# Patient Record
Sex: MALE | Race: WHITE | NOT HISPANIC OR LATINO | Employment: OTHER | URBAN - METROPOLITAN AREA
[De-identification: names, ages, dates, MRNs, and addresses within clinical notes are randomized per-mention and may not be internally consistent; named-entity substitution may affect disease eponyms.]

---

## 2017-05-26 ENCOUNTER — HOSPITAL ENCOUNTER (INPATIENT)
Facility: HOSPITAL | Age: 72
LOS: 4 days | Discharge: HOME | DRG: 687 | End: 2017-05-30
Attending: EMERGENCY MEDICINE | Admitting: HOSPITALIST
Payer: MEDICARE

## 2017-05-26 DIAGNOSIS — M51.9 LUMBAR DISC LESION: Primary | ICD-10-CM

## 2017-05-26 DIAGNOSIS — M54.5 CHRONIC LOW BACK PAIN, UNSPECIFIED BACK PAIN LATERALITY, WITH SCIATICA PRESENCE UNSPECIFIED: ICD-10-CM

## 2017-05-26 DIAGNOSIS — G89.29 CHRONIC LOW BACK PAIN, UNSPECIFIED BACK PAIN LATERALITY, WITH SCIATICA PRESENCE UNSPECIFIED: ICD-10-CM

## 2017-05-26 PROBLEM — E78.5 HLD (HYPERLIPIDEMIA): Status: ACTIVE | Noted: 2017-05-26

## 2017-05-26 PROBLEM — I10 BENIGN ESSENTIAL HTN: Status: ACTIVE | Noted: 2017-05-26

## 2017-05-26 PROBLEM — E03.9 HYPOTHYROID: Status: ACTIVE | Noted: 2017-05-26

## 2017-05-26 PROBLEM — I25.10 CAD (CORONARY ARTERY DISEASE): Status: ACTIVE | Noted: 2017-05-26

## 2017-05-26 PROBLEM — Z86.73 HISTORY OF CVA (CEREBROVASCULAR ACCIDENT): Status: ACTIVE | Noted: 2017-05-26

## 2017-05-26 PROBLEM — M54.9 CHRONIC BACK PAIN: Status: ACTIVE | Noted: 2017-05-26

## 2017-05-26 PROBLEM — K21.9 GERD (GASTROESOPHAGEAL REFLUX DISEASE): Status: ACTIVE | Noted: 2017-05-26

## 2017-05-26 PROBLEM — E11.9 DM2 (DIABETES MELLITUS, TYPE 2): Status: ACTIVE | Noted: 2017-05-26

## 2017-05-26 LAB
ABO + RH BLD: NORMAL
BLD GP AB SCN CELLS X3 SERPL QL: NORMAL
INR PPP: 1
POCT GLUCOSE: 211 MG/DL (ref 70–110)
PROTHROMBIN TIME: 10.8 SEC

## 2017-05-26 PROCEDURE — 11000001 HC ACUTE MED/SURG PRIVATE ROOM

## 2017-05-26 PROCEDURE — 86900 BLOOD TYPING SEROLOGIC ABO: CPT

## 2017-05-26 PROCEDURE — 99223 1ST HOSP IP/OBS HIGH 75: CPT | Mod: AI,GC,, | Performed by: HOSPITALIST

## 2017-05-26 PROCEDURE — 99285 EMERGENCY DEPT VISIT HI MDM: CPT | Mod: 25

## 2017-05-26 PROCEDURE — 94761 N-INVAS EAR/PLS OXIMETRY MLT: CPT

## 2017-05-26 PROCEDURE — 25000003 PHARM REV CODE 250: Performed by: SURGERY

## 2017-05-26 PROCEDURE — 85610 PROTHROMBIN TIME: CPT

## 2017-05-26 PROCEDURE — 86901 BLOOD TYPING SEROLOGIC RH(D): CPT

## 2017-05-26 PROCEDURE — 63600175 PHARM REV CODE 636 W HCPCS: Performed by: STUDENT IN AN ORGANIZED HEALTH CARE EDUCATION/TRAINING PROGRAM

## 2017-05-26 PROCEDURE — 82962 GLUCOSE BLOOD TEST: CPT

## 2017-05-26 PROCEDURE — 99285 EMERGENCY DEPT VISIT HI MDM: CPT | Mod: ,,, | Performed by: EMERGENCY MEDICINE

## 2017-05-26 PROCEDURE — 99223 1ST HOSP IP/OBS HIGH 75: CPT | Mod: ,,, | Performed by: NEUROLOGICAL SURGERY

## 2017-05-26 RX ORDER — INSULIN ASPART 100 [IU]/ML
0-5 INJECTION, SOLUTION INTRAVENOUS; SUBCUTANEOUS
Status: DISCONTINUED | OUTPATIENT
Start: 2017-05-26 | End: 2017-05-27

## 2017-05-26 RX ORDER — FENOFIBRATE 145 MG/1
145 TABLET, FILM COATED ORAL DAILY
Status: DISCONTINUED | OUTPATIENT
Start: 2017-05-27 | End: 2017-05-30 | Stop reason: HOSPADM

## 2017-05-26 RX ORDER — LEVOTHYROXINE SODIUM 25 UG/1
25 TABLET ORAL
Status: DISCONTINUED | OUTPATIENT
Start: 2017-05-27 | End: 2017-05-30 | Stop reason: HOSPADM

## 2017-05-26 RX ORDER — VITAMIN E 268 MG
800 CAPSULE ORAL DAILY
Status: DISCONTINUED | OUTPATIENT
Start: 2017-05-27 | End: 2017-05-30 | Stop reason: HOSPADM

## 2017-05-26 RX ORDER — INSULIN ASPART 100 [IU]/ML
24 INJECTION, SOLUTION INTRAVENOUS; SUBCUTANEOUS
Status: DISCONTINUED | OUTPATIENT
Start: 2017-05-27 | End: 2017-05-27

## 2017-05-26 RX ORDER — AMLODIPINE BESYLATE 10 MG/1
10 TABLET ORAL DAILY
Status: DISCONTINUED | OUTPATIENT
Start: 2017-05-27 | End: 2017-05-26

## 2017-05-26 RX ORDER — DIAZEPAM 5 MG/1
5 TABLET ORAL EVERY 6 HOURS PRN
Status: DISCONTINUED | OUTPATIENT
Start: 2017-05-26 | End: 2017-05-30 | Stop reason: HOSPADM

## 2017-05-26 RX ORDER — FOLIC ACID 1 MG/1
1000 TABLET ORAL DAILY
Status: DISCONTINUED | OUTPATIENT
Start: 2017-05-27 | End: 2017-05-30 | Stop reason: HOSPADM

## 2017-05-26 RX ORDER — IBUPROFEN 200 MG
16 TABLET ORAL
Status: DISCONTINUED | OUTPATIENT
Start: 2017-05-26 | End: 2017-05-30 | Stop reason: HOSPADM

## 2017-05-26 RX ORDER — GLUCAGON 1 MG
1 KIT INJECTION
Status: DISCONTINUED | OUTPATIENT
Start: 2017-05-26 | End: 2017-05-30 | Stop reason: HOSPADM

## 2017-05-26 RX ORDER — ASPIRIN 81 MG/1
81 TABLET ORAL DAILY
Status: DISCONTINUED | OUTPATIENT
Start: 2017-05-27 | End: 2017-05-27

## 2017-05-26 RX ORDER — AMLODIPINE BESYLATE 10 MG/1
10 TABLET ORAL
Status: COMPLETED | OUTPATIENT
Start: 2017-05-26 | End: 2017-05-26

## 2017-05-26 RX ORDER — FENOFIBRATE 134 MG/1
134 CAPSULE ORAL
Status: DISCONTINUED | OUTPATIENT
Start: 2017-05-27 | End: 2017-05-26

## 2017-05-26 RX ORDER — LISINOPRIL 20 MG/1
40 TABLET ORAL DAILY
Status: DISCONTINUED | OUTPATIENT
Start: 2017-05-27 | End: 2017-05-30 | Stop reason: HOSPADM

## 2017-05-26 RX ORDER — PANTOPRAZOLE SODIUM 40 MG/1
40 TABLET, DELAYED RELEASE ORAL DAILY
Status: DISCONTINUED | OUTPATIENT
Start: 2017-05-27 | End: 2017-05-30 | Stop reason: HOSPADM

## 2017-05-26 RX ORDER — IBUPROFEN 200 MG
24 TABLET ORAL
Status: DISCONTINUED | OUTPATIENT
Start: 2017-05-26 | End: 2017-05-30 | Stop reason: HOSPADM

## 2017-05-26 RX ORDER — HEPARIN SODIUM 5000 [USP'U]/ML
5000 INJECTION, SOLUTION INTRAVENOUS; SUBCUTANEOUS EVERY 8 HOURS
Status: DISCONTINUED | OUTPATIENT
Start: 2017-05-27 | End: 2017-05-28

## 2017-05-26 RX ORDER — INSULIN ASPART 100 [IU]/ML
1-10 INJECTION, SOLUTION INTRAVENOUS; SUBCUTANEOUS
Status: DISCONTINUED | OUTPATIENT
Start: 2017-05-26 | End: 2017-05-26

## 2017-05-26 RX ADMIN — AMLODIPINE BESYLATE 10 MG: 10 TABLET ORAL at 09:05

## 2017-05-26 RX ADMIN — INSULIN DETEMIR 32 UNITS: 100 INJECTION, SOLUTION SUBCUTANEOUS at 11:05

## 2017-05-27 PROBLEM — C64.2 RENAL CELL CARCINOMA OF LEFT KIDNEY: Status: ACTIVE | Noted: 2017-05-27

## 2017-05-27 PROBLEM — C78.7: Status: ACTIVE | Noted: 2017-05-27

## 2017-05-27 PROBLEM — C80.1: Status: ACTIVE | Noted: 2017-05-27

## 2017-05-27 PROBLEM — N28.89 RENAL MASS: Status: ACTIVE | Noted: 2017-05-27

## 2017-05-27 LAB
ALBUMIN SERPL BCP-MCNC: 3.8 G/DL
ALP SERPL-CCNC: 94 U/L
ALT SERPL W/O P-5'-P-CCNC: 34 U/L
ANION GAP SERPL CALC-SCNC: 15 MMOL/L
AST SERPL-CCNC: 23 U/L
BASOPHILS # BLD AUTO: 0.01 K/UL
BASOPHILS NFR BLD: 0.1 %
BILIRUB SERPL-MCNC: 0.6 MG/DL
BUN SERPL-MCNC: 25 MG/DL
CALCIUM SERPL-MCNC: 9.9 MG/DL
CHLORIDE SERPL-SCNC: 96 MMOL/L
CO2 SERPL-SCNC: 22 MMOL/L
COMPLEXED PSA SERPL-MCNC: 0.61 NG/ML
CREAT SERPL-MCNC: 1.4 MG/DL
DIFFERENTIAL METHOD: ABNORMAL
EOSINOPHIL # BLD AUTO: 0 K/UL
EOSINOPHIL NFR BLD: 0 %
ERYTHROCYTE [DISTWIDTH] IN BLOOD BY AUTOMATED COUNT: 13 %
EST. GFR  (AFRICAN AMERICAN): 57.6 ML/MIN/1.73 M^2
EST. GFR  (NON AFRICAN AMERICAN): 49.8 ML/MIN/1.73 M^2
ESTIMATED AVG GLUCOSE: 272 MG/DL
GLUCOSE SERPL-MCNC: 534 MG/DL
HBA1C MFR BLD HPLC: 11.1 %
HCT VFR BLD AUTO: 45.9 %
HGB BLD-MCNC: 16 G/DL
LYMPHOCYTES # BLD AUTO: 1.2 K/UL
LYMPHOCYTES NFR BLD: 13.6 %
MCH RBC QN AUTO: 30.6 PG
MCHC RBC AUTO-ENTMCNC: 34.9 %
MCV RBC AUTO: 88 FL
MONOCYTES # BLD AUTO: 0.1 K/UL
MONOCYTES NFR BLD: 1.3 %
NEUTROPHILS # BLD AUTO: 7.4 K/UL
NEUTROPHILS NFR BLD: 84.5 %
PLATELET # BLD AUTO: 300 K/UL
PMV BLD AUTO: 10.9 FL
POCT GLUCOSE: 181 MG/DL (ref 70–110)
POCT GLUCOSE: 204 MG/DL (ref 70–110)
POCT GLUCOSE: 250 MG/DL (ref 70–110)
POCT GLUCOSE: 267 MG/DL (ref 70–110)
POCT GLUCOSE: 282 MG/DL (ref 70–110)
POCT GLUCOSE: 283 MG/DL (ref 70–110)
POCT GLUCOSE: 287 MG/DL (ref 70–110)
POCT GLUCOSE: 288 MG/DL (ref 70–110)
POCT GLUCOSE: 304 MG/DL (ref 70–110)
POCT GLUCOSE: 322 MG/DL (ref 70–110)
POCT GLUCOSE: 352 MG/DL (ref 70–110)
POCT GLUCOSE: 401 MG/DL (ref 70–110)
POCT GLUCOSE: 401 MG/DL (ref 70–110)
POCT GLUCOSE: 413 MG/DL (ref 70–110)
POTASSIUM SERPL-SCNC: 4.2 MMOL/L
POTASSIUM SERPL-SCNC: 4.6 MMOL/L
PROT SERPL-MCNC: 7.4 G/DL
RBC # BLD AUTO: 5.23 M/UL
SODIUM SERPL-SCNC: 133 MMOL/L
WBC # BLD AUTO: 8.78 K/UL

## 2017-05-27 PROCEDURE — 25000003 PHARM REV CODE 250: Performed by: STUDENT IN AN ORGANIZED HEALTH CARE EDUCATION/TRAINING PROGRAM

## 2017-05-27 PROCEDURE — 84153 ASSAY OF PSA TOTAL: CPT

## 2017-05-27 PROCEDURE — A9585 GADOBUTROL INJECTION: HCPCS | Performed by: HOSPITALIST

## 2017-05-27 PROCEDURE — 25500020 PHARM REV CODE 255: Performed by: HOSPITALIST

## 2017-05-27 PROCEDURE — 25000003 PHARM REV CODE 250: Performed by: SURGERY

## 2017-05-27 PROCEDURE — 84132 ASSAY OF SERUM POTASSIUM: CPT

## 2017-05-27 PROCEDURE — 20600001 HC STEP DOWN PRIVATE ROOM

## 2017-05-27 PROCEDURE — 99222 1ST HOSP IP/OBS MODERATE 55: CPT | Mod: ,,, | Performed by: UROLOGY

## 2017-05-27 PROCEDURE — 63600175 PHARM REV CODE 636 W HCPCS: Performed by: STUDENT IN AN ORGANIZED HEALTH CARE EDUCATION/TRAINING PROGRAM

## 2017-05-27 PROCEDURE — 36415 COLL VENOUS BLD VENIPUNCTURE: CPT

## 2017-05-27 PROCEDURE — 80053 COMPREHEN METABOLIC PANEL: CPT

## 2017-05-27 PROCEDURE — 85025 COMPLETE CBC W/AUTO DIFF WBC: CPT

## 2017-05-27 PROCEDURE — 25500020 PHARM REV CODE 255: Performed by: RADIOLOGY

## 2017-05-27 PROCEDURE — 99233 SBSQ HOSP IP/OBS HIGH 50: CPT | Mod: GC,,, | Performed by: HOSPITALIST

## 2017-05-27 PROCEDURE — 83036 HEMOGLOBIN GLYCOSYLATED A1C: CPT

## 2017-05-27 RX ORDER — MIDAZOLAM HYDROCHLORIDE 1 MG/ML
2 INJECTION INTRAMUSCULAR; INTRAVENOUS ONCE
Status: COMPLETED | OUTPATIENT
Start: 2017-05-27 | End: 2017-05-27

## 2017-05-27 RX ORDER — INSULIN ASPART 100 [IU]/ML
1-10 INJECTION, SOLUTION INTRAVENOUS; SUBCUTANEOUS
Status: DISCONTINUED | OUTPATIENT
Start: 2017-05-27 | End: 2017-05-27

## 2017-05-27 RX ORDER — GADOBUTROL 604.72 MG/ML
10 INJECTION INTRAVENOUS
Status: COMPLETED | OUTPATIENT
Start: 2017-05-27 | End: 2017-05-27

## 2017-05-27 RX ADMIN — IOHEXOL 15 ML: 350 INJECTION, SOLUTION INTRAVENOUS at 02:05

## 2017-05-27 RX ADMIN — HEPARIN SODIUM 5000 UNITS: 5000 INJECTION, SOLUTION INTRAVENOUS; SUBCUTANEOUS at 05:05

## 2017-05-27 RX ADMIN — FOLIC ACID 1000 MCG: 1 TABLET ORAL at 10:05

## 2017-05-27 RX ADMIN — HEPARIN SODIUM 5000 UNITS: 5000 INJECTION, SOLUTION INTRAVENOUS; SUBCUTANEOUS at 02:05

## 2017-05-27 RX ADMIN — MIDAZOLAM HYDROCHLORIDE 2 MG: 1 INJECTION, SOLUTION INTRAMUSCULAR; INTRAVENOUS at 04:05

## 2017-05-27 RX ADMIN — FENOFIBRATE 145 MG: 145 TABLET, FILM COATED ORAL at 10:05

## 2017-05-27 RX ADMIN — SODIUM CHLORIDE 9 UNITS/HR: 9 INJECTION, SOLUTION INTRAVENOUS at 10:05

## 2017-05-27 RX ADMIN — PANTOPRAZOLE SODIUM 40 MG: 40 TABLET, DELAYED RELEASE ORAL at 10:05

## 2017-05-27 RX ADMIN — HEPARIN SODIUM 5000 UNITS: 5000 INJECTION, SOLUTION INTRAVENOUS; SUBCUTANEOUS at 09:05

## 2017-05-27 RX ADMIN — LEVOTHYROXINE SODIUM 25 MCG: 25 TABLET ORAL at 05:05

## 2017-05-27 RX ADMIN — IOHEXOL 100 ML: 350 INJECTION, SOLUTION INTRAVENOUS at 04:05

## 2017-05-27 RX ADMIN — VITAMIN E CAP 400 UNIT 800 UNITS: 400 CAP at 10:05

## 2017-05-27 RX ADMIN — GADOBUTROL 10 ML: 604.72 INJECTION INTRAVENOUS at 05:05

## 2017-05-27 RX ADMIN — INSULIN ASPART 10 UNITS: 100 INJECTION, SOLUTION INTRAVENOUS; SUBCUTANEOUS at 08:05

## 2017-05-27 RX ADMIN — SODIUM CHLORIDE 7 UNITS/HR: 9 INJECTION, SOLUTION INTRAVENOUS at 10:05

## 2017-05-27 RX ADMIN — LISINOPRIL 40 MG: 20 TABLET ORAL at 10:05

## 2017-05-27 NOTE — CONSULTS
Consult Note  Neurosurgery    Consult Requested By: ED  Reason for Consult: L3 vertebral body mass    SUBJECTIVE:     History of Present Illness:  Patient is a 72 y.o. male presents with 5 months of worsening back pain and BLE weakness. Patient notes no inciting event. He notes pain limited weakness in BLE worse on right. He had a couple falls over past day prompting visit to ED. He denied any numbness, incontinence. He has no h/o CA but was former smoker.     Scheduled Meds:   fenofibrate  145 mg Oral Daily    folic acid  1,000 mcg Oral Daily    heparin (porcine)  5,000 Units Subcutaneous Q8H    levothyroxine  25 mcg Oral Before breakfast    lisinopril  40 mg Oral Daily    pantoprazole  40 mg Oral Daily    vitamin E  800 Units Oral Daily     Continuous Infusions:   insulin (HUMAN R) infusion (adults) 4 Units/hr (05/27/17 1209)     PRN Meds:dextrose 50%, dextrose 50%, diazePAM, glucagon (human recombinant), glucose, glucose    Review of patient's allergies indicates:  No Known Allergies    Past Medical History:   Diagnosis Date    Chronic back pain     Coronary artery disease     Diabetes mellitus     GERD (gastroesophageal reflux disease)     Hyperlipemia     Thyroid disease      Past Surgical History:   Procedure Laterality Date    ABDOMINAL SURGERY      APPENDECTOMY      BACK SURGERY      CARDIAC SURGERY      CHOLECYSTECTOMY       No family history on file.  Social History   Substance Use Topics    Smoking status: Never Smoker    Smokeless tobacco: Not on file    Alcohol use No        Review of Systems:  Constitutional: Negative for chills and fever.   HENT: Negative for congestion.    Respiratory: Negative for cough, chest tightness and shortness of breath.    Cardiovascular: Negative for chest pain, palpitations and leg swelling.   Gastrointestinal: Negative for abdominal distention, abdominal pain and anal bleeding.   Genitourinary: Negative.  Negative for dysuria.   Musculoskeletal:  Positive for gait problem, myalgias and neck stiffness.   Skin: Negative.      OBJECTIVE:     Vital Signs (Most Recent)  Temp: 98.2 °F (36.8 °C) (05/27/17 0802)  Pulse: 92 (05/27/17 0802)  Resp: 18 (05/27/17 0802)  BP: (!) 178/86 (05/27/17 0802)  SpO2: 97 % (05/27/17 0802)    Vital Signs Range (Last 24H):  Temp:  [97.8 °F (36.6 °C)-98.5 °F (36.9 °C)]   Pulse:  [76-93]   Resp:  [16-20]   BP: (156-210)/(77-86)   SpO2:  [93 %-100 %]     Physical Exam:  Constitutional: He appears well-developed and well-nourished.   HENT:   Head: Normocephalic.   Eyes: Pupils are equal, round, and reactive to light.   Neck: Normal range of motion.   Cardiovascular: Normal rate and regular rhythm.   Pulmonary/Chest: Breath sounds normal.   Abdominal: Soft. Bowel sounds are normal.   Neurological: He is alert and oriented to person, place, and time. No cranial nerve deficit or sensory deficit.   Full strength in all muscle groups throughout  SILT  Reflexes 1+ throughout  No hoffmans or clonus  Rectal tone present  Psychiatric: He has a normal mood and affect.     Laboratory:  CBC:   Recent Labs  Lab 05/27/17  0437   WBC 8.78   RBC 5.23   HGB 16.0   HCT 45.9      MCV 88   MCH 30.6   MCHC 34.9     BMP:   Recent Labs  Lab 05/26/17  1220 05/27/17  0437 05/27/17  1048   * 534*  --     133*  --    K 4.0 4.2 4.6    96  --    CO2 25 22*  --    BUN 27* 25*  --    CREATININE 1.18 1.4  --    CALCIUM 9.8 9.9  --    MG 1.8  --   --      Coagulation:   Recent Labs  Lab 05/26/17  2105   INR 1.0     Cardiac markers: No results for input(s): CKMB, CPKMB, TROPONINT, TROPONINI, MYOGLOBIN in the last 168 hours.    Recent Labs  Lab 05/26/17  1336   COLORU Yellow   SPECGRAV 1.025   PHUR 6.0   PROTEINUA 3+*   BACTERIA Rare   NITRITE Negative   LEUKOCYTESUR Negative   UROBILINOGEN Negative   HYALINECASTS 0       Diagnostic Results:  CT: Reviewed  MRI: Reviewed    ASSESSMENT/PLAN:     L3 vertebral body lesion without any significant  central canal compromise; multi level neuroforaminal stenosis     -no neurosurgical intervention at this time  -recommend biopsy for diagnosis  -MRI L and T spine without significant central canal compromise   -ordered brace for comfort and standing xrays in brace to ensure stability  -please call if further questions exist; will sign off

## 2017-05-27 NOTE — HPI
"Patient is a 73 yo man with PMH CAD (s/p 5 stents, remotely), DM2 (insulin dependent), hypothyroid, CVA (2013), and chronic back pain who is transferred to [facility] [place], [place] for Neurosurgery evaluation of Lumbar spine lesion in the setting of progressive weakness of his legs and exacerbation of chronic back pain. He reports back pain x5 months [facility]. His original complaint was of right flank pain more than centralized discomfort. The pain does occasionally shoot down his legs. He denies any incontinence of bowel or bladder. More recently he has developed blt LE weakness, causing him to fall two times yesterday. He reports he was walking on the grass and fell, no LOC, did not hit his head, no residual pain. After the fall he presented to the ED at [facility]. He had an MRI showing "L3 vertebral body lesion with apparent cortical disruption concerning for metastases vs myeloma," and was transferred to [facility] [place], [place] for Neurosurgery evaluation. He is admitted to Hospital Medicine for further care and malignant work up.     Patient denies any fever/chills, though he does report fatigue, and loss of appetite over the last few months resulting in 8-10 pound weight loss. Of note, he has a significant smoking history 2-3 PPD x 36 years (quit 24 years ago). No longer drinks alcohol, no h/o IVDU, no other drugs.   "

## 2017-05-27 NOTE — SUBJECTIVE & OBJECTIVE
Past Medical History:   Diagnosis Date    Chronic back pain     Coronary artery disease     Diabetes mellitus     GERD (gastroesophageal reflux disease)     Hyperlipemia     Thyroid disease        Past Surgical History:   Procedure Laterality Date    ABDOMINAL SURGERY      APPENDECTOMY      BACK SURGERY      CARDIAC SURGERY      CHOLECYSTECTOMY         Review of patient's allergies indicates:  No Known Allergies    Family History     None          Social History Main Topics    Smoking status: Never Smoker    Smokeless tobacco: Not on file    Alcohol use No    Drug use: No    Sexual activity: Not on file       Review of Systems    Objective:     Temp:  [97.8 °F (36.6 °C)-98.5 °F (36.9 °C)] 98.2 °F (36.8 °C)  Pulse:  [76-93] 92  Resp:  [16-20] 18  SpO2:  [93 %-100 %] 97 %  BP: (156-210)/(77-86) 178/86     Body mass index is 30.7 kg/m².      Date 05/27/17 0700 - 05/28/17 0659   Shift 0003-9262 8276-7528 9766-8364 24 Hour Total   I  N  T  A  K  E   Shift Total  (mL/kg)       O  U  T  P  U  T   Urine  (mL/kg/hr) 800   800    Shift Total  (mL/kg) 800  (9)   800  (9)   Weight (kg) 88.9 88.9 88.9 88.9     Bladder Scan Volume (mL): 177 mL (05/27/17 0500)    Drains     Drain                 Urethral Catheter 05/27/17 0657 16 Fr. less than 1 day                Physical Exam   Constitutional: He appears well-developed and well-nourished. No distress.   HENT:   Head: Normocephalic and atraumatic.   Cardiovascular: Normal rate.    Pulmonary/Chest: Effort normal. No respiratory distress. He has no wheezes.   Abdominal: Soft. He exhibits no distension. There is no tenderness. There is CVA tenderness (mild left). There is no rebound and no guarding.   Genitourinary: Testes normal and penis normal. Right testis shows no mass, no swelling and no tenderness. Left testis shows no mass, no swelling and no tenderness. Circumcised.   Genitourinary Comments: 16 fr lynn catheter draining clear yellow urine   Skin: He is not  diaphoretic.         Significant Labs:    BMP:    Recent Labs  Lab 05/26/17  1220 05/27/17  0437 05/27/17  1048    133*  --    K 4.0 4.2 4.6    96  --    CO2 25 22*  --    BUN 27* 25*  --    CREATININE 1.18 1.4  --    CALCIUM 9.8 9.9  --        CBC:    Recent Labs  Lab 05/26/17  1220 05/27/17  0437   WBC 5.67 8.78   HGB 16.2 16.0   HCT 46.7 45.9    300       Blood Culture: No results for input(s): LABBLOO in the last 168 hours.  Urine Culture: No results for input(s): LABURIN in the last 168 hours.  Urine Studies:   Recent Labs  Lab 05/26/17  1336   COLORU Yellow   APPEARANCEUA Clear   PHUR 6.0   SPECGRAV 1.025   PROTEINUA 3+*   GLUCUA 4+*   KETONESU Trace*   BILIRUBINUA Negative   OCCULTUA Trace*   NITRITE Negative   UROBILINOGEN Negative   LEUKOCYTESUR Negative   RBCUA 3   WBCUA 1   BACTERIA Rare   SQUAMEPITHEL 2   HYALINECASTS 0       Significant Imaging:  CT: I have reviewed all results within the past 24 hours and my personal findings are:  3.6 cm atypical-appearing left lower pole enhancing renal mass.  1 cm upper pole enhancing lesion on right kidney.  Liver mets.  No pelvic or periaortic lymphadenopathy.  No hydronephrosis or stones.  Ureters opacify on delayed images and contrast noted in bladder.  Bladder distended, no obvious masses.

## 2017-05-27 NOTE — ASSESSMENT & PLAN NOTE
- Insulin drip given sugars, will transfer to step down unit   - A1c 11.1  - Home regimen: Lantus 40U qam and qhs, Novalog 40U with meals (twice /day).

## 2017-05-27 NOTE — SUBJECTIVE & OBJECTIVE
Interval History: NAEON.  CT showed masses in the kidney and liver (concern for renal cell carcinoma).  Reports improvement in lower extremity weakness.  Tolerating diet well.  Currently has no complaints.      Review of Systems   Constitutional: Positive for unexpected weight change. Negative for chills and fever.   HENT: Negative for rhinorrhea and sore throat.    Eyes: Negative for visual disturbance.   Respiratory: Negative for shortness of breath.    Cardiovascular: Negative for chest pain and leg swelling.   Gastrointestinal: Negative for abdominal pain, blood in stool, constipation, diarrhea and nausea.   Genitourinary: Negative for dysuria and hematuria.   Musculoskeletal: Positive for back pain and gait problem.   Neurological: Positive for weakness. Negative for headaches.     Objective:     Vital Signs (Most Recent):  Temp: 98.2 °F (36.8 °C) (05/27/17 0802)  Pulse: 92 (05/27/17 0802)  Resp: 18 (05/27/17 0802)  BP: (!) 178/86 (05/27/17 0802)  SpO2: 97 % (05/27/17 0802) Vital Signs (24h Range):  Temp:  [97.8 °F (36.6 °C)-98.5 °F (36.9 °C)] 98.2 °F (36.8 °C)  Pulse:  [76-93] 92  Resp:  [16-20] 18  SpO2:  [93 %-100 %] 97 %  BP: (156-210)/(77-86) 178/86     Weight: 88.9 kg (196 lb)  Body mass index is 30.7 kg/m².    Intake/Output Summary (Last 24 hours) at 05/27/17 1321  Last data filed at 05/27/17 1049   Gross per 24 hour   Intake              500 ml   Output              800 ml   Net             -300 ml      Physical Exam   Constitutional: He is oriented to person, place, and time. He appears well-developed and well-nourished. No distress.   HENT:   Head: Normocephalic and atraumatic.   Mouth/Throat: Oropharynx is clear and moist.   Eyes: EOM are normal. Pupils are equal, round, and reactive to light.   Neck: Normal range of motion.   Cardiovascular: Normal rate, regular rhythm and normal heart sounds.    Pulmonary/Chest: Effort normal and breath sounds normal.   Abdominal: Soft. Bowel sounds are normal. He  exhibits no distension. There is no tenderness.   Musculoskeletal: He exhibits no edema.   No spinal tenderness. Some tenderness over right flank.    Neurological: He is alert and oriented to person, place, and time.   Weakness of blt LE and UE. Toes downgoing on babinski. Unable to illicit patellar DTR.   Trace right sided facial weakness.    Skin: Skin is warm and dry. He is not diaphoretic.   Nursing note and vitals reviewed.      Significant Labs: All pertinent labs within the past 24 hours have been reviewed.    Significant Imaging: I have reviewed all pertinent imaging results/findings within the past 24 hours.

## 2017-05-27 NOTE — ASSESSMENT & PLAN NOTE
- s/p 5 stents remotely  - no acute issues  - Cont home ASA, amlodipine, lisinopril.   - Per NSG, holding home plavix for now.

## 2017-05-27 NOTE — CONSULTS
[facility]  Urology  Consult Note    Patient Name: Edwin Singleton  MRN: [id number]  Admission Date: 5/26/2017  Hospital Length of Stay: 1   Code Status: Full Code   Attending Provider: Hector Lloyd MD  Consulting Provider: Jessee Merritt MD  Primary Care Physician: No primary care provider on file.  Principal Problem:Lumbar disc lesion    Inpatient consult to Urology  Consult performed by: JESSEE MERRITT.  Consult ordered by: RENETTA SUAREZ          Subjective:     HPI:  72 YOM with hx of BPH on finasteride, DMII, HTN, CAD s/p PCI x 5, CVA, admitted with BLE weakness and back pain with L3 lytic lesion.  Patient found to have 3.6 cm atypical-appearing left renal mass, 1 cm right renal mass,  liver and lung mets.      Urology is consulted for nephrectomy.     Patient states that he has had back pain, mostly on right flank, and BLE weakness for 4-5 months.  He has lost 10 pounds.  He is a former ~100 pack year smoker, remote.  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  He has no personal history of cancer.  No family history of  malignancy.      He denies gross hematuria, dysuria, frequency, hesitancy, intermittency, stranguria.  He complains of urgency with very low volume UUI occasionally, as well as nocturia x 3.  One episode of prostatitis treated with abx.  No other urologic history.      He denies HA, vision change, seizures, F/C/N/V.  He does note dry cough for past 4-5 months.     Past Medical History:   Diagnosis Date    Chronic back pain     Coronary artery disease     Diabetes mellitus     GERD (gastroesophageal reflux disease)     Hyperlipemia     Thyroid disease        Past Surgical History:   Procedure Laterality Date    ABDOMINAL SURGERY      APPENDECTOMY      BACK SURGERY      CARDIAC SURGERY      CHOLECYSTECTOMY         Review of patient's allergies indicates:  No Known  Allergies    Family History     None          Social History Main Topics    Smoking status: Never Smoker    Smokeless tobacco: Not on file    Alcohol use No    Drug use: No    Sexual activity: Not on file       Review of Systems    Objective:     Temp:  [97.8 °F (36.6 °C)-98.5 °F (36.9 °C)] 98.2 °F (36.8 °C)  Pulse:  [76-93] 92  Resp:  [16-20] 18  SpO2:  [93 %-100 %] 97 %  BP: (156-210)/(77-86) 178/86     Body mass index is 30.7 kg/m².      Date 05/27/17 0700 - 05/28/17 0659   Shift 0706-0816 1584-0299 7192-0620 24 Hour Total   I  N  T  A  K  E   Shift Total  (mL/kg)       O  U  T  P  U  T   Urine  (mL/kg/hr) 800   800    Shift Total  (mL/kg) 800  (9)   800  (9)   Weight (kg) 88.9 88.9 88.9 88.9     Bladder Scan Volume (mL): 177 mL (05/27/17 0500)    Drains     Drain                 Urethral Catheter 05/27/17 0657 16 Fr. less than 1 day                Physical Exam   Constitutional: He appears well-developed and well-nourished. No distress.   HENT:   Head: Normocephalic and atraumatic.   Cardiovascular: Normal rate.    Pulmonary/Chest: Effort normal. No respiratory distress. He has no wheezes.   Abdominal: Soft. He exhibits no distension. There is no tenderness. There is CVA tenderness (mild left). There is no rebound and no guarding.   Genitourinary: Testes normal and penis normal. Right testis shows no mass, no swelling and no tenderness. Left testis shows no mass, no swelling and no tenderness. Circumcised.   Genitourinary Comments: 16 fr lynn catheter draining clear yellow urine   BRENDA--no prostate nodules, normal sphincter tone  Skin: He is not diaphoretic.         Significant Labs:    BMP:    Recent Labs  Lab 05/26/17  1220 05/27/17  0437 05/27/17  1048    133*  --    K 4.0 4.2 4.6    96  --    CO2 25 22*  --    BUN 27* 25*  --    CREATININE 1.18 1.4  --    CALCIUM 9.8 9.9  --        CBC:    Recent Labs  Lab 05/26/17  1220 05/27/17  0437   WBC 5.67 8.78   HGB 16.2 16.0   HCT 46.7 45.9   PLT  288 300       Blood Culture: No results for input(s): LABBLOO in the last 168 hours.  Urine Culture: No results for input(s): LABURIN in the last 168 hours.  Urine Studies:   Recent Labs  Lab 05/26/17  1336   COLORU Yellow   APPEARANCEUA Clear   PHUR 6.0   SPECGRAV 1.025   PROTEINUA 3+*   GLUCUA 4+*   KETONESU Trace*   BILIRUBINUA Negative   OCCULTUA Trace*   NITRITE Negative   UROBILINOGEN Negative   LEUKOCYTESUR Negative   RBCUA 3   WBCUA 1   BACTERIA Rare   SQUAMEPITHEL 2   HYALINECASTS 0       Significant Imaging:  CT: I have reviewed all results within the past 24 hours and my personal findings are:  3.6 cm atypical-appearing left lower pole enhancing renal mass.  1 cm upper pole enhancing lesion on right kidney.  Liver mets.  No pelvic or periaortic lymphadenopathy.  No hydronephrosis or stones.  Ureters opacify on delayed images and contrast noted in bladder.  Bladder distended, no obvious masses.                     Assessment and Plan:     Renal mass    72 YOM with hx of BPH on finasteride, DMII, HTN, CAD s/p PCI x 5, CVA, admitted with BLE weakness and back pain with L3 lytic lesion.  Patient found to have 3.6 cm atypical-appearing left renal mass, 1 cm right renal mass,  liver and lung mets.      - recommend heme onc, neurosurgery consults, consider rad onc consult   - need tissue diagnosis, recommend biopsy of either spinal met or hepatic mets as percutaneous renal biopsy would be difficult given location of mass and high false negative rate   - if this is metastatic renal cell carcinoma, patient not likely to benefit from cytoreductive nephrectomy due to current poor performance status and liver mets              VTE Risk Mitigation         Ordered     heparin (porcine) injection 5,000 Units  Every 8 hours     Route:  Subcutaneous        05/26/17 2310     High Risk of VTE  Once      05/26/17 2235          Jessee Merritt MD  Urology  [facility]  I have reviewed the notes,  assessments, and/or procedures performed by Dr. Merritt, I concur with her/his documentation of Edwin Singleton.  Needs tissue diagnosis and oncology.  Possible rad-onc if palliative radiation is possible.  No  intervention at this time.  Call with questions.

## 2017-05-27 NOTE — PLAN OF CARE
Problem: Patient Care Overview  Goal: Plan of Care Review  Outcome: Ongoing (interventions implemented as appropriate)  Pt aaox4, up with assist, AVSS on 2L NC, no c/o pain. accuchecks q1 hr maintained, titratable insulin gtt infusing. Pt repositions self, no signs of skin breakdown. Bed in lowest position, wheels locked, call light in reach, nonskid socks on, family at bedside. WCTM.

## 2017-05-27 NOTE — NURSING
"Spoke with IM1 MD again regarding pt having diet order while on insulin gtt; MD stated okay for pt to have diet at this time, because per MD "pt is not here for DKA, okay to have a diet and be on the drip." WCTM BGs hourly.   "

## 2017-05-27 NOTE — ASSESSMENT & PLAN NOTE
- A1c pending   - Home regimen: Lantus 40U qam and qhs, Novalog 40U with meals (twice /day).   - Will start with 80% home regimen and titrate as needed.   - Detemir 32U BID, Aspart 24U TIDWM, LDSSI.

## 2017-05-27 NOTE — PROGRESS NOTES
"[facility] Hospital Medicine  Progress Note    Patient Name: Edwin Singleton  MRN: [id number]  Patient Class: IP- Inpatient   Admission Date: 5/26/2017  Length of Stay: 1 days  Attending Physician: Vilma Rodgers MD  Primary Care Provider: No primary care provider on file.    Hospital Medicine Team: [facility] HOSP MED 1 Bi Florian MD    Subjective:     Principal Problem:Lumbar disc lesion    HPI:  Patient is a 71 yo man with PMH CAD (s/p 5 stents, remotely), DM2 (insulin dependent), hypothyroid, CVA (2013), and chronic back pain who is transferred to [facility] for Neurosurgery evaluation of Lumbar spine lesion in the setting of progressive weakness of his legs and exacerbation of chronic back pain. He reports back pain x5 months [facility]. His original complaint was of right flank pain more than centralized discomfort. The pain does occasionally shoot down his legs. He denies any incontinence of bowel or bladder. More recently he has developed blt LE weakness, causing him to fall two times yesterday. He reports he was walking on the grass and fell, no LOC, did not hit his head, no residual pain. After the fall he presented to the ED at [place]. He had an MRI showing "L3 vertebral body lesion with apparent cortical disruption concerning for metastases vs myeloma," and was transferred to [facility] for Neurosurgery evaluation. He is admitted to Hospital Medicine for further care and malignant work up.     Patient denies any fever/chills, though he does report fatigue, and loss of appetite over the last few months resulting in 8-10 pound weight loss. Of note, he has a significant smoking history 2-3 PPD x 36 years (quit 24 years ago). No longer drinks alcohol, no h/o IVDU, no other drugs.     Hospital Course:  No notes on file    No new subjective & objective note has been filed under this hospital service since the last note was " generated.    Assessment/Plan:      * Lumbar disc lesion    - Chronic back pain x5 months with recent worsening and new blt LE weakness.   - MRI showing L3 vertebral body lesion concerning for met vs myeloma  - Neurosurgery on board.   - Extended MRI T-spine tomorrow, as he has already received contrast today for MRI L-spine.   - CT showed masses in the kidney and liver, likely renal cell carcinoma,   - Urology on board, recs heme/onc, tissue biopsy            Renal mass    - See malignant neoplasm plan   - will speak with IR, anticipate biopsy Monday         Malignant neoplasm metastatic to liver with unknown primary site    - suspect Renal cell, Urology recommends heme/onc and biopsy           GERD (gastroesophageal reflux disease)    - pantoprazole.           Benign essential HTN    - Cont home amlodipine, lisinopril         HLD (hyperlipidemia)    - cont home finofibrate, vit E.           DM2 (diabetes mellitus, type 2)    - Insulin drip given sugars, will transfer to step down unit   - A1c 11.1  - Home regimen: Lantus 40U qam and qhs, Novalog 40U with meals (twice /day).             Chronic back pain              CAD (coronary artery disease)    - s/p 5 stents remotely  - no acute issues  - Cont home ASA, amlodipine, lisinopril.   - Per NSG, holding home plavix for now.           History of CVA (cerebrovascular accident)    - CVA in 2013, no residual deficits           Hypothyroid    - cont home levothyroxine             VTE Risk Mitigation         Ordered     heparin (porcine) injection 5,000 Units  Every 8 hours     Route:  Subcutaneous        05/26/17 2310     High Risk of VTE  Once      05/26/17 2235        Dispo:Likely renal cell carcinoma, urology recommends obtaining  tissue biopsy, neurosurgery recommends no acute intervention.       Bi Florian MD  Department of Hospital Medicine   [facility]

## 2017-05-27 NOTE — H&P
"[facility] Hospital Medicine  History & Physical    Patient Name: Edwin Singleton  MRN: [id number]  Admission Date: 5/26/2017  Attending Physician: Vilma Rodgers MD   Primary Care Provider: No primary care provider on file.    Hospital Medicine Team: [facility] HOSP MED 1 Karly Mcclellan MD     Patient information was obtained from patient and ER records.     Subjective:     Principal Problem:Lumbar disc lesion    Chief Complaint:   Chief Complaint   Patient presents with    transfer      pt presents to the ed from [facility] for eval of a tumor on the spine         HPI: Patient is a 73 yo man with PMH CAD (s/p 5 stents, remotely), DM2 (insulin dependent), hypothyroid, CVA (2013), and chronic back pain who is transferred to [facility] for Neurosurgery evaluation of Lumbar spine lesion in the setting of progressive weakness of his legs and exacerbation of chronic back pain. He reports back pain x5 months [facility]. His original complaint was of right flank pain more than centralized discomfort. The pain does occasionally shoot down his legs. He denies any incontinence of bowel or bladder. More recently he has developed blt LE weakness, causing him to fall two times yesterday. He reports he was walking on the grass and fell, no LOC, did not hit his head, no residual pain. After the fall he presented to the ED at [facility]. He had an MRI showing "L3 vertebral body lesion with apparent cortical disruption concerning for metastases vs myeloma," and was transferred to [facility] for Neurosurgery evaluation. He is admitted to Hospital Medicine for further care and malignant work up.     Patient denies any fever/chills, though he does report fatigue, and loss of appetite over the last few months resulting in 8-10 pound weight loss. Of note, he has a significant smoking history 2-3 PPD x 36 years (quit 24 years ago). No longer drinks alcohol, no h/o " IVDU, no other drugs.     Past Medical History:   Diagnosis Date    Chronic back pain     Coronary artery disease     Diabetes mellitus     GERD (gastroesophageal reflux disease)     Hyperlipemia     Thyroid disease        Past Surgical History:   Procedure Laterality Date    ABDOMINAL SURGERY      APPENDECTOMY      BACK SURGERY      CARDIAC SURGERY      CHOLECYSTECTOMY         Review of patient's allergies indicates:  No Known Allergies    Current Facility-Administered Medications on File Prior to Encounter   Medication    [COMPLETED] dexamethasone injection 10 mg    [COMPLETED] diazePAM injection 2 mg    [COMPLETED] gadobutrol 9 mL    [COMPLETED] sodium chloride 0.9% bolus 1,000 mL     Current Outpatient Prescriptions on File Prior to Encounter   Medication Sig    amlodipine (NORVASC) 10 MG tablet Take 10 mg by mouth once daily.    aspirin (ECOTRIN) 81 MG EC tablet Take 81 mg by mouth once daily.    b complex vitamins tablet Take 1 tablet by mouth once daily.    clopidogrel (PLAVIX) 75 mg tablet Take 75 mg by mouth once daily.    diazePAM (VALIUM) 5 MG tablet Take 5 mg by mouth every 6 (six) hours as needed for Anxiety.    fenofibrate 160 MG Tab Take 145 mg by mouth once daily.    fenofibrate micronized (LOFIBRA) 134 MG Cap Take 134 mg by mouth daily with breakfast.    finasteride (PROSCAR) 5 mg tablet Take 5 mg by mouth once daily.    fish oil-omega-3 fatty acids 300-1,000 mg capsule Take 2 g by mouth once daily.    FLAXSEED ORAL Take by mouth.    folic acid (FOLVITE) 800 MCG Tab Take 800 mcg by mouth once daily.    insulin aspart (NOVOLOG) 100 unit/mL injection Inject into the skin 3 (three) times daily before meals.    insulin glargine (LANTUS) 100 unit/mL injection Inject into the skin every evening.    levothyroxine (SYNTHROID) 25 MCG tablet Take 25 mcg by mouth once daily.    lisinopril (PRINIVIL,ZESTRIL) 40 MG tablet Take 40 mg by mouth once daily.    milk thistle 175 mg  tablet Take 175 mg by mouth once daily.    omeprazole (PRILOSEC) 20 MG capsule Take 20 mg by mouth once daily.    vitamin E 600 UNIT capsule Take 800 Units by mouth once daily.     Family History     None        Social History Main Topics    Smoking status: Never Smoker    Smokeless tobacco: Not on file    Alcohol use No    Drug use: No    Sexual activity: Not on file     Review of Systems   Constitutional: Positive for appetite change, fatigue and unexpected weight change. Negative for chills and fever.   HENT: Negative for rhinorrhea and sore throat.    Eyes: Negative for visual disturbance.   Respiratory: Negative for shortness of breath.    Cardiovascular: Negative for chest pain and leg swelling.   Gastrointestinal: Negative for abdominal pain, blood in stool, constipation, diarrhea and nausea.   Genitourinary: Negative for dysuria and hematuria.   Musculoskeletal: Positive for back pain and gait problem.   Neurological: Positive for weakness. Negative for headaches.     Objective:     Vital Signs (Most Recent):  Temp: 98.5 °F (36.9 °C) (05/26/17 1952)  Pulse: 79 (05/26/17 2132)  Resp: 18 (05/26/17 1952)  BP: (!) 169/80 (05/26/17 2132)  SpO2: (!) 93 % (05/26/17 2132) Vital Signs (24h Range):  Temp:  [98.3 °F (36.8 °C)-98.5 °F (36.9 °C)] 98.5 °F (36.9 °C)  Pulse:  [76-93] 79  Resp:  [16-18] 18  SpO2:  [93 %-100 %] 93 %  BP: (147-210)/(77-86) 169/80        There is no height or weight on file to calculate BMI.    Physical Exam   Constitutional: He is oriented to person, place, and time. He appears well-developed and well-nourished. No distress.   HENT:   Head: Normocephalic and atraumatic.   Mouth/Throat: Oropharynx is clear and moist.   Eyes: EOM are normal. Pupils are equal, round, and reactive to light.   Neck: Normal range of motion.   Cardiovascular: Normal rate, regular rhythm and normal heart sounds.    Pulmonary/Chest: Effort normal and breath sounds normal.   Abdominal: Soft. Bowel sounds are  normal. He exhibits no distension. There is no tenderness.   Musculoskeletal: He exhibits no edema.   No spinal tenderness. Some tenderness over right flank.    Neurological: He is alert and oriented to person, place, and time.   Weakness of blt LE and UE. Toes downgoing on babinski. Unable to illicit patellar DTR.   Trace right sided facial weakness.    Skin: He is not diaphoretic.   Nursing note and vitals reviewed.       Significant Labs:   CBC:   Recent Labs  Lab 05/26/17  1220   WBC 5.67   HGB 16.2   HCT 46.7        CMP:   Recent Labs  Lab 05/26/17  1220      K 4.0      CO2 25   *   BUN 27*   CREATININE 1.18   CALCIUM 9.8   PROT 7.5   ALBUMIN 4.2   BILITOT 0.9   ALKPHOS 94   AST 32   ALT 45*   ANIONGAP 11   EGFRNONAA >60.0     Urine Studies:   Recent Labs  Lab 05/26/17  1336   COLORU Yellow   APPEARANCEUA Clear   PHUR 6.0   SPECGRAV 1.025   PROTEINUA 3+*   GLUCUA 4+*   KETONESU Trace*   BILIRUBINUA Negative   OCCULTUA Trace*   NITRITE Negative   UROBILINOGEN Negative   LEUKOCYTESUR Negative   RBCUA 3   WBCUA 1   BACTERIA Rare   SQUAMEPITHEL 2   HYALINECASTS 0       Significant Imaging: I have reviewed all pertinent imaging results/findings within the past 24 hours.     MRI: L3 vertebral body lesion with apparent cortical disruption concerning for metastases vs myeloma.     Assessment/Plan:     * Lumbar disc lesion    - Chronic back pain x5 months with recent worsening and new blt LE weakness.   - MRI showing L3 vertebral body lesion concerning for met vs myeloma  - Neurosurgery on board.   - Extended MRI T-spine tomorrow, as he has already received contrast today for MRI L-spine.   - Follow up malignant workup, including UA, CT chest/abdo/pelvis, and PSA.   - Would confirm last colonoscopy.         DM2 (diabetes mellitus, type 2)    - A1c pending   - Home regimen: Lantus 40U qam and qhs, Novalog 40U with meals (twice /day).   - Will start with 80% home regimen and titrate as needed.    - Detemir 32U BID, Aspart 24U TIDWM, LDSSI.           Benign essential HTN    - Cont home amlodipine, lisinopril         GERD (gastroesophageal reflux disease)    - Home omeprazole. Started pantoprazole.           HLD (hyperlipidemia)    - cont home finofibrate, vit E.           Chronic back pain              CAD (coronary artery disease)    - s/p 5 stents remotely  - no acute issues  - Cont home ASA, amlodipine, lisinopril.   - Per NSG, holding home plavix for now.           History of CVA (cerebrovascular accident)    - CVA in 2013           Hypothyroid    - cont home levothyroxine             VTE Risk Mitigation         Ordered     heparin (porcine) injection 5,000 Units  Every 8 hours     Route:  Subcutaneous        05/26/17 2310     High Risk of VTE  Once      05/26/17 2235        Ppx: Heparin, but consider restarting plavix.   Diet: DV2217  PT/OT: Not currently ordered  Dispo: Pending NSG evaluation and malignant workup.     Will discuss with staff, Dr. Deluna.   LORETTA Mcclellan MD MPH PGY-1  Department of Hospital Medicine   [facility]

## 2017-05-27 NOTE — SUBJECTIVE & OBJECTIVE
Past Medical History:   Diagnosis Date    Chronic back pain     Coronary artery disease     Diabetes mellitus     GERD (gastroesophageal reflux disease)     Hyperlipemia     Thyroid disease        Past Surgical History:   Procedure Laterality Date    ABDOMINAL SURGERY      APPENDECTOMY      BACK SURGERY      CARDIAC SURGERY      CHOLECYSTECTOMY         Review of patient's allergies indicates:  No Known Allergies    Current Facility-Administered Medications on File Prior to Encounter   Medication    [COMPLETED] dexamethasone injection 10 mg    [COMPLETED] diazePAM injection 2 mg    [COMPLETED] gadobutrol 9 mL    [COMPLETED] sodium chloride 0.9% bolus 1,000 mL     Current Outpatient Prescriptions on File Prior to Encounter   Medication Sig    amlodipine (NORVASC) 10 MG tablet Take 10 mg by mouth once daily.    aspirin (ECOTRIN) 81 MG EC tablet Take 81 mg by mouth once daily.    b complex vitamins tablet Take 1 tablet by mouth once daily.    clopidogrel (PLAVIX) 75 mg tablet Take 75 mg by mouth once daily.    diazePAM (VALIUM) 5 MG tablet Take 5 mg by mouth every 6 (six) hours as needed for Anxiety.    fenofibrate 160 MG Tab Take 145 mg by mouth once daily.    fenofibrate micronized (LOFIBRA) 134 MG Cap Take 134 mg by mouth daily with breakfast.    finasteride (PROSCAR) 5 mg tablet Take 5 mg by mouth once daily.    fish oil-omega-3 fatty acids 300-1,000 mg capsule Take 2 g by mouth once daily.    FLAXSEED ORAL Take by mouth.    folic acid (FOLVITE) 800 MCG Tab Take 800 mcg by mouth once daily.    insulin aspart (NOVOLOG) 100 unit/mL injection Inject into the skin 3 (three) times daily before meals.    insulin glargine (LANTUS) 100 unit/mL injection Inject into the skin every evening.    levothyroxine (SYNTHROID) 25 MCG tablet Take 25 mcg by mouth once daily.    lisinopril (PRINIVIL,ZESTRIL) 40 MG tablet Take 40 mg by mouth once daily.    milk thistle 175 mg tablet Take 175 mg by mouth  once daily.    omeprazole (PRILOSEC) 20 MG capsule Take 20 mg by mouth once daily.    vitamin E 600 UNIT capsule Take 800 Units by mouth once daily.     Family History     None        Social History Main Topics    Smoking status: Never Smoker    Smokeless tobacco: Not on file    Alcohol use No    Drug use: No    Sexual activity: Not on file     Review of Systems   Constitutional: Positive for appetite change, fatigue and unexpected weight change. Negative for chills and fever.   HENT: Negative for rhinorrhea and sore throat.    Eyes: Negative for visual disturbance.   Respiratory: Negative for shortness of breath.    Cardiovascular: Negative for chest pain and leg swelling.   Gastrointestinal: Negative for abdominal pain, blood in stool, constipation, diarrhea and nausea.   Genitourinary: Negative for dysuria and hematuria.   Musculoskeletal: Positive for back pain and gait problem.   Neurological: Positive for weakness. Negative for headaches.     Objective:     Vital Signs (Most Recent):  Temp: 98.5 °F (36.9 °C) (05/26/17 1952)  Pulse: 79 (05/26/17 2132)  Resp: 18 (05/26/17 1952)  BP: (!) 169/80 (05/26/17 2132)  SpO2: (!) 93 % (05/26/17 2132) Vital Signs (24h Range):  Temp:  [98.3 °F (36.8 °C)-98.5 °F (36.9 °C)] 98.5 °F (36.9 °C)  Pulse:  [76-93] 79  Resp:  [16-18] 18  SpO2:  [93 %-100 %] 93 %  BP: (147-210)/(77-86) 169/80        There is no height or weight on file to calculate BMI.    Physical Exam   Constitutional: He is oriented to person, place, and time. He appears well-developed and well-nourished. No distress.   HENT:   Head: Normocephalic and atraumatic.   Mouth/Throat: Oropharynx is clear and moist.   Eyes: EOM are normal. Pupils are equal, round, and reactive to light.   Neck: Normal range of motion.   Cardiovascular: Normal rate, regular rhythm and normal heart sounds.    Pulmonary/Chest: Effort normal and breath sounds normal.   Abdominal: Soft. Bowel sounds are normal. He exhibits no  distension. There is no tenderness.   Musculoskeletal: He exhibits no edema.   No spinal tenderness. Some tenderness over right flank.    Neurological: He is alert and oriented to person, place, and time.   Weakness of blt LE and UE. Toes downgoing on babinski. Unable to illicit patellar DTR.   Trace right sided facial weakness.    Skin: He is not diaphoretic.   Nursing note and vitals reviewed.       Significant Labs:   CBC:   Recent Labs  Lab 05/26/17  1220   WBC 5.67   HGB 16.2   HCT 46.7        CMP:   Recent Labs  Lab 05/26/17  1220      K 4.0      CO2 25   *   BUN 27*   CREATININE 1.18   CALCIUM 9.8   PROT 7.5   ALBUMIN 4.2   BILITOT 0.9   ALKPHOS 94   AST 32   ALT 45*   ANIONGAP 11   EGFRNONAA >60.0     Urine Studies:   Recent Labs  Lab 05/26/17  1336   COLORU Yellow   APPEARANCEUA Clear   PHUR 6.0   SPECGRAV 1.025   PROTEINUA 3+*   GLUCUA 4+*   KETONESU Trace*   BILIRUBINUA Negative   OCCULTUA Trace*   NITRITE Negative   UROBILINOGEN Negative   LEUKOCYTESUR Negative   RBCUA 3   WBCUA 1   BACTERIA Rare   SQUAMEPITHEL 2   HYALINECASTS 0       Significant Imaging: I have reviewed all pertinent imaging results/findings within the past 24 hours.     MRI: L3 vertebral body lesion with apparent cortical disruption concerning for metastases vs myeloma.

## 2017-05-27 NOTE — NURSING
Spoke with IM1 MD regarding pt being on titrating insulin gtt and having a diet ordered. MD stated he would put in new orders for NPO until BGs under control; will update pt and family. WCTM BGs hourly.

## 2017-05-27 NOTE — ASSESSMENT & PLAN NOTE
- Chronic back pain x5 months with recent worsening and new blt LE weakness.   - MRI showing L3 vertebral body lesion concerning for met vs myeloma  - Neurosurgery on board.   - Extended MRI T-spine tomorrow, as he has already received contrast today for MRI L-spine.   - CT showed masses in the kidney and liver, likely renal cell carcinoma,   - Urology on board, recs heme/onc, tissue biopsy

## 2017-05-27 NOTE — NURSING
Patient arrived to MRI via wheelchair,  O2 2 L per NC in place, no signs of distress noted.     Patient assisted to MRI table without difficulty, patient then c/o claustrophobia.  MRI tech contacted patient's nurse.  Order entered per MD for Versed 2 mg for MRI.     Versed administered per protocol, patient without signs of distress.  Procedure explained, all questions answered.

## 2017-05-27 NOTE — ASSESSMENT & PLAN NOTE
- Chronic back pain x5 months with recent worsening and new blt LE weakness.   - MRI showing L3 vertebral body lesion concerning for met vs myeloma  - Neurosurgery on board.   - Extended MRI T-spine tomorrow, as he has already received contrast today for MRI L-spine.   - Follow up malignant workup, including UA, CT chest/abdo/pelvis, and PSA.   - Would confirm last colonoscopy.

## 2017-05-27 NOTE — ED PROVIDER NOTES
"Encounter Date: 5/26/2017       History     Chief Complaint   Patient presents with    transfer      pt presents to the ed from [facility] for eval of a tumor on the spine      Review of patient's allergies indicates:  No Known Allergies  Patient presents with acute exacerbation of his back pain, now associated with weakness of his lower extremities.  He states "I can't walk" both in the sense that his exacerbate his back pain, and that he feels unsteady on his feet due to muscle weakness.  Symptoms are severe.  He reports several falls in the past several months as well.  He states he has not been taken anything for his pain except some Advil intermittently which generally does not work.  States [facility].          Past Medical History:   Diagnosis Date    Chronic back pain     Coronary artery disease     Diabetes mellitus     GERD (gastroesophageal reflux disease)     Hyperlipemia     Thyroid disease      Past Surgical History:   Procedure Laterality Date    ABDOMINAL SURGERY      APPENDECTOMY      BACK SURGERY      CARDIAC SURGERY      CHOLECYSTECTOMY       No family history on file.  Social History   Substance Use Topics    Smoking status: Never Smoker    Smokeless tobacco: Not on file    Alcohol use No     Review of Systems   Constitutional: Negative for chills and fever.   HENT: Negative for congestion.    Respiratory: Negative for cough, chest tightness and shortness of breath.    Cardiovascular: Negative for chest pain, palpitations and leg swelling.   Gastrointestinal: Negative for abdominal distention, abdominal pain and anal bleeding.   Genitourinary: Negative.  Negative for dysuria.   Musculoskeletal: Positive for gait problem, myalgias and neck stiffness.   Skin: Negative.        Physical Exam     Initial Vitals [05/26/17 1952]   BP Pulse Resp Temp SpO2   (!) 210/85 85 18 98.5 °F (36.9 °C) 100 % "     Physical Exam    Constitutional: He appears well-developed and well-nourished.   HENT:   Head: Normocephalic.   Eyes: Pupils are equal, round, and reactive to light.   Neck: Normal range of motion.   Cardiovascular: Normal rate and regular rhythm.   Pulmonary/Chest: Breath sounds normal.   Abdominal: Soft. Bowel sounds are normal.   Neurological: He is alert and oriented to person, place, and time. No cranial nerve deficit or sensory deficit.   Decreased strength, 2+ palpable pulses   Psychiatric: He has a normal mood and affect.         ED Course   Procedures  Labs Reviewed   PROTIME-INR   TYPE & SCREEN        Medical Decision Making:  NSG consult  I reviewed radiology personally along with interpretations.  I reviewed and interpreted the laboratory results.    Ayaan Leigh MD, LUCA, CPE, FACEP  Department of Emergency Medicine  , [facility]                 APC / Resident Notes:   73 yo male with spinal cord L3 lesion  NSG eval, Tspine MRI  Medicine admit     Pt stable, with no worsening of his symptoms, continue Bed rest, labs WNL. NSG evaluated pt, will admit to medicine with NSG eval in am for lesion found on MRI           ED Course     Clinical Impression:   The primary encounter diagnosis was Lumbar disc lesion. A diagnosis of Chronic low back pain, unspecified back pain laterality, with sciatica presence unspecified was also pertinent to this visit.        ATTENDING PHYSICIAN ATTESTATION  I have repeated the key portions of the resident's history and physical, reviewed and agree with the resident medical documentation, and supervised and managed the medical care of the patient.  Additionally, I was present for the critical portion of any procedure(s) performed.    Ayaan Leigh MD, LUCA, FACEP  Department of Emergency Medicine         Hector Leigh MD  05/31/17 0927

## 2017-05-27 NOTE — ASSESSMENT & PLAN NOTE
72 YOM with hx of BPH on finasteride, DMII, HTN, CAD s/p PCI x 5, CVA, admitted with BLE weakness and back pain with L3 lytic lesion.  Patient found to have 3.6 cm atypical-appearing left renal mass, 1 cm right renal mass,  liver and lung mets.      - recommend heme onc, neurosurgery consults, consider rad onc consult   - need tissue diagnosis, recommend biopsy of either spinal met or hepatic mets as percutaneous renal biopsy would be difficult given location of mass and high false negative rate   - if this is metastatic renal cell carcinoma, patient not likely to benefit from cytoreductive nephrectomy due to current poor performance status and liver mets

## 2017-05-27 NOTE — HPI
72 YOM with hx of BPH on finasteride, DMII, HTN, CAD s/p PCI x 5, CVA, admitted with BLE weakness and back pain with L3 lytic lesion.  Patient found to have 3.6 cm atypical-appearing left renal mass, 1 cm right renal mass,  liver and lung mets.      Urology is consulted for nephrectomy.     Patient states that he has had back pain, mostly on right flank, and BLE weakness for 4-5 months.  He has lost 10 pounds.  He is a former ~100 pack year smoker, remote.  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  He has no personal history of cancer.  No family history of  malignancy.      He denies gross hematuria, dysuria, frequency, hesitancy, intermittency, stranguria.  He complains of urgency with very low volume UUI occasionally, as well as nocturia x 3.  One episode of prostatitis treated with abx.  No other urologic history.      He denies HA, vision change, seizures, F/C/N/V.  He does note dry cough for past 4-5 months.

## 2017-05-27 NOTE — PROVIDER PROGRESS NOTES - EMERGENCY DEPT.
Encounter Date: 5/26/2017    ED Physician Progress Notes        Physician Note:   Hypertension guidelines  The patient is being admitted for a complaint unrelated to hypertension.  There is strong data that the acute lowering of asymptomatic hypertension does not medically benefit the patient and, in fact, may do harm.  There is no medical need to acutely lower the patient's blood pressure, nor use telemetry.     [facility]P Clinical Policy on Asymptomatic Hypertension  Vicky Emerg Med. 2013;62:59-68    Ayaan Leigh MD, LUCA, CPE, FACEP  Department of Emergency Medicine  , [facility]

## 2017-05-28 LAB
ALBUMIN SERPL BCP-MCNC: 3.4 G/DL
ALP SERPL-CCNC: 72 U/L
ALT SERPL W/O P-5'-P-CCNC: 25 U/L
ANION GAP SERPL CALC-SCNC: 12 MMOL/L
AST SERPL-CCNC: 22 U/L
BASOPHILS # BLD AUTO: 0.01 K/UL
BASOPHILS NFR BLD: 0.1 %
BILIRUB SERPL-MCNC: 0.4 MG/DL
BUN SERPL-MCNC: 33 MG/DL
CALCIUM SERPL-MCNC: 10.1 MG/DL
CHLORIDE SERPL-SCNC: 103 MMOL/L
CO2 SERPL-SCNC: 25 MMOL/L
CREAT SERPL-MCNC: 1.3 MG/DL
DIFFERENTIAL METHOD: NORMAL
EOSINOPHIL # BLD AUTO: 0.1 K/UL
EOSINOPHIL NFR BLD: 0.6 %
ERYTHROCYTE [DISTWIDTH] IN BLOOD BY AUTOMATED COUNT: 13.3 %
EST. GFR  (AFRICAN AMERICAN): >60 ML/MIN/1.73 M^2
EST. GFR  (NON AFRICAN AMERICAN): 54.5 ML/MIN/1.73 M^2
GLUCOSE SERPL-MCNC: 181 MG/DL
HCT VFR BLD AUTO: 42 %
HGB BLD-MCNC: 14.6 G/DL
LYMPHOCYTES # BLD AUTO: 3.1 K/UL
LYMPHOCYTES NFR BLD: 29.3 %
MCH RBC QN AUTO: 30.4 PG
MCHC RBC AUTO-ENTMCNC: 34.8 %
MCV RBC AUTO: 88 FL
MONOCYTES # BLD AUTO: 0.9 K/UL
MONOCYTES NFR BLD: 8 %
NEUTROPHILS # BLD AUTO: 6.5 K/UL
NEUTROPHILS NFR BLD: 61.8 %
PLATELET # BLD AUTO: 290 K/UL
PMV BLD AUTO: 11 FL
POCT GLUCOSE: 131 MG/DL (ref 70–110)
POCT GLUCOSE: 133 MG/DL (ref 70–110)
POCT GLUCOSE: 142 MG/DL (ref 70–110)
POCT GLUCOSE: 148 MG/DL (ref 70–110)
POCT GLUCOSE: 154 MG/DL (ref 70–110)
POCT GLUCOSE: 157 MG/DL (ref 70–110)
POCT GLUCOSE: 161 MG/DL (ref 70–110)
POCT GLUCOSE: 198 MG/DL (ref 70–110)
POCT GLUCOSE: 200 MG/DL (ref 70–110)
POCT GLUCOSE: 207 MG/DL (ref 70–110)
POCT GLUCOSE: 219 MG/DL (ref 70–110)
POCT GLUCOSE: 221 MG/DL (ref 70–110)
POCT GLUCOSE: 237 MG/DL (ref 70–110)
POCT GLUCOSE: 242 MG/DL (ref 70–110)
POCT GLUCOSE: 255 MG/DL (ref 70–110)
POCT GLUCOSE: 290 MG/DL (ref 70–110)
POTASSIUM SERPL-SCNC: 4 MMOL/L
PROT SERPL-MCNC: 6.7 G/DL
RBC # BLD AUTO: 4.8 M/UL
SODIUM SERPL-SCNC: 140 MMOL/L
WBC # BLD AUTO: 10.56 K/UL

## 2017-05-28 PROCEDURE — 63600175 PHARM REV CODE 636 W HCPCS: Performed by: HOSPITALIST

## 2017-05-28 PROCEDURE — 99232 SBSQ HOSP IP/OBS MODERATE 35: CPT | Mod: GC,,, | Performed by: HOSPITALIST

## 2017-05-28 PROCEDURE — 85025 COMPLETE CBC W/AUTO DIFF WBC: CPT

## 2017-05-28 PROCEDURE — 80053 COMPREHEN METABOLIC PANEL: CPT

## 2017-05-28 PROCEDURE — 25000003 PHARM REV CODE 250: Performed by: SURGERY

## 2017-05-28 PROCEDURE — 20600001 HC STEP DOWN PRIVATE ROOM

## 2017-05-28 PROCEDURE — 36415 COLL VENOUS BLD VENIPUNCTURE: CPT

## 2017-05-28 PROCEDURE — 25000003 PHARM REV CODE 250: Performed by: STUDENT IN AN ORGANIZED HEALTH CARE EDUCATION/TRAINING PROGRAM

## 2017-05-28 RX ORDER — INSULIN ASPART 100 [IU]/ML
30 INJECTION, SOLUTION INTRAVENOUS; SUBCUTANEOUS
Status: COMPLETED | OUTPATIENT
Start: 2017-05-28 | End: 2017-05-28

## 2017-05-28 RX ORDER — AMLODIPINE BESYLATE 10 MG/1
10 TABLET ORAL DAILY
Status: DISCONTINUED | OUTPATIENT
Start: 2017-05-28 | End: 2017-05-30 | Stop reason: HOSPADM

## 2017-05-28 RX ORDER — LACTULOSE 10 G/15ML
15 SOLUTION ORAL ONCE
Status: COMPLETED | OUTPATIENT
Start: 2017-05-28 | End: 2017-05-28

## 2017-05-28 RX ORDER — INSULIN ASPART 100 [IU]/ML
1-10 INJECTION, SOLUTION INTRAVENOUS; SUBCUTANEOUS EVERY 6 HOURS PRN
Status: DISCONTINUED | OUTPATIENT
Start: 2017-05-28 | End: 2017-05-30 | Stop reason: HOSPADM

## 2017-05-28 RX ORDER — HEPARIN SODIUM 5000 [USP'U]/ML
5000 INJECTION, SOLUTION INTRAVENOUS; SUBCUTANEOUS EVERY 8 HOURS
Status: COMPLETED | OUTPATIENT
Start: 2017-05-28 | End: 2017-05-28

## 2017-05-28 RX ADMIN — INSULIN DETEMIR 40 UNITS: 100 INJECTION, SOLUTION SUBCUTANEOUS at 02:05

## 2017-05-28 RX ADMIN — FOLIC ACID 1000 MCG: 1 TABLET ORAL at 08:05

## 2017-05-28 RX ADMIN — LEVOTHYROXINE SODIUM 25 MCG: 25 TABLET ORAL at 05:05

## 2017-05-28 RX ADMIN — INSULIN DETEMIR 40 UNITS: 100 INJECTION, SOLUTION SUBCUTANEOUS at 10:05

## 2017-05-28 RX ADMIN — AMLODIPINE BESYLATE 10 MG: 10 TABLET ORAL at 11:05

## 2017-05-28 RX ADMIN — LISINOPRIL 40 MG: 20 TABLET ORAL at 08:05

## 2017-05-28 RX ADMIN — HEPARIN SODIUM 5000 UNITS: 5000 INJECTION, SOLUTION INTRAVENOUS; SUBCUTANEOUS at 02:05

## 2017-05-28 RX ADMIN — SODIUM CHLORIDE 3.6 UNITS/HR: 9 INJECTION, SOLUTION INTRAVENOUS at 09:05

## 2017-05-28 RX ADMIN — HEPARIN SODIUM 5000 UNITS: 5000 INJECTION, SOLUTION INTRAVENOUS; SUBCUTANEOUS at 10:05

## 2017-05-28 RX ADMIN — INSULIN ASPART 6 UNITS: 100 INJECTION, SOLUTION INTRAVENOUS; SUBCUTANEOUS at 06:05

## 2017-05-28 RX ADMIN — HEPARIN SODIUM 5000 UNITS: 5000 INJECTION, SOLUTION INTRAVENOUS; SUBCUTANEOUS at 05:05

## 2017-05-28 RX ADMIN — VITAMIN E CAP 400 UNIT 800 UNITS: 400 CAP at 09:05

## 2017-05-28 RX ADMIN — LACTULOSE 15 G: 20 SOLUTION ORAL at 10:05

## 2017-05-28 RX ADMIN — SODIUM CHLORIDE 7.6 UNITS/HR: 9 INJECTION, SOLUTION INTRAVENOUS at 12:05

## 2017-05-28 RX ADMIN — SODIUM CHLORIDE 4.6 UNITS/HR: 9 INJECTION, SOLUTION INTRAVENOUS at 07:05

## 2017-05-28 RX ADMIN — INSULIN ASPART 30 UNITS: 100 INJECTION, SOLUTION INTRAVENOUS; SUBCUTANEOUS at 06:05

## 2017-05-28 RX ADMIN — PANTOPRAZOLE SODIUM 40 MG: 40 TABLET, DELAYED RELEASE ORAL at 08:05

## 2017-05-28 RX ADMIN — FENOFIBRATE 145 MG: 145 TABLET, FILM COATED ORAL at 11:05

## 2017-05-28 NOTE — ASSESSMENT & PLAN NOTE
- suspect Renal cell, Urology recommends heme/onc (once tissue is provided) and biopsy (ordered for tomorrow)

## 2017-05-28 NOTE — ASSESSMENT & PLAN NOTE
- Chronic back pain x5 months with recent worsening and new blt LE weakness.   - MRI showing L3 vertebral body lesion concerning for met vs myeloma  - Neurosurgery on board.   - Extended MRI T-spine tomorrow, as he has already received contrast today for MRI L-spine.   - CT showed masses in the kidney and liver, likely renal cell carcinoma,   - Urology on board, recs heme/onc, tissue biopsy   - Recommended IR biopsy for his liver which has been ordered for tomorrow.

## 2017-05-28 NOTE — PLAN OF CARE
Problem: Fall Risk (Adult)  Intervention: Reduce Risk/Promote Restraint Free Environment   05/28/17 1651   Safety Interventions   Safety Precautions emergency equipment at bedside   Safety Interventions   Environmental Safety Modification clutter free environment maintained;room organization consistent     Intervention: Patient Rounds   05/28/17 1600   Safety Interventions   Patient Rounds bed in low position;bed wheels locked;call light in reach;clutter free environment maintained;ID band on;placement of personal items at bedside     Intervention: Safety Promotion/Fall Prevention   05/28/17 1600   Safety Interventions   Safety Promotion/Fall Prevention assistive device/personal item within reach;high risk medications identified;Fall Risk signage in place;nonskid shoes/socks when out of bed;side rails raised x 2     Intervention: Safety Precautions   05/28/17 1651   Safety Interventions   Safety Precautions emergency equipment at bedside       Goal: Identify Related Risk Factors and Signs and Symptoms  Related risk factors and signs and symptoms are identified upon initiation of Human Response Clinical Practice Guideline (CPG)   Outcome: Ongoing (interventions implemented as appropriate)   05/28/17 1651   Fall Risk   Related Risk Factors (Fall Risk) depression/anxiety;age-related changes;gait/mobility problems;fatigue/slow reaction     Goal: Absence of Falls  Patient will demonstrate the desired outcomes by discharge/transition of care.   Outcome: Ongoing (interventions implemented as appropriate)   05/28/17 1651   Fall Risk (Adult)   Absence of Falls making progress toward outcome       Problem: Patient Care Overview  Goal: Plan of Care Review   05/28/17 1651   Coping/Psychosocial   Plan Of Care Reviewed With Patient;family; Safety: call light in reach, patient oriented to room & instructed how to notify nurse if assistance is needed, current questions/concerns addressed, bed in lowest position with wheels locked &  side rails up X 3. Pt and family were educated regarding fall precaution and taking appropriate action.Activity: is up with 1 assist to the bathroom Neurological: Oriented x4 Respiratory: On RA, o2 Sat WNL.  Cardiac: BP normative. HR stable. Afebrile this shift. Intake/Output: [place] d/c'd. Voided x1.  Pain: Denied any payin Skin: bruised and scabbed, but intact. Plan: NPO past midnight for Liver biopsy tomorrow. Plan of care reviewed with the patient. All questions and concerns were addressed. Will continue to monitor.       Problem: Diabetes, Type 2 (Adult)  Intervention: Support/Optimize Psychosocial Response to Condition   05/28/17 1651   Coping/Psychosocial Interventions   Supportive Measures active listening utilized;decision-making supported;goal setting facilitated;positive reinforcement provided;problem solving facilitated;relaxation techniques promoted;self-care encouraged;verbalization of feelings encouraged;self-responsibility promoted   Environmental Support calm environment promoted;distractions minimized;environmental consistency promoted;rest periods encouraged;personal routine supported;rooming-in facilitated   Psychosocial Support   Family/Support System Care self-care encouraged;support provided;involvement promoted     Intervention: Optimize Glycemic Control   05/28/17 1651   Nutrition Interventions   Glycemic Management blood glucose monitoring;oral hydration promoted;insulin infusion adjusted; insulin drip d/c'd Schedule insulin administered. bs check AC&HS       Goal: Signs and Symptoms of Listed Potential Problems Will be Absent, Minimized or Managed (Diabetes, Type 2)  Signs and symptoms of listed potential problems will be absent, minimized or managed by discharge/transition of care (reference Diabetes, Type 2 (Adult) CPG).   Outcome: Ongoing (interventions implemented as appropriate)   05/28/17 1651   Diabetes, Type 2   Problems Assessed (Type 2 Diabetes) hyperglycemia       Problem:  Pressure Ulcer Risk (Amilcar Scale) (Adult,Obstetrics,Pediatric)  Intervention: Promote/Optimize Nutrition   05/28/17 1651   Nutrition Interventions   Oral Nutrition Promotion medicated;safe use of adaptive equipment encouraged       Goal: Skin Integrity  Patient will demonstrate the desired outcomes by discharge/transition of care.   Outcome: Ongoing (interventions implemented as appropriate)   05/28/17 1651   Pressure Ulcer Risk (Amilcar Scale) (Adult,Obstetrics,Pediatric)   Skin Integrity making progress toward outcome       Problem: Infection, Risk/Actual (Adult)  Intervention: Manage Suspected/Actual Infection   05/28/17 1651   Safety Interventions   Isolation Precautions environmental surveillance   Infection Management aseptic technique maintained     Intervention: Prevent Infection/Maximize Resistance   05/28/17 1651   Nutrition Interventions   Glycemic Management blood glucose monitoring;oral hydration promoted;insulin infusion adjusted   Oral Nutrition Promotion medicated;safe use of adaptive equipment encouraged   Respiratory Interventions   Airway/Ventilation Management calming measures promoted;airway patency maintained;pulmonary hygiene promoted   Pain/Comfort/Sleep Interventions   Sleep/Rest Enhancement consistent schedule promoted;noise level reduced;regular sleep/rest pattern promoted;therapeutic touch utilized       Goal: Identify Related Risk Factors and Signs and Symptoms  Related risk factors and signs and symptoms are identified upon initiation of Human Response Clinical Practice Guideline (CPG)    05/28/17 1651   Infection, Risk/Actual   Related Risk Factors (Infection, Risk/Actual) chronic illness/condition;prolonged hospitalization;immunosuppressed;sleep disturbance;surgery/procedure   Signs and Symptoms (Infection, Risk/Actual) blood glucose changes     Goal: Infection Prevention/Resolution  Patient will demonstrate the desired outcomes by discharge/transition of care.   Outcome: Ongoing  (interventions implemented as appropriate)   05/28/17 6879   Infection, Risk/Actual (Adult)   Infection Prevention/Resolution making progress toward outcome

## 2017-05-28 NOTE — HOSPITAL COURSE
Admitted for neurosurgery consult regarding concern for lytic lesion on t4 and concern for spinal compression.  Neurosurgery recommend no need for acute intervention.  CT Imaging showed masses in the kidneys and liver concerning for RCC.  Urology was consulted and recommend Heme/Onc and IR consult for biopsy, but no surgical intervention.  Patient underwent US guided liver biopsy on 5/30.  Heme/onc recommend outpatient follow up.  Patient was discharge to home with a walker in on 5/30.  He was scheduled for Neurosurgey and Heme/Onc follow up outpatient.

## 2017-05-28 NOTE — PLAN OF CARE
Problem: Patient Care Overview  Goal: Plan of Care Review  Outcome: Ongoing (interventions implemented as appropriate)  POC reviewed with pt; acknowledged understanding. Pt remains free from falls/injuries, VSS. Blood glucose monitored q1h. Continuous insulin gtt titrated per algorithm. Pt on 1800 ADA diet. No complaints of pain. WCTM.

## 2017-05-28 NOTE — SUBJECTIVE & OBJECTIVE
Interval History: NAEON.  CT showed masses in the kidney and liver (concern for renal cell carcinoma). IR liver biopsy order placed tomorrow. INR and PLT are wnl. NPO past midnight. Holding subcutaneous Heparin for tomorrow. Tolerating diet well.  Currently has no complaints.      Review of Systems   Constitutional: Positive for unexpected weight change. Negative for chills and fever.   HENT: Negative for rhinorrhea and sore throat.    Eyes: Negative for visual disturbance.   Respiratory: Negative for shortness of breath.    Cardiovascular: Negative for chest pain and leg swelling.   Gastrointestinal: Negative for abdominal pain, blood in stool, constipation, diarrhea and nausea.   Genitourinary: Negative for dysuria and hematuria.   Musculoskeletal: Positive for back pain and gait problem.   Neurological: Positive for weakness. Negative for headaches.     Objective:     Vital Signs (Most Recent):  Temp: 97.6 °F (36.4 °C) (05/28/17 0729)  Pulse: 67 (05/28/17 0729)  Resp: 17 (05/28/17 0729)  BP: (!) 160/77 (05/28/17 0729)  SpO2: 97 % (05/28/17 0729) Vital Signs (24h Range):  Temp:  [97.6 °F (36.4 °C)-98.4 °F (36.9 °C)] 97.6 °F (36.4 °C)  Pulse:  [66-87] 67  Resp:  [16-18] 17  SpO2:  [95 %-97 %] 97 %  BP: (139-160)/(62-77) 160/77     Weight: 88.9 kg (196 lb)  Body mass index is 30.7 kg/m².    Intake/Output Summary (Last 24 hours) at 05/28/17 1140  Last data filed at 05/28/17 0944   Gross per 24 hour   Intake          1373.73 ml   Output              600 ml   Net           773.73 ml      Physical Exam   Constitutional: He is oriented to person, place, and time. He appears well-developed and well-nourished. No distress.   HENT:   Head: Normocephalic and atraumatic.   Mouth/Throat: Oropharynx is clear and moist.   Eyes: EOM are normal. Pupils are equal, round, and reactive to light.   Neck: Normal range of motion.   Cardiovascular: Normal rate, regular rhythm and normal heart sounds.    Pulmonary/Chest: Effort normal and  breath sounds normal.   Abdominal: Soft. Bowel sounds are normal. He exhibits no distension. There is no tenderness.   Musculoskeletal: He exhibits no edema.   No spinal tenderness. Some tenderness over right flank.    Neurological: He is alert and oriented to person, place, and time.   Weakness of blt LE and UE. Toes downgoing on babinski. Unable to illicit patellar DTR.   Trace right sided facial weakness.    Skin: Skin is warm and dry. He is not diaphoretic.   Nursing note and vitals reviewed.      Significant Labs: All pertinent labs within the past 24 hours have been reviewed.    Significant Imaging: I have reviewed all pertinent imaging results/findings within the past 24 hours.

## 2017-05-28 NOTE — PROGRESS NOTES
"[facility] Hospital Medicine  Progress Note    Patient Name: Edwin Singleton  MRN: [id number]  Patient Class: IP- Inpatient   Admission Date: 5/26/2017  Length of Stay: 2 days  Attending Physician: Vilma Rodgers MD  Primary Care Provider: No primary care provider on file.    Hospital Medicine Team: [facility] HOSP MED 1 Ginette Dunham MD    Subjective:     Principal Problem:Lumbar disc lesion    HPI:  Patient is a 73 yo man with PMH CAD (s/p 5 stents, remotely), DM2 (insulin dependent), hypothyroid, CVA (2013), and chronic back pain who is transferred to [facility] for Neurosurgery evaluation of Lumbar spine lesion in the setting of progressive weakness of his legs and exacerbation of chronic back pain. He reports back pain x5 months [facility]. His original complaint was of right flank pain more than centralized discomfort. The pain does occasionally shoot down his legs. He denies any incontinence of bowel or bladder. More recently he has developed blt LE weakness, causing him to fall two times yesterday. He reports he was walking on the grass and fell, no LOC, did not hit his head, no residual pain. After the fall he presented to the ED at [facility]. He had an MRI showing "L3 vertebral body lesion with apparent cortical disruption concerning for metastases vs myeloma," and was transferred to [facility] for Neurosurgery evaluation. He is admitted to Hospital Medicine for further care and malignant work up.     Patient denies any fever/chills, though he does report fatigue, and loss of appetite over the last few months resulting in 8-10 pound weight loss. Of note, he has a significant smoking history 2-3 PPD x 36 years (quit 24 years ago). No longer drinks alcohol, no h/o IVDU, no other drugs.     Hospital Course:  Admitted and urology consulted for the RCC. NS consulted for the lytic lesion in T4. Today patient is doing well and pain in well controlled. " Placed an IR procedure for liver biopsy to get tissue dx for his RCC. Urology states that this would be the best option. NPO past midnight.    Interval History: NAEON.  CT showed masses in the kidney and liver (concern for renal cell carcinoma). IR liver biopsy order placed tomorrow. INR and PLT are wnl. NPO past midnight. Holding subcutaneous Heparin for tomorrow. Tolerating diet well.  Currently has no complaints.      Review of Systems   Constitutional: Positive for unexpected weight change. Negative for chills and fever.   HENT: Negative for rhinorrhea and sore throat.    Eyes: Negative for visual disturbance.   Respiratory: Negative for shortness of breath.    Cardiovascular: Negative for chest pain and leg swelling.   Gastrointestinal: Negative for abdominal pain, blood in stool, constipation, diarrhea and nausea.   Genitourinary: Negative for dysuria and hematuria.   Musculoskeletal: Positive for back pain and gait problem.   Neurological: Positive for weakness. Negative for headaches.     Objective:     Vital Signs (Most Recent):  Temp: 97.6 °F (36.4 °C) (05/28/17 0729)  Pulse: 67 (05/28/17 0729)  Resp: 17 (05/28/17 0729)  BP: (!) 160/77 (05/28/17 0729)  SpO2: 97 % (05/28/17 0729) Vital Signs (24h Range):  Temp:  [97.6 °F (36.4 °C)-98.4 °F (36.9 °C)] 97.6 °F (36.4 °C)  Pulse:  [66-87] 67  Resp:  [16-18] 17  SpO2:  [95 %-97 %] 97 %  BP: (139-160)/(62-77) 160/77     Weight: 88.9 kg (196 lb)  Body mass index is 30.7 kg/m².    Intake/Output Summary (Last 24 hours) at 05/28/17 1140  Last data filed at 05/28/17 0944   Gross per 24 hour   Intake          1373.73 ml   Output              600 ml   Net           773.73 ml      Physical Exam   Constitutional: He is oriented to person, place, and time. He appears well-developed and well-nourished. No distress.   HENT:   Head: Normocephalic and atraumatic.   Mouth/Throat: Oropharynx is clear and moist.   Eyes: EOM are normal. Pupils are equal, round, and reactive to  light.   Neck: Normal range of motion.   Cardiovascular: Normal rate, regular rhythm and normal heart sounds.    Pulmonary/Chest: Effort normal and breath sounds normal.   Abdominal: Soft. Bowel sounds are normal. He exhibits no distension. There is no tenderness.   Musculoskeletal: He exhibits no edema.   No spinal tenderness. Some tenderness over right flank.    Neurological: He is alert and oriented to person, place, and time.   Weakness of blt LE and UE. Toes downgoing on babinski. Unable to illicit patellar DTR.   Trace right sided facial weakness.    Skin: Skin is warm and dry. He is not diaphoretic.   Nursing note and vitals reviewed.      Significant Labs: All pertinent labs within the past 24 hours have been reviewed.    Significant Imaging: I have reviewed all pertinent imaging results/findings within the past 24 hours.    Assessment/Plan:      Malignant neoplasm metastatic to liver with unknown primary site    - suspect Renal cell, Urology recommends heme/onc (once tissue is provided) and biopsy (ordered for tomorrow)           Renal mass    - See malignant neoplasm plan   IR liver biopsy Monday         GERD (gastroesophageal reflux disease)    - pantoprazole.           Benign essential HTN    - Cont home lisinopril. Reordered Norvasc 10 mg today.          HLD (hyperlipidemia)    - cont home finofibrate, vit E.           DM2 (diabetes mellitus, type 2)    - Insulin drip given sugars, will transfer to step down unit   - A1c 11.1  - Home regimen: Lantus 40U qam and qhs, Novalog 40U with meals (twice /day).             Chronic back pain    Pain could also be related acutely due to his T4 lytic lesion.          CAD (coronary artery disease)    - s/p 5 stents remotely  - no acute issues  - Cont home ASA, amlodipine, lisinopril.   - Per NSG, holding home plavix for now.           History of CVA (cerebrovascular accident)    - CVA in 2013, no residual deficits           Hypothyroid    - cont home levothyroxine            * Lumbar disc lesion    - Chronic back pain x5 months with recent worsening and new blt LE weakness.   - MRI showing L3 vertebral body lesion concerning for met vs myeloma  - Neurosurgery on board.   - Extended MRI T-spine tomorrow, as he has already received contrast today for MRI L-spine.   - CT showed masses in the kidney and liver, likely renal cell carcinoma,   - Urology on board, recs heme/onc, tissue biopsy   - Recommended IR biopsy for his liver which has been ordered for tomorrow.             VTE Risk Mitigation         Ordered     heparin (porcine) injection 5,000 Units  Every 8 hours     Route:  Subcutaneous        05/26/17 2310     High Risk of VTE  Once      05/26/17 2235          Ginette Dunham MD  Department of Hospital Medicine   [facility]

## 2017-05-28 NOTE — PROGRESS NOTES
IM1 has been paged to confirm to whether to give heparin since pt is going to IR tomorrow and it is ordered q8. Stated give another 2 doses and will hold it for tomorrow.

## 2017-05-29 LAB
ALBUMIN SERPL BCP-MCNC: 3.2 G/DL
ALP SERPL-CCNC: 67 U/L
ALT SERPL W/O P-5'-P-CCNC: 28 U/L
ANION GAP SERPL CALC-SCNC: 8 MMOL/L
AST SERPL-CCNC: 24 U/L
BASOPHILS # BLD AUTO: 0.02 K/UL
BASOPHILS NFR BLD: 0.3 %
BILIRUB SERPL-MCNC: 0.3 MG/DL
BUN SERPL-MCNC: 26 MG/DL
CALCIUM SERPL-MCNC: 9.3 MG/DL
CHLORIDE SERPL-SCNC: 104 MMOL/L
CO2 SERPL-SCNC: 24 MMOL/L
CREAT SERPL-MCNC: 1.5 MG/DL
DIFFERENTIAL METHOD: NORMAL
EOSINOPHIL # BLD AUTO: 0.1 K/UL
EOSINOPHIL NFR BLD: 1.8 %
ERYTHROCYTE [DISTWIDTH] IN BLOOD BY AUTOMATED COUNT: 13.5 %
EST. GFR  (AFRICAN AMERICAN): 53 ML/MIN/1.73 M^2
EST. GFR  (NON AFRICAN AMERICAN): 45.9 ML/MIN/1.73 M^2
GLUCOSE SERPL-MCNC: 266 MG/DL
HCT VFR BLD AUTO: 44 %
HGB BLD-MCNC: 14.7 G/DL
LYMPHOCYTES # BLD AUTO: 2.6 K/UL
LYMPHOCYTES NFR BLD: 36.3 %
MCH RBC QN AUTO: 29.9 PG
MCHC RBC AUTO-ENTMCNC: 33.4 %
MCV RBC AUTO: 89 FL
MONOCYTES # BLD AUTO: 0.6 K/UL
MONOCYTES NFR BLD: 8.1 %
NEUTROPHILS # BLD AUTO: 3.8 K/UL
NEUTROPHILS NFR BLD: 53.2 %
PLATELET # BLD AUTO: 278 K/UL
PMV BLD AUTO: 11.5 FL
POCT GLUCOSE: 174 MG/DL (ref 70–110)
POCT GLUCOSE: 186 MG/DL (ref 70–110)
POCT GLUCOSE: 234 MG/DL (ref 70–110)
POCT GLUCOSE: 263 MG/DL (ref 70–110)
POCT GLUCOSE: 292 MG/DL (ref 70–110)
POCT GLUCOSE: 302 MG/DL (ref 70–110)
POTASSIUM SERPL-SCNC: 4 MMOL/L
PROT SERPL-MCNC: 6.4 G/DL
RBC # BLD AUTO: 4.92 M/UL
SODIUM SERPL-SCNC: 136 MMOL/L
WBC # BLD AUTO: 7.17 K/UL

## 2017-05-29 PROCEDURE — 25000003 PHARM REV CODE 250: Performed by: STUDENT IN AN ORGANIZED HEALTH CARE EDUCATION/TRAINING PROGRAM

## 2017-05-29 PROCEDURE — 20600001 HC STEP DOWN PRIVATE ROOM

## 2017-05-29 PROCEDURE — 63600175 PHARM REV CODE 636 W HCPCS: Performed by: HOSPITALIST

## 2017-05-29 PROCEDURE — 25000003 PHARM REV CODE 250: Performed by: SURGERY

## 2017-05-29 PROCEDURE — A9585 GADOBUTROL INJECTION: HCPCS | Performed by: HOSPITALIST

## 2017-05-29 PROCEDURE — 63600175 PHARM REV CODE 636 W HCPCS: Performed by: STUDENT IN AN ORGANIZED HEALTH CARE EDUCATION/TRAINING PROGRAM

## 2017-05-29 PROCEDURE — 85025 COMPLETE CBC W/AUTO DIFF WBC: CPT

## 2017-05-29 PROCEDURE — 80053 COMPREHEN METABOLIC PANEL: CPT

## 2017-05-29 PROCEDURE — 25500020 PHARM REV CODE 255: Performed by: HOSPITALIST

## 2017-05-29 PROCEDURE — 99233 SBSQ HOSP IP/OBS HIGH 50: CPT | Mod: GC,,, | Performed by: HOSPITALIST

## 2017-05-29 PROCEDURE — 99223 1ST HOSP IP/OBS HIGH 75: CPT | Mod: GC,,, | Performed by: INTERNAL MEDICINE

## 2017-05-29 PROCEDURE — 36415 COLL VENOUS BLD VENIPUNCTURE: CPT

## 2017-05-29 RX ORDER — GADOBUTROL 604.72 MG/ML
10 INJECTION INTRAVENOUS
Status: COMPLETED | OUTPATIENT
Start: 2017-05-29 | End: 2017-05-29

## 2017-05-29 RX ORDER — LORAZEPAM 1 MG/1
1 TABLET ORAL ONCE AS NEEDED
Status: COMPLETED | OUTPATIENT
Start: 2017-05-29 | End: 2017-05-29

## 2017-05-29 RX ORDER — ACETAMINOPHEN 325 MG/1
650 TABLET ORAL EVERY 8 HOURS PRN
Status: DISCONTINUED | OUTPATIENT
Start: 2017-05-29 | End: 2017-05-30 | Stop reason: HOSPADM

## 2017-05-29 RX ADMIN — AMLODIPINE BESYLATE 10 MG: 10 TABLET ORAL at 08:05

## 2017-05-29 RX ADMIN — LORAZEPAM 1 MG: 1 TABLET ORAL at 09:05

## 2017-05-29 RX ADMIN — INSULIN DETEMIR 40 UNITS: 100 INJECTION, SOLUTION SUBCUTANEOUS at 08:05

## 2017-05-29 RX ADMIN — LEVOTHYROXINE SODIUM 25 MCG: 25 TABLET ORAL at 04:05

## 2017-05-29 RX ADMIN — FOLIC ACID 1000 MCG: 1 TABLET ORAL at 08:05

## 2017-05-29 RX ADMIN — DIAZEPAM 5 MG: 5 TABLET ORAL at 05:05

## 2017-05-29 RX ADMIN — INSULIN DETEMIR 10 UNITS: 100 INJECTION, SOLUTION SUBCUTANEOUS at 11:05

## 2017-05-29 RX ADMIN — INSULIN ASPART 2 UNITS: 100 INJECTION, SOLUTION INTRAVENOUS; SUBCUTANEOUS at 11:05

## 2017-05-29 RX ADMIN — FENOFIBRATE 145 MG: 145 TABLET, FILM COATED ORAL at 08:05

## 2017-05-29 RX ADMIN — INSULIN ASPART 4 UNITS: 100 INJECTION, SOLUTION INTRAVENOUS; SUBCUTANEOUS at 10:05

## 2017-05-29 RX ADMIN — VITAMIN E CAP 400 UNIT 800 UNITS: 400 CAP at 09:05

## 2017-05-29 RX ADMIN — INSULIN ASPART 6 UNITS: 100 INJECTION, SOLUTION INTRAVENOUS; SUBCUTANEOUS at 06:05

## 2017-05-29 RX ADMIN — INSULIN ASPART 2 UNITS: 100 INJECTION, SOLUTION INTRAVENOUS; SUBCUTANEOUS at 05:05

## 2017-05-29 RX ADMIN — LISINOPRIL 40 MG: 20 TABLET ORAL at 08:05

## 2017-05-29 RX ADMIN — GADOBUTROL 10 ML: 604.72 INJECTION INTRAVENOUS at 07:05

## 2017-05-29 RX ADMIN — INSULIN DETEMIR 50 UNITS: 100 INJECTION, SOLUTION SUBCUTANEOUS at 09:05

## 2017-05-29 RX ADMIN — ACETAMINOPHEN 650 MG: 325 TABLET ORAL at 08:05

## 2017-05-29 RX ADMIN — PANTOPRAZOLE SODIUM 40 MG: 40 TABLET, DELAYED RELEASE ORAL at 08:05

## 2017-05-29 NOTE — ASSESSMENT & PLAN NOTE
- Chronic back pain x5 months with recent worsening and new blt LE weakness.   - MRI showing L3 vertebral body lesion concerning for met vs myeloma  - Neurosurgery on board.   - Extended MRI T-spine tomorrow, as he has already received contrast today for MRI L-spine.   - CT showed masses in the kidney and liver, likely renal cell carcinoma,   - Urology on board, recs heme/onc, tissue biopsy   - Recommended IR biopsy for his liver which has been ordered for 5/29.

## 2017-05-29 NOTE — ASSESSMENT & PLAN NOTE
- Heme/onc consulted   - suspect Renal cell, Urology recommends heme/onc (once tissue is provided) and biopsy (ordered for tomorrow)

## 2017-05-29 NOTE — PLAN OF CARE
Problem: Patient Care Overview  Goal: Plan of Care Review  Outcome: Ongoing (interventions implemented as appropriate)  POC reviewed with pt; acknowledged understanding. Pt remains free from falls/injuries, VSS. Blood glucose monitored q6h. NPO @ 0000 for IR. Up with stand by assist. No complaints of pain. WCTM.

## 2017-05-29 NOTE — MEDICAL/APP STUDENT
[facility] Hospital Medicine  Progress Note    Patient Name: Edwin Singleton  MRN: [id number]  Patient Class: IP- Inpatient   Admission Date: 5/26/2017  Length of Stay: 3 days  Attending Physician: Vilma Rodgers MD  Primary Care Provider: Primary Doctor No    [facility] Medicine Team: [facility] HOSP MED 1 Jw Kowalski    Subjective:     Principal Problem:Lumbar disc lesion    HPI: 71 yo male PMHx of chronic back pain, CAD, HTN, GERD, T2DM. Transferred for NSG eval for lumbar spine lesion from [place]. Complains of blt LE weakness: 2 falls on day of presentation and multiple falls over the last few months. Back pain occassionaly shoots down legs.     Interval History: No acute events over night. Feels more stable on his feet. ROS positive for headaches, fatigue, weakness in LE, dizziness (patient also describes R earache that radiates to his jaw and neck).      Review of Systems   Positive for headaches, fatigue, weakness in LE, dizziness  Negative for fevers, chills, chest pain, vision changes, SOB, N/V   Objective:     Vital Signs (Most Recent):  Temp: 98.2 °F (36.8 °C) (05/29/17 0750)  Pulse: 75 (05/29/17 0750)  Resp: 15 (05/29/17 0750)  BP: (!) 164/74 (05/29/17 0750)  SpO2: 95 % (05/29/17 0750) Vital Signs (24h Range):  Temp:  [97.7 °F (36.5 °C)-98.5 °F (36.9 °C)] 98.2 °F (36.8 °C)  Pulse:  [] 75  Resp:  [15-17] 15  SpO2:  [94 %-95 %] 95 %  BP: (133-164)/(62-74) 164/74     Weight: 88.9 kg (196 lb)  Body mass index is 30.7 kg/m².    Intake/Output Summary (Last 24 hours) at 05/29/17 1333  Last data filed at 05/29/17 0400   Gross per 24 hour   Intake              480 ml   Output                0 ml   Net              480 ml      Physical Exam    Oriented x3  Eyes- normal EOM, pupils reactive to light  Mouth- no signs of infection in oropharynx  Chest- normal heart sounds, no added sounds; breath sounds normal  Abdomen- mild distension, bowel sounds present   Proximal LE weakness blt, more  pronounced on R side; decreased patellar reflexes blt; no LE edema, no temperature changes    Significant Labs: Hyperglycemia, morning POCT-266    Significant Imaging: L-spine MRI, 5/26- L3 lesion, suspicious for metastases vs. myeloma     T-spine MRI, 5/26- T4 lesion, no evidence of cord compression     CT head, 5/26- no abnormalities     CT chest, abdo, pelvis, 5/27- 3.0 x 3.6 x 3.0 cm mass in L lower kidney pole, at least 7 masses in liver, irregular thickening of pleura bilaterally, lytic lesion in L-spine     XR, 5/28- lesion in L3, suggestive of metastatic disease    Assessment/Plan:      Active Diagnoses:    Diagnosis Date Noted POA    PRINCIPAL PROBLEM:  Lumbar disc lesion [M51.9] 05/26/2017 Yes    Renal mass [N28.89] 05/27/2017 Yes    Malignant neoplasm metastatic to liver with unknown primary site [C78.7, C80.1] 05/27/2017 Yes    Hypothyroid [E03.9] 05/26/2017 Yes    History of CVA (cerebrovascular accident) [Z86.73] 05/26/2017 Not Applicable    CAD (coronary artery disease) [I25.10] 05/26/2017 Yes    Chronic back pain [M54.9, G89.29] 05/26/2017 Yes    DM2 (diabetes mellitus, type 2) [E11.9] 05/26/2017 Yes    HLD (hyperlipidemia) [E78.5] 05/26/2017 Yes    Benign essential HTN [I10] 05/26/2017 Yes    GERD (gastroesophageal reflux disease) [K21.9] 05/26/2017 Yes      Problems Resolved During this Admission:    Diagnosis Date Noted Date Resolved POA     VTE Risk Mitigation         Ordered     High Risk of VTE  Once      05/26/17 2235        Suspected RCC with metastases  - Imaging studies suggestive of RCC with metastases to spine, liver, pleura  - IR planning biopsy on 5/30, NPO after midnight tonight    T2DM  - HbA1c- 11.1, >500 upon admission  - Continuing home insulin regimen, lantus 40U BID (qAM, qhs), Novalog 40U TIDWM  - POCT 266 in AM- consider increasing lantus to 50U BID    CAD  - s/p 5 stents  - Continuing home amlodipine, lisinopril, amlodipine  - Holding plavix per NSG    HTN  -  Continuing lisinopril, amlodipine    Hypothyroidism  - Continuing levothyroxine    Jw Kowalski  Department of Hospital Medicine   [facility]

## 2017-05-29 NOTE — PROGRESS NOTES
"[facility] Hospital Medicine  Progress Note    Patient Name: Edwin Singleton  MRN: [id number]  Patient Class: IP- Inpatient   Admission Date: 5/26/2017  Length of Stay: 3 days  Attending Physician: Vilma Rodgers MD  Primary Care Provider: Primary Doctor No    Hospital Medicine Team: [facility] [place] [place] HOSP MED 1 Bi Florian MD    Subjective:     Principal Problem:Lumbar disc lesion    HPI:  Patient is a 73 yo man with PMH CAD (s/p 5 stents, remotely), DM2 (insulin dependent), hypothyroid, CVA (2013), and chronic back pain who is transferred to [facility] [place] [place] for Neurosurgery evaluation of Lumbar spine lesion in the setting of progressive weakness of his legs and exacerbation of chronic back pain. He reports back pain x5 months [facility]. His original complaint was of right flank pain more than centralized discomfort. The pain does occasionally shoot down his legs. He denies any incontinence of bowel or bladder. More recently he has developed blt LE weakness, causing him to fall two times yesterday. He reports he was walking on the grass and fell, no LOC, did not hit his head, no residual pain. After the fall he presented to the ED at [facility]. He had an MRI showing "L3 vertebral body lesion with apparent cortical disruption concerning for metastases vs myeloma," and was transferred to [facility] [place], [place] for Neurosurgery evaluation. He is admitted to Hospital Medicine for further care and malignant work up.     Patient denies any fever/chills, though he does report fatigue, and loss of appetite over the last few months resulting in 8-10 pound weight loss. Of note, he has a significant smoking history 2-3 PPD x 36 years (quit 24 years ago). No longer drinks alcohol, no h/o IVDU, no other drugs.     Hospital Course:  Admitted and urology consulted for the RCC. NS consulted for the lytic lesion in T4. Today patient is doing well and pain in well controlled. Placed an IR " procedure for liver biopsy to get tissue dx for his RCC. Urology states that this would be the best option. NPO past midnight.    Interval History: ZENAIDA URIBE.  Planned IR biopsy today.  Reports minimal back pain and HA.  Otherwise states he is in his normal state of health.  Is able to ambulate.      Review of Systems   Constitutional: Negative for chills and fever.   HENT: Negative for rhinorrhea and sore throat.    Eyes: Negative for visual disturbance.   Respiratory: Negative for shortness of breath.    Cardiovascular: Negative for chest pain and leg swelling.   Gastrointestinal: Negative for abdominal pain, blood in stool, constipation, diarrhea and nausea.   Genitourinary: Negative for dysuria and hematuria.   Musculoskeletal: Positive for back pain and gait problem.   Neurological: Positive for weakness. Negative for headaches.     Objective:     Vital Signs (Most Recent):  Temp: 98.2 °F (36.8 °C) (05/29/17 0750)  Pulse: 75 (05/29/17 0750)  Resp: 15 (05/29/17 0750)  BP: (!) 164/74 (05/29/17 0750)  SpO2: 95 % (05/29/17 0750) Vital Signs (24h Range):  Temp:  [97.7 °F (36.5 °C)-98.5 °F (36.9 °C)] 98.2 °F (36.8 °C)  Pulse:  [] 75  Resp:  [15-17] 15  SpO2:  [94 %-95 %] 95 %  BP: (133-164)/(62-74) 164/74     Weight: 88.9 kg (196 lb)  Body mass index is 30.7 kg/m².    Intake/Output Summary (Last 24 hours) at 05/29/17 1454  Last data filed at 05/29/17 0400   Gross per 24 hour   Intake              480 ml   Output                0 ml   Net              480 ml      Physical Exam   Constitutional: He is oriented to person, place, and time. He appears well-developed and well-nourished. No distress.   HENT:   Head: Normocephalic and atraumatic.   Mouth/Throat: Oropharynx is clear and moist.   Eyes: EOM are normal. Pupils are equal, round, and reactive to light.   Neck: Normal range of motion.   Cardiovascular: Normal rate, regular rhythm and normal heart sounds.    Pulmonary/Chest: Effort normal and breath sounds  normal.   Abdominal: Soft. Bowel sounds are normal. He exhibits no distension. There is no tenderness.   Musculoskeletal: He exhibits no edema.   No spinal tenderness. Some tenderness over right flank.    Neurological: He is alert and oriented to person, place, and time.   Weakness of blt LE and UE. Toes downgoing on babinski. Unable to illicit patellar DTR.   Trace right sided facial weakness.    Skin: Skin is warm and dry. He is not diaphoretic.   Nursing note and vitals reviewed.      Significant Labs: All pertinent labs within the past 24 hours have been reviewed.    Significant Imaging: I have reviewed all pertinent imaging results/findings within the past 24 hours.    Assessment/Plan:      * Lumbar disc lesion    - Chronic back pain x5 months with recent worsening and new blt LE weakness.   - MRI showing L3 vertebral body lesion concerning for met vs myeloma  - Neurosurgery on board.   - Extended MRI T-spine tomorrow, as he has already received contrast today for MRI L-spine.   - CT showed masses in the kidney and liver, likely renal cell carcinoma,   - Urology on board, recs heme/onc, tissue biopsy   - Recommended IR biopsy for his liver which has been ordered for tomorrow.           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    - suspect Renal cell, Urology recommends heme/onc (once tissue is provided) and biopsy (ordered for tomorrow)           GERD (gastroesophageal reflux disease)    - pantoprazole.           Benign essential HTN    - Cont home lisinopril. Reordered Norvasc 10 mg today.          HLD (hyperlipidemia)    - cont home finofibrate, vit E.           DM2 (diabetes mellitus, type 2)    - Insulin drip given sugars, will transfer to step down unit   - A1c 11.1  - Home regimen: Lantus 40U qam and qhs, Novalog 40U with meals (twice /day).             Chronic back pain     Pain could also be related acutely due to his T4 lytic lesion.          CAD (coronary artery disease)    - s/p 5 stents remotely  - no acute issues  - Cont home ASA, amlodipine, lisinopril.   - Per NSG, holding home plavix for now.           History of CVA (cerebrovascular accident)    - CVA in 2013, no residual deficits           Hypothyroid    - cont home levothyroxine             VTE Risk Mitigation         Ordered     High Risk of VTE  Once      05/26/17 2423        Dispo: Planned US Biopsy 5/30. Will keep NPO for now.  Heme/onc consulted.      Bi Florian MD  Department of Hospital Medicine   [facility]

## 2017-05-29 NOTE — CONSULTS
"Consult Note    Inpatient consult to Hematology  Consult performed by: SAE DENIS  Consult ordered by: JIMENA KIM        SUBJECTIVE:     History of Present Illness:  Patient is a 72 y.o. male presents with PMHx of CAD (s/p 5 stents), IDDM (Type II), Hypothyroidism, HTN, CVA (2013), HLD, GERD, and chronic back/contreras pain who presented to the hospital due to recurrent falls and leg weakness. Pt notes that he has fell many times since 5 months ago but doesn't think he has had a fracture; he never had been evaluated in the hospital after those falls. He does have a walker at home that he doesn't always use. Pt denies saddle anasthesia and neuropathy of his lower extremities. In addition, he denies any GI symptoms such as nausea, vomiting, and diarrhea. He felt like he lost "a few pounds" but felt that he has gained them back. In addition, pt denies fevers and chills but has had night sweats intermittently for the past six months. He also endorses R flank pain but has not had any hematuria.     Pt quit smoking over 2 decades ago but smoked 2-3 pks a day for 36 years. There is no known family history of cancers.      Past Surgical History:   Procedure Laterality Date    ABDOMINAL SURGERY      APPENDECTOMY      BACK SURGERY      CARDIAC SURGERY      CHOLECYSTECTOMY       No family history on file.  Social History   Substance Use Topics    Smoking status: Never Smoker    Smokeless tobacco: Not on file    Alcohol use No     Review of Systems   Constitutional: Positive for malaise/fatigue. Negative for chills and fever.   HENT: Negative for sore throat.    Eyes: Negative for blurred vision and double vision.   Respiratory: Negative for cough and stridor.    Cardiovascular: Negative for chest pain and leg swelling.   Gastrointestinal: Negative for abdominal pain, diarrhea, nausea and vomiting.   Genitourinary: Positive for flank pain. Negative for dysuria and hematuria.   Musculoskeletal: Positive for " back pain and falls.   Skin: Negative for rash.   Neurological: Positive for weakness and headaches. Negative for sensory change, seizures and loss of consciousness.     OBJECTIVE:     Vital Signs:  Temp:  [97.6 °F (36.4 °C)-98.2 °F (36.8 °C)]   Pulse:  [73-75]   Resp:  [15-16]   BP: (153-164)/(72-85)   SpO2:  [95 %-96 %]     Physical Exam   Constitutional: He is oriented to person, place, and time. He appears well-developed and well-nourished.   HENT:   Head: Normocephalic and atraumatic.   dentures   Eyes: Conjunctivae and EOM are normal. Pupils are equal, round, and reactive to light.   Neck: Normal range of motion. Neck supple.   Cardiovascular: Normal rate, regular rhythm and normal heart sounds.  Exam reveals no friction rub.    No murmur heard.  Pulmonary/Chest: Effort normal and breath sounds normal. No respiratory distress. He has no wheezes. He has no rales.   Abdominal: Soft. Bowel sounds are normal. There is no tenderness. There is no guarding.   Musculoskeletal: Normal range of motion.   R CVA tenderness   Lymphadenopathy:     He has no cervical adenopathy.   Neurological: He is alert and oriented to person, place, and time. He has normal reflexes.   Skin: Skin is warm. He is diaphoretic.     Laboratory:  CBC:   Recent Labs  Lab 05/29/17  0616   WBC 7.17   RBC 4.92   HGB 14.7   HCT 44.0      MCV 89   MCH 29.9   MCHC 33.4     BMP:   Recent Labs  Lab 05/26/17  1220  05/29/17  0616   *  < > 266*     < > 136   K 4.0  < > 4.0     < > 104   CO2 25  < > 24   BUN 27*  < > 26*   CREATININE 1.18  < > 1.5*   CALCIUM 9.8  < > 9.3   MG 1.8  --   --    < > = values in this interval not displayed.  LFTs:   Recent Labs  Lab 05/29/17  0616   ALT 28   AST 24   ALKPHOS 67   BILITOT 0.3   PROT 6.4   ALBUMIN 3.2*     Coagulation:   Recent Labs  Lab 05/26/17  2105   INR 1.0     Cardiac markers: No results for input(s): CKMB, CPKMB, TROPONINT, TROPONINI, MYOGLOBIN in the last 168 hours.    Recent  Labs  Lab 05/26/17  1336   COLORU Yellow   SPECGRAV 1.025   PHUR 6.0   PROTEINUA 3+*   BACTERIA Rare   NITRITE Negative   LEUKOCYTESUR Negative   UROBILINOGEN Negative   HYALINECASTS 0         Diagnostic Results:    CT Chest W contrast, Abdomen/Pelvis W Contrast - 5/27/17:  Findings:    Examination of the vascular and soft tissue structures at the base of the neck demonstrates a 0.9 cm hypoattenuating right thyroid lobe nodule.    The thoracic aorta maintains normal caliber, contour, and course with moderate atherosclerotic calcification within its course.  The heart is not enlarged and there is no evidence of pericardial effusion. There are coronary artery calcifications present.    The esophagus maintains a normal course and caliber. There is no axillary, mediastinal, or hilar lymphadenopathy.      The trachea is midline, the proximal airways are patent and the lungs are symmetrically expanded.   Examination of the lung fields demonstrates no evidence of focal airspace consolidation or pneumothorax. There is multifocal irregular thickening of the bilateral pleura (for example axial series 1, images 41 through 45). There is no significant pleural fluid present.      The liver is enlarged with features suggesting hepatic steatosis. There are at least seven ill-defined enhancing masses identified throughout the right and left hepatic lobes concerning for hepatic metastatic disease. For reference purposes, there is a 4.4 cm lesion within the right hepatic lobe (axial series 1, image 52) and a 3.0 cm lesion within the left hepatic lobe (axial series 1, image 52). Several of these lesions demonstrate subtle central hypoattenuation which may reflect a component of central necrosis.  The gallbladder is not definitely visualized and may be surgically absent.  There is no intra-or extrahepatic biliary ductal dilatation.    The stomach, spleen, and pancreas are unremarkable. There is mild nodular thickening of the left  adrenal gland particularly the medial limb. The right adrenal gland appears unremarkable.    The left kidney is abnormal in appearance. There is an approximately 3.0 x 3.6 x 3.0 cm exophytic mass arising from the medial aspect of the lower pole of the left kidney. This is concerning for solid renal mass such as renal cell carcinoma.  No definite additional left-sided renal masses are identified. There is an apparent subtle cortical enhancing mass within the cortex of the upper pole of the right kidney concerning for possible contralateral solid renal mass. This measures approximately 1.0 x 1.1 x 1.0 cm (for example coronal series 3, image 39). There is no evidence of hydronephrosis. Urinary bladder is moderately distended. There is central dystrophic prostate calcifications. There is a fat containing left inguinal hernia.    The infrarenal abdominal aorta is mildly ectatic. There is moderate atherosclerotic change along its course.    The visualized loops of small and large bowel show no evidence of obstruction or inflammation.  There is no ascites or free intraperitoneal air. No significant retroperitoneal lymphadenopathy is appreciated. No bulky lymphadenopathy is identified within the pelvis.    The osseous structures demonstrate a lytic lesion within the right lateral aspect of the L3 vertebral body as seen on prior MRI examination. No definite additional discrete lytic lesions are identified.  The extraperitoneal soft tissues are unremarkable.   Impression         1. 3.6 cm exophytic mass arising from the medial aspect of the lower pole of left kidney which in light of the above findings is concerning for malignant solid renal mass such as renal cell carcinoma. Additional small 1.0 cm subtle cortical enhancing mass is identified within the upper pole of the right kidney concerning for possible contralateral solid renal mass.    2. Multiple enhancing ill-defined hepatic masses concerning for hepatic metastatic  disease. Superimposed hepatomegaly and hepatic steatosis.    3. Multifocal irregular thickening of the bilateral pleura concerning for possible pleural metastatic disease given the additional findings.    4. Mild nonspecific nodular thickening of the left adrenal gland without discrete nodule.    5. Redemonstration of known lytic lesion within the L3 vertebral body. No definite additional discrete lytic lesions are identified.    6. Subcentimeter hypoattenuating right thyroid lobe nodule.    7. Additional incidental findings as above.     MRI Thoracic Spine W Contrast - 5/27/17:  Findings:  The thoracic spine demonstrates proper alignment. The vertebral bodies show normal signal intensity and height with no indication of acute fracture or pathologic marrow replacement process.  Nonspecific area of low signal intensity on all imaging sequences T4, may represent that of sclerotic focus.  This corresponds with CT examination 5/27/17.  This lesion is indeterminate.  Additional Schmorl's nodes are identified throughout the thoracic spine. The intervertebral disk spaces also appear well-maintained.     The demonstrated portion of the spinal cord is normal in signal intensity at all levels with no indication of myelomalacia or cord edema. No intradural signal abnormalities are apparent. Evaluation of the surrounding soft tissue structures reveals .   Impression       1.Indeterminate lesion T4 vertebral body.  No evidence for cord compression or abnormal cord signal intensity.          ASSESSMENT/PLAN:   Plan:   Patient is a 72 y.o. male presents with PMHx of CAD (s/p 5 stents), IDDM (Type II), Hypothyroidism, HTN, CVA (2013), HLD, GERD, and chronic back/contreras pain who presented to the hospital due to recurrent falls and leg weakness. Symptomatically, he may have lost a few pounds and also has been have night sweats over the past six month. He has also had R CVA tenderness. CT of chest/abdomen/pelvis reveals a 3.6 cm  exophytic mass in L kidney and a 1 cm mass in R kidney in addition to multiple hepatic hyoechoic masses and also lesions concerning for pleural metastases. In addition, an MRI of Thoracic spine W contrast reveals an indeterminate T4 lesion without cord compression or abnormal cord signal intensity; there is no fracture noted.    Given above, pt likely has metastatic renal cell carcinoma. He is scheduled to get a biopsy of his liver lesions today. In addition, we will note baseline Hgb and check LDH as it can help in making decisions for treatment. Pt may potentially benefit from pamindronate but he doesn't have cord compression or edema to suggest that steroids would be of benefit. In addition,we will get an NM bone scan to visualize for skeletal metastases. Can consider MRI of brain as well. Once he has his pain controlled and biopsy is completed, pt could potentially follow up as outpatient in Oncology clinic. Discussed case with Dr. Tabor and Dr. Reed.    Attending Note  I have personally taken the history and examined this patient and agree with the fellow's note as stated above.  Biopsy as above and we will follow up on pathology to make further recs

## 2017-05-29 NOTE — PHARMACY MED REC
"Admission Medication Reconciliation - Pharmacy Consult Note    The home medication history was taken by Radha Laurent, Pharmacy Tech.  Based on information gathered and subsequent review by the clinical pharmacist, the items below may need attention.     You may go to "Admission" then "Reconcile Home Medications" tabs to review and/or act upon these items. Based on information gathered and subsequent review by the clinical pharmacist, the items below may need attention.    Potentially problematic discrepancies with current MAR  o Patient IS taking the following which was not ordered upon admit  o Finasteride 5mg PO daily    Jade Davis, PharmD  [id number]      Patient's prior to admission medication regimen was as follows:  Prescriptions Prior to Admission   Medication Sig Dispense Refill Last Dose    amlodipine (NORVASC) 10 MG tablet Take 10 mg by mouth once daily.   5/26/2017    aspirin (ECOTRIN) 81 MG EC tablet Take 81 mg by mouth once daily.   5/26/2017    b complex vitamins tablet Take 1 tablet by mouth once daily.   5/26/2017    clopidogrel (PLAVIX) 75 mg tablet Take 75 mg by mouth once daily.   5/26/2017    diazePAM (VALIUM) 5 MG tablet Take 5 mg by mouth every 6 (six) hours as needed for Anxiety.   5/26/2017    fenofibrate 160 MG Tab Take 145 mg by mouth once daily.   5/26/2017    finasteride (PROSCAR) 5 mg tablet Take 5 mg by mouth once daily.   5/26/2017    fish oil-omega-3 fatty acids 300-1,000 mg capsule Take 2 g by mouth once daily.   5/26/2017    FLAXSEED ORAL Take 1 tablet by mouth once daily.    5/26/2017    folic acid (FOLVITE) 800 MCG Tab Take 800 mcg by mouth once daily.   5/26/2017    insulin aspart (NOVOLOG) 100 unit/mL injection Inject 45 Units into the skin 2 (two) times daily.    5/26/2017    insulin glargine (LANTUS) 100 unit/mL injection Inject 45 Units into the skin 2 (two) times daily.    5/26/2017    levothyroxine (SYNTHROID) 25 MCG tablet Take 25 mcg by mouth once daily.   " 5/26/2017    lisinopril (PRINIVIL,ZESTRIL) 40 MG tablet Take 40 mg by mouth once daily.   5/26/2017    milk thistle 175 mg tablet Take 175 mg by mouth once daily.   5/26/2017    omeprazole (PRILOSEC) 20 MG capsule Take 20 mg by mouth once daily.   5/26/2017    vitamin E 600 UNIT capsule Take 600 Units by mouth once daily.    5/26/2017    fenofibrate micronized (LOFIBRA) 134 MG Cap Take 134 mg by mouth daily with breakfast.   5/26/2017         Please add appropriate    SmartPhrase below:

## 2017-05-29 NOTE — SUBJECTIVE & OBJECTIVE
Interval History: NAEON, NPO.  Planned IR biopsy today.  Reports minimal back pain and HA.  Otherwise states he is in his normal state of health.  Is able to ambulate.      Review of Systems   Constitutional: Negative for chills and fever.   HENT: Negative for rhinorrhea and sore throat.    Eyes: Negative for visual disturbance.   Respiratory: Negative for shortness of breath.    Cardiovascular: Negative for chest pain and leg swelling.   Gastrointestinal: Negative for abdominal pain, blood in stool, constipation, diarrhea and nausea.   Genitourinary: Negative for dysuria and hematuria.   Musculoskeletal: Positive for back pain and gait problem.   Neurological: Positive for weakness. Negative for headaches.     Objective:     Vital Signs (Most Recent):  Temp: 98.2 °F (36.8 °C) (05/29/17 0750)  Pulse: 75 (05/29/17 0750)  Resp: 15 (05/29/17 0750)  BP: (!) 164/74 (05/29/17 0750)  SpO2: 95 % (05/29/17 0750) Vital Signs (24h Range):  Temp:  [97.7 °F (36.5 °C)-98.5 °F (36.9 °C)] 98.2 °F (36.8 °C)  Pulse:  [] 75  Resp:  [15-17] 15  SpO2:  [94 %-95 %] 95 %  BP: (133-164)/(62-74) 164/74     Weight: 88.9 kg (196 lb)  Body mass index is 30.7 kg/m².    Intake/Output Summary (Last 24 hours) at 05/29/17 1454  Last data filed at 05/29/17 0400   Gross per 24 hour   Intake              480 ml   Output                0 ml   Net              480 ml      Physical Exam   Constitutional: He is oriented to person, place, and time. He appears well-developed and well-nourished. No distress.   HENT:   Head: Normocephalic and atraumatic.   Mouth/Throat: Oropharynx is clear and moist.   Eyes: EOM are normal. Pupils are equal, round, and reactive to light.   Neck: Normal range of motion.   Cardiovascular: Normal rate, regular rhythm and normal heart sounds.    Pulmonary/Chest: Effort normal and breath sounds normal.   Abdominal: Soft. Bowel sounds are normal. He exhibits no distension. There is no tenderness.   Musculoskeletal: He  exhibits no edema.   No spinal tenderness. Some tenderness over right flank.    Neurological: He is alert and oriented to person, place, and time.   Weakness of blt LE and UE. Toes downgoing on babinski. Unable to illicit patellar DTR.   Trace right sided facial weakness.    Skin: Skin is warm and dry. He is not diaphoretic.   Nursing note and vitals reviewed.      Significant Labs: All pertinent labs within the past 24 hours have been reviewed.    Significant Imaging: I have reviewed all pertinent imaging results/findings within the past 24 hours.

## 2017-05-29 NOTE — PLAN OF CARE
Problem: Patient Care Overview  Goal: Plan of Care Review  Patient AAOx4. Up independently. Patient calm and cooperative. Currently in MRI. Patient had ultrasound of abdomen done today. Scheduled to have bone scan done as well. NPO at midnight for liver biopsy tomorrow. Patient remains free of falls.

## 2017-05-30 VITALS
RESPIRATION RATE: 18 BRPM | BODY MASS INDEX: 30.76 KG/M2 | TEMPERATURE: 98 F | HEART RATE: 76 BPM | SYSTOLIC BLOOD PRESSURE: 156 MMHG | DIASTOLIC BLOOD PRESSURE: 78 MMHG | OXYGEN SATURATION: 96 % | WEIGHT: 196 LBS | HEIGHT: 67 IN

## 2017-05-30 LAB
ALBUMIN SERPL BCP-MCNC: 3.2 G/DL
ALP SERPL-CCNC: 65 U/L
ALT SERPL W/O P-5'-P-CCNC: 28 U/L
ANION GAP SERPL CALC-SCNC: 10 MMOL/L
AST SERPL-CCNC: 21 U/L
BASOPHILS # BLD AUTO: 0.02 K/UL
BASOPHILS NFR BLD: 0.4 %
BILIRUB SERPL-MCNC: 0.4 MG/DL
BUN SERPL-MCNC: 18 MG/DL
CALCIUM SERPL-MCNC: 9.1 MG/DL
CHLORIDE SERPL-SCNC: 104 MMOL/L
CO2 SERPL-SCNC: 26 MMOL/L
CREAT SERPL-MCNC: 1.3 MG/DL
DIFFERENTIAL METHOD: NORMAL
EOSINOPHIL # BLD AUTO: 0.1 K/UL
EOSINOPHIL NFR BLD: 1.9 %
ERYTHROCYTE [DISTWIDTH] IN BLOOD BY AUTOMATED COUNT: 13.4 %
EST. GFR  (AFRICAN AMERICAN): >60 ML/MIN/1.73 M^2
EST. GFR  (NON AFRICAN AMERICAN): 54.5 ML/MIN/1.73 M^2
GLUCOSE SERPL-MCNC: 273 MG/DL
HCT VFR BLD AUTO: 43 %
HGB BLD-MCNC: 14.4 G/DL
LDH SERPL L TO P-CCNC: 116 U/L
LYMPHOCYTES # BLD AUTO: 1.9 K/UL
LYMPHOCYTES NFR BLD: 37 %
MCH RBC QN AUTO: 30 PG
MCHC RBC AUTO-ENTMCNC: 33.5 %
MCV RBC AUTO: 90 FL
MONOCYTES # BLD AUTO: 0.7 K/UL
MONOCYTES NFR BLD: 13.4 %
NEUTROPHILS # BLD AUTO: 2.4 K/UL
NEUTROPHILS NFR BLD: 46.9 %
PLATELET # BLD AUTO: 261 K/UL
PMV BLD AUTO: 11.2 FL
POCT GLUCOSE: 185 MG/DL (ref 70–110)
POCT GLUCOSE: 285 MG/DL (ref 70–110)
POCT GLUCOSE: 357 MG/DL (ref 70–110)
POTASSIUM SERPL-SCNC: 4.1 MMOL/L
PROT SERPL-MCNC: 6.3 G/DL
RBC # BLD AUTO: 4.8 M/UL
SODIUM SERPL-SCNC: 140 MMOL/L
WBC # BLD AUTO: 5.21 K/UL

## 2017-05-30 PROCEDURE — 63600175 PHARM REV CODE 636 W HCPCS: Performed by: RADIOLOGY

## 2017-05-30 PROCEDURE — 25000003 PHARM REV CODE 250: Performed by: SURGERY

## 2017-05-30 PROCEDURE — 63600175 PHARM REV CODE 636 W HCPCS: Performed by: HOSPITALIST

## 2017-05-30 PROCEDURE — 85025 COMPLETE CBC W/AUTO DIFF WBC: CPT

## 2017-05-30 PROCEDURE — 99238 HOSP IP/OBS DSCHRG MGMT 30/<: CPT | Mod: GC,,, | Performed by: HOSPITALIST

## 2017-05-30 PROCEDURE — 80053 COMPREHEN METABOLIC PANEL: CPT

## 2017-05-30 PROCEDURE — 25000003 PHARM REV CODE 250: Performed by: STUDENT IN AN ORGANIZED HEALTH CARE EDUCATION/TRAINING PROGRAM

## 2017-05-30 PROCEDURE — 83615 LACTATE (LD) (LDH) ENZYME: CPT

## 2017-05-30 PROCEDURE — 97161 PT EVAL LOW COMPLEX 20 MIN: CPT

## 2017-05-30 PROCEDURE — 36415 COLL VENOUS BLD VENIPUNCTURE: CPT

## 2017-05-30 PROCEDURE — 63600175 PHARM REV CODE 636 W HCPCS: Performed by: STUDENT IN AN ORGANIZED HEALTH CARE EDUCATION/TRAINING PROGRAM

## 2017-05-30 PROCEDURE — 0FB23ZX EXCISION OF LEFT LOBE LIVER, PERCUTANEOUS APPROACH, DIAGNOSTIC: ICD-10-PCS | Performed by: RADIOLOGY

## 2017-05-30 RX ORDER — INSULIN GLARGINE 100 [IU]/ML
50 INJECTION, SOLUTION SUBCUTANEOUS 2 TIMES DAILY
Start: 2017-05-30 | End: 2017-06-06 | Stop reason: SDUPTHER

## 2017-05-30 RX ORDER — FENTANYL CITRATE 50 UG/ML
INJECTION, SOLUTION INTRAMUSCULAR; INTRAVENOUS CODE/TRAUMA/SEDATION MEDICATION
Status: COMPLETED | OUTPATIENT
Start: 2017-05-30 | End: 2017-05-30

## 2017-05-30 RX ORDER — MIDAZOLAM HYDROCHLORIDE 1 MG/ML
INJECTION, SOLUTION INTRAMUSCULAR; INTRAVENOUS CODE/TRAUMA/SEDATION MEDICATION
Status: COMPLETED | OUTPATIENT
Start: 2017-05-30 | End: 2017-05-30

## 2017-05-30 RX ORDER — INSULIN ASPART 100 [IU]/ML
30 INJECTION, SOLUTION INTRAVENOUS; SUBCUTANEOUS
Start: 2017-05-30 | End: 2017-06-06 | Stop reason: SDUPTHER

## 2017-05-30 RX ADMIN — FENTANYL CITRATE 50 MCG: 50 INJECTION, SOLUTION INTRAMUSCULAR; INTRAVENOUS at 11:05

## 2017-05-30 RX ADMIN — MIDAZOLAM 1 MG: 1 INJECTION INTRAMUSCULAR; INTRAVENOUS at 11:05

## 2017-05-30 RX ADMIN — FOLIC ACID 1000 MCG: 1 TABLET ORAL at 09:05

## 2017-05-30 RX ADMIN — PANTOPRAZOLE SODIUM 40 MG: 40 TABLET, DELAYED RELEASE ORAL at 09:05

## 2017-05-30 RX ADMIN — AMLODIPINE BESYLATE 10 MG: 10 TABLET ORAL at 09:05

## 2017-05-30 RX ADMIN — LEVOTHYROXINE SODIUM 25 MCG: 25 TABLET ORAL at 05:05

## 2017-05-30 RX ADMIN — INSULIN ASPART 2 UNITS: 100 INJECTION, SOLUTION INTRAVENOUS; SUBCUTANEOUS at 02:05

## 2017-05-30 RX ADMIN — INSULIN DETEMIR 50 UNITS: 100 INJECTION, SOLUTION SUBCUTANEOUS at 09:05

## 2017-05-30 RX ADMIN — INSULIN ASPART 6 UNITS: 100 INJECTION, SOLUTION INTRAVENOUS; SUBCUTANEOUS at 06:05

## 2017-05-30 RX ADMIN — INSULIN ASPART 10 UNITS: 100 INJECTION, SOLUTION INTRAVENOUS; SUBCUTANEOUS at 05:05

## 2017-05-30 RX ADMIN — FENOFIBRATE 145 MG: 145 TABLET, FILM COATED ORAL at 01:05

## 2017-05-30 RX ADMIN — LISINOPRIL 40 MG: 20 TABLET ORAL at 09:05

## 2017-05-30 RX ADMIN — VITAMIN E CAP 400 UNIT 800 UNITS: 400 CAP at 09:05

## 2017-05-30 NOTE — ASSESSMENT & PLAN NOTE
Lower back pain and gait difficulty, improved from admission   - Chronic back pain x5 months with recent worsening and new blt LE weakness.   - MRI showing L3 vertebral body lesion concerning for met vs myeloma  - Neurosurgery on board.   - Extended MRI T-spine tomorrow, as he has already received contrast today for MRI L-spine.   - CT showed masses in the kidney and liver, likely renal cell carcinoma,   - Urology on board, recs heme/onc, tissue biopsy   - Recommended IR biopsy for his liver which has been ordered for 5/30.

## 2017-05-30 NOTE — PLAN OF CARE
Problem: Physical Therapy Goal  Goal: Physical Therapy Goal  Goals to be met by: 6/10/17     Patient will increase functional independence with mobility by performin. Supine to sit with Modified [place]  2. Sit to supine with Modified [place]  3. Sit to stand transfer with Modified [place]  4. Gait  x 150 feet with Modified [place] using Rolling Walker.   5. Lower extremity exercise program x 30 reps per handout, with independence    Outcome: Ongoing (interventions implemented as appropriate)  PT eval complete and POC initiated.

## 2017-05-30 NOTE — PROGRESS NOTES
"[facility] Hospital Medicine  Progress Note    Patient Name: Edwin Singleton  MRN: [id number]  Patient Class: IP- Inpatient   Admission Date: 5/26/2017  Length of Stay: 4 days  Attending Physician: Vilma Rodgers MD  Primary Care Provider: Primary Doctor No    Hospital Medicine Team: [facility] HOSP MED 1 Bi Florian MD    Subjective:     Principal Problem:Lumbar disc lesion    HPI:  Patient is a 73 yo man with PMH CAD (s/p 5 stents, remotely), DM2 (insulin dependent), hypothyroid, CVA (2013), and chronic back pain who is transferred to [facility] for Neurosurgery evaluation of Lumbar spine lesion in the setting of progressive weakness of his legs and exacerbation of chronic back pain. He reports back pain x5 months [facility]. His original complaint was of right flank pain more than centralized discomfort. The pain does occasionally shoot down his legs. He denies any incontinence of bowel or bladder. More recently he has developed blt LE weakness, causing him to fall two times yesterday. He reports he was walking on the grass and fell, no LOC, did not hit his head, no residual pain. After the fall he presented to the ED at [facility]. He had an MRI showing "L3 vertebral body lesion with apparent cortical disruption concerning for metastases vs myeloma," and was transferred to [facility] for Neurosurgery evaluation. He is admitted to Hospital Medicine for further care and malignant work up.     Patient denies any fever/chills, though he does report fatigue, and loss of appetite over the last few months resulting in 8-10 pound weight loss. Of note, he has a significant smoking history 2-3 PPD x 36 years (quit 24 years ago). No longer drinks alcohol, no h/o IVDU, no other drugs.     Hospital Course:  Admitted and urology consulted for the RCC. NS consulted for the lytic lesion in T4. Today patient is doing well and pain in well controlled. Placed an IR " procedure for liver biopsy to get tissue dx for his RCC. Urology states that this would be the best option.  Heme/ Onc on board.  US biopsy today      Interval History: JODI URIBEO for planned US biopsy today.  Reports minimal back pain that he attributes to the bed.  Still able to ambulate with minimal difficulty.  Currently has no new complaints.      Review of Systems   Constitutional: Negative for chills and fever.   HENT: Negative for rhinorrhea and sore throat.    Eyes: Negative for visual disturbance.   Respiratory: Negative for shortness of breath.    Cardiovascular: Negative for chest pain and leg swelling.   Gastrointestinal: Negative for abdominal pain, blood in stool, constipation, diarrhea and nausea.   Genitourinary: Negative for dysuria and hematuria.   Musculoskeletal: Positive for back pain and gait problem.   Neurological: Negative for headaches.     Objective:     Vital Signs (Most Recent):  Temp: 98 °F (36.7 °C) (05/30/17 0739)  Pulse: 77 (05/30/17 0739)  Resp: 15 (05/30/17 0739)  BP: (!) 176/82 (05/30/17 0739)  SpO2: 96 % (05/30/17 0739) Vital Signs (24h Range):  Temp:  [97.6 °F (36.4 °C)-98.2 °F (36.8 °C)] 98 °F (36.7 °C)  Pulse:  [70-77] 77  Resp:  [13-16] 15  SpO2:  [95 %-97 %] 96 %  BP: (155-176)/(74-85) 176/82     Weight: 88.9 kg (196 lb)  Body mass index is 30.7 kg/m².    Intake/Output Summary (Last 24 hours) at 05/30/17 0749  Last data filed at 05/29/17 2100   Gross per 24 hour   Intake              300 ml   Output                0 ml   Net              300 ml      Physical Exam   Constitutional: He is oriented to person, place, and time. He appears well-developed and well-nourished. No distress.   HENT:   Head: Normocephalic and atraumatic.   Mouth/Throat: Oropharynx is clear and moist.   Eyes: EOM are normal. Pupils are equal, round, and reactive to light.   Neck: Normal range of motion.   Cardiovascular: Normal rate, regular rhythm and normal heart sounds.    Pulmonary/Chest: Effort  normal and breath sounds normal.   Abdominal: Soft. Bowel sounds are normal. He exhibits no distension. There is no tenderness.   Musculoskeletal: He exhibits no edema.   No spinal tenderness. Some tenderness over right flank.    Neurological: He is alert and oriented to person, place, and time.   Weakness of blt LE and UE. Toes downgoing on babinski. Unable to illicit patellar DTR.   Trace right sided facial weakness.    Skin: Skin is warm and dry. He is not diaphoretic.   Nursing note and vitals reviewed.      Significant Labs: All pertinent labs within the past 24 hours have been reviewed.    Significant Imaging: I have reviewed all pertinent imaging results/findings within the past 24 hours.    Assessment/Plan:      * Lumbar disc lesion    - Chronic back pain x5 months with recent worsening and new blt LE weakness.   - MRI showing L3 vertebral body lesion concerning for met vs myeloma  - Neurosurgery on board.   - Extended MRI T-spine tomorrow, as he has already received contrast today for MRI L-spine.   - CT showed masses in the kidney and liver, likely renal cell carcinoma,   - Urology on board, recs heme/onc, tissue biopsy   - Recommended IR biopsy for his liver which has been ordered for 5/29.           Renal mass    - See malignant neoplasm plan   - Planned IR liver biopsy 5/29         Malignant neoplasm metastatic to liver with unknown primary site    - Heme/onc consulted   - suspect Renal cell, Urology recommends heme/onc (once tissue is provided) and biopsy (ordered for tomorrow)           GERD (gastroesophageal reflux disease)    - pantoprazole.           Benign essential HTN    - Cont home lisinopril. Reordered Norvasc 10 mg today.          HLD (hyperlipidemia)    - cont home finofibrate, vit E.           DM2 (diabetes mellitus, type 2)    - Insulin drip given sugars, will transfer to step down unit   - A1c 11.1  - Home regimen: Lantus 40U qam and qhs, Novalog 40U with meals (twice /day).              Chronic back pain    Pain could also be related acutely due to his T4 lytic lesion.          CAD (coronary artery disease)    - s/p 5 stents remotely  - no acute issues  - Cont home ASA, amlodipine, lisinopril.   - Per NSG, holding home plavix for now.           History of CVA (cerebrovascular accident)    - CVA in 2013, no residual deficits           Hypothyroid    - cont home levothyroxine             VTE Risk Mitigation         Ordered     High Risk of VTE  Once      05/26/17 8532        Dispo: US biopsy today.  Follow up with Heme/onc, possible outpatient follow up     Bi Florian MD  Department of Hospital Medicine   [facility]

## 2017-05-30 NOTE — PLAN OF CARE
JANE consulted for RW delivery. JANE contacted Nigel hernandez/ [facility] . Nigel reports he is on the [employer] and it will be a while before he can deliver to bedside. Nigel also reports their fax is down and requests RW order be left at 10th floor nurses' station. Pt's name, room #, insurance, height and weight provided.    JANE notified pt does not want to wait for RW. [facility] contacted to cancel order. TAMMY notified for follow up regarding home delivery tomorrow.    Delivery address: [address] [place] [id number].    Lexie Davis LMSW, [facility]  On Call JANE

## 2017-05-30 NOTE — SUBJECTIVE & OBJECTIVE
Interval History: JODI URIBEO for planned US biopsy today.  Reports minimal back pain that he attributes to the bed.  Still able to ambulate with minimal difficulty.  Currently has no new complaints.      Review of Systems   Constitutional: Negative for chills and fever.   HENT: Negative for rhinorrhea and sore throat.    Eyes: Negative for visual disturbance.   Respiratory: Negative for shortness of breath.    Cardiovascular: Negative for chest pain and leg swelling.   Gastrointestinal: Negative for abdominal pain, blood in stool, constipation, diarrhea and nausea.   Genitourinary: Negative for dysuria and hematuria.   Musculoskeletal: Positive for back pain and gait problem.   Neurological: Negative for headaches.     Objective:     Vital Signs (Most Recent):  Temp: 98 °F (36.7 °C) (05/30/17 0739)  Pulse: 77 (05/30/17 0739)  Resp: 15 (05/30/17 0739)  BP: (!) 176/82 (05/30/17 0739)  SpO2: 96 % (05/30/17 0739) Vital Signs (24h Range):  Temp:  [97.6 °F (36.4 °C)-98.2 °F (36.8 °C)] 98 °F (36.7 °C)  Pulse:  [70-77] 77  Resp:  [13-16] 15  SpO2:  [95 %-97 %] 96 %  BP: (155-176)/(74-85) 176/82     Weight: 88.9 kg (196 lb)  Body mass index is 30.7 kg/m².    Intake/Output Summary (Last 24 hours) at 05/30/17 0749  Last data filed at 05/29/17 2100   Gross per 24 hour   Intake              300 ml   Output                0 ml   Net              300 ml      Physical Exam   Constitutional: He is oriented to person, place, and time. He appears well-developed and well-nourished. No distress.   HENT:   Head: Normocephalic and atraumatic.   Mouth/Throat: Oropharynx is clear and moist.   Eyes: EOM are normal. Pupils are equal, round, and reactive to light.   Neck: Normal range of motion.   Cardiovascular: Normal rate, regular rhythm and normal heart sounds.    Pulmonary/Chest: Effort normal and breath sounds normal.   Abdominal: Soft. Bowel sounds are normal. He exhibits no distension. There is no tenderness.   Musculoskeletal: He  exhibits no edema.   No spinal tenderness. Some tenderness over right flank.    Neurological: He is alert and oriented to person, place, and time.   Weakness of blt LE and UE. Toes downgoing on babinski. Unable to illicit patellar DTR.   Trace right sided facial weakness.    Skin: Skin is warm and dry. He is not diaphoretic.   Nursing note and vitals reviewed.      Significant Labs: All pertinent labs within the past 24 hours have been reviewed.    Significant Imaging: I have reviewed all pertinent imaging results/findings within the past 24 hours.

## 2017-05-30 NOTE — PLAN OF CARE
Problem: Fall Risk (Adult)  Intervention: Safety Precautions  Up ad socrates independently but on bedrest following biopsy. Remains free of falls.       Problem: Patient Care Overview  Goal: Plan of Care Review  Outcome: Ongoing (interventions implemented as appropriate)  No acute events this shift. AVSS on RA. Pt completed liver biopsy (dressing to abd CDI) & bone scan today.     Problem: Diabetes, Type 2 (Adult)  Intervention: Optimize Glycemic Control  BG checked AC, HS.       Problem: Pressure Ulcer Risk (Amilcar Scale) (Adult,Obstetrics,Pediatric)  Intervention: Turn/Reposition Often  Repositions independently. Weight shift encouraged.

## 2017-05-30 NOTE — NURSING
JANE Owen given correct pt address of [address] [facility] [address] for walker delivery. Pt has a borrowed walker he can use for the evening, fine with delivery tomorrow as they don't want to wait. [facility].

## 2017-05-30 NOTE — PLAN OF CARE
Problem: Fall Risk (Adult)  Intervention: Patient Rounds  AAOx4. Answers questions appropriately and converses well. NAD noted. Patient c/o pain to back. Has order for Tylenol but states for headaches. Patient will ask MD this AM about getting a pain medicine to help his back. Patient NPO since midnight except for sips with medication this AM. Patient to go for liver biopsy and bone scan today. No respiratory distress noted. Patient ambulates independently. Safety maintained. Patient's needs met. Will continue to monitor.

## 2017-05-30 NOTE — NURSING
Pt back to unit from procedure with VSS on RA. AOx4. Pt verbalizes understanding of bedrest status until 4pm.

## 2017-05-30 NOTE — NURSING TRANSFER
Nursing Transfer Note      5/30/2017     Transfer To: 1044A    Transfer via stretcher    Transfer with n/a    Transported by escort staff        Chart send with patient: Yes

## 2017-05-30 NOTE — PROGRESS NOTES
Ultrasound guided biopsy of liver mass complete. Pt tolerated well. VS consistent to previous recordings.   No signs or symptoms of distress noted.  Sandbag to abdomen for 1 hour - Up at 1300 and Pt to remain on bedrest for the next 3 hours thereafter.  Pt will be transferred to ROCU bed and report to be given at bedside.

## 2017-05-30 NOTE — PT/OT/SLP EVAL
"Physical Therapy   Evaluation    Edwin Singleton   MRN: [id number]     PT Received On: 05/30/17  PT Start Time: 1515  PT Stop Time: 1525  PT Total Time (min): 10 min    Billable Minutes:  Evaluation 10    Diagnosis: Lumbar disc lesion    Past Medical History:   Diagnosis Date    Chronic back pain     Coronary artery disease     Diabetes mellitus     GERD (gastroesophageal reflux disease)     Hyperlipemia     Thyroid disease      Past Surgical History:   Procedure Laterality Date    ABDOMINAL SURGERY      APPENDECTOMY      BACK SURGERY      CARDIAC SURGERY      CHOLECYSTECTOMY         Referring physician: Leslie  Date referred to PT: 5/30/2017     General Precautions: Standard, fall  Orthopedic Precautions : N/A  Braces: N/A    Do you have any cultural, spiritual, Pentec[employer]alism conflicts, given your current situation?: none    Patient History:  Lives With: child(sari), adult  Living Arrangements: house  Transportation Available: family or friend will provide  Living Environment Comment: Pt lives in a [facility] with no BENNIE his son who can provide limited assistance.    Equipment Currently Used at Home: none    Previous Level of Function:  Ambulation Skills: independent  Transfer Skills: independent  ADL Skills: independent    Subjective:  Communicated with RN prior to session.    "I could probably use this" re: RW    Chief Complaint: none stated  Patient goals: to return home     Objective:  Patient found supine in bed     Patient found with: peripheral IV    Cognitive Exam:  Oriented to: Person, Place and Time  Follows Commands/attention: Follows multistep  commands  Communication: clear/fluent  Safety awareness/insight to disability: intact    Physical Exam:  Postural examination/scapula alignment: Rounded shoulder    Skin integrity: Visible skin intact  Edema: none noted     Sensation:   Intact    Lower Extremity Range of Motion:  Right Lower Extremity: WFL  Left Lower Extremity: WFL    Lower Extremity " Strength:  Right Lower Extremity: WFL  Left Lower Extremity: WFL    Functional Mobility:    Bed Mobility:    Did not occur     Transfers:    Sit <> Stand Assistance: Supervision  Sit <> Stand Assistive Device: Rolling Walker    Ambulation:    Gait Distance: ~100 feet with cueing for technique  Assistance 1: Stand by Assistance  Gait Assistive Device: Rolling walker  Gait Pattern: reciprocal  Gait Deviation(s): decreased aileen, decreased step length, decreased stride length, decreased weight-shifting ability    Stairs:  Not assessed    Balance:   Static Sit: GOOD: Takes MODERATE challenges from all directions  Dynamic Sit:  GOOD: Maintains balance through MODERATE excursions of active trunk movement  Static Stand: FAIR+: Takes MINIMAL challenges from all directions  Dynamic stand: FAIR: Needs CONTACT GUARD during gait    Therapeutic Activities and Exercises:  PT evaluation performed.  White board updated.  Pt educated on role of PT, safety with functional mobility.     Pt safe to transfer with RN    Patient left up in chair with all lines intact, call button in reach, RN notified and family present.    AM-PAC 6 CLICK MOBILITY  1 = Unable, Total/Dependent Assistance  2 = A lot, Maximum/Moderate Assistance  3 = A little, Minimum/Contact Guard/Supervision  4 = None, Modified [place]/Independent    Turning over in bed (including adjusting bedclothes, sheets and blankets)?: 3  Sitting down on and standing up from a chair with arms (e.g., wheelchair, bedside commode, etc.): 3  Moving from lying on back to sitting on the side of the bed?: 3  Moving to and from a bed to a chair (including a wheelchair)?: 3  Need to walk in hospital room?: 3  Climbing 3-5 steps with a railing?: 3  Total Score: 18    AM-PAC Raw Score   CMS G-Code Modifier   Level of Impairment   Assistance     6   CN   100%         Total / Unable   7 - 9   CM   80 - 100%   Maximal Assist     10 - 14   CL   60 - 80%   Moderate Assist     15 - 19   CK    40 - 60%   Moderate Assist     20 - 22   CJ   20 - 40%   Minimal Assist     23   CI   1-20%         SBA / CGA     24 CH   0%   Independent/Modified Independent       Assessment:  Edwin Singleton is a 72 y.o. male with a medical diagnosis of Lumbar disc lesion. He presents with deficits listed below.  Pt tolerated evaluation well.  Pt is a good candidate for skilled PT services to address the below deficits and to increase functional independence.      Rehab identified problem list/impairments: weakness, impaired endurance, impaired functional mobilty, gait instability, impaired balance, decreased lower extremity function    Rehab potential is good.    Activity tolerance: Good    Discharge Facility/Level Of Care Needs: outpatient PT    Barriers to Discharge: None    Equipment Needed After Discharge: walker, rolling    GOALS:    Physical Therapy Goals        Problem: Physical Therapy Goal    Goal Priority Disciplines Outcome Goal Variances Interventions   Physical Therapy Goal     PT/OT, PT Ongoing (interventions implemented as appropriate)     Description:  Goals to be met by: 6/10/17     Patient will increase functional independence with mobility by performin. Supine to sit with Modified [place]  2. Sit to supine with Modified [place]  3. Sit to stand transfer with Modified [place]  4. Gait  x 150 feet with Modified [place] using Rolling Walker.   5. Lower extremity exercise program x 30 reps per handout, with independence                      PLAN:    Patient to be seen 3 x/week to address the above listed problems via gait training, therapeutic activities, therapeutic exercises  Plan of Care Expires: 17  Plan of Care reviewed with: patient, erin Falcon, PT, DPT  2017   [phone]

## 2017-05-30 NOTE — ASSESSMENT & PLAN NOTE
Plan US biopsy today  - Heme/onc consulted, recommend NM bone scan, and MRI brain, may consider outpatient follow up.     - suspect Renal cell, Urology recommends heme/onc (once tissue is provided) and biopsy (ordered for tomorrow)

## 2017-05-30 NOTE — NURSING
Discharge instructions reviewed with pt - verbalizes understanding. PIV removed. Case management called x2 to obtain walker that pt is to have for home use.

## 2017-05-30 NOTE — PLAN OF CARE
Extended Emergency Contact Information  Primary Emergency Contact: Pramod Powell   [place]  Mobile Phone: [phone]  Relation: Son    Primary Doctor No    No future appointments.    Payor: [employer] MEDICARE / Plan: [employer] MEDICARE HMO / Product Type: Capitation /     No Pharmacies Listed       05/30/17 1352   Discharge Assessment   Assessment Type Discharge Planning Assessment   Confirmed/corrected address and phone number on facesheet? Yes   Assessment information obtained from? Patient;Medical Record   Communicated expected length of stay with patient/caregiver yes   Prior to hospitilization cognitive status: Alert/Oriented   Prior to hospitalization functional status: Independent   Current cognitive status: Alert/Oriented   Current Functional Status: Independent   Arrived From [facility]  ([facility] from [facility])   Lives With child(sari), adult   Able to Return to Prior Arrangements yes   Is patient able to care for self after discharge? Yes   How many people do you have in your home that can help with your care after discharge? 1   Who are your caregiver(s) and their phone number(s)? Pramod Powell (Son)     [phone]    Patient's perception of discharge disposition home or selfcare   Readmission Within The Last 30 Days no previous admission in last 30 days   Patient currently being followed by outpatient case management? No   Patient currently receives home health services? No   Does the patient currently use HME? No   Patient currently receives private duty nursing? N/A   Patient currently receives any other outside agency services? No   Equipment Currently Used at Home none   Do you have any problems affording any of your prescribed medications? No   Is the patient taking medications as prescribed? yes   Do you have any financial concerns preventing you from receiving the healthcare you need? No   Does the patient have transportation to healthcare appointments? Yes    Transportation Available family or friend will provide   On Dialysis? No   Does the patient receive services at the Coumadin Clinic? No   Are there any open cases? No   Discharge Plan A Home   Discharge Plan B Home   Patient/Family In Agreement With Plan yes

## 2017-05-30 NOTE — NURSING
Pt transported to liver biopsy via wheelchair. AM meds given with exception of ones missing from pharmacy (to administer upon return to floor). Ambulatory from bed to wheelchair. VSS on RA.

## 2017-05-30 NOTE — PROCEDURES
Radiology Post-Procedure Note    Pre Op Diagnosis: Liver masses    Post Op Diagnosis: Same    Procedure: Ultrasound guided liver biopsy    Procedure performed by: Bianka Bullard MD    Written Informed Consent Obtained: Yes    Specimen Removed: Yes: Three, 16 gauge core biopsies of a left hepatic lobe mass    Estimated Blood Loss: Minimal    Findings: Moderate sedation and local anesthesia were used.    A 16-gauge Monopty biopsy device was used to remove 3 specimens from a left hepatic lobe mass.  This specimen was evaluated by pathology at the time of biopsy and sent for further evaluation.      The patient tolerated the procedure well and there were no complications.  Please see Imaging report for further details.      Rachid Byrnes MD  PGY-II  Dept of Radiology   Pager: [phone]

## 2017-05-30 NOTE — H&P
Inpatient Radiology Pre-procedure Note    History of Present Illness:  Edwin Singleton is a 72 y.o. male who presents for ultrasound guided biopsy of liver mass.    Admission H&P reviewed.  Past Medical History:   Diagnosis Date    Chronic back pain     Coronary artery disease     Diabetes mellitus     GERD (gastroesophageal reflux disease)     Hyperlipemia     Thyroid disease      Past Surgical History:   Procedure Laterality Date    ABDOMINAL SURGERY      APPENDECTOMY      BACK SURGERY      CARDIAC SURGERY      CHOLECYSTECTOMY         Review of Systems:   As documented in primary team H&P    Home Meds:   Prior to Admission medications    Medication Sig Start Date End Date Taking? Authorizing Provider   amlodipine (NORVASC) 10 MG tablet Take 10 mg by mouth once daily.   Yes Historical Provider, MD   aspirin (ECOTRIN) 81 MG EC tablet Take 81 mg by mouth once daily.   Yes Historical Provider, MD   b complex vitamins tablet Take 1 tablet by mouth once daily.   Yes Historical Provider, MD   clopidogrel (PLAVIX) 75 mg tablet Take 75 mg by mouth once daily.   Yes Historical Provider, MD   diazePAM (VALIUM) 5 MG tablet Take 5 mg by mouth every 6 (six) hours as needed for Anxiety.   Yes Historical Provider, MD   fenofibrate 160 MG Tab Take 145 mg by mouth once daily.   Yes Historical Provider, MD   finasteride (PROSCAR) 5 mg tablet Take 5 mg by mouth once daily.   Yes Historical Provider, MD   fish oil-omega-3 fatty acids 300-1,000 mg capsule Take 2 g by mouth once daily.   Yes Historical Provider, MD   FLAXSEED ORAL Take 1 tablet by mouth once daily.    Yes Historical Provider, MD   folic acid (FOLVITE) 800 MCG Tab Take 800 mcg by mouth once daily.   Yes Historical Provider, MD   insulin aspart (NOVOLOG) 100 unit/mL injection Inject 45 Units into the skin 2 (two) times daily.    Yes Historical Provider, MD   insulin glargine (LANTUS) 100 unit/mL injection Inject 45 Units into the skin 2 (two) times daily.     Yes Historical Provider, MD   levothyroxine (SYNTHROID) 25 MCG tablet Take 25 mcg by mouth once daily.   Yes Historical Provider, MD   lisinopril (PRINIVIL,ZESTRIL) 40 MG tablet Take 40 mg by mouth once daily.   Yes Historical Provider, MD   milk thistle 175 mg tablet Take 175 mg by mouth once daily.   Yes Historical Provider, MD   omeprazole (PRILOSEC) 20 MG capsule Take 20 mg by mouth once daily.   Yes Historical Provider, MD   vitamin E 600 UNIT capsule Take 600 Units by mouth once daily.    Yes Historical Provider, MD   fenofibrate micronized (LOFIBRA) 134 MG Cap Take 134 mg by mouth daily with breakfast.    Historical Provider, MD     Scheduled Meds:    amlodipine  10 mg Oral Daily    fenofibrate  145 mg Oral Daily    folic acid  1,000 mcg Oral Daily    insulin detemir  50 Units Subcutaneous BID    levothyroxine  25 mcg Oral Before breakfast    lisinopril  40 mg Oral Daily    pantoprazole  40 mg Oral Daily    vitamin E  800 Units Oral Daily     Continuous Infusions:    PRN Meds:acetaminophen, dextrose 50%, dextrose 50%, diazePAM, glucagon (human recombinant), glucose, glucose, insulin aspart  Anticoagulants/Antiplatelets: Heparin (discontinued 5/28/2017)    Allergies: Review of patient's allergies indicates:  No Known Allergies  Sedation Hx: have not been any systemic reactions    Labs:    Recent Labs  Lab 05/26/17  2105   INR 1.0       Recent Labs  Lab 05/30/17  0521   WBC 5.21   HGB 14.4   HCT 43.0   MCV 90         Recent Labs  Lab 05/26/17  1220  05/30/17  0521   *  < > 273*     < > 140   K 4.0  < > 4.1     < > 104   CO2 25  < > 26   BUN 27*  < > 18   CREATININE 1.18  < > 1.3   CALCIUM 9.8  < > 9.1   MG 1.8  --   --    ALT 45*  < > 28   AST 32  < > 21   ALBUMIN 4.2  < > 3.2*   BILITOT 0.9  < > 0.4   < > = values in this interval not displayed.      Vitals:  Temp: 98 °F (36.7 °C) (05/30/17 0739)  Pulse: 77 (05/30/17 0739)  Resp: 15 (05/30/17 0739)  BP: (!) 176/82 (05/30/17  0739)  SpO2: 96 % (05/30/17 0739)     Physical Exam:  ASA: III  Mallampati: II    General: no acute distress  Mental Status: alert and oriented to person, place and time  HEENT: normocephalic, atraumatic  Chest: unlabored breathing  Abdomen: nondistended  Extremity: moves all extremities    Plan: Ultrasound guided biopsy of liver mass  Sedation Plan: Moderate sedation    Rachid Byrnes MD  PGY-II  Dept of Radiology   Pager: [phone]

## 2017-05-31 NOTE — DISCHARGE SUMMARY
"[facility] Hospital Medicine  Discharge Summary      Patient Name: Edwin Singleton  MRN: [id number]  Admission Date: 5/26/2017  Hospital Length of Stay: 4 days  Discharge Date and Time: 5/30/2017  6:57 PM  Attending Physician: Vilma Rodgers MD   Discharging Provider: Bi Florian MD  Primary Care Provider: Primary Doctor   Hospital Medicine Team: [facility] 1 Bi Florian MD    HPI:   Patient is a 73 yo man with PMH CAD (s/p 5 stents, remotely), DM2 (insulin dependent), hypothyroid, CVA (2013), and chronic back pain who is transferred [place] [facility] [place]surgery evaluation of Lumbar spine lesion in the setting of progressive weakness of his legs and exacerbation of chronic back pain. He reports back pain x5 months [facility] His original complaint was of right flank pain more than centralized discomfort. The pain does occasionally shoot down his legs. He denies any incontinence of bowel or bladder. More recently he has developed blt LE weakness, causing him to fall two times yesterday. He reports he was walking on the grass and fell, no LOC, did not hit his head, no residual pain. After the fall he presented to the ED at . He had an MRI showing "L3 vertebral body lesion with apparent cortical disruption concerning for metastases vs myeloma," and was transferred [place] [facility] [place] evaluation. He is admitted to Hospital Medicine for further care and malignant work up.     Patient denies any fever/chills, though he does report fatigue, and loss of appetite over the last few months resulting in 8-10 pound weight loss. Of note, he has a significant smoking history 2-3 PPD x 36 years (quit 24 years ago). No longer drinks alcohol, no h/o IVDU, no other drugs.     * No surgery found *      Indwelling Lines/Drains at time of discharge:   Lines/Drains/Airways          No matching active lines, drains, or airways    "     Hospital Course:   Admitted for neurosurgery consult regarding concern for lytic lesion on t4 and concern for spinal compression.  Neurosurgery recommend no need for acute intervention.  CT Imaging showed masses in the kidneys and liver concerning for RCC.  Urology was consulted and recommend Heme/Onc and IR consult for biopsy, but no surgical intervention.  Patient underwent US guided liver biopsy on 5/30.  Heme/onc recommend outpatient follow up.  Patient was discharge to home with a walker in on 5/30.  He was scheduled for Neurosurgey and Heme/Onc follow up outpatient and [facility]igmen.                 Consults:   Consults         Status Ordering Provider     Inpatient consult to Hematology  Once     Provider:  (Not yet assigned)    Completed JIMENA KIM     Inpatient consult to Neurosurgery  Once     Provider:  (Not yet assigned)    Completed LINDA JUNIOR     Inpatient consult to Urology  Once     Provider:  (Not yet assigned)    Completed RENETTA SUAREZ          Significant Diagnostic Studies: Biopsy Pending     Pending Diagnostic Studies:     Procedure Component Value Units Date/Time    IR Biopsy Liver [[id number]]     Order Status:  Sent Lab Status:  No result         Final Active Diagnoses:    Diagnosis Date Noted POA    PRINCIPAL PROBLEM:  Lumbar disc lesion [M51.9] 05/26/2017 Yes    Renal mass [N28.89] 05/27/2017 Yes    Malignant neoplasm metastatic to liver with unknown primary site [C78.7, C80.1] 05/27/2017 Yes    Hypothyroid [E03.9] 05/26/2017 Yes    History of CVA (cerebrovascular accident) [Z86.73] 05/26/2017 Not Applicable    CAD (coronary artery disease) [I25.10] 05/26/2017 Yes    Chronic back pain [M54.9, G89.29] 05/26/2017 Yes    DM2 (diabetes mellitus, type 2) [E11.9] 05/26/2017 Yes    HLD (hyperlipidemia) [E78.5] 05/26/2017 Yes    Benign essential HTN [I10] 05/26/2017 Yes    GERD (gastroesophageal reflux  disease) [K21.9] 05/26/2017 Yes      Problems Resolved During this Admission:    Diagnosis Date Noted Date Resolved POA      * Lumbar disc lesion    Lower back pain and gait difficulty, improved from admission   - Chronic back pain x5 months with recent worsening and new blt LE weakness.   - MRI showing L3 vertebral body lesion concerning for met vs myeloma  - Neurosurgery on board.   - Extended MRI T-spine tomorrow, as he has already received contrast today for MRI L-spine.   - CT showed masses in the kidney and liver, likely renal cell carcinoma,   - Urology on board, recs heme/onc, tissue biopsy   - Recommended IR biopsy for his liver which has been ordered for 5/30.           Renal mass    - See malignant neoplasm plan   - Planned US liver biopsy 5/30         Malignant neoplasm metastatic to liver with unknown primary site    US biopsy 5/30  - Heme/onc consulted, recommend NM bone scan, and MRI brain, may consider outpatient follow up.     - suspect Renal cell, Urology recommends heme/onc (once tissue is provided) and biopsy           Benign essential HTN    - Cont home lisinopril. Reordered Norvasc 10 mg .          HLD (hyperlipidemia)    - cont home finofibrate, vit E.           DM2 (diabetes mellitus, type 2)    - Briefly on Insulin drip given sugars, followed by restarting home regiment.  Lantus 50 and Novolog 30 in discharge.     - A1c 11.1  - Home regimen: Lantus 40U qam and qhs, Novalog 40U with meals (twice /day).             CAD (coronary artery disease)    - s/p 5 stents remotely  - no acute issues  - Cont home ASA, amlodipine, lisinopril.   - Plavix and ASA continued on discharge           History of CVA (cerebrovascular accident)    - CVA in 2013, no residual deficits           Hypothyroid    - cont home levothyroxine               Discharged Condition: good    Disposition: Home or Self Care    Follow Up:  Follow-up Information     [facility] HEMATOLOGY/ONCOLOGY.    Specialty:  Hematology and  "Oncology  Why:  They will call you and set up and appointment   Contact information:  [address]  [facility] [id number] [phone]           [facility] - Neurosurgery [address]th [address]    Specialty:  Neurosurgery  Why:  They will call you and set up and appointment   Contact information:  [address]  [facility] [address] [phone]  Additional information:  7th Floor               Patient Instructions:     WALKER FOR HOME USE   Order Specific Question Answer Comments   Type of Walker: Adult (5'4"-6'6")    With wheels? Yes    Height: 5' 7" (1.702 m)    Weight: 88.9 kg (196 lb)    Length of need (1-99 months): 99    Does patient have medical equipment at home? none    Please check all that apply: Patient's condition impairs ambulation.    Please check all that apply: Patient is unable to safely ambulate without equipment.    Please check all that apply: Walker will be used for gait training.    Vendor: [facility] to deliver, orders sent to [facility]   Expected Date of Delivery: 5/31/2017      Ambulatory consult to Physical Therapy   Referral Priority: Routine Referral Type: Physical Medicine   Referral Reason: Specialty Services Required    Requested Specialty: Physical Therapy    Number of Visits Requested: 1      Diet general     Activity as tolerated       Medications:  Reconciled Home Medications:   Discharge Medication List as of 5/30/2017  5:05 PM      CONTINUE these medications which have CHANGED    Details   insulin aspart (NOVOLOG) 100 unit/mL injection Inject 30 Units into the skin 3 (three) times daily with meals., Starting Tue 5/30/2017, No Print      insulin glargine (LANTUS) 100 unit/mL injection Inject 50 Units into the skin 2 (two) times daily., Starting Tue 5/30/2017, No Print         CONTINUE these medications which have NOT CHANGED    Details   amlodipine (NORVASC) 10 MG tablet Take 10 mg by mouth once daily., Historical Med      aspirin (ECOTRIN) 81 MG EC tablet Take " 81 mg by mouth once daily., Historical Med      b complex vitamins tablet Take 1 tablet by mouth once daily., Historical Med      clopidogrel (PLAVIX) 75 mg tablet Take 75 mg by mouth once daily., Historical Med      diazePAM (VALIUM) 5 MG tablet Take 5 mg by mouth every 6 (six) hours as needed for Anxiety., Historical Med      fenofibrate 160 MG Tab Take 145 mg by mouth once daily., Historical Med      finasteride (PROSCAR) 5 mg tablet Take 5 mg by mouth once daily., Historical Med      fish oil-omega-3 fatty acids 300-1,000 mg capsule Take 2 g by mouth once daily., Historical Med      FLAXSEED ORAL Take 1 tablet by mouth once daily. , Historical Med      folic acid (FOLVITE) 800 MCG Tab Take 800 mcg by mouth once daily., Historical Med      levothyroxine (SYNTHROID) 25 MCG tablet Take 25 mcg by mouth once daily., Historical Med      lisinopril (PRINIVIL,ZESTRIL) 40 MG tablet Take 40 mg by mouth once daily., Historical Med      milk thistle 175 mg tablet Take 175 mg by mouth once daily., Historical Med      omeprazole (PRILOSEC) 20 MG capsule Take 20 mg by mouth once daily., Historical Med      vitamin E 600 UNIT capsule Take 600 Units by mouth once daily. , Historical Med           Time spent on the discharge of patient: 25 minutes    Bi Floiran MD  Department of Hospital Medicine  [facility]

## 2017-06-01 ENCOUNTER — CLINICAL SUPPORT (OUTPATIENT)
Dept: REHABILITATION | Facility: HOSPITAL | Age: 72
End: 2017-06-01
Attending: STUDENT IN AN ORGANIZED HEALTH CARE EDUCATION/TRAINING PROGRAM
Payer: MEDICARE

## 2017-06-01 ENCOUNTER — PATIENT OUTREACH (OUTPATIENT)
Dept: ADMINISTRATIVE | Facility: CLINIC | Age: 72
End: 2017-06-01
Payer: MEDICARE

## 2017-06-01 DIAGNOSIS — M54.5 CHRONIC LOW BACK PAIN, UNSPECIFIED BACK PAIN LATERALITY, WITH SCIATICA PRESENCE UNSPECIFIED: Primary | ICD-10-CM

## 2017-06-01 DIAGNOSIS — G89.29 CHRONIC LOW BACK PAIN, UNSPECIFIED BACK PAIN LATERALITY, WITH SCIATICA PRESENCE UNSPECIFIED: Primary | ICD-10-CM

## 2017-06-01 PROBLEM — M54.50 CHRONIC LOW BACK PAIN: Status: ACTIVE | Noted: 2017-06-01

## 2017-06-01 PROCEDURE — G8980 MOBILITY D/C STATUS: HCPCS | Mod: CL

## 2017-06-01 PROCEDURE — G8978 MOBILITY CURRENT STATUS: HCPCS | Mod: CL

## 2017-06-01 PROCEDURE — 97162 PT EVAL MOD COMPLEX 30 MIN: CPT

## 2017-06-01 NOTE — PROGRESS NOTES
Physical Therapy Evaluation    Name: Edwin HARDY [facility] Number: [id number]      Diagnosis:   Encounter Diagnosis   Name Primary?    Chronic low back pain, unspecified back pain laterality, with sciatica presence unspecified Yes     Physician: Bi Florian,*  Treatment Orders: PT Eval and Treat    Past Medical History:   Diagnosis Date    Chronic back pain     Coronary artery disease     Diabetes mellitus     GERD (gastroesophageal reflux disease)     Hyperlipemia     Thyroid disease      Current Outpatient Prescriptions   Medication Sig    amlodipine (NORVASC) 10 MG tablet Take 10 mg by mouth once daily.    aspirin (ECOTRIN) 81 MG EC tablet Take 81 mg by mouth once daily.    b complex vitamins tablet Take 1 tablet by mouth once daily.    clopidogrel (PLAVIX) 75 mg tablet Take 75 mg by mouth once daily.    diazePAM (VALIUM) 5 MG tablet Take 5 mg by mouth every 6 (six) hours as needed for Anxiety.    fenofibrate 160 MG Tab Take 145 mg by mouth once daily.    finasteride (PROSCAR) 5 mg tablet Take 5 mg by mouth once daily.    fish oil-omega-3 fatty acids 300-1,000 mg capsule Take 2 g by mouth once daily.    FLAXSEED ORAL Take 1 tablet by mouth once daily.     folic acid (FOLVITE) 800 MCG Tab Take 800 mcg by mouth once daily.    insulin aspart (NOVOLOG) 100 unit/mL injection Inject 30 Units into the skin 3 (three) times daily with meals.    insulin glargine (LANTUS) 100 unit/mL injection Inject 50 Units into the skin 2 (two) times daily.    levothyroxine (SYNTHROID) 25 MCG tablet Take 25 mcg by mouth once daily.    lisinopril (PRINIVIL,ZESTRIL) 40 MG tablet Take 40 mg by mouth once daily.    milk thistle 175 mg tablet Take 175 mg by mouth once daily.    omeprazole (PRILOSEC) 20 MG capsule Take 20 mg by mouth once daily.    vitamin E 600 UNIT capsule Take 600 Units by mouth once daily.      No current facility-administered medications for this visit.      Review of patient's allergies  indicates:  No Known Allergies  Precautions: metastatic disease ( of the spine)    Evaluation Date: 06/01/2017  Visit # authorized: 1/20  Authorization expiration: 12/31/2017  Plan of Care Expiration: 7/1/2017    Subjective   Onset/MALCOLM: sudden    Primary concern/ Chief complaints:    Edwin is a 72 y.o. male with a PMH of DM, CAD, cancer (not yet discussed with MD as this is a recent finding), lumbar bone spur removal (10+ years ago) that presents to [facility] outpatient physical therapy secondary to low back pain.  Injury occurred on 5-6 months ago, and has gotten worse over time. Pt had 2 falls last Thursday when his legs gave out and reports that he will go into a festinating gait pattern. Reports that when he is siting without support, he will have a tendency to fall backwards. Occasionally has a backwards drift when standing, and gets dizzy on occasion.     X-ray and MRI was taken and revealed and have not been released to him yet. Results include a lytic lesion of the L3 vertebra has been previously described and has an MRI appearance suggestive of metastatic disease.    There is endplate degenerative change with subchondral sclerosis and marginal osteophyte formation at all lumbar levels.  There is moderate facet arthropathy at the lower lumbar levels.  There is postoperative change consistent with posterior decompression at L4 and L5.      [facility], [facility] is unable to elaborate. Pt reports that bending forward, leaning forward in a seated position, sitting too long (15 min), and standing too long increases low back pain and reports back pain at worst is a 9-10 on the VAS. Pt reports that he has problems sleeping due to hip discomfort and back pain. Pt has a decreased ability to perform ADLs such as standing, sitting, walking, carrying objects. Pt has been given a RW and reports no problems with that. Pt reports constant numbness and tingling in both feet, but reports  having this due to diabetes. No cultural, environmental, or spiritual barriers identified to treatment or learning.      Pain Scale: Edwin rates pain on a scale of 0-10 to be 10 at worst; 7 currently; 5 at best .    Patient Goals: Pt would like to decrease pain and increase function so he can return to pain-free completion of all normal daily activities.        Objective     Observation: arrived in wheelchair    Posture:  slouched    Lumbar Range of Motion:    Degrees Pain   Flexion 40 degrees  (60 is norm) BLE shooting pain        Extension 10 degrees  (25 is norm) -        Left Side Bending 10 degrees  (25 is norm) Shooting pain down LLE        Right Side Bending 10 degrees  (25 is norm) Central low back pain        Left Rotation   50% Pain at end range        Right Rotation   50% Pain at end range             Lower Extremity Strength  Right LE  Left LE    Knee extension: 4+/5 Knee extension: 4+/5   Knee flexion: 5/5 Knee flexion: 5/5   Hip flexion: 3+/5* Hip flexion: 3+/5*   Hip extension:  4/5 Hip extension: 4/5   Hip abduction: 4+/5 Hip abduction: 4+/5   Hip adduction: 5/5 Hip adduction 5/5   Ankle dorsiflexion: 4+/5 Ankle dorsiflexion: 4+/5   Ankle plantarflexion: 5/5 Ankle plantarflexion: 5/5       Special Tests:  -SLR Test: + BLE  -Bridge Test: -    Joint Mobility: WNL    Palpation: ttp at L5 paraspinals    Sensation: WNL    Flexibility: decreased in hamstrings bilaterally   Popliteal Angle: R = 47 degrees ; L = 50 degrees    Outcome measures:    FOTO back: patient score = 65% impairment    G-code mobility current:  CL (indicating 60-80% impaired)    G-code mobility goal:  CK (indicating 40-60% impaired)    PT reviewed FOTO scores for Edwin Singleton on 06/01/2017.   FOTO scores were entered into [facility] - see media section.    PT Evaluation Completed? Yes  Discussed Plan of Care with patient: Yes    TREATMENT:  Edwin received therapeutic exercises to develop strength and endurance, flexibility for  15 minutes including: SKTC stretch, bridging, and abdominal marches. (HEP2go code = KO05ASD)    Instructed pt. Regarding: Proper technique with all exercises. Pt demo good understanding of the education provided. Edwin demonstrated good return demonstration of activities.      Assessment     This is a 72 y.o. male referred to outpatient physical therapy and presents with a medical diagnosis of chronic low back pain and demonstrates limitations as described in the problem list. Pt will benefit from physical therapy services in order to maximize pain free functional independence. The following goals were discussed with the patient and patient is in agreement with them as to be addressed in the treatment plan. Pt was given a HEP consisting of SKTC stretch, bridging, and abdominal marches. (HEP2go code = UC63ZTT). Pt verbally understood the instructions as they were given and demonstrated proper form and technique during therapy. Pt was advised to perform these exercises free of pain, and to stop performing them if pain occurs.     Pt presents with limitations in lumbar AROM in all planes of motion and presented with decreased lumbar lordosis. Pt presents with tight hamstrings and weak core and hip musculature indicating decreased support of the lumbar spine with completion of ADLs. In order to decrease stresses on the lumbar spine with completion of functional movements, Edwin Singleton  would greatly benefit from stretching of bilateral hamstrings and hip flexors as well as strengthening of core and hip musculature in order to increase dynamic stability at the lumbar spine and improve biomechanics throughout the lumbar spine. Due to patients presentation of bilateral sciatica, Edwin Singleton would also benefit from nerve mobilization techniques to decrease impingement of the sciatic nerve. Pt would also benefit from gait training and hip strengthening to decrease shuffling in gait and increase safety with  ambulation.      Patient History Examination Clinical Presentation Clinical Decision Making   Comorbidities:  DM, CAD, cancer    Personal Factors:  none       Activity and Participation Restriction:  Mobility  General tasks and demands    Body Systems:  Musculoskeletal      Body Regions:  Lumbar Evolving clinical presentation with unstable and unpredictable characteristics     Mod           Medical necessity is demonstrated by the following IMPAIRMENTS/PROBLEM LIST:   1)Increase in pain level limiting function   2)Decreased range of motion limiting function   3)Decreased strength limiting function   4)Impaired posture   5)Lack of HEP    GOALS: Short Term Goals: 3 weeks  1. Report decreased low back pain  <   / =  6  /10  to increase tolerance for completion of ADLs  2. Pt to increase B popliteal angle by > or = 10 degrees in order to improve flexibility and posture.   3. Increased MMT  for  hip extension to 4+/5 bilaterally to increase tolerance for ADL and work activities.  4. Pt to achieve 55 degrees of active lumbar flexion in order to demonstrate improvements in mobility.   5. Pt to tolerate HEP to improve ROM and independence with ADL's    Long Term Goals: 6 weeks  1. Report decreased low back pain  <   / =  5 /10  to increase tolerance for completion of ADLs  2. Pt to increase B popliteal angle by > or = 20 degrees in order to improve flexibility and posture.   3. Increased MMT  for  hip extension to 5/5 bilaterally to increase tolerance for ADL and work activities.  4. Pt will report at CJ level (20-40% impaired) on FOTO score for low back pain disability to demonstrate decrease in disability and improvement in back pain.  5. Pt to be Independent with HEP to improve ROM and independence with ADL's  6. Pt to achieve 60 degrees of active lumbar flexion in order to demonstrate improvements in mobility.       Plan     Pt will be treated by physical therapy 1-3 times a week for 6 weeks for Pt Education, HEP,  therapeutic exercises, neuromuscular re-education, joint mobilizations, modalities prn to achieve established goals. Edwin may at times be seen by a PTA as part of the Rehab Team.     Cont PT for 6 weeks.     [place], JESE  06/01/2017.     I certify the need for these services furnished under this plan of treatment and while under my care.    ______________________________ Physician/Referring Practitioner  Date of Signature

## 2017-06-01 NOTE — PATIENT INSTRUCTIONS
Back Pain (Acute or Chronic)    Back pain is one of the most common problems. The good news is that most people feel better in 1 to 2 weeks, and most of the rest in 1 to 2 months. Most people can remain active.  People experience and describe pain differently; not everyone is the same.  · The pain can be sharp, stabbing, shooting, aching, cramping or burning.  · Movement, standing, bending, lifting, sitting, or walking may worsen pain.  · It can be localized to one spot or area, or it can be more generalized.  · It can spread or radiate upwards, to the front, or go down your arms or legs (sciatica).  · It can cause muscle spasm.  Most of the time, mechanical problems with the muscles or spine cause the pain. Mechanical problems are usually caused by an injury to the muscles or ligaments. While illness can cause back pain, it is usually not caused by a serious illness. Mechanical problems include:   · Physical activity such as sports, exercise, work, or normal activity  · Overexertion, lifting, pushing, pulling incorrectly or too aggressively  · Sudden twisting, bending, or stretching from an accident, or accidental movement  · Poor posture  · Stretching or moving wrong, without noticing pain at the time  · Poor coordination, lack of regular exercise (check with your doctor about this)  · Spinal disc disease or arthritis  · Stress  Pain can also be related to pregnancy, or illness like appendicitis, bladder or kidney infections, pelvic infections, and many other things.  Acute back pain usually gets better in 1 to 2 weeks. Back pain related to disk disease, arthritis in the spinal joints or spinal stenosis (narrowing of the spinal canal) can become chronic and last for months or years.  Unless you had a physical injury (for example, a car accident or fall) X-rays are usually not needed for the initial evaluation of back pain. If pain continues and does not respond to medical treatment, X-rays and other tests may be  needed.  Home care  Try these home care recommendations:  · When in bed, try to find a position of comfort. A firm mattress is best. Try lying flat on your back with pillows under your knees. You can also try lying on your side with your knees bent up towards your chest and a pillow between your knees.  · At first, do not try to stretch out the sore spots. If there is a strain, it is not like the good soreness you get after exercising without an injury. In this case, stretching may make it worse.  · Avoid prolong sitting, long car rides, or travel. This puts more stress on the lower back than standing or walking.  · During the first 24 to 72 hours after an acute injury or flare up of chronic back pain, apply an ice pack to the painful area for 20 minutes and then remove it for 20 minutes. Do this over a period of 60 to 90 minutes or several times a day. This will reduce swelling and pain. Wrap the ice pack in a thin towel or plastic to protect your skin.  · You can start with ice, then switch to heat. Heat (hot shower, hot bath, or heating pad) reduces pain and works well for muscle spasms. Heat can be applied to the painful area for 20 minutes then remove it for 20 minutes. Do this over a period of 60 to 90 minutes or several times a day. Do not sleep on a heating pad. It can lead to skin burns or tissue damage.  · You can alternate ice and heat therapy. Talk with your doctor about the best treatment for your back pain.  · Therapeutic massage can help relax the back muscles without stretching them.  · Be aware of safe lifting methods and do not lift anything without stretching first.  Medicines  Talk to your doctor before using medicine, especially if you have other medical problems or are taking other medicines.  · You may use over-the-counter medicine as directed on the bottle to control pain, unless another pain medicine was prescribed. If you have chronic conditions like diabetes, liver or kidney disease,  stomach ulcers, or gastrointestinal bleeding, or are taking blood thinners, talk to your doctor before taking any medicine.  · Be careful if you are given a prescription medicines, narcotics, or medicine for muscle spasms. They can cause drowsiness, affect your coordination, reflexes, and judgement. Do not drive or operate heavy machinery.  Follow-up care  Follow up with your healthcare provider, or as advised.   A radiologist will review any X-rays that were taken. Your provide will notify you of any new findings that may affect your care.  Call 911  Call emergency services if any of the following occur:  · Trouble breathing  · Confusion  · Very drowsy or trouble awakening  · Fainting or loss of consciousness  · Rapid or very slow heart rate  · Loss of bowel or bladder control  When to seek medical advice  Call your healthcare provider right away if any of these occur:   · Pain becomes worse or spreads to your legs  · Weakness or numbness in one or both legs  · Numbness in the groin or genital area  Date Last Reviewed: 7/1/2016  © 1596-8507 [employer] [address] [place] [address], [place] [address]. All rights reserved. This information is not intended as a substitute for professional medical care. Always follow your healthcare professional's instructions.

## 2017-06-02 NOTE — PLAN OF CARE
Physical Therapy Evaluation    Name: Edwin HARDY [facility] Number: [id number]      Diagnosis:   Encounter Diagnosis   Name Primary?    Chronic low back pain, unspecified back pain laterality, with sciatica presence unspecified Yes     Physician: Bi Florian,*  Treatment Orders: PT Eval and Treat    Past Medical History:   Diagnosis Date    Chronic back pain     Coronary artery disease     Diabetes mellitus     GERD (gastroesophageal reflux disease)     Hyperlipemia     Thyroid disease      Current Outpatient Prescriptions   Medication Sig    amlodipine (NORVASC) 10 MG tablet Take 10 mg by mouth once daily.    aspirin (ECOTRIN) 81 MG EC tablet Take 81 mg by mouth once daily.    b complex vitamins tablet Take 1 tablet by mouth once daily.    clopidogrel (PLAVIX) 75 mg tablet Take 75 mg by mouth once daily.    diazePAM (VALIUM) 5 MG tablet Take 5 mg by mouth every 6 (six) hours as needed for Anxiety.    fenofibrate 160 MG Tab Take 145 mg by mouth once daily.    finasteride (PROSCAR) 5 mg tablet Take 5 mg by mouth once daily.    fish oil-omega-3 fatty acids 300-1,000 mg capsule Take 2 g by mouth once daily.    FLAXSEED ORAL Take 1 tablet by mouth once daily.     folic acid (FOLVITE) 800 MCG Tab Take 800 mcg by mouth once daily.    insulin aspart (NOVOLOG) 100 unit/mL injection Inject 30 Units into the skin 3 (three) times daily with meals.    insulin glargine (LANTUS) 100 unit/mL injection Inject 50 Units into the skin 2 (two) times daily.    levothyroxine (SYNTHROID) 25 MCG tablet Take 25 mcg by mouth once daily.    lisinopril (PRINIVIL,ZESTRIL) 40 MG tablet Take 40 mg by mouth once daily.    milk thistle 175 mg tablet Take 175 mg by mouth once daily.    omeprazole (PRILOSEC) 20 MG capsule Take 20 mg by mouth once daily.    vitamin E 600 UNIT capsule Take 600 Units by mouth once daily.      No current facility-administered medications for this visit.      Review of patient's allergies  indicates:  No Known Allergies  Precautions: metastatic disease ( of the spine)    Evaluation Date: 06/01/2017  Visit # authorized: 1/20  Authorization expiration: 12/31/2017  Plan of Care Expiration: 7/1/2017    Subjective   Onset/MALCOLM: sudden    Primary concern/ Chief complaints:    Edwin is a 72 y.o. male with a PMH of DM, CAD, cancer (not yet discussed with MD as this is a recent finding), lumbar bone spur removal (10+ years ago) that presents to [facility] outpatient physical therapy secondary to low back pain.  Injury occurred on 5-6 months ago, and has gotten worse over time. Pt had 2 falls last Thursday when his legs gave out and reports that he will go into a festinating gait pattern. Reports that when he is siting without support, he will have a tendency to fall backwards. Occasionally has a backwards drift when standing, and gets dizzy on occasion.     X-ray and MRI was taken and revealed and have not been released to him yet. Results include a lytic lesion of the L3 vertebra has been previously described and has an MRI appearance suggestive of metastatic disease.    There is endplate degenerative change with subchondral sclerosis and marginal osteophyte formation at all lumbar levels.  There is moderate facet arthropathy at the lower lumbar levels.  There is postoperative change consistent with posterior decompression at L4 and L5.      [facility] is unable to elaborate. Pt reports that bending forward, leaning forward in a seated position, sitting too long (15 min), and standing too long increases low back pain and reports back pain at worst is a 9-10 on the VAS. Pt reports that he has problems sleeping due to hip discomfort and back pain. Pt has a decreased ability to perform ADLs such as standing, sitting, walking, carrying objects. Pt has been given a RW and reports no problems with that. Pt reports constant numbness and tingling in both feet, but reports  having this due to diabetes. No cultural, environmental, or spiritual barriers identified to treatment or learning.      Pain Scale: Edwin rates pain on a scale of 0-10 to be 10 at worst; 7 currently; 5 at best .    Patient Goals: Pt would like to decrease pain and increase function so he can return to pain-free completion of all normal daily activities.        Objective     Observation: arrived in wheelchair    Posture:  slouched    Lumbar Range of Motion:    Degrees Pain   Flexion 40 degrees  (60 is norm) BLE shooting pain        Extension 10 degrees  (25 is norm) -        Left Side Bending 10 degrees  (25 is norm) Shooting pain down LLE        Right Side Bending 10 degrees  (25 is norm) Central low back pain        Left Rotation   50% Pain at end range        Right Rotation   50% Pain at end range             Lower Extremity Strength  Right LE  Left LE    Knee extension: 4+/5 Knee extension: 4+/5   Knee flexion: 5/5 Knee flexion: 5/5   Hip flexion: 3+/5* Hip flexion: 3+/5*   Hip extension:  4/5 Hip extension: 4/5   Hip abduction: 4+/5 Hip abduction: 4+/5   Hip adduction: 5/5 Hip adduction 5/5   Ankle dorsiflexion: 4+/5 Ankle dorsiflexion: 4+/5   Ankle plantarflexion: 5/5 Ankle plantarflexion: 5/5       Special Tests:  -SLR Test: + BLE  -Bridge Test: -    Joint Mobility: WNL    Palpation: ttp at L5 paraspinals    Sensation: WNL    Flexibility: decreased in hamstrings bilaterally   Popliteal Angle: R = 47 degrees ; L = 50 degrees    Outcome measures:    FOTO back: patient score = 65% impairment    G-code mobility current:  CL (indicating 60-80% impaired)    G-code mobility goal:  CK (indicating 40-60% impaired)    PT reviewed FOTO scores for Edwin Singleton on 06/01/2017.   FOTO scores were entered into [employer] - see media section.    PT Evaluation Completed? Yes  Discussed Plan of Care with patient: Yes    TREATMENT:  Edwin received therapeutic exercises to develop strength and endurance, flexibility for  15 minutes including: SKTC stretch, bridging, and abdominal marches. (HEP2go code = [id number])    Instructed pt. Regarding: Proper technique with all exercises. Pt demo good understanding of the education provided. Edwin demonstrated good return demonstration of activities.      Assessment     This is a 72 y.o. male referred to outpatient physical therapy and presents with a medical diagnosis of chronic low back pain and demonstrates limitations as described in the problem list. Pt will benefit from physical therapy services in order to maximize pain free functional independence. The following goals were discussed with the patient and patient is in agreement with them as to be addressed in the treatment plan. Pt was given a HEP consisting of SKTC stretch, bridging, and abdominal marches. (HEP2go code = [id number]). Pt verbally understood the instructions as they were given and demonstrated proper form and technique during therapy. Pt was advised to perform these exercises free of pain, and to stop performing them if pain occurs.     Pt presents with limitations in lumbar AROM in all planes of motion and presented with decreased lumbar lordosis. Pt presents with tight hamstrings and weak core and hip musculature indicating decreased support of the lumbar spine with completion of ADLs. In order to decrease stresses on the lumbar spine with completion of functional movements, Edwin Singleton  would greatly benefit from stretching of bilateral hamstrings and hip flexors as well as strengthening of core and hip musculature in order to increase dynamic stability at the lumbar spine and improve biomechanics throughout the lumbar spine. Due to patients presentation of bilateral sciatica, Edwin Singleton would also benefit from nerve mobilization techniques to decrease impingement of the sciatic nerve. Pt would also benefit from gait training and hip strengthening to decrease shuffling in gait and increase safety with  ambulation.      Patient History Examination Clinical Presentation Clinical Decision Making   Comorbidities:  DM, CAD, cancer    Personal Factors:  none       Activity and Participation Restriction:  Mobility  General tasks and demands    Body Systems:  Musculoskeletal      Body Regions:  Lumbar Evolving clinical presentation with unstable and unpredictable characteristics     Mod           Medical necessity is demonstrated by the following IMPAIRMENTS/PROBLEM LIST:   1)Increase in pain level limiting function   2)Decreased range of motion limiting function   3)Decreased strength limiting function   4)Impaired posture   5)Lack of HEP    GOALS: Short Term Goals: 3 weeks  1. Report decreased low back pain  <   / =  6  /10  to increase tolerance for completion of ADLs  2. Pt to increase B popliteal angle by > or = 10 degrees in order to improve flexibility and posture.   3. Increased MMT  for  hip extension to 4+/5 bilaterally to increase tolerance for ADL and work activities.  4. Pt to achieve 55 degrees of active lumbar flexion in order to demonstrate improvements in mobility.   5. Pt to tolerate HEP to improve ROM and independence with ADL's    Long Term Goals: 6 weeks  1. Report decreased low back pain  <   / =  5 /10  to increase tolerance for completion of ADLs  2. Pt to increase B popliteal angle by > or = 20 degrees in order to improve flexibility and posture.   3. Increased MMT  for  hip extension to 5/5 bilaterally to increase tolerance for ADL and work activities.  4. Pt will report at CJ level (20-40% impaired) on FOTO score for low back pain disability to demonstrate decrease in disability and improvement in back pain.  5. Pt to be Independent with HEP to improve ROM and independence with ADL's  6. Pt to achieve 60 degrees of active lumbar flexion in order to demonstrate improvements in mobility.       Plan     Pt will be treated by physical therapy 1-3 times a week for 6 weeks for Pt Education, HEP,  therapeutic exercises, neuromuscular re-education, joint mobilizations, modalities prn to achieve established goals. Edwin may at times be seen by a PTA as part of the Rehab Team.     Cont PT for 6 weeks.     La Ahumada, JESE  06/01/2017.     I certify the need for these services furnished under this plan of treatment and while under my care.    ______________________________ Physician/Referring Practitioner  Date of Signature

## 2017-06-03 PROBLEM — R82.90 ABNORMAL URINALYSIS: Status: ACTIVE | Noted: 2017-06-03

## 2017-06-03 PROBLEM — N39.0 URINARY TRACT INFECTION WITHOUT HEMATURIA: Status: ACTIVE | Noted: 2017-06-03

## 2017-06-03 PROBLEM — R53.1 GENERALIZED WEAKNESS: Status: ACTIVE | Noted: 2017-06-03

## 2017-06-03 PROBLEM — D72.829 LEUKOCYTOSIS: Status: ACTIVE | Noted: 2017-06-03

## 2017-06-03 NOTE — ASSESSMENT & PLAN NOTE
- s/p 5 stents remotely  - no acute issues  - Cont home ASA, amlodipine, lisinopril.   - Plavix and ASA continued on discharge

## 2017-06-03 NOTE — ASSESSMENT & PLAN NOTE
- Briefly on Insulin drip given sugars, followed by restarting home regiment.  Lantus 50 and Novolog 30 in discharge.     - A1c 11.1  - Home regimen: Lantus 40U qam and qhs, Novalog 40U with meals (twice /day).

## 2017-06-03 NOTE — ASSESSMENT & PLAN NOTE
US biopsy 5/30  - Heme/onc consulted, recommend NM bone scan, and MRI brain, may consider outpatient follow up.     - suspect Renal cell, Urology recommends heme/onc (once tissue is provided) and biopsy

## 2017-06-06 ENCOUNTER — OFFICE VISIT (OUTPATIENT)
Dept: PRIMARY CARE CLINIC | Facility: CLINIC | Age: 72
End: 2017-06-06
Payer: MEDICARE

## 2017-06-06 VITALS
DIASTOLIC BLOOD PRESSURE: 54 MMHG | HEIGHT: 67 IN | OXYGEN SATURATION: 96 % | HEART RATE: 92 BPM | SYSTOLIC BLOOD PRESSURE: 118 MMHG

## 2017-06-06 DIAGNOSIS — Z79.4 TYPE 2 DIABETES MELLITUS WITH STAGE 3 CHRONIC KIDNEY DISEASE, WITH LONG-TERM CURRENT USE OF INSULIN: Chronic | ICD-10-CM

## 2017-06-06 DIAGNOSIS — E11.22 TYPE 2 DIABETES MELLITUS WITH STAGE 3 CHRONIC KIDNEY DISEASE, WITH LONG-TERM CURRENT USE OF INSULIN: Chronic | ICD-10-CM

## 2017-06-06 DIAGNOSIS — C64.9 METASTATIC RENAL CELL CARCINOMA TO BONE: ICD-10-CM

## 2017-06-06 DIAGNOSIS — I25.10 CORONARY ARTERY DISEASE INVOLVING NATIVE CORONARY ARTERY OF NATIVE HEART WITHOUT ANGINA PECTORIS: Primary | Chronic | ICD-10-CM

## 2017-06-06 DIAGNOSIS — M54.50 CHRONIC LOW BACK PAIN WITHOUT SCIATICA, UNSPECIFIED BACK PAIN LATERALITY: ICD-10-CM

## 2017-06-06 DIAGNOSIS — G89.29 CHRONIC LOW BACK PAIN WITHOUT SCIATICA, UNSPECIFIED BACK PAIN LATERALITY: ICD-10-CM

## 2017-06-06 DIAGNOSIS — C79.51 METASTATIC RENAL CELL CARCINOMA TO BONE: ICD-10-CM

## 2017-06-06 DIAGNOSIS — C78.7 METASTATIC RENAL CELL CARCINOMA TO LIVER: ICD-10-CM

## 2017-06-06 DIAGNOSIS — K21.9 GASTROESOPHAGEAL REFLUX DISEASE WITHOUT ESOPHAGITIS: Chronic | ICD-10-CM

## 2017-06-06 DIAGNOSIS — N40.1 BENIGN PROSTATIC HYPERPLASIA (BPH) WITH URINARY URGE INCONTINENCE: ICD-10-CM

## 2017-06-06 DIAGNOSIS — Z86.73 HISTORY OF CVA (CEREBROVASCULAR ACCIDENT): ICD-10-CM

## 2017-06-06 DIAGNOSIS — N39.41 BENIGN PROSTATIC HYPERPLASIA (BPH) WITH URINARY URGE INCONTINENCE: ICD-10-CM

## 2017-06-06 DIAGNOSIS — N18.30 TYPE 2 DIABETES MELLITUS WITH STAGE 3 CHRONIC KIDNEY DISEASE, WITH LONG-TERM CURRENT USE OF INSULIN: Chronic | ICD-10-CM

## 2017-06-06 DIAGNOSIS — C64.2 RENAL CELL CARCINOMA OF LEFT KIDNEY: ICD-10-CM

## 2017-06-06 DIAGNOSIS — E03.9 ACQUIRED HYPOTHYROIDISM: ICD-10-CM

## 2017-06-06 DIAGNOSIS — C64.9 METASTATIC RENAL CELL CARCINOMA TO LIVER: ICD-10-CM

## 2017-06-06 DIAGNOSIS — E78.2 MIXED HYPERLIPIDEMIA: Chronic | ICD-10-CM

## 2017-06-06 DIAGNOSIS — I10 BENIGN ESSENTIAL HTN: ICD-10-CM

## 2017-06-06 PROBLEM — M51.9 LUMBAR DISC LESION: Status: RESOLVED | Noted: 2017-05-26 | Resolved: 2017-06-06

## 2017-06-06 PROBLEM — D72.829 LEUKOCYTOSIS: Status: RESOLVED | Noted: 2017-06-03 | Resolved: 2017-06-06

## 2017-06-06 PROCEDURE — 99999 PR PBB SHADOW E&M-EST. PATIENT-LVL IV: CPT | Mod: PBBFAC,,,

## 2017-06-06 RX ORDER — ATORVASTATIN CALCIUM 40 MG/1
40 TABLET, FILM COATED ORAL DAILY
Qty: 90 TABLET | Refills: 3 | Status: SHIPPED | OUTPATIENT
Start: 2017-06-06 | End: 2017-06-28 | Stop reason: SDUPTHER

## 2017-06-06 RX ORDER — INSULIN GLARGINE 100 [IU]/ML
30 INJECTION, SOLUTION SUBCUTANEOUS 2 TIMES DAILY
Start: 2017-06-06 | End: 2017-06-13 | Stop reason: SDUPTHER

## 2017-06-06 RX ORDER — TAMSULOSIN HYDROCHLORIDE 0.4 MG/1
0.4 CAPSULE ORAL NIGHTLY
Qty: 90 CAPSULE | Refills: 4 | Status: SHIPPED | OUTPATIENT
Start: 2017-06-06 | End: 2017-06-28 | Stop reason: SDUPTHER

## 2017-06-06 RX ORDER — HYDROCODONE BITARTRATE AND ACETAMINOPHEN 5; 325 MG/1; MG/1
1 TABLET ORAL EVERY 8 HOURS PRN
Qty: 50 TABLET | Refills: 0 | Status: SHIPPED | OUTPATIENT
Start: 2017-06-06 | End: 2017-06-14 | Stop reason: SDUPTHER

## 2017-06-06 RX ORDER — INSULIN ASPART 100 [IU]/ML
20 INJECTION, SOLUTION INTRAVENOUS; SUBCUTANEOUS
Start: 2017-06-06 | End: 2017-06-28 | Stop reason: SDUPTHER

## 2017-06-06 NOTE — Clinical Note
[facility] Visit (Post Discharge Follow-up) Today:  - Our clinic physicians and nurses plan to follow the patient up for any medical issues in the [facility] for 30 days post discharge. - Biopsy showed renal cell carcinoma with mets. Referrals made.  Future Appointments: 6/7/2017   1:00 PM    Joe Winters MD          [facility] HEM ONC   Alec Stafford  6/9/2017   11:30 AM   La Ahumada, PT          [facility]    [place] 6/12/2017  12:00 PM   La Bestey, PT          [facility]    [place] 6/14/2017  10:30 AM   La Ahumada, PT          [facility]    [place] 6/19/2017  11:00 AM   La Ahumada, PT          [facility]    [place] 6/19/2017  2:15 PM    Hector Lloyd MD      [facility] UROLOG   Arsenio Mukherjee 6/20/2017  1:00 PM    [facility]   Arsenio DANIELSON  6/21/2017  10:30 AM   La Ahumada, PT          [facility]    [place] 6/28/2017  11:30 AM   Lulú Lewis MD   [facility]        Arsenio DANIELSON  NEW PCP

## 2017-06-06 NOTE — PATIENT INSTRUCTIONS
Insulin Dosing with Sliding Scale:       Inject Lantus 30 units twice daily (morning and night) - this is your basal insulin  Inject Novolog three times daily with meals: 20 units       - If not eating, skip dose.    Add additional insulin base on the blood sugar level before meals - see below.    Monitor blood sugar before meals and at bedtime.  Call physician if blood sugar < 60 or > 350 for two consecutive readings.          Glucose  Novolog Insulin Subcutaneous        0 - 60   Orange juice or glucose tablet, hold insulin      No additional insulin   201-250  Add 2 units   251-300  Add 4 units   301-350  Add 6 units   351-400  Add 8 units   >400   Add 10 units then call physician

## 2017-06-06 NOTE — PROGRESS NOTES
"[facility]  New Visit Progress Note   Recent Hospital Discharge     PRESENTING HISTORY     Chief Complaint/Reason for Visit:  Follow up Hospital Discharge   Chief Complaint   Patient presents with    Hospital Follow Up     PCP: Primary Doctor No    History of Present Illness: Mr. Edwin Singleton is a 72 y.o. male who was recently admitted to the hospital.    Admission Date: 5/26/2017  Hospital Length of Stay: 4 days  Discharge Date and Time: 5/30/2017  6:57 PM  Attending Physician: Vilma Rodgers MD   Discharging Provider: Bi Florian MD  Primary Care Provider: Primary Doctor No  Hospital Medicine Team: [facility] HOSP MED 1 Bi Florian MD     HPI:   Patient is a 73 yo man with PMH CAD (s/p 5 stents, remotely), DM2 (insulin dependent), hypothyroid, CVA (2013), and chronic back pain who is transferred to [facility] for Neurosurgery evaluation of Lumbar spine lesion in the setting of progressive weakness of his legs and exacerbation of chronic back pain. He reports back pain x5 months [facility]. His original complaint was of right flank pain more than centralized discomfort. The pain does occasionally shoot down his legs. He denies any incontinence of bowel or bladder. More recently he has developed blt LE weakness, causing him to fall two times yesterday. He reports he was walking on the grass and fell, no LOC, did not hit his head, no residual pain. After the fall he presented to the ED at [facility]. He had an MRI showing "L3 vertebral body lesion with apparent cortical disruption concerning for metastases vs myeloma," and was transferred to [facility] for Neurosurgery evaluation. He is admitted to Hospital Medicine for further care and malignant work up.      Patient denies any fever/chills, though he does report fatigue, and loss of appetite over the last few months resulting in 8-10 pound weight loss. Of note, he has a significant smoking history 2-3 PPD " x 36 years (quit 24 years ago). No longer drinks alcohol, no h/o IVDU, no other drugs.     Hospital Course:   Admitted for neurosurgery consult regarding concern for lytic lesion on t4 and concern for spinal compression.  Neurosurgery recommend no need for acute intervention.  CT Imaging showed masses in the kidneys and liver concerning for RCC.  Urology was consulted and recommend Heme/Onc and IR consult for biopsy, but no surgical intervention.  Patient underwent US guided liver biopsy on 5/30.  Heme/onc recommend outpatient follow up.  Patient was discharge to home with a walker in on 5/30.  He was scheduled for Neurosurgey and Heme/Onc follow up outpatient and [facility]igmen.       Significant Diagnostic Studies: Biopsy Pending              Pending Diagnostic Studies:      Procedure Component Value Units Date/Time     IR Biopsy Liver [[id number]]       Order Status:  Sent Lab Status:  No result                   Final Active Diagnoses:     Diagnosis Date Noted POA    PRINCIPAL PROBLEM:  Lumbar disc lesion [M51.9] 05/26/2017 Yes    Renal mass [N28.89] 05/27/2017 Yes    Malignant neoplasm metastatic to liver with unknown primary site [C78.7, C80.1] 05/27/2017 Yes    Hypothyroid [E03.9] 05/26/2017 Yes    History of CVA (cerebrovascular accident) [Z86.73] 05/26/2017 Not Applicable    CAD (coronary artery disease) [I25.10] 05/26/2017 Yes    Chronic back pain [M54.9, G89.29] 05/26/2017 Yes    DM2 (diabetes mellitus, type 2) [E11.9] 05/26/2017 Yes    HLD (hyperlipidemia) [E78.5] 05/26/2017 Yes    Benign essential HTN [I10] 05/26/2017 Yes    GERD (gastroesophageal reflux disease) [K21.9] 05/26/2017 Yes       Problems Resolved During this Admission:     Diagnosis Date Noted Date Resolved POA          * Lumbar disc lesion     Lower back pain and gait difficulty, improved from admission   - Chronic back pain x5 months with recent worsening and new blt LE weakness.    - MRI showing L3 vertebral body lesion concerning for met vs myeloma  - Neurosurgery on board.   - Extended MRI T-spine tomorrow, as he has already received contrast today for MRI L-spine.   - CT showed masses in the kidney and liver, likely renal cell carcinoma,   - Urology on board, recs heme/onc, tissue biopsy   - Recommended IR biopsy for his liver which has been ordered for 5/30.          Renal mass     - See malignant neoplasm plan   - Planned US liver biopsy 5/30        Malignant neoplasm metastatic to liver with unknown primary site     US biopsy 5/30  - Heme/onc consulted, recommend NM bone scan, and MRI brain, may consider outpatient follow up.     - suspect Renal cell, Urology recommends heme/onc (once tissue is provided) and biopsy           Benign essential HTN     - Cont home lisinopril. Reordered Norvasc 10 mg .         HLD (hyperlipidemia)     - cont home finofibrate, vit E.           DM2 (diabetes mellitus, type 2)     - Briefly on Insulin drip given sugars, followed by restarting home regiment.  Lantus 50 and Novolog 30 in discharge.     - A1c 11.1  - Home regimen: Lantus 40U qam and qhs, Novalog 40U with meals (twice /day).              CAD (coronary artery disease)     - s/p 5 stents remotely  - no acute issues  - Cont home ASA, amlodipine, lisinopril.   - Plavix and ASA continued on discharge           History of CVA (cerebrovascular accident)     - CVA in 2013, no residual deficits           Hypothyroid     - cont home levothyroxine              Admission Date: 6/3/2017  Hospital Length of Stay: 0 days  Discharge Date and Time: 6/4/2017  1:35 PM  Attending Physician: No att. providers found   Discharging Provider: Pallavi Sunkara, MD    HPI:   Patient is a 73 yo man with CAD (s/p 5 stents, remotely), DM2 (insulin dependent and uncontrolled), hypothyroid, CVA (2013) with no residual deficit, chronic back pain and recent admit from 5/26/17 to 5/30/17 to [facility] for L 3 mass. Was found to  having left renal mass and liver mass during that admit. Had U/S guided biopsy of liver mass, pathology showing Malignancy but stains still pending for further differentiation.              He is now here with complaints of generalized weakness worse to the lower extremities. This has been present at time of d/c and worsening since then.  currently he is so weak that he could not reach restroom multiple times and had accidents multiple times yesterday. Also had fell twice yesterday. Therefore came to E.R. Had abnormal U.A , elevated WBC and therefore admitted for possible UTI. No recent chills or fever.  No difficulty with bowel.  No retention of urine.  Denies any significant pain.    Leukocytosis     Most likely sec to Malignancy also. Resolved on labs today.       Abnormal urinalysis     - blood Cx -ve since admission, no fever since here  - elevated WBC wnl now  - UTI less likely. Coag -ve Staph in urine - ? Contaminant  - will d/c on oral Keflex for possible UTI as bacteria and WBC on repeat U.A improved with Rocephin.       Malignant neoplasm metastatic to liver with unknown primary site     Biopsy results showing Carcinoma, but staining still pending to evaluate further details.  F/u with Oncology as out patient. Most likely has metastatic RCC as per previous notes.        ________________________________________________________________    Today:  Complains of weakness to back and lower extremities.  He barely walks with walker.  He got wheel chair.  Unable to control urine since admission. He has drips in the past.     Review of Systems:  Review of Systems   Constitutional: Negative for weight loss.   Respiratory: Negative for shortness of breath.    Cardiovascular: Negative for chest pain and leg swelling.   Gastrointestinal: Negative for abdominal pain and constipation.   Genitourinary: Positive for frequency (incontinence). Negative for dysuria.   Musculoskeletal: Positive for back pain.   Skin: Negative  for rash.   Neurological: Positive for weakness. Negative for dizziness and headaches.   Psychiatric/Behavioral: The patient is nervous/anxious.        PAST HISTORY:     Past Medical History:   Diagnosis Date    Acquired hypothyroidism 5/26/2017    Benign essential HTN 5/26/2017    Chronic low back pain 6/1/2017    Coronary artery disease involving native coronary artery of native heart without angina pectoris 5/26/2017    S/p 5 stents - last one around 2010-11.    Gastroesophageal reflux disease without esophagitis 5/26/2017    Lumbar disc lesion 5/26/2017    Metastatic renal cell carcinoma to bone 6/6/2017    Metastatic renal cell carcinoma to liver 6/6/2017    Liver Biopsy 5-2017: METASTATIC RENAL CELL CARCINOMA.    Mixed hyperlipidemia 5/26/2017    Type 2 diabetes mellitus with stage 3 chronic kidney disease, with long-term current use of insulin 5/26/2017       Past Surgical History:   Procedure Laterality Date    ABDOMINAL SURGERY      APPENDECTOMY      BACK SURGERY      CARDIAC SURGERY      CHOLECYSTECTOMY      coronary stenting      X 5 last one in 2010-11.       No family history on file.    Social History     Social History    Marital status: Single     Spouse name: N/A    Number of children: N/A    Years of education: N/A     Social History Main Topics    Smoking status: Never Smoker    Smokeless tobacco: None    Alcohol use No    Drug use: No    Sexual activity: Not Asked     Other Topics Concern    None     Social History Narrative    None       MEDICATIONS & ALLERGIES:     Current Outpatient Prescriptions on File Prior to Visit   Medication Sig Dispense Refill    amlodipine (NORVASC) 10 MG tablet Take 10 mg by mouth once daily.      aspirin (ECOTRIN) 81 MG EC tablet Take 81 mg by mouth once daily.      b complex vitamins tablet Take 1 tablet by mouth once daily.      cephALEXin (KEFLEX) 500 MG capsule Take 1 capsule (500 mg total) by mouth every 12 (twelve) hours. 14  capsule 0    clopidogrel (PLAVIX) 75 mg tablet Take 75 mg by mouth once daily.      diazePAM (VALIUM) 5 MG tablet Take 5 mg by mouth every 6 (six) hours as needed for Anxiety.      fenofibrate 160 MG Tab Take 145 mg by mouth once daily.      finasteride (PROSCAR) 5 mg tablet Take 5 mg by mouth once daily.      fish oil-omega-3 fatty acids 300-1,000 mg capsule Take 2 g by mouth once daily.      FLAXSEED ORAL Take 1 tablet by mouth once daily.       folic acid (FOLVITE) 800 MCG Tab Take 800 mcg by mouth once daily.      insulin aspart (NOVOLOG) 100 unit/mL injection Inject 30 Units into the skin 3 (three) times daily with meals.      insulin glargine (LANTUS) 100 unit/mL injection Inject 50 Units into the skin 2 (two) times daily.      levothyroxine 50 mcg Cap Take 50 mcg by mouth before breakfast. 30 tablet 0    lisinopril (PRINIVIL,ZESTRIL) 40 MG tablet Take 40 mg by mouth once daily.      milk thistle 175 mg tablet Take 175 mg by mouth once daily.      omeprazole (PRILOSEC) 20 MG capsule Take 20 mg by mouth once daily.      vitamin E 600 UNIT capsule Take 600 Units by mouth once daily.        No current facility-administered medications on file prior to visit.       Review of patient's allergies indicates:  No Known Allergies    OBJECTIVE:     Vital Signs:  Vitals:    06/06/17 1501   BP: (!) 118/54   Pulse: 92     Wt Readings from Last 1 Encounters:   06/03/17 0249 81.6 kg (180 lb)     There is no height or weight on file to calculate BMI.     Physical Exam:  General: Well developed, well nourished. No distress.  HEENT: Head is normocephalic, atraumatic   Eyes: Clear conjunctiva.  Neck: Supple, symmetrical neck; trachea midline.  Lungs: Clear to auscultation bilaterally and normal respiratory effort.  Cardiovascular: Heart with regular rate and rhythm.    Extremities: No LE edema.    Abdomen: Abdomen is soft, non-tender non-distended with normal bowel sounds.  Skin: Skin color, texture, turgor normal.  No rashes.  Psychiatric: Not depressed.    Laboratory  Lab Results   Component Value Date    WBC 11.78 06/04/2017    HGB 13.8 (L) 06/04/2017    HCT 41.3 06/04/2017    MCV 90 06/04/2017     06/04/2017     BMP  Lab Results   Component Value Date     06/04/2017    K 4.2 06/04/2017     06/04/2017    CO2 27 06/04/2017    BUN 17 06/04/2017    CREATININE 1.21 06/04/2017    CALCIUM 9.4 06/04/2017    ANIONGAP 8 06/04/2017    ESTGFRAFRICA >60.0 06/04/2017    EGFRNONAA 59.5 (A) 06/04/2017     Lab Results   Component Value Date    ALT 30 06/04/2017    AST 17 06/04/2017    ALKPHOS 66 06/04/2017    BILITOT 0.7 06/04/2017     Lab Results   Component Value Date    INR 1.0 05/26/2017     Lab Results   Component Value Date    HGBA1C 11.1 (H) 05/27/2017     No results for input(s): POCTGLUCOSE in the last 72 hours.    Diagnostic Results:  CT 5-27-17  1. 3.6 cm exophytic mass arising from the medial aspect of the lower pole of left kidney which in light of the above findings is concerning for malignant solid renal mass such as renal cell carcinoma. Additional small 1.0 cm subtle cortical enhancing mass is identified within the upper pole of the right kidney concerning for possible contralateral solid renal mass.    2. Multiple enhancing ill-defined hepatic masses concerning for hepatic metastatic disease. Superimposed hepatomegaly and hepatic steatosis.    3. Multifocal irregular thickening of the bilateral pleura concerning for possible pleural metastatic disease given the additional findings.    4. Mild nonspecific nodular thickening of the left adrenal gland without discrete nodule.    5. Redemonstration of known lytic lesion within the L3 vertebral body. No definite additional discrete lytic lesions are identified.    6. Subcentimeter hypoattenuating right thyroid lobe nodule.    TRANSITION OF CARE:     [facility] On Call Contact Note: 6-1-17    Family and/or Caretaker present at visit?  Yes.  Diagnostic tests  reviewed/disposition: No diagnosic tests pending after this hospitalization.  Disease/illness education: Renal CA carcinoma, CAD, DMI 2.  Home health/community services discussion/referrals: Patient does not have home health established from hospital visit.  They do not need home health.  If needed, we will set up home health for the patient.   Establishment or re-establishment of referral orders for community resources: No other necessary community resources.   Discussion with other health care providers: Referral for Oncology and Urology.     Medications Reconciliation:   I have reconciled the patient's home medications and discharge medications with the patient/family. I have updated all changes.  Refer to After-Visit Medication List.    ASSESSMENT & PLAN:     Renal cell carcinoma of left kidney  Metastatic renal cell carcinoma to liver  Metastatic renal cell carcinoma to bone L3  - Liver biopsy showed renal cell CA.  He has increased fatigue.    Plan:  -     Ambulatory consult to Hematology / Oncology :  appt tomorrow.  -     Ambulatory consult to Urology  -     hydrocodone-acetaminophen 5-325mg (NORCO) 5-325 mg per tablet; Take 1 tablet by mouth every 8 (eight) hours as needed for Pain.  Dispense: 50 tablet; Refill: 0  For the back pain L3    Coronary artery disease involving native coronary artery of native heart without angina pectoris s/p stents  Mixed hyperlipidemia  Benign essential HTN  History of CVA (cerebrovascular accident)  - No chest pain.     Plan:  Stop Plavix. Aspirin daily.  Last stent arojnd 2010-11.              Norvasc and Lisinopril for HTN.              Add Lipitor 40 mg daily.    Rx:     atorvastatin (LIPITOR) 40 MG tablet; Take 1 tablet (40 mg total) by mouth once daily.  Dispense: 90 tablet; Refill: 3    Acquired hypothyroidism  - On Levothyroxine 50 mcg daily.    Type 2 diabetes mellitus with stage 3 chronic kidney disease, with long-term current use of insulin  - Uncontrolled.    Plan:   See instructions below.  -     insulin glargine (LANTUS) 100 unit/mL injection; Inject 30 Units into the skin 2 (two) times daily.  -     insulin aspart (NOVOLOG) 100 unit/mL injection; Inject 20 Units into the skin 3 (three) times daily with meals.    BPH with urinary urge incontinence  Rx:  -     tamsulosin (FLOMAX) 0.4 mg Cp24; Take 1 capsule (0.4 mg total) by mouth every evening.  Dispense: 90 capsule; Refill: 4    Instructions for the patient:  Insulin Dosing with Sliding Scale:       Inject Lantus 30 units twice daily (morning and night) - this is your basal insulin  Inject Novolog three times daily with meals: 20 units       - If not eating, skip dose.    Add additional insulin base on the blood sugar level before meals - see below.    Monitor blood sugar before meals and at bedtime.  Call physician if blood sugar < 60 or > 350 for two consecutive readings.          Glucose  Novolog Insulin Subcutaneous        0 - 60   Orange juice or glucose tablet, hold insulin      No additional insulin   201-250  Add 2 units   251-300  Add 4 units   301-350  Add 6 units   351-400  Add 8 units   >400   Add 10 units then call physician      Scheduled Follow-up :  Future Appointments  Date Time Provider Department Center   6/7/2017 1:00 PM Joe Winters MD [facility] HEM ONC Michael Cance   6/9/2017 11:30 AM La Ahumada PT [facility]   6/12/2017 12:00 PM La Ahumada PT [facility]   6/14/2017 10:30 AM La Ahumada PT [facility]   6/19/2017 11:00 AM La Ahumada PT [facility]   6/19/2017 2:15 PM Hector Lloyd MD [facility] UROLOG Arsenio Mukherjee   6/20/2017 1:00 PM [facility]IC[facility]   6/21/2017 10:30 AM La Ahumada PT [facility]   6/28/2017 11:30 AM Lulú Lewis MD [facility] Arsenio DANIELSON       After Visit Medication List :     Medication List          Accurate as of 6/6/17  4:18 PM. Always use your most recent med list.                amlodipine 10 MG tablet  Commonly known as:  NORVASC     aspirin 81 MG EC tablet  Commonly known as:  ECOTRIN     atorvastatin 40 MG tablet  Commonly known as:  LIPITOR  Take 1 tablet (40 mg total) by mouth once daily.     cephALEXin 500 MG capsule  Commonly known as:  KEFLEX  Take 1 capsule (500 mg total) by mouth every 12 (twelve) hours.     diazePAM 5 MG tablet  Commonly known as:  VALIUM     hydrocodone-acetaminophen 5-325mg 5-325 mg per tablet  Commonly known as:  NORCO  Take 1 tablet by mouth every 8 (eight) hours as needed for Pain.     insulin aspart 100 unit/mL injection  Commonly known as:  NOVOLOG  Inject 20 Units into the skin 3 (three) times daily with meals.     insulin glargine 100 unit/mL injection  Commonly known as:  LANTUS  Inject 30 Units into the skin 2 (two) times daily.     levothyroxine 50 mcg Cap  Take 50 mcg by mouth before breakfast.     lisinopril 40 MG tablet  Commonly known as:  PRINIVIL,ZESTRIL     tamsulosin 0.4 mg Cp24  Commonly known as:  FLOMAX  Take 1 capsule (0.4 mg total) by mouth every evening.           Where to Get Your Medications      These medications were sent to [facility]/pharmacy #[id number] - [place] - [address] AT [address] [facility]  [address], [place] [address]    Phone:  [phone]    atorvastatin 40 MG tablet   tamsulosin 0.4 mg Cp24     You can get these medications from any pharmacy    Bring a paper prescription for each of these medications   hydrocodone-acetaminophen 5-325mg 5-325 mg per tablet     Information about where to get these medications is not yet available    Ask your nurse or doctor about these medications   insulin aspart 100 unit/mL injection   insulin glargine 100 unit/mL injection           Signing Physician:  Jw Reyes MD

## 2017-06-07 ENCOUNTER — INITIAL CONSULT (OUTPATIENT)
Dept: HEMATOLOGY/ONCOLOGY | Facility: CLINIC | Age: 72
End: 2017-06-07
Payer: MEDICARE

## 2017-06-07 VITALS
BODY MASS INDEX: 29.48 KG/M2 | RESPIRATION RATE: 18 BRPM | TEMPERATURE: 98 F | WEIGHT: 187.81 LBS | SYSTOLIC BLOOD PRESSURE: 126 MMHG | DIASTOLIC BLOOD PRESSURE: 57 MMHG | OXYGEN SATURATION: 94 % | HEART RATE: 92 BPM | HEIGHT: 67 IN

## 2017-06-07 DIAGNOSIS — I25.10 CORONARY ARTERY DISEASE INVOLVING NATIVE CORONARY ARTERY OF NATIVE HEART WITHOUT ANGINA PECTORIS: Chronic | ICD-10-CM

## 2017-06-07 DIAGNOSIS — E11.22 TYPE 2 DIABETES MELLITUS WITH STAGE 3 CHRONIC KIDNEY DISEASE, WITH LONG-TERM CURRENT USE OF INSULIN: Chronic | ICD-10-CM

## 2017-06-07 DIAGNOSIS — C78.7 METASTATIC RENAL CELL CARCINOMA TO LIVER: Primary | ICD-10-CM

## 2017-06-07 DIAGNOSIS — C64.9 METASTATIC RENAL CELL CARCINOMA TO LIVER: Primary | ICD-10-CM

## 2017-06-07 DIAGNOSIS — N18.30 TYPE 2 DIABETES MELLITUS WITH STAGE 3 CHRONIC KIDNEY DISEASE, WITH LONG-TERM CURRENT USE OF INSULIN: Chronic | ICD-10-CM

## 2017-06-07 DIAGNOSIS — Z79.4 TYPE 2 DIABETES MELLITUS WITH STAGE 3 CHRONIC KIDNEY DISEASE, WITH LONG-TERM CURRENT USE OF INSULIN: Chronic | ICD-10-CM

## 2017-06-07 PROCEDURE — 4010F ACE/ARB THERAPY RXD/TAKEN: CPT | Mod: S$GLB,,, | Performed by: INTERNAL MEDICINE

## 2017-06-07 PROCEDURE — 99999 PR PBB SHADOW E&M-EST. PATIENT-LVL IV: CPT | Mod: PBBFAC,,, | Performed by: INTERNAL MEDICINE

## 2017-06-07 PROCEDURE — 3046F HEMOGLOBIN A1C LEVEL >9.0%: CPT | Mod: S$GLB,,, | Performed by: INTERNAL MEDICINE

## 2017-06-07 PROCEDURE — 1125F AMNT PAIN NOTED PAIN PRSNT: CPT | Mod: S$GLB,,, | Performed by: INTERNAL MEDICINE

## 2017-06-07 PROCEDURE — 99215 OFFICE O/P EST HI 40 MIN: CPT | Mod: S$GLB,,, | Performed by: INTERNAL MEDICINE

## 2017-06-07 PROCEDURE — 1159F MED LIST DOCD IN RCRD: CPT | Mod: S$GLB,,, | Performed by: INTERNAL MEDICINE

## 2017-06-07 NOTE — PATIENT INSTRUCTIONS
--rad onc referral  --Denosumab q monthly  --24hr urine protein check  --follow up in 1 wk  --2D ECHO

## 2017-06-07 NOTE — PROGRESS NOTES
"Chief Complaint: Renal cell cancer     Referred by: In-patient oncology team    HPI: Mr.Mc Moss is a 73 yo male with CAD (s/p 5 stents, remotely), DM2 (insulin dependent), hypothyroidism , CVA (2013) with no residual deficit, chronic back pain.He was transferred to [facility] [place] for [place]surgery evaluation of a lumbar spine lesion in the setting of progressive weakness of his legs and exacerbation of chronic back pain, in May 2017. He states that his back pain and LE weakness progressively worsened. [facility]. The pain radiates down his legs. He denies any incontinence of bowel or bladder. He had several falls. After one of the falls, he presented to the ED at [facility], in May 2017. He had an MRI showing "L3 vertebral body lesion with apparent cortical disruption concerning for metastases vs myeloma," and was transferred to [facility] [place] for neurosurgery evaluation. He was evaluated by neurosurgery regarding concern for lytic lesion on L3 on 5/26/17. No surgical intervention was proposed.   CT chest, abdomen,pelvis on 5/27/17 revealed :    --A 3.6 cm exophytic mass arising from the medial aspect of the lower pole of left kidney,   --A small 1.0 cm, subtle, cortical enhancing mass within the upper pole of the right kidney, concerning for possible contralateral solid renal mass  --Multiple enhancing ill-defined hepatic masses concerning for hepatic metastatic disease, hepatomegaly and hepatic steatosis  --Mulltifocal irregular thickening of the bilateral pleura concerning for possible pleural metastatic disease  --Mild nonspecific nodular thickening of the left adrenal gland without discrete nodule  -- Redemonstration of known lytic lesion within the L3 vertebral body. No definite additional discrete lytic lesions are identified.    Urology was consulted. No renal biopsy or cytoreductive nephrectomy was recommended. He underwent US guided liver biopsy on 5/30/17. Histopathology shows " features of RCC: IHC + for RCC, CD 10, EMA and negative for Hep-Par.        Patient denies any fever/chills, though he does report fatigue, and loss of appetite over the last few months resulting in 8-10 pound weight loss. Of note, he has a significant smoking history 2-3 PPD x 36 years (quit 24 years ago). No longer drinks alcohol, no h/o IVDU, no other drugs.       Review of Systems   Constitutional: Positive for malaise/fatigue and weight loss (About 10 lb weight loss since early 2017). Negative for chills, diaphoresis and fever.   HENT: Negative for nosebleeds and sore throat.    Eyes: Negative for blurred vision, double vision and redness.   Respiratory: Negative for cough, hemoptysis and wheezing.    Cardiovascular: Negative for chest pain, palpitations, orthopnea, claudication and PND.   Gastrointestinal: Positive for abdominal pain. Negative for blood in stool, constipation, diarrhea, heartburn, nausea and vomiting.   Genitourinary: Positive for flank pain (He has right flank pain). Negative for dysuria, frequency, hematuria and urgency.        He has hesitancy   Musculoskeletal: Positive for back pain (he has moderate- severe low back pain  ).   Skin: Negative for itching and rash.   Neurological: Negative for dizziness, tingling, seizures, loss of consciousness, weakness and headaches.   Endo/Heme/Allergies: Does not bruise/bleed easily.   Psychiatric/Behavioral: Negative for depression and memory loss.           Past Medical History:   Diagnosis Date    Acquired hypothyroidism 5/26/2017    Benign essential HTN 5/26/2017    Benign prostatic hyperplasia (BPH) with urinary urge incontinence 6/6/2017    Chronic low back pain 6/1/2017    Coronary artery disease involving native coronary artery of native heart without angina pectoris 5/26/2017    S/p 5 stents - last one around 2010-11.    Gastroesophageal reflux disease without esophagitis 5/26/2017    History of CVA (cerebrovascular accident) 5/26/2017     Lumbar disc lesion 5/26/2017    Metastatic renal cell carcinoma to bone 6/6/2017    Metastatic renal cell carcinoma to liver 6/6/2017    Liver Biopsy 5-2017: METASTATIC RENAL CELL CARCINOMA.    Mixed hyperlipidemia 5/26/2017    Type 2 diabetes mellitus with stage 3 chronic kidney disease, with long-term current use of insulin 5/26/2017       Past Surgical History:   Procedure Laterality Date    ABDOMINAL SURGERY      APPENDECTOMY      BACK SURGERY      CARDIAC SURGERY      CHOLECYSTECTOMY      coronary stenting      X 5 last one in 2010-11.          History reviewed. No pertinent family history.           Occupational History    He is a      Social History Main Topics    Smoking status: Never Smoker    Smokeless tobacco: Not on file    Alcohol use No    Drug use: No    Sexual activity: Not on file          Vitals:    06/07/17 1259   BP: (!) 126/57   Pulse: 92   Resp: 18   Temp: 98.3 °F (36.8 °C)     Pain 9/10, in his low back  PS: ECOG 2    Physical Exam   Constitutional: He is oriented to person, place, and time and well-developed, well-nourished, and in no distress.   HENT:   Head: Normocephalic and atraumatic.   Eyes: Pupils are equal, round, and reactive to light.   Cardiovascular: Normal rate, regular rhythm and normal heart sounds.  Exam reveals no friction rub.    No murmur heard.  Pulmonary/Chest: No respiratory distress. He has no wheezes. He has no rales.   Abdominal: Soft. He exhibits no distension and no mass. There is tenderness (He has tenderness in right lower quadrant). There is no rebound and no guarding.   Obese abdomen   Lymphadenopathy:     He has no cervical adenopathy.   Neurological: He is alert and oriented to person, place, and time.   He walks with a cane and is unsteady. He has bilaterally diminished strength in LEs   Skin: Skin is dry. No erythema.   Psychiatric:   He has poor affect            Component      Latest Ref Rng & Units 6/4/2017   WBC      3.90 -  12.70 K/uL 11.78   RBC      4.60 - 6.20 M/uL 4.57 (L)   Hemoglobin      14.0 - 18.0 g/dL 13.8 (L)   Hematocrit      40.0 - 54.0 % 41.3   MCV      82 - 98 fL 90   MCH      27.0 - 31.0 pg 30.2   MCHC      32.0 - 36.0 % 33.4   RDW      11.5 - 14.5 % 13.5   Platelets      150 - 350 K/uL 252   MPV      9.2 - 12.9 fL 10.7   Gran #      1.8 - 7.7 K/uL 7.8 (H)   Lymph #      1.0 - 4.8 K/uL 2.3   Mono #      0.3 - 1.0 K/uL 1.5 (H)   Eos #      0.0 - 0.5 K/uL 0.2   Baso #      0.00 - 0.20 K/uL 0.03   Gran%      38.0 - 73.0 % 66.3   Lymph%      18.0 - 48.0 % 19.4   Mono%      4.0 - 15.0 % 12.6   Eosinophil%      0.0 - 8.0 % 1.4   Basophil%      0.0 - 1.9 % 0.3   Differential Method       Automated   Sodium      136 - 145 mmol/L 137   Potassium      3.5 - 5.1 mmol/L 4.2   Chloride      95 - 110 mmol/L 102   CO2      23 - 29 mmol/L 27   Glucose      70 - 110 mg/dL 294 (H)   BUN, Bld      2 - 20 mg/dL 17   Creatinine      0.50 - 1.40 mg/dL 1.21   Calcium      8.7 - 10.5 mg/dL 9.4   Total Protein      6.0 - 8.4 g/dL 6.6   Albumin      3.5 - 5.2 g/dL 3.6   Total Bilirubin      0.1 - 1.0 mg/dL 0.7   Alkaline Phosphatase      38 - 126 U/L 66   AST      15 - 46 U/L 17   ALT      10 - 44 U/L 30   Anion Gap      8 - 16 mmol/L 8   eGFR if African American      >60 mL/min/1.73 m:2 >60.0   eGFR if non African American      >60 mL/min/1.73 m:2 59.5 (A)   RBC, UA      0 - 4 /hpf 1   WBC, UA      0 - 5 /hpf 1   Bacteria, UA      None-Occ /hpf Rare   Yeast, UA      None None   Squam Epithel, UA      /hpf 1   Hyaline Casts, UA      0-1/lpf /lpf 0   Microscopic Comment       SEE COMMENT   LD      110 - 260 U/L        Assessment/Plan:    1. Mr.Mc Moss, 72, has metastatic renal cell carcinoma, most likely clear cell type, arising from lower left renal pole. He has metastases in the liver, and questionable pleural lesions, as well as lytic lesion at L3.   Urology has evaluate dhim and cytoreductive nephrectomy is not an option, per records, as he  has several co-morbidities.   I discussed management of metastatic renal cell carcinoma. I told him that we do not have any clinicla trials at this time.  I discussed using VEGF antagonist/TKI Sunitinib as first line medication.   In a multicenter, international randomized trial enrolling 750 patients with treatment-naïve metastatic renal cell carcinoma, patients were randomized to receive either sunitinib or interferon-? (IFN-?).  Sunitinib was administered at a starting dose of 50 mg orally once daily for four weeks, followed by two weeks off treatment. IFN-? was given sub q on three nonconsecutive days/week at a starting dose of 3 MU per dose during the first week, 6 MU per dose the second week and 9 MU per dose thereafter. The two treatment arms were balanced for baseline demographic characteristics. Patients were required to have a component of clear cell histology. 90% had prior nephrectomy. The median number of disease sites was two. Common metastatic sites included lung,LN, bone and liver.  Efficacy data were based on an interim progression-free survival (PFS) analysis by an independent data review committee. There were 96 events (25.6 percent) of progression/death on sunitinib compared with 154 events (41.1 percent) on IFN-?.   Median PFS was 47.3 weeks (95 % 42.6, 50.7) for sunitinib-treated patients and 22.0 weeks (95 percent CI 16.4, 24.0) for patients treated with IFN; the hazard ratio was 0.415 (95 percent CI .320, 0.539, p<[id number]).  Objective response rate on the sunitinib arm was 27.5 % (95 % CI 23.0 % , 32.3%t) vs. 5.3 % (95 percent CI 3.3 %, 8.1 %) on IFN-?.  Treatment-emergent adverse events occurring more commonly on sunitinib included gastrointestinal events (diarrhea, nausea, mucositis, vomiting, dyspepsia, abdominal pain, gastroesophageal reflux, oral pain, glossodynia, and flatulence); bleeding; hypertension; dermatologic events (rash, skin discoloration, dry skin, and hair color changes);  hand-foot syndrome; limb pain; decreases in cardiac ejection fraction; and peripheral edema. Treatment-emergent hypothyroidism was also more common in patients receiving sunitinib.  Grade 3/4 adverse events more common on sunitinib included hypertension, diarrhea, hand-foot syndrome, nausea, vomiting, mucositis, and bleeding.  Grade 3/4 laboratory abnormalities more common in sunitinib-treated patients included hematologic abnormalities (neutropenia, thrombocytopenia, and leucopenia), increased lipase, increased amylase, hyponatremia, hyperuricemia, and hyperbilirubinemia.  He has diabetes mellitus and urine dipstick was positive for protein. He will have 24hr urine protein assay as well as 2D ECHO before he starts Sutent.    2. Back pain: He has long history of back pain. The bony metastases in L3 might be contributing as well. He is on Hydrocodone now,a s needed. He will be referred to radiation oncology for possible palliative RT.     3. Skeletal metastases: He will start monthly Denosumab. He has dentures and no actvive gum or jaw pain. Benefits include decreased risk of skeletal events like pain, fracture. Adverse reactions include hypertension (11%,), fatigue (Xgeva: ?45%), headache (Xgeva: 13% to 24%), peripheral edema (5%; Xgeva: 24%), dermatitis (4% to 11%), eczema (4% to 11%), skin rash (3% to 11%), hypophosphatemia (Xgeva: 32%; grade 3: 10% to 15%), hyypocalcemia (2%; Xgeva: 3% to 18%; grade 3: 3%), nausea (Xgeva: 31%), decreased appetite (Xgeva: 24%), vomiting (Xgeva: 24%), constipation (Xgeva: 21%), diarrhea (Xgeva: 20%), anemia (Xgeva: 21%), influenza (11%, Lila 2007) ,weakness (Xgeva: ?45%), arthralgia (7% to 14%), limb pain (10% to 12%), back pain (8% to 12%), dyspnea (Xgeva: 21% to 27%), cough (Xgeva: 15%),Angina pectoris (3%),sciatica (5%), hypercholesterolemia (7%), flatulence (2%), malignant neoplasm (new; 3% to 5%), serious infection (4%), musculoskeletal pain (6%), ostealgia (4%), myalgia  (3%), osteonecrosis (jaw; ?2%; Xgeva ?2%) ,cataract (?5%), nasopharyngitis (7%), upper respiratory tract infection (5%), amongst others. He will be on calcium with vit D ( 600mg Calcium BID and 400 IU vit D3 BID) while on Denosumab.        .

## 2017-06-07 NOTE — LETTER
June 7, 2017      Jw Reyes MD  [address]  [facility] [address]           [facility]  [address]  [facility] [address]  Phone: [phone]          Patient: Edwin Singleton   MR Number: [id number]   YOB: 1945   Date of Visit: 6/7/2017       Dear Dr. Jw Reyes:    Thank you for referring Edwin Singleton to me for evaluation. Attached you will find relevant portions of my assessment and plan of care.    If you have questions, please do not hesitate to call me. I look forward to following Edwin Singleton along with you.    Sincerely,    Joe Winters MD    Enclosure  CC:  No Recipients    If you would like to receive this communication electronically, please contact [email or web address] or [phone] to request more information on [employer] Link access.    For providers and/or their staff who would like to refer a patient to [facility], please contact us through our one-stop-shop provider referral line, Phillip Adams, at [phone].    If you feel you have received this communication in error or would no longer like to receive these types of communications, please e-mail [email or web address]

## 2017-06-08 ENCOUNTER — PATIENT MESSAGE (OUTPATIENT)
Dept: HEMATOLOGY/ONCOLOGY | Facility: CLINIC | Age: 72
End: 2017-06-08

## 2017-06-09 ENCOUNTER — LAB VISIT (OUTPATIENT)
Dept: LAB | Facility: HOSPITAL | Age: 72
End: 2017-06-09
Attending: INTERNAL MEDICINE
Payer: MEDICARE

## 2017-06-09 ENCOUNTER — PATIENT MESSAGE (OUTPATIENT)
Dept: HEMATOLOGY/ONCOLOGY | Facility: CLINIC | Age: 72
End: 2017-06-09

## 2017-06-09 ENCOUNTER — INITIAL CONSULT (OUTPATIENT)
Dept: RADIATION ONCOLOGY | Facility: CLINIC | Age: 72
End: 2017-06-09
Payer: MEDICARE

## 2017-06-09 ENCOUNTER — TELEPHONE (OUTPATIENT)
Dept: ADMINISTRATIVE | Facility: OTHER | Age: 72
End: 2017-06-09

## 2017-06-09 VITALS
HEIGHT: 67 IN | SYSTOLIC BLOOD PRESSURE: 137 MMHG | WEIGHT: 188 LBS | DIASTOLIC BLOOD PRESSURE: 64 MMHG | BODY MASS INDEX: 29.51 KG/M2 | HEART RATE: 87 BPM | TEMPERATURE: 98 F | RESPIRATION RATE: 20 BRPM

## 2017-06-09 DIAGNOSIS — C64.9 RENAL CELL CARCINOMA, UNSPECIFIED LATERALITY: ICD-10-CM

## 2017-06-09 DIAGNOSIS — R80.9 PROTEINURIA, UNSPECIFIED TYPE: Primary | ICD-10-CM

## 2017-06-09 DIAGNOSIS — C64.2 RENAL CELL CARCINOMA OF LEFT KIDNEY: Primary | ICD-10-CM

## 2017-06-09 DIAGNOSIS — C64.9 METASTATIC RENAL CELL CARCINOMA TO BONE: ICD-10-CM

## 2017-06-09 DIAGNOSIS — C64.9 RENAL CELL CARCINOMA, UNSPECIFIED LATERALITY: Primary | ICD-10-CM

## 2017-06-09 DIAGNOSIS — C79.51 METASTATIC RENAL CELL CARCINOMA TO BONE: ICD-10-CM

## 2017-06-09 DIAGNOSIS — R52 PAIN: Primary | ICD-10-CM

## 2017-06-09 LAB
PROT 24H UR-MRATE: 1647 MG/SPEC
PROT UR-MCNC: 89 MG/DL
URINE COLLECTION DURATION: 24 HR
URINE VOLUME: 1850 ML

## 2017-06-09 PROCEDURE — 99999 PR PBB SHADOW E&M-EST. PATIENT-LVL III: CPT | Mod: PBBFAC,,, | Performed by: RADIOLOGY

## 2017-06-09 PROCEDURE — 99204 OFFICE O/P NEW MOD 45 MIN: CPT | Mod: S$GLB,,, | Performed by: RADIOLOGY

## 2017-06-09 PROCEDURE — 1159F MED LIST DOCD IN RCRD: CPT | Mod: S$GLB,,, | Performed by: RADIOLOGY

## 2017-06-09 PROCEDURE — 84156 ASSAY OF PROTEIN URINE: CPT

## 2017-06-09 PROCEDURE — 1125F AMNT PAIN NOTED PAIN PRSNT: CPT | Mod: S$GLB,,, | Performed by: RADIOLOGY

## 2017-06-09 RX ORDER — MORPHINE SULFATE 30 MG/1
TABLET, FILM COATED, EXTENDED RELEASE ORAL
Qty: 90 TABLET | Refills: 0 | Status: SHIPPED | OUTPATIENT
Start: 2017-06-09 | End: 2017-06-13 | Stop reason: ALTCHOICE

## 2017-06-09 NOTE — PROGRESS NOTES
REFERRING PHYSICIAN: Joe Winters MD    PROBLEM: Mr Singleton is a 72 years old man recently found to have a stage T1a N0 M1 (IV) renal cell carcinoma of the the left kidney with painful metastasis to the L3 vertebra.     OTHER MEDICAL HISTORY: Patient has never smoked. He has had appendectomy, other abdominal surgery, back surgery cholecystectomy and coronary angiopalsty. He is treated or followed for chronic low back pain,  CAD, diabetes, GERD, HLD and hypothyroidism.PS is ECOG one. Psychosocial Distress screening score of Distress Score: 6 noted and reviewed. No intervention indicated.    PRESENT ILLNESS: Patient was seen in the ED on 5/26/17 complaining of low back pain increasing for about 5 months,  new onset of weakness in the lower extremities and 10 pound weight loss in the past 6 months. MRI of the lumbar spine 5/26 shows a lytic lesion at the base of the pedicle of the L3 vertebral body.  CT of the chest, abdomen and pelvis on 5/27/17 shows a 3.6 cm mass in the lower part of the left kidney, a small mass in the right kidney that is possibly a metastasis, multiple liver metastases, bilateral pleural thickening and a right thyroid nodule.     On 5/30/17 percutaneous needle biopsy of a liver mass gives a diagnosis of carcinoma consistent with metastasis from renal cell carcinoma.     PHYSICAL EXAM: Patient is an alert man who responds appropriately. He is seated in a wheel chair. He is unable to walk without assistance and states he has a shuffling gait and has fallen recently. He uses a walker at home. He indicates pain in the mid back at about the L3 leveel radiating into the flanks. There are no other evident neurologic deficits. He states he is continent of urine. The respirations are normal.     RADIOLOGIC STUDIES: In addition to that noted above,  MRI of the thoracic spine on 5/27/17 shows a sclerotic lesion in T4 of uncertain significance.  MRI of the brain shows abnormal signal in the sulci of  uncertain significance and no evidence of parenchymal metastasis.       LABORATORY STUDIES: On 6/4/17 the Hb is 14.6 with a wbc of 14,010 and a platelet count of 266,000. Comprehensive metabolic panel is remarkable for a glucose of 294.     IMPRESSION: Radiation treatment of the L3 metastasis is recommended to relieve pain and preserve vertebral stability. The L3 lesion does not look like it would cause lower extremity weakness.     PLAN: Patient returns 6/12/17 for radiation treatment planning. A course of radiation to a dose of 30 Gy in fractions of 3 Gy each is expected to begin by about 6/14/17. (45 minutes in discussion with patient).

## 2017-06-09 NOTE — LETTER
June 9, 2017      Joe Winters MD  [address]  [facility] [address]           [facility]  [address]  [facility] [address]  Phone: [phone]          Patient: Edwin Singleton   MR Number: [id number]   YOB: 1945   Date of Visit: 6/9/2017       Dear Dr. Joe Winters:    Thank you for referring Edwin Singleton to me for evaluation. Attached you will find relevant portions of my assessment and plan of care.    If you have questions, please do not hesitate to call me. I look forward to following Edwin Singleton along with you.    Sincerely,    Kael Prieto MD    Enclosure  CC:  No Recipients    If you would like to receive this communication electronically, please contact [email or web address] or [phone] to request more information on [employer] Link access.    For providers and/or their staff who would like to refer a patient to [facility], please contact us through our one-stop-shop provider referral line, [facility], at [phone].    If you feel you have received this communication in error or would no longer like to receive these types of communications, please e-mail [email or web address]

## 2017-06-09 NOTE — TELEPHONE ENCOUNTER
----- Message from Rahel Reed MD sent at 6/8/2017  2:18 PM CDT -----  Put with me and Gio next week    ----- Message -----  From: Gio Rocha MD  Sent: 6/8/2017   8:01 AM  To: Renate Parada MD, Rahel Reed MD, #    I can see this patient.     Gio  ----- Message -----  From: Meliton Casas  Sent: 6/6/2017   9:27 AM  To: Renate Parada MD, Gio Rocha MD, #    Hello, can you all please check your schedules and let me know who can see Mr. Johnson this week with Dr. Reed Thanks   (i sent this last week i am not sure if you received it)   ----- Message -----  From: Rahel Reed MD  Sent: 6/1/2017   9:45 AM  To: Meliton Casas    See with a fellow next week    ----- Message -----  From: Meliton Casas  Sent: 5/30/2017   4:40 PM  To: Rahel Reed MD    Hel, please advise on hosp f/u Thanks   ----- Message -----  From: Renate Parada MD  Sent: 5/30/2017   4:38 PM  To: Meliton Casas    Patient needs a hospital follow up with Dr. Reed - Pathology results, presumed RCC. Thank you. Prob in one week

## 2017-06-11 ENCOUNTER — PATIENT MESSAGE (OUTPATIENT)
Dept: INTERNAL MEDICINE | Facility: CLINIC | Age: 72
End: 2017-06-11

## 2017-06-13 ENCOUNTER — PATIENT MESSAGE (OUTPATIENT)
Dept: HEMATOLOGY/ONCOLOGY | Facility: CLINIC | Age: 72
End: 2017-06-13

## 2017-06-13 ENCOUNTER — TELEPHONE (OUTPATIENT)
Dept: PHARMACY | Facility: CLINIC | Age: 72
End: 2017-06-13

## 2017-06-13 ENCOUNTER — OFFICE VISIT (OUTPATIENT)
Dept: HEMATOLOGY/ONCOLOGY | Facility: CLINIC | Age: 72
End: 2017-06-13
Payer: MEDICARE

## 2017-06-13 ENCOUNTER — TELEPHONE (OUTPATIENT)
Dept: HEMATOLOGY/ONCOLOGY | Facility: CLINIC | Age: 72
End: 2017-06-13

## 2017-06-13 VITALS
HEART RATE: 78 BPM | TEMPERATURE: 99 F | BODY MASS INDEX: 28.97 KG/M2 | DIASTOLIC BLOOD PRESSURE: 62 MMHG | RESPIRATION RATE: 20 BRPM | OXYGEN SATURATION: 96 % | WEIGHT: 184.94 LBS | SYSTOLIC BLOOD PRESSURE: 139 MMHG

## 2017-06-13 DIAGNOSIS — C64.9 METASTATIC RENAL CELL CARCINOMA TO LIVER: Primary | ICD-10-CM

## 2017-06-13 DIAGNOSIS — C64.9 METASTATIC RENAL CELL CARCINOMA TO BONE: ICD-10-CM

## 2017-06-13 DIAGNOSIS — E11.22 TYPE 2 DIABETES MELLITUS WITH STAGE 3 CHRONIC KIDNEY DISEASE, WITH LONG-TERM CURRENT USE OF INSULIN: Primary | Chronic | ICD-10-CM

## 2017-06-13 DIAGNOSIS — I10 BENIGN ESSENTIAL HTN: ICD-10-CM

## 2017-06-13 DIAGNOSIS — E03.9 ACQUIRED HYPOTHYROIDISM: ICD-10-CM

## 2017-06-13 DIAGNOSIS — C78.7 METASTATIC RENAL CELL CARCINOMA TO LIVER: Primary | ICD-10-CM

## 2017-06-13 DIAGNOSIS — C64.2 RENAL CELL CARCINOMA OF LEFT KIDNEY: ICD-10-CM

## 2017-06-13 DIAGNOSIS — C79.51 METASTATIC RENAL CELL CARCINOMA TO BONE: ICD-10-CM

## 2017-06-13 DIAGNOSIS — N18.30 TYPE 2 DIABETES MELLITUS WITH STAGE 3 CHRONIC KIDNEY DISEASE, WITH LONG-TERM CURRENT USE OF INSULIN: Primary | Chronic | ICD-10-CM

## 2017-06-13 DIAGNOSIS — G89.3 NEOPLASM RELATED PAIN: ICD-10-CM

## 2017-06-13 DIAGNOSIS — Z79.4 TYPE 2 DIABETES MELLITUS WITH STAGE 3 CHRONIC KIDNEY DISEASE, WITH LONG-TERM CURRENT USE OF INSULIN: Primary | Chronic | ICD-10-CM

## 2017-06-13 PROCEDURE — 99214 OFFICE O/P EST MOD 30 MIN: CPT | Mod: GC,S$GLB,, | Performed by: INTERNAL MEDICINE

## 2017-06-13 PROCEDURE — 99999 PR PBB SHADOW E&M-EST. PATIENT-LVL III: CPT | Mod: PBBFAC,,, | Performed by: INTERNAL MEDICINE

## 2017-06-13 PROCEDURE — 1125F AMNT PAIN NOTED PAIN PRSNT: CPT | Mod: S$GLB,,, | Performed by: INTERNAL MEDICINE

## 2017-06-13 PROCEDURE — 1159F MED LIST DOCD IN RCRD: CPT | Mod: S$GLB,,, | Performed by: INTERNAL MEDICINE

## 2017-06-13 RX ORDER — INSULIN GLARGINE 100 [IU]/ML
30 INJECTION, SOLUTION SUBCUTANEOUS 2 TIMES DAILY
Start: 2017-06-13

## 2017-06-13 RX ORDER — INSULIN GLARGINE 100 [IU]/ML
30 INJECTION, SOLUTION SUBCUTANEOUS 2 TIMES DAILY
Qty: 3 VIAL | Refills: 11 | Status: SHIPPED | OUTPATIENT
Start: 2017-06-13 | End: 2017-06-28 | Stop reason: SDUPTHER

## 2017-06-13 RX ORDER — PAZOPANIB 200 MG/1
800 TABLET ORAL DAILY
Qty: 120 TABLET | Refills: 11 | Status: SHIPPED | OUTPATIENT
Start: 2017-06-13 | End: 2018-07-05 | Stop reason: SDUPTHER

## 2017-06-13 RX ORDER — MORPHINE SULFATE 15 MG/1
15 TABLET ORAL EVERY 6 HOURS PRN
Qty: 60 TABLET | Refills: 0 | Status: SHIPPED | OUTPATIENT
Start: 2017-06-13 | End: 2017-06-16 | Stop reason: SDUPTHER

## 2017-06-13 NOTE — PROGRESS NOTES
"PATIENT: Edwin Singleton  MRN: [id number]  DATE: 6/13/2017    Diagnosis:   1. Metastatic renal cell carcinoma to liver    2. 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 Renal cell carcinoma of left kidney    4. Benign essential HTN    5. Acquired hypothyroidism    6. Neoplasm related pain      Chief Complaint: Follow-up    Oncologic History:      Oncologic History Renal cell carcinoma of the left kidney - Stage IV - T1,Nx,M1    Oncologic Treatment Planned treatment:   Radiation therapy to L3 spine - 3 Gy/Fx x 10 fx    pazopanib 800 mg PO daily    Pathology       Subjective:    History of Present Illness: Mr. Singleton is a 72 y.o. male who returns for follow up regarding metastatic renal cell carcinoma of left kidney.  HPI: Mr.Mc Moss is a 73 yo male with CAD (s/p 5 stents, remotely), DM2 (insulin dependent), hypothyroidism , CVA (2013) with no residual deficit, chronic back pain. He was transferred to [facility] [place] [place] for neurosurgery evaluation of a lumbar spine lesion in the setting of progressive weakness of his legs and exacerbation of chronic back pain, in May 2017. He states that his back pain and LE weakness progressively worsened. [facility]. The pain radiates down his legs. He denies any incontinence of bowel or bladder. He had several falls. After one of the falls, he presented to the ED at [facility], in May 2017. He had an MRI showing "L3 vertebral body lesion with apparent cortical disruption concerning for metastases vs myeloma," and was transferred to [facility] [place] [place] for neurosurgery evaluation. He was evaluated by neurosurgery regarding concern for lytic lesion on L3 on 5/26/17. No surgical intervention was proposed.   CT chest, abdomen,pelvis on 5/27/17 revealed :   --A 3.6 cm exophytic mass arising from the medial aspect of the lower pole of left kidney,   --A small 1.0 cm, subtle, cortical enhancing mass within the upper pole of the right kidney, concerning for " possible contralateral solid renal mass  --Multiple enhancing ill-defined hepatic masses concerning for hepatic metastatic disease, hepatomegaly and hepatic steatosis  --Mulltifocal irregular thickening of the bilateral pleura concerning for possible pleural metastatic disease  --Mild nonspecific nodular thickening of the left adrenal gland without discrete nodule  -- Redemonstration of known lytic lesion within the L3 vertebral body. No definite additional discrete lytic lesions are identified.    Interval history:  - he presents for follow-up appointment. He is scheduled to begin radiation therapy to his L3 metastasis this week.  - he presented to ED on 6/11/17 with altered mental status from MS contin. He is taking 1-2 norco 5-325mg tablets daily with moderate control of his symptoms.  - he complains of dyspnea upon exertion, cough, abdominal pain, back pain.    Past Medical History:   Past Medical History:   Diagnosis Date    Acquired hypothyroidism 5/26/2017    Benign essential HTN 5/26/2017    Benign prostatic hyperplasia (BPH) with urinary urge incontinence 6/6/2017    Chronic low back pain 6/1/2017    Coronary artery disease involving native coronary artery of native heart without angina pectoris 5/26/2017    S/p 5 stents - last one around 2010-11.    Gastroesophageal reflux disease without esophagitis 5/26/2017    History of CVA (cerebrovascular accident) 5/26/2017    Lumbar disc lesion 5/26/2017    Metastatic renal cell carcinoma to bone 6/6/2017    Metastatic renal cell carcinoma to liver 6/6/2017    Liver Biopsy 5-2017: METASTATIC RENAL CELL CARCINOMA.    Mixed hyperlipidemia 5/26/2017    Type 2 diabetes mellitus with stage 3 chronic kidney disease, with long-term current use of insulin 5/26/2017     Past Surgical HIstory:   Past Surgical History:   Procedure Laterality Date    ABDOMINAL SURGERY      APPENDECTOMY      BACK SURGERY      CARDIAC SURGERY      CHOLECYSTECTOMY      coronary  stenting      X 5 last one in 2010-11.     Family History: History reviewed. No pertinent family history.    Social History:  reports that he has never smoked. He does not have any smokeless tobacco history on file. He reports that he does not drink alcohol or use drugs.    Allergies:  Review of patient's allergies indicates:  No Known Allergies    Medications:  Current Outpatient Prescriptions   Medication Sig Dispense Refill    amlodipine (NORVASC) 10 MG tablet Take 10 mg by mouth once daily.      aspirin (ECOTRIN) 81 MG EC tablet Take 81 mg by mouth once daily.      atorvastatin (LIPITOR) 40 MG tablet Take 1 tablet (40 mg total) by mouth once daily. 90 tablet 3    diazePAM (VALIUM) 5 MG tablet Take 5 mg by mouth every 6 (six) hours as needed for Anxiety.      hydrocodone-acetaminophen 5-325mg (NORCO) 5-325 mg per tablet Take 1 tablet by mouth every 8 (eight) hours as needed for Pain. 50 tablet 0    insulin aspart (NOVOLOG) 100 unit/mL injection Inject 20 Units into the skin 3 (three) times daily with meals.      insulin glargine (LANTUS) 100 unit/mL injection Inject 30 Units into the skin 2 (two) times daily. 3 vial 11    levothyroxine 50 mcg Cap Take 50 mcg by mouth before breakfast. 30 tablet 0    lisinopril (PRINIVIL,ZESTRIL) 40 MG tablet Take 40 mg by mouth once daily.      morphine (MS CONTIN) 30 MG 12 hr tablet Take one every 8 hours for pain 90 tablet 0    tamsulosin (FLOMAX) 0.4 mg Cp24 Take 1 capsule (0.4 mg total) by mouth every evening. 90 capsule 4    cephALEXin (KEFLEX) 500 MG capsule Take 1 capsule (500 mg total) by mouth every 12 (twelve) hours. 14 capsule 0     No current facility-administered medications for this visit.      Review of Systems   Constitutional: Negative for appetite change and unexpected weight change.   Eyes: Negative for visual disturbance.   Respiratory: Positive for cough and shortness of breath.    Cardiovascular: Positive for chest pain.   Gastrointestinal:  Positive for abdominal pain. Negative for diarrhea.   Genitourinary: Negative for frequency.   Musculoskeletal: Positive for back pain.   Skin: Negative for rash.   Neurological: Positive for headaches.   Hematological: Negative for adenopathy.   Psychiatric/Behavioral: The patient is nervous/anxious.      ECOG Performance Status:   ECOG SCORE 2     Objective:      Vitals:   Vitals:    06/13/17 1140   BP: 139/62   BP Location: Left arm   Patient Position: Sitting   Pulse: 78   Resp: 20   Temp: 98.8 °F (37.1 °C)   TempSrc: Oral   SpO2: 96%   Weight: 83.9 kg (184 lb 15.5 oz)     BMI: Body mass index is 28.97 kg/m².    Physical Exam   Constitutional: He is oriented to person, place, and time. He appears well-developed.   Malnourished, fatigued   HENT:   Head: Normocephalic and atraumatic.   Eyes: EOM are normal. Pupils are equal, round, and reactive to light.   Neck: Normal range of motion. Neck supple.   Cardiovascular: Normal rate and regular rhythm.    Pulmonary/Chest:   Coarse breath sounds at bases   Abdominal: Soft. Bowel sounds are normal. There is no tenderness.   Musculoskeletal: Normal range of motion. He exhibits no edema.   Neurological: He is alert and oriented to person, place, and time.   Skin: Skin is warm and dry.   Psychiatric: He has a normal mood and affect. His behavior is normal. Judgment and thought content normal.     Laboratory Data:  Labs have been reviewed.    Imaging:   Bone scan (5/30/17):  L3 metastatic disease.    CT chest/abdomen/pelvis (5/27/17):  1. 3.6 cm exophytic mass arising from the medial aspect of the lower pole of left kidney which in light of the above findings is concerning for malignant solid renal mass such as renal cell carcinoma. Additional small 1.0 cm subtle cortical enhancing mass is identified within the upper pole of the right kidney concerning for possible contralateral solid renal mass.  2. Multiple enhancing ill-defined hepatic masses concerning for hepatic  metastatic disease. Superimposed hepatomegaly and hepatic steatosis.  3. Multifocal irregular thickening of the bilateral pleura concerning for possible pleural metastatic disease given the additional findings.  4. Mild nonspecific nodular thickening of the left adrenal gland without discrete nodule.  5. Redemonstration of known lytic lesion within the L3 vertebral body. No definite additional discrete lytic lesions are identified.  6. Subcentimeter hypoattenuating right thyroid lobe nodule.    MRI brain (5/29/17):  1. No evidence of acute intracranial abnormality or abnormal restricted diffusion.  2. Patchy periventricular and supratentorial T2/FLAIR hyperintensity with additional scattered foci in the juan jose which do not demonstrate corresponding enhancement suggestive of chronic microvascular ischemic change. Superimposed remote lacunar type infarct is present within the left corona radiata.  3. Incomplete suppression of sulcal FLAIR signal which appears relatively symmetric involving the posterior bilateral cerebral hemispheres. This is nonspecific and may be artifactual in nature, although additional differential considerations include hyperoxygenation in the setting of intubation, subarachnoid hemorrhage, proteinaceous material, or less likely leptomeningeal carcinomatosis noting no corresponding enhancement is appreciated as would be expected with either infection or neoplastic process. Clinical correlation and continued followup is advised.    Assessment:       1. Metastatic renal cell carcinoma to liver    2. 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 Renal cell carcinoma of left kidney    4. Benign essential HTN    5. Acquired hypothyroidism    6. Neoplasm related pain         Plan:     1. Renal cell carcinoma of the left kidney - Stage IV - T1,Nx,M1  - ECOG 2  - patient has a few high-risk features, including corrected calcium > 10, < 1 year between diagnosis and initiating therapy, 2 or more sites of  metastases, and relatively poor performance status.  - ECG on 6/12/17 had QTc 412 msec   - TSH was 6 on recent check (he takes levothyroxine)  - we recommend pazopanib 800mg PO daily (take 1 hour before meal or two hours after).  - The risks and benefits of chemotherapy were discussed, written information was given, and informed consent was verbally obtained.  - I will send a prescription for pazopanib to [facility] pharmacy. He will start pazopanib after he completes radiation therapy.    2. Bone metastasis  - Secondary to renal cell cancer  - he will be starting radiation therapy this week (10 fx with 3 Gy/fx)  - we will consider denosumab after patient has been cleared by his dentist.    3. Neoplasm-related pain  - he takes 2 norco (hydrocodone-acetaminophen) 5-325mg tables daily.  - he had a altered mental status with MS contin  - I will send a prescription for hydocodone to his pharmacy    - return to clinic in 4 weeks.    Patient was also seen and examined by Dr. Reed. Patient is in agreement with the proposed treatment plan. All questions were answered to the patient's satisfaction.    Gio Rocha M.D.  Hematology/Oncology Fellow    Attending Note  I have personally taken the history and examined this patient and agree with the fellow's note as stated above.

## 2017-06-13 NOTE — Clinical Note
1. Schedule follow-up appointment with Dr. Reed with labs CBC, CMP, magnesium, and TSH in 4 weeks (7/11/17).  Thanks.

## 2017-06-13 NOTE — TELEPHONE ENCOUNTER
Clarified instructions on pain medications with pharmacy technician.   Tech in agreement.       ----- Message from Valarie Chase sent at 6/13/2017  2:41 PM CDT -----  Contact: [facility]  Pharmacy has a few questions about medication morphine (MSIR) 15 MG tablet.    Pharmacy info  [phone]

## 2017-06-14 ENCOUNTER — TELEPHONE (OUTPATIENT)
Dept: HEMATOLOGY/ONCOLOGY | Facility: CLINIC | Age: 72
End: 2017-06-14

## 2017-06-14 DIAGNOSIS — C79.51 METASTATIC RENAL CELL CARCINOMA TO BONE: ICD-10-CM

## 2017-06-14 DIAGNOSIS — C64.9 METASTATIC RENAL CELL CARCINOMA TO BONE: ICD-10-CM

## 2017-06-14 RX ORDER — HYDROCODONE BITARTRATE AND ACETAMINOPHEN 5; 325 MG/1; MG/1
1 TABLET ORAL EVERY 8 HOURS PRN
Qty: 50 TABLET | Refills: 0 | Status: SHIPPED | OUTPATIENT
Start: 2017-06-14 | End: 2017-06-26 | Stop reason: SDUPTHER

## 2017-06-14 NOTE — TELEPHONE ENCOUNTER
"Returned call to Milagros with [facility] pharmacy.   Milagros stated that pt was requesting a "stronger medication" other than the Hydrocodone, as pt has received a 17 day supply of hydrocodone on 6/6/17.   I informed Milagros that per Dr. Rocha's note, only looked like hydrocodone was to be called in, as MS Contin caused AMS.   I informed Milagros would check with provider when back in office tomorrow to clarify pain management.   Milagros verbalized understanding.     Message routed to Dr. Rocha.       ----- Message from Reji Harris sent at 6/14/2017  3:59 PM CDT -----  Contact: Milagros (Pharmacy)  Pharmacy can't fill in the prescription that came in today. She wants to know if you would rather them wait until its over because already had a prescription for June 6th for a 17 day supply. Please contact Pharmacy at [phone].    "

## 2017-06-15 ENCOUNTER — TELEPHONE (OUTPATIENT)
Dept: INTERNAL MEDICINE | Facility: CLINIC | Age: 72
End: 2017-06-15

## 2017-06-15 ENCOUNTER — PATIENT MESSAGE (OUTPATIENT)
Dept: RADIATION ONCOLOGY | Facility: CLINIC | Age: 72
End: 2017-06-15

## 2017-06-15 NOTE — TELEPHONE ENCOUNTER
[facility]- this patient was in ED yesterday for AMS.    Thanks,  CORTEZ Lewis MD/MPH  Internal Medicine  [facility]  [phone]

## 2017-06-16 DIAGNOSIS — G89.3 NEOPLASM RELATED PAIN: Primary | ICD-10-CM

## 2017-06-16 DIAGNOSIS — C64.9 METASTATIC RENAL CELL CARCINOMA TO LIVER: ICD-10-CM

## 2017-06-16 DIAGNOSIS — C78.7 METASTATIC RENAL CELL CARCINOMA TO LIVER: ICD-10-CM

## 2017-06-16 RX ORDER — MORPHINE SULFATE 15 MG/1
15 TABLET ORAL EVERY 6 HOURS PRN
Qty: 60 TABLET | Refills: 0 | Status: SHIPPED | OUTPATIENT
Start: 2017-06-16 | End: 2017-07-10 | Stop reason: SDUPTHER

## 2017-06-16 NOTE — TELEPHONE ENCOUNTER
DOCUMENTATION ONLY   The assistance [facility]  Id: [id number]  Grp: [id number]  PCN: [id number]  Bin: [id number]  EXP: 12/31/2017

## 2017-06-19 ENCOUNTER — HOSPITAL ENCOUNTER (OUTPATIENT)
Dept: RADIATION THERAPY | Facility: HOSPITAL | Age: 72
Discharge: HOME | End: 2017-06-19
Attending: RADIOLOGY
Payer: MEDICARE

## 2017-06-19 ENCOUNTER — OFFICE VISIT (OUTPATIENT)
Dept: UROLOGY | Facility: CLINIC | Age: 72
End: 2017-06-19
Payer: MEDICARE

## 2017-06-19 VITALS
DIASTOLIC BLOOD PRESSURE: 59 MMHG | SYSTOLIC BLOOD PRESSURE: 134 MMHG | HEART RATE: 76 BPM | BODY MASS INDEX: 29.35 KG/M2 | WEIGHT: 187.38 LBS

## 2017-06-19 DIAGNOSIS — C64.9 METASTATIC RENAL CELL CARCINOMA TO BONE: ICD-10-CM

## 2017-06-19 DIAGNOSIS — C64.2 RENAL CELL CARCINOMA OF LEFT KIDNEY: ICD-10-CM

## 2017-06-19 DIAGNOSIS — G89.3 NEOPLASM RELATED PAIN: Primary | ICD-10-CM

## 2017-06-19 DIAGNOSIS — C79.51 METASTATIC RENAL CELL CARCINOMA TO BONE: ICD-10-CM

## 2017-06-19 PROCEDURE — 1125F AMNT PAIN NOTED PAIN PRSNT: CPT | Mod: S$GLB,,, | Performed by: UROLOGY

## 2017-06-19 PROCEDURE — 77014 HC CT GUIDANCE RADIATION THERAPY FLDS PLACEMENT: CPT | Mod: TC | Performed by: RADIOLOGY

## 2017-06-19 PROCEDURE — 77334 RADIATION TREATMENT AID(S): CPT | Mod: 26,,, | Performed by: RADIOLOGY

## 2017-06-19 PROCEDURE — 99999 PR PBB SHADOW E&M-EST. PATIENT-LVL III: CPT | Mod: PBBFAC,,, | Performed by: UROLOGY

## 2017-06-19 PROCEDURE — 1159F MED LIST DOCD IN RCRD: CPT | Mod: S$GLB,,, | Performed by: UROLOGY

## 2017-06-19 PROCEDURE — 77334 RADIATION TREATMENT AID(S): CPT | Mod: TC | Performed by: RADIOLOGY

## 2017-06-19 PROCEDURE — 77280 THER RAD SIMULAJ FIELD SMPL: CPT | Mod: TC | Performed by: RADIOLOGY

## 2017-06-19 PROCEDURE — 77261 THER RADIOLOGY TX PLNG SMPL: CPT | Mod: ,,, | Performed by: RADIOLOGY

## 2017-06-19 PROCEDURE — 99213 OFFICE O/P EST LOW 20 MIN: CPT | Mod: S$GLB,,, | Performed by: UROLOGY

## 2017-06-19 PROCEDURE — 77280 THER RAD SIMULAJ FIELD SMPL: CPT | Mod: 26,,, | Performed by: RADIOLOGY

## 2017-06-19 NOTE — PROGRESS NOTES
Subjective:       Patient ID: Edwin Singleton is a 72 y.o. male.    Chief Complaint:  Metastatic renal cell carcinoma      History of Present Illness  HPI  Patient is a 72 y.o. male who is established to our clinic and was initially referred by the ED, Dr. Ochoa for evaluation of a renal mass.  He was seen while admitted to the hospital.  Was found to have bilateral renal masses, liver and pulmonary lesions, and bone lesions consistent with metastatic disease.  The patient underwent liver biopsy.  The biopsy favored metastatic RCC.  The patient has been seen by oncology with Dr. Reed and rad/onc.  He underwent simulation for XRT today with plans to receive XRT in the near future.  He also picked up medications for his RCC, pazopanib, prescribed by oncology.      Review of Systems  Review of Systems  All other systems reviewed and negative except pertinent positives noted in HPI.       Objective:     Physical Exam   Constitutional: He is oriented to person, place, and time. He appears well-developed and well-nourished. No distress.   HENT:   Head: Normocephalic and atraumatic.   Eyes: No scleral icterus.   Neck: No tracheal deviation present.   Pulmonary/Chest: Effort normal. No respiratory distress.   Neurological: He is alert and oriented to person, place, and time.   Psychiatric: He has a normal mood and affect. His behavior is normal. Judgment and thought content normal.       Lab Review  Lab Results   Component Value Date    COLORU Yellow 06/16/2017    SPECGRAV 1.020 06/16/2017    PHUR 6.0 06/16/2017    NITRITE Negative 06/16/2017    KETONESU Negative 06/16/2017    UROBILINOGEN Negative 06/16/2017         Assessment:        1. Neoplasm related pain    2. 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 Renal cell carcinoma of left kidney         Plan:     Neoplasm related pain    Metastatic renal cell carcinoma to bone    Renal cell carcinoma of left kidney    -no role for cytoreductive nephrectomy  -continue  pain meds per oncology  -rad/onc for palliative therapy to bone mets  -pazopanib per Dr. Reed.    -f/u with urology prn.    -I spent 15 minutes with the patient of which more than half was spent in direct consultation with the patient in regards to our treatment and plan.

## 2017-06-19 NOTE — LETTER
June 19, 2017      Jw Reyes MD  [address]  [place] [address]           [facility]  [address]  [facility] [address]  Phone: [phone]          Patient: Edwin Singleton   MR Number: [id number]   YOB: 1945   Date of Visit: 6/19/2017       Dear Dr. Jw Reyes:    Thank you for referring Edwin Singleton to me for evaluation. Attached you will find relevant portions of my assessment and plan of care.    If you have questions, please do not hesitate to call me. I look forward to following Edwin Singleton along with you.    Sincerely,    Hector Lloyd MD    Enclosure  CC:  No Recipients    If you would like to receive this communication electronically, please contact [email or web address] or [phone] to request more information on [facility] access.    For providers and/or their staff who would like to refer a patient to [facility], please contact us through our one-stop-shop provider referral line, [facility], at [phone].    If you feel you have received this communication in error or would no longer like to receive these types of communications, please e-mail [email or web address]

## 2017-06-19 NOTE — TELEPHONE ENCOUNTER
Initial Votrient consult completed on 2017 . Medication picked up 2017.  $0.00 copay. Patient uncertain of Votrient start date; radiation stimulation this morning - Dr Prieto advised patient to wait until after stimulation to begin.  Patient plans to start Votrient once cleared by provider.  Confirmed 2 patient identifiers - name and . Therapy Appropriate.     Patient was counseled on the administration directions for Votrient:  -Take 4 tablets (800mg) by mouth once daily on an empty stomach, take either 1 hour before or 2 hours after meals.    -Do not chew, crush, or break the tablets.   If possible, patient was instructed tip the tablets from the RX bottle to the cap, and take directly from the cap to the mouth.  Patient may handle the medication with their hands if they wear with a latex or nitrile glove and wash their hands before and after handling the tablets.     Patient was counseled on the following possible side effects, which include, but are not limited to: hypertension, fatigue, hair discoloration, hair loss, hypertension, diarrhea, nausea, increased risk for infections, increased risk for bleeding, musculoskeletal pain, mouth sores, decreased appetite, weight loss, and shortness of breath.    -Eucerin given for potential dryness of palms of hands and soles of feet.      DDIs: AVOID grapefruit or grapefruit juice. Medication list reviewed, none.     Patient was given 2 patient education handouts on how to handle oral chemotherapy and specific recommendations- do's and don'ts. Instructed the patient that if they have any remaining oral chemotherapy, not to flush down the toilet or throw away in the trash; the patient or caregiver should return the unused oral chemotherapy to either the clinic or to myself in the Pharmacy where the oral chemotherapy can be disposed of properly.      Patient confirmed understanding. Patient did not have additional questions.  Patient plans to start Votrient  once cleared by provider.  Consultation included: indication; goals of treatment; administration; storage and handling; side effects; how to handle side effects; the importance of compliance; how to handle missed doses; the importance of laboratory monitoring; the importance of keeping all follow up appointments.  Patient understands to report any medication changes to [facility] and provider. All questions answered and addressed to patients satisfaction. I will f/u with her in 1 week from start, [facility] to contact patient in 3 weeks for refills.

## 2017-06-20 ENCOUNTER — OFFICE VISIT (OUTPATIENT)
Dept: PRIMARY CARE CLINIC | Facility: CLINIC | Age: 72
End: 2017-06-20
Payer: MEDICARE

## 2017-06-20 VITALS
WEIGHT: 186.75 LBS | DIASTOLIC BLOOD PRESSURE: 58 MMHG | HEIGHT: 67 IN | SYSTOLIC BLOOD PRESSURE: 136 MMHG | BODY MASS INDEX: 29.31 KG/M2 | HEART RATE: 75 BPM

## 2017-06-20 DIAGNOSIS — I25.10 CORONARY ARTERY DISEASE INVOLVING NATIVE CORONARY ARTERY OF NATIVE HEART WITHOUT ANGINA PECTORIS: Chronic | ICD-10-CM

## 2017-06-20 DIAGNOSIS — C64.9 METASTATIC RENAL CELL CARCINOMA TO LIVER: ICD-10-CM

## 2017-06-20 DIAGNOSIS — C64.9 METASTATIC RENAL CELL CARCINOMA TO BONE: ICD-10-CM

## 2017-06-20 DIAGNOSIS — I10 BENIGN ESSENTIAL HTN: ICD-10-CM

## 2017-06-20 DIAGNOSIS — E11.22 TYPE 2 DIABETES MELLITUS WITH STAGE 3 CHRONIC KIDNEY DISEASE, WITH LONG-TERM CURRENT USE OF INSULIN: Chronic | ICD-10-CM

## 2017-06-20 DIAGNOSIS — C79.51 METASTATIC RENAL CELL CARCINOMA TO BONE: ICD-10-CM

## 2017-06-20 DIAGNOSIS — N39.41 BENIGN PROSTATIC HYPERPLASIA (BPH) WITH URINARY URGE INCONTINENCE: ICD-10-CM

## 2017-06-20 DIAGNOSIS — C64.2 RENAL CELL CARCINOMA OF LEFT KIDNEY: Primary | ICD-10-CM

## 2017-06-20 DIAGNOSIS — G89.3 NEOPLASM RELATED PAIN: ICD-10-CM

## 2017-06-20 DIAGNOSIS — C78.7 METASTATIC RENAL CELL CARCINOMA TO LIVER: ICD-10-CM

## 2017-06-20 DIAGNOSIS — N18.30 TYPE 2 DIABETES MELLITUS WITH STAGE 3 CHRONIC KIDNEY DISEASE, WITH LONG-TERM CURRENT USE OF INSULIN: Chronic | ICD-10-CM

## 2017-06-20 DIAGNOSIS — Z79.4 TYPE 2 DIABETES MELLITUS WITH STAGE 3 CHRONIC KIDNEY DISEASE, WITH LONG-TERM CURRENT USE OF INSULIN: Chronic | ICD-10-CM

## 2017-06-20 DIAGNOSIS — N40.1 BENIGN PROSTATIC HYPERPLASIA (BPH) WITH URINARY URGE INCONTINENCE: ICD-10-CM

## 2017-06-20 DIAGNOSIS — E78.2 MIXED HYPERLIPIDEMIA: Chronic | ICD-10-CM

## 2017-06-20 DIAGNOSIS — E03.9 ACQUIRED HYPOTHYROIDISM: ICD-10-CM

## 2017-06-20 PROCEDURE — 77300 RADIATION THERAPY DOSE PLAN: CPT | Mod: TC | Performed by: RADIOLOGY

## 2017-06-20 PROCEDURE — 77300 RADIATION THERAPY DOSE PLAN: CPT | Mod: 26,,, | Performed by: RADIOLOGY

## 2017-06-20 PROCEDURE — 99999 PR PBB SHADOW E&M-EST. PATIENT-LVL III: CPT | Mod: PBBFAC,,,

## 2017-06-20 PROCEDURE — 3046F HEMOGLOBIN A1C LEVEL >9.0%: CPT | Mod: S$GLB,,, | Performed by: INTERNAL MEDICINE

## 2017-06-20 PROCEDURE — 1159F MED LIST DOCD IN RCRD: CPT | Mod: S$GLB,,, | Performed by: INTERNAL MEDICINE

## 2017-06-20 PROCEDURE — 77334 RADIATION TREATMENT AID(S): CPT | Mod: 26,,, | Performed by: RADIOLOGY

## 2017-06-20 PROCEDURE — 4010F ACE/ARB THERAPY RXD/TAKEN: CPT | Mod: S$GLB,,, | Performed by: INTERNAL MEDICINE

## 2017-06-20 PROCEDURE — 99214 OFFICE O/P EST MOD 30 MIN: CPT | Mod: S$GLB,,, | Performed by: INTERNAL MEDICINE

## 2017-06-20 PROCEDURE — 77295 3-D RADIOTHERAPY PLAN: CPT | Mod: TC | Performed by: RADIOLOGY

## 2017-06-20 PROCEDURE — 77295 3-D RADIOTHERAPY PLAN: CPT | Mod: 26,,, | Performed by: RADIOLOGY

## 2017-06-20 PROCEDURE — 77334 RADIATION TREATMENT AID(S): CPT | Mod: TC | Performed by: RADIOLOGY

## 2017-06-20 NOTE — Clinical Note
Doing well.  Had 2 ED visits (first due to high dose morphine of 30 mg, second due to false positive blood culture from the first ED visit). Start XRT this Thursday.

## 2017-06-20 NOTE — PATIENT INSTRUCTIONS
Bring Vaccine Record from [facility] next visit.    Insulin Dosing with Sliding Scale:        Inject Lantus 30 units twice daily (morning and night) - this is your basal insulin  Inject Novolog three times daily with meals: 20 units       - If not eating, skip dose.     Add additional insulin base on the blood sugar level before meals - see below.     Monitor blood sugar before meals and at bedtime.  Call physician if blood sugar < 60 or > 350 for two consecutive readings.                           Glucose                                          Novolog Insulin Subcutaneous                         0 - 60                                              Orange juice or glucose tablet, hold insulin                                                                     No additional insulin                        201-250                                           Add 2 units                        251-300                                           Add 4 units                        301-350                                           Add 6 units                        351-400                                           Add 8 units                        >400                                                Add 10 units then call physician

## 2017-06-20 NOTE — PROGRESS NOTES
[facility]  Follow-up Visit Progress Note     PRESENTING HISTORY     PCP: Lulú Lewis MD  Chief Complaint/Reason for Visit:  Follow up from recent visit.  Last [facility] Clinic Visit: 6-6-17    Chief Complaint   Patient presents with    Follow-up     History of Present Illness: Mr. Edwin Singleton is a 72 y.o. male.    Still with back. Pain medication is working.  He takes morphine alternating with hydrocodone as needed.     He is urinating okay.    Blood sugar 170s in the am.   Yesterday 140-170s.  No extra sliding scales.    No chest pain or SOB. Urinating well. BM every other day.    PAST HISTORY:     Past Medical History:   Diagnosis Date    Acquired hypothyroidism 5/26/2017    Benign essential HTN 5/26/2017    Benign prostatic hyperplasia (BPH) with urinary urge incontinence 6/6/2017    Chronic low back pain 6/1/2017    Coronary artery disease involving native coronary artery of native heart without angina pectoris 5/26/2017    S/p 5 stents - last one around 2010-11.    Gastroesophageal reflux disease without esophagitis 5/26/2017    History of CVA (cerebrovascular accident) 5/26/2017    Lumbar disc lesion 5/26/2017    Metastatic renal cell carcinoma to bone 6/6/2017    Metastatic renal cell carcinoma to liver 6/6/2017    Liver Biopsy 5-2017: METASTATIC RENAL CELL CARCINOMA.    Mixed hyperlipidemia 5/26/2017    Type 2 diabetes mellitus with stage 3 chronic kidney disease, with long-term current use of insulin 5/26/2017       Past Surgical History:   Procedure Laterality Date    ABDOMINAL SURGERY      APPENDECTOMY      BACK SURGERY      CARDIAC SURGERY      CHOLECYSTECTOMY      coronary stenting      X 5 last one in 2010-11.       No family history on file.    Social History     Social History    Marital status: Single     Spouse name: N/A    Number of children: N/A    Years of education: N/A     Social History Main Topics    Smoking status: Never Smoker    Smokeless  tobacco: Not on file    Alcohol use No    Drug use: No    Sexual activity: Not on file     Other Topics Concern    Not on file     Social History Narrative    No narrative on file       MEDICATIONS & ALLERGIES:     Current Outpatient Prescriptions on File Prior to Visit   Medication Sig Dispense Refill    amlodipine (NORVASC) 10 MG tablet Take 10 mg by mouth once daily.      aspirin (ECOTRIN) 81 MG EC tablet Take 81 mg by mouth once daily.      atorvastatin (LIPITOR) 40 MG tablet Take 1 tablet (40 mg total) by mouth once daily. 90 tablet 3    diazePAM (VALIUM) 5 MG tablet Take 5 mg by mouth every 6 (six) hours as needed for Anxiety.      hydrocodone-acetaminophen 5-325mg (NORCO) 5-325 mg per tablet Take 1 tablet by mouth every 8 (eight) hours as needed for Pain. 50 tablet 0    insulin aspart (NOVOLOG) 100 unit/mL injection Inject 20 Units into the skin 3 (three) times daily with meals.      insulin glargine (LANTUS) 100 unit/mL injection Inject 30 Units into the skin 2 (two) times daily. 3 vial 11    levothyroxine 50 mcg Cap Take 50 mcg by mouth before breakfast. 30 tablet 0    lisinopril (PRINIVIL,ZESTRIL) 40 MG tablet Take 40 mg by mouth once daily.      morphine (MSIR) 15 MG tablet Take 1 tablet (15 mg total) by mouth every 6 (six) hours as needed for Pain. 60 tablet 0    PAZOpanib 200 mg Tab Take 800 mg by mouth once daily. 30 tablet 11    tamsulosin (FLOMAX) 0.4 mg Cp24 Take 1 capsule (0.4 mg total) by mouth every evening. 90 capsule 4     No current facility-administered medications on file prior to visit.         Review of patient's allergies indicates:  No Known Allergies    Medications Reconciliation:   I have reconciled the patient's home medications and discharge medications with the patient/family. I have updated all changes.  Refer to After-Visit Medication List.    OBJECTIVE:     Vital Signs:  Vitals:    06/20/17 1301   BP: (!) 136/58   Pulse: 75     Wt Readings from Last 1 Encounters:    06/20/17 1301 84.7 kg (186 lb 11.7 oz)     Body mass index is 29.25 kg/m².      6-16-17: Pulse 76, Wt 85 kg (187 lb 6.3 oz), BMI 29.35 kg/m²     Physical Exam:  General: Well developed, well nourished. No distress.  HEENT: Head is normocephalic, atraumatic  Eyes: Clear conjunctiva.  Neck: Supple, symmetrical neck; trachea midline.  Lungs: Clear to auscultation bilaterally and normal respiratory effort.  Cardiovascular: Heart with regular rate and rhythm.    Extremities: No LE edema.   Abdomen: Abdomen is soft, non-tender non-distended with normal bowel sounds.  Skin: Skin color, texture, turgor normal. No rashes.  Psychiatric: Not depressed.    Laboratory  Lab Results   Component Value Date    WBC 7.97 06/16/2017    HGB 13.6 (L) 06/16/2017    HCT 41.1 06/16/2017     (H) 06/16/2017    ALT 34 06/16/2017    AST 32 06/16/2017     06/16/2017    K 5.0 06/16/2017    CL 98 06/16/2017    CREATININE 1.34 06/16/2017    BUN 26 (H) 06/16/2017    CO2 28 06/16/2017    TSH 6.020 (H) 06/04/2017    PSA 0.61 05/27/2017    INR 1.2 06/12/2017    HGBA1C 11.1 (H) 05/27/2017       ASSESSMENT & PLAN:     Renal cell carcinoma of left kidney (Stage IV - T1, Nx, M1)  Metastatic renal cell carcinoma to liver  Metastatic renal cell carcinoma to bone L3  Cancer-Related Pain  - Oncology (Dr. Reed): pazopanib to start after radiation.  - Urology (): no role for cytoreductive nephretomy  - RadOnc (Prieto): A course of radiation to a dose of 30 Gy in fractions of 3 Gy each to start about 6/22/17.      - On Morphine 15 mg (had confusion with 30 mg) PRN plus Hydrocodone 5-325 mg: currently good controlled.     Coronary artery disease involving native coronary artery of native heart without angina pectoris s/p stents  Mixed hyperlipidemia  Benign essential HTN  History of CVA (cerebrovascular accident)  - No chest pain.     On Aspirin daily.             Norvasc 10 mg daily and Lisinopril 40 mg daily for HTN.          Lipitor  40 mg daily.     Rx:     atorvastatin (LIPITOR) 40 MG tablet; Take 1 tablet (40 mg total) by mouth once daily.  Dispense: 90 tablet; Refill: 3     Acquired hypothyroidism  - On Levothyroxine 50 mcg daily.     Type 2 diabetes mellitus with stage 3 chronic kidney disease, with long-term current use of insulin  - Improved glycemic control. Keep current dosing.     Lantus 30 units BID plus Novolog 20 units TID with SSI.    BPH with urinary urge incontinence  On  tamsulosin (FLOMAX) 0.4 mg daily.     Instructions for the patient:    Bring Vaccine Record from [facility] next visit.    Insulin Dosing with Sliding Scale:       Bring Vaccine Record from [facility] next visit.     Inject Lantus 30 units twice daily (morning and night) - this is your basal insulin  Inject Novolog three times daily with meals: 20 units       - If not eating, skip dose.     Add additional insulin base on the blood sugar level before meals - see below.     Monitor blood sugar before meals and at bedtime.  Call physician if blood sugar < 60 or > 350 for two consecutive readings.                           Glucose                                          Novolog Insulin Subcutaneous                         0 - 60                                                        Orange juice or glucose tablet, hold insulin                                                                               No additional insulin                        201-250                                           Add 2 units                        251-300                                           Add 4 units                        301-350                                           Add 6 units                        351-400                                           Add 8 units                        >400                                                          Add 10 units then call physician    Scheduled Follow-up :    To establish care with Dr. Joshua Estrada  Appointments  Date Time Provider Department Center   6/28/2017 11:30 AM Lulú Lewis MD [facility] IM [address] PCW   7/11/2017 7:10 AM LAB, HEMONC CANCER BLDG [facility] LAB HO [address]   7/11/2017 8:00 AM Rahel Reed MD [facility] HEM ONC [address]       After Visit Medication List :     Medication List          Accurate as of 6/20/17  1:35 PM. If you have any questions, ask your nurse or doctor.               CONTINUE taking these medications    amlodipine 10 MG tablet  Commonly known as:  NORVASC     aspirin 81 MG EC tablet  Commonly known as:  ECOTRIN     atorvastatin 40 MG tablet  Commonly known as:  LIPITOR  Take 1 tablet (40 mg total) by mouth once daily.     diazePAM 5 MG tablet  Commonly known as:  VALIUM     hydrocodone-acetaminophen 5-325mg 5-325 mg per tablet  Commonly known as:  NORCO  Take 1 tablet by mouth every 8 (eight) hours as needed for Pain.     insulin aspart 100 unit/mL injection  Commonly known as:  NOVOLOG  Inject 20 Units into the skin 3 (three) times daily with meals.     insulin glargine 100 unit/mL injection  Commonly known as:  LANTUS  Inject 30 Units into the skin 2 (two) times daily.     levothyroxine 50 mcg Cap  Take 50 mcg by mouth before breakfast.     lisinopril 40 MG tablet  Commonly known as:  PRINIVIL,ZESTRIL     morphine 15 MG tablet  Commonly known as:  MSIR  Take 1 tablet (15 mg total) by mouth every 6 (six) hours as needed for Pain.     PAZOpanib 200 mg Tab  Take 800 mg by mouth once daily.     tamsulosin 0.4 mg Cp24  Commonly known as:  FLOMAX  Take 1 capsule (0.4 mg total) by mouth every evening.            Signing Physician:  Jw Reyes MD

## 2017-06-21 NOTE — TELEPHONE ENCOUNTER
Dr Reed,    Prior progress note reflects to start Votrient after radiation however you clarified yesterday 6/19 that he is start today 6/20. He reports he was under the impression to start after radiation.    Please clarify.    Of note: he is currently without antiemetic therapy.    Jose Álvarez, PharmD, [facility]  [facility]  [phone]

## 2017-06-22 PROCEDURE — 77417 THER RADIOLOGY PORT IMAGE(S): CPT | Performed by: RADIOLOGY

## 2017-06-22 PROCEDURE — 77402 RADIATION TX DELIVERY LVL 1: CPT | Performed by: RADIOLOGY

## 2017-06-22 PROCEDURE — 77387 GUIDANCE FOR RADJ TX DLVR: CPT | Mod: TC | Performed by: RADIOLOGY

## 2017-06-22 PROCEDURE — G6002 STEREOSCOPIC X-RAY GUIDANCE: HCPCS | Mod: 26,,, | Performed by: RADIOLOGY

## 2017-06-23 ENCOUNTER — PATIENT MESSAGE (OUTPATIENT)
Dept: HEMATOLOGY/ONCOLOGY | Facility: CLINIC | Age: 72
End: 2017-06-23

## 2017-06-23 PROCEDURE — G6002 STEREOSCOPIC X-RAY GUIDANCE: HCPCS | Mod: 26,,, | Performed by: RADIOLOGY

## 2017-06-23 PROCEDURE — 77402 RADIATION TX DELIVERY LVL 1: CPT | Performed by: RADIOLOGY

## 2017-06-23 PROCEDURE — 77387 GUIDANCE FOR RADJ TX DLVR: CPT | Mod: TC | Performed by: RADIOLOGY

## 2017-06-26 ENCOUNTER — TELEPHONE (OUTPATIENT)
Dept: HEMATOLOGY/ONCOLOGY | Facility: CLINIC | Age: 72
End: 2017-06-26

## 2017-06-26 DIAGNOSIS — C79.51 METASTATIC RENAL CELL CARCINOMA TO BONE: ICD-10-CM

## 2017-06-26 DIAGNOSIS — R11.2 CHEMOTHERAPY-INDUCED NAUSEA AND VOMITING: ICD-10-CM

## 2017-06-26 DIAGNOSIS — C64.9 METASTATIC RENAL CELL CARCINOMA TO BONE: ICD-10-CM

## 2017-06-26 DIAGNOSIS — T45.1X5A CHEMOTHERAPY-INDUCED NAUSEA AND VOMITING: ICD-10-CM

## 2017-06-26 DIAGNOSIS — C78.7 METASTATIC RENAL CELL CARCINOMA TO LIVER: Primary | ICD-10-CM

## 2017-06-26 DIAGNOSIS — C64.9 METASTATIC RENAL CELL CARCINOMA TO LIVER: Primary | ICD-10-CM

## 2017-06-26 RX ORDER — ONDANSETRON 8 MG/1
8 TABLET, ORALLY DISINTEGRATING ORAL EVERY 8 HOURS PRN
Qty: 40 TABLET | Refills: 5 | Status: SHIPPED | OUTPATIENT
Start: 2017-06-26 | End: 2017-07-26 | Stop reason: SDUPTHER

## 2017-06-26 RX ORDER — HYDROCODONE BITARTRATE AND ACETAMINOPHEN 5; 325 MG/1; MG/1
1 TABLET ORAL EVERY 8 HOURS PRN
Qty: 50 TABLET | Refills: 0 | Status: SHIPPED | OUTPATIENT
Start: 2017-06-26 | End: 2017-07-24 | Stop reason: SDUPTHER

## 2017-06-27 ENCOUNTER — DOCUMENTATION ONLY (OUTPATIENT)
Dept: RADIATION ONCOLOGY | Facility: CLINIC | Age: 72
End: 2017-06-27

## 2017-06-27 PROCEDURE — 77387 GUIDANCE FOR RADJ TX DLVR: CPT | Mod: TC | Performed by: RADIOLOGY

## 2017-06-27 PROCEDURE — 77402 RADIATION TX DELIVERY LVL 1: CPT | Performed by: RADIOLOGY

## 2017-06-27 PROCEDURE — G6002 STEREOSCOPIC X-RAY GUIDANCE: HCPCS | Mod: 26,,, | Performed by: RADIOLOGY

## 2017-06-28 ENCOUNTER — PATIENT MESSAGE (OUTPATIENT)
Dept: INTERNAL MEDICINE | Facility: CLINIC | Age: 72
End: 2017-06-28

## 2017-06-28 ENCOUNTER — OFFICE VISIT (OUTPATIENT)
Dept: INTERNAL MEDICINE | Facility: CLINIC | Age: 72
End: 2017-06-28
Payer: MEDICARE

## 2017-06-28 ENCOUNTER — PROCEDURE VISIT (OUTPATIENT)
Dept: OPTOMETRY | Facility: CLINIC | Age: 72
End: 2017-06-28
Attending: INTERNAL MEDICINE
Payer: MEDICARE

## 2017-06-28 VITALS
SYSTOLIC BLOOD PRESSURE: 122 MMHG | OXYGEN SATURATION: 97 % | WEIGHT: 189.81 LBS | DIASTOLIC BLOOD PRESSURE: 58 MMHG | HEART RATE: 81 BPM | HEIGHT: 67 IN | BODY MASS INDEX: 29.79 KG/M2

## 2017-06-28 DIAGNOSIS — E03.9 ACQUIRED HYPOTHYROIDISM: ICD-10-CM

## 2017-06-28 DIAGNOSIS — D75.839 THROMBOCYTHEMIA: ICD-10-CM

## 2017-06-28 DIAGNOSIS — N40.1 BENIGN PROSTATIC HYPERPLASIA (BPH) WITH URINARY URGE INCONTINENCE: ICD-10-CM

## 2017-06-28 DIAGNOSIS — E11.59 HYPERTENSION ASSOCIATED WITH DIABETES: ICD-10-CM

## 2017-06-28 DIAGNOSIS — I25.10 CORONARY ARTERY DISEASE INVOLVING NATIVE CORONARY ARTERY OF NATIVE HEART WITHOUT ANGINA PECTORIS: Chronic | ICD-10-CM

## 2017-06-28 DIAGNOSIS — M47.812 CERVICAL ARTHRITIS: ICD-10-CM

## 2017-06-28 DIAGNOSIS — E78.5 HYPERLIPIDEMIA DUE TO TYPE 2 DIABETES MELLITUS: ICD-10-CM

## 2017-06-28 DIAGNOSIS — E11.22 TYPE 2 DIABETES MELLITUS WITH STAGE 3 CHRONIC KIDNEY DISEASE, WITH LONG-TERM CURRENT USE OF INSULIN: Chronic | ICD-10-CM

## 2017-06-28 DIAGNOSIS — N18.30 TYPE 2 DIABETES MELLITUS WITH STAGE 3 CHRONIC KIDNEY DISEASE, WITH LONG-TERM CURRENT USE OF INSULIN: Chronic | ICD-10-CM

## 2017-06-28 DIAGNOSIS — C64.9 METASTATIC RENAL CELL CARCINOMA TO LIVER: ICD-10-CM

## 2017-06-28 DIAGNOSIS — F11.20 UNCOMPLICATED OPIOID DEPENDENCE: ICD-10-CM

## 2017-06-28 DIAGNOSIS — Z12.11 COLON CANCER SCREENING: ICD-10-CM

## 2017-06-28 DIAGNOSIS — C78.7 METASTATIC RENAL CELL CARCINOMA TO LIVER: ICD-10-CM

## 2017-06-28 DIAGNOSIS — K59.03 DRUG-INDUCED CONSTIPATION: ICD-10-CM

## 2017-06-28 DIAGNOSIS — E11.69 HYPERLIPIDEMIA DUE TO TYPE 2 DIABETES MELLITUS: ICD-10-CM

## 2017-06-28 DIAGNOSIS — Z79.4 TYPE 2 DIABETES MELLITUS WITH STAGE 3 CHRONIC KIDNEY DISEASE, WITH LONG-TERM CURRENT USE OF INSULIN: Chronic | ICD-10-CM

## 2017-06-28 DIAGNOSIS — G89.3 NEOPLASM RELATED PAIN: ICD-10-CM

## 2017-06-28 DIAGNOSIS — D69.2 SENILE PURPURA: ICD-10-CM

## 2017-06-28 DIAGNOSIS — N39.41 BENIGN PROSTATIC HYPERPLASIA (BPH) WITH URINARY URGE INCONTINENCE: ICD-10-CM

## 2017-06-28 DIAGNOSIS — I15.2 HYPERTENSION ASSOCIATED WITH DIABETES: ICD-10-CM

## 2017-06-28 PROCEDURE — 77387 GUIDANCE FOR RADJ TX DLVR: CPT | Mod: TC | Performed by: RADIOLOGY

## 2017-06-28 PROCEDURE — 92227 IMG RTA DETCJ/MNTR DS STAFF: CPT | Mod: S$GLB,,, | Performed by: OPHTHALMOLOGY

## 2017-06-28 PROCEDURE — 4010F ACE/ARB THERAPY RXD/TAKEN: CPT | Mod: S$GLB,,, | Performed by: INTERNAL MEDICINE

## 2017-06-28 PROCEDURE — 1159F MED LIST DOCD IN RCRD: CPT | Mod: S$GLB,,, | Performed by: INTERNAL MEDICINE

## 2017-06-28 PROCEDURE — G6002 STEREOSCOPIC X-RAY GUIDANCE: HCPCS | Mod: 26,,, | Performed by: RADIOLOGY

## 2017-06-28 PROCEDURE — 1126F AMNT PAIN NOTED NONE PRSNT: CPT | Mod: S$GLB,,, | Performed by: INTERNAL MEDICINE

## 2017-06-28 PROCEDURE — 77402 RADIATION TX DELIVERY LVL 1: CPT | Performed by: RADIOLOGY

## 2017-06-28 PROCEDURE — 99999 PR PBB SHADOW E&M-EST. PATIENT-LVL III: CPT | Mod: PBBFAC,,, | Performed by: INTERNAL MEDICINE

## 2017-06-28 PROCEDURE — 99215 OFFICE O/P EST HI 40 MIN: CPT | Mod: S$GLB,,, | Performed by: INTERNAL MEDICINE

## 2017-06-28 PROCEDURE — 3046F HEMOGLOBIN A1C LEVEL >9.0%: CPT | Mod: S$GLB,,, | Performed by: INTERNAL MEDICINE

## 2017-06-28 RX ORDER — LEVOTHYROXINE SODIUM 50 UG/1
50 CAPSULE ORAL
Qty: 90 CAPSULE | Refills: 3 | Status: SHIPPED | OUTPATIENT
Start: 2017-06-28 | End: 2017-06-29 | Stop reason: SDUPTHER

## 2017-06-28 RX ORDER — INSULIN GLARGINE 100 [IU]/ML
30 INJECTION, SOLUTION SUBCUTANEOUS 2 TIMES DAILY
Qty: 3 VIAL | Refills: 11 | Status: SHIPPED | OUTPATIENT
Start: 2017-06-28 | End: 2017-07-11 | Stop reason: SDUPTHER

## 2017-06-28 RX ORDER — LISINOPRIL 40 MG/1
40 TABLET ORAL DAILY
Qty: 90 TABLET | Refills: 3 | Status: SHIPPED | OUTPATIENT
Start: 2017-06-28 | End: 2018-03-26 | Stop reason: SDUPTHER

## 2017-06-28 RX ORDER — AMOXICILLIN 250 MG
1 CAPSULE ORAL 2 TIMES DAILY
Qty: 180 TABLET | Refills: 3 | Status: SHIPPED | OUTPATIENT
Start: 2017-06-28 | End: 2017-07-26

## 2017-06-28 RX ORDER — LORAZEPAM 1 MG/1
1 TABLET ORAL DAILY PRN
Qty: 10 TABLET | Refills: 0 | Status: SHIPPED | OUTPATIENT
Start: 2017-06-28 | End: 2017-10-03 | Stop reason: SDUPTHER

## 2017-06-28 RX ORDER — AMLODIPINE BESYLATE 10 MG/1
10 TABLET ORAL DAILY
Qty: 90 TABLET | Refills: 3 | Status: SHIPPED | OUTPATIENT
Start: 2017-06-28 | End: 2018-03-26 | Stop reason: SDUPTHER

## 2017-06-28 RX ORDER — INSULIN ASPART 100 [IU]/ML
20 INJECTION, SOLUTION INTRAVENOUS; SUBCUTANEOUS
Qty: 10 ML | Refills: 11 | Status: SHIPPED | OUTPATIENT
Start: 2017-06-28 | End: 2017-07-11 | Stop reason: SDUPTHER

## 2017-06-28 RX ORDER — TAMSULOSIN HYDROCHLORIDE 0.4 MG/1
0.4 CAPSULE ORAL NIGHTLY
Qty: 90 CAPSULE | Refills: 4 | Status: SHIPPED | OUTPATIENT
Start: 2017-06-28 | End: 2018-03-26 | Stop reason: SDUPTHER

## 2017-06-28 RX ORDER — LACTULOSE 10 G/15ML
10 SOLUTION ORAL EVERY 6 HOURS PRN
Qty: 450 ML | Refills: 0 | Status: SHIPPED | OUTPATIENT
Start: 2017-06-28 | End: 2018-11-28 | Stop reason: SDUPTHER

## 2017-06-28 RX ORDER — ATORVASTATIN CALCIUM 40 MG/1
40 TABLET, FILM COATED ORAL DAILY
Qty: 90 TABLET | Refills: 3 | Status: SHIPPED | OUTPATIENT
Start: 2017-06-28 | End: 2017-08-22 | Stop reason: SDUPTHER

## 2017-06-28 NOTE — ASSESSMENT & PLAN NOTE
Patient with LUTS, compliant with flomax. Occasional testicular pain  · Can increase flomax to 0.8mg after 4 weeks of treatment

## 2017-06-28 NOTE — ASSESSMENT & PLAN NOTE
Uncontrolled back pain and anxiety related to metastatic cancer  · Continue narcotics per Heme-Onc  · discotinue valium 5mg  · Start ativan 1mg PRN before procedures

## 2017-06-28 NOTE — PROGRESS NOTES
Primary Care Provider Appointment    Subjective:      Patient ID: Edwin Singleton is a 72 y.o. male with HTN, metastatic renal cell CA (Stage IV - T1,Nx,M1)    Chief Complaint: Establish Care (Est. Care and get an overall health check up ); Constipation (Pt stated that he has been constipated sice he has been taking pain pills); Diabetes (Glucose has been low for the past few days , Pt stated that he is on a sliding scale); Flank Pain (Right side , Pain= 9 , 1+ month); and Leg Pain (Rt leg , since Pt has got radiation treatment)    He was found Renal cell carcinoma of the left kidney - Stage IV - T1,Nx,M1 during work-up for recurrent falls and admission on 5/30. Per Urology, no indication for surgery. Heme-Onc plans for Radiation therapy to L3 spine - 3 Gy/Fx x 10 fx and pazopanib 800 mg PO daily.    Patient with recent narcotic overdose, was taking morphine 30mg for back pain in addition to valium. Seen in on 6/12 and treated in ED, repeat evluation on 6/16 for pos blood culture but during evalution had no evidence of infection and discharged.    Complains of constipation, with BM q3 days only with use of x-lax.    He has long-standing diagnosis of DM. CBG log shows AM CBG in . Lantus 30U BID, novolog 30U qac. He has not taken any short-acting in 2 days due to low glucose.     He takes valium 5mg before procedures for anxiety but per his son it is overly sedating, his son wants a lower dose med.      Social History     Social History    Marital status: Single     Spouse name: N/A    Number of children: N/A    Years of education: N/A     Occupational History    Not on file.     Social History Main Topics    Smoking status: Never Smoker    Smokeless tobacco: Never Used    Alcohol use No    Drug use: No    Sexual activity: Not Currently     Partners: Female     Other Topics Concern    Not on file     Social History Narrative    No narrative on file       Past Surgical History:   Procedure Laterality  "Date    ABDOMINAL SURGERY      APPENDECTOMY      BACK SURGERY      CARDIAC SURGERY      CHOLECYSTECTOMY      coronary stenting      X 5 last one in 2010-11.       Past Medical History:   Diagnosis Date    Acquired hypothyroidism 5/26/2017    Benign essential HTN 5/26/2017    Benign prostatic hyperplasia (BPH) with urinary urge incontinence 6/6/2017    Chronic low back pain 6/1/2017    Coronary artery disease involving native coronary artery of native heart without angina pectoris 5/26/2017    S/p 5 stents - last one around 2010-11.    Gastroesophageal reflux disease without esophagitis 5/26/2017    History of CVA (cerebrovascular accident) 5/26/2017    Lumbar disc lesion 5/26/2017    Metastatic renal cell carcinoma to bone 6/6/2017    Metastatic renal cell carcinoma to liver 6/6/2017    Liver Biopsy 5-2017: METASTATIC RENAL CELL CARCINOMA.    Mixed hyperlipidemia 5/26/2017    Type 2 diabetes mellitus with stage 3 chronic kidney disease, with long-term current use of insulin 5/26/2017         History reviewed. No pertinent family history.    Review of Systems   Constitutional: Positive for activity change and appetite change.   Respiratory: Negative for shortness of breath.    Cardiovascular: Negative for chest pain and palpitations.   Gastrointestinal: Negative for blood in stool, diarrhea and nausea.   Musculoskeletal: Positive for back pain and gait problem.   Skin: Positive for color change.   Hematological: Bruises/bleeds easily.   Psychiatric/Behavioral: Negative for decreased concentration and dysphoric mood.       Objective:   BP (!) 122/58 (BP Location: Other (Comment))   Pulse 81   Ht 5' 7" (1.702 m)   Wt 86.1 kg (189 lb 13.1 oz)   SpO2 97%   BMI 29.73 kg/m²     Physical Exam   Constitutional: He is oriented to person, place, and time. He appears well-developed and well-nourished.   Ambulating by wheelchair   HENT:   Head: Normocephalic.   Right Ear: External ear normal.   Left Ear: " External ear normal.   edentulous   Eyes: Conjunctivae and EOM are normal. Pupils are equal, round, and reactive to light.   Neck:   Decreased ROM in neck    Cardiovascular: Normal rate and regular rhythm.    2/6 aortic systolic murmur   Pulmonary/Chest: Effort normal and breath sounds normal.   Musculoskeletal: Normal range of motion.   5/5 strength UE/LE  No TTP of CVA or low back  Pos straight leg raise (per patient history)     Neurological: He is alert and oriented to person, place, and time. He has normal reflexes.   Skin: Skin is warm.   Ecchymoses and purpura bilaterally UE  Telangiectasias on face and chest  Multiple tattoos on UE   Psychiatric: He has a normal mood and affect.           Lab Results   Component Value Date    WBC 7.97 06/16/2017    HGB 13.6 (L) 06/16/2017    HCT 41.1 06/16/2017     (H) 06/16/2017    ALT 34 06/16/2017    AST 32 06/16/2017     06/16/2017    K 5.0 06/16/2017    CL 98 06/16/2017    CREATININE 1.34 06/16/2017    BUN 26 (H) 06/16/2017    CO2 28 06/16/2017    TSH 6.020 (H) 06/04/2017    PSA 0.61 05/27/2017    INR 1.2 06/12/2017    HGBA1C 11.1 (H) 05/27/2017       Current Outpatient Prescriptions on File Prior to Visit   Medication Sig Dispense Refill    aspirin (ECOTRIN) 81 MG EC tablet Take 81 mg by mouth once daily.      hydrocodone-acetaminophen 5-325mg (NORCO) 5-325 mg per tablet Take 1 tablet by mouth every 8 (eight) hours as needed for Pain. 50 tablet 0    morphine (MSIR) 15 MG tablet Take 1 tablet (15 mg total) by mouth every 6 (six) hours as needed for Pain. 60 tablet 0    ondansetron (ZOFRAN-ODT) 8 MG TbDL Take 1 tablet (8 mg total) by mouth every 8 (eight) hours as needed (nausea and vomiting). 40 tablet 5    [DISCONTINUED] amlodipine (NORVASC) 10 MG tablet Take 10 mg by mouth once daily.      [DISCONTINUED] atorvastatin (LIPITOR) 40 MG tablet Take 1 tablet (40 mg total) by mouth once daily. 90 tablet 3    [DISCONTINUED] diazePAM (VALIUM) 5 MG tablet  Take 5 mg by mouth every 6 (six) hours as needed for Anxiety.      [DISCONTINUED] insulin aspart (NOVOLOG) 100 unit/mL injection Inject 20 Units into the skin 3 (three) times daily with meals.      [DISCONTINUED] insulin glargine (LANTUS) 100 unit/mL injection Inject 30 Units into the skin 2 (two) times daily. 3 vial 11    [DISCONTINUED] levothyroxine 50 mcg Cap Take 50 mcg by mouth before breakfast. 30 tablet 0    [DISCONTINUED] lisinopril (PRINIVIL,ZESTRIL) 40 MG tablet Take 40 mg by mouth once daily.      [DISCONTINUED] tamsulosin (FLOMAX) 0.4 mg Cp24 Take 1 capsule (0.4 mg total) by mouth every evening. 90 capsule 4    PAZOpanib 200 mg Tab Take 800 mg by mouth once daily. 30 tablet 11     No current facility-administered medications on file prior to visit.          Assessment:   72 y.o. male with multiple co-morbid illnesses here to establish care with new PCP and continue work-up of chronic issues notably renal cell CA metastatic, DM, HTN.     Plan:     Problem List Items Addressed This Visit        Neuro    Neoplasm related pain     Uncontrolled back pain and anxiety related to metastatic cancer  · Continue narcotics per Heme-Onc  · discotinue valium 5mg  · Start ativan 1mg PRN before procedures         Relevant Medications    lorazepam (ATIVAN) 1 MG tablet    Uncomplicated opioid dependence     Taking scheduled narcotics for cancer-related pain. Recent overdose  · Continue pain management per Heme-Onc  · Advised mindfulness to prevent overmedication            Cardiac    Coronary artery disease involving native coronary artery of native heart without angina pectoris (Chronic)     [facility] S/p 5 stents 2010  · Continue CCB, ACE, ASA, statin  · Consider starting BB         Hypertension associated with diabetes     BP controlled on home reads. ACE, CCB  · Continue lisinopril 40mg, amlodipine 10mg  · Continue statin, ASA  · cotninue home BP reads         Relevant Medications    amlodipine (NORVASC) 10 MG tablet     lisinopril (PRINIVIL,ZESTRIL) 40 MG tablet    Senile purpura     Ecchymoses and pupura on UE bilaterally  · Advised mindfulness of movements  · Monitor  · continue APT            Renal    Type 2 diabetes mellitus with stage 3 chronic kidney disease, with long-term current use of insulin (Chronic)     A1c 11.1 in 5/2017. Lantus 30U BID, novolog 20 TID. GFR 52.6  · Continue current therapies  · Monitor A1c         Relevant Medications    atorvastatin (LIPITOR) 40 MG tablet    insulin aspart (NOVOLOG) 100 unit/mL injection    insulin glargine (LANTUS) 100 unit/mL injection    Other Relevant Orders    Diabetic Eye Screening Photo    Ambulatory Referral to Podiatry    Metastatic renal cell carcinoma to liver     Metastatic renal cell CA (Stage IV - T1,Nx,M1). Followed by Heme-Onc (Dr Reed) radiation therapy to L3 spine - 3 Gy/Fx x 10 fx and pazopanib 800 mg PO daily.  · F/U Heme-Onc  · Continue current therapies           Benign prostatic hyperplasia (BPH) with urinary urge incontinence     Patient with LUTS, compliant with flomax. Occasional testicular pain  · Can increase flomax to 0.8mg after 4 weeks of treatment         Relevant Medications    tamsulosin (FLOMAX) 0.4 mg Cp24       Hem/Onc    Thrombocythemia     Elevated platelets at 601, no symptoms  · Monitor with CBCs  · Continue APT            Endocrine    Acquired hypothyroidism     Elevated TSH in 6/2017, compliant with levo 50mcg  · Advised to take separate from other meds and food  · Continue current dose         Relevant Medications    lisinopril (PRINIVIL,ZESTRIL) 40 MG tablet       Fluids/Electrolytes/Nutrition/GI    Hyperlipidemia due to type 2 diabetes mellitus     ASCVD risk high due to DM  · Continue statin  · Continue DM management- long and short acting insulins         Relevant Medications    atorvastatin (LIPITOR) 40 MG tablet    Other Relevant Orders    Diabetic Eye Screening Photo    Ambulatory Referral to Podiatry    Drug-induced  constipation     BM q3 days, with high narcotic use  · Start senna-docusate BID  · Decrease to daily once patient has daily BM  · Lactulose PRN when no BM for 3 days         Relevant Medications    senna-docusate 8.6-50 mg (SENNA WITH DOCUSATE SODIUM) 8.6-50 mg per tablet    lactulose (CHRONULAC) 20 gram/30 mL Soln       Musculoskeletal and Integument    Cervical arthritis     Fused bones in cervical spine at [facility]. Recurrent neck pain  · Monitor clinically           Other Visit Diagnoses    None.         Health Maintenance       Date Due Completion Date    Hepatitis C Screening 1945 ---    Lipid Panel 1945 ---    Foot Exam 02/18/1955 ---    Eye Exam 02/18/1955 ---    TETANUS VACCINE 02/18/1963 ---    Colonoscopy 02/18/1995 ---    Zoster Vaccine 02/18/2005 ---    Pneumococcal (65+) (1 of 2 - PCV13) 02/18/2010 ---    Influenza Vaccine 08/01/2017 ---    Hemoglobin A1c 11/27/2017 5/27/2017          Return in about 4 weeks (around 7/26/2017) for established patient follow-up. One hour spent with this patient today, half of that in counseling.    Lulú Lewis MD/MPH  Internal Medicine  [facility]  [phone]

## 2017-06-28 NOTE — ASSESSMENT & PLAN NOTE
A1c 11.1 in 5/2017. Lantus 30U BID, novolog 20 TID. GFR 52.6  · Continue current therapies  · Monitor A1c

## 2017-06-28 NOTE — ASSESSMENT & PLAN NOTE
Elevated TSH in 6/2017, compliant with levo 50mcg  · Advised to take separate from other meds and food  · Continue current dose

## 2017-06-28 NOTE — ASSESSMENT & PLAN NOTE
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 Recurrent neck pain  · Monitor clinically

## 2017-06-28 NOTE — ASSESSMENT & PLAN NOTE
Ecchymoses and pupura on UE bilaterally  · Advised mindfulness of movements  · Monitor  · continue APT

## 2017-06-28 NOTE — PROGRESS NOTES
HPI     Diabetic Eye Exam    Additional comments: Photos           Comments   72 y.o. y/o here for screening for Diabetic Renopathy with non-dilated   fundus photos per Lulú Lewis MD         Last edited by Samira Robertson MA on 6/28/2017  2:26 PM. (History)            Assessment /Plan     For exam results, see Encounter Report.    Type 2 diabetes mellitus with stage 3 chronic kidney disease, with long-term current use of insulin  -     Diabetic Eye Screening Photo    Hyperlipidemia due to type 2 diabetes mellitus  -     Diabetic Eye Screening Photo

## 2017-06-28 NOTE — PATIENT INSTRUCTIONS
TODAY:  - Start senna-docusate twice daily for constipation  · Decrease to daily once you have daily bowel movements  - Use Lactulose as needed when no BM for 3 days  - call Dr Lewis if worried about constipation  - [facility]lain[facility]  - diabetic eye exam  - foot doctor appointment  - bring medications to next appointment  - continue checking BP and sugars in the morning (bring log to next appt)  - changed valium to ativan 1mg before CT scans  - all med refills sent to [facility] in [place]

## 2017-06-28 NOTE — ASSESSMENT & PLAN NOTE
ASCVD risk high due to DM  · Continue statin  · Continue DM management- long and short acting insulins

## 2017-06-28 NOTE — ASSESSMENT & PLAN NOTE
BP controlled on home reads. ACE, CCB  · Continue lisinopril 40mg, amlodipine 10mg  · Continue statin, ASA  · cotninue home BP reads

## 2017-06-28 NOTE — ASSESSMENT & PLAN NOTE
Metastatic renal cell CA (Stage IV - T1,Nx,M1). Followed by Heme-Onc (Dr Reed) radiation therapy to L3 spine - 3 Gy/Fx x 10 fx and pazopanib 800 mg PO daily.  · F/U Heme-Onc  · Continue current therapies

## 2017-06-28 NOTE — ASSESSMENT & PLAN NOTE
BM q3 days, with high narcotic use  · Start senna-docusate BID  · Decrease to daily once patient has daily BM  · Lactulose PRN when no BM for 3 days

## 2017-06-28 NOTE — ASSESSMENT & PLAN NOTE
Taking scheduled narcotics for cancer-related pain. Recent overdose  · Continue pain management per Heme-Onc  · Advised mindfulness to prevent overmedication

## 2017-06-29 ENCOUNTER — TELEPHONE (OUTPATIENT)
Dept: INTERNAL MEDICINE | Facility: CLINIC | Age: 72
End: 2017-06-29

## 2017-06-29 DIAGNOSIS — E03.9 HYPOTHYROIDISM, UNSPECIFIED TYPE: Primary | ICD-10-CM

## 2017-06-29 PROCEDURE — 77402 RADIATION TX DELIVERY LVL 1: CPT | Performed by: RADIOLOGY

## 2017-06-29 PROCEDURE — 77336 RADIATION PHYSICS CONSULT: CPT | Performed by: RADIOLOGY

## 2017-06-29 PROCEDURE — G6002 STEREOSCOPIC X-RAY GUIDANCE: HCPCS | Mod: 26,,, | Performed by: RADIOLOGY

## 2017-06-29 PROCEDURE — 99496 TRANSJ CARE MGMT HIGH F2F 7D: CPT | Mod: S$GLB,,, | Performed by: INTERNAL MEDICINE

## 2017-06-29 PROCEDURE — 77387 GUIDANCE FOR RADJ TX DLVR: CPT | Mod: TC | Performed by: RADIOLOGY

## 2017-06-29 RX ORDER — LEVOTHYROXINE SODIUM 50 UG/1
50 CAPSULE ORAL
Qty: 90 CAPSULE | Refills: 3 | Status: SHIPPED | OUTPATIENT
Start: 2017-06-29 | End: 2017-09-12 | Stop reason: SDUPTHER

## 2017-06-30 ENCOUNTER — TELEPHONE (OUTPATIENT)
Dept: OPTOMETRY | Facility: CLINIC | Age: 72
End: 2017-06-30

## 2017-06-30 PROCEDURE — 77402 RADIATION TX DELIVERY LVL 1: CPT | Performed by: RADIOLOGY

## 2017-06-30 PROCEDURE — G6002 STEREOSCOPIC X-RAY GUIDANCE: HCPCS | Mod: 26,,, | Performed by: RADIOLOGY

## 2017-06-30 PROCEDURE — 77417 THER RADIOLOGY PORT IMAGE(S): CPT | Performed by: RADIOLOGY

## 2017-06-30 PROCEDURE — 77387 GUIDANCE FOR RADJ TX DLVR: CPT | Mod: TC | Performed by: RADIOLOGY

## 2017-06-30 NOTE — PLAN OF CARE
Problem: Patient Care Overview  Goal: Plan of Care Review  Outcome: Ongoing (interventions implemented as appropriate)  Day 3 of radiation to the spine  Denies pain

## 2017-06-30 NOTE — TELEPHONE ENCOUNTER
----- Message from Luis Antonio Parson MD sent at 6/30/2017  2:00 PM CDT -----  Small pupils  Dark images

## 2017-07-03 ENCOUNTER — HOSPITAL ENCOUNTER (OUTPATIENT)
Dept: RADIATION THERAPY | Facility: HOSPITAL | Age: 72
Discharge: HOME | End: 2017-07-03
Attending: RADIOLOGY
Payer: MEDICARE

## 2017-07-03 PROCEDURE — 77387 GUIDANCE FOR RADJ TX DLVR: CPT | Mod: TC | Performed by: RADIOLOGY

## 2017-07-03 PROCEDURE — G6002 STEREOSCOPIC X-RAY GUIDANCE: HCPCS | Mod: 26,,, | Performed by: RADIOLOGY

## 2017-07-03 PROCEDURE — 77402 RADIATION TX DELIVERY LVL 1: CPT | Performed by: RADIOLOGY

## 2017-07-05 PROCEDURE — G6002 STEREOSCOPIC X-RAY GUIDANCE: HCPCS | Mod: 26,,, | Performed by: RADIOLOGY

## 2017-07-05 PROCEDURE — 77402 RADIATION TX DELIVERY LVL 1: CPT | Performed by: RADIOLOGY

## 2017-07-05 PROCEDURE — 77387 GUIDANCE FOR RADJ TX DLVR: CPT | Mod: TC | Performed by: RADIOLOGY

## 2017-07-06 PROCEDURE — G6002 STEREOSCOPIC X-RAY GUIDANCE: HCPCS | Mod: 26,,, | Performed by: RADIOLOGY

## 2017-07-06 PROCEDURE — 77402 RADIATION TX DELIVERY LVL 1: CPT | Performed by: RADIOLOGY

## 2017-07-06 PROCEDURE — 77387 GUIDANCE FOR RADJ TX DLVR: CPT | Mod: TC | Performed by: RADIOLOGY

## 2017-07-07 ENCOUNTER — PATIENT MESSAGE (OUTPATIENT)
Dept: INTERNAL MEDICINE | Facility: CLINIC | Age: 72
End: 2017-07-07

## 2017-07-07 ENCOUNTER — DOCUMENTATION ONLY (OUTPATIENT)
Dept: RADIATION ONCOLOGY | Facility: CLINIC | Age: 72
End: 2017-07-07

## 2017-07-07 ENCOUNTER — OFFICE VISIT (OUTPATIENT)
Dept: PODIATRY | Facility: CLINIC | Age: 72
End: 2017-07-07
Payer: MEDICARE

## 2017-07-07 ENCOUNTER — INITIAL CONSULT (OUTPATIENT)
Dept: OPTOMETRY | Facility: CLINIC | Age: 72
End: 2017-07-07
Payer: MEDICARE

## 2017-07-07 VITALS — HEART RATE: 77 BPM | DIASTOLIC BLOOD PRESSURE: 55 MMHG | SYSTOLIC BLOOD PRESSURE: 127 MMHG | HEIGHT: 67 IN

## 2017-07-07 DIAGNOSIS — B35.1 ONYCHOMYCOSIS DUE TO DERMATOPHYTE: ICD-10-CM

## 2017-07-07 DIAGNOSIS — E11.42 DIABETIC POLYNEUROPATHY ASSOCIATED WITH TYPE 2 DIABETES MELLITUS: Primary | ICD-10-CM

## 2017-07-07 DIAGNOSIS — Z83.511 FAMILY HISTORY OF GLAUCOMA: ICD-10-CM

## 2017-07-07 DIAGNOSIS — H25.13 NUCLEAR SCLEROTIC CATARACT OF BOTH EYES: ICD-10-CM

## 2017-07-07 DIAGNOSIS — H52.203 HYPEROPIA WITH ASTIGMATISM AND PRESBYOPIA, BILATERAL: ICD-10-CM

## 2017-07-07 DIAGNOSIS — E11.9 ENCOUNTER FOR COMPREHENSIVE DIABETIC FOOT EXAMINATION, TYPE 2 DIABETES MELLITUS: ICD-10-CM

## 2017-07-07 DIAGNOSIS — H52.03 HYPEROPIA WITH ASTIGMATISM AND PRESBYOPIA, BILATERAL: ICD-10-CM

## 2017-07-07 DIAGNOSIS — H52.4 HYPEROPIA WITH ASTIGMATISM AND PRESBYOPIA, BILATERAL: ICD-10-CM

## 2017-07-07 PROCEDURE — 77402 RADIATION TX DELIVERY LVL 1: CPT | Performed by: RADIOLOGY

## 2017-07-07 PROCEDURE — 99999 PR PBB SHADOW E&M-EST. PATIENT-LVL III: CPT | Mod: PBBFAC,,, | Performed by: PODIATRIST

## 2017-07-07 PROCEDURE — 77336 RADIATION PHYSICS CONSULT: CPT | Performed by: RADIOLOGY

## 2017-07-07 PROCEDURE — 92015 DETERMINE REFRACTIVE STATE: CPT | Mod: S$GLB,,, | Performed by: OPTOMETRIST

## 2017-07-07 PROCEDURE — 11721 DEBRIDE NAIL 6 OR MORE: CPT | Mod: Q9,S$GLB,, | Performed by: PODIATRIST

## 2017-07-07 PROCEDURE — G6002 STEREOSCOPIC X-RAY GUIDANCE: HCPCS | Mod: 26,,, | Performed by: RADIOLOGY

## 2017-07-07 PROCEDURE — 99202 OFFICE O/P NEW SF 15 MIN: CPT | Mod: 25,S$GLB,, | Performed by: PODIATRIST

## 2017-07-07 PROCEDURE — 1126F AMNT PAIN NOTED NONE PRSNT: CPT | Mod: S$GLB,,, | Performed by: PODIATRIST

## 2017-07-07 PROCEDURE — 4010F ACE/ARB THERAPY RXD/TAKEN: CPT | Mod: S$GLB,,, | Performed by: PODIATRIST

## 2017-07-07 PROCEDURE — 92004 COMPRE OPH EXAM NEW PT 1/>: CPT | Mod: S$GLB,,, | Performed by: OPTOMETRIST

## 2017-07-07 PROCEDURE — 3046F HEMOGLOBIN A1C LEVEL >9.0%: CPT | Mod: S$GLB,,, | Performed by: PODIATRIST

## 2017-07-07 PROCEDURE — 99999 PR PBB SHADOW E&M-EST. PATIENT-LVL II: CPT | Mod: PBBFAC,,, | Performed by: OPTOMETRIST

## 2017-07-07 PROCEDURE — 77387 GUIDANCE FOR RADJ TX DLVR: CPT | Mod: TC | Performed by: RADIOLOGY

## 2017-07-07 PROCEDURE — 1159F MED LIST DOCD IN RCRD: CPT | Mod: S$GLB,,, | Performed by: PODIATRIST

## 2017-07-07 PROCEDURE — 77412 RADIATION TX DELIVERY LVL 3: CPT | Performed by: RADIOLOGY

## 2017-07-07 NOTE — Clinical Note
Dear Dr. Lewis and Dr. Reyes,  I had the pleasure of seeing Mr. Singleton today for a diabetic eye examination. 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 I will check his eyes again in 6 months. Please let me know if you have questions.  Sincerely, Beverly Andrews OD

## 2017-07-07 NOTE — PROGRESS NOTES
HPI     Mr. Edwin Singleton is here for a diabetic eye examination  Diabetic eye screening photos were taken 06/28/17, but unable to read due   to poor image quality.     Patient would like to update glasses, blurred distance and reading.    Pt is accompanied today by his son.    (-)drops  (-)flashes  (-)floaters  (-)diplopia    Diabetic yes  Last blood glucose 112 mg/dL this morning (fasting).  Pt's son measures blood glucose QID and says blood glucose has   significantly improved recently (used to be in the 400s).   Hemoglobin A1C       Date                     Value               Ref Range             Status                05/27/2017               11.1 (H)            4.5 - 6.2 %           Final             ----------    OCULAR HISTORY  Last Eye Exam 1 year ago Dr. Terry at [facility]  Diabetic eye screening photos were taken 06/28/17, but unable to read due   to poor image quality.   (-)eye surgery   (-)diagnosed or treated for any eye conditions or diseases    FAMILY HISTORY  (+)Glaucoma: mother, maternal grandmother        Last edited by Beverly Andrews, OD on 7/7/2017  2:03 PM. (History)            Assessment /Plan     For exam results, see Encounter Report.    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 Stressed importance of continued blood glucose improvement. Pt and his son say blood glucose has improved significantly recently.   Continue management of DM as directed by PCP (pt says diabetes is managed by Dr. Lewis and by Dr. Jw Reyes). PCP and Dr. Reyes will be informed of findings.    Recheck in 6 months or RTC immediately with any vision changes.    Nuclear  sclerotic cataract of both eyes   Not visually significant OU. Monitor.      Hyperopia with astigmatism and presbyopia, bilateral   Pt understands that refractive error changes with blood glucose fluctuations and that repeat refraction within the next 12 months may results in $40 fee; pt agrees to proceed with refraction today.    Small change OU. New glasses prescription released, adaptation expected.  New glasses optional.   Eyeglass Final Rx     Eyeglass Final Rx       Sphere Cylinder Axis Add    Right +2.25 +1.50 170 +2.50    Left +1.25 +2.50 003 +2.50    Expiration Date:  7/8/2018            Family history of glaucoma   No signs of glaucoma today OU. Monitor regularly.      RTC 6 months to recheck diabetic retinopathy OU

## 2017-07-07 NOTE — PROGRESS NOTES
Subjective:      Patient ID: Edwin Singleton is a 72 y.o. male.    Chief Complaint: Diabetes Mellitus (PCP Dr. Lewis 6/28/17); Diabetic Foot Exam; Routine Foot Care; and Peripheral Neuropathy    Edwin is a 72 y.o. male who presents to the clinic upon referral from Dr. Lewis  for evaluation and treatment of diabetic feet. Edwin has a past medical history of Acquired hypothyroidism (5/26/2017); Benign essential HTN (5/26/2017); Benign prostatic hyperplasia (BPH) with urinary urge incontinence (6/6/2017); Chronic low back pain (6/1/2017); Coronary artery disease involving native coronary artery of native heart without angina pectoris (5/26/2017); Gastroesophageal reflux disease without esophagitis (5/26/2017); History of CVA (cerebrovascular accident) (5/26/2017); Lumbar disc lesion (5/26/2017); Metastatic renal cell carcinoma to bone (6/6/2017); Metastatic renal cell carcinoma to liver (6/6/2017); Mixed hyperlipidemia (5/26/2017); and Type 2 diabetes mellitus with stage 3 chronic kidney disease, with long-term current use of insulin (5/26/2017). Patient relates no major problem with feet. Only complaints today consist of thick, fungal toenails which he cannot reach and trim due to leg pain.  He denies any history of lower extremity wounds, infections and/or injury.      PCP: Lulú Lewis MD    Date Last Seen by PCP: 6/28/17        Hemoglobin A1C   Date Value Ref Range Status   05/27/2017 11.1 (H) 4.5 - 6.2 % Final     Comment:     According to ADA guidelines, hemoglobin A1C <7.0% represents  optimal control in non-pregnant diabetic patients.  Different  metrics may apply to specific populations.   Standards of Medical Care in Diabetes - 2016.  For the purpose of screening for the presence of diabetes:  <5.7%     Consistent with the absence of diabetes  5.7-6.4%  Consistent with increasing risk for diabetes   (prediabetes)  >or=6.5%  Consistent with diabetes  Currently no consensus exists for use  of hemoglobin A1C  for diagnosis of diabetes for children.             Review of Systems   Constitution: Negative for chills, fever and malaise/fatigue.   Cardiovascular: Negative for chest pain, leg swelling, orthopnea and palpitations.   Respiratory: Negative for cough, shortness of breath and wheezing.    Skin: Positive for color change, dry skin and nail changes. Negative for itching, poor wound healing and rash.   Musculoskeletal: Negative for arthritis, gout, joint pain, joint swelling, muscle weakness and myalgias.   Neurological: Positive for numbness, paresthesias and sensory change. Negative for disturbances in coordination, dizziness, focal weakness and tremors.           Objective:      Physical Exam   Cardiovascular:   Pulses:       Popliteal pulses are 1+ on the right side, and 1+ on the left side.        Dorsalis pedis pulses are 1+ on the right side, and 1+ on the left side.   Dorsalis pedis and posterior tibial pulses are diminished Bilaterally. Toes are cool to touch. Feet are warm proximally.There is decreased digital hair. Skin is atrophic, slightly hyperpigmented, and mildly edematous       Musculoskeletal:   Musculoskeletal:  Muscle strength is 5/5 in all groups bilaterally.  Metatarsophalangeal and subtalar range of motion are within normal limits without crepitus bilaterally. There is limitation of ankle dorsiflexion with knees extended and flexed Bilaterally.       Feet:   Right Foot:   Protective Sensation: 10 sites tested. 5 sites sensed.   Skin Integrity: Negative for ulcer, blister or skin breakdown.   Left Foot:   Protective Sensation: 10 sites tested. 6 sites sensed.   Skin Integrity: Negative for ulcer, blister or skin breakdown.   Neurological:   [place] 5.07 monofilamant testing is diminished both feet. Sharp/dull sensation diminished Bilaterally. Light touch absent Bilaterally.       Skin:   Toenails 1-5 bilaterally are elongated by 2-3 mm, thickened by 2-3 mm,  discolored/yellowed, dystrophic, brittle with subungual debris. No incurvation. Mild xerosis noted, bilat. No open wounds.                 Assessment:       Encounter Diagnoses   Name Primary?    Diabetic polyneuropathy associated with type 2 diabetes mellitus Yes    Encounter for comprehensive diabetic foot examination, type 2 diabetes mellitus     Onychomycosis due to dermatophyte          Plan:       Edwin was seen today for diabetes mellitus, diabetic foot exam, routine foot care and peripheral neuropathy.    Diagnoses and all orders for this visit:    Diabetic polyneuropathy associated with type 2 diabetes mellitus    Encounter for comprehensive diabetic foot examination, type 2 diabetes mellitus    Onychomycosis due to dermatophyte      I counseled the patient on his conditions, their implications and medical management.        - Shoe inspection. Diabetic Foot Education. Patient reminded of the importance of good nutrition and blood sugar control to help prevent podiatric complications of diabetes. Patient instructed on proper foot hygeine. We discussed wearing proper shoe gear, daily foot inspections, never walking without protective shoe gear, never putting sharp instruments to feet, routine podiatric visits every 3-6 months, or sooner, prn.      - With patient's permission, nails were aggressively reduced and debrided x 10 to their soft tissue attachment mechanically, removing all offending nail and debris. Patient relates relief following the procedure.

## 2017-07-07 NOTE — LETTER
July 7, 2017      Lulú Lewis MD  [address]  [facility] [address]           [facility]  [address]  [facility] [address]  Phone: [phone]          Patient: Edwin Singleton   MR Number: [id number]   YOB: 1945   Date of Visit: 7/7/2017       Dear Dr. Lulú Lewis:    Thank you for referring Edwin Singleton to me for evaluation. Attached you will find relevant portions of my assessment and plan of care.    If you have questions, please do not hesitate to call me. I look forward to following Edwin Singleton along with you.    Sincerely,    Gene Villegas DPM    Enclosure  CC:  No Recipients    If you would like to receive this communication electronically, please contact [email or web address] or [phone] to request more information on [employer] Link access.    For providers and/or their staff who would like to refer a patient to [facility], please contact us through our one-stop-shop provider referral line, [facility], at [phone].    If you feel you have received this communication in error or would no longer like to receive these types of communications, please e-mail [email or web address]

## 2017-07-07 NOTE — PATIENT INSTRUCTIONS
Thank you very much for coming in for your eye examination. It was a pleasure working with you today. Below is some information you might find helpful.         What Is Diabetic Retinopathy?  Diabetic retinopathy is the leading cause of blindness in adults. It occurs when diabetes harms blood vessels inside the eye. These weak vessels leak fluid into an area of the eye called the retina. Weak new vessels can break and bleed into the retina. Old vessels can leak and cause swelling. These vessels can damage areas of the retina, causing blurry, distorted vision.    What causes diabetic retinopathy?  Diabetes is the cause of this eye disease. Over time, diabetes makes blood vessels weaken all over the body, including in the eyes. Poor blood sugar control can make retinopathy worse. So can smoking, high cholesterol, or poorly controlled high blood pressure. Pregnancy can also cause retinopathy to worsen.  What are the symptoms?  You can have diabetic retinopathy without knowing it. Usually, there is no pain and no outward sign. Over time, you may notice gradual blurring or some vision loss. Symptoms may come and go. Early treatment and good control of risk factors may help prevent vision loss or blindness.  What you can do  Have your eyes examined regularly by an eye specialist. Your healthcare provider will tell you when and how often you need these exams. You can also help control your diabetes through exercise, diet, and medicine, as instructed by your healthcare provider. These same steps may also help control diabetic retinopathy.           Treating Diabetic Retinopathy  Treatment may help slow the progress of diabetic retinopathy. Your treatment plan depends on your condition. It may include frequent exams to monitor your condition, laser treatment, and other procedures.      Laser is one type of treatment that may help limit vision loss from diabetic retinopathy.      Monitoring Your Vision  At first, your doctor  may simply want to monitor your vision. In some cases, you may also have an angiogram. This test uses a special dye to create detailed images of the retina. These images help your doctor decide whether special treatments are needed. You may also have ocular coherence tomography (OCT) testing. This uses light waves to see if there is fluid leaking into certain parts of the eye. It can also measure the thickness of the retina.  Types of Treatment  Special treatments can help stop bleeding, slow or stop new vessel growth, and preserve vision. The type of treatment you get depends on your condition:  · Laser treatment can help stop leaks and limit abnormal vessel growth.  · Surgery can remove the vitreous or repair a retina that is damaged by scar tissue formation. This may help if the vitreous becomes filled with blood and obscures your vision.  · Cryotherapy shrinks blood vessels and repairs the retina.  · Medications injected in the eye can help decrease swelling of the retina or slow the abnormal growth of blood vessels.   ·   © 2312-4649 [employer]. [address] [place] [address], [place] [address]. All rights reserved. This information is not intended as a substitute for professional medical care. Always follow your healthcare professional's instructions.         Managing Type 2 Diabetes  Type 2 diabetes is a long-term (chronic) condition. Managing your diabetes means making some changes that may be hard. Your health care provider, nurse, diabetes educator, and others can help you.  Managing type 2 diabetes means balancing your medicine with diet and activity. Managing your diabetes may also include checking your blood sugar. And, working with your health care provider to prevent complications.  Take your medicine as prescribed  You may take pills (oral medicine) or give yourself shots (insulin injections) for diabetes. Or you may use both. Taking your medicines or giving yourself insulin at the right times  will help you control your blood sugar. Think about ways that will help you remember to take your medicines the right way every day. Ask your health care provider, nurse, or diabetes educator for ideas.  Although you may only take pills for your diabetes, this may change. Over time, most people with type 2 diabetes also use insulin.  Eat healthy  A healthy, well-planned diet helps control the amount of sugar in your blood. It also helps you stay at a healthy weight or lose weight, if you are overweight. Extra weight makes it harder to control diabetes.  Your health care provider, nurse, a dietitian, or diabetes educator will help you create a plan that works for you. You don't have to give up all the foods you like. Having meals and snacks with vegetables, fruits, lean meats, or other healthy proteins, whole grains, and low- or no-fat dairy products will help control your blood sugar.  Be physically active  Being active helps lower your blood sugar. It does this by helping your body use insulin to turn food into energy. Activity also helps you manage your weight:  Ask your health care provider to work with you to create an activity program that's right for you. Your activity programs is based on your age, general health, and types of activity you enjoy. You should start slowly, but aim for at least 30 minutes of exercise or activity on most days.  Check your blood sugar  Checking your own blood sugar may be a regular part of your care. Or you may only check your blood sugar from time to time. Your health care provider will give you instructions about checking your blood sugar at home. Checking it tells you if your blood sugar is in your target range. A target range means that you are managing your diabetes well.  If your blood sugar levels are too high or too low, your health care provider may suggest changes to your diet or activity level. He or she may also adjust your medication.  Your health care provider may  also tell you to check your blood sugar more often when you are sick. At certain times, for example, when you have a cold or the flu, you may need to check it more often.  Take care of yourself  When you have diabetes, you may be more likely to develop other health problems. They include foot, eye, heart, and kidney problems. By controlling your blood sugar, and taking good care of yourself, you can help prevent these problems. Your health care provider, nurse, diabetes educator, and others can assist you.  · Checkups. You should have regular checkups with your health care provider. At those visits, you will have a physical exam that includes checking your feet. Your health care provider will also check your blood pressure and weight.  · Other exams. You should also have complete eye, foot, and dental exams at least once every year.  · Lab tests. You will have blood and urine tests.  ¨ At least 2 times a year, your health care provider will check your hemoglobin A1C. This blood test shows how well you have been controlling your blood sugar over 2 to 3 months. The results help your health care provider manage your diabetes.    ¨ You will also have other lab tests. For example, to check for kidney problems and abnormal cholesterol levels.  · Smoking. If you smoke, you will need to quit. Smoking increases the chance that you will develop complications from diabetes. Ask your health care provider about ways to quit.  · Vaccines. Get a yearly flu shot. And, ask your health care provider about vaccines to prevent pneumonia and hepatitis B.  Stress and depression  Most people have challenges throughout their lives. Living with diabetes, or any serious condition, can increase your stress and make you feel a lot of different emotions. In diabetes, feeling stressed or depressed can actually affect your blood sugar levels.  If you are having trouble dealing with diabetes, tell your health care provider. He or she can help or  refer you to other health care providers or programs.  Support and resources  Know where you can get help. You can try the following:  · Support. Ask family and friends to support your effects to take care of yourself. Or, look for a diabetes support group locally or on the internet. (Check the Connect with Others link on www.diabetes.org)  · Counseling. Talk with a , psychologist, psychiatrist, or other counselor.  · Information. Contact the American Diabetes Association at 394-743-0503 or www.diabetes.org      © 7857-5803 [employer]. [address] [place] [address]. All rights reserved. This information is not intended as a substitute for professional medical care. Always follow your healthcare professional's instructions.    ==============================================    CATARACT    Symptoms and Signs:  A cataract starts out small, and at first has little effect on your vision. You may notice that your vision is blurred a little, like looking through a cloudy piece of glass or viewing an impressionist painting. A cataract may make light from the sun or a lamp seem too bright or glaring. Or you may notice when you drive at night that the oncoming headlights cause more glare than before. Colors may not appear as bright as they once did.  The type of cataract you have will affect exactly which symptoms you experience and how soon they will occur. When a nuclear cataract first develops it can bring about a temporary improvement in your near vision, called second sight. Unfortunately, the improved vision is short-lived and will disappear as the cataract worsens. Meanwhile, a sub-capsular cataract may not produce any symptoms until it's well-developed.    Causes:  No one knows for sure why the eye's lens changes as we age, forming cataracts. Researchers are gradually identifying factors that may cause cataracts - and information that may help to prevent them.  Many studies  suggest that exposure to ultraviolet light is associated with cataracts, so eye care practitioners recommend wearing sunglasses and a wide-brimmed hat to lessen your exposure.  Other studies suggest people with diabetes are at risk for developing a cataract.   Some eye care practitioners believe that a diet high in antioxidants, such as beta-carotene (vitamin A), selenium and vitamins C and E, may forestall cataracts.  The most important of these is probably vitamin C; it might be helpful to supplement the diet with an extra Vitamin C tablet.  Meanwhile, eating a lot of salt may increase your risk.  Other risk factors include cigarette smoke, air pollution and heavy alcohol consumption.  We simply recommend that you be careful to use sunglasses and to take Vitamin C.    Treatment:  When symptoms begin to appear, we can improve your vision for a while using new glasses, strong bifocals, magnification, appropriate lighting or other visual aids.  This is true in your case; your cataract does not impact your vision very much at this time. If you experience any of the symptoms we described you can return at any time. Otherwise it is fine to see you in 1 year.

## 2017-07-07 NOTE — PATIENT INSTRUCTIONS
Diabetes: Inspecting Your Feet  Diabetes increases your chances of developing foot problems. So inspect your feet every day. This helps you find small skin irritations before they become serious infections. If you have trouble seeing the bottoms of your feet, use a mirror or ask a family member or friend to help.    How to check your feet  Below are tips to help you look for foot problems. Try to check your feet at the same time each day, such as when you get out of bed in the morning:  · Check the top of each foot. The tops of toes, back of the heel, and outer edge of the foot can get a lot of rubbing from poor-fitting shoes.  · Check the bottom of each foot. Daily wear and tear often leads to problems at pressure spots.  · Check the toes and nails. Fungal infections often occur between toes. Toenail problems can also be a sign of fungal infections or lead to breaks in the skin.  · Check your shoes, too. Loose objects inside a shoe can injure the foot. Use your hand to feel inside your shoes for things like mark, loose stitching, or rough areas that could irritate your skin.  Warning signs  Look for any color changes in the foot. Redness with streaks can signal a severe infection, which needs immediate medical attention. Tell your doctor right away if you have any of these problems:  · Swelling, sometimes with color changes, may be a sign of poor blood flow or infection. Symptoms include tenderness and an increase in the size of your foot.  · Warm or hot areas on your feet may be signs of infection. A foot that is cold may not be getting enough blood.  · Sensations such as burning, tingling, or pins and needles can be signs of a problem. Also check for areas that may be numb.  · Hot spots are caused by friction or pressure. Look for hot spots in areas that get a lot of rubbing. Hot spots can turn into blisters, calluses, or sores.  · Cracks and sores are caused by dry or irritated skin. They are a sign that  the skin is breaking down, which can lead to infection.  · Toenail problems to watch for include nails growing into the skin (ingrown toenail) and causing redness or pain. Thick, yellow, or discolored nails can signal a fungal infection.  · Drainage and odor can develop from untreated sores and ulcers. Call your doctor right away if you notice white or yellow drainage, bleeding, or unpleasant odor.   © 6634-4517 "[employer]". [address] [place] [address], [place] [address]. All rights reserved. This information is not intended as a substitute for professional medical care. Always follow your healthcare professional's instructions.    Diabetic Foot Care    Diabetes can lead to a number of different foot complications. Fortunately, most of these complications can be prevented with a little extra foot care. If diabetes is not well controlled, the high blood sugar can cause damage to blood vessels and result in poor circulation to the foot. When the skin does not get enough blood flow, it becomes prone to pressure sores and ulcers, which heal slowly.  High blood sugar can also damage nerves, interfering with the ability to feel pain and pressure. When you cant feel your foot normally, it is easy to injure your skin, bones and joints without knowing it. For these reasons diabetes increases the risk of fungal infections, bunions and ulcers. Deep ulcers can lead to bone infection. Gangrene is the most serious foot complication of diabetes. It usually occurs on the tips of the toes as blacked areas of skin. The black area is dead tissue. In severe cases, gangrene spreads to involve the entire toe, other toes and the entire foot. Foot or toe amputation may be required. Good foot care and blood sugar control can prevent this.    Home Care  1. Wear comfortable, proper fitting shoes.  2. Wash your feet daily with warm water and mild soap.  3. After drying, apply a moisturizing cream or lotion.  4. Check your feet daily  for skin breaks, blisters, swelling, or redness. Look between your toes also.  5. Wear cotton socks and change them every day.  6. Trim toe nails carefully and do not cut your cuticles.  7. Strive to keep your blood sugar under control with a combination of medicines, diet and activity.  8. If you smoke and have diabetes, it is very important that you stop. Smoking reduces blood flow to your foot.  9. Avoid activities that increase your risk of foot injury:  · Do not walk barefoot.  · Do not use heating pads or hot water bottles on your feet.  · Do not put your foot in a hot tub without first checking the temperature with your hand.  10) Schedule yearly foot exams.    Follow Up  with your doctor or as advised by our staff. Report any cut, puncture, scrape, other injury, blister, ingrown toenail or ulcer on your foot.    Get Prompt Medical Attention  if any of the following occur:  -- Open ulcer with pus draining from the wound  -- Increasing foot or leg pain  -- New areas of redness or swelling or tender areas of the foot    © 7007-0243 [employer]. [address] [place] [address]. All rights reserved. This information is not intended as a substitute for professional medical care. Always follow your healthcare professional's instructions.      Understanding Thickened Nails  There are several causes of very thick or crumbling nails. They can be caused by injuries or pressure from shoes. Fungal infections are a common cause. Diabetes, psoriasis, or vascular disease are other possible causes.    Symptoms  Along with thickening, the nail may appear ridged, brittle, or yellowish. It may also feel painful when pressure is put on it. After a while, a thickened nail may loosen and fall off.  Evaluation  Because thickened nails may be a symptom of a medical condition, your doctor will look at your medical history. A test may be done to check for fungal infection. The thickness and color of the nail  are examined carefully. This helps determine the cause.  Treatment  · For infection: Oral or topical antifungal medications may be used to treat infection. These can help prevent sores under the nail. They also keep the fungus from spreading to other nails.  · For thick nails not caused by infection: Thinning the nail may be an option. This can be done by trimming, filing, or grinding.  · For pain: If the nail causes pain, part or all of the nail can be removed with surgery. Never try to remove a nail by yourself.  Prevention  Many nail problems can be prevented by wearing the right shoes and trimming your nails properly. Keep your feet clean and dry to help avoid infection. If you have diabetes, talk with your podiatrist or primary care doctor before doing any foot self-care.  · The right shoes: Get your feet measured. Your size may change as you age. This is due to ligaments that loosen with age and allow the bones in your foot to change position or spread. Wear shoes that are supportive and roomy enough for your toes to wiggle. Look for shoes made of natural materials, such as leather, which allow your feet to breathe.  · Proper trimming: To avoid problems, trim your toenails straight across. Then, file the edges with a nail file. If you cant trim your own nails, ask your health care provider to do so for you.  © 0579-3518 [employer]. [address] [place] [address], [place] [address]. All rights reserved. This information is not intended as a substitute for professional medical care. Always follow your healthcare professional's instructions.

## 2017-07-07 NOTE — LETTER
July 7, 2017      Lulú Lewis MD  [address]  [facility] [address]           [facility]  [address]  [facility] [address]  Phone: [phone]          Patient: Edwin Singleton   MR Number: [id number]   YOB: 1945   Date of Visit: 7/7/2017       Dear Dr. Lulú Lewis:    Thank you for referring Edwin Singleton to me for evaluation. Attached you will find relevant portions of my assessment and plan of care.    If you have questions, please do not hesitate to call me. I look forward to following Edwin Singleton along with you.    Sincerely,    Beverly Andrews, OD    Enclosure  CC:  No Recipients    If you would like to receive this communication electronically, please contact [email or web address] or [phone] to request more information on [facility] access.    For providers and/or their staff who would like to refer a patient to [facility], please contact us through our one-stop-shop provider referral line, [facility], at [phone].    If you feel you have received this communication in error or would no longer like to receive these types of communications, please e-mail [email or web address]

## 2017-07-10 ENCOUNTER — PATIENT MESSAGE (OUTPATIENT)
Dept: HEMATOLOGY/ONCOLOGY | Facility: CLINIC | Age: 72
End: 2017-07-10

## 2017-07-10 DIAGNOSIS — C78.7 METASTATIC RENAL CELL CARCINOMA TO LIVER: ICD-10-CM

## 2017-07-10 DIAGNOSIS — G89.3 NEOPLASM RELATED PAIN: ICD-10-CM

## 2017-07-10 DIAGNOSIS — C64.9 METASTATIC RENAL CELL CARCINOMA TO LIVER: ICD-10-CM

## 2017-07-10 RX ORDER — MORPHINE SULFATE 15 MG/1
15 TABLET ORAL EVERY 6 HOURS PRN
Qty: 60 TABLET | Refills: 0 | Status: SHIPPED | OUTPATIENT
Start: 2017-07-10 | End: 2017-07-24 | Stop reason: SDUPTHER

## 2017-07-10 NOTE — TELEPHONE ENCOUNTER
Prizeot message sent instructing patient's son to skip pre-prandial insulin when glucose is less than 100.    Thanks,  KJ

## 2017-07-10 NOTE — PROGRESS NOTES
Name: Edwin Singleton  Referring Provider: Anival  PT Order: PT evaluate and treat  Clinical #: [id number]  Discharge Summary Date: 7/10/2017  Diagnosis: low back pain    Patient was seen for 1 OP PT visits on 6/1/2017. Pt missed/no visit all other scheduled sessions. Treatment included: evaluation, HEP, pt education, aquatic therapy, joint mobilizations, ther ex, and stretching. PT unable to fully assess goal achievement as pt did not return for follow up sessions/did not reschedule follow up visits. This patient is discharged from [facility] PT Services.    La Ahumada DPT  7/10/2017

## 2017-07-10 NOTE — PLAN OF CARE
Problem: Patient Care Overview  Goal: Plan of Care Review  Outcome: Outcome(s) achieved Date Met: 07/10/17  Radiation completed on 7/7 17  F/u appt made

## 2017-07-11 ENCOUNTER — LAB VISIT (OUTPATIENT)
Dept: LAB | Facility: HOSPITAL | Age: 72
End: 2017-07-11
Attending: INTERNAL MEDICINE
Payer: MEDICARE

## 2017-07-11 ENCOUNTER — OFFICE VISIT (OUTPATIENT)
Dept: HEMATOLOGY/ONCOLOGY | Facility: CLINIC | Age: 72
End: 2017-07-11
Payer: MEDICARE

## 2017-07-11 VITALS
RESPIRATION RATE: 20 BRPM | OXYGEN SATURATION: 98 % | HEART RATE: 77 BPM | BODY MASS INDEX: 29.07 KG/M2 | DIASTOLIC BLOOD PRESSURE: 62 MMHG | TEMPERATURE: 98 F | WEIGHT: 185.63 LBS | SYSTOLIC BLOOD PRESSURE: 135 MMHG

## 2017-07-11 DIAGNOSIS — C78.7 METASTATIC RENAL CELL CARCINOMA TO LIVER: ICD-10-CM

## 2017-07-11 DIAGNOSIS — C64.9 METASTATIC RENAL CELL CARCINOMA TO BONE: ICD-10-CM

## 2017-07-11 DIAGNOSIS — C64.9 METASTATIC RENAL CELL CARCINOMA TO LIVER: ICD-10-CM

## 2017-07-11 DIAGNOSIS — C79.51 METASTATIC RENAL CELL CARCINOMA TO BONE: ICD-10-CM

## 2017-07-11 DIAGNOSIS — C64.9 METASTATIC RENAL CELL CARCINOMA TO LIVER: Primary | ICD-10-CM

## 2017-07-11 DIAGNOSIS — E03.9 ACQUIRED HYPOTHYROIDISM: ICD-10-CM

## 2017-07-11 DIAGNOSIS — Z79.4 TYPE 2 DIABETES MELLITUS WITH STAGE 3 CHRONIC KIDNEY DISEASE, WITH LONG-TERM CURRENT USE OF INSULIN: Chronic | ICD-10-CM

## 2017-07-11 DIAGNOSIS — G89.3 NEOPLASM RELATED PAIN: ICD-10-CM

## 2017-07-11 DIAGNOSIS — E11.22 TYPE 2 DIABETES MELLITUS WITH STAGE 3 CHRONIC KIDNEY DISEASE, WITH LONG-TERM CURRENT USE OF INSULIN: Chronic | ICD-10-CM

## 2017-07-11 DIAGNOSIS — C64.2 RENAL CELL CARCINOMA OF LEFT KIDNEY: ICD-10-CM

## 2017-07-11 DIAGNOSIS — C78.7 METASTATIC RENAL CELL CARCINOMA TO LIVER: Primary | ICD-10-CM

## 2017-07-11 DIAGNOSIS — N18.30 TYPE 2 DIABETES MELLITUS WITH STAGE 3 CHRONIC KIDNEY DISEASE, WITH LONG-TERM CURRENT USE OF INSULIN: Chronic | ICD-10-CM

## 2017-07-11 LAB
ALBUMIN SERPL BCP-MCNC: 3 G/DL
ALP SERPL-CCNC: 68 U/L
ALT SERPL W/O P-5'-P-CCNC: 23 U/L
ANION GAP SERPL CALC-SCNC: 8 MMOL/L
AST SERPL-CCNC: 18 U/L
BILIRUB SERPL-MCNC: 0.4 MG/DL
BUN SERPL-MCNC: 19 MG/DL
CALCIUM SERPL-MCNC: 8.3 MG/DL
CHLORIDE SERPL-SCNC: 104 MMOL/L
CO2 SERPL-SCNC: 25 MMOL/L
CREAT SERPL-MCNC: 1.2 MG/DL
ERYTHROCYTE [DISTWIDTH] IN BLOOD BY AUTOMATED COUNT: 13.1 %
EST. GFR  (AFRICAN AMERICAN): >60 ML/MIN/1.73 M^2
EST. GFR  (NON AFRICAN AMERICAN): >60 ML/MIN/1.73 M^2
GLUCOSE SERPL-MCNC: 52 MG/DL
HCT VFR BLD AUTO: 40.3 %
HGB BLD-MCNC: 13.8 G/DL
MAGNESIUM SERPL-MCNC: 1.7 MG/DL
MCH RBC QN AUTO: 29.8 PG
MCHC RBC AUTO-ENTMCNC: 34.2 %
MCV RBC AUTO: 87 FL
NEUTROPHILS # BLD AUTO: 4.9 K/UL
PLATELET # BLD AUTO: 311 K/UL
PMV BLD AUTO: 9.5 FL
POTASSIUM SERPL-SCNC: 3.9 MMOL/L
PROT SERPL-MCNC: 6.1 G/DL
RBC # BLD AUTO: 4.63 M/UL
SODIUM SERPL-SCNC: 137 MMOL/L
TSH SERPL DL<=0.005 MIU/L-ACNC: 2.23 UIU/ML
WBC # BLD AUTO: 9.56 K/UL

## 2017-07-11 PROCEDURE — 99999 PR PBB SHADOW E&M-EST. PATIENT-LVL III: CPT | Mod: PBBFAC,,, | Performed by: INTERNAL MEDICINE

## 2017-07-11 PROCEDURE — 85027 COMPLETE CBC AUTOMATED: CPT

## 2017-07-11 PROCEDURE — 83735 ASSAY OF MAGNESIUM: CPT

## 2017-07-11 PROCEDURE — 80053 COMPREHEN METABOLIC PANEL: CPT

## 2017-07-11 PROCEDURE — 4010F ACE/ARB THERAPY RXD/TAKEN: CPT | Mod: S$GLB,,, | Performed by: INTERNAL MEDICINE

## 2017-07-11 PROCEDURE — 84443 ASSAY THYROID STIM HORMONE: CPT

## 2017-07-11 PROCEDURE — 36415 COLL VENOUS BLD VENIPUNCTURE: CPT

## 2017-07-11 PROCEDURE — 3046F HEMOGLOBIN A1C LEVEL >9.0%: CPT | Mod: S$GLB,,, | Performed by: INTERNAL MEDICINE

## 2017-07-11 PROCEDURE — 1159F MED LIST DOCD IN RCRD: CPT | Mod: S$GLB,,, | Performed by: INTERNAL MEDICINE

## 2017-07-11 PROCEDURE — 1125F AMNT PAIN NOTED PAIN PRSNT: CPT | Mod: S$GLB,,, | Performed by: INTERNAL MEDICINE

## 2017-07-11 PROCEDURE — 99214 OFFICE O/P EST MOD 30 MIN: CPT | Mod: GC,S$GLB,, | Performed by: INTERNAL MEDICINE

## 2017-07-11 RX ORDER — INSULIN GLARGINE 100 [IU]/ML
15 INJECTION, SOLUTION SUBCUTANEOUS 2 TIMES DAILY
Qty: 3 VIAL | Refills: 11
Start: 2017-07-11 | End: 2017-09-06

## 2017-07-11 RX ORDER — INSULIN ASPART 100 [IU]/ML
10 INJECTION, SOLUTION INTRAVENOUS; SUBCUTANEOUS
Qty: 10 ML | Refills: 11
Start: 2017-07-11 | End: 2018-02-15

## 2017-07-11 NOTE — Clinical Note
1. Schedule labs CBC, CMP, magnesium, TSH, as well as CT chest/abdomen/pelvis in about 6 weeks (8/21/17). 2. Schedule follow-up appointment with Dr. Reed in 6 weeks (8/22/17).  Thanks.

## 2017-07-11 NOTE — PROGRESS NOTES
"PATIENT: Edwin Singleton  MRN: [id number]  DATE: 7/11/2017    Diagnosis:   1. Metastatic renal cell carcinoma to liver    2. 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 Renal cell carcinoma of left kidney    4. Neoplasm related pain      Chief Complaint: renal cell cancer and Chemotherapy    Oncologic History:      Oncologic History Renal cell carcinoma of the left kidney - Stage IV - T1,Nx,M1      Oncologic Treatment Planned treatment:   Radiation therapy to L3 spine - 3 Gy/Fx x 10 fx    pazopanib 800 mg PO daily      Pathology Liver mass (5/30/17):  Morphology and immunostains support classification as METASTATIC RENAL CELL CARCINOMA.  Positive stains: CD10, RCC, EMA  Negative stains: Hepatocyte (Heppar).  Immunostains with appropriate positive and negative controls.  Reticulin stain shows preserved reticulin around tumor nests.        Subjective:    History of Present Illness: Mr. Singleton is a 72 y.o. male who returns for follow up regarding metastatic renal cell carcinoma of left kidney.  HPI: Mr.Mc Moss is a 71 yo male with CAD (s/p 5 stents, remotely), DM2 (insulin dependent), hypothyroidism , CVA (2013) with no residual deficit, chronic back pain. He was transferred to [facility] [place] neurosurgery evaluation of a lumbar spine lesion in the setting of progressive weakness of his legs and exacerbation of chronic back pain, in May 2017. He states that his back pain and LE weakness progressively worsened. [facility]. The pain radiates down his legs. He denies any incontinence of bowel or bladder. He had several falls. After one of the falls, he presented to the ED at [facility], in May 2017. He had an MRI showing "L3 vertebral body lesion with apparent cortical disruption concerning for metastases vs myeloma," and was transferred to [facility] for neurosurgery evaluation. He was evaluated by neurosurgery regarding concern for lytic lesion on L3 on 5/26/17. No surgical " intervention was proposed.   CT chest, abdomen,pelvis on 5/27/17 revealed :   --A 3.6 cm exophytic mass arising from the medial aspect of the lower pole of left kidney,   --A small 1.0 cm, subtle, cortical enhancing mass within the upper pole of the right kidney, concerning for possible contralateral solid renal mass  --Multiple enhancing ill-defined hepatic masses concerning for hepatic metastatic disease, hepatomegaly and hepatic steatosis  --Mulltifocal irregular thickening of the bilateral pleura concerning for possible pleural metastatic disease  --Mild nonspecific nodular thickening of the left adrenal gland without discrete nodule  -- Redemonstration of known lytic lesion within the L3 vertebral body. No definite additional discrete lytic lesions are identified.    Interval history:  - he presents for follow-up appointment. He completed radiation therapy to L2-L4 on 7/7/17. He has been taking pazopanib for 4-5 days. He notes some gastrointestinal upset (related to medication or radiation effects). He had constipation once that required lactulose. His back pain has improved; he complains of intermittent right hip pain.  - he takes lantus 30 units BID and novolog 20 units TID with meals. He has had several hypoglycemic episodes over the past few weeks.    Review of Systems   Constitutional: Negative for appetite change and unexpected weight change.   HENT: Negative for sore throat.    Eyes: Negative for visual disturbance.   Respiratory: Negative for cough and shortness of breath.    Cardiovascular: Negative for chest pain.   Gastrointestinal: Positive for abdominal pain and diarrhea.   Genitourinary: Negative for frequency.   Musculoskeletal: Negative for back pain.   Skin: Negative for rash.   Neurological: Negative for headaches.   Hematological: Negative for adenopathy.   Psychiatric/Behavioral: The patient is not nervous/anxious.      ECOG Performance Status:   ECOG SCORE 2     Objective:      Vitals:    Vitals:    07/11/17 0802   BP: 135/62   BP Location: Left arm   Patient Position: Sitting   Pulse: 77   Resp: 20   Temp: 97.8 °F (36.6 °C)   TempSrc: Oral   SpO2: 98%   Weight: 84.2 kg (185 lb 10 oz)     BMI: Body mass index is 29.07 kg/m².    Physical Exam   Constitutional: He is oriented to person, place, and time. He appears well-developed.   HENT:   Head: Normocephalic and atraumatic.   Eyes: EOM are normal. Pupils are equal, round, and reactive to light.   Neck: Normal range of motion. Neck supple.   Cardiovascular: Normal rate and regular rhythm.    Pulmonary/Chest:   Coarse breath sounds at bases   Abdominal: Soft. Bowel sounds are normal. There is no tenderness.   Musculoskeletal: Normal range of motion. He exhibits no edema.   Neurological: He is alert and oriented to person, place, and time.   Skin: Skin is warm and dry.   Psychiatric: He has a normal mood and affect. His behavior is normal. Judgment and thought content normal.     Laboratory Data:  Labs have been reviewed.    Imaging:   Bone scan (5/30/17):  L3 metastatic disease.    CT chest/abdomen/pelvis (5/27/17):  1. 3.6 cm exophytic mass arising from the medial aspect of the lower pole of left kidney which in light of the above findings is concerning for malignant solid renal mass such as renal cell carcinoma. Additional small 1.0 cm subtle cortical enhancing mass is identified within the upper pole of the right kidney concerning for possible contralateral solid renal mass.  2. Multiple enhancing ill-defined hepatic masses concerning for hepatic metastatic disease. Superimposed hepatomegaly and hepatic steatosis.  3. Multifocal irregular thickening of the bilateral pleura concerning for possible pleural metastatic disease given the additional findings.  4. Mild nonspecific nodular thickening of the left adrenal gland without discrete nodule.  5. Redemonstration of known lytic lesion within the L3 vertebral body. No definite additional discrete  lytic lesions are identified.  6. Subcentimeter hypoattenuating right thyroid lobe nodule.    MRI brain (5/29/17):  1. No evidence of acute intracranial abnormality or abnormal restricted diffusion.  2. Patchy periventricular and supratentorial T2/FLAIR hyperintensity with additional scattered foci in the juan jose which do not demonstrate corresponding enhancement suggestive of chronic microvascular ischemic change. Superimposed remote lacunar type infarct is present within the left corona radiata.  3. Incomplete suppression of sulcal FLAIR signal which appears relatively symmetric involving the posterior bilateral cerebral hemispheres. This is nonspecific and may be artifactual in nature, although additional differential considerations include hyperoxygenation in the setting of intubation, subarachnoid hemorrhage, proteinaceous material, or less likely leptomeningeal carcinomatosis noting no corresponding enhancement is appreciated as would be expected with either infection or neoplastic process. Clinical correlation and continued followup is advised.    Assessment:       1. Metastatic renal cell carcinoma to liver    2. 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 Renal cell carcinoma of left kidney    4. Neoplasm related pain         Plan:     1. Renal cell carcinoma of the left kidney - Stage IV - T1,Nx,M1  - ECOG 2  - patient has a few high-risk features, including corrected calcium > 10, < 1 year between diagnosis and initiating therapy, 2 or more sites of metastases, and relatively poor performance status.  - he has completed radiation therapy to L2-L4 spine.  - he is tolerating pazopanib 800mg PO daily (take 1 hour before meal or two hours after) relatively well.  - Labs have been reviewed. Okay to continue taking pazopanib.   - we will check scans before next visit.    2. Bone metastasis  - Secondary to renal cell cancer  - he has completed radiation therapy to L2-L4 spine (10 fx with 3 Gy/fx)  - we will  consider denosumab after patient has been cleared by his dentist.    3. Neoplasm-related pain  - moderately controlled.  - continue as-needed norco (hydrocodone-acetaminophen) 5-325mg.    4. Type 2 diabetes mellitus  - he has had several hypoglycemic episodes over the past few weeks. His glucose was low on today's labs; we gave him candy to raise his blood glucose level.  - we recommended to decrease his insulin regimen to lantus 15 units BID and novolog 5-10 units TID with meals.  - we asked him to call if he continues to have hypoglycemic episodes.    - return to clinic in 6 weeks with scans.    Patient was also seen and examined by Dr. Reed. Patient is in agreement with the proposed treatment plan. All questions were answered to the patient's satisfaction.    Gio Rocha M.D.  Hematology/Oncology Fellow    Attending Note  I have personally taken the history and examined this patient and agree with the fellow's note as stated above.

## 2017-07-14 ENCOUNTER — TELEPHONE (OUTPATIENT)
Dept: PHARMACY | Facility: CLINIC | Age: 72
End: 2017-07-14

## 2017-07-14 ENCOUNTER — TELEPHONE (OUTPATIENT)
Dept: RADIATION ONCOLOGY | Facility: CLINIC | Age: 72
End: 2017-07-14

## 2017-07-14 NOTE — TELEPHONE ENCOUNTER
Votrient follow-up. Confirmed 2 patient identifiers - name and . Start date confirmed -17. No missed doses reported. Patient's son, Pramod, reports loss of appetite and stomach pain - consulted with radiation Md and this is common effect of the radiation that should go away in a few weeks. Pt advised to add Glucerna to supplement for what he is not eating. Blood glucose should be checked regularly. He mentions a reading of 192mg/dL earlier this morning - insulin doses have just been adjusted due to decreased appetite. Advised son to continue to keep a record of blood glucose trends and get with MD or myself if it continues to be high or has any drastic changes. Patient confirms dosing, no difficulties with administration. He/she confirms no changes to insurance or health and has no further questions at this time.  All questions answered and addressed to patients satisfaction. [facility] will continue to reach out to patient monthly to arrange refills.

## 2017-07-21 ENCOUNTER — PATIENT MESSAGE (OUTPATIENT)
Dept: HEMATOLOGY/ONCOLOGY | Facility: CLINIC | Age: 72
End: 2017-07-21

## 2017-07-24 DIAGNOSIS — C64.9 METASTATIC RENAL CELL CARCINOMA TO LIVER: ICD-10-CM

## 2017-07-24 DIAGNOSIS — C78.7 METASTATIC RENAL CELL CARCINOMA TO LIVER: ICD-10-CM

## 2017-07-24 DIAGNOSIS — C64.9 METASTATIC RENAL CELL CARCINOMA TO BONE: ICD-10-CM

## 2017-07-24 DIAGNOSIS — G89.3 NEOPLASM RELATED PAIN: ICD-10-CM

## 2017-07-24 DIAGNOSIS — C79.51 METASTATIC RENAL CELL CARCINOMA TO BONE: ICD-10-CM

## 2017-07-24 RX ORDER — HYDROCODONE BITARTRATE AND ACETAMINOPHEN 5; 325 MG/1; MG/1
1 TABLET ORAL EVERY 8 HOURS PRN
Qty: 50 TABLET | Refills: 0 | Status: SHIPPED | OUTPATIENT
Start: 2017-07-24 | End: 2017-08-15 | Stop reason: SDUPTHER

## 2017-07-24 RX ORDER — MORPHINE SULFATE 15 MG/1
15 TABLET ORAL EVERY 6 HOURS PRN
Qty: 60 TABLET | Refills: 0 | Status: SHIPPED | OUTPATIENT
Start: 2017-07-24 | End: 2017-08-15 | Stop reason: SDUPTHER

## 2017-07-26 ENCOUNTER — OFFICE VISIT (OUTPATIENT)
Dept: INTERNAL MEDICINE | Facility: CLINIC | Age: 72
End: 2017-07-26
Payer: MEDICARE

## 2017-07-26 VITALS
HEART RATE: 77 BPM | BODY MASS INDEX: 28.02 KG/M2 | OXYGEN SATURATION: 97 % | TEMPERATURE: 99 F | HEIGHT: 67 IN | SYSTOLIC BLOOD PRESSURE: 120 MMHG | WEIGHT: 178.56 LBS | DIASTOLIC BLOOD PRESSURE: 58 MMHG

## 2017-07-26 DIAGNOSIS — Z79.4 TYPE 2 DIABETES MELLITUS WITH STAGE 3 CHRONIC KIDNEY DISEASE, WITH LONG-TERM CURRENT USE OF INSULIN: Chronic | ICD-10-CM

## 2017-07-26 DIAGNOSIS — K52.1 CHEMOTHERAPY INDUCED DIARRHEA: ICD-10-CM

## 2017-07-26 DIAGNOSIS — E11.22 TYPE 2 DIABETES MELLITUS WITH STAGE 3 CHRONIC KIDNEY DISEASE, WITH LONG-TERM CURRENT USE OF INSULIN: Chronic | ICD-10-CM

## 2017-07-26 DIAGNOSIS — F11.20 UNCOMPLICATED OPIOID DEPENDENCE: ICD-10-CM

## 2017-07-26 DIAGNOSIS — I15.2 HYPERTENSION ASSOCIATED WITH DIABETES: ICD-10-CM

## 2017-07-26 DIAGNOSIS — C79.51 METASTATIC RENAL CELL CARCINOMA TO BONE: ICD-10-CM

## 2017-07-26 DIAGNOSIS — K21.9 GASTROESOPHAGEAL REFLUX DISEASE WITHOUT ESOPHAGITIS: Chronic | ICD-10-CM

## 2017-07-26 DIAGNOSIS — E11.59 HYPERTENSION ASSOCIATED WITH DIABETES: ICD-10-CM

## 2017-07-26 DIAGNOSIS — T45.1X5A CHEMOTHERAPY INDUCED DIARRHEA: ICD-10-CM

## 2017-07-26 DIAGNOSIS — C64.9 METASTATIC RENAL CELL CARCINOMA TO LIVER: ICD-10-CM

## 2017-07-26 DIAGNOSIS — C64.9 METASTATIC RENAL CELL CARCINOMA TO BONE: ICD-10-CM

## 2017-07-26 DIAGNOSIS — T45.1X5A CHEMOTHERAPY-INDUCED NAUSEA AND VOMITING: ICD-10-CM

## 2017-07-26 DIAGNOSIS — N39.41 BENIGN PROSTATIC HYPERPLASIA (BPH) WITH URINARY URGE INCONTINENCE: ICD-10-CM

## 2017-07-26 DIAGNOSIS — N18.30 TYPE 2 DIABETES MELLITUS WITH STAGE 3 CHRONIC KIDNEY DISEASE, WITH LONG-TERM CURRENT USE OF INSULIN: Chronic | ICD-10-CM

## 2017-07-26 DIAGNOSIS — C64.2 RENAL CELL CARCINOMA OF LEFT KIDNEY: ICD-10-CM

## 2017-07-26 DIAGNOSIS — I25.119 CORONARY ARTERY DISEASE INVOLVING NATIVE CORONARY ARTERY OF NATIVE HEART WITH ANGINA PECTORIS: ICD-10-CM

## 2017-07-26 DIAGNOSIS — R11.2 CHEMOTHERAPY-INDUCED NAUSEA AND VOMITING: ICD-10-CM

## 2017-07-26 DIAGNOSIS — N40.1 BENIGN PROSTATIC HYPERPLASIA (BPH) WITH URINARY URGE INCONTINENCE: ICD-10-CM

## 2017-07-26 DIAGNOSIS — C78.7 METASTATIC RENAL CELL CARCINOMA TO LIVER: ICD-10-CM

## 2017-07-26 PROBLEM — K59.03 DRUG-INDUCED CONSTIPATION: Status: RESOLVED | Noted: 2017-06-28 | Resolved: 2017-07-26

## 2017-07-26 LAB — GLUCOSE SERPL-MCNC: 136 MG/DL (ref 70–110)

## 2017-07-26 PROCEDURE — 99215 OFFICE O/P EST HI 40 MIN: CPT | Mod: S$GLB,,, | Performed by: INTERNAL MEDICINE

## 2017-07-26 PROCEDURE — 1126F AMNT PAIN NOTED NONE PRSNT: CPT | Mod: S$GLB,,, | Performed by: INTERNAL MEDICINE

## 2017-07-26 PROCEDURE — 1159F MED LIST DOCD IN RCRD: CPT | Mod: S$GLB,,, | Performed by: INTERNAL MEDICINE

## 2017-07-26 PROCEDURE — 4010F ACE/ARB THERAPY RXD/TAKEN: CPT | Mod: S$GLB,,, | Performed by: INTERNAL MEDICINE

## 2017-07-26 PROCEDURE — 3046F HEMOGLOBIN A1C LEVEL >9.0%: CPT | Mod: S$GLB,,, | Performed by: INTERNAL MEDICINE

## 2017-07-26 PROCEDURE — 99999 PR PBB SHADOW E&M-EST. PATIENT-LVL III: CPT | Mod: PBBFAC,,, | Performed by: INTERNAL MEDICINE

## 2017-07-26 PROCEDURE — 82948 REAGENT STRIP/BLOOD GLUCOSE: CPT | Mod: S$GLB,,, | Performed by: INTERNAL MEDICINE

## 2017-07-26 RX ORDER — ONDANSETRON 8 MG/1
8 TABLET, ORALLY DISINTEGRATING ORAL
Qty: 60 TABLET | Refills: 3 | Status: SHIPPED | OUTPATIENT
Start: 2017-07-26 | End: 2017-09-20 | Stop reason: SDUPTHER

## 2017-07-26 RX ORDER — OMEPRAZOLE 20 MG/1
20 CAPSULE, DELAYED RELEASE ORAL DAILY
Qty: 90 CAPSULE | Refills: 3 | Status: SHIPPED | OUTPATIENT
Start: 2017-07-26 | End: 2017-09-06

## 2017-07-26 RX ORDER — LOPERAMIDE HYDROCHLORIDE 2 MG/1
2 CAPSULE ORAL 4 TIMES DAILY PRN
Qty: 30 CAPSULE | Refills: 0
Start: 2017-07-26 | End: 2017-09-06 | Stop reason: SDUPTHER

## 2017-07-26 NOTE — ASSESSMENT & PLAN NOTE
Worsening. Compliant with narcotics, zofran  · Advised to take zofran before meals  · Start omeprazole  · Advised more high-protein foods

## 2017-07-26 NOTE — ASSESSMENT & PLAN NOTE
Patient with LUTS, compliant with flomax. Occasional testicular pain  · Can increase flomax to 0.8mg after 4 weeks of treatment  · Continue current dose until diarrhea resolves  · Advised to avoid fluids at bedtime

## 2017-07-26 NOTE — PATIENT INSTRUCTIONS
TODAY:  - start omeprazole (30 min before food or meds), can take 2 daily for reflux   + take daily  - continue lower dose insulin  - labs on 8/24  - start loperamide (imodium) for diarrhea  - take zofran before each meal  - use barrier creme for hemorrhoids  - use narcotics sparingly  - avoid artificial sweeteners   - avoid liquids near bedtime     NEXT:  - home PT when you are ready and have energy   room air

## 2017-07-26 NOTE — ASSESSMENT & PLAN NOTE
BP controlled on ACE, CCB  · Continue lisinopril 40mg, amlodipine 10mg  · Continue statin, ASA  · cotninue home BP reads

## 2017-07-26 NOTE — ASSESSMENT & PLAN NOTE
A1c 11.1 in 5/2017. Lantus 15U BID, novolog 10 PRN preprandial  · Continue current therapies  · Monitor A1c

## 2017-07-26 NOTE — ASSESSMENT & PLAN NOTE
Uncontrolled with narcotics, now experiencing hemorrhoids  · Continue zofran for nausea  · Start imodium  · Advised to use barrier creme

## 2017-07-26 NOTE — PROGRESS NOTES
"Primary Care Provider Appointment    Subjective:      Patient ID: Edwin Singleton is a 72 y.o. male with metastatic renal cell CA, DM, HTN    Chief Complaint: Follow-up (Pt is here for his 4 week follow up appt.); Dysphagia (Pt stated that since he has had the radiation he has been having issue with food going down his throat ); GI Problem (Pt stated that he has been having stomach issues , since getting chemo); Diarrhea (Pt has been having this issue since he has been getting the chemo); and Fatigue (Pt has been feeling like this upon starting treatment)    Patient complaining of epigastric/chest pain that lasts for 20+ minutes after eating. He also has diarrhea since starting chemo. He has stopped taking senna-docusate. He continues to take norco and morphine PRN. He has completed radiation therapy and now takes pazopanid 800mg daily.     His son reports that patient has chills, and exhibits signs of subjective fever. "He's always cold." His CBG log showed controlled CBG, with lowest reading of 74. Most readings in , average reading in 150s. His insulin regimen was cut in half 2 weeks ago by Heme-Onc. He now takes 15U lantus, with skipped doses of novolog and PRN 10U for preprandial hyperglycemia. He has had fewer episodes of nocturnal sweats and weakness since then.     He complains of daily diarrhea since starting chemo 18 days ago. complicated by hemorrhoids. He is using preparation H and vaseline. He is having incomplete voiding during the night.     He drinks gator-chris throughout the day and diabetic boost with artificial sweetner. He eats cheerios and a banana for breakfast, has decreased appetite. He vomits after high protein meals. He is steaming his vegetables, drinking chicken broth. He has unintentional weight loss.       Past Surgical History:   Procedure Laterality Date    ABDOMINAL SURGERY      APPENDECTOMY      BACK SURGERY      CARDIAC SURGERY      CHOLECYSTECTOMY      coronary " "stenting      X 5 last one in 2010-11.       Past Medical History:   Diagnosis Date    Acquired hypothyroidism 5/26/2017    Benign essential HTN 5/26/2017    Benign prostatic hyperplasia (BPH) with urinary urge incontinence 6/6/2017    Chronic low back pain 6/1/2017    Coronary artery disease involving native coronary artery of native heart without angina pectoris 5/26/2017    S/p 5 stents - last one around 2010-11.    Gastroesophageal reflux disease without esophagitis 5/26/2017    History of CVA (cerebrovascular accident) 5/26/2017    Lumbar disc lesion 5/26/2017    Metastatic renal cell carcinoma to bone 6/6/2017    Metastatic renal cell carcinoma to liver 6/6/2017    Liver Biopsy 5-2017: METASTATIC RENAL CELL CARCINOMA.    Mixed hyperlipidemia 5/26/2017    Type 2 diabetes mellitus with stage 3 chronic kidney disease, with long-term current use of insulin 5/26/2017       Review of Systems   Constitutional: Negative for fever.   HENT: Negative for ear pain, rhinorrhea and sore throat.    Respiratory: Positive for shortness of breath. Negative for wheezing.    Cardiovascular: Positive for chest pain. Negative for leg swelling.   Gastrointestinal: Positive for vomiting. Negative for abdominal pain.   Musculoskeletal: Negative for neck pain.   Skin: Negative for rash.   Neurological: Negative for headaches.       Objective:   BP (!) 120/58 (BP Location: Right arm, Patient Position: Sitting, BP Method: Manual)   Pulse 77   Ht 5' 7" (1.702 m)   Wt 81 kg (178 lb 9.2 oz)   SpO2 97%   BMI 27.97 kg/m²     Physical Exam   Constitutional: He is oriented to person, place, and time. He appears well-developed and well-nourished.   Ambulating by wheelchair   HENT:   Head: Normocephalic.   edentulous   Eyes: Pupils are equal, round, and reactive to light.   Neck:   Decreased ROM in neck    Cardiovascular: Normal rate.    2/6 aortic systolic murmur   Pulmonary/Chest: Effort normal.   Musculoskeletal: Normal range " of motion.   5/5 strength UE/LE  No TTP of CVA or low back  Pos straight leg raise (per patient history)     Neurological: He is alert and oriented to person, place, and time.   Skin: Skin is warm.   Ecchymoses and purpura bilaterally UE  Telangiectasias on face and chest  Multiple tattoos on UE   Psychiatric: He has a normal mood and affect.       Lab Results   Component Value Date    WBC 9.56 07/11/2017    HGB 13.8 (L) 07/11/2017    HCT 40.3 07/11/2017     07/11/2017    ALT 23 07/11/2017    AST 18 07/11/2017     07/11/2017    K 3.9 07/11/2017     07/11/2017    CREATININE 1.2 07/11/2017    BUN 19 07/11/2017    CO2 25 07/11/2017    TSH 2.227 07/11/2017    PSA 0.61 05/27/2017    INR 1.2 06/12/2017    HGBA1C 11.1 (H) 05/27/2017         Assessment:   72 y.o. male with multiple co-morbid illnesses here to continue work-up of chronic issues notably metastatic renal cell CA, DM, HTN.     Plan:     Problem List Items Addressed This Visit        Neuro    Uncomplicated opioid dependence     Taking scheduled narcotics for cancer-related pain. Recent overdose  · Continue pain management per Heme-Onc  · Advised mindfulness to prevent overmedication            Cardiac    Coronary artery disease involving native coronary artery of native heart with angina pectoris     [facility] S/p 5 stents 2010. Anginal equivalent controlled with CCB, ACE, APT  · Continue CCB, ACE, ASA, statin  · Will not start BB due to response to chemo         Hypertension associated with diabetes     BP controlled on ACE, CCB  · Continue lisinopril 40mg, amlodipine 10mg  · Continue statin, ASA  · cotninue home BP reads         Relevant Orders    POCT GLUCOSE    Hemoglobin A1c       Renal    Type 2 diabetes mellitus with stage 3 chronic kidney disease, with long-term current use of insulin (Chronic)     A1c 11.1 in 5/2017. Lantus 15U BID, novolog 10 PRN preprandial  · Continue current therapies  · Monitor A1c         Relevant Orders    Lipid  panel    Metastatic renal cell carcinoma to liver    Relevant Medications    ondansetron (ZOFRAN-ODT) 8 MG TbDL    Metastatic renal cell carcinoma to bone    Relevant Medications    ondansetron (ZOFRAN-ODT) 8 MG TbDL    Renal cell carcinoma of left kidney     Metastatic renal cell CA (Stage IV - T1,Nx,M1). Followed by Heme-Onc (Dr Reed) radiation therapy to L3 spine - 3 Gy/Fx x 10 fx and pazopanib 800 mg PO daily.  · F/U Heme-Onc  · Continue current therapies         Relevant Orders    Hepatitis C antibody    Benign prostatic hyperplasia (BPH) with urinary urge incontinence     Patient with LUTS, compliant with flomax. Occasional testicular pain  · Can increase flomax to 0.8mg after 4 weeks of treatment  · Continue current dose until diarrhea resolves  · Advised to avoid fluids at bedtime            Fluids/Electrolytes/Nutrition/GI    Gastroesophageal reflux disease without esophagitis (Chronic)     Epigastric pain after eating, worsened after chemo initiation  · Start omeprazole         Relevant Medications    omeprazole (PRILOSEC) 20 MG capsule    Chemotherapy-induced nausea and vomiting     Worsening. Compliant with narcotics, zofran  · Advised to take zofran before meals  · Start omeprazole  · Advised more high-protein foods         Relevant Medications    ondansetron (ZOFRAN-ODT) 8 MG TbDL    Chemotherapy induced diarrhea     Uncontrolled with narcotics, now experiencing hemorrhoids  · Continue zofran for nausea  · Start imodium  · Advised to use barrier creme         Relevant Medications    loperamide (IMODIUM) 2 mg capsule      Other Visit Diagnoses    None.         Health Maintenance       Date Due Completion Date    Hepatitis C Screening 1945 ---TODAY    Lipid Panel 1945 ---TODAY    TETANUS VACCINE 02/18/1963 ---    Colonoscopy 02/18/1995 ---    Zoster Vaccine 02/18/2005 ---    Pneumococcal (65+) (1 of 2 - PCV13) 02/18/2010 ---    Influenza Vaccine 08/01/2017 ---    Hemoglobin A1c 11/27/2017  5/27/2017    Foot Exam 07/07/2018 7/7/2017    Eye Exam 07/07/2018 7/7/2017          Return in about 6 weeks (around 9/6/2017). . One hour spent with this patient today, half of that in counseling.    Lulú Lewis MD/MPH  Internal Medicine  [facility]  [phone]  Answers for HPI/ROS submitted by the patient on 7/23/2017   Shortness of breath  Chronicity: new  Onset: in the past 7 days  Frequency: daily  Progression since onset: waxing and waning  Episode duration: 15 minutes  claudication: No  coryza: No  hemoptysis: No  leg pain: No  orthopnea: No  PND: No  sputum production: No  swollen glands: No  syncope: No  Aggravating factors: any activity  Improvement on treatment: no relief  Risk factors for DVT/PE: no known risk factors  Treatments tried: nothing  asthma: No  allergies: No  COPD: No  pneumonia: No  aspirin allergies: No  CAD: Yes  DVT: No  heart failure: Yes  PE: No  recent surgery: No  bronchiolitis: No  chronic lung disease: No

## 2017-07-26 NOTE — ASSESSMENT & PLAN NOTE
[facility] S/p 5 stents 2010. Anginal equivalent controlled with CCB, ACE, APT  · Continue CCB, ACE, ASA, statin  · Will not start BB due to response to chemo

## 2017-07-28 ENCOUNTER — TELEPHONE (OUTPATIENT)
Dept: PHARMACY | Facility: CLINIC | Age: 72
End: 2017-07-28

## 2017-07-28 NOTE — TELEPHONE ENCOUNTER
Pt's son called and informed us that pt has started Omeprazole and Zofran by Dr. Davis recently due to complains of dysphagia. I advised him that Omeprazole can actually decrease the serum concentration of Votrient, causing it to be less effective. He states that it is working to help with the nausea and dysphagia he has when eating. He has tried tums and that has NOT helped. Zantac provides little relief but also has the potential for the same DDIs. Pt also reports diarrhea - staying well hydrated with a diet of boost, gatorade, bananas- slightly relieved with Imodium.     Routing provider to advise.

## 2017-07-31 ENCOUNTER — TELEPHONE (OUTPATIENT)
Dept: PHARMACY | Facility: CLINIC | Age: 72
End: 2017-07-31

## 2017-07-31 ENCOUNTER — PATIENT MESSAGE (OUTPATIENT)
Dept: HEMATOLOGY/ONCOLOGY | Facility: CLINIC | Age: 72
End: 2017-07-31

## 2017-07-31 NOTE — TELEPHONE ENCOUNTER
good afternoon, i see that mr ruiz has started omeprazole treatment.  he is also taking votrient.  the  recommends to avoid the combination of omeprazole and PPIs.  the omeprazole may decrese the serum concentration of votrient.  Please let us know how to proceed. Thank you.

## 2017-08-01 ENCOUNTER — TELEPHONE (OUTPATIENT)
Dept: INTERNAL MEDICINE | Facility: CLINIC | Age: 72
End: 2017-08-01

## 2017-08-01 NOTE — TELEPHONE ENCOUNTER
Ms Palomo,  Can the patient take ranitidine instead? He had significant reflux symptoms at last appointment. If not I will advise him to take TUMS.     Thanks,  KJ

## 2017-08-02 ENCOUNTER — TELEPHONE (OUTPATIENT)
Dept: PHARMACY | Facility: CLINIC | Age: 72
End: 2017-08-02

## 2017-08-02 NOTE — TELEPHONE ENCOUNTER
Patient's son, Pramod, came to  [facility] today. He inquired about use of PPI and Votrient, since there was some differing recommendations from his oncologist and internist.     It was explained that neither H2 Blockers nor PPIs are recommended with Votrient, but it would ultimately be determined depending on how beneficial it would be for the patient. Originally, it was advised that he use Tums but he said that is not effective for him. Dr. Davis recommended Zantac - studies with Tasigna, show to separate dose by 10 hours to avoid DDI- currently no studies with Votrient though. Patient will try Zantac in the morning, taking it 10 hours before Votrient dose (4-5pm daily). If this does not work, he will proceed with Dr. Reed's recommendation to take Nexium instead of Prilosec. Use should be monitored, as both H2 Blockers and PPIs can decrease the effect of Votrient. Patient advised to call myself or MD should any further questions arise.     Routing all involved providers. FREDY

## 2017-08-15 ENCOUNTER — PATIENT MESSAGE (OUTPATIENT)
Dept: HEMATOLOGY/ONCOLOGY | Facility: CLINIC | Age: 72
End: 2017-08-15

## 2017-08-15 DIAGNOSIS — G89.3 NEOPLASM RELATED PAIN: ICD-10-CM

## 2017-08-15 DIAGNOSIS — C64.9 METASTATIC RENAL CELL CARCINOMA TO BONE: ICD-10-CM

## 2017-08-15 DIAGNOSIS — C64.9 METASTATIC RENAL CELL CARCINOMA TO LIVER: ICD-10-CM

## 2017-08-15 DIAGNOSIS — N39.41 BENIGN PROSTATIC HYPERPLASIA (BPH) WITH URINARY URGE INCONTINENCE: ICD-10-CM

## 2017-08-15 DIAGNOSIS — N40.1 BENIGN PROSTATIC HYPERPLASIA (BPH) WITH URINARY URGE INCONTINENCE: ICD-10-CM

## 2017-08-15 DIAGNOSIS — C79.51 METASTATIC RENAL CELL CARCINOMA TO BONE: ICD-10-CM

## 2017-08-15 DIAGNOSIS — C78.7 METASTATIC RENAL CELL CARCINOMA TO LIVER: ICD-10-CM

## 2017-08-15 RX ORDER — MORPHINE SULFATE 15 MG/1
15 TABLET ORAL EVERY 6 HOURS PRN
Qty: 60 TABLET | Refills: 0 | Status: SHIPPED | OUTPATIENT
Start: 2017-08-15 | End: 2017-09-12 | Stop reason: SDUPTHER

## 2017-08-15 RX ORDER — HYDROCODONE BITARTRATE AND ACETAMINOPHEN 5; 325 MG/1; MG/1
1 TABLET ORAL EVERY 8 HOURS PRN
Qty: 50 TABLET | Refills: 0 | Status: SHIPPED | OUTPATIENT
Start: 2017-08-15 | End: 2017-09-12 | Stop reason: SDUPTHER

## 2017-08-22 DIAGNOSIS — Z79.4 TYPE 2 DIABETES MELLITUS WITH STAGE 3 CHRONIC KIDNEY DISEASE, WITH LONG-TERM CURRENT USE OF INSULIN: Chronic | ICD-10-CM

## 2017-08-22 DIAGNOSIS — E11.22 TYPE 2 DIABETES MELLITUS WITH STAGE 3 CHRONIC KIDNEY DISEASE, WITH LONG-TERM CURRENT USE OF INSULIN: Chronic | ICD-10-CM

## 2017-08-22 DIAGNOSIS — N18.30 TYPE 2 DIABETES MELLITUS WITH STAGE 3 CHRONIC KIDNEY DISEASE, WITH LONG-TERM CURRENT USE OF INSULIN: Chronic | ICD-10-CM

## 2017-08-22 DIAGNOSIS — E11.69 HYPERLIPIDEMIA DUE TO TYPE 2 DIABETES MELLITUS: ICD-10-CM

## 2017-08-22 DIAGNOSIS — E78.5 HYPERLIPIDEMIA DUE TO TYPE 2 DIABETES MELLITUS: ICD-10-CM

## 2017-08-22 RX ORDER — TAMSULOSIN HYDROCHLORIDE 0.4 MG/1
0.4 CAPSULE ORAL NIGHTLY
Qty: 90 CAPSULE | Refills: 4 | OUTPATIENT
Start: 2017-08-22

## 2017-08-22 RX ORDER — ATORVASTATIN CALCIUM 40 MG/1
40 TABLET, FILM COATED ORAL DAILY
Qty: 90 TABLET | Refills: 3 | Status: SHIPPED | OUTPATIENT
Start: 2017-08-22 | End: 2018-03-26 | Stop reason: SDUPTHER

## 2017-08-22 NOTE — TELEPHONE ENCOUNTER
Atorvastatin 40mg was already ordered for this patient on 6/28/17 for this patient. I resent the script today to [facility] in [place], although he should already have a valid script at the pharmacy.    Thanks,  KJ

## 2017-08-23 ENCOUNTER — HOSPITAL ENCOUNTER (OUTPATIENT)
Dept: RADIOLOGY | Facility: HOSPITAL | Age: 72
Discharge: HOME | End: 2017-08-23
Attending: INTERNAL MEDICINE
Payer: MEDICARE

## 2017-08-23 ENCOUNTER — TELEPHONE (OUTPATIENT)
Dept: PHARMACY | Facility: CLINIC | Age: 72
End: 2017-08-23

## 2017-08-23 DIAGNOSIS — C78.7 METASTATIC RENAL CELL CARCINOMA TO LIVER: ICD-10-CM

## 2017-08-23 DIAGNOSIS — C64.9 METASTATIC RENAL CELL CARCINOMA TO BONE: ICD-10-CM

## 2017-08-23 DIAGNOSIS — C64.9 METASTATIC RENAL CELL CARCINOMA TO LIVER: ICD-10-CM

## 2017-08-23 DIAGNOSIS — C64.2 RENAL CELL CARCINOMA OF LEFT KIDNEY: ICD-10-CM

## 2017-08-23 DIAGNOSIS — C79.51 METASTATIC RENAL CELL CARCINOMA TO BONE: ICD-10-CM

## 2017-08-23 LAB
CREAT SERPL-MCNC: 1 MG/DL (ref 0.5–1.4)
SAMPLE: NORMAL

## 2017-08-23 PROCEDURE — 25500020 PHARM REV CODE 255: Performed by: INTERNAL MEDICINE

## 2017-08-23 PROCEDURE — 71260 CT THORAX DX C+: CPT | Mod: 26,,, | Performed by: RADIOLOGY

## 2017-08-23 PROCEDURE — 71260 CT THORAX DX C+: CPT | Mod: TC

## 2017-08-23 PROCEDURE — 74177 CT ABD & PELVIS W/CONTRAST: CPT | Mod: 26,,, | Performed by: RADIOLOGY

## 2017-08-23 PROCEDURE — 74177 CT ABD & PELVIS W/CONTRAST: CPT | Mod: TC

## 2017-08-23 RX ADMIN — IOHEXOL 15 ML: 350 INJECTION, SOLUTION INTRAVENOUS at 10:08

## 2017-08-23 RX ADMIN — IOHEXOL 75 ML: 350 INJECTION, SOLUTION INTRAVENOUS at 11:08

## 2017-08-23 RX ADMIN — IOHEXOL 15 ML: 350 INJECTION, SOLUTION INTRAVENOUS at 09:08

## 2017-08-24 ENCOUNTER — LAB VISIT (OUTPATIENT)
Dept: LAB | Facility: HOSPITAL | Age: 72
End: 2017-08-24
Attending: INTERNAL MEDICINE
Payer: MEDICARE

## 2017-08-24 ENCOUNTER — OFFICE VISIT (OUTPATIENT)
Dept: HEMATOLOGY/ONCOLOGY | Facility: CLINIC | Age: 72
End: 2017-08-24
Payer: MEDICARE

## 2017-08-24 VITALS
RESPIRATION RATE: 20 BRPM | DIASTOLIC BLOOD PRESSURE: 72 MMHG | TEMPERATURE: 99 F | BODY MASS INDEX: 27.42 KG/M2 | SYSTOLIC BLOOD PRESSURE: 161 MMHG | OXYGEN SATURATION: 96 % | HEART RATE: 67 BPM | WEIGHT: 175.06 LBS

## 2017-08-24 DIAGNOSIS — C64.2 RENAL CELL CARCINOMA OF LEFT KIDNEY: Primary | ICD-10-CM

## 2017-08-24 DIAGNOSIS — C79.51 METASTATIC RENAL CELL CARCINOMA TO BONE: ICD-10-CM

## 2017-08-24 DIAGNOSIS — E03.9 ACQUIRED HYPOTHYROIDISM: ICD-10-CM

## 2017-08-24 DIAGNOSIS — K52.1 DIARRHEA DUE TO DRUG: ICD-10-CM

## 2017-08-24 DIAGNOSIS — Z79.4 TYPE 2 DIABETES MELLITUS WITH STAGE 3 CHRONIC KIDNEY DISEASE, WITH LONG-TERM CURRENT USE OF INSULIN: Chronic | ICD-10-CM

## 2017-08-24 DIAGNOSIS — N18.30 TYPE 2 DIABETES MELLITUS WITH STAGE 3 CHRONIC KIDNEY DISEASE, WITH LONG-TERM CURRENT USE OF INSULIN: Chronic | ICD-10-CM

## 2017-08-24 DIAGNOSIS — E11.22 TYPE 2 DIABETES MELLITUS WITH STAGE 3 CHRONIC KIDNEY DISEASE, WITH LONG-TERM CURRENT USE OF INSULIN: Chronic | ICD-10-CM

## 2017-08-24 DIAGNOSIS — C78.7 METASTATIC RENAL CELL CARCINOMA TO LIVER: ICD-10-CM

## 2017-08-24 DIAGNOSIS — E11.59 HYPERTENSION ASSOCIATED WITH DIABETES: ICD-10-CM

## 2017-08-24 DIAGNOSIS — G89.3 NEOPLASM RELATED PAIN: ICD-10-CM

## 2017-08-24 DIAGNOSIS — C64.2 RENAL CELL CARCINOMA OF LEFT KIDNEY: ICD-10-CM

## 2017-08-24 DIAGNOSIS — I15.2 HYPERTENSION ASSOCIATED WITH DIABETES: ICD-10-CM

## 2017-08-24 DIAGNOSIS — C64.9 METASTATIC RENAL CELL CARCINOMA TO LIVER: ICD-10-CM

## 2017-08-24 DIAGNOSIS — C64.9 METASTATIC RENAL CELL CARCINOMA TO BONE: ICD-10-CM

## 2017-08-24 PROBLEM — D75.839 THROMBOCYTHEMIA: Status: RESOLVED | Noted: 2017-06-28 | Resolved: 2017-08-24

## 2017-08-24 LAB
ALBUMIN SERPL BCP-MCNC: 3.3 G/DL
ALP SERPL-CCNC: 97 U/L
ALT SERPL W/O P-5'-P-CCNC: 33 U/L
ANION GAP SERPL CALC-SCNC: 8 MMOL/L
AST SERPL-CCNC: 30 U/L
BILIRUB SERPL-MCNC: 0.6 MG/DL
BUN SERPL-MCNC: 20 MG/DL
CALCIUM SERPL-MCNC: 9.2 MG/DL
CHLORIDE SERPL-SCNC: 110 MMOL/L
CHOLEST/HDLC SERPL: 4.1 {RATIO}
CO2 SERPL-SCNC: 25 MMOL/L
CREAT SERPL-MCNC: 1.1 MG/DL
ERYTHROCYTE [DISTWIDTH] IN BLOOD BY AUTOMATED COUNT: 17.5 %
EST. GFR  (AFRICAN AMERICAN): >60 ML/MIN/1.73 M^2
EST. GFR  (NON AFRICAN AMERICAN): >60 ML/MIN/1.73 M^2
ESTIMATED AVG GLUCOSE: 148 MG/DL
GLUCOSE SERPL-MCNC: 65 MG/DL
HBA1C MFR BLD HPLC: 6.8 %
HCT VFR BLD AUTO: 39 %
HCV AB SERPL QL IA: NEGATIVE
HDL/CHOLESTEROL RATIO: 24.1 %
HDLC SERPL-MCNC: 112 MG/DL
HDLC SERPL-MCNC: 27 MG/DL
HGB BLD-MCNC: 13.1 G/DL
LDLC SERPL CALC-MCNC: 40.2 MG/DL
MAGNESIUM SERPL-MCNC: 1.5 MG/DL
MCH RBC QN AUTO: 30.7 PG
MCHC RBC AUTO-ENTMCNC: 33.6 G/DL
MCV RBC AUTO: 91 FL
NEUTROPHILS # BLD AUTO: 2.6 K/UL
NONHDLC SERPL-MCNC: 85 MG/DL
PLATELET # BLD AUTO: 276 K/UL
PMV BLD AUTO: 9.8 FL
POTASSIUM SERPL-SCNC: 4.1 MMOL/L
PROT SERPL-MCNC: 6.5 G/DL
RBC # BLD AUTO: 4.27 M/UL
SODIUM SERPL-SCNC: 143 MMOL/L
T4 FREE SERPL-MCNC: 0.79 NG/DL
TRIGL SERPL-MCNC: 224 MG/DL
TSH SERPL DL<=0.005 MIU/L-ACNC: 4.91 UIU/ML
WBC # BLD AUTO: 5.64 K/UL

## 2017-08-24 PROCEDURE — 1159F MED LIST DOCD IN RCRD: CPT | Mod: S$GLB,,, | Performed by: INTERNAL MEDICINE

## 2017-08-24 PROCEDURE — 99214 OFFICE O/P EST MOD 30 MIN: CPT | Mod: GC,S$GLB,, | Performed by: INTERNAL MEDICINE

## 2017-08-24 PROCEDURE — 3077F SYST BP >= 140 MM HG: CPT | Mod: S$GLB,,, | Performed by: INTERNAL MEDICINE

## 2017-08-24 PROCEDURE — 1125F AMNT PAIN NOTED PAIN PRSNT: CPT | Mod: S$GLB,,, | Performed by: INTERNAL MEDICINE

## 2017-08-24 PROCEDURE — 3078F DIAST BP <80 MM HG: CPT | Mod: S$GLB,,, | Performed by: INTERNAL MEDICINE

## 2017-08-24 PROCEDURE — 3008F BODY MASS INDEX DOCD: CPT | Mod: S$GLB,,, | Performed by: INTERNAL MEDICINE

## 2017-08-24 PROCEDURE — 99999 PR PBB SHADOW E&M-EST. PATIENT-LVL III: CPT | Mod: PBBFAC,,, | Performed by: INTERNAL MEDICINE

## 2017-08-24 PROCEDURE — 4010F ACE/ARB THERAPY RXD/TAKEN: CPT | Mod: S$GLB,,, | Performed by: INTERNAL MEDICINE

## 2017-08-24 PROCEDURE — 3044F HG A1C LEVEL LT 7.0%: CPT | Mod: S$GLB,,, | Performed by: INTERNAL MEDICINE

## 2017-08-24 RX ORDER — DIPHENOXYLATE HYDROCHLORIDE AND ATROPINE SULFATE 2.5; .025 MG/1; MG/1
1 TABLET ORAL 4 TIMES DAILY PRN
Qty: 40 TABLET | Refills: 0 | Status: SHIPPED | OUTPATIENT
Start: 2017-08-24 | End: 2017-09-03

## 2017-08-24 NOTE — PROGRESS NOTES
"PATIENT: Edwin Singleton  MRN: [id number]  DATE: 8/24/2017    Diagnosis:   1. Renal cell carcinoma of left kidney    2. 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 Metastatic renal cell carcinoma to liver    4. Neoplasm related pain    5. Acquired hypothyroidism    6. Type 2 diabetes mellitus with stage 3 chronic kidney disease, with long-term current use of insulin      Chief Complaint: renal cell cancer    Oncologic History:      Oncologic History Renal cell carcinoma of the left kidney - Stage IV - T1,Nx,M1      Oncologic Treatment Planned treatment:   Radiation therapy to L3 spine - 3 Gy/Fx x 10 fx    pazopanib 800 mg PO daily      Pathology Liver mass (5/30/17):  Morphology and immunostains support classification as METASTATIC RENAL CELL CARCINOMA.  Positive stains: CD10, RCC, EMA  Negative stains: Hepatocyte (Heppar).  Immunostains with appropriate positive and negative controls.  Reticulin stain shows preserved reticulin around tumor nests.        Subjective:    History of Present Illness: Mr. Singleton is a 72 y.o. male who returns for follow up regarding metastatic renal cell carcinoma of left kidney.  HPI: Mr.Mc Moss is a 73 yo male with CAD (s/p 5 stents, remotely), DM2 (insulin dependent), hypothyroidism , CVA (2013) with no residual deficit, chronic back pain. [place] [facility] [place] of a lumbar spine lesion in the setting of progressive weakness of his legs and exacerbation of chronic back pain, in May 2017. He states that his back pain and LE weakness progressively worsened. [facility]. The pain radiates down his legs. He denies any incontinence of bowel or bladder. He had several falls. After one of the falls, he presented to the ED at [facility], in May 2017. He had an MRI showing "L3 vertebral body lesion with apparent cortical disruption concerning for metastases vs myeloma," and was transferred [place] [facility] [place] " neurosurgery evaluation. He was evaluated by neurosurgery regarding concern for lytic lesion on L3 on 5/26/17. No surgical intervention was proposed.   CT chest, abdomen,pelvis on 5/27/17 revealed :   --A 3.6 cm exophytic mass arising from the medial aspect of the lower pole of left kidney,   --A small 1.0 cm, subtle, cortical enhancing mass within the upper pole of the right kidney, concerning for possible contralateral solid renal mass  --Multiple enhancing ill-defined hepatic masses concerning for hepatic metastatic disease, hepatomegaly and hepatic steatosis  --Mulltifocal irregular thickening of the bilateral pleura concerning for possible pleural metastatic disease  --Mild nonspecific nodular thickening of the left adrenal gland without discrete nodule  -- Redemonstration of known lytic lesion within the L3 vertebral body. No definite additional discrete lytic lesions are identified.    Interval history:  - he presents for follow-up appointment. He underwent CT scans on 8/23/17. He is tolerating pazopanib well. His main complaints are diarrhea and fatigue. His pain is better controlled, but he notes pain on his right side that travels down his leg.  - he takes lantus 15 units BID and novolog 10 units TID with meals. He has noticed a blood glucose level in the 50s about two weeks ago.    Review of Systems   Constitutional: Positive for fatigue. Negative for appetite change and unexpected weight change.   HENT: Negative for sore throat.    Eyes: Negative for visual disturbance.   Respiratory: Negative for cough and shortness of breath.    Cardiovascular: Negative for chest pain.   Gastrointestinal: Positive for abdominal pain and diarrhea.   Genitourinary: Negative for frequency.   Musculoskeletal: Negative for back pain.   Skin: Negative for rash.   Neurological: Negative for headaches.   Hematological: Negative for adenopathy.   Psychiatric/Behavioral: The patient is not nervous/anxious.      ECOG Performance  Status:   ECOG SCORE 2     Objective:      Vitals:   Vitals:    08/24/17 0757   BP: (!) 161/72   BP Location: Left arm   Patient Position: Sitting   Pulse: 67   Resp: 20   Temp: 99 °F (37.2 °C)   TempSrc: Oral   SpO2: 96%   Weight: 79.4 kg (175 lb 0.7 oz)     BMI: Body mass index is 27.42 kg/m².    Physical Exam   Constitutional: He is oriented to person, place, and time. He appears well-developed.   HENT:   Head: Normocephalic and atraumatic.   Eyes: EOM are normal. Pupils are equal, round, and reactive to light.   Neck: Normal range of motion. Neck supple.   Cardiovascular: Normal rate and regular rhythm.    Pulmonary/Chest:   Coarse breath sounds at bases   Abdominal: Soft. Bowel sounds are normal. There is no tenderness.   Musculoskeletal: Normal range of motion. He exhibits no edema.   Neurological: He is alert and oriented to person, place, and time.   Skin: Skin is warm and dry.   Psychiatric: He has a normal mood and affect. His behavior is normal. Judgment and thought content normal.     Laboratory Data:  Labs have been reviewed.    Imaging:   Bone scan (5/30/17):  L3 metastatic disease.    CT chest/abdomen/pelvis (8/23/17):  - Interval decrease in size and enhancement of the bilateral renal masses and multiple hepatic lesions suggestive of response to chemotherapy in this patient with metastatic RCC. Stable L3 lytic lesion.  - Diffuse bladder wall thickening may relate to incomplete distention or cystitis. Consider correlation with urinalysis.  - Additional stable findings include right thyroid nodule, cholecystectomy, fat-containing left inguinal hernia, and postsurgical changes of the lumbar spine.  - RECIST SUMMARY: Date of prior examination for comparison: 5/27/17  Lesion 1: Left kidney 2.2 cm Series 2 Image 73 Prior measurement 3.0 cm  Lesion 2: Right kidney 0.6 cm Series 601 Image 41 Prior measurement 1.0 cm  Lesion 3: Hepatic segment II 2.1 cm Series 2 Image 50 Prior measurement 3.0 cm  Lesion 4:  Hepatic segment VII 3.0 cm Series 2 Image 79 Prior measurement 4.7 cm  Lesion 5: L3 vertebral body 2.4 cm Series 2 Image 79 Prior measurement 2.4 cm    MRI brain (5/29/17):  1. No evidence of acute intracranial abnormality or abnormal restricted diffusion.  2. Patchy periventricular and supratentorial T2/FLAIR hyperintensity with additional scattered foci in the juan jose which do not demonstrate corresponding enhancement suggestive of chronic microvascular ischemic change. Superimposed remote lacunar type infarct is present within the left corona radiata.  3. Incomplete suppression of sulcal FLAIR signal which appears relatively symmetric involving the posterior bilateral cerebral hemispheres. This is nonspecific and may be artifactual in nature, although additional differential considerations include hyperoxygenation in the setting of intubation, subarachnoid hemorrhage, proteinaceous material, or less likely leptomeningeal carcinomatosis noting no corresponding enhancement is appreciated as would be expected with either infection or neoplastic process. Clinical correlation and continued followup is advised.    Assessment:       1. Renal cell carcinoma of left kidney    2. 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 Metastatic renal cell carcinoma to liver    4. Neoplasm related pain    5. Acquired hypothyroidism    6. Type 2 diabetes mellitus with stage 3 chronic kidney disease, with long-term current use of insulin         Plan:     1. Renal cell carcinoma of the left kidney - Stage IV - T1,Nx,M1  - ECOG 2  - patient has a few high-risk features, including corrected calcium > 10, < 1 year between diagnosis and initiating therapy, 2 or more sites of metastases, and relatively poor performance status.  - he has completed radiation therapy to L2-L4 spine.  - he is tolerating pazopanib 800mg PO daily (take 1 hour before meal or two hours after) relatively well.  - CT scans from 8/23/17 reveal a partial response.  -  Labs have been reviewed. Okay to continue taking pazopanib.   - return to clinic in one month.    2. Bone metastasis  - Secondary to renal cell cancer  - he has completed radiation therapy to L2-L4 spine (10 fx with 3 Gy/fx)  - he has not seen a dentist. Recommend he see a dentist before proceeding with denosumab.    3. Neoplasm-related pain  - moderately controlled.  - continue as-needed norco (hydrocodone-acetaminophen) 5-325mg.    4. Type 2 diabetes mellitus  - he has continued to have a few hypoglycemic episodes over the past few weeks.   - we recommended to decrease his insulin regimen to lantus 10 units BID and novolog 5 units TID with meals.  - we asked him to call if he continues to have hypoglycemic episodes.    5. Moderate protein malnutrition  - he has lost about 10 lbs since his last visit  - continue Boost supplemental shakes.  - if he continues to lose weight at next visit, we will refer to nutrition.    6. Chemotherapy-induced diarrhea  - secondary to pazopanib  - continue as-needed loperamide. I have sent a prescription for lomotil to his pharmacy.    7. Hypothyroidism  - secondary to pazopanib  - TSH is mildly elevated. Continue levothyroxine at same dose. If TSH continues to increase at next visit, we will increase his dose of levothyroxine.    - return to clinic in 4 weeks.    Patient was also seen and examined by Dr. Reed. Patient is in agreement with the proposed treatment plan. All questions were answered to the patient's satisfaction.    Gio Rocha M.D.  Hematology/Oncology Fellow    Attending Note  I have personally taken the history and examined this patient and agree with the fellow's note as stated above.  Overall tolerating therapy well.  Partial response on CT scans.

## 2017-08-25 ENCOUNTER — PATIENT MESSAGE (OUTPATIENT)
Dept: RADIATION ONCOLOGY | Facility: CLINIC | Age: 72
End: 2017-08-25

## 2017-08-30 ENCOUNTER — OFFICE VISIT (OUTPATIENT)
Dept: RADIATION ONCOLOGY | Facility: CLINIC | Age: 72
End: 2017-08-30
Payer: MEDICARE

## 2017-08-30 VITALS
WEIGHT: 175.13 LBS | BODY MASS INDEX: 27.42 KG/M2 | SYSTOLIC BLOOD PRESSURE: 141 MMHG | TEMPERATURE: 99 F | HEART RATE: 72 BPM | RESPIRATION RATE: 20 BRPM | DIASTOLIC BLOOD PRESSURE: 70 MMHG

## 2017-08-30 DIAGNOSIS — C64.2 RENAL CELL CARCINOMA OF LEFT KIDNEY: ICD-10-CM

## 2017-08-30 DIAGNOSIS — C79.51 METASTATIC RENAL CELL CARCINOMA TO BONE: Primary | ICD-10-CM

## 2017-08-30 DIAGNOSIS — G89.3 NEOPLASM RELATED PAIN: ICD-10-CM

## 2017-08-30 DIAGNOSIS — C64.9 METASTATIC RENAL CELL CARCINOMA TO BONE: Primary | ICD-10-CM

## 2017-08-30 PROCEDURE — 99499 UNLISTED E&M SERVICE: CPT | Mod: S$GLB,,, | Performed by: RADIOLOGY

## 2017-08-30 PROCEDURE — 99999 PR PBB SHADOW E&M-EST. PATIENT-LVL III: CPT | Mod: PBBFAC,,, | Performed by: RADIOLOGY

## 2017-08-30 NOTE — PROGRESS NOTES
: Mr Singleton is a 72 years old man recently found to have a stage T1a N0 M1 (IV) renal cell carcinoma of the the left kidney treated with radiation for painful metastasis to the L3 vertebra.     Pain in back has decreased. He has no evident neurologic deficits.     IMPRESSION: Good palliative response. Recovered from radiation treatment.     PLAN: Sees Dr Reed in oncology clinic on 9/28/17. He will return here as needed.

## 2017-09-06 ENCOUNTER — OFFICE VISIT (OUTPATIENT)
Dept: INTERNAL MEDICINE | Facility: CLINIC | Age: 72
End: 2017-09-06
Payer: MEDICARE

## 2017-09-06 VITALS
HEIGHT: 67 IN | HEART RATE: 75 BPM | SYSTOLIC BLOOD PRESSURE: 136 MMHG | BODY MASS INDEX: 27.37 KG/M2 | DIASTOLIC BLOOD PRESSURE: 60 MMHG | OXYGEN SATURATION: 97 % | WEIGHT: 174.38 LBS

## 2017-09-06 DIAGNOSIS — K21.9 GASTROESOPHAGEAL REFLUX DISEASE WITHOUT ESOPHAGITIS: Chronic | ICD-10-CM

## 2017-09-06 DIAGNOSIS — K52.1 DIARRHEA DUE TO DRUG: ICD-10-CM

## 2017-09-06 DIAGNOSIS — M25.511 ACUTE PAIN OF RIGHT SHOULDER: ICD-10-CM

## 2017-09-06 DIAGNOSIS — T45.1X5A CHEMOTHERAPY INDUCED DIARRHEA: ICD-10-CM

## 2017-09-06 DIAGNOSIS — E13.40 NEUROPATHY DUE TO SECONDARY DIABETES: ICD-10-CM

## 2017-09-06 DIAGNOSIS — N18.30 TYPE 2 DIABETES MELLITUS WITH STAGE 3 CHRONIC KIDNEY DISEASE, WITH LONG-TERM CURRENT USE OF INSULIN: Chronic | ICD-10-CM

## 2017-09-06 DIAGNOSIS — E11.59 HYPERTENSION ASSOCIATED WITH DIABETES: ICD-10-CM

## 2017-09-06 DIAGNOSIS — E11.22 TYPE 2 DIABETES MELLITUS WITH STAGE 3 CHRONIC KIDNEY DISEASE, WITH LONG-TERM CURRENT USE OF INSULIN: Chronic | ICD-10-CM

## 2017-09-06 DIAGNOSIS — E03.9 HYPOTHYROIDISM, UNSPECIFIED TYPE: ICD-10-CM

## 2017-09-06 DIAGNOSIS — C64.2 RENAL CELL CARCINOMA OF LEFT KIDNEY: ICD-10-CM

## 2017-09-06 DIAGNOSIS — K52.1 CHEMOTHERAPY INDUCED DIARRHEA: ICD-10-CM

## 2017-09-06 DIAGNOSIS — Z79.4 TYPE 2 DIABETES MELLITUS WITH STAGE 3 CHRONIC KIDNEY DISEASE, WITH LONG-TERM CURRENT USE OF INSULIN: Chronic | ICD-10-CM

## 2017-09-06 DIAGNOSIS — G89.3 NEOPLASM RELATED PAIN: ICD-10-CM

## 2017-09-06 DIAGNOSIS — E03.9 ACQUIRED HYPOTHYROIDISM: ICD-10-CM

## 2017-09-06 DIAGNOSIS — I15.2 HYPERTENSION ASSOCIATED WITH DIABETES: ICD-10-CM

## 2017-09-06 PROCEDURE — 3044F HG A1C LEVEL LT 7.0%: CPT | Mod: S$GLB,,, | Performed by: INTERNAL MEDICINE

## 2017-09-06 PROCEDURE — 3008F BODY MASS INDEX DOCD: CPT | Mod: S$GLB,,, | Performed by: INTERNAL MEDICINE

## 2017-09-06 PROCEDURE — 99215 OFFICE O/P EST HI 40 MIN: CPT | Mod: S$GLB,,, | Performed by: INTERNAL MEDICINE

## 2017-09-06 PROCEDURE — 1125F AMNT PAIN NOTED PAIN PRSNT: CPT | Mod: S$GLB,,, | Performed by: INTERNAL MEDICINE

## 2017-09-06 PROCEDURE — 1159F MED LIST DOCD IN RCRD: CPT | Mod: S$GLB,,, | Performed by: INTERNAL MEDICINE

## 2017-09-06 PROCEDURE — 4010F ACE/ARB THERAPY RXD/TAKEN: CPT | Mod: S$GLB,,, | Performed by: INTERNAL MEDICINE

## 2017-09-06 PROCEDURE — 3078F DIAST BP <80 MM HG: CPT | Mod: S$GLB,,, | Performed by: INTERNAL MEDICINE

## 2017-09-06 PROCEDURE — 3075F SYST BP GE 130 - 139MM HG: CPT | Mod: S$GLB,,, | Performed by: INTERNAL MEDICINE

## 2017-09-06 PROCEDURE — 99999 PR PBB SHADOW E&M-EST. PATIENT-LVL III: CPT | Mod: PBBFAC,,, | Performed by: INTERNAL MEDICINE

## 2017-09-06 RX ORDER — LORAZEPAM 1 MG/1
1 TABLET ORAL DAILY PRN
Qty: 10 TABLET | Refills: 0 | Status: CANCELLED | OUTPATIENT
Start: 2017-09-06 | End: 2017-10-06

## 2017-09-06 RX ORDER — LOPERAMIDE HYDROCHLORIDE 2 MG/1
2 CAPSULE ORAL 4 TIMES DAILY PRN
Qty: 90 CAPSULE | Refills: 0 | Status: SHIPPED | OUTPATIENT
Start: 2017-09-06 | End: 2017-10-06

## 2017-09-06 RX ORDER — LEVOTHYROXINE SODIUM 50 UG/1
50 CAPSULE ORAL
Qty: 90 CAPSULE | Refills: 3 | Status: CANCELLED | OUTPATIENT
Start: 2017-09-06

## 2017-09-06 NOTE — ASSESSMENT & PLAN NOTE
A1c 11.1 in 5/2017, 6.8 in 8/2017. Weaning off Lantus, PRN novolog 10 preprandial  · Continue current therapies  · Monitor A1c  · Discontinue long acting insulin  · PRN novolog

## 2017-09-06 NOTE — ASSESSMENT & PLAN NOTE
"R shoulder pain and decreased ROM, likely "frozen shoulder" related to DM and decreased activity  · Refuses home OT  · Will order when patient is ready  "

## 2017-09-06 NOTE — PROGRESS NOTES
Primary Care Provider Appointment    Subjective:      Patient ID: Edwin Singleton is a 72 y.o. male stage T1a N0 M1 (IV) renal cell carcinoma, GERD, HTN, DM    Chief Complaint: Follow-up (Patient is here for a 6 week follow up appt.) and Shoulder Pain (9+ month , shoulders , both Lt & Rt , Patient is unable to lift arms up to take off shirt , Pain= 10)    Patient with significantly improved abdominal pain since last exam. He was prescribed a PPI by PCP, but Heme-Onc and pharmacy messaged that this medication reduces absorption of chemo. Patient started on ranitidine with improvement in abdominal pain. Diarrhea persists, but it is controlled on loperamide, which was started by Heme-Onc.    He no longer takes ativan for sleep, he was prescribed ativan for MRI/CT anxiety PRN. He does not want refills.    Patient's son is frustrated with [employer], and feels that they don't enhance his father's care. He wants resources on how to cancel. He wants to order some OTC items from [employer].    He has few compliants about chemo pazopanib 800mg daily with radiation therapy completed in 7/7/17. He does endorse diarrhea. His tumor burden is shrinking. He has been able to maintain his weight since 8/2017, but since June has lost 20#. He spends most of his day in a recliner. He does his own laundry and folds it. His son assists with all other ADLs.     He was advised to see a dentist by Dr Reed on 8/24 before starting denosumab. Patient has not seen one yet because he has no teeth.    AM CBG have been in 120s with no lows. His son halved his insulin dose to 5U lantus BID. He is now only using novolog PRN, and has stopped the lantus. He does take drink Boost daily with his meds.    His clinic and home BPs are well-controlled, patient's son does not have log today.    Patient with R shoulder pain, but does not want to do OT at this time.      Past Surgical History:   Procedure Laterality Date    ABDOMINAL SURGERY       "APPENDECTOMY      BACK SURGERY      CARDIAC SURGERY      CHOLECYSTECTOMY      coronary stenting      X 5 last one in 2010-11.       Past Medical History:   Diagnosis Date    Acquired hypothyroidism 5/26/2017    Benign essential HTN 5/26/2017    Benign prostatic hyperplasia (BPH) with urinary urge incontinence 6/6/2017    Chronic low back pain 6/1/2017    Coronary artery disease involving native coronary artery of native heart without angina pectoris 5/26/2017    S/p 5 stents - last one around 2010-11.    Gastroesophageal reflux disease without esophagitis 5/26/2017    History of CVA (cerebrovascular accident) 5/26/2017    Lumbar disc lesion 5/26/2017    Metastatic renal cell carcinoma to bone 6/6/2017    Metastatic renal cell carcinoma to liver 6/6/2017    Liver Biopsy 5-2017: METASTATIC RENAL CELL CARCINOMA.    Mixed hyperlipidemia 5/26/2017    Type 2 diabetes mellitus with stage 3 chronic kidney disease, with long-term current use of insulin 5/26/2017       Review of Systems   Constitutional: Negative for activity change and unexpected weight change.   HENT: Negative for hearing loss, rhinorrhea and trouble swallowing.    Eyes: Negative for discharge and visual disturbance.   Respiratory: Negative for chest tightness and wheezing.    Cardiovascular: Negative for chest pain and palpitations.   Gastrointestinal: Positive for constipation and diarrhea. Negative for blood in stool and vomiting.   Endocrine: Negative for polydipsia and polyuria.   Genitourinary: Negative for difficulty urinating, hematuria and urgency.   Musculoskeletal: Negative for arthralgias, joint swelling and neck pain.   Neurological: Negative for weakness and headaches.   Psychiatric/Behavioral: Negative for confusion and dysphoric mood.       Objective:   /60 (BP Location: Left arm, Patient Position: Sitting, BP Method: Medium (Manual))   Pulse 75   Ht 5' 7" (1.702 m)   Wt 79.1 kg (174 lb 6.1 oz)   SpO2 97%   BMI " 27.31 kg/m²     Physical Exam   Constitutional: He is oriented to person, place, and time. He appears well-developed and well-nourished.   Ambulating by wheelchair   HENT:   Head: Normocephalic.   edentulous   Eyes: Pupils are equal, round, and reactive to light.   Neck:   Decreased ROM in neck    Cardiovascular: Normal rate.    Pulmonary/Chest: Effort normal.   Musculoskeletal: Normal range of motion.   Neurological: He is alert and oriented to person, place, and time.   Skin: Skin is warm.   Ecchymoses and purpura bilaterally UE  Telangiectasias on face and chest  Multiple tattoos on UE   Psychiatric: He has a normal mood and affect.       Lab Results   Component Value Date    WBC 5.64 08/24/2017    HGB 13.1 (L) 08/24/2017    HCT 39.0 (L) 08/24/2017     08/24/2017    CHOL 112 (L) 08/24/2017    TRIG 224 (H) 08/24/2017    HDL 27 (L) 08/24/2017    ALT 33 08/24/2017    AST 30 08/24/2017     08/24/2017    K 4.1 08/24/2017     08/24/2017    CREATININE 1.1 08/24/2017    BUN 20 08/24/2017    CO2 25 08/24/2017    TSH 4.912 (H) 08/24/2017    PSA 0.61 05/27/2017    INR 1.2 06/12/2017    HGBA1C 6.8 (H) 08/24/2017         Assessment:   72 y.o. male with multiple co-morbid illnesses here to continue work-up of chronic issues notably stage T1a N0 M1 (IV) renal cell carcinoma, GERD, HTN, DM.     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 Followed by Heme-Onc (Dr Reed) radiation therapy to L3 spine - 3  "Gy/Fx x 10 fx and pazopanib 800 mg PO daily.  · F/U Heme-Onc  · Continue current therapies         Neoplasm related pain       Endocrine    Type 2 diabetes mellitus with stage 3 chronic kidney disease, with long-term current use of insulin (Chronic)     A1c 11.1 in 5/2017, 6.8 in 8/2017. Weaning off Lantus, PRN novolog 10 preprandial  · Continue current therapies  · Monitor A1c  · Discontinue long acting insulin  · PRN novolog         Acquired hypothyroidism     Elevated TSH in 6/2017 and 8/2017, compliant with levo 50mcg  · Advised to take separate from other meds and food  · Continue current dose  · Dr Reed will recheck on 9/28/17  · May increase levothyroxine at that time            GI    Gastroesophageal reflux disease without esophagitis (Chronic)     Epigastric pain after eating, worsened after chemo initiation  · Discontinue omeprazole due to chemo interaction  · Continue ranitidine  · Order from [employer]         Relevant Medications    ranitidine (ZANTAC) 150 MG tablet    Chemotherapy induced diarrhea     Uncontrolled with narcotics, now experiencing hemorrhoids  · Continue zofran for nausea  · Continue loperamide  · Advised to use barrier creme         Relevant Medications    loperamide (IMODIUM) 2 mg capsule    Diarrhea due to drug     Started on loperamide by Heme-Onc with good improvement  · Continue loperamide            Orthopedic    Acute pain of right shoulder     R shoulder pain and decreased ROM, likely "frozen shoulder" related to DM and decreased activity  · Refuses home OT  · Will order when patient is ready           Other Visit Diagnoses     Hypothyroidism, unspecified type              Health Maintenance       Date Due Completion Date    TETANUS VACCINE 02/18/1963 ---    Colonoscopy 02/18/1995 ---    Zoster Vaccine 02/18/2005 ---    Pneumococcal (65+) (1 of 2 - PCV13) 02/18/2010 ---    Influenza Vaccine 08/01/2017 ---    Hemoglobin A1c 02/24/2018 8/24/2017    Foot Exam 07/07/2018 7/7/2017 "    Eye Exam 07/07/2018 7/7/2017    Lipid Panel 08/24/2018 8/24/2017          Return in about 6 weeks (around 10/18/2017). . One hour spent with this patient today, half of that in counseling.    Lulú Lewis MD/MPH  Internal Medicine  [facility]  [phone]

## 2017-09-06 NOTE — ASSESSMENT & PLAN NOTE
LE numbness and tingling, elevated A1c, followed by podiatry  · Establish podiatry care in [facility]  · F/U podiatry q3-6 mos  · Last appt 7/7/17

## 2017-09-06 NOTE — PATIENT INSTRUCTIONS
TODAY:  - Call [employer] to cancel [employer] program  - Order ASA, ranitidine, loperamide  - take ranitidine (zantac for reflux) 10 hours before pazopanib (votrien for chemo)  - Dr TORO will call with Dr Reed's regarding   + need to see a dentist   + shoulder creme interaction with chemo   + starting physical therapy in the home  - loperamide script sent to pharmacy  - podiatry referral for [facility]  - let Ms Linh know when ready for home physical therapy

## 2017-09-06 NOTE — ASSESSMENT & PLAN NOTE
Epigastric pain after eating, worsened after chemo initiation  · Discontinue omeprazole due to chemo interaction  · Continue ranitidine  · Order from [employer] [facility]

## 2017-09-06 NOTE — ASSESSMENT & PLAN NOTE
Elevated TSH in 6/2017 and 8/2017, compliant with levo 50mcg  · Advised to take separate from other meds and food  · Continue current dose  · Dr Reed will recheck on 9/28/17  · May increase levothyroxine at that time

## 2017-09-06 NOTE — ASSESSMENT & PLAN NOTE
Uncontrolled with narcotics, now experiencing hemorrhoids  · Continue zofran for nausea  · Continue loperamide  · Advised to use barrier creme

## 2017-09-08 ENCOUNTER — TELEPHONE (OUTPATIENT)
Dept: INTERNAL MEDICINE | Facility: CLINIC | Age: 72
End: 2017-09-08

## 2017-09-08 NOTE — TELEPHONE ENCOUNTER
Please let patient and his son know that he still needs to see a dentist in order to have jaw Xrays prior to initiating the chemo for his bone metastases. Please see if they need assistance with identifying a dentist from the [facility] list.    Thanks,  KJ

## 2017-09-11 NOTE — TELEPHONE ENCOUNTER
Patient son has been informed about his dad going to see a dentist in order to have a jaw XR prior to initiating the chemo for his bone metastases .   Patient son has been given information on a dentist in their local area , Hector Boone DDS , this provider practices at [facility] in the [facility] .

## 2017-09-12 ENCOUNTER — PATIENT MESSAGE (OUTPATIENT)
Dept: HEMATOLOGY/ONCOLOGY | Facility: CLINIC | Age: 72
End: 2017-09-12

## 2017-09-12 DIAGNOSIS — G89.3 NEOPLASM RELATED PAIN: ICD-10-CM

## 2017-09-12 DIAGNOSIS — C78.7 METASTATIC RENAL CELL CARCINOMA TO LIVER: ICD-10-CM

## 2017-09-12 DIAGNOSIS — C79.51 METASTATIC RENAL CELL CARCINOMA TO BONE: ICD-10-CM

## 2017-09-12 DIAGNOSIS — E03.9 HYPOTHYROIDISM, UNSPECIFIED TYPE: ICD-10-CM

## 2017-09-12 DIAGNOSIS — C64.9 METASTATIC RENAL CELL CARCINOMA TO LIVER: ICD-10-CM

## 2017-09-12 DIAGNOSIS — C64.9 METASTATIC RENAL CELL CARCINOMA TO BONE: ICD-10-CM

## 2017-09-12 RX ORDER — HYDROCODONE BITARTRATE AND ACETAMINOPHEN 5; 325 MG/1; MG/1
1 TABLET ORAL EVERY 8 HOURS PRN
Qty: 50 TABLET | Refills: 0 | Status: SHIPPED | OUTPATIENT
Start: 2017-09-12 | End: 2017-10-06 | Stop reason: SDUPTHER

## 2017-09-12 RX ORDER — LEVOTHYROXINE SODIUM 50 UG/1
50 CAPSULE ORAL
Qty: 90 CAPSULE | Refills: 3 | Status: SHIPPED | OUTPATIENT
Start: 2017-09-12 | End: 2018-02-15 | Stop reason: SDUPTHER

## 2017-09-12 RX ORDER — MORPHINE SULFATE 15 MG/1
15 TABLET ORAL EVERY 6 HOURS PRN
Qty: 60 TABLET | Refills: 0 | Status: SHIPPED | OUTPATIENT
Start: 2017-09-12 | End: 2017-10-12 | Stop reason: SDUPTHER

## 2017-09-13 ENCOUNTER — PATIENT MESSAGE (OUTPATIENT)
Dept: HEMATOLOGY/ONCOLOGY | Facility: CLINIC | Age: 72
End: 2017-09-13

## 2017-09-13 ENCOUNTER — PATIENT MESSAGE (OUTPATIENT)
Dept: INTERNAL MEDICINE | Facility: CLINIC | Age: 72
End: 2017-09-13

## 2017-09-14 ENCOUNTER — PATIENT MESSAGE (OUTPATIENT)
Dept: HEMATOLOGY/ONCOLOGY | Facility: CLINIC | Age: 72
End: 2017-09-14

## 2017-09-15 ENCOUNTER — TELEPHONE (OUTPATIENT)
Dept: PHARMACY | Facility: CLINIC | Age: 72
End: 2017-09-15

## 2017-09-15 NOTE — TELEPHONE ENCOUNTER
Refill readiness for Votrient confirmed with patient; name/ confirmed; no missed doses; no new medications; no side effects noted; Son will pick-up medication on .  $0.00 (004).

## 2017-09-18 ENCOUNTER — PATIENT MESSAGE (OUTPATIENT)
Dept: INTERNAL MEDICINE | Facility: CLINIC | Age: 72
End: 2017-09-18

## 2017-09-19 DIAGNOSIS — C79.51 METASTATIC RENAL CELL CARCINOMA TO BONE: ICD-10-CM

## 2017-09-19 DIAGNOSIS — R11.2 CHEMOTHERAPY-INDUCED NAUSEA AND VOMITING: ICD-10-CM

## 2017-09-19 DIAGNOSIS — C78.7 METASTATIC RENAL CELL CARCINOMA TO LIVER: ICD-10-CM

## 2017-09-19 DIAGNOSIS — C64.9 METASTATIC RENAL CELL CARCINOMA TO BONE: ICD-10-CM

## 2017-09-19 DIAGNOSIS — T45.1X5A CHEMOTHERAPY-INDUCED NAUSEA AND VOMITING: ICD-10-CM

## 2017-09-19 DIAGNOSIS — C64.9 METASTATIC RENAL CELL CARCINOMA TO LIVER: ICD-10-CM

## 2017-09-19 NOTE — TELEPHONE ENCOUNTER
"Patient's son shared jaw XR taken on 9/13/17 at dentist's office as evaluation prior to initiation of denosumab for bone mets.  Image attached.    Could radiology provide assistance with reading this XR? Patient's son reports that dentist read it as having a "jaw fracture."        Thank you,  Lulú Lewis MD/MPH  Internal Medicine  [facility]  [phone]    "

## 2017-09-20 RX ORDER — ONDANSETRON 8 MG/1
8 TABLET, ORALLY DISINTEGRATING ORAL
Qty: 60 TABLET | Refills: 3 | Status: SHIPPED | OUTPATIENT
Start: 2017-09-20 | End: 2017-09-22 | Stop reason: SDUPTHER

## 2017-09-22 RX ORDER — ONDANSETRON 8 MG/1
8 TABLET, ORALLY DISINTEGRATING ORAL
Qty: 60 TABLET | Refills: 3 | Status: SHIPPED | OUTPATIENT
Start: 2017-09-22 | End: 2017-10-18 | Stop reason: SDUPTHER

## 2017-09-22 NOTE — TELEPHONE ENCOUNTER
Zofran has been called into [facility]  [facility] pharmacy has been informed to cancel RX and that Rx has been sent to local pharmacy.

## 2017-09-28 ENCOUNTER — OFFICE VISIT (OUTPATIENT)
Dept: HEMATOLOGY/ONCOLOGY | Facility: CLINIC | Age: 72
End: 2017-09-28
Payer: MEDICARE

## 2017-09-28 ENCOUNTER — LAB VISIT (OUTPATIENT)
Dept: LAB | Facility: HOSPITAL | Age: 72
End: 2017-09-28
Attending: INTERNAL MEDICINE
Payer: MEDICARE

## 2017-09-28 VITALS
WEIGHT: 171.5 LBS | OXYGEN SATURATION: 96 % | HEART RATE: 60 BPM | DIASTOLIC BLOOD PRESSURE: 72 MMHG | TEMPERATURE: 99 F | RESPIRATION RATE: 20 BRPM | SYSTOLIC BLOOD PRESSURE: 165 MMHG | BODY MASS INDEX: 26.86 KG/M2

## 2017-09-28 DIAGNOSIS — C64.9 METASTATIC RENAL CELL CARCINOMA TO LIVER: Primary | ICD-10-CM

## 2017-09-28 DIAGNOSIS — C64.9 METASTATIC RENAL CELL CARCINOMA TO BONE: ICD-10-CM

## 2017-09-28 DIAGNOSIS — Z79.4 TYPE 2 DIABETES MELLITUS WITH STAGE 3 CHRONIC KIDNEY DISEASE, WITH LONG-TERM CURRENT USE OF INSULIN: Chronic | ICD-10-CM

## 2017-09-28 DIAGNOSIS — E11.22 TYPE 2 DIABETES MELLITUS WITH STAGE 3 CHRONIC KIDNEY DISEASE, WITH LONG-TERM CURRENT USE OF INSULIN: Chronic | ICD-10-CM

## 2017-09-28 DIAGNOSIS — C79.51 METASTATIC RENAL CELL CARCINOMA TO BONE: ICD-10-CM

## 2017-09-28 DIAGNOSIS — K52.1 DIARRHEA DUE TO DRUG: ICD-10-CM

## 2017-09-28 DIAGNOSIS — N18.30 TYPE 2 DIABETES MELLITUS WITH STAGE 3 CHRONIC KIDNEY DISEASE, WITH LONG-TERM CURRENT USE OF INSULIN: Chronic | ICD-10-CM

## 2017-09-28 DIAGNOSIS — C78.7 METASTATIC RENAL CELL CARCINOMA TO LIVER: Primary | ICD-10-CM

## 2017-09-28 DIAGNOSIS — E03.9 ACQUIRED HYPOTHYROIDISM: ICD-10-CM

## 2017-09-28 DIAGNOSIS — C64.2 RENAL CELL CARCINOMA OF LEFT KIDNEY: ICD-10-CM

## 2017-09-28 DIAGNOSIS — G89.3 NEOPLASM RELATED PAIN: ICD-10-CM

## 2017-09-28 LAB
ALBUMIN SERPL BCP-MCNC: 3.1 G/DL
ALP SERPL-CCNC: 115 U/L
ALT SERPL W/O P-5'-P-CCNC: 27 U/L
ANION GAP SERPL CALC-SCNC: 12 MMOL/L
AST SERPL-CCNC: 25 U/L
BILIRUB SERPL-MCNC: 0.4 MG/DL
BUN SERPL-MCNC: 17 MG/DL
CALCIUM SERPL-MCNC: 9.2 MG/DL
CHLORIDE SERPL-SCNC: 108 MMOL/L
CO2 SERPL-SCNC: 23 MMOL/L
CREAT SERPL-MCNC: 1.1 MG/DL
ERYTHROCYTE [DISTWIDTH] IN BLOOD BY AUTOMATED COUNT: 17.2 %
EST. GFR  (AFRICAN AMERICAN): >60 ML/MIN/1.73 M^2
EST. GFR  (NON AFRICAN AMERICAN): >60 ML/MIN/1.73 M^2
GLUCOSE SERPL-MCNC: 162 MG/DL
HCT VFR BLD AUTO: 36.9 %
HGB BLD-MCNC: 12.8 G/DL
MCH RBC QN AUTO: 33.2 PG
MCHC RBC AUTO-ENTMCNC: 34.7 G/DL
MCV RBC AUTO: 96 FL
NEUTROPHILS # BLD AUTO: 3.7 K/UL
PLATELET # BLD AUTO: 285 K/UL
PMV BLD AUTO: 9.7 FL
POTASSIUM SERPL-SCNC: 3.8 MMOL/L
PROT SERPL-MCNC: 6.8 G/DL
RBC # BLD AUTO: 3.85 M/UL
SODIUM SERPL-SCNC: 143 MMOL/L
T4 FREE SERPL-MCNC: 0.8 NG/DL
TSH SERPL DL<=0.005 MIU/L-ACNC: 6.62 UIU/ML
WBC # BLD AUTO: 7.3 K/UL

## 2017-09-28 PROCEDURE — 84443 ASSAY THYROID STIM HORMONE: CPT

## 2017-09-28 PROCEDURE — 3077F SYST BP >= 140 MM HG: CPT | Mod: S$GLB,,, | Performed by: INTERNAL MEDICINE

## 2017-09-28 PROCEDURE — 99999 PR PBB SHADOW E&M-EST. PATIENT-LVL III: CPT | Mod: PBBFAC,,, | Performed by: INTERNAL MEDICINE

## 2017-09-28 PROCEDURE — 85027 COMPLETE CBC AUTOMATED: CPT

## 2017-09-28 PROCEDURE — 3078F DIAST BP <80 MM HG: CPT | Mod: S$GLB,,, | Performed by: INTERNAL MEDICINE

## 2017-09-28 PROCEDURE — 3008F BODY MASS INDEX DOCD: CPT | Mod: S$GLB,,, | Performed by: INTERNAL MEDICINE

## 2017-09-28 PROCEDURE — 1159F MED LIST DOCD IN RCRD: CPT | Mod: S$GLB,,, | Performed by: INTERNAL MEDICINE

## 2017-09-28 PROCEDURE — 1125F AMNT PAIN NOTED PAIN PRSNT: CPT | Mod: S$GLB,,, | Performed by: INTERNAL MEDICINE

## 2017-09-28 PROCEDURE — 36415 COLL VENOUS BLD VENIPUNCTURE: CPT

## 2017-09-28 PROCEDURE — 84439 ASSAY OF FREE THYROXINE: CPT

## 2017-09-28 PROCEDURE — 80053 COMPREHEN METABOLIC PANEL: CPT

## 2017-09-28 PROCEDURE — 99215 OFFICE O/P EST HI 40 MIN: CPT | Mod: S$GLB,,, | Performed by: INTERNAL MEDICINE

## 2017-09-28 NOTE — PROGRESS NOTES
Subjective:       Patient ID: Edwin Singleton is a 72 y.o. male.    Chief Complaint: Follow-up and Abdominal Pain    HPI     Returns for follow-up on Votrient  Reports diarrhea that is intermittent  No nausea or vomiting  Eating well  No taste changes  + hoarseness  Reports worsening left hip pain. No falls. No weakness.  Noted > 1 months.    Diagnosis:   1. Renal cell carcinoma of left kidney    2. 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 Metastatic renal cell carcinoma to liver    4. Neoplasm related pain    5. Acquired hypothyroidism    6. Type 2 diabetes mellitus with stage 3 chronic kidney disease, with long-term current use of insulin       Chief Complaint: renal cell cancer     Oncologic History:       Oncologic History Renal cell carcinoma of the left kidney - Stage IV - T1,Nx,M1    Oncologic Treatment Planned treatment:   Radiation therapy to L3 spine - 3 Gy/Fx x 10 fx     pazopanib 800 mg PO daily    Pathology Liver mass (5/30/17):  Morphology and immunostains support classification as METASTATIC RENAL CELL CARCINOMA.  Positive stains: CD10, RCC, EMA  Negative stains: Hepatocyte (Heppar).  Immunostains with appropriate positive and negative controls.  Reticulin stain shows preserved reticulin around tumor nests.      Subjective:    History of Present Illness: Mr. Singleton is a 72 y.o. male who returns for follow up regarding metastatic renal cell carcinoma of left kidney.  HPI: Mr.Mc Moss is a 71 yo male with CAD (s/p 5 stents, remotely), DM2 (insulin dependent), hypothyroidism , CVA (2013) with no residual deficit, chronic back pain. He was transferred to [facility] for neurosurgery evaluation of a lumbar spine lesion in the setting of progressive weakness of his legs and exacerbation of chronic back pain, in May 2017. He states that his back pain and LE weakness progressively worsened. [facility]. The pain radiates down his legs. He denies any incontinence of bowel or  "bladder. He had several falls. After one of the falls, he presented to the ED at [facility], in May 2017. He had an MRI showing "L3 vertebral body lesion with apparent cortical disruption concerning for metastases vs myeloma," and was [place] [facility] [place] evaluation. He was evaluated by neurosurgery regarding concern for lytic lesion on L3 on 5/26/17. No surgical intervention was proposed.   CT chest, abdomen,pelvis on 5/27/17 revealed :   --A 3.6 cm exophytic mass arising from the medial aspect of the lower pole of left kidney,   --A small 1.0 cm, subtle, cortical enhancing mass within the upper pole of the right kidney, concerning for possible contralateral solid renal mass  --Multiple enhancing ill-defined hepatic masses concerning for hepatic metastatic disease, hepatomegaly and hepatic steatosis  --Mulltifocal irregular thickening of the bilateral pleura concerning for possible pleural metastatic disease  --Mild nonspecific nodular thickening of the left adrenal gland without discrete nodule  -- Redemonstration of known lytic lesion within the L3 vertebral body. No definite additional discrete lytic lesions are identified.     Interval history:  - he presents for follow-up appointment. He underwent CT scans on 8/23/17. He is tolerating pazopanib well. His main complaints are diarrhea and fatigue. His pain is better controlled, but he notes pain on his right side that travels down his leg.  - he takes lantus 15 units BID and novolog 10 units TID with meals. He has noticed a blood glucose level in the 50s about two weeks ago.      Review of Systems   Constitutional: Positive for fatigue. Negative for activity change, appetite change, chills, fever and unexpected weight change.   HENT: Positive for hearing loss. Negative for congestion, dental problem (had dental clearance), nosebleeds, postnasal drip, rhinorrhea, sinus pain, sinus pressure, sneezing, sore throat and trouble " swallowing.    Eyes: Negative for visual disturbance.   Respiratory: Negative for cough, shortness of breath and wheezing.    Cardiovascular: Negative for chest pain, palpitations and leg swelling.   Gastrointestinal: Positive for abdominal pain and diarrhea. Negative for abdominal distention, blood in stool, constipation, nausea and vomiting.   Genitourinary: Negative for decreased urine volume, difficulty urinating, dysuria, frequency and urgency.   Musculoskeletal: Positive for arthralgias (chronic, though left hip has worsened over last month). Negative for back pain, myalgias, neck pain and neck stiffness.   Skin: Negative for color change, pallor, rash and wound.   Neurological: Negative for dizziness, weakness, numbness and headaches.   Hematological: Negative for adenopathy. Does not bruise/bleed easily.   Psychiatric/Behavioral: Negative for dysphoric mood and sleep disturbance. The patient is not nervous/anxious.        Objective:      Physical Exam   Constitutional: He is oriented to person, place, and time. He appears well-developed and well-nourished. No distress.   Presents alone   HENT:   Head: Normocephalic and atraumatic.   Right Ear: External ear normal.   Left Ear: External ear normal.   Nose: Nose normal.   Mouth/Throat: Oropharynx is clear and moist. No oropharyngeal exudate.   Eyes: Conjunctivae and EOM are normal. Pupils are equal, round, and reactive to light. Right eye exhibits no discharge. Left eye exhibits no discharge. No scleral icterus.   Neck: Normal range of motion. Neck supple. No thyromegaly present.   Cardiovascular: Normal rate, regular rhythm, normal heart sounds and intact distal pulses.  Exam reveals no gallop and no friction rub.    No murmur heard.  Pulmonary/Chest: Effort normal and breath sounds normal. No respiratory distress. He has no wheezes. He has no rales. He exhibits no tenderness.   Coarse breath sounds at bases   Abdominal: Soft. Bowel sounds are normal. He  exhibits no distension and no mass. There is no tenderness. There is no rebound and no guarding.   No organomegaly   Musculoskeletal: Normal range of motion. He exhibits no edema, tenderness or deformity.   Lymphadenopathy:     He has no cervical adenopathy.   Neurological: He is alert and oriented to person, place, and time. No cranial nerve deficit or sensory deficit. Coordination normal.   Skin: Skin is warm and dry. No rash noted. He is not diaphoretic. No erythema. No pallor.   Psychiatric: He has a normal mood and affect. His behavior is normal. Judgment and thought content normal.   Nursing note and vitals reviewed.      Assessment:       1. Metastatic renal cell carcinoma to liver    2. 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 Neoplasm related pain    4. Type 2 diabetes mellitus with stage 3 chronic kidney disease, with long-term current use of insulin    5. Acquired hypothyroidism    6. Diarrhea due to drug        Plan:     1-2. Renal cell carcinoma of the left kidney - Stage IV - T1,Nx,M1  - ECOG 2  - patient has a few high-risk features, including corrected calcium > 10, < 1 year between diagnosis and initiating therapy, 2 or more sites of metastases, and relatively poor performance status.  - he has completed radiation therapy to L2-L4 spine.  - he is tolerating pazopanib 800mg PO daily (take 1 hour before meal or two hours after) relatively well.  - CT scans from 8/23/17 reveal a partial response.  - Labs have been reviewed. Okay to continue taking pazopanib.   - return to clinic in one month.    Bone metastasis  - Secondary to renal cell cancer  - he has completed radiation therapy to L2-L4 spine (10 fx with 3 Gy/fx)  - he has had dental clearance and can proceed with Xgeva  - we will set up an MRI to further evaluate left hip. He reports need to take ativan prior and has supply from prior imaging.     3. Neoplasm-related pain  - continue as-needed norco (hydrocodone-acetaminophen) 5-325mg.     4.  Type 2 diabetes mellitus  - reports appropriate parameters     5. Hypothyroidism  - secondary to pazopanib  - TSH is mildly elevated. Continue levothyroxine at same dose with appropriate free T4.    6. Chemotherapy-induced diarrhea  - secondary to pazopanib  - continue as-needed loperamide.

## 2017-10-01 ENCOUNTER — PATIENT MESSAGE (OUTPATIENT)
Dept: HEMATOLOGY/ONCOLOGY | Facility: CLINIC | Age: 72
End: 2017-10-01

## 2017-10-02 ENCOUNTER — PATIENT MESSAGE (OUTPATIENT)
Dept: INTERNAL MEDICINE | Facility: CLINIC | Age: 72
End: 2017-10-02

## 2017-10-03 ENCOUNTER — PATIENT MESSAGE (OUTPATIENT)
Dept: INTERNAL MEDICINE | Facility: CLINIC | Age: 72
End: 2017-10-03

## 2017-10-03 ENCOUNTER — TELEPHONE (OUTPATIENT)
Dept: INTERNAL MEDICINE | Facility: CLINIC | Age: 72
End: 2017-10-03

## 2017-10-03 DIAGNOSIS — G89.3 NEOPLASM RELATED PAIN: ICD-10-CM

## 2017-10-03 RX ORDER — LORAZEPAM 1 MG/1
1 TABLET ORAL DAILY PRN
Qty: 10 TABLET | Refills: 0 | Status: SHIPPED | OUTPATIENT
Start: 2017-10-03 | End: 2018-01-04

## 2017-10-03 NOTE — TELEPHONE ENCOUNTER
Patient along with son have been advised through mychart about only using this medication for imaging procedures.

## 2017-10-03 NOTE — TELEPHONE ENCOUNTER
Refill for lorazepam sent to [facility] in [place].    Please advise patient's son to only use this medication for sedation prior to an imaging procedure. If his dad needs something for sleep, we should use a different medication. This one increases his risk of falls and has addictive properties.    Thanks,  CORTEZ

## 2017-10-05 DIAGNOSIS — Z51.11 ENCOUNTER FOR CHEMOTHERAPY MANAGEMENT: Primary | ICD-10-CM

## 2017-10-06 ENCOUNTER — PATIENT MESSAGE (OUTPATIENT)
Dept: HEMATOLOGY/ONCOLOGY | Facility: CLINIC | Age: 72
End: 2017-10-06

## 2017-10-06 ENCOUNTER — TELEPHONE (OUTPATIENT)
Dept: INTERNAL MEDICINE | Facility: CLINIC | Age: 72
End: 2017-10-06

## 2017-10-06 DIAGNOSIS — C79.51 METASTATIC RENAL CELL CARCINOMA TO BONE: ICD-10-CM

## 2017-10-06 DIAGNOSIS — C64.9 METASTATIC RENAL CELL CARCINOMA TO BONE: ICD-10-CM

## 2017-10-06 RX ORDER — HYDROCODONE BITARTRATE AND ACETAMINOPHEN 5; 325 MG/1; MG/1
1 TABLET ORAL EVERY 8 HOURS PRN
Qty: 50 TABLET | Refills: 0 | Status: SHIPPED | OUTPATIENT
Start: 2017-10-06 | End: 2017-11-01 | Stop reason: SDUPTHER

## 2017-10-06 NOTE — TELEPHONE ENCOUNTER
Please let patient know that most sleep aids have a drug interaction with the other medications he is using. I am going to work with pharmacy before sending a script for a new med to make sure it is safe.    Thanks,  KJ

## 2017-10-06 NOTE — TELEPHONE ENCOUNTER
"Jamari,  Can you recommend a sleep aid that does not interact with his other meds for this patient? I did an interaction check for trazodone and seroquel and both had "significant interaction possible" level interactions.     Thanks,  KJ  "

## 2017-10-12 ENCOUNTER — TELEPHONE (OUTPATIENT)
Dept: PHARMACY | Facility: CLINIC | Age: 72
End: 2017-10-12

## 2017-10-12 DIAGNOSIS — G89.3 NEOPLASM RELATED PAIN: ICD-10-CM

## 2017-10-12 DIAGNOSIS — C64.9 METASTATIC RENAL CELL CARCINOMA TO LIVER: ICD-10-CM

## 2017-10-12 DIAGNOSIS — C78.7 METASTATIC RENAL CELL CARCINOMA TO LIVER: ICD-10-CM

## 2017-10-12 RX ORDER — MORPHINE SULFATE 15 MG/1
15 TABLET ORAL EVERY 6 HOURS PRN
Qty: 60 TABLET | Refills: 0 | Status: SHIPPED | OUTPATIENT
Start: 2017-10-12 | End: 2017-11-01 | Stop reason: SDUPTHER

## 2017-10-17 ENCOUNTER — PATIENT MESSAGE (OUTPATIENT)
Dept: ADMINISTRATIVE | Facility: OTHER | Age: 72
End: 2017-10-17

## 2017-10-18 ENCOUNTER — OFFICE VISIT (OUTPATIENT)
Dept: INTERNAL MEDICINE | Facility: CLINIC | Age: 72
End: 2017-10-18
Payer: MEDICARE

## 2017-10-18 ENCOUNTER — HOSPITAL ENCOUNTER (OUTPATIENT)
Dept: RADIOLOGY | Facility: HOSPITAL | Age: 72
Discharge: HOME | End: 2017-10-18
Attending: INTERNAL MEDICINE
Payer: MEDICARE

## 2017-10-18 ENCOUNTER — IMMUNIZATION (OUTPATIENT)
Dept: INTERNAL MEDICINE | Facility: CLINIC | Age: 72
End: 2017-10-18

## 2017-10-18 VITALS
HEIGHT: 65 IN | SYSTOLIC BLOOD PRESSURE: 132 MMHG | DIASTOLIC BLOOD PRESSURE: 60 MMHG | BODY MASS INDEX: 28.54 KG/M2 | HEART RATE: 66 BPM | WEIGHT: 171.31 LBS

## 2017-10-18 DIAGNOSIS — N18.30 TYPE 2 DIABETES MELLITUS WITH STAGE 3 CHRONIC KIDNEY DISEASE, WITH LONG-TERM CURRENT USE OF INSULIN: Chronic | ICD-10-CM

## 2017-10-18 DIAGNOSIS — C64.2 RENAL CELL CARCINOMA OF LEFT KIDNEY: ICD-10-CM

## 2017-10-18 DIAGNOSIS — K21.9 GASTROESOPHAGEAL REFLUX DISEASE WITHOUT ESOPHAGITIS: Chronic | ICD-10-CM

## 2017-10-18 DIAGNOSIS — C78.7 METASTATIC RENAL CELL CARCINOMA TO LIVER: ICD-10-CM

## 2017-10-18 DIAGNOSIS — K52.1 DIARRHEA DUE TO DRUG: ICD-10-CM

## 2017-10-18 DIAGNOSIS — I15.2 HYPERTENSION ASSOCIATED WITH DIABETES: ICD-10-CM

## 2017-10-18 DIAGNOSIS — F19.982 DRUG INDUCED INSOMNIA: ICD-10-CM

## 2017-10-18 DIAGNOSIS — C79.51 METASTATIC RENAL CELL CARCINOMA TO BONE: ICD-10-CM

## 2017-10-18 DIAGNOSIS — E11.22 TYPE 2 DIABETES MELLITUS WITH STAGE 3 CHRONIC KIDNEY DISEASE, WITH LONG-TERM CURRENT USE OF INSULIN: Chronic | ICD-10-CM

## 2017-10-18 DIAGNOSIS — C64.9 METASTATIC RENAL CELL CARCINOMA TO LIVER: ICD-10-CM

## 2017-10-18 DIAGNOSIS — E03.9 ACQUIRED HYPOTHYROIDISM: ICD-10-CM

## 2017-10-18 DIAGNOSIS — E11.59 HYPERTENSION ASSOCIATED WITH DIABETES: ICD-10-CM

## 2017-10-18 DIAGNOSIS — C64.9 METASTATIC RENAL CELL CARCINOMA TO BONE: ICD-10-CM

## 2017-10-18 DIAGNOSIS — Z79.4 TYPE 2 DIABETES MELLITUS WITH STAGE 3 CHRONIC KIDNEY DISEASE, WITH LONG-TERM CURRENT USE OF INSULIN: Chronic | ICD-10-CM

## 2017-10-18 PROCEDURE — 72195 MRI PELVIS W/O DYE: CPT | Mod: 26,,, | Performed by: RADIOLOGY

## 2017-10-18 PROCEDURE — 90662 IIV NO PRSV INCREASED AG IM: CPT | Mod: S$GLB,,, | Performed by: INTERNAL MEDICINE

## 2017-10-18 PROCEDURE — 99215 OFFICE O/P EST HI 40 MIN: CPT | Mod: S$GLB,,, | Performed by: INTERNAL MEDICINE

## 2017-10-18 PROCEDURE — 72195 MRI PELVIS W/O DYE: CPT | Mod: TC

## 2017-10-18 PROCEDURE — 99999 PR PBB SHADOW E&M-EST. PATIENT-LVL III: CPT | Mod: PBBFAC,,, | Performed by: INTERNAL MEDICINE

## 2017-10-18 PROCEDURE — G0008 ADMIN INFLUENZA VIRUS VAC: HCPCS | Mod: S$GLB,,, | Performed by: INTERNAL MEDICINE

## 2017-10-18 RX ORDER — MELATONIN 5 MG
TABLET, SUBLINGUAL SUBLINGUAL
Qty: 90 EACH | Refills: 3 | Status: SHIPPED | OUTPATIENT
Start: 2017-10-18 | End: 2018-09-17

## 2017-10-18 RX ORDER — ONDANSETRON HYDROCHLORIDE 8 MG/1
8 TABLET, FILM COATED ORAL 3 TIMES DAILY PRN
Refills: 3 | COMMUNITY
Start: 2017-09-22 | End: 2018-08-27 | Stop reason: SDUPTHER

## 2017-10-18 RX ORDER — CHROMIUM PICOLINATE 200 MCG
1 TABLET ORAL ONCE
Qty: 180 CAPSULE | Refills: 3 | Status: ON HOLD | OUTPATIENT
Start: 2017-10-18 | End: 2019-08-22 | Stop reason: CLARIF

## 2017-10-18 NOTE — PROGRESS NOTES
Primary Care Provider Appointment    Subjective:      Patient ID: Edwin Singleton is a 72 y.o. male with insomnia, metastatic renal CA of L kidney, chemo side-effects, GERD, CAD    Chief Complaint: Follow-up (insomnia); Cancer; and Insomnia    Patient with complaint of insomnia. He watches tv to fall asleep, goes to bed at different time each night, naps during the day. He is resistant to meditation, mindfulness. He sometimes listens to 50s music, but doesn't like classical music because it stresses him. He is requesting ativan 1mg at bedtime as a sleep aid.    His son discontinued his home health. He ordered some items from [employer], but received an incomplete order at a later date than expected.    He complains of nausea, diarrhea and reflux as side-effect of chemo (pazopanib). He takes imodium, ranitidine pre-chemo. He has a script for zofran but does not use it frequently.    He is complaining of bone pain in L hip today with an MRI planned today by Heme-Onc. He has completed radiation therapy of 10 treatments, and is continuing pazopanib started 7/8/17. He was cleared to start Xgeva after dental evaluation.    He reports his glucose has been in around 116 fasting AM readings. Lantus was discontinued at last appt. He administers short acting insulin as needed for glucose >150. He hasn't used it in 2 days. His  PO intake is reduced since chemotherapy started.    Past Surgical History:   Procedure Laterality Date    ABDOMINAL SURGERY      APPENDECTOMY      BACK SURGERY      CARDIAC SURGERY      CHOLECYSTECTOMY      coronary stenting      X 5 last one in 2010-11.       Past Medical History:   Diagnosis Date    Acquired hypothyroidism 5/26/2017    Benign essential HTN 5/26/2017    Benign prostatic hyperplasia (BPH) with urinary urge incontinence 6/6/2017    Chronic low back pain 6/1/2017    Coronary artery disease involving native coronary artery of native heart without angina pectoris 5/26/2017     "S/p 5 stents - last one around 2010-11.    Gastroesophageal reflux disease without esophagitis 5/26/2017    History of CVA (cerebrovascular accident) 5/26/2017    Lumbar disc lesion 5/26/2017    Metastatic renal cell carcinoma to bone 6/6/2017    Metastatic renal cell carcinoma to liver 6/6/2017    Liver Biopsy 5-2017: METASTATIC RENAL CELL CARCINOMA.    Mixed hyperlipidemia 5/26/2017    Type 2 diabetes mellitus with stage 3 chronic kidney disease, with long-term current use of insulin 5/26/2017       Review of Systems   Constitutional: Negative for activity change and unexpected weight change.   HENT: Positive for hearing loss and rhinorrhea. Negative for trouble swallowing.    Eyes: Positive for visual disturbance. Negative for discharge.   Respiratory: Negative for chest tightness and wheezing.    Cardiovascular: Negative for chest pain and palpitations.   Gastrointestinal: Positive for diarrhea. Negative for blood in stool, constipation and vomiting.   Endocrine: Negative for polydipsia and polyuria.   Genitourinary: Positive for difficulty urinating. Negative for hematuria and urgency.   Musculoskeletal: Positive for arthralgias. Negative for joint swelling and neck pain.   Neurological: Positive for weakness. Negative for headaches.   Psychiatric/Behavioral: Negative for confusion and dysphoric mood.       Objective:   /60 (BP Location: Left arm, Patient Position: Sitting, BP Method: Medium (Manual))   Pulse 66   Ht 5' 5" (1.651 m)   Wt 77.7 kg (171 lb 4.8 oz)   BMI 28.51 kg/m²     Physical Exam   Constitutional: He is oriented to person, place, and time. He appears well-developed and well-nourished.   Ambulating by wheelchair   HENT:   Head: Normocephalic.   edentulous   Eyes: Pupils are equal, round, and reactive to light.   Neck:   Decreased ROM in neck    Cardiovascular: Normal rate.    Pulmonary/Chest: Effort normal.   Musculoskeletal: Normal range of motion.   Neurological: He is alert " and oriented to person, place, and time.   Skin: Skin is warm.   Ecchymoses and purpura bilaterally UE  Telangiectasias on face and chest  Multiple tattoos on UE   Psychiatric: He has a normal mood and affect.       Lab Results   Component Value Date    WBC 7.30 09/28/2017    HGB 12.8 (L) 09/28/2017    HCT 36.9 (L) 09/28/2017     09/28/2017    CHOL 112 (L) 08/24/2017    TRIG 224 (H) 08/24/2017    HDL 27 (L) 08/24/2017    ALT 27 09/28/2017    AST 25 09/28/2017     09/28/2017    K 3.8 09/28/2017     09/28/2017    CREATININE 1.1 09/28/2017    BUN 17 09/28/2017    CO2 23 09/28/2017    TSH 6.617 (H) 09/28/2017    PSA 0.61 05/27/2017    INR 1.2 06/12/2017    HGBA1C 6.8 (H) 08/24/2017               Assessment:   72 y.o. male with multiple co-morbid illnesses here to continue work-up of chronic issues notably insomnia, metastatic renal CA of L kidney, chemo side-effects, GERD, CAD.     Plan:     Problem List Items Addressed This Visit        Psychiatric    Drug induced insomnia     Patient with insomnia worsened with anxiety about his CA treatment  · Start melatonin 5-10mg  · Med [place] [facility]  · Advised sleep hygiene   - turn off the tv 30 min - 1 hour before bed  - go to bed at the same time, wake up at the same time  - avoid napping              Cardiac/Vascular    Hypertension associated with diabetes     BP controlled on ACE, CCB  · Continue lisinopril 40mg, amlodipine 10mg  · Continue statin, ASA  · cotninue home BP reads            Oncology    Renal cell carcinoma of left kidney     Metastatic renal cell CA (Stage IV - T1,Nx,M1). Followed by Heme-Onc (Dr Reed) radiation therapy to L3 spine - 3 Gy/Fx x 10 fx and pazopanib 800 mg PO daily.  · F/U Heme-Onc  · Continue current therapies  · Patient to be started on Xgeva on 10/26  · Started on Ca and Vit D by PCP            Endocrine    Type 2 diabetes mellitus with stage 3 chronic kidney disease, with long-term current use of insulin  (Chronic)     A1c 11.1 in 5/2017, 6.8 in 8/2017. Weaning off Lantus, PRN novolog 10 preprandial  · Continue current therapies  · Monitor A1c  · Discontinue long acting insulin  · PRN novolog for glucose >150         Acquired hypothyroidism     Elevated TSH in 6/2017 and 8/2017, compliant with levo 50mcg  · Advised to take separate from other meds and food  · Continue current dose  · Dr Reed will recheck on 9/28/17  · Needs increase levothyroxine   · Heme-Onc advised to postpone this change            GI    Gastroesophageal reflux disease without esophagitis (Chronic)     Epigastric pain after eating, worsened after chemo initiation  · Discontinue omeprazole due to chemo interaction  · Continue ranitidine  · Order from [facility]         Diarrhea due to drug     Started on loperamide by Heme-Onc with good improvement  · Continue loperamide           Other Visit Diagnoses    None.         Health Maintenance       Date Due Completion Date    TETANUS VACCINE 02/18/1963 ---    Colonoscopy 02/18/1995 ---    Zoster Vaccine 02/18/2005 ---    Pneumococcal (65+) (1 of 2 - PCV13) 02/18/2010 ---    Influenza Vaccine 08/01/2017 ---TODAY    Hemoglobin A1c 02/24/2018 8/24/2017    Foot Exam 07/07/2018 7/7/2017    Eye Exam 07/07/2018 7/7/2017    Lipid Panel 08/24/2018 8/24/2017          Return in about 6 weeks (around 11/29/2017). . One hour spent with this patient today, half of that in counseling.    Lulú Lewis MD/MPH  Internal Medicine  [facility]  [phone]

## 2017-10-18 NOTE — PATIENT INSTRUCTIONS
TODAY:  - start melatonin 5-10mg each evening as needed for sleep  - start calcium with Vit D supplements twice daily   + scripts printed  - turn off the tv 30 min - 1 hour before bed  - go to bed at the same time, wake up at the same time  - avoid napping  - continue all other meds as directed  - Dr Lewis will call you if Dr Reed says anything different   - flu vaccine

## 2017-10-18 NOTE — ASSESSMENT & PLAN NOTE
Metastatic renal cell CA (Stage IV - T1,Nx,M1). Followed by Heme-Onc (Dr Reed) radiation therapy to L3 spine - 3 Gy/Fx x 10 fx and pazopanib 800 mg PO daily.  · F/U Heme-Onc  · Continue current therapies  · Patient to be started on Xgeva on 10/26  · Started on Ca and Vit D by PCP

## 2017-10-18 NOTE — ASSESSMENT & PLAN NOTE
Elevated TSH in 6/2017 and 8/2017, compliant with levo 50mcg  · Advised to take separate from other meds and food  · Continue current dose  · Dr Reed will recheck on 9/28/17  · Needs increase levothyroxine   · Heme-Onc advised to postpone this change

## 2017-10-18 NOTE — ASSESSMENT & PLAN NOTE
A1c 11.1 in 5/2017, 6.8 in 8/2017. Weaning off Lantus, PRN novolog 10 preprandial  · Continue current therapies  · Monitor A1c  · Discontinue long acting insulin  · PRN novolog for glucose >150

## 2017-10-19 ENCOUNTER — TELEPHONE (OUTPATIENT)
Dept: INTERNAL MEDICINE | Facility: CLINIC | Age: 72
End: 2017-10-19

## 2017-10-19 NOTE — TELEPHONE ENCOUNTER
Please let patient know that Dr Reed wants him to start the calcium with vitamin D supplement.     She also agreed to the use of melatonin at bedtime. Were they able to get these meds?    Thanks,  KJ

## 2017-10-26 ENCOUNTER — HOSPITAL ENCOUNTER (OUTPATIENT)
Dept: CARDIOLOGY | Facility: CLINIC | Age: 72
Discharge: HOME | End: 2017-10-26
Payer: MEDICARE

## 2017-10-26 ENCOUNTER — OFFICE VISIT (OUTPATIENT)
Dept: HEMATOLOGY/ONCOLOGY | Facility: CLINIC | Age: 72
End: 2017-10-26
Payer: MEDICARE

## 2017-10-26 ENCOUNTER — INFUSION (OUTPATIENT)
Dept: INFUSION THERAPY | Facility: HOSPITAL | Age: 72
End: 2017-10-26
Attending: INTERNAL MEDICINE
Payer: MEDICARE

## 2017-10-26 VITALS
OXYGEN SATURATION: 98 % | TEMPERATURE: 99 F | BODY MASS INDEX: 26.53 KG/M2 | HEIGHT: 67 IN | SYSTOLIC BLOOD PRESSURE: 158 MMHG | DIASTOLIC BLOOD PRESSURE: 70 MMHG | RESPIRATION RATE: 15 BRPM | HEART RATE: 69 BPM | WEIGHT: 169.06 LBS

## 2017-10-26 DIAGNOSIS — M25.552 LEFT HIP PAIN: Primary | ICD-10-CM

## 2017-10-26 DIAGNOSIS — C79.51 METASTATIC RENAL CELL CARCINOMA TO BONE: Primary | ICD-10-CM

## 2017-10-26 DIAGNOSIS — C79.51 METASTATIC RENAL CELL CARCINOMA TO BONE: ICD-10-CM

## 2017-10-26 DIAGNOSIS — E03.9 HYPOTHYROIDISM, UNSPECIFIED TYPE: ICD-10-CM

## 2017-10-26 DIAGNOSIS — C64.9 METASTATIC RENAL CELL CARCINOMA TO BONE: Primary | ICD-10-CM

## 2017-10-26 DIAGNOSIS — C64.9 METASTATIC RENAL CELL CARCINOMA TO BONE: ICD-10-CM

## 2017-10-26 DIAGNOSIS — Z51.11 ENCOUNTER FOR CHEMOTHERAPY MANAGEMENT: ICD-10-CM

## 2017-10-26 DIAGNOSIS — C64.2 RENAL CELL CARCINOMA OF LEFT KIDNEY: ICD-10-CM

## 2017-10-26 PROCEDURE — 96372 THER/PROPH/DIAG INJ SC/IM: CPT

## 2017-10-26 PROCEDURE — 99999 PR PBB SHADOW E&M-EST. PATIENT-LVL IV: CPT | Mod: PBBFAC,,, | Performed by: PHYSICIAN ASSISTANT

## 2017-10-26 PROCEDURE — 93000 ELECTROCARDIOGRAM COMPLETE: CPT | Mod: S$GLB,,, | Performed by: INTERNAL MEDICINE

## 2017-10-26 PROCEDURE — 99214 OFFICE O/P EST MOD 30 MIN: CPT | Mod: S$GLB,,, | Performed by: PHYSICIAN ASSISTANT

## 2017-10-26 PROCEDURE — 63600175 PHARM REV CODE 636 W HCPCS: Performed by: INTERNAL MEDICINE

## 2017-10-26 RX ADMIN — DENOSUMAB 120 MG: 120 INJECTION SUBCUTANEOUS at 02:10

## 2017-10-26 NOTE — PROGRESS NOTES
"Subjective:       Patient ID: Edwin Singleton is a 72 y.o. male.    Chief Complaint: Metastatic renal cell carcinoma to liver    Mr. Singleton is a 72 y.o. male who returns for follow up regarding metastatic renal cell carcinoma of left kidney.    HPI: Mr.Mc Moss is a 73 yo male with CAD (s/p 5 stents, remotely), DM2 (insulin dependent), hypothyroidism , CVA (2013) with no residual deficit, chronic back pain. He was transferred to [facility] for neurosurgery evaluation of a lumbar spine lesion in the setting of progressive weakness of his legs and exacerbation of chronic back pain, in May 2017. He states that his back pain and LE weakness progressively worsened. [facility]. The pain radiates down his legs. He denies any incontinence of bowel or bladder. He had several falls. After one of the falls, he presented to the ED at [facility], in May 2017. He had an MRI showing "L3 vertebral body lesion with apparent cortical disruption concerning for metastases vs myeloma," and was transferred to [facility] for neurosurgery evaluation. He was evaluated by neurosurgery regarding concern for lytic lesion on L3 on 5/26/17. No surgical intervention was proposed.   CT chest, abdomen,pelvis on 5/27/17 revealed :   --A 3.6 cm exophytic mass arising from the medial aspect of the lower pole of left kidney,   --A small 1.0 cm, subtle, cortical enhancing mass within the upper pole of the right kidney, concerning for possible contralateral solid renal mass  --Multiple enhancing ill-defined hepatic masses concerning for hepatic metastatic disease, hepatomegaly and hepatic steatosis  --Mulltifocal irregular thickening of the bilateral pleura concerning for possible pleural metastatic disease  --Mild nonspecific nodular thickening of the left adrenal gland without discrete nodule  -- Redemonstration of known lytic lesion within the L3 vertebral body. No definite additional discrete lytic lesions are " identified.  -MRI pelvis from 10/18/17:    2.5 cm linear T1 hypointense T2/STIR hyperintense lesion within the left iliac bone, this lesion is indeterminate but felt less likely to represent metastasis given its appearance. Further evaluation can be obtained with bone scan as clinically indicated.  Findings suggestive of left trochanteric bursitis.  Partially visualized L3 metastatic lesion.  Diffuse thickening of the urinary bladder wall can be seen with chronic outlet obstruction or cystitis.     Interval history:  - he presents for follow-up appointment. MRI as above.. He is tolerating pazopanib well. His main complaints are diarrhea and fatigue. His pain is better controlled, but he notes pain on his right side that travels down his leg, there is associated point tenderness at his hip, unchanged from last visit.         Review of Systems   Constitutional: Negative for appetite change and unexpected weight change.   Eyes: Negative for visual disturbance.   Respiratory: Positive for shortness of breath. Negative for cough.    Cardiovascular: Negative for chest pain.   Gastrointestinal: Positive for diarrhea. Negative for abdominal pain.   Genitourinary: Positive for frequency.   Musculoskeletal: Positive for back pain.   Skin: Positive for rash.   Neurological: Negative for headaches.   Hematological: Negative for adenopathy.   Psychiatric/Behavioral: The patient is not nervous/anxious.        Objective:      Physical Exam   Constitutional: He is oriented to person, place, and time. He appears well-nourished. No distress.   Presents with son  ECOG 2   HENT:   Head: Normocephalic.   Mouth/Throat: Oropharynx is clear and moist. No oropharyngeal exudate.   Eyes: Conjunctivae are normal. Pupils are equal, round, and reactive to light. No scleral icterus.   Neck: Normal range of motion. Neck supple. No thyromegaly present.   Cardiovascular: Normal rate, regular rhythm, normal heart sounds and intact distal pulses.     Pulmonary/Chest: Effort normal and breath sounds normal. No respiratory distress.   Abdominal: Soft. Bowel sounds are normal. He exhibits no distension and no mass. There is no tenderness.   Musculoskeletal: Normal range of motion. He exhibits no edema.   No spinal or paraspinal tenderness to palpation  Point tenderness at left hip, walks with slight limp on left     Lymphadenopathy:     He has no cervical adenopathy.   Neurological: He is alert and oriented to person, place, and time. No cranial nerve deficit.   Skin: Skin is warm and dry. No rash noted. No erythema. No pallor.   Psychiatric: He has a normal mood and affect. His behavior is normal. Thought content normal.   Vitals reviewed.      Assessment:       1. Left hip pain    2. 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 Hypothyroidism, unspecified type    4. Renal cell carcinoma of left kidney        Plan:       Continue Pazopanib however will obtain bone scan for better characterization of continued left hip pain. He will continue Norco for pain.   Xgeva today.  Continue lomotil for treatment related diarrhea.   RTC in 1-2 week with reimaging as above.     Distress Screening Results: Psychosocial Distress screening score of Distress Score: 0 noted and reviewed. No intervention indicated.

## 2017-10-26 NOTE — Clinical Note
Please schedule bone scan - can you do it the same day as his appt on 11/10 at [facility]? (not sure if they have that imaging there) - if not possible let me know [place] cbc/cmp/tsh/free T4 on 11/24 with Xgeva the same day

## 2017-10-27 ENCOUNTER — TELEPHONE (OUTPATIENT)
Dept: PHARMACY | Facility: CLINIC | Age: 72
End: 2017-10-27

## 2017-11-01 DIAGNOSIS — C79.51 METASTATIC RENAL CELL CARCINOMA TO BONE: ICD-10-CM

## 2017-11-01 DIAGNOSIS — C64.9 METASTATIC RENAL CELL CARCINOMA TO BONE: ICD-10-CM

## 2017-11-01 DIAGNOSIS — C78.7 METASTATIC RENAL CELL CARCINOMA TO LIVER: ICD-10-CM

## 2017-11-01 DIAGNOSIS — C64.9 METASTATIC RENAL CELL CARCINOMA TO LIVER: ICD-10-CM

## 2017-11-01 DIAGNOSIS — G89.3 NEOPLASM RELATED PAIN: ICD-10-CM

## 2017-11-01 RX ORDER — MORPHINE SULFATE 15 MG/1
15 TABLET ORAL EVERY 6 HOURS PRN
Qty: 60 TABLET | Refills: 0 | Status: SHIPPED | OUTPATIENT
Start: 2017-11-01 | End: 2017-11-21 | Stop reason: SDUPTHER

## 2017-11-01 RX ORDER — HYDROCODONE BITARTRATE AND ACETAMINOPHEN 5; 325 MG/1; MG/1
1 TABLET ORAL EVERY 8 HOURS PRN
Qty: 50 TABLET | Refills: 0 | Status: SHIPPED | OUTPATIENT
Start: 2017-11-01 | End: 2017-11-21 | Stop reason: SDUPTHER

## 2017-11-10 ENCOUNTER — INITIAL CONSULT (OUTPATIENT)
Dept: PODIATRY | Facility: CLINIC | Age: 72
End: 2017-11-10
Payer: MEDICARE

## 2017-11-10 ENCOUNTER — TELEPHONE (OUTPATIENT)
Dept: RADIOLOGY | Facility: HOSPITAL | Age: 72
End: 2017-11-10

## 2017-11-10 VITALS
HEART RATE: 68 BPM | DIASTOLIC BLOOD PRESSURE: 85 MMHG | RESPIRATION RATE: 18 BRPM | BODY MASS INDEX: 26.21 KG/M2 | HEIGHT: 67 IN | WEIGHT: 167 LBS | SYSTOLIC BLOOD PRESSURE: 176 MMHG

## 2017-11-10 DIAGNOSIS — Z79.4 TYPE 2 DIABETES MELLITUS WITH STAGE 3 CHRONIC KIDNEY DISEASE, WITH LONG-TERM CURRENT USE OF INSULIN: Chronic | ICD-10-CM

## 2017-11-10 DIAGNOSIS — E11.22 TYPE 2 DIABETES MELLITUS WITH STAGE 3 CHRONIC KIDNEY DISEASE, WITH LONG-TERM CURRENT USE OF INSULIN: Chronic | ICD-10-CM

## 2017-11-10 DIAGNOSIS — M20.41 HAMMER TOES OF BOTH FEET: ICD-10-CM

## 2017-11-10 DIAGNOSIS — B35.1 ONYCHOMYCOSIS DUE TO DERMATOPHYTE: ICD-10-CM

## 2017-11-10 DIAGNOSIS — N18.30 TYPE 2 DIABETES MELLITUS WITH STAGE 3 CHRONIC KIDNEY DISEASE, WITH LONG-TERM CURRENT USE OF INSULIN: Chronic | ICD-10-CM

## 2017-11-10 DIAGNOSIS — E11.49 TYPE II DIABETES MELLITUS WITH NEUROLOGICAL MANIFESTATIONS: Primary | ICD-10-CM

## 2017-11-10 DIAGNOSIS — M20.42 HAMMER TOES OF BOTH FEET: ICD-10-CM

## 2017-11-10 DIAGNOSIS — L84 CORN OR CALLUS: ICD-10-CM

## 2017-11-10 PROCEDURE — 99213 OFFICE O/P EST LOW 20 MIN: CPT | Mod: 25,S$GLB,, | Performed by: PODIATRIST

## 2017-11-10 PROCEDURE — 11721 DEBRIDE NAIL 6 OR MORE: CPT | Mod: Q9,S$GLB,, | Performed by: PODIATRIST

## 2017-11-10 RX ORDER — HYDROCODONE BITARTRATE AND ACETAMINOPHEN 5; 325 MG/1; MG/1
TABLET ORAL
Refills: 0 | COMMUNITY
Start: 2017-11-01 | End: 2018-01-10 | Stop reason: SDUPTHER

## 2017-11-10 NOTE — PATIENT INSTRUCTIONS
Diabetes: Inspecting Your Feet    Diabetes increases your chances of developing foot problems. So inspect your feet every day. This helps you find small skin irritations before they become serious ulcers or infections. If you have trouble seeing the bottoms of your feet, use a mirror or ask a family member or friend to help.  How to check your feet  Below are tips to help you look for foot problems. Try to check your feet at the same time each day, such as when you get out of bed in the morning:  · Check the top of each foot. The tops of toes, back of the heel, and outer edge of the foot can get a lot of rubbing from poor-fitting shoes.  · Check the bottom of each foot. Daily wear and tear often leads to problems at pressure spots.  · Check the toes and nails. Fungal infections often occur between toes. Toenail problems can also be a sign of fungal infections or lead to breaks in the skin.  · Check your shoes, too. Loose objects inside a shoe can injure the foot. Use your hand to feel inside your shoes for things like mark, loose stitching, or rough areas that could irritate your skin.  Warning signs  Look for any color changes in the foot. Redness with streaks can signal a severe infection, which needs immediate medical attention. Tell your healthcare provider right away if you have any of these problems:  · Swelling, sometimes with color changes, may be a sign of poor blood flow or infection. Symptoms include tenderness and an increase in the size of your foot.  · Warm or hot areas on your feet may be signs of infection. A foot that is cold may not be getting enough blood.  · Sensations such as burning, tingling, or pins and needles can be signs of a problem. Also check for areas that may be numb.  · Hot spots are caused by friction or pressure. Look for hot spots in areas that get a lot of rubbing. Hot spots can turn into blisters, calluses, or sores.  · Cracks and sores are caused by dry or irritated  skin. They are a sign that the skin is breaking down, which can lead to infection.  · Toenail problems to watch for include nails growing into the skin (ingrown toenail) and causing redness or pain. Thick, yellow, or discolored nails can signal a fungal infection.  · Drainage and odor can develop from untreated sores and ulcers. Call your healthcare provider right away if you notice white or yellow drainage, bleeding, or unpleasant odor.   Date Last Reviewed: 6/1/2016 © 2000-2017 [employer]. [address] [place] [id number]. All rights reserved. This information is not intended as a substitute for professional medical care. Always follow your healthcare professional's instructions.        Your Diabetes Foot Care Program    Every day you depend on your feet to keep you moving. But when you have diabetes, your feet need special care. Even a small foot problem can become very serious. So dont take your feet for granted. By working with your diabetes healthcare team, you can learn how to protect your feet and keep them healthy.  Evaluating your feet  An evaluation helps your healthcare provider check the condition of your feet. The evaluation includes a review of your diabetes history and overall health. It may also include a foot exam, X-rays, or other tests. These can help show problems beneath the skin that you cant see or feel.  Medical history  You will be asked about your overall health and any history of foot problems. Youll also discuss your diabetes history, such as whether your blood sugar level has changed over time. It also includes questions about sensations of pain, tingling, pins and needles, or numbness. Your healthcare provider will also want to know if you have high blood pressure and heart disease, or if you smoke. Be sure to mention any medicines (including over-the-counter), supplements, or herbal remedies you take.  Foot exam  A foot exam checks the condition of  different parts of your foot. First, your skin and nails are examined for any signs of infection. Blood flow is checked by feeling for the pulses in each foot. You may also have tests to study the nerves in the foot. These include using a small filament (wire) to see how sensitive your feet are. In certain cases, you will be asked to walk a short distance to check for bone, joint, and muscle problems.  Diagnostic tests  If needed, your healthcare provider will suggest certain tests to learn more about your feet. These include:  · Doppler tests to measure blood flow in the feet and lower leg.  · X-rays, which can show bone or joint problems.  · Other imaging tests, such as an MRI (magnetic resonance imaging), bone scan, and CT (computed tomography) scan. These can help show bone infections.  · Other tests, such as vascular tests, which study the blood flow in your feet and legs. You may also have nerve studies to learn how sensitive your feet are.  Creating a foot care program  Based on the evaluation, your healthcare provider will create a foot care program for you. Your program may be as simple as starting a daily self-care routine and changing the types of shoes your wear. It may also involve treating minor foot problems, such as a corn or blister. In some cases, surgery will be needed to treat an infection or mechanical problems, such as hammer toes.  Preventing problems  When you have diabetes, its easier to prevent problems than to treat them later on. So see your healthcare team for regular checkups and foot care. Your healthcare team can also help you learn more about caring for your feet at home. For example, you may be told to avoid walking barefoot. Or you may be told that special footwear is needed to protect your feet.  Have regular checkups  Foot problems can develop quickly. So be sure to follow your healthcare teams schedule for regular checkups. During office visits, take off your shoes and socks  as soon as you get in the exam room. Ask your healthcare provider to examine your feet for problems. This will make it easier to find and treat small skin irritations before they get worse. Regular checkups can also help keep track of the blood flow and feeling in your feet. If you have neuropathy (lack of feeling in your feet), you will need to have checkups more often.  Learn about self-care  The more you know about diabetes and your feet, the easier it will be to prevent problems. Members of your healthcare team can teach you how to inspect your feet and teach you to look for warning signs. They can also give you other foot care tips. During office visits, be sure to ask any questions you have.  Date Last Reviewed: 7/1/2016 © 2000-2017 [employer]. [address] [place] [address], [place] [address]. All rights reserved. This information is not intended as a substitute for professional medical care. Always follow your healthcare professional's instructions.        Long-Term Complications of Diabetes    Diabetes can cause health problems over time. These are called complications. They are more likely to happen if your blood sugar is often too high. Over time, high blood sugar can damage blood vessels in your body. It is important to keep your blood sugar in your target range. This can help prevent or delay complications from diabetes.  Possible complications  Complications of diabetes include:  · Eye problems, including damage to the blood vessels in the eyes (retinopathy), pressure in the eye (glaucoma), and clouding of the eyes lens (a cataract). Eye problems can eventually lead to irreversible blindness.   · Tooth and gum problems (periodontal disease), causing loss of teeth and bone  · Blood vessel (vascular) disease leading to circulation problems, heart attack or stroke, or a need for amputation of a limb   · Problems with sexual function leading to erectile dysfunction in men and sexual discomfort in  women   · Kidney disease (nephropathy) can eventually lead to kidney failure, which may require dialysis or kidney transplant   · Nerve problems (neuropathy), causing pain or loss of feeling in your feet and other parts of your body, potentially leading to an amputation of a limb   · High blood pressure (hypertension), putting strain on your heart and blood vessels  · Serious infections, possibly leading to loss of toes, feet, or limbs  How to avoid complications  The serious consequences of these complications may be avoidable for most people with diabetes by managing your blood glucose, blood pressure, and cholesterol levels. This can help you feel better and stay healthy. You can manage diabetes by tracking your blood sugar. You can also eat healthy and exercise to avoid gaining weight. And you should take medicine if directed by your healthcare provider.  Date Last Reviewed: 5/1/2016 © 2000-2017 [employer]. [address] [place] [address], [place] [address]. All rights reserved. This information is not intended as a substitute for professional medical care. Always follow your healthcare professional's instructions.

## 2017-11-10 NOTE — PROGRESS NOTES
Subjective:    Patient ID: Edwin Singleton is a 72 y.o. male.    Chief Complaint: Diabetes Mellitus (pcp Dr. Lulú Lewis last appt 10/18/17)      HPI:   Edwin is a 72 y.o. male who presents to the clinic for evaluation and treatment of high risk feet. Edwin has a past medical history of Acquired hypothyroidism (5/26/2017); Benign essential HTN (5/26/2017); Benign prostatic hyperplasia (BPH) with urinary urge incontinence (6/6/2017); Chronic low back pain (6/1/2017); Coronary artery disease involving native coronary artery of native heart without angina pectoris (5/26/2017); Gastroesophageal reflux disease without esophagitis (5/26/2017); History of CVA (cerebrovascular accident) (5/26/2017); Lumbar disc lesion (5/26/2017); Metastatic renal cell carcinoma to bone (6/6/2017); Metastatic renal cell carcinoma to liver (6/6/2017); Mixed hyperlipidemia (5/26/2017); and Type 2 diabetes mellitus with stage 3 chronic kidney disease, with long-term current use of insulin (5/26/2017). The patient's chief complaint is long, thick toenails. This patient has documented high risk feet requiring routine maintenance secondary to diabetes mellitis and those secondary complications of diabetes, as mentioned..      HPI    Last Podiatry Enc: Visit date not found  Last Enc w/ Me: Visit date not found    PCP: Lulú Lewis MD    Date Last Seen by PCP: 10/18/17    Current shoe gear:  Affected Foot: Tennis shoes      Hemoglobin A1C   Date Value Ref Range Status   08/24/2017 6.8 (H) 4.0 - 5.6 % Final     Comment:     According to ADA guidelines, hemoglobin A1c <7.0% represents  optimal control in non-pregnant diabetic patients. Different  metrics may apply to specific patient populations.   Standards of Medical Care in Diabetes-2016.  For the purpose of screening for the presence of diabetes:  <5.7%     Consistent with the absence of diabetes  5.7-6.4%  Consistent with increasing risk for diabetes   (prediabetes)  >or=6.5%   Consistent with diabetes  Currently, no consensus exists for use of hemoglobin A1c  for diagnosis of diabetes for children.  This Hemoglobin A1c assay has significant interference with fetal   hemoglobin   (HbF). The results are invalid for patients with abnormal amounts of   HbF,   including those with known Hereditary Persistence   of Fetal Hemoglobin. Heterozygous hemoglobin variants (HbAS, HbAC,   HbAD, HbAE, HbA2) do not significantly interfere with this assay;   however, presence of multiple variants in a sample may impact the %   interference.     05/27/2017 11.1 (H) 4.5 - 6.2 % Final     Comment:     According to ADA guidelines, hemoglobin A1C <7.0% represents  optimal control in non-pregnant diabetic patients.  Different  metrics may apply to specific populations.   Standards of Medical Care in Diabetes - 2016.  For the purpose of screening for the presence of diabetes:  <5.7%     Consistent with the absence of diabetes  5.7-6.4%  Consistent with increasing risk for diabetes   (prediabetes)  >or=6.5%  Consistent with diabetes  Currently no consensus exists for use of hemoglobin A1C  for diagnosis of diabetes for children.       Past Medical History:   Diagnosis Date    Acquired hypothyroidism 5/26/2017    Benign essential HTN 5/26/2017    Benign prostatic hyperplasia (BPH) with urinary urge incontinence 6/6/2017    Chronic low back pain 6/1/2017    Coronary artery disease involving native coronary artery of native heart without angina pectoris 5/26/2017    S/p 5 stents - last one around 2010-11.    Gastroesophageal reflux disease without esophagitis 5/26/2017    History of CVA (cerebrovascular accident) 5/26/2017    Lumbar disc lesion 5/26/2017    Metastatic renal cell carcinoma to bone 6/6/2017    Metastatic renal cell carcinoma to liver 6/6/2017    Liver Biopsy 5-2017: METASTATIC RENAL CELL CARCINOMA.    Mixed hyperlipidemia 5/26/2017    Type 2 diabetes mellitus with stage 3 chronic kidney  disease, with long-term current use of insulin 5/26/2017     Past Surgical History:   Procedure Laterality Date    ABDOMINAL SURGERY      APPENDECTOMY      BACK SURGERY      CARDIAC SURGERY      CHOLECYSTECTOMY      coronary stenting      X 5 last one in 2010-11.     Social History     Social History    Marital status: Single     Spouse name: N/A    Number of children: N/A    Years of education: N/A     Occupational History    Not on file.     Social History Main Topics    Smoking status: Never Smoker    Smokeless tobacco: Never Used    Alcohol use No    Drug use: No    Sexual activity: Not Currently     Partners: Female     Other Topics Concern    Not on file     Social History Narrative    No narrative on file         Current Outpatient Prescriptions:     amlodipine (NORVASC) 10 MG tablet, Take 1 tablet (10 mg total) by mouth once daily., Disp: 90 tablet, Rfl: 3    aspirin (ECOTRIN) 81 MG EC tablet, Take 81 mg by mouth once daily., Disp: , Rfl:     atorvastatin (LIPITOR) 40 MG tablet, Take 1 tablet (40 mg total) by mouth once daily., Disp: 90 tablet, Rfl: 3    hydrocodone-acetaminophen 5-325mg (NORCO) 5-325 mg per tablet, Take 1 tablet by mouth every 8 (eight) hours as needed for Pain., Disp: 50 tablet, Rfl: 0    hydrocodone-acetaminophen 5-325mg (NORCO) 5-325 mg per tablet, TAKE 1 TABLET BY MOUTH EVERY 8 (EIGHT) HOURS AS NEEDED FOR PAIN., Disp: , Rfl: 0    insulin aspart (NOVOLOG) 100 unit/mL injection, Inject 10 Units into the skin 3 (three) times daily with meals., Disp: 10 mL, Rfl: 11    levothyroxine 50 mcg Cap, Take 50 mcg by mouth before breakfast., Disp: 90 capsule, Rfl: 3    lisinopril (PRINIVIL,ZESTRIL) 40 MG tablet, Take 1 tablet (40 mg total) by mouth once daily., Disp: 90 tablet, Rfl: 3    melatonin 5 mg Subl, Place 1-2 tablets under the tongue each evening as needed for sleep, Disp: 90 each, Rfl: 3    morphine (MSIR) 15 MG tablet, Take 1 tablet (15 mg total) by mouth every  "6 (six) hours as needed for Pain., Disp: 60 tablet, Rfl: 0    ondansetron (ZOFRAN) 8 MG tablet, Take 8 mg by mouth 3 (three) times daily as needed., Disp: , Rfl: 3    PAZOpanib 200 mg Tab, Take 800 mg by mouth once daily., Disp: 30 tablet, Rfl: 11    ranitidine (ZANTAC) 150 MG tablet, Take 1 tablet (150 mg total) by mouth 2 (two) times daily. Separate ingestion of ranitidine from chemo (pazopanib) by 10 hours, Disp: 60 tablet, Rfl: 0    tamsulosin (FLOMAX) 0.4 mg Cp24, Take 1 capsule (0.4 mg total) by mouth every evening., Disp: 90 capsule, Rfl: 4    calcium carbonate-vitamin D3 (CALCIUM 600 WITH VITAMIN D3) 600 mg(1,500mg) -400 unit Cap, Take 1 capsule by mouth once., Disp: 180 capsule, Rfl: 3    lorazepam (ATIVAN) 1 MG tablet, Take 1 tablet (1 mg total) by mouth daily as needed for Anxiety (use only as needed before MRI or CT, very sedating)., Disp: 10 tablet, Rfl: 0   Review of patient's allergies indicates:  No Known Allergies    ROS  ROS Constitutional:  General Appearance: well nourished  Vascular: negative for cramps, edema and bruising  Musculoskeletal: negative for joint paint and joint edema  Skin: negative for rashes and lesions  Neurological: positive for burning, tingling, numbness  Gastrointestinal: negative for stomach pain, nausea and vomiting        Objective:        BP (!) 176/85 (BP Location: Left arm, Patient Position: Sitting, BP Method: Large (Automatic))   Pulse 68   Resp 18   Ht 5' 7" (1.702 m)   Wt 75.8 kg (167 lb)   BMI 26.16 kg/m²     General    Nursing note and vitals reviewed.  Constitutional: He is oriented to person, place, and time. He appears well-developed and well-nourished.   Neurological: He is alert and oriented to person, place, and time.   Psychiatric: He has a normal mood and affect. His behavior is normal.         Right Ankle/Foot Exam     Inspection   Deformity: present        Vascular Exam     Right Pulses  Dorsalis Pedis:      2+  Posterior Tibial:      " 2+        Left Pulses  Dorsalis Pedis:      2+  Posterior Tibial:      2+          Physical Exam   Constitutional: He is oriented to person, place, and time. He appears well-developed and well-nourished.   Cardiovascular:   Pulses:       Dorsalis pedis pulses are 2+ on the right side, and 2+ on the left side.        Posterior tibial pulses are 2+ on the right side, and 2+ on the left side.   Musculoskeletal:        Right foot: There is decreased range of motion and deformity.        Left foot: There is decreased range of motion.   Feet:   Right Foot:   Protective Sensation: 10 sites tested. 6 sites sensed.   Left Foot:   Protective Sensation: 10 sites tested. 6 sites sensed.   Neurological: He is alert and oriented to person, place, and time.   Psychiatric: He has a normal mood and affect. His behavior is normal.   Nursing note and vitals reviewed.    LE exam con't:  V: DP 2/4, PT 2/4, CRT< 3s to all digits tested. hair growth present bilaterally, coloration normal, varicosities present bilaterally    N: SILT in SP/DP/T/Gautam/Saph distributions.      [place] Neuropathy Screening:   Left Right   Normal Appearance No-1 No-1   Ulceration no-0 no-0   Achilles Reflex normal-0 normal-0   Vibratory Sensation normal-0 normal-0   10 Gram MF reduced-0.5 reduced-0.5   Total 1.5/5 1.5/5     3/10         Derm: Skin intact. texture turgor well hydrated, pigment changes present bilaterally, nails dystrophic, mycotic, nails discolored    Ortho:  +Motor EHL/FHL/TA/GA   no TTP of foot or ankles   Compartments soft/compressible. No pain on passive stretch of big toe. No calf  pain.        Assessment:     Imaging / Labs:            1. Type II diabetes mellitus with neurological manifestations    2. Type 2 diabetes mellitus with stage 3 chronic kidney disease, with long-term current use of insulin    3. Hammer toes of both feet    4. Corn or callus    5. Onychomycosis due to dermatophyte          Plan:       Orders Placed This Encounter  "   Foot Care    DIABETIC SHOES FOR HOME USE     Routine Foot Care  Date/Time: 11/10/2017 11:02 AM  Performed by: SY RAYMUNDO JR.  Authorized by: SY RAYMUNDO JR.     Time out: Immediately prior to procedure a "time out" was called to verify the correct patient, procedure, equipment, support staff and site/side marked as required.    Consent Done?:  Yes (Verbal)  Hyperkeratotic Skin Lesions?: No      Nail Care Type:  Debride  Location(s): All  (Left 1st Toe, Left 3rd Toe, Left 2nd Toe, Left 4th Toe, Left 5th Toe, Right 1st Toe, Right 2nd Toe, Right 3rd Toe, Right 4th Toe and Right 5th Toe)  Patient tolerance:  Patient tolerated the procedure well with no immediate complications      .   Edwin was seen today for diabetes mellitus.    Diagnoses and all orders for this visit:    Type II diabetes mellitus with neurological manifestations  -     DIABETIC SHOES FOR HOME USE  -     Foot Care    Type 2 diabetes mellitus with stage 3 chronic kidney disease, with long-term current use of insulin    Hammer toes of both feet  -     DIABETIC SHOES FOR HOME USE    Corn or callus  -     DIABETIC SHOES FOR HOME USE    Onychomycosis due to dermatophyte        Edwin Singleton is a 72 y.o. male presenting w/   1. Type II diabetes mellitus with neurological manifestations    2. Type 2 diabetes mellitus with stage 3 chronic kidney disease, with long-term current use of insulin    3. Hammer toes of both feet    4. Corn or callus    5. Onychomycosis due to dermatophyte        -pt seen, evaluated, and managed  -dx discussed in detail. All questions/concerns addressed  -all tx options discussed. All alternatives, risks, benefits of all txs discussed  -discussed reducing caloric intake, increase physical activity  -The patient was educated regarding the above diagnosis. We discussed conservative care options regarding shoe wear and/or padding.  -rxs dispensed: dm shoe rx  -px as above  -Shoe inspection. Diabetic Foot Education. " Patient reminded of the importance of good nutrition and blood sugar control to help prevent podiatric complications of diabetes. Patient instructed on proper foot hygeine. We discussed wearing proper shoe gear, daily foot inspections, never walking without protective shoe gear, never putting sharp instruments to feet.      Return in about 6 months (around 5/10/2018).

## 2017-11-13 ENCOUNTER — TELEPHONE (OUTPATIENT)
Dept: PHARMACY | Facility: CLINIC | Age: 72
End: 2017-11-13

## 2017-11-13 ENCOUNTER — HOSPITAL ENCOUNTER (OUTPATIENT)
Dept: RADIOLOGY | Facility: HOSPITAL | Age: 72
Discharge: HOME | End: 2017-11-13
Attending: PHYSICIAN ASSISTANT
Payer: MEDICARE

## 2017-11-13 DIAGNOSIS — M25.552 LEFT HIP PAIN: ICD-10-CM

## 2017-11-13 PROCEDURE — 78306 BONE IMAGING WHOLE BODY: CPT | Mod: 26,,, | Performed by: NUCLEAR MEDICINE

## 2017-11-13 PROCEDURE — A9503 TC99M MEDRONATE: HCPCS

## 2017-11-15 NOTE — TELEPHONE ENCOUNTER
.FOR DOCUMENTATION ONLY:  Financial Assistance for [employer] approved from 11-14-17 to 10-14-18  [facility]  BIN: [id number]  PCN: [id number]  Id: [id number]  GRP: [id number]  10,000/nithya

## 2017-11-20 ENCOUNTER — TELEPHONE (OUTPATIENT)
Dept: PHARMACY | Facility: CLINIC | Age: 72
End: 2017-11-20

## 2017-11-21 DIAGNOSIS — C79.51 METASTATIC RENAL CELL CARCINOMA TO BONE: ICD-10-CM

## 2017-11-21 DIAGNOSIS — C64.9 METASTATIC RENAL CELL CARCINOMA TO BONE: ICD-10-CM

## 2017-11-21 DIAGNOSIS — G89.3 NEOPLASM RELATED PAIN: ICD-10-CM

## 2017-11-21 DIAGNOSIS — C64.9 METASTATIC RENAL CELL CARCINOMA TO LIVER: ICD-10-CM

## 2017-11-21 DIAGNOSIS — C78.7 METASTATIC RENAL CELL CARCINOMA TO LIVER: ICD-10-CM

## 2017-11-21 RX ORDER — HYDROCODONE BITARTRATE AND ACETAMINOPHEN 5; 325 MG/1; MG/1
1 TABLET ORAL EVERY 8 HOURS PRN
Qty: 50 TABLET | Refills: 0 | Status: SHIPPED | OUTPATIENT
Start: 2017-11-21 | End: 2017-12-11 | Stop reason: SDUPTHER

## 2017-11-21 RX ORDER — MORPHINE SULFATE 15 MG/1
15 TABLET ORAL EVERY 6 HOURS PRN
Qty: 60 TABLET | Refills: 0 | Status: SHIPPED | OUTPATIENT
Start: 2017-11-21 | End: 2017-12-11 | Stop reason: SDUPTHER

## 2017-11-22 NOTE — PROGRESS NOTES
"Metastatic renal cell carcinoma to liver     Mr. Singleton is a 72 y.o. male who returns for follow up . He has metastatic renal cell carcinoma.     HPI: Mr.Mc Moss is a 73 yo male with CAD (s/p 5 stents, remotely), DM2 (insulin dependent), hypothyroidism , CVA (2013) with no residual deficit, chronic back pain. He [place] [facility] [place] of a lumbar spine lesion in the setting of progressive weakness of his legs and exacerbation of chronic back pain, in May 2017. He states that his back pain and LE weakness progressively worsened. [facility]. The pain radiates down his legs. He denies any incontinence of bowel or bladder. He had several falls. After one of the falls, he presented to the ED at [facility], in May 2017. He had an MRI showing "L3 vertebral body lesion with apparent cortical disruption concerning for metastases vs myeloma," and was transferred to [facility] [place] evaluation. He was evaluated by neurosurgery regarding concern for lytic lesion on L3 on 5/26/17. No surgical intervention was proposed.   CT chest, abdomen,pelvis on 5/27/17 revealed :   --A 3.6 cm exophytic mass arising from the medial aspect of the lower pole of left kidney,   --A small 1.0 cm, subtle, cortical enhancing mass within the upper pole of the right kidney, concerning for possible contralateral solid renal mass  --Multiple enhancing ill-defined hepatic masses concerning for hepatic metastatic disease, hepatomegaly and hepatic steatosis  --Mulltifocal irregular thickening of the bilateral pleura concerning for possible pleural metastatic disease  --Mild nonspecific nodular thickening of the left adrenal gland without discrete nodule  -- Redemonstration of known lytic lesion within the L3 vertebral body. No definite additional discrete lytic lesions are identified.  -MRI pelvis from 10/18/17:    2.5 cm linear T1 hypointense T2/STIR hyperintense lesion " within the left iliac bone, this lesion is indeterminate but felt less likely to represent metastasis given its appearance. Further evaluation can be obtained with bone scan as clinically indicated.  Findings suggestive of left trochanteric bursitis.  Partially visualized L3 metastatic lesion.  Diffuse thickening of the urinary bladder wall can be seen with chronic outlet obstruction or cystitis.     Interval history:   He is compliant with Pazopanib. He continues to have diarrhea and fatigue. He takes Imodium and Lomotil for diarrhea. His pain is better controlled. He had whole body bone scan on 11/13/17    Review of Systems   Constitutional: Positive for malaise/fatigue. Negative for chills, fever and weight loss.   HENT: Negative for congestion, hearing loss, nosebleeds and sinus pain.    Eyes: Negative for blurred vision and double vision.   Respiratory: Negative for cough, hemoptysis and sputum production.    Cardiovascular: Negative for chest pain, palpitations and PND.   Gastrointestinal: Positive for diarrhea. Negative for abdominal pain, heartburn and nausea.   Genitourinary: Negative for dysuria, hematuria and urgency.   Musculoskeletal: Positive for back pain and myalgias. Negative for neck pain.   Skin: Negative for rash.   Neurological: Positive for weakness. Negative for dizziness, sensory change, speech change and headaches.   Endo/Heme/Allergies: Does not bruise/bleed easily.   Psychiatric/Behavioral: Negative for depression.         Current Outpatient Prescriptions   Medication Sig    amlodipine (NORVASC) 10 MG tablet Take 1 tablet (10 mg total) by mouth once daily.    aspirin (ECOTRIN) 81 MG EC tablet Take 81 mg by mouth once daily.    atorvastatin (LIPITOR) 40 MG tablet Take 1 tablet (40 mg total) by mouth once daily.    calcium carbonate-vitamin D3 (CALCIUM 600 WITH VITAMIN D3) 600 mg(1,500mg) -400 unit Cap Take 1 capsule by mouth once.    hydrocodone-acetaminophen 5-325mg (NORCO) 5-325 mg  per tablet TAKE 1 TABLET BY MOUTH EVERY 8 (EIGHT) HOURS AS NEEDED FOR PAIN.    hydrocodone-acetaminophen 5-325mg (NORCO) 5-325 mg per tablet Take 1 tablet by mouth every 8 (eight) hours as needed for Pain.    insulin aspart (NOVOLOG) 100 unit/mL injection Inject 10 Units into the skin 3 (three) times daily with meals.    levothyroxine 50 mcg Cap Take 50 mcg by mouth before breakfast.    lisinopril (PRINIVIL,ZESTRIL) 40 MG tablet Take 1 tablet (40 mg total) by mouth once daily.    lorazepam (ATIVAN) 1 MG tablet Take 1 tablet (1 mg total) by mouth daily as needed for Anxiety (use only as needed before MRI or CT, very sedating).    melatonin 5 mg Subl Place 1-2 tablets under the tongue each evening as needed for sleep    morphine (MSIR) 15 MG tablet Take 1 tablet (15 mg total) by mouth every 6 (six) hours as needed for Pain.    ondansetron (ZOFRAN) 8 MG tablet Take 8 mg by mouth 3 (three) times daily as needed.    PAZOpanib 200 mg Tab Take 800 mg by mouth once daily.    ranitidine (ZANTAC) 150 MG tablet Take 1 tablet (150 mg total) by mouth 2 (two) times daily. Separate ingestion of ranitidine from chemo (pazopanib) by 10 hours    tamsulosin (FLOMAX) 0.4 mg Cp24 Take 1 capsule (0.4 mg total) by mouth every evening.     No current facility-administered medications for this visit.        Vitals:    11/24/17 0917   BP: (!) 163/75   Pulse: 74   Resp: 16   Temp: 99.1 °F (37.3 °C)     Physical Exam   Constitutional: He is oriented to person, place, and time. He appears well-developed. No distress.   HENT:   Head: Normocephalic and atraumatic.   Mouth/Throat: No oropharyngeal exudate.   Eyes: Pupils are equal, round, and reactive to light. No scleral icterus.   Neck: Normal range of motion.   Cardiovascular: Normal rate, regular rhythm and normal heart sounds.    Pulmonary/Chest: Effort normal and breath sounds normal.   Abdominal: Soft. He exhibits no distension and no mass. There is no rebound and no guarding.    Musculoskeletal: Normal range of motion. He exhibits no edema.   Lymphadenopathy:     He has no cervical adenopathy.   Neurological: He is alert and oriented to person, place, and time. No cranial nerve deficit.   Skin: Skin is warm.   Psychiatric: He has a normal mood and affect.       11/13/17 NM bone scan    Comparison to the bone scan of 5/30/17.    Result: Interval decrease in the intensity of the L3 lesion which demonstrated only background activity today. The kidneys demonstrate normal excretion. No new bone lesions identified. Mild asymmetry in the SI joints that could be related to mild degenerative changes.   Impression        1.  Interval resolution of the L3 bone activity.  2.  No new bone metastatic lesions identified.       Component      Latest Ref Rng & Units 11/24/2017   WBC      3.90 - 12.70 K/uL 5.75   RBC      4.60 - 6.20 M/uL 3.90 (L)   Hemoglobin      14.0 - 18.0 g/dL 13.6 (L)   Hematocrit      40.0 - 54.0 % 40.2   MCV      82 - 98 fL 103 (H)   MCH      27.0 - 31.0 pg 34.9 (H)   MCHC      32.0 - 36.0 g/dL 33.8   RDW      11.5 - 14.5 % 13.4   Platelets      150 - 350 K/uL 273   MPV      9.2 - 12.9 fL 10.3   Gran #      1.8 - 7.7 K/uL 3.2   Immature Grans (Abs)      0.00 - 0.04 K/uL 0.02        Assessment:         1. Renal cell carcinoma of left kidney    2. 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 Left hip pain    4. Hypothyroidism, unspecified type    5. Diarrhea   6 Fatigue   7 Hypertension   8 Diabetes mellitus     Plan:    1,2: Continue Pazopanib  2,3: Continue Norco for pain.   2,3: Xgeva today. Bone scan on 11/13/17 shows interval improvement from May 2017  5. Continue lomotil for treatment related diarrhea.   7. On Amlodipine and Lisinopril.Pazaopanib could be contributing as well.  RTC in 4 weeks with reimaging as above.     Distress Screening Results: Psychosocial Distress screening score of Distress Score: 0 noted and reviewed. No intervention indicated.

## 2017-11-24 ENCOUNTER — OFFICE VISIT (OUTPATIENT)
Dept: HEMATOLOGY/ONCOLOGY | Facility: CLINIC | Age: 72
End: 2017-11-24
Payer: MEDICARE

## 2017-11-24 ENCOUNTER — INFUSION (OUTPATIENT)
Dept: INFUSION THERAPY | Facility: HOSPITAL | Age: 72
End: 2017-11-24
Attending: INTERNAL MEDICINE
Payer: MEDICARE

## 2017-11-24 VITALS
BODY MASS INDEX: 26.51 KG/M2 | WEIGHT: 168.88 LBS | HEART RATE: 74 BPM | DIASTOLIC BLOOD PRESSURE: 75 MMHG | OXYGEN SATURATION: 96 % | TEMPERATURE: 99 F | SYSTOLIC BLOOD PRESSURE: 163 MMHG | RESPIRATION RATE: 16 BRPM | HEIGHT: 67 IN

## 2017-11-24 DIAGNOSIS — C64.9 METASTATIC RENAL CELL CARCINOMA TO LIVER: ICD-10-CM

## 2017-11-24 DIAGNOSIS — C79.51 METASTATIC RENAL CELL CARCINOMA TO BONE: Primary | ICD-10-CM

## 2017-11-24 DIAGNOSIS — N18.30 TYPE 2 DIABETES MELLITUS WITH STAGE 3 CHRONIC KIDNEY DISEASE, WITH LONG-TERM CURRENT USE OF INSULIN: Primary | Chronic | ICD-10-CM

## 2017-11-24 DIAGNOSIS — D69.2 SENILE PURPURA: ICD-10-CM

## 2017-11-24 DIAGNOSIS — G89.3 NEOPLASM RELATED PAIN: ICD-10-CM

## 2017-11-24 DIAGNOSIS — R11.2 CHEMOTHERAPY-INDUCED NAUSEA AND VOMITING: ICD-10-CM

## 2017-11-24 DIAGNOSIS — K52.1 DIARRHEA DUE TO DRUG: ICD-10-CM

## 2017-11-24 DIAGNOSIS — E11.22 TYPE 2 DIABETES MELLITUS WITH STAGE 3 CHRONIC KIDNEY DISEASE, WITH LONG-TERM CURRENT USE OF INSULIN: Primary | Chronic | ICD-10-CM

## 2017-11-24 DIAGNOSIS — T45.1X5A CHEMOTHERAPY INDUCED DIARRHEA: ICD-10-CM

## 2017-11-24 DIAGNOSIS — C64.2 RENAL CELL CARCINOMA OF LEFT KIDNEY: ICD-10-CM

## 2017-11-24 DIAGNOSIS — C78.7 METASTATIC RENAL CELL CARCINOMA TO LIVER: ICD-10-CM

## 2017-11-24 DIAGNOSIS — C64.9 METASTATIC RENAL CELL CARCINOMA TO BONE: Primary | ICD-10-CM

## 2017-11-24 DIAGNOSIS — T45.1X5A CHEMOTHERAPY-INDUCED NAUSEA AND VOMITING: ICD-10-CM

## 2017-11-24 DIAGNOSIS — Z79.4 TYPE 2 DIABETES MELLITUS WITH STAGE 3 CHRONIC KIDNEY DISEASE, WITH LONG-TERM CURRENT USE OF INSULIN: Primary | Chronic | ICD-10-CM

## 2017-11-24 DIAGNOSIS — K52.1 CHEMOTHERAPY INDUCED DIARRHEA: ICD-10-CM

## 2017-11-24 PROCEDURE — 99999 PR PBB SHADOW E&M-EST. PATIENT-LVL IV: CPT | Mod: PBBFAC,,, | Performed by: INTERNAL MEDICINE

## 2017-11-24 PROCEDURE — 96372 THER/PROPH/DIAG INJ SC/IM: CPT

## 2017-11-24 PROCEDURE — 63600175 PHARM REV CODE 636 W HCPCS: Performed by: INTERNAL MEDICINE

## 2017-11-24 PROCEDURE — 99214 OFFICE O/P EST MOD 30 MIN: CPT | Mod: S$GLB,,, | Performed by: INTERNAL MEDICINE

## 2017-11-24 RX ORDER — DIPHENOXYLATE HYDROCHLORIDE AND ATROPINE SULFATE 2.5; .025 MG/1; MG/1
1 TABLET ORAL 4 TIMES DAILY PRN
Qty: 120 TABLET | Refills: 0 | Status: SHIPPED | OUTPATIENT
Start: 2017-11-24 | End: 2018-01-19 | Stop reason: SDUPTHER

## 2017-11-24 RX ADMIN — DENOSUMAB 120 MG: 120 INJECTION SUBCUTANEOUS at 10:11

## 2017-11-24 NOTE — NURSING
Pt arrived for xgeva following MD appt.  Labs reviewed.  Pt tolerated injection SQ to left side of abd.  Discharged to home using his cane with family.

## 2017-11-29 ENCOUNTER — OFFICE VISIT (OUTPATIENT)
Dept: INTERNAL MEDICINE | Facility: CLINIC | Age: 72
End: 2017-11-29
Payer: MEDICARE

## 2017-11-29 ENCOUNTER — TELEPHONE (OUTPATIENT)
Dept: INTERNAL MEDICINE | Facility: CLINIC | Age: 72
End: 2017-11-29

## 2017-11-29 VITALS
HEIGHT: 67 IN | BODY MASS INDEX: 26.51 KG/M2 | WEIGHT: 168.88 LBS | SYSTOLIC BLOOD PRESSURE: 124 MMHG | DIASTOLIC BLOOD PRESSURE: 56 MMHG | OXYGEN SATURATION: 97 % | HEART RATE: 56 BPM

## 2017-11-29 DIAGNOSIS — E03.9 ACQUIRED HYPOTHYROIDISM: ICD-10-CM

## 2017-11-29 DIAGNOSIS — F19.982 DRUG INDUCED INSOMNIA: ICD-10-CM

## 2017-11-29 DIAGNOSIS — T45.1X5A CHEMOTHERAPY INDUCED DIARRHEA: ICD-10-CM

## 2017-11-29 DIAGNOSIS — C64.2 RENAL CELL CARCINOMA OF LEFT KIDNEY: ICD-10-CM

## 2017-11-29 DIAGNOSIS — N40.1 BENIGN PROSTATIC HYPERPLASIA (BPH) WITH URINARY URGE INCONTINENCE: ICD-10-CM

## 2017-11-29 DIAGNOSIS — E11.69 HYPERLIPIDEMIA DUE TO TYPE 2 DIABETES MELLITUS: ICD-10-CM

## 2017-11-29 DIAGNOSIS — D69.2 SENILE PURPURA: ICD-10-CM

## 2017-11-29 DIAGNOSIS — E11.59 HYPERTENSION ASSOCIATED WITH DIABETES: ICD-10-CM

## 2017-11-29 DIAGNOSIS — C79.51 METASTATIC RENAL CELL CARCINOMA TO BONE: ICD-10-CM

## 2017-11-29 DIAGNOSIS — F11.20 UNCOMPLICATED OPIOID DEPENDENCE: ICD-10-CM

## 2017-11-29 DIAGNOSIS — C64.9 METASTATIC RENAL CELL CARCINOMA TO BONE: ICD-10-CM

## 2017-11-29 DIAGNOSIS — K52.1 CHEMOTHERAPY INDUCED DIARRHEA: ICD-10-CM

## 2017-11-29 DIAGNOSIS — Z00.00 ENCOUNTER FOR PREVENTIVE HEALTH EXAMINATION: Primary | ICD-10-CM

## 2017-11-29 DIAGNOSIS — I25.119 CORONARY ARTERY DISEASE INVOLVING NATIVE CORONARY ARTERY OF NATIVE HEART WITH ANGINA PECTORIS: ICD-10-CM

## 2017-11-29 DIAGNOSIS — I15.2 HYPERTENSION ASSOCIATED WITH DIABETES: ICD-10-CM

## 2017-11-29 DIAGNOSIS — G89.3 NEOPLASM RELATED PAIN: ICD-10-CM

## 2017-11-29 DIAGNOSIS — Z79.4 TYPE 2 DIABETES MELLITUS WITH STAGE 3 CHRONIC KIDNEY DISEASE, WITH LONG-TERM CURRENT USE OF INSULIN: Chronic | ICD-10-CM

## 2017-11-29 DIAGNOSIS — E13.40 NEUROPATHY DUE TO SECONDARY DIABETES: ICD-10-CM

## 2017-11-29 DIAGNOSIS — R11.2 CHEMOTHERAPY-INDUCED NAUSEA AND VOMITING: ICD-10-CM

## 2017-11-29 DIAGNOSIS — I70.0 ATHEROSCLEROSIS OF AORTA: ICD-10-CM

## 2017-11-29 DIAGNOSIS — E78.5 HYPERLIPIDEMIA DUE TO TYPE 2 DIABETES MELLITUS: ICD-10-CM

## 2017-11-29 DIAGNOSIS — C78.7 METASTATIC RENAL CELL CARCINOMA TO LIVER: ICD-10-CM

## 2017-11-29 DIAGNOSIS — T45.1X5A CHEMOTHERAPY-INDUCED NAUSEA AND VOMITING: ICD-10-CM

## 2017-11-29 DIAGNOSIS — N39.41 BENIGN PROSTATIC HYPERPLASIA (BPH) WITH URINARY URGE INCONTINENCE: ICD-10-CM

## 2017-11-29 DIAGNOSIS — K52.1 DIARRHEA DUE TO DRUG: ICD-10-CM

## 2017-11-29 DIAGNOSIS — Z23 NEED FOR PNEUMOCOCCAL VACCINATION: ICD-10-CM

## 2017-11-29 DIAGNOSIS — I77.1 TORTUOUS AORTA: ICD-10-CM

## 2017-11-29 DIAGNOSIS — M47.812 CERVICAL ARTHRITIS: ICD-10-CM

## 2017-11-29 DIAGNOSIS — N18.30 TYPE 2 DIABETES MELLITUS WITH STAGE 3 CHRONIC KIDNEY DISEASE, WITH LONG-TERM CURRENT USE OF INSULIN: Chronic | ICD-10-CM

## 2017-11-29 DIAGNOSIS — K21.9 GASTROESOPHAGEAL REFLUX DISEASE WITHOUT ESOPHAGITIS: Chronic | ICD-10-CM

## 2017-11-29 DIAGNOSIS — E11.22 TYPE 2 DIABETES MELLITUS WITH STAGE 3 CHRONIC KIDNEY DISEASE, WITH LONG-TERM CURRENT USE OF INSULIN: Chronic | ICD-10-CM

## 2017-11-29 DIAGNOSIS — C64.9 METASTATIC RENAL CELL CARCINOMA TO LIVER: ICD-10-CM

## 2017-11-29 PROBLEM — M25.511 ACUTE PAIN OF RIGHT SHOULDER: Status: RESOLVED | Noted: 2017-09-06 | Resolved: 2017-11-29

## 2017-11-29 PROCEDURE — 90670 PCV13 VACCINE IM: CPT | Mod: S$GLB,,, | Performed by: INTERNAL MEDICINE

## 2017-11-29 PROCEDURE — G0009 ADMIN PNEUMOCOCCAL VACCINE: HCPCS | Mod: S$GLB,,, | Performed by: INTERNAL MEDICINE

## 2017-11-29 PROCEDURE — G0439 PPPS, SUBSEQ VISIT: HCPCS | Mod: S$GLB,,, | Performed by: NURSE PRACTITIONER

## 2017-11-29 PROCEDURE — 99215 OFFICE O/P EST HI 40 MIN: CPT | Mod: S$GLB,,, | Performed by: INTERNAL MEDICINE

## 2017-11-29 PROCEDURE — 99999 PR PBB SHADOW E&M-EST. PATIENT-LVL III: CPT | Mod: PBBFAC,,, | Performed by: INTERNAL MEDICINE

## 2017-11-29 PROCEDURE — 99999 PR PBB SHADOW E&M-EST. PATIENT-LVL IV: CPT | Mod: PBBFAC,,, | Performed by: NURSE PRACTITIONER

## 2017-11-29 RX ORDER — LOPERAMIDE HYDROCHLORIDE 2 MG/1
2 CAPSULE ORAL 4 TIMES DAILY PRN
Qty: 40 CAPSULE | Refills: 0 | Status: SHIPPED | OUTPATIENT
Start: 2017-11-29 | End: 2017-12-09

## 2017-11-29 RX ORDER — LEVOTHYROXINE SODIUM 25 UG/1
25 TABLET ORAL DAILY
Qty: 90 TABLET | Refills: 3 | Status: SHIPPED | OUTPATIENT
Start: 2017-11-29 | End: 2018-04-02 | Stop reason: DRUGHIGH

## 2017-11-29 NOTE — ASSESSMENT & PLAN NOTE
Elevated TSH in 6/2017, 8/2017, 11/2017 compliant with levo 50mcg  · Advised to take separate from other meds and food  · Patient unable to  · Continue current dose  · PCP messaged Dr Reed about increasing dose

## 2017-11-29 NOTE — PROGRESS NOTES
Two patient identifiers and allergies reviewed.  Prevnar  vaccine ordered per   , verified and administered to right  deltoid. Pt tolerated injection well; no swelling, redness, or bruising noted at injection site. Pt advised to remain in clinic 15 minutes following injection for observation , verbalized understanding.

## 2017-11-29 NOTE — PROGRESS NOTES
"Primary Care Provider Appointment    Subjective:      Patient ID: Edwin Singleton is a 72 y.o. male with HTN, DM, metastatic renal cell CA, insomnia    Chief Complaint: Follow-up (Patient is here for an over all check up ) and Hip Pain (2+ months pain=2 , Rt hip )    Patient with many complaints about the temperature of his home. Otherwise has complaints of pain in the R hip and flank (location of tumor). He states his L hip pain is improved with calcium. He is taking hydrocodone for pain at bedtime. He does not take morphine BID for pain. He no longer takes the ativan at bedtime. He does state he continues to have difficulty sleeping at night due to polyuria and urge for midnight snacks. He only drinks caffeine in the morning (coffee), none in the afternoon. He continues to watch tv in the bed and leaves it on all night.     He last received xgeva on 10/26/17 and 11/24/17 for bone metastasis, had one injection prior. Last bone scan on 11/13/17 showed interval improvement since 5/2017.  His chemo is pazopanib 800mg daily.     His diarrhea is improved with loperamide capsules as needed. He has 3 bowel movements daily, some are liquid but one is formed. He has lost 10# in the last 4 mos.     His home CBG readings are "good" in the range of 115-120 in AM, but he does state it increases to 200s after meals. Last A1c was 6.8 in 8/2017. He saw podiatry on 11/11 and is requesting diabetes shoes. Seen by optometry in 7/2017.     He is compliant with levothyroxine but takes it with other meds. He does take his chemo separately in the afternoon.     He was seen by HRA today, and is requesting the capsule form of loperamide. He is due for his PNA13 vaccine today.     Past Surgical History:   Procedure Laterality Date    ABDOMINAL SURGERY      APPENDECTOMY      BACK SURGERY      CARDIAC SURGERY      CHOLECYSTECTOMY      coronary stenting      X 5 last one in 2010-11.    SPINAL FUSION         Past Medical History: " "  Diagnosis Date    Acquired hypothyroidism 5/26/2017    Benign essential HTN 5/26/2017    Benign prostatic hyperplasia (BPH) with urinary urge incontinence 6/6/2017    Chronic low back pain 6/1/2017    Coronary artery disease involving native coronary artery of native heart without angina pectoris 5/26/2017    S/p 5 stents - last one around 2010-11.    Gastroesophageal reflux disease without esophagitis 5/26/2017    History of CVA (cerebrovascular accident) 5/26/2017    Lumbar disc lesion 5/26/2017    Metastatic renal cell carcinoma to bone 6/6/2017    Metastatic renal cell carcinoma to liver 6/6/2017    Liver Biopsy 5-2017: METASTATIC RENAL CELL CARCINOMA.    Mixed hyperlipidemia 5/26/2017    Type 2 diabetes mellitus with stage 3 chronic kidney disease, with long-term current use of insulin 5/26/2017       Review of Systems   Constitutional: Negative for activity change and unexpected weight change.   HENT: Positive for hearing loss. Negative for trouble swallowing.    Gastrointestinal: Positive for diarrhea. Negative for blood in stool, constipation and vomiting.   Endocrine: Positive for polyuria. Negative for polydipsia.   Genitourinary: Positive for difficulty urinating and urgency.   Musculoskeletal: Positive for arthralgias. Negative for joint swelling and neck pain.   Neurological: Positive for weakness. Negative for headaches.   Psychiatric/Behavioral: Negative for confusion and dysphoric mood.       Objective:   BP (!) 124/56 (BP Location: Left arm, Patient Position: Sitting, BP Method: Medium (Manual))   Pulse (!) 56   Ht 5' 7" (1.702 m)   Wt 76.6 kg (168 lb 14 oz)   SpO2 97%   BMI 26.45 kg/m²     Physical Exam   Constitutional: He is oriented to person, place, and time. He appears well-developed and well-nourished.   Ambulating by wheelchair   HENT:   Head: Normocephalic.   edentulous   Eyes: Pupils are equal, round, and reactive to light.   Neck:   Decreased ROM in neck  "   Cardiovascular:   bradycardic   Pulmonary/Chest: Effort normal.   Musculoskeletal: Normal range of motion.   Neurological: He is alert and oriented to person, place, and time.   Skin: Skin is warm.   Ecchymoses and purpura bilaterally UE  Telangiectasias on face and chest  Multiple tattoos on UE   Psychiatric: He has a normal mood and affect.       Lab Results   Component Value Date    WBC 5.75 11/24/2017    HGB 13.6 (L) 11/24/2017    HCT 40.2 11/24/2017     11/24/2017    CHOL 112 (L) 08/24/2017    TRIG 224 (H) 08/24/2017    HDL 27 (L) 08/24/2017    ALT 22 11/24/2017    AST 22 11/24/2017     11/24/2017    K 4.1 11/24/2017     11/24/2017    CREATININE 1.0 11/24/2017    BUN 15 11/24/2017    CO2 26 11/24/2017    TSH 6.113 (H) 11/24/2017    PSA 0.61 05/27/2017    INR 1.2 06/12/2017    HGBA1C 6.8 (H) 08/24/2017         Assessment:   72 y.o. male with multiple co-morbid illnesses here to continue work-up of chronic issues notably HTN, DM, metastatic renal cell CA, insomnia    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     LE numbness and tingling, elevated A1c, followed by podiatry  · Establish podiatry care in [facility]  · F/U podiatry q3-6 mos  · Diabetic shoes            Psychiatric    Drug induced insomnia     Patient with insomnia worsened with anxiety about his CA treatment  · Start melatonin 5-10mg  · [place] [facility]  · Advised sleep hygiene   - turn off the tv 30 min - 1 hour before bed  - go to bed at the same time, wake up at the same time  - avoid napping  · PCP messaged Dr Reed about increasing levothyroxine            Cardiac/Vascular    Hypertension associated with diabetes     BP controlled on ACE, CCB  · Continue lisinopril 40mg, amlodipine 10mg  · Continue statin, ASA  · cotninue home BP reads            Oncology    Renal cell carcinoma of left kidney     Metastatic renal cell CA (Stage IV - T1,Nx,M1).  Followed by Heme-Onc (Dr Reed) radiation therapy to L3 spine - 3 Gy/Fx x 10 fx and pazopanib 800 mg PO daily.  · F/U Heme-Onc  · Continue pazopanib 800mg  · Cotninue Xgeva on 10/26, 11/24/17  · Cotninue Ca and Vit D by PCP            Endocrine    Acquired hypothyroidism     Elevated TSH in 6/2017, 8/2017, 11/2017 compliant with levo 50mcg  · Advised to take separate from other meds and food  · Patient unable to  · Continue current dose  · PCP messaged Dr Reed about increasing dose            GI    Chemotherapy induced diarrhea     Uncontrolled with narcotics, now experiencing hemorrhoids  · Continue zofran for nausea  · Continue loperamide  · Advised to use barrier creme         Relevant Medications    loperamide (IMODIUM) 2 mg capsule    Diarrhea due to drug     Started on loperamide by Heme-Onc with good improvement  · Continue loperamide         Relevant Medications    loperamide (IMODIUM) 2 mg capsule          Health Maintenance       Date Due Completion Date    TETANUS VACCINE 02/18/1963 ---    Zoster Vaccine 02/18/2005 ---    Pneumococcal (65+) (1 of 2 - PCV13) 02/18/2010 ---TODAY    Hemoglobin A1c 02/24/2018 8/24/2017    Foot Exam 07/07/2018 7/7/2017    Eye Exam 07/07/2018 7/7/2017    Lipid Panel 08/24/2018 8/24/2017    Colonoscopy 02/15/2021 2/15/2011 (Done)    Override on 2/15/2011: Done (Per [employer] dashboard ? results)          Return in about 4 weeks (around 12/27/2017). . One hour spent with this patient today, half of that in counseling.    Lulú Lewis MD/MPH  Internal Medicine  [facility]  [phone]

## 2017-11-29 NOTE — ASSESSMENT & PLAN NOTE
LE numbness and tingling, elevated A1c, followed by podiatry  · Establish podiatry care in [facility]  · F/U podiatry q3-6 mos  · Diabetic shoes

## 2017-11-29 NOTE — Clinical Note
Do you have loperamide capsules? This patient prefers that form of the med. He just had the loperamide pills filled, but wants to get capsules. Would his insurance cover that?  Thanks, CORTEZ

## 2017-11-29 NOTE — ASSESSMENT & PLAN NOTE
Patient with insomnia worsened with anxiety about his CA treatment  · Start melatonin 5-10mg  · Med list reviewed by [facility]  · Advised sleep hygiene   - turn off the tv 30 min - 1 hour before bed  - go to bed at the same time, wake up at the same time  - avoid napping  · PCP messaged Dr Reed about increasing levothyroxine

## 2017-11-29 NOTE — ASSESSMENT & PLAN NOTE
Metastatic renal cell CA (Stage IV - T1,Nx,M1). Followed by Heme-Onc (Dr Reed) radiation therapy to L3 spine - 3 Gy/Fx x 10 fx and pazopanib 800 mg PO daily.  · F/U Heme-Onc  · Continue pazopanib 800mg  · Cotninue Xgeva on 10/26, 11/24/17  · Cotninue Ca and Vit D by PCP

## 2017-11-29 NOTE — PATIENT INSTRUCTIONS
TODAY:  - pneumonia vaccine  - refill loperamide capsules  - PCP messaged pharmacy about loperamide capsules   + 40 capsules script sent to [facility]  - PCP messaged Dr Reed about increasing thyroid med   + may add a 25mcg pill to the 50mcg for the next 6 weeks

## 2017-11-29 NOTE — PATIENT INSTRUCTIONS
Counseling and Referral of Other Preventative  (Italic type indicates deductible and co-insurance are waived)    Patient Name: Edwin Singleton  Today's Date: 11/29/2017      SERVICE LIMITATIONS RECOMMENDATION    Vaccines    · Pneumococcal (once after 65)    · Influenza (annually)    · Hepatitis B (if medium/high risk)    · Prevnar 13      Hepatitis B medium/high risk factors:       - End-stage renal disease       - Hemophiliacs who received Factor VII or         IX concentrates       - Clients of institutions for the mentally             retarded       - Persons who live in the same house as          a HepB carrier       - Homosexual men       - Illicit injectable drug abusers     Pneumococcal: N/A     Influenza: Done, repeat in one year     Hepatitis B: N/A     Prevnar 13: Recommended today.    Prostate cancer screening (annually to age 75)     Prostate specific antigen (PSA) Shared decision making with Provider. Sometimes a co-pay may be required if the patient decides to have this test. The USPSTF no longer recommends prostate cancer screening routinely in medicine: every 1 year    Colorectal cancer screening (to age 75)    · Fecal occult blood test (annual)  · Flexible sigmoidoscopy (5y)  · Screening colonoscopy (10y)  · Barium enema   Last done 2011, recommend to repeat every 10  years    Diabetes self-management training (no USPSTF recommendations)  Requires referral by treating physician for patient with diabetes or renal disease. 10 hours of initial DSMT sessions of no less than 30 minutes each in a continuous 12-month period. 2 hours of follow-up DSMT in subsequent years.  N/A    Glaucoma screening (no USPSTF recommendation)  Diabetes mellitus, family history   , age 50 or over    American, age 65 or over  Recommend follow up with eye care professional regularly    Medical nutrition therapy for diabetes or renal disease (no recommended schedule)  Requires referral by treating  physician for patient with diabetes or renal disease or kidney transplant within the past 3 years.  Can be provided in same year as diabetes self-management training (DSMT), and CMS recommends medical nutrition therapy take place after DSMT. Up to 3 hours for initial year and 2 hours in subsequent years.  N/A    Cardiovascular screening blood tests (every 5 years)  · Fasting lipid panel  Order as a panel if possible  Done this year, repeat every year    Diabetes screening tests (at least every 3 years, Medicare covers annually or at 6-month intervals for prediabetic patients)  · Fasting blood sugar (FBS) or glucose tolerance test (GTT)  Patient must be diagnosed with one of the following:       - Hypertension       - Dyslipidemia       - Obesity (BMI 30kg/m2)       - Previous elevated impaired FBS or GTT       ... or any two of the following:       - Overweight (BMI 25 but <30)       - Family history of diabetes       - Age 65 or older       - History of gestational diabetes or birth of baby weighing more than 9 pounds  Done this year, repeat every year    Abdominal aortic aneurysm screening (once)  · Sonogram   Limited to patients who meet one of the following criteria:       - Men who are 65-75 years old and have smoked more than 100 cigarette in their lifetime       - Anyone with a family history of abdominal aortic aneurysm       - Anyone recommended for screening by the USPSTF  N/A    HIV screening (annually for increased risk patients)  · HIV-1 and HIV-2 by EIA, or AME, rapid antibody test or oral mucosa transudate  Patients must be at increased risk for HIV infection per USPSTF guidelines or pregnant. Tests covered annually for patient at increased risk or as requested by the patient. Pregnant patients may receive up to 3 tests during pregnancy.  Risks discussed, screening is not recommended    Smoking cessation counseling (up to 8 sessions per year)  Patients must be asymptomatic of tobacco-related  conditions to receive as a preventative service.  Non-smoker    Subsequent annual wellness visit  At least 12 months since last AWV  Return in one year     The following information is provided to all patients.  This information is to help you find resources for any of the problems found today that may be affecting your health:                Living healthy guide: www.[facility].[place].[facility]      Understanding Diabetes: www.diabetes.org      Eating healthy: www.cdc.gov/healthyweight      CDC home safety checklist: www.cdc.gov/steadi/patient.html      Agency on Aging: www.goea.[place].[facility]      Alcoholics anonymous (AA): www.aa.org      Physical Activity: www.yancy.nih.gov/os5fnuq      Tobacco use: www.quitwithusla.org

## 2017-11-29 NOTE — TELEPHONE ENCOUNTER
Patient son has been informed that Dr. Reed agreed with Dr. Lewis  to increase the levothyroxine. Also that Dr. Lewis sent a script for 25mcg to his pharmacy, and that patient should take this along with the 50mcg. Patient son has been advised that Dr. Lewis will recheck the thyroid hormone at his next appt.    Patient son stressed great understanding of instructions .    Thanks

## 2017-11-29 NOTE — TELEPHONE ENCOUNTER
Please let patient know that Dr Reed agrees to increase the levothyroxine. I sent a script for 25mcg to his pharmacy, he should take this along with the 50mcg. We'll recheck the thyroid hormone at his next appt.    Script sent to [facility] in [place] for 90-day supply.    Thanks,  KJ

## 2017-11-30 VITALS
BODY MASS INDEX: 26.4 KG/M2 | DIASTOLIC BLOOD PRESSURE: 60 MMHG | SYSTOLIC BLOOD PRESSURE: 110 MMHG | HEIGHT: 67 IN | WEIGHT: 168.19 LBS | HEART RATE: 68 BPM

## 2017-11-30 NOTE — PROGRESS NOTES
"Edwin Singleton presented for a  Medicare AWV and comprehensive Health Risk Assessment today. The following components were reviewed and updated:    · Medical history  · Family History  · Social history  · Allergies and Current Medications  · Health Risk Assessment  · Health Maintenance  · Care Team     ** See Completed Assessments for Annual Wellness Visit within the encounter summary.**       The following assessments were completed:  · Living Situation  · CAGE  · Depression Screening  · Timed Get Up and Go  · Whisper Test  · Cognitive Function Screening  · Nutrition Screening  · ADL Screening  · PAQ Screening    Vitals:    11/29/17 1010   BP: 110/60   Pulse: 68   Weight: 76.3 kg (168 lb 3.4 oz)   Height: 5' 7" (1.702 m)     Body mass index is 26.35 kg/m².  Physical Exam   Constitutional: He is oriented to person, place, and time. He appears well-developed.   HENT:   Head: Normocephalic and atraumatic.   Nose: Nose normal.   Eyes: Conjunctivae and EOM are normal.   Neck: Normal range of motion. Neck supple.   Cardiovascular: Normal rate and regular rhythm.    Pulmonary/Chest: Effort normal. No respiratory distress.   Neurological: He is alert and oriented to person, place, and time. Gait abnormal.   Skin: Skin is warm and dry.   Psychiatric: He has a normal mood and affect. His behavior is normal. Judgment and thought content normal.   Nursing note and vitals reviewed.        Diagnoses and health risks identified today and associated recommendations/orders:    1. Encounter for preventive health examination  Assessment performed. Health maintenance updated. Chart review completed.    2. Need for pneumococcal vaccination  Done today in clinic.    3. Tortuous aorta  Noted on imaging 6/16/2017. Stable with blood pressure control and statin therapy. Followed by PCP.    4. Atherosclerosis of aorta  Noted on imaging 6/16/2017. Stable with blood pressure control and statin therapy. Followed by PCP.    5. Metastatic renal " cell carcinoma to bone  Active treatment. Followed by Hematology Oncology.    6. Metastatic renal cell carcinoma to liver  Active treatment. Followed by Hematology Oncology.    7. Renal cell carcinoma of left kidney  Active treatment. Followed by Hematology Oncology.    8. Neuropathy due to secondary diabetes  Chronic. Stable. Followed by PCP.    9. Drug induced insomnia  Chronic. Stable. Followed by PCP.    10. Uncomplicated opioid dependence  Active treatment. Followed by Hematology Oncology.    11. Coronary artery disease involving native coronary artery of native heart with angina pectoris  Chronic. Stable. Followed by PCP.    12. Hyperlipidemia due to type 2 diabetes mellitus  Chronic. Stable. Followed by PCP.    13. Hypertension associated with diabetes  Chronic. Stable. Followed by PCP.    14. Benign prostatic hyperplasia (BPH) with urinary urge incontinence  Chronic. Stable. Followed by Urology.    15. Senile purpura  Chronic. Stable. Followed by PCP.    16. Neoplasm related pain  Active treatment. Followed by Hematology Oncology.    17. Type 2 diabetes mellitus with stage 3 chronic kidney disease, with long-term current use of insulin  Chronic. Stable. No longer using insulin. Followed by PCP.    18. Chemotherapy induced diarrhea  Active treatment. Followed by Hematology Oncology.    19. Chemotherapy-induced nausea and vomiting  Active treatment. Followed by Hematology Oncology.    20. Gastroesophageal reflux disease without esophagitis  Chronic. Stable. Followed by PCP.    21. Acquired hypothyroidism  Chronic. Stable. Followed by PCP.    22. Cervical arthritis  Chronic. Stable. Followed by PCP.    23. Diarrhea due to drug  Chronic. Stable. Followed by PCP.      Provided Edwin with a 5-10 year written screening schedule and personal prevention plan. Recommendations were developed using the USPSTF age appropriate recommendations. Education, counseling, and referrals were provided as needed. After Visit  Summary printed and given to patient which includes a list of additional screenings\tests needed.    Return for follow up with Primary Care Provider as instructed, ;sooner if problems, HRA in 1 year.    NATALIYA Grullon

## 2017-12-11 DIAGNOSIS — C79.51 METASTATIC RENAL CELL CARCINOMA TO BONE: ICD-10-CM

## 2017-12-11 DIAGNOSIS — C78.7 METASTATIC RENAL CELL CARCINOMA TO LIVER: ICD-10-CM

## 2017-12-11 DIAGNOSIS — G89.3 NEOPLASM RELATED PAIN: ICD-10-CM

## 2017-12-11 DIAGNOSIS — C64.9 METASTATIC RENAL CELL CARCINOMA TO BONE: ICD-10-CM

## 2017-12-11 DIAGNOSIS — C64.9 METASTATIC RENAL CELL CARCINOMA TO LIVER: ICD-10-CM

## 2017-12-11 RX ORDER — MORPHINE SULFATE 15 MG/1
15 TABLET ORAL EVERY 6 HOURS PRN
Qty: 60 TABLET | Refills: 0 | Status: SHIPPED | OUTPATIENT
Start: 2017-12-11 | End: 2017-12-26 | Stop reason: SDUPTHER

## 2017-12-11 RX ORDER — HYDROCODONE BITARTRATE AND ACETAMINOPHEN 5; 325 MG/1; MG/1
1 TABLET ORAL EVERY 8 HOURS PRN
Qty: 50 TABLET | Refills: 0 | Status: SHIPPED | OUTPATIENT
Start: 2017-12-11 | End: 2017-12-26 | Stop reason: SDUPTHER

## 2017-12-15 ENCOUNTER — TELEPHONE (OUTPATIENT)
Dept: PHARMACY | Facility: HOSPITAL | Age: 72
End: 2017-12-15

## 2017-12-15 NOTE — TELEPHONE ENCOUNTER
Spoke with patient's son, Pramod, regarding Votrient therapy.  He states that other than diarrhea followed by subsequent constipation, he is doing well.  He uses Lomotil for diarrhea which he reports helps.  He still gets out an about and keeps up with his daily routine, however, on occasion he is afraid of having an accident while he is out.  Patient experienced about a 30 lb weight loss when he initiated therapy, however, his weight is stable currently and over the past few months.  He confirmed readiness for refill and would like to pick medication up from [facility] on 12/22/17 after lab visit and MD appointment.

## 2017-12-21 NOTE — PROGRESS NOTES
"CC: Metastatic renal cell carcinoma to liver     Mr. Singleton is a 72 y.o. male who returns for follow up . He has metastatic renal cell carcinoma.He is a pt of      HPI: Mr.Mc Moss is a 73 yo male with CAD (s/p 5 stents, remotely), DM2 (insulin dependent), hypothyroidism , CVA (2013) with no residual deficit, chronic back pain. [place] [facility] [place] of a lumbar spine lesion in the setting of progressive weakness of his legs and exacerbation of chronic back pain, in May 2017. He states that his back pain and LE weakness progressively worsened. [facility]. The pain radiates down his legs. He denies any incontinence of bowel or bladder. He had several falls. After one of the falls, he presented to the ED at [facility], in May 2017. He had an MRI showing "L3 vertebral body lesion with apparent cortical disruption concerning for metastases vs myeloma," and was [place] [facility] [place]. He was evaluated by neurosurgery regarding concern for lytic lesion on L3 on 5/26/17. No surgical intervention was proposed.   CT chest, abdomen,pelvis on 5/27/17 revealed :   --A 3.6 cm exophytic mass arising from the medial aspect of the lower pole of left kidney,   --A small 1.0 cm, subtle, cortical enhancing mass within the upper pole of the right kidney, concerning for possible contralateral solid renal mass  --Multiple enhancing ill-defined hepatic masses concerning for hepatic metastatic disease, hepatomegaly and hepatic steatosis  --Mulltifocal irregular thickening of the bilateral pleura concerning for possible pleural metastatic disease  --Mild nonspecific nodular thickening of the left adrenal gland without discrete nodule  -- Redemonstration of known lytic lesion within the L3 vertebral body. No definite additional discrete lytic lesions are identified.  -MRI pelvis from 10/18/17:    2.5 cm linear T1 hypointense " T2/STIR hyperintense lesion within the left iliac bone, this lesion is indeterminate but felt less likely to represent metastasis given its appearance. Further evaluation can be obtained with bone scan as clinically indicated.  Findings suggestive of left trochanteric bursitis.  Partially visualized L3 metastatic lesion.  Diffuse thickening of the urinary bladder wall can be seen with chronic outlet obstruction or cystitis.  Whole body bone scan on 11/13/17 showed resolution of the L3 bone activity that was seen on the scan done in May 2017. No new bone lesions were identified.      Interval history:   He is compliant with Pazopanib. He continues to have diarrhea and fatigue. He takes Imodium and Lomotil for diarrhea. His pain is better.     Review of Systems   Constitutional: Positive for malaise/fatigue. Negative for fever and weight loss.   HENT: Negative for congestion, hearing loss and nosebleeds.    Eyes: Negative for blurred vision and double vision.   Respiratory: Negative for cough, hemoptysis and shortness of breath.    Cardiovascular: Negative for chest pain, claudication and leg swelling.   Gastrointestinal: Negative for abdominal pain, diarrhea, heartburn and nausea.   Genitourinary: Negative for dysuria and urgency.   Musculoskeletal: Negative for myalgias.   Neurological: Positive for weakness. Negative for dizziness, sensory change and speech change.   Endo/Heme/Allergies: Does not bruise/bleed easily.   Psychiatric/Behavioral: Negative for depression.   All other systems reviewed and are negative.         Current Outpatient Prescriptions   Medication Sig    amlodipine (NORVASC) 10 MG tablet Take 1 tablet (10 mg total) by mouth once daily.    aspirin (ECOTRIN) 81 MG EC tablet Take 81 mg by mouth once daily.    atorvastatin (LIPITOR) 40 MG tablet Take 1 tablet (40 mg total) by mouth once daily.    calcium carbonate-vitamin D3 (CALCIUM 600 WITH VITAMIN D3) 600 mg(1,500mg) -400 unit Cap Take 1  capsule by mouth once.    diphenoxylate-atropine 2.5-0.025 mg (LOMOTIL) 2.5-0.025 mg per tablet Take 1 tablet by mouth 4 (four) times daily as needed for Diarrhea.    hydrocodone-acetaminophen 5-325mg (NORCO) 5-325 mg per tablet TAKE 1 TABLET BY MOUTH EVERY 8 (EIGHT) HOURS AS NEEDED FOR PAIN.    hydrocodone-acetaminophen 5-325mg (NORCO) 5-325 mg per tablet Take 1 tablet by mouth every 8 (eight) hours as needed for Pain.    insulin aspart (NOVOLOG) 100 unit/mL injection Inject 10 Units into the skin 3 (three) times daily with meals.    levothyroxine (SYNTHROID) 25 MCG tablet Take 1 tablet (25 mcg total) by mouth once daily.    levothyroxine 50 mcg Cap Take 50 mcg by mouth before breakfast.    lisinopril (PRINIVIL,ZESTRIL) 40 MG tablet Take 1 tablet (40 mg total) by mouth once daily.    lorazepam (ATIVAN) 1 MG tablet Take 1 tablet (1 mg total) by mouth daily as needed for Anxiety (use only as needed before MRI or CT, very sedating).    melatonin 5 mg Subl Place 1-2 tablets under the tongue each evening as needed for sleep    morphine (MSIR) 15 MG tablet Take 1 tablet (15 mg total) by mouth every 6 (six) hours as needed for Pain.    ondansetron (ZOFRAN) 8 MG tablet Take 8 mg by mouth 3 (three) times daily as needed.    PAZOpanib 200 mg Tab Take 800 mg by mouth once daily.    ranitidine (ZANTAC) 150 MG tablet Take 1 tablet (150 mg total) by mouth 2 (two) times daily. Separate ingestion of ranitidine from chemo (pazopanib) by 10 hours    tamsulosin (FLOMAX) 0.4 mg Cp24 Take 1 capsule (0.4 mg total) by mouth every evening.     No current facility-administered medications for this visit.      Vitals:    12/22/17 1522   BP: (!) 177/77   Pulse: (!) 57   Temp: 98.6 °F (37 °C)       Physical Exam   Constitutional: He is oriented to person, place, and time. He appears well-developed. No distress.   HENT:   Head: Normocephalic and atraumatic.   Mouth/Throat: No oropharyngeal exudate.   Eyes: Pupils are equal,  round, and reactive to light. No scleral icterus.   Neck: Normal range of motion.   Cardiovascular: Normal rate, regular rhythm and normal heart sounds.    Pulmonary/Chest: Effort normal. No respiratory distress. He has no wheezes. He has no rales.   Abdominal: Soft. He exhibits no distension. There is no rebound and no guarding.   Musculoskeletal: Normal range of motion. He exhibits no edema.   Lymphadenopathy:     He has no cervical adenopathy.   Neurological: He is alert and oriented to person, place, and time. No cranial nerve deficit.   Skin: Skin is warm.   Psychiatric: He has a normal mood and affect.         11/13/17 NM bone scan     Comparison to the bone scan of 5/30/17.    Result: Interval decrease in the intensity of the L3 lesion which demonstrated only background activity today. The kidneys demonstrate normal excretion. No new bone lesions identified. Mild asymmetry in the SI joints that could be related to mild degenerative changes.   Impression         1.  Interval resolution of the L3 bone activity.  2.  No new bone metastatic lesions identified.     Component      Latest Ref Rng & Units 12/22/2017 11/24/2017   WBC      3.90 - 12.70 K/uL 6.17    RBC      4.60 - 6.20 M/uL 3.85 (L)    Hemoglobin      14.0 - 18.0 g/dL 13.0 (L)    Hematocrit      40.0 - 54.0 % 38.9 (L)    MCV      82 - 98 fL 101 (H)    MCH      27.0 - 31.0 pg 33.8 (H)    MCHC      32.0 - 36.0 g/dL 33.4    RDW      11.5 - 14.5 % 13.6    Platelets      150 - 350 K/uL 296    MPV      9.2 - 12.9 fL 10.1    Immature Granulocytes      0.0 - 0.5 % 0.3    Gran #      1.8 - 7.7 K/uL 3.1    Immature Grans (Abs)      0.00 - 0.04 K/uL 0.02    Lymph #      1.0 - 4.8 K/uL 2.0    Mono #      0.3 - 1.0 K/uL 0.5    Eos #      0.0 - 0.5 K/uL 0.6 (H)    Baso #      0.00 - 0.20 K/uL 0.03    nRBC      0 /100 WBC 0    Gran%      38.0 - 73.0 % 49.8    Lymph%      18.0 - 48.0 % 31.9    Mono%      4.0 - 15.0 % 8.6    Eosinophil%      0.0 - 8.0 % 8.9 (H)     Basophil%      0.0 - 1.9 % 0.5    Differential Method       Automated    Sodium      136 - 145 mmol/L 145    Potassium      3.5 - 5.1 mmol/L 4.1    Chloride      95 - 110 mmol/L 110    CO2      23 - 29 mmol/L 26    Glucose      70 - 110 mg/dL 177 (H)    BUN, Bld      8 - 23 mg/dL 23    Creatinine      0.5 - 1.4 mg/dL 1.3    Calcium      8.7 - 10.5 mg/dL 9.7    Total Protein      6.0 - 8.4 g/dL 6.3    Albumin      3.5 - 5.2 g/dL 3.0 (L)    Total Bilirubin      0.1 - 1.0 mg/dL 0.4    Alkaline Phosphatase      55 - 135 U/L 91    AST      10 - 40 U/L 20    ALT      10 - 44 U/L 22    Anion Gap      8 - 16 mmol/L 9    eGFR if African American      >60 mL/min/1.73 m:2 >60.0    eGFR if non African American      >60 mL/min/1.73 m:2 54.5 (A)    TSH      0.400 - 4.000 uIU/mL  6.113 (H)   Free T4      0.71 - 1.51 ng/dL  0.78   LD      110 - 260 U/L 167    Phosphorus      2.7 - 4.5 mg/dL 3.2         Assessment:           1. Renal cell carcinoma of left kidney    2. 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 Left hip pain    4. Hypothyroidism, unspecified type    5. Diarrhea   6 Fatigue   7 Hypertension   8 Diabetes mellitus      Plan:     1,2: Continue Pazopanib  2,3: Continue Norco for pain.   2,3: Xgeva today and in 4 weeks. Bone scan on 11/13/17 shows interval improvement from May 2017.  5. Continue lomotil for treatment related diarrhea.   7. On Amlodipine and Lisinopril.Pazaopanib could be contributing as well.  RTC in 4 weeks       Distress Screening Results: Psychosocial Distress screening score of Distress Score: 2 noted and reviewed. No intervention indicated.

## 2017-12-22 ENCOUNTER — OFFICE VISIT (OUTPATIENT)
Dept: HEMATOLOGY/ONCOLOGY | Facility: CLINIC | Age: 72
End: 2017-12-22
Payer: MEDICARE

## 2017-12-22 ENCOUNTER — INFUSION (OUTPATIENT)
Dept: INFUSION THERAPY | Facility: HOSPITAL | Age: 72
End: 2017-12-22
Attending: INTERNAL MEDICINE
Payer: MEDICARE

## 2017-12-22 VITALS
SYSTOLIC BLOOD PRESSURE: 177 MMHG | BODY MASS INDEX: 26.06 KG/M2 | DIASTOLIC BLOOD PRESSURE: 77 MMHG | WEIGHT: 166 LBS | OXYGEN SATURATION: 98 % | HEART RATE: 57 BPM | HEIGHT: 67 IN | TEMPERATURE: 99 F

## 2017-12-22 DIAGNOSIS — C64.9 METASTATIC RENAL CELL CARCINOMA TO BONE: Primary | ICD-10-CM

## 2017-12-22 DIAGNOSIS — Z79.4 TYPE 2 DIABETES MELLITUS WITH STAGE 3 CHRONIC KIDNEY DISEASE, WITH LONG-TERM CURRENT USE OF INSULIN: Chronic | ICD-10-CM

## 2017-12-22 DIAGNOSIS — C79.51 METASTATIC RENAL CELL CARCINOMA TO BONE: Primary | ICD-10-CM

## 2017-12-22 DIAGNOSIS — N18.30 TYPE 2 DIABETES MELLITUS WITH STAGE 3 CHRONIC KIDNEY DISEASE, WITH LONG-TERM CURRENT USE OF INSULIN: Chronic | ICD-10-CM

## 2017-12-22 DIAGNOSIS — G89.3 NEOPLASM RELATED PAIN: ICD-10-CM

## 2017-12-22 DIAGNOSIS — E11.22 TYPE 2 DIABETES MELLITUS WITH STAGE 3 CHRONIC KIDNEY DISEASE, WITH LONG-TERM CURRENT USE OF INSULIN: Chronic | ICD-10-CM

## 2017-12-22 PROCEDURE — 99214 OFFICE O/P EST MOD 30 MIN: CPT | Mod: S$GLB,,, | Performed by: INTERNAL MEDICINE

## 2017-12-22 PROCEDURE — 63600175 PHARM REV CODE 636 W HCPCS: Performed by: INTERNAL MEDICINE

## 2017-12-22 PROCEDURE — 99999 PR PBB SHADOW E&M-EST. PATIENT-LVL IV: CPT | Mod: PBBFAC,,, | Performed by: INTERNAL MEDICINE

## 2017-12-22 PROCEDURE — 96372 THER/PROPH/DIAG INJ SC/IM: CPT

## 2017-12-22 RX ORDER — LEVOTHYROXINE SODIUM 50 UG/1
50 TABLET ORAL DAILY
Refills: 3 | COMMUNITY
Start: 2017-11-26 | End: 2018-02-15 | Stop reason: SDUPTHER

## 2017-12-22 RX ADMIN — DENOSUMAB 120 MG: 120 INJECTION SUBCUTANEOUS at 04:12

## 2017-12-22 NOTE — NURSING
Pt arrived for xgeva following MD appt, labs reviewed. Pt tolerated injection SQ to abd.  Discharged to home using his cane with son.

## 2017-12-26 DIAGNOSIS — C78.7 METASTATIC RENAL CELL CARCINOMA TO LIVER: ICD-10-CM

## 2017-12-26 DIAGNOSIS — G89.3 NEOPLASM RELATED PAIN: ICD-10-CM

## 2017-12-26 DIAGNOSIS — C64.9 METASTATIC RENAL CELL CARCINOMA TO LIVER: ICD-10-CM

## 2017-12-26 DIAGNOSIS — C64.9 METASTATIC RENAL CELL CARCINOMA TO BONE: ICD-10-CM

## 2017-12-26 DIAGNOSIS — C79.51 METASTATIC RENAL CELL CARCINOMA TO BONE: ICD-10-CM

## 2017-12-26 RX ORDER — MORPHINE SULFATE 15 MG/1
15 TABLET ORAL EVERY 6 HOURS PRN
Qty: 60 TABLET | Refills: 0 | Status: SHIPPED | OUTPATIENT
Start: 2017-12-26 | End: 2018-01-10 | Stop reason: SDUPTHER

## 2017-12-26 RX ORDER — HYDROCODONE BITARTRATE AND ACETAMINOPHEN 5; 325 MG/1; MG/1
1 TABLET ORAL EVERY 8 HOURS PRN
Qty: 50 TABLET | Refills: 0 | Status: SHIPPED | OUTPATIENT
Start: 2017-12-26 | End: 2018-02-05 | Stop reason: SDUPTHER

## 2018-01-04 ENCOUNTER — OFFICE VISIT (OUTPATIENT)
Dept: INTERNAL MEDICINE | Facility: CLINIC | Age: 73
End: 2018-01-04
Payer: MEDICARE

## 2018-01-04 VITALS
HEART RATE: 66 BPM | SYSTOLIC BLOOD PRESSURE: 110 MMHG | DIASTOLIC BLOOD PRESSURE: 60 MMHG | HEIGHT: 67 IN | OXYGEN SATURATION: 95 % | WEIGHT: 164.25 LBS | BODY MASS INDEX: 25.78 KG/M2

## 2018-01-04 DIAGNOSIS — C78.7 METASTATIC RENAL CELL CARCINOMA TO LIVER: ICD-10-CM

## 2018-01-04 DIAGNOSIS — N18.30 TYPE 2 DIABETES MELLITUS WITH STAGE 3 CHRONIC KIDNEY DISEASE, WITH LONG-TERM CURRENT USE OF INSULIN: Chronic | ICD-10-CM

## 2018-01-04 DIAGNOSIS — E03.9 ACQUIRED HYPOTHYROIDISM: ICD-10-CM

## 2018-01-04 DIAGNOSIS — I70.0 ATHEROSCLEROSIS OF AORTA: ICD-10-CM

## 2018-01-04 DIAGNOSIS — M47.812 CERVICAL ARTHRITIS: ICD-10-CM

## 2018-01-04 DIAGNOSIS — F19.982 DRUG INDUCED INSOMNIA: ICD-10-CM

## 2018-01-04 DIAGNOSIS — C64.2 RENAL CELL CARCINOMA OF LEFT KIDNEY: ICD-10-CM

## 2018-01-04 DIAGNOSIS — F11.20 UNCOMPLICATED OPIOID DEPENDENCE: ICD-10-CM

## 2018-01-04 DIAGNOSIS — I25.119 CORONARY ARTERY DISEASE INVOLVING NATIVE CORONARY ARTERY OF NATIVE HEART WITH ANGINA PECTORIS: ICD-10-CM

## 2018-01-04 DIAGNOSIS — Z79.4 TYPE 2 DIABETES MELLITUS WITH STAGE 3 CHRONIC KIDNEY DISEASE, WITH LONG-TERM CURRENT USE OF INSULIN: Chronic | ICD-10-CM

## 2018-01-04 DIAGNOSIS — E13.40 NEUROPATHY DUE TO SECONDARY DIABETES: ICD-10-CM

## 2018-01-04 DIAGNOSIS — E11.22 TYPE 2 DIABETES MELLITUS WITH STAGE 3 CHRONIC KIDNEY DISEASE, WITH LONG-TERM CURRENT USE OF INSULIN: Chronic | ICD-10-CM

## 2018-01-04 DIAGNOSIS — I77.1 TORTUOUS AORTA: ICD-10-CM

## 2018-01-04 DIAGNOSIS — C79.51 METASTATIC RENAL CELL CARCINOMA TO BONE: ICD-10-CM

## 2018-01-04 DIAGNOSIS — C64.9 METASTATIC RENAL CELL CARCINOMA TO LIVER: ICD-10-CM

## 2018-01-04 DIAGNOSIS — D69.2 SENILE PURPURA: ICD-10-CM

## 2018-01-04 DIAGNOSIS — C64.9 METASTATIC RENAL CELL CARCINOMA TO BONE: ICD-10-CM

## 2018-01-04 PROCEDURE — 99999 PR PBB SHADOW E&M-EST. PATIENT-LVL III: CPT | Mod: PBBFAC,,, | Performed by: INTERNAL MEDICINE

## 2018-01-04 PROCEDURE — 99215 OFFICE O/P EST HI 40 MIN: CPT | Mod: S$GLB,,, | Performed by: INTERNAL MEDICINE

## 2018-01-04 NOTE — ASSESSMENT & PLAN NOTE
A1c 11.1 in 5/2017, 6.8 in 8/2017. Weaning off Lantus, PRN novolog 10 preprandial  · Continue current therapies  · Monitor A1c  · Discontinue long acting insulin  · PRN novolog for glucose >150  · Has not taken insulin in past 2 mos

## 2018-01-04 NOTE — ASSESSMENT & PLAN NOTE
Elevated TSH in 6/2017, 8/2017, 11/2017 compliant with levo 50mcg  · Advised to take separate from other meds and food  · Patient unable to  · Dose increased at last appt to 75mcg  · Check TSH

## 2018-01-04 NOTE — PATIENT INSTRUCTIONS
TODAY:  - optometry appt on 1/19 in AM with Dr Andrews  - PCP to add labs to 1/19 lab appt  - PCP to message Dr Reed about increasing pain meds  - [employer] programs near [place] (FREE gym membership)  [employer] [facility] [place]  Phone: [phone] Address: [address] [place] [address], [facility]. , [id number]  [facility] - [place]  Phone: [phone] Address: [address] [place] [address], [address]  [facility]  Phone: [phone] Address: [address] [address]  [employer] [facility] [place]  Phone: [phone] Address: [address] [place]. , [address]

## 2018-01-04 NOTE — PROGRESS NOTES
Primary Care Provider Appointment    Subjective:      Patient ID: Edwin Singleton is a 72 y.o. male with metastatic RCC, HTN, DM, hypothyroidism    Chief Complaint: Follow-up (Patient is here for a 4 week follow up ) and Flank Pain (1+ month , Right side , knot that is hard on Right side , pain= 10)    Patient with continued weight loss, 2# since last appt 2 weeks ago, and 10# in past 4 mos. He has undergone radiation and pazopanib treatments since 7/2017, started on Xgeva 3 months ago for bone metastasis. His intitial CT abdomen was 5/2017 which showed metastasis to liver and possible pleura with bone activity. In 8/2017 there was decrease in size of all masses with the chemo. He is compliant with Pazopanib for metastatic renal CA. He is experiencing intermittent diarrhea/constipation. His bone scan in 5/2017 showed iliac and L3 lesions, then repeat in 11/2017 after chemo and xgeva showed resolution of lumbar lesion. Patient and son with many questions about imaging.      He continues to have R flank pain. He is requesting higher dose hydrocodone. He plans to address this with Dr Reed on 1/19/17. He is noncompliant with the morphine and is using more norco. He has history of unintentional overdose of narcotics.     The pain is disrupting his sleep. He is turning tv off after falling asleep. He is avoiding coffee in the afternoon.    Due for A1c, sugars are normally controlled. He has weaned himself from long acting, and no longer needs short-acting due to readings being below 150.     Needs thyroid labs, thyroid meds increased to 75mcg at last appt 2 mos ago. No clinical symptoms of thyroid abnormalities.    He is now ambulating by cane and no longer needs wheelchair. He does not exercise during the day and watches a lot of tv.    Past Surgical History:   Procedure Laterality Date    ABDOMINAL SURGERY      APPENDECTOMY      BACK SURGERY      CARDIAC SURGERY      CHOLECYSTECTOMY      coronary stenting       "X 5 last one in 2010-11.    SPINAL FUSION         Past Medical History:   Diagnosis Date    Acquired hypothyroidism 5/26/2017    Benign essential HTN 5/26/2017    Benign prostatic hyperplasia (BPH) with urinary urge incontinence 6/6/2017    Chronic low back pain 6/1/2017    Coronary artery disease involving native coronary artery of native heart without angina pectoris 5/26/2017    S/p 5 stents - last one around 2010-11.    Gastroesophageal reflux disease without esophagitis 5/26/2017    History of CVA (cerebrovascular accident) 5/26/2017    Lumbar disc lesion 5/26/2017    Metastatic renal cell carcinoma to bone 6/6/2017    Metastatic renal cell carcinoma to liver 6/6/2017    Liver Biopsy 5-2017: METASTATIC RENAL CELL CARCINOMA.    Mixed hyperlipidemia 5/26/2017    Type 2 diabetes mellitus with stage 3 chronic kidney disease, with long-term current use of insulin 5/26/2017       Review of Systems   Constitutional: Negative for activity change and unexpected weight change.   HENT: Positive for hearing loss. Negative for trouble swallowing.    Gastrointestinal: Positive for diarrhea. Negative for blood in stool, constipation and vomiting.   Endocrine: Positive for polyuria. Negative for polydipsia.   Genitourinary: Positive for difficulty urinating and urgency.   Musculoskeletal: Positive for arthralgias. Negative for joint swelling and neck pain.   Neurological: Positive for weakness. Negative for headaches.   Psychiatric/Behavioral: Negative for confusion and dysphoric mood.       Objective:   /60 (BP Location: Left arm, Patient Position: Sitting, BP Method: Medium (Manual))   Pulse 66   Ht 5' 7" (1.702 m)   Wt 74.5 kg (164 lb 3.9 oz)   SpO2 95%   BMI 25.72 kg/m²     Physical Exam   Constitutional: He is oriented to person, place, and time. He appears well-developed and well-nourished.   Ambulating with cane   HENT:   Head: Normocephalic.   edentulous   Eyes: Pupils are equal, round, and " reactive to light.   Neck:   Decreased ROM in neck    Cardiovascular:   bradycardic   Pulmonary/Chest: Effort normal.   Musculoskeletal: Normal range of motion.   Neurological: He is alert and oriented to person, place, and time.   Skin: Skin is warm.   Ecchymoses and purpura bilaterally UE  Telangiectasias on face and chest  Multiple tattoos on UE   Psychiatric: He has a normal mood and affect.       Lab Results   Component Value Date    WBC 6.17 12/22/2017    HGB 13.0 (L) 12/22/2017    HCT 38.9 (L) 12/22/2017     12/22/2017    CHOL 112 (L) 08/24/2017    TRIG 224 (H) 08/24/2017    HDL 27 (L) 08/24/2017    ALT 22 12/22/2017    AST 20 12/22/2017     12/22/2017    K 4.1 12/22/2017     12/22/2017    CREATININE 1.3 12/22/2017    BUN 23 12/22/2017    CO2 26 12/22/2017    TSH 6.113 (H) 11/24/2017    PSA 0.61 05/27/2017    INR 1.2 06/12/2017    HGBA1C 6.8 (H) 08/24/2017         Assessment:   72 y.o. 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 Recent overdose  · Continue pain management per Heme-Onc  · PCP to message Dr Reed about dose increase  · Advised mindfulness to prevent overmedication  · Advised to avoid alcohol during [facility]         Drug induced insomnia     Patient with insomnia worsened with anxiety about his CA treatment  · Continue melatonin 5-10mg  · Med list reviewed by [facility] pharmacy  · Advised sleep hygiene   - turn off the tv 30 min - 1 hour before bed  - go to bed at the same time, wake up at the same time  - avoid napping             Cardiac/Vascular    Coronary artery disease involving native coronary artery of native heart with angina pectoris     [facility] S/p 5 stents 2010. Anginal equivalent controlled with CCB, ACE, APT  · Continue CCB, ACE, ASA, statin  · Will not start BB due to response to chemo         Tortuous aorta     CXR 6/16/17 showed tortuous aorta  · Continue statin, ASA, BP control         Atherosclerosis of aorta     6/16/17 CXR calcification of aorta  · Continue statin, ASA, BP control            Hematology    Senile purpura     Ecchymoses and pupura on UE bilaterally  · Advised mindfulness of movements  · Monitor  · continue APT            Oncology    Metastatic renal cell carcinoma to liver     Metastatic renal cell CA (Stage IV - T1,Nx,M1). Followed by Heme-Onc (Dr Reed) radiation therapy to L3 spine - 3 Gy/Fx x 10 fx and pazopanib 800 mg PO daily. Liver bx 5/2017 with RCC  · F/U Heme-Onc  · Continue current therapies         Metastatic renal cell carcinoma to bone     Metastatic renal cell CA (Stage IV - T1,Nx,M1). Followed by Heme-Onc (Dr Reed) radiation therapy to L3 spine - 3 Gy/Fx x 10 fx and pazopanib 800 mg PO daily. Liver bx 5/2017 with RCC  · F/U Heme-Onc  · Continue current therapies  · Infusion of xgeva monthly  · Ca with Vit D supplementation         Renal cell carcinoma of left kidney     Metastatic renal cell CA (Stage IV - T1,Nx,M1). Followed by Heme-Onc (Dr Reed) radiation therapy to L3 spine - 3 Gy/Fx x 10 fx and pazopanib 800 mg PO daily.  · F/U Heme-Onc  · Continue pazopanib 800mg  · Cotninue Xgeva on 10/26, 11/24/17, 12/22/17  · Cotninue Ca and Vit D by PCP            Endocrine    Type 2 diabetes mellitus with stage 3 chronic kidney disease, with long-term current use of insulin (Chronic)     A1c 11.1 in 5/2017, 6.8 in 8/2017. Weaning off Lantus, PRN novolog 10 preprandial  · Continue current therapies  · Monitor A1c  · Discontinue long acting insulin  · PRN novolog for glucose >150  · Has not taken  insulin in past 2 mos         Relevant Orders    Hemoglobin A1c    Acquired hypothyroidism     Elevated TSH in 6/2017, 8/2017, 11/2017 compliant with levo 50mcg  · Advised to take separate from other meds and food  · Patient unable to  · Dose increased at last appt to 75mcg  · Check TSH          Relevant Orders    TSH       Orthopedic    Cervical arthritis     Fused bones in cervical spine at [facility]. Recurrent neck pain  · Monitor clinically               Health Maintenance       Date Due Completion Date    TETANUS VACCINE 02/18/1963 ---    Hemoglobin A1c 02/24/2018 8/24/2017- TODAY    Foot Exam 07/07/2018 7/7/2017    Eye Exam 07/07/2018 7/7/2017    Lipid Panel 08/24/2018 8/24/2017    Pneumococcal (65+) (2 of 2 - PPSV23) 11/29/2018 11/29/2017    Colonoscopy 02/15/2021 2/15/2011 (Done)    Override on 2/15/2011: Done (Per [facility] dashboard ? results)          Return in about 6 weeks (around 2/15/2018). . One hour spent with this patient today, half of that in counseling.    Lulú Lewis MD/MPH  Internal Medicine  [facility]  [phone]

## 2018-01-04 NOTE — ASSESSMENT & PLAN NOTE
Fused bones in cervical spine at [facility]. Recurrent neck pain  · Monitor clinically

## 2018-01-05 NOTE — ASSESSMENT & PLAN NOTE
LE numbness and tingling, elevated A1c, followed by podiatry  · Establish podiatry care in [facility]  · F/U podiatry q3-6 mos  · Diabetic shoes

## 2018-01-05 NOTE — ASSESSMENT & PLAN NOTE
Patient with insomnia worsened with anxiety about his CA treatment  · Continue melatonin 5-10mg  · Med list [facility] by [facility]  · Advised sleep hygiene   - turn off the tv 30 min - 1 hour before bed  - go to bed at the same time, wake up at the same time  - avoid napping

## 2018-01-05 NOTE — ASSESSMENT & PLAN NOTE
Metastatic renal cell CA (Stage IV - T1,Nx,M1). Followed by Heme-Onc (Dr Reed) radiation therapy to L3 spine - 3 Gy/Fx x 10 fx and pazopanib 800 mg PO daily.  · F/U Heme-Onc  · Continue pazopanib 800mg  · Cotninue Xgeva on 10/26, 11/24/17, 12/22/17  · Cotninue Ca and Vit D by PCP

## 2018-01-05 NOTE — ASSESSMENT & PLAN NOTE
Metastatic renal cell CA (Stage IV - T1,Nx,M1). Followed by Heme-Onc (Dr Reed) radiation therapy to L3 spine - 3 Gy/Fx x 10 fx and pazopanib 800 mg PO daily. Liver bx 5/2017 with RCC  · F/U Heme-Onc  · Continue current therapies  · Infusion of xgeva monthly  · Ca with Vit D supplementation

## 2018-01-05 NOTE — ASSESSMENT & PLAN NOTE
Metastatic renal cell CA (Stage IV - T1,Nx,M1). Followed by Heme-Onc (Dr Reed) radiation therapy to L3 spine - 3 Gy/Fx x 10 fx and pazopanib 800 mg PO daily. Liver bx 5/2017 with RCC  · F/U Heme-Onc  · Continue current therapies

## 2018-01-05 NOTE — ASSESSMENT & PLAN NOTE
[facility] S/p 5 stents 2010. Anginal equivalent controlled with CCB, ACE, APT  · Continue CCB, ACE, ASA, statin  · Will not start BB due to response to chemo

## 2018-01-05 NOTE — ASSESSMENT & PLAN NOTE
Taking scheduled narcotics for cancer-related pain. Recent overdose  · Continue pain management per Heme-Onc  · PCP to message Dr Reed about dose increase  · Advised mindfulness to prevent overmedication  · Advised to avoid alcohol during [facility]

## 2018-01-10 DIAGNOSIS — C64.9 METASTATIC RENAL CELL CARCINOMA TO LIVER: ICD-10-CM

## 2018-01-10 DIAGNOSIS — C78.7 METASTATIC RENAL CELL CARCINOMA TO LIVER: ICD-10-CM

## 2018-01-10 DIAGNOSIS — G89.3 NEOPLASM RELATED PAIN: ICD-10-CM

## 2018-01-10 RX ORDER — MORPHINE SULFATE 15 MG/1
15 TABLET ORAL EVERY 12 HOURS
Qty: 60 TABLET | Refills: 0 | Status: SHIPPED | OUTPATIENT
Start: 2018-01-10 | End: 2018-02-05 | Stop reason: SDUPTHER

## 2018-01-10 RX ORDER — HYDROCODONE BITARTRATE AND ACETAMINOPHEN 5; 325 MG/1; MG/1
TABLET ORAL
Qty: 120 TABLET | Refills: 0 | Status: SHIPPED | OUTPATIENT
Start: 2018-01-10 | End: 2018-03-05 | Stop reason: SDUPTHER

## 2018-01-15 ENCOUNTER — TELEPHONE (OUTPATIENT)
Dept: PHARMACY | Facility: CLINIC | Age: 73
End: 2018-01-15

## 2018-01-19 ENCOUNTER — LAB VISIT (OUTPATIENT)
Dept: LAB | Facility: HOSPITAL | Age: 73
End: 2018-01-19
Attending: INTERNAL MEDICINE
Payer: MEDICARE

## 2018-01-19 ENCOUNTER — OFFICE VISIT (OUTPATIENT)
Dept: HEMATOLOGY/ONCOLOGY | Facility: CLINIC | Age: 73
End: 2018-01-19
Payer: MEDICARE

## 2018-01-19 ENCOUNTER — OFFICE VISIT (OUTPATIENT)
Dept: OPTOMETRY | Facility: CLINIC | Age: 73
End: 2018-01-19
Payer: MEDICARE

## 2018-01-19 ENCOUNTER — INFUSION (OUTPATIENT)
Dept: INFUSION THERAPY | Facility: HOSPITAL | Age: 73
End: 2018-01-19
Attending: INTERNAL MEDICINE
Payer: MEDICARE

## 2018-01-19 ENCOUNTER — PATIENT MESSAGE (OUTPATIENT)
Dept: HEMATOLOGY/ONCOLOGY | Facility: CLINIC | Age: 73
End: 2018-01-19

## 2018-01-19 VITALS
OXYGEN SATURATION: 97 % | RESPIRATION RATE: 20 BRPM | DIASTOLIC BLOOD PRESSURE: 94 MMHG | SYSTOLIC BLOOD PRESSURE: 164 MMHG | BODY MASS INDEX: 25.9 KG/M2 | WEIGHT: 165.38 LBS | HEART RATE: 77 BPM | TEMPERATURE: 98 F

## 2018-01-19 DIAGNOSIS — Z83.511 FAMILY HISTORY OF GLAUCOMA: ICD-10-CM

## 2018-01-19 DIAGNOSIS — E11.3292 TYPE 2 DIABETES MELLITUS WITH LEFT EYE AFFECTED BY MILD NONPROLIFERATIVE RETINOPATHY WITHOUT MACULAR EDEMA, WITH LONG-TERM CURRENT USE OF INSULIN: Primary | ICD-10-CM

## 2018-01-19 DIAGNOSIS — H25.13 NUCLEAR SCLEROTIC CATARACT OF BOTH EYES: ICD-10-CM

## 2018-01-19 DIAGNOSIS — C64.9 METASTATIC RENAL CELL CARCINOMA TO LIVER: ICD-10-CM

## 2018-01-19 DIAGNOSIS — Z79.4 TYPE 2 DIABETES MELLITUS WITH LEFT EYE AFFECTED BY MILD NONPROLIFERATIVE RETINOPATHY WITHOUT MACULAR EDEMA, WITH LONG-TERM CURRENT USE OF INSULIN: Primary | ICD-10-CM

## 2018-01-19 DIAGNOSIS — C78.7 METASTATIC RENAL CELL CARCINOMA TO LIVER: ICD-10-CM

## 2018-01-19 DIAGNOSIS — C64.9 METASTATIC RENAL CELL CARCINOMA TO BONE: Primary | ICD-10-CM

## 2018-01-19 DIAGNOSIS — C64.9 METASTATIC RENAL CELL CARCINOMA TO BONE: ICD-10-CM

## 2018-01-19 DIAGNOSIS — C64.2 RENAL CELL CARCINOMA OF LEFT KIDNEY: Primary | ICD-10-CM

## 2018-01-19 DIAGNOSIS — C79.51 METASTATIC RENAL CELL CARCINOMA TO BONE: ICD-10-CM

## 2018-01-19 DIAGNOSIS — C79.51 METASTATIC RENAL CELL CARCINOMA TO BONE: Primary | ICD-10-CM

## 2018-01-19 DIAGNOSIS — K52.1 DIARRHEA DUE TO DRUG: ICD-10-CM

## 2018-01-19 DIAGNOSIS — R79.9 ABNORMAL FINDING OF BLOOD CHEMISTRY: ICD-10-CM

## 2018-01-19 LAB
ALBUMIN SERPL BCP-MCNC: 3.2 G/DL
ALP SERPL-CCNC: 100 U/L
ALT SERPL W/O P-5'-P-CCNC: 28 U/L
ANION GAP SERPL CALC-SCNC: 10 MMOL/L
AST SERPL-CCNC: 26 U/L
BASOPHILS # BLD AUTO: 0.03 K/UL
BASOPHILS NFR BLD: 0.5 %
BILIRUB SERPL-MCNC: 0.7 MG/DL
BUN SERPL-MCNC: 12 MG/DL
CALCIUM SERPL-MCNC: 8.5 MG/DL
CHLORIDE SERPL-SCNC: 105 MMOL/L
CO2 SERPL-SCNC: 26 MMOL/L
CREAT SERPL-MCNC: 1.1 MG/DL
DIFFERENTIAL METHOD: ABNORMAL
EOSINOPHIL # BLD AUTO: 0.4 K/UL
EOSINOPHIL NFR BLD: 6.4 %
ERYTHROCYTE [DISTWIDTH] IN BLOOD BY AUTOMATED COUNT: 13.6 %
EST. GFR  (AFRICAN AMERICAN): >60 ML/MIN/1.73 M^2
EST. GFR  (NON AFRICAN AMERICAN): >60 ML/MIN/1.73 M^2
GLUCOSE SERPL-MCNC: 147 MG/DL
HCT VFR BLD AUTO: 41.3 %
HGB BLD-MCNC: 13.7 G/DL
IMM GRANULOCYTES # BLD AUTO: 0.01 K/UL
IMM GRANULOCYTES NFR BLD AUTO: 0.2 %
LYMPHOCYTES # BLD AUTO: 2.2 K/UL
LYMPHOCYTES NFR BLD: 35.4 %
MCH RBC QN AUTO: 32.8 PG
MCHC RBC AUTO-ENTMCNC: 33.2 G/DL
MCV RBC AUTO: 99 FL
MONOCYTES # BLD AUTO: 0.6 K/UL
MONOCYTES NFR BLD: 8.8 %
NEUTROPHILS # BLD AUTO: 3 K/UL
NEUTROPHILS NFR BLD: 48.7 %
NRBC BLD-RTO: 0 /100 WBC
PHOSPHATE SERPL-MCNC: 2.1 MG/DL
PLATELET # BLD AUTO: 298 K/UL
PMV BLD AUTO: 10 FL
POTASSIUM SERPL-SCNC: 3.6 MMOL/L
PROT SERPL-MCNC: 7 G/DL
RBC # BLD AUTO: 4.18 M/UL
SODIUM SERPL-SCNC: 141 MMOL/L
WBC # BLD AUTO: 6.24 K/UL

## 2018-01-19 PROCEDURE — 99999 PR PBB SHADOW E&M-EST. PATIENT-LVL II: CPT | Mod: PBBFAC,,, | Performed by: OPTOMETRIST

## 2018-01-19 PROCEDURE — 85025 COMPLETE CBC W/AUTO DIFF WBC: CPT

## 2018-01-19 PROCEDURE — 36415 COLL VENOUS BLD VENIPUNCTURE: CPT

## 2018-01-19 PROCEDURE — 99999 PR PBB SHADOW E&M-EST. PATIENT-LVL III: CPT | Mod: PBBFAC,,, | Performed by: INTERNAL MEDICINE

## 2018-01-19 PROCEDURE — 96372 THER/PROPH/DIAG INJ SC/IM: CPT

## 2018-01-19 PROCEDURE — 84100 ASSAY OF PHOSPHORUS: CPT

## 2018-01-19 PROCEDURE — 92014 COMPRE OPH EXAM EST PT 1/>: CPT | Mod: S$GLB,,, | Performed by: OPTOMETRIST

## 2018-01-19 PROCEDURE — 63600175 PHARM REV CODE 636 W HCPCS: Mod: JG | Performed by: INTERNAL MEDICINE

## 2018-01-19 PROCEDURE — 80053 COMPREHEN METABOLIC PANEL: CPT

## 2018-01-19 PROCEDURE — 99214 OFFICE O/P EST MOD 30 MIN: CPT | Mod: S$GLB,,, | Performed by: INTERNAL MEDICINE

## 2018-01-19 RX ORDER — DIPHENOXYLATE HYDROCHLORIDE AND ATROPINE SULFATE 2.5; .025 MG/1; MG/1
2 TABLET ORAL 4 TIMES DAILY PRN
Qty: 240 TABLET | Refills: 3 | Status: SHIPPED | OUTPATIENT
Start: 2018-01-19 | End: 2018-02-19 | Stop reason: SDUPTHER

## 2018-01-19 RX ADMIN — DENOSUMAB 120 MG: 120 INJECTION SUBCUTANEOUS at 02:01

## 2018-01-19 NOTE — NURSING
Pt arrived for xgeva following MD appt.  Labs reviewed with pt.  Instructed to take calcium Vit D everyday, states understanding.  Pt tolerated injection SQ to left lower abd.  Discharged to home using his cane.

## 2018-01-19 NOTE — PROGRESS NOTES
Subjective:       Patient ID: Edwin Singleton is a 72 y.o. male.    Chief Complaint: Follow-up    HPI     Mr. Singleton is a 72 y.o. male who returns for follow up regarding metastatic renal cell carcinoma of left kidney.  Diarrhea on Votrient remains biggest issue  Reports when he sleeps at night he requires pain medication which makes him sometimes sleep through diarrhea.  Reports 3-4 episodes of diarrhea per day.  Taking Lomotil and reports even triples up with continued symptoms    Sometimes notes urine flow hard to get started   Does not feel as if he fully empties  On Flomax    11/13/17 Bone Scan:  1.  Interval resolution of the L3 bone activity.  2.  No new bone metastatic lesions identified.    8/23/17 CT scans:  Interval decrease in size and enhancement of the bilateral renal masses and multiple hepatic lesions suggestive of response to chemotherapy in this patient with metastatic RCC. Stable L3 lytic lesion.  Diffuse bladder wall thickening may relate to incomplete distention or cystitis. Consider correlation with urinalysis.  Additional stable findings include right thyroid nodule, cholecystectomy, fat-containing left inguinal hernia, and postsurgical changes of the lumbar spine.  RECIST SUMMARY: Date of prior examination for comparison: 5/27/17  Lesion 1: Left kidney 2.2 cm Series 2 Image 73 Prior measurement 3.0 cm  Lesion 2: Right kidney 0.6 cm Series 601 Image 41 Prior measurement 1.0 cm  Lesion 3: Hepatic segment II 2.1 cm Series 2 Image 50 Prior measurement 3.0 cm  Lesion 4: Hepatic segment VII 3.0 cm Series 2 Image 79 Prior measurement 4.7 cm  Lesion 5: L3 vertebral body 2.4 cm Series 2 Image 79 Prior measurement 2.4 cm    Oncology History:  Mr.Mc Moss is a 71 yo male with CAD (s/p 5 stents, remotely), DM2 (insulin dependent), hypothyroidism , CVA (2013) with no residual deficit, chronic back pain. He [place] [facility] [place] of a lumbar spine lesion in the setting of  "progressive weakness of his legs and exacerbation of chronic back pain, in May 2017. He states that his back pain and LE weakness progressively worsened. [facility]. The pain radiates down his legs. He denies any incontinence of bowel or bladder. He had several falls. After one of the falls, he presented to the ED at [facility], in May 2017. He had an MRI showing "L3 vertebral body lesion with apparent cortical disruption concerning for metastases vs myeloma," and was transferred to [facility] [place] for neurosurgery evaluation. He was evaluated by neurosurgery regarding concern for lytic lesion on L3 on 5/26/17. No surgical intervention was proposed.   CT chest, abdomen,pelvis on 5/27/17 revealed :   --A 3.6 cm exophytic mass arising from the medial aspect of the lower pole of left kidney,   --A small 1.0 cm, subtle, cortical enhancing mass within the upper pole of the right kidney, concerning for possible contralateral solid renal mass  --Multiple enhancing ill-defined hepatic masses concerning for hepatic metastatic disease, hepatomegaly and hepatic steatosis  --Mulltifocal irregular thickening of the bilateral pleura concerning for possible pleural metastatic disease  --Mild nonspecific nodular thickening of the left adrenal gland without discrete nodule  -- Redemonstration of known lytic lesion within the L3 vertebral body. No definite additional discrete lytic lesions are identified.  -MRI pelvis from 10/18/17:    2.5 cm linear T1 hypointense T2/STIR hyperintense lesion within the left iliac bone, this lesion is indeterminate but felt less likely to represent metastasis given its appearance. Further evaluation can be obtained with bone scan as clinically indicated.  Findings suggestive of left trochanteric bursitis.  Partially visualized L3 metastatic lesion.  Diffuse thickening of the urinary bladder wall can be seen with chronic outlet obstruction or cystitis.    Review " of Systems   Constitutional: Positive for fatigue. Negative for activity change, appetite change, chills, fever and unexpected weight change.   HENT: Positive for hearing loss. Negative for congestion, dental problem (had dental clearance), nosebleeds, postnasal drip, rhinorrhea, sinus pain, sinus pressure, sneezing, sore throat and trouble swallowing.    Eyes: Negative for visual disturbance.   Respiratory: Negative for cough, shortness of breath and wheezing.    Cardiovascular: Negative for chest pain, palpitations and leg swelling.   Gastrointestinal: Positive for diarrhea. Negative for abdominal distention, abdominal pain, blood in stool, constipation, nausea and vomiting.   Genitourinary: Negative for decreased urine volume, difficulty urinating, dysuria, frequency and urgency.   Musculoskeletal: Positive for arthralgias (chronic, though left hip has worsened over last month). Negative for back pain, myalgias, neck pain and neck stiffness.   Skin: Negative for color change, pallor, rash and wound.   Neurological: Negative for dizziness, weakness, numbness and headaches.   Hematological: Negative for adenopathy. Does not bruise/bleed easily.   Psychiatric/Behavioral: Negative for dysphoric mood and sleep disturbance. The patient is not nervous/anxious.        Objective:      Physical Exam   Constitutional: He is oriented to person, place, and time. He appears well-developed and well-nourished. No distress.   Presents alone   HENT:   Head: Normocephalic and atraumatic.   Right Ear: External ear normal.   Left Ear: External ear normal.   Nose: Nose normal.   Mouth/Throat: Oropharynx is clear and moist. No oropharyngeal exudate.   Eyes: Conjunctivae and EOM are normal. Pupils are equal, round, and reactive to light. Right eye exhibits no discharge. Left eye exhibits no discharge. No scleral icterus.   Neck: Normal range of motion. Neck supple. No thyromegaly present.   Cardiovascular: Normal rate, regular rhythm,  normal heart sounds and intact distal pulses.  Exam reveals no gallop and no friction rub.    No murmur heard.  Pulmonary/Chest: Effort normal and breath sounds normal. No respiratory distress. He has no wheezes. He has no rales. He exhibits no tenderness.   Coarse breath sounds at bases   Abdominal: Soft. Bowel sounds are normal. He exhibits no distension and no mass. There is no tenderness. There is no rebound and no guarding.   No organomegaly   Musculoskeletal: Normal range of motion. He exhibits no edema, tenderness or deformity.   Lymphadenopathy:     He has no cervical adenopathy.   Neurological: He is alert and oriented to person, place, and time. No cranial nerve deficit or sensory deficit. Coordination normal.   Skin: Skin is warm and dry. No rash noted. He is not diaphoretic. No erythema. No pallor.   Psychiatric: He has a normal mood and affect. His behavior is normal. Judgment and thought content normal.   Nursing note and vitals reviewed.    Labs- reviewed  Assessment:       1. Renal cell carcinoma of left kidney    2. Metastatic renal cell carcinoma to liver    3. 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 Due for restaging on Votrient  Reviewed diarrhea regimen with patient   RTC post        Distress Screening Results: Psychosocial Distress screening score of Distress Score: 10 noted and reviewed. No intervention indicated.   Medical stress and appointment time stress- discussed

## 2018-01-19 NOTE — PROGRESS NOTES
HPI     Mr. Edwin Singleton is here for a 6 month diabetic retinopathy recheck.    He reports distance blur with glasses. He sees better with glasses off.   Glasses are about 3 years old. Pt declines refraction today and will wait   for a refraction until his annual visit.    (-)drops  (-)flashes  (-)floaters  (-)diplopia    Diabetic yes  Hemoglobin A1C       Date                     Value               Ref Range             Status           08/24/2017               6.8 (H)             4.0 - 5.6 %         Final  05/27/2017               11.1 (H)            4.5 - 6.2 %         Final      OCULAR HISTORY  Last Eye Exam 07/07/17 with Dr. Andrews  (-)eye surgery   Mild NPDR OD  Moderate NPDR OS  Cataracts OU    FAMILY HISTORY  (+)Glaucoma: mother, maternal grandmother        Last edited by Beverly Andrews, OD on 1/19/2018 10:37 AM. (History)            Assessment /Plan     For exam results, see Encounter Report.    Type 2 diabetes mellitus with left eye affected by mild nonproliferative retinopathy without macular edema, with long-term current use of insulin   Mild NPDR OS, no retinopathy OD. Improved OU compared to 6 months ago now that pt has re-started insulin.   Recheck in 6 months, or RTC immediately with any vision changes.    Nuclear sclerotic cataract of both eyes   Stable OU. Monitor.     Family history of glaucoma   No signs of glaucoma today OU. Monitor yearly.         Note: Eye exam today performed at [facility]        RTC 6 months to recheck diabetic retinopathy and refraction

## 2018-02-02 ENCOUNTER — PATIENT MESSAGE (OUTPATIENT)
Dept: PODIATRY | Facility: CLINIC | Age: 73
End: 2018-02-02

## 2018-02-02 ENCOUNTER — PATIENT MESSAGE (OUTPATIENT)
Dept: INTERNAL MEDICINE | Facility: CLINIC | Age: 73
End: 2018-02-02

## 2018-02-02 NOTE — TELEPHONE ENCOUNTER
Spoke with bella from orthotics and she stated she faxed over paperwork and received confirmation at 2:13 p.m.  Asked her to call out to me the fax number she has and fax number is correct

## 2018-02-05 DIAGNOSIS — C64.9 METASTATIC RENAL CELL CARCINOMA TO LIVER: ICD-10-CM

## 2018-02-05 DIAGNOSIS — C78.7 METASTATIC RENAL CELL CARCINOMA TO LIVER: ICD-10-CM

## 2018-02-05 DIAGNOSIS — G89.3 NEOPLASM RELATED PAIN: ICD-10-CM

## 2018-02-05 DIAGNOSIS — C79.51 METASTATIC RENAL CELL CARCINOMA TO BONE: ICD-10-CM

## 2018-02-05 DIAGNOSIS — C64.9 METASTATIC RENAL CELL CARCINOMA TO BONE: ICD-10-CM

## 2018-02-05 RX ORDER — HYDROCODONE BITARTRATE AND ACETAMINOPHEN 5; 325 MG/1; MG/1
1 TABLET ORAL EVERY 8 HOURS PRN
Qty: 50 TABLET | Refills: 0 | Status: SHIPPED | OUTPATIENT
Start: 2018-02-05 | End: 2018-05-03 | Stop reason: SDUPTHER

## 2018-02-05 RX ORDER — MORPHINE SULFATE 15 MG/1
15 TABLET ORAL EVERY 12 HOURS
Qty: 60 TABLET | Refills: 0 | Status: SHIPPED | OUTPATIENT
Start: 2018-02-05 | End: 2018-03-05 | Stop reason: SDUPTHER

## 2018-02-07 ENCOUNTER — PATIENT MESSAGE (OUTPATIENT)
Dept: GASTROENTEROLOGY | Facility: CLINIC | Age: 73
End: 2018-02-07

## 2018-02-12 ENCOUNTER — TELEPHONE (OUTPATIENT)
Dept: PHARMACY | Facility: CLINIC | Age: 73
End: 2018-02-12

## 2018-02-12 NOTE — TELEPHONE ENCOUNTER
"Patient's son, Pramod, called for Mr. Powell concerning a rash that has developed on the back of his head around his neck for "about a couple of weeks". The rash is itchy and red, but has not blistered or have any open wounds. Pt has been using Dr. Pierce to "dry it up". Discussed with pt's son to use hydrocortisone cream instead to help with the redness and inflammation. Rash will be monitored and will contact [facility] or MD if rash begins to spread further or worsens. Pt will follow up with MD on 2/19 at [facility]. No other side effects experienced at this time. Pt Will f/u with pt and son next monday 2/19.  "

## 2018-02-15 ENCOUNTER — HOSPITAL ENCOUNTER (OUTPATIENT)
Dept: RADIOLOGY | Facility: HOSPITAL | Age: 73
Discharge: HOME | End: 2018-02-15
Attending: INTERNAL MEDICINE
Payer: MEDICARE

## 2018-02-15 ENCOUNTER — OFFICE VISIT (OUTPATIENT)
Dept: INTERNAL MEDICINE | Facility: CLINIC | Age: 73
End: 2018-02-15
Payer: MEDICARE

## 2018-02-15 VITALS
WEIGHT: 161.19 LBS | BODY MASS INDEX: 25.3 KG/M2 | SYSTOLIC BLOOD PRESSURE: 130 MMHG | DIASTOLIC BLOOD PRESSURE: 60 MMHG | HEIGHT: 67 IN | HEART RATE: 56 BPM

## 2018-02-15 DIAGNOSIS — E11.59 HYPERTENSION ASSOCIATED WITH DIABETES: ICD-10-CM

## 2018-02-15 DIAGNOSIS — G89.3 NEOPLASM RELATED PAIN: ICD-10-CM

## 2018-02-15 DIAGNOSIS — C64.2 RENAL CELL CARCINOMA OF LEFT KIDNEY: ICD-10-CM

## 2018-02-15 DIAGNOSIS — T45.1X5A CHEMOTHERAPY INDUCED DIARRHEA: ICD-10-CM

## 2018-02-15 DIAGNOSIS — I15.2 HYPERTENSION ASSOCIATED WITH DIABETES: ICD-10-CM

## 2018-02-15 DIAGNOSIS — K52.1 CHEMOTHERAPY INDUCED DIARRHEA: ICD-10-CM

## 2018-02-15 DIAGNOSIS — F19.982 DRUG INDUCED INSOMNIA: ICD-10-CM

## 2018-02-15 DIAGNOSIS — E03.9 ACQUIRED HYPOTHYROIDISM: ICD-10-CM

## 2018-02-15 PROCEDURE — 99215 OFFICE O/P EST HI 40 MIN: CPT | Mod: S$GLB,,, | Performed by: INTERNAL MEDICINE

## 2018-02-15 PROCEDURE — 3008F BODY MASS INDEX DOCD: CPT | Mod: S$GLB,,, | Performed by: INTERNAL MEDICINE

## 2018-02-15 PROCEDURE — 71260 CT THORAX DX C+: CPT | Mod: 26,,, | Performed by: RADIOLOGY

## 2018-02-15 PROCEDURE — 71260 CT THORAX DX C+: CPT | Mod: TC

## 2018-02-15 PROCEDURE — 1125F AMNT PAIN NOTED PAIN PRSNT: CPT | Mod: S$GLB,,, | Performed by: INTERNAL MEDICINE

## 2018-02-15 PROCEDURE — 74177 CT ABD & PELVIS W/CONTRAST: CPT | Mod: TC

## 2018-02-15 PROCEDURE — 99999 PR PBB SHADOW E&M-EST. PATIENT-LVL III: CPT | Mod: PBBFAC,,, | Performed by: INTERNAL MEDICINE

## 2018-02-15 PROCEDURE — 25500020 PHARM REV CODE 255: Performed by: INTERNAL MEDICINE

## 2018-02-15 PROCEDURE — 1159F MED LIST DOCD IN RCRD: CPT | Mod: S$GLB,,, | Performed by: INTERNAL MEDICINE

## 2018-02-15 PROCEDURE — 74177 CT ABD & PELVIS W/CONTRAST: CPT | Mod: 26,,, | Performed by: RADIOLOGY

## 2018-02-15 RX ADMIN — IOHEXOL 100 ML: 350 INJECTION, SOLUTION INTRAVENOUS at 01:02

## 2018-02-15 NOTE — PROGRESS NOTES
"Primary Care Provider Appointment    Subjective:      Patient ID: Edwin Singleton is a 72 y.o. male with HTN, HLD, DM, RCC    Chief Complaint: Follow-up (6 weeks )    Patient here without his son today (who usually accompanies him to appts). He drove himself. He has already done his labs for Heme/Onc. He states "I'm feeling alright." He did not celebrate [employer] this year, but in previous years did drink excessively. He no longer drinks alcohol.    He complains of mild pain in his R flank which is chronic. He takes hydrocodone for this.     His main concerns with Heme/Onc are diarrhea on votrient since 7/8/17. He states his diarrhea is better with lomotil, worse with milk. He no longer takes zantac for reflux due to difficulty with complying with time difference to take voltran.    He is due for RCC restaging with CTs following initiation of daily votrient in 7/2017. CTs planned on 2/15/18, and Dr Reed on 2/19. He has completed 4 injections of xgeva for bone metastasis. His follow-up bone scan on 11/2017 was improved from 5/2017.     Patient due for thyroid and A1c labs, but will defer until next lab draw (patient already had labs drawn this AM with Heme/Onc). Synthroid was increased to 75mcg at last appt in 11/2017.     He has weight-loss associated with chemo, and A1c become better controlled. He no longer uses short-acting insulin. Last A1c was 6.8 in 8/2017.    Patient continues to have insomnia, has poor sleep hygiene.     Past Surgical History:   Procedure Laterality Date    ABDOMINAL SURGERY      APPENDECTOMY      BACK SURGERY      CARDIAC SURGERY      CHOLECYSTECTOMY      coronary stenting      X 5 last one in 2010-11.    SPINAL FUSION         Past Medical History:   Diagnosis Date    Acquired hypothyroidism 5/26/2017    Benign essential HTN 5/26/2017    Benign prostatic hyperplasia (BPH) with urinary urge incontinence 6/6/2017    Chronic low back pain 6/1/2017    Coronary artery disease " "involving native coronary artery of native heart without angina pectoris 5/26/2017    S/p 5 stents - last one around 2010-11.    Gastroesophageal reflux disease without esophagitis 5/26/2017    History of CVA (cerebrovascular accident) 5/26/2017    Lumbar disc lesion 5/26/2017    Metastatic renal cell carcinoma to bone 6/6/2017    Metastatic renal cell carcinoma to liver 6/6/2017    Liver Biopsy 5-2017: METASTATIC RENAL CELL CARCINOMA.    Mixed hyperlipidemia 5/26/2017    Type 2 diabetes mellitus with stage 3 chronic kidney disease, with long-term current use of insulin 5/26/2017       Review of Systems   Constitutional: Negative for activity change and unexpected weight change.   HENT: Positive for hearing loss. Negative for trouble swallowing.    Gastrointestinal: Positive for diarrhea. Negative for blood in stool, constipation and vomiting.   Endocrine: Positive for polyuria. Negative for polydipsia.   Genitourinary: Positive for difficulty urinating. Negative for urgency.   Musculoskeletal: Positive for arthralgias. Negative for joint swelling and neck pain.   Neurological: Positive for weakness. Negative for headaches.   Psychiatric/Behavioral: Negative for confusion and dysphoric mood.       Objective:   /60 (BP Location: Right arm, Patient Position: Sitting, BP Method: Medium (Manual))   Pulse (!) 56   Ht 5' 7" (1.702 m)   Wt 73.1 kg (161 lb 2.5 oz)   BMI 25.24 kg/m²     Physical Exam   Constitutional: He is oriented to person, place, and time. He appears well-developed and well-nourished.   Ambulating with cane   HENT:   Head: Normocephalic.   edentulous   Eyes: Pupils are equal, round, and reactive to light.   Neck:   Decreased ROM in neck    Cardiovascular:   bradycardic   Pulmonary/Chest: Effort normal.   Musculoskeletal: Normal range of motion.   Neurological: He is alert and oriented to person, place, and time.   Skin: Skin is warm.   Ecchymoses and purpura bilaterally " UE  Telangiectasias on face and chest  Multiple tattoos on UE   Psychiatric: He has a normal mood and affect.       Lab Results   Component Value Date    WBC 6.30 02/15/2018    HGB 13.9 (L) 02/15/2018    HCT 41.3 02/15/2018     02/15/2018    CHOL 130 02/15/2018    TRIG 186 (H) 02/15/2018    HDL 31 (L) 02/15/2018    ALT 32 02/15/2018    AST 28 02/15/2018     02/15/2018    K 4.2 02/15/2018     (H) 02/15/2018    CREATININE 1.1 02/15/2018    BUN 19 02/15/2018    CO2 24 02/15/2018    TSH 6.113 (H) 11/24/2017    PSA 0.61 05/27/2017    INR 1.2 06/12/2017    HGBA1C 6.8 (H) 08/24/2017         Assessment:   72 y.o. male with multiple co-morbid illnesses here to continue work-up of chronic issues notably HTN, HLD, DM, RCC     Plan:     Problem List Items Addressed This Visit        Psychiatric    Drug induced insomnia     Patient with insomnia worsened with anxiety about his CA treatment  · Continue melatonin 5-10mg  · Med list reviewed by [facility] pharmacy  · Advised sleep hygiene   - turn off the tv 30 min - 1 hour before bed  - go to bed at the same time, wake up at the same time  - avoid napping            Cardiac/Vascular    Hypertension associated with diabetes     BP controlled on ACE, CCB  · Continue lisinopril 40mg, amlodipine 10mg  · Continue statin, ASA  · cotninue home BP reads         Relevant Orders    Hemoglobin A1c       Oncology    Renal cell carcinoma of left kidney     Metastatic renal cell CA (Stage IV - T1,Nx,M1). Followed by Heme-Onc (Dr Reed) radiation therapy to L3 spine - 3 Gy/Fx x 10 fx and pazopanib 800 mg PO daily.  · F/U Heme-Onc  · restaging CTs on 2/15/18  · Continue pazopanib 800mg  · Cotninue Xgeva on 10/26, 11/24/17, 12/22/17, 1/18/18  · Cotninue Ca and Vit D by PCP         Neoplasm related pain     Uncontrolled back pain and anxiety related to metastatic cancer  · Continue narcotics per Heme-Onc  · discotinue valium 5mg  · Continue ativan 1mg PRN before procedures             Endocrine    Acquired hypothyroidism     Elevated TSH in 6/2017, 8/2017, 11/2017 compliant with levo 75mcg  · Advised to take separate from other meds and food  · Patient unable to  · Dose increased at last appt to 75mcg  · Check TSH at next lab draw         Relevant Orders    TSH       GI    Chemotherapy induced diarrhea     Uncontrolled with narcotics, now experiencing hemorrhoids  · Continue zofran for nausea  · Continue lomotil  · Advised to use barrier creme               Health Maintenance       Date Due Completion Date    TETANUS VACCINE 02[birth year]18/1963 ---    Hemoglobin A1c 02/24/2018 8/24/2017- TODAY    Foot Exam 07/07/2018 7/7/2017    Lipid Panel 08/24/2018 8/24/2017- DONE TODAY    Pneumococcal (65+) (2 of 2 - PPSV23) 11/29/2018 11/29/2017    High Dose Statin 01/19/2019 1/19/2018    Eye Exam 01/19/2019 1/19/2018    Colonoscopy 02/15/2021 2/15/2011 (Done)    Override on 2/15/2011: Done (Per [facility] dashboard ? results)          Follow-up in about 6 weeks (around 3/29/2018). . One hour spent with this patient today, half of that in counseling.    Lulú Lewis MD/MPH  Internal Medicine  [facility]  [phone]

## 2018-02-15 NOTE — ASSESSMENT & PLAN NOTE
Uncontrolled with narcotics, now experiencing hemorrhoids  · Continue zofran for nausea  · Continue lomotil  · Advised to use barrier creme

## 2018-02-15 NOTE — ASSESSMENT & PLAN NOTE
Uncontrolled back pain and anxiety related to metastatic cancer  · Continue narcotics per Heme-Onc  · discotinue valium 5mg  · Continue ativan 1mg PRN before procedures

## 2018-02-15 NOTE — ASSESSMENT & PLAN NOTE
Metastatic renal cell CA (Stage IV - T1,Nx,M1). Followed by Heme-Onc (Dr Reed) radiation therapy to L3 spine - 3 Gy/Fx x 10 fx and pazopanib 800 mg PO daily.  · F/U Heme-Onc  · restaging CTs on 2/15/18  · Continue pazopanib 800mg  · Cotninue Xgeva on 10/26, 11/24/17, 12/22/17, 1/18/18  · Cotninue Ca and Vit D by PCP

## 2018-02-15 NOTE — ASSESSMENT & PLAN NOTE
Patient with insomnia worsened with anxiety about his CA treatment  · Continue melatonin 5-10mg  · Med list reviewed by [facility] [place] [facility] pharmacy  · Advised sleep hygiene   - turn off the tv 30 min - 1 hour before bed  - go to bed at the same time, wake up at the same time  - avoid napping

## 2018-02-15 NOTE — PATIENT INSTRUCTIONS
TODAY:  - to improve sleep:   - turn off the tv 30 min - 1 hour before bed   - go to bed at the same time, wake up at the same time   - avoid napping  - labs at next appointment

## 2018-02-15 NOTE — ASSESSMENT & PLAN NOTE
Elevated TSH in 6/2017, 8/2017, 11/2017 compliant with levo 75mcg  · Advised to take separate from other meds and food  · Patient unable to  · Dose increased at last appt to 75mcg  · Check TSH at next lab draw

## 2018-02-19 ENCOUNTER — TELEPHONE (OUTPATIENT)
Dept: INTERNAL MEDICINE | Facility: CLINIC | Age: 73
End: 2018-02-19

## 2018-02-19 ENCOUNTER — OFFICE VISIT (OUTPATIENT)
Dept: HEMATOLOGY/ONCOLOGY | Facility: CLINIC | Age: 73
End: 2018-02-19
Payer: MEDICARE

## 2018-02-19 VITALS
WEIGHT: 162.25 LBS | DIASTOLIC BLOOD PRESSURE: 79 MMHG | OXYGEN SATURATION: 98 % | HEART RATE: 65 BPM | BODY MASS INDEX: 25.41 KG/M2 | SYSTOLIC BLOOD PRESSURE: 181 MMHG | TEMPERATURE: 99 F | RESPIRATION RATE: 20 BRPM

## 2018-02-19 DIAGNOSIS — G89.3 NEOPLASM RELATED PAIN: ICD-10-CM

## 2018-02-19 DIAGNOSIS — C64.9 METASTATIC RENAL CELL CARCINOMA TO BONE: ICD-10-CM

## 2018-02-19 DIAGNOSIS — K52.1 DIARRHEA DUE TO DRUG: ICD-10-CM

## 2018-02-19 DIAGNOSIS — C79.51 METASTATIC RENAL CELL CARCINOMA TO BONE: ICD-10-CM

## 2018-02-19 DIAGNOSIS — C78.7 METASTATIC RENAL CELL CARCINOMA TO LIVER: Primary | ICD-10-CM

## 2018-02-19 DIAGNOSIS — C64.9 METASTATIC RENAL CELL CARCINOMA TO LIVER: Primary | ICD-10-CM

## 2018-02-19 PROCEDURE — 1125F AMNT PAIN NOTED PAIN PRSNT: CPT | Mod: S$GLB,,, | Performed by: INTERNAL MEDICINE

## 2018-02-19 PROCEDURE — 1159F MED LIST DOCD IN RCRD: CPT | Mod: S$GLB,,, | Performed by: INTERNAL MEDICINE

## 2018-02-19 PROCEDURE — 99214 OFFICE O/P EST MOD 30 MIN: CPT | Mod: S$GLB,,, | Performed by: INTERNAL MEDICINE

## 2018-02-19 PROCEDURE — 99999 PR PBB SHADOW E&M-EST. PATIENT-LVL III: CPT | Mod: PBBFAC,,, | Performed by: INTERNAL MEDICINE

## 2018-02-19 PROCEDURE — 3008F BODY MASS INDEX DOCD: CPT | Mod: S$GLB,,, | Performed by: INTERNAL MEDICINE

## 2018-02-19 RX ORDER — DIPHENOXYLATE HYDROCHLORIDE AND ATROPINE SULFATE 2.5; .025 MG/1; MG/1
2 TABLET ORAL 4 TIMES DAILY PRN
Qty: 240 TABLET | Refills: 3 | Status: SHIPPED | OUTPATIENT
Start: 2018-02-19 | End: 2019-02-12

## 2018-02-19 NOTE — PROGRESS NOTES
"Subjective:       Patient ID: Edwin Singleton is a 73 y.o. male.    Chief Complaint: No chief complaint on file.    HPI     Mr. Singleton is a 73 y.o. male who returns for follow up regarding metastatic renal cell carcinoma of left kidney.  In the interval he underwent scans revealing stable to improved disease.  Pain well controlled- mostly in low back and side where there are known bone lesions.  Eating well overall.  "eats when he gets hungry"  Always eats breakfast- 3 eggs, sausage, banana, toast and boost, orange juice  Very seldom eats lunch  Has to time around his Votrient  Some snacking through the day- pudding  Eats dinner  No additional Boost through the day  Reports diarrhea mostly resolved currently    Imaging: CT scans 2/15/17:  Impression   Left renal lesion as above, stable in size with decreased enhancement.  Interval decrease in size of hepatic lesions.  Stable L3 lytic lesion.  Stable bilateral pleural nodularity, and stable 2 mm right upper lobe pulmonary micronodule.  Stable 0.6 cm hypodense lesion in the upper pole of the right kidney, too small to characterize.    Moderate atherosclerosis with infrarenal abdominal aortic ectasia.  RECIST SUMMARY:Date of prior examination for comparison 8/23/2017  Lesion 1: Left kidney 2.2 cm cm Series 3 Image 122 Prior measurement 2.2 cm cm  Lesion 2: Right kidney 0.6  cm Series 601 Image 79 Prior measurement 0.6  cm  Lesion 3: Hepatic segment II 1.1 cm Series 3 Image 79 Prior measurement 2.1 cm  Lesion 4: Hepatic segment VII 1.6 cm Series 3 Image 82 Prior measurement 3 cm  Lesion 5: L3 vertebral body 2.4 cm Series 3 Image 133 Prior measurement 2.4 cm    Diarrhea on Votrient remains biggest issue  Reports when he sleeps at night he requires pain medication which makes him sometimes sleep through diarrhea.  Reports 3-4 episodes of diarrhea per day.  Taking Lomotil and reports even triples up with continued symptoms     Sometimes notes urine flow hard to get " "started   Does not feel as if he fully empties  On Flomax     Oncology History:  Mr.Mc Moss is a 74 yo male with CAD (s/p 5 stents, remotely), DM2 (insulin dependent), hypothyroidism , CVA (2013) with no residual deficit, chronic back pain. He was transferred to [facility] [place], [place] for neurosurgery evaluation of a lumbar spine lesion in the setting of progressive weakness of his legs and exacerbation of chronic back pain, in May 2017. He states that his back pain and LE weakness progressively worsened. [facility]. The pain radiates down his legs. He denies any incontinence of bowel or bladder. He had several falls. After one of the falls, he presented to the ED at [facility], in May 2017. He had an MRI showing "L3 vertebral body lesion with apparent cortical disruption concerning for metastases vs myeloma," and was transferred to [facility] [place], [place] for neurosurgery evaluation. He was evaluated by neurosurgery regarding concern for lytic lesion on L3 on 5/26/17. No surgical intervention was proposed.   CT chest, abdomen,pelvis on 5/27/17 revealed :   --A 3.6 cm exophytic mass arising from the medial aspect of the lower pole of left kidney,   --A small 1.0 cm, subtle, cortical enhancing mass within the upper pole of the right kidney, concerning for possible contralateral solid renal mass  --Multiple enhancing ill-defined hepatic masses concerning for hepatic metastatic disease, hepatomegaly and hepatic steatosis  --Mulltifocal irregular thickening of the bilateral pleura concerning for possible pleural metastatic disease  --Mild nonspecific nodular thickening of the left adrenal gland without discrete nodule  -- Redemonstration of known lytic lesion within the L3 vertebral body. No definite additional discrete lytic lesions are identified.  -MRI pelvis from 10/18/17:    2.5 cm linear T1 hypointense T2/STIR hyperintense lesion within the left iliac bone, this lesion is indeterminate but felt less " likely to represent metastasis given its appearance. Further evaluation can be obtained with bone scan as clinically indicated.  Findings suggestive of left trochanteric bursitis.  Partially visualized L3 metastatic lesion.  Diffuse thickening of the urinary bladder wall can be seen with chronic outlet obstruction or cystitis.    Review of Systems   Constitutional: Positive for unexpected weight change. Negative for activity change, appetite change, chills, fatigue and fever.   HENT: Positive for hearing loss. Negative for congestion, dental problem (had dental clearance), nosebleeds, postnasal drip, rhinorrhea, sinus pain, sinus pressure, sneezing, sore throat and trouble swallowing.    Eyes: Negative for visual disturbance.   Respiratory: Negative for cough, shortness of breath and wheezing.    Cardiovascular: Negative for chest pain, palpitations and leg swelling.   Gastrointestinal: Negative for abdominal distention, abdominal pain, blood in stool, constipation, diarrhea, nausea and vomiting.   Genitourinary: Negative for decreased urine volume, difficulty urinating, dysuria, frequency and urgency.   Musculoskeletal: Positive for back pain. Negative for arthralgias, myalgias, neck pain and neck stiffness.   Skin: Positive for rash. Negative for color change, pallor and wound.   Neurological: Negative for dizziness, weakness, numbness and headaches.   Hematological: Negative for adenopathy. Does not bruise/bleed easily.   Psychiatric/Behavioral: Negative for dysphoric mood and sleep disturbance. The patient is not nervous/anxious.        Objective:      Physical Exam   Constitutional: He is oriented to person, place, and time. He appears well-developed and well-nourished. No distress.   Presents with his son   HENT:   Head: Normocephalic and atraumatic.   Right Ear: External ear normal.   Left Ear: External ear normal.   Nose: Nose normal.   Mouth/Throat: Oropharynx is clear and moist. No oropharyngeal exudate.    Eyes: Conjunctivae and EOM are normal. Pupils are equal, round, and reactive to light. Right eye exhibits no discharge. Left eye exhibits no discharge. No scleral icterus.   Neck: Normal range of motion. Neck supple. No thyromegaly present.   Cardiovascular: Normal rate, regular rhythm, normal heart sounds and intact distal pulses.  Exam reveals no gallop and no friction rub.    No murmur heard.  Pulmonary/Chest: Effort normal and breath sounds normal. No respiratory distress. He has no wheezes. He has no rales. He exhibits no tenderness.   Abdominal: Soft. Bowel sounds are normal. He exhibits no distension and no mass. There is no tenderness. There is no rebound and no guarding.   No organomegaly   Musculoskeletal: Normal range of motion. He exhibits no edema, tenderness or deformity.   Lymphadenopathy:     He has no cervical adenopathy.   Neurological: He is alert and oriented to person, place, and time. No cranial nerve deficit or sensory deficit. Coordination normal.   Skin: Skin is warm and dry. No rash noted. He is not diaphoretic. No erythema. No pallor.   Psychiatric: He has a normal mood and affect. His behavior is normal. Judgment and thought content normal.   Nursing note and vitals reviewed.    Labs- reviewed  Assessment:       1. Metastatic renal cell carcinoma to liver    2. 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 Neoplasm related pain    4. Diarrhea due to drug        Plan:     1-2. Continue Votrient  Disease stable to improved  Parameters appropriate  3. Well controlled on current medication  4. Resolved      Distress Screening Results: Psychosocial Distress screening score of Distress Score: 3 noted and reviewed. No intervention indicated.

## 2018-02-22 ENCOUNTER — TELEPHONE (OUTPATIENT)
Dept: PHARMACY | Facility: CLINIC | Age: 73
End: 2018-02-22

## 2018-03-02 ENCOUNTER — PATIENT MESSAGE (OUTPATIENT)
Dept: HEMATOLOGY/ONCOLOGY | Facility: CLINIC | Age: 73
End: 2018-03-02

## 2018-03-05 DIAGNOSIS — C78.7 METASTATIC RENAL CELL CARCINOMA TO LIVER: ICD-10-CM

## 2018-03-05 DIAGNOSIS — G89.3 NEOPLASM RELATED PAIN: ICD-10-CM

## 2018-03-05 DIAGNOSIS — C64.9 METASTATIC RENAL CELL CARCINOMA TO LIVER: ICD-10-CM

## 2018-03-05 DIAGNOSIS — C64.9 METASTATIC RENAL CELL CARCINOMA TO BONE: ICD-10-CM

## 2018-03-05 DIAGNOSIS — C79.51 METASTATIC RENAL CELL CARCINOMA TO BONE: ICD-10-CM

## 2018-03-05 RX ORDER — HYDROCODONE BITARTRATE AND ACETAMINOPHEN 5; 325 MG/1; MG/1
TABLET ORAL
Qty: 120 TABLET | Refills: 0 | Status: SHIPPED | OUTPATIENT
Start: 2018-03-05 | End: 2018-04-02 | Stop reason: SDUPTHER

## 2018-03-05 RX ORDER — HYDROCODONE BITARTRATE AND ACETAMINOPHEN 5; 325 MG/1; MG/1
1 TABLET ORAL EVERY 8 HOURS PRN
Qty: 50 TABLET | Refills: 0 | OUTPATIENT
Start: 2018-03-05

## 2018-03-05 RX ORDER — MORPHINE SULFATE 15 MG/1
15 TABLET ORAL EVERY 12 HOURS
Qty: 60 TABLET | Refills: 0 | Status: SHIPPED | OUTPATIENT
Start: 2018-03-05 | End: 2018-04-02 | Stop reason: SDUPTHER

## 2018-03-05 RX ORDER — HYDROCODONE BITARTRATE AND ACETAMINOPHEN 5; 325 MG/1; MG/1
TABLET ORAL
Qty: 120 TABLET | Refills: 0 | Status: CANCELLED | OUTPATIENT
Start: 2018-03-05

## 2018-03-05 RX ORDER — MORPHINE SULFATE 15 MG/1
15 TABLET ORAL EVERY 12 HOURS
Qty: 60 TABLET | Refills: 0 | Status: CANCELLED | OUTPATIENT
Start: 2018-03-05

## 2018-03-05 NOTE — TELEPHONE ENCOUNTER
----- Message from Jose Armando Brannon sent at 3/5/2018  1:53 PM CST -----  Contact: [facility]   Will like office to know that pt's insurance only covers 90 till for Norco 5mg     Contact::[phone]

## 2018-03-12 ENCOUNTER — TELEPHONE (OUTPATIENT)
Dept: INTERNAL MEDICINE | Facility: CLINIC | Age: 73
End: 2018-03-12

## 2018-03-12 ENCOUNTER — PATIENT MESSAGE (OUTPATIENT)
Dept: INTERNAL MEDICINE | Facility: CLINIC | Age: 73
End: 2018-03-12

## 2018-03-12 ENCOUNTER — TELEPHONE (OUTPATIENT)
Dept: PHARMACY | Facility: CLINIC | Age: 73
End: 2018-03-12

## 2018-03-12 DIAGNOSIS — T45.1X5A CHEMOTHERAPY-INDUCED NAUSEA AND VOMITING: ICD-10-CM

## 2018-03-12 DIAGNOSIS — K52.1 CHEMOTHERAPY INDUCED DIARRHEA: Primary | ICD-10-CM

## 2018-03-12 DIAGNOSIS — R11.2 CHEMOTHERAPY-INDUCED NAUSEA AND VOMITING: ICD-10-CM

## 2018-03-12 DIAGNOSIS — T45.1X5A CHEMOTHERAPY INDUCED DIARRHEA: Primary | ICD-10-CM

## 2018-03-12 DIAGNOSIS — K52.1 DIARRHEA DUE TO DRUG: ICD-10-CM

## 2018-03-12 RX ORDER — LOPERAMIDE HYDROCHLORIDE 2 MG/1
2 CAPSULE ORAL 4 TIMES DAILY PRN
Qty: 180 CAPSULE | Refills: 3 | Status: SHIPPED | OUTPATIENT
Start: 2018-03-12 | End: 2018-03-22

## 2018-03-12 RX ORDER — LOPERAMIDE HCL 2 MG
2 TABLET ORAL 4 TIMES DAILY PRN
Qty: 180 TABLET | Refills: 3 | Status: SHIPPED | OUTPATIENT
Start: 2018-03-12 | End: 2018-03-12 | Stop reason: SDUPTHER

## 2018-03-12 NOTE — TELEPHONE ENCOUNTER
Loperamide script sent to [facility] in [place]. Please call the pharmacy and make sure the CAPSULES script is the one that's filled, and cancel the TABLETS script.    Thanks,  KJ

## 2018-03-19 ENCOUNTER — PATIENT OUTREACH (OUTPATIENT)
Dept: ADMINISTRATIVE | Facility: HOSPITAL | Age: 73
End: 2018-03-19

## 2018-03-19 NOTE — PROGRESS NOTES
VM left reminding pt. To continue taking blood pressure medications as prescribed, and to take blood pressure daily, document and bring readings on RTC.

## 2018-03-26 ENCOUNTER — LAB VISIT (OUTPATIENT)
Dept: LAB | Facility: HOSPITAL | Age: 73
End: 2018-03-26
Attending: INTERNAL MEDICINE
Payer: MEDICARE

## 2018-03-26 ENCOUNTER — OFFICE VISIT (OUTPATIENT)
Dept: INTERNAL MEDICINE | Facility: CLINIC | Age: 73
End: 2018-03-26
Payer: MEDICARE

## 2018-03-26 ENCOUNTER — OFFICE VISIT (OUTPATIENT)
Dept: HEMATOLOGY/ONCOLOGY | Facility: CLINIC | Age: 73
End: 2018-03-26
Payer: MEDICARE

## 2018-03-26 VITALS
BODY MASS INDEX: 25.96 KG/M2 | DIASTOLIC BLOOD PRESSURE: 56 MMHG | OXYGEN SATURATION: 98 % | WEIGHT: 165.38 LBS | SYSTOLIC BLOOD PRESSURE: 128 MMHG | HEART RATE: 72 BPM | HEIGHT: 67 IN

## 2018-03-26 VITALS
OXYGEN SATURATION: 97 % | WEIGHT: 164.44 LBS | HEART RATE: 63 BPM | RESPIRATION RATE: 20 BRPM | TEMPERATURE: 98 F | SYSTOLIC BLOOD PRESSURE: 157 MMHG | BODY MASS INDEX: 25.76 KG/M2 | DIASTOLIC BLOOD PRESSURE: 70 MMHG

## 2018-03-26 DIAGNOSIS — C64.2 RENAL CELL CARCINOMA OF LEFT KIDNEY: Primary | ICD-10-CM

## 2018-03-26 DIAGNOSIS — N39.41 BENIGN PROSTATIC HYPERPLASIA (BPH) WITH URINARY URGE INCONTINENCE: ICD-10-CM

## 2018-03-26 DIAGNOSIS — C64.9 METASTATIC RENAL CELL CARCINOMA TO LIVER: ICD-10-CM

## 2018-03-26 DIAGNOSIS — C64.9 METASTATIC RENAL CELL CARCINOMA TO BONE: ICD-10-CM

## 2018-03-26 DIAGNOSIS — E03.9 ACQUIRED HYPOTHYROIDISM: ICD-10-CM

## 2018-03-26 DIAGNOSIS — C79.51 METASTATIC RENAL CELL CARCINOMA TO BONE: ICD-10-CM

## 2018-03-26 DIAGNOSIS — D53.9 MACROCYTIC ANEMIA: ICD-10-CM

## 2018-03-26 DIAGNOSIS — I15.2 HYPERTENSION ASSOCIATED WITH DIABETES: ICD-10-CM

## 2018-03-26 DIAGNOSIS — C78.7 METASTATIC RENAL CELL CARCINOMA TO LIVER: ICD-10-CM

## 2018-03-26 DIAGNOSIS — T50.905S ADVERSE EFFECT OF DRUG, SEQUELA: ICD-10-CM

## 2018-03-26 DIAGNOSIS — N18.30 TYPE 2 DIABETES MELLITUS WITH STAGE 3 CHRONIC KIDNEY DISEASE, WITH LONG-TERM CURRENT USE OF INSULIN: Chronic | ICD-10-CM

## 2018-03-26 DIAGNOSIS — R93.3 ABNORMAL FINDINGS ON DIAGNOSTIC IMAGING OF OTHER PARTS OF DIGESTIVE TRACT: ICD-10-CM

## 2018-03-26 DIAGNOSIS — K52.1 DIARRHEA DUE TO DRUG: ICD-10-CM

## 2018-03-26 DIAGNOSIS — Z79.4 TYPE 2 DIABETES MELLITUS WITH STAGE 3 CHRONIC KIDNEY DISEASE, WITH LONG-TERM CURRENT USE OF INSULIN: Chronic | ICD-10-CM

## 2018-03-26 DIAGNOSIS — E78.5 HYPERLIPIDEMIA DUE TO TYPE 2 DIABETES MELLITUS: ICD-10-CM

## 2018-03-26 DIAGNOSIS — E11.69 HYPERLIPIDEMIA DUE TO TYPE 2 DIABETES MELLITUS: ICD-10-CM

## 2018-03-26 DIAGNOSIS — M47.816 FACET ARTHRITIS OF LUMBAR REGION: ICD-10-CM

## 2018-03-26 DIAGNOSIS — N40.1 BENIGN PROSTATIC HYPERPLASIA (BPH) WITH URINARY URGE INCONTINENCE: ICD-10-CM

## 2018-03-26 DIAGNOSIS — E11.22 TYPE 2 DIABETES MELLITUS WITH STAGE 3 CHRONIC KIDNEY DISEASE, WITH LONG-TERM CURRENT USE OF INSULIN: Chronic | ICD-10-CM

## 2018-03-26 DIAGNOSIS — E11.59 HYPERTENSION ASSOCIATED WITH DIABETES: ICD-10-CM

## 2018-03-26 LAB
ALBUMIN SERPL BCP-MCNC: 3.1 G/DL
ALP SERPL-CCNC: 90 U/L
ALT SERPL W/O P-5'-P-CCNC: 26 U/L
ANION GAP SERPL CALC-SCNC: 8 MMOL/L
AST SERPL-CCNC: 22 U/L
BILIRUB SERPL-MCNC: 0.5 MG/DL
BUN SERPL-MCNC: 18 MG/DL
CALCIUM SERPL-MCNC: 8.6 MG/DL
CHLORIDE SERPL-SCNC: 111 MMOL/L
CO2 SERPL-SCNC: 23 MMOL/L
CREAT SERPL-MCNC: 0.9 MG/DL
ERYTHROCYTE [DISTWIDTH] IN BLOOD BY AUTOMATED COUNT: 14.2 %
EST. GFR  (AFRICAN AMERICAN): >60 ML/MIN/1.73 M^2
EST. GFR  (NON AFRICAN AMERICAN): >60 ML/MIN/1.73 M^2
GLUCOSE SERPL-MCNC: 129 MG/DL
HCT VFR BLD AUTO: 38.9 %
HGB BLD-MCNC: 12.9 G/DL
IMM GRANULOCYTES # BLD AUTO: 0.02 K/UL
MCH RBC QN AUTO: 34.5 PG
MCHC RBC AUTO-ENTMCNC: 33.2 G/DL
MCV RBC AUTO: 104 FL
NEUTROPHILS # BLD AUTO: 3.5 K/UL
PLATELET # BLD AUTO: 320 K/UL
PMV BLD AUTO: 9.7 FL
POTASSIUM SERPL-SCNC: 4 MMOL/L
PROT SERPL-MCNC: 6.4 G/DL
RBC # BLD AUTO: 3.74 M/UL
SODIUM SERPL-SCNC: 142 MMOL/L
T4 FREE SERPL-MCNC: 0.83 NG/DL
TSH SERPL DL<=0.005 MIU/L-ACNC: 5.19 UIU/ML
WBC # BLD AUTO: 6.38 K/UL

## 2018-03-26 PROCEDURE — 99999 PR PBB SHADOW E&M-EST. PATIENT-LVL III: CPT | Mod: PBBFAC,,, | Performed by: INTERNAL MEDICINE

## 2018-03-26 PROCEDURE — 80053 COMPREHEN METABOLIC PANEL: CPT

## 2018-03-26 PROCEDURE — 3077F SYST BP >= 140 MM HG: CPT | Mod: CPTII,S$GLB,, | Performed by: INTERNAL MEDICINE

## 2018-03-26 PROCEDURE — 3078F DIAST BP <80 MM HG: CPT | Mod: CPTII,S$GLB,, | Performed by: INTERNAL MEDICINE

## 2018-03-26 PROCEDURE — 3044F HG A1C LEVEL LT 7.0%: CPT | Mod: CPTII,S$GLB,, | Performed by: INTERNAL MEDICINE

## 2018-03-26 PROCEDURE — 84439 ASSAY OF FREE THYROXINE: CPT

## 2018-03-26 PROCEDURE — 85027 COMPLETE CBC AUTOMATED: CPT

## 2018-03-26 PROCEDURE — 84443 ASSAY THYROID STIM HORMONE: CPT

## 2018-03-26 PROCEDURE — 99215 OFFICE O/P EST HI 40 MIN: CPT | Mod: S$GLB,,, | Performed by: INTERNAL MEDICINE

## 2018-03-26 PROCEDURE — 99214 OFFICE O/P EST MOD 30 MIN: CPT | Mod: S$GLB,,, | Performed by: INTERNAL MEDICINE

## 2018-03-26 PROCEDURE — 36415 COLL VENOUS BLD VENIPUNCTURE: CPT

## 2018-03-26 RX ORDER — AMLODIPINE BESYLATE 10 MG/1
10 TABLET ORAL DAILY
Qty: 90 TABLET | Refills: 3 | Status: SHIPPED | OUTPATIENT
Start: 2018-03-26 | End: 2018-08-17 | Stop reason: SDUPTHER

## 2018-03-26 RX ORDER — LISINOPRIL 40 MG/1
40 TABLET ORAL DAILY
Qty: 90 TABLET | Refills: 3 | Status: SHIPPED | OUTPATIENT
Start: 2018-03-26 | End: 2018-08-17 | Stop reason: SDUPTHER

## 2018-03-26 RX ORDER — TAMSULOSIN HYDROCHLORIDE 0.4 MG/1
0.4 CAPSULE ORAL NIGHTLY
Qty: 90 CAPSULE | Refills: 4 | Status: SHIPPED | OUTPATIENT
Start: 2018-03-26 | End: 2018-05-14 | Stop reason: SDUPTHER

## 2018-03-26 RX ORDER — ATORVASTATIN CALCIUM 40 MG/1
40 TABLET, FILM COATED ORAL DAILY
Qty: 90 TABLET | Refills: 3 | Status: SHIPPED | OUTPATIENT
Start: 2018-03-26 | End: 2018-09-20 | Stop reason: SDUPTHER

## 2018-03-26 NOTE — PROGRESS NOTES
"Primary Care Provider Appointment    Subjective:      Patient ID: Edwin Singleton is a 73 y.o. male with HTN, DM, BPH, metastatic RCC    Chief Complaint: Follow-up (6 weeks )    Patient here for routine monthly follow-up. He is undergoing chemo for metastatic RCC with votrient. He has mets to bone in spine, and pain in location. Has stable liver/pulm mets. Next scans due in June. Has close monitoring from Heme-Onc (Dr Reed).    He previously had bone mets at L3, which resolved with radiation therapy and xgeva infusions.     He continues to have diarrhea related to chemo. He has R flank pain. He has never participated in PT. He has history of sciatica. With last lumbar MRI in 5/2017 showing " L5-S1: Facet hypertrophy with a disc herniation which creates mild to moderate bilateral neural foraminal stenosis." He sits in a lazy boy for most of the day and has trouble sleeping at night. He states "I'm walking better, I haven't been using my cane."    Last A1c was 6.5, with no insulin therapy. He has lost weight due to cancer/chemo. Home glucose readings in low 100s, his diet is variable (mostly carbs and fruit juices, a small amount of protein).    His TSH is improved but remains elevated with slow increases in levothyroxine.    His calcium is low, but corrects to 9.3    Past Surgical History:   Procedure Laterality Date    ABDOMINAL SURGERY      APPENDECTOMY      BACK SURGERY      CARDIAC SURGERY      CHOLECYSTECTOMY      coronary stenting      X 5 last one in 2010-11.    SPINAL FUSION         Past Medical History:   Diagnosis Date    Acquired hypothyroidism 5/26/2017    Benign essential HTN 5/26/2017    Benign prostatic hyperplasia (BPH) with urinary urge incontinence 6/6/2017    Chronic low back pain 6/1/2017    Coronary artery disease involving native coronary artery of native heart without angina pectoris 5/26/2017    S/p 5 stents - last one around 2010-11.    Gastroesophageal reflux disease without " "esophagitis 5/26/2017    History of CVA (cerebrovascular accident) 5/26/2017    Lumbar disc lesion 5/26/2017    Metastatic renal cell carcinoma to bone 6/6/2017    Metastatic renal cell carcinoma to liver 6/6/2017    Liver Biopsy 5-2017: METASTATIC RENAL CELL CARCINOMA.    Mixed hyperlipidemia 5/26/2017    Type 2 diabetes mellitus with stage 3 chronic kidney disease, with long-term current use of insulin 5/26/2017       Review of Systems   Constitutional: Negative for activity change and unexpected weight change.   HENT: Positive for hearing loss. Negative for trouble swallowing.    Gastrointestinal: Positive for diarrhea. Negative for blood in stool, constipation and vomiting.   Endocrine: Positive for polyuria. Negative for polydipsia.   Genitourinary: Positive for difficulty urinating. Negative for urgency.   Musculoskeletal: Positive for arthralgias. Negative for joint swelling and neck pain.   Neurological: Positive for weakness. Negative for headaches.   Psychiatric/Behavioral: Negative for confusion and dysphoric mood.       Objective:   BP (!) 150/64 (BP Location: Right arm, Patient Position: Sitting, BP Method: Large (Manual))   Pulse 72   Ht 5' 7" (1.702 m)   Wt 75 kg (165 lb 5.5 oz)   SpO2 98%   BMI 25.90 kg/m²     Physical Exam   Constitutional: He is oriented to person, place, and time. He appears well-developed and well-nourished.   Ambulating with cane   HENT:   Head: Normocephalic.   edentulous   Eyes: Pupils are equal, round, and reactive to light.   Neck:   Decreased ROM in neck    Cardiovascular:   bradycardic   Pulmonary/Chest: Effort normal.   Musculoskeletal: Normal range of motion.   Neurological: He is alert and oriented to person, place, and time.   Skin: Skin is warm.   Ecchymoses and purpura bilaterally UE  Telangiectasias on face and chest  Multiple tattoos on UE   Psychiatric: He has a normal mood and affect.       Lab Results   Component Value Date    WBC 6.38 03/26/2018    " HGB 12.9 (L) 03/26/2018    HCT 38.9 (L) 03/26/2018     03/26/2018    CHOL 130 02/15/2018    TRIG 186 (H) 02/15/2018    HDL 31 (L) 02/15/2018    ALT 26 03/26/2018    AST 22 03/26/2018     03/26/2018    K 4.0 03/26/2018     (H) 03/26/2018    CREATININE 0.9 03/26/2018    BUN 18 03/26/2018    CO2 23 03/26/2018    TSH 4.857 (H) 02/15/2018    TSH 5.033 (H) 02/15/2018    PSA 0.61 05/27/2017    INR 1.2 06/12/2017    HGBA1C 6.5 (H) 02/15/2018         Assessment:   73 y.o. male with multiple co-morbid illnesses here to continue work-up of chronic issues notably HTN, DM, BPH, metastatic RCC    Plan:     Problem List Items Addressed This Visit        Cardiac/Vascular    Hyperlipidemia due to type 2 diabetes mellitus    Relevant Medications    atorvastatin (LIPITOR) 40 MG tablet    Hypertension associated with diabetes     BP controlled on ACE, CCB  · Continue lisinopril 40mg, amlodipine 10mg  · Continue statin, ASA  · cotninue home BP reads         Relevant Medications    amLODIPine (NORVASC) 10 MG tablet    lisinopril (PRINIVIL,ZESTRIL) 40 MG tablet       Renal/    Benign prostatic hyperplasia (BPH) with urinary urge incontinence    Relevant Medications    tamsulosin (FLOMAX) 0.4 mg Cp24       Oncology    Metastatic renal cell carcinoma to bone     Metastatic renal cell CA (Stage IV - T1,Nx,M1). Followed by Heme-Onc (Dr Reed) radiation therapy to L3 spine - 3 Gy/Fx x 10 fx and pazopanib 800 mg PO daily. Liver bx 5/2017 with RCC  · F/U Heme-Onc  · Continue current therapies  · Completed monthly xgeva and radiation therapy  · Ca with Vit D supplementation         Macrocytic anemia     Elevated MCV, low Hg  · PCP to message Dr Reed about starting B complex            Endocrine    Type 2 diabetes mellitus with stage 3 chronic kidney disease, with long-term current use of insulin (Chronic)    Relevant Medications    atorvastatin (LIPITOR) 40 MG tablet    Acquired hypothyroidism     Elevated TSH in 6/2017,  8/2017, 11/2017 compliant with levo 75mcg  · Advised to take separate from other meds and food  · Patient unable to  · Dose increased at last appt to 75mcg  · Add-on TSH to today's lab draw         Relevant Medications    lisinopril (PRINIVIL,ZESTRIL) 40 MG tablet    Other Relevant Orders    TSH       GI    Diarrhea due to drug     Started on loperamide by Heme-Onc with good improvement  · Continue loperamide            Orthopedic    Facet arthritis of lumbar region     Seen on MRI 5/2017, complains of sciatica. Not physically active, refuses PT  · Refuses PT  · Continue to advise to be physically active               Health Maintenance       Date Due Completion Date    TETANUS VACCINE 02/18/1963 ---    Foot Exam 07/07/2018 7/7/2017- already done in 2/2018    Hemoglobin A1c 08/15/2018 2/15/2018    Pneumococcal (65+) (2 of 2 - PPSV23) 11/29/2018 11/29/2017    Eye Exam 01/19/2019 1/19/2018    Lipid Panel 02/15/2019 2/15/2018    High Dose Statin 03/26/2019 3/26/2018    Colonoscopy 02/15/2021 2/15/2011 (Done)    Override on 2/15/2011: Done (Per [employer] dashboard ? results)          Follow-up in about 6 weeks (around 5/7/2018). . One hour spent with this patient today, half of that in counseling.    Lulú Lewis MD/MPH  Internal Medicine  [facility]  [phone]

## 2018-03-26 NOTE — ASSESSMENT & PLAN NOTE
Seen on MRI 5/2017, complains of sciatica. Not physically active, refuses PT  · Refuses PT  · Continue to advise to be physically active

## 2018-03-26 NOTE — PATIENT INSTRUCTIONS
TODAY;  - add on thyroid study to labs this morning   + may increase levothyroxine dose to 88mcg if TSH is elevated again  - advise physical therapy in the home  - try to be more physically active  - F/U on same day as Dr Reed

## 2018-03-26 NOTE — ASSESSMENT & PLAN NOTE
Metastatic renal cell CA (Stage IV - T1,Nx,M1). Followed by Heme-Onc (Dr Reed) radiation therapy to L3 spine - 3 Gy/Fx x 10 fx and pazopanib 800 mg PO daily. Liver bx 5/2017 with RCC  · F/U Heme-Onc  · Continue current therapies  · Completed monthly xgeva and radiation therapy  · Ca with Vit D supplementation

## 2018-03-26 NOTE — PROGRESS NOTES
Subjective:       Patient ID: Edwin Singleton is a 73 y.o. male.    Chief Complaint: Follow-up    HPI     Returns for follow-up  Reports overall doing well  Tolerating Votrient with minimal side effects- manages diarrhea with medication  Eating well   Weight stable  No new pain  Pain well controlled- mostly in low back and side where there are known bone lesions.    Mr. Singleton is a 73 y.o. male who returns for follow up regarding metastatic renal cell carcinoma of left kidney.  In the interval he underwent scans revealing stable to improved disease.    Imaging: CT scans 2/15/18:  Impression   Left renal lesion as above, stable in size with decreased enhancement.  Interval decrease in size of hepatic lesions.  Stable L3 lytic lesion.  Stable bilateral pleural nodularity, and stable 2 mm right upper lobe pulmonary micronodule.  Stable 0.6 cm hypodense lesion in the upper pole of the right kidney, too small to characterize.    Moderate atherosclerosis with infrarenal abdominal aortic ectasia.  RECIST SUMMARY:Date of prior examination for comparison 8/23/2017  Lesion 1: Left kidney 2.2 cm cm Series 3 Image 122 Prior measurement 2.2 cm cm  Lesion 2: Right kidney 0.6  cm Series 601 Image 79 Prior measurement 0.6  cm  Lesion 3: Hepatic segment II 1.1 cm Series 3 Image 79 Prior measurement 2.1 cm  Lesion 4: Hepatic segment VII 1.6 cm Series 3 Image 82 Prior measurement 3 cm  Lesion 5: L3 vertebral body 2.4 cm Series 3 Image 133 Prior measurement 2.4 cm     Diarrhea on Votrient remains biggest issue  Reports when he sleeps at night he requires pain medication which makes him sometimes sleep through diarrhea.  Reports 3-4 episodes of diarrhea per day.  Taking Lomotil and reports even triples up with continued symptoms     Sometimes notes urine flow hard to get started   Does not feel as if he fully empties  On Flomax     Oncology History:  Mr.Mc Moss is a 74 yo male with CAD (s/p 5 stents, remotely), DM2 (insulin  "dependent), hypothyroidism , CVA (2013) with no residual deficit, chronic back pain. [place] [facility] [place] of a lumbar spine lesion in the setting of progressive weakness of his legs and exacerbation of chronic back pain, in May 2017. He states that his back pain and LE weakness progressively worsened. [facility]. The pain radiates down his legs. He denies any incontinence of bowel or bladder. He had several falls. After one of the falls, [place] presented to [place] ED [place] in May [place]. He had an MRI showing "L3 vertebral body lesion with apparent cortical disruption concerning for metastases vs myeloma," and [place] [facility] [place]. He was evaluated by neurosurgery regarding concern for lytic lesion on L3 on 5/26/17. No surgical intervention was proposed.   CT chest, abdomen,pelvis on 5/27/17 revealed :   --A 3.6 cm exophytic mass arising from the medial aspect of the lower pole of left kidney,   --A small 1.0 cm, subtle, cortical enhancing mass within the upper pole of the right kidney, concerning for possible contralateral solid renal mass  --Multiple enhancing ill-defined hepatic masses concerning for hepatic metastatic disease, hepatomegaly and hepatic steatosis  --Mulltifocal irregular thickening of the bilateral pleura concerning for possible pleural metastatic disease  --Mild nonspecific nodular thickening of the left adrenal gland without discrete nodule  -- Redemonstration of known lytic lesion within the L3 vertebral body. No definite additional discrete lytic lesions are identified.  -MRI pelvis from 10/18/17:    2.5 cm linear T1 hypointense T2/STIR hyperintense lesion within the left iliac bone, this lesion is indeterminate but felt less likely to represent metastasis given its appearance. Further evaluation can be obtained with bone scan as clinically indicated.  Findings suggestive of left " trochanteric bursitis.  Partially visualized L3 metastatic lesion.  Diffuse thickening of the urinary bladder wall can be seen with chronic outlet obstruction or cystitis.    Review of Systems   Constitutional: Negative for activity change, appetite change, chills, fatigue, fever and unexpected weight change.   HENT: Negative for congestion, mouth sores, postnasal drip, rhinorrhea, sinus pain, sinus pressure, sore throat and trouble swallowing.    Eyes: Positive for visual disturbance (chronic).   Respiratory: Negative for cough, chest tightness and shortness of breath.    Cardiovascular: Negative for chest pain, palpitations and leg swelling.   Gastrointestinal: Positive for diarrhea. Negative for abdominal distention, abdominal pain, blood in stool, constipation and vomiting.   Genitourinary: Negative for decreased urine volume, difficulty urinating, dysuria, frequency and urgency.   Musculoskeletal: Positive for back pain. Negative for arthralgias, gait problem, myalgias, neck pain and neck stiffness.   Skin: Positive for rash.   Neurological: Negative for dizziness, weakness, numbness and headaches.   Hematological: Negative for adenopathy. Does not bruise/bleed easily.   Psychiatric/Behavioral: Negative for dysphoric mood. The patient is not nervous/anxious.        Objective:      Physical Exam   Constitutional: He is oriented to person, place, and time. He appears well-developed and well-nourished. No distress.   Presents alone  ECOG=0   HENT:   Head: Normocephalic and atraumatic.   Right Ear: External ear normal.   Left Ear: External ear normal.   Nose: Nose normal.   Mouth/Throat: Oropharynx is clear and moist. No oropharyngeal exudate.   Eyes: Conjunctivae and EOM are normal. Pupils are equal, round, and reactive to light. Right eye exhibits no discharge. Left eye exhibits no discharge. No scleral icterus.   Neck: Normal range of motion. Neck supple. No thyromegaly present.   Cardiovascular: Normal rate,  regular rhythm, normal heart sounds and intact distal pulses.  Exam reveals no gallop and no friction rub.    No murmur heard.  Pulmonary/Chest: Effort normal and breath sounds normal. No respiratory distress. He has no wheezes. He has no rales. He exhibits no tenderness.   Abdominal: Soft. Bowel sounds are normal. He exhibits no distension and no mass. There is no tenderness. There is no rebound and no guarding.   No organomegaly   Musculoskeletal: Normal range of motion. He exhibits no edema, tenderness or deformity.   Lymphadenopathy:     He has no cervical adenopathy.   Neurological: He is alert and oriented to person, place, and time. No cranial nerve deficit or sensory deficit. Coordination normal.   Skin: Skin is warm and dry. No rash noted. He is not diaphoretic. No erythema. No pallor.   Psychiatric: He has a normal mood and affect. His behavior is normal. Judgment and thought content normal.   Nursing note and vitals reviewed.    Labs- reviewed  Assessment:       1. Renal cell carcinoma of left kidney    2. 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 Metastatic renal cell carcinoma to liver        Plan:     1-3. Continue Votrient with response  Tolerating well  Will scan again in June unless clinically indicated prior  Check lipids and EKG with next visit in 4-6 weeks  Refill pain medication  Knows to call for any issues

## 2018-03-26 NOTE — ASSESSMENT & PLAN NOTE
Elevated TSH in 6/2017, 8/2017, 11/2017 compliant with levo 75mcg  · Advised to take separate from other meds and food  · Patient unable to  · Dose increased at last appt to 75mcg  · Add-on TSH to today's lab draw

## 2018-04-02 ENCOUNTER — TELEPHONE (OUTPATIENT)
Dept: INTERNAL MEDICINE | Facility: CLINIC | Age: 73
End: 2018-04-02

## 2018-04-02 ENCOUNTER — PATIENT MESSAGE (OUTPATIENT)
Dept: INTERNAL MEDICINE | Facility: CLINIC | Age: 73
End: 2018-04-02

## 2018-04-02 DIAGNOSIS — C64.9 METASTATIC RENAL CELL CARCINOMA TO LIVER: ICD-10-CM

## 2018-04-02 DIAGNOSIS — C78.7 METASTATIC RENAL CELL CARCINOMA TO LIVER: ICD-10-CM

## 2018-04-02 DIAGNOSIS — G89.3 NEOPLASM RELATED PAIN: ICD-10-CM

## 2018-04-02 DIAGNOSIS — E03.9 ACQUIRED HYPOTHYROIDISM: Primary | ICD-10-CM

## 2018-04-02 RX ORDER — MORPHINE SULFATE 15 MG/1
15 TABLET ORAL EVERY 12 HOURS
Qty: 60 TABLET | Refills: 0 | Status: SHIPPED | OUTPATIENT
Start: 2018-04-02 | End: 2018-05-03 | Stop reason: SDUPTHER

## 2018-04-02 RX ORDER — HYDROCODONE BITARTRATE AND ACETAMINOPHEN 5; 325 MG/1; MG/1
TABLET ORAL
Qty: 120 TABLET | Refills: 0 | Status: SHIPPED | OUTPATIENT
Start: 2018-04-02 | End: 2018-05-10 | Stop reason: SDUPTHER

## 2018-04-02 RX ORDER — LEVOTHYROXINE SODIUM 88 UG/1
88 TABLET ORAL DAILY
Qty: 90 TABLET | Refills: 3 | Status: SHIPPED | OUTPATIENT
Start: 2018-04-02 | End: 2019-04-06 | Stop reason: SDUPTHER

## 2018-04-02 NOTE — TELEPHONE ENCOUNTER
Levothyroxine 88mcg sent to [facility] in [place].    Thanks,  CORTEZ

## 2018-04-23 ENCOUNTER — LAB VISIT (OUTPATIENT)
Dept: LAB | Facility: HOSPITAL | Age: 73
End: 2018-04-23
Attending: INTERNAL MEDICINE
Payer: MEDICARE

## 2018-04-23 ENCOUNTER — OFFICE VISIT (OUTPATIENT)
Dept: HEMATOLOGY/ONCOLOGY | Facility: CLINIC | Age: 73
End: 2018-04-23
Payer: MEDICARE

## 2018-04-23 ENCOUNTER — HOSPITAL ENCOUNTER (OUTPATIENT)
Dept: CARDIOLOGY | Facility: CLINIC | Age: 73
Discharge: HOME | End: 2018-04-23
Payer: MEDICARE

## 2018-04-23 VITALS
DIASTOLIC BLOOD PRESSURE: 55 MMHG | WEIGHT: 165.56 LBS | BODY MASS INDEX: 26.61 KG/M2 | RESPIRATION RATE: 16 BRPM | OXYGEN SATURATION: 96 % | HEART RATE: 58 BPM | TEMPERATURE: 98 F | SYSTOLIC BLOOD PRESSURE: 140 MMHG | HEIGHT: 66 IN

## 2018-04-23 DIAGNOSIS — C79.51 METASTATIC RENAL CELL CARCINOMA TO BONE: ICD-10-CM

## 2018-04-23 DIAGNOSIS — C78.7 METASTATIC RENAL CELL CARCINOMA TO LIVER: ICD-10-CM

## 2018-04-23 DIAGNOSIS — C64.2 RENAL CELL CARCINOMA OF LEFT KIDNEY: Primary | ICD-10-CM

## 2018-04-23 DIAGNOSIS — T50.905S ADVERSE EFFECT OF DRUG, SEQUELA: ICD-10-CM

## 2018-04-23 DIAGNOSIS — C64.2 RENAL CELL CARCINOMA OF LEFT KIDNEY: ICD-10-CM

## 2018-04-23 DIAGNOSIS — C64.9 METASTATIC RENAL CELL CARCINOMA TO BONE: ICD-10-CM

## 2018-04-23 DIAGNOSIS — K52.1 CHEMOTHERAPY INDUCED DIARRHEA: ICD-10-CM

## 2018-04-23 DIAGNOSIS — T45.1X5A CHEMOTHERAPY INDUCED DIARRHEA: ICD-10-CM

## 2018-04-23 DIAGNOSIS — G89.3 NEOPLASM RELATED PAIN: ICD-10-CM

## 2018-04-23 DIAGNOSIS — R93.3 ABNORMAL FINDINGS ON DIAGNOSTIC IMAGING OF OTHER PARTS OF DIGESTIVE TRACT: ICD-10-CM

## 2018-04-23 DIAGNOSIS — C64.9 METASTATIC RENAL CELL CARCINOMA TO LIVER: ICD-10-CM

## 2018-04-23 LAB
ALBUMIN SERPL BCP-MCNC: 2.9 G/DL
ALP SERPL-CCNC: 86 U/L
ALT SERPL W/O P-5'-P-CCNC: 41 U/L
ANION GAP SERPL CALC-SCNC: 7 MMOL/L
AST SERPL-CCNC: 40 U/L
BILIRUB SERPL-MCNC: 0.6 MG/DL
BUN SERPL-MCNC: 11 MG/DL
CALCIUM SERPL-MCNC: 8.4 MG/DL
CHLORIDE SERPL-SCNC: 108 MMOL/L
CHOLEST SERPL-MCNC: 98 MG/DL
CHOLEST/HDLC SERPL: 3.6 {RATIO}
CO2 SERPL-SCNC: 29 MMOL/L
CREAT SERPL-MCNC: 0.9 MG/DL
ERYTHROCYTE [DISTWIDTH] IN BLOOD BY AUTOMATED COUNT: 14.6 %
EST. GFR  (AFRICAN AMERICAN): >60 ML/MIN/1.73 M^2
EST. GFR  (NON AFRICAN AMERICAN): >60 ML/MIN/1.73 M^2
GLUCOSE SERPL-MCNC: 135 MG/DL
HCT VFR BLD AUTO: 37.7 %
HDLC SERPL-MCNC: 27 MG/DL
HDLC SERPL: 27.6 %
HGB BLD-MCNC: 12.5 G/DL
IMM GRANULOCYTES # BLD AUTO: 0.02 K/UL
LDLC SERPL CALC-MCNC: 48.4 MG/DL
MCH RBC QN AUTO: 34.2 PG
MCHC RBC AUTO-ENTMCNC: 33.2 G/DL
MCV RBC AUTO: 103 FL
NEUTROPHILS # BLD AUTO: 3.4 K/UL
NONHDLC SERPL-MCNC: 71 MG/DL
PLATELET # BLD AUTO: 324 K/UL
PMV BLD AUTO: 9.9 FL
POTASSIUM SERPL-SCNC: 4.1 MMOL/L
PROT SERPL-MCNC: 5.8 G/DL
RBC # BLD AUTO: 3.66 M/UL
SODIUM SERPL-SCNC: 144 MMOL/L
TRIGL SERPL-MCNC: 113 MG/DL
WBC # BLD AUTO: 5.62 K/UL

## 2018-04-23 PROCEDURE — 99214 OFFICE O/P EST MOD 30 MIN: CPT | Mod: S$GLB,,, | Performed by: PHYSICIAN ASSISTANT

## 2018-04-23 PROCEDURE — 36415 COLL VENOUS BLD VENIPUNCTURE: CPT

## 2018-04-23 PROCEDURE — 80053 COMPREHEN METABOLIC PANEL: CPT

## 2018-04-23 PROCEDURE — 3078F DIAST BP <80 MM HG: CPT | Mod: CPTII,S$GLB,, | Performed by: PHYSICIAN ASSISTANT

## 2018-04-23 PROCEDURE — 99999 PR PBB SHADOW E&M-EST. PATIENT-LVL IV: CPT | Mod: PBBFAC,,, | Performed by: PHYSICIAN ASSISTANT

## 2018-04-23 PROCEDURE — 80061 LIPID PANEL: CPT

## 2018-04-23 PROCEDURE — 85027 COMPLETE CBC AUTOMATED: CPT

## 2018-04-23 PROCEDURE — 93000 ELECTROCARDIOGRAM COMPLETE: CPT | Mod: S$GLB,,, | Performed by: INTERNAL MEDICINE

## 2018-04-23 PROCEDURE — 3077F SYST BP >= 140 MM HG: CPT | Mod: CPTII,S$GLB,, | Performed by: PHYSICIAN ASSISTANT

## 2018-04-23 NOTE — Clinical Note
5/14 with cbc/cmp and Brianna (trying to get this date as he has another PCP appt that same day and would like to come in the same day) thanks

## 2018-04-25 ENCOUNTER — TELEPHONE (OUTPATIENT)
Dept: PHARMACY | Facility: CLINIC | Age: 73
End: 2018-04-25

## 2018-05-03 DIAGNOSIS — C78.7 METASTATIC RENAL CELL CARCINOMA TO LIVER: ICD-10-CM

## 2018-05-03 DIAGNOSIS — G89.3 NEOPLASM RELATED PAIN: ICD-10-CM

## 2018-05-03 DIAGNOSIS — C64.9 METASTATIC RENAL CELL CARCINOMA TO LIVER: ICD-10-CM

## 2018-05-03 DIAGNOSIS — C79.51 METASTATIC RENAL CELL CARCINOMA TO BONE: ICD-10-CM

## 2018-05-03 DIAGNOSIS — C64.9 METASTATIC RENAL CELL CARCINOMA TO BONE: ICD-10-CM

## 2018-05-03 RX ORDER — HYDROCODONE BITARTRATE AND ACETAMINOPHEN 5; 325 MG/1; MG/1
1 TABLET ORAL EVERY 8 HOURS PRN
Qty: 50 TABLET | Refills: 0 | Status: SHIPPED | OUTPATIENT
Start: 2018-05-03 | End: 2018-06-22 | Stop reason: SDUPTHER

## 2018-05-03 RX ORDER — MORPHINE SULFATE 15 MG/1
15 TABLET ORAL EVERY 12 HOURS
Qty: 60 TABLET | Refills: 0 | Status: SHIPPED | OUTPATIENT
Start: 2018-05-03 | End: 2018-06-04 | Stop reason: SDUPTHER

## 2018-05-04 ENCOUNTER — PATIENT MESSAGE (OUTPATIENT)
Dept: HEMATOLOGY/ONCOLOGY | Facility: CLINIC | Age: 73
End: 2018-05-04

## 2018-05-04 ENCOUNTER — TELEPHONE (OUTPATIENT)
Dept: HEMATOLOGY/ONCOLOGY | Facility: CLINIC | Age: 73
End: 2018-05-04

## 2018-05-04 RX ORDER — MORPHINE SULFATE 15 MG/1
15 TABLET ORAL EVERY 12 HOURS
Qty: 60 TABLET | Refills: 0 | OUTPATIENT
Start: 2018-05-04

## 2018-05-04 NOTE — TELEPHONE ENCOUNTER
Pharmacy called stating that the patient brought back the ER and got he IR.  The pharmacist states they filled it 3 days early and the patient says he was completely out and had already taken an ER even though he knew it wasn't the right medication they reported losing a couple under his stove.

## 2018-05-04 NOTE — TELEPHONE ENCOUNTER
----- Message from Divya South RN sent at 5/4/2018  4:23 PM CDT -----  Contact: [facility]   This is a pt of Raven Siddiqi  Thanks  ----- Message -----  From: Jes Fernandez  Sent: 5/4/2018   4:06 PM  To: Vianey Diaz Staff    [facility] called about medication and states that they need to speak with a nurse   [phone]  Thank You  KEANU Fernandez

## 2018-05-07 ENCOUNTER — PATIENT MESSAGE (OUTPATIENT)
Dept: HEMATOLOGY/ONCOLOGY | Facility: CLINIC | Age: 73
End: 2018-05-07

## 2018-05-07 ENCOUNTER — TELEPHONE (OUTPATIENT)
Dept: HEMATOLOGY/ONCOLOGY | Facility: CLINIC | Age: 73
End: 2018-05-07

## 2018-05-07 NOTE — TELEPHONE ENCOUNTER
Message sent via [facility].    MD Radha Randolph   Caller: Unspecified (3 days ago,  4:53 PM)             See below   I cannot imagine they will allow a reorder with him taking   Can you see what he did over weekend?   He can take this prescription as he already opened every 12 hours

## 2018-05-10 DIAGNOSIS — G89.3 NEOPLASM RELATED PAIN: ICD-10-CM

## 2018-05-10 DIAGNOSIS — C64.9 METASTATIC RENAL CELL CARCINOMA TO LIVER: ICD-10-CM

## 2018-05-10 DIAGNOSIS — C78.7 METASTATIC RENAL CELL CARCINOMA TO LIVER: ICD-10-CM

## 2018-05-10 RX ORDER — HYDROCODONE BITARTRATE AND ACETAMINOPHEN 5; 325 MG/1; MG/1
TABLET ORAL
Qty: 120 TABLET | Refills: 0 | Status: SHIPPED | OUTPATIENT
Start: 2018-05-10 | End: 2018-06-24 | Stop reason: SDUPTHER

## 2018-05-14 ENCOUNTER — OFFICE VISIT (OUTPATIENT)
Dept: HEMATOLOGY/ONCOLOGY | Facility: CLINIC | Age: 73
End: 2018-05-14
Payer: MEDICARE

## 2018-05-14 ENCOUNTER — OFFICE VISIT (OUTPATIENT)
Dept: INTERNAL MEDICINE | Facility: CLINIC | Age: 73
End: 2018-05-14
Payer: MEDICARE

## 2018-05-14 ENCOUNTER — LAB VISIT (OUTPATIENT)
Dept: LAB | Facility: HOSPITAL | Age: 73
End: 2018-05-14
Attending: INTERNAL MEDICINE
Payer: MEDICARE

## 2018-05-14 VITALS
OXYGEN SATURATION: 98 % | DIASTOLIC BLOOD PRESSURE: 60 MMHG | WEIGHT: 163.81 LBS | SYSTOLIC BLOOD PRESSURE: 130 MMHG | HEIGHT: 67 IN | BODY MASS INDEX: 25.71 KG/M2 | HEART RATE: 68 BPM

## 2018-05-14 VITALS
BODY MASS INDEX: 26.26 KG/M2 | DIASTOLIC BLOOD PRESSURE: 74 MMHG | TEMPERATURE: 98 F | OXYGEN SATURATION: 98 % | WEIGHT: 163.38 LBS | RESPIRATION RATE: 18 BRPM | SYSTOLIC BLOOD PRESSURE: 160 MMHG | HEIGHT: 66 IN | HEART RATE: 56 BPM

## 2018-05-14 DIAGNOSIS — K52.1 DIARRHEA DUE TO DRUG: ICD-10-CM

## 2018-05-14 DIAGNOSIS — Z79.4 TYPE 2 DIABETES MELLITUS WITH STAGE 3 CHRONIC KIDNEY DISEASE, WITH LONG-TERM CURRENT USE OF INSULIN: Chronic | ICD-10-CM

## 2018-05-14 DIAGNOSIS — E53.8 B12 DEFICIENCY: ICD-10-CM

## 2018-05-14 DIAGNOSIS — C78.7 METASTATIC RENAL CELL CARCINOMA TO LIVER: ICD-10-CM

## 2018-05-14 DIAGNOSIS — K52.1 CHEMOTHERAPY INDUCED DIARRHEA: ICD-10-CM

## 2018-05-14 DIAGNOSIS — N40.1 BENIGN PROSTATIC HYPERPLASIA (BPH) WITH URINARY URGE INCONTINENCE: ICD-10-CM

## 2018-05-14 DIAGNOSIS — C64.9 METASTATIC RENAL CELL CARCINOMA TO LIVER: ICD-10-CM

## 2018-05-14 DIAGNOSIS — E44.0 MODERATE PROTEIN-CALORIE MALNUTRITION: ICD-10-CM

## 2018-05-14 DIAGNOSIS — N18.30 TYPE 2 DIABETES MELLITUS WITH STAGE 3 CHRONIC KIDNEY DISEASE, WITH LONG-TERM CURRENT USE OF INSULIN: Chronic | ICD-10-CM

## 2018-05-14 DIAGNOSIS — N39.41 BENIGN PROSTATIC HYPERPLASIA (BPH) WITH URINARY URGE INCONTINENCE: ICD-10-CM

## 2018-05-14 DIAGNOSIS — C79.51 METASTATIC RENAL CELL CARCINOMA TO BONE: ICD-10-CM

## 2018-05-14 DIAGNOSIS — C64.2 RENAL CELL CARCINOMA OF LEFT KIDNEY: Primary | ICD-10-CM

## 2018-05-14 DIAGNOSIS — E03.9 ACQUIRED HYPOTHYROIDISM: ICD-10-CM

## 2018-05-14 DIAGNOSIS — T45.1X5A CHEMOTHERAPY INDUCED DIARRHEA: ICD-10-CM

## 2018-05-14 DIAGNOSIS — C64.2 RENAL CELL CARCINOMA OF LEFT KIDNEY: ICD-10-CM

## 2018-05-14 DIAGNOSIS — E11.22 TYPE 2 DIABETES MELLITUS WITH STAGE 3 CHRONIC KIDNEY DISEASE, WITH LONG-TERM CURRENT USE OF INSULIN: Chronic | ICD-10-CM

## 2018-05-14 DIAGNOSIS — G89.3 CANCER ASSOCIATED PAIN: ICD-10-CM

## 2018-05-14 DIAGNOSIS — C64.9 METASTATIC RENAL CELL CARCINOMA TO BONE: ICD-10-CM

## 2018-05-14 LAB
ALBUMIN SERPL BCP-MCNC: 2.8 G/DL
ALP SERPL-CCNC: 82 U/L
ALT SERPL W/O P-5'-P-CCNC: 37 U/L
ANION GAP SERPL CALC-SCNC: 9 MMOL/L
AST SERPL-CCNC: 37 U/L
BILIRUB SERPL-MCNC: 0.5 MG/DL
BUN SERPL-MCNC: 23 MG/DL
CALCIUM SERPL-MCNC: 9 MG/DL
CHLORIDE SERPL-SCNC: 109 MMOL/L
CO2 SERPL-SCNC: 23 MMOL/L
CREAT SERPL-MCNC: 1 MG/DL
ERYTHROCYTE [DISTWIDTH] IN BLOOD BY AUTOMATED COUNT: 13.7 %
EST. GFR  (AFRICAN AMERICAN): >60 ML/MIN/1.73 M^2
EST. GFR  (NON AFRICAN AMERICAN): >60 ML/MIN/1.73 M^2
GLUCOSE SERPL-MCNC: 171 MG/DL
HCT VFR BLD AUTO: 39.5 %
HGB BLD-MCNC: 12.9 G/DL
IMM GRANULOCYTES # BLD AUTO: 0.01 K/UL
MCH RBC QN AUTO: 33.9 PG
MCHC RBC AUTO-ENTMCNC: 32.7 G/DL
MCV RBC AUTO: 104 FL
NEUTROPHILS # BLD AUTO: 3.8 K/UL
PLATELET # BLD AUTO: 312 K/UL
PMV BLD AUTO: 9.8 FL
POTASSIUM SERPL-SCNC: 4.9 MMOL/L
PROT SERPL-MCNC: 5.7 G/DL
RBC # BLD AUTO: 3.8 M/UL
SODIUM SERPL-SCNC: 141 MMOL/L
WBC # BLD AUTO: 6.58 K/UL

## 2018-05-14 PROCEDURE — 99999 PR PBB SHADOW E&M-EST. PATIENT-LVL V: CPT | Mod: PBBFAC,,, | Performed by: INTERNAL MEDICINE

## 2018-05-14 PROCEDURE — 3044F HG A1C LEVEL LT 7.0%: CPT | Mod: CPTII,S$GLB,, | Performed by: INTERNAL MEDICINE

## 2018-05-14 PROCEDURE — 99999 PR PBB SHADOW E&M-EST. PATIENT-LVL III: CPT | Mod: PBBFAC,,, | Performed by: INTERNAL MEDICINE

## 2018-05-14 PROCEDURE — 3075F SYST BP GE 130 - 139MM HG: CPT | Mod: CPTII,S$GLB,, | Performed by: INTERNAL MEDICINE

## 2018-05-14 PROCEDURE — 85027 COMPLETE CBC AUTOMATED: CPT

## 2018-05-14 PROCEDURE — 99214 OFFICE O/P EST MOD 30 MIN: CPT | Mod: S$GLB,,, | Performed by: INTERNAL MEDICINE

## 2018-05-14 PROCEDURE — 3078F DIAST BP <80 MM HG: CPT | Mod: CPTII,S$GLB,, | Performed by: INTERNAL MEDICINE

## 2018-05-14 PROCEDURE — 80053 COMPREHEN METABOLIC PANEL: CPT

## 2018-05-14 PROCEDURE — 36415 COLL VENOUS BLD VENIPUNCTURE: CPT

## 2018-05-14 PROCEDURE — 3074F SYST BP LT 130 MM HG: CPT | Mod: CPTII,S$GLB,, | Performed by: INTERNAL MEDICINE

## 2018-05-14 PROCEDURE — 99215 OFFICE O/P EST HI 40 MIN: CPT | Mod: S$GLB,,, | Performed by: INTERNAL MEDICINE

## 2018-05-14 RX ORDER — MORPHINE SULFATE 15 MG/1
TABLET, FILM COATED, EXTENDED RELEASE ORAL
Refills: 0 | COMMUNITY
Start: 2018-05-04 | End: 2018-06-29 | Stop reason: SDUPTHER

## 2018-05-14 RX ORDER — TAMSULOSIN HYDROCHLORIDE 0.4 MG/1
0.8 CAPSULE ORAL NIGHTLY
Qty: 180 CAPSULE | Refills: 3 | Status: SHIPPED | OUTPATIENT
Start: 2018-05-14 | End: 2019-06-20 | Stop reason: SDUPTHER

## 2018-05-14 NOTE — ASSESSMENT & PLAN NOTE
A1c 11.1 in 5/2017, 6.8 in 8/2017. Weaned off Lantus, PRN novolog 10 preprandial  · Continue current therapies  · Monitor A1c  · Discontinue long acting insulin  · PRN novolog for glucose >150  · Has not taken insulin in past 2 mos

## 2018-05-14 NOTE — ASSESSMENT & PLAN NOTE
Metastatic renal cell CA (Stage IV - T1,Nx,M1). Followed by Heme-Onc (Dr Reed) radiation therapy to L3 spine - 3 Gy/Fx x 10 fx and pazopanib 800 mg PO daily. Liver bx 5/2017 with RCC  · F/U Heme-Onc  · Continue current therapies  · Completed monthly xgeva and radiation therapy  · Ca with Vit D supplementation  · Continue votrient

## 2018-05-14 NOTE — ASSESSMENT & PLAN NOTE
Elevated TSH in 6/2017, 8/2017, 11/2017, 3/2018 compliant with levo 75mcg  · Advised to take separate from other meds and food  · Patient unable to  · Dose increased to 88mcg  · Will not increase beyond this due to age and chronic conditions

## 2018-05-14 NOTE — PROGRESS NOTES
"Primary Care Provider Appointment    Subjective:      Patient ID: Edwin Singleton is a 73 y.o. male with RCC L kidney, DM, HTN, HLD, hypothyroidism    Chief Complaint: Hyperlipidemia and Follow-up    Patient here for routine care. He is working on a flower garden outside, and has a suntan today. He states he has no energy.     Was seen by Heme-Onc, has plans to have restaging scans in 6/2018 (last scans in 2/2018). He is compliant with votrient, no missed doses.     Last note states his pain is well-controlled. He had a mix-up with his morphine script at pharmacy and was given ER instead of IR. Patient states he was able to get the correct version and had no pain exacerbation during that time.    His last A1c was 6.5 while not on any diabetes therapies. He states his AM fasting sugars are in the 110s.    His levothyroxine was increased to 88mcg on 4/2018. TSH 5 in 3/2018.     He does have BPH symptoms, is resistant to med changes for this and refuses Urology consult. He is compliant with flomax. He states "urination are not bad" but he feels "different" after sex.     His diarrhea is improved, he continues to take lomotil PRN after a diarrhea episode.    Past Surgical History:   Procedure Laterality Date    ABDOMINAL SURGERY      APPENDECTOMY      BACK SURGERY      CARDIAC SURGERY      CHOLECYSTECTOMY      coronary stenting      X 5 last one in 2010-11.    SPINAL FUSION         Past Medical History:   Diagnosis Date    Acquired hypothyroidism 5/26/2017    Benign essential HTN 5/26/2017    Benign prostatic hyperplasia (BPH) with urinary urge incontinence 6/6/2017    Chronic low back pain 6/1/2017    Coronary artery disease involving native coronary artery of native heart without angina pectoris 5/26/2017    S/p 5 stents - last one around 2010-11.    Gastroesophageal reflux disease without esophagitis 5/26/2017    History of CVA (cerebrovascular accident) 5/26/2017    Lumbar disc lesion 5/26/2017    " "Metastatic renal cell carcinoma to bone 6/6/2017    Metastatic renal cell carcinoma to liver 6/6/2017    Liver Biopsy 5-2017: METASTATIC RENAL CELL CARCINOMA.    Mixed hyperlipidemia 5/26/2017    Type 2 diabetes mellitus with stage 3 chronic kidney disease, with long-term current use of insulin 5/26/2017       Review of Systems   Constitutional: Negative for activity change and unexpected weight change.   HENT: Positive for hearing loss. Negative for trouble swallowing.    Gastrointestinal: Positive for diarrhea. Negative for blood in stool, constipation and vomiting.   Endocrine: Positive for polyuria. Negative for polydipsia.   Genitourinary: Positive for difficulty urinating. Negative for urgency.   Musculoskeletal: Positive for arthralgias. Negative for joint swelling and neck pain.   Neurological: Positive for weakness. Negative for headaches.   Psychiatric/Behavioral: Negative for confusion and dysphoric mood.       Objective:   /60 (BP Location: Right arm, Patient Position: Sitting, BP Method: Medium (Manual))   Pulse 68   Ht 5' 7" (1.702 m)   Wt 74.3 kg (163 lb 12.8 oz)   SpO2 98%   BMI 25.66 kg/m²     Physical Exam   Constitutional: He is oriented to person, place, and time. He appears well-developed and well-nourished.   Ambulating with cane   HENT:   Head: Normocephalic.   edentulous   Eyes: Pupils are equal, round, and reactive to light.   Neck:   Decreased ROM in neck    Cardiovascular:   bradycardic   Pulmonary/Chest: Effort normal.   Musculoskeletal: Normal range of motion.   Neurological: He is alert and oriented to person, place, and time.   Skin: Skin is warm.   Ecchymoses and purpura bilaterally UE  Telangiectasias on face and chest  Multiple tattoos on UE   Psychiatric: He has a normal mood and affect.       Lab Results   Component Value Date    WBC 5.62 04/23/2018    HGB 12.5 (L) 04/23/2018    HCT 37.7 (L) 04/23/2018     04/23/2018    CHOL 98 (L) 04/23/2018    TRIG 113 " 04/23/2018    HDL 27 (L) 04/23/2018    ALT 41 04/23/2018    AST 40 04/23/2018     04/23/2018    K 4.1 04/23/2018     04/23/2018    CREATININE 0.9 04/23/2018    BUN 11 04/23/2018    CO2 29 04/23/2018    TSH 5.191 (H) 03/26/2018    PSA 0.61 05/27/2017    INR 1.2 06/12/2017    HGBA1C 6.5 (H) 02/15/2018         Assessment:   73 y.o. male with multiple co-morbid illnesses here to continue work-up of chronic issues notably RCC L kidney, DM, HTN, HLD, hypothyroidism    Plan:     Problem List Items Addressed This Visit        Renal/    Benign prostatic hyperplasia (BPH) with urinary urge incontinence     Patient with LUTS, compliant with flomax. Occasional testicular pain  · Increase flomax to 0.8mg (has completed >4 weeks of treatment)  · Advised to avoid fluids at bedtime  · Refuses Urology consult         Relevant Medications    tamsulosin (FLOMAX) 0.4 mg Cp24       Oncology    Metastatic renal cell carcinoma to liver     Metastatic renal cell CA (Stage IV - T1,Nx,M1). Followed by Heme-Onc (Dr Reed) radiation therapy to L3 spine - 3 Gy/Fx x 10 fx and pazopanib 800 mg PO daily. Liver bx 5/2017 with RCC  · F/U Heme-Onc  · Continue current therapies  · Completed monthly xgeva and radiation therapy  · Ca with Vit D supplementation  · Continue votrient         Renal cell carcinoma of left kidney     Metastatic renal cell CA (Stage IV - T1,Nx,M1). Followed by Heme-Onc (Dr Reed) radiation therapy to L3 spine - 3 Gy/Fx x 10 fx and pazopanib 800 mg PO daily.  · F/U Heme-Onc  · restaging CTs on 2/15/18  · Repeat due in 6/2018  · Continue pazopanib 800mg  · Completed Xgeva for bone mets  · Cotninue Ca and Vit D            Endocrine    Type 2 diabetes mellitus with stage 3 chronic kidney disease, with long-term current use of insulin (Chronic)     A1c 11.1 in 5/2017, 6.8 in 8/2017. Weaned off Lantus, PRN novolog 10 preprandial  · Continue current therapies  · Monitor A1c  · Discontinue long acting insulin  · PRN  novolog for glucose >150  · Has not taken insulin in past 2 mos         Acquired hypothyroidism     Elevated TSH in 6/2017, 8/2017, 11/2017, 3/2018 compliant with levo 75mcg  · Advised to take separate from other meds and food  · Patient unable to  · Dose increased to 88mcg  · Will not increase beyond this due to age and chronic conditions         B12 deficiency     Low-normal B12, compliant with MVI  · Continue MVI  · Continue boost supplements         Moderate protein-calorie malnutrition     Low albumin, low muscle tone, chronic disease of metastatic CA, fatigue  · Continue protein supplement  · Continue high protein intake diet  · MVI and Ca/Vit D supplements            GI    Diarrhea due to drug     Started on loperamide by Heme-Onc with good improvement  · Continue loperamide               Health Maintenance       Date Due Completion Date    TETANUS VACCINE 02/18/1963 ---    Influenza Vaccine 08/01/2018 10/18/2017    Hemoglobin A1c 08/15/2018 2/15/2018    Pneumococcal (65+) (2 of 2 - PPSV23) 11/29/2018 11/29/2017    Eye Exam 01/19/2019 1/19/2018    Foot Exam 02/02/2019 2/2/2018 (Done)    Override on 2/2/2018: Done (performed by podiatrist)    High Dose Statin 04/23/2019 4/23/2018    Lipid Panel 04/23/2019 4/23/2018    Colonoscopy 02/15/2021 2/15/2011 (Done)    Override on 2/15/2011: Done (Per [employer] dashboard ? results)          Follow-up in about 6 weeks (around 6/25/2018). . One hour spent with this patient today, half of that in counseling.    Lulú Lewis MD/MPH  Internal Medicine  [facility]  [phone]

## 2018-05-14 NOTE — ASSESSMENT & PLAN NOTE
Low albumin, low muscle tone, chronic disease of metastatic CA, fatigue  · Continue protein supplement  · Continue high protein intake diet  · MVI and Ca/Vit D supplements

## 2018-05-14 NOTE — ASSESSMENT & PLAN NOTE
Patient with LUTS, compliant with flomax. Occasional testicular pain  · Increase flomax to 0.8mg (has completed >4 weeks of treatment)  · Advised to avoid fluids at bedtime  · Refuses Urology consult

## 2018-05-14 NOTE — ASSESSMENT & PLAN NOTE
Metastatic renal cell CA (Stage IV - T1,Nx,M1). Followed by Heme-Onc (Dr Reed) radiation therapy to L3 spine - 3 Gy/Fx x 10 fx and pazopanib 800 mg PO daily.  · F/U Heme-Onc  · restaging CTs on 2/15/18  · Repeat due in 6/2018  · Continue pazopanib 800mg  · Completed Xgeva for bone mets  · Cotninue Ca and Vit D

## 2018-05-14 NOTE — PATIENT INSTRUCTIONS
TODAY:  - take 2 capsules of flomax (0.8mg) for prostate symptoms  - continue levothyroxine dose of 88mcg (synthroid)  - continue lifestyle changes for diabetes   + use short-acting insulin for glucose readings greater than 150   + healthy foods and exercise  - labs today  - follow-up Heme-Onc today  - continue Boost (try drinking 2 per day)

## 2018-05-14 NOTE — PROGRESS NOTES
"Chief Complaint: Renal cell carcinoma of left kidney     Mr. Singleton is a 73 y.o. male who returns for follow up regarding metastatic renal cell carcinoma of left kidney.  Restaging scans in February showed  stable to improved disease.     Reports overall doing well  Tolerating Votrient with minimal side effects- manages diarrhea with medication, "better than it was"  Eating well   Weight stable  No new pain  Pain well controlled- mostly in low back and side where there are known bone lesions.  Using hydrocodone and morphine.       Imaging: CT scans 2/15/18:  Impression   Left renal lesion as above, stable in size with decreased enhancement.  Interval decrease in size of hepatic lesions.  Stable L3 lytic lesion.  Stable bilateral pleural nodularity, and stable 2 mm right upper lobe pulmonary micronodule.  Stable 0.6 cm hypodense lesion in the upper pole of the right kidney, too small to characterize.    Moderate atherosclerosis with infrarenal abdominal aortic ectasia.  RECIST SUMMARY:Date of prior examination for comparison 8/23/2017  Lesion 1: Left kidney 2.2 cm cm Series 3 Image 122 Prior measurement 2.2 cm cm  Lesion 2: Right kidney 0.6  cm Series 601 Image 79 Prior measurement 0.6  cm  Lesion 3: Hepatic segment II 1.1 cm Series 3 Image 79 Prior measurement 2.1 cm  Lesion 4: Hepatic segment VII 1.6 cm Series 3 Image 82 Prior measurement 3 cm  Lesion 5: L3 vertebral body 2.4 cm Series 3 Image 133 Prior measurement 2.4 cm        Sometimes notes urine flow hard to get started   Does not feel as if he fully empties  On Flomax     Oncology History:  Mr.Mc Moss is a 72 yo male with CAD (s/p 5 stents, remotely), DM2 (insulin dependent), hypothyroidism , CVA (2013) with no residual deficit, chronic back pain. [place] [facility] [place] of a lumbar spine lesion in the setting of progressive weakness of his legs and exacerbation of chronic back pain, in May 2017. He states that his back pain " "and LE weakness progressively worsened. [facility]. The pain radiates down his legs. He denies any incontinence of bowel or bladder. He had several falls. After one of the falls, he presented to the ED at [facility], in May 2017. He had an MRI showing "L3 vertebral body lesion with apparent cortical disruption concerning for metastases vs myeloma," and [place] [facility] [place]. He was evaluated by neurosurgery regarding concern for lytic lesion on L3 on 5/26/17. No surgical intervention was proposed.   CT chest, abdomen,pelvis on 5/27/17 revealed :   --A 3.6 cm exophytic mass arising from the medial aspect of the lower pole of left kidney,   --A small 1.0 cm, subtle, cortical enhancing mass within the upper pole of the right kidney, concerning for possible contralateral solid renal mass  --Multiple enhancing ill-defined hepatic masses concerning for hepatic metastatic disease, hepatomegaly and hepatic steatosis  --Mulltifocal irregular thickening of the bilateral pleura concerning for possible pleural metastatic disease  --Mild nonspecific nodular thickening of the left adrenal gland without discrete nodule  -- Redemonstration of known lytic lesion within the L3 vertebral body. No definite additional discrete lytic lesions are identified.  -MRI pelvis from 10/18/17:    2.5 cm linear T1 hypointense T2/STIR hyperintense lesion within the left iliac bone, this lesion is indeterminate but felt less likely to represent metastasis given its appearance. Further evaluation can be obtained with bone scan as clinically indicated.  Findings suggestive of left trochanteric bursitis.  Partially visualized L3 metastatic lesion.  Diffuse thickening of the urinary bladder wall can be seen with chronic outlet obstruction or cystitis.      Review of Systems   Constitutional: Positive for malaise/fatigue. Negative for chills, fever and weight loss.   HENT: " Negative.    Eyes: Negative.    Respiratory: Negative.    Cardiovascular: Negative.    Gastrointestinal: Positive for abdominal pain and diarrhea. Negative for blood in stool, constipation and vomiting.   Genitourinary: Negative for dysuria, frequency, hematuria and urgency.   Musculoskeletal: Positive for back pain and myalgias.   Skin: Negative.    Neurological: Negative.    Endo/Heme/Allergies: Negative.    Psychiatric/Behavioral: Negative.        Current Outpatient Prescriptions   Medication Sig    amLODIPine (NORVASC) 10 MG tablet Take 1 tablet (10 mg total) by mouth once daily.    aspirin (ECOTRIN) 81 MG EC tablet Take 81 mg by mouth once daily.    atorvastatin (LIPITOR) 40 MG tablet Take 1 tablet (40 mg total) by mouth once daily.    diphenoxylate-atropine 2.5-0.025 mg (LOMOTIL) 2.5-0.025 mg per tablet Take 2 tablets by mouth 4 (four) times daily as needed for Diarrhea.    hydrocodone-acetaminophen 5-325mg (NORCO) 5-325 mg per tablet Take 1 tablet by mouth every 8 (eight) hours as needed for Pain.    hydrocodone-acetaminophen 5-325mg (NORCO) 5-325 mg per tablet TAKE 1 TABLET BY MOUTH EVERY 8 (EIGHT) HOURS AS NEEDED FOR PAIN.    levothyroxine (SYNTHROID) 88 MCG tablet Take 1 tablet (88 mcg total) by mouth once daily.    lisinopril (PRINIVIL,ZESTRIL) 40 MG tablet Take 1 tablet (40 mg total) by mouth once daily.    melatonin 5 mg Subl Place 1-2 tablets under the tongue each evening as needed for sleep    morphine (MS CONTIN) 15 MG 12 hr tablet TAKE 1 TABLET (15 MG TOTAL) BY MOUTH EVERY 12 (TWELVE) HOURS.    morphine (MSIR) 15 MG tablet Take 1 tablet (15 mg total) by mouth every 12 (twelve) hours.    ondansetron (ZOFRAN) 8 MG tablet Take 8 mg by mouth 3 (three) times daily as needed.    PAZOpanib 200 mg Tab Take 4 tablets (800mg total) by mouth once daily.    tamsulosin (FLOMAX) 0.4 mg Cp24 Take 2 capsules (0.8 mg total) by mouth every evening.    calcium carbonate-vitamin D3 (CALCIUM 600 WITH  VITAMIN D3) 600 mg(1,500mg) -400 unit Cap Take 1 capsule by mouth once.    lorazepam (ATIVAN) 1 MG tablet Take 1 tablet (1 mg total) by mouth daily as needed for Anxiety (use only as needed before MRI or CT, very sedating).     No current facility-administered medications for this visit.            Vitals:    05/14/18 0957   BP: (!) 160/74   Pulse: (!) 56   Resp: 18   Temp: 98 °F (36.7 °C)     Pain 8/10, in his right flank    Physical Exam   Constitutional: He is oriented to person, place, and time. He appears well-developed. No distress.   HENT:   Head: Normocephalic and atraumatic.   Mouth/Throat: No oropharyngeal exudate.   Eyes: Pupils are equal, round, and reactive to light. No scleral icterus.   Neck: Normal range of motion.   Cardiovascular: Normal rate and regular rhythm.    No murmur heard.  Pulmonary/Chest: Effort normal and breath sounds normal. No respiratory distress. He has no wheezes.   Abdominal: Soft. He exhibits no distension. There is tenderness.   He has tenderness in his RUQ, as well as in his right flnak   Musculoskeletal: He exhibits no edema.   Lymphadenopathy:     He has no cervical adenopathy.   Neurological: He is alert and oriented to person, place, and time. No cranial nerve deficit.   Skin: Skin is warm. No erythema.   Psychiatric: He has a normal mood and affect.       Component      Latest Ref Rng & Units 5/14/2018   Sodium      136 - 145 mmol/L 141   Potassium      3.5 - 5.1 mmol/L 4.9   Chloride      95 - 110 mmol/L 109   CO2      23 - 29 mmol/L 23   Glucose      70 - 110 mg/dL 171 (H)   BUN, Bld      8 - 23 mg/dL 23   Creatinine      0.5 - 1.4 mg/dL 1.0   Calcium      8.7 - 10.5 mg/dL 9.0   Total Protein      6.0 - 8.4 g/dL 5.7 (L)   Albumin      3.5 - 5.2 g/dL 2.8 (L)   Total Bilirubin      0.1 - 1.0 mg/dL 0.5   Alkaline Phosphatase      55 - 135 U/L 82   AST      10 - 40 U/L 37   ALT      10 - 44 U/L 37   Anion Gap      8 - 16 mmol/L 9   eGFR if African American      >60  mL/min/1.73 m:2 >60.0   eGFR if non African American      >60 mL/min/1.73 m:2 >60.0   WBC      3.90 - 12.70 K/uL 6.58   RBC      4.60 - 6.20 M/uL 3.80 (L)   Hemoglobin      14.0 - 18.0 g/dL 12.9 (L)   Hematocrit      40.0 - 54.0 % 39.5 (L)   MCV      82 - 98 fL 104 (H)   MCH      27.0 - 31.0 pg 33.9 (H)   MCHC      32.0 - 36.0 g/dL 32.7   RDW      11.5 - 14.5 % 13.7   Platelets      150 - 350 K/uL 312   MPV      9.2 - 12.9 fL 9.8   Gran # (ANC)      1.8 - 7.7 K/uL 3.8   Immature Grans (Abs)      0.00 - 0.04 K/uL 0.01     Assessment:    1. Renal cell carcinoma of left kidney   2.Metastatic renal cell carcinoma to bone   3.Neoplasm related pain   4. Chemotherapy induced diarrhea   5. Macrocytic anemia  6. Hyperglycemia      Plan:    1-2)Continue Votrient with response  Tolerating well  Plan is to re-image in June unless clinically indicated prior  3)controlled with current pain medications  4)controlled with lomotil.  5)Mild, asymptomatic  6)He was previously on Insulin. He does not take any medications for DM now. Will monitor

## 2018-06-01 ENCOUNTER — TELEPHONE (OUTPATIENT)
Dept: PHARMACY | Facility: CLINIC | Age: 73
End: 2018-06-01

## 2018-06-04 DIAGNOSIS — C78.7 METASTATIC RENAL CELL CARCINOMA TO LIVER: ICD-10-CM

## 2018-06-04 DIAGNOSIS — C64.9 METASTATIC RENAL CELL CARCINOMA TO LIVER: ICD-10-CM

## 2018-06-04 DIAGNOSIS — G89.3 NEOPLASM RELATED PAIN: ICD-10-CM

## 2018-06-04 RX ORDER — MORPHINE SULFATE 15 MG/1
15 TABLET ORAL EVERY 12 HOURS
Qty: 60 TABLET | Refills: 0 | Status: SHIPPED | OUTPATIENT
Start: 2018-06-04 | End: 2018-06-24 | Stop reason: SDUPTHER

## 2018-06-14 ENCOUNTER — HOSPITAL ENCOUNTER (OUTPATIENT)
Dept: RADIOLOGY | Facility: HOSPITAL | Age: 73
Discharge: HOME | End: 2018-06-14
Attending: INTERNAL MEDICINE
Payer: MEDICARE

## 2018-06-14 DIAGNOSIS — C64.9 METASTATIC RENAL CELL CARCINOMA TO BONE: ICD-10-CM

## 2018-06-14 DIAGNOSIS — C79.51 METASTATIC RENAL CELL CARCINOMA TO BONE: ICD-10-CM

## 2018-06-14 PROCEDURE — 71260 CT THORAX DX C+: CPT | Mod: 26,,, | Performed by: RADIOLOGY

## 2018-06-14 PROCEDURE — 74177 CT ABD & PELVIS W/CONTRAST: CPT | Mod: TC

## 2018-06-14 PROCEDURE — 71260 CT THORAX DX C+: CPT | Mod: TC

## 2018-06-14 PROCEDURE — 25500020 PHARM REV CODE 255: Performed by: INTERNAL MEDICINE

## 2018-06-14 PROCEDURE — 74177 CT ABD & PELVIS W/CONTRAST: CPT | Mod: 26,,, | Performed by: RADIOLOGY

## 2018-06-14 RX ADMIN — IOHEXOL 75 ML: 350 INJECTION, SOLUTION INTRAVENOUS at 10:06

## 2018-06-14 RX ADMIN — IOHEXOL 15 ML: 350 INJECTION, SOLUTION INTRAVENOUS at 10:06

## 2018-06-14 RX ADMIN — IOHEXOL 15 ML: 350 INJECTION, SOLUTION INTRAVENOUS at 09:06

## 2018-06-15 ENCOUNTER — OFFICE VISIT (OUTPATIENT)
Dept: HEMATOLOGY/ONCOLOGY | Facility: CLINIC | Age: 73
End: 2018-06-15
Payer: MEDICARE

## 2018-06-15 VITALS
WEIGHT: 160.25 LBS | BODY MASS INDEX: 25.75 KG/M2 | HEIGHT: 66 IN | TEMPERATURE: 98 F | DIASTOLIC BLOOD PRESSURE: 81 MMHG | SYSTOLIC BLOOD PRESSURE: 187 MMHG | HEART RATE: 75 BPM | RESPIRATION RATE: 20 BRPM | OXYGEN SATURATION: 97 %

## 2018-06-15 DIAGNOSIS — R93.3 ABNORMAL FINDINGS ON DIAGNOSTIC IMAGING OF OTHER PARTS OF DIGESTIVE TRACT: ICD-10-CM

## 2018-06-15 DIAGNOSIS — C64.9 METASTATIC RENAL CELL CARCINOMA TO BONE: ICD-10-CM

## 2018-06-15 DIAGNOSIS — C78.7 METASTATIC RENAL CELL CARCINOMA TO LIVER: ICD-10-CM

## 2018-06-15 DIAGNOSIS — C64.9 METASTATIC RENAL CELL CARCINOMA TO LIVER: ICD-10-CM

## 2018-06-15 DIAGNOSIS — C64.2 RENAL CELL CARCINOMA OF LEFT KIDNEY: Primary | ICD-10-CM

## 2018-06-15 DIAGNOSIS — C79.51 METASTATIC RENAL CELL CARCINOMA TO BONE: ICD-10-CM

## 2018-06-15 PROCEDURE — 3077F SYST BP >= 140 MM HG: CPT | Mod: CPTII,S$GLB,, | Performed by: INTERNAL MEDICINE

## 2018-06-15 PROCEDURE — 99999 PR PBB SHADOW E&M-EST. PATIENT-LVL III: CPT | Mod: PBBFAC,,, | Performed by: INTERNAL MEDICINE

## 2018-06-15 PROCEDURE — 3079F DIAST BP 80-89 MM HG: CPT | Mod: CPTII,S$GLB,, | Performed by: INTERNAL MEDICINE

## 2018-06-15 PROCEDURE — 99214 OFFICE O/P EST MOD 30 MIN: CPT | Mod: S$GLB,,, | Performed by: INTERNAL MEDICINE

## 2018-06-15 NOTE — PROGRESS NOTES
"Subjective:       Patient ID: Edwin Singleton is a 73 y.o. male.    Chief Complaint: No chief complaint on file.    HPI     Mr. Singleton is a 73 y.o. male who returns for follow up regarding metastatic renal cell carcinoma of left kidney.  Returns for follow-up and scans results  Reports overall doing well  Tolerating Votrient with minimal side effects- manages diarrhea with medication  Eating well   Weight stable  No new pain  Pain well controlled- mostly in low back and side where there are known bone lesions.     Scans reveal stable disease  Imaging:   CT scans 6/14/18:  1. Stable exophytic left renal lesion with surrounding decreased attenuation of the renal parenchyma favored to represent post treatment change.  2. Lytic lesion within the L3 vertebral body, unchanged.  3. Stable hypodensities in hepatic segment VII and GUSTAVO/B.  Stable lesion in hepatic segment II.  4. Unchanged hypodensity in the superior right renal pole, too small to characterize.  5. Stable right upper lobe pulmonary micronodule.  6. Additional findings as above.  RECIST SUMMARY: These measurements are given as on the prior study.     Oncology History:  Mr.Mc Moss is a 74 yo male with CAD (s/p 5 stents, remotely), DM2 (insulin dependent), hypothyroidism , CVA (2013) with no residual deficit, chronic back pain. [place] [facility] [place] a lumbar spine lesion in the setting of progressive weakness of his legs and exacerbation of chronic back pain, in May 2017. He states that his back pain and LE weakness progressively worsened. [facility]. The pain radiates down his legs. He denies any incontinence of bowel or bladder. He had several falls. After one of the falls, he presented to the ED at [facility], in May 2017. He had an MRI showing "L3 vertebral body lesion with apparent cortical disruption concerning for metastases vs myeloma," and [place] [facility] [place]. He was evaluated by neurosurgery regarding concern for lytic lesion on L3 on 5/26/17. No surgical intervention was proposed.   CT chest, abdomen,pelvis on 5/27/17 revealed :   --A 3.6 cm exophytic mass arising from the medial aspect of the lower pole of left kidney,   --A small 1.0 cm, subtle, cortical enhancing mass within the upper pole of the right kidney, concerning for possible contralateral solid renal mass  --Multiple enhancing ill-defined hepatic masses concerning for hepatic metastatic disease, hepatomegaly and hepatic steatosis  --Mulltifocal irregular thickening of the bilateral pleura concerning for possible pleural metastatic disease  --Mild nonspecific nodular thickening of the left adrenal gland without discrete nodule  -- Redemonstration of known lytic lesion within the L3 vertebral body. No definite additional discrete lytic lesions are identified.  -MRI pelvis from 10/18/17:    2.5 cm linear T1 hypointense T2/STIR hyperintense lesion within the left iliac bone, this lesion is indeterminate but felt less likely to represent metastasis given its appearance. Further evaluation can be obtained with bone scan as clinically indicated.  Findings suggestive of left trochanteric bursitis.  Partially visualized L3 metastatic lesion.  Diffuse thickening of the urinary bladder wall can be seen with chronic outlet obstruction or cystitis.    Review of Systems   Constitutional: Negative for activity change, appetite change, chills, fatigue, fever and unexpected weight change.   HENT: Negative for congestion, mouth sores, postnasal drip, rhinorrhea, sinus pain, sinus pressure, sore throat and trouble swallowing.    Eyes: Positive for visual disturbance (chronic).   Respiratory: Negative for cough, chest tightness and shortness of breath.    Cardiovascular: Negative for chest pain, palpitations and leg swelling.   Gastrointestinal: Positive for diarrhea. Negative for abdominal  distention, abdominal pain, blood in stool, constipation and vomiting.   Genitourinary: Negative for decreased urine volume, difficulty urinating, dysuria, frequency and urgency.   Musculoskeletal: Positive for back pain. Negative for arthralgias, gait problem, myalgias, neck pain and neck stiffness.   Skin: Positive for rash.   Neurological: Negative for dizziness, weakness, numbness and headaches.   Hematological: Negative for adenopathy. Does not bruise/bleed easily.   Psychiatric/Behavioral: Negative for dysphoric mood. The patient is not nervous/anxious.        Objective:      Physical Exam   Constitutional: He is oriented to person, place, and time. He appears well-developed and well-nourished. No distress.   Presents alone  ECOG 0   HENT:   Head: Normocephalic and atraumatic.   Right Ear: External ear normal.   Left Ear: External ear normal.   Nose: Nose normal.   Mouth/Throat: Oropharynx is clear and moist. No oropharyngeal exudate.   Eyes: Conjunctivae and EOM are normal. Pupils are equal, round, and reactive to light. Right eye exhibits no discharge. Left eye exhibits no discharge. No scleral icterus.   Neck: Normal range of motion. Neck supple. No thyromegaly present.   Cardiovascular: Normal rate, regular rhythm, normal heart sounds and intact distal pulses.  Exam reveals no gallop and no friction rub.    No murmur heard.  Pulmonary/Chest: Effort normal and breath sounds normal. No respiratory distress. He has no wheezes. He has no rales. He exhibits no tenderness.   Lungs clear     Abdominal: Soft. Bowel sounds are normal. He exhibits no distension and no mass. There is no tenderness. There is no rebound and no guarding.   No organomegaly   Musculoskeletal: Normal range of motion. He exhibits no edema, tenderness or deformity.   No spinal or paraspinal tenderness to palpation  Point tenderness at right flank area, unchanged from previous exams     Lymphadenopathy:     He has no cervical adenopathy.    Neurological: He is alert and oriented to person, place, and time. No cranial nerve deficit or sensory deficit. Coordination normal.   Skin: Skin is warm and dry. No rash noted. He is not diaphoretic. No erythema. No pallor.   Psychiatric: He has a normal mood and affect. His behavior is normal. Judgment and thought content normal.   Nursing note and vitals reviewed.      Assessment:       1. Renal cell carcinoma of left kidney    2. 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 Metastatic renal cell carcinoma to liver        Plan:     1-2. Continue Votrient  Disease stable  RTC 4-6 weeks with labs  Knows to call for any issues        Distress Screening Results: Psychosocial Distress screening score of Distress Score: 0 noted and reviewed. No intervention indicated.

## 2018-06-22 DIAGNOSIS — C79.51 METASTATIC RENAL CELL CARCINOMA TO BONE: ICD-10-CM

## 2018-06-22 DIAGNOSIS — C64.9 METASTATIC RENAL CELL CARCINOMA TO BONE: ICD-10-CM

## 2018-06-24 DIAGNOSIS — G89.3 NEOPLASM RELATED PAIN: ICD-10-CM

## 2018-06-24 DIAGNOSIS — C64.9 METASTATIC RENAL CELL CARCINOMA TO LIVER: ICD-10-CM

## 2018-06-24 DIAGNOSIS — C78.7 METASTATIC RENAL CELL CARCINOMA TO LIVER: ICD-10-CM

## 2018-06-24 RX ORDER — MORPHINE SULFATE 15 MG/1
15 TABLET ORAL EVERY 12 HOURS
Qty: 60 TABLET | Refills: 0 | Status: SHIPPED | OUTPATIENT
Start: 2018-06-24 | End: 2018-06-26 | Stop reason: SDUPTHER

## 2018-06-24 RX ORDER — HYDROCODONE BITARTRATE AND ACETAMINOPHEN 5; 325 MG/1; MG/1
TABLET ORAL
Qty: 120 TABLET | Refills: 0 | Status: SHIPPED | OUTPATIENT
Start: 2018-06-24 | End: 2018-09-20 | Stop reason: SDUPTHER

## 2018-06-24 RX ORDER — HYDROCODONE BITARTRATE AND ACETAMINOPHEN 5; 325 MG/1; MG/1
1 TABLET ORAL EVERY 8 HOURS PRN
Qty: 50 TABLET | Refills: 0 | Status: SHIPPED | OUTPATIENT
Start: 2018-06-24 | End: 2018-06-26 | Stop reason: SDUPTHER

## 2018-06-26 DIAGNOSIS — C64.9 METASTATIC RENAL CELL CARCINOMA TO LIVER: ICD-10-CM

## 2018-06-26 DIAGNOSIS — C64.9 METASTATIC RENAL CELL CARCINOMA TO BONE: ICD-10-CM

## 2018-06-26 DIAGNOSIS — C79.51 METASTATIC RENAL CELL CARCINOMA TO BONE: ICD-10-CM

## 2018-06-26 DIAGNOSIS — G89.3 NEOPLASM RELATED PAIN: ICD-10-CM

## 2018-06-26 DIAGNOSIS — C78.7 METASTATIC RENAL CELL CARCINOMA TO LIVER: ICD-10-CM

## 2018-06-26 RX ORDER — HYDROCODONE BITARTRATE AND ACETAMINOPHEN 5; 325 MG/1; MG/1
1 TABLET ORAL EVERY 8 HOURS PRN
Qty: 50 TABLET | Refills: 0 | Status: SHIPPED | OUTPATIENT
Start: 2018-06-26 | End: 2018-07-27 | Stop reason: SDUPTHER

## 2018-06-26 RX ORDER — MORPHINE SULFATE 15 MG/1
15 TABLET ORAL EVERY 12 HOURS
Qty: 60 TABLET | Refills: 0 | Status: SHIPPED | OUTPATIENT
Start: 2018-06-26 | End: 2018-07-16

## 2018-06-26 RX ORDER — HYDROCODONE BITARTRATE AND ACETAMINOPHEN 5; 325 MG/1; MG/1
TABLET ORAL
Qty: 120 TABLET | Refills: 0 | Status: CANCELLED | OUTPATIENT
Start: 2018-06-26

## 2018-06-29 ENCOUNTER — OFFICE VISIT (OUTPATIENT)
Dept: PRIMARY CARE CLINIC | Facility: CLINIC | Age: 73
End: 2018-06-29
Payer: MEDICARE

## 2018-06-29 VITALS
DIASTOLIC BLOOD PRESSURE: 58 MMHG | HEIGHT: 67 IN | OXYGEN SATURATION: 97 % | SYSTOLIC BLOOD PRESSURE: 138 MMHG | HEART RATE: 74 BPM | BODY MASS INDEX: 25.4 KG/M2 | WEIGHT: 161.81 LBS

## 2018-06-29 DIAGNOSIS — E55.9 VITAMIN D DEFICIENCY: ICD-10-CM

## 2018-06-29 DIAGNOSIS — E53.8 B12 DEFICIENCY: ICD-10-CM

## 2018-06-29 DIAGNOSIS — N40.1 BENIGN PROSTATIC HYPERPLASIA (BPH) WITH URINARY URGE INCONTINENCE: ICD-10-CM

## 2018-06-29 DIAGNOSIS — Z12.5 ENCOUNTER FOR SCREENING FOR MALIGNANT NEOPLASM OF PROSTATE: ICD-10-CM

## 2018-06-29 DIAGNOSIS — E11.22 TYPE 2 DIABETES MELLITUS WITH STAGE 3 CHRONIC KIDNEY DISEASE, WITH LONG-TERM CURRENT USE OF INSULIN: Chronic | ICD-10-CM

## 2018-06-29 DIAGNOSIS — Z79.4 TYPE 2 DIABETES MELLITUS WITH STAGE 3 CHRONIC KIDNEY DISEASE, WITH LONG-TERM CURRENT USE OF INSULIN: Chronic | ICD-10-CM

## 2018-06-29 DIAGNOSIS — D53.9 MACROCYTIC ANEMIA: ICD-10-CM

## 2018-06-29 DIAGNOSIS — N39.41 BENIGN PROSTATIC HYPERPLASIA (BPH) WITH URINARY URGE INCONTINENCE: ICD-10-CM

## 2018-06-29 DIAGNOSIS — F11.20 UNCOMPLICATED OPIOID DEPENDENCE: ICD-10-CM

## 2018-06-29 DIAGNOSIS — E03.9 ACQUIRED HYPOTHYROIDISM: ICD-10-CM

## 2018-06-29 DIAGNOSIS — N18.30 TYPE 2 DIABETES MELLITUS WITH STAGE 3 CHRONIC KIDNEY DISEASE, WITH LONG-TERM CURRENT USE OF INSULIN: Chronic | ICD-10-CM

## 2018-06-29 DIAGNOSIS — R21 RASH OF NECK: ICD-10-CM

## 2018-06-29 PROCEDURE — 3075F SYST BP GE 130 - 139MM HG: CPT | Mod: CPTII,S$GLB,, | Performed by: INTERNAL MEDICINE

## 2018-06-29 PROCEDURE — 3044F HG A1C LEVEL LT 7.0%: CPT | Mod: CPTII,S$GLB,, | Performed by: INTERNAL MEDICINE

## 2018-06-29 PROCEDURE — 99215 OFFICE O/P EST HI 40 MIN: CPT | Mod: S$GLB,,, | Performed by: INTERNAL MEDICINE

## 2018-06-29 PROCEDURE — 3078F DIAST BP <80 MM HG: CPT | Mod: CPTII,S$GLB,, | Performed by: INTERNAL MEDICINE

## 2018-06-29 RX ORDER — MULTIVITAMIN
1 TABLET ORAL DAILY
COMMUNITY
Start: 2018-06-29 | End: 2020-09-09

## 2018-06-29 RX ORDER — MULTIVIT WITH MINERALS/HERBS
1 TABLET ORAL DAILY
COMMUNITY
Start: 2018-06-29 | End: 2020-06-09 | Stop reason: SDUPTHER

## 2018-06-29 NOTE — ASSESSMENT & PLAN NOTE
Elevated MCV, low Hg  · Patient unclear whether he's taking a B complex  · Will order from [employer] OTC mag

## 2018-06-29 NOTE — ASSESSMENT & PLAN NOTE
Taking scheduled narcotics for cancer-related pain. Overdose in past year  · Continue pain management per Heme-Onc  · Advised mindfulness to prevent overmedication  · Advised to avoid alcohol at all times

## 2018-06-29 NOTE — PATIENT INSTRUCTIONS
TODAY:  - order B-complex and MVI from [employer]  - add labs to Dr Reed's lab draw on 7/16  - Derm consult ([facility])

## 2018-06-29 NOTE — ASSESSMENT & PLAN NOTE
A1c 11.1 in 5/2017, 6.8 in 8/2017. Weaned off Lantus, PRN novolog 10 preprandial  · Continue current therapies  · Monitor A1c  · Discontinue long acting insulin  · PRN novolog for glucose >150  · Has not taken insulin in past 4 mos

## 2018-06-29 NOTE — PROGRESS NOTES
"Primary Care Provider Appointment    Subjective:      Patient ID: Edwin Powell is a 73 y.o. male with RCC L kidney, BPH, HLD, HTN, DM, CAD    Chief Complaint: Diabetes; Hypertension; Follow-up; and Abdominal Pain    Patient complains R flank pain controlled with norco and ms contin prescribed be Heme-Onc. He takes these PRN only, is not taking the ms contin BID. He uses his entire prescription each month of 120 tablets each month of norco.     Has RCC L kidney being treated with votrient, has h/o radiation therapy. Plan to continue, images in June showed stable disease (no tumor growth on all measurements of tumors in kidney, liver, spine). Patient states he plans to continue votrient for the undetermined future. Complains of fatigue. His diarrhea "comes and goes."    He no longer drinks alcohol, last drink at [employer] 6 mos ago when he had a beer with his son.     His insomnia is improving. Sleeps from 10:30pm-5am, only wakes up occassionally.     A1c is well controlled with weight loss related to CA. No longer uses insulin.    His TSH was 5 in 3/3018 on synthroid 88 mcg.     He has an macrocytic anemia, that is improving with MVI. He can't remember whether he's taking B complex/ He takes calcium with Vit D and a MVI.    Patient requesting PSA because he is taking 2 flomax daily. His nocturia decreased since increasing the dose. At last appt he refused Urology consult.     Past Surgical History:   Procedure Laterality Date    ABDOMINAL SURGERY      APPENDECTOMY      BACK SURGERY      CARDIAC SURGERY      CHOLECYSTECTOMY      coronary stenting      X 5 last one in 2010-11.    SPINAL FUSION         Past Medical History:   Diagnosis Date    Acquired hypothyroidism 5/26/2017    Benign essential HTN 5/26/2017    Benign prostatic hyperplasia (BPH) with urinary urge incontinence 6/6/2017    Chronic low back pain 6/1/2017    Coronary artery disease involving native coronary artery of native heart without " "angina pectoris 5/26/2017    S/p 5 stents - last one around 2010-11.    Gastroesophageal reflux disease without esophagitis 5/26/2017    History of CVA (cerebrovascular accident) 5/26/2017    Lumbar disc lesion 5/26/2017    Metastatic renal cell carcinoma to bone 6/6/2017    Metastatic renal cell carcinoma to liver 6/6/2017    Liver Biopsy 5-2017: METASTATIC RENAL CELL CARCINOMA.    Mixed hyperlipidemia 5/26/2017    Type 2 diabetes mellitus with stage 3 chronic kidney disease, with long-term current use of insulin 5/26/2017       Review of Systems   Constitutional: Negative for activity change and unexpected weight change.   HENT: Positive for hearing loss. Negative for trouble swallowing.    Gastrointestinal: Positive for diarrhea. Negative for blood in stool, constipation and vomiting.   Endocrine: Positive for polyuria. Negative for polydipsia.   Genitourinary: Positive for difficulty urinating. Negative for urgency.   Musculoskeletal: Positive for arthralgias. Negative for joint swelling and neck pain.   Neurological: Positive for weakness. Negative for headaches.   Psychiatric/Behavioral: Negative for confusion and dysphoric mood.       Objective:   BP (!) 138/58 (BP Location: Right arm, Patient Position: Sitting, BP Method: Medium (Manual))   Pulse 74   Ht 5' 7" (1.702 m)   Wt 73.4 kg (161 lb 13.1 oz)   SpO2 97%   BMI 25.34 kg/m²     Physical Exam   Constitutional: He is oriented to person, place, and time. He appears well-developed and well-nourished.   Ambulating with cane   HENT:   Head: Normocephalic.   edentulous   Eyes: Pupils are equal, round, and reactive to light.   Neck:   Decreased ROM in neck    Pulmonary/Chest: Effort normal.   Musculoskeletal: Normal range of motion.   Neurological: He is alert and oriented to person, place, and time.   Skin: Skin is warm.   Ecchymoses and purpura bilaterally UE  Telangiectasias on face and chest  Multiple tattoos on UE  Numerous red papules on " neck  Fair skin   Psychiatric: He has a normal mood and affect.       Lab Results   Component Value Date    WBC 6.42 06/14/2018    HGB 13.3 (L) 06/14/2018    HCT 39.9 (L) 06/14/2018     06/14/2018    CHOL 98 (L) 04/23/2018    TRIG 113 04/23/2018    HDL 27 (L) 04/23/2018    ALT 38 06/14/2018    AST 31 06/14/2018     06/14/2018    K 4.5 06/14/2018     06/14/2018    CREATININE 1.1 06/14/2018    BUN 18 06/14/2018    CO2 26 06/14/2018    TSH 5.191 (H) 03/26/2018    PSA 0.61 05/27/2017    INR 1.2 06/12/2017    HGBA1C 6.5 (H) 02/15/2018         Assessment:   73 y.o. male with multiple co-morbid illnesses here to continue work-up of chronic issues notably RCC L kidney, BPH, HLD, HTN, DM, CAD     Plan:     Problem List Items Addressed This Visit        Psychiatric    Uncomplicated opioid dependence     Taking scheduled narcotics for cancer-related pain. Overdose in past year  · Continue pain management per Heme-Onc  · Advised mindfulness to prevent overmedication  · Advised to avoid alcohol at all times            Derm    Rash of neck     Papules on neck with fair skin and active treatment for CA, high risk for skin neoplasm  · Derm consult  · Patient prefers [facility]         Relevant Orders    Ambulatory Referral to Dermatology       Renal/    Benign prostatic hyperplasia (BPH) with urinary urge incontinence     Patient with LUTS, compliant with flomax. Occasional testicular pain  · Increase flomax to 0.8mg (has completed >4 weeks of treatment)  · Advised to avoid fluids at bedtime  · Refuses Urology consult         Relevant Orders    PSA, Screening       Oncology    Macrocytic anemia     Elevated MCV, low Hg  · Patient unclear whether he's taking a B complex  · Will order from [employer] OTC mag         Relevant Medications    b complex vitamins (B COMPLEX 1) tablet    multivitamin (THERAGRAN) per tablet    Other Relevant Orders    Ferritin    METHYLMALONIC ACID, SERUM    Homocysteine, serum        Endocrine    Type 2 diabetes mellitus with stage 3 chronic kidney disease, with long-term current use of insulin (Chronic)     A1c 11.1 in 5/2017, 6.8 in 8/2017. Weaned off Lantus, PRN novolog 10 preprandial  · Continue current therapies  · Monitor A1c  · Discontinue long acting insulin  · PRN novolog for glucose >150  · Has not taken insulin in past 4 mos         Relevant Orders    Hemoglobin A1c    Acquired hypothyroidism     Elevated TSH in 6/2017, 8/2017, 11/2017, 3/2018 compliant with levo 75mcg  · Advised to take separate from other meds and food  · Patient unable to  · Dose increased to 88mcg  · Will not increase beyond this due to age and chronic conditions  · Monitor levels         Relevant Orders    TSH    T4, free    B12 deficiency     Low-normal B12, compliant with MVI  · Continue MVI  · Continue boost supplements  · Order B complex from [employer]         Relevant Medications    b complex vitamins (B COMPLEX 1) tablet    multivitamin (THERAGRAN) per tablet    Other Relevant Orders    METHYLMALONIC ACID, SERUM    Homocysteine, serum    Vitamin D deficiency     Compliant with Ca and vit D, complains of fatigue  · Supplement  · monitor         Relevant Orders    Vitamin D      Other Visit Diagnoses     Encounter for screening for malignant neoplasm of prostate         Relevant Orders    PSA, Screening          Health Maintenance       Date Due Completion Date    TETANUS VACCINE 02/18/1963 ---    Influenza Vaccine 08/01/2018 10/18/2017    Hemoglobin A1c 08/15/2018 2/15/2018- TODAY    Pneumococcal (65+) (2 of 2 - PPSV23) 11/29/2018 11/29/2017    Eye Exam 01/19/2019 1/19/2018    Foot Exam 02/02/2019 2/2/2018 (Done)    Override on 2/2/2018: Done (performed by podiatrist)    Lipid Panel 04/23/2019 4/23/2018    High Dose Statin 06/15/2019 6/15/2018    Colonoscopy 02/15/2021 2/15/2011 (Done)    Override on 2/15/2011: Done (Per [employer] dashboard ? results)          Follow-up in about 2 months (around 8/29/2018). .  One hour spent with this patient today, half of that in counseling.    Lulú Lewis MD/MPH  Internal Medicine  [facility]  [phone]

## 2018-06-29 NOTE — ASSESSMENT & PLAN NOTE
Elevated TSH in 6/2017, 8/2017, 11/2017, 3/2018 compliant with levo 75mcg  · Advised to take separate from other meds and food  · Patient unable to  · Dose increased to 88mcg  · Will not increase beyond this due to age and chronic conditions  · Monitor levels

## 2018-06-29 NOTE — ASSESSMENT & PLAN NOTE
Low-normal B12, compliant with MVI  · Continue MVI  · Continue boost supplements  · Order B complex from [employer]

## 2018-07-05 DIAGNOSIS — C64.2 RENAL CELL CARCINOMA OF LEFT KIDNEY: ICD-10-CM

## 2018-07-05 DIAGNOSIS — C64.9 METASTATIC RENAL CELL CARCINOMA TO BONE: ICD-10-CM

## 2018-07-05 DIAGNOSIS — C78.7 METASTATIC RENAL CELL CARCINOMA TO LIVER: ICD-10-CM

## 2018-07-05 DIAGNOSIS — C64.9 METASTATIC RENAL CELL CARCINOMA TO LIVER: ICD-10-CM

## 2018-07-05 DIAGNOSIS — C79.51 METASTATIC RENAL CELL CARCINOMA TO BONE: ICD-10-CM

## 2018-07-05 RX ORDER — PAZOPANIB 200 MG/1
800 TABLET ORAL DAILY
Qty: 120 TABLET | Refills: 11 | Status: SHIPPED | OUTPATIENT
Start: 2018-07-05 | End: 2018-12-12

## 2018-07-06 ENCOUNTER — TELEPHONE (OUTPATIENT)
Dept: PHARMACY | Facility: CLINIC | Age: 73
End: 2018-07-06

## 2018-07-06 NOTE — TELEPHONE ENCOUNTER
Patient says that he has enough medication for the next 2 weeks. He would like us to reach out to him on 7/16 after he has him MD appt. Discussed with him to reach out to us if he notices he needs his medication before then.

## 2018-07-16 ENCOUNTER — LAB VISIT (OUTPATIENT)
Dept: LAB | Facility: HOSPITAL | Age: 73
End: 2018-07-16
Attending: INTERNAL MEDICINE
Payer: MEDICARE

## 2018-07-16 ENCOUNTER — OFFICE VISIT (OUTPATIENT)
Dept: HEMATOLOGY/ONCOLOGY | Facility: CLINIC | Age: 73
End: 2018-07-16
Payer: MEDICARE

## 2018-07-16 ENCOUNTER — TELEPHONE (OUTPATIENT)
Dept: PHARMACY | Facility: CLINIC | Age: 73
End: 2018-07-16

## 2018-07-16 VITALS
HEART RATE: 71 BPM | BODY MASS INDEX: 25.26 KG/M2 | TEMPERATURE: 98 F | SYSTOLIC BLOOD PRESSURE: 132 MMHG | RESPIRATION RATE: 18 BRPM | DIASTOLIC BLOOD PRESSURE: 63 MMHG | HEIGHT: 67 IN | OXYGEN SATURATION: 98 % | WEIGHT: 160.94 LBS

## 2018-07-16 DIAGNOSIS — N39.41 BENIGN PROSTATIC HYPERPLASIA (BPH) WITH URINARY URGE INCONTINENCE: ICD-10-CM

## 2018-07-16 DIAGNOSIS — G89.3 NEOPLASM RELATED PAIN: Primary | ICD-10-CM

## 2018-07-16 DIAGNOSIS — E53.8 B12 DEFICIENCY: ICD-10-CM

## 2018-07-16 DIAGNOSIS — D53.9 MACROCYTIC ANEMIA: ICD-10-CM

## 2018-07-16 DIAGNOSIS — Z79.4 TYPE 2 DIABETES MELLITUS WITH STAGE 3 CHRONIC KIDNEY DISEASE, WITH LONG-TERM CURRENT USE OF INSULIN: Chronic | ICD-10-CM

## 2018-07-16 DIAGNOSIS — C79.51 METASTATIC RENAL CELL CARCINOMA TO BONE: ICD-10-CM

## 2018-07-16 DIAGNOSIS — E03.9 ACQUIRED HYPOTHYROIDISM: ICD-10-CM

## 2018-07-16 DIAGNOSIS — G89.3 NEOPLASM RELATED PAIN: ICD-10-CM

## 2018-07-16 DIAGNOSIS — C64.2 RENAL CELL CARCINOMA OF LEFT KIDNEY: Primary | ICD-10-CM

## 2018-07-16 DIAGNOSIS — E11.22 TYPE 2 DIABETES MELLITUS WITH STAGE 3 CHRONIC KIDNEY DISEASE, WITH LONG-TERM CURRENT USE OF INSULIN: Chronic | ICD-10-CM

## 2018-07-16 DIAGNOSIS — Z12.5 ENCOUNTER FOR SCREENING FOR MALIGNANT NEOPLASM OF PROSTATE: ICD-10-CM

## 2018-07-16 DIAGNOSIS — C64.9 METASTATIC RENAL CELL CARCINOMA TO LIVER: ICD-10-CM

## 2018-07-16 DIAGNOSIS — C78.7 METASTATIC RENAL CELL CARCINOMA TO LIVER: ICD-10-CM

## 2018-07-16 DIAGNOSIS — E55.9 VITAMIN D DEFICIENCY: ICD-10-CM

## 2018-07-16 DIAGNOSIS — R93.3 ABNORMAL FINDINGS ON DIAGNOSTIC IMAGING OF OTHER PARTS OF DIGESTIVE TRACT: ICD-10-CM

## 2018-07-16 DIAGNOSIS — C64.9 METASTATIC RENAL CELL CARCINOMA TO BONE: ICD-10-CM

## 2018-07-16 DIAGNOSIS — N18.30 TYPE 2 DIABETES MELLITUS WITH STAGE 3 CHRONIC KIDNEY DISEASE, WITH LONG-TERM CURRENT USE OF INSULIN: Chronic | ICD-10-CM

## 2018-07-16 DIAGNOSIS — N40.1 BENIGN PROSTATIC HYPERPLASIA (BPH) WITH URINARY URGE INCONTINENCE: ICD-10-CM

## 2018-07-16 DIAGNOSIS — C64.2 RENAL CELL CARCINOMA OF LEFT KIDNEY: ICD-10-CM

## 2018-07-16 LAB
25(OH)D3+25(OH)D2 SERPL-MCNC: 20 NG/ML
ALBUMIN SERPL BCP-MCNC: 3.2 G/DL
ALP SERPL-CCNC: 90 U/L
ALT SERPL W/O P-5'-P-CCNC: 38 U/L
ANION GAP SERPL CALC-SCNC: 8 MMOL/L
AST SERPL-CCNC: 30 U/L
BASOPHILS # BLD AUTO: 0.03 K/UL
BASOPHILS NFR BLD: 0.5 %
BILIRUB SERPL-MCNC: 0.5 MG/DL
BUN SERPL-MCNC: 21 MG/DL
CALCIUM SERPL-MCNC: 8.9 MG/DL
CHLORIDE SERPL-SCNC: 115 MMOL/L
CHOLEST SERPL-MCNC: 99 MG/DL
CHOLEST/HDLC SERPL: 3.3 {RATIO}
CO2 SERPL-SCNC: 21 MMOL/L
COMPLEXED PSA SERPL-MCNC: 1.1 NG/ML
CREAT SERPL-MCNC: 1.2 MG/DL
DIFFERENTIAL METHOD: ABNORMAL
EOSINOPHIL # BLD AUTO: 0.3 K/UL
EOSINOPHIL NFR BLD: 4.4 %
ERYTHROCYTE [DISTWIDTH] IN BLOOD BY AUTOMATED COUNT: 14.1 %
EST. GFR  (AFRICAN AMERICAN): >60 ML/MIN/1.73 M^2
EST. GFR  (NON AFRICAN AMERICAN): 59.6 ML/MIN/1.73 M^2
ESTIMATED AVG GLUCOSE: 131 MG/DL
FERRITIN SERPL-MCNC: 304 NG/ML
GLUCOSE SERPL-MCNC: 121 MG/DL
HBA1C MFR BLD HPLC: 6.2 %
HCT VFR BLD AUTO: 40.3 %
HCYS SERPL-SCNC: 10.3 UMOL/L
HDLC SERPL-MCNC: 30 MG/DL
HDLC SERPL: 30.3 %
HGB BLD-MCNC: 13.3 G/DL
IMM GRANULOCYTES # BLD AUTO: 0.02 K/UL
IMM GRANULOCYTES NFR BLD AUTO: 0.3 %
LDLC SERPL CALC-MCNC: 40 MG/DL
LYMPHOCYTES # BLD AUTO: 1.5 K/UL
LYMPHOCYTES NFR BLD: 25.8 %
MCH RBC QN AUTO: 34.3 PG
MCHC RBC AUTO-ENTMCNC: 33 G/DL
MCV RBC AUTO: 104 FL
MONOCYTES # BLD AUTO: 0.5 K/UL
MONOCYTES NFR BLD: 7.8 %
NEUTROPHILS # BLD AUTO: 3.6 K/UL
NEUTROPHILS NFR BLD: 61.2 %
NONHDLC SERPL-MCNC: 69 MG/DL
NRBC BLD-RTO: 0 /100 WBC
PLATELET # BLD AUTO: 258 K/UL
PMV BLD AUTO: 10.9 FL
POTASSIUM SERPL-SCNC: 4.1 MMOL/L
PROT SERPL-MCNC: 6.3 G/DL
RBC # BLD AUTO: 3.88 M/UL
SODIUM SERPL-SCNC: 144 MMOL/L
T4 FREE SERPL-MCNC: 0.83 NG/DL
TRIGL SERPL-MCNC: 145 MG/DL
TSH SERPL DL<=0.005 MIU/L-ACNC: 3.53 UIU/ML
WBC # BLD AUTO: 5.9 K/UL

## 2018-07-16 PROCEDURE — 83036 HEMOGLOBIN GLYCOSYLATED A1C: CPT

## 2018-07-16 PROCEDURE — 82306 VITAMIN D 25 HYDROXY: CPT

## 2018-07-16 PROCEDURE — 84443 ASSAY THYROID STIM HORMONE: CPT

## 2018-07-16 PROCEDURE — 3078F DIAST BP <80 MM HG: CPT | Mod: CPTII,S$GLB,, | Performed by: PHYSICIAN ASSISTANT

## 2018-07-16 PROCEDURE — 99999 PR PBB SHADOW E&M-EST. PATIENT-LVL IV: CPT | Mod: PBBFAC,,, | Performed by: PHYSICIAN ASSISTANT

## 2018-07-16 PROCEDURE — 84439 ASSAY OF FREE THYROXINE: CPT

## 2018-07-16 PROCEDURE — 83921 ORGANIC ACID SINGLE QUANT: CPT

## 2018-07-16 PROCEDURE — 80061 LIPID PANEL: CPT

## 2018-07-16 PROCEDURE — 99214 OFFICE O/P EST MOD 30 MIN: CPT | Mod: S$GLB,,, | Performed by: PHYSICIAN ASSISTANT

## 2018-07-16 PROCEDURE — 3075F SYST BP GE 130 - 139MM HG: CPT | Mod: CPTII,S$GLB,, | Performed by: PHYSICIAN ASSISTANT

## 2018-07-16 PROCEDURE — 36415 COLL VENOUS BLD VENIPUNCTURE: CPT

## 2018-07-16 PROCEDURE — 80053 COMPREHEN METABOLIC PANEL: CPT

## 2018-07-16 PROCEDURE — 83090 ASSAY OF HOMOCYSTEINE: CPT

## 2018-07-16 PROCEDURE — 82728 ASSAY OF FERRITIN: CPT

## 2018-07-16 PROCEDURE — 84153 ASSAY OF PSA TOTAL: CPT

## 2018-07-16 PROCEDURE — 85025 COMPLETE CBC W/AUTO DIFF WBC: CPT

## 2018-07-16 RX ORDER — MORPHINE SULFATE 30 MG/1
30 TABLET, FILM COATED, EXTENDED RELEASE ORAL EVERY 12 HOURS
Qty: 60 TABLET | Refills: 0 | Status: SHIPPED | OUTPATIENT
Start: 2018-07-16 | End: 2018-07-27 | Stop reason: SDUPTHER

## 2018-07-16 NOTE — PROGRESS NOTES
Subjective:       Patient ID: Edwin Powell is a 73 y.o. male.    Chief Complaint: Follow-up    HPI      Mr. Singleton is a 73 y.o. male who returns for follow up regarding metastatic renal cell carcinoma of left kidney.  Returns for follow-up, currently on Votrient. Last imaging on 6/14/18 showed stable disease.   Reports overall doing well  Tolerating Votrient with minimal side effects- manages diarrhea with medication  He is having some point tenderness and increased pain at the mid left back - feels like pain is not as well controlled as it once was on MS contin 15 mg BID with breakthrough Norco (taking 3 per day)  Eating well   Weight stable          Scans reveal stable disease  Imaging:   CT scans 6/14/18:  1. Stable exophytic left renal lesion with surrounding decreased attenuation of the renal parenchyma favored to represent post treatment change.  2. Lytic lesion within the L3 vertebral body, unchanged.  3. Stable hypodensities in hepatic segment VII and GUSTAVO/B.  Stable lesion in hepatic segment II.  4. Unchanged hypodensity in the superior right renal pole, too small to characterize.  5. Stable right upper lobe pulmonary micronodule.  6. Additional findings as above.  RECIST SUMMARY: These measurements are given as on the prior study.     Oncology History:  Mr.Mc Moss is a 74 yo male with CAD (s/p 5 stents, remotely), DM2 (insulin dependent), hypothyroidism , CVA (2013) with no residual deficit, chronic back pain. He was transferred to [facility] [place]surgery evaluation of a lumbar spine lesion in the setting of progressive weakness of his legs and exacerbation of chronic back pain, in May 2017. He states that his back pain and LE weakness progressively worsened. [facility]. The pain radiates down his legs. He denies any incontinence of bowel or bladder. He had several falls. After one of the falls, he presented to the ED at [facility], in May 2017. He had  "an MRI showing "L3 vertebral body lesion with apparent cortical disruption concerning for metastases vs myeloma," and was transferred to [facility] for neurosurgery evaluation. He was evaluated by neurosurgery regarding concern for lytic lesion on L3 on 5/26/17. No surgical intervention was proposed.   CT chest, abdomen,pelvis on 5/27/17 revealed :   --A 3.6 cm exophytic mass arising from the medial aspect of the lower pole of left kidney,   --A small 1.0 cm, subtle, cortical enhancing mass within the upper pole of the right kidney, concerning for possible contralateral solid renal mass  --Multiple enhancing ill-defined hepatic masses concerning for hepatic metastatic disease, hepatomegaly and hepatic steatosis  --Mulltifocal irregular thickening of the bilateral pleura concerning for possible pleural metastatic disease  --Mild nonspecific nodular thickening of the left adrenal gland without discrete nodule  -- Redemonstration of known lytic lesion within the L3 vertebral body. No definite additional discrete lytic lesions are identified.  -MRI pelvis from 10/18/17:    2.5 cm linear T1 hypointense T2/STIR hyperintense lesion within the left iliac bone, this lesion is indeterminate but felt less likely to represent metastasis given its appearance. Further evaluation can be obtained with bone scan as clinically indicated.  Findings suggestive of left trochanteric bursitis.  Partially visualized L3 metastatic lesion.  Diffuse thickening of the urinary bladder wall can be seen with chronic outlet obstruction or cystitis.         Review of Systems   Constitutional: Negative for appetite change and unexpected weight change.   HENT: Negative for congestion, mouth sores, rhinorrhea and trouble swallowing.    Eyes: Negative for visual disturbance.   Respiratory: Negative for cough and shortness of breath.    Cardiovascular: Negative for chest pain.   Gastrointestinal: Positive for diarrhea (controlled). Negative for abdominal pain. "   Genitourinary: Positive for frequency.   Musculoskeletal: Positive for back pain (chronic and unchanged).        New pain along rib cage   Skin: Negative for rash.   Neurological: Negative for headaches.   Hematological: Negative for adenopathy.   Psychiatric/Behavioral: The patient is not nervous/anxious.        Objective:      Physical Exam   Constitutional: He is oriented to person, place, and time. He appears well-nourished. No distress.   Presents alone  ECOG 0   HENT:   Head: Normocephalic.   Mouth/Throat: Oropharynx is clear and moist. No oropharyngeal exudate.   Eyes: Conjunctivae are normal. Pupils are equal, round, and reactive to light. No scleral icterus.   Neck: Normal range of motion. Neck supple. No thyromegaly present.   Cardiovascular: Normal rate, regular rhythm, normal heart sounds and intact distal pulses.    Pulmonary/Chest: Effort normal and breath sounds normal. No respiratory distress.   Lungs clear     Abdominal: Soft. Bowel sounds are normal. He exhibits no distension and no mass. There is no tenderness.   Musculoskeletal: Normal range of motion. He exhibits no edema.   No spinal or paraspinal tenderness to palpation  Point tenderness at right flank area, along rib, exacerbated with light pressure on exam   Lymphadenopathy:     He has no cervical adenopathy.   Neurological: He is alert and oriented to person, place, and time. No cranial nerve deficit.   Skin: Skin is warm and dry. No rash noted. No erythema. No pallor.   Psychiatric: He has a normal mood and affect. His behavior is normal. Thought content normal.   Vitals reviewed.      Assessment:       1. Renal cell carcinoma of left kidney    2. Metastatic renal cell carcinoma to liver    3. 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 Neoplasm related pain        Plan:     1-3)continue Votrient.  Recent scan was stable but with some worsening bony point tenderness, have put in order for bone scan for further evaluation.    4)increase  MS Contin to 30 mg BID and continue Norco 5/325 mg for breakthrough pain.  Will follow up by phone regarding bone scan results and patient will return to clinic in 4-6 wees with labs.

## 2018-07-16 NOTE — PROGRESS NOTES
Subjective:       Patient ID: Edwin Powell is a 73 y.o. male.    Chief Complaint: Follow-up    HPI  Review of Systems    Objective:      Physical Exam    Assessment:       No diagnosis found.    Plan:       ***

## 2018-07-17 ENCOUNTER — OFFICE VISIT (OUTPATIENT)
Dept: DERMATOLOGY | Facility: CLINIC | Age: 73
End: 2018-07-17
Payer: MEDICARE

## 2018-07-17 ENCOUNTER — TELEPHONE (OUTPATIENT)
Dept: INTERNAL MEDICINE | Facility: CLINIC | Age: 73
End: 2018-07-17

## 2018-07-17 DIAGNOSIS — E55.9 VITAMIN D DEFICIENCY: Primary | ICD-10-CM

## 2018-07-17 DIAGNOSIS — L73.9 FOLLICULITIS: Primary | ICD-10-CM

## 2018-07-17 PROCEDURE — 99202 OFFICE O/P NEW SF 15 MIN: CPT | Mod: S$GLB,,, | Performed by: PATHOLOGY

## 2018-07-17 PROCEDURE — 99999 PR PBB SHADOW E&M-EST. PATIENT-LVL III: CPT | Mod: PBBFAC,,, | Performed by: PATHOLOGY

## 2018-07-17 PROCEDURE — 87070 CULTURE OTHR SPECIMN AEROBIC: CPT

## 2018-07-17 RX ORDER — DOXYCYCLINE 100 MG/1
100 CAPSULE ORAL EVERY 12 HOURS
Qty: 60 CAPSULE | Refills: 0 | Status: SHIPPED | OUTPATIENT
Start: 2018-07-17 | End: 2018-12-12

## 2018-07-17 RX ORDER — B-COMPLEX WITH VITAMIN C
TABLET ORAL
COMMUNITY
Start: 2018-07-05 | End: 2019-06-26 | Stop reason: SDUPTHER

## 2018-07-17 RX ORDER — VIT C/E/ZN/COPPR/LUTEIN/ZEAXAN 250MG-90MG
1000 CAPSULE ORAL DAILY
Refills: 0 | COMMUNITY
Start: 2018-07-17 | End: 2018-07-18 | Stop reason: DRUGHIGH

## 2018-07-17 RX ORDER — CLINDAMYCIN PHOSPHATE 11.9 MG/ML
SOLUTION TOPICAL
Qty: 60 ML | Refills: 3 | Status: ON HOLD | OUTPATIENT
Start: 2018-07-17 | End: 2020-08-28 | Stop reason: HOSPADM

## 2018-07-17 NOTE — LETTER
July 17, 2018      Lulú Lewis MD  [address]  [facility] [address]           [facility] - Dermatology  [address]  [facility] [address]  Phone: [phone]  Fax: [phone]          Patient: Edwin Powell   MR Number: [id number]   YOB: 1945   Date of Visit: 7/17/2018       Dear Dr. Lulú Lewis:    Thank you for referring Edwin Powell to me for evaluation. Attached you will find relevant portions of my assessment and plan of care.    If you have questions, please do not hesitate to call me. I look forward to following Edwin Powell along with you.    Sincerely,    Stephen Tinoco MD    Enclosure  CC:  No Recipients    If you would like to receive this communication electronically, please contact [email or web address] or [phone] to request more information on [facility] [employer] Link access.    For providers and/or their staff who would like to refer a patient to [facility], please contact us through our one-stop-shop provider referral line, [facility], at [phone].    If you feel you have received this communication in error or would no longer like to receive these types of communications, please e-mail [email or web address]

## 2018-07-17 NOTE — PROGRESS NOTES
Subjective:       Patient ID:  Edwin Powell is a 73 y.o. male who presents for   Chief Complaint   Patient presents with    Spot     Neck     Spot  - Initial  Affected locations: neck  Duration: 1 month  Signs / symptoms: itching, inflamed, irritated and pain  Severity: mild  Timing: constant  Aggravated by: sunlight  Relieving factors/Treatments tried: OTC hydrocortisone  Improvement on treatment: no relief    Pt with multiple red bumps and pustules to scalp, posterior neck, upper back x 1 month.  + tenderness, mild itching.  No prior treatments.  No new medications.      Review of Systems   Constitutional: Positive for weight loss and night sweats. Negative for fever, chills, weight gain, fatigue and malaise.   Skin: Positive for recent sunburn. Negative for daily sunscreen use.   Hematologic/Lymphatic: Bruises/bleeds easily.        Objective:    Physical Exam   Constitutional: He appears well-developed and well-nourished.   Neurological: He is alert.   Skin:   Areas Examined (abnormalities noted in diagram):   Scalp / Hair Palpated and Inspected  Head / Face Inspection Performed  Neck Inspection Performed  Chest / Axilla Inspection Performed  Back Inspection Performed  RUE Inspected  LUE Inspection Performed                   Diagram Legend     Erythematous scaling macule/papule c/w actinic keratosis       Vascular papule c/w angioma      Pigmented verrucoid papule/plaque c/w seborrheic keratosis      Yellow umbilicated papule c/w sebaceous hyperplasia      Irregularly shaped tan macule c/w lentigo     1-2 mm smooth white papules consistent with Milia      Movable subcutaneous cyst with punctum c/w epidermal inclusion cyst      Subcutaneous movable cyst c/w pilar cyst      Firm pink to brown papule c/w dermatofibroma      Pedunculated fleshy papule(s) c/w skin tag(s)      Evenly pigmented macule c/w junctional nevus     Mildly variegated pigmented, slightly irregular-bordered macule c/w mildly atypical  nevus      Flesh colored to evenly pigmented papule c/w intradermal nevus       Pink pearly papule/plaque c/w basal cell carcinoma      Erythematous hyperkeratotic cursted plaque c/w SCC      Surgical scar with no sign of skin cancer recurrence      Open and closed comedones      Inflammatory papules and pustules      Verrucoid papule consistent consistent with wart     Erythematous eczematous patches and plaques     Dystrophic onycholytic nail with subungual debris c/w onychomycosis     Umbilicated papule    Erythematous-base heme-crusted tan verrucoid plaque consistent with inflamed seborrheic keratosis     Erythematous Silvery Scaling Plaque c/w Psoriasis     See annotation      Assessment / Plan:        Folliculitis  -     clindamycin (CLEOCIN T) 1 % external solution; AAA bid  Dispense: 60 mL; Refill: 3  -     doxycycline (MONODOX) 100 MG capsule; Take 1 capsule (100 mg total) by mouth every 12 (twelve) hours.  Dispense: 60 capsule; Refill: 0  -     Aerobic culture             Follow-up in about 4 weeks (around 8/14/2018).

## 2018-07-17 NOTE — TELEPHONE ENCOUNTER
Please let patient know that all labs were normal, except his vitamin D was low. He should start daily vitamin D supplementation. We can order from [employer] the Vit D3 1000U if he agrees.    Thanks,  KJ

## 2018-07-18 ENCOUNTER — TELEPHONE (OUTPATIENT)
Dept: INTERNAL MEDICINE | Facility: CLINIC | Age: 73
End: 2018-07-18

## 2018-07-18 DIAGNOSIS — E55.9 VITAMIN D DEFICIENCY: Primary | ICD-10-CM

## 2018-07-18 RX ORDER — ACETAMINOPHEN 500 MG
5000 TABLET ORAL DAILY
Qty: 90 TABLET | Refills: 3 | COMMUNITY
Start: 2018-07-18 | End: 2020-06-09 | Stop reason: SDUPTHER

## 2018-07-18 NOTE — TELEPHONE ENCOUNTER
Please let patient know that I increased the Vit D3 to 5000U daily. Please order from [facility].    Thanks,  CORTEZ Wyatt MA   You 17 hours ago (3:41 PM)      Spoke with pt pt understood pt said hes taking 2 vitamin d daily and a calcium with vitamin d added hes unsure of the strength FYI  (Routing comment)       You routed conversation to Joshua Grover Staff 17 hours ago (3:26 PM)      You 17 hours ago (3:24 PM)         Please let patient know that all labs were normal, except his vitamin D was low. He should start daily vitamin D supplementation. We can order from [facility] the Vit D3 1000U if he agrees.     Thanks,  CORETZ

## 2018-07-19 LAB — METHYLMALONATE SERPL-SCNC: 0.68 UMOL/L

## 2018-07-20 LAB — BACTERIA SPEC AEROBE CULT: NO GROWTH

## 2018-07-21 ENCOUNTER — TELEPHONE (OUTPATIENT)
Dept: INTERNAL MEDICINE | Facility: CLINIC | Age: 73
End: 2018-07-21

## 2018-07-21 NOTE — TELEPHONE ENCOUNTER
Please let patient know that he still seems to have a B12 deficinecy. Is he taking his B- complex supplement every day?     His A1c was controlled at 6.2 and cholesterol well-controlled. Vitamin D was low, is he taking the 5000U daily?    Thanks,  KJ

## 2018-07-27 ENCOUNTER — TELEPHONE (OUTPATIENT)
Dept: HEMATOLOGY/ONCOLOGY | Facility: CLINIC | Age: 73
End: 2018-07-27

## 2018-07-27 ENCOUNTER — HOSPITAL ENCOUNTER (OUTPATIENT)
Dept: RADIOLOGY | Facility: HOSPITAL | Age: 73
Discharge: HOME | End: 2018-07-27
Attending: PHYSICIAN ASSISTANT
Payer: MEDICARE

## 2018-07-27 ENCOUNTER — PATIENT MESSAGE (OUTPATIENT)
Dept: HEMATOLOGY/ONCOLOGY | Facility: CLINIC | Age: 73
End: 2018-07-27

## 2018-07-27 DIAGNOSIS — G89.3 NEOPLASM RELATED PAIN: ICD-10-CM

## 2018-07-27 DIAGNOSIS — C64.2 RENAL CELL CARCINOMA OF LEFT KIDNEY: ICD-10-CM

## 2018-07-27 DIAGNOSIS — C79.51 METASTATIC RENAL CELL CARCINOMA TO BONE: ICD-10-CM

## 2018-07-27 DIAGNOSIS — C64.9 METASTATIC RENAL CELL CARCINOMA TO BONE: ICD-10-CM

## 2018-07-27 PROCEDURE — A9503 TC99M MEDRONATE: HCPCS

## 2018-07-27 PROCEDURE — 78306 BONE IMAGING WHOLE BODY: CPT | Mod: 26,,, | Performed by: RADIOLOGY

## 2018-07-27 RX ORDER — MORPHINE SULFATE 30 MG/1
30 TABLET, FILM COATED, EXTENDED RELEASE ORAL EVERY 12 HOURS
Qty: 60 TABLET | Refills: 0 | Status: SHIPPED | OUTPATIENT
Start: 2018-07-27 | End: 2018-08-31

## 2018-07-27 RX ORDER — HYDROCODONE BITARTRATE AND ACETAMINOPHEN 5; 325 MG/1; MG/1
1 TABLET ORAL EVERY 8 HOURS PRN
Qty: 50 TABLET | Refills: 0 | Status: SHIPPED | OUTPATIENT
Start: 2018-07-27 | End: 2018-09-04 | Stop reason: SDUPTHER

## 2018-07-27 RX ORDER — MORPHINE SULFATE 30 MG/1
30 TABLET, FILM COATED, EXTENDED RELEASE ORAL EVERY 12 HOURS
Qty: 60 TABLET | Refills: 0 | Status: SHIPPED | OUTPATIENT
Start: 2018-07-27 | End: 2018-07-27 | Stop reason: SDUPTHER

## 2018-07-27 NOTE — TELEPHONE ENCOUNTER
Returned call to [place] and informed pharmacy of pt diagnosis (metastatic RCC to bone) for narcotic refill.   [place] verbalized understanding.         ----- Message from Ifeoma Johnson sent at 7/27/2018  2:43 PM CDT -----  Contact: [facility], [place]  Mr. [facility] is calling to speak with Staff regarding the pt's pain medication and a diagnosis.    He can be reached at [phone].    Thank you.

## 2018-07-31 ENCOUNTER — PATIENT MESSAGE (OUTPATIENT)
Dept: HEMATOLOGY/ONCOLOGY | Facility: CLINIC | Age: 73
End: 2018-07-31

## 2018-08-07 ENCOUNTER — TELEPHONE (OUTPATIENT)
Dept: PHARMACY | Facility: CLINIC | Age: 73
End: 2018-08-07

## 2018-08-09 NOTE — TELEPHONE ENCOUNTER
Patient [facility] VOTRIENT $0/004- Patient scheduled to have medication ready for  on Thurs 8/16 @8am he will  the medication before his labs appointment at 10am. Patient informed he have maybe about a week of medication left & no missed dose. Patient informed no new medications besides the vitamin D he been taking, no new conditions or allergies. Patient voiced understanding.

## 2018-08-16 ENCOUNTER — LAB VISIT (OUTPATIENT)
Dept: LAB | Facility: HOSPITAL | Age: 73
End: 2018-08-16
Attending: INTERNAL MEDICINE
Payer: MEDICARE

## 2018-08-16 ENCOUNTER — OFFICE VISIT (OUTPATIENT)
Dept: HEMATOLOGY/ONCOLOGY | Facility: CLINIC | Age: 73
End: 2018-08-16
Payer: MEDICARE

## 2018-08-16 VITALS
WEIGHT: 161.19 LBS | BODY MASS INDEX: 25.3 KG/M2 | HEIGHT: 67 IN | OXYGEN SATURATION: 99 % | HEART RATE: 75 BPM | SYSTOLIC BLOOD PRESSURE: 139 MMHG | TEMPERATURE: 98 F | DIASTOLIC BLOOD PRESSURE: 66 MMHG | RESPIRATION RATE: 20 BRPM

## 2018-08-16 DIAGNOSIS — R14.0 BLOATING: ICD-10-CM

## 2018-08-16 DIAGNOSIS — C64.9 METASTATIC RENAL CELL CARCINOMA TO LIVER: Primary | ICD-10-CM

## 2018-08-16 DIAGNOSIS — C64.2 RENAL CELL CARCINOMA OF LEFT KIDNEY: ICD-10-CM

## 2018-08-16 DIAGNOSIS — R79.9 ABNORMAL FINDING OF BLOOD CHEMISTRY: ICD-10-CM

## 2018-08-16 DIAGNOSIS — C64.9 METASTATIC RENAL CELL CARCINOMA TO BONE: ICD-10-CM

## 2018-08-16 DIAGNOSIS — C78.7 METASTATIC RENAL CELL CARCINOMA TO LIVER: Primary | ICD-10-CM

## 2018-08-16 DIAGNOSIS — C79.51 METASTATIC RENAL CELL CARCINOMA TO BONE: ICD-10-CM

## 2018-08-16 LAB
ALBUMIN SERPL BCP-MCNC: 3 G/DL
ALP SERPL-CCNC: 91 U/L
ALT SERPL W/O P-5'-P-CCNC: 37 U/L
ANION GAP SERPL CALC-SCNC: 10 MMOL/L
AST SERPL-CCNC: 29 U/L
BILIRUB SERPL-MCNC: 0.4 MG/DL
BUN SERPL-MCNC: 15 MG/DL
CALCIUM SERPL-MCNC: 9.1 MG/DL
CHLORIDE SERPL-SCNC: 108 MMOL/L
CO2 SERPL-SCNC: 23 MMOL/L
CREAT SERPL-MCNC: 1.1 MG/DL
ERYTHROCYTE [DISTWIDTH] IN BLOOD BY AUTOMATED COUNT: 14.9 %
EST. GFR  (AFRICAN AMERICAN): >60 ML/MIN/1.73 M^2
EST. GFR  (NON AFRICAN AMERICAN): >60 ML/MIN/1.73 M^2
GLUCOSE SERPL-MCNC: 132 MG/DL
HCT VFR BLD AUTO: 39.4 %
HGB BLD-MCNC: 12.6 G/DL
IMM GRANULOCYTES # BLD AUTO: 0.02 K/UL
MCH RBC QN AUTO: 34.1 PG
MCHC RBC AUTO-ENTMCNC: 32 G/DL
MCV RBC AUTO: 107 FL
NEUTROPHILS # BLD AUTO: 3.5 K/UL
PLATELET # BLD AUTO: 301 K/UL
PMV BLD AUTO: 9.8 FL
POTASSIUM SERPL-SCNC: 4.3 MMOL/L
PROT SERPL-MCNC: 5.9 G/DL
RBC # BLD AUTO: 3.69 M/UL
SODIUM SERPL-SCNC: 141 MMOL/L
WBC # BLD AUTO: 6.46 K/UL

## 2018-08-16 PROCEDURE — 80053 COMPREHEN METABOLIC PANEL: CPT

## 2018-08-16 PROCEDURE — 99999 PR PBB SHADOW E&M-EST. PATIENT-LVL III: CPT | Mod: PBBFAC,,, | Performed by: INTERNAL MEDICINE

## 2018-08-16 PROCEDURE — 99214 OFFICE O/P EST MOD 30 MIN: CPT | Mod: S$GLB,,, | Performed by: INTERNAL MEDICINE

## 2018-08-16 PROCEDURE — 36415 COLL VENOUS BLD VENIPUNCTURE: CPT

## 2018-08-16 PROCEDURE — 3075F SYST BP GE 130 - 139MM HG: CPT | Mod: CPTII,S$GLB,, | Performed by: INTERNAL MEDICINE

## 2018-08-16 PROCEDURE — 85027 COMPLETE CBC AUTOMATED: CPT

## 2018-08-16 PROCEDURE — 3078F DIAST BP <80 MM HG: CPT | Mod: CPTII,S$GLB,, | Performed by: INTERNAL MEDICINE

## 2018-08-16 RX ORDER — DICYCLOMINE HYDROCHLORIDE 10 MG/1
10 CAPSULE ORAL 4 TIMES DAILY
Qty: 120 CAPSULE | Refills: 0 | Status: SHIPPED | OUTPATIENT
Start: 2018-08-16 | End: 2018-09-13 | Stop reason: SDUPTHER

## 2018-08-16 NOTE — PROGRESS NOTES
"Subjective:       Patient ID: Edwin Powell is a 73 y.o. male.    Chief Complaint: Follow-up    HPI     Mr. Singleton is a 73 y.o. male who returns for follow up regarding metastatic renal cell carcinoma of left kidney.  Returns for follow-up on Votrient  Reports overall doing well  Tolerating Votrient with minimal side effects- manages diarrhea with medication  Eating well   Weight stable  No new pain  Pain well controlled- mostly in low back and side where there are known bone lesions.     Scans reveal stable disease  Imaging:   CT scans 6/14/18:  1. Stable exophytic left renal lesion with surrounding decreased attenuation of the renal parenchyma favored to represent post treatment change.  2. Lytic lesion within the L3 vertebral body, unchanged.  3. Stable hypodensities in hepatic segment VII and GUSTAVO/B.  Stable lesion in hepatic segment II.  4. Unchanged hypodensity in the superior right renal pole, too small to characterize.  5. Stable right upper lobe pulmonary micronodule.  6. Additional findings as above.  RECIST SUMMARY: These measurements are given as on the prior study.     Oncology History:  Mr.Mc Moss is a 74 yo male with CAD (s/p 5 stents, remotely), DM2 (insulin dependent), hypothyroidism , CVA (2013) with no residual deficit, chronic back pain. He was transferred to [facility] for neurosurgery evaluation of a lumbar spine lesion in the setting of progressive weakness of his legs and exacerbation of chronic back pain, in May 2017. He states that his back pain and LE weakness progressively worsened. [facility]. The pain radiates down his legs. He denies any incontinence of bowel or bladder. He had several falls. After one of the falls, he presented to the ED at [facility], in May 2017. He had an MRI showing "L3 vertebral body lesion with apparent cortical disruption concerning for metastases vs myeloma," and was transferred to [facility] for neurosurgery " evaluation. He was evaluated by neurosurgery regarding concern for lytic lesion on L3 on 5/26/17. No surgical intervention was proposed.   CT chest, abdomen,pelvis on 5/27/17 revealed :   --A 3.6 cm exophytic mass arising from the medial aspect of the lower pole of left kidney,   --A small 1.0 cm, subtle, cortical enhancing mass within the upper pole of the right kidney, concerning for possible contralateral solid renal mass  --Multiple enhancing ill-defined hepatic masses concerning for hepatic metastatic disease, hepatomegaly and hepatic steatosis  --Mulltifocal irregular thickening of the bilateral pleura concerning for possible pleural metastatic disease  --Mild nonspecific nodular thickening of the left adrenal gland without discrete nodule  -- Redemonstration of known lytic lesion within the L3 vertebral body. No definite additional discrete lytic lesions are identified.  -MRI pelvis from 10/18/17:    2.5 cm linear T1 hypointense T2/STIR hyperintense lesion within the left iliac bone, this lesion is indeterminate but felt less likely to represent metastasis given its appearance. Further evaluation can be obtained with bone scan as clinically indicated.  Findings suggestive of left trochanteric bursitis.  Partially visualized L3 metastatic lesion.  Diffuse thickening of the urinary bladder wall can be seen with chronic outlet obstruction or cystitis.      Review of Systems   Constitutional: Negative for activity change, appetite change, chills, fatigue, fever and unexpected weight change.   HENT: Negative for congestion, mouth sores, postnasal drip, rhinorrhea, sinus pressure, sinus pain, sore throat and trouble swallowing.    Eyes: Positive for visual disturbance (chronic).   Respiratory: Negative for cough, chest tightness and shortness of breath.    Cardiovascular: Negative for chest pain, palpitations and leg swelling.   Gastrointestinal: Positive for diarrhea. Negative for abdominal distention, abdominal  pain, blood in stool, constipation and vomiting.   Genitourinary: Negative for decreased urine volume, difficulty urinating, dysuria, frequency and urgency.   Musculoskeletal: Positive for back pain. Negative for arthralgias, gait problem, myalgias, neck pain and neck stiffness.   Skin: Positive for rash.   Neurological: Negative for dizziness, weakness, numbness and headaches.   Hematological: Negative for adenopathy. Does not bruise/bleed easily.   Psychiatric/Behavioral: Negative for dysphoric mood. The patient is not nervous/anxious.        Objective:      Physical Exam   Constitutional: He is oriented to person, place, and time. He appears well-developed and well-nourished. No distress.   Presents alone  ECOG 0   HENT:   Head: Normocephalic and atraumatic.   Right Ear: External ear normal.   Left Ear: External ear normal.   Nose: Nose normal.   Mouth/Throat: Oropharynx is clear and moist. No oropharyngeal exudate.   Eyes: Conjunctivae and EOM are normal. Pupils are equal, round, and reactive to light. Right eye exhibits no discharge. Left eye exhibits no discharge. No scleral icterus.   Neck: Normal range of motion. Neck supple. No thyromegaly present.   Cardiovascular: Normal rate, regular rhythm, normal heart sounds and intact distal pulses. Exam reveals no gallop and no friction rub.   No murmur heard.  Pulmonary/Chest: Effort normal and breath sounds normal. No respiratory distress. He has no wheezes. He has no rales. He exhibits no tenderness.   Lungs clear     Abdominal: Soft. Bowel sounds are normal. He exhibits no distension and no mass. There is no tenderness. There is no rebound and no guarding.   No organomegaly   Musculoskeletal: Normal range of motion. He exhibits no edema, tenderness or deformity.   No spinal or paraspinal tenderness to palpation  Point tenderness at right flank area, unchanged from previous exams     Lymphadenopathy:     He has no cervical adenopathy.   Neurological: He is alert  and oriented to person, place, and time. No cranial nerve deficit or sensory deficit. Coordination normal.   Skin: Skin is warm and dry. No rash noted. He is not diaphoretic. No erythema. No pallor.   Psychiatric: He has a normal mood and affect. His behavior is normal. Judgment and thought content normal.   Nursing note and vitals reviewed.    Labs- reviewed  Assessment:       1. Metastatic renal cell carcinoma to liver    2. Renal cell carcinoma of left kidney    3. 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 Bloating    5. Abnormal finding of blood chemistry         Plan:     1-3. Tolerating and stable disease on Votrient  Knows to call for any issues  Pain clinc referral discussed  4. Bentyl  > 25 minutes total      Distress Screening Results: Psychosocial Distress screening score of Distress Score: 0 noted and reviewed. No intervention indicated.

## 2018-08-17 DIAGNOSIS — E11.59 HYPERTENSION ASSOCIATED WITH DIABETES: ICD-10-CM

## 2018-08-17 DIAGNOSIS — E03.9 ACQUIRED HYPOTHYROIDISM: ICD-10-CM

## 2018-08-17 DIAGNOSIS — I15.2 HYPERTENSION ASSOCIATED WITH DIABETES: ICD-10-CM

## 2018-08-17 RX ORDER — AMLODIPINE BESYLATE 10 MG/1
TABLET ORAL
Qty: 90 TABLET | Refills: 3 | Status: SHIPPED | OUTPATIENT
Start: 2018-08-17 | End: 2019-06-20 | Stop reason: SDUPTHER

## 2018-08-17 RX ORDER — LISINOPRIL 40 MG/1
40 TABLET ORAL DAILY
Qty: 90 TABLET | Refills: 3 | Status: SHIPPED | OUTPATIENT
Start: 2018-08-17 | End: 2019-06-20 | Stop reason: SDUPTHER

## 2018-08-17 NOTE — TELEPHONE ENCOUNTER
Refills of amlodipine and lisinopril sent to [facility] in [place].    Thanks,  CORTEZ

## 2018-08-20 ENCOUNTER — TELEPHONE (OUTPATIENT)
Dept: DERMATOLOGY | Facility: CLINIC | Age: 73
End: 2018-08-20

## 2018-08-20 NOTE — TELEPHONE ENCOUNTER
----- Message from Lulúnan Griffin sent at 8/20/2018  2:37 PM CDT -----  Contact: pt at [phone]  Earnest pt-Pt needs to cancel his appt that is on Aug. 28 and wants to reschedule asap.  Please call pt at above number.

## 2018-08-27 ENCOUNTER — OFFICE VISIT (OUTPATIENT)
Dept: PRIMARY CARE CLINIC | Facility: CLINIC | Age: 73
End: 2018-08-27
Payer: MEDICARE

## 2018-08-27 VITALS
WEIGHT: 160.94 LBS | HEART RATE: 73 BPM | DIASTOLIC BLOOD PRESSURE: 58 MMHG | BODY MASS INDEX: 25.26 KG/M2 | HEIGHT: 67 IN | SYSTOLIC BLOOD PRESSURE: 124 MMHG | OXYGEN SATURATION: 98 %

## 2018-08-27 DIAGNOSIS — E53.8 B12 DEFICIENCY: ICD-10-CM

## 2018-08-27 DIAGNOSIS — C79.51 METASTATIC RENAL CELL CARCINOMA TO BONE: ICD-10-CM

## 2018-08-27 DIAGNOSIS — Z79.4 TYPE 2 DIABETES MELLITUS WITH STAGE 3 CHRONIC KIDNEY DISEASE, WITH LONG-TERM CURRENT USE OF INSULIN: Primary | Chronic | ICD-10-CM

## 2018-08-27 DIAGNOSIS — C64.9 METASTATIC RENAL CELL CARCINOMA TO BONE: ICD-10-CM

## 2018-08-27 DIAGNOSIS — E13.40 NEUROPATHY DUE TO SECONDARY DIABETES: ICD-10-CM

## 2018-08-27 DIAGNOSIS — T45.1X5A CHEMOTHERAPY-INDUCED NAUSEA AND VOMITING: ICD-10-CM

## 2018-08-27 DIAGNOSIS — K21.9 GASTROESOPHAGEAL REFLUX DISEASE WITHOUT ESOPHAGITIS: Chronic | ICD-10-CM

## 2018-08-27 DIAGNOSIS — E11.22 TYPE 2 DIABETES MELLITUS WITH STAGE 3 CHRONIC KIDNEY DISEASE, WITH LONG-TERM CURRENT USE OF INSULIN: Primary | Chronic | ICD-10-CM

## 2018-08-27 DIAGNOSIS — E78.5 HYPERLIPIDEMIA DUE TO TYPE 2 DIABETES MELLITUS: ICD-10-CM

## 2018-08-27 DIAGNOSIS — M47.816 FACET ARTHRITIS OF LUMBAR REGION: ICD-10-CM

## 2018-08-27 DIAGNOSIS — N18.30 TYPE 2 DIABETES MELLITUS WITH STAGE 3 CHRONIC KIDNEY DISEASE, WITH LONG-TERM CURRENT USE OF INSULIN: Primary | Chronic | ICD-10-CM

## 2018-08-27 DIAGNOSIS — R11.2 CHEMOTHERAPY-INDUCED NAUSEA AND VOMITING: ICD-10-CM

## 2018-08-27 DIAGNOSIS — C78.7 METASTATIC RENAL CELL CARCINOMA TO LIVER: ICD-10-CM

## 2018-08-27 DIAGNOSIS — E11.69 HYPERLIPIDEMIA DUE TO TYPE 2 DIABETES MELLITUS: ICD-10-CM

## 2018-08-27 DIAGNOSIS — C64.9 METASTATIC RENAL CELL CARCINOMA TO LIVER: ICD-10-CM

## 2018-08-27 PROBLEM — E11.59 HYPERTENSION ASSOCIATED WITH DIABETES: Status: RESOLVED | Noted: 2017-05-26 | Resolved: 2018-08-27

## 2018-08-27 PROBLEM — I15.2 HYPERTENSION ASSOCIATED WITH DIABETES: Status: RESOLVED | Noted: 2017-05-26 | Resolved: 2018-08-27

## 2018-08-27 PROCEDURE — 99215 OFFICE O/P EST HI 40 MIN: CPT | Mod: 25,S$GLB,, | Performed by: INTERNAL MEDICINE

## 2018-08-27 PROCEDURE — 3074F SYST BP LT 130 MM HG: CPT | Mod: CPTII,S$GLB,, | Performed by: INTERNAL MEDICINE

## 2018-08-27 PROCEDURE — 3078F DIAST BP <80 MM HG: CPT | Mod: CPTII,S$GLB,, | Performed by: INTERNAL MEDICINE

## 2018-08-27 PROCEDURE — 96372 THER/PROPH/DIAG INJ SC/IM: CPT | Mod: S$GLB,,, | Performed by: INTERNAL MEDICINE

## 2018-08-27 PROCEDURE — 3044F HG A1C LEVEL LT 7.0%: CPT | Mod: CPTII,S$GLB,, | Performed by: INTERNAL MEDICINE

## 2018-08-27 RX ORDER — NALOXONE HYDROCHLORIDE 4 MG/.1ML
SPRAY NASAL
COMMUNITY
Start: 2018-07-31 | End: 2019-06-26

## 2018-08-27 RX ORDER — CYANOCOBALAMIN 1000 UG/ML
1000 INJECTION, SOLUTION INTRAMUSCULAR; SUBCUTANEOUS ONCE
Status: COMPLETED | OUTPATIENT
Start: 2018-08-27 | End: 2018-08-27

## 2018-08-27 RX ORDER — ONDANSETRON HYDROCHLORIDE 8 MG/1
8 TABLET, FILM COATED ORAL 3 TIMES DAILY PRN
Qty: 90 TABLET | Refills: 3 | Status: SHIPPED | OUTPATIENT
Start: 2018-08-27 | End: 2019-06-20 | Stop reason: SDUPTHER

## 2018-08-27 RX ADMIN — CYANOCOBALAMIN 1000 MCG: 1000 INJECTION, SOLUTION INTRAMUSCULAR; SUBCUTANEOUS at 09:08

## 2018-08-27 NOTE — PROGRESS NOTES
Subjective:      Patient ID: Edwin Powell is a 73 y.o. male.    Chief Complaint: Diabetes and Follow-up (2 month )    73 years old male patient came to follow up his medical conditions:    # Renal cancer with liver and bone mets:  Diagnosed 1 year ago and had radiation therapy at that time, currently on daily chemotherapy pills and taking morphine and hydrocodone for pain.  He lost around 60 Ib since then.  Complaining of right flank dull pain, constant 8/10, radiate across his back and increase with activities.  associated with mild abdominal discomfort and diarrhea secondary to chemotherapy.  Denies fever,chills and urinary symptoms.  Following up with Hem/onc clinic.    # DM-2  Last hemoglobin A1c 7/2018 was 6.2  Per patient he was on insulin prior to losing 60 Ib due to malignancy, currently not on any medications and his blood sugar controlled  Denies hyper/hypoglycemic symptoms      # Hyperlipidemia   On Lipitor 40 mg . Not sure if he still taking it    #GERD  On ranitidine   Stable     # Hypertension  On amlodipine 10 mg and lisinopril 20 mg     Stated that he rarely measure his BP at home but usually 130's .  stable     # CVA   Acute storks 6 years ago   On statin and Asprin   No new issues     # CAD  Denies any heart problems  No chest pain , has mild shortness of breath with activities   currently on lisinopril , statin and Asprin  Nonsmoker, DM and HTN under contorl   Body mass index is 25.21 kg/m².    Patient is retired , lives with his son, denies any recent falls, able to preform all his ADL, doesn't exercise daily but try to walk around, spent his time watching TV, eats 2 meals/day, sleeps around 6 hours/day.          Review of Systems   Constitutional: Negative for chills and fever.   HENT: Negative for congestion and sore throat.    Eyes: Negative for photophobia and visual disturbance.   Respiratory: Negative for cough and chest tightness.    Cardiovascular: Negative for chest pain  and palpitations.   Gastrointestinal: Positive for diarrhea. Negative for abdominal distention and vomiting.   Endocrine: Negative for polydipsia, polyphagia and polyuria.   Genitourinary: Positive for flank pain. Negative for dysuria and hematuria.   Musculoskeletal: Positive for myalgias. Negative for arthralgias and gait problem.   Skin: Negative for pallor and rash.   Neurological: Positive for dizziness (orthostatic often 2/2 to diarrhea ). Negative for headaches.   Hematological: Negative for adenopathy. Does not bruise/bleed easily.   Psychiatric/Behavioral: Negative for agitation and confusion.     Objective:     Vitals:    08/27/18 0739   BP: (!) 140/56   Pulse: 73     Physical Exam   Constitutional: He is oriented to person, place, and time. He appears well-developed and well-nourished. No distress.   HENT:   Head: Normocephalic and atraumatic.   Eyes: Conjunctivae are normal. Pupils are equal, round, and reactive to light. No scleral icterus.   Neck: Neck supple.   Cardiovascular: Normal rate and regular rhythm.   No murmur heard.  Pulmonary/Chest: Breath sounds normal. No stridor. He is in respiratory distress.   Tenderness over the right chest lowe rips    Abdominal: Soft. Bowel sounds are normal. He exhibits no distension. There is no tenderness.   +ve right CVA tenderness    Musculoskeletal: He exhibits no edema or tenderness.   Neurological: He is alert and oriented to person, place, and time.   Skin: No rash noted. He is not diaphoretic. No pallor.   Psychiatric: He has a normal mood and affect. His behavior is normal. Judgment and thought content normal.   Vitals reviewed.    Assessment and Plan :      1. Type 2 diabetes mellitus with stage 3 chronic kidney disease, with long-term current use of insulin    2. Facet arthritis of lumbar region    3. Neuropathy due to secondary diabetes    4. B12 deficiency    5. Chemotherapy-induced nausea and vomiting    6. Metastatic renal cell carcinoma to bone     7. Metastatic renal cell carcinoma to liver    8. Hyperlipidemia due to type 2 diabetes mellitus    9. Gastroesophageal reflux disease without esophagitis        Edwin was seen today for diabetes and follow-up.    Diagnoses and all orders for this visit:    Type 2 diabetes mellitus with stage 3 chronic kidney disease, with long-term current use of insulin   - stable , no medications   Facet arthritis of lumbar region  - Stable, continue current pain meds   Neuropathy due to secondary diabetes  -     cyanocobalamin injection 1,000 mcg; Inject 1 mL (1,000 mcg total) into the muscle once.    B12 deficiency  -     cyanocobalamin injection 1,000 mcg; Inject 1 mL (1,000 mcg total) into the muscle once.    Chemotherapy-induced nausea and vomiting  -     ondansetron (ZOFRAN) 8 MG tablet; Take 1 tablet (8 mg total) by mouth 3 (three) times daily as needed.    Metastatic renal cell carcinoma to bone  - stable, continue pain meds and chemotherapy   - follow up with hem/onc   Metastatic renal cell carcinoma to liver  - stable, continue pain meds and chemotherapy   - follow up with hem/onc   Hyperlipidemia due to type 2 diabetes mellitus  - stable   Gastroesophageal reflux disease without esophagitis  - Stable              Sindi Isaac  Internal Medicine, PGY 2  [phone]

## 2018-08-27 NOTE — ASSESSMENT & PLAN NOTE
Seen on MRI 5/2017, complains of sciatica. Not physically active, refuses PT  · Refuses PT  · Continue to advise to be physically active  · PCP messaged Dr Reed for pain management following bone scan

## 2018-08-27 NOTE — ASSESSMENT & PLAN NOTE
Low-normal B12  · Continue MVI  · Continue boost supplements  · Continue B complex from [employer]  · B12 injection in clinic

## 2018-08-27 NOTE — ASSESSMENT & PLAN NOTE
LE numbness and tingling, elevated A1c, followed by podiatry  · Establish podiatry care in [facility]  · F/U podiatry q3-6 mos  · Diabetic shoes  · B12 injection today  · contiunue B12 and Vit D3 supplementation

## 2018-08-27 NOTE — Clinical Note
Hi Dr Reed,Mr Powell mentioned that you wanted him to see a particular pain management doctor after his bone scan. If you let me know the name of the doctor, I can help with scheduling. You referral was authorized.Thanks,KJ

## 2018-08-27 NOTE — PROGRESS NOTES
"Primary Care Provider Appointment    Subjective:      Patient ID: Edwin Powell is a 73 y.o. male with metastatic kidney cancer, CAD, DM, neuropathy from chemo    Chief Complaint: Diabetes and Follow-up (2 month )    Patient with no new complaints. He has chronic flank pain from metastatic cancer, compliant with votrient. He was referred to pain management. Had a bone scan showing no metastatic disease in 7/2018. Chronic diarrhea continues.    A1c is well-controlled with weight loss from CA. BP elevated today, but is usually controlled with home readings. Thyroid studies are normalizing.    He is still B12 and Vit D deficient. He reports that he only took calcium with vitamin D, but was not taking a dedicated Vit D supplement.     He had a Derm appt for follow-up of folliculitis. He cancelled it and does not want to reschedule. The rash is improved with topical antibiotics.    He is confused about one of his meds that has the word "calcium" in it for which he needs a prescription.      Past Surgical History:   Procedure Laterality Date    ABDOMINAL SURGERY      APPENDECTOMY      BACK SURGERY      CARDIAC SURGERY      CHOLECYSTECTOMY      coronary stenting      X 5 last one in 2010-11.    SPINAL FUSION         Past Medical History:   Diagnosis Date    Acquired hypothyroidism 5/26/2017    Benign essential HTN 5/26/2017    Benign prostatic hyperplasia (BPH) with urinary urge incontinence 6/6/2017    Chronic low back pain 6/1/2017    Coronary artery disease involving native coronary artery of native heart without angina pectoris 5/26/2017    S/p 5 stents - last one around 2010-11.    Gastroesophageal reflux disease without esophagitis 5/26/2017    History of CVA (cerebrovascular accident) 5/26/2017    Lumbar disc lesion 5/26/2017    Metastatic renal cell carcinoma to bone 6/6/2017    Metastatic renal cell carcinoma to liver 6/6/2017    Liver Biopsy 5-2017: METASTATIC RENAL CELL CARCINOMA.    Mixed " "hyperlipidemia 5/26/2017    Type 2 diabetes mellitus with stage 3 chronic kidney disease, with long-term current use of insulin 5/26/2017       Review of Systems   Constitutional: Negative for activity change and unexpected weight change.   HENT: Positive for hearing loss. Negative for trouble swallowing.    Gastrointestinal: Positive for diarrhea. Negative for blood in stool, constipation and vomiting.   Endocrine: Positive for polyuria. Negative for polydipsia.   Genitourinary: Positive for difficulty urinating. Negative for urgency.   Musculoskeletal: Positive for arthralgias. Negative for joint swelling and neck pain.   Neurological: Positive for weakness. Negative for headaches.   Psychiatric/Behavioral: Negative for confusion and dysphoric mood.       Objective:   BP (!) 140/56 (BP Location: Left arm, Patient Position: Sitting, BP Method: Medium (Manual))   Pulse 73   Ht 5' 7" (1.702 m)   Wt 73 kg (160 lb 15 oz)   SpO2 98%   BMI 25.21 kg/m²     Physical Exam   Constitutional: He is oriented to person, place, and time. He appears well-developed and well-nourished.   Ambulating with cane   HENT:   Head: Normocephalic.   edentulous   Eyes: Pupils are equal, round, and reactive to light.   Neck:   Decreased ROM in neck    Pulmonary/Chest: Effort normal.   Musculoskeletal: Normal range of motion.   Neurological: He is alert and oriented to person, place, and time.   Skin: Skin is warm.   Ecchymoses and purpura bilaterally UE  Telangiectasias on face and chest  Multiple tattoos on UE  Numerous red papules on neck  Fair skin   Psychiatric: He has a normal mood and affect.       Lab Results   Component Value Date    WBC 6.46 08/16/2018    HGB 12.6 (L) 08/16/2018    HCT 39.4 (L) 08/16/2018     08/16/2018    CHOL 99 (L) 07/16/2018    TRIG 145 07/16/2018    HDL 30 (L) 07/16/2018    ALT 37 08/16/2018    AST 29 08/16/2018     08/16/2018    K 4.3 08/16/2018     08/16/2018    CREATININE 1.1 " 08/16/2018    BUN 15 08/16/2018    CO2 23 08/16/2018    TSH 3.530 07/16/2018    PSA 1.1 07/16/2018    INR 1.2 06/12/2017    HGBA1C 6.2 (H) 07/16/2018         Assessment:   73 y.o. male with multiple co-morbid illnesses here to continue work-up of chronic issues notably metastatic kidney cancer, CAD, DM, neuropathy from chemo.     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     LE numbness and tingling, elevated A1c, followed by podiatry  · Establish podiatry care in [facility]  · F/U podiatry q3-6 mos  · Diabetic shoes  · B12 injection today  · contiunue B12 and Vit D3 supplementation         Relevant Medications    cyanocobalamin injection 1,000 mcg (Start on 8/27/2018 10:00 AM)       Endocrine    B12 deficiency     Low-normal B12  · Continue MVI  · Continue boost supplements  · Continue B complex from [employer]  · B12 injection in clinic         Relevant Medications    cyanocobalamin injection 1,000 mcg (Start on 8/27/2018 10:00 AM)       GI    Chemotherapy-induced nausea and vomiting     Uncontrolled with narcotics, now experiencing hemorrhoids  · Continue zofran for nausea  · Continue loperamide  · Advised to use barrier creme         Relevant Medications    ondansetron (ZOFRAN) 8 MG tablet       Orthopedic    Facet arthritis of lumbar region     Seen on MRI 5/2017, complains of sciatica. Not physically active, refuses PT  · Refuses PT  · Continue to advise to be physically active  · PCP messaged Dr Reed for pain management following bone scan               Health Maintenance       Date Due Completion Date    TETANUS VACCINE 02[birth year]18/1963 ---    Influenza Vaccine 08/01/2018 10/18/2017    Pneumococcal (65+) (2 of 2 - PPSV23) 11/29/2018 11/29/2017    Hemoglobin A1c 01/16/2019 7/16/2018    Eye Exam 01/19/2019 1/19/2018    Foot Exam 02/02/2019 2/2/2018 (Done)    Override on 2/2/2018: Done (performed by podiatrist)    Lipid Panel 07/16/2019 7/16/2018    High Dose  Statin 08/27/2019 8/27/2018    Colonoscopy 02/15/2021 2/15/2011 (Done)    Override on 2/15/2011: Done (Per [employer] dashboard ? results)          Follow-up in about 2 months (around 10/27/2018). . One hour spent with this patient today, half of that in counseling.    Lulú Lewis MD/MPH  Internal Medicine  [facility]  [phone]

## 2018-08-27 NOTE — PATIENT INSTRUCTIONS
TODAY:  - PCP to message Dr Reed about pain management  - take B-complex and vit D3 5000U every day  - B12 injection today in clinic  - call the clinic with the calcium medication you are confused about  - bring all meds to next appt  - zofran refilled, now at [facility]

## 2018-08-30 ENCOUNTER — PATIENT MESSAGE (OUTPATIENT)
Dept: HEMATOLOGY/ONCOLOGY | Facility: CLINIC | Age: 73
End: 2018-08-30

## 2018-08-30 ENCOUNTER — TELEPHONE (OUTPATIENT)
Dept: INTERNAL MEDICINE | Facility: CLINIC | Age: 73
End: 2018-08-30

## 2018-08-30 NOTE — TELEPHONE ENCOUNTER
----- Message from Rahel Reed MD sent at 8/30/2018 12:26 PM CDT -----  Basil  Thanks!  ----- Message -----  From: Lulú Lewis MD  Sent: 8/27/2018   8:47 AM  To: Rahel Reed MD    Hi Dr Reed,  Mr Powell mentioned that you wanted him to see a particular pain management doctor after his bone scan. If you let me know the name of the doctor, I can help with scheduling. You referral was authorized.    Thanks,  KJ

## 2018-08-30 NOTE — TELEPHONE ENCOUNTER
Please schedule patietn with Dr Gracia, he is pain management. Referral was placed by Dr Reed.    Let patient know of the appointment and mail letter.    Thanks,  KJ

## 2018-08-31 DIAGNOSIS — G89.3 PAIN, NEOPLASM-RELATED: Primary | ICD-10-CM

## 2018-08-31 RX ORDER — MORPHINE SULFATE 15 MG/1
15 TABLET, FILM COATED, EXTENDED RELEASE ORAL 2 TIMES DAILY
Qty: 60 TABLET | Refills: 0 | Status: SHIPPED | OUTPATIENT
Start: 2018-08-31 | End: 2018-09-04 | Stop reason: SDUPTHER

## 2018-09-04 DIAGNOSIS — G89.3 NEOPLASM RELATED PAIN: ICD-10-CM

## 2018-09-04 DIAGNOSIS — C79.51 METASTATIC RENAL CELL CARCINOMA TO BONE: ICD-10-CM

## 2018-09-04 DIAGNOSIS — C64.9 METASTATIC RENAL CELL CARCINOMA TO LIVER: ICD-10-CM

## 2018-09-04 DIAGNOSIS — C78.7 METASTATIC RENAL CELL CARCINOMA TO LIVER: ICD-10-CM

## 2018-09-04 DIAGNOSIS — C64.9 METASTATIC RENAL CELL CARCINOMA TO BONE: ICD-10-CM

## 2018-09-04 DIAGNOSIS — G89.3 PAIN, NEOPLASM-RELATED: ICD-10-CM

## 2018-09-04 RX ORDER — HYDROCODONE BITARTRATE AND ACETAMINOPHEN 5; 325 MG/1; MG/1
TABLET ORAL
Qty: 120 TABLET | Refills: 0 | Status: CANCELLED | OUTPATIENT
Start: 2018-09-04

## 2018-09-04 RX ORDER — HYDROCODONE BITARTRATE AND ACETAMINOPHEN 5; 325 MG/1; MG/1
1 TABLET ORAL EVERY 8 HOURS PRN
Qty: 50 TABLET | Refills: 0 | Status: SHIPPED | OUTPATIENT
Start: 2018-09-04 | End: 2019-02-13

## 2018-09-04 RX ORDER — MORPHINE SULFATE 15 MG/1
15 TABLET, FILM COATED, EXTENDED RELEASE ORAL 2 TIMES DAILY
Qty: 60 TABLET | Refills: 0 | Status: SHIPPED | OUTPATIENT
Start: 2018-09-04 | End: 2018-10-04 | Stop reason: SDUPTHER

## 2018-09-05 ENCOUNTER — PATIENT MESSAGE (OUTPATIENT)
Dept: HEMATOLOGY/ONCOLOGY | Facility: CLINIC | Age: 73
End: 2018-09-05

## 2018-09-05 ENCOUNTER — TELEPHONE (OUTPATIENT)
Dept: PHARMACY | Facility: CLINIC | Age: 73
End: 2018-09-05

## 2018-09-07 ENCOUNTER — TELEPHONE (OUTPATIENT)
Dept: HEMATOLOGY/ONCOLOGY | Facility: CLINIC | Age: 73
End: 2018-09-07

## 2018-09-07 NOTE — TELEPHONE ENCOUNTER
Call to pt.  Pt unavailable.   Left message for pt to return call.   Callback number provided.         ----- Message from Radha Phillips sent at 9/6/2018  7:47 AM CDT -----  Can you get patient on phone for me tomorrow

## 2018-09-07 NOTE — TELEPHONE ENCOUNTER
"        ----- Message from Rahel Reed MD sent at 9/7/2018  1:11 PM CDT -----  Called son  Left message    ----- Message -----  From: Radha Phillips  Sent: 9/7/2018   1:03 PM  To: Rahel Reed MD    Called pt to verify that okay for Dr. Reed to discuss health with pt's son who was requesting for information.   Pt stated that "okay to discuss; however, don't let him change any of his medications that he is on."   Thanked pt and informed pt would relay information over to Dr. Reed.   Pt verbalized understanding.       "

## 2018-09-13 ENCOUNTER — PATIENT MESSAGE (OUTPATIENT)
Dept: HEMATOLOGY/ONCOLOGY | Facility: CLINIC | Age: 73
End: 2018-09-13

## 2018-09-13 DIAGNOSIS — R14.0 BLOATING: ICD-10-CM

## 2018-09-13 RX ORDER — DICYCLOMINE HYDROCHLORIDE 10 MG/1
10 CAPSULE ORAL 4 TIMES DAILY
Qty: 120 CAPSULE | Refills: 0 | Status: SHIPPED | OUTPATIENT
Start: 2018-09-13 | End: 2018-12-12

## 2018-09-13 RX ORDER — DICYCLOMINE HYDROCHLORIDE 10 MG/1
10 CAPSULE ORAL 4 TIMES DAILY
Qty: 120 CAPSULE | Refills: 0 | Status: SHIPPED | OUTPATIENT
Start: 2018-09-13 | End: 2018-09-13 | Stop reason: SDUPTHER

## 2018-09-13 NOTE — TELEPHONE ENCOUNTER
Message sent to pt to see where would like rx fwd to.           ----- Message from Jes Fernandez sent at 9/13/2018  9:51 AM CDT -----  Pharmacy Calling    Reason for call:Medication on Back Order Needs to speak with nurse   Pharmacy Name:[facility]   Prescription Name:dicyclomine (BENTYL) 10 MG capsule  Phone Number:[phone]  Additional Information:  Thank You  KEANU Fernandez

## 2018-09-17 ENCOUNTER — OFFICE VISIT (OUTPATIENT)
Dept: HEMATOLOGY/ONCOLOGY | Facility: CLINIC | Age: 73
End: 2018-09-17
Payer: MEDICARE

## 2018-09-17 ENCOUNTER — LAB VISIT (OUTPATIENT)
Dept: LAB | Facility: HOSPITAL | Age: 73
End: 2018-09-17
Attending: INTERNAL MEDICINE
Payer: MEDICARE

## 2018-09-17 VITALS
HEART RATE: 58 BPM | OXYGEN SATURATION: 100 % | SYSTOLIC BLOOD PRESSURE: 147 MMHG | WEIGHT: 158.94 LBS | HEIGHT: 67 IN | DIASTOLIC BLOOD PRESSURE: 65 MMHG | BODY MASS INDEX: 24.94 KG/M2 | TEMPERATURE: 98 F | RESPIRATION RATE: 16 BRPM

## 2018-09-17 DIAGNOSIS — C78.7 METASTATIC RENAL CELL CARCINOMA TO LIVER: ICD-10-CM

## 2018-09-17 DIAGNOSIS — K52.1 DIARRHEA DUE TO DRUG: ICD-10-CM

## 2018-09-17 DIAGNOSIS — E78.00 HYPERCHOLESTEREMIA: ICD-10-CM

## 2018-09-17 DIAGNOSIS — C64.9 METASTATIC RENAL CELL CARCINOMA TO LIVER: ICD-10-CM

## 2018-09-17 DIAGNOSIS — R79.9 ABNORMAL FINDING OF BLOOD CHEMISTRY: ICD-10-CM

## 2018-09-17 DIAGNOSIS — C79.51 METASTATIC RENAL CELL CARCINOMA TO BONE: ICD-10-CM

## 2018-09-17 DIAGNOSIS — C64.2 RENAL CELL CARCINOMA OF LEFT KIDNEY: Primary | ICD-10-CM

## 2018-09-17 DIAGNOSIS — C64.9 METASTATIC RENAL CELL CARCINOMA TO BONE: ICD-10-CM

## 2018-09-17 DIAGNOSIS — E86.0 DEHYDRATION: ICD-10-CM

## 2018-09-17 DIAGNOSIS — G89.3 NEOPLASM RELATED PAIN: ICD-10-CM

## 2018-09-17 LAB
ALBUMIN SERPL BCP-MCNC: 3.2 G/DL
ALP SERPL-CCNC: 99 U/L
ALT SERPL W/O P-5'-P-CCNC: 21 U/L
ANION GAP SERPL CALC-SCNC: 7 MMOL/L
AST SERPL-CCNC: 21 U/L
BASOPHILS # BLD AUTO: 0.02 K/UL
BASOPHILS NFR BLD: 0.4 %
BILIRUB SERPL-MCNC: 0.8 MG/DL
BUN SERPL-MCNC: 31 MG/DL
CALCIUM SERPL-MCNC: 8.9 MG/DL
CHLORIDE SERPL-SCNC: 108 MMOL/L
CHOLEST SERPL-MCNC: 180 MG/DL
CHOLEST/HDLC SERPL: 5.3 {RATIO}
CO2 SERPL-SCNC: 25 MMOL/L
CREAT SERPL-MCNC: 1.4 MG/DL
DIFFERENTIAL METHOD: ABNORMAL
EOSINOPHIL # BLD AUTO: 0.2 K/UL
EOSINOPHIL NFR BLD: 3.9 %
ERYTHROCYTE [DISTWIDTH] IN BLOOD BY AUTOMATED COUNT: 13.7 %
EST. GFR  (AFRICAN AMERICAN): 57.2 ML/MIN/1.73 M^2
EST. GFR  (NON AFRICAN AMERICAN): 49.5 ML/MIN/1.73 M^2
GLUCOSE SERPL-MCNC: 139 MG/DL
HCT VFR BLD AUTO: 37.7 %
HDLC SERPL-MCNC: 34 MG/DL
HDLC SERPL: 18.9 %
HGB BLD-MCNC: 12.7 G/DL
IMM GRANULOCYTES # BLD AUTO: 0.01 K/UL
IMM GRANULOCYTES NFR BLD AUTO: 0.2 %
LDLC SERPL CALC-MCNC: 102.8 MG/DL
LYMPHOCYTES # BLD AUTO: 1.6 K/UL
LYMPHOCYTES NFR BLD: 29.9 %
MCH RBC QN AUTO: 35.1 PG
MCHC RBC AUTO-ENTMCNC: 33.7 G/DL
MCV RBC AUTO: 104 FL
MONOCYTES # BLD AUTO: 0.5 K/UL
MONOCYTES NFR BLD: 8.3 %
NEUTROPHILS # BLD AUTO: 3.1 K/UL
NEUTROPHILS NFR BLD: 57.3 %
NONHDLC SERPL-MCNC: 146 MG/DL
NRBC BLD-RTO: 0 /100 WBC
PLATELET # BLD AUTO: 301 K/UL
PMV BLD AUTO: 10 FL
POTASSIUM SERPL-SCNC: 4.1 MMOL/L
PROT SERPL-MCNC: 6.4 G/DL
RBC # BLD AUTO: 3.62 M/UL
SODIUM SERPL-SCNC: 140 MMOL/L
TRIGL SERPL-MCNC: 216 MG/DL
WBC # BLD AUTO: 5.41 K/UL

## 2018-09-17 PROCEDURE — 36415 COLL VENOUS BLD VENIPUNCTURE: CPT

## 2018-09-17 PROCEDURE — 80053 COMPREHEN METABOLIC PANEL: CPT

## 2018-09-17 PROCEDURE — 85025 COMPLETE CBC W/AUTO DIFF WBC: CPT

## 2018-09-17 PROCEDURE — 99999 PR PBB SHADOW E&M-EST. PATIENT-LVL V: CPT | Mod: PBBFAC,,, | Performed by: PHYSICIAN ASSISTANT

## 2018-09-17 PROCEDURE — 99214 OFFICE O/P EST MOD 30 MIN: CPT | Mod: S$PBB,,, | Performed by: PHYSICIAN ASSISTANT

## 2018-09-17 PROCEDURE — 1101F PT FALLS ASSESS-DOCD LE1/YR: CPT | Mod: CPTII,,, | Performed by: PHYSICIAN ASSISTANT

## 2018-09-17 PROCEDURE — 99215 OFFICE O/P EST HI 40 MIN: CPT | Mod: PBBFAC | Performed by: PHYSICIAN ASSISTANT

## 2018-09-17 PROCEDURE — 3077F SYST BP >= 140 MM HG: CPT | Mod: CPTII,,, | Performed by: PHYSICIAN ASSISTANT

## 2018-09-17 PROCEDURE — 80061 LIPID PANEL: CPT

## 2018-09-17 PROCEDURE — 3078F DIAST BP <80 MM HG: CPT | Mod: CPTII,,, | Performed by: PHYSICIAN ASSISTANT

## 2018-09-17 RX ORDER — RESVER/WINE/BFL/GRPSD/PC/C/POM 200MG-60MG
5000 CAPSULE ORAL DAILY
COMMUNITY
Start: 2018-07-20 | End: 2019-06-26 | Stop reason: SDUPTHER

## 2018-09-17 NOTE — PROGRESS NOTES
"Subjective:       Patient ID: Edwin Powell is a 73 y.o. male.    Chief Complaint: Metastatic renal cell carcinoma to liver    HPI      Mr. Singleton is a 73 y.o. male who returns for follow up regarding metastatic renal cell carcinoma of left kidney.  Returns for follow-up, currently on Votrient. Last imaging in 7/2018  showed stable disease.   Reports overall doing well  Tolerating Votrient with minimal side effects- manages diarrhea with medication  He continues to have back pain, no significant change. He takes hydrocodone as needed and Morphine Sulfate 15 mg if no relief from hydrocodone.   Notes fluid intake is not as much as it should be.     Eating well   Weight stable         Oncology History:  Mr.Mc Moss is a 74 yo male with CAD (s/p 5 stents, remotely), DM2 (insulin dependent), hypothyroidism , CVA (2013) with no residual deficit, chronic back pain. He was transferred to [facility] for neurosurgery evaluation of a lumbar spine lesion in the setting of progressive weakness of his legs and exacerbation of chronic back pain, in May 2017. He states that his back pain and LE weakness progressively worsened. [facility]. The pain radiates down his legs. He denies any incontinence of bowel or bladder. He had several falls. After one of the falls, he presented to the ED at [facility], in May 2017. He had an MRI showing "L3 vertebral body lesion with apparent cortical disruption concerning for metastases vs myeloma," and was transferred to [facility] for neurosurgery evaluation. He was evaluated by neurosurgery regarding concern for lytic lesion on L3 on 5/26/17. No surgical intervention was proposed.   CT chest, abdomen,pelvis on 5/27/17 revealed :   --A 3.6 cm exophytic mass arising from the medial aspect of the lower pole of left kidney,   --A small 1.0 cm, subtle, cortical enhancing mass within the upper pole of the right kidney, concerning for possible contralateral solid " renal mass  --Multiple enhancing ill-defined hepatic masses concerning for hepatic metastatic disease, hepatomegaly and hepatic steatosis  --Mulltifocal irregular thickening of the bilateral pleura concerning for possible pleural metastatic disease  --Mild nonspecific nodular thickening of the left adrenal gland without discrete nodule  -- Redemonstration of known lytic lesion within the L3 vertebral body. No definite additional discrete lytic lesions are identified.  -MRI pelvis from 10/18/17:    2.5 cm linear T1 hypointense T2/STIR hyperintense lesion within the left iliac bone, this lesion is indeterminate but felt less likely to represent metastasis given its appearance. Further evaluation can be obtained with bone scan as clinically indicated.  Findings suggestive of left trochanteric bursitis.  Partially visualized L3 metastatic lesion.  Diffuse thickening of the urinary bladder wall can be seen with chronic outlet obstruction or cystitis.         Review of Systems   Constitutional: Negative for appetite change and unexpected weight change.   HENT: Negative for congestion, mouth sores, rhinorrhea and trouble swallowing.    Eyes: Negative for visual disturbance.   Respiratory: Negative for cough and shortness of breath.    Cardiovascular: Negative for chest pain.   Gastrointestinal: Positive for diarrhea (controlled). Negative for abdominal pain.   Genitourinary: Positive for frequency.   Musculoskeletal: Positive for back pain (chronic and unchanged).        New pain along rib cage   Skin: Negative for rash.   Neurological: Negative for headaches.   Hematological: Negative for adenopathy.   Psychiatric/Behavioral: The patient is not nervous/anxious.        Objective:      Physical Exam   Constitutional: He is oriented to person, place, and time. He appears well-nourished. No distress.   Presents alone  ECOG 0   HENT:   Head: Normocephalic.   Mouth/Throat: Oropharynx is clear and moist. No oropharyngeal  exudate.   Eyes: Conjunctivae are normal. Pupils are equal, round, and reactive to light. No scleral icterus.   Neck: Normal range of motion. Neck supple. No thyromegaly present.   Cardiovascular: Normal rate, regular rhythm, normal heart sounds and intact distal pulses.   Pulmonary/Chest: Effort normal and breath sounds normal. No respiratory distress.   Lungs clear     Abdominal: Soft. Bowel sounds are normal. He exhibits no distension and no mass. There is no tenderness.   Musculoskeletal: Normal range of motion. He exhibits no edema.   No spinal or paraspinal tenderness to palpation     Lymphadenopathy:     He has no cervical adenopathy.   Neurological: He is alert and oriented to person, place, and time. No cranial nerve deficit.   Skin: Skin is warm and dry. No rash noted. No erythema. No pallor.   Psychiatric: He has a normal mood and affect. His behavior is normal. Thought content normal.   Vitals reviewed.      Assessment:       1. Renal cell carcinoma of left kidney    2. 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 Metastatic renal cell carcinoma to liver    4. Neoplasm related pain    5. Diarrhea due to drug    6. Dehydration        Plan:     1-3)continue Votrient.  RTC in one month with restaging CT C/A/P.  Fluids following CT due to renal function.  4)we discussed using MS Contin 15 mg on scheduled basis with [place] for breakthrough pain. He had appointment with pain management for further evaluation on 10/1/18.  5)Continue Imodium  6)slight decline in renal function today, likely from dehdyration; encouraged hydration    All questions answered to satisfaction of patient.    4)increase MS Contin to 30 mg BID and continue Norco 5/325 mg for breakthrough pain.  Will follow up by phone regarding bone scan results and patient will return to clinic in 4-6 wees with labs.

## 2018-09-17 NOTE — Clinical Note
vanessa in one month with cbc/cmp/lipid panel and CT C/A/P the day prior. Please schedule patient for 1 liter of fluids after CT

## 2018-09-17 NOTE — PROGRESS NOTES
Distress Screening Results: Psychosocial Distress screening score of Distress Score: 3 noted and reviewed. No intervention indicated.

## 2018-09-20 DIAGNOSIS — C64.9 METASTATIC RENAL CELL CARCINOMA TO LIVER: ICD-10-CM

## 2018-09-20 DIAGNOSIS — G89.3 NEOPLASM RELATED PAIN: ICD-10-CM

## 2018-09-20 DIAGNOSIS — N18.30 TYPE 2 DIABETES MELLITUS WITH STAGE 3 CHRONIC KIDNEY DISEASE, WITH LONG-TERM CURRENT USE OF INSULIN: Chronic | ICD-10-CM

## 2018-09-20 DIAGNOSIS — C64.9 METASTATIC RENAL CELL CARCINOMA TO BONE: ICD-10-CM

## 2018-09-20 DIAGNOSIS — G89.3 PAIN, NEOPLASM-RELATED: ICD-10-CM

## 2018-09-20 DIAGNOSIS — E78.5 HYPERLIPIDEMIA DUE TO TYPE 2 DIABETES MELLITUS: ICD-10-CM

## 2018-09-20 DIAGNOSIS — C79.51 METASTATIC RENAL CELL CARCINOMA TO BONE: ICD-10-CM

## 2018-09-20 DIAGNOSIS — E11.22 TYPE 2 DIABETES MELLITUS WITH STAGE 3 CHRONIC KIDNEY DISEASE, WITH LONG-TERM CURRENT USE OF INSULIN: Chronic | ICD-10-CM

## 2018-09-20 DIAGNOSIS — E11.69 HYPERLIPIDEMIA DUE TO TYPE 2 DIABETES MELLITUS: ICD-10-CM

## 2018-09-20 DIAGNOSIS — Z79.4 TYPE 2 DIABETES MELLITUS WITH STAGE 3 CHRONIC KIDNEY DISEASE, WITH LONG-TERM CURRENT USE OF INSULIN: Chronic | ICD-10-CM

## 2018-09-20 DIAGNOSIS — C78.7 METASTATIC RENAL CELL CARCINOMA TO LIVER: ICD-10-CM

## 2018-09-20 RX ORDER — MORPHINE SULFATE 15 MG/1
15 TABLET, FILM COATED, EXTENDED RELEASE ORAL 2 TIMES DAILY
Qty: 60 TABLET | Refills: 0 | Status: CANCELLED | OUTPATIENT
Start: 2018-09-20

## 2018-09-20 RX ORDER — HYDROCODONE BITARTRATE AND ACETAMINOPHEN 5; 325 MG/1; MG/1
1 TABLET ORAL EVERY 8 HOURS PRN
Qty: 50 TABLET | Refills: 0 | Status: CANCELLED | OUTPATIENT
Start: 2018-09-20

## 2018-09-20 RX ORDER — HYDROCODONE BITARTRATE AND ACETAMINOPHEN 5; 325 MG/1; MG/1
TABLET ORAL
Qty: 120 TABLET | Refills: 0 | Status: SHIPPED | OUTPATIENT
Start: 2018-09-20 | End: 2018-10-29 | Stop reason: SDUPTHER

## 2018-09-20 RX ORDER — ATORVASTATIN CALCIUM 40 MG/1
40 TABLET, FILM COATED ORAL DAILY
Qty: 90 TABLET | Refills: 3 | Status: SHIPPED | OUTPATIENT
Start: 2018-09-20 | End: 2018-09-24 | Stop reason: SDUPTHER

## 2018-09-21 DIAGNOSIS — Z79.4 TYPE 2 DIABETES MELLITUS WITH STAGE 3 CHRONIC KIDNEY DISEASE, WITH LONG-TERM CURRENT USE OF INSULIN: Chronic | ICD-10-CM

## 2018-09-21 DIAGNOSIS — E78.5 HYPERLIPIDEMIA DUE TO TYPE 2 DIABETES MELLITUS: ICD-10-CM

## 2018-09-21 DIAGNOSIS — E11.69 HYPERLIPIDEMIA DUE TO TYPE 2 DIABETES MELLITUS: ICD-10-CM

## 2018-09-21 DIAGNOSIS — N18.30 TYPE 2 DIABETES MELLITUS WITH STAGE 3 CHRONIC KIDNEY DISEASE, WITH LONG-TERM CURRENT USE OF INSULIN: Chronic | ICD-10-CM

## 2018-09-21 DIAGNOSIS — E11.22 TYPE 2 DIABETES MELLITUS WITH STAGE 3 CHRONIC KIDNEY DISEASE, WITH LONG-TERM CURRENT USE OF INSULIN: Chronic | ICD-10-CM

## 2018-09-24 ENCOUNTER — TELEPHONE (OUTPATIENT)
Dept: PRIMARY CARE CLINIC | Facility: CLINIC | Age: 73
End: 2018-09-24

## 2018-09-24 RX ORDER — ATORVASTATIN CALCIUM 40 MG/1
40 TABLET, FILM COATED ORAL DAILY
Qty: 90 TABLET | Refills: 3 | Status: SHIPPED | OUTPATIENT
Start: 2018-09-24 | End: 2018-12-12 | Stop reason: SDUPTHER

## 2018-09-25 ENCOUNTER — TELEPHONE (OUTPATIENT)
Dept: PHARMACY | Facility: CLINIC | Age: 73
End: 2018-09-25

## 2018-10-01 ENCOUNTER — OFFICE VISIT (OUTPATIENT)
Dept: PAIN MEDICINE | Facility: CLINIC | Age: 73
End: 2018-10-01
Attending: ANESTHESIOLOGY
Payer: MEDICARE

## 2018-10-01 ENCOUNTER — TELEPHONE (OUTPATIENT)
Dept: PAIN MEDICINE | Facility: CLINIC | Age: 73
End: 2018-10-01

## 2018-10-01 VITALS
DIASTOLIC BLOOD PRESSURE: 86 MMHG | HEIGHT: 67 IN | OXYGEN SATURATION: 99 % | SYSTOLIC BLOOD PRESSURE: 170 MMHG | BODY MASS INDEX: 25.74 KG/M2 | WEIGHT: 164 LBS | HEART RATE: 64 BPM | TEMPERATURE: 98 F | RESPIRATION RATE: 16 BRPM

## 2018-10-01 DIAGNOSIS — M54.50 CHRONIC RIGHT-SIDED LOW BACK PAIN WITHOUT SCIATICA: Primary | ICD-10-CM

## 2018-10-01 DIAGNOSIS — C64.2 RENAL CELL CARCINOMA OF LEFT KIDNEY: ICD-10-CM

## 2018-10-01 DIAGNOSIS — G89.29 CHRONIC RIGHT-SIDED LOW BACK PAIN WITHOUT SCIATICA: Primary | ICD-10-CM

## 2018-10-01 PROCEDURE — 1101F PT FALLS ASSESS-DOCD LE1/YR: CPT | Mod: CPTII,S$GLB,, | Performed by: ANESTHESIOLOGY

## 2018-10-01 PROCEDURE — 3079F DIAST BP 80-89 MM HG: CPT | Mod: CPTII,S$GLB,, | Performed by: ANESTHESIOLOGY

## 2018-10-01 PROCEDURE — 99204 OFFICE O/P NEW MOD 45 MIN: CPT | Mod: S$GLB,,, | Performed by: ANESTHESIOLOGY

## 2018-10-01 PROCEDURE — 3077F SYST BP >= 140 MM HG: CPT | Mod: CPTII,S$GLB,, | Performed by: ANESTHESIOLOGY

## 2018-10-01 RX ORDER — LORAZEPAM 1 MG/1
1 TABLET ORAL
Qty: 2 TABLET | Refills: 0 | Status: SHIPPED | OUTPATIENT
Start: 2018-10-01 | End: 2018-12-12

## 2018-10-01 NOTE — TELEPHONE ENCOUNTER
----- Message from Conner Flores III, MD sent at 10/1/2018  9:15 AM CDT -----  Yen,       Please schedule this patient for the MRI ordered today and have him follow up in [place] next Wednesday if possible.    Thanks    1018am - left detailed message regarding the MRI that I just scheduled per Dr. Flores.  MRI to be done Tuesday 10/2/18 at 945am and he should arrive 30m prior to appt.  It is scheduled her in [place].  A follow up appt will be scheduled also to review this imaging.  Pt was advised to call back with any further concerns or questions.

## 2018-10-01 NOTE — PROGRESS NOTES
Chronic Pain - New Consult    Referring Physician: Rahel Reed MD      Chief Complaint   Patient presents with    Back Pain        SUBJECTIVE:    Edwin Powell is a 72 y/o male with hx of renal cell carcinoma with liver mets who presents to the clinic for the evaluation of back pain. The pain started one year ago and symptoms have been worsening. The pain is located in the right low back/flank area overlying the posterior ribcage. He also complains of pain along the right lower thoracic paraspinal area. He denies radiation of pain into the lower extremities. The pain is described as aching and is rated as 9/10. The pain is rated with a score of  9/10 on the AVERAGE day and a score of 10/10 on the WORST day.  Symptoms interfere with daily activity. The pain is exacerbated by activity.  The pain is mitigated somewhat by pain medication (Norco and Morphine).  He does not like to take medication for pain and would like to discuss interventional options for his back pain.    Patient denies urinary incontinence, bowel incontinence, significant motor weakness and loss of sensations.    Physical Therapy/Home Exercise: no      Pain Disability Index Review:  Last 3 PDI Scores 10/1/2018   Pain Disability Index (PDI) 12       Pain Medications:    - Norco 5/325 daily as needed  - MS Contin 15 mg as needed     report:  Reviewed    Imaging:       NM Bone Scan (7/27/2018):    There is no scintigraphic evidence of metastatic disease.    MRI Thoracic (5/27/2017):    Findings:  The thoracic spine demonstrates proper alignment. The vertebral bodies show normal signal intensity and height with no indication of acute fracture or pathologic marrow replacement process.  Nonspecific area of low signal intensity on all imaging sequences T4, may represent that of sclerotic focus.  This corresponds with CT examination 5/27/17.  This lesion is indeterminate.  Additional Schmorl's nodes are identified throughout the thoracic spine. The  intervertebral disk spaces also appear well-maintained.     The demonstrated portion of the spinal cord is normal in signal intensity at all levels with no indication of myelomalacia or cord edema. No intradural signal abnormalities are apparent. Evaluation of the surrounding soft tissue structures reveals .    MRI Lumbar (5/26/2017):    The lumbar spinal alignment and vertebral body heights are satisfactorily preserved. There is loss of disc space height with degenerative endplate change and disc desiccation throughout the lumbar spine. The cord ends at L2. The terminal cord, conus, and cauda equina have a normal appearance. There is no intradural abnormality. There is a 2.3 x 2.2 cm T1 and T2 hypointense lesion within the L3 vertebral body off midline to the right with apparent cortical disruption which enhances following the administration of contrast. The visualized surrounding soft tissues and vascular structures are unremarkable.    L1-2: Facet hypertrophy with a mild disc bulge    L2-3: Facet hypertrophy with ligamentum flavum thickening.    L3-4: Facet hypertrophy with ligamentum flavum thickening and a disc bulge. The previously described osseous lesion within L3 abuts the exited right L3 nerve root and appears to narrow the right neural foramen.    L4-5: Facet hypertrophy with ligamentum flavum thickening and a disc bulge with a superimposed left paracentral protrusion which creates mild to moderate right-sided and moderate to severe left-sided neural foraminal stenosis.    L5-S1: Facet hypertrophy with a disc herniation which creates mild to moderate bilateral neural foraminal stenosis.        Past Medical History:   Diagnosis Date    Acquired hypothyroidism 5/26/2017    Benign essential HTN 5/26/2017    Benign prostatic hyperplasia (BPH) with urinary urge incontinence 6/6/2017    Chronic low back pain 6/1/2017    Coronary artery disease involving native coronary artery of native heart without angina  pectoris 5/26/2017    S/p 5 stents - last one around 2010-11.    Gastroesophageal reflux disease without esophagitis 5/26/2017    History of CVA (cerebrovascular accident) 5/26/2017    Hypertension associated with diabetes 5/26/2017    BP controlled on ACE, CCB    Lumbar disc lesion 5/26/2017    Metastatic renal cell carcinoma to bone 6/6/2017    Metastatic renal cell carcinoma to liver 6/6/2017    Liver Biopsy 5-2017: METASTATIC RENAL CELL CARCINOMA.    Mixed hyperlipidemia 5/26/2017    Type 2 diabetes mellitus with stage 3 chronic kidney disease, with long-term current use of insulin 5/26/2017     Past Surgical History:   Procedure Laterality Date    ABDOMINAL SURGERY      APPENDECTOMY      BACK SURGERY      CARDIAC SURGERY      CHOLECYSTECTOMY      coronary stenting      X 5 last one in 2010-11.    SPINAL FUSION       Social History     Socioeconomic History    Marital status: Single     Spouse name: Not on file    Number of children: Not on file    Years of education: Not on file    Highest education level: Not on file   Social Needs    Financial resource strain: Not on file    Food insecurity - worry: Not on file    Food insecurity - inability: Not on file    Transportation needs - medical: Not on file    Transportation needs - non-medical: Not on file   Occupational History    Not on file   Tobacco Use    Smoking status: Never Smoker    Smokeless tobacco: Never Used   Substance and Sexual Activity    Alcohol use: No    Drug use: No    Sexual activity: Not Currently     Partners: Female   Other Topics Concern    Not on file   Social History Narrative    Not on file     Family History   Problem Relation Age of Onset    Glaucoma Mother     Diabetes Mother     Diabetes Brother     Glaucoma Maternal Grandmother     No Known Problems Son     Melanoma Neg Hx        Review of patient's allergies indicates:  No Known Allergies    Current Outpatient Medications   Medication Sig     amLODIPine (NORVASC) 10 MG tablet TAKE 1 TABLET BY MOUTH ONCE DAILY.    aspirin (ECOTRIN) 81 MG EC tablet Take 81 mg by mouth once daily.    atorvastatin (LIPITOR) 40 MG tablet TAKE 1 TABLET (40 MG TOTAL) BY MOUTH ONCE DAILY.    b complex vitamins (B COMPLEX 1) tablet Take 1 tablet by mouth once daily.    B-complex with vitamin C (Z-BEC OR EQUIV) tablet     cholecalciferol, vitamin D3, (VITAMIN D3) 5,000 unit Tab Take 5,000 Units by mouth once daily.    clindamycin (CLEOCIN T) 1 % external solution AAA bid    dicyclomine (BENTYL) 10 MG capsule Take 1 capsule (10 mg total) by mouth 4 (four) times daily.    diphenoxylate-atropine 2.5-0.025 mg (LOMOTIL) 2.5-0.025 mg per tablet Take 2 tablets by mouth 4 (four) times daily as needed for Diarrhea.    doxycycline (MONODOX) 100 MG capsule Take 1 capsule (100 mg total) by mouth every 12 (twelve) hours.    HYDROcodone-acetaminophen (NORCO) 5-325 mg per tablet Take 1 tablet by mouth every 8 (eight) hours as needed for Pain.    HYDROcodone-acetaminophen (NORCO) 5-325 mg per tablet TAKE 1 TABLET BY MOUTH EVERY 8 (EIGHT) HOURS AS NEEDED FOR PAIN.    levothyroxine (SYNTHROID) 88 MCG tablet Take 1 tablet (88 mcg total) by mouth once daily.    lisinopril (PRINIVIL,ZESTRIL) 40 MG tablet TAKE 1 TABLET (40 MG TOTAL) BY MOUTH ONCE DAILY.    morphine (MS CONTIN) 15 MG 12 hr tablet Take 1 tablet (15 mg total) by mouth 2 (two) times daily.    multivitamin (THERAGRAN) per tablet Take 1 tablet by mouth once daily.    multivitamin with iron-mineral Tab     NARCAN 4 mg/actuation Spry     ondansetron (ZOFRAN) 8 MG tablet Take 1 tablet (8 mg total) by mouth 3 (three) times daily as needed.    PAZOpanib (VOTRIENT) 200 mg Tab Take 4 tablets (800mg total) by mouth once daily.    tamsulosin (FLOMAX) 0.4 mg Cp24 Take 2 capsules (0.8 mg total) by mouth every evening.    VITAMIN D3 5,000 unit Tab Take 5,000 Units by mouth once daily.    calcium carbonate-vitamin D3 (CALCIUM 600  "WITH VITAMIN D3) 600 mg(1,500mg) -400 unit Cap Take 1 capsule by mouth once.    LORazepam (ATIVAN) 1 MG tablet Take 1 tablet (1 mg total) by mouth On call Procedure for Anxiety (Take 1 tablet 1 hour prior to MRI. May repeat x 1 if needed.).     No current facility-administered medications for this visit.        REVIEW OF SYSTEMS:  GENERAL: No weight loss, malaise or fevers.  HEENT: Negative for frequent or significant headaches.  RESPIRATORY: Negative for wheezing or shortness of breath.  CARDIOVASCULAR: Negative for chest pain or palpitations.  GI: No blood in stools or black stools or change in bowel habits.  : hx of renal cell carcinoma  MUSCULOSKELETAL: See HPI  SKIN: Negative for rash or itching.  PSYCH: Negative for sleep disturbance, mood disorder and recent psychosocial stressors.    HEMATOLOGY/LYMPHOLOGY Negative for prolonged bleeding, bruising easily or swollen nodes.  NEURO:  No history of seizures or tremors.    OBJECTIVE:    BP (!) 170/86   Pulse 64   Temp 98.3 °F (36.8 °C)   Resp 16   Ht 5' 7" (1.702 m)   Wt 74.4 kg (164 lb)   SpO2 99%   BMI 25.69 kg/m²     PHYSICAL EXAMINATION:  GENERAL: Well appearing, in no acute distress.  PSYCH:  Mood and affect is appropriate.  Awake, alert, and oriented x 3.  SKIN: Skin color, texture, turgor normal, no rashes or lesions  HEENT: Normocephalic, atraumatic.  EOM intact.  CV: Radial pulses are 2+.  RESP:  Respirations are unlabored.  GI: Abdomen soft and non-tender.  MSK:  No atrophy or tone abnormalities are noted.      Neck: No pain with neck flexion, extension, or lateral rotation.  No obvious deformity or signs of trauma.  Normal cervical spine range of motion.    Back: Straight leg raising in the sitting and supine positions is negative for radicular pain. Tenderness to palpation over the lower thoracic and lumbar paraspinous muscles on the right. Tenderness to palpation over the inferior aspect of the posterior ribcage. Negative for pain with back " extension/rotation. Normal range of motion without pain reproduction.    Buttocks:  No pain to palpation over the PSIS.     Extremities:  Peripheral joint ROM is full and pain free without obvious instability or laxity in all four extremities. No edema or skin discolorations noted.     Gait:  Gait is normal    NEUR:  Bilateral lower extremity coordination and muscle stretch reflexes are physiologic and symmetric. Strength testing is 5/5 throughout all muscle groups in the upper and lower extremities. No loss of sensation is noted.     ASSESSMENT: 73 y.o. male with hx of metastatic renal cell carcinoma of the left kidney with chronic right-sided back/flank pain. NM Bone scan performed in July showed no evidence of metastatic disease.    1. Chronic right-sided low back pain without sciatica    2. Renal cell carcinoma of left kidney        PLAN:     - I have stressed the importance of physical activity and a home exercise plan to help with pain and improve health.  - Order MRI of the Thoracic and Lumbar spine to help further evaluate source of pain.  - RTC in 1 week.    The above plan and management options were discussed at length with patient. Patient is in agreement with the above and verbalized understanding. It will be communicated with the referring physician via electronic record, fax, or mail.    Conner Flores III  10/01/2018

## 2018-10-01 NOTE — TELEPHONE ENCOUNTER
----- Message from Conner Flores III, MD sent at 10/1/2018  9:15 AM CDT -----  Yen,       Please schedule this patient for the MRI ordered today and have him follow up in [place] next Wednesday if possible.    Thanks

## 2018-10-01 NOTE — LETTER
October 1, 2018      Rahel Reed MD  [address]  [facility] [address]           [facility]  [address]  [facility] [address]  Phone: [phone]  Fax: [phone]          Patient: Edwin Powell   MR Number: [id number]   YOB: 1945   Date of Visit: 10/1/2018       Dear Dr. Rahel Reed:    Thank you for referring Edwin Powell to me for evaluation. Attached you will find relevant portions of my assessment and plan of care.    If you have questions, please do not hesitate to call me. I look forward to following Edwin Powell along with you.    Sincerely,    Conner Flores III, MD    Enclosure  CC:  No Recipients    If you would like to receive this communication electronically, please contact [email or web address] or [phone] to request more information on [employer] access.    For providers and/or their staff who would like to refer a patient to [facility], please contact us through our one-stop-shop provider referral line, [facility], at [phone].    If you feel you have received this communication in error or would no longer like to receive these types of communications, please e-mail [email or web address]

## 2018-10-02 ENCOUNTER — TELEPHONE (OUTPATIENT)
Dept: PAIN MEDICINE | Facility: CLINIC | Age: 73
End: 2018-10-02

## 2018-10-02 NOTE — TELEPHONE ENCOUNTER
"----- Message from Arely Ochoa sent at 10/2/2018  9:33 AM CDT -----  Contact: Lary [phone]  Patient Returning Your Phone Call    1009am - patient is here for imaging and states he is supposed to have labwork today.  Per Dr. Flores, no orders were made for labs.  I ordered the 2 MRI's to have done today and pt would follow up on Wednesday with doctor to review said imaging.  Per Muna in RAD, patient became upset, used the "F" word and walked away.  I advised her that I would contact the patient and try to resolve this further.   "

## 2018-10-03 ENCOUNTER — TELEPHONE (OUTPATIENT)
Dept: PRIMARY CARE CLINIC | Facility: CLINIC | Age: 73
End: 2018-10-03

## 2018-10-04 ENCOUNTER — PATIENT MESSAGE (OUTPATIENT)
Dept: HEMATOLOGY/ONCOLOGY | Facility: CLINIC | Age: 73
End: 2018-10-04

## 2018-10-04 DIAGNOSIS — G89.3 PAIN, NEOPLASM-RELATED: ICD-10-CM

## 2018-10-04 RX ORDER — MORPHINE SULFATE 15 MG/1
15 TABLET, FILM COATED, EXTENDED RELEASE ORAL 2 TIMES DAILY
Qty: 60 TABLET | Refills: 0 | Status: SHIPPED | OUTPATIENT
Start: 2018-10-04 | End: 2018-10-29 | Stop reason: SDUPTHER

## 2018-10-10 ENCOUNTER — OFFICE VISIT (OUTPATIENT)
Dept: PAIN MEDICINE | Facility: CLINIC | Age: 73
End: 2018-10-10
Payer: MEDICARE

## 2018-10-10 VITALS — DIASTOLIC BLOOD PRESSURE: 84 MMHG | WEIGHT: 159.63 LBS | BODY MASS INDEX: 25 KG/M2 | SYSTOLIC BLOOD PRESSURE: 132 MMHG

## 2018-10-10 DIAGNOSIS — C64.2 RENAL CELL CARCINOMA OF LEFT KIDNEY: ICD-10-CM

## 2018-10-10 DIAGNOSIS — G89.29 CHRONIC RIGHT-SIDED LOW BACK PAIN WITHOUT SCIATICA: ICD-10-CM

## 2018-10-10 DIAGNOSIS — M47.816 LUMBAR SPONDYLOSIS: Primary | ICD-10-CM

## 2018-10-10 DIAGNOSIS — M54.50 CHRONIC RIGHT-SIDED LOW BACK PAIN WITHOUT SCIATICA: ICD-10-CM

## 2018-10-10 PROCEDURE — 99212 OFFICE O/P EST SF 10 MIN: CPT | Mod: PBBFAC,PN | Performed by: ANESTHESIOLOGY

## 2018-10-10 PROCEDURE — 3075F SYST BP GE 130 - 139MM HG: CPT | Mod: CPTII,,, | Performed by: ANESTHESIOLOGY

## 2018-10-10 PROCEDURE — 99999 PR PBB SHADOW E&M-EST. PATIENT-LVL II: CPT | Mod: PBBFAC,,, | Performed by: ANESTHESIOLOGY

## 2018-10-10 PROCEDURE — 3079F DIAST BP 80-89 MM HG: CPT | Mod: CPTII,,, | Performed by: ANESTHESIOLOGY

## 2018-10-10 PROCEDURE — 99214 OFFICE O/P EST MOD 30 MIN: CPT | Mod: S$PBB,,, | Performed by: ANESTHESIOLOGY

## 2018-10-10 PROCEDURE — 1101F PT FALLS ASSESS-DOCD LE1/YR: CPT | Mod: CPTII,,, | Performed by: ANESTHESIOLOGY

## 2018-10-10 NOTE — PROGRESS NOTES
Chronic Pain - Established    INTERVAL HISTORY (10/10/2018):    Edwin Powell presents to the clinic today for a follow-up appointment for back pain. Since the last visit, the back pain has been persistent. Current pain intensity is 9/10. Edwin is here today to review recent thoracic and lumbar MRI. He continues to experience pain in the right lower back and flank area. Today, his pain appears to be most pronounced along the right lumbar paraspinal area. The pain is made worse with activity. He denies radicular symptoms.       SUBJECTIVE:    Edwin Powell is a 74 y/o male with hx of renal cell carcinoma with liver mets who presents to the clinic for the evaluation of back pain. The pain started one year ago and symptoms have been worsening. The pain is located in the right low back/flank area overlying the posterior ribcage. He also complains of pain along the right lower thoracic paraspinal area. He denies radiation of pain into the lower extremities. The pain is described as aching and is rated as 9/10. The pain is rated with a score of  9/10 on the AVERAGE day and a score of 10/10 on the WORST day.  Symptoms interfere with daily activity. The pain is exacerbated by activity.  The pain is mitigated somewhat by pain medication (Norco and Morphine).  He does not like to take medication for pain and would like to discuss interventional options for his back pain.    Patient denies urinary incontinence, bowel incontinence, significant motor weakness and loss of sensations.    Physical Therapy/Home Exercise: no      Pain Disability Index Review:  Last 3 PDI Scores 10/10/2018 10/1/2018   Pain Disability Index (PDI) 57 12       Pain Medications:    - Norco 5/325 daily as needed  - MS Contin 15 mg as needed     report:  Reviewed    Imaging:       MRI Lumbar (10/8/2018):    FINDINGS:  Alignment: Normal.    Vertebrae: T1 hypointense, T2 hyperintense, enhancing lesion re-identified within the L3 vertebral body, unchanged  from prior study.  No compression fracture or epidural extension.  No measurable soft tissue component.    Discs: Multilevel disc desiccation with mild to moderate height loss.    Cord: Normal.  Conus terminates at L2.  Fatty filum terminale noted.    Degenerative findings:    T12-L1: No spinal canal stenosis or neural foraminal narrowing.    L1-L2: Circumferential disc bulge noted.  No spinal canal stenosis or neural foraminal narrowing.    L2-L3: Circumferential disc bulge and mild bilateral facet arthropathy noted.  No spinal canal stenosis or neural foraminal narrowing.    L3-L4: Circumferential disc bulge and moderate facet arthropathy result in mild spinal canal stenosis and mild bilateral neural foraminal narrowing.    L4-L5: Status post laminectomy.  Circumferential disc bulge and moderate bilateral facet arthrosis result in moderate right, severe left neural foraminal narrowing.    L5-S1: Status post laminectomy.  Circumferential disc bulge and mild bilateral facet arthrosis result in moderate bilateral neural foraminal narrowing.    Paraspinal muscles & soft tissues: Paraspinal muscle atrophy noted.      MRI Thoracic (10/8/2018):    FINDINGS:  Alignment: Normal.    Vertebrae: There is reidentification of low signal focus within the T4 vertebral body, unchanged, likely bone island.  There is a hemangioma within the T11 vertebral body.  No evidence for thoracic spine metastatic disease.    Discs: There is multilevel disc desiccation.    Cord: Normal morphology and signal.    Degenerative findings: No spinal canal stenosis or neural foraminal narrowing at any level.    Enhancement: No abnormal foci of enhancement within the spinal canal.    Paraspinal muscles & soft tissues: Unremarkable.      NM Bone Scan (7/27/2018):    There is no scintigraphic evidence of metastatic disease.    MRI Thoracic (5/27/2017):    Findings:  The thoracic spine demonstrates proper alignment. The vertebral bodies show normal signal  intensity and height with no indication of acute fracture or pathologic marrow replacement process.  Nonspecific area of low signal intensity on all imaging sequences T4, may represent that of sclerotic focus.  This corresponds with CT examination 5/27/17.  This lesion is indeterminate.  Additional Schmorl's nodes are identified throughout the thoracic spine. The intervertebral disk spaces also appear well-maintained.     The demonstrated portion of the spinal cord is normal in signal intensity at all levels with no indication of myelomalacia or cord edema. No intradural signal abnormalities are apparent. Evaluation of the surrounding soft tissue structures reveals .    MRI Lumbar (5/26/2017):    The lumbar spinal alignment and vertebral body heights are satisfactorily preserved. There is loss of disc space height with degenerative endplate change and disc desiccation throughout the lumbar spine. The cord ends at L2. The terminal cord, conus, and cauda equina have a normal appearance. There is no intradural abnormality. There is a 2.3 x 2.2 cm T1 and T2 hypointense lesion within the L3 vertebral body off midline to the right with apparent cortical disruption which enhances following the administration of contrast. The visualized surrounding soft tissues and vascular structures are unremarkable.    L1-2: Facet hypertrophy with a mild disc bulge    L2-3: Facet hypertrophy with ligamentum flavum thickening.    L3-4: Facet hypertrophy with ligamentum flavum thickening and a disc bulge. The previously described osseous lesion within L3 abuts the exited right L3 nerve root and appears to narrow the right neural foramen.    L4-5: Facet hypertrophy with ligamentum flavum thickening and a disc bulge with a superimposed left paracentral protrusion which creates mild to moderate right-sided and moderate to severe left-sided neural foraminal stenosis.    L5-S1: Facet hypertrophy with a disc herniation which creates mild to  moderate bilateral neural foraminal stenosis.        Past Medical History:   Diagnosis Date    Acquired hypothyroidism 5/26/2017    Benign essential HTN 5/26/2017    Benign prostatic hyperplasia (BPH) with urinary urge incontinence 6/6/2017    Chronic low back pain 6/1/2017    Coronary artery disease involving native coronary artery of native heart without angina pectoris 5/26/2017    S/p 5 stents - last one around 2010-11.    Gastroesophageal reflux disease without esophagitis 5/26/2017    History of CVA (cerebrovascular accident) 5/26/2017    Hypertension associated with diabetes 5/26/2017    BP controlled on ACE, CCB    Lumbar disc lesion 5/26/2017    Metastatic renal cell carcinoma to bone 6/6/2017    Metastatic renal cell carcinoma to liver 6/6/2017    Liver Biopsy 5-2017: METASTATIC RENAL CELL CARCINOMA.    Mixed hyperlipidemia 5/26/2017    Type 2 diabetes mellitus with stage 3 chronic kidney disease, with long-term current use of insulin 5/26/2017     Past Surgical History:   Procedure Laterality Date    ABDOMINAL SURGERY      APPENDECTOMY      BACK SURGERY      CARDIAC SURGERY      CHOLECYSTECTOMY      coronary stenting      X 5 last one in 2010-11.    SPINAL FUSION       Social History     Socioeconomic History    Marital status: Single     Spouse name: Not on file    Number of children: Not on file    Years of education: Not on file    Highest education level: Not on file   Social Needs    Financial resource strain: Not on file    Food insecurity - worry: Not on file    Food insecurity - inability: Not on file    Transportation needs - medical: Not on file    Transportation needs - non-medical: Not on file   Occupational History    Not on file   Tobacco Use    Smoking status: Never Smoker    Smokeless tobacco: Never Used   Substance and Sexual Activity    Alcohol use: No    Drug use: No    Sexual activity: Not Currently     Partners: Female   Other Topics Concern    Not  on file   Social History Narrative    Not on file     Family History   Problem Relation Age of Onset    Glaucoma Mother     Diabetes Mother     Diabetes Brother     Glaucoma Maternal Grandmother     No Known Problems Son     Melanoma Neg Hx        Review of patient's allergies indicates:  No Known Allergies    Current Outpatient Medications   Medication Sig    amLODIPine (NORVASC) 10 MG tablet TAKE 1 TABLET BY MOUTH ONCE DAILY.    aspirin (ECOTRIN) 81 MG EC tablet Take 81 mg by mouth once daily.    atorvastatin (LIPITOR) 40 MG tablet TAKE 1 TABLET (40 MG TOTAL) BY MOUTH ONCE DAILY.    b complex vitamins (B COMPLEX 1) tablet Take 1 tablet by mouth once daily.    B-complex with vitamin C (Z-BEC OR EQUIV) tablet     cholecalciferol, vitamin D3, (VITAMIN D3) 5,000 unit Tab Take 5,000 Units by mouth once daily.    clindamycin (CLEOCIN T) 1 % external solution AAA bid    dicyclomine (BENTYL) 10 MG capsule Take 1 capsule (10 mg total) by mouth 4 (four) times daily.    diphenoxylate-atropine 2.5-0.025 mg (LOMOTIL) 2.5-0.025 mg per tablet Take 2 tablets by mouth 4 (four) times daily as needed for Diarrhea.    doxycycline (MONODOX) 100 MG capsule Take 1 capsule (100 mg total) by mouth every 12 (twelve) hours.    HYDROcodone-acetaminophen (NORCO) 5-325 mg per tablet Take 1 tablet by mouth every 8 (eight) hours as needed for Pain.    levothyroxine (SYNTHROID) 88 MCG tablet Take 1 tablet (88 mcg total) by mouth once daily.    lisinopril (PRINIVIL,ZESTRIL) 40 MG tablet TAKE 1 TABLET (40 MG TOTAL) BY MOUTH ONCE DAILY.    LORazepam (ATIVAN) 1 MG tablet Take 1 tablet (1 mg total) by mouth On call Procedure for Anxiety (Take 1 tablet 1 hour prior to MRI. May repeat x 1 if needed.).    morphine (MS CONTIN) 15 MG 12 hr tablet Take 1 tablet (15 mg total) by mouth 2 (two) times daily.    multivitamin (THERAGRAN) per tablet Take 1 tablet by mouth once daily.    multivitamin with iron-mineral Tab     NARCAN 4  mg/actuation Spry     ondansetron (ZOFRAN) 8 MG tablet Take 1 tablet (8 mg total) by mouth 3 (three) times daily as needed.    PAZOpanib (VOTRIENT) 200 mg Tab Take 4 tablets (800mg total) by mouth once daily.    tamsulosin (FLOMAX) 0.4 mg Cp24 Take 2 capsules (0.8 mg total) by mouth every evening.    VITAMIN D3 5,000 unit Tab Take 5,000 Units by mouth once daily.    calcium carbonate-vitamin D3 (CALCIUM 600 WITH VITAMIN D3) 600 mg(1,500mg) -400 unit Cap Take 1 capsule by mouth once.    HYDROcodone-acetaminophen (NORCO) 5-325 mg per tablet TAKE 1 TABLET BY MOUTH EVERY 8 (EIGHT) HOURS AS NEEDED FOR PAIN.     No current facility-administered medications for this visit.        REVIEW OF SYSTEMS:  GENERAL: No weight loss, malaise or fevers.  HEENT: Negative for frequent or significant headaches.  RESPIRATORY: Negative for wheezing or shortness of breath.  CARDIOVASCULAR: Negative for chest pain or palpitations.  GI: No blood in stools or black stools or change in bowel habits.  : hx of renal cell carcinoma  MUSCULOSKELETAL: See HPI  SKIN: Negative for rash or itching.  PSYCH: Negative for mood disorder and recent psychosocial stressors.    HEMATOLOGY/LYMPHOLOGY Negative for prolonged bleeding, bruising easily or swollen nodes.  NEURO:  No history of seizures or tremors.    OBJECTIVE:    /84   Wt 72.4 kg (159 lb 9.8 oz)   BMI 25.00 kg/m²     PHYSICAL EXAMINATION:  GENERAL: Well appearing, in no acute distress.  PSYCH:  Mood and affect is appropriate.  Awake, alert, and oriented x 3.  SKIN: Skin color, texture, turgor normal, no rashes or lesions  HEENT: Normocephalic, atraumatic.  EOM intact.  CV: Radial pulses are 2+.  RESP:  Respirations are unlabored.  GI: Abdomen soft and non-tender.  MSK:  No atrophy or tone abnormalities are noted.      Neck: No pain with neck flexion, extension, or lateral rotation.  No obvious deformity or signs of trauma.  Normal cervical spine range of motion.    Back: Straight  leg raising in the sitting and supine positions is negative for radicular pain. Exquisite tenderness to palpation over the lumbar paraspinous muscles and facets on the right. + pain with back extension/rotation. Normal range of motion without pain reproduction.    Buttocks:  Pain to palpation over the PSIS, on the right.     Extremities:  Peripheral joint ROM is full and pain free without obvious instability or laxity in all four extremities. No edema or skin discolorations noted.     Gait:  Gait is normal    NEUR:  Strength testing is 5/5 throughout all muscle groups in the upper and lower extremities. No loss of sensation is noted.     ASSESSMENT: 73 y.o. male with hx of metastatic renal cell carcinoma of the left kidney with chronic right-sided low back/flank pain. MRI Lumbar and Thoracic spine show no evidence of new metastatic disease.    1. Lumbar spondylosis    2. Chronic right-sided low back pain without sciatica    3. Renal cell carcinoma of left kidney        PLAN:     - I have stressed the importance of physical activity and a home exercise plan to help with pain and improve health.  - Schedule for Right L3/4, L4/5 and L5/S1 medial branch blocks to determine if back pain is facet-mediated.  - RTC after procedure.    The above plan and management options were discussed at length with patient. Patient is in agreement with the above and verbalized understanding. It will be communicated with the referring physician via electronic record, fax, or mail.    Conner Flores III  10/10/2018

## 2018-10-10 NOTE — H&P (VIEW-ONLY)
Chronic Pain - Established    INTERVAL HISTORY (10/10/2018):    Edwin Powell presents to the clinic today for a follow-up appointment for back pain. Since the last visit, the back pain has been persistent. Current pain intensity is 9/10. Edwin is here today to review recent thoracic and lumbar MRI. He continues to experience pain in the right lower back and flank area. Today, his pain appears to be most pronounced along the right lumbar paraspinal area. The pain is made worse with activity. He denies radicular symptoms.       SUBJECTIVE:    Edwin Powell is a 72 y/o male with hx of renal cell carcinoma with liver mets who presents to the clinic for the evaluation of back pain. The pain started one year ago and symptoms have been worsening. The pain is located in the right low back/flank area overlying the posterior ribcage. He also complains of pain along the right lower thoracic paraspinal area. He denies radiation of pain into the lower extremities. The pain is described as aching and is rated as 9/10. The pain is rated with a score of  9/10 on the AVERAGE day and a score of 10/10 on the WORST day.  Symptoms interfere with daily activity. The pain is exacerbated by activity.  The pain is mitigated somewhat by pain medication (Norco and Morphine).  He does not like to take medication for pain and would like to discuss interventional options for his back pain.    Patient denies urinary incontinence, bowel incontinence, significant motor weakness and loss of sensations.    Physical Therapy/Home Exercise: no      Pain Disability Index Review:  Last 3 PDI Scores 10/10/2018 10/1/2018   Pain Disability Index (PDI) 57 12       Pain Medications:    - Norco 5/325 daily as needed  - MS Contin 15 mg as needed     report:  Reviewed    Imaging:       MRI Lumbar (10/8/2018):    FINDINGS:  Alignment: Normal.    Vertebrae: T1 hypointense, T2 hyperintense, enhancing lesion re-identified within the L3 vertebral body, unchanged  from prior study.  No compression fracture or epidural extension.  No measurable soft tissue component.    Discs: Multilevel disc desiccation with mild to moderate height loss.    Cord: Normal.  Conus terminates at L2.  Fatty filum terminale noted.    Degenerative findings:    T12-L1: No spinal canal stenosis or neural foraminal narrowing.    L1-L2: Circumferential disc bulge noted.  No spinal canal stenosis or neural foraminal narrowing.    L2-L3: Circumferential disc bulge and mild bilateral facet arthropathy noted.  No spinal canal stenosis or neural foraminal narrowing.    L3-L4: Circumferential disc bulge and moderate facet arthropathy result in mild spinal canal stenosis and mild bilateral neural foraminal narrowing.    L4-L5: Status post laminectomy.  Circumferential disc bulge and moderate bilateral facet arthrosis result in moderate right, severe left neural foraminal narrowing.    L5-S1: Status post laminectomy.  Circumferential disc bulge and mild bilateral facet arthrosis result in moderate bilateral neural foraminal narrowing.    Paraspinal muscles & soft tissues: Paraspinal muscle atrophy noted.      MRI Thoracic (10/8/2018):    FINDINGS:  Alignment: Normal.    Vertebrae: There is reidentification of low signal focus within the T4 vertebral body, unchanged, likely bone island.  There is a hemangioma within the T11 vertebral body.  No evidence for thoracic spine metastatic disease.    Discs: There is multilevel disc desiccation.    Cord: Normal morphology and signal.    Degenerative findings: No spinal canal stenosis or neural foraminal narrowing at any level.    Enhancement: No abnormal foci of enhancement within the spinal canal.    Paraspinal muscles & soft tissues: Unremarkable.      NM Bone Scan (7/27/2018):    There is no scintigraphic evidence of metastatic disease.    MRI Thoracic (5/27/2017):    Findings:  The thoracic spine demonstrates proper alignment. The vertebral bodies show normal signal  intensity and height with no indication of acute fracture or pathologic marrow replacement process.  Nonspecific area of low signal intensity on all imaging sequences T4, may represent that of sclerotic focus.  This corresponds with CT examination 5/27/17.  This lesion is indeterminate.  Additional Schmorl's nodes are identified throughout the thoracic spine. The intervertebral disk spaces also appear well-maintained.     The demonstrated portion of the spinal cord is normal in signal intensity at all levels with no indication of myelomalacia or cord edema. No intradural signal abnormalities are apparent. Evaluation of the surrounding soft tissue structures reveals .    MRI Lumbar (5/26/2017):    The lumbar spinal alignment and vertebral body heights are satisfactorily preserved. There is loss of disc space height with degenerative endplate change and disc desiccation throughout the lumbar spine. The cord ends at L2. The terminal cord, conus, and cauda equina have a normal appearance. There is no intradural abnormality. There is a 2.3 x 2.2 cm T1 and T2 hypointense lesion within the L3 vertebral body off midline to the right with apparent cortical disruption which enhances following the administration of contrast. The visualized surrounding soft tissues and vascular structures are unremarkable.    L1-2: Facet hypertrophy with a mild disc bulge    L2-3: Facet hypertrophy with ligamentum flavum thickening.    L3-4: Facet hypertrophy with ligamentum flavum thickening and a disc bulge. The previously described osseous lesion within L3 abuts the exited right L3 nerve root and appears to narrow the right neural foramen.    L4-5: Facet hypertrophy with ligamentum flavum thickening and a disc bulge with a superimposed left paracentral protrusion which creates mild to moderate right-sided and moderate to severe left-sided neural foraminal stenosis.    L5-S1: Facet hypertrophy with a disc herniation which creates mild to  moderate bilateral neural foraminal stenosis.        Past Medical History:   Diagnosis Date    Acquired hypothyroidism 5/26/2017    Benign essential HTN 5/26/2017    Benign prostatic hyperplasia (BPH) with urinary urge incontinence 6/6/2017    Chronic low back pain 6/1/2017    Coronary artery disease involving native coronary artery of native heart without angina pectoris 5/26/2017    S/p 5 stents - last one around 2010-11.    Gastroesophageal reflux disease without esophagitis 5/26/2017    History of CVA (cerebrovascular accident) 5/26/2017    Hypertension associated with diabetes 5/26/2017    BP controlled on ACE, CCB    Lumbar disc lesion 5/26/2017    Metastatic renal cell carcinoma to bone 6/6/2017    Metastatic renal cell carcinoma to liver 6/6/2017    Liver Biopsy 5-2017: METASTATIC RENAL CELL CARCINOMA.    Mixed hyperlipidemia 5/26/2017    Type 2 diabetes mellitus with stage 3 chronic kidney disease, with long-term current use of insulin 5/26/2017     Past Surgical History:   Procedure Laterality Date    ABDOMINAL SURGERY      APPENDECTOMY      BACK SURGERY      CARDIAC SURGERY      CHOLECYSTECTOMY      coronary stenting      X 5 last one in 2010-11.    SPINAL FUSION       Social History     Socioeconomic History    Marital status: Single     Spouse name: Not on file    Number of children: Not on file    Years of education: Not on file    Highest education level: Not on file   Social Needs    Financial resource strain: Not on file    Food insecurity - worry: Not on file    Food insecurity - inability: Not on file    Transportation needs - medical: Not on file    Transportation needs - non-medical: Not on file   Occupational History    Not on file   Tobacco Use    Smoking status: Never Smoker    Smokeless tobacco: Never Used   Substance and Sexual Activity    Alcohol use: No    Drug use: No    Sexual activity: Not Currently     Partners: Female   Other Topics Concern    Not  on file   Social History Narrative    Not on file     Family History   Problem Relation Age of Onset    Glaucoma Mother     Diabetes Mother     Diabetes Brother     Glaucoma Maternal Grandmother     No Known Problems Son     Melanoma Neg Hx        Review of patient's allergies indicates:  No Known Allergies    Current Outpatient Medications   Medication Sig    amLODIPine (NORVASC) 10 MG tablet TAKE 1 TABLET BY MOUTH ONCE DAILY.    aspirin (ECOTRIN) 81 MG EC tablet Take 81 mg by mouth once daily.    atorvastatin (LIPITOR) 40 MG tablet TAKE 1 TABLET (40 MG TOTAL) BY MOUTH ONCE DAILY.    b complex vitamins (B COMPLEX 1) tablet Take 1 tablet by mouth once daily.    B-complex with vitamin C (Z-BEC OR EQUIV) tablet     cholecalciferol, vitamin D3, (VITAMIN D3) 5,000 unit Tab Take 5,000 Units by mouth once daily.    clindamycin (CLEOCIN T) 1 % external solution AAA bid    dicyclomine (BENTYL) 10 MG capsule Take 1 capsule (10 mg total) by mouth 4 (four) times daily.    diphenoxylate-atropine 2.5-0.025 mg (LOMOTIL) 2.5-0.025 mg per tablet Take 2 tablets by mouth 4 (four) times daily as needed for Diarrhea.    doxycycline (MONODOX) 100 MG capsule Take 1 capsule (100 mg total) by mouth every 12 (twelve) hours.    HYDROcodone-acetaminophen (NORCO) 5-325 mg per tablet Take 1 tablet by mouth every 8 (eight) hours as needed for Pain.    levothyroxine (SYNTHROID) 88 MCG tablet Take 1 tablet (88 mcg total) by mouth once daily.    lisinopril (PRINIVIL,ZESTRIL) 40 MG tablet TAKE 1 TABLET (40 MG TOTAL) BY MOUTH ONCE DAILY.    LORazepam (ATIVAN) 1 MG tablet Take 1 tablet (1 mg total) by mouth On call Procedure for Anxiety (Take 1 tablet 1 hour prior to MRI. May repeat x 1 if needed.).    morphine (MS CONTIN) 15 MG 12 hr tablet Take 1 tablet (15 mg total) by mouth 2 (two) times daily.    multivitamin (THERAGRAN) per tablet Take 1 tablet by mouth once daily.    multivitamin with iron-mineral Tab     NARCAN 4  mg/actuation Spry     ondansetron (ZOFRAN) 8 MG tablet Take 1 tablet (8 mg total) by mouth 3 (three) times daily as needed.    PAZOpanib (VOTRIENT) 200 mg Tab Take 4 tablets (800mg total) by mouth once daily.    tamsulosin (FLOMAX) 0.4 mg Cp24 Take 2 capsules (0.8 mg total) by mouth every evening.    VITAMIN D3 5,000 unit Tab Take 5,000 Units by mouth once daily.    calcium carbonate-vitamin D3 (CALCIUM 600 WITH VITAMIN D3) 600 mg(1,500mg) -400 unit Cap Take 1 capsule by mouth once.    HYDROcodone-acetaminophen (NORCO) 5-325 mg per tablet TAKE 1 TABLET BY MOUTH EVERY 8 (EIGHT) HOURS AS NEEDED FOR PAIN.     No current facility-administered medications for this visit.        REVIEW OF SYSTEMS:  GENERAL: No weight loss, malaise or fevers.  HEENT: Negative for frequent or significant headaches.  RESPIRATORY: Negative for wheezing or shortness of breath.  CARDIOVASCULAR: Negative for chest pain or palpitations.  GI: No blood in stools or black stools or change in bowel habits.  : hx of renal cell carcinoma  MUSCULOSKELETAL: See HPI  SKIN: Negative for rash or itching.  PSYCH: Negative for mood disorder and recent psychosocial stressors.    HEMATOLOGY/LYMPHOLOGY Negative for prolonged bleeding, bruising easily or swollen nodes.  NEURO:  No history of seizures or tremors.    OBJECTIVE:    /84   Wt 72.4 kg (159 lb 9.8 oz)   BMI 25.00 kg/m²     PHYSICAL EXAMINATION:  GENERAL: Well appearing, in no acute distress.  PSYCH:  Mood and affect is appropriate.  Awake, alert, and oriented x 3.  SKIN: Skin color, texture, turgor normal, no rashes or lesions  HEENT: Normocephalic, atraumatic.  EOM intact.  CV: Radial pulses are 2+.  RESP:  Respirations are unlabored.  GI: Abdomen soft and non-tender.  MSK:  No atrophy or tone abnormalities are noted.      Neck: No pain with neck flexion, extension, or lateral rotation.  No obvious deformity or signs of trauma.  Normal cervical spine range of motion.    Back: Straight  leg raising in the sitting and supine positions is negative for radicular pain. Exquisite tenderness to palpation over the lumbar paraspinous muscles and facets on the right. + pain with back extension/rotation. Normal range of motion without pain reproduction.    Buttocks:  Pain to palpation over the PSIS, on the right.     Extremities:  Peripheral joint ROM is full and pain free without obvious instability or laxity in all four extremities. No edema or skin discolorations noted.     Gait:  Gait is normal    NEUR:  Strength testing is 5/5 throughout all muscle groups in the upper and lower extremities. No loss of sensation is noted.     ASSESSMENT: 73 y.o. male with hx of metastatic renal cell carcinoma of the left kidney with chronic right-sided low back/flank pain. MRI Lumbar and Thoracic spine show no evidence of new metastatic disease.    1. Lumbar spondylosis    2. Chronic right-sided low back pain without sciatica    3. Renal cell carcinoma of left kidney        PLAN:     - I have stressed the importance of physical activity and a home exercise plan to help with pain and improve health.  - Schedule for Right L3/4, L4/5 and L5/S1 medial branch blocks to determine if back pain is facet-mediated.  - RTC after procedure.    The above plan and management options were discussed at length with patient. Patient is in agreement with the above and verbalized understanding. It will be communicated with the referring physician via electronic record, fax, or mail.    Conner Flores III  10/10/2018

## 2018-10-16 ENCOUNTER — LAB VISIT (OUTPATIENT)
Dept: LAB | Facility: HOSPITAL | Age: 73
End: 2018-10-16
Attending: INTERNAL MEDICINE
Payer: MEDICARE

## 2018-10-16 ENCOUNTER — INFUSION (OUTPATIENT)
Dept: INFUSION THERAPY | Facility: HOSPITAL | Age: 73
End: 2018-10-16
Attending: INTERNAL MEDICINE
Payer: MEDICARE

## 2018-10-16 ENCOUNTER — HOSPITAL ENCOUNTER (OUTPATIENT)
Dept: RADIOLOGY | Facility: HOSPITAL | Age: 73
Discharge: HOME | End: 2018-10-16
Attending: PHYSICIAN ASSISTANT
Payer: MEDICARE

## 2018-10-16 VITALS — DIASTOLIC BLOOD PRESSURE: 65 MMHG | HEART RATE: 81 BPM | RESPIRATION RATE: 18 BRPM | SYSTOLIC BLOOD PRESSURE: 139 MMHG

## 2018-10-16 DIAGNOSIS — C64.9 RENAL CELL CARCINOMA: Primary | ICD-10-CM

## 2018-10-16 DIAGNOSIS — C64.2 RENAL CELL CARCINOMA OF LEFT KIDNEY: ICD-10-CM

## 2018-10-16 DIAGNOSIS — E78.00 HYPERCHOLESTEREMIA: ICD-10-CM

## 2018-10-16 LAB
ALBUMIN SERPL BCP-MCNC: 3.2 G/DL
ALP SERPL-CCNC: 88 U/L
ALT SERPL W/O P-5'-P-CCNC: 15 U/L
ANION GAP SERPL CALC-SCNC: 6 MMOL/L
AST SERPL-CCNC: 24 U/L
BILIRUB SERPL-MCNC: 0.5 MG/DL
BUN SERPL-MCNC: 30 MG/DL
CALCIUM SERPL-MCNC: 9 MG/DL
CHLORIDE SERPL-SCNC: 109 MMOL/L
CHOLEST SERPL-MCNC: 187 MG/DL
CHOLEST/HDLC SERPL: 6 {RATIO}
CO2 SERPL-SCNC: 22 MMOL/L
CREAT SERPL-MCNC: 1.4 MG/DL
ERYTHROCYTE [DISTWIDTH] IN BLOOD BY AUTOMATED COUNT: 13.8 %
EST. GFR  (AFRICAN AMERICAN): 57.2 ML/MIN/1.73 M^2
EST. GFR  (NON AFRICAN AMERICAN): 49.5 ML/MIN/1.73 M^2
GLUCOSE SERPL-MCNC: 116 MG/DL
HCT VFR BLD AUTO: 39.2 %
HDLC SERPL-MCNC: 31 MG/DL
HDLC SERPL: 16.6 %
HGB BLD-MCNC: 13.1 G/DL
IMM GRANULOCYTES # BLD AUTO: 0.02 K/UL
LDLC SERPL CALC-MCNC: 97 MG/DL
MCH RBC QN AUTO: 35.8 PG
MCHC RBC AUTO-ENTMCNC: 33.4 G/DL
MCV RBC AUTO: 107 FL
NEUTROPHILS # BLD AUTO: 2.9 K/UL
NONHDLC SERPL-MCNC: 156 MG/DL
PLATELET # BLD AUTO: 332 K/UL
PMV BLD AUTO: 9.9 FL
POTASSIUM SERPL-SCNC: 4.5 MMOL/L
PROT SERPL-MCNC: 6.2 G/DL
RBC # BLD AUTO: 3.66 M/UL
SODIUM SERPL-SCNC: 137 MMOL/L
TRIGL SERPL-MCNC: 295 MG/DL
WBC # BLD AUTO: 5.79 K/UL

## 2018-10-16 PROCEDURE — 74178 CT ABD&PLV WO CNTR FLWD CNTR: CPT | Mod: TC

## 2018-10-16 PROCEDURE — 96360 HYDRATION IV INFUSION INIT: CPT

## 2018-10-16 PROCEDURE — 25000003 PHARM REV CODE 250: Performed by: PHYSICIAN ASSISTANT

## 2018-10-16 PROCEDURE — 74178 CT ABD&PLV WO CNTR FLWD CNTR: CPT | Mod: 26,,, | Performed by: RADIOLOGY

## 2018-10-16 PROCEDURE — 36415 COLL VENOUS BLD VENIPUNCTURE: CPT

## 2018-10-16 PROCEDURE — 80061 LIPID PANEL: CPT

## 2018-10-16 PROCEDURE — 85027 COMPLETE CBC AUTOMATED: CPT

## 2018-10-16 PROCEDURE — 71260 CT THORAX DX C+: CPT | Mod: 26,,, | Performed by: RADIOLOGY

## 2018-10-16 PROCEDURE — 80053 COMPREHEN METABOLIC PANEL: CPT

## 2018-10-16 PROCEDURE — 25500020 PHARM REV CODE 255: Performed by: PHYSICIAN ASSISTANT

## 2018-10-16 RX ADMIN — SODIUM CHLORIDE 1000 ML: 0.9 INJECTION, SOLUTION INTRAVENOUS at 01:10

## 2018-10-16 RX ADMIN — IOHEXOL 75 ML: 350 INJECTION, SOLUTION INTRAVENOUS at 11:10

## 2018-10-16 NOTE — PLAN OF CARE
Problem: Patient Care Overview (Adult)  Goal: Plan of Care Review  Outcome: Ongoing (interventions implemented as appropriate)  Pt tolerated 1L NS well with no complaints.  VSS. NAD.

## 2018-10-17 ENCOUNTER — OFFICE VISIT (OUTPATIENT)
Dept: HEMATOLOGY/ONCOLOGY | Facility: CLINIC | Age: 73
End: 2018-10-17
Payer: MEDICARE

## 2018-10-17 ENCOUNTER — IMMUNIZATION (OUTPATIENT)
Dept: INTERNAL MEDICINE | Facility: CLINIC | Age: 73
End: 2018-10-17
Payer: MEDICARE

## 2018-10-17 ENCOUNTER — OFFICE VISIT (OUTPATIENT)
Dept: PRIMARY CARE CLINIC | Facility: CLINIC | Age: 73
End: 2018-10-17
Payer: MEDICARE

## 2018-10-17 VITALS
WEIGHT: 165.38 LBS | TEMPERATURE: 98 F | SYSTOLIC BLOOD PRESSURE: 198 MMHG | RESPIRATION RATE: 20 BRPM | HEART RATE: 54 BPM | BODY MASS INDEX: 25.9 KG/M2 | DIASTOLIC BLOOD PRESSURE: 79 MMHG

## 2018-10-17 VITALS
OXYGEN SATURATION: 98 % | WEIGHT: 166.44 LBS | BODY MASS INDEX: 26.12 KG/M2 | SYSTOLIC BLOOD PRESSURE: 146 MMHG | HEIGHT: 67 IN | DIASTOLIC BLOOD PRESSURE: 60 MMHG | HEART RATE: 55 BPM

## 2018-10-17 DIAGNOSIS — C79.51 METASTATIC RENAL CELL CARCINOMA TO BONE: Primary | ICD-10-CM

## 2018-10-17 DIAGNOSIS — Z79.4 TYPE 2 DIABETES MELLITUS WITH STAGE 3 CHRONIC KIDNEY DISEASE, WITH LONG-TERM CURRENT USE OF INSULIN: Chronic | ICD-10-CM

## 2018-10-17 DIAGNOSIS — C64.2 RENAL CELL CARCINOMA OF LEFT KIDNEY: ICD-10-CM

## 2018-10-17 DIAGNOSIS — E11.22 TYPE 2 DIABETES MELLITUS WITH STAGE 3 CHRONIC KIDNEY DISEASE, WITH LONG-TERM CURRENT USE OF INSULIN: Chronic | ICD-10-CM

## 2018-10-17 DIAGNOSIS — C64.9 METASTATIC RENAL CELL CARCINOMA TO BONE: Primary | ICD-10-CM

## 2018-10-17 DIAGNOSIS — D53.9 MACROCYTIC ANEMIA: ICD-10-CM

## 2018-10-17 DIAGNOSIS — C78.7 METASTATIC RENAL CELL CARCINOMA TO LIVER: ICD-10-CM

## 2018-10-17 DIAGNOSIS — N18.30 TYPE 2 DIABETES MELLITUS WITH STAGE 3 CHRONIC KIDNEY DISEASE, WITH LONG-TERM CURRENT USE OF INSULIN: Chronic | ICD-10-CM

## 2018-10-17 DIAGNOSIS — R79.9 ABNORMAL FINDING OF BLOOD CHEMISTRY: ICD-10-CM

## 2018-10-17 DIAGNOSIS — E55.9 VITAMIN D DEFICIENCY: ICD-10-CM

## 2018-10-17 DIAGNOSIS — C64.9 METASTATIC RENAL CELL CARCINOMA TO LIVER: ICD-10-CM

## 2018-10-17 DIAGNOSIS — G89.3 NEOPLASM RELATED PAIN: ICD-10-CM

## 2018-10-17 DIAGNOSIS — M47.816 FACET ARTHRITIS OF LUMBAR REGION: ICD-10-CM

## 2018-10-17 PROCEDURE — 90662 IIV NO PRSV INCREASED AG IM: CPT | Mod: PBBFAC

## 2018-10-17 PROCEDURE — 3078F DIAST BP <80 MM HG: CPT | Mod: CPTII,,, | Performed by: INTERNAL MEDICINE

## 2018-10-17 PROCEDURE — 99214 OFFICE O/P EST MOD 30 MIN: CPT | Mod: S$PBB,,, | Performed by: INTERNAL MEDICINE

## 2018-10-17 PROCEDURE — 99213 OFFICE O/P EST LOW 20 MIN: CPT | Mod: PBBFAC | Performed by: INTERNAL MEDICINE

## 2018-10-17 PROCEDURE — 1101F PT FALLS ASSESS-DOCD LE1/YR: CPT | Mod: CPTII,,, | Performed by: INTERNAL MEDICINE

## 2018-10-17 PROCEDURE — 3044F HG A1C LEVEL LT 7.0%: CPT | Mod: CPTII,S$GLB,, | Performed by: INTERNAL MEDICINE

## 2018-10-17 PROCEDURE — 1101F PT FALLS ASSESS-DOCD LE1/YR: CPT | Mod: CPTII,S$GLB,, | Performed by: INTERNAL MEDICINE

## 2018-10-17 PROCEDURE — 99215 OFFICE O/P EST HI 40 MIN: CPT | Mod: S$GLB,,, | Performed by: INTERNAL MEDICINE

## 2018-10-17 PROCEDURE — 3077F SYST BP >= 140 MM HG: CPT | Mod: CPTII,,, | Performed by: INTERNAL MEDICINE

## 2018-10-17 PROCEDURE — 3078F DIAST BP <80 MM HG: CPT | Mod: CPTII,S$GLB,, | Performed by: INTERNAL MEDICINE

## 2018-10-17 PROCEDURE — 3077F SYST BP >= 140 MM HG: CPT | Mod: CPTII,S$GLB,, | Performed by: INTERNAL MEDICINE

## 2018-10-17 PROCEDURE — 99999 PR PBB SHADOW E&M-EST. PATIENT-LVL III: CPT | Mod: PBBFAC,,, | Performed by: INTERNAL MEDICINE

## 2018-10-17 NOTE — PROGRESS NOTES
"Primary Care Provider Appointment    Subjective:      Patient ID: Edwin Powell is a 73 y.o. male with DM, RCC, GERD, malnutrition    Chief Complaint: Diabetes and Follow-up    Patient seen by Heme-Onc today. His imaging is "stable to improved (in liver)" while on Votrient. Dr Reed plans to continue.     He plans to undergo an VARGHESE on Friday (Right L3/4, L4/5 and L5/S1 medial branch blocks to determine if back pain is facet-mediated). Pain not well-controlled on chronic narcotics. He states he doesn't take his narcotics frequently. 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 "I try to do some walking." He regularly refuses physical therapy.    He is compliant with Vit D supplement D3 5000U daily. Has B12 deficiency, received a B12 injection at last appointment, and takes Bcomplex oral daily.    A1c is well-controlled with weight loss. CKD worsening.    Past Surgical History:   Procedure Laterality Date    ABDOMINAL SURGERY      APPENDECTOMY      BACK SURGERY      CARDIAC SURGERY      CHOLECYSTECTOMY      coronary stenting      X 5 last one in 2010-11.    SPINAL FUSION         Past Medical History:   Diagnosis Date    Acquired hypothyroidism 5/26/2017    Benign essential HTN 5/26/2017    Benign prostatic hyperplasia (BPH) with urinary urge incontinence 6/6/2017    Chronic low back pain 6/1/2017    Coronary artery disease involving native coronary artery of native heart without angina pectoris 5/26/2017    S/p 5 stents - last one around 2010-11.    Gastroesophageal reflux disease without esophagitis 5/26/2017    History of CVA (cerebrovascular accident) 5/26/2017    Hypertension associated with diabetes 5/26/2017    BP controlled on ACE, CCB    Lumbar disc lesion 5/26/2017    Metastatic renal cell carcinoma to bone 6/6/2017    Metastatic renal cell carcinoma to liver 6/6/2017    Liver Biopsy 5-2017: METASTATIC RENAL CELL CARCINOMA.    Mixed hyperlipidemia 5/26/2017    Type 2 diabetes " "mellitus with stage 3 chronic kidney disease, with long-term current use of insulin 5/26/2017       Review of Systems   Constitutional: Negative for activity change and unexpected weight change.   HENT: Positive for hearing loss. Negative for trouble swallowing.    Gastrointestinal: Positive for diarrhea. Negative for blood in stool, constipation and vomiting.   Endocrine: Positive for polyuria. Negative for polydipsia.   Genitourinary: Positive for difficulty urinating. Negative for urgency.   Musculoskeletal: Positive for arthralgias. Negative for joint swelling and neck pain.   Neurological: Positive for weakness. Negative for headaches.   Psychiatric/Behavioral: Negative for confusion and dysphoric mood.       Objective:   BP (!) 146/60 (BP Location: Left arm, Patient Position: Sitting, BP Method: Medium (Manual))   Pulse (!) 55   Ht 5' 7" (1.702 m)   Wt 75.5 kg (166 lb 7.2 oz)   SpO2 98%   BMI 26.07 kg/m²     Physical Exam   Constitutional: He is oriented to person, place, and time. He appears well-developed and well-nourished.   Ambulating with cane   HENT:   Head: Normocephalic.   edentulous   Eyes: Pupils are equal, round, and reactive to light.   Neck:   Decreased ROM in neck    Pulmonary/Chest: Effort normal.   Musculoskeletal: Normal range of motion.   Neurological: He is alert and oriented to person, place, and time.   Skin: Skin is warm.   Ecchymoses and purpura bilaterally UE  Telangiectasias on face and chest  Multiple tattoos on UE  Numerous red papules on neck  Fair skin   Psychiatric: He has a normal mood and affect.       Lab Results   Component Value Date    WBC 5.79 10/16/2018    HGB 13.1 (L) 10/16/2018    HCT 39.2 (L) 10/16/2018     10/16/2018    CHOL 187 10/16/2018    TRIG 295 (H) 10/16/2018    HDL 31 (L) 10/16/2018    ALT 15 10/16/2018    AST 24 10/16/2018     10/16/2018    K 4.5 10/16/2018     10/16/2018    CREATININE 1.4 10/16/2018    BUN 30 (H) 10/16/2018    CO2 22 " (L) 10/16/2018    TSH 3.530 07/16/2018    PSA 1.1 07/16/2018    INR 1.2 06/12/2017    HGBA1C 6.2 (H) 07/16/2018         Assessment:   73 y.o. male with multiple co-morbid illnesses here to continue work-up of chronic issues notably DM, RCC, GERD, malnutrition     Plan:     Problem List Items Addressed This Visit        Oncology    Renal cell carcinoma of left kidney     Metastatic renal cell CA (Stage IV - T1,Nx,M1). Followed by Heme-Onc (Dr Reed) radiation therapy to L3 spine - 3 Gy/Fx x 10 fx and pazopanib 800 mg PO daily.  · F/U Heme-Onc  · restaging CTs q3-4 mos  · Continue pazopanib 800mg  · Completed Xgeva for bone mets  · Cotninue Ca and Vit D         Macrocytic anemia     Elevated MCV, low Hg  · Recently started on oral B complex  · B12 injection at last appt  · Hg improved            Endocrine    Type 2 diabetes mellitus with stage 3 chronic kidney disease, with long-term current use of insulin (Chronic)     A1c 11.1 in 5/2017, 6.8 in 8/2017. Weaned off Lantus, PRN novolog 10 preprandial  · Continue current therapies  · Monitor A1c  · Discontinue long acting insulin  · PRN novolog for glucose >150  · Has not taken insulin in past 4 mos         Vitamin D deficiency     Compliant with Ca and vit D, complains of fatigue  · Supplement with D3 5000U  · monitor            Orthopedic    Facet arthritis of lumbar region     Seen on MRI 5/2017, complains of sciatica. Not physically active, refuses PT  · Refuses PT  · Continue to advise to be physically active  · Plan for medial nerve block with pain management               Health Maintenance       Date Due Completion Date    TETANUS VACCINE 02[birth year]18/1963 ---    Influenza Vaccine 08/01/2018 10/18/2017-DONE    Pneumococcal (65+) (2 of 2 - PPSV23) 11/29/2018 11/29/2017    Hemoglobin A1c 01/16/2019 7/16/2018    Eye Exam 01/19/2019 1/19/2018    Foot Exam 02/02/2019 2/2/2018 (Done)    Override on 2/2/2018: Done (performed by podiatrist)    Lipid Panel 10/16/2019  10/16/2018    High Dose Statin 10/17/2019 10/17/2018    Colonoscopy 02/15/2021 2/15/2011 (Done)    Override on 2/15/2011: Done (Per [facility] [employer] dashboard ? results)          Follow-up in about 2 months (around 12/17/2018). . One hour spent with this patient today, half of that in counseling.    Lulú Lewis MD/MPH  Internal Medicine  [facility]  [phone]

## 2018-10-17 NOTE — PROGRESS NOTES
Subjective:       Patient ID: Edwin Powell is a 73 y.o. male.    Chief Complaint: No chief complaint on file.    HPI     Mr. Singleton is a 73 y.o. male who returns for follow up regarding metastatic renal cell carcinoma of left kidney.  Returns for follow-up, currently on Votrient. Last imaging in 7/2018  showed stable disease.   Reports overall doing well  Chronic low back pain- scheduled on Friday for right L3/4, L4/5 and L5/S1 medial branch blocks to determine if back pain is facet-mediated.  Admits tries to avoid narcotics for controlling so pain often 9/10  Denies weakness or numbness in legs  Weight stable  Tolerating Votrient with minimal side effects- manages diarrhea with medication    CT scans 10/16/18:  In this patient with a history of renal cell carcinoma, an exophytic left renal mass is unchanged in size and appearance to CT 06/14/2018.  Hypodense hepatic lesions in segment 7 and 4 are unchanged.  A previously identified hepatic segment 2 lesion is not seen on today's examination.  Stable L3 vertebral body lytic lesion.  Stable right upper lobe pulmonary micronodule.  Additional findings as above.  RECIST SUMMARY:  Date of prior examination for comparison: 06/14/2018  Lesion 1: Left kidney.  2.3 cm. Series 3 image 123.  Prior measurement 2.3 cm.  Lesion 2: Hepatic segment 2.  Not definitively visualized on today's examination.  Prior measurement 1.0 cm.  Lesion 3: Hepatic segment 7.  1.4 cm. Series 3 image 84.  Prior measurement 1.6 cm.  Lesion 4: Hepatic segment 4.  1.1 cm. Series 3 image 91.  Prior measurement 1.1 cm.    Oncology History:  Mr.Mc Moss is a 74 yo male with CAD (s/p 5 stents, remotely), DM2 (insulin dependent), hypothyroidism , CVA (2013) with no residual deficit, chronic back pain. He was transferred to [facility] [place] for neurosurgery evaluation of a lumbar spine lesion in the setting of progressive weakness of his legs and exacerbation of chronic back pain, in May 2017. He states that his  "back pain and LE weakness progressively worsened. [facility]. The pain radiates down his legs. He denies any incontinence of bowel or bladder. He had several falls. After one of the falls, he presented to the ED at [facility], in May 2017. He had an MRI showing "L3 vertebral body lesion with apparent cortical disruption concerning for metastases vs myeloma," and was transferred to [facility] [place] evaluation. He was evaluated by neurosurgery regarding concern for lytic lesion on L3 on 5/26/17. No surgical intervention was proposed.   CT chest, abdomen,pelvis on 5/27/17 revealed :   --A 3.6 cm exophytic mass arising from the medial aspect of the lower pole of left kidney,   --A small 1.0 cm, subtle, cortical enhancing mass within the upper pole of the right kidney, concerning for possible contralateral solid renal mass  --Multiple enhancing ill-defined hepatic masses concerning for hepatic metastatic disease, hepatomegaly and hepatic steatosis  --Mulltifocal irregular thickening of the bilateral pleura concerning for possible pleural metastatic disease  --Mild nonspecific nodular thickening of the left adrenal gland without discrete nodule  -- Redemonstration of known lytic lesion within the L3 vertebral body. No definite additional discrete lytic lesions are identified.  -MRI pelvis from 10/18/17:    2.5 cm linear T1 hypointense T2/STIR hyperintense lesion within the left iliac bone, this lesion is indeterminate but felt less likely to represent metastasis given its appearance. Further evaluation can be obtained with bone scan as clinically indicated.  Findings suggestive of left trochanteric bursitis.  Partially visualized L3 metastatic lesion.  Diffuse thickening of the urinary bladder wall can be seen with chronic outlet obstruction or cystitis.    Review of Systems   Constitutional: Negative for activity change, appetite change, chills, fatigue, fever and " unexpected weight change.   HENT: Negative for congestion, mouth sores, rhinorrhea, sore throat and trouble swallowing.    Eyes: Negative for visual disturbance.   Respiratory: Negative for cough, chest tightness, shortness of breath and wheezing.    Cardiovascular: Negative for chest pain, palpitations and leg swelling.   Gastrointestinal: Positive for diarrhea (controlled). Negative for abdominal distention, abdominal pain, constipation and vomiting.   Genitourinary: Positive for frequency. Negative for decreased urine volume, difficulty urinating, hematuria and urgency.   Musculoskeletal: Positive for back pain (chronic and unchanged). Negative for arthralgias, neck pain and neck stiffness.        New pain along rib cage   Skin: Negative for color change, pallor, rash and wound.   Neurological: Negative for dizziness and headaches.   Hematological: Negative for adenopathy.   Psychiatric/Behavioral: Negative for dysphoric mood and sleep disturbance. The patient is not nervous/anxious.        Objective:      Physical Exam   Constitutional: He is oriented to person, place, and time. He appears well-developed and well-nourished. No distress.   Presents alone  ECOG 0   HENT:   Head: Normocephalic.   Mouth/Throat: Oropharynx is clear and moist. No oropharyngeal exudate.   Eyes: Conjunctivae and EOM are normal. Pupils are equal, round, and reactive to light. Right eye exhibits no discharge. Left eye exhibits no discharge. No scleral icterus.   Neck: Normal range of motion. Neck supple. No thyromegaly present.   Cardiovascular: Normal rate, regular rhythm, normal heart sounds and intact distal pulses. Exam reveals no gallop and no friction rub.   No murmur heard.  Pulmonary/Chest: Effort normal and breath sounds normal. No respiratory distress. He has no wheezes. He has no rales. He exhibits no tenderness.   Lungs clear   Abdominal: Soft. Bowel sounds are normal. He exhibits no distension and no mass. There is no  tenderness. There is no rebound.   No hepatosplenomegaly   Musculoskeletal: Normal range of motion. He exhibits tenderness (point tenderness low back). He exhibits no edema or deformity.   No spinal or paraspinal tenderness to palpation   Lymphadenopathy:     He has no cervical adenopathy.   Neurological: He is alert and oriented to person, place, and time. No cranial nerve deficit or sensory deficit. Coordination normal.   Skin: Skin is warm and dry. No rash noted. He is not diaphoretic. No erythema. No pallor.   Psychiatric: He has a normal mood and affect. His behavior is normal. Thought content normal.   Nursing note and vitals reviewed.   Labs- reviewed   Imaging- reviewed    Assessment:       1. Metastatic renal cell carcinoma to bone    2. Metastatic renal cell carcinoma to liver    3. Neoplasm related pain        Plan:     1-2. Disease stable to improved (in liver)  Continue Votrient  Labs and exam appropriate  Knows side effects to monitor for  Manages diarrhea well    3. Has procedure planned as above on Friday      Distress Screening Results: Psychosocial Distress screening score of Distress Score: 0 noted and reviewed. No intervention indicated.     Needs flu shot

## 2018-10-17 NOTE — PATIENT INSTRUCTIONS
TODAY:  - increase physical activity  - stay awake during the day  - continue B-complex, vitamin D, and calcium supplements   + if you need us to order let me know

## 2018-10-19 ENCOUNTER — HOSPITAL ENCOUNTER (OUTPATIENT)
Facility: HOSPITAL | Age: 73
Discharge: HOME | End: 2018-10-19
Attending: ANESTHESIOLOGY | Admitting: ANESTHESIOLOGY
Payer: MEDICARE

## 2018-10-19 VITALS
HEIGHT: 67 IN | HEART RATE: 61 BPM | WEIGHT: 160 LBS | TEMPERATURE: 98 F | BODY MASS INDEX: 25.11 KG/M2 | SYSTOLIC BLOOD PRESSURE: 185 MMHG | DIASTOLIC BLOOD PRESSURE: 86 MMHG | RESPIRATION RATE: 20 BRPM | OXYGEN SATURATION: 100 %

## 2018-10-19 DIAGNOSIS — M47.816 FACET ARTHRITIS OF LUMBAR REGION: Primary | ICD-10-CM

## 2018-10-19 PROCEDURE — 64495 INJ PARAVERT F JNT L/S 3 LEV: CPT | Mod: RT,,, | Performed by: ANESTHESIOLOGY

## 2018-10-19 PROCEDURE — 64494 INJ PARAVERT F JNT L/S 2 LEV: CPT | Mod: RT,,, | Performed by: ANESTHESIOLOGY

## 2018-10-19 PROCEDURE — 63600175 PHARM REV CODE 636 W HCPCS

## 2018-10-19 PROCEDURE — 64493 INJ PARAVERT F JNT L/S 1 LEV: CPT | Mod: RT,,, | Performed by: ANESTHESIOLOGY

## 2018-10-19 PROCEDURE — 99152 MOD SED SAME PHYS/QHP 5/>YRS: CPT

## 2018-10-19 PROCEDURE — S0020 INJECTION, BUPIVICAINE HYDRO: HCPCS

## 2018-10-19 PROCEDURE — 99152 MOD SED SAME PHYS/QHP 5/>YRS: CPT | Mod: ,,, | Performed by: ANESTHESIOLOGY

## 2018-10-19 PROCEDURE — 64494 INJ PARAVERT F JNT L/S 2 LEV: CPT | Mod: RT

## 2018-10-19 PROCEDURE — 64493 INJ PARAVERT F JNT L/S 1 LEV: CPT | Mod: RT

## 2018-10-19 PROCEDURE — 64495 INJ PARAVERT F JNT L/S 3 LEV: CPT | Mod: RT

## 2018-10-19 PROCEDURE — 25000003 PHARM REV CODE 250

## 2018-10-19 RX ORDER — SODIUM CHLORIDE 9 MG/ML
500 INJECTION, SOLUTION INTRAVENOUS CONTINUOUS
Status: DISCONTINUED | OUTPATIENT
Start: 2018-10-19 | End: 2018-10-19 | Stop reason: HOSPADM

## 2018-10-19 NOTE — NURSING
Received via stretcher from procedure.  Awake and alert.  No c/o pain.  bandaids to lower back clean dry and intact.  Son at pt side.  VSS.  Tolerating sandwich and fluids upon arrival. Will continue to monitor.

## 2018-10-19 NOTE — OP NOTE
"Patient Name: Edwin Powell  MRN: [id number]    INFORMED CONSENT: The procedure, risks, benefits and options were discussed with patient. There are no contraindications to the procedure. The patient expressed understanding and agreed to proceed. The personnel performing the procedure was discussed. I verify that I personally obtained the patient's consent prior to the start of the procedure and the signed consent can be found on the patient's chart.    PROCEDURE: RIGHT L2, L3, L4 MEDIAL BRANCH NERVE AND L5 DORSAL RAMUS BLOCK     Procedure Date: 10/19/2018  Surgeon(s) and Role:     * Conner Flores III, MD - Primary  Anesthesia: RN IV Sedation  Procedure(s) (LRB):  BLOCK, NERVE, FACET JOINT, LUMBAR, MEDIAL BRANCH, right L3/4, L4/5, L5/S1 (Right)    Pre Procedure diagnosis:   Facet arthritis of lumbar region     Post-Procedure diagnosis: SAME      Sedation: Yes - Midazolam 2 mg    DESCRIPTION OF PROCEDURE:The patient was brought to the procedure room. IV access was obtained prior to the procedure. The patient was positioned prone on the fluoroscopy table. Continuous hemodynamic monitoring was initiated including blood pressure, EKG, and pulse oximetry. The area of the lumbar spine was prepped with Chlorhexidine three times and draped into a sterile field. Fluoroscopy was used to identify the location of the RIGHT side L2, L3, L4 medial branch nerves and L5 dorsal ramus at the junctions of the superior articular process and the transverse processes of L3, L4, L5, and the sacral ala respectively. Skin anesthesia was achieved using Lidocaine 1% over the injection sites. A 22 gauge, 3 1/2" spinal needle was slowly inserted at each level using AP and oblique fluoroscopic imaging. Negative aspiration for blood or CSF was confirmed. 1 mL of Bupivacaine 0.5% and Depo-Medrol 8 mg/mL was injected at each site. The needles were removed and bleeding was nil. A sterile dressing was applied. No specimens collected. Edwin was " taken back to the recovery room for further observation.

## 2018-10-19 NOTE — DISCHARGE INSTRUCTIONS

## 2018-10-19 NOTE — DISCHARGE SUMMARY
Discharge Note  Short Stay      SUMMARY     Admit Date: 10/19/2018    Attending Physician: Conner Flores III      Discharge Physician: Conner Flores III      Discharge Date: 10/19/2018 1:14 PM    Final Diagnosis:   1. Facet arthritis of lumbar region        Disposition: Home or self care    Patient Instructions:   Discharge Medication List as of 10/19/2018 12:44 PM      CONTINUE these medications which have NOT CHANGED    Details   amLODIPine (NORVASC) 10 MG tablet TAKE 1 TABLET BY MOUTH ONCE DAILY., Normal      aspirin (ECOTRIN) 81 MG EC tablet Take 81 mg by mouth once daily., Historical Med      atorvastatin (LIPITOR) 40 MG tablet TAKE 1 TABLET (40 MG TOTAL) BY MOUTH ONCE DAILY., Starting Mon 9/24/2018, Until Tue 9/24/2019, Normal      b complex vitamins (B COMPLEX 1) tablet Take 1 tablet by mouth once daily., Starting Fri 6/29/2018, OTC      B-complex with vitamin C (Z-BEC OR EQUIV) tablet Starting Thu 7/5/2018, Historical Med      calcium carbonate-vitamin D3 (CALCIUM 600 WITH VITAMIN D3) 600 mg(1,500mg) -400 unit Cap Take 1 capsule by mouth once., Starting Wed 10/18/2017, Print      !! cholecalciferol, vitamin D3, (VITAMIN D3) 5,000 unit Tab Take 5,000 Units by mouth once daily., Starting Wed 7/18/2018, OTC      clindamycin (CLEOCIN T) 1 % external solution AAA bid, Normal      dicyclomine (BENTYL) 10 MG capsule Take 1 capsule (10 mg total) by mouth 4 (four) times daily., Starting Thu 9/13/2018, Until Fri 9/13/2019, Normal      diphenoxylate-atropine 2.5-0.025 mg (LOMOTIL) 2.5-0.025 mg per tablet Take 2 tablets by mouth 4 (four) times daily as needed for Diarrhea., Starting Mon 2/19/2018, Normal      doxycycline (MONODOX) 100 MG capsule Take 1 capsule (100 mg total) by mouth every 12 (twelve) hours., Starting Tue 7/17/2018, Normal      !! HYDROcodone-acetaminophen (NORCO) 5-325 mg per tablet Take 1 tablet by mouth every 8 (eight) hours as needed for Pain., Starting Tue 9/4/2018, Normal      !!  HYDROcodone-acetaminophen (NORCO) 5-325 mg per tablet TAKE 1 TABLET BY MOUTH EVERY 8 (EIGHT) HOURS AS NEEDED FOR PAIN., Normal      levothyroxine (SYNTHROID) 88 MCG tablet Take 1 tablet (88 mcg total) by mouth once daily., Starting Mon 4/2/2018, Until Tue 4/2/2019, Normal      lisinopril (PRINIVIL,ZESTRIL) 40 MG tablet TAKE 1 TABLET (40 MG TOTAL) BY MOUTH ONCE DAILY., Starting Fri 8/17/2018, Normal      LORazepam (ATIVAN) 1 MG tablet Take 1 tablet (1 mg total) by mouth On call Procedure for Anxiety (Take 1 tablet 1 hour prior to MRI. May repeat x 1 if needed.)., Starting Mon 10/1/2018, Normal      morphine (MS CONTIN) 15 MG 12 hr tablet Take 1 tablet (15 mg total) by mouth 2 (two) times daily., Starting Thu 10/4/2018, Normal      multivitamin (THERAGRAN) per tablet Take 1 tablet by mouth once daily., Starting Fri 6/29/2018, OTC      multivitamin with iron-mineral Tab Starting Thu 7/5/2018, Historical Med      NARCAN 4 mg/actuation Spry Starting Tue 7/31/2018, Historical Med      ondansetron (ZOFRAN) 8 MG tablet Take 1 tablet (8 mg total) by mouth 3 (three) times daily as needed., Starting Mon 8/27/2018, Normal      PAZOpanib (VOTRIENT) 200 mg Tab Take 4 tablets (800mg total) by mouth once daily., Starting Thu 7/5/2018, Normal      tamsulosin (FLOMAX) 0.4 mg Cp24 Take 2 capsules (0.8 mg total) by mouth every evening., Starting Mon 5/14/2018, Normal      !! VITAMIN D3 5,000 unit Tab Take 5,000 Units by mouth once daily., Starting Fri 7/20/2018, Historical Med       !! - Potential duplicate medications found. Please discuss with provider.              Discharge Diagnosis: Same as Pre and Post Procedure  Condition on Discharge: Stable.  Diet on Discharge: Same as before.  Activity: as per instruction sheet.  Discharge to: Home with a responsible adult.  Follow up: 2 weeks

## 2018-10-22 ENCOUNTER — TELEPHONE (OUTPATIENT)
Dept: PHARMACY | Facility: CLINIC | Age: 73
End: 2018-10-22

## 2018-10-29 ENCOUNTER — PATIENT MESSAGE (OUTPATIENT)
Dept: ADMINISTRATIVE | Facility: OTHER | Age: 73
End: 2018-10-29

## 2018-10-29 DIAGNOSIS — C64.9 METASTATIC RENAL CELL CARCINOMA TO LIVER: ICD-10-CM

## 2018-10-29 DIAGNOSIS — G89.3 NEOPLASM RELATED PAIN: ICD-10-CM

## 2018-10-29 DIAGNOSIS — G89.3 PAIN, NEOPLASM-RELATED: ICD-10-CM

## 2018-10-29 DIAGNOSIS — C78.7 METASTATIC RENAL CELL CARCINOMA TO LIVER: ICD-10-CM

## 2018-10-29 RX ORDER — HYDROCODONE BITARTRATE AND ACETAMINOPHEN 5; 325 MG/1; MG/1
TABLET ORAL
Qty: 120 TABLET | Refills: 0 | Status: SHIPPED | OUTPATIENT
Start: 2018-10-29 | End: 2018-12-10 | Stop reason: SDUPTHER

## 2018-10-29 RX ORDER — MORPHINE SULFATE 15 MG/1
15 TABLET, FILM COATED, EXTENDED RELEASE ORAL 2 TIMES DAILY
Qty: 60 TABLET | Refills: 0 | Status: SHIPPED | OUTPATIENT
Start: 2018-10-29 | End: 2018-11-21

## 2018-11-07 ENCOUNTER — OFFICE VISIT (OUTPATIENT)
Dept: PAIN MEDICINE | Facility: CLINIC | Age: 73
End: 2018-11-07
Payer: MEDICARE

## 2018-11-07 VITALS
WEIGHT: 159.81 LBS | BODY MASS INDEX: 25.03 KG/M2 | SYSTOLIC BLOOD PRESSURE: 140 MMHG | DIASTOLIC BLOOD PRESSURE: 64 MMHG

## 2018-11-07 DIAGNOSIS — M54.50 CHRONIC RIGHT-SIDED LOW BACK PAIN WITHOUT SCIATICA: ICD-10-CM

## 2018-11-07 DIAGNOSIS — G89.29 CHRONIC RIGHT-SIDED LOW BACK PAIN WITHOUT SCIATICA: ICD-10-CM

## 2018-11-07 DIAGNOSIS — M47.816 LUMBAR SPONDYLOSIS: Primary | ICD-10-CM

## 2018-11-07 DIAGNOSIS — C64.2 RENAL CELL CARCINOMA OF LEFT KIDNEY: ICD-10-CM

## 2018-11-07 PROCEDURE — 99213 OFFICE O/P EST LOW 20 MIN: CPT | Mod: HCNC,S$GLB,, | Performed by: ANESTHESIOLOGY

## 2018-11-07 PROCEDURE — 3078F DIAST BP <80 MM HG: CPT | Mod: CPTII,HCNC,S$GLB, | Performed by: ANESTHESIOLOGY

## 2018-11-07 PROCEDURE — 3077F SYST BP >= 140 MM HG: CPT | Mod: CPTII,HCNC,S$GLB, | Performed by: ANESTHESIOLOGY

## 2018-11-07 PROCEDURE — 99999 PR PBB SHADOW E&M-EST. PATIENT-LVL II: CPT | Mod: PBBFAC,HCNC,, | Performed by: ANESTHESIOLOGY

## 2018-11-07 PROCEDURE — 1101F PT FALLS ASSESS-DOCD LE1/YR: CPT | Mod: CPTII,HCNC,S$GLB, | Performed by: ANESTHESIOLOGY

## 2018-11-07 NOTE — PROGRESS NOTES
Chronic Pain - Established      INTERVAL HISTORY (11/07/2018):    Edwin Powell presents to the clinic today for a follow-up appointment for low back pain. Since the last visit, the pain has been improving. The patient reports 80% pain relief after Right L3/4, L4/5 and L5/S1 medial branch blocks which lasted 4 days. He reports the pain in his right lower back remains improved and at a tolerable level since the procedure. He complains today of pain along the posterior rib cage which he reports also temporarily improved after the procedure.  Current pain intensity is 4-5/10.       INTERVAL HISTORY (10/19/2018):    Edwin Powell presents to the clinic today for a follow-up appointment for back pain. Since the last visit, the back pain has been persistent. Current pain intensity is 9/10. Edwin is here today to review recent thoracic and lumbar MRI. He continues to experience pain in the right lower back and flank area. Today, his pain appears to be most pronounced along the right lumbar paraspinal area. The pain is made worse with activity. He denies radicular symptoms.       SUBJECTIVE:    Edwin Powell is a 74 y/o male with hx of renal cell carcinoma with liver mets who presents to the clinic for the evaluation of back pain. The pain started one year ago and symptoms have been worsening. The pain is located in the right low back/flank area overlying the posterior ribcage. He also complains of pain along the right lower thoracic paraspinal area. He denies radiation of pain into the lower extremities. The pain is described as aching and is rated as 9/10. The pain is rated with a score of  9/10 on the AVERAGE day and a score of 10/10 on the WORST day.  Symptoms interfere with daily activity. The pain is exacerbated by activity.  The pain is mitigated somewhat by pain medication (Norco and Morphine).  He does not like to take medication for pain and would like to discuss interventional options for his back  pain.    Patient denies urinary incontinence, bowel incontinence, significant motor weakness and loss of sensations.    Physical Therapy/Home Exercise: no      Pain Disability Index Review:  Last 3 PDI Scores 11/7/2018 10/10/2018 10/1/2018   Pain Disability Index (PDI) 35 [phone] 12       Pain Medications:    - Norco 5/325 daily as needed  - MS Contin 15 mg as needed     report:  Reviewed    Imaging:       MRI Lumbar (10/8/2018):    FINDINGS:  Alignment: Normal.    Vertebrae: T1 hypointense, T2 hyperintense, enhancing lesion re-identified within the L3 vertebral body, unchanged from prior study.  No compression fracture or epidural extension.  No measurable soft tissue component.    Discs: Multilevel disc desiccation with mild to moderate height loss.    Cord: Normal.  Conus terminates at L2.  Fatty filum terminale noted.    Degenerative findings:    T12-L1: No spinal canal stenosis or neural foraminal narrowing.    L1-L2: Circumferential disc bulge noted.  No spinal canal stenosis or neural foraminal narrowing.    L2-L3: Circumferential disc bulge and mild bilateral facet arthropathy noted.  No spinal canal stenosis or neural foraminal narrowing.    L3-L4: Circumferential disc bulge and moderate facet arthropathy result in mild spinal canal stenosis and mild bilateral neural foraminal narrowing.    L4-L5: Status post laminectomy.  Circumferential disc bulge and moderate bilateral facet arthrosis result in moderate right, severe left neural foraminal narrowing.    L5-S1: Status post laminectomy.  Circumferential disc bulge and mild bilateral facet arthrosis result in moderate bilateral neural foraminal narrowing.    Paraspinal muscles & soft tissues: Paraspinal muscle atrophy noted.      MRI Thoracic (10/8/2018):    FINDINGS:  Alignment: Normal.    Vertebrae: There is reidentification of low signal focus within the T4 vertebral body, unchanged, likely bone island.  There is a hemangioma within the T11 vertebral body.   No evidence for thoracic spine metastatic disease.    Discs: There is multilevel disc desiccation.    Cord: Normal morphology and signal.    Degenerative findings: No spinal canal stenosis or neural foraminal narrowing at any level.    Enhancement: No abnormal foci of enhancement within the spinal canal.    Paraspinal muscles & soft tissues: Unremarkable.      NM Bone Scan (7/27/2018):    There is no scintigraphic evidence of metastatic disease.    MRI Thoracic (5/27/2017):    Findings:  The thoracic spine demonstrates proper alignment. The vertebral bodies show normal signal intensity and height with no indication of acute fracture or pathologic marrow replacement process.  Nonspecific area of low signal intensity on all imaging sequences T4, may represent that of sclerotic focus.  This corresponds with CT examination 5/27/17.  This lesion is indeterminate.  Additional Schmorl's nodes are identified throughout the thoracic spine. The intervertebral disk spaces also appear well-maintained.     The demonstrated portion of the spinal cord is normal in signal intensity at all levels with no indication of myelomalacia or cord edema. No intradural signal abnormalities are apparent. Evaluation of the surrounding soft tissue structures reveals .    MRI Lumbar (5/26/2017):    The lumbar spinal alignment and vertebral body heights are satisfactorily preserved. There is loss of disc space height with degenerative endplate change and disc desiccation throughout the lumbar spine. The cord ends at L2. The terminal cord, conus, and cauda equina have a normal appearance. There is no intradural abnormality. There is a 2.3 x 2.2 cm T1 and T2 hypointense lesion within the L3 vertebral body off midline to the right with apparent cortical disruption which enhances following the administration of contrast. The visualized surrounding soft tissues and vascular structures are unremarkable.    L1-2: Facet hypertrophy with a mild disc  bulge    L2-3: Facet hypertrophy with ligamentum flavum thickening.    L3-4: Facet hypertrophy with ligamentum flavum thickening and a disc bulge. The previously described osseous lesion within L3 abuts the exited right L3 nerve root and appears to narrow the right neural foramen.    L4-5: Facet hypertrophy with ligamentum flavum thickening and a disc bulge with a superimposed left paracentral protrusion which creates mild to moderate right-sided and moderate to severe left-sided neural foraminal stenosis.    L5-S1: Facet hypertrophy with a disc herniation which creates mild to moderate bilateral neural foraminal stenosis.        Past Medical History:   Diagnosis Date    Acquired hypothyroidism 5/26/2017    Benign essential HTN 5/26/2017    Benign prostatic hyperplasia (BPH) with urinary urge incontinence 6/6/2017    Chronic low back pain 6/1/2017    Coronary artery disease involving native coronary artery of native heart without angina pectoris 5/26/2017    S/p 5 stents - last one around 2010-11.    Gastroesophageal reflux disease without esophagitis 5/26/2017    History of CVA (cerebrovascular accident) 5/26/2017    Hypertension associated with diabetes 5/26/2017    BP controlled on ACE, CCB    Lumbar disc lesion 5/26/2017    Metastatic renal cell carcinoma to bone 6/6/2017    Metastatic renal cell carcinoma to liver 6/6/2017    Liver Biopsy 5-2017: METASTATIC RENAL CELL CARCINOMA.    Mixed hyperlipidemia 5/26/2017    Type 2 diabetes mellitus with stage 3 chronic kidney disease, with long-term current use of insulin 5/26/2017     Past Surgical History:   Procedure Laterality Date    ABDOMINAL SURGERY      APPENDECTOMY      BACK SURGERY      CARDIAC SURGERY      CHOLECYSTECTOMY      coronary stenting      X 5 last one in 2010-11.    SPINAL FUSION       Social History     Socioeconomic History    Marital status: Single     Spouse name: Not on file    Number of children: Not on file    Years of  education: Not on file    Highest education level: Not on file   Social Needs    Financial resource strain: Not on file    Food insecurity - worry: Not on file    Food insecurity - inability: Not on file    Transportation needs - medical: Not on file    Transportation needs - non-medical: Not on file   Occupational History    Not on file   Tobacco Use    Smoking status: Never Smoker    Smokeless tobacco: Never Used   Substance and Sexual Activity    Alcohol use: No    Drug use: No    Sexual activity: Not Currently     Partners: Female   Other Topics Concern    Not on file   Social History Narrative    Not on file     Family History   Problem Relation Age of Onset    Glaucoma Mother     Diabetes Mother     Diabetes Brother     Glaucoma Maternal Grandmother     No Known Problems Son     Melanoma Neg Hx        Review of patient's allergies indicates:  No Known Allergies    Current Outpatient Medications   Medication Sig    amLODIPine (NORVASC) 10 MG tablet TAKE 1 TABLET BY MOUTH ONCE DAILY.    aspirin (ECOTRIN) 81 MG EC tablet Take 81 mg by mouth once daily.    atorvastatin (LIPITOR) 40 MG tablet TAKE 1 TABLET (40 MG TOTAL) BY MOUTH ONCE DAILY.    b complex vitamins (B COMPLEX 1) tablet Take 1 tablet by mouth once daily.    B-complex with vitamin C (Z-BEC OR EQUIV) tablet     cholecalciferol, vitamin D3, (VITAMIN D3) 5,000 unit Tab Take 5,000 Units by mouth once daily.    clindamycin (CLEOCIN T) 1 % external solution AAA bid    dicyclomine (BENTYL) 10 MG capsule Take 1 capsule (10 mg total) by mouth 4 (four) times daily.    diphenoxylate-atropine 2.5-0.025 mg (LOMOTIL) 2.5-0.025 mg per tablet Take 2 tablets by mouth 4 (four) times daily as needed for Diarrhea.    doxycycline (MONODOX) 100 MG capsule Take 1 capsule (100 mg total) by mouth every 12 (twelve) hours.    HYDROcodone-acetaminophen (NORCO) 5-325 mg per tablet Take 1 tablet by mouth every 8 (eight) hours as needed for Pain.     HYDROcodone-acetaminophen (NORCO) 5-325 mg per tablet TAKE 1 TABLET BY MOUTH EVERY 8 (EIGHT) HOURS AS NEEDED FOR PAIN.    levothyroxine (SYNTHROID) 88 MCG tablet Take 1 tablet (88 mcg total) by mouth once daily.    lisinopril (PRINIVIL,ZESTRIL) 40 MG tablet TAKE 1 TABLET (40 MG TOTAL) BY MOUTH ONCE DAILY.    LORazepam (ATIVAN) 1 MG tablet Take 1 tablet (1 mg total) by mouth On call Procedure for Anxiety (Take 1 tablet 1 hour prior to MRI. May repeat x 1 if needed.).    morphine (MS CONTIN) 15 MG 12 hr tablet Take 1 tablet (15 mg total) by mouth 2 (two) times daily.    multivitamin (THERAGRAN) per tablet Take 1 tablet by mouth once daily.    multivitamin with iron-mineral Tab     NARCAN 4 mg/actuation Spry     ondansetron (ZOFRAN) 8 MG tablet Take 1 tablet (8 mg total) by mouth 3 (three) times daily as needed.    PAZOpanib (VOTRIENT) 200 mg Tab Take 4 tablets (800mg total) by mouth once daily.    tamsulosin (FLOMAX) 0.4 mg Cp24 Take 2 capsules (0.8 mg total) by mouth every evening.    VITAMIN D3 5,000 unit Tab Take 5,000 Units by mouth once daily.    calcium carbonate-vitamin D3 (CALCIUM 600 WITH VITAMIN D3) 600 mg(1,500mg) -400 unit Cap Take 1 capsule by mouth once.     No current facility-administered medications for this visit.        REVIEW OF SYSTEMS:  GENERAL: No weight loss, malaise or fevers.  HEENT: Negative for frequent or significant headaches.  RESPIRATORY: Negative for wheezing or shortness of breath.  CARDIOVASCULAR: Negative for chest pain or palpitations.  GI: No blood in stools or black stools or change in bowel habits.  : hx of renal cell carcinoma  MUSCULOSKELETAL: See HPI  SKIN: Negative for rash or itching.  PSYCH: Negative for mood disorder and recent psychosocial stressors.    HEMATOLOGY/LYMPHOLOGY Negative for prolonged bleeding, bruising easily or swollen nodes.  NEURO:  No history of seizures or tremors.    OBJECTIVE:    BP (!) 140/64   Wt 72.5 kg (159 lb 13.3 oz)   BMI 25.03  kg/m²     PHYSICAL EXAMINATION:  GENERAL: Well appearing, in no acute distress.  PSYCH:  Mood and affect is appropriate.  Awake, alert, and oriented x 3.  SKIN: Skin color, texture, turgor normal, no rashes or lesions  HEENT: Normocephalic, atraumatic.  EOM intact.  CV: Radial pulses are 2+.  RESP:  Respirations are unlabored.  GI: Abdomen soft and non-tender.  MSK:  No atrophy or tone abnormalities are noted.      Neck: No obvious deformity or signs of trauma.  Normal cervical spine range of motion.    Back: Straight leg raising in the sitting and supine positions is negative for radicular pain. No tenderness to palpation over the lumbar paraspinous muscles. No pain with back extension/rotation. Normal range of motion without pain reproduction.    Extremities:  Peripheral joint ROM is full and pain free without obvious instability or laxity in all four extremities. No edema or skin discolorations noted.     Gait:  Gait is normal    NEUR:  Strength testing is 5/5 throughout all muscle groups in the upper and lower extremities. No loss of sensation is noted.     ASSESSMENT: 73 y.o. male with hx of metastatic renal cell carcinoma of the left kidney with chronic right-sided low back/flank pain. MRI Lumbar and Thoracic spine show no evidence of new metastatic disease. He obtained 80% improvement of his low back pain after lumbar MBBs.    1. Lumbar spondylosis    2. Chronic right-sided low back pain without sciatica    3. Renal cell carcinoma of left kidney        PLAN:     - I have stressed the importance of physical activity and a home exercise plan to help with pain and improve health.  - In the future, I will consider Right L3/4, L4/5 and L5/S1 medial branch RFA.  The patient is not interested at this time.  - RTC in 6 weeks.    The above plan and management options were discussed at length with patient. Patient is in agreement with the above and verbalized understanding. It will be communicated with the referring  physician via electronic record, fax, or mail.    Conner Flores III  11/07/2018

## 2018-11-14 ENCOUNTER — OFFICE VISIT (OUTPATIENT)
Dept: HEMATOLOGY/ONCOLOGY | Facility: CLINIC | Age: 73
End: 2018-11-14
Payer: MEDICARE

## 2018-11-14 ENCOUNTER — LAB VISIT (OUTPATIENT)
Dept: LAB | Facility: HOSPITAL | Age: 73
End: 2018-11-14
Attending: INTERNAL MEDICINE
Payer: MEDICARE

## 2018-11-14 VITALS
DIASTOLIC BLOOD PRESSURE: 64 MMHG | RESPIRATION RATE: 16 BRPM | TEMPERATURE: 98 F | WEIGHT: 158.94 LBS | BODY MASS INDEX: 24.94 KG/M2 | HEIGHT: 67 IN | HEART RATE: 69 BPM | OXYGEN SATURATION: 98 % | SYSTOLIC BLOOD PRESSURE: 134 MMHG

## 2018-11-14 DIAGNOSIS — E11.22 TYPE 2 DIABETES MELLITUS WITH STAGE 3 CHRONIC KIDNEY DISEASE, WITH LONG-TERM CURRENT USE OF INSULIN: Chronic | ICD-10-CM

## 2018-11-14 DIAGNOSIS — R79.9 ABNORMAL FINDING OF BLOOD CHEMISTRY: ICD-10-CM

## 2018-11-14 DIAGNOSIS — C78.7 METASTATIC RENAL CELL CARCINOMA TO LIVER: ICD-10-CM

## 2018-11-14 DIAGNOSIS — G89.3 NEOPLASM RELATED PAIN: ICD-10-CM

## 2018-11-14 DIAGNOSIS — E11.69 HYPERLIPIDEMIA DUE TO TYPE 2 DIABETES MELLITUS: ICD-10-CM

## 2018-11-14 DIAGNOSIS — C64.9 METASTATIC RENAL CELL CARCINOMA TO BONE: ICD-10-CM

## 2018-11-14 DIAGNOSIS — E78.5 HYPERLIPIDEMIA DUE TO TYPE 2 DIABETES MELLITUS: ICD-10-CM

## 2018-11-14 DIAGNOSIS — C64.9 METASTATIC RENAL CELL CARCINOMA TO LIVER: ICD-10-CM

## 2018-11-14 DIAGNOSIS — N18.30 TYPE 2 DIABETES MELLITUS WITH STAGE 3 CHRONIC KIDNEY DISEASE, WITH LONG-TERM CURRENT USE OF INSULIN: Chronic | ICD-10-CM

## 2018-11-14 DIAGNOSIS — E78.00 HYPERCHOLESTEREMIA: ICD-10-CM

## 2018-11-14 DIAGNOSIS — C64.2 RENAL CELL CARCINOMA OF LEFT KIDNEY: Primary | ICD-10-CM

## 2018-11-14 DIAGNOSIS — C79.51 METASTATIC RENAL CELL CARCINOMA TO BONE: ICD-10-CM

## 2018-11-14 DIAGNOSIS — Z79.4 TYPE 2 DIABETES MELLITUS WITH STAGE 3 CHRONIC KIDNEY DISEASE, WITH LONG-TERM CURRENT USE OF INSULIN: Chronic | ICD-10-CM

## 2018-11-14 LAB
ALBUMIN SERPL BCP-MCNC: 3.3 G/DL
ALP SERPL-CCNC: 91 U/L
ALT SERPL W/O P-5'-P-CCNC: 22 U/L
ANION GAP SERPL CALC-SCNC: 9 MMOL/L
AST SERPL-CCNC: 29 U/L
BASOPHILS # BLD AUTO: 0.03 K/UL
BASOPHILS NFR BLD: 0.5 %
BILIRUB SERPL-MCNC: 0.3 MG/DL
BUN SERPL-MCNC: 24 MG/DL
CALCIUM SERPL-MCNC: 9.8 MG/DL
CHLORIDE SERPL-SCNC: 111 MMOL/L
CHOLEST SERPL-MCNC: 206 MG/DL
CHOLEST/HDLC SERPL: 5.7 {RATIO}
CO2 SERPL-SCNC: 22 MMOL/L
CREAT SERPL-MCNC: 1.4 MG/DL
DIFFERENTIAL METHOD: ABNORMAL
EOSINOPHIL # BLD AUTO: 0.3 K/UL
EOSINOPHIL NFR BLD: 4.3 %
ERYTHROCYTE [DISTWIDTH] IN BLOOD BY AUTOMATED COUNT: 13.7 %
EST. GFR  (AFRICAN AMERICAN): 57.2 ML/MIN/1.73 M^2
EST. GFR  (NON AFRICAN AMERICAN): 49.5 ML/MIN/1.73 M^2
GLUCOSE SERPL-MCNC: 131 MG/DL
HCT VFR BLD AUTO: 40.6 %
HDLC SERPL-MCNC: 36 MG/DL
HDLC SERPL: 17.5 %
HGB BLD-MCNC: 13.2 G/DL
IMM GRANULOCYTES # BLD AUTO: 0.03 K/UL
IMM GRANULOCYTES NFR BLD AUTO: 0.5 %
LDLC SERPL CALC-MCNC: 92.4 MG/DL
LYMPHOCYTES # BLD AUTO: 1.9 K/UL
LYMPHOCYTES NFR BLD: 32.8 %
MCH RBC QN AUTO: 35.1 PG
MCHC RBC AUTO-ENTMCNC: 32.5 G/DL
MCV RBC AUTO: 108 FL
MONOCYTES # BLD AUTO: 0.5 K/UL
MONOCYTES NFR BLD: 8.2 %
NEUTROPHILS # BLD AUTO: 3.1 K/UL
NEUTROPHILS NFR BLD: 53.7 %
NONHDLC SERPL-MCNC: 170 MG/DL
NRBC BLD-RTO: 0 /100 WBC
PLATELET # BLD AUTO: 305 K/UL
PMV BLD AUTO: 10.1 FL
POTASSIUM SERPL-SCNC: 4.4 MMOL/L
PROT SERPL-MCNC: 6.5 G/DL
RBC # BLD AUTO: 3.76 M/UL
SODIUM SERPL-SCNC: 142 MMOL/L
TRIGL SERPL-MCNC: 388 MG/DL
WBC # BLD AUTO: 5.83 K/UL

## 2018-11-14 PROCEDURE — 99999 PR PBB SHADOW E&M-EST. PATIENT-LVL V: CPT | Mod: PBBFAC,HCNC,, | Performed by: PHYSICIAN ASSISTANT

## 2018-11-14 PROCEDURE — 85025 COMPLETE CBC W/AUTO DIFF WBC: CPT | Mod: HCNC

## 2018-11-14 PROCEDURE — 80053 COMPREHEN METABOLIC PANEL: CPT | Mod: HCNC

## 2018-11-14 PROCEDURE — 1101F PT FALLS ASSESS-DOCD LE1/YR: CPT | Mod: CPTII,HCNC,S$GLB, | Performed by: PHYSICIAN ASSISTANT

## 2018-11-14 PROCEDURE — 3078F DIAST BP <80 MM HG: CPT | Mod: CPTII,HCNC,S$GLB, | Performed by: PHYSICIAN ASSISTANT

## 2018-11-14 PROCEDURE — 36415 COLL VENOUS BLD VENIPUNCTURE: CPT | Mod: HCNC

## 2018-11-14 PROCEDURE — 99214 OFFICE O/P EST MOD 30 MIN: CPT | Mod: HCNC,S$GLB,, | Performed by: PHYSICIAN ASSISTANT

## 2018-11-14 PROCEDURE — 80061 LIPID PANEL: CPT | Mod: HCNC

## 2018-11-14 PROCEDURE — 3075F SYST BP GE 130 - 139MM HG: CPT | Mod: CPTII,HCNC,S$GLB, | Performed by: PHYSICIAN ASSISTANT

## 2018-11-14 NOTE — PROGRESS NOTES
Subjective:       Patient ID: Edwin Powell is a 73 y.o. male.    Chief Complaint: Metastatic renal cell carcinoma to bone    Mr. Singleton is a 73 y.o. male who returns for follow up regarding metastatic renal cell carcinoma of left kidney.  Returns for follow-up, currently on Votrient. Last imaging in 10/16/18showed stable disease.   Reports overall doing well  Chronic low back pain- seen in pain management with recent  RIGHT L2, L3, L4 MEDIAL BRANCH NERVE AND L5 DORSAL RAMUS BLOCK.  Patient notes that back pain has not significantly improved.     No weakness or numbness in legs.  Weight stable.  Appetite is good.  Tolerating Votrient with minimal side effects- manages diarrhea with medication    Recalls being on Lipitor in the past but not sure if  He is taking it currently.      CT scans 10/16/18:  In this patient with a history of renal cell carcinoma, an exophytic left renal mass is unchanged in size and appearance to CT 06/14/2018.  Hypodense hepatic lesions in segment 7 and 4 are unchanged.  A previously identified hepatic segment 2 lesion is not seen on today's examination.  Stable L3 vertebral body lytic lesion.  Stable right upper lobe pulmonary micronodule.  Additional findings as above.  RECIST SUMMARY:  Date of prior examination for comparison: 06/14/2018  Lesion 1: Left kidney.  2.3 cm. Series 3 image 123.  Prior measurement 2.3 cm.  Lesion 2: Hepatic segment 2.  Not definitively visualized on today's examination.  Prior measurement 1.0 cm.  Lesion 3: Hepatic segment 7.  1.4 cm. Series 3 image 84.  Prior measurement 1.6 cm.  Lesion 4: Hepatic segment 4.  1.1 cm. Series 3 image 91.  Prior measurement 1.1 cm.     Oncology History:  Mr.Mc Moss is a 74 yo male with CAD (s/p 5 stents, remotely), DM2 (insulin dependent), hypothyroidism , CVA (2013) with no residual deficit, chronic back pain. He was transferred to [facility] [place] for neurosurgery evaluation of a lumbar spine lesion in the setting of progressive  "weakness of his legs and exacerbation of chronic back pain, in May 2017. He states that his back pain and LE weakness progressively worsened. [facility]. The pain radiates down his legs. He denies any incontinence of bowel or bladder. He had several falls. After one of the falls, he presented to the ED at [facility], in May 2017. He had an MRI showing "L3 vertebral body lesion with apparent cortical disruption concerning for metastases vs myeloma," and was transferred to [facility] [place] for neurosurgery evaluation. He was evaluated by neurosurgery regarding concern for lytic lesion on L3 on 5/26/17. No surgical intervention was proposed.   CT chest, abdomen,pelvis on 5/27/17 revealed :   --A 3.6 cm exophytic mass arising from the medial aspect of the lower pole of left kidney,   --A small 1.0 cm, subtle, cortical enhancing mass within the upper pole of the right kidney, concerning for possible contralateral solid renal mass  --Multiple enhancing ill-defined hepatic masses concerning for hepatic metastatic disease, hepatomegaly and hepatic steatosis  --Mulltifocal irregular thickening of the bilateral pleura concerning for possible pleural metastatic disease  --Mild nonspecific nodular thickening of the left adrenal gland without discrete nodule  -- Redemonstration of known lytic lesion within the L3 vertebral body. No definite additional discrete lytic lesions are identified.  -MRI pelvis from 10/18/17:    2.5 cm linear T1 hypointense T2/STIR hyperintense lesion within the left iliac bone, this lesion is indeterminate but felt less likely to represent metastasis given its appearance. Further evaluation can be obtained with bone scan as clinically indicated.  Findings suggestive of left trochanteric bursitis.  Partially visualized L3 metastatic lesion.  Diffuse thickening of the urinary bladder wall can be seen with chronic outlet obstruction or cystitis.             Review of " Systems   Constitutional: Negative for appetite change and unexpected weight change.   HENT: Negative for congestion, mouth sores, rhinorrhea and trouble swallowing.    Eyes: Negative for visual disturbance.   Respiratory: Negative for cough and shortness of breath.    Cardiovascular: Negative for chest pain.   Gastrointestinal: Positive for diarrhea (controlled). Negative for abdominal pain.   Genitourinary: Negative for frequency.   Musculoskeletal: Positive for back pain (chronic and unchanged).   Skin: Negative for rash.   Neurological: Negative for headaches.   Hematological: Negative for adenopathy.   Psychiatric/Behavioral: The patient is not nervous/anxious.        Objective:      Physical Exam   Constitutional: He is oriented to person, place, and time. He appears well-nourished. No distress.   Presents alone  ECOG 0   HENT:   Head: Normocephalic.   Mouth/Throat: Oropharynx is clear and moist. No oropharyngeal exudate.   Eyes: Conjunctivae are normal. Pupils are equal, round, and reactive to light. No scleral icterus.   Neck: Normal range of motion. Neck supple. No thyromegaly present.   Cardiovascular: Normal rate, regular rhythm, normal heart sounds and intact distal pulses.   Pulmonary/Chest: Effort normal and breath sounds normal. No respiratory distress.   Lungs clear     Abdominal: Soft. Bowel sounds are normal. He exhibits no distension and no mass. There is no tenderness.   Musculoskeletal: Normal range of motion. He exhibits no edema.   No spinal or paraspinal tenderness to palpation     Lymphadenopathy:     He has no cervical adenopathy.   Neurological: He is alert and oriented to person, place, and time. No cranial nerve deficit.   Skin: Skin is warm and dry. No rash noted. No erythema. No pallor.   Psychiatric: He has a normal mood and affect. His behavior is normal. Thought content normal.   Vitals reviewed.      Assessment:       1. Renal cell carcinoma of left kidney    2. Metastatic renal  cell carcinoma to bone    3. Neoplasm related pain        Plan:       1-2)Patient will continue Votrient and return to clinic in one month.  Knows to call in interim and I have asked him to please check at home to see if he is in fact taking Lipitor.  3)follows with pain management but no significant improvement with recent nerve block. Refill on current pain medications.

## 2018-11-14 NOTE — Clinical Note
Do you mind checking with him to see if he is in fact taking lipitor? He said he was going to call back and I didn't hear from him. Also need to know which of his pain meds he needs a refill on. Thanks!

## 2018-11-15 ENCOUNTER — PATIENT MESSAGE (OUTPATIENT)
Dept: HEMATOLOGY/ONCOLOGY | Facility: CLINIC | Age: 73
End: 2018-11-15

## 2018-11-15 RX ORDER — ATORVASTATIN CALCIUM 40 MG/1
40 TABLET, FILM COATED ORAL DAILY
Qty: 90 TABLET | Refills: 3 | Status: SHIPPED | OUTPATIENT
Start: 2018-11-15 | End: 2019-02-12

## 2018-11-16 ENCOUNTER — TELEPHONE (OUTPATIENT)
Dept: PHARMACY | Facility: CLINIC | Age: 73
End: 2018-11-16

## 2018-11-19 ENCOUNTER — TELEPHONE (OUTPATIENT)
Dept: INTERNAL MEDICINE | Facility: CLINIC | Age: 73
End: 2018-11-19

## 2018-11-19 NOTE — TELEPHONE ENCOUNTER
Please see what's happened to this patient's script for atorvastatin 40mg. Was sent to [facility] in [place] twice but rejected. Do I need to resend?    Pharmacy phone number.  [phone]    Thanks,  KJ

## 2018-11-21 ENCOUNTER — TELEPHONE (OUTPATIENT)
Dept: PHARMACY | Facility: CLINIC | Age: 73
End: 2018-11-21

## 2018-11-21 DIAGNOSIS — G89.3 NEOPLASM RELATED PAIN: Primary | ICD-10-CM

## 2018-11-21 RX ORDER — MORPHINE SULFATE 15 MG/1
15 TABLET ORAL EVERY 4 HOURS PRN
Qty: 120 TABLET | Refills: 0 | Status: SHIPPED | OUTPATIENT
Start: 2018-11-21 | End: 2019-01-14 | Stop reason: SDUPTHER

## 2018-11-23 ENCOUNTER — TELEPHONE (OUTPATIENT)
Dept: PHARMACY | Facility: CLINIC | Age: 73
End: 2018-11-23

## 2018-11-28 DIAGNOSIS — K59.03 DRUG-INDUCED CONSTIPATION: ICD-10-CM

## 2018-11-28 RX ORDER — LACTULOSE 10 G/15ML
SOLUTION ORAL; RECTAL
Qty: 450 ML | Refills: 0 | Status: SHIPPED | OUTPATIENT
Start: 2018-11-28 | End: 2019-06-26

## 2018-11-29 NOTE — TELEPHONE ENCOUNTER
Patient returned phone call back regard specialty medication refill for Votrient 200mg (120/30) $0/004- Patient scheduled to have medication  on Fri 11/30. Patient informed no new medications, conditions or allergies since last talked to [facility]. Patient have about 8 tablets remaining & no missed dose. Patient declined questions for the clinical pharmacist. Patient voiced understanding.

## 2018-12-10 DIAGNOSIS — C78.7 METASTATIC RENAL CELL CARCINOMA TO LIVER: ICD-10-CM

## 2018-12-10 DIAGNOSIS — G89.3 NEOPLASM RELATED PAIN: ICD-10-CM

## 2018-12-10 DIAGNOSIS — C79.51 METASTATIC RENAL CELL CARCINOMA TO BONE: ICD-10-CM

## 2018-12-10 DIAGNOSIS — C64.9 METASTATIC RENAL CELL CARCINOMA TO LIVER: ICD-10-CM

## 2018-12-10 DIAGNOSIS — C64.9 METASTATIC RENAL CELL CARCINOMA TO BONE: ICD-10-CM

## 2018-12-10 RX ORDER — HYDROCODONE BITARTRATE AND ACETAMINOPHEN 5; 325 MG/1; MG/1
1 TABLET ORAL EVERY 8 HOURS PRN
Qty: 50 TABLET | Refills: 0 | Status: CANCELLED | OUTPATIENT
Start: 2018-12-10

## 2018-12-10 RX ORDER — HYDROCODONE BITARTRATE AND ACETAMINOPHEN 5; 325 MG/1; MG/1
TABLET ORAL
Qty: 120 TABLET | Refills: 0 | Status: SHIPPED | OUTPATIENT
Start: 2018-12-10 | End: 2019-01-14 | Stop reason: SDUPTHER

## 2018-12-12 ENCOUNTER — OFFICE VISIT (OUTPATIENT)
Dept: HEMATOLOGY/ONCOLOGY | Facility: CLINIC | Age: 73
End: 2018-12-12
Payer: MEDICARE

## 2018-12-12 ENCOUNTER — OFFICE VISIT (OUTPATIENT)
Dept: PRIMARY CARE CLINIC | Facility: CLINIC | Age: 73
End: 2018-12-12
Payer: MEDICARE

## 2018-12-12 ENCOUNTER — TELEPHONE (OUTPATIENT)
Dept: INTERNAL MEDICINE | Facility: CLINIC | Age: 73
End: 2018-12-12

## 2018-12-12 ENCOUNTER — LAB VISIT (OUTPATIENT)
Dept: LAB | Facility: HOSPITAL | Age: 73
End: 2018-12-12
Attending: INTERNAL MEDICINE
Payer: MEDICARE

## 2018-12-12 ENCOUNTER — IMMUNIZATION (OUTPATIENT)
Dept: PHARMACY | Facility: CLINIC | Age: 73
End: 2018-12-12
Payer: MEDICARE

## 2018-12-12 VITALS
HEART RATE: 67 BPM | OXYGEN SATURATION: 98 % | WEIGHT: 158.06 LBS | SYSTOLIC BLOOD PRESSURE: 130 MMHG | DIASTOLIC BLOOD PRESSURE: 60 MMHG | BODY MASS INDEX: 24.81 KG/M2 | HEIGHT: 67 IN

## 2018-12-12 VITALS
DIASTOLIC BLOOD PRESSURE: 68 MMHG | RESPIRATION RATE: 16 BRPM | TEMPERATURE: 98 F | SYSTOLIC BLOOD PRESSURE: 143 MMHG | WEIGHT: 159.63 LBS | HEART RATE: 67 BPM | BODY MASS INDEX: 25.06 KG/M2 | OXYGEN SATURATION: 99 % | HEIGHT: 67 IN

## 2018-12-12 DIAGNOSIS — T45.1X5A CHEMOTHERAPY INDUCED DIARRHEA: ICD-10-CM

## 2018-12-12 DIAGNOSIS — E78.00 HYPERCHOLESTEREMIA: ICD-10-CM

## 2018-12-12 DIAGNOSIS — C78.7 METASTATIC RENAL CELL CARCINOMA TO LIVER: ICD-10-CM

## 2018-12-12 DIAGNOSIS — H61.23 BILATERAL IMPACTED CERUMEN: ICD-10-CM

## 2018-12-12 DIAGNOSIS — C64.9 METASTATIC RENAL CELL CARCINOMA TO BONE: ICD-10-CM

## 2018-12-12 DIAGNOSIS — C64.2 RENAL CELL CARCINOMA OF LEFT KIDNEY: ICD-10-CM

## 2018-12-12 DIAGNOSIS — E11.69 HYPERLIPIDEMIA DUE TO TYPE 2 DIABETES MELLITUS: ICD-10-CM

## 2018-12-12 DIAGNOSIS — C64.9 METASTATIC RENAL CELL CARCINOMA TO LIVER: ICD-10-CM

## 2018-12-12 DIAGNOSIS — C79.51 METASTATIC RENAL CELL CARCINOMA TO BONE: ICD-10-CM

## 2018-12-12 DIAGNOSIS — E78.5 HYPERLIPIDEMIA DUE TO TYPE 2 DIABETES MELLITUS: ICD-10-CM

## 2018-12-12 DIAGNOSIS — K52.1 CHEMOTHERAPY INDUCED DIARRHEA: ICD-10-CM

## 2018-12-12 DIAGNOSIS — C64.2 RENAL CELL CARCINOMA OF LEFT KIDNEY: Primary | ICD-10-CM

## 2018-12-12 DIAGNOSIS — G89.3 NEOPLASM RELATED PAIN: ICD-10-CM

## 2018-12-12 DIAGNOSIS — K52.1 DIARRHEA DUE TO DRUG: ICD-10-CM

## 2018-12-12 LAB
ALBUMIN SERPL BCP-MCNC: 3.1 G/DL
ALP SERPL-CCNC: 84 U/L
ALT SERPL W/O P-5'-P-CCNC: 21 U/L
ANION GAP SERPL CALC-SCNC: 7 MMOL/L
AST SERPL-CCNC: 25 U/L
BILIRUB SERPL-MCNC: 0.4 MG/DL
BUN SERPL-MCNC: 20 MG/DL
CALCIUM SERPL-MCNC: 8.7 MG/DL
CHLORIDE SERPL-SCNC: 112 MMOL/L
CHOLEST SERPL-MCNC: 126 MG/DL
CHOLEST/HDLC SERPL: 3.7 {RATIO}
CO2 SERPL-SCNC: 22 MMOL/L
CREAT SERPL-MCNC: 1.3 MG/DL
ERYTHROCYTE [DISTWIDTH] IN BLOOD BY AUTOMATED COUNT: 13.8 %
EST. GFR  (AFRICAN AMERICAN): >60 ML/MIN/1.73 M^2
EST. GFR  (NON AFRICAN AMERICAN): 54.1 ML/MIN/1.73 M^2
GLUCOSE SERPL-MCNC: 135 MG/DL
HCT VFR BLD AUTO: 37.9 %
HDLC SERPL-MCNC: 34 MG/DL
HDLC SERPL: 27 %
HGB BLD-MCNC: 12.5 G/DL
IMM GRANULOCYTES # BLD AUTO: 0.01 K/UL
LDLC SERPL CALC-MCNC: 46.4 MG/DL
MCH RBC QN AUTO: 35.7 PG
MCHC RBC AUTO-ENTMCNC: 33 G/DL
MCV RBC AUTO: 108 FL
NEUTROPHILS # BLD AUTO: 4.1 K/UL
NONHDLC SERPL-MCNC: 92 MG/DL
PLATELET # BLD AUTO: 317 K/UL
PMV BLD AUTO: 9.9 FL
POTASSIUM SERPL-SCNC: 4.3 MMOL/L
PROT SERPL-MCNC: 6.6 G/DL
RBC # BLD AUTO: 3.5 M/UL
SODIUM SERPL-SCNC: 141 MMOL/L
TRIGL SERPL-MCNC: 228 MG/DL
WBC # BLD AUTO: 7.55 K/UL

## 2018-12-12 PROCEDURE — 36415 COLL VENOUS BLD VENIPUNCTURE: CPT | Mod: HCNC

## 2018-12-12 PROCEDURE — 85027 COMPLETE CBC AUTOMATED: CPT | Mod: HCNC

## 2018-12-12 PROCEDURE — 80061 LIPID PANEL: CPT | Mod: HCNC

## 2018-12-12 PROCEDURE — 3078F DIAST BP <80 MM HG: CPT | Mod: CPTII,HCNC,S$GLB, | Performed by: PHYSICIAN ASSISTANT

## 2018-12-12 PROCEDURE — 1101F PT FALLS ASSESS-DOCD LE1/YR: CPT | Mod: CPTII,HCNC,S$GLB, | Performed by: INTERNAL MEDICINE

## 2018-12-12 PROCEDURE — 99214 OFFICE O/P EST MOD 30 MIN: CPT | Mod: HCNC,S$GLB,, | Performed by: PHYSICIAN ASSISTANT

## 2018-12-12 PROCEDURE — 80053 COMPREHEN METABOLIC PANEL: CPT | Mod: HCNC

## 2018-12-12 PROCEDURE — 99999 PR PBB SHADOW E&M-EST. PATIENT-LVL V: CPT | Mod: PBBFAC,HCNC,, | Performed by: PHYSICIAN ASSISTANT

## 2018-12-12 PROCEDURE — 3074F SYST BP LT 130 MM HG: CPT | Mod: CPTII,HCNC,S$GLB, | Performed by: PHYSICIAN ASSISTANT

## 2018-12-12 PROCEDURE — 99215 OFFICE O/P EST HI 40 MIN: CPT | Mod: HCNC,S$GLB,, | Performed by: INTERNAL MEDICINE

## 2018-12-12 PROCEDURE — 3044F HG A1C LEVEL LT 7.0%: CPT | Mod: CPTII,HCNC,S$GLB, | Performed by: INTERNAL MEDICINE

## 2018-12-12 PROCEDURE — 1101F PT FALLS ASSESS-DOCD LE1/YR: CPT | Mod: CPTII,HCNC,S$GLB, | Performed by: PHYSICIAN ASSISTANT

## 2018-12-12 PROCEDURE — 3075F SYST BP GE 130 - 139MM HG: CPT | Mod: CPTII,HCNC,S$GLB, | Performed by: INTERNAL MEDICINE

## 2018-12-12 PROCEDURE — 3078F DIAST BP <80 MM HG: CPT | Mod: CPTII,HCNC,S$GLB, | Performed by: INTERNAL MEDICINE

## 2018-12-12 RX ORDER — PAZOPANIB 200 MG/1
600 TABLET ORAL DAILY
Qty: 90 TABLET | Refills: 5 | Status: SHIPPED | OUTPATIENT
Start: 2018-12-12 | End: 2019-01-15 | Stop reason: CLARIF

## 2018-12-12 RX ORDER — LOPERAMIDE HCL 2 MG
2 TABLET ORAL 4 TIMES DAILY PRN
Qty: 30 TABLET | Refills: 0 | Status: SHIPPED | OUTPATIENT
Start: 2018-12-12 | End: 2018-12-22

## 2018-12-12 NOTE — PROGRESS NOTES
"Primary Care Provider Appointment    Subjective:      Patient ID: Edwin Powell is a 73 y.o. male DM, HLD, metastatic RCC, BPH    Chief Complaint: Diabetes and Follow-up    Patient has had uncontrolled diarrhea for the past 3 days. Is taking lomotil (he takes 3 tablets twice daily, up to 8 tablets within 6 hours), and wants to start OTC loperamide. Seen by Heme-Onc this AM, who advised to cut back on chemo to 3 tablets per day (600mg votrient).    He has not had any hospital admits recently. Last admit was due to narcotic overdose. Has UTD script of narcan at home.    He underwent spinal injection/ nerve block with pain management in 10/2018 with significant improvement in low back, but continues to have pain in the upper back. He does not want to repeat the procedure. He continues to not be physically active.    He reports compliance with "thirteen pills I take every day" including vitamins. His lipids and well-controlled today, BP controlled.    Complains of ear popping, decreased hearing acuity bilaterally.     Past Surgical History:   Procedure Laterality Date    ABDOMINAL SURGERY      APPENDECTOMY      BACK SURGERY      CARDIAC SURGERY      CHOLECYSTECTOMY      coronary stenting      X 5 last one in 2010-11.    SPINAL FUSION         Past Medical History:   Diagnosis Date    Acquired hypothyroidism 5/26/2017    Benign essential HTN 5/26/2017    Benign prostatic hyperplasia (BPH) with urinary urge incontinence 6/6/2017    Chronic low back pain 6/1/2017    Coronary artery disease involving native coronary artery of native heart without angina pectoris 5/26/2017    S/p 5 stents - last one around 2010-11.    Gastroesophageal reflux disease without esophagitis 5/26/2017    History of CVA (cerebrovascular accident) 5/26/2017    Hypertension associated with diabetes 5/26/2017    BP controlled on ACE, CCB    Lumbar disc lesion 5/26/2017    Metastatic renal cell carcinoma to bone 6/6/2017    " "Metastatic renal cell carcinoma to liver 6/6/2017    Liver Biopsy 5-2017: METASTATIC RENAL CELL CARCINOMA.    Mixed hyperlipidemia 5/26/2017    Type 2 diabetes mellitus with stage 3 chronic kidney disease, with long-term current use of insulin 5/26/2017       Review of Systems   Constitutional: Negative for activity change and unexpected weight change.   HENT: Positive for hearing loss. Negative for trouble swallowing.         Abnormal hearing   Gastrointestinal: Positive for diarrhea. Negative for blood in stool, constipation and vomiting.   Endocrine: Positive for polyuria. Negative for polydipsia.   Genitourinary: Positive for difficulty urinating. Negative for urgency.   Musculoskeletal: Positive for arthralgias. Negative for joint swelling and neck pain.   Neurological: Positive for weakness. Negative for headaches.   Psychiatric/Behavioral: Negative for confusion and dysphoric mood.       Objective:   /60 (BP Location: Left arm, Patient Position: Sitting, BP Method: Medium (Manual))   Pulse 67   Ht 5' 7" (1.702 m)   Wt 71.7 kg (158 lb 1.1 oz)   SpO2 98%   BMI 24.76 kg/m²     Physical Exam   Constitutional: He is oriented to person, place, and time. He appears well-developed and well-nourished.   Ambulating with cane   HENT:   Head: Normocephalic.   Edentulous  Bilateral cerumen impaction  Abnormal hearing   Eyes: Pupils are equal, round, and reactive to light.   Neck:   Decreased ROM in neck    Pulmonary/Chest: Effort normal.   Musculoskeletal: Normal range of motion.   Neurological: He is alert and oriented to person, place, and time.   Skin: Skin is warm.   Ecchymoses and purpura bilaterally UE  Telangiectasias on face and chest  Multiple tattoos on UE  Numerous red papules on neck  Fair skin   Psychiatric: He has a normal mood and affect.       Lab Results   Component Value Date    WBC 7.55 12/12/2018    HGB 12.5 (L) 12/12/2018    HCT 37.9 (L) 12/12/2018     12/12/2018    CHOL 126 " 12/12/2018    TRIG 228 (H) 12/12/2018    HDL 34 (L) 12/12/2018    ALT 21 12/12/2018    AST 25 12/12/2018     12/12/2018    K 4.3 12/12/2018     (H) 12/12/2018    CREATININE 1.3 12/12/2018    BUN 20 12/12/2018    CO2 22 (L) 12/12/2018    TSH 3.530 07/16/2018    PSA 1.1 07/16/2018    INR 1.2 06/12/2017    HGBA1C 6.2 (H) 07/16/2018         Assessment:   73 y.o. male with multiple co-morbid illnesses here to continue work-up of chronic issues notably DM, HLD, metastatic RCC, BPH.     Plan:     Problem List Items Addressed This Visit        ENT    Bilateral impacted cerumen     Bilateral cerumen impaction, reports hearing loss  · Refuses ear wax removal  · Refuses hearing evaluation with DB  study  · Refuses ENT eval            Cardiac/Vascular    Hyperlipidemia due to type 2 diabetes mellitus     ASCVD risk high due to DM  · Continue statin  · Lipids controlled  · Continue DM management- no longer requiring insulin  · Lifestyle changes and weight loss due to cancer         Relevant Orders    Ambulatory Referral to Optometry       Oncology    Metastatic renal cell carcinoma to bone     Metastatic renal cell CA (Stage IV - T1,Nx,M1). Followed by Heme-Onc (Dr Reed) radiation therapy to L3 spine - 3 Gy/Fx x 10 fx and pazopanib 800 mg PO daily. Liver bx 5/2017 with RCC  · F/U Heme-Onc  · Continue current therapies  · Completed monthly xgeva and radiation therapy  · Ca with Vit D supplementation  · Continue votrient  · Heme-Onc decreased dose to 600mg due to diarrhea            GI    Diarrhea due to drug     Started on lomotil by Heme-Onc with good improvement  · Continue lomotil  · Start loperamide  · Heme-Onc decreased votrient to 600mg on 12/12/18         Relevant Medications    loperamide (IMODIUM A-D) 2 mg Tab          Health Maintenance       Date Due Completion Date    TETANUS VACCINE 02[birth year]18/1963 ---    Pneumococcal Vaccine (65+ High/Highest Risk) (2 of 2 - PPSV23) 01/24/2018 11/29/2017- TODAY    Foot  Exam 02/02/2019 2/2/2018 (Done)- NEXT    Override on 2/2/2018: Done (performed by podiatrist)    Eye Exam 01/19/2019 1/19/2018- NEXT    Hemoglobin A1c 02/27/2019 8/27/2018    Lipid Panel 11/14/2019 11/14/2018    High Dose Statin 12/12/2019 12/12/2018    Colonoscopy 02/15/2021 2/15/2011 (Done)    Override on 2/15/2011: Done (Per [employer] dashboard ? results)          Follow-up in about 2 months (around 2/12/2019). One hour spent with this patient today, half of that in counseling.    Lulú Lewis MD/MPH  Internal Medicine  [facility]  [phone]

## 2018-12-12 NOTE — PATIENT INSTRUCTIONS
TODAY:  - continue lomotil prescription: take 2 tablets, four times per day (NO MORE THAN 8 TABLETS PER DAY)  - start loperamide (imodium) over the counter: take 2 tablets, then an additional tablet after each episode of diarrhea (NO MORE THAN 8 TABLETS PER DAY)  - decrease votrient to 3 tablets daily  - pneumonia vaccine 23 today at pharmacy  - optometry appt    NEXT:  - Dr TORO will perform foot exam

## 2018-12-12 NOTE — ASSESSMENT & PLAN NOTE
Started on lomotil by Heme-Onc with good improvement  · Continue lomotil  · Start loperamide  · Heme-Onc decreased votrient to 600mg on 12/12/18

## 2018-12-12 NOTE — TELEPHONE ENCOUNTER
Please have tc stop by clinic when he is done with his other appt. He needs to be added to today's schedule.    Thanks,  KJ  ----- Message from Sudarshan Williamson sent at 12/12/2018  7:35 AM CST -----  Contact: self  [phone]    Patient would like to speak to nurse, stated he is seeing Dr. Flannery today @8:30 @ [facility] and would like to be seen by Dr. Lewis today as well. Was told from the office they will schedule appointment on same day which they didn't and doesn't want to come back tomorrow. Pt waiting on return phone call     Please advise

## 2018-12-12 NOTE — ASSESSMENT & PLAN NOTE
Bilateral cerumen impaction, reports hearing loss  · Refuses ear wax removal  · Refuses hearing evaluation with DB  study  · Refuses ENT eval

## 2018-12-12 NOTE — ASSESSMENT & PLAN NOTE
ASCVD risk high due to DM  · Continue statin  · Lipids controlled  · Continue DM management- no longer requiring insulin  · Lifestyle changes and weight loss due to cancer

## 2018-12-12 NOTE — Clinical Note
Scans were stable from mid-October, was going to wait until feb to scan him again unless you want sooner

## 2018-12-12 NOTE — Clinical Note
vanessa in 1 months with cbc/cmp/lipid panel (lets try to get him in with her as I have seen him the last two times)

## 2018-12-12 NOTE — PROGRESS NOTES
Subjective:       Patient ID: Edwin Powell is a 73 y.o. male.    Chief Complaint: Metastatic renal cell carcinoma to bone    Mr. Singleton is a 73 y.o. male who returns for follow up regarding metastatic renal cell carcinoma of left kidney.  Returns for follow-up, currently on Votrient. Last imaging in 10/16/18showed stable disease.    Reports doing well other than diarrhea which has worsened recently.  He is taking lomotil q 4 hours as well as Bentyl and Imodium as needed.  He states that he eats the same thing meal-wise every day and there is no apparent trigger food.  He had diarrhea the majority of last night and this has been disrupting sleep.    Chronic low back pain- seen in pain management with recent  RIGHT L2, L3, L4 MEDIAL BRANCH NERVE AND L5 DORSAL RAMUS BLOCK.  Patient notes that back pain has  improved.     No weakness or numbness in legs.  Weight stable.  Appetite is good.  Taking lipitor regularly.      CT scans 10/16/18:  In this patient with a history of renal cell carcinoma, an exophytic left renal mass is unchanged in size and appearance to CT 06/14/2018.  Hypodense hepatic lesions in segment 7 and 4 are unchanged.  A previously identified hepatic segment 2 lesion is not seen on today's examination.  Stable L3 vertebral body lytic lesion.  Stable right upper lobe pulmonary micronodule.  Additional findings as above.  RECIST SUMMARY:  Date of prior examination for comparison: 06/14/2018  Lesion 1: Left kidney.  2.3 cm. Series 3 image 123.  Prior measurement 2.3 cm.  Lesion 2: Hepatic segment 2.  Not definitively visualized on today's examination.  Prior measurement 1.0 cm.  Lesion 3: Hepatic segment 7.  1.4 cm. Series 3 image 84.  Prior measurement 1.6 cm.  Lesion 4: Hepatic segment 4.  1.1 cm. Series 3 image 91.  Prior measurement 1.1 cm.     Oncology History:  Mr.Mc Moss is a 74 yo male with CAD (s/p 5 stents, remotely), DM2 (insulin dependent), hypothyroidism , CVA (2013) with no residual  "deficit, chronic back pain. He was transferred to [facility] for neurosurgery evaluation of a lumbar spine lesion in the setting of progressive weakness of his legs and exacerbation of chronic back pain, in May 2017. He states that his back pain and LE weakness progressively worsened. [facility]. The pain radiates down his legs. He denies any incontinence of bowel or bladder. He had several falls. After one of the falls, he presented to the ED at [facility], in May 2017. He had an MRI showing "L3 vertebral body lesion with apparent cortical disruption concerning for metastases vs myeloma," and was transferred to [facility] for neurosurgery evaluation. He was evaluated by neurosurgery regarding concern for lytic lesion on L3 on 5/26/17. No surgical intervention was proposed.   CT chest, abdomen,pelvis on 5/27/17 revealed :   --A 3.6 cm exophytic mass arising from the medial aspect of the lower pole of left kidney,   --A small 1.0 cm, subtle, cortical enhancing mass within the upper pole of the right kidney, concerning for possible contralateral solid renal mass  --Multiple enhancing ill-defined hepatic masses concerning for hepatic metastatic disease, hepatomegaly and hepatic steatosis  --Mulltifocal irregular thickening of the bilateral pleura concerning for possible pleural metastatic disease  --Mild nonspecific nodular thickening of the left adrenal gland without discrete nodule  -- Redemonstration of known lytic lesion within the L3 vertebral body. No definite additional discrete lytic lesions are identified.  -MRI pelvis from 10/18/17:    2.5 cm linear T1 hypointense T2/STIR hyperintense lesion within the left iliac bone, this lesion is indeterminate but felt less likely to represent metastasis given its appearance. Further evaluation can be obtained with bone scan as clinically indicated.  Findings suggestive of left trochanteric bursitis.  Partially visualized L3 metastatic " lesion.  Diffuse thickening of the urinary bladder wall can be seen with chronic outlet obstruction or cystitis.             Review of Systems   Constitutional: Negative for appetite change and unexpected weight change.   HENT: Negative for congestion, mouth sores, rhinorrhea and trouble swallowing.    Eyes: Negative for visual disturbance.   Respiratory: Negative for cough and shortness of breath.    Cardiovascular: Negative for chest pain.   Gastrointestinal: Positive for diarrhea (worsened recently). Negative for abdominal pain.   Genitourinary: Negative for frequency.   Musculoskeletal: Positive for back pain (chronic and unchanged).   Skin: Negative for rash.   Neurological: Negative for headaches.   Hematological: Negative for adenopathy.   Psychiatric/Behavioral: The patient is not nervous/anxious.        Objective:      Physical Exam   Constitutional: He is oriented to person, place, and time. He appears well-nourished. No distress.   Presents alone  ECOG 0   HENT:   Head: Normocephalic.   Mouth/Throat: Oropharynx is clear and moist. No oropharyngeal exudate.   Eyes: Conjunctivae are normal. Pupils are equal, round, and reactive to light. No scleral icterus.   Neck: Normal range of motion. Neck supple. No thyromegaly present.   Cardiovascular: Normal rate, regular rhythm, normal heart sounds and intact distal pulses.   Pulmonary/Chest: Effort normal and breath sounds normal. No respiratory distress.   Lungs clear     Abdominal: Soft. Bowel sounds are normal. He exhibits no distension and no mass. There is no tenderness.   Musculoskeletal: Normal range of motion. He exhibits no edema.   No spinal or paraspinal tenderness to palpation     Lymphadenopathy:     He has no cervical adenopathy.   Neurological: He is alert and oriented to person, place, and time. No cranial nerve deficit.   Skin: Skin is warm and dry. No rash noted. No erythema. No pallor.   Psychiatric: He has a normal mood and affect. His behavior  is normal. Thought content normal.   Vitals reviewed.      Assessment:       1. Renal cell carcinoma of left kidney    2. 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 Chemotherapy induced diarrhea    4. Neoplasm related pain    5. Hypercholesteremia        Plan:       1-3)Patient will continue Votrient, however will reduce dose to 600 mg daily due to grade 3 diarrhea (>7 bowel movements per day) and return to clinic in one month.  He knows to call clinic if diarrhea does not improve and I have encouraged hydration and BRAT diet.  I also advised him to keep a food log so that we could identify any trigger foods.   4)follows with pain management; currently well controlled  5)controlled on lipitor  Distress Screening Results: Psychosocial Distress screening score of Distress Score: 0 noted and reviewed. No intervention indicated.

## 2018-12-12 NOTE — ASSESSMENT & PLAN NOTE
Metastatic renal cell CA (Stage IV - T1,Nx,M1). Followed by Heme-Onc (Dr Reed) radiation therapy to L3 spine - 3 Gy/Fx x 10 fx and pazopanib 800 mg PO daily. Liver bx 5/2017 with RCC  · F/U Heme-Onc  · Continue current therapies  · Completed monthly xgeva and radiation therapy  · Ca with Vit D supplementation  · Continue votrient  · Heme-Onc decreased dose to 600mg due to diarrhea

## 2018-12-13 ENCOUNTER — PATIENT MESSAGE (OUTPATIENT)
Dept: HEMATOLOGY/ONCOLOGY | Facility: CLINIC | Age: 73
End: 2018-12-13

## 2018-12-13 ENCOUNTER — TELEPHONE (OUTPATIENT)
Dept: PHARMACY | Facility: CLINIC | Age: 73
End: 2018-12-13

## 2018-12-13 NOTE — TELEPHONE ENCOUNTER
Called mobile (LVM) and home number (no VM) to review Votrient dose decrease from 200mg - 4 tablets per day to 3 tablets per day. Will continue to try to reach patient to review dose decrease. Patient should not need refill until the beginning of January 2019.

## 2018-12-17 ENCOUNTER — TELEPHONE (OUTPATIENT)
Dept: PHARMACY | Facility: CLINIC | Age: 73
End: 2018-12-17

## 2018-12-21 NOTE — TELEPHONE ENCOUNTER
"Spoke with Mr. Roman (not his son). He has reduced his dose to 3 tabs daily - new votrient script received with update directed. RTS until 12/22. Patient says he has "enough until the end of the month." Please call back on Monday once claim goes through and do a pill count with patient to ensure refills on track with reduced dose. Also needs clinical f/u on Monday    Patient confirm he stopped Lipitor.  "

## 2018-12-24 NOTE — TELEPHONE ENCOUNTER
Called patient in regards to Votrient refill and clinical f/u. Votrient approved for $0.00 copay. Patient has about one week remaining. Patient's son will  Votrient from [facility] today.    Reviewed proper administration: Votrient 200mg - 3 tablets once daily (decreased from 4 tablets per day). Patient denied rash, N/V and other side effects. Patient confirmed he discontinued Lipitor as instructed. He stated he continues to have about 3 episodes of diarrhea per day which is an improvement. He confirmed ample oral hydration. Patient takes Votrient either in the morning or afternoon time. Advised patient to try to take medication about the same time each day; patient will consistently take in the afternoon. Pharmacist will follow up with him at next refill to assess for ADRs with an emphasis on diarrhea. Patient had no questions at this time.

## 2019-01-09 ENCOUNTER — TELEPHONE (OUTPATIENT)
Dept: PHARMACY | Facility: CLINIC | Age: 74
End: 2019-01-09

## 2019-01-09 NOTE — TELEPHONE ENCOUNTER
FOR DOCUMENTATION ONLY:  Financial Assistance for Votrient is approved from 1-9-2019 to 12-31-19  [employer] Assistance Fund  BIN: [id number]  PCN: [id number]  Id: [id number]  GRP: [id number]

## 2019-01-14 DIAGNOSIS — C78.7 METASTATIC RENAL CELL CARCINOMA TO LIVER: ICD-10-CM

## 2019-01-14 DIAGNOSIS — C64.9 METASTATIC RENAL CELL CARCINOMA TO LIVER: ICD-10-CM

## 2019-01-14 DIAGNOSIS — G89.3 NEOPLASM RELATED PAIN: ICD-10-CM

## 2019-01-14 RX ORDER — MORPHINE SULFATE 15 MG/1
15 TABLET ORAL EVERY 4 HOURS PRN
Qty: 120 TABLET | Refills: 0 | Status: SHIPPED | OUTPATIENT
Start: 2019-01-14 | End: 2019-02-13

## 2019-01-14 RX ORDER — HYDROCODONE BITARTRATE AND ACETAMINOPHEN 5; 325 MG/1; MG/1
TABLET ORAL
Qty: 120 TABLET | Refills: 0 | Status: SHIPPED | OUTPATIENT
Start: 2019-01-14 | End: 2019-01-22 | Stop reason: SDUPTHER

## 2019-01-16 ENCOUNTER — TELEPHONE (OUTPATIENT)
Dept: PHARMACY | Facility: CLINIC | Age: 74
End: 2019-01-16

## 2019-01-16 DIAGNOSIS — C64.9 METASTATIC RENAL CELL CARCINOMA TO LIVER: ICD-10-CM

## 2019-01-16 DIAGNOSIS — C64.2 RENAL CELL CARCINOMA OF LEFT KIDNEY: ICD-10-CM

## 2019-01-16 DIAGNOSIS — C78.7 METASTATIC RENAL CELL CARCINOMA TO LIVER: ICD-10-CM

## 2019-01-16 DIAGNOSIS — C64.9 METASTATIC RENAL CELL CARCINOMA TO BONE: ICD-10-CM

## 2019-01-16 DIAGNOSIS — C79.51 METASTATIC RENAL CELL CARCINOMA TO BONE: ICD-10-CM

## 2019-01-16 RX ORDER — PAZOPANIB 200 MG/1
600 TABLET ORAL DAILY
Qty: 90 TABLET | Refills: 5 | Status: SHIPPED | OUTPATIENT
Start: 2019-01-16 | End: 2019-01-22

## 2019-01-22 ENCOUNTER — LAB VISIT (OUTPATIENT)
Dept: LAB | Facility: HOSPITAL | Age: 74
End: 2019-01-22
Attending: INTERNAL MEDICINE
Payer: MEDICARE

## 2019-01-22 ENCOUNTER — OFFICE VISIT (OUTPATIENT)
Dept: HEMATOLOGY/ONCOLOGY | Facility: CLINIC | Age: 74
End: 2019-01-22
Payer: MEDICARE

## 2019-01-22 VITALS
BODY MASS INDEX: 26.1 KG/M2 | WEIGHT: 166.69 LBS | RESPIRATION RATE: 20 BRPM | SYSTOLIC BLOOD PRESSURE: 166 MMHG | DIASTOLIC BLOOD PRESSURE: 71 MMHG | HEART RATE: 62 BPM | TEMPERATURE: 98 F

## 2019-01-22 DIAGNOSIS — G89.3 NEOPLASM RELATED PAIN: ICD-10-CM

## 2019-01-22 DIAGNOSIS — C64.2 RENAL CELL CARCINOMA OF LEFT KIDNEY: Primary | ICD-10-CM

## 2019-01-22 DIAGNOSIS — R79.9 ABNORMAL FINDING OF BLOOD CHEMISTRY: ICD-10-CM

## 2019-01-22 DIAGNOSIS — C64.2 RENAL CELL CARCINOMA OF LEFT KIDNEY: ICD-10-CM

## 2019-01-22 DIAGNOSIS — C64.9 METASTATIC RENAL CELL CARCINOMA TO LIVER: ICD-10-CM

## 2019-01-22 DIAGNOSIS — C78.7 METASTATIC RENAL CELL CARCINOMA TO LIVER: ICD-10-CM

## 2019-01-22 DIAGNOSIS — C79.51 METASTATIC RENAL CELL CARCINOMA TO BONE: ICD-10-CM

## 2019-01-22 DIAGNOSIS — G89.3 CANCER ASSOCIATED PAIN: ICD-10-CM

## 2019-01-22 DIAGNOSIS — E78.00 HYPERCHOLESTEREMIA: ICD-10-CM

## 2019-01-22 DIAGNOSIS — C64.9 METASTATIC RENAL CELL CARCINOMA TO BONE: ICD-10-CM

## 2019-01-22 LAB
ALBUMIN SERPL BCP-MCNC: 3 G/DL
ALP SERPL-CCNC: 94 U/L
ALT SERPL W/O P-5'-P-CCNC: 26 U/L
ANION GAP SERPL CALC-SCNC: 10 MMOL/L
AST SERPL-CCNC: 35 U/L
BILIRUB SERPL-MCNC: 0.6 MG/DL
BUN SERPL-MCNC: 25 MG/DL
CALCIUM SERPL-MCNC: 9.4 MG/DL
CHLORIDE SERPL-SCNC: 106 MMOL/L
CHOLEST SERPL-MCNC: 229 MG/DL
CHOLEST/HDLC SERPL: 5.5 {RATIO}
CO2 SERPL-SCNC: 25 MMOL/L
CREAT SERPL-MCNC: 1.5 MG/DL
ERYTHROCYTE [DISTWIDTH] IN BLOOD BY AUTOMATED COUNT: 13.2 %
EST. GFR  (AFRICAN AMERICAN): 52.6 ML/MIN/1.73 M^2
EST. GFR  (NON AFRICAN AMERICAN): 45.5 ML/MIN/1.73 M^2
GLUCOSE SERPL-MCNC: 162 MG/DL
HCT VFR BLD AUTO: 37.7 %
HDLC SERPL-MCNC: 42 MG/DL
HDLC SERPL: 18.3 %
HGB BLD-MCNC: 12 G/DL
IMM GRANULOCYTES # BLD AUTO: 0.01 K/UL
LDLC SERPL CALC-MCNC: 155.6 MG/DL
MCH RBC QN AUTO: 33.8 PG
MCHC RBC AUTO-ENTMCNC: 31.8 G/DL
MCV RBC AUTO: 106 FL
NEUTROPHILS # BLD AUTO: 2.7 K/UL
NONHDLC SERPL-MCNC: 187 MG/DL
PLATELET # BLD AUTO: 306 K/UL
PMV BLD AUTO: 10.3 FL
POTASSIUM SERPL-SCNC: 4.9 MMOL/L
PROT SERPL-MCNC: 6.3 G/DL
RBC # BLD AUTO: 3.55 M/UL
SODIUM SERPL-SCNC: 141 MMOL/L
TRIGL SERPL-MCNC: 157 MG/DL
WBC # BLD AUTO: 5.38 K/UL

## 2019-01-22 PROCEDURE — 3078F DIAST BP <80 MM HG: CPT | Mod: CPTII,HCNC,S$GLB, | Performed by: INTERNAL MEDICINE

## 2019-01-22 PROCEDURE — 80053 COMPREHEN METABOLIC PANEL: CPT | Mod: HCNC

## 2019-01-22 PROCEDURE — 80061 LIPID PANEL: CPT | Mod: HCNC

## 2019-01-22 PROCEDURE — 1101F PR PT FALLS ASSESS DOC 0-1 FALLS W/OUT INJ PAST YR: ICD-10-PCS | Mod: CPTII,HCNC,S$GLB, | Performed by: INTERNAL MEDICINE

## 2019-01-22 PROCEDURE — 36415 COLL VENOUS BLD VENIPUNCTURE: CPT | Mod: HCNC

## 2019-01-22 PROCEDURE — 3077F SYST BP >= 140 MM HG: CPT | Mod: CPTII,HCNC,S$GLB, | Performed by: INTERNAL MEDICINE

## 2019-01-22 PROCEDURE — 99999 PR PBB SHADOW E&M-EST. PATIENT-LVL III: ICD-10-PCS | Mod: PBBFAC,HCNC,, | Performed by: INTERNAL MEDICINE

## 2019-01-22 PROCEDURE — 85027 COMPLETE CBC AUTOMATED: CPT | Mod: HCNC

## 2019-01-22 PROCEDURE — 99999 PR PBB SHADOW E&M-EST. PATIENT-LVL III: CPT | Mod: PBBFAC,HCNC,, | Performed by: INTERNAL MEDICINE

## 2019-01-22 PROCEDURE — 3078F PR MOST RECENT DIASTOLIC BLOOD PRESSURE < 80 MM HG: ICD-10-PCS | Mod: CPTII,HCNC,S$GLB, | Performed by: INTERNAL MEDICINE

## 2019-01-22 PROCEDURE — 3077F PR MOST RECENT SYSTOLIC BLOOD PRESSURE >= 140 MM HG: ICD-10-PCS | Mod: CPTII,HCNC,S$GLB, | Performed by: INTERNAL MEDICINE

## 2019-01-22 PROCEDURE — 99214 OFFICE O/P EST MOD 30 MIN: CPT | Mod: HCNC,S$GLB,, | Performed by: INTERNAL MEDICINE

## 2019-01-22 PROCEDURE — 99214 PR OFFICE/OUTPT VISIT, EST, LEVL IV, 30-39 MIN: ICD-10-PCS | Mod: HCNC,S$GLB,, | Performed by: INTERNAL MEDICINE

## 2019-01-22 PROCEDURE — 1101F PT FALLS ASSESS-DOCD LE1/YR: CPT | Mod: CPTII,HCNC,S$GLB, | Performed by: INTERNAL MEDICINE

## 2019-01-22 RX ORDER — HYDROCODONE BITARTRATE AND ACETAMINOPHEN 5; 325 MG/1; MG/1
TABLET ORAL
Qty: 120 TABLET | Refills: 0 | Status: SHIPPED | OUTPATIENT
Start: 2019-01-22 | End: 2019-02-13 | Stop reason: SDUPTHER

## 2019-01-22 RX ORDER — PAZOPANIB 200 MG/1
800 TABLET ORAL DAILY
Qty: 120 TABLET | Refills: 5 | Status: SHIPPED | OUTPATIENT
Start: 2019-01-22 | End: 2019-07-22

## 2019-01-22 NOTE — PROGRESS NOTES
Subjective:       Patient ID: Edwin Powell is a 73 y.o. male.    Chief Complaint: Follow-up    HPI     Mr. Singleton is a 73 y.o. male who returns for follow up regarding metastatic renal cell carcinoma of left kidney.  Returns for follow-up, currently on Votrient. Last imaging in 10/16/18 showed stable disease.    He reports overall doing well. Reports no further diarrhea since stopping his anti cholesterol medications. He was going to drop to 3 pills per day from 4 pills but states that he maintained dosage as a result of symptoms resolved.     He still with chronic low back pain- seen in pain management with recent  RIGHT L2, L3, L4 MEDIAL BRANCH NERVE AND L5 DORSAL RAMUS BLOCK.   He has not gone back however due to cost ($200 per shot) and not sure it provided enough relief to warrant.  No weakness or numbness in legs.  No new pain complaints.    Weight stable.  Appetite is good.  No fevers, chills or infections.     CT scans 10/16/18:  In this patient with a history of renal cell carcinoma, an exophytic left renal mass is unchanged in size and appearance to CT 06/14/2018.  Hypodense hepatic lesions in segment 7 and 4 are unchanged.  A previously identified hepatic segment 2 lesion is not seen on today's examination.  Stable L3 vertebral body lytic lesion.  Stable right upper lobe pulmonary micronodule.  Additional findings as above.  RECIST SUMMARY:  Date of prior examination for comparison: 06/14/2018  Lesion 1: Left kidney.  2.3 cm. Series 3 image 123.  Prior measurement 2.3 cm.  Lesion 2: Hepatic segment 2.  Not definitively visualized on today's examination.  Prior measurement 1.0 cm.  Lesion 3: Hepatic segment 7.  1.4 cm. Series 3 image 84.  Prior measurement 1.6 cm.  Lesion 4: Hepatic segment 4.  1.1 cm. Series 3 image 91.  Prior measurement 1.1 cm.     Oncology History:  Mr.Mc Moss is a 74 yo male with CAD (s/p 5 stents, remotely), DM2 (insulin dependent), hypothyroidism , CVA (2013) with no residual  "deficit, chronic back pain. He was transferred to [facility] for neurosurgery evaluation of a lumbar spine lesion in the setting of progressive weakness of his legs and exacerbation of chronic back pain, in May 2017. He states that his back pain and LE weakness progressively worsened. [facility]. The pain radiates down his legs. He denies any incontinence of bowel or bladder. He had several falls. After one of the falls, he presented to the ED at [facility], in May 2017. He had an MRI showing "L3 vertebral body lesion with apparent cortical disruption concerning for metastases vs myeloma," and was transferred to [facility] for neurosurgery evaluation. He was evaluated by neurosurgery regarding concern for lytic lesion on L3 on 5/26/17. No surgical intervention was proposed.   CT chest, abdomen,pelvis on 5/27/17 revealed :   --A 3.6 cm exophytic mass arising from the medial aspect of the lower pole of left kidney,   --A small 1.0 cm, subtle, cortical enhancing mass within the upper pole of the right kidney, concerning for possible contralateral solid renal mass  --Multiple enhancing ill-defined hepatic masses concerning for hepatic metastatic disease, hepatomegaly and hepatic steatosis  --Mulltifocal irregular thickening of the bilateral pleura concerning for possible pleural metastatic disease  --Mild nonspecific nodular thickening of the left adrenal gland without discrete nodule  -- Redemonstration of known lytic lesion within the L3 vertebral body. No definite additional discrete lytic lesions are identified.  -MRI pelvis from 10/18/17:    2.5 cm linear T1 hypointense T2/STIR hyperintense lesion within the left iliac bone, this lesion is indeterminate but felt less likely to represent metastasis given its appearance. Further evaluation can be obtained with bone scan as clinically indicated.  Findings suggestive of left trochanteric bursitis.  Partially visualized L3 metastatic " lesion.  Diffuse thickening of the urinary bladder wall can be seen with chronic outlet obstruction or cystitis.    Review of Systems   Constitutional: Negative for activity change, appetite change, chills, fatigue, fever and unexpected weight change.   HENT: Negative for congestion, mouth sores, rhinorrhea and trouble swallowing.    Eyes: Negative for visual disturbance.   Respiratory: Negative for cough, chest tightness, shortness of breath and wheezing.    Cardiovascular: Negative for chest pain, palpitations and leg swelling.   Gastrointestinal: Negative for abdominal distention, abdominal pain, diarrhea, nausea and vomiting.   Genitourinary: Negative for decreased urine volume, difficulty urinating, frequency, hematuria and urgency.   Musculoskeletal: Positive for back pain (chronic and unchanged). Negative for arthralgias, gait problem, myalgias, neck pain and neck stiffness.   Skin: Negative for color change, pallor, rash and wound.   Neurological: Negative for headaches.   Hematological: Negative for adenopathy.   Psychiatric/Behavioral: Negative for dysphoric mood. The patient is not nervous/anxious.        Objective:      Physical Exam   Constitutional: He is oriented to person, place, and time. He appears well-developed and well-nourished. No distress.   Presents alone   HENT:   Head: Normocephalic and atraumatic.   Mouth/Throat: Oropharynx is clear and moist. No oropharyngeal exudate.   Eyes: Conjunctivae and EOM are normal. Pupils are equal, round, and reactive to light. Right eye exhibits no discharge. Left eye exhibits no discharge. No scleral icterus.   Neck: Normal range of motion. Neck supple. No thyromegaly present.   Cardiovascular: Normal rate, regular rhythm, normal heart sounds and intact distal pulses. Exam reveals no gallop and no friction rub.   No murmur heard.  Pulmonary/Chest: Effort normal and breath sounds normal. No respiratory distress. He has no wheezes. He has no rales. He exhibits  no tenderness.   Lungs clear   Abdominal: Soft. Bowel sounds are normal. He exhibits no distension and no mass. There is no tenderness. There is no rebound.   No hepatosplenomegaly   Musculoskeletal: Normal range of motion. He exhibits no edema, tenderness (point tenderness low back) or deformity.   No spinal or paraspinal tenderness to palpation   Lymphadenopathy:     He has no cervical adenopathy.   Neurological: He is alert and oriented to person, place, and time. No cranial nerve deficit or sensory deficit. Coordination normal.   Skin: Skin is warm and dry. No rash noted. He is not diaphoretic. No erythema. No pallor.   Psychiatric: He has a normal mood and affect. His behavior is normal. Thought content normal.   Nursing note and vitals reviewed.   Labs- reviewed   Assessment:       1. Renal cell carcinoma of left kidney    2. 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 Metastatic renal cell carcinoma to liver    4. Cancer associated pain        Plan:     1-3. Continue Votrient at current dosage  Rescan in March after next visit unless clinically indicated sooner  Will need EKG at next visit  Lipid panel reviewed- cholesterol slightly up but triglycerides down, will monitor off therapy  4. Controlled, pain well managed  Medication refilled      Distress Screening Results: Psychosocial Distress screening score of Distress Score: 0 noted and reviewed. No intervention indicated.

## 2019-01-23 NOTE — TELEPHONE ENCOUNTER
LVM for patient in regards to Votrient refill and clinical follow up. If patient does not return call, will call patient's son as well.

## 2019-01-24 ENCOUNTER — TELEPHONE (OUTPATIENT)
Dept: PHARMACY | Facility: CLINIC | Age: 74
End: 2019-01-24

## 2019-01-24 NOTE — TELEPHONE ENCOUNTER
DOCUMENTATION ONLY:  Prior authorization for Votrient 200mg approved from 01/23/2019 to 12/31/2019    Co-pay: $2,540.34    Patient Assistance IS required and a nithya is on file. The patient's co-pay with the nithya is $0.00. Ernesto FLORES

## 2019-01-25 ENCOUNTER — TELEPHONE (OUTPATIENT)
Dept: PHARMACY | Facility: CLINIC | Age: 74
End: 2019-01-25

## 2019-01-25 NOTE — TELEPHONE ENCOUNTER
"Votrient follow-up/refill:  Patient reached for follow up of Votrient.  Mr. oPwell confirmed that he is taking 4 - 200mg tablets on an empty stomach at the same time each day.  He is in great spirits and feeling well.  Since discontinuing Lipitor, he reports that his diarrhea has stopped.  He denies any diarrhea in the last few weeks.  He denies major fatigue.  He confirms feeling "a little tired" later in the day, but no more than usual.  He is still able to complete all daily tasks and does not feel inhibited in any way.  He denies any nausea, vomiting or skin changes at this time.  Mr. Powell has noticed an increase in bruises appearing on his arms and legs.  He denies any bleeding gums, nosebleeds or blood in urine or stool.  Advised that he is at increased risk for bruising and/or bleeding due to Votrient therapy.  He will keep an eye on the bruises to ensure they are not worsening and will report any other signs of bleeding to his provider.  His appetite is good and he reports that he has gained about 10 lbs which worries him a little.  When he was initially diagnosed, he lost about 60 lbs and was able to stop insulin and diabetes medications.  His last glucose was about 160 (fasting) and he is worried he will have to go back on medicine.  Noted that it appears his next A1c will be in late February which will give a more accurate picture of blood glucose control.  In the meantime, we discussed appropriate dietary changes such as minimizing carbs, sugars and eating more diabetic friendly foods such as leafy greens, broccoli, nuts, greek yogurt, lean meats/protien, etc.  His next office visit is 2/26/19.  Other than stopping Lipitor, there have been no changes to his medications, allergies or health conditions.  He has been well and free of infection with no trips to the urgent care or ER.      Mr. Powell confirmed readiness for refill at this time.  He will pick medication up from [facility] on 1/30 or 1/31.  Copay " $0.00 (004).  Two patient identifiers confirmed - name and .  Therapy appropriate.  He did not have any additional questions at this time.

## 2019-02-12 ENCOUNTER — OFFICE VISIT (OUTPATIENT)
Dept: PRIMARY CARE CLINIC | Facility: CLINIC | Age: 74
End: 2019-02-12
Payer: MEDICARE

## 2019-02-12 VITALS
WEIGHT: 171.5 LBS | OXYGEN SATURATION: 97 % | DIASTOLIC BLOOD PRESSURE: 78 MMHG | HEART RATE: 65 BPM | HEIGHT: 67 IN | BODY MASS INDEX: 26.92 KG/M2 | SYSTOLIC BLOOD PRESSURE: 160 MMHG

## 2019-02-12 DIAGNOSIS — C64.9 METASTATIC RENAL CELL CARCINOMA TO LIVER: ICD-10-CM

## 2019-02-12 DIAGNOSIS — Z79.4 TYPE 2 DIABETES MELLITUS WITH STAGE 3 CHRONIC KIDNEY DISEASE, WITH LONG-TERM CURRENT USE OF INSULIN: Chronic | ICD-10-CM

## 2019-02-12 DIAGNOSIS — I70.0 ATHEROSCLEROSIS OF AORTA: ICD-10-CM

## 2019-02-12 DIAGNOSIS — I25.119 CORONARY ARTERY DISEASE INVOLVING NATIVE CORONARY ARTERY OF NATIVE HEART WITH ANGINA PECTORIS: ICD-10-CM

## 2019-02-12 DIAGNOSIS — M47.812 CERVICAL ARTHRITIS: ICD-10-CM

## 2019-02-12 DIAGNOSIS — I77.1 TORTUOUS AORTA: ICD-10-CM

## 2019-02-12 DIAGNOSIS — E11.69 HYPERLIPIDEMIA DUE TO TYPE 2 DIABETES MELLITUS: ICD-10-CM

## 2019-02-12 DIAGNOSIS — C78.7 METASTATIC RENAL CELL CARCINOMA TO LIVER: ICD-10-CM

## 2019-02-12 DIAGNOSIS — E78.5 HYPERLIPIDEMIA DUE TO TYPE 2 DIABETES MELLITUS: ICD-10-CM

## 2019-02-12 DIAGNOSIS — C64.2 RENAL CELL CARCINOMA OF LEFT KIDNEY: ICD-10-CM

## 2019-02-12 DIAGNOSIS — E44.0 MODERATE PROTEIN-CALORIE MALNUTRITION: ICD-10-CM

## 2019-02-12 DIAGNOSIS — C64.9 METASTATIC RENAL CELL CARCINOMA TO BONE: ICD-10-CM

## 2019-02-12 DIAGNOSIS — M47.816 FACET ARTHRITIS OF LUMBAR REGION: ICD-10-CM

## 2019-02-12 DIAGNOSIS — E11.22 TYPE 2 DIABETES MELLITUS WITH STAGE 3 CHRONIC KIDNEY DISEASE, WITH LONG-TERM CURRENT USE OF INSULIN: Chronic | ICD-10-CM

## 2019-02-12 DIAGNOSIS — C79.51 METASTATIC RENAL CELL CARCINOMA TO BONE: ICD-10-CM

## 2019-02-12 DIAGNOSIS — N18.30 TYPE 2 DIABETES MELLITUS WITH STAGE 3 CHRONIC KIDNEY DISEASE, WITH LONG-TERM CURRENT USE OF INSULIN: Chronic | ICD-10-CM

## 2019-02-12 DIAGNOSIS — F11.20 UNCOMPLICATED OPIOID DEPENDENCE: ICD-10-CM

## 2019-02-12 DIAGNOSIS — E13.40 NEUROPATHY DUE TO SECONDARY DIABETES: ICD-10-CM

## 2019-02-12 DIAGNOSIS — E55.9 VITAMIN D DEFICIENCY: ICD-10-CM

## 2019-02-12 DIAGNOSIS — D69.2 SENILE PURPURA: ICD-10-CM

## 2019-02-12 PROCEDURE — 3077F SYST BP >= 140 MM HG: CPT | Mod: HCNC,CPTII,S$GLB, | Performed by: INTERNAL MEDICINE

## 2019-02-12 PROCEDURE — 3044F HG A1C LEVEL LT 7.0%: CPT | Mod: HCNC,CPTII,S$GLB, | Performed by: INTERNAL MEDICINE

## 2019-02-12 PROCEDURE — 3078F PR MOST RECENT DIASTOLIC BLOOD PRESSURE < 80 MM HG: ICD-10-PCS | Mod: HCNC,CPTII,S$GLB, | Performed by: INTERNAL MEDICINE

## 2019-02-12 PROCEDURE — 3044F PR MOST RECENT HEMOGLOBIN A1C LEVEL <7.0%: ICD-10-PCS | Mod: HCNC,CPTII,S$GLB, | Performed by: INTERNAL MEDICINE

## 2019-02-12 PROCEDURE — 3077F PR MOST RECENT SYSTOLIC BLOOD PRESSURE >= 140 MM HG: ICD-10-PCS | Mod: HCNC,CPTII,S$GLB, | Performed by: INTERNAL MEDICINE

## 2019-02-12 PROCEDURE — 99215 PR OFFICE/OUTPT VISIT, EST, LEVL V, 40-54 MIN: ICD-10-PCS | Mod: HCNC,S$GLB,, | Performed by: INTERNAL MEDICINE

## 2019-02-12 PROCEDURE — 1101F PT FALLS ASSESS-DOCD LE1/YR: CPT | Mod: HCNC,CPTII,S$GLB, | Performed by: INTERNAL MEDICINE

## 2019-02-12 PROCEDURE — 1101F PR PT FALLS ASSESS DOC 0-1 FALLS W/OUT INJ PAST YR: ICD-10-PCS | Mod: HCNC,CPTII,S$GLB, | Performed by: INTERNAL MEDICINE

## 2019-02-12 PROCEDURE — 99215 OFFICE O/P EST HI 40 MIN: CPT | Mod: HCNC,S$GLB,, | Performed by: INTERNAL MEDICINE

## 2019-02-12 PROCEDURE — 3078F DIAST BP <80 MM HG: CPT | Mod: HCNC,CPTII,S$GLB, | Performed by: INTERNAL MEDICINE

## 2019-02-12 NOTE — ASSESSMENT & PLAN NOTE
Compliant with Ca and vit D, complains of fatigue  · Supplement with D3 5000U and calcium citrate  · monitor

## 2019-02-12 NOTE — PATIENT INSTRUCTIONS
TODAY:  - add A1c (diabetes marker) to your next labs  - be careful with alcohol  - order multivitamin, calcium citrate and Vit D3 5000U from [employer] [facility]  - optometrist appt   - if you want your ear examined, you can see the ear doctor  - avoid benadryl and sudafed if you drive a car or have alcohol    NEXT:  - foot exam

## 2019-02-12 NOTE — PROGRESS NOTES
"Primary Care Provider Appointment    Subjective:      Patient ID: Edwin Powell is a 73 y.o. male with DM, RCC, HTN, HLD    Chief Complaint: Diabetes; Hyperlipidemia; and Follow-up    Patient here for his monthly check-up. He has no complaints aside from being "tired". He is due for labs, EKG and Heme-Onc follow-up. He is compliant with votrient 800mg daily for RCC with Dr Reed. He is doing well! Has restaging CTs due (last performed 10/2018, due 3-4 mos), will schedule when he sees Heme-Onc next week.    Has chronic lumbar back pain, last injection in 10/2018. Does not want additional ones due to cost.    Has history of diarrhea, which stopped with discontinuation of lipitor. He is eating more and gaining weight. His CBG was last 160#, and he is eating more.    Complains of R ear pain since last ear cleaning. His external ear canal is tortuous and PCP unable to visualize tympanic membrane. He refuses ENT eval. Also complains of sinus pressure for which he takes antihistamines.       Past Surgical History:   Procedure Laterality Date    ABDOMINAL SURGERY      APPENDECTOMY      BACK SURGERY      CARDIAC SURGERY      CHOLECYSTECTOMY      coronary stenting      X 5 last one in 2010-11.    SPINAL FUSION         Past Medical History:   Diagnosis Date    Acquired hypothyroidism 5/26/2017    Benign essential HTN 5/26/2017    Benign prostatic hyperplasia (BPH) with urinary urge incontinence 6/6/2017    Chronic low back pain 6/1/2017    Coronary artery disease involving native coronary artery of native heart without angina pectoris 5/26/2017    S/p 5 stents - last one around 2010-11.    Gastroesophageal reflux disease without esophagitis 5/26/2017    History of CVA (cerebrovascular accident) 5/26/2017    Hypertension associated with diabetes 5/26/2017    BP controlled on ACE, CCB    Lumbar disc lesion 5/26/2017    Metastatic renal cell carcinoma to bone 6/6/2017    Metastatic renal cell carcinoma to " "liver 6/6/2017    Liver Biopsy 5-2017: METASTATIC RENAL CELL CARCINOMA.    Mixed hyperlipidemia 5/26/2017    Type 2 diabetes mellitus with stage 3 chronic kidney disease, with long-term current use of insulin 5/26/2017       Review of Systems   Constitutional: Negative for activity change and unexpected weight change.   HENT: Positive for hearing loss. Negative for trouble swallowing.         Abnormal hearing   Gastrointestinal: Positive for diarrhea. Negative for blood in stool, constipation and vomiting.   Endocrine: Negative for polydipsia and polyuria.   Genitourinary: Negative for difficulty urinating and urgency.   Musculoskeletal: Positive for arthralgias. Negative for joint swelling and neck pain.   Neurological: Positive for weakness. Negative for headaches.   Psychiatric/Behavioral: Negative for confusion and dysphoric mood.       Objective:   BP (!) 160/78 (BP Location: Left arm, Patient Position: Sitting, BP Method: Medium (Manual))   Pulse 65   Ht 5' 7" (1.702 m)   Wt 77.8 kg (171 lb 8.3 oz)   SpO2 97%   BMI 26.86 kg/m²     Physical Exam   Constitutional: He is oriented to person, place, and time. He appears well-developed and well-nourished.   No longer ambulating with cane   HENT:   Head: Normocephalic.   Edentulous  R ear unable to visualize tympanic membrane  Abnormal hearing   Eyes: Pupils are equal, round, and reactive to light.   Neck:   Decreased ROM in neck    Pulmonary/Chest: Effort normal.   Musculoskeletal: Normal range of motion.   Neurological: He is alert and oriented to person, place, and time.   Skin: Skin is warm.   Ecchymoses and purpura bilaterally UE  Telangiectasias on face and chest  Multiple tattoos on UE  Numerous red papules on neck  Fair skin   Psychiatric: He has a normal mood and affect.       Lab Results   Component Value Date    WBC 5.38 01/22/2019    HGB 12.0 (L) 01/22/2019    HCT 37.7 (L) 01/22/2019     01/22/2019    CHOL 229 (H) 01/22/2019    TRIG 157 (H) " 01/22/2019    HDL 42 01/22/2019    ALT 26 01/22/2019    AST 35 01/22/2019     01/22/2019    K 4.9 01/22/2019     01/22/2019    CREATININE 1.5 (H) 01/22/2019    BUN 25 (H) 01/22/2019    CO2 25 01/22/2019    TSH 3.530 07/16/2018    PSA 1.1 07/16/2018    INR 1.2 06/12/2017    HGBA1C 6.2 (H) 07/16/2018         Assessment:   73 y.o. male with multiple co-morbid illnesses here to continue work-up of chronic issues notably with DM, RCC, HTN, HLD.     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     LE numbness and tingling, elevated A1c, followed by podiatry  · Establish podiatry care in [facility]  · F/U podiatry q3-6 mos  · Diabetic shoes  · B12 injection today  · contiunue B12 and Vit D3 supplementation            Psychiatric    Uncomplicated opioid dependence     Taking scheduled narcotics for cancer-related pain. Overdose in past year  · Continue pain management per Heme-Onc  · Advised mindfulness to prevent overmedication  · Advised to avoid alcohol at all times            Cardiac/Vascular    Coronary artery disease involving native coronary artery of native heart with angina pectoris     [facility] S/p 5 stents 2010. Anginal equivalent controlled with CCB, ACE, APT  · Continue CCB, ACE, ASA, statin  · Will not start BB due to response to chemo         Hyperlipidemia due to type 2 diabetes mellitus     ASCVD risk high due to DM  · Continue statin  · Lipids controlled  · Continue DM management- no longer requiring insulin  · Lifestyle changes and weight loss due to cancer         Relevant Orders    Hemoglobin A1c    Tortuous aorta     CXR 6/16/17 showed tortuous aorta  · Continue statin, ASA, BP control         Atherosclerosis of aorta     6/16/17 CXR calcification of aorta  · Continue statin, ASA, BP control            Hematology    Senile purpura     Ecchymoses and pupura on UE bilaterally  · Advised mindfulness of movements  · Monitor  · continue APT             Oncology    Metastatic renal cell carcinoma to liver     Metastatic renal cell CA (Stage IV - T1,Nx,M1). Followed by Heme-Onc (Dr Reed) radiation therapy to L3 spine - 3 Gy/Fx x 10 fx and pazopanib 800 mg PO daily. Liver bx 5/2017 with RCC  · F/U Heme-Onc  · Continue current therapies  · Completed monthly xgeva and radiation therapy  · Ca with Vit D supplementation  · Continue votrient         Metastatic renal cell carcinoma to bone     Metastatic renal cell CA (Stage IV - T1,Nx,M1). Followed by Heme-Onc (Dr Reed) radiation therapy to L3 spine - 3 Gy/Fx x 10 fx and pazopanib 800 mg PO daily. Liver bx 5/2017 with RCC  · F/U Heme-Onc  · Continue current therapies  · Completed monthly xgeva and radiation therapy  · Ca with Vit D supplementation         Renal cell carcinoma of left kidney     Metastatic renal cell CA (Stage IV - T1,Nx,M1). Followed by Heme-Onc (Dr Reed) radiation therapy to L3 spine - 3 Gy/Fx x 10 fx and pazopanib 800 mg PO daily.  · F/U Heme-Onc  · restaging CTs q3-4 mos  · Continue pazopanib 800mg  · Completed Xgeva for bone mets  · Cotninue Ca and Vit D            Endocrine    Type 2 diabetes mellitus with stage 3 chronic kidney disease, with long-term current use of insulin (Chronic)     A1c 11.1 in 5/2017, 6.8 in 8/2017. Weaned off Lantus, PRN novolog 10 preprandial  · Continue current therapies  · Monitor A1c  · Discontinued long acting insulin  · PRN novolog for glucose >150  · Has not taken insulin in past 4 mos         Moderate protein-calorie malnutrition     Low albumin, low muscle tone, chronic disease of metastatic CA, fatigue  · Continue protein supplement  · Continue high protein intake diet  · MVI and Ca/Vit D supplements         Vitamin D deficiency     Compliant with Ca and vit D, complains of fatigue  · Supplement with D3 5000U and calcium citrate  · monitor         Relevant Orders    Vitamin D       Orthopedic    Cervical arthritis     Fused bones in cervical spine at  [facility]. Recurrent neck pain  · Monitor clinically         Facet arthritis of lumbar region     Seen on MRI 5/2017, complains of sciatica. Not physically active, refuses PT  · Refuses PT  · Continue to advise to be physically active  · Previously received nerve blocks with pain management               Health Maintenance       Date Due Completion Date    TETANUS VACCINE 02/18/1963 ---    Eye Exam 01/19/2019 1/19/2018- TODAY    Foot Exam 02/02/2019 2/2/2018 (Done)- NEXT    Override on 2/2/2018: Done (performed by podiatrist)    Hemoglobin A1c 02/27/2019 8/27/2018- TODAY    High Dose Statin 01/22/2020 1/22/2019    Lipid Panel 01/22/2020 1/22/2019- TODAY    Colonoscopy 02/15/2021 2/15/2011 (Done)    Override on 2/15/2011: Done (Per [employer] dashboard ? results)          Follow-up in about 6 weeks (around 3/26/2019). One hour spent with this patient today, half of that in counseling.    Lulú Lewis MD/MPH  Internal Medicine  [facility]  [phone]

## 2019-02-12 NOTE — ASSESSMENT & PLAN NOTE
LE numbness and tingling, elevated A1c, followed by podiatry  · Establish podiatry care in [facility]  · F/U podiatry q3-6 mos  · Diabetic shoes  · B12 injection today  · contiunue B12 and Vit D3 supplementation

## 2019-02-12 NOTE — ASSESSMENT & PLAN NOTE
Seen on MRI 5/2017, complains of sciatica. Not physically active, refuses PT  · Refuses PT  · Continue to advise to be physically active  · Previously received nerve blocks with pain management

## 2019-02-12 NOTE — ASSESSMENT & PLAN NOTE
A1c 11.1 in 5/2017, 6.8 in 8/2017. Weaned off Lantus, PRN novolog 10 preprandial  · Continue current therapies  · Monitor A1c  · Discontinued long acting insulin  · PRN novolog for glucose >150  · Has not taken insulin in past 4 mos

## 2019-02-12 NOTE — ASSESSMENT & PLAN NOTE
[facility] S/p 5 stents 2010. Anginal equivalent controlled with CCB, ACE, APT  · Continue CCB, ACE, ASA, statin  · Will not start BB due to response to chemo

## 2019-02-12 NOTE — ASSESSMENT & PLAN NOTE
Fused bones in cervical spine at [facility]. Recurrent neck pain  · Monitor clinically

## 2019-02-12 NOTE — ASSESSMENT & PLAN NOTE
Metastatic renal cell CA (Stage IV - T1,Nx,M1). Followed by Heme-Onc (Dr Reed) radiation therapy to L3 spine - 3 Gy/Fx x 10 fx and pazopanib 800 mg PO daily.  · F/U Heme-Onc  · restaging CTs q3-4 mos  · Continue pazopanib 800mg  · Completed Xgeva for bone mets  · Cotninue Ca and Vit D

## 2019-02-13 ENCOUNTER — PATIENT MESSAGE (OUTPATIENT)
Dept: HEMATOLOGY/ONCOLOGY | Facility: CLINIC | Age: 74
End: 2019-02-13

## 2019-02-13 ENCOUNTER — TELEPHONE (OUTPATIENT)
Dept: HEMATOLOGY/ONCOLOGY | Facility: CLINIC | Age: 74
End: 2019-02-13

## 2019-02-13 DIAGNOSIS — C64.9 METASTATIC RENAL CELL CARCINOMA TO LIVER: ICD-10-CM

## 2019-02-13 DIAGNOSIS — G89.3 NEOPLASM RELATED PAIN: Primary | ICD-10-CM

## 2019-02-13 DIAGNOSIS — G89.3 NEOPLASM RELATED PAIN: ICD-10-CM

## 2019-02-13 DIAGNOSIS — C64.9 METASTATIC RENAL CELL CARCINOMA TO BONE: ICD-10-CM

## 2019-02-13 DIAGNOSIS — C78.7 METASTATIC RENAL CELL CARCINOMA TO LIVER: ICD-10-CM

## 2019-02-13 DIAGNOSIS — C79.51 METASTATIC RENAL CELL CARCINOMA TO BONE: ICD-10-CM

## 2019-02-13 RX ORDER — MORPHINE SULFATE 15 MG/1
15 TABLET ORAL EVERY 4 HOURS PRN
Qty: 120 TABLET | Refills: 0 | OUTPATIENT
Start: 2019-02-13

## 2019-02-13 RX ORDER — HYDROCODONE BITARTRATE AND ACETAMINOPHEN 5; 325 MG/1; MG/1
1 TABLET ORAL EVERY 8 HOURS PRN
Qty: 50 TABLET | Refills: 0 | OUTPATIENT
Start: 2019-02-13

## 2019-02-13 RX ORDER — HYDROCODONE BITARTRATE AND ACETAMINOPHEN 5; 325 MG/1; MG/1
TABLET ORAL
Qty: 120 TABLET | Refills: 0 | Status: SHIPPED | OUTPATIENT
Start: 2019-02-13 | End: 2019-04-03 | Stop reason: SDUPTHER

## 2019-02-13 RX ORDER — HYDROCODONE BITARTRATE AND ACETAMINOPHEN 5; 325 MG/1; MG/1
TABLET ORAL
Qty: 120 TABLET | Refills: 0 | OUTPATIENT
Start: 2019-02-13

## 2019-02-13 RX ORDER — MORPHINE SULFATE 15 MG/1
15 TABLET, FILM COATED, EXTENDED RELEASE ORAL EVERY 12 HOURS
Qty: 60 TABLET | Refills: 0 | Status: SHIPPED | OUTPATIENT
Start: 2019-02-13 | End: 2019-04-03 | Stop reason: SDUPTHER

## 2019-02-13 NOTE — TELEPHONE ENCOUNTER
Message sent via [facility] to pt.       ----- Message from Rahel Reed MD sent at 2/13/2019  8:55 AM CST -----  He has 2 short acting pain meds and is getting flagged  He can get Norco for short acting but needs to switch MSIR a short acting like the norco to an extended release Morphine that last 8-12 hours  Is he willing to consider?

## 2019-02-19 ENCOUNTER — TELEPHONE (OUTPATIENT)
Dept: HEMATOLOGY/ONCOLOGY | Facility: CLINIC | Age: 74
End: 2019-02-19

## 2019-02-19 ENCOUNTER — OFFICE VISIT (OUTPATIENT)
Dept: HEMATOLOGY/ONCOLOGY | Facility: CLINIC | Age: 74
End: 2019-02-19
Payer: MEDICARE

## 2019-02-19 ENCOUNTER — LAB VISIT (OUTPATIENT)
Dept: LAB | Facility: HOSPITAL | Age: 74
End: 2019-02-19
Attending: INTERNAL MEDICINE
Payer: MEDICARE

## 2019-02-19 ENCOUNTER — TELEPHONE (OUTPATIENT)
Dept: INTERNAL MEDICINE | Facility: CLINIC | Age: 74
End: 2019-02-19

## 2019-02-19 ENCOUNTER — HOSPITAL ENCOUNTER (OUTPATIENT)
Dept: CARDIOLOGY | Facility: CLINIC | Age: 74
Discharge: HOME | End: 2019-02-19
Payer: MEDICARE

## 2019-02-19 VITALS
OXYGEN SATURATION: 98 % | HEART RATE: 63 BPM | SYSTOLIC BLOOD PRESSURE: 162 MMHG | BODY MASS INDEX: 26.78 KG/M2 | TEMPERATURE: 98 F | HEIGHT: 67 IN | WEIGHT: 170.63 LBS | RESPIRATION RATE: 18 BRPM | DIASTOLIC BLOOD PRESSURE: 75 MMHG

## 2019-02-19 DIAGNOSIS — E55.9 VITAMIN D DEFICIENCY: ICD-10-CM

## 2019-02-19 DIAGNOSIS — C79.51 METASTATIC RENAL CELL CARCINOMA TO BONE: ICD-10-CM

## 2019-02-19 DIAGNOSIS — C64.9 METASTATIC RENAL CELL CARCINOMA TO LIVER: ICD-10-CM

## 2019-02-19 DIAGNOSIS — E11.69 HYPERLIPIDEMIA DUE TO TYPE 2 DIABETES MELLITUS: ICD-10-CM

## 2019-02-19 DIAGNOSIS — C64.9 METASTATIC RENAL CELL CARCINOMA TO BONE: ICD-10-CM

## 2019-02-19 DIAGNOSIS — C78.7 METASTATIC RENAL CELL CARCINOMA TO LIVER: ICD-10-CM

## 2019-02-19 DIAGNOSIS — C64.2 RENAL CELL CARCINOMA OF LEFT KIDNEY: Primary | ICD-10-CM

## 2019-02-19 DIAGNOSIS — C64.2 RENAL CELL CARCINOMA OF LEFT KIDNEY: ICD-10-CM

## 2019-02-19 DIAGNOSIS — R79.9 ABNORMAL FINDING OF BLOOD CHEMISTRY: ICD-10-CM

## 2019-02-19 DIAGNOSIS — G89.3 NEOPLASM RELATED PAIN: ICD-10-CM

## 2019-02-19 DIAGNOSIS — E78.5 HYPERLIPIDEMIA DUE TO TYPE 2 DIABETES MELLITUS: ICD-10-CM

## 2019-02-19 LAB
25(OH)D3+25(OH)D2 SERPL-MCNC: 25 NG/ML
ALBUMIN SERPL BCP-MCNC: 3.3 G/DL
ALP SERPL-CCNC: 88 U/L
ALT SERPL W/O P-5'-P-CCNC: 20 U/L
ANION GAP SERPL CALC-SCNC: 6 MMOL/L
AST SERPL-CCNC: 22 U/L
BASOPHILS # BLD AUTO: 0.02 K/UL
BASOPHILS NFR BLD: 0.3 %
BILIRUB SERPL-MCNC: 0.4 MG/DL
BUN SERPL-MCNC: 24 MG/DL
CALCIUM SERPL-MCNC: 9.9 MG/DL
CHLORIDE SERPL-SCNC: 105 MMOL/L
CHOLEST SERPL-MCNC: 260 MG/DL
CHOLEST/HDLC SERPL: 6.8 {RATIO}
CO2 SERPL-SCNC: 29 MMOL/L
CREAT SERPL-MCNC: 1.5 MG/DL
DIFFERENTIAL METHOD: ABNORMAL
EOSINOPHIL # BLD AUTO: 0.3 K/UL
EOSINOPHIL NFR BLD: 5.1 %
ERYTHROCYTE [DISTWIDTH] IN BLOOD BY AUTOMATED COUNT: 13.3 %
EST. GFR  (AFRICAN AMERICAN): 52.3 ML/MIN/1.73 M^2
EST. GFR  (NON AFRICAN AMERICAN): 45.2 ML/MIN/1.73 M^2
ESTIMATED AVG GLUCOSE: 137 MG/DL
GLUCOSE SERPL-MCNC: 149 MG/DL
HBA1C MFR BLD HPLC: 6.4 %
HCT VFR BLD AUTO: 38.6 %
HDLC SERPL-MCNC: 38 MG/DL
HDLC SERPL: 14.6 %
HGB BLD-MCNC: 12.4 G/DL
IMM GRANULOCYTES # BLD AUTO: 0.02 K/UL
IMM GRANULOCYTES NFR BLD AUTO: 0.3 %
LDLC SERPL CALC-MCNC: 169.6 MG/DL
LYMPHOCYTES # BLD AUTO: 1.8 K/UL
LYMPHOCYTES NFR BLD: 29.5 %
MCH RBC QN AUTO: 34.3 PG
MCHC RBC AUTO-ENTMCNC: 32.1 G/DL
MCV RBC AUTO: 107 FL
MONOCYTES # BLD AUTO: 0.5 K/UL
MONOCYTES NFR BLD: 8.6 %
NEUTROPHILS # BLD AUTO: 3.3 K/UL
NEUTROPHILS NFR BLD: 56.2 %
NONHDLC SERPL-MCNC: 222 MG/DL
NRBC BLD-RTO: 0 /100 WBC
PLATELET # BLD AUTO: 375 K/UL
PMV BLD AUTO: 9.9 FL
POTASSIUM SERPL-SCNC: 4.7 MMOL/L
PROT SERPL-MCNC: 6.7 G/DL
RBC # BLD AUTO: 3.62 M/UL
SODIUM SERPL-SCNC: 140 MMOL/L
TRIGL SERPL-MCNC: 262 MG/DL
WBC # BLD AUTO: 5.94 K/UL

## 2019-02-19 PROCEDURE — 3078F DIAST BP <80 MM HG: CPT | Mod: HCNC,CPTII,S$GLB, | Performed by: PHYSICIAN ASSISTANT

## 2019-02-19 PROCEDURE — 3044F PR MOST RECENT HEMOGLOBIN A1C LEVEL <7.0%: ICD-10-PCS | Mod: HCNC,CPTII,S$GLB, | Performed by: PHYSICIAN ASSISTANT

## 2019-02-19 PROCEDURE — 99214 PR OFFICE/OUTPT VISIT, EST, LEVL IV, 30-39 MIN: ICD-10-PCS | Mod: HCNC,S$GLB,, | Performed by: PHYSICIAN ASSISTANT

## 2019-02-19 PROCEDURE — 83036 HEMOGLOBIN GLYCOSYLATED A1C: CPT | Mod: HCNC

## 2019-02-19 PROCEDURE — 85025 COMPLETE CBC W/AUTO DIFF WBC: CPT | Mod: HCNC

## 2019-02-19 PROCEDURE — 1101F PT FALLS ASSESS-DOCD LE1/YR: CPT | Mod: HCNC,CPTII,S$GLB, | Performed by: PHYSICIAN ASSISTANT

## 2019-02-19 PROCEDURE — 82306 VITAMIN D 25 HYDROXY: CPT | Mod: HCNC

## 2019-02-19 PROCEDURE — 80053 COMPREHEN METABOLIC PANEL: CPT | Mod: HCNC

## 2019-02-19 PROCEDURE — 3078F PR MOST RECENT DIASTOLIC BLOOD PRESSURE < 80 MM HG: ICD-10-PCS | Mod: HCNC,CPTII,S$GLB, | Performed by: PHYSICIAN ASSISTANT

## 2019-02-19 PROCEDURE — 99214 OFFICE O/P EST MOD 30 MIN: CPT | Mod: HCNC,S$GLB,, | Performed by: PHYSICIAN ASSISTANT

## 2019-02-19 PROCEDURE — 3044F HG A1C LEVEL LT 7.0%: CPT | Mod: HCNC,CPTII,S$GLB, | Performed by: PHYSICIAN ASSISTANT

## 2019-02-19 PROCEDURE — 93000 EKG 12-LEAD: ICD-10-PCS | Mod: HCNC,S$GLB,, | Performed by: INTERNAL MEDICINE

## 2019-02-19 PROCEDURE — 99999 PR PBB SHADOW E&M-EST. PATIENT-LVL V: CPT | Mod: PBBFAC,HCNC,, | Performed by: PHYSICIAN ASSISTANT

## 2019-02-19 PROCEDURE — 3077F SYST BP >= 140 MM HG: CPT | Mod: HCNC,CPTII,S$GLB, | Performed by: PHYSICIAN ASSISTANT

## 2019-02-19 PROCEDURE — 80061 LIPID PANEL: CPT | Mod: HCNC

## 2019-02-19 PROCEDURE — 1101F PR PT FALLS ASSESS DOC 0-1 FALLS W/OUT INJ PAST YR: ICD-10-PCS | Mod: HCNC,CPTII,S$GLB, | Performed by: PHYSICIAN ASSISTANT

## 2019-02-19 PROCEDURE — 99999 PR PBB SHADOW E&M-EST. PATIENT-LVL V: ICD-10-PCS | Mod: PBBFAC,HCNC,, | Performed by: PHYSICIAN ASSISTANT

## 2019-02-19 PROCEDURE — 36415 COLL VENOUS BLD VENIPUNCTURE: CPT | Mod: HCNC

## 2019-02-19 PROCEDURE — 93000 ELECTROCARDIOGRAM COMPLETE: CPT | Mod: HCNC,S$GLB,, | Performed by: INTERNAL MEDICINE

## 2019-02-19 PROCEDURE — 3077F PR MOST RECENT SYSTOLIC BLOOD PRESSURE >= 140 MM HG: ICD-10-PCS | Mod: HCNC,CPTII,S$GLB, | Performed by: PHYSICIAN ASSISTANT

## 2019-02-19 NOTE — TELEPHONE ENCOUNTER
----- Message from Kennedi Flannery PA-C sent at 2/19/2019  9:25 AM CST -----  larned in 1 month with cbc/cmp/lipids and bone scan and CT C/A/P

## 2019-02-19 NOTE — PROGRESS NOTES
Subjective:       Patient ID: Edwin Powell is a 74 y.o. male.    Chief Complaint: Renal cell carcinoma of left kidney    Mr. Singleton is a 74 y.o. male who returns for follow up regarding metastatic renal cell carcinoma of left kidney.  Returns for follow-up, currently on Votrient. Last imaging in 10/16/18 showed stable disease.    He is having some right shoulder pain that comes and goes, has been chronic.  Additionally he has some lateral right back discomfort that has also been chronic and is controlled on current pain medications.      He reports overall doing well. Reports no further diarrhea since stopping his anti cholesterol medications but does note some weight gain.   No fevers, chills or infections.     CT scans 10/16/18:  In this patient with a history of renal cell carcinoma, an exophytic left renal mass is unchanged in size and appearance to CT 06/14/2018.  Hypodense hepatic lesions in segment 7 and 4 are unchanged.  A previously identified hepatic segment 2 lesion is not seen on today's examination.  Stable L3 vertebral body lytic lesion.  Stable right upper lobe pulmonary micronodule.  Additional findings as above.  RECIST SUMMARY:  Date of prior examination for comparison: 06/14/2018  Lesion 1: Left kidney.  2.3 cm. Series 3 image 123.  Prior measurement 2.3 cm.  Lesion 2: Hepatic segment 2.  Not definitively visualized on today's examination.  Prior measurement 1.0 cm.  Lesion 3: Hepatic segment 7.  1.4 cm. Series 3 image 84.  Prior measurement 1.6 cm.  Lesion 4: Hepatic segment 4.  1.1 cm. Series 3 image 91.  Prior measurement 1.1 cm.     Oncology History:  Mr.Mc Moss is a 72 yo male with CAD (s/p 5 stents, remotely), DM2 (insulin dependent), hypothyroidism , CVA (2013) with no residual deficit, chronic back pain. He was transferred to [facility] [place]surgery evaluation of a lumbar spine lesion in the setting of progressive weakness of his legs and exacerbation of chronic back pain, in May 2017.  "He states that his back pain and LE weakness progressively worsened. [facility]. The pain radiates down his legs. He denies any incontinence of bowel or bladder. He had several falls. After one of the falls, he presented to the ED at [facility], in May 2017. He had an MRI showing "L3 vertebral body lesion with apparent cortical disruption concerning for metastases vs myeloma," and was transferred to [facility] [place] for neurosurgery evaluation. He was evaluated by neurosurgery regarding concern for lytic lesion on L3 on 5/26/17. No surgical intervention was proposed.   CT chest, abdomen,pelvis on 5/27/17 revealed :   --A 3.6 cm exophytic mass arising from the medial aspect of the lower pole of left kidney,   --A small 1.0 cm, subtle, cortical enhancing mass within the upper pole of the right kidney, concerning for possible contralateral solid renal mass  --Multiple enhancing ill-defined hepatic masses concerning for hepatic metastatic disease, hepatomegaly and hepatic steatosis  --Mulltifocal irregular thickening of the bilateral pleura concerning for possible pleural metastatic disease  --Mild nonspecific nodular thickening of the left adrenal gland without discrete nodule  -- Redemonstration of known lytic lesion within the L3 vertebral body. No definite additional discrete lytic lesions are identified.  -MRI pelvis from 10/18/17:    2.5 cm linear T1 hypointense T2/STIR hyperintense lesion within the left iliac bone, this lesion is indeterminate but felt less likely to represent metastasis given its appearance. Further evaluation can be obtained with bone scan as clinically indicated.  Findings suggestive of left trochanteric bursitis.  Partially visualized L3 metastatic lesion.  Diffuse thickening of the urinary bladder wall can be seen with chronic outlet obstruction or cystitis.             Review of Systems   Constitutional: Negative for appetite change and unexpected " weight change.   HENT: Negative for congestion, mouth sores, rhinorrhea and trouble swallowing.    Eyes: Negative for visual disturbance.   Respiratory: Negative for cough and shortness of breath.    Cardiovascular: Negative for chest pain.   Gastrointestinal: Negative for abdominal pain and diarrhea.   Genitourinary: Negative for frequency.   Musculoskeletal: Positive for back pain (chronic and unchanged).   Skin: Negative for rash.   Neurological: Negative for headaches.   Hematological: Negative for adenopathy.   Psychiatric/Behavioral: The patient is not nervous/anxious.        Objective:      Physical Exam   Constitutional: He is oriented to person, place, and time. He appears well-nourished. No distress.   Presents alone  ECOG 0   HENT:   Head: Normocephalic.   Mouth/Throat: Oropharynx is clear and moist. No oropharyngeal exudate.   Eyes: Conjunctivae are normal. Pupils are equal, round, and reactive to light. No scleral icterus.   Neck: Normal range of motion. Neck supple. No thyromegaly present.   Cardiovascular: Normal rate, regular rhythm, normal heart sounds and intact distal pulses.   Pulmonary/Chest: Effort normal and breath sounds normal. No respiratory distress.   Lungs clear     Abdominal: Soft. Bowel sounds are normal. He exhibits no distension and no mass. There is no tenderness.   Musculoskeletal: Normal range of motion. He exhibits no edema.   No spinal or paraspinal tenderness to palpation     Lymphadenopathy:     He has no cervical adenopathy.   Neurological: He is alert and oriented to person, place, and time. No cranial nerve deficit.   Skin: Skin is warm and dry. No rash noted. No erythema. No pallor.   Psychiatric: He has a normal mood and affect. His behavior is normal. Thought content normal.   Vitals reviewed.      Assessment:       1. Renal cell carcinoma of left kidney    2. 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 Neoplasm related pain    4. Hyperlipidemia due to type 2 diabetes  mellitus        Plan:       1-2) Continue Votrient and return to clinic in one month with scans.  3)well controlled on current pain medication regimen  4)worsening cholesterol; advised dietary changes and exercise.  Votrient also exacerbating lipids, will await results of upcoming scan as if progression he will be switched to new medication.  If stable disease will opt for different cholesterol management (lipitor in past caused diarrhea).  Distress Screening Results: Psychosocial Distress screening score of Distress Score: 0 noted and reviewed. No intervention indicated.

## 2019-02-19 NOTE — Clinical Note
Lipids are creeping up  - getting scan before next visit and then we discussed at that point deciding on whether or not to continue votrient which would dictate cholesterol management. If you want any lipid intervention now just let me know, we discussed diet and exercise changes

## 2019-02-19 NOTE — TELEPHONE ENCOUNTER
Please let patient know that his vitamin D is still low, he needs to continue the supplementation.    He continues to have anemia, but it is stable. He has developed kidney disease over the last year, likely due to the chemotherapy and cancer, so his cancer doctor will monitor that too.    His cholesterol remains elevated, as he is no longer on the statin. His A1c is well-controlled.    Thanks,  CORTEZ

## 2019-02-20 ENCOUNTER — TELEPHONE (OUTPATIENT)
Dept: PHARMACY | Facility: CLINIC | Age: 74
End: 2019-02-20

## 2019-03-15 ENCOUNTER — HOSPITAL ENCOUNTER (OUTPATIENT)
Dept: RADIOLOGY | Facility: HOSPITAL | Age: 74
Discharge: HOME | End: 2019-03-15
Attending: PHYSICIAN ASSISTANT
Payer: MEDICARE

## 2019-03-15 DIAGNOSIS — C64.2 RENAL CELL CARCINOMA OF LEFT KIDNEY: ICD-10-CM

## 2019-03-15 PROCEDURE — 74178 CT ABD&PLV WO CNTR FLWD CNTR: CPT | Mod: 26,HCNC,, | Performed by: RADIOLOGY

## 2019-03-15 PROCEDURE — 71260 CT CHEST ABDOMEN PELVIS W W/O CONTRAST (XPD): ICD-10-PCS | Mod: 26,HCNC,, | Performed by: RADIOLOGY

## 2019-03-15 PROCEDURE — 78306 BONE IMAGING WHOLE BODY: CPT | Mod: 26,HCNC,, | Performed by: RADIOLOGY

## 2019-03-15 PROCEDURE — 74178 CT ABD&PLV WO CNTR FLWD CNTR: CPT | Mod: TC,HCNC

## 2019-03-15 PROCEDURE — A9503 TC99M MEDRONATE: HCPCS | Mod: HCNC

## 2019-03-15 PROCEDURE — 71260 CT THORAX DX C+: CPT | Mod: 26,HCNC,, | Performed by: RADIOLOGY

## 2019-03-15 PROCEDURE — 25500020 PHARM REV CODE 255: Mod: HCNC | Performed by: PHYSICIAN ASSISTANT

## 2019-03-15 PROCEDURE — 78306 NM BONE SCAN WHOLE BODY: ICD-10-PCS | Mod: 26,HCNC,, | Performed by: RADIOLOGY

## 2019-03-15 PROCEDURE — 74178 CT CHEST ABDOMEN PELVIS W W/O CONTRAST (XPD): ICD-10-PCS | Mod: 26,HCNC,, | Performed by: RADIOLOGY

## 2019-03-15 RX ADMIN — IOHEXOL 100 ML: 350 INJECTION, SOLUTION INTRAVENOUS at 01:03

## 2019-03-18 NOTE — PROGRESS NOTES
Subjective:       Patient ID: Edwin Powell is a 74 y.o. male.    Chief Complaint: No chief complaint on file.    HPI     Mr. Singleton is a 74 y.o. male who returns for follow up regarding metastatic renal cell carcinoma of left kidney.  Returns for follow-up, currently on Votrient, and for results of restaging scans.    Reports back pain (lateral right back) is getting worse- thinks he may have pulled a muscle the other day however  Now he has left sided shoulder pain and decreased range of motion  He is having some right shoulder pain that comes and goes, has been chronic.    No other pain areas.   Weight up.  Eating well.  Reports no further diarrhea since stopping his anti cholesterol medications  No fevers, chills or infections.     Repeat Imaging:  Bone scan 3/15/19:  There is no scintigraphic evidence of metastatic disease.    CT scans 3/15/19:  In this patient with a history of renal cell carcinoma there is a stable appearance of an exophytic left renal mass, unchanged in size since CT 06/14/2018.  Several hypodense hepatic lesions unchanged since prior CT 10/16/2018 and CT 06/14/2018.  Stable L3 vertebral body lytic lesion.  Stable right upper lobe pulmonary micronodule.  1.6 cm focus of ground-glass attenuation in the medial basal segment of the right lower lobe, new in comparison to prior examination.  Attention at repeat surveillance imaging is recommended.  RECIST SUMMARY:  Date of prior examination for comparison: 10/16/2018.  Lesion 1: Left kidney mass.  2.3 cm. Series 4 Image 122. Prior measurement 2.3 cm.  Lesion 2: Hepatic segment 2.  Not definitively visualized on today's examination or prior examination.  Lesion 3: Hepatic segment 7.  1.4 cm. Series 4 image 82.  Prior measurement 1.4 cm.  Lesion 4: Hepatic segment 4.  1.1 cm. Series 4 image 91.  Prior measurement 1.1 cm.     Oncology History:  Mr.Mc Moss is a 74 yo male with CAD (s/p 5 stents, remotely), DM2 (insulin dependent), hypothyroidism  ", CVA (2013) with no residual deficit, chronic back pain. He was transferred to [facility] [place] for neurosurgery evaluation of a lumbar spine lesion in the setting of progressive weakness of his legs and exacerbation of chronic back pain, in May 2017. He states that his back pain and LE weakness progressively worsened. [facility]. The pain radiates down his legs. He denies any incontinence of bowel or bladder. He had several falls. After one of the falls, he presented to the ED at [facility], in May 2017. He had an MRI showing "L3 vertebral body lesion with apparent cortical disruption concerning for metastases vs myeloma," and was transferred to [facility] [place] for neurosurgery evaluation. He was evaluated by neurosurgery regarding concern for lytic lesion on L3 on 5/26/17. No surgical intervention was proposed.   CT chest, abdomen,pelvis on 5/27/17 revealed :   --A 3.6 cm exophytic mass arising from the medial aspect of the lower pole of left kidney,   --A small 1.0 cm, subtle, cortical enhancing mass within the upper pole of the right kidney, concerning for possible contralateral solid renal mass  --Multiple enhancing ill-defined hepatic masses concerning for hepatic metastatic disease, hepatomegaly and hepatic steatosis  --Mulltifocal irregular thickening of the bilateral pleura concerning for possible pleural metastatic disease  --Mild nonspecific nodular thickening of the left adrenal gland without discrete nodule  -- Redemonstration of known lytic lesion within the L3 vertebral body. No definite additional discrete lytic lesions are identified.  -MRI pelvis from 10/18/17:    2.5 cm linear T1 hypointense T2/STIR hyperintense lesion within the left iliac bone, this lesion is indeterminate but felt less likely to represent metastasis given its appearance. Further evaluation can be obtained with bone scan as clinically indicated.  Findings suggestive of left trochanteric " bursitis.  Partially visualized L3 metastatic lesion.  Diffuse thickening of the urinary bladder wall can be seen with chronic outlet obstruction or cystitis.  - initiated on Votrient    Review of Systems   Constitutional: Negative for activity change, appetite change, chills, fatigue, fever and unexpected weight change.   HENT: Negative for congestion, dental problem, mouth sores, nosebleeds, rhinorrhea, sinus pressure, sore throat and trouble swallowing.    Eyes: Negative for visual disturbance.   Respiratory: Negative for cough, chest tightness, shortness of breath and wheezing.    Cardiovascular: Negative for chest pain, palpitations and leg swelling.   Gastrointestinal: Negative for abdominal distention, abdominal pain, blood in stool, constipation, diarrhea, nausea and vomiting.   Genitourinary: Negative for decreased urine volume, difficulty urinating, dysuria, frequency, hematuria and urgency.   Musculoskeletal: Positive for arthralgias and back pain (chronic and unchanged). Negative for gait problem, joint swelling, myalgias, neck pain and neck stiffness.   Skin: Negative for color change, pallor, rash and wound.   Neurological: Negative for dizziness, weakness, light-headedness, numbness and headaches.   Hematological: Negative for adenopathy.   Psychiatric/Behavioral: Negative for dysphoric mood. The patient is not nervous/anxious.      Labs- reviewed  Objective:      Physical Exam   Constitutional: He is oriented to person, place, and time. He appears well-developed and well-nourished. No distress.   Presents alone  ECOG=0   HENT:   Head: Normocephalic and atraumatic.   Mouth/Throat: Oropharynx is clear and moist. No oropharyngeal exudate.   Eyes: Conjunctivae and EOM are normal. Pupils are equal, round, and reactive to light. Right eye exhibits no discharge. Left eye exhibits no discharge. No scleral icterus.   Neck: Normal range of motion. Neck supple. No thyromegaly present.   Cardiovascular: Normal  rate, regular rhythm, normal heart sounds and intact distal pulses. Exam reveals no gallop and no friction rub.   No murmur heard.  Pulmonary/Chest: Effort normal and breath sounds normal. No respiratory distress. He has no wheezes. He has no rales. He exhibits no tenderness.   Lungs clear   Abdominal: Soft. Bowel sounds are normal. He exhibits no distension and no mass. There is no tenderness. There is no rebound.   No hepatosplenomegaly   Musculoskeletal: Normal range of motion. He exhibits no edema, tenderness (point tenderness low back) or deformity.   No spinal or paraspinal tenderness to palpation   Lymphadenopathy:     He has no cervical adenopathy.   Neurological: He is alert and oriented to person, place, and time. No cranial nerve deficit or sensory deficit. Coordination normal.   Skin: Skin is warm and dry. No rash noted. He is not diaphoretic. No erythema. No pallor.   Psychiatric: He has a normal mood and affect. His behavior is normal. Thought content normal.   Nursing note and vitals reviewed.   Labs- reviewed   Assessment:       1. Renal cell carcinoma of left kidney    2. Type 2 diabetes mellitus with stage 3 chronic kidney disease, with long-term current use of insulin    3. Hyperlipidemia due to type 2 diabetes mellitus    4. Arthritis        Plan:     1. Metastatic to liver and bone  He also has 2 small pulmonary nodules- will monitor as too small to characterize  As 1 is new we will monitor closely  All other areas of known disease stable  Continue Votrient  RTC 1 month, will be due for an EKG  Knows to call for any issues in the interval  2. Managed by his PCP  3. Managed by his PCP  We monitor on Votrient  4. Counseled on bilateral shoulder pain and decreased ROM on left  He declines orthopedics and physical therapy referral at this time      Distress Screening Results: Psychosocial Distress screening score of Distress Score: 0 noted and reviewed. No intervention indicated.

## 2019-03-19 ENCOUNTER — OFFICE VISIT (OUTPATIENT)
Dept: HEMATOLOGY/ONCOLOGY | Facility: CLINIC | Age: 74
End: 2019-03-19
Payer: MEDICARE

## 2019-03-19 VITALS
OXYGEN SATURATION: 97 % | TEMPERATURE: 98 F | RESPIRATION RATE: 18 BRPM | SYSTOLIC BLOOD PRESSURE: 161 MMHG | HEART RATE: 59 BPM | WEIGHT: 169.75 LBS | DIASTOLIC BLOOD PRESSURE: 72 MMHG | HEIGHT: 68 IN | BODY MASS INDEX: 25.73 KG/M2

## 2019-03-19 DIAGNOSIS — C64.2 RENAL CELL CARCINOMA OF LEFT KIDNEY: Primary | ICD-10-CM

## 2019-03-19 DIAGNOSIS — E11.69 HYPERLIPIDEMIA DUE TO TYPE 2 DIABETES MELLITUS: ICD-10-CM

## 2019-03-19 DIAGNOSIS — E11.22 TYPE 2 DIABETES MELLITUS WITH STAGE 3 CHRONIC KIDNEY DISEASE, WITH LONG-TERM CURRENT USE OF INSULIN: Chronic | ICD-10-CM

## 2019-03-19 DIAGNOSIS — E78.5 HYPERLIPIDEMIA DUE TO TYPE 2 DIABETES MELLITUS: ICD-10-CM

## 2019-03-19 DIAGNOSIS — M19.90 ARTHRITIS: ICD-10-CM

## 2019-03-19 DIAGNOSIS — N18.30 TYPE 2 DIABETES MELLITUS WITH STAGE 3 CHRONIC KIDNEY DISEASE, WITH LONG-TERM CURRENT USE OF INSULIN: Chronic | ICD-10-CM

## 2019-03-19 DIAGNOSIS — Z79.4 TYPE 2 DIABETES MELLITUS WITH STAGE 3 CHRONIC KIDNEY DISEASE, WITH LONG-TERM CURRENT USE OF INSULIN: Chronic | ICD-10-CM

## 2019-03-19 PROCEDURE — 3077F SYST BP >= 140 MM HG: CPT | Mod: HCNC,CPTII,S$GLB, | Performed by: INTERNAL MEDICINE

## 2019-03-19 PROCEDURE — 3044F HG A1C LEVEL LT 7.0%: CPT | Mod: HCNC,CPTII,S$GLB, | Performed by: INTERNAL MEDICINE

## 2019-03-19 PROCEDURE — 1101F PR PT FALLS ASSESS DOC 0-1 FALLS W/OUT INJ PAST YR: ICD-10-PCS | Mod: HCNC,CPTII,S$GLB, | Performed by: INTERNAL MEDICINE

## 2019-03-19 PROCEDURE — 99214 OFFICE O/P EST MOD 30 MIN: CPT | Mod: HCNC,S$GLB,, | Performed by: INTERNAL MEDICINE

## 2019-03-19 PROCEDURE — 99999 PR PBB SHADOW E&M-EST. PATIENT-LVL III: CPT | Mod: PBBFAC,HCNC,, | Performed by: INTERNAL MEDICINE

## 2019-03-19 PROCEDURE — 1101F PT FALLS ASSESS-DOCD LE1/YR: CPT | Mod: HCNC,CPTII,S$GLB, | Performed by: INTERNAL MEDICINE

## 2019-03-19 PROCEDURE — 99999 PR PBB SHADOW E&M-EST. PATIENT-LVL III: ICD-10-PCS | Mod: PBBFAC,HCNC,, | Performed by: INTERNAL MEDICINE

## 2019-03-19 PROCEDURE — 3077F PR MOST RECENT SYSTOLIC BLOOD PRESSURE >= 140 MM HG: ICD-10-PCS | Mod: HCNC,CPTII,S$GLB, | Performed by: INTERNAL MEDICINE

## 2019-03-19 PROCEDURE — 3078F DIAST BP <80 MM HG: CPT | Mod: HCNC,CPTII,S$GLB, | Performed by: INTERNAL MEDICINE

## 2019-03-19 PROCEDURE — 99214 PR OFFICE/OUTPT VISIT, EST, LEVL IV, 30-39 MIN: ICD-10-PCS | Mod: HCNC,S$GLB,, | Performed by: INTERNAL MEDICINE

## 2019-03-19 PROCEDURE — 3044F PR MOST RECENT HEMOGLOBIN A1C LEVEL <7.0%: ICD-10-PCS | Mod: HCNC,CPTII,S$GLB, | Performed by: INTERNAL MEDICINE

## 2019-03-19 PROCEDURE — 3078F PR MOST RECENT DIASTOLIC BLOOD PRESSURE < 80 MM HG: ICD-10-PCS | Mod: HCNC,CPTII,S$GLB, | Performed by: INTERNAL MEDICINE

## 2019-03-22 ENCOUNTER — TELEPHONE (OUTPATIENT)
Dept: PHARMACY | Facility: CLINIC | Age: 74
End: 2019-03-22

## 2019-03-25 ENCOUNTER — INITIAL CONSULT (OUTPATIENT)
Dept: OPTOMETRY | Facility: CLINIC | Age: 74
End: 2019-03-25
Payer: MEDICARE

## 2019-03-25 ENCOUNTER — OFFICE VISIT (OUTPATIENT)
Dept: PRIMARY CARE CLINIC | Facility: CLINIC | Age: 74
End: 2019-03-25
Payer: MEDICARE

## 2019-03-25 VITALS
BODY MASS INDEX: 26.68 KG/M2 | TEMPERATURE: 98 F | HEART RATE: 72 BPM | WEIGHT: 175.5 LBS | DIASTOLIC BLOOD PRESSURE: 60 MMHG | SYSTOLIC BLOOD PRESSURE: 155 MMHG

## 2019-03-25 DIAGNOSIS — H52.03 HYPEROPIA WITH ASTIGMATISM AND PRESBYOPIA, BILATERAL: ICD-10-CM

## 2019-03-25 DIAGNOSIS — H52.203 HYPEROPIA WITH ASTIGMATISM AND PRESBYOPIA, BILATERAL: ICD-10-CM

## 2019-03-25 DIAGNOSIS — E11.9 TYPE 2 DIABETES MELLITUS WITHOUT RETINOPATHY: Primary | ICD-10-CM

## 2019-03-25 DIAGNOSIS — H25.13 NUCLEAR SCLEROTIC CATARACT OF BOTH EYES: ICD-10-CM

## 2019-03-25 DIAGNOSIS — Z13.83 SCREENING FOR RESPIRATORY CONDITION: ICD-10-CM

## 2019-03-25 DIAGNOSIS — E11.69 HYPERLIPIDEMIA DUE TO TYPE 2 DIABETES MELLITUS: Primary | ICD-10-CM

## 2019-03-25 DIAGNOSIS — R09.81 NASAL CONGESTION: ICD-10-CM

## 2019-03-25 DIAGNOSIS — H52.4 HYPEROPIA WITH ASTIGMATISM AND PRESBYOPIA, BILATERAL: ICD-10-CM

## 2019-03-25 DIAGNOSIS — E78.5 HYPERLIPIDEMIA DUE TO TYPE 2 DIABETES MELLITUS: Primary | ICD-10-CM

## 2019-03-25 LAB
POST FEV1 FVC: NORMAL
POST FEV1: NORMAL
POST FVC: NORMAL
PRE FEV1 FVC: 80.95
PRE FEV1: 1.86
PRE FVC: 2.29
PREDICTED FEV1: 68
PREDICTED FVC: 61

## 2019-03-25 PROCEDURE — 1101F PR PT FALLS ASSESS DOC 0-1 FALLS W/OUT INJ PAST YR: ICD-10-PCS | Mod: HCNC,CPTII,S$GLB, | Performed by: INTERNAL MEDICINE

## 2019-03-25 PROCEDURE — 3077F PR MOST RECENT SYSTOLIC BLOOD PRESSURE >= 140 MM HG: ICD-10-PCS | Mod: HCNC,CPTII,S$GLB, | Performed by: INTERNAL MEDICINE

## 2019-03-25 PROCEDURE — 92014 COMPRE OPH EXAM EST PT 1/>: CPT | Mod: HCNC,S$GLB,, | Performed by: OPTOMETRIST

## 2019-03-25 PROCEDURE — 92014 PR EYE EXAM, EST PATIENT,COMPREHESV: ICD-10-PCS | Mod: HCNC,S$GLB,, | Performed by: OPTOMETRIST

## 2019-03-25 PROCEDURE — 99215 OFFICE O/P EST HI 40 MIN: CPT | Mod: HCNC,S$GLB,, | Performed by: INTERNAL MEDICINE

## 2019-03-25 PROCEDURE — 3078F DIAST BP <80 MM HG: CPT | Mod: HCNC,CPTII,S$GLB, | Performed by: INTERNAL MEDICINE

## 2019-03-25 PROCEDURE — 92015 DETERMINE REFRACTIVE STATE: CPT | Mod: HCNC,S$GLB,, | Performed by: OPTOMETRIST

## 2019-03-25 PROCEDURE — 3077F SYST BP >= 140 MM HG: CPT | Mod: HCNC,CPTII,S$GLB, | Performed by: INTERNAL MEDICINE

## 2019-03-25 PROCEDURE — 3078F PR MOST RECENT DIASTOLIC BLOOD PRESSURE < 80 MM HG: ICD-10-PCS | Mod: HCNC,CPTII,S$GLB, | Performed by: INTERNAL MEDICINE

## 2019-03-25 PROCEDURE — 3044F HG A1C LEVEL LT 7.0%: CPT | Mod: HCNC,CPTII,S$GLB, | Performed by: INTERNAL MEDICINE

## 2019-03-25 PROCEDURE — 99999 PR PBB SHADOW E&M-EST. PATIENT-LVL III: CPT | Mod: PBBFAC,HCNC,, | Performed by: OPTOMETRIST

## 2019-03-25 PROCEDURE — 3044F PR MOST RECENT HEMOGLOBIN A1C LEVEL <7.0%: ICD-10-PCS | Mod: HCNC,CPTII,S$GLB, | Performed by: INTERNAL MEDICINE

## 2019-03-25 PROCEDURE — 99999 PR PBB SHADOW E&M-EST. PATIENT-LVL III: ICD-10-PCS | Mod: PBBFAC,HCNC,, | Performed by: OPTOMETRIST

## 2019-03-25 PROCEDURE — 1101F PT FALLS ASSESS-DOCD LE1/YR: CPT | Mod: HCNC,CPTII,S$GLB, | Performed by: INTERNAL MEDICINE

## 2019-03-25 PROCEDURE — 99215 PR OFFICE/OUTPT VISIT, EST, LEVL V, 40-54 MIN: ICD-10-PCS | Mod: HCNC,S$GLB,, | Performed by: INTERNAL MEDICINE

## 2019-03-25 PROCEDURE — 92015 PR REFRACTION: ICD-10-PCS | Mod: HCNC,S$GLB,, | Performed by: OPTOMETRIST

## 2019-03-25 RX ORDER — FLUTICASONE PROPIONATE 50 MCG
1 SPRAY, SUSPENSION (ML) NASAL DAILY
Qty: 18.2 ML | Refills: 0 | Status: SHIPPED | OUTPATIENT
Start: 2019-03-25 | End: 2019-05-03 | Stop reason: SDUPTHER

## 2019-03-25 RX ORDER — ATORVASTATIN CALCIUM 40 MG/1
40 TABLET, FILM COATED ORAL DAILY
Qty: 90 TABLET | Refills: 3
Start: 2019-03-25 | End: 2019-06-20 | Stop reason: SDUPTHER

## 2019-03-25 NOTE — PATIENT INSTRUCTIONS
TODAY:  · Start flonase nasal spray  · Start 2nd gen antihistamine  · Zyrtec or claritin  · Divide flomax into 2 doses  · Take statin every 2 days

## 2019-03-25 NOTE — ASSESSMENT & PLAN NOTE
Reaction to tree pollen, now with dizziness  · Start flonase   · Start 2nd gen antihistamine  · Divide flomax into 2 doses

## 2019-03-25 NOTE — PROGRESS NOTES
Primary Care Provider Appointment    Subjective:      Patient ID: Edwin Powell is a 74 y.o. male with metastatic renal cancer, HLD, h/o MI    Chief Complaint: Dizziness; Cancer; and Pain    Patient with MSK in R flank. Is followed by Heme-Onc who refill narcotics for patient.    His BP is elevated due to skipping meds this AM because he was running late to appointment. Otherwise is usually controlled.    CBG controlled, A1c controlled with weight loss. Lipids remain uncontrolled, does not tolerate statin with chemo due to diarrhea, but has been taking it q2-3 days with no diarrhea. His lipid panel improved slightly, but remains uncontrolled.    Patient with dizziness and possible orthostasis. On high-dose tamsulosin, which he takes in the morning (both tablet). When he previously took tamsulosin 0.4mg his urianry symptoms were not well-controlled. He refuses Uro eval for urodynamic studies.    He is worried today that he has an ear infection. Since his ear symptoms he has had dizziness. He refuses referral to ENT.    Past Surgical History:   Procedure Laterality Date    ABDOMINAL SURGERY      APPENDECTOMY      BACK SURGERY      CARDIAC SURGERY      CHOLECYSTECTOMY      coronary stenting      X 5 last one in 2010-11.    SPINAL FUSION         Past Medical History:   Diagnosis Date    Acquired hypothyroidism 5/26/2017    Benign essential HTN 5/26/2017    Benign prostatic hyperplasia (BPH) with urinary urge incontinence 6/6/2017    Chronic low back pain 6/1/2017    Coronary artery disease involving native coronary artery of native heart without angina pectoris 5/26/2017    S/p 5 stents - last one around 2010-11.    Gastroesophageal reflux disease without esophagitis 5/26/2017    History of CVA (cerebrovascular accident) 5/26/2017    Hypertension associated with diabetes 5/26/2017    BP controlled on ACE, CCB    Lumbar disc lesion 5/26/2017    Metastatic renal cell carcinoma to bone 6/6/2017     Metastatic renal cell carcinoma to liver 6/6/2017    Liver Biopsy 5-2017: METASTATIC RENAL CELL CARCINOMA.    Mixed hyperlipidemia 5/26/2017    Type 2 diabetes mellitus with stage 3 chronic kidney disease, with long-term current use of insulin 5/26/2017       Social History     Socioeconomic History    Marital status: Single     Spouse name: Not on file    Number of children: Not on file    Years of education: Not on file    Highest education level: Not on file   Occupational History    Not on file   Social Needs    Financial resource strain: Hard    Food insecurity:     Worry: Never true     Inability: Never true    Transportation needs:     Medical: No     Non-medical: No   Tobacco Use    Smoking status: Never Smoker    Smokeless tobacco: Never Used   Substance and Sexual Activity    Alcohol use: No    Drug use: No    Sexual activity: Not Currently     Partners: Female   Lifestyle    Physical activity:     Days per week: Not on file     Minutes per session: Not on file    Stress: Not on file   Relationships    Social connections:     Talks on phone: Not on file     Gets together: Not on file     Attends [employer] service: Not on file     Active member of club or organization: Not on file     Attends meetings of clubs or organizations: Not on file     Relationship status: Not on file    Intimate partner violence:     Fear of current or ex partner: Not on file     Emotionally abused: Not on file     Physically abused: Not on file     Forced sexual activity: Not on file   Other Topics Concern    Not on file   Social History Narrative    Not on file       Review of Systems   Constitutional: Negative for activity change and unexpected weight change.   HENT: Positive for hearing loss. Negative for trouble swallowing.         Abnormal hearing   Gastrointestinal: Positive for diarrhea. Negative for blood in stool, constipation and vomiting.   Endocrine: Negative for polydipsia and polyuria.    Genitourinary: Negative for difficulty urinating and urgency.   Musculoskeletal: Positive for arthralgias. Negative for joint swelling and neck pain.   Neurological: Positive for weakness. Negative for headaches.   Psychiatric/Behavioral: Negative for confusion and dysphoric mood.       Objective:   BP (!) 155/60   Pulse 72   Temp 98.1 °F (36.7 °C)   Wt 79.6 kg (175 lb 7.8 oz)   BMI 26.68 kg/m²     Physical Exam   Constitutional: He is oriented to person, place, and time. He appears well-developed and well-nourished.   No longer ambulating with cane   HENT:   Head: Normocephalic.   Edentulous  R ear unable to visualize tympanic membrane  Abnormal hearing   Eyes: Pupils are equal, round, and reactive to light.   Neck:   Decreased ROM in neck    Pulmonary/Chest: Effort normal.   Musculoskeletal: Normal range of motion.   Neurological: He is alert and oriented to person, place, and time.   Skin: Skin is warm.   Ecchymoses and purpura bilaterally UE  Telangiectasias on face and chest  Multiple tattoos on UE  Numerous red papules on neck  Fair skin   Psychiatric: He has a normal mood and affect.       Lab Results   Component Value Date    WBC 5.52 03/15/2019    HGB 11.9 (L) 03/15/2019    HCT 38.1 (L) 03/15/2019     03/15/2019    CHOL 181 03/15/2019    TRIG 195 (H) 03/15/2019    HDL 30 (L) 03/15/2019    ALT 20 03/15/2019    AST 19 03/15/2019     03/15/2019    K 5.2 (H) 03/15/2019     03/15/2019    CREATININE 1.4 03/15/2019    BUN 21 03/15/2019    CO2 22 (L) 03/15/2019    TSH 3.530 07/16/2018    PSA 1.1 07/16/2018    INR 1.2 06/12/2017    HGBA1C 6.4 (H) 02/19/2019       RESULTS: Reviewed labs and images today    Assessment:   74 y.o. male with multiple co-morbid illnesses here to continue work-up of chronic issues notably with metastatic renal cancer, HLD, h/o MI     Plan:     Problem List Items Addressed This Visit        ENT    Nasal congestion     Reaction to tree pollen, now with  dizziness  · Start flonase   · Start 2nd gen antihistamine  · Divide flomax into 2 doses         Relevant Medications    fluticasone (FLONASE) 50 mcg/actuation nasal spray       Cardiac/Vascular    Hyperlipidemia due to type 2 diabetes mellitus - Primary     ASCVD risk high due to DM  · Continue statin q2 days  · Lipids uncontrolled  · Continue DM management- no longer requiring insulin  · Lifestyle changes and weight loss due to cancer         Relevant Medications    atorvastatin (LIPITOR) 40 MG tablet      Other Visit Diagnoses     Screening for respiratory condition        Relevant Orders    Mobile Spirometry With/Without Bronchodilator - Arsenio Mukherjee Only (Completed)          Health Maintenance       Date Due Completion Date    TETANUS VACCINE 02/18/1963 ---    High Dose Statin 02/18/1966 ---    Eye Exam 01/19/2019 1/19/2018    Foot Exam 02/02/2019 2/2/2018 (Done)    Override on 2/2/2018: Done (performed by podiatrist)    Hemoglobin A1c 08/19/2019 2/19/2019    Aspirin/Antiplatelet Therapy 02/12/2020 2/12/2019    Lipid Panel 03/15/2020 3/15/2019    Colonoscopy 02/15/2021 2/15/2011 (Done)    Override on 2/15/2011: Done (Per [facility] ? results)          Follow up in about 2 months (around 5/25/2019). Total face-to-face time was 60 min, 50% of this was spent on counseling and coordination of care. The following issues were discussed: with metastatic renal cancer, HLD, h/o CARLOS Lewis MD/MPH  Internal Medicine  [facility]  [phone]

## 2019-03-25 NOTE — LETTER
March 25, 2019      Lulú Lewis MD  [address]  [facility] [address]           [facility]  [address]  [facility] [address]  Phone: [phone]          Patient: Edwin Powell   MR Number: [id number]   YOB: 1945   Date of Visit: 3/25/2019       Dear Dr. Lulú Lewis:    Thank you for referring Edwin Powell to me for evaluation. Attached you will find relevant portions of my assessment and plan of care.    If you have questions, please do not hesitate to call me. I look forward to following Edwin Powell along with you.    Sincerely,    Beverly Andrews, OD    Enclosure  CC:  No Recipients    If you would like to receive this communication electronically, please contact [email or web address] or [phone] to request more information on [employer] access.    For providers and/or their staff who would like to refer a patient to [facility], please contact us through our one-stop-shop provider referral line, [facility], at [phone].    If you feel you have received this communication in error or would no longer like to receive these types of communications, please e-mail [email or web address]

## 2019-03-25 NOTE — Clinical Note
Dear Dr. Lewis,Thank you for referring Mr. Powell for a diabetic eye examination; there is no retinopathy. However, he has visually significant cataracts and I have referred him to ophthalmology to consider cataract surgery.  Please let me know if you have questions.Sincerely,Beverly Andrews OD

## 2019-03-25 NOTE — PATIENT INSTRUCTIONS
Cataract Surgery FAQs  Frequently Asked Questions    My doctor told me I have a cataract     What do I do next?   Relax. Cataracts are a normal part of aging, and cataract surgery is among the safest and most common procedures performed today.  Most patients who have had cataract surgery report significant improvement in their quality of life because of their improved vision, with a speedy recovery and minimal discomfort or inconvenience.    Your optometrist will recommend a cataract surgeon who will examine your eyes, evaluate the need for surgery, and discuss the details with you and your family.     What exactly is a cataract?   A cataract is simply a darkening or clouding of the eyes internal lens, which blurs your vision.  It is not a film which grows over the eye, but rather a degenerative process which causes the eyes normally clear lens to become cloudy or hazy.     Because this degenerative process occurs slowly over many years, we generally wait until the cataract begins to significantly impact your vision, before recommend surgery.                     How is cataract surgery performed?  Cataract surgery involves removal of the dark or cloudy lens from the eye, and implantation of a new, clear lens implant or IOL.  Cataract surgery is a lens replacement surgery.          Modern cataract surgery is performed as a same-day surgery and typically takes about 15 minutes to complete.  It is performed while you are awake, but you will be medicated so that you are relaxed and comfortable. Of course, the eye is anesthetized so that you dont feel any discomfort, and many people only vaguely remember the actual procedure, recalling only lights and the sensation of moisture on the eye.     Although is takes about a week to fully heal, most people are able to see better and resume their normal activities the very next day!  You will need to use eye drops for about one month after your surgery.     Will I  need glasses after cataract surgery?   The purpose of cataract surgery is to improve the overall quality of your vision, but it does not necessarily eliminate the need for glasses. Although some people dont need to wear glasses after standard cataract surgery, most people will still need to wear glasses for certain tasks.  If you are interested in minimizing or even eliminating the need for glasses, you should ask your surgeon about Refractive Cataract Surgery.    What is Refractive Cataract Surgery?   Refractive Cataract Surgery refers to the use of additional techniques performed either at the time of your cataract surgery or soon afterwards, designed to minimize and often eliminate your need for glasses.  Technologies such as LASIK, Astigmatism Correcting Incisions, Multifocal, Accommodating, or Toric lens implants can all be used, depending on the particular needs of each patient. Only your surgeon can determine if you are a candidate and which techniques are appropriate for your goals and your eye.                         LASIK                Multifocal IOL         Will my insurance cover these additional procedures?   Although your insurance will fully cover your cataract surgery, any other additional procedures -designed solely to eliminate the need for glasses- are considered elective (not medically necessary), and therefore not covered by your insurance.  Rest assured that your vision will be better after cataract surgery, in either case.  You simply need to decide whether minimizing your dependence on glasses is worth the additional investment in your eyes.     View a 1hr video discussing cataract surgery with live patient questions and answers on the [employer] Web Site (Keywords: [facility] Cataract):    [email or web address]

## 2019-03-25 NOTE — ASSESSMENT & PLAN NOTE
ASCVD risk high due to DM  · Continue statin q2 days  · Lipids uncontrolled  · Continue DM management- no longer requiring insulin  · Lifestyle changes and weight loss due to cancer

## 2019-03-25 NOTE — PROGRESS NOTES
"HPI     Mr. Edwin Powell was referred by Lulú Lewis MD for a   diabetic eye exam.    Patient complains of clouded vision in OD "for a while." Also, OD is   sometimes watery.  Pt complains current prescription glasses (~3yrs old) aren't working as   well and requests refraction.     (-)drops  (-)flashes  (-)floaters  (-)diplopia    (+)Diabetes  Pt does not measure blood glucose at home.  Hemoglobin A1C       Date                     Value               Ref Range             Status           02/19/2019               6.4 (H)             4.0 - 5.6 %         Final  07/16/2018               6.2 (H)             4.0 - 5.6 %         Final  02/15/2018               6.5 (H)             4.0 - 5.6 %         Final    OCULAR HISTORY  Last Eye Exam: 01/19/18 with Dr. Andrews  (-)eye surgery   Cataracts OU  NPDR OU (first noted in 2017)    FAMILY HISTORY  (-)Glaucoma         Last edited by Beverly Andrews, OD on 3/25/2019  9:31 PM. (History)            Assessment /Plan     For exam results, see Encounter Report.    Type 2 diabetes mellitus without retinopathy   No retinopathy noted OU. Monitor with yearly DFE.     Nuclear sclerotic cataract of both eyes   OD>OS. Cause of reduced best-corrected visual acuity OD. Pt motivated for cataract surgery.   -     Ambulatory Referral to Ophthalmology    Hyperopia with astigmatism and presbyopia, bilateral   Stable OD, decrease OS. Hold SRx for cataract surgery. If cataract surgery deferred then okay to release CRx as Final Rx.        RTC 04/22/19 for cataract evaluation                   "

## 2019-03-25 NOTE — MEDICAL/APP STUDENT
"Subjective:       Patient ID: Edwin Powell is a 74 y.o. male.    Chief Complaint: No chief complaint on file.    73 yo M w RCC with metastasis to bone and liver, HLD, T2DM controlled with diet here for 6 mo f/u    Pt says he is feeling "alright" overall, still has pain in his side, which has been worked up by oncology, no significant finding. He says it used to be an 8/10, now 10/10. No alleviating factors, worse with getting up and moving. It's an aching pain, no radiation. He says it feels like a pulled muscle. Doesn't like to take the pain meds because it makes him feel too drowsy.     Pt has not been monitoring his glucose at home, but is discouraged because he is gaining weight. Pt lost 60 lbs last year which led to the resolution of his diabetes. Last A1c in 2/2019 in the pre diabetic range (6.4). Pt last lipid panel improved from 1 mo ago. He says he doesn't take his statin regularly anymore due to bad interaction (diarrhea) with the chemo drugs. Pt now takes his statin "every couple days" as lipid panel 1 mo ago was abnormal.     Pt is compliant with all his meds, no reported side effects except for the diarrhea previously mentioned.     RCC is being followed by oncology. Most recent scans showed new mets to bone and liver, as well as new pulmonary micronodules.    Pt mentioned that he gets occasional dizziness, only happens with change of position.        Review of Systems   Constitutional: Positive for unexpected weight change. Negative for appetite change, chills and fever.   HENT: Positive for ear pain and postnasal drip. Negative for congestion, rhinorrhea and sore throat.    Respiratory: Positive for cough. Negative for chest tightness and shortness of breath.    Cardiovascular: Negative for chest pain, palpitations and leg swelling.   Gastrointestinal: Positive for diarrhea. Negative for abdominal pain, constipation, nausea and vomiting.   Genitourinary: Negative for difficulty urinating, dysuria " and hematuria.   Musculoskeletal: Positive for back pain.   Skin: Negative for rash.   Neurological: Positive for dizziness (gets dizzy sometimes when he has ear pain) and light-headedness. Negative for headaches.   Hematological: Negative for adenopathy. Does not bruise/bleed easily.       Objective:      Physical Exam   Constitutional: He is oriented to person, place, and time. He appears well-developed and well-nourished. No distress.   HENT:   Head: Normocephalic and atraumatic.   Mouth/Throat: Oropharynx is clear and moist.   Neck: Normal range of motion. Neck supple.   Cardiovascular: Normal rate, regular rhythm, normal heart sounds and intact distal pulses.   Pulmonary/Chest: Effort normal and breath sounds normal.   Abdominal: Soft. Bowel sounds are normal. He exhibits no distension and no mass. There is tenderness (tenderness of the lower quadrants, as well as some pain over palpation of the R obliques). There is guarding. There is no rebound.   Lymphadenopathy:     He has cervical adenopathy.   Neurological: He is alert and oriented to person, place, and time.   Skin: Skin is warm and dry. No rash noted. No erythema.   Psychiatric: He has a normal mood and affect.       Assessment:       No diagnosis found.    Plan:       Dizziness   - potentially due to flomax, consider lowering dose   - perform orthostatic vitals   - potentially due to vertigo, secondary to chemotherapy     Difficulty urinating   -  W/o flomax feels that he cannot urinate, although he knows his bladder is full. This is suggestive of neurogenic bladder, likely secondary to metastatic disease to the bone.     HTN   - BP is high this morning, pt did not take medications    HLD   - alter diet and lifestyle habits   - statin as tolerated    RCC    - followed by hem/onc    T2DM   - last A1c in prediabetic range. Continue healthy diet and lifestyle habits, cont to monitor A1c. Encourage home BG monitoring    Pain on side   - PET scan negative  for metastatic disease in that area   - most likely MSK related, encourage cont activity, alternating heat and cold

## 2019-04-03 DIAGNOSIS — C78.7 METASTATIC RENAL CELL CARCINOMA TO LIVER: ICD-10-CM

## 2019-04-03 DIAGNOSIS — G89.3 NEOPLASM RELATED PAIN: ICD-10-CM

## 2019-04-03 DIAGNOSIS — C64.9 METASTATIC RENAL CELL CARCINOMA TO LIVER: ICD-10-CM

## 2019-04-03 RX ORDER — HYDROCODONE BITARTRATE AND ACETAMINOPHEN 5; 325 MG/1; MG/1
TABLET ORAL
Qty: 120 TABLET | Refills: 0 | Status: SHIPPED | OUTPATIENT
Start: 2019-04-03 | End: 2019-05-09 | Stop reason: SDUPTHER

## 2019-04-03 RX ORDER — MORPHINE SULFATE 15 MG/1
15 TABLET, FILM COATED, EXTENDED RELEASE ORAL EVERY 12 HOURS
Qty: 60 TABLET | Refills: 0 | Status: SHIPPED | OUTPATIENT
Start: 2019-04-03 | End: 2019-05-09 | Stop reason: SDUPTHER

## 2019-04-06 DIAGNOSIS — E03.9 ACQUIRED HYPOTHYROIDISM: ICD-10-CM

## 2019-04-08 RX ORDER — LEVOTHYROXINE SODIUM 88 UG/1
88 TABLET ORAL DAILY
Qty: 90 TABLET | Refills: 3 | Status: SHIPPED | OUTPATIENT
Start: 2019-04-08 | End: 2020-06-09 | Stop reason: SDUPTHER

## 2019-04-16 ENCOUNTER — OFFICE VISIT (OUTPATIENT)
Dept: HEMATOLOGY/ONCOLOGY | Facility: CLINIC | Age: 74
End: 2019-04-16
Payer: MEDICARE

## 2019-04-16 ENCOUNTER — HOSPITAL ENCOUNTER (OUTPATIENT)
Dept: CARDIOLOGY | Facility: CLINIC | Age: 74
Discharge: HOME | End: 2019-04-16
Payer: MEDICARE

## 2019-04-16 ENCOUNTER — LAB VISIT (OUTPATIENT)
Dept: LAB | Facility: HOSPITAL | Age: 74
End: 2019-04-16
Attending: INTERNAL MEDICINE
Payer: MEDICARE

## 2019-04-16 VITALS
OXYGEN SATURATION: 95 % | RESPIRATION RATE: 16 BRPM | TEMPERATURE: 98 F | HEIGHT: 67 IN | HEART RATE: 73 BPM | BODY MASS INDEX: 27.34 KG/M2 | DIASTOLIC BLOOD PRESSURE: 85 MMHG | SYSTOLIC BLOOD PRESSURE: 185 MMHG | WEIGHT: 174.19 LBS

## 2019-04-16 DIAGNOSIS — E11.69 HYPERLIPIDEMIA DUE TO TYPE 2 DIABETES MELLITUS: ICD-10-CM

## 2019-04-16 DIAGNOSIS — E78.5 HYPERLIPIDEMIA DUE TO TYPE 2 DIABETES MELLITUS: ICD-10-CM

## 2019-04-16 DIAGNOSIS — C64.2 RENAL CELL CARCINOMA OF LEFT KIDNEY: ICD-10-CM

## 2019-04-16 DIAGNOSIS — C79.51 METASTATIC RENAL CELL CARCINOMA TO BONE: ICD-10-CM

## 2019-04-16 DIAGNOSIS — R42 DIZZINESS: ICD-10-CM

## 2019-04-16 DIAGNOSIS — C64.9 METASTATIC RENAL CELL CARCINOMA TO LIVER: Primary | ICD-10-CM

## 2019-04-16 DIAGNOSIS — E11.22 TYPE 2 DIABETES MELLITUS WITH STAGE 3 CHRONIC KIDNEY DISEASE, WITH LONG-TERM CURRENT USE OF INSULIN: Chronic | ICD-10-CM

## 2019-04-16 DIAGNOSIS — C78.7 METASTATIC RENAL CELL CARCINOMA TO LIVER: Primary | ICD-10-CM

## 2019-04-16 DIAGNOSIS — Z79.4 TYPE 2 DIABETES MELLITUS WITH STAGE 3 CHRONIC KIDNEY DISEASE, WITH LONG-TERM CURRENT USE OF INSULIN: Chronic | ICD-10-CM

## 2019-04-16 DIAGNOSIS — C64.9 METASTATIC RENAL CELL CARCINOMA TO BONE: ICD-10-CM

## 2019-04-16 DIAGNOSIS — N18.30 TYPE 2 DIABETES MELLITUS WITH STAGE 3 CHRONIC KIDNEY DISEASE, WITH LONG-TERM CURRENT USE OF INSULIN: Chronic | ICD-10-CM

## 2019-04-16 LAB
ALBUMIN SERPL BCP-MCNC: 3.2 G/DL (ref 3.5–5.2)
ALP SERPL-CCNC: 98 U/L (ref 55–135)
ALT SERPL W/O P-5'-P-CCNC: 20 U/L (ref 10–44)
ANION GAP SERPL CALC-SCNC: 7 MMOL/L (ref 8–16)
AST SERPL-CCNC: 21 U/L (ref 10–40)
BASOPHILS # BLD AUTO: 0.03 K/UL (ref 0–0.2)
BASOPHILS NFR BLD: 0.4 % (ref 0–1.9)
BILIRUB SERPL-MCNC: 0.3 MG/DL (ref 0.1–1)
BUN SERPL-MCNC: 23 MG/DL (ref 8–23)
CALCIUM SERPL-MCNC: 10 MG/DL (ref 8.7–10.5)
CHLORIDE SERPL-SCNC: 105 MMOL/L (ref 95–110)
CO2 SERPL-SCNC: 29 MMOL/L (ref 23–29)
CREAT SERPL-MCNC: 1.5 MG/DL (ref 0.5–1.4)
DIFFERENTIAL METHOD: ABNORMAL
EOSINOPHIL # BLD AUTO: 0.2 K/UL (ref 0–0.5)
EOSINOPHIL NFR BLD: 2.1 % (ref 0–8)
ERYTHROCYTE [DISTWIDTH] IN BLOOD BY AUTOMATED COUNT: 14.3 % (ref 11.5–14.5)
EST. GFR  (AFRICAN AMERICAN): 52.3 ML/MIN/1.73 M^2
EST. GFR  (NON AFRICAN AMERICAN): 45.2 ML/MIN/1.73 M^2
GLUCOSE SERPL-MCNC: 196 MG/DL (ref 70–110)
HCT VFR BLD AUTO: 39 % (ref 40–54)
HGB BLD-MCNC: 12.6 G/DL (ref 14–18)
IMM GRANULOCYTES # BLD AUTO: 0.01 K/UL (ref 0–0.04)
IMM GRANULOCYTES NFR BLD AUTO: 0.1 % (ref 0–0.5)
LYMPHOCYTES # BLD AUTO: 2.3 K/UL (ref 1–4.8)
LYMPHOCYTES NFR BLD: 32.5 % (ref 18–48)
MCH RBC QN AUTO: 33.8 PG (ref 27–31)
MCHC RBC AUTO-ENTMCNC: 32.3 G/DL (ref 32–36)
MCV RBC AUTO: 105 FL (ref 82–98)
MONOCYTES # BLD AUTO: 0.6 K/UL (ref 0.3–1)
MONOCYTES NFR BLD: 8.5 % (ref 4–15)
NEUTROPHILS # BLD AUTO: 4 K/UL (ref 1.8–7.7)
NEUTROPHILS NFR BLD: 56.4 % (ref 38–73)
NRBC BLD-RTO: 0 /100 WBC
PLATELET # BLD AUTO: 356 K/UL (ref 150–350)
PMV BLD AUTO: 9.8 FL (ref 9.2–12.9)
POTASSIUM SERPL-SCNC: 4.9 MMOL/L (ref 3.5–5.1)
PROT SERPL-MCNC: 6.7 G/DL (ref 6–8.4)
RBC # BLD AUTO: 3.73 M/UL (ref 4.6–6.2)
SODIUM SERPL-SCNC: 141 MMOL/L (ref 136–145)
WBC # BLD AUTO: 7.08 K/UL (ref 3.9–12.7)

## 2019-04-16 PROCEDURE — 93000 EKG 12-LEAD: ICD-10-PCS | Mod: HCNC,S$GLB,, | Performed by: INTERNAL MEDICINE

## 2019-04-16 PROCEDURE — 3044F PR MOST RECENT HEMOGLOBIN A1C LEVEL <7.0%: ICD-10-PCS | Mod: HCNC,CPTII,S$GLB, | Performed by: PHYSICIAN ASSISTANT

## 2019-04-16 PROCEDURE — 1101F PR PT FALLS ASSESS DOC 0-1 FALLS W/OUT INJ PAST YR: ICD-10-PCS | Mod: HCNC,CPTII,S$GLB, | Performed by: PHYSICIAN ASSISTANT

## 2019-04-16 PROCEDURE — 36415 COLL VENOUS BLD VENIPUNCTURE: CPT | Mod: HCNC

## 2019-04-16 PROCEDURE — 99214 PR OFFICE/OUTPT VISIT, EST, LEVL IV, 30-39 MIN: ICD-10-PCS | Mod: HCNC,S$GLB,, | Performed by: PHYSICIAN ASSISTANT

## 2019-04-16 PROCEDURE — 3077F PR MOST RECENT SYSTOLIC BLOOD PRESSURE >= 140 MM HG: ICD-10-PCS | Mod: HCNC,CPTII,S$GLB, | Performed by: PHYSICIAN ASSISTANT

## 2019-04-16 PROCEDURE — 99999 PR PBB SHADOW E&M-EST. PATIENT-LVL V: CPT | Mod: PBBFAC,HCNC,, | Performed by: PHYSICIAN ASSISTANT

## 2019-04-16 PROCEDURE — 3079F DIAST BP 80-89 MM HG: CPT | Mod: HCNC,CPTII,S$GLB, | Performed by: PHYSICIAN ASSISTANT

## 2019-04-16 PROCEDURE — 3077F SYST BP >= 140 MM HG: CPT | Mod: HCNC,CPTII,S$GLB, | Performed by: PHYSICIAN ASSISTANT

## 2019-04-16 PROCEDURE — 3044F HG A1C LEVEL LT 7.0%: CPT | Mod: HCNC,CPTII,S$GLB, | Performed by: PHYSICIAN ASSISTANT

## 2019-04-16 PROCEDURE — 85025 COMPLETE CBC W/AUTO DIFF WBC: CPT | Mod: HCNC

## 2019-04-16 PROCEDURE — 93000 ELECTROCARDIOGRAM COMPLETE: CPT | Mod: HCNC,S$GLB,, | Performed by: INTERNAL MEDICINE

## 2019-04-16 PROCEDURE — 99214 OFFICE O/P EST MOD 30 MIN: CPT | Mod: HCNC,S$GLB,, | Performed by: PHYSICIAN ASSISTANT

## 2019-04-16 PROCEDURE — 99999 PR PBB SHADOW E&M-EST. PATIENT-LVL V: ICD-10-PCS | Mod: PBBFAC,HCNC,, | Performed by: PHYSICIAN ASSISTANT

## 2019-04-16 PROCEDURE — 3079F PR MOST RECENT DIASTOLIC BLOOD PRESSURE 80-89 MM HG: ICD-10-PCS | Mod: HCNC,CPTII,S$GLB, | Performed by: PHYSICIAN ASSISTANT

## 2019-04-16 PROCEDURE — 1101F PT FALLS ASSESS-DOCD LE1/YR: CPT | Mod: HCNC,CPTII,S$GLB, | Performed by: PHYSICIAN ASSISTANT

## 2019-04-16 PROCEDURE — 80053 COMPREHEN METABOLIC PANEL: CPT | Mod: HCNC

## 2019-04-16 NOTE — Clinical Note
vanessa with cbc/cmp/lipid panel on 5/14; can we also schedule patient to see ENT the same day for eval of dizziness/vertigo? thanks

## 2019-04-16 NOTE — PROGRESS NOTES
Subjective:       Patient ID: Edwin Powell is a 74 y.o. male.    Chief Complaint: Renal cell carcinoma of left kidney    Mr. Singleton is a 74 y.o. male who returns for follow up regarding metastatic renal cell carcinoma of left kidney.  Returns for follow-up, currently on Votrient, and for results of restaging scans.    Patient states that his blood pressure is elevated as he forgot to take his medications this morning.     Reports back pain (lateral right back) is getting worse- thinks he may have pulled a muscle the other day however  Now he has left sided shoulder pain and decreased range of motion  He is having some right shoulder pain that comes and goes, has been chronic.    No other pain areas.   Weight up.  Eating well.  Reports no further diarrhea since stopping his anti cholesterol medications  No fevers, chills or infections.     Repeat Imaging:  Bone scan 3/15/19:  There is no scintigraphic evidence of metastatic disease.    CT scans 3/15/19:  In this patient with a history of renal cell carcinoma there is a stable appearance of an exophytic left renal mass, unchanged in size since CT 06/14/2018.  Several hypodense hepatic lesions unchanged since prior CT 10/16/2018 and CT 06/14/2018.  Stable L3 vertebral body lytic lesion.  Stable right upper lobe pulmonary micronodule.  1.6 cm focus of ground-glass attenuation in the medial basal segment of the right lower lobe, new in comparison to prior examination.  Attention at repeat surveillance imaging is recommended.  RECIST SUMMARY:  Date of prior examination for comparison: 10/16/2018.  Lesion 1: Left kidney mass.  2.3 cm. Series 4 Image 122. Prior measurement 2.3 cm.  Lesion 2: Hepatic segment 2.  Not definitively visualized on today's examination or prior examination.  Lesion 3: Hepatic segment 7.  1.4 cm. Series 4 image 82.  Prior measurement 1.4 cm.  Lesion 4: Hepatic segment 4.  1.1 cm. Series 4 image 91.  Prior measurement 1.1 cm.     Oncology  "History:  Mr.Mc Moss is a 72 yo male with CAD (s/p 5 stents, remotely), DM2 (insulin dependent), hypothyroidism , CVA (2013) with no residual deficit, chronic back pain. [place] [facility] [place] of a lumbar spine lesion in the setting of progressive weakness of his legs and exacerbation of chronic back pain, in May 2017. He states that his back pain and LE weakness progressively worsened. [facility]. The pain radiates down his legs. He denies any incontinence of bowel or bladder. He had several falls. After one of the falls, he presented to the ED at [facility], in May 2017. He had an MRI showing "L3 vertebral body lesion with apparent cortical disruption concerning for metastases vs myeloma," and was [place] [facility] [place]. He was evaluated by neurosurgery regarding concern for lytic lesion on L3 on 5/26/17. No surgical intervention was proposed.   CT chest, abdomen,pelvis on 5/27/17 revealed :   --A 3.6 cm exophytic mass arising from the medial aspect of the lower pole of left kidney,   --A small 1.0 cm, subtle, cortical enhancing mass within the upper pole of the right kidney, concerning for possible contralateral solid renal mass  --Multiple enhancing ill-defined hepatic masses concerning for hepatic metastatic disease, hepatomegaly and hepatic steatosis  --Mulltifocal irregular thickening of the bilateral pleura concerning for possible pleural metastatic disease  --Mild nonspecific nodular thickening of the left adrenal gland without discrete nodule  -- Redemonstration of known lytic lesion within the L3 vertebral body. No definite additional discrete lytic lesions are identified.  -MRI pelvis from 10/18/17:    2.5 cm linear T1 hypointense T2/STIR hyperintense lesion within the left iliac bone, this lesion is indeterminate but felt less likely to represent metastasis given its appearance. Further evaluation " can be obtained with bone scan as clinically indicated.  Findings suggestive of left trochanteric bursitis.  Partially visualized L3 metastatic lesion.  Diffuse thickening of the urinary bladder wall can be seen with chronic outlet obstruction or cystitis.  - initiated on Votrient        Review of Systems   Constitutional: Negative for appetite change and unexpected weight change.   HENT: Negative for congestion, mouth sores, rhinorrhea and trouble swallowing.         Notes dizziness when turning his head too quickly or when getting out of bed too quickly; sensation then resolves       Eyes: Negative for visual disturbance.   Respiratory: Negative for cough and shortness of breath.    Cardiovascular: Negative for chest pain.   Gastrointestinal: Negative for abdominal pain and diarrhea.   Genitourinary: Negative for frequency.   Musculoskeletal: Positive for back pain (chronic and unchanged).   Skin: Negative for rash.   Neurological: Negative for headaches.   Hematological: Negative for adenopathy.   Psychiatric/Behavioral: The patient is not nervous/anxious.        Objective:      Physical Exam   Constitutional: He is oriented to person, place, and time. He appears well-nourished. No distress.   Presents alone  ECOG 0   HENT:   Head: Normocephalic.   Mouth/Throat: Oropharynx is clear and moist. No oropharyngeal exudate.   Eyes: Pupils are equal, round, and reactive to light. Conjunctivae are normal. No scleral icterus.   Neck: Normal range of motion. Neck supple. No thyromegaly present.   Cardiovascular: Normal rate, regular rhythm, normal heart sounds and intact distal pulses.   Pulmonary/Chest: Effort normal and breath sounds normal. No respiratory distress.   Lungs clear     Abdominal: Soft. Bowel sounds are normal. He exhibits no distension and no mass. There is no tenderness.   Musculoskeletal: Normal range of motion. He exhibits no edema.   No spinal or paraspinal tenderness to palpation     Lymphadenopathy:      He has no cervical adenopathy.   Neurological: He is alert and oriented to person, place, and time. No cranial nerve deficit.   Skin: Skin is warm and dry. No rash noted. No erythema. No pallor.   Psychiatric: He has a normal mood and affect. His behavior is normal. Thought content normal.   Vitals reviewed.      Assessment:       1. Metastatic renal cell carcinoma to liver    2. 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 Type 2 diabetes mellitus with stage 3 chronic kidney disease, with long-term current use of insulin    4. Hyperlipidemia due to type 2 diabetes mellitus    5. Dizziness        Plan:       1-2)Continue Votrient, restaging scans from 3/2019 overall stable and patient clinically doing well.  RTC in one month with labs.   3)managed by PCP, elevated blood sugar today as he was not fasting  4)remains on lipitor every 2 days under direction of this PCP  5)referral to ENT, based on symptoms could be vertigo; patient has had symptoms for several years

## 2019-04-17 ENCOUNTER — TELEPHONE (OUTPATIENT)
Dept: HEMATOLOGY/ONCOLOGY | Facility: CLINIC | Age: 74
End: 2019-04-17

## 2019-04-22 ENCOUNTER — OFFICE VISIT (OUTPATIENT)
Dept: OPHTHALMOLOGY | Facility: CLINIC | Age: 74
End: 2019-04-22
Payer: MEDICARE

## 2019-04-22 DIAGNOSIS — H25.11 NUCLEAR SCLEROTIC CATARACT OF RIGHT EYE: Primary | ICD-10-CM

## 2019-04-22 DIAGNOSIS — H25.12 NUCLEAR SCLEROTIC CATARACT OF LEFT EYE: ICD-10-CM

## 2019-04-22 PROCEDURE — 92025 COMPUTERIZED CORNEAL TOPOGRAPHY: ICD-10-PCS | Mod: HCNC,S$GLB,, | Performed by: OPHTHALMOLOGY

## 2019-04-22 PROCEDURE — 99999 PR PBB SHADOW E&M-EST. PATIENT-LVL III: CPT | Mod: PBBFAC,HCNC,, | Performed by: OPHTHALMOLOGY

## 2019-04-22 PROCEDURE — 92014 PR EYE EXAM, EST PATIENT,COMPREHESV: ICD-10-PCS | Mod: HCNC,S$GLB,, | Performed by: OPHTHALMOLOGY

## 2019-04-22 PROCEDURE — 99999 PR PBB SHADOW E&M-EST. PATIENT-LVL III: ICD-10-PCS | Mod: PBBFAC,HCNC,, | Performed by: OPHTHALMOLOGY

## 2019-04-22 PROCEDURE — 92025 CPTRIZED CORNEAL TOPOGRAPHY: CPT | Mod: HCNC,S$GLB,, | Performed by: OPHTHALMOLOGY

## 2019-04-22 PROCEDURE — 92014 COMPRE OPH EXAM EST PT 1/>: CPT | Mod: HCNC,S$GLB,, | Performed by: OPHTHALMOLOGY

## 2019-04-22 PROCEDURE — 92136 IOL MASTER - OD - RIGHT EYE: ICD-10-PCS | Mod: HCNC,RT,S$GLB, | Performed by: OPHTHALMOLOGY

## 2019-04-22 PROCEDURE — 92136 OPHTHALMIC BIOMETRY: CPT | Mod: HCNC,RT,S$GLB, | Performed by: OPHTHALMOLOGY

## 2019-04-22 NOTE — LETTER
April 22, 2019      Beverly Andrews, OD  [address]  [facility] [address]           [facility]  [address]  [facility] [address]  Phone: [phone]  Fax: [phone]          Patient: Edwin Powell   MR Number: [id number]   YOB: 1945   Date of Visit: 4/22/2019       Dear Dr. Beverly Andrews:    Thank you for referring Edwin Powell to me for evaluation. Attached you will find relevant portions of my assessment and plan of care.    If you have questions, please do not hesitate to call me. I look forward to following Edwin Powell along with you.    Sincerely,    Chary Culp MD    Enclosure  CC:  No Recipients    If you would like to receive this communication electronically, please contact [email or web address] or [phone] to request more information on [employer] access.    For providers and/or their staff who would like to refer a patient to [facility], please contact us through our one-stop-shop provider referral line, [facility], at [phone].    If you feel you have received this communication in error or would no longer like to receive these types of communications, please e-mail [email or web address]

## 2019-04-22 NOTE — PROGRESS NOTES
HPI     Darryl pt     H/o Kidney and Liver Cancer  H/o IDDM now diet controlled; pt states he has not been on insulin for   almost 2 yrs.    Hyperopia with astig    Visine PRN OU    Patient states he has longstanding blurry vision and vision is getting   progressively worse. Patient states he is constantly wiping his glasses   and vision is not clearing. Patient states is looks like a film is over   his vision. Rt eye worse than left eye.     Last edited by Chary Culp MD on 4/22/2019  9:23 AM. (History)            Assessment /Plan     For exam results, see Encounter Report.    Nuclear sclerotic cataract of right eye  -     IOL Master - OD - Right Eye  -     Computerized corneal topography    Nuclear sclerotic cataract of left eye      Visually significant nuclear sclerotic cataract   - Interfering with activities of daily living.  Pt desires cataract surgery for [facility].   - R/B/A discussed and pt agrees to proceed with surgery.   - IOL options discussed according to patient's goals and concomitant ocular pathology; and pt content with monofocal lens.    - Target: plano.    pcboo 22.5 OD  *ring    (pcboo 22.5 OS)  *ring    Pt has cyl - okay with FT rx post sx.

## 2019-04-23 ENCOUNTER — TELEPHONE (OUTPATIENT)
Dept: PHARMACY | Facility: CLINIC | Age: 74
End: 2019-04-23

## 2019-04-25 NOTE — TELEPHONE ENCOUNTER
"Votrient follow-up and refill:  Contacted patient in regards to Votrient 200mg clinical follow up.     Administration: Patient confirms taking medication as prescribed. Patient takes medication without food around the same time daily.  He takes 4 - 200mg tablets around 8-8:30a daily and waits to eat breakfast until about 10a to ensure Votrient is absorbed on an empty stomach. Proper chemo handling procedures are followed by using a disposable dosing cup to avoid touching tablets directly. Medication is stored at room temperature.  Adherence: Patient denies missed doses of his Votrient in the past month.  Side effects/disease state management: Patient denies side effects such as N/V/D, rash/H-F syndrome, or unusual headache.  He previously suffered from diarrhea but we have attributed this to Lipitor as the diarrhea seemed to stop after stopping Lipitor and has not returned over the past 3 months.  Pain levels: Patient denies any new issues with pain.    Energy levels: Patient confirms there has not been any changes in regards to his energy levels.  He still feels "a little tired" at the end of the day, but no more than usual.  This intermittent fatigue is not hinder his ability to complete daily tasks and is not negatively effecting his quality of life.  He reports a great appetite and has gained about 15 pounds since last follow up in January.  He remains unconcerned about the weight gain as he initially lost about 60 lbs at the time of his diagnosis.  He confirms good fluid intake and is keeping his blood glucose levels under control.  Last A1c from 2/2019 was 6.4 (wnl).      Pertinent findings:  After our discussion, patient plans to continue Votrient 4 - 200mg tablets once daily. [facility] will continue to follow the patient's Votrient treatment closely, with an emphasis on maintaining his current quality of life. Recent scans from March 2019 show stable disease and patient is hopeful this will continue.  Encourage " patient to contact [facility] with any questions/concerns.    Patient confirmed readiness for refill as he currently has about 6 days remaining.  Medication will ship  for delivery on .  Copay $0.00 (004).  No new medications, allergies or health conditions to report.  Address confirmed.  Two patient identifiers confirmed - name and .  Therapy appropriate.

## 2019-05-03 DIAGNOSIS — R09.81 NASAL CONGESTION: ICD-10-CM

## 2019-05-05 RX ORDER — FLUTICASONE PROPIONATE 50 MCG
1 SPRAY, SUSPENSION (ML) NASAL DAILY
Qty: 16 ML | Refills: 3 | Status: ON HOLD | OUTPATIENT
Start: 2019-05-05 | End: 2020-08-28 | Stop reason: SDUPTHER

## 2019-05-05 NOTE — TELEPHONE ENCOUNTER
Please see if patient is still using flonase nasal spray. A new script was sent to [facility] for him with refills.    Thanks,  CORTEZ

## 2019-05-09 DIAGNOSIS — C78.7 METASTATIC RENAL CELL CARCINOMA TO LIVER: ICD-10-CM

## 2019-05-09 DIAGNOSIS — C64.9 METASTATIC RENAL CELL CARCINOMA TO LIVER: ICD-10-CM

## 2019-05-09 DIAGNOSIS — G89.3 NEOPLASM RELATED PAIN: ICD-10-CM

## 2019-05-09 RX ORDER — HYDROCODONE BITARTRATE AND ACETAMINOPHEN 5; 325 MG/1; MG/1
TABLET ORAL
Qty: 120 TABLET | Refills: 0 | Status: SHIPPED | OUTPATIENT
Start: 2019-05-09 | End: 2019-06-24 | Stop reason: SDUPTHER

## 2019-05-09 RX ORDER — MORPHINE SULFATE 15 MG/1
15 TABLET, FILM COATED, EXTENDED RELEASE ORAL EVERY 12 HOURS
Qty: 60 TABLET | Refills: 0 | Status: SHIPPED | OUTPATIENT
Start: 2019-05-09 | End: 2019-06-24 | Stop reason: SDUPTHER

## 2019-05-16 ENCOUNTER — OFFICE VISIT (OUTPATIENT)
Dept: HEMATOLOGY/ONCOLOGY | Facility: CLINIC | Age: 74
End: 2019-05-16
Attending: INTERNAL MEDICINE
Payer: MEDICARE

## 2019-05-16 ENCOUNTER — LAB VISIT (OUTPATIENT)
Dept: LAB | Facility: HOSPITAL | Age: 74
End: 2019-05-16
Attending: INTERNAL MEDICINE
Payer: MEDICARE

## 2019-05-16 ENCOUNTER — CLINICAL SUPPORT (OUTPATIENT)
Dept: AUDIOLOGY | Facility: CLINIC | Age: 74
End: 2019-05-16
Payer: MEDICARE

## 2019-05-16 ENCOUNTER — OFFICE VISIT (OUTPATIENT)
Dept: OTOLARYNGOLOGY | Facility: CLINIC | Age: 74
End: 2019-05-16
Payer: MEDICARE

## 2019-05-16 VITALS
RESPIRATION RATE: 18 BRPM | TEMPERATURE: 98 F | OXYGEN SATURATION: 97 % | BODY MASS INDEX: 27.51 KG/M2 | HEART RATE: 63 BPM | WEIGHT: 175.25 LBS | HEIGHT: 67 IN | SYSTOLIC BLOOD PRESSURE: 177 MMHG | DIASTOLIC BLOOD PRESSURE: 83 MMHG

## 2019-05-16 VITALS — HEART RATE: 68 BPM | SYSTOLIC BLOOD PRESSURE: 183 MMHG | DIASTOLIC BLOOD PRESSURE: 81 MMHG

## 2019-05-16 DIAGNOSIS — R93.3 ABNORMAL FINDINGS ON DIAGNOSTIC IMAGING OF OTHER PARTS OF DIGESTIVE TRACT: ICD-10-CM

## 2019-05-16 DIAGNOSIS — C64.9 METASTATIC RENAL CELL CARCINOMA, UNSPECIFIED LATERALITY: ICD-10-CM

## 2019-05-16 DIAGNOSIS — C79.51 METASTATIC RENAL CELL CARCINOMA TO BONE: ICD-10-CM

## 2019-05-16 DIAGNOSIS — C78.7 METASTATIC RENAL CELL CARCINOMA TO LIVER: ICD-10-CM

## 2019-05-16 DIAGNOSIS — C64.9 METASTATIC RENAL CELL CARCINOMA TO LIVER: ICD-10-CM

## 2019-05-16 DIAGNOSIS — R42 DIZZINESSES: Primary | ICD-10-CM

## 2019-05-16 DIAGNOSIS — H92.01 EAR DISCOMFORT, RIGHT: ICD-10-CM

## 2019-05-16 DIAGNOSIS — E11.69 HYPERLIPIDEMIA DUE TO TYPE 2 DIABETES MELLITUS: ICD-10-CM

## 2019-05-16 DIAGNOSIS — Z77.122 HISTORY OF EXPOSURE TO NOISE: ICD-10-CM

## 2019-05-16 DIAGNOSIS — C64.2 RENAL CELL CARCINOMA OF LEFT KIDNEY: Primary | ICD-10-CM

## 2019-05-16 DIAGNOSIS — C64.9 METASTATIC RENAL CELL CARCINOMA TO BONE: ICD-10-CM

## 2019-05-16 DIAGNOSIS — H90.3 SENSORINEURAL HEARING LOSS, BILATERAL: Primary | ICD-10-CM

## 2019-05-16 DIAGNOSIS — Z01.10 ENCOUNTER FOR HEARING EVALUATION: ICD-10-CM

## 2019-05-16 DIAGNOSIS — H61.21 RIGHT EAR IMPACTED CERUMEN: ICD-10-CM

## 2019-05-16 DIAGNOSIS — E78.5 HYPERLIPIDEMIA DUE TO TYPE 2 DIABETES MELLITUS: ICD-10-CM

## 2019-05-16 DIAGNOSIS — H90.3 SENSORINEURAL HEARING LOSS, BILATERAL: ICD-10-CM

## 2019-05-16 LAB
ALBUMIN SERPL BCP-MCNC: 3 G/DL (ref 3.5–5.2)
ALP SERPL-CCNC: 97 U/L (ref 55–135)
ALT SERPL W/O P-5'-P-CCNC: 19 U/L (ref 10–44)
ANION GAP SERPL CALC-SCNC: 8 MMOL/L (ref 8–16)
AST SERPL-CCNC: 21 U/L (ref 10–40)
BILIRUB SERPL-MCNC: 0.3 MG/DL (ref 0.1–1)
BUN SERPL-MCNC: 32 MG/DL (ref 8–23)
CALCIUM SERPL-MCNC: 8.8 MG/DL (ref 8.7–10.5)
CHLORIDE SERPL-SCNC: 109 MMOL/L (ref 95–110)
CHOLEST SERPL-MCNC: 196 MG/DL (ref 120–199)
CHOLEST/HDLC SERPL: 4.9 {RATIO} (ref 2–5)
CO2 SERPL-SCNC: 23 MMOL/L (ref 23–29)
CREAT SERPL-MCNC: 1.7 MG/DL (ref 0.5–1.4)
ERYTHROCYTE [DISTWIDTH] IN BLOOD BY AUTOMATED COUNT: 14.2 % (ref 11.5–14.5)
EST. GFR  (AFRICAN AMERICAN): 44.9 ML/MIN/1.73 M^2
EST. GFR  (NON AFRICAN AMERICAN): 38.9 ML/MIN/1.73 M^2
GLUCOSE SERPL-MCNC: 162 MG/DL (ref 70–110)
HCT VFR BLD AUTO: 38.3 % (ref 40–54)
HDLC SERPL-MCNC: 40 MG/DL (ref 40–75)
HDLC SERPL: 20.4 % (ref 20–50)
HGB BLD-MCNC: 12.5 G/DL (ref 14–18)
IMM GRANULOCYTES # BLD AUTO: 0.02 K/UL (ref 0–0.04)
LDLC SERPL CALC-MCNC: 108.6 MG/DL (ref 63–159)
MCH RBC QN AUTO: 34.2 PG (ref 27–31)
MCHC RBC AUTO-ENTMCNC: 32.6 G/DL (ref 32–36)
MCV RBC AUTO: 105 FL (ref 82–98)
NEUTROPHILS # BLD AUTO: 4 K/UL (ref 1.8–7.7)
NONHDLC SERPL-MCNC: 156 MG/DL
PLATELET # BLD AUTO: 355 K/UL (ref 150–350)
PMV BLD AUTO: 9.5 FL (ref 9.2–12.9)
POTASSIUM SERPL-SCNC: 4.4 MMOL/L (ref 3.5–5.1)
PROT SERPL-MCNC: 6.5 G/DL (ref 6–8.4)
RBC # BLD AUTO: 3.65 M/UL (ref 4.6–6.2)
SODIUM SERPL-SCNC: 140 MMOL/L (ref 136–145)
TRIGL SERPL-MCNC: 237 MG/DL (ref 30–150)
WBC # BLD AUTO: 6.94 K/UL (ref 3.9–12.7)

## 2019-05-16 PROCEDURE — 99203 PR OFFICE/OUTPT VISIT, NEW, LEVL III, 30-44 MIN: ICD-10-PCS | Mod: HCNC,S$GLB,, | Performed by: OTOLARYNGOLOGY

## 2019-05-16 PROCEDURE — 99999 PR PBB SHADOW E&M-EST. PATIENT-LVL I: CPT | Mod: PBBFAC,HCNC,,

## 2019-05-16 PROCEDURE — 3077F PR MOST RECENT SYSTOLIC BLOOD PRESSURE >= 140 MM HG: ICD-10-PCS | Mod: HCNC,CPTII,S$GLB, | Performed by: OTOLARYNGOLOGY

## 2019-05-16 PROCEDURE — 1101F PR PT FALLS ASSESS DOC 0-1 FALLS W/OUT INJ PAST YR: ICD-10-PCS | Mod: HCNC,CPTII,S$GLB, | Performed by: OTOLARYNGOLOGY

## 2019-05-16 PROCEDURE — 3077F SYST BP >= 140 MM HG: CPT | Mod: HCNC,CPTII,S$GLB, | Performed by: OTOLARYNGOLOGY

## 2019-05-16 PROCEDURE — 1101F PT FALLS ASSESS-DOCD LE1/YR: CPT | Mod: HCNC,CPTII,S$GLB, | Performed by: OTOLARYNGOLOGY

## 2019-05-16 PROCEDURE — 92557 COMPREHENSIVE HEARING TEST: CPT | Mod: HCNC,S$GLB,, | Performed by: PHYSICIAN ASSISTANT

## 2019-05-16 PROCEDURE — 99999 PR PBB SHADOW E&M-EST. PATIENT-LVL IV: ICD-10-PCS | Mod: PBBFAC,HCNC,, | Performed by: OTOLARYNGOLOGY

## 2019-05-16 PROCEDURE — 99999 PR PBB SHADOW E&M-EST. PATIENT-LVL I: ICD-10-PCS | Mod: PBBFAC,HCNC,,

## 2019-05-16 PROCEDURE — 92567 PR TYMPA2METRY: ICD-10-PCS | Mod: HCNC,S$GLB,, | Performed by: PHYSICIAN ASSISTANT

## 2019-05-16 PROCEDURE — 99214 PR OFFICE/OUTPT VISIT, EST, LEVL IV, 30-39 MIN: ICD-10-PCS | Mod: HCNC,S$GLB,, | Performed by: INTERNAL MEDICINE

## 2019-05-16 PROCEDURE — G0268 PR REMOVAL OF IMPACTED WAX MD: ICD-10-PCS | Mod: HCNC,S$GLB,, | Performed by: OTOLARYNGOLOGY

## 2019-05-16 PROCEDURE — 80061 LIPID PANEL: CPT | Mod: HCNC

## 2019-05-16 PROCEDURE — 3079F PR MOST RECENT DIASTOLIC BLOOD PRESSURE 80-89 MM HG: ICD-10-PCS | Mod: HCNC,CPTII,S$GLB, | Performed by: OTOLARYNGOLOGY

## 2019-05-16 PROCEDURE — 80053 COMPREHEN METABOLIC PANEL: CPT | Mod: HCNC

## 2019-05-16 PROCEDURE — 3077F SYST BP >= 140 MM HG: CPT | Mod: HCNC,CPTII,S$GLB, | Performed by: INTERNAL MEDICINE

## 2019-05-16 PROCEDURE — 3077F PR MOST RECENT SYSTOLIC BLOOD PRESSURE >= 140 MM HG: ICD-10-PCS | Mod: HCNC,CPTII,S$GLB, | Performed by: INTERNAL MEDICINE

## 2019-05-16 PROCEDURE — G0268 REMOVAL OF IMPACTED WAX MD: HCPCS | Mod: HCNC,S$GLB,, | Performed by: OTOLARYNGOLOGY

## 2019-05-16 PROCEDURE — 92567 TYMPANOMETRY: CPT | Mod: HCNC,S$GLB,, | Performed by: PHYSICIAN ASSISTANT

## 2019-05-16 PROCEDURE — 92557 PR COMPREHENSIVE HEARING TEST: ICD-10-PCS | Mod: HCNC,S$GLB,, | Performed by: PHYSICIAN ASSISTANT

## 2019-05-16 PROCEDURE — 99999 PR PBB SHADOW E&M-EST. PATIENT-LVL IV: CPT | Mod: PBBFAC,HCNC,, | Performed by: OTOLARYNGOLOGY

## 2019-05-16 PROCEDURE — 99214 OFFICE O/P EST MOD 30 MIN: CPT | Mod: HCNC,S$GLB,, | Performed by: INTERNAL MEDICINE

## 2019-05-16 PROCEDURE — 99999 PR PBB SHADOW E&M-EST. PATIENT-LVL III: CPT | Mod: PBBFAC,HCNC,, | Performed by: INTERNAL MEDICINE

## 2019-05-16 PROCEDURE — 36415 COLL VENOUS BLD VENIPUNCTURE: CPT | Mod: HCNC

## 2019-05-16 PROCEDURE — 3079F DIAST BP 80-89 MM HG: CPT | Mod: HCNC,CPTII,S$GLB, | Performed by: INTERNAL MEDICINE

## 2019-05-16 PROCEDURE — 1101F PT FALLS ASSESS-DOCD LE1/YR: CPT | Mod: HCNC,CPTII,S$GLB, | Performed by: INTERNAL MEDICINE

## 2019-05-16 PROCEDURE — 3079F DIAST BP 80-89 MM HG: CPT | Mod: HCNC,CPTII,S$GLB, | Performed by: OTOLARYNGOLOGY

## 2019-05-16 PROCEDURE — 99203 OFFICE O/P NEW LOW 30 MIN: CPT | Mod: HCNC,S$GLB,, | Performed by: OTOLARYNGOLOGY

## 2019-05-16 PROCEDURE — 85027 COMPLETE CBC AUTOMATED: CPT | Mod: HCNC

## 2019-05-16 PROCEDURE — 3079F PR MOST RECENT DIASTOLIC BLOOD PRESSURE 80-89 MM HG: ICD-10-PCS | Mod: HCNC,CPTII,S$GLB, | Performed by: INTERNAL MEDICINE

## 2019-05-16 PROCEDURE — 1101F PR PT FALLS ASSESS DOC 0-1 FALLS W/OUT INJ PAST YR: ICD-10-PCS | Mod: HCNC,CPTII,S$GLB, | Performed by: INTERNAL MEDICINE

## 2019-05-16 PROCEDURE — 99999 PR PBB SHADOW E&M-EST. PATIENT-LVL III: ICD-10-PCS | Mod: PBBFAC,HCNC,, | Performed by: INTERNAL MEDICINE

## 2019-05-16 NOTE — PATIENT INSTRUCTIONS
Audiometry reviewed  Pt. is a candidiate for amplification for one or both ears  Copy of audiogram/[facility] card/[facility] hearing [facility] info/Rx to obtain hearing aid(s) provided  Cerumen impaction removed from occluded AD eac  RTC yearly for ear cleaning and audiometric monitoring   Hearing protection encouraged prn  Consider Phillip-Hallpike VNG testing pending course

## 2019-05-16 NOTE — PROGRESS NOTES
Subjective:       Patient ID: Edwin Powell is a 74 y.o. male.    Chief Complaint: No chief complaint on file.    HPI     Mr. Singleton is a 74 y.o. male who returns for follow up regarding metastatic renal cell carcinoma of left kidney.  Returns for follow-up, currently on Votrient.  Returns for monthly check.  States feels the same  Alternating constipation and diarrhea     Lives with chronic back pain  Saw pain medicine and had some relief with a local injection  Weight up.  Eating well.  No fevers, chills or infections.     Repeat Imaging:  Bone scan 3/15/19:  There is no scintigraphic evidence of metastatic disease.     CT scans 3/15/19:  In this patient with a history of renal cell carcinoma there is a stable appearance of an exophytic left renal mass, unchanged in size since CT 06/14/2018.  Several hypodense hepatic lesions unchanged since prior CT 10/16/2018 and CT 06/14/2018.  Stable L3 vertebral body lytic lesion.  Stable right upper lobe pulmonary micronodule.  1.6 cm focus of ground-glass attenuation in the medial basal segment of the right lower lobe, new in comparison to prior examination.  Attention at repeat surveillance imaging is recommended.  RECIST SUMMARY:  Date of prior examination for comparison: 10/16/2018.  Lesion 1: Left kidney mass.  2.3 cm. Series 4 Image 122. Prior measurement 2.3 cm.  Lesion 2: Hepatic segment 2.  Not definitively visualized on today's examination or prior examination.  Lesion 3: Hepatic segment 7.  1.4 cm. Series 4 image 82.  Prior measurement 1.4 cm.  Lesion 4: Hepatic segment 4.  1.1 cm. Series 4 image 91.  Prior measurement 1.1 cm.     Oncology History:  Mr.Mc Moss is a 72 yo male with CAD (s/p 5 stents, remotely), DM2 (insulin dependent), hypothyroidism , CVA (2013) with no residual deficit, chronic back pain. He was transferred to [facility] for neurosurgery evaluation of a lumbar spine lesion in the setting of progressive weakness of his legs and exacerbation of  "chronic back pain, in May 2017. He states that his back pain and LE weakness progressively worsened. [facility]. The pain radiates down his legs. He denies any incontinence of bowel or bladder. He had several falls. After one of the falls, he presented to the ED at [facility], in May 2017. He had an MRI showing "L3 vertebral body lesion with apparent cortical disruption concerning for metastases vs myeloma," and was transferred to [facility] for neurosurgery evaluation. He was evaluated by neurosurgery regarding concern for lytic lesion on L3 on 5/26/17. No surgical intervention was proposed.   CT chest, abdomen,pelvis on 5/27/17 revealed :   --A 3.6 cm exophytic mass arising from the medial aspect of the lower pole of left kidney,   --A small 1.0 cm, subtle, cortical enhancing mass within the upper pole of the right kidney, concerning for possible contralateral solid renal mass  --Multiple enhancing ill-defined hepatic masses concerning for hepatic metastatic disease, hepatomegaly and hepatic steatosis  --Mulltifocal irregular thickening of the bilateral pleura concerning for possible pleural metastatic disease  --Mild nonspecific nodular thickening of the left adrenal gland without discrete nodule  -- Redemonstration of known lytic lesion within the L3 vertebral body. No definite additional discrete lytic lesions are identified.  -MRI pelvis from 10/18/17:    2.5 cm linear T1 hypointense T2/STIR hyperintense lesion within the left iliac bone, this lesion is indeterminate but felt less likely to represent metastasis given its appearance. Further evaluation can be obtained with bone scan as clinically indicated.  Findings suggestive of left trochanteric bursitis.  Partially visualized L3 metastatic lesion.  Diffuse thickening of the urinary bladder wall can be seen with chronic outlet obstruction or cystitis.  - initiated on Votrient    Review of Systems   Constitutional: " Negative for activity change, appetite change, chills, fatigue, fever and unexpected weight change.   HENT: Negative for congestion, dental problem, mouth sores, nosebleeds, rhinorrhea, sinus pressure, sore throat and trouble swallowing.         Notes dizziness when turning his head too quickly or when getting out of bed too quickly; sensation then resolves       Eyes: Negative for visual disturbance.   Respiratory: Negative for cough, chest tightness, shortness of breath and wheezing.    Cardiovascular: Negative for chest pain, palpitations and leg swelling.   Gastrointestinal: Negative for abdominal distention, abdominal pain, blood in stool, constipation, diarrhea, nausea and vomiting.   Genitourinary: Negative for decreased urine volume, difficulty urinating, dysuria, frequency, hematuria and urgency.   Musculoskeletal: Positive for back pain (chronic and unchanged). Negative for arthralgias, gait problem, joint swelling, myalgias, neck pain and neck stiffness.   Skin: Negative for color change, pallor, rash and wound.   Neurological: Negative for dizziness, weakness, light-headedness, numbness and headaches.   Hematological: Negative for adenopathy.   Psychiatric/Behavioral: Negative for dysphoric mood. The patient is not nervous/anxious.        Objective:      Physical Exam   Constitutional: He is oriented to person, place, and time. He appears well-developed and well-nourished. No distress.   Presents alone  ECOG=0   HENT:   Head: Normocephalic and atraumatic.   Mouth/Throat: Oropharynx is clear and moist. No oropharyngeal exudate.   Eyes: Pupils are equal, round, and reactive to light. Conjunctivae and EOM are normal. Right eye exhibits no discharge. Left eye exhibits no discharge. No scleral icterus.   Neck: Normal range of motion. Neck supple. No thyromegaly present.   Cardiovascular: Normal rate, regular rhythm, normal heart sounds and intact distal pulses. Exam reveals no gallop and no friction rub.   No  murmur heard.  Pulmonary/Chest: Effort normal and breath sounds normal. No respiratory distress. He has no wheezes. He has no rales. He exhibits no tenderness.   Lungs clear   Abdominal: Soft. Bowel sounds are normal. He exhibits no distension and no mass. There is no tenderness. There is no rebound.   No hepatosplenomegaly   Musculoskeletal: Normal range of motion. He exhibits no edema, tenderness (point tenderness low back) or deformity.   No spinal or paraspinal tenderness to palpation   Lymphadenopathy:     He has no cervical adenopathy.   Neurological: He is alert and oriented to person, place, and time. No cranial nerve deficit or sensory deficit. Coordination normal.   Skin: Skin is warm and dry. No rash noted. He is not diaphoretic. No erythema. No pallor.   Psychiatric: He has a normal mood and affect. His behavior is normal. Thought content normal.   Nursing note and vitals reviewed.   Labs- reviewed   Assessment:       1. Renal cell carcinoma of left kidney    2. Metastatic renal cell carcinoma to liver    3. 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 Disease well controlled on Votrient   Scans stable  Repeat scans in late July unless clinically indicated prior  Lipids and EKG at next visit  Patient       Distress Screening Results: Psychosocial Distress screening score of Distress Score: 0 noted and reviewed. No intervention indicated.

## 2019-05-16 NOTE — PROGRESS NOTES
Audiological Evaluation:    Mr. Edwin Powell was seen today for a hearing evaluation. Test results indicated a mild to severe SNHL AU with good speech discrimination scores.  Tympanometry revealed normal middle ear function bilaterally.    Recommend:  1.  Otologic evaluation  2.  Annual hearing evaluations  3.  Noise protection  4.  HAC

## 2019-05-16 NOTE — LETTER
May 18, 2019      Kennedi Flannery PA-C  [address]  [facility] [id number]           [facility] - Otorhinolaryngology  [address]  [facility] [address]  Phone: [phone]  Fax: [phone]          Patient: Edwin Powell   MR Number: [id number]   YOB: 1945   Date of Visit: 5/16/2019       Dear Kennedi Flannery:    Thank you for referring Edwin Powell to me for evaluation. Attached you will find relevant portions of my assessment and plan of care.    If you have questions, please do not hesitate to call me. I look forward to following Edwin Powell along with you.    Sincerely,    Solomon Klein III, MD    Enclosure  CC:  No Recipients    If you would like to receive this communication electronically, please contact [email or web address] or [phone] to request more information on [facility] access.    For providers and/or their staff who would like to refer a patient to [facility], please contact us through our one-stop-shop provider referral line, [facility], at [phone].    If you feel you have received this communication in error or would no longer like to receive these types of communications, please e-mail [email or web address]

## 2019-05-18 NOTE — PROGRESS NOTES
Subjective:       Patient ID: Edwin Powell is a 74 y.o. male.    Chief Complaint: Otalgia (right ear ) and Hearing Loss (hearing checked)    HPI: Mr. Powell is a 74-year-old respect cold  male whose hand written reason for the visit today is hearing   He indicates having a look at faces and to to read lips in order to understand what people are saying to him at this point.    He has a history of renal cell carcinoma the left kidney metastatic to bone in liver.  He was treated with chemotherapy.  When he lies down, he can become dizzy depending on where he puts his head.  He has been doing all right lately.  He often staggers when he gets up from bed suddenly.  He served in the [employer] for 3 years.  He liked his [employer] service.  He has a relative now serving on a submarine([facility]) .  He cannot remember the last time he completed an audiometric study.  He indicates a history of laceration to his scalp when he was 6 years of age requiring repair.   He indicates the need for right eye cataract procedure. He was examined by an internist 02/12/2019.  He was diagnosed with neuropathy secondary to diabetes, uncomplicated opioid dependence, coronary artery disease, hyperlipidemia, tortuous aorta, aortic atherosclerosis, senile purpura, metastasis to the liver and bone due to renal cell carcinoma of the left kidney stage 4, T1 NX M1.    He also carries a history of type 2 diabetes with stage 3 chronic kidney disease with chronic use of insulin, moderate protein calorie malnutrition for vitamin-D deficiency, cervical arthritis and facet  arthritis of the lumbar region is.  His NM bone scan of 02/19/2019 indicated no evidence of metastatic disease.  His medication list includes MS Contin, Norco and lisinopril 40 mg    PMH:  Heart disease, high blood pressure, glaucoma, arthritis, liver disease, hearing loss, vertigo, kidney disease  Past Surgical History:   Procedure Laterality Date    ABDOMINAL  SURGERY      APPENDECTOMY      BACK SURGERY      CARDIAC SURGERY      CHOLECYSTECTOMY      coronary stenting      X 5 last one in 2010-11.    SPINAL FUSION      Family history:  Heart disease, high blood pressure, emphysema, stroke, glaucoma, diabetes, arthritis  Allergies:  None  Habits:  The patient quit smoking 30 years ago; 2 alcoholic drinks per day and tubes cups of coffee per day  Occupation:  Retired    Review of Systems   Ears: Positive for hearing loss, ear pain, ear infections, dizziness and family history of hearing loss.    Nose:  Positive for postnasal drip.    Mouth/Throat: Positive for impaired swallowing and hoarse voice.   Eyes:  Positive for history of glaucoma and visual change.   Cardiovascular:  Positive for history of high blood pressure.   Gastrointestinal:  Positive for acid reflux.   Other:  Positive for arthritis. Negative for rash.     Current Outpatient Medications on File Prior to Visit   Medication Sig Dispense Refill    amLODIPine (NORVASC) 10 MG tablet TAKE 1 TABLET BY MOUTH ONCE DAILY. 90 tablet 3    aspirin (ECOTRIN) 81 MG EC tablet Take 81 mg by mouth once daily.      atorvastatin (LIPITOR) 40 MG tablet Take 1 tablet (40 mg total) by mouth once daily. 90 tablet 3    b complex vitamins (B COMPLEX 1) tablet Take 1 tablet by mouth once daily.      B-complex with vitamin C (Z-BEC OR EQUIV) tablet       cholecalciferol, vitamin D3, (VITAMIN D3) 5,000 unit Tab Take 5,000 Units by mouth once daily. 90 tablet 3    clindamycin (CLEOCIN T) 1 % external solution AAA bid 60 mL 3    fluticasone propionate (FLONASE) 50 mcg/actuation nasal spray 1 SPRAY (50 MCG TOTAL) BY EACH NARE ROUTE ONCE DAILY. 16 mL 3    HYDROcodone-acetaminophen (NORCO) 5-325 mg per tablet TAKE 1 TABLET BY MOUTH EVERY 8 (EIGHT) HOURS AS NEEDED FOR PAIN. 120 tablet 0    lactulose (CHRONULAC) 10 gram/15 mL solution TAKE 15 MILLILITERS BY MOUTH EVERY 6 HOURS AS NEEDED WHEN NO BOWEL MOVEMENT FOR 3 DAYS. 450 mL  0    levothyroxine (SYNTHROID) 88 MCG tablet TAKE 1 TABLET (88 MCG TOTAL) BY MOUTH ONCE DAILY. 90 tablet 3    lisinopril (PRINIVIL,ZESTRIL) 40 MG tablet TAKE 1 TABLET (40 MG TOTAL) BY MOUTH ONCE DAILY. 90 tablet 3    morphine (MS CONTIN) 15 MG 12 hr tablet Take 1 tablet (15 mg total) by mouth every 12 (twelve) hours. 60 tablet 0    multivitamin (THERAGRAN) per tablet Take 1 tablet by mouth once daily.      multivitamin with iron-mineral Tab       NARCAN 4 mg/actuation Spry       ondansetron (ZOFRAN) 8 MG tablet Take 1 tablet (8 mg total) by mouth 3 (three) times daily as needed. 90 tablet 3    PAZOpanib (VOTRIENT) 200 mg Tab Take 4 tablets (800 mg total) by mouth once daily. 120 tablet 5    tamsulosin (FLOMAX) 0.4 mg Cp24 Take 2 capsules (0.8 mg total) by mouth every evening. 180 capsule 3    VITAMIN D3 5,000 unit Tab Take 5,000 Units by mouth once daily.      calcium carbonate-vitamin D3 (CALCIUM 600 WITH VITAMIN D3) 600 mg(1,500mg) -400 unit Cap Take 1 capsule by mouth once. 180 capsule 3     No current facility-administered medications on file prior to visit.            The patient completed an audiometric study performed by the [facility] audiology service.  The study is duplicated below and the results are reviewed with him in detail  Objective:           Physical Exam   Constitutional: He is oriented to person, place, and time. He appears well-developed and well-nourished.   HENT:   Head: Normocephalic.   Right Ear: Hearing, tympanic membrane and ear canal normal. No drainage. No foreign bodies. No mastoid tenderness. Tympanic membrane is not perforated. No decreased hearing is noted.   Left Ear: Hearing, tympanic membrane and ear canal normal. No drainage. No foreign bodies. No mastoid tenderness. Tympanic membrane is not perforated. No decreased hearing is noted.   Ears:    Nose: No nose lacerations, nasal deformity, septal deviation or nasal septal hematoma. No epistaxis. Right sinus exhibits no  maxillary sinus tenderness and no frontal sinus tenderness. Left sinus exhibits no maxillary sinus tenderness and no frontal sinus tenderness.   Mouth/Throat: Uvula is midline, oropharynx is clear and moist and mucous membranes are normal. He does not have dentures. No oral lesions. No trismus in the jaw. No uvula swelling or dental caries. No oropharyngeal exudate or tonsillar abscesses.   Neck: No thyromegaly present.   Pulmonary/Chest: Effort normal. No stridor.   Lymphadenopathy:     He has no cervical adenopathy.   Neurological: He is alert and oriented to person, place, and time.   Skin: No rash noted.   Psychiatric: His behavior is normal.       Assessment:       1. Dizzinesses    2. Encounter for hearing evaluation    3. Ear discomfort, right    4. Right ear impacted cerumen    5. History of exposure to noise    6. Sensorineural hearing loss, bilateral    7. Metastatic renal cell carcinoma, unspecified laterality        Plan:     Audiometry reviewed  Pt. is a candidiate for amplification for one or both ears  Copy of audiogram/[facility] card/[place] hearing aid [place] info/Rx to obtain hearing aid(s) provided  Cerumen impaction removed from occluded AD eac  RTC yearly for ear cleaning and audiometric monitoring   Hearing protection encouraged prn  Consider Phillip- Hallpike VNG testing pending course

## 2019-05-23 ENCOUNTER — TELEPHONE (OUTPATIENT)
Dept: PHARMACY | Facility: CLINIC | Age: 74
End: 2019-05-23

## 2019-05-27 ENCOUNTER — TELEPHONE (OUTPATIENT)
Dept: OPHTHALMOLOGY | Facility: CLINIC | Age: 74
End: 2019-05-27

## 2019-05-27 DIAGNOSIS — H25.11 NUCLEAR SCLEROTIC CATARACT OF RIGHT EYE: Primary | ICD-10-CM

## 2019-05-27 RX ORDER — PREDNISOLONE ACETATE-GATIFLOXACIN-BROMFENAC .75; 5; 1 MG/ML; MG/ML; MG/ML
1 SUSPENSION/ DROPS OPHTHALMIC 3 TIMES DAILY
Qty: 7 ML | Refills: 3 | Status: SHIPPED | OUTPATIENT
Start: 2019-05-27 | End: 2020-07-31

## 2019-05-28 NOTE — TELEPHONE ENCOUNTER
Patient returned phone call back regard specialty medication refill for Votrient 200mg (120/30) $0/004- Patient scheduled to have medication ship out on Thurs 5/30 to arrive on Fri 5/31 address confirmed/prescription address. Patient informed no new medications, conditions or allergies since last talked to [facility]. Patient have enough medication remaining until Mon 6/3 & no missed dose. Patient declined questions for the clinical pharmacist. Patient voiced understanding.     Prescription Address:  [address] [place] [id number]

## 2019-06-06 ENCOUNTER — TELEPHONE (OUTPATIENT)
Dept: OPHTHALMOLOGY | Facility: CLINIC | Age: 74
End: 2019-06-06

## 2019-06-06 NOTE — TELEPHONE ENCOUNTER
Called pt to confirm surgery with Dr Culp on 6/27/19.  No answer.  LM for pt to call and confirm surgery date.

## 2019-06-20 ENCOUNTER — OFFICE VISIT (OUTPATIENT)
Dept: PRIMARY CARE CLINIC | Facility: CLINIC | Age: 74
End: 2019-06-20
Payer: MEDICARE

## 2019-06-20 VITALS
HEART RATE: 71 BPM | SYSTOLIC BLOOD PRESSURE: 130 MMHG | OXYGEN SATURATION: 99 % | DIASTOLIC BLOOD PRESSURE: 64 MMHG | BODY MASS INDEX: 26.87 KG/M2 | WEIGHT: 171.19 LBS | HEIGHT: 67 IN

## 2019-06-20 DIAGNOSIS — N40.1 BENIGN PROSTATIC HYPERPLASIA (BPH) WITH URINARY URGE INCONTINENCE: ICD-10-CM

## 2019-06-20 DIAGNOSIS — N18.30 TYPE 2 DIABETES MELLITUS WITH STAGE 3 CHRONIC KIDNEY DISEASE, WITH LONG-TERM CURRENT USE OF INSULIN: Chronic | ICD-10-CM

## 2019-06-20 DIAGNOSIS — F11.20 UNCOMPLICATED OPIOID DEPENDENCE: ICD-10-CM

## 2019-06-20 DIAGNOSIS — R09.81 NASAL CONGESTION: ICD-10-CM

## 2019-06-20 DIAGNOSIS — E78.5 HYPERLIPIDEMIA DUE TO TYPE 2 DIABETES MELLITUS: ICD-10-CM

## 2019-06-20 DIAGNOSIS — E11.59 HYPERTENSION ASSOCIATED WITH DIABETES: ICD-10-CM

## 2019-06-20 DIAGNOSIS — D53.9 MACROCYTIC ANEMIA: ICD-10-CM

## 2019-06-20 DIAGNOSIS — N39.41 BENIGN PROSTATIC HYPERPLASIA (BPH) WITH URINARY URGE INCONTINENCE: ICD-10-CM

## 2019-06-20 DIAGNOSIS — I15.2 HYPERTENSION ASSOCIATED WITH DIABETES: ICD-10-CM

## 2019-06-20 DIAGNOSIS — R91.8 GROUND GLASS OPACITY PRESENT ON IMAGING OF LUNG: ICD-10-CM

## 2019-06-20 DIAGNOSIS — R91.8 LUNG NODULE, MULTIPLE: ICD-10-CM

## 2019-06-20 DIAGNOSIS — K21.9 GASTROESOPHAGEAL REFLUX DISEASE WITHOUT ESOPHAGITIS: Chronic | ICD-10-CM

## 2019-06-20 DIAGNOSIS — Z79.4 TYPE 2 DIABETES MELLITUS WITH STAGE 3 CHRONIC KIDNEY DISEASE, WITH LONG-TERM CURRENT USE OF INSULIN: Chronic | ICD-10-CM

## 2019-06-20 DIAGNOSIS — E11.69 HYPERLIPIDEMIA DUE TO TYPE 2 DIABETES MELLITUS: ICD-10-CM

## 2019-06-20 DIAGNOSIS — E03.9 ACQUIRED HYPOTHYROIDISM: ICD-10-CM

## 2019-06-20 DIAGNOSIS — R11.2 CHEMOTHERAPY-INDUCED NAUSEA AND VOMITING: ICD-10-CM

## 2019-06-20 DIAGNOSIS — C79.51 METASTATIC RENAL CELL CARCINOMA TO BONE: ICD-10-CM

## 2019-06-20 DIAGNOSIS — E11.22 TYPE 2 DIABETES MELLITUS WITH STAGE 3 CHRONIC KIDNEY DISEASE, WITH LONG-TERM CURRENT USE OF INSULIN: Chronic | ICD-10-CM

## 2019-06-20 DIAGNOSIS — C64.9 METASTATIC RENAL CELL CARCINOMA TO BONE: ICD-10-CM

## 2019-06-20 DIAGNOSIS — E53.8 B12 DEFICIENCY: ICD-10-CM

## 2019-06-20 DIAGNOSIS — T45.1X5A CHEMOTHERAPY-INDUCED NAUSEA AND VOMITING: ICD-10-CM

## 2019-06-20 PROCEDURE — 99215 PR OFFICE/OUTPT VISIT, EST, LEVL V, 40-54 MIN: ICD-10-PCS | Mod: HCNC,S$GLB,, | Performed by: INTERNAL MEDICINE

## 2019-06-20 PROCEDURE — 3075F SYST BP GE 130 - 139MM HG: CPT | Mod: HCNC,CPTII,S$GLB, | Performed by: INTERNAL MEDICINE

## 2019-06-20 PROCEDURE — 3044F PR MOST RECENT HEMOGLOBIN A1C LEVEL <7.0%: ICD-10-PCS | Mod: HCNC,CPTII,S$GLB, | Performed by: INTERNAL MEDICINE

## 2019-06-20 PROCEDURE — 1101F PR PT FALLS ASSESS DOC 0-1 FALLS W/OUT INJ PAST YR: ICD-10-PCS | Mod: HCNC,CPTII,S$GLB, | Performed by: INTERNAL MEDICINE

## 2019-06-20 PROCEDURE — 3075F PR MOST RECENT SYSTOLIC BLOOD PRESS GE 130-139MM HG: ICD-10-PCS | Mod: HCNC,CPTII,S$GLB, | Performed by: INTERNAL MEDICINE

## 2019-06-20 PROCEDURE — 3044F HG A1C LEVEL LT 7.0%: CPT | Mod: HCNC,CPTII,S$GLB, | Performed by: INTERNAL MEDICINE

## 2019-06-20 PROCEDURE — 99215 OFFICE O/P EST HI 40 MIN: CPT | Mod: HCNC,S$GLB,, | Performed by: INTERNAL MEDICINE

## 2019-06-20 PROCEDURE — 1101F PT FALLS ASSESS-DOCD LE1/YR: CPT | Mod: HCNC,CPTII,S$GLB, | Performed by: INTERNAL MEDICINE

## 2019-06-20 PROCEDURE — 3078F DIAST BP <80 MM HG: CPT | Mod: HCNC,CPTII,S$GLB, | Performed by: INTERNAL MEDICINE

## 2019-06-20 PROCEDURE — 3078F PR MOST RECENT DIASTOLIC BLOOD PRESSURE < 80 MM HG: ICD-10-PCS | Mod: HCNC,CPTII,S$GLB, | Performed by: INTERNAL MEDICINE

## 2019-06-20 RX ORDER — LORATADINE 10 MG/1
10 TABLET ORAL DAILY
Refills: 0 | COMMUNITY
Start: 2019-06-20 | End: 2020-06-09

## 2019-06-20 RX ORDER — AMLODIPINE BESYLATE 10 MG/1
10 TABLET ORAL DAILY
Qty: 90 TABLET | Refills: 3 | Status: SHIPPED | OUTPATIENT
Start: 2019-06-20 | End: 2020-06-09 | Stop reason: SDUPTHER

## 2019-06-20 RX ORDER — ATORVASTATIN CALCIUM 40 MG/1
40 TABLET, FILM COATED ORAL DAILY
Qty: 90 TABLET | Refills: 3 | Status: SHIPPED | OUTPATIENT
Start: 2019-06-20 | End: 2019-12-23

## 2019-06-20 RX ORDER — LISINOPRIL 40 MG/1
40 TABLET ORAL DAILY
Qty: 90 TABLET | Refills: 3 | Status: SHIPPED | OUTPATIENT
Start: 2019-06-20 | End: 2020-06-09 | Stop reason: SDUPTHER

## 2019-06-20 RX ORDER — TAMSULOSIN HYDROCHLORIDE 0.4 MG/1
0.8 CAPSULE ORAL NIGHTLY
Qty: 180 CAPSULE | Refills: 3 | Status: SHIPPED | OUTPATIENT
Start: 2019-06-20 | End: 2020-06-09 | Stop reason: SDUPTHER

## 2019-06-20 RX ORDER — ONDANSETRON HYDROCHLORIDE 8 MG/1
8 TABLET, FILM COATED ORAL 3 TIMES DAILY PRN
Qty: 90 TABLET | Refills: 3 | Status: SHIPPED | OUTPATIENT
Start: 2019-06-20 | End: 2020-06-09 | Stop reason: SDUPTHER

## 2019-06-20 NOTE — Clinical Note
Dear Dr Reed and staff,Patient is due for follow-up and scans at the end of July. Mr Powell complains of abdominal fullness and distension today. He has a 20# weight gain since 12/2018. His symptoms are likely related to GERD or something benign, but wanted to make you aware in the event you are monitoring for ascites.Thanks,CORTEZ (PCP)

## 2019-06-20 NOTE — ASSESSMENT & PLAN NOTE
A1c 11.1 in 5/2017, 6.8 in 8/2017. Weaned off Lantus, PRN novolog   · Continue current therapies  · Monitor A1c  · Discontinued long acting insulin  · PRN novolog for glucose >150  · Has not taken insulin recently, but still has it available

## 2019-06-20 NOTE — ASSESSMENT & PLAN NOTE
RUL and RLL ground glass attenuation, stable on CT  · Monitor with surveillance CTs with Heme-Onc  · Advised to continue avoiding tobacco and all inhaled drugs

## 2019-06-20 NOTE — PATIENT INSTRUCTIONS
TODAY:  - meds refilled to [facility] in [place]  - use your as needed meds for diarrhea (imodium) and nausea (zofran)  - try the claritin and nasal spray every day for allergies  - continue vitamin supplements (B complex, calcium and Vit D)  - podiatry appt at [facility]  - use zantac or TUMS for reflux symptoms  - PCP messaged Dr Reed about abdominal symptoms and need for scans at end of 7/2019    NEXT:  - labs

## 2019-06-20 NOTE — ASSESSMENT & PLAN NOTE
Reaction to tree pollen, now with dizziness  · Continue flonase   · Continue 2nd gen antihistamine  · Divide flomax into 2 doses

## 2019-06-20 NOTE — ASSESSMENT & PLAN NOTE
Taking scheduled narcotics for cancer-related pain. Overdose in 2017  · Continue pain management per Heme-Onc  · Advised mindfulness to prevent overmedication  · Advised to avoid alcohol at all times

## 2019-06-20 NOTE — ASSESSMENT & PLAN NOTE
Metastatic renal cell CA (Stage IV - T1,Nx,M1). Followed by Heme-Onc (Dr Reed) radiation therapy to L3 spine - 3 Gy/Fx x 10 fx and pazopanib 800 mg PO daily. Liver bx 5/2017 with RCC  · F/U Heme-Onc  · Continue current therapies  · Completed monthly xgeva and radiation therapy  · Ca with Vit D supplementation  · Continue votrient daily

## 2019-06-20 NOTE — PROGRESS NOTES
"Primary Care Provider Appointment    Subjective:      Patient ID: Edwin Powell is a 74 y.o. male with RCC, DM, HTN, seasonal allergies    Chief Complaint: Follow-up; Diabetes; and Hip Pain (right side )    Patient is complaining of abdominal fullness, distension, discomfort after eating. Last CT abd/ch/pelvis had stable masses. He complains that he has firm stool, then later will have diarrhea. Underwent cholecystectomy in the 1980s. He continues to have chemo-induced diarrhea and occasional nausea.    Complains of fatigue, and sleeps throughout the day. He takes chronic narcotics from Heme-Onc (MS contin and norco), but patient states "I hadn't been taking that for a good while." Has narcan at home.     Is compliant with daily chemo (votrient) for RCC. Mets are stable on surveillance scans, last performed in 3/2019. Last seen by Dr Reed on 5/20/19 with plans for repeat scans in late July.    Complains of nasal discharge. He states he has a nasal spray at home, but doesn't know the name. He takes claritin, but not daily.    BP controlled, but lipids uncontrolled. He reports compliance with his statin, but did not fast before his labs.    Plans to have cataract surgery. Has his bottle for pre-hab eye drops.     Patient had hearing eval, and needs hearing aids.    Past Surgical History:   Procedure Laterality Date    ABDOMINAL SURGERY      APPENDECTOMY      BACK SURGERY      CARDIAC SURGERY      CHOLECYSTECTOMY      coronary stenting      X 5 last one in 2010-11.    SPINAL FUSION         Past Medical History:   Diagnosis Date    Acquired hypothyroidism 5/26/2017    Benign essential HTN 5/26/2017    Benign prostatic hyperplasia (BPH) with urinary urge incontinence 6/6/2017    Chronic low back pain 6/1/2017    Coronary artery disease involving native coronary artery of native heart without angina pectoris 5/26/2017    S/p 5 stents - last one around 2010-11.    Gastroesophageal reflux disease without " esophagitis 5/26/2017    History of CVA (cerebrovascular accident) 5/26/2017    Hypertension associated with diabetes 5/26/2017    BP controlled on ACE, CCB    Lumbar disc lesion 5/26/2017    Metastatic renal cell carcinoma to bone 6/6/2017    Metastatic renal cell carcinoma to liver 6/6/2017    Liver Biopsy 5-2017: METASTATIC RENAL CELL CARCINOMA.    Mixed hyperlipidemia 5/26/2017    Type 2 diabetes mellitus with stage 3 chronic kidney disease, with long-term current use of insulin 5/26/2017       Social History     Socioeconomic History    Marital status: Single     Spouse name: Not on file    Number of children: Not on file    Years of education: Not on file    Highest education level: Not on file   Occupational History    Not on file   Social Needs    Financial resource strain: Hard    Food insecurity:     Worry: Never true     Inability: Never true    Transportation needs:     Medical: No     Non-medical: No   Tobacco Use    Smoking status: Never Smoker    Smokeless tobacco: Never Used   Substance and Sexual Activity    Alcohol use: No    Drug use: No    Sexual activity: Not Currently     Partners: Female   Lifestyle    Physical activity:     Days per week: Not on file     Minutes per session: Not on file    Stress: Not on file   Relationships    Social connections:     Talks on phone: Not on file     Gets together: Not on file     Attends Synagogue service: Not on file     Active member of club or organization: Not on file     Attends meetings of clubs or organizations: Not on file     Relationship status: Not on file   Other Topics Concern    Not on file   Social History Narrative    Not on file       Review of Systems   Constitutional: Negative for activity change and unexpected weight change.   HENT: Positive for hearing loss. Negative for trouble swallowing.         Abnormal hearing   Gastrointestinal: Positive for diarrhea. Negative for blood in stool, constipation and vomiting.  "  Endocrine: Negative for polydipsia and polyuria.   Genitourinary: Negative for difficulty urinating and urgency.   Musculoskeletal: Positive for arthralgias. Negative for joint swelling and neck pain.   Neurological: Positive for weakness. Negative for headaches.   Psychiatric/Behavioral: Negative for confusion and dysphoric mood.       Objective:   /64   Pulse 71   Ht 5' 7" (1.702 m)   Wt 77.7 kg (171 lb 3 oz)   SpO2 99%   BMI 26.81 kg/m²     Physical Exam   Constitutional: He is oriented to person, place, and time. He appears well-developed and well-nourished.   No longer ambulating with cane   HENT:   Head: Normocephalic.   Edentulous   Eyes: Pupils are equal, round, and reactive to light.   Neck:   Decreased ROM in neck    Pulmonary/Chest: Effort normal.   Musculoskeletal: Normal range of motion.   Neurological: He is alert and oriented to person, place, and time.   Skin: Skin is warm.   Ecchymoses and purpura bilaterally UE  Telangiectasias on face and chest  Multiple tattoos on UE  Numerous red papules on neck  Fair skin   Psychiatric: He has a normal mood and affect.       Lab Results   Component Value Date    WBC 6.94 05/16/2019    HGB 12.5 (L) 05/16/2019    HCT 38.3 (L) 05/16/2019     (H) 05/16/2019    CHOL 196 05/16/2019    TRIG 237 (H) 05/16/2019    HDL 40 05/16/2019    ALT 19 05/16/2019    AST 21 05/16/2019     05/16/2019    K 4.4 05/16/2019     05/16/2019    CREATININE 1.7 (H) 05/16/2019    BUN 32 (H) 05/16/2019    CO2 23 05/16/2019    TSH 3.530 07/16/2018    PSA 1.1 07/16/2018    INR 1.2 06/12/2017    HGBA1C 6.4 (H) 02/19/2019       RESULTS: Reviewed labs and images today    Assessment:   74 y.o. male with multiple co-morbid illnesses here to continue work-up of chronic issues notably  with RCC, DM, HTN, seasonal allergies.     Plan:     Problem List Items Addressed This Visit        Psychiatric    Uncomplicated opioid dependence     Taking scheduled narcotics for " cancer-related pain. Overdose in 2017  · Continue pain management per Heme-Onc  · Advised mindfulness to prevent overmedication  · Advised to avoid alcohol at all times            ENT    Nasal congestion     Reaction to tree pollen, now with dizziness  · Continue flonase   · Continue 2nd gen antihistamine  · Divide flomax into 2 doses         Relevant Medications    loratadine (CLARITIN) 10 mg tablet       Pulmonary    Lung nodule, multiple     RUL and RLL ground glass attenuation, stable on CT  · Monitor with surveillance CTs with Heme-Onc  · Advised to continue avoiding tobacco and all inhaled drugs         Ground glass opacity present on imaging of lung     Seen in RLL, clinically asymptomatic  · Monitor            Cardiac/Vascular    Hyperlipidemia due to type 2 diabetes mellitus    Relevant Medications    atorvastatin (LIPITOR) 40 MG tablet       Renal/    Benign prostatic hyperplasia (BPH) with urinary urge incontinence    Relevant Medications    tamsulosin (FLOMAX) 0.4 mg Cap       Oncology    Metastatic renal cell carcinoma to bone     Metastatic renal cell CA (Stage IV - T1,Nx,M1). Followed by Heme-Onc (Dr Reed) radiation therapy to L3 spine - 3 Gy/Fx x 10 fx and pazopanib 800 mg PO daily. Liver bx 5/2017 with RCC  · F/U Heme-Onc  · Continue current therapies  · Completed monthly xgeva and radiation therapy  · Ca with Vit D supplementation  · Continue votrient daily         Macrocytic anemia     Elevated MCV, low Hg  · Recently started on oral B complex  · B12 injection at last appt  · Hg improving            Endocrine    Type 2 diabetes mellitus with stage 3 chronic kidney disease, with long-term current use of insulin (Chronic)     A1c 11.1 in 5/2017, 6.8 in 8/2017. Weaned off Lantus, PRN novolog   · Continue current therapies  · Monitor A1c  · Discontinued long acting insulin  · PRN novolog for glucose >150  · Has not taken insulin recently, but still has it available         Relevant Orders     Ambulatory Referral to Podiatry    Acquired hypothyroidism    Relevant Medications    lisinopril (PRINIVIL,ZESTRIL) 40 MG tablet    B12 deficiency     Low-normal B12, compliant with MVI  · Continue oral supplementation  · S/p B12 injection            GI    Gastroesophageal reflux disease without esophagitis (Chronic)     Epigastric pain after eating, worsened after chemo initiation  · Discontinue omeprazole due to chemo interaction  · Trial of ranitidine for symptoms  · Encouraged use of TUMS         Chemotherapy-induced nausea and vomiting     Uncontrolled with narcotics, now experiencing hemorrhoids  · Continue zofran for nausea  · Continue lomotil  · Advised to use barrier creme         Relevant Medications    ondansetron (ZOFRAN) 8 MG tablet      Other Visit Diagnoses     Hypertension associated with diabetes        Relevant Medications    amLODIPine (NORVASC) 10 MG tablet    lisinopril (PRINIVIL,ZESTRIL) 40 MG tablet    Other Relevant Orders    Ambulatory Referral to Podiatry          Health Maintenance       Date Due Completion Date    TETANUS VACCINE 02/18/1963 ---    Shingles Vaccine (1 of 2) 02/18/1995 ---    Foot Exam 02/02/2019 2/2/2018 (Done)- TODAY    Override on 2/2/2018: Done (performed by podiatrist)    Influenza Vaccine 08/01/2019 10/17/2018    Hemoglobin A1c 08/19/2019 2/19/2019    Eye Exam 04/22/2020 4/22/2019    High Dose Statin 05/16/2020 5/16/2019    Aspirin/Antiplatelet Therapy 05/16/2020 5/16/2019    Lipid Panel 05/16/2020 5/16/2019    Colonoscopy 02/15/2021 2/15/2011 (Done)    Override on 2/15/2011: Done (Per [employer] dashboard ? results)          Follow up in about 6 weeks (around 8/1/2019). Total face-to-face time was 60 min, 50% of this was spent on counseling and coordination of care. The following issues were discussed:  with RCC, DM, HTN, seasonal allergies    Lulú Lewis MD/MPH  Internal Medicine  [facility]  [phone]

## 2019-06-20 NOTE — ASSESSMENT & PLAN NOTE
Elevated MCV, low Hg  · Recently started on oral B complex  · B12 injection at last appt  · Hg improving

## 2019-06-20 NOTE — ASSESSMENT & PLAN NOTE
Epigastric pain after eating, worsened after chemo initiation  · Discontinue omeprazole due to chemo interaction  · Trial of ranitidine for symptoms  · Encouraged use of TUMS

## 2019-06-21 ENCOUNTER — TELEPHONE (OUTPATIENT)
Dept: PRIMARY CARE CLINIC | Facility: CLINIC | Age: 74
End: 2019-06-21

## 2019-06-21 ENCOUNTER — OFFICE VISIT (OUTPATIENT)
Dept: HEMATOLOGY/ONCOLOGY | Facility: CLINIC | Age: 74
End: 2019-06-21
Payer: MEDICARE

## 2019-06-21 ENCOUNTER — TELEPHONE (OUTPATIENT)
Dept: HEMATOLOGY/ONCOLOGY | Facility: CLINIC | Age: 74
End: 2019-06-21

## 2019-06-21 VITALS
SYSTOLIC BLOOD PRESSURE: 165 MMHG | DIASTOLIC BLOOD PRESSURE: 95 MMHG | HEIGHT: 67 IN | OXYGEN SATURATION: 99 % | WEIGHT: 171.06 LBS | HEART RATE: 66 BPM | BODY MASS INDEX: 26.85 KG/M2 | RESPIRATION RATE: 18 BRPM | TEMPERATURE: 98 F

## 2019-06-21 DIAGNOSIS — C64.9 METASTATIC RENAL CELL CARCINOMA TO LIVER: ICD-10-CM

## 2019-06-21 DIAGNOSIS — C64.2 RENAL CELL CARCINOMA OF LEFT KIDNEY: Primary | ICD-10-CM

## 2019-06-21 DIAGNOSIS — C64.9 METASTATIC RENAL CELL CARCINOMA TO BONE: ICD-10-CM

## 2019-06-21 DIAGNOSIS — R10.32 LLQ ABDOMINAL PAIN: ICD-10-CM

## 2019-06-21 DIAGNOSIS — C78.7 METASTATIC RENAL CELL CARCINOMA TO LIVER: ICD-10-CM

## 2019-06-21 DIAGNOSIS — G89.3 NEOPLASM RELATED PAIN: ICD-10-CM

## 2019-06-21 DIAGNOSIS — C79.51 METASTATIC RENAL CELL CARCINOMA TO BONE: ICD-10-CM

## 2019-06-21 PROBLEM — K52.1 CHEMOTHERAPY INDUCED DIARRHEA: Status: RESOLVED | Noted: 2017-07-26 | Resolved: 2019-06-21

## 2019-06-21 PROBLEM — R21 RASH OF NECK: Status: RESOLVED | Noted: 2018-06-29 | Resolved: 2019-06-21

## 2019-06-21 PROBLEM — T45.1X5A CHEMOTHERAPY-INDUCED NAUSEA AND VOMITING: Status: RESOLVED | Noted: 2017-06-26 | Resolved: 2019-06-21

## 2019-06-21 PROBLEM — R11.2 CHEMOTHERAPY-INDUCED NAUSEA AND VOMITING: Status: RESOLVED | Noted: 2017-06-26 | Resolved: 2019-06-21

## 2019-06-21 PROBLEM — H61.23 BILATERAL IMPACTED CERUMEN: Status: RESOLVED | Noted: 2018-12-12 | Resolved: 2019-06-21

## 2019-06-21 PROBLEM — R09.81 NASAL CONGESTION: Status: RESOLVED | Noted: 2019-03-25 | Resolved: 2019-06-21

## 2019-06-21 PROBLEM — T45.1X5A CHEMOTHERAPY INDUCED DIARRHEA: Status: RESOLVED | Noted: 2017-07-26 | Resolved: 2019-06-21

## 2019-06-21 PROBLEM — K52.1 DIARRHEA DUE TO DRUG: Status: RESOLVED | Noted: 2017-08-24 | Resolved: 2019-06-21

## 2019-06-21 PROCEDURE — 99214 PR OFFICE/OUTPT VISIT, EST, LEVL IV, 30-39 MIN: ICD-10-PCS | Mod: HCNC,S$GLB,, | Performed by: NURSE PRACTITIONER

## 2019-06-21 PROCEDURE — 99999 PR PBB SHADOW E&M-EST. PATIENT-LVL V: ICD-10-PCS | Mod: PBBFAC,HCNC,, | Performed by: NURSE PRACTITIONER

## 2019-06-21 PROCEDURE — 99214 OFFICE O/P EST MOD 30 MIN: CPT | Mod: HCNC,S$GLB,, | Performed by: NURSE PRACTITIONER

## 2019-06-21 PROCEDURE — 99999 PR PBB SHADOW E&M-EST. PATIENT-LVL V: CPT | Mod: PBBFAC,HCNC,, | Performed by: NURSE PRACTITIONER

## 2019-06-21 NOTE — TELEPHONE ENCOUNTER
----- Message from Rahel Reed MD sent at 6/21/2019  7:10 AM CDT -----  Thank you for the follow up on this  We will get him in to evaluate    ----- Message -----  From: Lulú Lewis MD  Sent: 6/20/2019  10:50 AM  To: Rahel Reed MD    Dear Dr Reed and staff,  Patient is due for follow-up and scans at the end of July. Mr Powell complains of abdominal fullness and distension today. He has a 20# weight gain since 12/2018. His symptoms are likely related to GERD or something benign, but wanted to make you aware in the event you are monitoring for ascites.    Thanks,  CORTEZ (PCP)

## 2019-06-21 NOTE — TELEPHONE ENCOUNTER
----- Message from Radha Phillips sent at 6/21/2019  8:13 AM CDT -----  Sorry this was sent yesterday when I was out- can we get in with an MYLES today?     ----- Message -----  From: Rahel Reed MD  Sent: 6/21/2019   8:12 AM  To: Radha Phillips    Yes  Sent separate message    ----- Message -----  From: Radha Phillips  Sent: 6/21/2019   7:51 AM  To: Rahel Reed MD    Would you like to see if can see an MYLES today? Sorry just getting this message    ----- Message -----  From: Rahel Reed MD  Sent: 6/21/2019   7:10 AM  To: Radha Phillips    Can he come see MYLES today or me next week?    ----- Message -----  From: Lulú Lewis MD  Sent: 6/20/2019  10:50 AM  To: Rahel Reed MD    Dear Dr Reed and staff,  Patient is due for follow-up and scans at the end of July. Mr Powell complains of abdominal fullness and distension today. He has a 20# weight gain since 12/2018. His symptoms are likely related to GERD or something benign, but wanted to make you aware in the event you are monitoring for ascites.    Thanks,  CORTEZ (PCP)

## 2019-06-21 NOTE — PROGRESS NOTES
Subjective:       Patient ID: Edwin Powell is a 74 y.o. male.    Chief Complaint: Abdominal Fullness and Distension    HPI Patient here for an urgent care visit due to abdominal distension and fullness. He states that he feels like his stomach is distending when eats. He states he is able to eat, complains of pain in the pit of the stomach, but states the pain feels better after he eats, also feels better after taking tums or rolaids and after sitting up. He is not on an antacid. He states this has discomfort in the abdomen has been going for 3 weeks. Bowel movements were stable, but he has been having loose stools for the last couple of days. He states he feels tired, doesn't feel up to doing anything.  He was seen by his PCP yesterday. Last CT abd/ch/pelvis had stable masses. He remains on Votrient.     Patient anxious about diagnosis that were on his AVS from 6/20/19 - specifically the right lung nodule and ground glass opacities. Discussed that the right lung micronodule is too small to classify it, and it recommended to continue with routine scans, which he will receive in July. The ground glass opacities are from his CT scan in March, will rescan in July.     Mr. Singleton is a 74 y.o. male who returns for follow up regarding metastatic renal cell carcinoma of left kidney; currently on Votrient.    Lives with chronic back pain Saw pain medicine and had some relief with a local injection.      Repeat Imaging:  Bone scan 3/15/19:  There is no scintigraphic evidence of metastatic disease.     CT scans 3/15/19:  In this patient with a history of renal cell carcinoma there is a stable appearance of an exophytic left renal mass, unchanged in size since CT 06/14/2018.  Several hypodense hepatic lesions unchanged since prior CT 10/16/2018 and CT 06/14/2018.  Stable L3 vertebral body lytic lesion.  Stable right upper lobe pulmonary micronodule.  1.6 cm focus of ground-glass attenuation in the medial basal segment of  "the right lower lobe, new in comparison to prior examination.  Attention at repeat surveillance imaging is recommended.  RECIST SUMMARY:  Date of prior examination for comparison: 10/16/2018.  Lesion 1: Left kidney mass.  2.3 cm. Series 4 Image 122. Prior measurement 2.3 cm.  Lesion 2: Hepatic segment 2.  Not definitively visualized on today's examination or prior examination.  Lesion 3: Hepatic segment 7.  1.4 cm. Series 4 image 82.  Prior measurement 1.4 cm.  Lesion 4: Hepatic segment 4.  1.1 cm. Series 4 image 91.  Prior measurement 1.1 cm.     Oncology History:  Mr.Mc Moss is a 72 yo male with CAD (s/p 5 stents, remotely), DM2 (insulin dependent), hypothyroidism , CVA (2013) with no residual deficit, chronic back pain. [place] [facility] [place] of a lumbar spine lesion in the setting of progressive weakness of his legs and exacerbation of chronic back pain, in May 2017. He states that his back pain and LE weakness progressively worsened. [facility]. The pain radiates down his legs. He denies any incontinence of bowel or bladder. He had several falls. After one of the falls, he presented to the ED at [facility], in May 2017. He had an MRI showing "L3 vertebral body lesion with apparent cortical disruption concerning for metastases vs myeloma," and [place] [facility] [place]. He was evaluated by neurosurgery regarding concern for lytic lesion on L3 on 5/26/17. No surgical intervention was proposed.   CT chest, abdomen,pelvis on 5/27/17 revealed :   --A 3.6 cm exophytic mass arising from the medial aspect of the lower pole of left kidney,   --A small 1.0 cm, subtle, cortical enhancing mass within the upper pole of the right kidney, concerning for possible contralateral solid renal mass  --Multiple enhancing ill-defined hepatic masses concerning for hepatic metastatic disease, hepatomegaly and hepatic " steatosis  --Mulltifocal irregular thickening of the bilateral pleura concerning for possible pleural metastatic disease  --Mild nonspecific nodular thickening of the left adrenal gland without discrete nodule  -- Redemonstration of known lytic lesion within the L3 vertebral body. No definite additional discrete lytic lesions are identified.  -MRI pelvis from 10/18/17:    2.5 cm linear T1 hypointense T2/STIR hyperintense lesion within the left iliac bone, this lesion is indeterminate but felt less likely to represent metastasis given its appearance. Further evaluation can be obtained with bone scan as clinically indicated.  Findings suggestive of left trochanteric bursitis.  Partially visualized L3 metastatic lesion.  Diffuse thickening of the urinary bladder wall can be seen with chronic outlet obstruction or cystitis.  - initiated on Votrient    Review of Systems   Constitutional: Positive for fatigue (extreme). Negative for activity change, appetite change, chills, fever and unexpected weight change.   HENT: Negative for congestion, dental problem, mouth sores, nosebleeds, rhinorrhea, sinus pressure, sore throat and trouble swallowing.                Eyes: Negative for visual disturbance.   Respiratory: Negative for cough, chest tightness, shortness of breath and wheezing.    Cardiovascular: Negative for chest pain, palpitations and leg swelling.   Gastrointestinal: Positive for abdominal distention, abdominal pain, diarrhea (loose stools for the past several days) and nausea (intermittent). Negative for blood in stool, constipation and vomiting.   Genitourinary: Negative for decreased urine volume, difficulty urinating, dysuria, frequency, hematuria and urgency.   Musculoskeletal: Positive for back pain (chronic and unchanged - right sided). Negative for arthralgias, gait problem, joint swelling, myalgias, neck pain and neck stiffness.   Skin: Negative for color change, pallor, rash and wound.   Neurological:  Positive for dizziness (intermittent) and weakness. Negative for light-headedness, numbness and headaches.   Hematological: Negative for adenopathy.   Psychiatric/Behavioral: Negative for dysphoric mood. The patient is not nervous/anxious.        Objective:      Physical Exam   Constitutional: He is oriented to person, place, and time. He appears well-developed and well-nourished. No distress.   Presents alone  ECOG=0   HENT:   Head: Normocephalic and atraumatic.   Mouth/Throat: Oropharynx is clear and moist. No oropharyngeal exudate.   Eyes: Pupils are equal, round, and reactive to light. Conjunctivae and EOM are normal. Right eye exhibits no discharge. Left eye exhibits no discharge. No scleral icterus.   Neck: Normal range of motion. Neck supple. No thyromegaly present.   Cardiovascular: Normal rate, regular rhythm, normal heart sounds and intact distal pulses. Exam reveals no gallop and no friction rub.   No murmur heard.  Pulmonary/Chest: Effort normal and breath sounds normal. No respiratory distress. He has no wheezes. He has no rales. He exhibits no tenderness.   Lungs clear   Abdominal: Soft. Bowel sounds are normal. He exhibits no distension and no mass. There is tenderness (Left lower quadrant). There is rebound.   No hepatosplenomegaly   Musculoskeletal: Normal range of motion. He exhibits no edema, tenderness (point tenderness low back) or deformity.   No spinal or paraspinal tenderness to palpation   Lymphadenopathy:     He has no cervical adenopathy.   Neurological: He is alert and oriented to person, place, and time. No cranial nerve deficit or sensory deficit. Coordination normal.   Skin: Skin is warm and dry. No rash noted. He is not diaphoretic. No erythema. No pallor.   Psychiatric: He has a normal mood and affect. His behavior is normal. Thought content normal.   Nursing note and vitals reviewed.   Labs- reviewed   Assessment:       1. Renal cell carcinoma of left kidney    2. Metastatic renal  cell carcinoma to liver    3. 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 Neoplasm related pain    5. LLQ abdominal pain        Plan:     1-3. Disease well controlled on Votrient; Repeat scans in late July unless clinically indicated prior  4. Continue pain regimen  5. Abdominal distension and fullness - complete abdominal US ordered to be done 6/22/19. Will call patient with results on 6/24/19. Discussed that this is probably reflux, but given his RCC history and LLQ pain with palpation would like to rule out other diagnosis. If US is clear, will recommend starting a regular antiacid medication.         Distress Screening Results: Psychosocial Distress screening score of   noted and reviewed. No intervention indicated.         Marlena Ramirez NP  Oncology     Patient dicussed with supervising physician, Dr. Reed.

## 2019-06-21 NOTE — TELEPHONE ENCOUNTER
Called and spoke with patient and assisted with scheduling his apt for today to come in to discuss our symptoms.

## 2019-06-21 NOTE — Clinical Note
Patient needs CT scan, labs (lipids, cbc, cmp), EKG and to see Brianna, Kennedi, or myself in 4 weeks.

## 2019-06-22 ENCOUNTER — HOSPITAL ENCOUNTER (OUTPATIENT)
Dept: RADIOLOGY | Facility: HOSPITAL | Age: 74
Discharge: HOME | End: 2019-06-22
Attending: NURSE PRACTITIONER
Payer: MEDICARE

## 2019-06-22 DIAGNOSIS — C64.2 RENAL CELL CARCINOMA OF LEFT KIDNEY: ICD-10-CM

## 2019-06-22 PROCEDURE — 76700 US EXAM ABDOM COMPLETE: CPT | Mod: TC,HCNC

## 2019-06-22 PROCEDURE — 76700 US EXAM ABDOM COMPLETE: CPT | Mod: 26,HCNC,, | Performed by: RADIOLOGY

## 2019-06-22 PROCEDURE — 76700 US ABDOMEN COMPLETE: ICD-10-PCS | Mod: 26,HCNC,, | Performed by: RADIOLOGY

## 2019-06-24 ENCOUNTER — TELEPHONE (OUTPATIENT)
Dept: HEMATOLOGY/ONCOLOGY | Facility: CLINIC | Age: 74
End: 2019-06-24

## 2019-06-24 DIAGNOSIS — G89.3 NEOPLASM RELATED PAIN: ICD-10-CM

## 2019-06-24 DIAGNOSIS — C64.9 METASTATIC RENAL CELL CARCINOMA TO LIVER: ICD-10-CM

## 2019-06-24 DIAGNOSIS — C78.7 METASTATIC RENAL CELL CARCINOMA TO LIVER: ICD-10-CM

## 2019-06-24 RX ORDER — MORPHINE SULFATE 15 MG/1
15 TABLET, FILM COATED, EXTENDED RELEASE ORAL EVERY 12 HOURS
Qty: 60 TABLET | Refills: 0 | Status: SHIPPED | OUTPATIENT
Start: 2019-06-24 | End: 2019-08-20 | Stop reason: SDUPTHER

## 2019-06-24 RX ORDER — HYDROCODONE BITARTRATE AND ACETAMINOPHEN 5; 325 MG/1; MG/1
TABLET ORAL
Qty: 120 TABLET | Refills: 0 | Status: SHIPPED | OUTPATIENT
Start: 2019-06-24 | End: 2019-08-20 | Stop reason: SDUPTHER

## 2019-06-24 NOTE — TELEPHONE ENCOUNTER
"Called and spoke to patient; explained that ultrasound did not show any abnormalities with the exception of the RCC, which we are aware of. I recommend he try his zantac because I believe he is suffering from reflux. He will take it for the next several days to see if he gets relief. I notified him of his appointment on July 19 with Dr. Reed and I ask that if he needs anything in the mean time to please call us back. He expressed appreciation.    ----- Message from Radha Phillips sent at 6/24/2019 12:43 PM CDT -----  Hey! Not sure if you had tried calling him with US results. I can call with results if needed!  Thanks    ----- Message -----  From: Kasie North  Sent: 6/24/2019  12:11 PM  To: Brianna BENNETT Staff    Pt was waiting to hear back from the staff to discuss results from last weeks testing. Please contact him at [phone] . "Thank you for all that you do for our patients'"      "

## 2019-06-24 NOTE — TELEPHONE ENCOUNTER
"Message fwd to NP.         ----- Message from Kasie North sent at 6/24/2019 12:11 PM CDT -----  Pt was waiting to hear back from the staff to discuss results from last weeks testing. Please contact him at [phone] . "Thank you for all that you do for our patients'"    "

## 2019-06-25 ENCOUNTER — TELEPHONE (OUTPATIENT)
Dept: PHARMACY | Facility: CLINIC | Age: 74
End: 2019-06-25

## 2019-06-26 ENCOUNTER — TELEPHONE (OUTPATIENT)
Dept: OPTOMETRY | Facility: CLINIC | Age: 74
End: 2019-06-26

## 2019-06-26 NOTE — H&P
History    Chief complaint:  Painless progressive vision loss    Present Ilness/Diagnosis: Nuclear sclerotic Cataract    Past Medical History:  has a past medical history of Acquired hypothyroidism (5/26/2017), Benign essential HTN (5/26/2017), Benign prostatic hyperplasia (BPH) with urinary urge incontinence (6/6/2017), Chronic low back pain (6/1/2017), Coronary artery disease involving native coronary artery of native heart without angina pectoris (5/26/2017), Gastroesophageal reflux disease without esophagitis (5/26/2017), History of CVA (cerebrovascular accident) (5/26/2017), Hypertension associated with diabetes (5/26/2017), Lumbar disc lesion (5/26/2017), Metastatic renal cell carcinoma to bone (6/6/2017), Metastatic renal cell carcinoma to liver (6/6/2017), Mixed hyperlipidemia (5/26/2017), and Type 2 diabetes mellitus with stage 3 chronic kidney disease, with long-term current use of insulin (5/26/2017).    Family History/Social History: refer to chart    Allergies: Review of patient's allergies indicates:  No Known Allergies    Current Medications: No current facility-administered medications for this encounter.     Current Outpatient Medications:     amLODIPine (NORVASC) 10 MG tablet, Take 1 tablet (10 mg total) by mouth once daily., Disp: 90 tablet, Rfl: 3    aspirin (ECOTRIN) 81 MG EC tablet, Take 81 mg by mouth once daily., Disp: , Rfl:     b complex vitamins (B COMPLEX 1) tablet, Take 1 tablet by mouth once daily., Disp: , Rfl:     calcium carbonate-vitamin D3 (CALCIUM 600 WITH VITAMIN D3) 600 mg(1,500mg) -400 unit Cap, Take 1 capsule by mouth once., Disp: 180 capsule, Rfl: 3    cholecalciferol, vitamin D3, (VITAMIN D3) 5,000 unit Tab, Take 5,000 Units by mouth once daily., Disp: 90 tablet, Rfl: 3    fluticasone propionate (FLONASE) 50 mcg/actuation nasal spray, 1 SPRAY (50 MCG TOTAL) BY EACH NARE ROUTE ONCE DAILY., Disp: 16 mL, Rfl: 3    levothyroxine (SYNTHROID) 88 MCG tablet, TAKE 1 TABLET  (88 MCG TOTAL) BY MOUTH ONCE DAILY., Disp: 90 tablet, Rfl: 3    lisinopril (PRINIVIL,ZESTRIL) 40 MG tablet, Take 1 tablet (40 mg total) by mouth once daily., Disp: 90 tablet, Rfl: 3    loratadine (CLARITIN) 10 mg tablet, Take 1 tablet (10 mg total) by mouth once daily., Disp: , Rfl: 0    multivitamin (THERAGRAN) per tablet, Take 1 tablet by mouth once daily., Disp: , Rfl:     ondansetron (ZOFRAN) 8 MG tablet, Take 1 tablet (8 mg total) by mouth 3 (three) times daily as needed., Disp: 90 tablet, Rfl: 3    PAZOpanib (VOTRIENT) 200 mg Tab, Take 4 tablets (800 mg total) by mouth once daily., Disp: 120 tablet, Rfl: 5    tamsulosin (FLOMAX) 0.4 mg Cap, Take 2 capsules (0.8 mg total) by mouth every evening. (Patient taking differently: Take 0.8 mg by mouth once daily. ), Disp: 180 capsule, Rfl: 3    atorvastatin (LIPITOR) 40 MG tablet, Take 1 tablet (40 mg total) by mouth once daily., Disp: 90 tablet, Rfl: 3    clindamycin (CLEOCIN T) 1 % external solution, AAA bid, Disp: 60 mL, Rfl: 3    HYDROcodone-acetaminophen (NORCO) 5-325 mg per tablet, TAKE 1 TABLET BY MOUTH EVERY 8 (EIGHT) HOURS AS NEEDED FOR PAIN., Disp: 120 tablet, Rfl: 0    morphine (MS CONTIN) 15 MG 12 hr tablet, Take 1 tablet (15 mg total) by mouth every 12 (twelve) hours., Disp: 60 tablet, Rfl: 0    prednisolon/gatiflox/bromfenac (PREDNISOL ACE-GATIFLOX-BROMFEN) 1-0.5-0.075 % DrpS, Apply 1 drop to eye 3 (three) times daily., Disp: 7 mL, Rfl: 3    Physical Exam    BP: Vital signs stable  General: No apparent distress  HEENT: nuclear sclerotic cataract  Lungs: adequate respirations  Heart: + pulses  Abdomen: soft  Rectal/pelvic: deferred    Impression: Visually significant Cataract    Plan: Phacoemulsification with implantation of Intraocular lens

## 2019-06-26 NOTE — PRE-PROCEDURE INSTRUCTIONS
Preop instructions: NPO solids/ milk products after midnight and clears up to 5am  (clear liquids are: water, apple juice, Gatorade & Jell-O, black coffee/no milk, cream or creamer), shower instructions, directions, leave all valuables at home, wear comfortable clothes (something that buttons up the front is best), medication instructions for PM prior & am of procedure explained. Patient stated an understanding.     Patient denies any side effects or issues with anesthesia or sedation.    Informed patient that instructions apply to second eye surgery to be scheduled. Stated an understanding.

## 2019-06-27 ENCOUNTER — ANESTHESIA (OUTPATIENT)
Dept: SURGERY | Facility: HOSPITAL | Age: 74
End: 2019-06-27
Payer: MEDICARE

## 2019-06-27 ENCOUNTER — HOSPITAL ENCOUNTER (OUTPATIENT)
Facility: HOSPITAL | Age: 74
Discharge: HOME | End: 2019-06-27
Attending: OPHTHALMOLOGY | Admitting: OPHTHALMOLOGY
Payer: MEDICARE

## 2019-06-27 ENCOUNTER — ANESTHESIA EVENT (OUTPATIENT)
Dept: SURGERY | Facility: HOSPITAL | Age: 74
End: 2019-06-27
Payer: MEDICARE

## 2019-06-27 VITALS
OXYGEN SATURATION: 98 % | DIASTOLIC BLOOD PRESSURE: 74 MMHG | BODY MASS INDEX: 26.68 KG/M2 | RESPIRATION RATE: 18 BRPM | TEMPERATURE: 99 F | HEIGHT: 67 IN | SYSTOLIC BLOOD PRESSURE: 162 MMHG | HEART RATE: 73 BPM | WEIGHT: 170 LBS

## 2019-06-27 DIAGNOSIS — H25.11 NUCLEAR SCLEROTIC CATARACT OF RIGHT EYE: Primary | ICD-10-CM

## 2019-06-27 DIAGNOSIS — H25.10 SENILE NUCLEAR SCLEROSIS: ICD-10-CM

## 2019-06-27 PROCEDURE — 71000015 HC POSTOP RECOV 1ST HR: Mod: HCNC | Performed by: OPHTHALMOLOGY

## 2019-06-27 PROCEDURE — D9220A PRA ANESTHESIA: ICD-10-PCS | Mod: HCNC,ANES,, | Performed by: ANESTHESIOLOGY

## 2019-06-27 PROCEDURE — 63600175 PHARM REV CODE 636 W HCPCS: Mod: HCNC | Performed by: NURSE ANESTHETIST, CERTIFIED REGISTERED

## 2019-06-27 PROCEDURE — 25000003 PHARM REV CODE 250: Mod: HCNC | Performed by: OPHTHALMOLOGY

## 2019-06-27 PROCEDURE — 25000003 PHARM REV CODE 250: Mod: HCNC | Performed by: NURSE ANESTHETIST, CERTIFIED REGISTERED

## 2019-06-27 PROCEDURE — 36000707: Mod: HCNC | Performed by: OPHTHALMOLOGY

## 2019-06-27 PROCEDURE — 37000009 HC ANESTHESIA EA ADD 15 MINS: Mod: HCNC | Performed by: OPHTHALMOLOGY

## 2019-06-27 PROCEDURE — 36000706: Mod: HCNC | Performed by: OPHTHALMOLOGY

## 2019-06-27 PROCEDURE — 37000008 HC ANESTHESIA 1ST 15 MINUTES: Mod: HCNC | Performed by: OPHTHALMOLOGY

## 2019-06-27 PROCEDURE — V2632 POST CHMBR INTRAOCULAR LENS: HCPCS | Mod: HCNC | Performed by: OPHTHALMOLOGY

## 2019-06-27 PROCEDURE — D9220A PRA ANESTHESIA: Mod: HCNC,ANES,, | Performed by: ANESTHESIOLOGY

## 2019-06-27 PROCEDURE — 66982 XCAPSL CTRC RMVL CPLX WO ECP: CPT | Mod: HCNC,RT,, | Performed by: OPHTHALMOLOGY

## 2019-06-27 PROCEDURE — 63600175 PHARM REV CODE 636 W HCPCS: Mod: HCNC | Performed by: OPHTHALMOLOGY

## 2019-06-27 PROCEDURE — C9447 INJ, PHENYLEPHRINE KETOROLAC: HCPCS | Mod: HCNC | Performed by: OPHTHALMOLOGY

## 2019-06-27 PROCEDURE — 27201423 OPTIME MED/SURG SUP & DEVICES STERILE SUPPLY: Mod: HCNC | Performed by: OPHTHALMOLOGY

## 2019-06-27 PROCEDURE — 25000003 PHARM REV CODE 250: Mod: HCNC | Performed by: ANESTHESIOLOGY

## 2019-06-27 PROCEDURE — 66982 PR REMOVAL, CATARACT, W/INSRT INTRAOC LENS, W/O ENDO CYCLO, CMPLX: ICD-10-PCS | Mod: HCNC,RT,, | Performed by: OPHTHALMOLOGY

## 2019-06-27 PROCEDURE — 25000003 PHARM REV CODE 250: Mod: HCNC

## 2019-06-27 DEVICE — LENS IOL ITEC PRELOAD 22.5D: Type: IMPLANTABLE DEVICE | Site: EYE | Status: FUNCTIONAL

## 2019-06-27 RX ORDER — TROPICAMIDE 10 MG/ML
SOLUTION/ DROPS OPHTHALMIC
Status: COMPLETED
Start: 2019-06-27 | End: 2019-06-27

## 2019-06-27 RX ORDER — PHENYLEPHRINE HYDROCHLORIDE 25 MG/ML
SOLUTION/ DROPS OPHTHALMIC
Status: COMPLETED
Start: 2019-06-27 | End: 2019-06-27

## 2019-06-27 RX ORDER — LIDOCAINE HYDROCHLORIDE 40 MG/ML
INJECTION, SOLUTION RETROBULBAR
Status: DISCONTINUED
Start: 2019-06-27 | End: 2019-06-27 | Stop reason: HOSPADM

## 2019-06-27 RX ORDER — LIDOCAINE HYDROCHLORIDE 10 MG/ML
1 INJECTION, SOLUTION EPIDURAL; INFILTRATION; INTRACAUDAL; PERINEURAL ONCE
Status: COMPLETED | OUTPATIENT
Start: 2019-06-27 | End: 2019-06-27

## 2019-06-27 RX ORDER — TROPICAMIDE 10 MG/ML
1 SOLUTION/ DROPS OPHTHALMIC
Status: DISCONTINUED | OUTPATIENT
Start: 2019-06-27 | End: 2019-06-27 | Stop reason: HOSPADM

## 2019-06-27 RX ORDER — ACETAMINOPHEN 325 MG/1
650 TABLET ORAL EVERY 4 HOURS PRN
Status: DISCONTINUED | OUTPATIENT
Start: 2019-06-27 | End: 2019-06-27 | Stop reason: HOSPADM

## 2019-06-27 RX ORDER — LIDOCAINE HYDROCHLORIDE 10 MG/ML
INJECTION, SOLUTION EPIDURAL; INFILTRATION; INTRACAUDAL; PERINEURAL
Status: DISCONTINUED | OUTPATIENT
Start: 2019-06-27 | End: 2019-06-27 | Stop reason: HOSPADM

## 2019-06-27 RX ORDER — PHENYLEPHRINE HYDROCHLORIDE 25 MG/ML
1 SOLUTION/ DROPS OPHTHALMIC
Status: DISCONTINUED | OUTPATIENT
Start: 2019-06-27 | End: 2019-06-27 | Stop reason: HOSPADM

## 2019-06-27 RX ORDER — MIDAZOLAM HYDROCHLORIDE 1 MG/ML
INJECTION, SOLUTION INTRAMUSCULAR; INTRAVENOUS
Status: DISCONTINUED | OUTPATIENT
Start: 2019-06-27 | End: 2019-06-27

## 2019-06-27 RX ORDER — PROPARACAINE HYDROCHLORIDE 5 MG/ML
SOLUTION/ DROPS OPHTHALMIC
Status: COMPLETED
Start: 2019-06-27 | End: 2019-06-27

## 2019-06-27 RX ORDER — SODIUM CHLORIDE 9 MG/ML
INJECTION, SOLUTION INTRAVENOUS CONTINUOUS PRN
Status: DISCONTINUED | OUTPATIENT
Start: 2019-06-27 | End: 2019-06-27

## 2019-06-27 RX ORDER — SODIUM CHLORIDE 0.9 % (FLUSH) 0.9 %
3 SYRINGE (ML) INJECTION
Status: DISCONTINUED | OUTPATIENT
Start: 2019-06-27 | End: 2019-06-27 | Stop reason: HOSPADM

## 2019-06-27 RX ORDER — PROPARACAINE HYDROCHLORIDE 5 MG/ML
1 SOLUTION/ DROPS OPHTHALMIC
Status: DISCONTINUED | OUTPATIENT
Start: 2019-06-27 | End: 2019-06-27 | Stop reason: HOSPADM

## 2019-06-27 RX ORDER — HYDROMORPHONE HYDROCHLORIDE 1 MG/ML
0.2 INJECTION, SOLUTION INTRAMUSCULAR; INTRAVENOUS; SUBCUTANEOUS EVERY 5 MIN PRN
Status: DISCONTINUED | OUTPATIENT
Start: 2019-06-27 | End: 2019-06-27 | Stop reason: HOSPADM

## 2019-06-27 RX ORDER — MOXIFLOXACIN 5 MG/ML
SOLUTION/ DROPS OPHTHALMIC
Status: COMPLETED
Start: 2019-06-27 | End: 2019-06-27

## 2019-06-27 RX ORDER — PREDNISOLONE ACETATE 10 MG/ML
SUSPENSION/ DROPS OPHTHALMIC
Status: DISCONTINUED | OUTPATIENT
Start: 2019-06-27 | End: 2019-06-27 | Stop reason: HOSPADM

## 2019-06-27 RX ORDER — LIDOCAINE HYDROCHLORIDE 40 MG/ML
INJECTION, SOLUTION RETROBULBAR
Status: DISCONTINUED | OUTPATIENT
Start: 2019-06-27 | End: 2019-06-27 | Stop reason: HOSPADM

## 2019-06-27 RX ORDER — PREDNISOLONE ACETATE 10 MG/ML
SUSPENSION/ DROPS OPHTHALMIC
Status: DISCONTINUED
Start: 2019-06-27 | End: 2019-06-27 | Stop reason: HOSPADM

## 2019-06-27 RX ORDER — MOXIFLOXACIN 5 MG/ML
SOLUTION/ DROPS OPHTHALMIC
Status: DISCONTINUED | OUTPATIENT
Start: 2019-06-27 | End: 2019-06-27 | Stop reason: HOSPADM

## 2019-06-27 RX ORDER — KETOROLAC TROMETHAMINE 5 MG/ML
SOLUTION OPHTHALMIC
Status: COMPLETED
Start: 2019-06-27 | End: 2019-06-27

## 2019-06-27 RX ORDER — MOXIFLOXACIN 5 MG/ML
1 SOLUTION/ DROPS OPHTHALMIC
Status: COMPLETED | OUTPATIENT
Start: 2019-06-27 | End: 2019-06-27

## 2019-06-27 RX ORDER — KETOROLAC TROMETHAMINE 5 MG/ML
1 SOLUTION OPHTHALMIC ONCE
Status: COMPLETED | OUTPATIENT
Start: 2019-06-27 | End: 2019-06-27

## 2019-06-27 RX ORDER — SODIUM CHLORIDE 9 MG/ML
INJECTION, SOLUTION INTRAVENOUS CONTINUOUS
Status: DISCONTINUED | OUTPATIENT
Start: 2019-06-27 | End: 2019-06-27 | Stop reason: HOSPADM

## 2019-06-27 RX ORDER — LIDOCAINE HYDROCHLORIDE 10 MG/ML
INJECTION, SOLUTION EPIDURAL; INFILTRATION; INTRACAUDAL; PERINEURAL
Status: DISCONTINUED
Start: 2019-06-27 | End: 2019-06-27 | Stop reason: HOSPADM

## 2019-06-27 RX ADMIN — PHENYLEPHRINE HYDROCHLORIDE 1 DROP: 2.5 SOLUTION/ DROPS OPHTHALMIC at 06:06

## 2019-06-27 RX ADMIN — PHENYLEPHRINE HYDROCHLORIDE 1 DROP: 25 SOLUTION/ DROPS OPHTHALMIC at 07:06

## 2019-06-27 RX ADMIN — TROPICAMIDE 1 DROP: 10 SOLUTION/ DROPS OPHTHALMIC at 07:06

## 2019-06-27 RX ADMIN — MOXIFLOXACIN 1 DROP: 5 SOLUTION/ DROPS OPHTHALMIC at 06:06

## 2019-06-27 RX ADMIN — SODIUM CHLORIDE: 0.9 INJECTION, SOLUTION INTRAVENOUS at 07:06

## 2019-06-27 RX ADMIN — MOXIFLOXACIN 1 DROP: 5 SOLUTION/ DROPS OPHTHALMIC at 07:06

## 2019-06-27 RX ADMIN — PROPARACAINE HYDROCHLORIDE 1 DROP: 5 SOLUTION/ DROPS OPHTHALMIC at 06:06

## 2019-06-27 RX ADMIN — LIDOCAINE HYDROCHLORIDE 1 MG: 10 INJECTION, SOLUTION EPIDURAL; INFILTRATION; INTRACAUDAL; PERINEURAL at 07:06

## 2019-06-27 RX ADMIN — MOXIFLOXACIN HYDROCHLORIDE 1 DROP: 5 SOLUTION/ DROPS OPHTHALMIC at 06:06

## 2019-06-27 RX ADMIN — SODIUM CHLORIDE 1000 ML: 0.9 INJECTION, SOLUTION INTRAVENOUS at 07:06

## 2019-06-27 RX ADMIN — TROPICAMIDE 1 DROP: 10 SOLUTION/ DROPS OPHTHALMIC at 06:06

## 2019-06-27 RX ADMIN — KETOROLAC TROMETHAMINE 1 DROP: 5 SOLUTION OPHTHALMIC at 06:06

## 2019-06-27 RX ADMIN — PHENYLEPHRINE HYDROCHLORIDE 1 DROP: 25 SOLUTION/ DROPS OPHTHALMIC at 06:06

## 2019-06-27 RX ADMIN — MIDAZOLAM HYDROCHLORIDE 1 MG: 1 INJECTION, SOLUTION INTRAMUSCULAR; INTRAVENOUS at 07:06

## 2019-06-27 NOTE — PLAN OF CARE
PT is AAOx4. VSS. NAD. Denies pain or nausea. Discharge instructions given. Verbalized understanding. IV discontinued. Discharged home with family.

## 2019-06-27 NOTE — TRANSFER OF CARE
"Anesthesia Transfer of Care Note    Patient: Edwin Powell    Procedure(s) Performed: Procedure(s) (LRB):  PHACOEMULSIFICATION, CATARACT (Right)  INSERTION, IOL PROSTHESIS (Right)    Patient location: PACU    Anesthesia Type: MAC    Transport from OR: Transported from OR on room air with adequate spontaneous ventilation    Post pain: adequate analgesia    Post assessment: tolerated procedure well and no apparent anesthetic complications    Post vital signs: stable    Level of consciousness: awake, alert and oriented    Nausea/Vomiting: no nausea/vomiting    Complications: none    Transfer of care protocol was followed      Last vitals:   Visit Vitals  BP (!) 186/87 (BP Location: Left arm, Patient Position: Lying)   Pulse 78   Temp 36.7 °C (98 °F) (Oral)   Resp 16   Ht 5' 7" (1.702 m)   Wt 77.1 kg (170 lb)   SpO2 97%   BMI 26.63 kg/m²     "

## 2019-06-27 NOTE — ANESTHESIA POSTPROCEDURE EVALUATION
Anesthesia Post Evaluation    Patient: Edwin Powell    Procedure(s) Performed: Procedure(s) (LRB):  PHACOEMULSIFICATION, CATARACT (Right)  INSERTION, IOL PROSTHESIS (Right)    Final Anesthesia Type: MAC  Patient location during evaluation: GI PACU  Patient participation: Yes- Able to Participate  Level of consciousness: awake and alert  Post-procedure vital signs: reviewed and stable  Pain management: adequate  Airway patency: patent  PONV status at discharge: No PONV  Anesthetic complications: no      Cardiovascular status: blood pressure returned to baseline  Respiratory status: spontaneous ventilation  Hydration status: euvolemic  Follow-up not needed.          Vitals Value Taken Time   /74 6/27/2019  8:17 AM   Temp 37.2 °C (99 °F) 6/27/2019  8:10 AM   Pulse 74 6/27/2019  8:31 AM   Resp 18 6/27/2019  8:15 AM   SpO2 96 % 6/27/2019  8:31 AM   Vitals shown include unvalidated device data.      No case tracking events are documented in the log.      Pain/Lashaun Score: Lashaun Score: 10 (6/27/2019  8:40 AM)  Modified Lashaun Score: 19 (6/27/2019  8:40 AM)

## 2019-06-27 NOTE — ANESTHESIA PREPROCEDURE EVALUATION
06/27/2019  Edwin Powell is a 74 y.o., male with CAD, renal cell carcinoma, type 2 DM, HTN, GERD and hx of stroke here for cataract surgery.      Past Medical History:   Diagnosis Date    Acquired hypothyroidism 5/26/2017    Benign essential HTN 5/26/2017    Benign prostatic hyperplasia (BPH) with urinary urge incontinence 6/6/2017    Chronic low back pain 6/1/2017    Coronary artery disease involving native coronary artery of native heart without angina pectoris 5/26/2017    S/p 5 stents - last one around 2010-11.    Gastroesophageal reflux disease without esophagitis 5/26/2017    History of CVA (cerebrovascular accident) 5/26/2017    Hypertension associated with diabetes 5/26/2017    BP controlled on ACE, CCB    Lumbar disc lesion 5/26/2017    Metastatic renal cell carcinoma to bone 6/6/2017    Metastatic renal cell carcinoma to liver 6/6/2017    Liver Biopsy 5-2017: METASTATIC RENAL CELL CARCINOMA.    Mixed hyperlipidemia 5/26/2017    Type 2 diabetes mellitus with stage 3 chronic kidney disease, with long-term current use of insulin 5/26/2017     Lab Results   Component Value Date    WBC 6.94 05/16/2019    HGB 12.5 (L) 05/16/2019    HCT 38.3 (L) 05/16/2019     (H) 05/16/2019     (H) 05/16/2019       Chemistry        Component Value Date/Time     05/16/2019 1141    K 4.4 05/16/2019 1141     05/16/2019 1141    CO2 23 05/16/2019 1141    BUN 32 (H) 05/16/2019 1141    CREATININE 1.7 (H) 05/16/2019 1141     (H) 05/16/2019 1141        Component Value Date/Time    CALCIUM 8.8 05/16/2019 1141    ALKPHOS 97 05/16/2019 1141    AST 21 05/16/2019 1141    ALT 19 05/16/2019 1141    BILITOT 0.3 05/16/2019 1141    ESTGFRAFRICA 44.9 (A) 05/16/2019 1141    EGFRNONAA 38.9 (A) 05/16/2019 1141            Anesthesia Evaluation    I have reviewed the Patient Summary Reports.     I  have reviewed the Medications.     Review of Systems  Anesthesia Hx:  No problems with previous Anesthesia    Cardiovascular:   Hypertension CAD  CABG/stent   Denies Angina. Good functional status, denies chest pain   Pulmonary:   Denies Shortness of breath.  Denies Recent URI.    Renal/:   Chronic Renal Disease, CRI    Hepatic/GI:   GERD    Neurological:   CVA, no residual symptoms    Endocrine:   Diabetes, type 2 Hypothyroidism        Physical Exam  General:  Well nourished, Large Beard    Airway/Jaw/Neck:  Airway Findings: Mouth Opening: Normal Tongue: Normal  General Airway Assessment: Adult  Mallampati: II  Jaw/Neck Findings:  Neck ROM: Normal ROM      Dental:  Dental Findings: Upper Dentures, Lower Dentures   Chest/Lungs:  Chest/Lungs Findings: Clear to auscultation, Normal Respiratory Rate     Heart/Vascular:  Heart Findings: Rate: Normal  Rhythm: Regular Rhythm  Sounds: Normal        Mental Status:  Mental Status Findings:  Cooperative, Alert and Oriented         Anesthesia Plan  Type of Anesthesia, risks & benefits discussed:  Anesthesia Type:  MAC  Patient's Preference:   Intra-op Monitoring Plan: standard ASA monitors  Intra-op Monitoring Plan Comments:   Post Op Pain Control Plan: multimodal analgesia  Post Op Pain Control Plan Comments:   Induction:   IV  Beta Blocker:         Informed Consent: Patient understands risks and agrees with Anesthesia plan.  Questions answered. Anesthesia consent signed with patient.  ASA Score: 3     Day of Surgery Review of History & Physical:    H&P update referred to the surgeon.         Ready For Surgery From Anesthesia Perspective.

## 2019-06-27 NOTE — DISCHARGE INSTRUCTIONS
Small-Incision Cataract Surgery: Removing the Old Lens    You may be surprised by how little time small-incision cataract surgery takes.  The procedure  Your doctor uses a microscope and tiny tools to make the incision and remove the old lens. A special tool breaks apart the old lens with sound waves (ultrasound) and then takes out the pieces. This process is called phacoemulsification. The natural membrane (capsule) that held your lens is left in place.  Incision size  A smaller incision means a shorter recovery time for you. The location of the incision will vary.

## 2019-06-27 NOTE — OP NOTE
DATE OF PROCEDURE: 06/27/2019    SURGEON: HERRERA CHEN MD    PREOPERATIVE DIAGNOSIS:  1. Senile nuclear sclerotic cataract right eye. 2. 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 2. 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    IMPLANT:  PCBOO 22.5    ANESTHESIA:  Topical and MAC    COMPLICATIONS: none    ESTIMATED BLOOD LOSS: <1cc    SPECIMENS: none    INDICATIONS FOR PROCEDURE:  This patient presented to the clinic with decreased vision in the right eye and was found to have a cataract.  The risks, benefits, and alternatives were discussed and the patient agreed to proceed with phacoemulsification and implantation of a lens in the right eye.     PROCEDURE IN DETAIL:  The patient was met in the preop holding area.  Consent was confirmed to be signed.  The operative site was marked.  The patient was brought into the operating room by the anesthesia team and placed under monitored anesthesia care.  The right eye was prepped and draped in a sterile ophthalmic fashion.  A Roula speculum was placed into the right eye.   A paracentesis site was made and 1% preservative-free lidocaine was injected into the anterior chamber.  Viscoelastic  material was injected into the anterior chamber.  A keratome blade was used to make a clear corneal incision. The malyugin ring was inserted and positioned into place, assisting with pupillary dilation.  A cystotome was used to initiate the continuous curvilinear capsulorrhexis which was completed with Utrata forceps.  BSS on a merchant cannula was used to perform hydrodissection.  The phacoemulsification tip was introduced into the eye and the nucleus was removed in a standard divide-and-conquer fashion.  Remaining cortical material was removed  from the eye using irrigation-aspiration.  The capsular bag was filled with viscoelastic material and the intraocular lens was injected and positioned into place. The Malyugin ring was removed from the eye. Remaining viscoelastic material was removed from the eye using irrigation and aspiration.  The corneal wounds were hydrated.  The eye was filled to physiologic pressure. The wounds were found to be watertight. Drops of Vigamox and prednisilone were placed into the eye.  The eye was washed, dried, and shielded.  The patient tolerated the procedure well and knows to follow up with me tomorrow morning, sooner if needed.

## 2019-06-27 NOTE — PLAN OF CARE
Pre-op assessment complete.  Dr. Abel at bedside notified of dentures and told to keep them in.  Patient needs site simba and update.

## 2019-06-27 NOTE — DISCHARGE SUMMARY
BRIEF DISCHARGE NOTE:    Reason for hospitalization -  Cataract surgery     Final Diagnosis - Visually significant Cataract    Procedures and treatment provided - Status post phacoemulsification with placement of intraocular lens     Diet - Advance to regular as tolerated    Activity - as tolerated    Disposition at the end of the case - Good.    Discharge: to home    The patient tolerated the procedure well and knows to follow up with me tomorrow morning in the eye clinic, sooner if needed.    Patient and family instructions (as appropriate) - Given to patient on discharge    Chary Culp MD

## 2019-06-28 ENCOUNTER — OFFICE VISIT (OUTPATIENT)
Dept: OPHTHALMOLOGY | Facility: CLINIC | Age: 74
End: 2019-06-28
Payer: MEDICARE

## 2019-06-28 DIAGNOSIS — H25.12 NUCLEAR SCLEROTIC CATARACT OF LEFT EYE: ICD-10-CM

## 2019-06-28 DIAGNOSIS — Z96.1 STATUS POST CATARACT EXTRACTION AND INSERTION OF INTRAOCULAR LENS, RIGHT: Primary | ICD-10-CM

## 2019-06-28 DIAGNOSIS — Z98.41 STATUS POST CATARACT EXTRACTION AND INSERTION OF INTRAOCULAR LENS, RIGHT: Primary | ICD-10-CM

## 2019-06-28 PROCEDURE — 99999 PR PBB SHADOW E&M-EST. PATIENT-LVL III: CPT | Mod: PBBFAC,HCNC,, | Performed by: OPHTHALMOLOGY

## 2019-06-28 PROCEDURE — 99999 PR PBB SHADOW E&M-EST. PATIENT-LVL III: ICD-10-PCS | Mod: PBBFAC,HCNC,, | Performed by: OPHTHALMOLOGY

## 2019-06-28 PROCEDURE — 99024 POSTOP FOLLOW-UP VISIT: CPT | Mod: HCNC,S$GLB,, | Performed by: OPHTHALMOLOGY

## 2019-06-28 PROCEDURE — 99024 PR POST-OP FOLLOW-UP VISIT: ICD-10-PCS | Mod: HCNC,S$GLB,, | Performed by: OPHTHALMOLOGY

## 2019-06-28 NOTE — PROGRESS NOTES
HPI     Darryl pt     S/p phaco w/IOL OD 6/27/19  H/o Kidney and Liver Cancer  H/o IDDM now diet controlled; pt states he has not been on insulin for   almost 2 yrs.    Hyperopia with astig    Combo TID OD    Pt here for 1 day post op OD. Pt states doing well.  Pt denies eye pain   OD.     Last edited by Nupur Huang MA on 6/28/2019  1:29 PM.   (History)            Assessment /Plan     For exam results, see Encounter Report.    Status post cataract extraction and insertion of intraocular lens, right    Nuclear sclerotic cataract of left eye      Slit Lamp Exam  L/L - normal  C/s - quiet  Cornea - clear  A/C - 1+ cell  Lens - PCIOL    POD #1 s/p phaco/IOL  - doing well  - continue the following drops:    vigamox or ocuflox TID x 1 wk then stop  Pred forte or durezol or dexamethasone TID x  4 wks  Ketorolac TID until runs out    Versus:    Combination drop - 1 drop TID x total of 1 month    Appropriate precautions and post op medications reviewed.  Patient instructed to call or come in if symptoms of redness, decreased vision, or pain are experienced.    -f/up 1-2wks, sooner PRN.       Cat OS - can sign consent next if ready to proceed.

## 2019-07-02 ENCOUNTER — OFFICE VISIT (OUTPATIENT)
Dept: OPHTHALMOLOGY | Facility: CLINIC | Age: 74
End: 2019-07-02
Payer: MEDICARE

## 2019-07-02 DIAGNOSIS — Z96.1 STATUS POST CATARACT EXTRACTION AND INSERTION OF INTRAOCULAR LENS, RIGHT: Primary | ICD-10-CM

## 2019-07-02 DIAGNOSIS — H25.12 NUCLEAR SCLEROTIC CATARACT OF LEFT EYE: ICD-10-CM

## 2019-07-02 DIAGNOSIS — Z98.41 STATUS POST CATARACT EXTRACTION AND INSERTION OF INTRAOCULAR LENS, RIGHT: Primary | ICD-10-CM

## 2019-07-02 PROCEDURE — 99024 PR POST-OP FOLLOW-UP VISIT: ICD-10-PCS | Mod: HCNC,S$GLB,, | Performed by: OPHTHALMOLOGY

## 2019-07-02 PROCEDURE — 99999 PR PBB SHADOW E&M-EST. PATIENT-LVL III: ICD-10-PCS | Mod: PBBFAC,HCNC,, | Performed by: OPHTHALMOLOGY

## 2019-07-02 PROCEDURE — 99999 PR PBB SHADOW E&M-EST. PATIENT-LVL III: CPT | Mod: PBBFAC,HCNC,, | Performed by: OPHTHALMOLOGY

## 2019-07-02 PROCEDURE — 99024 POSTOP FOLLOW-UP VISIT: CPT | Mod: HCNC,S$GLB,, | Performed by: OPHTHALMOLOGY

## 2019-07-02 PROCEDURE — 92136 IOL MASTER - OU - BOTH EYES: ICD-10-PCS | Mod: 26,HCNC,LT,S$GLB | Performed by: OPHTHALMOLOGY

## 2019-07-02 PROCEDURE — 92136 OPHTHALMIC BIOMETRY: CPT | Mod: 26,HCNC,LT,S$GLB | Performed by: OPHTHALMOLOGY

## 2019-07-02 PROCEDURE — 92025 CPTRIZED CORNEAL TOPOGRAPHY: CPT | Mod: HCNC,S$GLB,, | Performed by: OPHTHALMOLOGY

## 2019-07-02 PROCEDURE — 92025 COMPUTERIZED CORNEAL TOPOGRAPHY: ICD-10-PCS | Mod: HCNC,S$GLB,, | Performed by: OPHTHALMOLOGY

## 2019-07-02 NOTE — PROGRESS NOTES
MORRO Andrews pt     S/p phaco w/IOL OD 6/27/19  H/o Kidney and Liver Cancer  H/o IDDM now diet controlled; pt states he has not been on insulin for   almost 2 yrs.    Hyperopia with astig    Combo TID OD    1 week post op OD.  Vision doing good  No pains    Last edited by Beverly Queen on 7/2/2019  1:53 PM. (History)            Assessment /Plan     For exam results, see Encounter Report.    Status post cataract extraction and insertion of intraocular lens, right    Nuclear sclerotic cataract of left eye  -     IOL Master - OU - Both Eyes  -     Computerized corneal topography      PO week #1 s/p phaco/IOL -    - doing well, no issues    Continue combo drops for a total of 1 month versus: d/c abx gtt, continue PF/ketorolocTID for total of 1 month    - f/up 3-4 wks for MRx, DFE    Visually significant nuclear sclerotic cataract   - Interfering with activities of daily living.  Pt desires cataract surgery for [facility] rehabilitation.   - R/B/A discussed and pt agrees to proceed with surgery.   - IOL options discussed according to patient's goals and concomitant ocular pathology; and pt content with monofocal lens.    - Target: plano.      (pcboo 22.5 OS)  *ring    Pt has cyl - okay with FT rx post sx.

## 2019-07-09 ENCOUNTER — PATIENT OUTREACH (OUTPATIENT)
Dept: ADMINISTRATIVE | Facility: OTHER | Age: 74
End: 2019-07-09

## 2019-07-10 ENCOUNTER — TELEPHONE (OUTPATIENT)
Dept: PODIATRY | Facility: CLINIC | Age: 74
End: 2019-07-10

## 2019-07-10 NOTE — TELEPHONE ENCOUNTER
Patient called to confirm his appt on 7/11/2019@10:00am with Dr [email or web address]

## 2019-07-10 NOTE — TELEPHONE ENCOUNTER
----- Message from Shashi Jaimes sent at 7/10/2019 11:10 AM CDT -----  Contact: self/ [phone]  Patient is returning your call . Please advise [phone]

## 2019-07-11 ENCOUNTER — OFFICE VISIT (OUTPATIENT)
Dept: PODIATRY | Facility: CLINIC | Age: 74
End: 2019-07-11
Payer: MEDICARE

## 2019-07-11 VITALS
WEIGHT: 176 LBS | DIASTOLIC BLOOD PRESSURE: 84 MMHG | HEART RATE: 68 BPM | SYSTOLIC BLOOD PRESSURE: 164 MMHG | BODY MASS INDEX: 27.62 KG/M2 | RESPIRATION RATE: 18 BRPM | HEIGHT: 67 IN

## 2019-07-11 DIAGNOSIS — E13.40 NEUROPATHY DUE TO SECONDARY DIABETES: Primary | ICD-10-CM

## 2019-07-11 PROCEDURE — 99999 PR PBB SHADOW E&M-EST. PATIENT-LVL IV: ICD-10-PCS | Mod: PBBFAC,HCNC,, | Performed by: PODIATRIST

## 2019-07-11 PROCEDURE — 99499 NO LOS: ICD-10-PCS | Mod: HCNC,S$GLB,, | Performed by: PODIATRIST

## 2019-07-11 PROCEDURE — 11721 DEBRIDE NAIL 6 OR MORE: CPT | Mod: Q9,HCNC,S$GLB, | Performed by: PODIATRIST

## 2019-07-11 PROCEDURE — 99499 UNLISTED E&M SERVICE: CPT | Mod: HCNC,S$GLB,, | Performed by: PODIATRIST

## 2019-07-11 PROCEDURE — 99999 PR PBB SHADOW E&M-EST. PATIENT-LVL IV: CPT | Mod: PBBFAC,HCNC,, | Performed by: PODIATRIST

## 2019-07-11 PROCEDURE — 11721 PR DEBRIDEMENT OF NAILS, 6 OR MORE: ICD-10-PCS | Mod: Q9,HCNC,S$GLB, | Performed by: PODIATRIST

## 2019-07-11 NOTE — LETTER
July 11, 2019      Lulú Lewis MD  [address]  [facility] [address]           [facility]  [address], [address]  [place] [address]  Phone: [phone]  Fax: [phone]          Patient: Edwin Powell   MR Number: [id number]   YOB: 1945   Date of Visit: 7/11/2019       Dear Dr. Lulú Lewis:    Thank you for referring Edwin Powell to me for evaluation. Attached you will find relevant portions of my assessment and plan of care.    If you have questions, please do not hesitate to call me. I look forward to following Edwin Powell along with you.    Sincerely,    Lary Vickers, DANII    Enclosure  CC:  No Recipients    If you would like to receive this communication electronically, please contact [email or web address] or [phone] to request more information on [email or web address] Link access.    For providers and/or their staff who would like to refer a patient to [facility], please contact us through our one-stop-shop provider referral line, [facility], at [phone].    If you feel you have received this communication in error or would no longer like to receive these types of communications, please e-mail [email or web address]

## 2019-07-11 NOTE — PROGRESS NOTES
Subjective:      Patient ID: Edwin Powell is a 74 y.o. male.    Chief Complaint: Diabetic Foot Exam (Pcp Dr Rob last seen 2/12/2019)    Edwin is a 74 y.o. male who presents to the clinic for evaluation and treatment of high risk feet. Edwin has a past medical history of Acquired hypothyroidism (5/26/2017), Benign essential HTN (5/26/2017), Benign prostatic hyperplasia (BPH) with urinary urge incontinence (6/6/2017), Chronic low back pain (6/1/2017), Coronary artery disease involving native coronary artery of native heart without angina pectoris (5/26/2017), Gastroesophageal reflux disease without esophagitis (5/26/2017), History of CVA (cerebrovascular accident) (5/26/2017), Hypertension associated with diabetes (5/26/2017), Lumbar disc lesion (5/26/2017), Metastatic renal cell carcinoma to bone (6/6/2017), Metastatic renal cell carcinoma to liver (6/6/2017), Mixed hyperlipidemia (5/26/2017), and Type 2 diabetes mellitus with stage 3 chronic kidney disease, with long-term current use of insulin (5/26/2017). The patient's chief complaint is long, thick toenails. This patient has documented high risk feet requiring routine maintenance secondary to diabetes mellitis and those secondary complications of diabetes, as mentioned..    PCP: Lulú Lewis MD    Date Last Seen by PCP: in epic     Current shoe gear:  Affected Foot: Rx diabetic extra depth shoes and custom accommodative insoles     Unaffected Foot: Rx diabetic extra depth shoes and custom accommodative insoles    Hemoglobin A1C   Date Value Ref Range Status   02/19/2019 6.4 (H) 4.0 - 5.6 % Final     Comment:     ADA Screening Guidelines:  5.7-6.4%  Consistent with prediabetes  >or=6.5%  Consistent with diabetes  High levels of fetal hemoglobin interfere with the HbA1C  assay. Heterozygous hemoglobin variants (HbS, HgC, etc)do  not significantly interfere with this assay.   However, presence of multiple variants may affect accuracy.      07/16/2018 6.2 (H) 4.0 - 5.6 % Final     Comment:     ADA Screening Guidelines:  5.7-6.4%  Consistent with prediabetes  >or=6.5%  Consistent with diabetes  High levels of fetal hemoglobin interfere with the HbA1C  assay. Heterozygous hemoglobin variants (HbS, HgC, etc)do  not significantly interfere with this assay.   However, presence of multiple variants may affect accuracy.     02/15/2018 6.5 (H) 4.0 - 5.6 % Final     Comment:     According to ADA guidelines, hemoglobin A1c <7.0% represents  optimal control in non-pregnant diabetic patients. Different  metrics may apply to specific patient populations.   Standards of Medical Care in Diabetes-2016.  For the purpose of screening for the presence of diabetes:  <5.7%     Consistent with the absence of diabetes  5.7-6.4%  Consistent with increasing risk for diabetes   (prediabetes)  >or=6.5%  Consistent with diabetes  Currently, no consensus exists for use of hemoglobin A1c  for diagnosis of diabetes for children.  This Hemoglobin A1c assay has significant interference with fetal   hemoglobin   (HbF). The results are invalid for patients with abnormal amounts of   HbF,   including those with known Hereditary Persistence   of Fetal Hemoglobin. Heterozygous hemoglobin variants (HbAS, HbAC,   HbAD, HbAE, HbA2) do not significantly interfere with this assay;   however, presence of multiple variants in a sample may impact the %   interference.         Review of Systems   Constitution: Negative for decreased appetite, fever and malaise/fatigue.   HENT: Negative for congestion.    Cardiovascular: Negative for chest pain and leg swelling.   Respiratory: Negative for cough and shortness of breath.    Skin: Negative for color change, nail changes and rash.   Musculoskeletal: Negative for arthritis, joint pain, joint swelling and muscle weakness.   Gastrointestinal: Negative for bloating, abdominal pain, nausea and vomiting.   Neurological: Positive for numbness and sensory  change. Negative for headaches and weakness.   Psychiatric/Behavioral: Negative for altered mental status.             Past Medical History:   Diagnosis Date    Acquired hypothyroidism 5/26/2017    Benign essential HTN 5/26/2017    Benign prostatic hyperplasia (BPH) with urinary urge incontinence 6/6/2017    Chronic low back pain 6/1/2017    Coronary artery disease involving native coronary artery of native heart without angina pectoris 5/26/2017    S/p 5 stents - last one around 2010-11.    Gastroesophageal reflux disease without esophagitis 5/26/2017    History of CVA (cerebrovascular accident) 5/26/2017    Hypertension associated with diabetes 5/26/2017    BP controlled on ACE, CCB    Lumbar disc lesion 5/26/2017    Metastatic renal cell carcinoma to bone 6/6/2017    Metastatic renal cell carcinoma to liver 6/6/2017    Liver Biopsy 5-2017: METASTATIC RENAL CELL CARCINOMA.    Mixed hyperlipidemia 5/26/2017    Type 2 diabetes mellitus with stage 3 chronic kidney disease, with long-term current use of insulin 5/26/2017       Past Surgical History:   Procedure Laterality Date    ABDOMINAL SURGERY      APPENDECTOMY      BACK SURGERY      CARDIAC SURGERY      CATARACT EXTRACTION      CHOLECYSTECTOMY      coronary stenting      X 5 last one in 2010-11.    INSERTION, IOL PROSTHESIS Right 6/27/2019    [facility]formed [facility] at [facility]ST FLR    PHACOEMULSIFICATION, CATARACT Right 6/27/2019    Performed [facility] at [facility] OR 1ST FLR    SPINAL FUSION         Family History   Problem Relation Age of Onset    Glaucoma Mother     Diabetes Mother     Diabetes Brother     Glaucoma Maternal Grandmother     No Known Problems Son     Melanoma Neg Hx        Social History     Socioeconomic History    Marital status: Single     Spouse name: Not on file    Number of children: Not on file    Years of education: Not on file    Highest education level: Not on file   Occupational  History    Not on file   Social Needs    Financial resource strain: Hard    Food insecurity:     Worry: Never true     Inability: Never true    Transportation needs:     Medical: No     Non-medical: No   Tobacco Use    Smoking status: Never Smoker    Smokeless tobacco: Never Used   Substance and Sexual Activity    Alcohol use: Yes     Comment: rarely     Drug use: No    Sexual activity: Not Currently     Partners: Female   Lifestyle    Physical activity:     Days per week: Not on file     Minutes per session: Not on file    Stress: Not on file   Relationships    Social connections:     Talks on phone: Not on file     Gets together: Not on file     Attends Confucianist service: Not on file     Active member of club or organization: Not on file     Attends meetings of clubs or organizations: Not on file     Relationship status: Not on file   Other Topics Concern    Not on file   Social History Narrative    Not on file       Current Outpatient Medications   Medication Sig Dispense Refill    amLODIPine (NORVASC) 10 MG tablet Take 1 tablet (10 mg total) by mouth once daily. 90 tablet 3    aspirin (ECOTRIN) 81 MG EC tablet Take 81 mg by mouth once daily.      atorvastatin (LIPITOR) 40 MG tablet Take 1 tablet (40 mg total) by mouth once daily. 90 tablet 3    b complex vitamins (B COMPLEX 1) tablet Take 1 tablet by mouth once daily.      cholecalciferol, vitamin D3, (VITAMIN D3) 5,000 unit Tab Take 5,000 Units by mouth once daily. 90 tablet 3    fluticasone propionate (FLONASE) 50 mcg/actuation nasal spray 1 SPRAY (50 MCG TOTAL) BY EACH NARE ROUTE ONCE DAILY. 16 mL 3    HYDROcodone-acetaminophen (NORCO) 5-325 mg per tablet TAKE 1 TABLET BY MOUTH EVERY 8 (EIGHT) HOURS AS NEEDED FOR PAIN. 120 tablet 0    levothyroxine (SYNTHROID) 88 MCG tablet TAKE 1 TABLET (88 MCG TOTAL) BY MOUTH ONCE DAILY. 90 tablet 3    lisinopril (PRINIVIL,ZESTRIL) 40 MG tablet Take 1 tablet (40 mg total) by mouth once daily. 90  "tablet 3    loratadine (CLARITIN) 10 mg tablet Take 1 tablet (10 mg total) by mouth once daily.  0    morphine (MS CONTIN) 15 MG 12 hr tablet Take 1 tablet (15 mg total) by mouth every 12 (twelve) hours. 60 tablet 0    multivitamin (THERAGRAN) per tablet Take 1 tablet by mouth once daily.      ondansetron (ZOFRAN) 8 MG tablet Take 1 tablet (8 mg total) by mouth 3 (three) times daily as needed. 90 tablet 3    PAZOpanib (VOTRIENT) 200 mg Tab Take 4 tablets (800 mg total) by mouth once daily. 120 tablet 5    prednisolon/gatiflox/bromfenac (PREDNISOL ACE-GATIFLOX-BROMFEN) 1-0.5-0.075 % DrpS Apply 1 drop to eye 3 (three) times daily. 7 mL 3    tamsulosin (FLOMAX) 0.4 mg Cap Take 2 capsules (0.8 mg total) by mouth every evening. (Patient taking differently: Take 0.8 mg by mouth once daily. ) 180 capsule 3    calcium carbonate-vitamin D3 (CALCIUM 600 WITH VITAMIN D3) 600 mg(1,500mg) -400 unit Cap Take 1 capsule by mouth once. 180 capsule 3    clindamycin (CLEOCIN T) 1 % external solution AAA bid 60 mL 3     No current facility-administered medications for this visit.        Review of patient's allergies indicates:  No Known Allergies    Vitals:    07/11/19 0958   BP: (!) 164/84   Pulse: 68   Resp: 18   Weight: 79.8 kg (176 lb)   Height: 5' 7" (1.702 m)   PainSc: 0-No pain       Objective:      Physical Exam   Constitutional: He is oriented to person, place, and time. He appears well-developed and well-nourished. No distress.   Musculoskeletal: He exhibits no edema, tenderness or deformity.   Neurological: He is alert and oriented to person, place, and time.   Skin: Skin is warm. Capillary refill takes less than 2 seconds. No erythema.   Psychiatric: He has a normal mood and affect. His behavior is normal.       Vascular: Distal DP/PT pulses palpable 1/4. CRT < 3 sec to tips of toes. No vericosities noted to LEs. Hair growth present LE, warm to touch LE, No edema noted to LE.    Dermatologic: No open lesions, " lacerations or wounds. Interdigital spaces clean, dry and intact. No erythema, rubor, calor noted LE, Nails are normal R 1-5 and L 1-5.    Musculoskeletal: MMT 5/5 in DF/PF/Inv/Ev resistance with no reproduction of pain in any direction. Passive range of motion of ankle and pedal joints is painless. No calf tenderness LE, Compartments soft/compressible. ROM of ankles with < 10 deg DF is noted b/l      Neurological:   Light touch, proprioception, and sharp/dull sensation are decreased b/l. Protective threshold with the Garland-Wienstein monofilament is decreased b/l. Vibratory sensation decreased b/l.         Assessment:       Encounter Diagnosis   Name Primary?   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    73 y/o male with diabetic foot risk assessment.    -nails x 10 debrided with nail nipper   -Shoe inspection. General Foot Education. Patient reminded of the importance of good nutrition. Patient instructed on proper foot hygeine. We discussed wearing proper shoe gear, daily foot inspections, never walking without protective shoe gear, caution putting sharp instruments to feet. Discussed general foot care:  Wear comfortable, proper fitting shoes. Wash feet daily. Dry well. After drying, apply moisturizer to feet (no lotion to webspaces). Inspect feet daily for skin breaks, blisters, swelling, or redness. Wear cotton socks (preferably white)  Change socks every day. Do NOT walk barefoot. Do NOT use heating pads or warm/hot water soaks   -It was discussed the importance of wearing shoes with adequate room in toe box to accommodate toe deformities. Recommended New Balance/[employer]ics shoe brands with adequate arch supports to alleviate abnormal pressure and improve stability of foot  while walking. Avoid flat shoes and barefoot walking as these will exacerbate or worsen symptoms.   -Advised for optimal glucose control and maintenance per primary care physician. Patient was also educated on healthy diet that is naturally rich in nutrients and low in fat and calories.   -The nature of the condition, options for management, as well as potential risks and complications were discussed in detail with patient. Patient was amenable to my recommendations and left my office fully informed and will follow up as instructed or sooner if necessary.    -Patient was advised of signs and symptoms of infection including redness, drainage, purulence, odor, streaking, fever, chills and I advised patient to seek medical attention (ER or urgent care) if these symptoms arise.   -f/u 6 months     Diabetic foot exam:   Left: Reflexes 2+    Vibratory sensation diminished   Proprioception diminished   Sharp/dull discrimination diminished   Filament test present  Right: Reflexes 2+    Vibratory sensation diminished   Proprioception diminished   Sharp/dull discrimination diminished   Filament test present

## 2019-07-15 ENCOUNTER — LAB VISIT (OUTPATIENT)
Dept: LAB | Facility: HOSPITAL | Age: 74
End: 2019-07-15
Attending: INTERNAL MEDICINE
Payer: MEDICARE

## 2019-07-15 ENCOUNTER — HOSPITAL ENCOUNTER (OUTPATIENT)
Dept: CARDIOLOGY | Facility: CLINIC | Age: 74
Discharge: HOME | End: 2019-07-15
Payer: MEDICARE

## 2019-07-15 ENCOUNTER — HOSPITAL ENCOUNTER (OUTPATIENT)
Dept: RADIOLOGY | Facility: HOSPITAL | Age: 74
Discharge: HOME | End: 2019-07-15
Attending: NURSE PRACTITIONER
Payer: MEDICARE

## 2019-07-15 DIAGNOSIS — C64.9 METASTATIC RENAL CELL CARCINOMA TO BONE: ICD-10-CM

## 2019-07-15 DIAGNOSIS — C78.7 METASTATIC RENAL CELL CARCINOMA TO LIVER: ICD-10-CM

## 2019-07-15 DIAGNOSIS — C64.2 RENAL CELL CARCINOMA OF LEFT KIDNEY: ICD-10-CM

## 2019-07-15 DIAGNOSIS — C64.9 METASTATIC RENAL CELL CARCINOMA TO LIVER: ICD-10-CM

## 2019-07-15 DIAGNOSIS — C79.51 METASTATIC RENAL CELL CARCINOMA TO BONE: ICD-10-CM

## 2019-07-15 DIAGNOSIS — R93.3 ABNORMAL FINDINGS ON DIAGNOSTIC IMAGING OF OTHER PARTS OF DIGESTIVE TRACT: ICD-10-CM

## 2019-07-15 LAB
ALBUMIN SERPL BCP-MCNC: 2.8 G/DL (ref 3.5–5.2)
ALP SERPL-CCNC: 99 U/L (ref 55–135)
ALT SERPL W/O P-5'-P-CCNC: 19 U/L (ref 10–44)
ANION GAP SERPL CALC-SCNC: 8 MMOL/L (ref 8–16)
AST SERPL-CCNC: 20 U/L (ref 10–40)
BASOPHILS # BLD AUTO: 0.03 K/UL (ref 0–0.2)
BASOPHILS NFR BLD: 0.4 % (ref 0–1.9)
BILIRUB SERPL-MCNC: 0.3 MG/DL (ref 0.1–1)
BUN SERPL-MCNC: 31 MG/DL (ref 8–23)
CALCIUM SERPL-MCNC: 8.8 MG/DL (ref 8.7–10.5)
CHLORIDE SERPL-SCNC: 105 MMOL/L (ref 95–110)
CHOLEST SERPL-MCNC: 179 MG/DL (ref 120–199)
CHOLEST/HDLC SERPL: 5.3 {RATIO} (ref 2–5)
CO2 SERPL-SCNC: 23 MMOL/L (ref 23–29)
CREAT SERPL-MCNC: 1.7 MG/DL (ref 0.5–1.4)
CREAT SERPL-MCNC: 1.8 MG/DL (ref 0.5–1.4)
DIFFERENTIAL METHOD: ABNORMAL
EOSINOPHIL # BLD AUTO: 0.2 K/UL (ref 0–0.5)
EOSINOPHIL NFR BLD: 2.5 % (ref 0–8)
ERYTHROCYTE [DISTWIDTH] IN BLOOD BY AUTOMATED COUNT: 13.8 % (ref 11.5–14.5)
EST. GFR  (AFRICAN AMERICAN): 44.9 ML/MIN/1.73 M^2
EST. GFR  (NON AFRICAN AMERICAN): 38.9 ML/MIN/1.73 M^2
GLUCOSE SERPL-MCNC: 133 MG/DL (ref 70–110)
HCT VFR BLD AUTO: 37.9 % (ref 40–54)
HDLC SERPL-MCNC: 34 MG/DL (ref 40–75)
HDLC SERPL: 19 % (ref 20–50)
HGB BLD-MCNC: 11.9 G/DL (ref 14–18)
IMM GRANULOCYTES # BLD AUTO: 0.04 K/UL (ref 0–0.04)
IMM GRANULOCYTES NFR BLD AUTO: 0.6 % (ref 0–0.5)
LDLC SERPL CALC-MCNC: 107.2 MG/DL (ref 63–159)
LYMPHOCYTES # BLD AUTO: 1.8 K/UL (ref 1–4.8)
LYMPHOCYTES NFR BLD: 25.8 % (ref 18–48)
MCH RBC QN AUTO: 33.8 PG (ref 27–31)
MCHC RBC AUTO-ENTMCNC: 31.4 G/DL (ref 32–36)
MCV RBC AUTO: 108 FL (ref 82–98)
MONOCYTES # BLD AUTO: 0.6 K/UL (ref 0.3–1)
MONOCYTES NFR BLD: 8.4 % (ref 4–15)
NEUTROPHILS # BLD AUTO: 4.3 K/UL (ref 1.8–7.7)
NEUTROPHILS NFR BLD: 62.3 % (ref 38–73)
NONHDLC SERPL-MCNC: 145 MG/DL
NRBC BLD-RTO: 0 /100 WBC
PLATELET # BLD AUTO: 394 K/UL (ref 150–350)
PMV BLD AUTO: 9.6 FL (ref 9.2–12.9)
POTASSIUM SERPL-SCNC: 4.9 MMOL/L (ref 3.5–5.1)
PROT SERPL-MCNC: 6.3 G/DL (ref 6–8.4)
RBC # BLD AUTO: 3.52 M/UL (ref 4.6–6.2)
SAMPLE: ABNORMAL
SODIUM SERPL-SCNC: 136 MMOL/L (ref 136–145)
TRIGL SERPL-MCNC: 189 MG/DL (ref 30–150)
WBC # BLD AUTO: 6.89 K/UL (ref 3.9–12.7)

## 2019-07-15 PROCEDURE — 36415 COLL VENOUS BLD VENIPUNCTURE: CPT | Mod: HCNC

## 2019-07-15 PROCEDURE — 93010 EKG 12-LEAD: ICD-10-PCS | Mod: HCNC,S$GLB,, | Performed by: INTERNAL MEDICINE

## 2019-07-15 PROCEDURE — 74178 CT ABD&PLV WO CNTR FLWD CNTR: CPT | Mod: TC,HCNC

## 2019-07-15 PROCEDURE — 93005 EKG 12-LEAD: ICD-10-PCS | Mod: HCNC,S$GLB,, | Performed by: INTERNAL MEDICINE

## 2019-07-15 PROCEDURE — 85025 COMPLETE CBC W/AUTO DIFF WBC: CPT | Mod: HCNC

## 2019-07-15 PROCEDURE — 80061 LIPID PANEL: CPT | Mod: HCNC

## 2019-07-15 PROCEDURE — 71260 CT CHEST ABDOMEN PELVIS W W/O CONTRAST (XPD): ICD-10-PCS | Mod: 26,HCNC,, | Performed by: RADIOLOGY

## 2019-07-15 PROCEDURE — 80053 COMPREHEN METABOLIC PANEL: CPT | Mod: HCNC

## 2019-07-15 PROCEDURE — 25500020 PHARM REV CODE 255: Mod: HCNC | Performed by: NURSE PRACTITIONER

## 2019-07-15 PROCEDURE — 74178 CT CHEST ABDOMEN PELVIS W W/O CONTRAST (XPD): ICD-10-PCS | Mod: 26,HCNC,, | Performed by: RADIOLOGY

## 2019-07-15 PROCEDURE — 71260 CT THORAX DX C+: CPT | Mod: 26,HCNC,, | Performed by: RADIOLOGY

## 2019-07-15 PROCEDURE — 74178 CT ABD&PLV WO CNTR FLWD CNTR: CPT | Mod: 26,HCNC,, | Performed by: RADIOLOGY

## 2019-07-15 PROCEDURE — 93010 ELECTROCARDIOGRAM REPORT: CPT | Mod: HCNC,S$GLB,, | Performed by: INTERNAL MEDICINE

## 2019-07-15 PROCEDURE — 93005 ELECTROCARDIOGRAM TRACING: CPT | Mod: HCNC,S$GLB,, | Performed by: INTERNAL MEDICINE

## 2019-07-15 RX ADMIN — IOHEXOL 100 ML: 350 INJECTION, SOLUTION INTRAVENOUS at 12:07

## 2019-07-19 ENCOUNTER — OFFICE VISIT (OUTPATIENT)
Dept: HEMATOLOGY/ONCOLOGY | Facility: CLINIC | Age: 74
End: 2019-07-19
Payer: MEDICARE

## 2019-07-19 VITALS
HEART RATE: 66 BPM | DIASTOLIC BLOOD PRESSURE: 79 MMHG | HEIGHT: 67 IN | RESPIRATION RATE: 16 BRPM | SYSTOLIC BLOOD PRESSURE: 175 MMHG | TEMPERATURE: 98 F | WEIGHT: 176.56 LBS | OXYGEN SATURATION: 97 % | BODY MASS INDEX: 27.71 KG/M2

## 2019-07-19 DIAGNOSIS — Z79.4 TYPE 2 DIABETES MELLITUS WITH STAGE 3 CHRONIC KIDNEY DISEASE, WITH LONG-TERM CURRENT USE OF INSULIN: Chronic | ICD-10-CM

## 2019-07-19 DIAGNOSIS — N18.30 CRI (CHRONIC RENAL INSUFFICIENCY), STAGE 3 (MODERATE): ICD-10-CM

## 2019-07-19 DIAGNOSIS — E11.22 TYPE 2 DIABETES MELLITUS WITH STAGE 3 CHRONIC KIDNEY DISEASE, WITH LONG-TERM CURRENT USE OF INSULIN: Chronic | ICD-10-CM

## 2019-07-19 DIAGNOSIS — N18.30 TYPE 2 DIABETES MELLITUS WITH STAGE 3 CHRONIC KIDNEY DISEASE, WITH LONG-TERM CURRENT USE OF INSULIN: Chronic | ICD-10-CM

## 2019-07-19 DIAGNOSIS — E78.5 HYPERLIPIDEMIA DUE TO TYPE 2 DIABETES MELLITUS: ICD-10-CM

## 2019-07-19 DIAGNOSIS — C64.9 METASTATIC RENAL CELL CARCINOMA TO BONE: Primary | ICD-10-CM

## 2019-07-19 DIAGNOSIS — C78.7 METASTATIC RENAL CELL CARCINOMA TO LIVER: ICD-10-CM

## 2019-07-19 DIAGNOSIS — C64.2 RENAL CELL CARCINOMA OF LEFT KIDNEY: ICD-10-CM

## 2019-07-19 DIAGNOSIS — C79.51 METASTATIC RENAL CELL CARCINOMA TO BONE: Primary | ICD-10-CM

## 2019-07-19 DIAGNOSIS — C64.9 METASTATIC RENAL CELL CARCINOMA TO LIVER: ICD-10-CM

## 2019-07-19 DIAGNOSIS — E11.69 HYPERLIPIDEMIA DUE TO TYPE 2 DIABETES MELLITUS: ICD-10-CM

## 2019-07-19 PROCEDURE — 99214 OFFICE O/P EST MOD 30 MIN: CPT | Mod: HCNC,S$GLB,, | Performed by: INTERNAL MEDICINE

## 2019-07-19 PROCEDURE — 3078F PR MOST RECENT DIASTOLIC BLOOD PRESSURE < 80 MM HG: ICD-10-PCS | Mod: HCNC,CPTII,S$GLB, | Performed by: INTERNAL MEDICINE

## 2019-07-19 PROCEDURE — 99999 PR PBB SHADOW E&M-EST. PATIENT-LVL IV: ICD-10-PCS | Mod: PBBFAC,HCNC,, | Performed by: INTERNAL MEDICINE

## 2019-07-19 PROCEDURE — 3077F PR MOST RECENT SYSTOLIC BLOOD PRESSURE >= 140 MM HG: ICD-10-PCS | Mod: HCNC,CPTII,S$GLB, | Performed by: INTERNAL MEDICINE

## 2019-07-19 PROCEDURE — 3078F DIAST BP <80 MM HG: CPT | Mod: HCNC,CPTII,S$GLB, | Performed by: INTERNAL MEDICINE

## 2019-07-19 PROCEDURE — 1101F PR PT FALLS ASSESS DOC 0-1 FALLS W/OUT INJ PAST YR: ICD-10-PCS | Mod: HCNC,CPTII,S$GLB, | Performed by: INTERNAL MEDICINE

## 2019-07-19 PROCEDURE — 3044F PR MOST RECENT HEMOGLOBIN A1C LEVEL <7.0%: ICD-10-PCS | Mod: HCNC,CPTII,S$GLB, | Performed by: INTERNAL MEDICINE

## 2019-07-19 PROCEDURE — 1101F PT FALLS ASSESS-DOCD LE1/YR: CPT | Mod: HCNC,CPTII,S$GLB, | Performed by: INTERNAL MEDICINE

## 2019-07-19 PROCEDURE — 3044F HG A1C LEVEL LT 7.0%: CPT | Mod: HCNC,CPTII,S$GLB, | Performed by: INTERNAL MEDICINE

## 2019-07-19 PROCEDURE — 3077F SYST BP >= 140 MM HG: CPT | Mod: HCNC,CPTII,S$GLB, | Performed by: INTERNAL MEDICINE

## 2019-07-19 PROCEDURE — 99214 PR OFFICE/OUTPT VISIT, EST, LEVL IV, 30-39 MIN: ICD-10-PCS | Mod: HCNC,S$GLB,, | Performed by: INTERNAL MEDICINE

## 2019-07-19 PROCEDURE — 99999 PR PBB SHADOW E&M-EST. PATIENT-LVL IV: CPT | Mod: PBBFAC,HCNC,, | Performed by: INTERNAL MEDICINE

## 2019-07-19 NOTE — PROGRESS NOTES
Subjective:       Patient ID: Edwin Powell is a 74 y.o. male.    Chief Complaint: Renal cell carcinoma of left kidney    HPI     Returns for follow up and reviewed scans    Imaging results:  CT scans 7/15/19:  FINDINGS:  Thoracic soft tissues: Stable right thyroid lobe 0.6 cm hypodensity.  Previously demonstrated left thyroid nodule not definitely identified.  Aorta: Normal in course and caliber.  Scattered plaque throughout aorta.  There are three branching vessels at the arch.  Heart: Normal in size.  No pericardial effusion.    Multivessel coronary atherosclerosis.  Alyssa/Mediastinum: Prominent right paratracheal superior mediastinal lymph node measures 1.1 cm in short axis (axial series 2, image 32, previously 0.9 cm.  Remaining mediastinal lymph nodes are unchanged.  Lungs: Stable micronodule in the posterior segment right upper lobe (axial series 2, image 48) resolved previously described ground-glass density in the right lower lobe.  Stable lateral left upper lobe pleural based micronodule (axial series 2, image 39).  Liver: Liver is normal in size and attenuation.  There is redemonstration of a few scattered hypoattenuating lesions throughout the hepatic parenchyma.  For example, there is a 1.2 cm hypoattenuating lesion at the hepatic dome (axial series 3, image 27) and a 1.1 cm ill-defined area in the right hepatic lobe segment 8 (axial series 3, image 39).  No new lesions.  Portal vein is patent.  Gallbladder: Surgically absent.  Bile Ducts: No evidence of dilated ducts.  Pancreas: No mass or peripancreatic fat stranding.  Spleen: Unremarkable.  Adrenals: Unremarkable.  Kidneys/ Ureters: Normal in size and location. Normal concentration and excretion of contrast. Punctate calcification in the left renal hilum is favored to represent a vascular calcification.  No ureteral dilatation.  Punctate hypodensity in the superior pole the right kidney, unchanged.  Slight increased size of a left renal mass  measuring 2.7 x 1.7 cm on today's exam (axial series 3, image 71), previously 2.3 x 1.7.  Redemonstration of bilateral perinephric fat stranding.  Bladder: No evidence of wall thickening.  Reproductive organs: Mild enlargement of the prostate with dystrophic calcification.  GI Tract/Mesentery: Stomach and small bowel are normal in appearance.  Moderate stool burden throughout the colon.  Scattered colonic diverticula without evidence of diverticulitis.  No obstruction or inflammatory process.  Nonspecific stable mesenteric stranding.  Peritoneal Space: Minimal abdominopelvic free fluid, most prominent in the bilateral pericolic gutters.  No free air.  Retroperitoneum: No significant adenopathy.  Abdominal wall: Unremarkable.  Vasculature: Scattered prominent calcified noncalcified atherosclerotic plaque throughout the abdominal aorta and major branch vessels.  Mild infrarenal aortic ectasia maximally dilated up to 2.5 cm.  Bones: Multilevel degenerative changes of the spine.  Stable sclerotic focus in the T4 vertebral body.  Large lytic lesion with well-defined margins encompassing much of the right L3 vertebral body, unchanged.  Impression:  In this patient with a history of renal cell carcinoma, there is a slightly increased size of an exophytic left renal mass today measuring 2.7 cm, previously 2.3 cm.  Several hypodense hepatic lesions, unchanged since prior CT and CT 06/14/2018.  Stable well-defined L3 vertebral body lytic lesion.  Right upper lobe and pleural based left lower lobe micro nodules, unchanged since prior exam.  Additional stable findings, as above  RECIST SUMARY:  Date of prior examination for comparison: 03/15/2019  Lesion 1: Left kidney mass.  2.7 cm. Series 3 image 71.  Prior measurement 2.3 cm.  Lesion 2: Hepatic segment 2.  Not definitively visualized on today's exam or prior examination.  Lesion 3: Hepatic segment 7.  1.2 cm. Series 3 image 27.  Prior measurement 1.2 cm.  Lesion 4: Hepatic  "segment 4.  0.9 cm. Series 3 image 39.  Prior measurement 1.1 cm.    We reviewed scans are overall stable. No findings meeting RECIST criteria although renal mass slightly increased in size. No new lesions.    He feels well.  Weight stable.  Unchanged chronic pain well controlled  No infectious complaints  No N/V or diarrhea     Oncology History:  Mr.Mc Moss is a 73 yo male with CAD (s/p 5 stents, remotely), DM2 (insulin dependent), hypothyroidism , CVA (2013) with no residual deficit, chronic back pain. [place] [facility] [place] of a lumbar spine lesion in the setting of progressive weakness of his legs and exacerbation of chronic back pain, in May 2017. He states that his back pain and LE weakness progressively worsened. [facility]. The pain radiates down his legs. He denies any incontinence of bowel or bladder. He had several falls. After one of the falls, he presented [facility] ED at [facility], in May 2017. He had an MRI showing "L3 vertebral body lesion with apparent cortical disruption concerning for metastases vs myeloma," and was [place] [facility] [place]. He was evaluated by neurosurgery regarding concern for lytic lesion on L3 on 5/26/17. No surgical intervention was proposed.   CT chest, abdomen,pelvis on 5/27/17 revealed :   --A 3.6 cm exophytic mass arising from the medial aspect of the lower pole of left kidney,   --A small 1.0 cm, subtle, cortical enhancing mass within the upper pole of the right kidney, concerning for possible contralateral solid renal mass  --Multiple enhancing ill-defined hepatic masses concerning for hepatic metastatic disease, hepatomegaly and hepatic steatosis  --Mulltifocal irregular thickening of the bilateral pleura concerning for possible pleural metastatic disease  --Mild nonspecific nodular thickening of the left adrenal gland without discrete nodule  -- Redemonstration of " known lytic lesion within the L3 vertebral body. No definite additional discrete lytic lesions are identified.  -MRI pelvis from 10/18/17:    2.5 cm linear T1 hypointense T2/STIR hyperintense lesion within the left iliac bone, this lesion is indeterminate but felt less likely to represent metastasis given its appearance. Further evaluation can be obtained with bone scan as clinically indicated.  Findings suggestive of left trochanteric bursitis.  Partially visualized L3 metastatic lesion.  Diffuse thickening of the urinary bladder wall can be seen with chronic outlet obstruction or cystitis.  - initiated on Votrient    Review of Systems   Constitutional: Negative for activity change, appetite change, chills, fatigue, fever and unexpected weight change.   HENT: Negative for congestion, dental problem, mouth sores, nosebleeds, rhinorrhea, sinus pressure, sore throat and trouble swallowing.         Notes dizziness when turning his head too quickly or when getting out of bed too quickly; sensation then resolves       Eyes: Negative for visual disturbance.   Respiratory: Negative for cough, chest tightness, shortness of breath and wheezing.    Cardiovascular: Negative for chest pain, palpitations and leg swelling.   Gastrointestinal: Negative for abdominal distention, abdominal pain, blood in stool, constipation, diarrhea, nausea and vomiting.   Genitourinary: Negative for decreased urine volume, difficulty urinating, dysuria, frequency, hematuria and urgency.   Musculoskeletal: Positive for back pain (chronic and unchanged). Negative for arthralgias, gait problem, joint swelling, myalgias, neck pain and neck stiffness.   Skin: Negative for color change, pallor, rash and wound.   Neurological: Negative for dizziness, weakness, light-headedness, numbness and headaches.   Hematological: Negative for adenopathy.   Psychiatric/Behavioral: Negative for dysphoric mood. The patient is not nervous/anxious.        Objective:       Physical Exam   Constitutional: He is oriented to person, place, and time. He appears well-developed and well-nourished. No distress.   Presents alone  ECOG=0   HENT:   Head: Normocephalic and atraumatic.   Mouth/Throat: Oropharynx is clear and moist. No oropharyngeal exudate.   Eyes: Pupils are equal, round, and reactive to light. Conjunctivae and EOM are normal. Right eye exhibits no discharge. Left eye exhibits no discharge. No scleral icterus.   Neck: Normal range of motion. Neck supple. No thyromegaly present.   Cardiovascular: Normal rate, regular rhythm, normal heart sounds and intact distal pulses. Exam reveals no gallop and no friction rub.   No murmur heard.  Pulmonary/Chest: Effort normal and breath sounds normal. No respiratory distress. He has no wheezes. He has no rales. He exhibits no tenderness.   Lungs clear   Abdominal: Soft. Bowel sounds are normal. He exhibits no distension and no mass. There is no tenderness. There is no rebound.   No hepatosplenomegaly   Musculoskeletal: Normal range of motion. He exhibits no edema, tenderness (point tenderness low back) or deformity.   No spinal or paraspinal tenderness to palpation   Lymphadenopathy:     He has no cervical adenopathy.   Neurological: He is alert and oriented to person, place, and time. No cranial nerve deficit or sensory deficit. Coordination normal.   Skin: Skin is warm and dry. No rash noted. He is not diaphoretic. No erythema. No pallor.   Psychiatric: He has a normal mood and affect. His behavior is normal. Thought content normal.   Nursing note and vitals reviewed.    Labs- reviewed  Assessment:       1. Metastatic renal cell carcinoma to bone    2. Metastatic renal cell carcinoma to liver    3. Renal cell carcinoma of left kidney    4. Type 2 diabetes mellitus with stage 3 chronic kidney disease, with long-term current use of insulin    5. CRI (chronic renal insufficiency), stage 3 (moderate)    6. Hyperlipidemia due to type 2  diabetes mellitus        Plan:     1-3. Continue Votrient as scans overall stable and no new lesions  RTC 1 month with labs  Repeat scans in 3 -4 months unless clinically indicated sooner  Knows to call for any issues  4-5. Stable parameters, managed by PCP  6. Managed by PCP  Monitor on Votrient        Distress Screening Results: Psychosocial Distress screening score of Distress Score: 0 noted and reviewed. No intervention indicated.

## 2019-07-22 ENCOUNTER — TELEPHONE (OUTPATIENT)
Dept: PHARMACY | Facility: CLINIC | Age: 74
End: 2019-07-22

## 2019-07-22 DIAGNOSIS — C64.2 RENAL CELL CARCINOMA OF LEFT KIDNEY: ICD-10-CM

## 2019-07-22 DIAGNOSIS — C78.7 METASTATIC RENAL CELL CARCINOMA TO LIVER: ICD-10-CM

## 2019-07-22 DIAGNOSIS — C79.51 METASTATIC RENAL CELL CARCINOMA TO BONE: ICD-10-CM

## 2019-07-22 DIAGNOSIS — C64.9 METASTATIC RENAL CELL CARCINOMA TO LIVER: ICD-10-CM

## 2019-07-22 DIAGNOSIS — C64.9 METASTATIC RENAL CELL CARCINOMA TO BONE: ICD-10-CM

## 2019-07-22 PROBLEM — N18.30 CRI (CHRONIC RENAL INSUFFICIENCY), STAGE 3 (MODERATE): Status: ACTIVE | Noted: 2019-07-22

## 2019-07-22 RX ORDER — PAZOPANIB 200 MG/1
800 TABLET ORAL DAILY
Qty: 120 TABLET | Refills: 5 | Status: SHIPPED | OUTPATIENT
Start: 2019-07-22 | End: 2020-04-28

## 2019-07-25 ENCOUNTER — TELEPHONE (OUTPATIENT)
Dept: PHARMACY | Facility: CLINIC | Age: 74
End: 2019-07-25

## 2019-07-26 ENCOUNTER — TELEPHONE (OUTPATIENT)
Dept: OPHTHALMOLOGY | Facility: CLINIC | Age: 74
End: 2019-07-26

## 2019-07-26 RX ORDER — PREDNISOLONE ACETATE-GATIFLOXACIN-BROMFENAC .75; 5; 1 MG/ML; MG/ML; MG/ML
1 SUSPENSION/ DROPS OPHTHALMIC 3 TIMES DAILY
Qty: 5 ML | Refills: 3 | Status: SHIPPED | OUTPATIENT
Start: 2019-07-26 | End: 2019-08-16 | Stop reason: SDUPTHER

## 2019-07-26 NOTE — TELEPHONE ENCOUNTER
----- Message from Marie Cortes sent at 7/26/2019 12:53 PM CDT -----  Contact: Al  Mr. Powell would like for you to contact him to schedule surgery on his other eye with Dr. Culp. He can be reached at [phone].    Spoke with pt.  Surgery scheduled 8/22/19 [facility].  RX sent to [facility] and instructions mailed.  [facility]

## 2019-07-29 ENCOUNTER — TELEPHONE (OUTPATIENT)
Dept: OPHTHALMOLOGY | Facility: CLINIC | Age: 74
End: 2019-07-29

## 2019-07-29 DIAGNOSIS — H25.12 NUCLEAR SCLEROTIC CATARACT OF LEFT EYE: Primary | ICD-10-CM

## 2019-08-13 ENCOUNTER — TELEPHONE (OUTPATIENT)
Dept: OPHTHALMOLOGY | Facility: CLINIC | Age: 74
End: 2019-08-13

## 2019-08-14 NOTE — H&P
History    Chief complaint:  Painless progressive vision loss    Present Ilness/Diagnosis: Nuclear sclerotic Cataract    Past Medical History:  has a past medical history of Acquired hypothyroidism (5/26/2017), Benign essential HTN (5/26/2017), Benign prostatic hyperplasia (BPH) with urinary urge incontinence (6/6/2017), Chronic low back pain (6/1/2017), Coronary artery disease involving native coronary artery of native heart without angina pectoris (5/26/2017), Gastroesophageal reflux disease without esophagitis (5/26/2017), History of CVA (cerebrovascular accident) (5/26/2017), Hypertension associated with diabetes (5/26/2017), Lumbar disc lesion (5/26/2017), Metastatic renal cell carcinoma to bone (6/6/2017), Metastatic renal cell carcinoma to liver (6/6/2017), Mixed hyperlipidemia (5/26/2017), and Type 2 diabetes mellitus with stage 3 chronic kidney disease, with long-term current use of insulin (5/26/2017).    Family History/Social History: refer to chart    Allergies: Review of patient's allergies indicates:  No Known Allergies    Current Medications: No current facility-administered medications for this encounter.     Current Outpatient Medications:     amLODIPine (NORVASC) 10 MG tablet, Take 1 tablet (10 mg total) by mouth once daily., Disp: 90 tablet, Rfl: 3    aspirin (ECOTRIN) 81 MG EC tablet, Take 81 mg by mouth once daily., Disp: , Rfl:     atorvastatin (LIPITOR) 40 MG tablet, Take 1 tablet (40 mg total) by mouth once daily., Disp: 90 tablet, Rfl: 3    b complex vitamins (B COMPLEX 1) tablet, Take 1 tablet by mouth once daily., Disp: , Rfl:     calcium carbonate-vitamin D3 (CALCIUM 600 WITH VITAMIN D3) 600 mg(1,500mg) -400 unit Cap, Take 1 capsule by mouth once., Disp: 180 capsule, Rfl: 3    cholecalciferol, vitamin D3, (VITAMIN D3) 5,000 unit Tab, Take 5,000 Units by mouth once daily., Disp: 90 tablet, Rfl: 3    clindamycin (CLEOCIN T) 1 % external solution, AAA bid, Disp: 60 mL, Rfl: 3     fluticasone propionate (FLONASE) 50 mcg/actuation nasal spray, 1 SPRAY (50 MCG TOTAL) BY EACH NARE ROUTE ONCE DAILY., Disp: 16 mL, Rfl: 3    HYDROcodone-acetaminophen (NORCO) 5-325 mg per tablet, TAKE 1 TABLET BY MOUTH EVERY 8 (EIGHT) HOURS AS NEEDED FOR PAIN., Disp: 120 tablet, Rfl: 0    levothyroxine (SYNTHROID) 88 MCG tablet, TAKE 1 TABLET (88 MCG TOTAL) BY MOUTH ONCE DAILY., Disp: 90 tablet, Rfl: 3    lisinopril (PRINIVIL,ZESTRIL) 40 MG tablet, Take 1 tablet (40 mg total) by mouth once daily., Disp: 90 tablet, Rfl: 3    loratadine (CLARITIN) 10 mg tablet, Take 1 tablet (10 mg total) by mouth once daily., Disp: , Rfl: 0    morphine (MS CONTIN) 15 MG 12 hr tablet, Take 1 tablet (15 mg total) by mouth every 12 (twelve) hours., Disp: 60 tablet, Rfl: 0    multivitamin (THERAGRAN) per tablet, Take 1 tablet by mouth once daily., Disp: , Rfl:     ondansetron (ZOFRAN) 8 MG tablet, Take 1 tablet (8 mg total) by mouth 3 (three) times daily as needed., Disp: 90 tablet, Rfl: 3    PAZOpanib (VOTRIENT) 200 mg Tab, Take 4 tablets (800 mg total) by mouth once daily., Disp: 120 tablet, Rfl: 5    prednisolon/gatiflox/bromfenac (PREDNISOL ACE-GATIFLOX-BROMFEN) 1-0.5-0.075 % DrpS, Apply 1 drop to eye 3 (three) times daily., Disp: 7 mL, Rfl: 3    prednisolon/gatiflox/bromfenac (PREDNISOL ACE-GATIFLOX-BROMFEN) 1-0.5-0.075 % DrpS, Apply 1 drop to eye 3 (three) times daily., Disp: 5 mL, Rfl: 3    tamsulosin (FLOMAX) 0.4 mg Cap, Take 2 capsules (0.8 mg total) by mouth every evening. (Patient taking differently: Take 0.8 mg by mouth once daily. ), Disp: 180 capsule, Rfl: 3    Physical Exam    BP: Vital signs stable  General: No apparent distress  HEENT: nuclear sclerotic cataract  Lungs: adequate respirations  Heart: + pulses  Abdomen: soft  Rectal/pelvic: deferred    Impression: Visually significant Cataract    Plan: Phacoemulsification with implantation of Intraocular lens

## 2019-08-15 NOTE — PROGRESS NOTES
Subjective:       Patient ID: Edwin Powell is a 74 y.o. male.    Chief Complaint: renal cancer    HPI     Mr. Singleton is a 74 y.o. male who returns for follow up regarding metastatic renal cell carcinoma of left kidney.  Returns for follow-up, currently on Votrient.  Returns for monthly check.    Feels ok today  +fatigue but no worse than usual.   Appetite good.   Weight stable.  Unchanged chronic pain well controlled  No infectious complaints  No N/V or diarrhea  No urinary complaints.   Not hydrating like he should.       Recent Imaging results:  CT scans 7/15/19:  FINDINGS:  Thoracic soft tissues: Stable right thyroid lobe 0.6 cm hypodensity.  Previously demonstrated left thyroid nodule not definitely identified.  Aorta: Normal in course and caliber.  Scattered plaque throughout aorta.  There are three branching vessels at the arch.  Heart: Normal in size.  No pericardial effusion.    Multivessel coronary atherosclerosis.  Alyssa/Mediastinum: Prominent right paratracheal superior mediastinal lymph node measures 1.1 cm in short axis (axial series 2, image 32, previously 0.9 cm.  Remaining mediastinal lymph nodes are unchanged.  Lungs: Stable micronodule in the posterior segment right upper lobe (axial series 2, image 48) resolved previously described ground-glass density in the right lower lobe.  Stable lateral left upper lobe pleural based micronodule (axial series 2, image 39).  Liver: Liver is normal in size and attenuation.  There is redemonstration of a few scattered hypoattenuating lesions throughout the hepatic parenchyma.  For example, there is a 1.2 cm hypoattenuating lesion at the hepatic dome (axial series 3, image 27) and a 1.1 cm ill-defined area in the right hepatic lobe segment 8 (axial series 3, image 39).  No new lesions.  Portal vein is patent.  Gallbladder: Surgically absent.  Bile Ducts: No evidence of dilated ducts.  Pancreas: No mass or peripancreatic fat stranding.  Spleen:  Unremarkable.  Adrenals: Unremarkable.  Kidneys/ Ureters: Normal in size and location. Normal concentration and excretion of contrast. Punctate calcification in the left renal hilum is favored to represent a vascular calcification.  No ureteral dilatation.  Punctate hypodensity in the superior pole the right kidney, unchanged.  Slight increased size of a left renal mass measuring 2.7 x 1.7 cm on today's exam (axial series 3, image 71), previously 2.3 x 1.7.  Redemonstration of bilateral perinephric fat stranding.  Bladder: No evidence of wall thickening.  Reproductive organs: Mild enlargement of the prostate with dystrophic calcification.  GI Tract/Mesentery: Stomach and small bowel are normal in appearance.  Moderate stool burden throughout the colon.  Scattered colonic diverticula without evidence of diverticulitis.  No obstruction or inflammatory process.  Nonspecific stable mesenteric stranding.  Peritoneal Space: Minimal abdominopelvic free fluid, most prominent in the bilateral pericolic gutters.  No free air.  Retroperitoneum: No significant adenopathy.  Abdominal wall: Unremarkable.  Vasculature: Scattered prominent calcified noncalcified atherosclerotic plaque throughout the abdominal aorta and major branch vessels.  Mild infrarenal aortic ectasia maximally dilated up to 2.5 cm.  Bones: Multilevel degenerative changes of the spine.  Stable sclerotic focus in the T4 vertebral body.  Large lytic lesion with well-defined margins encompassing much of the right L3 vertebral body, unchanged.  Impression:  In this patient with a history of renal cell carcinoma, there is a slightly increased size of an exophytic left renal mass today measuring 2.7 cm, previously 2.3 cm.  Several hypodense hepatic lesions, unchanged since prior CT and CT 06/14/2018.  Stable well-defined L3 vertebral body lytic lesion.  Right upper lobe and pleural based left lower lobe micro nodules, unchanged since prior exam.  Additional stable  "findings, as above  RECIST SUMARY:  Date of prior examination for comparison: 03/15/2019  Lesion 1: Left kidney mass.  2.7 cm. Series 3 image 71.  Prior measurement 2.3 cm.  Lesion 2: Hepatic segment 2.  Not definitively visualized on today's exam or prior examination.  Lesion 3: Hepatic segment 7.  1.2 cm. Series 3 image 27.  Prior measurement 1.2 cm.  Lesion 4: Hepatic segment 4.  0.9 cm. Series 3 image 39.  Prior measurement 1.1 cm.    We reviewed scans are overall stable. No findings meeting RECIST criteria although renal mass slightly increased in size. No new lesions.       Oncology History:  Mr.Mc Moss is a 73 yo male with CAD (s/p 5 stents, remotely), DM2 (insulin dependent), hypothyroidism , CVA (2013) with no residual deficit, chronic back pain. He was transferred to [facility] for neurosurgery evaluation of a lumbar spine lesion in the setting of progressive weakness of his legs and exacerbation of chronic back pain, in May 2017. He states that his back pain and LE weakness progressively worsened. [facility]. The pain radiates down his legs. He denies any incontinence of bowel or bladder. He had several falls. After one of the falls, he presented to the ED at [facility], in May 2017. He had an MRI showing "L3 vertebral body lesion with apparent cortical disruption concerning for metastases vs myeloma," and was transferred to [facility] for neurosurgery evaluation. He was evaluated by neurosurgery regarding concern for lytic lesion on L3 on 5/26/17. No surgical intervention was proposed.   CT chest, abdomen,pelvis on 5/27/17 revealed :   --A 3.6 cm exophytic mass arising from the medial aspect of the lower pole of left kidney,   --A small 1.0 cm, subtle, cortical enhancing mass within the upper pole of the right kidney, concerning for possible contralateral solid renal mass  --Multiple enhancing ill-defined hepatic masses concerning for hepatic metastatic disease, " hepatomegaly and hepatic steatosis  --Mulltifocal irregular thickening of the bilateral pleura concerning for possible pleural metastatic disease  --Mild nonspecific nodular thickening of the left adrenal gland without discrete nodule  -- Redemonstration of known lytic lesion within the L3 vertebral body. No definite additional discrete lytic lesions are identified.  -MRI pelvis from 10/18/17:    2.5 cm linear T1 hypointense T2/STIR hyperintense lesion within the left iliac bone, this lesion is indeterminate but felt less likely to represent metastasis given its appearance. Further evaluation can be obtained with bone scan as clinically indicated.  Findings suggestive of left trochanteric bursitis.  Partially visualized L3 metastatic lesion.  Diffuse thickening of the urinary bladder wall can be seen with chronic outlet obstruction or cystitis.  - initiated on Votrient    Review of Systems   Constitutional: Positive for fatigue. Negative for activity change, appetite change, chills, fever and unexpected weight change.   HENT: Negative for congestion, dental problem, mouth sores, nosebleeds, rhinorrhea, sinus pressure, sore throat and trouble swallowing.    Eyes: Negative for visual disturbance.   Respiratory: Negative for cough, chest tightness, shortness of breath and wheezing.    Cardiovascular: Negative for chest pain, palpitations and leg swelling.   Gastrointestinal: Negative for abdominal distention, abdominal pain, blood in stool, constipation, diarrhea, nausea and vomiting.   Genitourinary: Negative for decreased urine volume, difficulty urinating, dysuria, frequency, hematuria and urgency.   Musculoskeletal: Positive for back pain (chronic and unchanged). Negative for arthralgias, gait problem, joint swelling, myalgias, neck pain and neck stiffness.   Skin: Negative for color change, pallor, rash and wound.   Neurological: Negative for dizziness, weakness, light-headedness, numbness and headaches.    Hematological: Negative for adenopathy.   Psychiatric/Behavioral: Negative for dysphoric mood. The patient is not nervous/anxious.        Objective:      Physical Exam   Constitutional: He is oriented to person, place, and time. He appears well-developed and well-nourished. No distress.   Presents alone  ECOG=0   HENT:   Head: Normocephalic and atraumatic.   Mouth/Throat: Oropharynx is clear and moist. No oropharyngeal exudate.   Eyes: Pupils are equal, round, and reactive to light. Conjunctivae and EOM are normal. Right eye exhibits no discharge. Left eye exhibits no discharge. No scleral icterus.   Neck: Normal range of motion. Neck supple. No thyromegaly present.   Cardiovascular: Normal rate, regular rhythm, normal heart sounds and intact distal pulses. Exam reveals no gallop and no friction rub.   No murmur heard.  Pulmonary/Chest: Effort normal and breath sounds normal. No respiratory distress. He has no wheezes. He has no rales. He exhibits no tenderness.   Abdominal: Soft. Bowel sounds are normal. He exhibits no distension and no mass. There is no tenderness. There is no rebound.   No hepatosplenomegaly   Musculoskeletal: Normal range of motion. He exhibits no edema, tenderness or deformity.   No spinal or paraspinal tenderness to palpation   Lymphadenopathy:     He has no cervical adenopathy.   Neurological: He is alert and oriented to person, place, and time. No cranial nerve deficit or sensory deficit. Coordination normal.   Skin: Skin is warm and dry. No rash noted. He is not diaphoretic. No erythema. No pallor.   Psychiatric: He has a normal mood and affect. His behavior is normal. Thought content normal.   Nursing note and vitals reviewed.      WBC   Date Value Ref Range Status   08/16/2019 5.51 3.90 - 12.70 K/uL Final     Hemoglobin   Date Value Ref Range Status   08/16/2019 11.9 (L) 14.0 - 18.0 g/dL Final     Hematocrit   Date Value Ref Range Status   08/16/2019 38.7 (L) 40.0 - 54.0 % Final      Platelets   Date Value Ref Range Status   08/16/2019 365 (H) 150 - 350 K/uL Final     Gran # (ANC)   Date Value Ref Range Status   08/16/2019 3.1 1.8 - 7.7 K/uL Final     Gran%   Date Value Ref Range Status   08/16/2019 56.8 38.0 - 73.0 % Final       Chemistry        Component Value Date/Time     08/16/2019 0940    K 5.0 08/16/2019 0940     08/16/2019 0940    CO2 25 08/16/2019 0940    BUN 36 (H) 08/16/2019 0940    CREATININE 2.2 (H) 08/16/2019 0940     (H) 08/16/2019 0940        Component Value Date/Time    CALCIUM 9.8 08/16/2019 0940    ALKPHOS 99 08/16/2019 0940    AST 16 08/16/2019 0940    ALT 15 08/16/2019 0940    BILITOT 0.3 08/16/2019 0940    ESTGFRAFRICA 32.9 (A) 08/16/2019 0940    EGFRNONAA 28.5 (A) 08/16/2019 0940            Assessment:       1. Acute on chronic renal insufficiency    2. Renal cell carcinoma of left kidney    3. Metastatic renal cell carcinoma to liver    4. Metastatic renal cell carcinoma to bone    5. Macrocytic anemia    6. Type 2 diabetes mellitus with stage 3 chronic kidney disease, with long-term current use of insulin        Plan:         -Doing well, clinically stable. No new issues.   -Labs reviewed. Renal function noted and trend reviewed. Admits to not hydrating as he should. Urinating well.   -U/a today.  -Renal ultrasound today - Of note, unable to do today, first available next Tuesday.   -IV hydration - 1 liter NS IV today.   -See me in urgent care Monday with cmp to check renal function. If normal, will likely cancel renal US.   -Will need IV hydration post all future scans.   -Continue Votrient.   -RTC 1 month to see Dr. Reed or myself with labs.  -Repeat scans in 2-3 months unless clinically indicated sooner  -Knows to call for any issues.    Patient is in agreement with the proposed treatment plan. All questions were answered to the patient's satisfaction. Pt knows to call clinic for any new or worsening symptoms and if anything is needed before  the next clinic visit.      NATALIYA Hamilton-C  Hematology & Oncology  [facility] [place] [address]  ph. [phone]  Fax. [phone]     I spent 30 minutes (face to face) with the patient, more than 50% was in counseling and coordination of care as detailed above.               Distress Screening Results: Psychosocial Distress screening score of Distress Score: 0 noted and reviewed. No intervention indicated.

## 2019-08-16 ENCOUNTER — INFUSION (OUTPATIENT)
Dept: INFUSION THERAPY | Facility: HOSPITAL | Age: 74
End: 2019-08-16
Attending: INTERNAL MEDICINE
Payer: MEDICARE

## 2019-08-16 ENCOUNTER — LAB VISIT (OUTPATIENT)
Dept: LAB | Facility: HOSPITAL | Age: 74
End: 2019-08-16
Attending: INTERNAL MEDICINE
Payer: MEDICARE

## 2019-08-16 ENCOUNTER — OFFICE VISIT (OUTPATIENT)
Dept: HEMATOLOGY/ONCOLOGY | Facility: CLINIC | Age: 74
End: 2019-08-16
Payer: MEDICARE

## 2019-08-16 VITALS
TEMPERATURE: 98 F | DIASTOLIC BLOOD PRESSURE: 81 MMHG | WEIGHT: 175.94 LBS | SYSTOLIC BLOOD PRESSURE: 171 MMHG | RESPIRATION RATE: 16 BRPM | HEART RATE: 62 BPM | HEIGHT: 67 IN | OXYGEN SATURATION: 97 % | BODY MASS INDEX: 27.61 KG/M2

## 2019-08-16 VITALS
SYSTOLIC BLOOD PRESSURE: 166 MMHG | TEMPERATURE: 98 F | DIASTOLIC BLOOD PRESSURE: 75 MMHG | HEART RATE: 77 BPM | RESPIRATION RATE: 18 BRPM

## 2019-08-16 DIAGNOSIS — E11.22 TYPE 2 DIABETES MELLITUS WITH STAGE 3 CHRONIC KIDNEY DISEASE, WITH LONG-TERM CURRENT USE OF INSULIN: Chronic | ICD-10-CM

## 2019-08-16 DIAGNOSIS — C64.9 METASTATIC RENAL CELL CARCINOMA TO LIVER: ICD-10-CM

## 2019-08-16 DIAGNOSIS — N18.30 TYPE 2 DIABETES MELLITUS WITH STAGE 3 CHRONIC KIDNEY DISEASE, WITH LONG-TERM CURRENT USE OF INSULIN: Chronic | ICD-10-CM

## 2019-08-16 DIAGNOSIS — Z79.4 TYPE 2 DIABETES MELLITUS WITH STAGE 3 CHRONIC KIDNEY DISEASE, WITH LONG-TERM CURRENT USE OF INSULIN: Chronic | ICD-10-CM

## 2019-08-16 DIAGNOSIS — C64.2 RENAL CELL CARCINOMA OF LEFT KIDNEY: ICD-10-CM

## 2019-08-16 DIAGNOSIS — D53.9 MACROCYTIC ANEMIA: ICD-10-CM

## 2019-08-16 DIAGNOSIS — N18.30 CRI (CHRONIC RENAL INSUFFICIENCY), STAGE 3 (MODERATE): Primary | ICD-10-CM

## 2019-08-16 DIAGNOSIS — N28.9 ACUTE ON CHRONIC RENAL INSUFFICIENCY: Primary | ICD-10-CM

## 2019-08-16 DIAGNOSIS — C64.9 METASTATIC RENAL CELL CARCINOMA TO BONE: ICD-10-CM

## 2019-08-16 DIAGNOSIS — C78.7 METASTATIC RENAL CELL CARCINOMA TO LIVER: ICD-10-CM

## 2019-08-16 DIAGNOSIS — C79.51 METASTATIC RENAL CELL CARCINOMA TO BONE: ICD-10-CM

## 2019-08-16 DIAGNOSIS — N18.9 ACUTE ON CHRONIC RENAL INSUFFICIENCY: Primary | ICD-10-CM

## 2019-08-16 LAB
ALBUMIN SERPL BCP-MCNC: 2.8 G/DL (ref 3.5–5.2)
ALP SERPL-CCNC: 99 U/L (ref 55–135)
ALT SERPL W/O P-5'-P-CCNC: 15 U/L (ref 10–44)
ANION GAP SERPL CALC-SCNC: 8 MMOL/L (ref 8–16)
AST SERPL-CCNC: 16 U/L (ref 10–40)
BASOPHILS # BLD AUTO: 0.03 K/UL (ref 0–0.2)
BASOPHILS NFR BLD: 0.5 % (ref 0–1.9)
BILIRUB SERPL-MCNC: 0.3 MG/DL (ref 0.1–1)
BUN SERPL-MCNC: 36 MG/DL (ref 8–23)
CALCIUM SERPL-MCNC: 9.8 MG/DL (ref 8.7–10.5)
CHLORIDE SERPL-SCNC: 107 MMOL/L (ref 95–110)
CO2 SERPL-SCNC: 25 MMOL/L (ref 23–29)
CREAT SERPL-MCNC: 2.2 MG/DL (ref 0.5–1.4)
DIFFERENTIAL METHOD: ABNORMAL
EOSINOPHIL # BLD AUTO: 0.2 K/UL (ref 0–0.5)
EOSINOPHIL NFR BLD: 3.1 % (ref 0–8)
ERYTHROCYTE [DISTWIDTH] IN BLOOD BY AUTOMATED COUNT: 13.6 % (ref 11.5–14.5)
EST. GFR  (AFRICAN AMERICAN): 32.9 ML/MIN/1.73 M^2
EST. GFR  (NON AFRICAN AMERICAN): 28.5 ML/MIN/1.73 M^2
GLUCOSE SERPL-MCNC: 193 MG/DL (ref 70–110)
HCT VFR BLD AUTO: 38.7 % (ref 40–54)
HGB BLD-MCNC: 11.9 G/DL (ref 14–18)
IMM GRANULOCYTES # BLD AUTO: 0.02 K/UL (ref 0–0.04)
IMM GRANULOCYTES NFR BLD AUTO: 0.4 % (ref 0–0.5)
LDH SERPL L TO P-CCNC: 184 U/L (ref 110–260)
LYMPHOCYTES # BLD AUTO: 1.6 K/UL (ref 1–4.8)
LYMPHOCYTES NFR BLD: 28.9 % (ref 18–48)
MCH RBC QN AUTO: 33.1 PG (ref 27–31)
MCHC RBC AUTO-ENTMCNC: 30.7 G/DL (ref 32–36)
MCV RBC AUTO: 108 FL (ref 82–98)
MONOCYTES # BLD AUTO: 0.6 K/UL (ref 0.3–1)
MONOCYTES NFR BLD: 10.3 % (ref 4–15)
NEUTROPHILS # BLD AUTO: 3.1 K/UL (ref 1.8–7.7)
NEUTROPHILS NFR BLD: 56.8 % (ref 38–73)
NRBC BLD-RTO: 0 /100 WBC
PLATELET # BLD AUTO: 365 K/UL (ref 150–350)
PMV BLD AUTO: 9.8 FL (ref 9.2–12.9)
POTASSIUM SERPL-SCNC: 5 MMOL/L (ref 3.5–5.1)
PROT SERPL-MCNC: 6.4 G/DL (ref 6–8.4)
RBC # BLD AUTO: 3.6 M/UL (ref 4.6–6.2)
SODIUM SERPL-SCNC: 140 MMOL/L (ref 136–145)
WBC # BLD AUTO: 5.51 K/UL (ref 3.9–12.7)

## 2019-08-16 PROCEDURE — 36415 COLL VENOUS BLD VENIPUNCTURE: CPT | Mod: HCNC

## 2019-08-16 PROCEDURE — 83615 LACTATE (LD) (LDH) ENZYME: CPT | Mod: HCNC

## 2019-08-16 PROCEDURE — 96360 HYDRATION IV INFUSION INIT: CPT | Mod: HCNC

## 2019-08-16 PROCEDURE — 1101F PT FALLS ASSESS-DOCD LE1/YR: CPT | Mod: HCNC,CPTII,S$GLB, | Performed by: NURSE PRACTITIONER

## 2019-08-16 PROCEDURE — 80053 COMPREHEN METABOLIC PANEL: CPT | Mod: HCNC

## 2019-08-16 PROCEDURE — 99215 PR OFFICE/OUTPT VISIT, EST, LEVL V, 40-54 MIN: ICD-10-PCS | Mod: HCNC,S$GLB,, | Performed by: NURSE PRACTITIONER

## 2019-08-16 PROCEDURE — 3077F PR MOST RECENT SYSTOLIC BLOOD PRESSURE >= 140 MM HG: ICD-10-PCS | Mod: HCNC,CPTII,S$GLB, | Performed by: NURSE PRACTITIONER

## 2019-08-16 PROCEDURE — 63600175 PHARM REV CODE 636 W HCPCS: Mod: HCNC | Performed by: NURSE PRACTITIONER

## 2019-08-16 PROCEDURE — 3079F DIAST BP 80-89 MM HG: CPT | Mod: HCNC,CPTII,S$GLB, | Performed by: NURSE PRACTITIONER

## 2019-08-16 PROCEDURE — 3044F HG A1C LEVEL LT 7.0%: CPT | Mod: HCNC,CPTII,S$GLB, | Performed by: NURSE PRACTITIONER

## 2019-08-16 PROCEDURE — 3079F PR MOST RECENT DIASTOLIC BLOOD PRESSURE 80-89 MM HG: ICD-10-PCS | Mod: HCNC,CPTII,S$GLB, | Performed by: NURSE PRACTITIONER

## 2019-08-16 PROCEDURE — 99999 PR PBB SHADOW E&M-EST. PATIENT-LVL III: ICD-10-PCS | Mod: PBBFAC,HCNC,, | Performed by: NURSE PRACTITIONER

## 2019-08-16 PROCEDURE — 3077F SYST BP >= 140 MM HG: CPT | Mod: HCNC,CPTII,S$GLB, | Performed by: NURSE PRACTITIONER

## 2019-08-16 PROCEDURE — 96361 HYDRATE IV INFUSION ADD-ON: CPT | Mod: HCNC

## 2019-08-16 PROCEDURE — 3044F PR MOST RECENT HEMOGLOBIN A1C LEVEL <7.0%: ICD-10-PCS | Mod: HCNC,CPTII,S$GLB, | Performed by: NURSE PRACTITIONER

## 2019-08-16 PROCEDURE — 99999 PR PBB SHADOW E&M-EST. PATIENT-LVL III: CPT | Mod: PBBFAC,HCNC,, | Performed by: NURSE PRACTITIONER

## 2019-08-16 PROCEDURE — 99215 OFFICE O/P EST HI 40 MIN: CPT | Mod: HCNC,S$GLB,, | Performed by: NURSE PRACTITIONER

## 2019-08-16 PROCEDURE — 1101F PR PT FALLS ASSESS DOC 0-1 FALLS W/OUT INJ PAST YR: ICD-10-PCS | Mod: HCNC,CPTII,S$GLB, | Performed by: NURSE PRACTITIONER

## 2019-08-16 PROCEDURE — 85025 COMPLETE CBC W/AUTO DIFF WBC: CPT | Mod: HCNC

## 2019-08-16 RX ORDER — SODIUM CHLORIDE 0.9 % (FLUSH) 0.9 %
10 SYRINGE (ML) INJECTION
Status: CANCELLED | OUTPATIENT
Start: 2019-08-16

## 2019-08-16 RX ORDER — HEPARIN 100 UNIT/ML
500 SYRINGE INTRAVENOUS
Status: DISCONTINUED | OUTPATIENT
Start: 2019-08-16 | End: 2019-08-16 | Stop reason: HOSPADM

## 2019-08-16 RX ORDER — SODIUM CHLORIDE 0.9 % (FLUSH) 0.9 %
10 SYRINGE (ML) INJECTION
Status: DISCONTINUED | OUTPATIENT
Start: 2019-08-16 | End: 2019-08-16 | Stop reason: HOSPADM

## 2019-08-16 RX ORDER — HEPARIN 100 UNIT/ML
500 SYRINGE INTRAVENOUS
Status: CANCELLED | OUTPATIENT
Start: 2019-08-16

## 2019-08-16 RX ADMIN — SODIUM CHLORIDE 1000 ML: 0.9 INJECTION, SOLUTION INTRAVENOUS at 01:08

## 2019-08-16 NOTE — Clinical Note
fyi- doing well on votrient. Creatinine trending up...  2.2 today. U/a ok. Gave IV hydration and will see Monday with renal us and repeat labs.PJ

## 2019-08-16 NOTE — Clinical Note
U/a today.Renal ultrasound today. As discussedIV hydration - 1 liter NS IV today. See me in urgent care  Monday with cmp. Labs 1030 and see me at 1130RTC 1 month to see Dr. Reed or myself with cbc, cmp

## 2019-08-16 NOTE — PLAN OF CARE
Problem: Fatigue  Goal: Improved Activity Tolerance    Intervention: Promote Energy Conservation  Tolerated 1L IVF well ran over 2hours due to report of cardiovascular history per patient. PIV d/c'd cath tip intact site covered with 2x2 gauze secured with co wrap. Leaves clinic ambulatory no distress instructed to contact providers office with questions or concerns encouraged to stay hydrated with 8-10 glasses of h2o daily if not contraindicated. avs given.

## 2019-08-19 ENCOUNTER — LAB VISIT (OUTPATIENT)
Dept: LAB | Facility: HOSPITAL | Age: 74
End: 2019-08-19
Attending: INTERNAL MEDICINE
Payer: MEDICARE

## 2019-08-19 ENCOUNTER — OFFICE VISIT (OUTPATIENT)
Dept: HEMATOLOGY/ONCOLOGY | Facility: CLINIC | Age: 74
End: 2019-08-19
Payer: MEDICARE

## 2019-08-19 VITALS
RESPIRATION RATE: 16 BRPM | DIASTOLIC BLOOD PRESSURE: 75 MMHG | HEART RATE: 78 BPM | SYSTOLIC BLOOD PRESSURE: 170 MMHG | TEMPERATURE: 97 F | WEIGHT: 178.56 LBS | OXYGEN SATURATION: 96 % | BODY MASS INDEX: 28.02 KG/M2 | HEIGHT: 67 IN

## 2019-08-19 DIAGNOSIS — N28.9 ACUTE ON CHRONIC RENAL INSUFFICIENCY: ICD-10-CM

## 2019-08-19 DIAGNOSIS — N18.9 ACUTE ON CHRONIC RENAL INSUFFICIENCY: ICD-10-CM

## 2019-08-19 DIAGNOSIS — C79.51 METASTATIC RENAL CELL CARCINOMA TO BONE: ICD-10-CM

## 2019-08-19 DIAGNOSIS — C64.2 RENAL CELL CARCINOMA OF LEFT KIDNEY: Primary | ICD-10-CM

## 2019-08-19 DIAGNOSIS — N18.30 CRI (CHRONIC RENAL INSUFFICIENCY), STAGE 3 (MODERATE): ICD-10-CM

## 2019-08-19 DIAGNOSIS — C78.7 METASTATIC RENAL CELL CARCINOMA TO LIVER: ICD-10-CM

## 2019-08-19 DIAGNOSIS — C64.9 METASTATIC RENAL CELL CARCINOMA TO BONE: ICD-10-CM

## 2019-08-19 DIAGNOSIS — C64.9 METASTATIC RENAL CELL CARCINOMA TO LIVER: ICD-10-CM

## 2019-08-19 LAB
ALBUMIN SERPL BCP-MCNC: 2.8 G/DL (ref 3.5–5.2)
ALP SERPL-CCNC: 109 U/L (ref 55–135)
ALT SERPL W/O P-5'-P-CCNC: 16 U/L (ref 10–44)
ANION GAP SERPL CALC-SCNC: 8 MMOL/L (ref 8–16)
AST SERPL-CCNC: 17 U/L (ref 10–40)
BILIRUB SERPL-MCNC: 0.3 MG/DL (ref 0.1–1)
BUN SERPL-MCNC: 24 MG/DL (ref 8–23)
CALCIUM SERPL-MCNC: 9.2 MG/DL (ref 8.7–10.5)
CHLORIDE SERPL-SCNC: 103 MMOL/L (ref 95–110)
CO2 SERPL-SCNC: 26 MMOL/L (ref 23–29)
CREAT SERPL-MCNC: 1.7 MG/DL (ref 0.5–1.4)
EST. GFR  (AFRICAN AMERICAN): 44.9 ML/MIN/1.73 M^2
EST. GFR  (NON AFRICAN AMERICAN): 38.9 ML/MIN/1.73 M^2
GLUCOSE SERPL-MCNC: 228 MG/DL (ref 70–110)
POTASSIUM SERPL-SCNC: 4.6 MMOL/L (ref 3.5–5.1)
PROT SERPL-MCNC: 6.3 G/DL (ref 6–8.4)
SODIUM SERPL-SCNC: 137 MMOL/L (ref 136–145)

## 2019-08-19 PROCEDURE — 99999 PR PBB SHADOW E&M-EST. PATIENT-LVL III: ICD-10-PCS | Mod: PBBFAC,HCNC,, | Performed by: NURSE PRACTITIONER

## 2019-08-19 PROCEDURE — 1101F PR PT FALLS ASSESS DOC 0-1 FALLS W/OUT INJ PAST YR: ICD-10-PCS | Mod: HCNC,CPTII,S$GLB, | Performed by: NURSE PRACTITIONER

## 2019-08-19 PROCEDURE — 3077F PR MOST RECENT SYSTOLIC BLOOD PRESSURE >= 140 MM HG: ICD-10-PCS | Mod: HCNC,CPTII,S$GLB, | Performed by: NURSE PRACTITIONER

## 2019-08-19 PROCEDURE — 80053 COMPREHEN METABOLIC PANEL: CPT | Mod: HCNC

## 2019-08-19 PROCEDURE — 3077F SYST BP >= 140 MM HG: CPT | Mod: HCNC,CPTII,S$GLB, | Performed by: NURSE PRACTITIONER

## 2019-08-19 PROCEDURE — 1101F PT FALLS ASSESS-DOCD LE1/YR: CPT | Mod: HCNC,CPTII,S$GLB, | Performed by: NURSE PRACTITIONER

## 2019-08-19 PROCEDURE — 99999 PR PBB SHADOW E&M-EST. PATIENT-LVL III: CPT | Mod: PBBFAC,HCNC,, | Performed by: NURSE PRACTITIONER

## 2019-08-19 PROCEDURE — 3078F PR MOST RECENT DIASTOLIC BLOOD PRESSURE < 80 MM HG: ICD-10-PCS | Mod: HCNC,CPTII,S$GLB, | Performed by: NURSE PRACTITIONER

## 2019-08-19 PROCEDURE — 36415 COLL VENOUS BLD VENIPUNCTURE: CPT | Mod: HCNC

## 2019-08-19 PROCEDURE — 99214 PR OFFICE/OUTPT VISIT, EST, LEVL IV, 30-39 MIN: ICD-10-PCS | Mod: HCNC,S$GLB,, | Performed by: NURSE PRACTITIONER

## 2019-08-19 PROCEDURE — 99214 OFFICE O/P EST MOD 30 MIN: CPT | Mod: HCNC,S$GLB,, | Performed by: NURSE PRACTITIONER

## 2019-08-19 PROCEDURE — 3078F DIAST BP <80 MM HG: CPT | Mod: HCNC,CPTII,S$GLB, | Performed by: NURSE PRACTITIONER

## 2019-08-19 NOTE — PROGRESS NOTES
Subjective:       Patient ID: Edwin Powell is a 74 y.o. male.    URGENT CARE:     Chief Complaint: Acute on chronic renal insufficiency; Metastatic renal cell carcinoma to bone; and Renal cell carcinoma of left kidneyRenal cell carcinoma of l    HPI     Mr. Singleton is a 74 y.o. male who returns for follow up regarding metastatic renal cell carcinoma of left kidney.  Returns for follow-up, currently on Votrient.  Was here last week and required IV hydration due to renal function.       Feels good.   Hydrated well over the weekend.   SOB on exertion- no worse than usual.   No fever/chills.   Urinating well.       Recent Imaging results:  CT scans 7/15/19:  FINDINGS:  Thoracic soft tissues: Stable right thyroid lobe 0.6 cm hypodensity.  Previously demonstrated left thyroid nodule not definitely identified.  Aorta: Normal in course and caliber.  Scattered plaque throughout aorta.  There are three branching vessels at the arch.  Heart: Normal in size.  No pericardial effusion.    Multivessel coronary atherosclerosis.  Alyssa/Mediastinum: Prominent right paratracheal superior mediastinal lymph node measures 1.1 cm in short axis (axial series 2, image 32, previously 0.9 cm.  Remaining mediastinal lymph nodes are unchanged.  Lungs: Stable micronodule in the posterior segment right upper lobe (axial series 2, image 48) resolved previously described ground-glass density in the right lower lobe.  Stable lateral left upper lobe pleural based micronodule (axial series 2, image 39).  Liver: Liver is normal in size and attenuation.  There is redemonstration of a few scattered hypoattenuating lesions throughout the hepatic parenchyma.  For example, there is a 1.2 cm hypoattenuating lesion at the hepatic dome (axial series 3, image 27) and a 1.1 cm ill-defined area in the right hepatic lobe segment 8 (axial series 3, image 39).  No new lesions.  Portal vein is patent.  Gallbladder: Surgically absent.  Bile Ducts: No evidence of  dilated ducts.  Pancreas: No mass or peripancreatic fat stranding.  Spleen: Unremarkable.  Adrenals: Unremarkable.  Kidneys/ Ureters: Normal in size and location. Normal concentration and excretion of contrast. Punctate calcification in the left renal hilum is favored to represent a vascular calcification.  No ureteral dilatation.  Punctate hypodensity in the superior pole the right kidney, unchanged.  Slight increased size of a left renal mass measuring 2.7 x 1.7 cm on today's exam (axial series 3, image 71), previously 2.3 x 1.7.  Redemonstration of bilateral perinephric fat stranding.  Bladder: No evidence of wall thickening.  Reproductive organs: Mild enlargement of the prostate with dystrophic calcification.  GI Tract/Mesentery: Stomach and small bowel are normal in appearance.  Moderate stool burden throughout the colon.  Scattered colonic diverticula without evidence of diverticulitis.  No obstruction or inflammatory process.  Nonspecific stable mesenteric stranding.  Peritoneal Space: Minimal abdominopelvic free fluid, most prominent in the bilateral pericolic gutters.  No free air.  Retroperitoneum: No significant adenopathy.  Abdominal wall: Unremarkable.  Vasculature: Scattered prominent calcified noncalcified atherosclerotic plaque throughout the abdominal aorta and major branch vessels.  Mild infrarenal aortic ectasia maximally dilated up to 2.5 cm.  Bones: Multilevel degenerative changes of the spine.  Stable sclerotic focus in the T4 vertebral body.  Large lytic lesion with well-defined margins encompassing much of the right L3 vertebral body, unchanged.  Impression:  In this patient with a history of renal cell carcinoma, there is a slightly increased size of an exophytic left renal mass today measuring 2.7 cm, previously 2.3 cm.  Several hypodense hepatic lesions, unchanged since prior CT and CT 06/14/2018.  Stable well-defined L3 vertebral body lytic lesion.  Right upper lobe and pleural based left  "lower lobe micro nodules, unchanged since prior exam.  Additional stable findings, as above  RECIST SUMARY:  Date of prior examination for comparison: 03/15/2019  Lesion 1: Left kidney mass.  2.7 cm. Series 3 image 71.  Prior measurement 2.3 cm.  Lesion 2: Hepatic segment 2.  Not definitively visualized on today's exam or prior examination.  Lesion 3: Hepatic segment 7.  1.2 cm. Series 3 image 27.  Prior measurement 1.2 cm.  Lesion 4: Hepatic segment 4.  0.9 cm. Series 3 image 39.  Prior measurement 1.1 cm.    We reviewed scans are overall stable. No findings meeting RECIST criteria although renal mass slightly increased in size. No new lesions.       Oncology History:  Mr.Mc Moss is a 75 yo male with CAD (s/p 5 stents, remotely), DM2 (insulin dependent), hypothyroidism , CVA (2013) with no residual deficit, chronic back pain. He was transferred to [facility] for neurosurgery evaluation of a lumbar spine lesion in the setting of progressive weakness of his legs and exacerbation of chronic back pain, in May 2017. He states that his back pain and LE weakness progressively worsened. [facility]. The pain radiates down his legs. He denies any incontinence of bowel or bladder. He had several falls. After one of the falls, he presented to the ED at [facility], in May 2017. He had an MRI showing "L3 vertebral body lesion with apparent cortical disruption concerning for metastases vs myeloma," and was transferred to [facility] for neurosurgery evaluation. He was evaluated by neurosurgery regarding concern for lytic lesion on L3 on 5/26/17. No surgical intervention was proposed.   CT chest, abdomen,pelvis on 5/27/17 revealed :   --A 3.6 cm exophytic mass arising from the medial aspect of the lower pole of left kidney,   --A small 1.0 cm, subtle, cortical enhancing mass within the upper pole of the right kidney, concerning for possible contralateral solid renal mass  --Multiple enhancing " ill-defined hepatic masses concerning for hepatic metastatic disease, hepatomegaly and hepatic steatosis  --Mulltifocal irregular thickening of the bilateral pleura concerning for possible pleural metastatic disease  --Mild nonspecific nodular thickening of the left adrenal gland without discrete nodule  -- Redemonstration of known lytic lesion within the L3 vertebral body. No definite additional discrete lytic lesions are identified.  -MRI pelvis from 10/18/17:    2.5 cm linear T1 hypointense T2/STIR hyperintense lesion within the left iliac bone, this lesion is indeterminate but felt less likely to represent metastasis given its appearance. Further evaluation can be obtained with bone scan as clinically indicated.  Findings suggestive of left trochanteric bursitis.  Partially visualized L3 metastatic lesion.  Diffuse thickening of the urinary bladder wall can be seen with chronic outlet obstruction or cystitis.  - initiated on Votrient    Review of Systems   Constitutional: Positive for fatigue. Negative for activity change, appetite change, chills, fever and unexpected weight change.   HENT: Negative for congestion, dental problem, mouth sores, nosebleeds, rhinorrhea, sinus pressure, sore throat and trouble swallowing.    Eyes: Negative for visual disturbance.   Respiratory: Positive for shortness of breath (occasional, mild SOB on exertion- no worse. ). Negative for cough, chest tightness and wheezing.    Cardiovascular: Negative for chest pain, palpitations and leg swelling.   Gastrointestinal: Negative for abdominal distention, abdominal pain, blood in stool, constipation, diarrhea, nausea and vomiting.   Genitourinary: Negative for decreased urine volume, difficulty urinating, dysuria, frequency, hematuria and urgency.   Musculoskeletal: Positive for back pain (chronic and unchanged). Negative for arthralgias, gait problem, joint swelling, myalgias, neck pain and neck stiffness.   Skin: Negative for color  change, pallor, rash and wound.   Neurological: Negative for dizziness, weakness, light-headedness, numbness and headaches.   Hematological: Negative for adenopathy.   Psychiatric/Behavioral: Negative for dysphoric mood. The patient is not nervous/anxious.        Objective:      Physical Exam   Constitutional: He is oriented to person, place, and time. He appears well-developed and well-nourished. No distress.   Presents alone  ECOG=0   HENT:   Head: Normocephalic and atraumatic.   Mouth/Throat: Oropharynx is clear and moist. No oropharyngeal exudate.   Eyes: Pupils are equal, round, and reactive to light. Conjunctivae and EOM are normal. Right eye exhibits no discharge. Left eye exhibits no discharge. No scleral icterus.   Neck: Normal range of motion. Neck supple. No thyromegaly present.   Cardiovascular: Normal rate, regular rhythm, normal heart sounds and intact distal pulses. Exam reveals no gallop and no friction rub.   No murmur heard.  Pulmonary/Chest: Effort normal and breath sounds normal. No respiratory distress. He has no wheezes. He has no rales. He exhibits no tenderness.   Abdominal: Soft. Bowel sounds are normal. He exhibits no distension and no mass. There is no tenderness. There is no rebound.   No hepatosplenomegaly   Musculoskeletal: Normal range of motion. He exhibits no edema, tenderness or deformity.   No spinal or paraspinal tenderness to palpation   Lymphadenopathy:     He has no cervical adenopathy.   Neurological: He is alert and oriented to person, place, and time. No cranial nerve deficit or sensory deficit. Coordination normal.   Skin: Skin is warm and dry. No rash noted. He is not diaphoretic. No erythema. No pallor.   Psychiatric: He has a normal mood and affect. His behavior is normal. Thought content normal.   Nursing note and vitals reviewed.      WBC   Date Value Ref Range Status   08/16/2019 5.51 3.90 - 12.70 K/uL Final     Hemoglobin   Date Value Ref Range Status   08/16/2019  11.9 (L) 14.0 - 18.0 g/dL Final     Hematocrit   Date Value Ref Range Status   08/16/2019 38.7 (L) 40.0 - 54.0 % Final     Platelets   Date Value Ref Range Status   08/16/2019 365 (H) 150 - 350 K/uL Final     Gran # (ANC)   Date Value Ref Range Status   08/16/2019 3.1 1.8 - 7.7 K/uL Final     Gran%   Date Value Ref Range Status   08/16/2019 56.8 38.0 - 73.0 % Final       Chemistry        Component Value Date/Time     08/19/2019 1002    K 4.6 08/19/2019 1002     08/19/2019 1002    CO2 26 08/19/2019 1002    BUN 24 (H) 08/19/2019 1002    CREATININE 1.7 (H) 08/19/2019 1002     (H) 08/19/2019 1002        Component Value Date/Time    CALCIUM 9.2 08/19/2019 1002    ALKPHOS 109 08/19/2019 1002    AST 17 08/19/2019 1002    ALT 16 08/19/2019 1002    BILITOT 0.3 08/19/2019 1002    ESTGFRAFRICA 44.9 (A) 08/19/2019 1002    EGFRNONAA 38.9 (A) 08/19/2019 1002            Assessment:       1. Renal cell carcinoma of left kidney    2. 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 Metastatic renal cell carcinoma to liver    4. CRI (chronic renal insufficiency), stage 3 (moderate)        Plan:         -Doing well, clinically stable. No new issues.   -Labs reviewed. Renal function improved and trend reviewed. Continue hydration. Monitor closely.   -Will need IV hydration post all future scans.   -Continue Votrient.   -RTC 1 month to see Dr. Reed or myself with labs.  -Repeat scans in 2-3 months unless clinically indicated sooner. Will likely need in 2 months.   -Knows to call for any issues.    Patient is in agreement with the proposed treatment plan. All questions were answered to the patient's satisfaction. Pt knows to call clinic for any new or worsening symptoms and if anything is needed before the next clinic visit.      Jessica Siddiqi, YESENIAP-C  Hematology & Oncology  [facility] [place] [address]  ph. [phone]  Fax. [phone]     I spent 30 minutes (face to face) with the patient,  more than 50% was in counseling and coordination of care as detailed above.

## 2019-08-20 DIAGNOSIS — G89.3 NEOPLASM RELATED PAIN: ICD-10-CM

## 2019-08-20 DIAGNOSIS — C78.7 METASTATIC RENAL CELL CARCINOMA TO LIVER: ICD-10-CM

## 2019-08-20 DIAGNOSIS — C64.9 METASTATIC RENAL CELL CARCINOMA TO LIVER: ICD-10-CM

## 2019-08-20 RX ORDER — HYDROCODONE BITARTRATE AND ACETAMINOPHEN 5; 325 MG/1; MG/1
TABLET ORAL
Qty: 120 TABLET | Refills: 0 | Status: SHIPPED | OUTPATIENT
Start: 2019-08-20 | End: 2019-08-21 | Stop reason: SDUPTHER

## 2019-08-20 RX ORDER — MORPHINE SULFATE 15 MG/1
15 TABLET, FILM COATED, EXTENDED RELEASE ORAL EVERY 12 HOURS
Qty: 60 TABLET | Refills: 0 | Status: SHIPPED | OUTPATIENT
Start: 2019-08-20 | End: 2019-09-25 | Stop reason: SDUPTHER

## 2019-08-21 ENCOUNTER — PATIENT MESSAGE (OUTPATIENT)
Dept: HEMATOLOGY/ONCOLOGY | Facility: CLINIC | Age: 74
End: 2019-08-21

## 2019-08-21 ENCOUNTER — TELEPHONE (OUTPATIENT)
Dept: OPTOMETRY | Facility: CLINIC | Age: 74
End: 2019-08-21

## 2019-08-21 DIAGNOSIS — C78.7 METASTATIC RENAL CELL CARCINOMA TO LIVER: ICD-10-CM

## 2019-08-21 DIAGNOSIS — C64.9 METASTATIC RENAL CELL CARCINOMA TO LIVER: ICD-10-CM

## 2019-08-21 DIAGNOSIS — G89.3 NEOPLASM RELATED PAIN: ICD-10-CM

## 2019-08-21 RX ORDER — HYDROCODONE BITARTRATE AND ACETAMINOPHEN 5; 325 MG/1; MG/1
TABLET ORAL
Qty: 120 TABLET | Refills: 0 | Status: SHIPPED | OUTPATIENT
Start: 2019-08-21 | End: 2019-09-25 | Stop reason: SDUPTHER

## 2019-08-22 ENCOUNTER — HOSPITAL ENCOUNTER (OUTPATIENT)
Facility: HOSPITAL | Age: 74
Discharge: HOME | End: 2019-08-22
Attending: OPHTHALMOLOGY | Admitting: OPHTHALMOLOGY
Payer: MEDICARE

## 2019-08-22 ENCOUNTER — ANESTHESIA (OUTPATIENT)
Dept: SURGERY | Facility: HOSPITAL | Age: 74
End: 2019-08-22
Payer: MEDICARE

## 2019-08-22 ENCOUNTER — ANESTHESIA EVENT (OUTPATIENT)
Dept: SURGERY | Facility: HOSPITAL | Age: 74
End: 2019-08-22
Payer: MEDICARE

## 2019-08-22 VITALS
BODY MASS INDEX: 26.68 KG/M2 | WEIGHT: 170 LBS | DIASTOLIC BLOOD PRESSURE: 71 MMHG | OXYGEN SATURATION: 99 % | HEART RATE: 67 BPM | HEIGHT: 67 IN | SYSTOLIC BLOOD PRESSURE: 168 MMHG | RESPIRATION RATE: 16 BRPM | TEMPERATURE: 99 F

## 2019-08-22 DIAGNOSIS — H25.10 SENILE NUCLEAR SCLEROSIS: ICD-10-CM

## 2019-08-22 DIAGNOSIS — H25.12 NUCLEAR SENILE CATARACT OF LEFT EYE: Primary | ICD-10-CM

## 2019-08-22 LAB
POCT GLUCOSE: 163 MG/DL (ref 70–110)
POCT GLUCOSE: 172 MG/DL (ref 70–110)

## 2019-08-22 PROCEDURE — 25000003 PHARM REV CODE 250: Mod: HCNC | Performed by: OPHTHALMOLOGY

## 2019-08-22 PROCEDURE — 66982 PR REMOVAL, CATARACT, W/INSRT INTRAOC LENS, W/O ENDO CYCLO, CMPLX: ICD-10-PCS | Mod: HCNC,79,LT, | Performed by: OPHTHALMOLOGY

## 2019-08-22 PROCEDURE — 82962 GLUCOSE BLOOD TEST: CPT | Mod: HCNC | Performed by: OPHTHALMOLOGY

## 2019-08-22 PROCEDURE — C9447 INJ, PHENYLEPHRINE KETOROLAC: HCPCS | Mod: HCNC | Performed by: OPHTHALMOLOGY

## 2019-08-22 PROCEDURE — 63600175 PHARM REV CODE 636 W HCPCS: Mod: HCNC | Performed by: OPHTHALMOLOGY

## 2019-08-22 PROCEDURE — D9220A PRA ANESTHESIA: Mod: HCNC,ANES,, | Performed by: ANESTHESIOLOGY

## 2019-08-22 PROCEDURE — D9220A PRA ANESTHESIA: ICD-10-PCS | Mod: HCNC,ANES,, | Performed by: ANESTHESIOLOGY

## 2019-08-22 PROCEDURE — 25000003 PHARM REV CODE 250: Mod: HCNC

## 2019-08-22 PROCEDURE — 27201423 OPTIME MED/SURG SUP & DEVICES STERILE SUPPLY: Mod: HCNC | Performed by: OPHTHALMOLOGY

## 2019-08-22 PROCEDURE — 36000707: Mod: HCNC | Performed by: OPHTHALMOLOGY

## 2019-08-22 PROCEDURE — 66982 XCAPSL CTRC RMVL CPLX WO ECP: CPT | Mod: HCNC,79,LT, | Performed by: OPHTHALMOLOGY

## 2019-08-22 PROCEDURE — 37000009 HC ANESTHESIA EA ADD 15 MINS: Mod: HCNC | Performed by: OPHTHALMOLOGY

## 2019-08-22 PROCEDURE — V2632 POST CHMBR INTRAOCULAR LENS: HCPCS | Mod: HCNC | Performed by: OPHTHALMOLOGY

## 2019-08-22 PROCEDURE — 36000706: Mod: HCNC | Performed by: OPHTHALMOLOGY

## 2019-08-22 PROCEDURE — 37000008 HC ANESTHESIA 1ST 15 MINUTES: Mod: HCNC | Performed by: OPHTHALMOLOGY

## 2019-08-22 PROCEDURE — 63600175 PHARM REV CODE 636 W HCPCS: Mod: HCNC | Performed by: NURSE ANESTHETIST, CERTIFIED REGISTERED

## 2019-08-22 PROCEDURE — 71000015 HC POSTOP RECOV 1ST HR: Mod: HCNC | Performed by: OPHTHALMOLOGY

## 2019-08-22 DEVICE — LENS IOL ITEC PRELOAD 22.5D: Type: IMPLANTABLE DEVICE | Site: EYE | Status: FUNCTIONAL

## 2019-08-22 RX ORDER — PREDNISOLONE ACETATE 10 MG/ML
SUSPENSION/ DROPS OPHTHALMIC
Status: DISCONTINUED
Start: 2019-08-22 | End: 2019-08-22 | Stop reason: HOSPADM

## 2019-08-22 RX ORDER — ONDANSETRON 2 MG/ML
4 INJECTION INTRAMUSCULAR; INTRAVENOUS ONCE AS NEEDED
Status: DISCONTINUED | OUTPATIENT
Start: 2019-08-22 | End: 2019-08-22 | Stop reason: HOSPADM

## 2019-08-22 RX ORDER — LIDOCAINE HYDROCHLORIDE 40 MG/ML
INJECTION, SOLUTION RETROBULBAR
Status: DISCONTINUED
Start: 2019-08-22 | End: 2019-08-22 | Stop reason: HOSPADM

## 2019-08-22 RX ORDER — MOXIFLOXACIN 5 MG/ML
SOLUTION/ DROPS OPHTHALMIC
Status: DISCONTINUED | OUTPATIENT
Start: 2019-08-22 | End: 2019-08-22 | Stop reason: HOSPADM

## 2019-08-22 RX ORDER — SODIUM CHLORIDE 9 MG/ML
INJECTION, SOLUTION INTRAVENOUS CONTINUOUS PRN
Status: DISCONTINUED | OUTPATIENT
Start: 2019-08-22 | End: 2019-08-22

## 2019-08-22 RX ORDER — PREDNISOLONE ACETATE 10 MG/ML
SUSPENSION/ DROPS OPHTHALMIC
Status: DISCONTINUED | OUTPATIENT
Start: 2019-08-22 | End: 2019-08-22 | Stop reason: HOSPADM

## 2019-08-22 RX ORDER — MIDAZOLAM HYDROCHLORIDE 1 MG/ML
INJECTION, SOLUTION INTRAMUSCULAR; INTRAVENOUS
Status: DISCONTINUED | OUTPATIENT
Start: 2019-08-22 | End: 2019-08-22

## 2019-08-22 RX ORDER — KETOROLAC TROMETHAMINE 5 MG/ML
1 SOLUTION OPHTHALMIC ONCE
Status: COMPLETED | OUTPATIENT
Start: 2019-08-22 | End: 2019-08-22

## 2019-08-22 RX ORDER — LIDOCAINE HYDROCHLORIDE 10 MG/ML
INJECTION, SOLUTION EPIDURAL; INFILTRATION; INTRACAUDAL; PERINEURAL
Status: DISCONTINUED
Start: 2019-08-22 | End: 2019-08-22 | Stop reason: HOSPADM

## 2019-08-22 RX ORDER — MOXIFLOXACIN 5 MG/ML
1 SOLUTION/ DROPS OPHTHALMIC
Status: COMPLETED | OUTPATIENT
Start: 2019-08-22 | End: 2019-08-22

## 2019-08-22 RX ORDER — TROPICAMIDE 10 MG/ML
1 SOLUTION/ DROPS OPHTHALMIC
Status: DISPENSED | OUTPATIENT
Start: 2019-08-22

## 2019-08-22 RX ORDER — PHENYLEPHRINE HYDROCHLORIDE 25 MG/ML
1 SOLUTION/ DROPS OPHTHALMIC
Status: DISPENSED | OUTPATIENT
Start: 2019-08-22

## 2019-08-22 RX ORDER — PROPARACAINE HYDROCHLORIDE 5 MG/ML
1 SOLUTION/ DROPS OPHTHALMIC
Status: DISPENSED | OUTPATIENT
Start: 2019-08-22

## 2019-08-22 RX ORDER — LIDOCAINE HYDROCHLORIDE 10 MG/ML
INJECTION, SOLUTION EPIDURAL; INFILTRATION; INTRACAUDAL; PERINEURAL
Status: DISCONTINUED | OUTPATIENT
Start: 2019-08-22 | End: 2019-08-22 | Stop reason: HOSPADM

## 2019-08-22 RX ORDER — LIDOCAINE HYDROCHLORIDE 40 MG/ML
INJECTION, SOLUTION RETROBULBAR
Status: DISCONTINUED | OUTPATIENT
Start: 2019-08-22 | End: 2019-08-22 | Stop reason: HOSPADM

## 2019-08-22 RX ORDER — ACETAMINOPHEN 325 MG/1
650 TABLET ORAL EVERY 4 HOURS PRN
Status: DISCONTINUED | OUTPATIENT
Start: 2019-08-22 | End: 2019-08-22 | Stop reason: HOSPADM

## 2019-08-22 RX ADMIN — PHENYLEPHRINE HYDROCHLORIDE 1 DROP: 2.5 SOLUTION/ DROPS OPHTHALMIC at 06:08

## 2019-08-22 RX ADMIN — SODIUM CHLORIDE: 0.9 INJECTION, SOLUTION INTRAVENOUS at 07:08

## 2019-08-22 RX ADMIN — MIDAZOLAM HYDROCHLORIDE 1 MG: 1 INJECTION, SOLUTION INTRAMUSCULAR; INTRAVENOUS at 07:08

## 2019-08-22 RX ADMIN — MOXIFLOXACIN HYDROCHLORIDE 1 DROP: 5 SOLUTION/ DROPS OPHTHALMIC at 06:08

## 2019-08-22 RX ADMIN — TROPICAMIDE 1 DROP: 10 SOLUTION/ DROPS OPHTHALMIC at 06:08

## 2019-08-22 RX ADMIN — KETOROLAC TROMETHAMINE 1 DROP: 5 SOLUTION OPHTHALMIC at 06:08

## 2019-08-22 RX ADMIN — TROPICAMIDE 1 DROP: 10 SOLUTION/ DROPS OPHTHALMIC at 07:08

## 2019-08-22 RX ADMIN — MOXIFLOXACIN HYDROCHLORIDE 1 DROP: 5 SOLUTION/ DROPS OPHTHALMIC at 07:08

## 2019-08-22 RX ADMIN — PROPARACAINE HYDROCHLORIDE 1 DROP: 5 SOLUTION/ DROPS OPHTHALMIC at 06:08

## 2019-08-22 NOTE — ANESTHESIA PREPROCEDURE EVALUATION
08/22/2019  Edwin Powell is a 74 y.o., male scheduled for cataract in left eye.     Anesthesia Evaluation    I have reviewed the Patient Summary Reports.    I have reviewed the Nursing Notes.   I have reviewed the Medications.     Review of Systems  Anesthesia Hx:  No problems with previous Anesthesia  History of prior surgery of interest to airway management or planning: Denies Family Hx of Anesthesia complications.   Denies Personal Hx of Anesthesia complications.   Hematology/Oncology:  Hematology Normal   Oncology Normal     EENT/Dental:EENT/Dental Normal   Cardiovascular:   Hypertension CAD  CABG/stent   Denies Angina. Good functional status, denies chest pain   Pulmonary:   Denies Shortness of breath.  Denies Recent URI.    Renal/:   Chronic Renal Disease, CRI    Hepatic/GI:   GERD    Neurological:   CVA, no residual symptoms    Endocrine:   Diabetes, type 2 Hypothyroidism        Physical Exam  General:  Well nourished, Large Beard    Airway/Jaw/Neck:  Airway Findings: Mouth Opening: Normal Tongue: Normal  General Airway Assessment: Adult  Mallampati: II  Jaw/Neck Findings:  Neck ROM: Normal ROM      Dental:  Dental Findings: Upper Dentures, Lower Dentures   Chest/Lungs:  Chest/Lungs Findings: Clear to auscultation, Normal Respiratory Rate     Heart/Vascular:  Heart Findings: Rate: Normal  Rhythm: Regular Rhythm  Sounds: Normal        Mental Status:  Mental Status Findings:  Cooperative, Alert and Oriented         Anesthesia Plan  Type of Anesthesia, risks & benefits discussed:  Anesthesia Type:  general, MAC  Patient's Preference:   Intra-op Monitoring Plan: standard ASA monitors  Intra-op Monitoring Plan Comments:   Post Op Pain Control Plan: multimodal analgesia  Post Op Pain Control Plan Comments:   Induction:   IV  Beta Blocker:  Patient is not currently on a Beta-Blocker (No further  documentation required).       Informed Consent: Patient understands risks and agrees with Anesthesia plan.  Questions answered. Anesthesia consent signed with patient.  ASA Score: 3     Day of Surgery Review of History & Physical: I have interviewed and examined the patient. I have reviewed the patient's H&P dated:  There are no significant changes.          Ready For Surgery From Anesthesia Perspective.

## 2019-08-22 NOTE — DISCHARGE INSTRUCTIONS
Discharge Instructions for Cataract Surgery  A surgeon removed the cloudy lens in your eye and replaced it with a clear man-made lens. Be sure to have an adult family member or friend drive you home after surgery. Heres what you can expect following surgery and tips for a healthy recovery.  It is normal to have the following:  · Bruised or bloodshot eye for 7 days  · Itching and mild discomfort for several days  · Some fluid discharge  · Sensitivity to light  · Scratchy, sandlike feeling in the eye for 2 weeks  · Feeling tired, especially during the first 24 hours  Activity level  · Do not drive for 2 days or as instructed by your doctor.  · Do not drink alcohol for at least 24 hours.  · Avoid bending at the waist to  objects or lifting anything heavy for 2 days.  · Relax for the first 24 hours after surgery. Watching TV and reading are OK and wont harm your eye.  Eye protection  · Do not rub or press on your eye.  · Sleep on your back or on your unoperated side for 2 nights.  · If instructed, wear a bandage over your eye for 2 days and 2 nights.  · If instructed, wear a shield to protect your eye for 2 days and 2 nights.  Using eyedrops  You may be given special eyedrops or ointment. Here is one way to use eyedrops:  · Tilt your head back.  · Pull your bottom eyelid down.  · Squeeze one drop into your eye. Do not touch your eye with the bottle tip.  · Close your eyes for a few seconds.  · If you need more than one drop, wait a few minutes before adding the next one.   Call your doctor right away if you have any of the following:  · Bleeding or discharge from the eye  · Your vision suddenly becomes worse  · Pain medicine you are told to take does not ease your pain  · Nausea or vomiting  · Chills or fever over 100.4°F (39.1°C)   Date Last Reviewed: 5/30/2015  © 3264-6192 [employer]. [address] [place] [address], [place] [address]. All rights reserved. This information is not intended as a  substitute for professional medical care. Always follow your healthcare professional's instructions.

## 2019-08-22 NOTE — OP NOTE
DATE OF PROCEDURE: 08/22/2019    SURGEON: HERRERA CHEN MD    PREOPERATIVE DIAGNOSIS:  1. Senile nuclear sclerotic cataract left eye. 2. Poor mydriasis secondary to floppy iris syndrome left eye    POSTOPERATIVE DIAGNOSIS: 1.Senile nuclear sclerotic cataract left eye. 2. Poor mydriasis secondary to floppy iris syndrome left eye    PROCEDURE PERFORMED:  Complex phacoemulsification with placement of intraocular lens, left eye, with placement of a Malyugin ring.    IMPLANT:  PCBOO 22.5    ANESTHESIA:  Topical and MAC    COMPLICATIONS: none    ESTIMATED BLOOD LOSS: <1cc    SPECIMENS: none    INDICATIONS FOR PROCEDURE:  This patient presented to the clinic with decreased vision in the left eye and was found to have a cataract.  The risks, benefits, and alternatives were discussed and the patient agreed to proceed with phacoemulsification and implantation of a lens in the left eye.     PROCEDURE IN DETAIL:  The patient was met in the preop holding area.  Consent was confirmed to be signed.  The operative site was marked.  The patient was brought into the operating room by the anesthesia team and placed under monitored anesthesia care.  The left eye was prepped and draped in a sterile ophthalmic fashion.  A Roula speculum was placed into the left eye.   A paracentesis site was made and 1% preservative-free lidocaine was injected into the anterior chamber.  Viscoelastic  material was injected into the anterior chamber.  A keratome blade was used to make a clear corneal incision. The malyugin ring was inserted and positioned into place, assisting with pupillary dilation.  A cystotome was used to initiate the continuous curvilinear capsulorrhexis which was completed with Utrata forceps.  BSS on a merchant cannula was used to perform hydrodissection.  The phacoemulsification tip was introduced into the eye and the nucleus was removed in a standard divide-and-conquer fashion.  Remaining cortical material was removed from the  eye using irrigation-aspiration.  The capsular bag was filled with viscoelastic material and the intraocular lens was injected and positioned into place. The Malyugin ring was removed from the eye. Remaining viscoelastic material was removed from the eye using irrigation and aspiration.  The corneal wounds were hydrated.  The eye was filled to physiologic pressure. The wounds were found to be watertight. Drops of Vigamox and prednisilone were placed into the eye.  The eye was washed, dried, and shielded.  The patient tolerated the procedure well and knows to follow up with me tomorrow morning, sooner if needed.

## 2019-08-22 NOTE — ANESTHESIA POSTPROCEDURE EVALUATION
Anesthesia Post Evaluation    Patient: Edwin Powell    Procedure(s) Performed: Procedure(s) (LRB):  PHACOEMULSIFICATION, CATARACT (Left)  INSERTION, IOL PROSTHESIS (Left)    Final Anesthesia Type: MAC  Patient location during evaluation: PACU  Patient participation: Yes- Able to Participate  Level of consciousness: awake and alert and oriented  Post-procedure vital signs: reviewed and stable  Pain management: adequate  Airway patency: patent  PONV status at discharge: No PONV  Anesthetic complications: no      Cardiovascular status: blood pressure returned to baseline and hemodynamically stable  Respiratory status: unassisted  Hydration status: euvolemic  Follow-up not needed.          Vitals Value Taken Time   /76 8/22/2019  8:15 AM   Temp 36.6 °C (97.8 °F) 8/22/2019  6:50 AM   Pulse 68 8/22/2019  8:15 AM   Resp 16 8/22/2019  8:15 AM   SpO2 97 % 8/22/2019  8:15 AM   Vitals shown include unvalidated device data.      No case tracking events are documented in the log.      Pain/Lashaun Score: Lashaun Score: 9 (8/22/2019  8:15 AM)

## 2019-08-22 NOTE — INTERVAL H&P NOTE
The patient has been examined and the H&P has been reviewed:    I concur with the findings and no changes have occurred since H&P was written.    Anesthesia/Surgery risks, benefits and alternative options discussed and understood by patient/family.          Active Hospital Problems    Diagnosis  POA    Senile nuclear sclerosis [H25.10]  Yes      Resolved Hospital Problems   No resolved problems to display.

## 2019-08-22 NOTE — TRANSFER OF CARE
"Anesthesia Transfer of Care Note    Patient: Edwin Powell    Procedure(s) Performed: Procedure(s) (LRB):  PHACOEMULSIFICATION, CATARACT (Left)  INSERTION, IOL PROSTHESIS (Left)    Patient location: PACU    Anesthesia Type: MAC    Transport from OR: Transported from OR on 2-3 L/min O2 by NC with adequate spontaneous ventilation    Post pain: adequate analgesia    Post assessment: no apparent anesthetic complications    Post vital signs: stable    Level of consciousness: awake    Nausea/Vomiting: no nausea/vomiting    Complications: none    Transfer of care protocol was followed      Last vitals:   Visit Vitals  BP (!) 187/81 (BP Location: Left arm, Patient Position: Lying)   Pulse 73   Temp 36.6 °C (97.8 °F) (Oral)   Resp 18   Ht 5' 7" (1.702 m)   Wt 77.1 kg (170 lb)   SpO2 99%   BMI 26.63 kg/m²     "

## 2019-08-23 ENCOUNTER — OFFICE VISIT (OUTPATIENT)
Dept: OPHTHALMOLOGY | Facility: CLINIC | Age: 74
End: 2019-08-23
Payer: MEDICARE

## 2019-08-23 DIAGNOSIS — Z98.41 STATUS POST CATARACT EXTRACTION AND INSERTION OF INTRAOCULAR LENS, RIGHT: ICD-10-CM

## 2019-08-23 DIAGNOSIS — Z96.1 STATUS POST CATARACT EXTRACTION AND INSERTION OF INTRAOCULAR LENS, RIGHT: ICD-10-CM

## 2019-08-23 DIAGNOSIS — Z96.1 STATUS POST CATARACT EXTRACTION AND INSERTION OF INTRAOCULAR LENS, LEFT: Primary | ICD-10-CM

## 2019-08-23 DIAGNOSIS — Z98.42 STATUS POST CATARACT EXTRACTION AND INSERTION OF INTRAOCULAR LENS, LEFT: Primary | ICD-10-CM

## 2019-08-23 PROCEDURE — 99024 POSTOP FOLLOW-UP VISIT: CPT | Mod: HCNC,S$GLB,, | Performed by: OPHTHALMOLOGY

## 2019-08-23 PROCEDURE — 99024 PR POST-OP FOLLOW-UP VISIT: ICD-10-PCS | Mod: HCNC,S$GLB,, | Performed by: OPHTHALMOLOGY

## 2019-08-23 PROCEDURE — 99999 PR PBB SHADOW E&M-EST. PATIENT-LVL III: CPT | Mod: PBBFAC,HCNC,, | Performed by: OPHTHALMOLOGY

## 2019-08-23 PROCEDURE — 99999 PR PBB SHADOW E&M-EST. PATIENT-LVL III: ICD-10-PCS | Mod: PBBFAC,HCNC,, | Performed by: OPHTHALMOLOGY

## 2019-08-23 NOTE — PROGRESS NOTES
MORRO Andrews pt     S/p; phaco w/ IOL OS 8/22/19  S/p phaco w/IOL OD 6/27/19  H/o Kidney and Liver Cancer  H/o IDDM now diet controlled; pt states he has not been on insulin for   almost 2 yrs.    Hyperopia with astig    Combo TID OS    Patient states [facility] blurry OS. Denies any pain. No other ocular complaints.    Last edited by Beverly Knight on 8/23/2019  1:59 PM. (History)            Assessment /Plan     For exam results, see Encounter Report.    Status post cataract extraction and insertion of intraocular lens, left    Status post cataract extraction and insertion of intraocular lens, right      Slit Lamp Exam  L/L - normal  C/s - quiet  Cornea - clear  A/C - 1+ cell  Lens - PCIOL    POD #1 s/p phaco/IOL  - doing well  - continue the following drops:    vigamox or ocuflox TID x 1 wk then stop  Pred forte or durezol or dexamethasone TID x  4 wks  Ketorolac TID until runs out    Versus:    Combination drop - 1 drop TID x total of 1 month    Appropriate precautions and post op medications reviewed.  Patient instructed to call or come in if symptoms of redness, decreased vision, or pain are experienced.    -f/up 4 wks, sooner PRN. mrx if needed

## 2019-08-27 ENCOUNTER — TELEPHONE (OUTPATIENT)
Dept: PHARMACY | Facility: CLINIC | Age: 74
End: 2019-08-27

## 2019-09-12 NOTE — PROGRESS NOTES
Subjective:       Patient ID: Edwin Powell is a 74 y.o. male.    URGENT CARE:     Chief Complaint: kidney cancer    HPI     Mr. Singleton is a 74 y.o. male who returns for follow up regarding metastatic renal cell carcinoma of left kidney.  Returns for follow-up, currently on Votrient.  Last month he required IV hydration due to renal function.     No new complaints.   Appetite good.   Weight gain noted- eating more. Trying to watch salt though.   No new pain issues; has his usual right flank pain.   BP up today as he did not take his medications.   SOB on exertion- no worse than usual.   No fever/chills.   Urinating well.   Has an occasional diarrhea alternating with constipation.       Recent Imaging results:  CT scans 7/15/19:  FINDINGS:  Thoracic soft tissues: Stable right thyroid lobe 0.6 cm hypodensity.  Previously demonstrated left thyroid nodule not definitely identified.  Aorta: Normal in course and caliber.  Scattered plaque throughout aorta.  There are three branching vessels at the arch.  Heart: Normal in size.  No pericardial effusion.    Multivessel coronary atherosclerosis.  Alyssa/Mediastinum: Prominent right paratracheal superior mediastinal lymph node measures 1.1 cm in short axis (axial series 2, image 32, previously 0.9 cm.  Remaining mediastinal lymph nodes are unchanged.  Lungs: Stable micronodule in the posterior segment right upper lobe (axial series 2, image 48) resolved previously described ground-glass density in the right lower lobe.  Stable lateral left upper lobe pleural based micronodule (axial series 2, image 39).  Liver: Liver is normal in size and attenuation.  There is redemonstration of a few scattered hypoattenuating lesions throughout the hepatic parenchyma.  For example, there is a 1.2 cm hypoattenuating lesion at the hepatic dome (axial series 3, image 27) and a 1.1 cm ill-defined area in the right hepatic lobe segment 8 (axial series 3, image 39).  No new lesions.  Portal  vein is patent.  Gallbladder: Surgically absent.  Bile Ducts: No evidence of dilated ducts.  Pancreas: No mass or peripancreatic fat stranding.  Spleen: Unremarkable.  Adrenals: Unremarkable.  Kidneys/ Ureters: Normal in size and location. Normal concentration and excretion of contrast. Punctate calcification in the left renal hilum is favored to represent a vascular calcification.  No ureteral dilatation.  Punctate hypodensity in the superior pole the right kidney, unchanged.  Slight increased size of a left renal mass measuring 2.7 x 1.7 cm on today's exam (axial series 3, image 71), previously 2.3 x 1.7.  Redemonstration of bilateral perinephric fat stranding.  Bladder: No evidence of wall thickening.  Reproductive organs: Mild enlargement of the prostate with dystrophic calcification.  GI Tract/Mesentery: Stomach and small bowel are normal in appearance.  Moderate stool burden throughout the colon.  Scattered colonic diverticula without evidence of diverticulitis.  No obstruction or inflammatory process.  Nonspecific stable mesenteric stranding.  Peritoneal Space: Minimal abdominopelvic free fluid, most prominent in the bilateral pericolic gutters.  No free air.  Retroperitoneum: No significant adenopathy.  Abdominal wall: Unremarkable.  Vasculature: Scattered prominent calcified noncalcified atherosclerotic plaque throughout the abdominal aorta and major branch vessels.  Mild infrarenal aortic ectasia maximally dilated up to 2.5 cm.  Bones: Multilevel degenerative changes of the spine.  Stable sclerotic focus in the T4 vertebral body.  Large lytic lesion with well-defined margins encompassing much of the right L3 vertebral body, unchanged.  Impression:  In this patient with a history of renal cell carcinoma, there is a slightly increased size of an exophytic left renal mass today measuring 2.7 cm, previously 2.3 cm.  Several hypodense hepatic lesions, unchanged since prior CT and CT 06/14/2018.  Stable  "well-defined L3 vertebral body lytic lesion.  Right upper lobe and pleural based left lower lobe micro nodules, unchanged since prior exam.  Additional stable findings, as above  RECIST SUMARY:  Date of prior examination for comparison: 03/15/2019  Lesion 1: Left kidney mass.  2.7 cm. Series 3 image 71.  Prior measurement 2.3 cm.  Lesion 2: Hepatic segment 2.  Not definitively visualized on today's exam or prior examination.  Lesion 3: Hepatic segment 7.  1.2 cm. Series 3 image 27.  Prior measurement 1.2 cm.  Lesion 4: Hepatic segment 4.  0.9 cm. Series 3 image 39.  Prior measurement 1.1 cm.    We reviewed scans are overall stable. No findings meeting RECIST criteria although renal mass slightly increased in size. No new lesions.       Oncology History:  Mr.Mc Moss is a 73 yo male with CAD (s/p 5 stents, remotely), DM2 (insulin dependent), hypothyroidism , CVA (2013) with no residual deficit, chronic back pain. [place] [facility] [place] of a lumbar spine lesion in the setting of progressive weakness of his legs and exacerbation of chronic back pain, in May 2017. He states that his back pain and LE weakness progressively worsened. [facility]. The pain radiates down his legs. He denies any incontinence of bowel or bladder. He had several falls. After one of the falls, [place] in May [place]. He had an MRI showing "L3 vertebral body lesion with apparent cortical disruption concerning for metastases vs myeloma," and was transferred [place] [facility] [place] evaluation. He was evaluated by neurosurgery regarding concern for lytic lesion on L3 on 5/26/17. No surgical intervention was proposed.   CT chest, abdomen,pelvis on 5/27/17 revealed :   --A 3.6 cm exophytic mass arising from the medial aspect of the lower pole of left kidney,   --A small 1.0 cm, subtle, cortical enhancing mass within the upper pole of the right " kidney, concerning for possible contralateral solid renal mass  --Multiple enhancing ill-defined hepatic masses concerning for hepatic metastatic disease, hepatomegaly and hepatic steatosis  --Mulltifocal irregular thickening of the bilateral pleura concerning for possible pleural metastatic disease  --Mild nonspecific nodular thickening of the left adrenal gland without discrete nodule  -- Redemonstration of known lytic lesion within the L3 vertebral body. No definite additional discrete lytic lesions are identified.  -MRI pelvis from 10/18/17:    2.5 cm linear T1 hypointense T2/STIR hyperintense lesion within the left iliac bone, this lesion is indeterminate but felt less likely to represent metastasis given its appearance. Further evaluation can be obtained with bone scan as clinically indicated.  Findings suggestive of left trochanteric bursitis.  Partially visualized L3 metastatic lesion.  Diffuse thickening of the urinary bladder wall can be seen with chronic outlet obstruction or cystitis.  - initiated on Votrient    Review of Systems   Constitutional: Positive for fatigue. Negative for activity change, appetite change, chills, fever and unexpected weight change.   HENT: Negative for congestion, dental problem, mouth sores, nosebleeds, rhinorrhea, sinus pressure, sore throat and trouble swallowing.    Eyes: Negative for visual disturbance.   Respiratory: Positive for shortness of breath (occasional, mild SOB on exertion- no worse. ). Negative for cough, chest tightness and wheezing.    Cardiovascular: Negative for chest pain, palpitations and leg swelling.   Gastrointestinal: Positive for diarrhea (alternating with constipation). Negative for abdominal distention, abdominal pain, blood in stool, constipation, nausea and vomiting.   Genitourinary: Negative for decreased urine volume, difficulty urinating, dysuria, frequency, hematuria and urgency.   Musculoskeletal: Positive for back pain (chronic and  unchanged). Negative for arthralgias, gait problem, joint swelling, myalgias, neck pain and neck stiffness.   Skin: Negative for color change, pallor, rash and wound.   Neurological: Negative for dizziness, weakness, light-headedness, numbness and headaches.   Hematological: Negative for adenopathy.   Psychiatric/Behavioral: Negative for dysphoric mood. The patient is not nervous/anxious.        Objective:      Physical Exam   Constitutional: He is oriented to person, place, and time. He appears well-developed and well-nourished. No distress.   Presents alone  ECOG=0   HENT:   Head: Normocephalic and atraumatic.   Mouth/Throat: Oropharynx is clear and moist. No oropharyngeal exudate.   Eyes: Pupils are equal, round, and reactive to light. Conjunctivae and EOM are normal. Right eye exhibits no discharge. Left eye exhibits no discharge. No scleral icterus.   Neck: Normal range of motion. Neck supple. No thyromegaly present.   Cardiovascular: Normal rate, regular rhythm, normal heart sounds and intact distal pulses. Exam reveals no gallop and no friction rub.   No murmur heard.  Pulmonary/Chest: Effort normal and breath sounds normal. No respiratory distress. He has no wheezes. He has no rales. He exhibits no tenderness.   Abdominal: Soft. Bowel sounds are normal. He exhibits no mass. There is no tenderness. There is no rebound.   No hepatosplenomegaly  Slightly bloated but soft   Musculoskeletal: Normal range of motion. He exhibits no edema, tenderness or deformity.   No spinal or paraspinal tenderness to palpation   Lymphadenopathy:     He has no cervical adenopathy.   Neurological: He is alert and oriented to person, place, and time. No cranial nerve deficit or sensory deficit. Coordination normal.   Skin: Skin is warm and dry. No rash noted. He is not diaphoretic. No erythema. No pallor.   Psychiatric: He has a normal mood and affect. His behavior is normal. Thought content normal.   Nursing note and vitals  reviewed.      WBC   Date Value Ref Range Status   09/13/2019 7.18 3.90 - 12.70 K/uL Final     Hemoglobin   Date Value Ref Range Status   09/13/2019 12.2 (L) 14.0 - 18.0 g/dL Final     Hematocrit   Date Value Ref Range Status   09/13/2019 37.9 (L) 40.0 - 54.0 % Final     Platelets   Date Value Ref Range Status   09/13/2019 341 150 - 350 K/uL Final     Gran # (ANC)   Date Value Ref Range Status   09/13/2019 4.0 1.8 - 7.7 K/uL Final     Comment:     The ANC is based on a white cell differential from an   automated cell counter. It has not been microscopically   reviewed for the presence of abnormal cells. Clinical   correlation is required.         Chemistry        Component Value Date/Time     09/13/2019 1119    K 5.1 09/13/2019 1119     09/13/2019 1119    CO2 26 09/13/2019 1119    BUN 28 (H) 09/13/2019 1119    CREATININE 1.7 (H) 09/13/2019 1119     (H) 09/13/2019 1119        Component Value Date/Time    CALCIUM 9.8 09/13/2019 1119    ALKPHOS 124 09/13/2019 1119    AST 17 09/13/2019 1119    ALT 16 09/13/2019 1119    BILITOT 0.3 09/13/2019 1119    ESTGFRAFRICA 44.9 (A) 09/13/2019 1119    EGFRNONAA 38.9 (A) 09/13/2019 1119            Assessment:       1. Renal cell carcinoma of left kidney    2. 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 Metastatic renal cell carcinoma to liver    4. Neoplasm related pain    5. CRI (chronic renal insufficiency), stage 3 (moderate)        Plan:         -Doing well, clinically stable. No new issues.   -add TSH, T4 to labs as he is slightly more fatigued. On synthroid.   -Labs reviewed. Stable.   -Will need IV hydration post all future scans.   -Continue Votrient.   -RTC 1 month to see Dr. Reed with labs and scans. IV hydration post scan- 1 liter IV NS over an hour.  -Refuses colonoscopy       Patient is in agreement with the proposed treatment plan. All questions were answered to the patient's satisfaction. Pt knows to call clinic for any new or worsening symptoms  and if anything is needed before the next clinic visit.      Jessica Siddiqi, FNP-C  Hematology & Oncology  [facility] [place] [address]  ph. [phone]  Fax. [phone]     I spent 30 minutes (face to face) with the patient, more than 50% was in counseling and coordination of care as detailed above.

## 2019-09-13 ENCOUNTER — OFFICE VISIT (OUTPATIENT)
Dept: HEMATOLOGY/ONCOLOGY | Facility: CLINIC | Age: 74
End: 2019-09-13
Payer: MEDICARE

## 2019-09-13 ENCOUNTER — LAB VISIT (OUTPATIENT)
Dept: LAB | Facility: HOSPITAL | Age: 74
End: 2019-09-13
Payer: MEDICARE

## 2019-09-13 VITALS
OXYGEN SATURATION: 99 % | TEMPERATURE: 98 F | DIASTOLIC BLOOD PRESSURE: 76 MMHG | BODY MASS INDEX: 27.93 KG/M2 | HEIGHT: 67 IN | SYSTOLIC BLOOD PRESSURE: 172 MMHG | RESPIRATION RATE: 16 BRPM | WEIGHT: 177.94 LBS | HEART RATE: 69 BPM

## 2019-09-13 DIAGNOSIS — C79.51 METASTATIC RENAL CELL CARCINOMA TO BONE: ICD-10-CM

## 2019-09-13 DIAGNOSIS — N18.9 ACUTE ON CHRONIC RENAL INSUFFICIENCY: ICD-10-CM

## 2019-09-13 DIAGNOSIS — C64.2 RENAL CELL CARCINOMA OF LEFT KIDNEY: Primary | ICD-10-CM

## 2019-09-13 DIAGNOSIS — C64.9 METASTATIC RENAL CELL CARCINOMA TO BONE: ICD-10-CM

## 2019-09-13 DIAGNOSIS — C78.7 METASTATIC RENAL CELL CARCINOMA TO LIVER: ICD-10-CM

## 2019-09-13 DIAGNOSIS — N18.30 CRI (CHRONIC RENAL INSUFFICIENCY), STAGE 3 (MODERATE): ICD-10-CM

## 2019-09-13 DIAGNOSIS — G89.3 NEOPLASM RELATED PAIN: ICD-10-CM

## 2019-09-13 DIAGNOSIS — C64.9 METASTATIC RENAL CELL CARCINOMA TO LIVER: ICD-10-CM

## 2019-09-13 DIAGNOSIS — N28.9 ACUTE ON CHRONIC RENAL INSUFFICIENCY: ICD-10-CM

## 2019-09-13 LAB
ALBUMIN SERPL BCP-MCNC: 2.9 G/DL (ref 3.5–5.2)
ALP SERPL-CCNC: 124 U/L (ref 55–135)
ALT SERPL W/O P-5'-P-CCNC: 16 U/L (ref 10–44)
ANION GAP SERPL CALC-SCNC: 8 MMOL/L (ref 8–16)
AST SERPL-CCNC: 17 U/L (ref 10–40)
BILIRUB SERPL-MCNC: 0.3 MG/DL (ref 0.1–1)
BUN SERPL-MCNC: 28 MG/DL (ref 8–23)
CALCIUM SERPL-MCNC: 9.8 MG/DL (ref 8.7–10.5)
CHLORIDE SERPL-SCNC: 103 MMOL/L (ref 95–110)
CO2 SERPL-SCNC: 26 MMOL/L (ref 23–29)
CREAT SERPL-MCNC: 1.7 MG/DL (ref 0.5–1.4)
ERYTHROCYTE [DISTWIDTH] IN BLOOD BY AUTOMATED COUNT: 13.6 % (ref 11.5–14.5)
EST. GFR  (AFRICAN AMERICAN): 44.9 ML/MIN/1.73 M^2
EST. GFR  (NON AFRICAN AMERICAN): 38.9 ML/MIN/1.73 M^2
GLUCOSE SERPL-MCNC: 168 MG/DL (ref 70–110)
HCT VFR BLD AUTO: 37.9 % (ref 40–54)
HGB BLD-MCNC: 12.2 G/DL (ref 14–18)
IMM GRANULOCYTES # BLD AUTO: 0.03 K/UL (ref 0–0.04)
MCH RBC QN AUTO: 33.2 PG (ref 27–31)
MCHC RBC AUTO-ENTMCNC: 32.2 G/DL (ref 32–36)
MCV RBC AUTO: 103 FL (ref 82–98)
NEUTROPHILS # BLD AUTO: 4 K/UL (ref 1.8–7.7)
PLATELET # BLD AUTO: 341 K/UL (ref 150–350)
PMV BLD AUTO: 9.7 FL (ref 9.2–12.9)
POTASSIUM SERPL-SCNC: 5.1 MMOL/L (ref 3.5–5.1)
PROT SERPL-MCNC: 6.6 G/DL (ref 6–8.4)
RBC # BLD AUTO: 3.68 M/UL (ref 4.6–6.2)
SODIUM SERPL-SCNC: 137 MMOL/L (ref 136–145)
T4 FREE SERPL-MCNC: 0.87 NG/DL (ref 0.71–1.51)
TSH SERPL DL<=0.005 MIU/L-ACNC: 4.06 UIU/ML (ref 0.4–4)
WBC # BLD AUTO: 7.18 K/UL (ref 3.9–12.7)

## 2019-09-13 PROCEDURE — 85027 COMPLETE CBC AUTOMATED: CPT | Mod: HCNC

## 2019-09-13 PROCEDURE — 99999 PR PBB SHADOW E&M-EST. PATIENT-LVL IV: CPT | Mod: PBBFAC,HCNC,, | Performed by: NURSE PRACTITIONER

## 2019-09-13 PROCEDURE — 1101F PT FALLS ASSESS-DOCD LE1/YR: CPT | Mod: HCNC,CPTII,S$GLB, | Performed by: NURSE PRACTITIONER

## 2019-09-13 PROCEDURE — 80053 COMPREHEN METABOLIC PANEL: CPT | Mod: HCNC

## 2019-09-13 PROCEDURE — 84443 ASSAY THYROID STIM HORMONE: CPT | Mod: HCNC

## 2019-09-13 PROCEDURE — 84439 ASSAY OF FREE THYROXINE: CPT | Mod: HCNC

## 2019-09-13 PROCEDURE — 99214 PR OFFICE/OUTPT VISIT, EST, LEVL IV, 30-39 MIN: ICD-10-PCS | Mod: HCNC,S$GLB,, | Performed by: NURSE PRACTITIONER

## 2019-09-13 PROCEDURE — 3078F PR MOST RECENT DIASTOLIC BLOOD PRESSURE < 80 MM HG: ICD-10-PCS | Mod: HCNC,CPTII,S$GLB, | Performed by: NURSE PRACTITIONER

## 2019-09-13 PROCEDURE — 3078F DIAST BP <80 MM HG: CPT | Mod: HCNC,CPTII,S$GLB, | Performed by: NURSE PRACTITIONER

## 2019-09-13 PROCEDURE — 3077F PR MOST RECENT SYSTOLIC BLOOD PRESSURE >= 140 MM HG: ICD-10-PCS | Mod: HCNC,CPTII,S$GLB, | Performed by: NURSE PRACTITIONER

## 2019-09-13 PROCEDURE — 1101F PR PT FALLS ASSESS DOC 0-1 FALLS W/OUT INJ PAST YR: ICD-10-PCS | Mod: HCNC,CPTII,S$GLB, | Performed by: NURSE PRACTITIONER

## 2019-09-13 PROCEDURE — 36415 COLL VENOUS BLD VENIPUNCTURE: CPT | Mod: HCNC

## 2019-09-13 PROCEDURE — 99499 UNLISTED E&M SERVICE: CPT | Mod: S$GLB,,, | Performed by: NURSE PRACTITIONER

## 2019-09-13 PROCEDURE — 99214 OFFICE O/P EST MOD 30 MIN: CPT | Mod: HCNC,S$GLB,, | Performed by: NURSE PRACTITIONER

## 2019-09-13 PROCEDURE — 99999 PR PBB SHADOW E&M-EST. PATIENT-LVL IV: ICD-10-PCS | Mod: PBBFAC,HCNC,, | Performed by: NURSE PRACTITIONER

## 2019-09-13 PROCEDURE — 99499 RISK ADDL DX/OHS AUDIT: ICD-10-PCS | Mod: S$GLB,,, | Performed by: NURSE PRACTITIONER

## 2019-09-13 PROCEDURE — 3077F SYST BP >= 140 MM HG: CPT | Mod: HCNC,CPTII,S$GLB, | Performed by: NURSE PRACTITIONER

## 2019-09-13 RX ORDER — HEPARIN 100 UNIT/ML
500 SYRINGE INTRAVENOUS
Status: CANCELLED | OUTPATIENT
Start: 2019-10-11

## 2019-09-13 RX ORDER — SODIUM CHLORIDE 0.9 % (FLUSH) 0.9 %
10 SYRINGE (ML) INJECTION
Status: CANCELLED | OUTPATIENT
Start: 2019-10-11

## 2019-09-13 NOTE — Clinical Note
RTC 1 month to see Dr. Reed with cbc, cmp and ct chest/abd/pelvis. Set up for  IV hydration post scan- 1 liter IV NS over an hour (same day)

## 2019-09-23 ENCOUNTER — OFFICE VISIT (OUTPATIENT)
Dept: OPHTHALMOLOGY | Facility: CLINIC | Age: 74
End: 2019-09-23
Payer: MEDICARE

## 2019-09-23 DIAGNOSIS — Z98.41 STATUS POST CATARACT EXTRACTION AND INSERTION OF INTRAOCULAR LENS, RIGHT: Primary | ICD-10-CM

## 2019-09-23 DIAGNOSIS — Z98.42 STATUS POST CATARACT EXTRACTION AND INSERTION OF INTRAOCULAR LENS, LEFT: ICD-10-CM

## 2019-09-23 DIAGNOSIS — Z96.1 STATUS POST CATARACT EXTRACTION AND INSERTION OF INTRAOCULAR LENS, LEFT: ICD-10-CM

## 2019-09-23 DIAGNOSIS — Z96.1 STATUS POST CATARACT EXTRACTION AND INSERTION OF INTRAOCULAR LENS, RIGHT: Primary | ICD-10-CM

## 2019-09-23 PROCEDURE — 99024 PR POST-OP FOLLOW-UP VISIT: ICD-10-PCS | Mod: HCNC,S$GLB,, | Performed by: OPHTHALMOLOGY

## 2019-09-23 PROCEDURE — 99999 PR PBB SHADOW E&M-EST. PATIENT-LVL III: ICD-10-PCS | Mod: PBBFAC,HCNC,, | Performed by: OPHTHALMOLOGY

## 2019-09-23 PROCEDURE — 99024 POSTOP FOLLOW-UP VISIT: CPT | Mod: HCNC,S$GLB,, | Performed by: OPHTHALMOLOGY

## 2019-09-23 PROCEDURE — 99999 PR PBB SHADOW E&M-EST. PATIENT-LVL III: CPT | Mod: PBBFAC,HCNC,, | Performed by: OPHTHALMOLOGY

## 2019-09-23 NOTE — PROGRESS NOTES
MORRO Andrews pt     S/p phaco w/ IOL OS 8/22/19  S/p phaco w/IOL OD 6/27/19  H/o Kidney and Liver Cancer  H/o IDDM now diet controlled; pt states he has not been on insulin for   almost 2 yrs.    Hyperopia with astig    Pt here for 1 month post op check.  Pt states it feels like there is   something under his upper eyelid OS and gives him a headache.  Pt states   tearing OU.  Pt denies eye pain OU. Pt states he occasionally has floaters   OS.     Last edited by Chary Culp MD on 9/23/2019  1:27 PM. (History)            Assessment /Plan     For exam results, see Encounter Report.    Status post cataract extraction and insertion of intraocular lens, right    Status post cataract extraction and insertion of intraocular lens, left      S/p phaco w/ IOL OS 8/22/19  S/p phaco w/IOL OD 6/27/19    Doing well, [facility].    Blepharitis  - WC, LH    F/up 6 mo - establish care with dr montes

## 2019-09-25 ENCOUNTER — TELEPHONE (OUTPATIENT)
Dept: PHARMACY | Facility: CLINIC | Age: 74
End: 2019-09-25

## 2019-09-25 DIAGNOSIS — C78.7 METASTATIC RENAL CELL CARCINOMA TO LIVER: ICD-10-CM

## 2019-09-25 DIAGNOSIS — G89.3 NEOPLASM RELATED PAIN: ICD-10-CM

## 2019-09-25 DIAGNOSIS — C64.9 METASTATIC RENAL CELL CARCINOMA TO LIVER: ICD-10-CM

## 2019-09-25 RX ORDER — HYDROCODONE BITARTRATE AND ACETAMINOPHEN 5; 325 MG/1; MG/1
TABLET ORAL
Qty: 120 TABLET | Refills: 0 | Status: SHIPPED | OUTPATIENT
Start: 2019-09-25 | End: 2019-11-04 | Stop reason: SDUPTHER

## 2019-09-25 RX ORDER — MORPHINE SULFATE 15 MG/1
15 TABLET, FILM COATED, EXTENDED RELEASE ORAL EVERY 12 HOURS
Qty: 60 TABLET | Refills: 0 | Status: SHIPPED | OUTPATIENT
Start: 2019-09-25 | End: 2019-11-04 | Stop reason: SDUPTHER

## 2019-09-26 ENCOUNTER — TELEPHONE (OUTPATIENT)
Dept: PRIMARY CARE CLINIC | Facility: CLINIC | Age: 74
End: 2019-09-26

## 2019-10-14 ENCOUNTER — TELEPHONE (OUTPATIENT)
Dept: INFUSION THERAPY | Facility: HOSPITAL | Age: 74
End: 2019-10-14

## 2019-10-14 NOTE — PROGRESS NOTES
Subjective:       Patient ID: Edwin Powell is a 74 y.o. male.    Chief Complaint: No chief complaint on file.    HPI     Mr. Singleton is a 74 y.o. male who returns for follow up regarding metastatic renal cell carcinoma of left kidney.  Returns for follow-up, currently on Votrient.  Reports has not received Votrient supply  Sates he was called and did not return call yet  Found he had 1 month supply already and 8 left from current supply so he will call next week     Yesterday he did not feel well so actually missed his scans and has rescheduled, feels better now  No new complaints.   Appetite good.   Weight stable.  No new pain issues; has his usual right flank pain.   States easy fatigue-- which is about the same  Has an occasional diarrhea alternating with constipation.      Recent Imaging results:  CT scans 7/15/19:  FINDINGS:  Thoracic soft tissues: Stable right thyroid lobe 0.6 cm hypodensity.  Previously demonstrated left thyroid nodule not definitely identified.  Aorta: Normal in course and caliber.  Scattered plaque throughout aorta.  There are three branching vessels at the arch.  Heart: Normal in size.  No pericardial effusion.    Multivessel coronary atherosclerosis.  Alyssa/Mediastinum: Prominent right paratracheal superior mediastinal lymph node measures 1.1 cm in short axis (axial series 2, image 32, previously 0.9 cm.  Remaining mediastinal lymph nodes are unchanged.  Lungs: Stable micronodule in the posterior segment right upper lobe (axial series 2, image 48) resolved previously described ground-glass density in the right lower lobe.  Stable lateral left upper lobe pleural based micronodule (axial series 2, image 39).  Liver: Liver is normal in size and attenuation.  There is redemonstration of a few scattered hypoattenuating lesions throughout the hepatic parenchyma.  For example, there is a 1.2 cm hypoattenuating lesion at the hepatic dome (axial series 3, image 27) and a 1.1 cm ill-defined area  in the right hepatic lobe segment 8 (axial series 3, image 39).  No new lesions.  Portal vein is patent.  Gallbladder: Surgically absent.  Bile Ducts: No evidence of dilated ducts.  Pancreas: No mass or peripancreatic fat stranding.  Spleen: Unremarkable.  Adrenals: Unremarkable.  Kidneys/ Ureters: Normal in size and location. Normal concentration and excretion of contrast. Punctate calcification in the left renal hilum is favored to represent a vascular calcification.  No ureteral dilatation.  Punctate hypodensity in the superior pole the right kidney, unchanged.  Slight increased size of a left renal mass measuring 2.7 x 1.7 cm on today's exam (axial series 3, image 71), previously 2.3 x 1.7.  Redemonstration of bilateral perinephric fat stranding.  Bladder: No evidence of wall thickening.  Reproductive organs: Mild enlargement of the prostate with dystrophic calcification.  GI Tract/Mesentery: Stomach and small bowel are normal in appearance.  Moderate stool burden throughout the colon.  Scattered colonic diverticula without evidence of diverticulitis.  No obstruction or inflammatory process.  Nonspecific stable mesenteric stranding.  Peritoneal Space: Minimal abdominopelvic free fluid, most prominent in the bilateral pericolic gutters.  No free air.  Retroperitoneum: No significant adenopathy.  Abdominal wall: Unremarkable.  Vasculature: Scattered prominent calcified noncalcified atherosclerotic plaque throughout the abdominal aorta and major branch vessels.  Mild infrarenal aortic ectasia maximally dilated up to 2.5 cm.  Bones: Multilevel degenerative changes of the spine.  Stable sclerotic focus in the T4 vertebral body.  Large lytic lesion with well-defined margins encompassing much of the right L3 vertebral body, unchanged.  Impression:  In this patient with a history of renal cell carcinoma, there is a slightly increased size of an exophytic left renal mass today measuring 2.7 cm, previously 2.3  "cm.  Several hypodense hepatic lesions, unchanged since prior CT and CT 06/14/2018.  Stable well-defined L3 vertebral body lytic lesion.  Right upper lobe and pleural based left lower lobe micro nodules, unchanged since prior exam.  Additional stable findings, as above  RECIST SUMARY:  Date of prior examination for comparison: 03/15/2019  Lesion 1: Left kidney mass.  2.7 cm. Series 3 image 71.  Prior measurement 2.3 cm.  Lesion 2: Hepatic segment 2.  Not definitively visualized on today's exam or prior examination.  Lesion 3: Hepatic segment 7.  1.2 cm. Series 3 image 27.  Prior measurement 1.2 cm.  Lesion 4: Hepatic segment 4.  0.9 cm. Series 3 image 39.  Prior measurement 1.1 cm.     We reviewed scans are overall stable. No findings meeting RECIST criteria although renal mass slightly increased in size. No new lesions.        Oncology History:  Mr.Mc Moss is a 73 yo male with CAD (s/p 5 stents, remotely), DM2 (insulin dependent), hypothyroidism , CVA (2013) with no residual deficit, chronic back pain. He was transferred to [facility] [place] for neurosurgery evaluation of a lumbar spine lesion in the setting of progressive weakness of his legs and exacerbation of chronic back pain, in May 2017. He states that his back pain and LE weakness progressively worsened. [facility]. The pain radiates down his legs. He denies any incontinence of bowel or bladder. He had several falls. After one of the falls, he presented to the ED at [facility], in May 2017. He had an MRI showing "L3 vertebral body lesion with apparent cortical disruption concerning for metastases vs myeloma," and was transferred to [facility] [place] for neurosurgery evaluation. He was evaluated by neurosurgery regarding concern for lytic lesion on L3 on 5/26/17. No surgical intervention was proposed.   CT chest, abdomen,pelvis on 5/27/17 revealed :   --A 3.6 cm exophytic mass arising from the medial aspect of the lower pole of left " kidney,   --A small 1.0 cm, subtle, cortical enhancing mass within the upper pole of the right kidney, concerning for possible contralateral solid renal mass  --Multiple enhancing ill-defined hepatic masses concerning for hepatic metastatic disease, hepatomegaly and hepatic steatosis  --Mulltifocal irregular thickening of the bilateral pleura concerning for possible pleural metastatic disease  --Mild nonspecific nodular thickening of the left adrenal gland without discrete nodule  -- Redemonstration of known lytic lesion within the L3 vertebral body. No definite additional discrete lytic lesions are identified.  -MRI pelvis from 10/18/17:    2.5 cm linear T1 hypointense T2/STIR hyperintense lesion within the left iliac bone, this lesion is indeterminate but felt less likely to represent metastasis given its appearance. Further evaluation can be obtained with bone scan as clinically indicated.  Findings suggestive of left trochanteric bursitis.  Partially visualized L3 metastatic lesion.  Diffuse thickening of the urinary bladder wall can be seen with chronic outlet obstruction or cystitis.  - initiated on Votrient    Review of Systems   Constitutional: Positive for fatigue. Negative for activity change, appetite change, chills, fever and unexpected weight change.   HENT: Negative for congestion, dental problem, mouth sores, nosebleeds, rhinorrhea, sinus pressure, sore throat and trouble swallowing.    Eyes: Negative for visual disturbance.   Respiratory: Positive for shortness of breath (occasional, mild SOB on exertion- no worse. ). Negative for cough, chest tightness and wheezing.    Cardiovascular: Negative for chest pain, palpitations and leg swelling.   Gastrointestinal: Positive for diarrhea (alternating with constipation). Negative for abdominal distention, abdominal pain, blood in stool, constipation, nausea and vomiting.   Genitourinary: Negative for decreased urine volume, difficulty urinating, dysuria,  frequency, hematuria and urgency.   Musculoskeletal: Positive for back pain (chronic and unchanged). Negative for arthralgias, gait problem, joint swelling, myalgias, neck pain and neck stiffness.   Skin: Negative for color change, pallor, rash and wound.   Neurological: Negative for dizziness, weakness, light-headedness, numbness and headaches.   Hematological: Negative for adenopathy.   Psychiatric/Behavioral: Negative for dysphoric mood. The patient is not nervous/anxious.        Objective:      Physical Exam   Constitutional: He is oriented to person, place, and time. He appears well-developed and well-nourished. No distress.   Presents alone   HENT:   Head: Normocephalic and atraumatic.   Mouth/Throat: Oropharynx is clear and moist. No oropharyngeal exudate.   Eyes: Pupils are equal, round, and reactive to light. Conjunctivae and EOM are normal. No scleral icterus.   Neck: Normal range of motion. Neck supple. No thyromegaly present.   Cardiovascular: Normal rate, regular rhythm, normal heart sounds and intact distal pulses. Exam reveals no gallop and no friction rub.   No murmur heard.  Pulmonary/Chest: Effort normal and breath sounds normal. No respiratory distress. He has no wheezes. He has no rales. He exhibits no tenderness.   Abdominal: Soft. Bowel sounds are normal. He exhibits no mass. There is no tenderness. There is no rebound.   No hepatosplenomegaly  Slightly bloated but soft   Musculoskeletal: Normal range of motion. He exhibits no edema, tenderness or deformity.   No spinal or paraspinal tenderness to palpation   Lymphadenopathy:     He has no cervical adenopathy.   Neurological: He is alert and oriented to person, place, and time. No sensory deficit. Coordination normal.   Skin: Skin is warm and dry. No rash noted. He is not diaphoretic. No erythema. No pallor.   Psychiatric: He has a normal mood and affect. His behavior is normal. Judgment and thought content normal.   Nursing note and vitals  reviewed.    Labs- reviewed  Assessment:       1. Metastatic renal cell carcinoma to liver    2. 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 Renal cell carcinoma of left kidney        Plan:     1-3. Continue Votrient  Check scans as rescheduled until later this week  We will call him with these results  Labs reviewed    All questions answered  25 minutes total

## 2019-10-15 ENCOUNTER — OFFICE VISIT (OUTPATIENT)
Dept: HEMATOLOGY/ONCOLOGY | Facility: CLINIC | Age: 74
End: 2019-10-15
Payer: MEDICARE

## 2019-10-15 ENCOUNTER — LAB VISIT (OUTPATIENT)
Dept: LAB | Facility: HOSPITAL | Age: 74
End: 2019-10-15
Attending: NURSE PRACTITIONER
Payer: MEDICARE

## 2019-10-15 ENCOUNTER — IMMUNIZATION (OUTPATIENT)
Dept: PHARMACY | Facility: CLINIC | Age: 74
End: 2019-10-15
Payer: MEDICARE

## 2019-10-15 VITALS
BODY MASS INDEX: 27.41 KG/M2 | RESPIRATION RATE: 16 BRPM | OXYGEN SATURATION: 96 % | HEART RATE: 66 BPM | DIASTOLIC BLOOD PRESSURE: 90 MMHG | SYSTOLIC BLOOD PRESSURE: 196 MMHG | TEMPERATURE: 98 F | HEIGHT: 67 IN | WEIGHT: 174.63 LBS

## 2019-10-15 DIAGNOSIS — C78.7 METASTATIC RENAL CELL CARCINOMA TO LIVER: Primary | ICD-10-CM

## 2019-10-15 DIAGNOSIS — C64.2 RENAL CELL CARCINOMA OF LEFT KIDNEY: ICD-10-CM

## 2019-10-15 DIAGNOSIS — C64.9 METASTATIC RENAL CELL CARCINOMA TO LIVER: Primary | ICD-10-CM

## 2019-10-15 DIAGNOSIS — N18.30 CRI (CHRONIC RENAL INSUFFICIENCY), STAGE 3 (MODERATE): ICD-10-CM

## 2019-10-15 DIAGNOSIS — C64.9 METASTATIC RENAL CELL CARCINOMA TO LIVER: ICD-10-CM

## 2019-10-15 DIAGNOSIS — C79.51 METASTATIC RENAL CELL CARCINOMA TO BONE: ICD-10-CM

## 2019-10-15 DIAGNOSIS — C64.9 METASTATIC RENAL CELL CARCINOMA TO BONE: ICD-10-CM

## 2019-10-15 DIAGNOSIS — C78.7 METASTATIC RENAL CELL CARCINOMA TO LIVER: ICD-10-CM

## 2019-10-15 LAB
ALBUMIN SERPL BCP-MCNC: 2.9 G/DL (ref 3.5–5.2)
ALP SERPL-CCNC: 110 U/L (ref 55–135)
ALT SERPL W/O P-5'-P-CCNC: 14 U/L (ref 10–44)
ANION GAP SERPL CALC-SCNC: 8 MMOL/L (ref 8–16)
AST SERPL-CCNC: 18 U/L (ref 10–40)
BILIRUB SERPL-MCNC: 0.3 MG/DL (ref 0.1–1)
BUN SERPL-MCNC: 34 MG/DL (ref 8–23)
CALCIUM SERPL-MCNC: 8.5 MG/DL (ref 8.7–10.5)
CHLORIDE SERPL-SCNC: 106 MMOL/L (ref 95–110)
CO2 SERPL-SCNC: 26 MMOL/L (ref 23–29)
CREAT SERPL-MCNC: 2 MG/DL (ref 0.5–1.4)
ERYTHROCYTE [DISTWIDTH] IN BLOOD BY AUTOMATED COUNT: 14.2 % (ref 11.5–14.5)
EST. GFR  (AFRICAN AMERICAN): 36.9 ML/MIN/1.73 M^2
EST. GFR  (NON AFRICAN AMERICAN): 31.9 ML/MIN/1.73 M^2
GLUCOSE SERPL-MCNC: 251 MG/DL (ref 70–110)
HCT VFR BLD AUTO: 39.6 % (ref 40–54)
HGB BLD-MCNC: 11.9 G/DL (ref 14–18)
IMM GRANULOCYTES # BLD AUTO: 0.04 K/UL (ref 0–0.04)
MCH RBC QN AUTO: 31.9 PG (ref 27–31)
MCHC RBC AUTO-ENTMCNC: 30.1 G/DL (ref 32–36)
MCV RBC AUTO: 106 FL (ref 82–98)
NEUTROPHILS # BLD AUTO: 4 K/UL (ref 1.8–7.7)
PLATELET # BLD AUTO: 372 K/UL (ref 150–350)
PMV BLD AUTO: 10 FL (ref 9.2–12.9)
POTASSIUM SERPL-SCNC: 5.2 MMOL/L (ref 3.5–5.1)
PROT SERPL-MCNC: 6.4 G/DL (ref 6–8.4)
RBC # BLD AUTO: 3.73 M/UL (ref 4.6–6.2)
SODIUM SERPL-SCNC: 140 MMOL/L (ref 136–145)
WBC # BLD AUTO: 6.8 K/UL (ref 3.9–12.7)

## 2019-10-15 PROCEDURE — 1101F PR PT FALLS ASSESS DOC 0-1 FALLS W/OUT INJ PAST YR: ICD-10-PCS | Mod: HCNC,CPTII,S$GLB, | Performed by: INTERNAL MEDICINE

## 2019-10-15 PROCEDURE — 80053 COMPREHEN METABOLIC PANEL: CPT | Mod: HCNC

## 2019-10-15 PROCEDURE — 99999 PR PBB SHADOW E&M-EST. PATIENT-LVL III: CPT | Mod: PBBFAC,HCNC,, | Performed by: INTERNAL MEDICINE

## 2019-10-15 PROCEDURE — 85027 COMPLETE CBC AUTOMATED: CPT | Mod: HCNC

## 2019-10-15 PROCEDURE — 3080F DIAST BP >= 90 MM HG: CPT | Mod: HCNC,CPTII,S$GLB, | Performed by: INTERNAL MEDICINE

## 2019-10-15 PROCEDURE — 99999 PR PBB SHADOW E&M-EST. PATIENT-LVL III: ICD-10-PCS | Mod: PBBFAC,HCNC,, | Performed by: INTERNAL MEDICINE

## 2019-10-15 PROCEDURE — 99214 OFFICE O/P EST MOD 30 MIN: CPT | Mod: HCNC,S$GLB,, | Performed by: INTERNAL MEDICINE

## 2019-10-15 PROCEDURE — 3080F PR MOST RECENT DIASTOLIC BLOOD PRESSURE >= 90 MM HG: ICD-10-PCS | Mod: HCNC,CPTII,S$GLB, | Performed by: INTERNAL MEDICINE

## 2019-10-15 PROCEDURE — 36415 COLL VENOUS BLD VENIPUNCTURE: CPT | Mod: HCNC

## 2019-10-15 PROCEDURE — 3077F PR MOST RECENT SYSTOLIC BLOOD PRESSURE >= 140 MM HG: ICD-10-PCS | Mod: HCNC,CPTII,S$GLB, | Performed by: INTERNAL MEDICINE

## 2019-10-15 PROCEDURE — 1101F PT FALLS ASSESS-DOCD LE1/YR: CPT | Mod: HCNC,CPTII,S$GLB, | Performed by: INTERNAL MEDICINE

## 2019-10-15 PROCEDURE — 99214 PR OFFICE/OUTPT VISIT, EST, LEVL IV, 30-39 MIN: ICD-10-PCS | Mod: HCNC,S$GLB,, | Performed by: INTERNAL MEDICINE

## 2019-10-15 PROCEDURE — 3077F SYST BP >= 140 MM HG: CPT | Mod: HCNC,CPTII,S$GLB, | Performed by: INTERNAL MEDICINE

## 2019-10-17 ENCOUNTER — HOSPITAL ENCOUNTER (OUTPATIENT)
Dept: RADIOLOGY | Facility: HOSPITAL | Age: 74
Discharge: HOME | End: 2019-10-17
Attending: NURSE PRACTITIONER
Payer: MEDICARE

## 2019-10-17 DIAGNOSIS — C79.51 METASTATIC RENAL CELL CARCINOMA TO BONE: ICD-10-CM

## 2019-10-17 DIAGNOSIS — C78.7 METASTATIC RENAL CELL CARCINOMA TO LIVER: ICD-10-CM

## 2019-10-17 DIAGNOSIS — C64.9 METASTATIC RENAL CELL CARCINOMA TO LIVER: ICD-10-CM

## 2019-10-17 DIAGNOSIS — N18.30 CRI (CHRONIC RENAL INSUFFICIENCY), STAGE 3 (MODERATE): ICD-10-CM

## 2019-10-17 DIAGNOSIS — C64.9 METASTATIC RENAL CELL CARCINOMA TO BONE: ICD-10-CM

## 2019-10-17 DIAGNOSIS — C64.2 RENAL CELL CARCINOMA OF LEFT KIDNEY: ICD-10-CM

## 2019-10-17 PROCEDURE — 71260 CT THORAX DX C+: CPT | Mod: 26,HCNC,, | Performed by: RADIOLOGY

## 2019-10-17 PROCEDURE — 25500020 PHARM REV CODE 255: Mod: HCNC | Performed by: NURSE PRACTITIONER

## 2019-10-17 PROCEDURE — 74177 CT ABD & PELVIS W/CONTRAST: CPT | Mod: 26,HCNC,, | Performed by: RADIOLOGY

## 2019-10-17 PROCEDURE — 74177 CT CHEST ABDOMEN PELVIS WITH CONTRAST (XPD): ICD-10-PCS | Mod: 26,HCNC,, | Performed by: RADIOLOGY

## 2019-10-17 PROCEDURE — 71260 CT CHEST ABDOMEN PELVIS WITH CONTRAST (XPD): ICD-10-PCS | Mod: 26,HCNC,, | Performed by: RADIOLOGY

## 2019-10-17 PROCEDURE — 74177 CT ABD & PELVIS W/CONTRAST: CPT | Mod: TC,HCNC

## 2019-10-17 RX ADMIN — IOHEXOL 15 ML: 350 INJECTION, SOLUTION INTRAVENOUS at 01:10

## 2019-10-17 RX ADMIN — IOHEXOL 15 ML: 350 INJECTION, SOLUTION INTRAVENOUS at 02:10

## 2019-10-17 RX ADMIN — IOHEXOL 75 ML: 350 INJECTION, SOLUTION INTRAVENOUS at 04:10

## 2019-10-18 LAB
CREAT SERPL-MCNC: 1.6 MG/DL (ref 0.5–1.4)
SAMPLE: ABNORMAL

## 2019-10-24 ENCOUNTER — TELEPHONE (OUTPATIENT)
Dept: HEMATOLOGY/ONCOLOGY | Facility: CLINIC | Age: 74
End: 2019-10-24

## 2019-10-24 NOTE — TELEPHONE ENCOUNTER
----- Message from Rahel Reed MD sent at 10/24/2019  2:33 PM CDT -----  Called him  This has not really significantly changed- no reason to change therapy    ----- Message -----  From: Jessica Siddiqi NP  Sent: 10/24/2019   1:20 PM CDT  To: Rahel Reed MD    Reminder to review scans and call

## 2019-11-04 DIAGNOSIS — G89.3 NEOPLASM RELATED PAIN: ICD-10-CM

## 2019-11-04 DIAGNOSIS — C78.7 METASTATIC RENAL CELL CARCINOMA TO LIVER: ICD-10-CM

## 2019-11-04 DIAGNOSIS — C64.9 METASTATIC RENAL CELL CARCINOMA TO LIVER: ICD-10-CM

## 2019-11-04 RX ORDER — HYDROCODONE BITARTRATE AND ACETAMINOPHEN 5; 325 MG/1; MG/1
TABLET ORAL
Qty: 120 TABLET | Refills: 0 | Status: SHIPPED | OUTPATIENT
Start: 2019-11-04 | End: 2019-12-06 | Stop reason: SDUPTHER

## 2019-11-04 RX ORDER — MORPHINE SULFATE 15 MG/1
15 TABLET, FILM COATED, EXTENDED RELEASE ORAL EVERY 12 HOURS
Qty: 60 TABLET | Refills: 0 | Status: SHIPPED | OUTPATIENT
Start: 2019-11-04 | End: 2019-12-06 | Stop reason: SDUPTHER

## 2019-11-08 NOTE — PROGRESS NOTES
"Subjective:       Patient ID: Edwin Powell is a 74 y.o. male.    Chief Complaint: Follow-up    HPI       Mr. Singleton is a 74 y.o. male who returns for follow up regarding metastatic renal cell carcinoma of left kidney. Returns for follow-up, currently on Votrient. Recent scans reviewed and no change in therapy indicated.     Feels ok other than usual fatigue.   Appetite good. Weight overall stable.   Ate oatmeal, apples and cinnamon swirl bread today. (glucose 304)  Was on metformin in past and [facility] but has not followed up recently.   Mild SOB on exertion if overdoes it. Nothing new.   No pain issues other than right flank pain. Lately feels like both sides hurt ?muscular".   Takes Norco at times at night.   Has an occasional diarrhea alternating with constipation. Intermittent RLQ abd pain comes and goes.        Recent Imaging results:  CT scans 10/17/19:  COMPARISON:  CT chest, abdomen, and pelvis 07/15/2019.    FINDINGS:  Stable small nodule in the right thyroid lobe.  Soft tissue structures at the base of the neck show no significant abnormalities.    Left-sided aortic arch with 3 branch vessels a moderate atherosclerosis with calcified and noncalcified plaque in the descending thoracic aorta.  No aneurysm.    Heart is not enlarged.  Coronary artery atherosclerosis.  No pericardial fluid.    Right paratracheal lymph node is slightly larger than prior examination measuring at 1.4 cm (series 2, image 36).  Other mediastinal lymph nodes remain prominent and none enlarged by CT criteria.  No axillary or hilar lymphadenopathy.    Esophagus is normal in course and caliber.    Trachea and proximal airways are patent.  Lungs are symmetrically expanded.    Stable micronodule in the right upper lobe (series 2, image 52).  Stable left upper lobe pleural based micronodule (series 2, image 44).  No lung mass, pleural effusion, pneumothorax, or significant pleural thickening.    Liver is upper normal in " size.  There is a stable hypodense lesion at the hepatic dome measuring 1.2 cm.  Stable ill defined hypodense area in the right hepatic lobe segment VIII (series 2, image 93).  There are 2 stable foci of enhancement in the right hepatic lobe (series 2, image 81 and 91).  This focus measures at 1.0 cm and possibly represents flash fill hemangioma.  These appears similar when compared to February 2018.  No definite new lesions.  Cholecystectomy.  No intra or extrahepatic biliary ductal dilatation.  Spleen is normal in size.    Portal vein and branches, superior mesenteric, and splenic veins are patent.    Stomach, pancreas, and adrenal glands appear unremarkable.    Kidneys are normal in size and location.  Small vascular calcification in the left renal hilum.  No hydronephrosis.  Slightly increased in size left renal mass measuring at 3.2 x 2.3 cm.  Stable nonspecific bilateral perinephric stranding.    Visualized portions of the ureters show no significant abnormalities.  There is some mild hyperenhancement of the proximal left renal collecting system.  Mild thickening of the urinary bladder wall possibly related to cystitis or chronic outlet obstruction.  Mild prostatomegaly with dystrophic calcifications.    No lymphadenopathy in the abdomen and pelvis.  No free intraperitoneal air.  Trace ascites in the pelvis.    Abdominal aorta shows distal ectasia and severe atherosclerosis including the branching vessels.  Abdominal aorta measures up to 2.5 cm.    Osseous structures show stable sclerotic lesion at T4 vertebral body, enlarged lytic lesion at the right of L3 vertebral body.  Stable sclerotic lesion in the left iliac bone and right iliac crest.  Lower lumbar spine laminectomies.      Impression       Patient with history of left renal cell carcinoma demonstrating interval increase in size in the exophytic left renal mass now measuring at 3.2 cm.  Right paratracheal lymph node also shows mild interval increase in  "size.    There is some hyperenhancement of the left renal pelvis.  Correlation with urinalysis and additional evaluation as indicated.    Multiple stable hypodense and subtle foci of enhancement within the liver parenchyma, unchanged from examinations dating back to 2018.    Stable T4, L3, right iliac crest and left iliac bone lesions.    Stable bilateral lungs micro nodules.    Trace fluid in the pelvis.    RECIST SUMMARY:    Date of prior examination for comparison: CT chest, abdomen, pelvis 07/15/2019.    Lesion 1: Left kidney mass.  3.2 cm. Series 2 image 127.  Prior measurement 2.7 cm.    Lesion 2: Hepatic segment VII.  1.2 cm. Series 2 image 82.  Prior measurement 1.2 cm.    Electronically signed by resident: Jayleen Gil  Date: 10/17/2019  Time: 16:16            Oncology History:  Mr.Mc Moss is a 73 yo male with CAD (s/p 5 stents, remotely), DM2 (insulin dependent), hypothyroidism , CVA (2013) with no residual deficit, chronic back pain. He was transferred to [facility] for neurosurgery evaluation of a lumbar spine lesion in the setting of progressive weakness of his legs and exacerbation of chronic back pain, in May 2017. He states that his back pain and LE weakness progressively worsened. [facility]. The pain radiates down his legs. He denies any incontinence of bowel or bladder. He had several falls. After one of the falls, he presented to the ED at [facility], in May 2017. He had an MRI showing "L3 vertebral body lesion with apparent cortical disruption concerning for metastases vs myeloma," and was transferred to [facility] for neurosurgery evaluation. He was evaluated by neurosurgery regarding concern for lytic lesion on L3 on 5/26/17. No surgical intervention was proposed.   CT chest, abdomen,pelvis on 5/27/17 revealed :   --A 3.6 cm exophytic mass arising from the medial aspect of the lower pole of left kidney,   --A small 1.0 cm, subtle, cortical enhancing " mass within the upper pole of the right kidney, concerning for possible contralateral solid renal mass  --Multiple enhancing ill-defined hepatic masses concerning for hepatic metastatic disease, hepatomegaly and hepatic steatosis  --Mulltifocal irregular thickening of the bilateral pleura concerning for possible pleural metastatic disease  --Mild nonspecific nodular thickening of the left adrenal gland without discrete nodule  -- Redemonstration of known lytic lesion within the L3 vertebral body. No definite additional discrete lytic lesions are identified.  -MRI pelvis from 10/18/17:    2.5 cm linear T1 hypointense T2/STIR hyperintense lesion within the left iliac bone, this lesion is indeterminate but felt less likely to represent metastasis given its appearance. Further evaluation can be obtained with bone scan as clinically indicated.  Findings suggestive of left trochanteric bursitis.  Partially visualized L3 metastatic lesion.  Diffuse thickening of the urinary bladder wall can be seen with chronic outlet obstruction or cystitis.  - initiated on Votrient    Review of Systems   Constitutional: Positive for fatigue. Negative for activity change, appetite change, chills, fever and unexpected weight change.   HENT: Negative for congestion, dental problem, mouth sores, nosebleeds, rhinorrhea, sinus pressure, sore throat and trouble swallowing.    Eyes: Negative for visual disturbance.   Respiratory: Positive for shortness of breath (occasional, mild SOB on exertion- no worse. ). Negative for cough, chest tightness and wheezing.    Cardiovascular: Negative for chest pain, palpitations and leg swelling.   Gastrointestinal: Positive for diarrhea (alternating with constipation). Negative for abdominal distention, abdominal pain, blood in stool, constipation, nausea and vomiting.   Genitourinary: Negative for decreased urine volume, difficulty urinating, dysuria, frequency, hematuria and urgency.   Musculoskeletal:  Positive for back pain (chronic and unchanged). Negative for arthralgias, gait problem, joint swelling, myalgias, neck pain and neck stiffness.   Skin: Negative for color change, pallor, rash and wound.   Neurological: Negative for dizziness, weakness, light-headedness, numbness and headaches.   Hematological: Negative for adenopathy.   Psychiatric/Behavioral: Negative for dysphoric mood. The patient is not nervous/anxious.        Objective:      Physical Exam   Constitutional: He is oriented to person, place, and time. He appears well-developed and well-nourished. No distress.   Presents alone   HENT:   Head: Normocephalic and atraumatic.   Mouth/Throat: Oropharynx is clear and moist. No oropharyngeal exudate.   Eyes: Pupils are equal, round, and reactive to light. Conjunctivae and EOM are normal. No scleral icterus.   Neck: Normal range of motion. Neck supple. No thyromegaly present.   Cardiovascular: Normal rate, regular rhythm, normal heart sounds and intact distal pulses. Exam reveals no gallop and no friction rub.   No murmur heard.  Pulmonary/Chest: Effort normal and breath sounds normal. No respiratory distress. He has no wheezes. He has no rales. He exhibits no tenderness.   Abdominal: Soft. Bowel sounds are normal. He exhibits no mass. There is tenderness (right mid/lower quad slightly tender with deep palpation just above scar from previous appendectomy. He states this pain has been there for months. No worse. ). There is no rebound.   No hepatosplenomegaly  Slightly bloated but soft   Musculoskeletal: Normal range of motion. He exhibits no edema, tenderness or deformity.   No spinal or paraspinal tenderness to palpation   Lymphadenopathy:     He has no cervical adenopathy.   Neurological: He is alert and oriented to person, place, and time. No sensory deficit. Coordination normal.   Skin: Skin is warm and dry. No rash noted. He is not diaphoretic. No erythema. No pallor.   Psychiatric: He has a normal  mood and affect. His behavior is normal. Judgment and thought content normal.   Nursing note and vitals reviewed.      Results for orders placed or performed in visit on 11/12/19   CBC W/ AUTO DIFFERENTIAL   Result Value Ref Range    WBC 7.07 3.90 - 12.70 K/uL    RBC 3.53 (L) 4.60 - 6.20 M/uL    Hemoglobin 11.6 (L) 14.0 - 18.0 g/dL    Hematocrit 37.3 (L) 40.0 - 54.0 %    Mean Corpuscular Volume 106 (H) 82 - 98 fL    Mean Corpuscular Hemoglobin 32.9 (H) 27.0 - 31.0 pg    Mean Corpuscular Hemoglobin Conc 31.1 (L) 32.0 - 36.0 g/dL    RDW 14.5 11.5 - 14.5 %    Platelets 406 (H) 150 - 350 K/uL    MPV 9.7 9.2 - 12.9 fL    Immature Granulocytes 0.3 0.0 - 0.5 %    Gran # (ANC) 3.9 1.8 - 7.7 K/uL    Immature Grans (Abs) 0.02 0.00 - 0.04 K/uL    Lymph # 2.4 1.0 - 4.8 K/uL    Mono # 0.6 0.3 - 1.0 K/uL    Eos # 0.2 0.0 - 0.5 K/uL    Baso # 0.03 0.00 - 0.20 K/uL    nRBC 0 0 /100 WBC    Gran% 55.0 38.0 - 73.0 %    Lymph% 33.4 18.0 - 48.0 %    Mono% 8.8 4.0 - 15.0 %    Eosinophil% 2.1 0.0 - 8.0 %    Basophil% 0.4 0.0 - 1.9 %    Differential Method Automated    Comprehensive metabolic panel   Result Value Ref Range    Sodium 137 136 - 145 mmol/L    Potassium 4.8 3.5 - 5.1 mmol/L    Chloride 106 95 - 110 mmol/L    CO2 23 23 - 29 mmol/L    Glucose 304 (H) 70 - 110 mg/dL    BUN, Bld 26 (H) 8 - 23 mg/dL    Creatinine 1.9 (H) 0.5 - 1.4 mg/dL    Calcium 8.4 (L) 8.7 - 10.5 mg/dL    Total Protein 6.3 6.0 - 8.4 g/dL    Albumin 2.7 (L) 3.5 - 5.2 g/dL    Total Bilirubin 0.3 0.1 - 1.0 mg/dL    Alkaline Phosphatase 105 55 - 135 U/L    AST 15 10 - 40 U/L    ALT 15 10 - 44 U/L    Anion Gap 8 8 - 16 mmol/L    eGFR if African American 39.3 (A) >60 mL/min/1.73 m^2    eGFR if non  34.0 (A) >60 mL/min/1.73 m^2   Lactate dehydrogenase   Result Value Ref Range     110 - 260 U/L       Assessment:       1. Renal cell carcinoma of left kidney    2. 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 Metastatic renal cell carcinoma to liver     4. Neoplasm related pain    5. Macrocytic anemia        Plan:         -Doing well, clinically stable.   -Labs reviewed. Anemia parameters with slight dip- will monitor.   -Glucose elevated. We discussed diet and nutrition. He will see his PCP for further evaluation and recs as he was on Insulin previously. Unable to take metformin due to CrCl.   -Kidney function essentially stable. May need renal US and/or nephrology referral in near future. Will reevaluate once glucose better controlled.   -We further discussed the tenderness he has in his abdomen. He states that this is not new and the pain comes and goes for several months. Diarrhea alt with constipation as well. Encouraged to have Colonoscopy as he is due but refuses at this time. He understands to call for any new or worsening symptoms.   -Continue Votrient.   -Rescan in 2 months, sooner of clinically indicated. Will need IV hydration post all future scans.    RTC in 4 weeks to see Dr. Reed with CBC, CMP, LDH      Patient is in agreement with the proposed treatment plan. All questions were answered to the patient's satisfaction. Pt knows to call clinic for any new or worsening symptoms and if anything is needed before the next clinic visit.      NATALIYA Hamilton-C  Hematology & Oncology  [facility] [place] [address]  ph. [phone]  Fax. [phone]     I spent 30 minutes (face to face) with the patient, more than 50% was in counseling and coordination of care as detailed above.

## 2019-11-11 ENCOUNTER — TELEPHONE (OUTPATIENT)
Dept: HEMATOLOGY/ONCOLOGY | Facility: CLINIC | Age: 74
End: 2019-11-11

## 2019-11-11 ENCOUNTER — TELEPHONE (OUTPATIENT)
Dept: PHARMACY | Facility: CLINIC | Age: 74
End: 2019-11-11

## 2019-11-11 NOTE — TELEPHONE ENCOUNTER
"Contacted the patient to assess Votrient refill readiness and complete clinical f/u. When he spoke with a technician on 10/1, he declined refill and stated he had over a month on hand with no missed doses. Today he reported having about one month on hand. He was unsure how this happened but believes he may have missed a few doses. He was unable to confirm how many doses he has missed but appears to be significant based on refill history. We reviewed adherence tools such as medication alarm reminders, calendars and writing down each time he takes the medication.     He confirmed proper administration: Votrient 4 tablets once every morning. He reported being in the routine of taking the medication each morning around 7:30-8am and turns his medication bottle the opposite direction after he takes it. He jokingly stated he was "lazy" and declined to use other adherence tools. He was ultimately agreeable to place a calendar near Votrient bottle and simba off the day after taking his dose. We will delay refill and reach out toward the end of the month closer to time refill is needed. He is aware to reach out if refill is needed sooner than anticipated. [facility] will conduct clinical f/u at that time.  "

## 2019-11-12 ENCOUNTER — LAB VISIT (OUTPATIENT)
Dept: LAB | Facility: HOSPITAL | Age: 74
End: 2019-11-12
Attending: INTERNAL MEDICINE
Payer: MEDICARE

## 2019-11-12 ENCOUNTER — OFFICE VISIT (OUTPATIENT)
Dept: HEMATOLOGY/ONCOLOGY | Facility: CLINIC | Age: 74
End: 2019-11-12
Payer: MEDICARE

## 2019-11-12 ENCOUNTER — TELEPHONE (OUTPATIENT)
Dept: PRIMARY CARE CLINIC | Facility: CLINIC | Age: 74
End: 2019-11-12

## 2019-11-12 VITALS
SYSTOLIC BLOOD PRESSURE: 163 MMHG | DIASTOLIC BLOOD PRESSURE: 72 MMHG | HEART RATE: 62 BPM | RESPIRATION RATE: 20 BRPM | BODY MASS INDEX: 27.21 KG/M2 | TEMPERATURE: 98 F | WEIGHT: 173.75 LBS

## 2019-11-12 DIAGNOSIS — C78.7 METASTATIC RENAL CELL CARCINOMA TO LIVER: ICD-10-CM

## 2019-11-12 DIAGNOSIS — D53.9 MACROCYTIC ANEMIA: ICD-10-CM

## 2019-11-12 DIAGNOSIS — C64.9 METASTATIC RENAL CELL CARCINOMA TO BONE: ICD-10-CM

## 2019-11-12 DIAGNOSIS — C64.9 METASTATIC RENAL CELL CARCINOMA TO LIVER: ICD-10-CM

## 2019-11-12 DIAGNOSIS — G89.3 NEOPLASM RELATED PAIN: ICD-10-CM

## 2019-11-12 DIAGNOSIS — C79.51 METASTATIC RENAL CELL CARCINOMA TO BONE: ICD-10-CM

## 2019-11-12 DIAGNOSIS — C64.2 RENAL CELL CARCINOMA OF LEFT KIDNEY: Primary | ICD-10-CM

## 2019-11-12 PROBLEM — N18.30 TYPE 2 DIABETES MELLITUS WITH STAGE 3 CHRONIC KIDNEY DISEASE, WITHOUT LONG-TERM CURRENT USE OF INSULIN: Status: ACTIVE | Noted: 2017-05-26

## 2019-11-12 PROBLEM — E11.22 TYPE 2 DIABETES MELLITUS WITH STAGE 3 CHRONIC KIDNEY DISEASE, WITHOUT LONG-TERM CURRENT USE OF INSULIN: Status: ACTIVE | Noted: 2017-05-26

## 2019-11-12 LAB
ALBUMIN SERPL BCP-MCNC: 2.7 G/DL (ref 3.5–5.2)
ALP SERPL-CCNC: 105 U/L (ref 55–135)
ALT SERPL W/O P-5'-P-CCNC: 15 U/L (ref 10–44)
ANION GAP SERPL CALC-SCNC: 8 MMOL/L (ref 8–16)
AST SERPL-CCNC: 15 U/L (ref 10–40)
BASOPHILS # BLD AUTO: 0.03 K/UL (ref 0–0.2)
BASOPHILS NFR BLD: 0.4 % (ref 0–1.9)
BILIRUB SERPL-MCNC: 0.3 MG/DL (ref 0.1–1)
BUN SERPL-MCNC: 26 MG/DL (ref 8–23)
CALCIUM SERPL-MCNC: 8.4 MG/DL (ref 8.7–10.5)
CHLORIDE SERPL-SCNC: 106 MMOL/L (ref 95–110)
CO2 SERPL-SCNC: 23 MMOL/L (ref 23–29)
CREAT SERPL-MCNC: 1.9 MG/DL (ref 0.5–1.4)
DIFFERENTIAL METHOD: ABNORMAL
EOSINOPHIL # BLD AUTO: 0.2 K/UL (ref 0–0.5)
EOSINOPHIL NFR BLD: 2.1 % (ref 0–8)
ERYTHROCYTE [DISTWIDTH] IN BLOOD BY AUTOMATED COUNT: 14.5 % (ref 11.5–14.5)
EST. GFR  (AFRICAN AMERICAN): 39.3 ML/MIN/1.73 M^2
EST. GFR  (NON AFRICAN AMERICAN): 34 ML/MIN/1.73 M^2
GLUCOSE SERPL-MCNC: 304 MG/DL (ref 70–110)
HCT VFR BLD AUTO: 37.3 % (ref 40–54)
HGB BLD-MCNC: 11.6 G/DL (ref 14–18)
IMM GRANULOCYTES # BLD AUTO: 0.02 K/UL (ref 0–0.04)
IMM GRANULOCYTES NFR BLD AUTO: 0.3 % (ref 0–0.5)
LDH SERPL L TO P-CCNC: 174 U/L (ref 110–260)
LYMPHOCYTES # BLD AUTO: 2.4 K/UL (ref 1–4.8)
LYMPHOCYTES NFR BLD: 33.4 % (ref 18–48)
MCH RBC QN AUTO: 32.9 PG (ref 27–31)
MCHC RBC AUTO-ENTMCNC: 31.1 G/DL (ref 32–36)
MCV RBC AUTO: 106 FL (ref 82–98)
MONOCYTES # BLD AUTO: 0.6 K/UL (ref 0.3–1)
MONOCYTES NFR BLD: 8.8 % (ref 4–15)
NEUTROPHILS # BLD AUTO: 3.9 K/UL (ref 1.8–7.7)
NEUTROPHILS NFR BLD: 55 % (ref 38–73)
NRBC BLD-RTO: 0 /100 WBC
PLATELET # BLD AUTO: 406 K/UL (ref 150–350)
PMV BLD AUTO: 9.7 FL (ref 9.2–12.9)
POTASSIUM SERPL-SCNC: 4.8 MMOL/L (ref 3.5–5.1)
PROT SERPL-MCNC: 6.3 G/DL (ref 6–8.4)
RBC # BLD AUTO: 3.53 M/UL (ref 4.6–6.2)
SODIUM SERPL-SCNC: 137 MMOL/L (ref 136–145)
WBC # BLD AUTO: 7.07 K/UL (ref 3.9–12.7)

## 2019-11-12 PROCEDURE — 3078F DIAST BP <80 MM HG: CPT | Mod: CPTII,S$GLB,, | Performed by: NURSE PRACTITIONER

## 2019-11-12 PROCEDURE — 3077F PR MOST RECENT SYSTOLIC BLOOD PRESSURE >= 140 MM HG: ICD-10-PCS | Mod: CPTII,S$GLB,, | Performed by: NURSE PRACTITIONER

## 2019-11-12 PROCEDURE — 80053 COMPREHEN METABOLIC PANEL: CPT

## 2019-11-12 PROCEDURE — 3078F PR MOST RECENT DIASTOLIC BLOOD PRESSURE < 80 MM HG: ICD-10-PCS | Mod: CPTII,S$GLB,, | Performed by: NURSE PRACTITIONER

## 2019-11-12 PROCEDURE — 83615 LACTATE (LD) (LDH) ENZYME: CPT

## 2019-11-12 PROCEDURE — 1101F PR PT FALLS ASSESS DOC 0-1 FALLS W/OUT INJ PAST YR: ICD-10-PCS | Mod: CPTII,S$GLB,, | Performed by: NURSE PRACTITIONER

## 2019-11-12 PROCEDURE — 99999 PR PBB SHADOW E&M-EST. PATIENT-LVL III: ICD-10-PCS | Mod: PBBFAC,,, | Performed by: NURSE PRACTITIONER

## 2019-11-12 PROCEDURE — 99999 PR PBB SHADOW E&M-EST. PATIENT-LVL III: CPT | Mod: PBBFAC,,, | Performed by: NURSE PRACTITIONER

## 2019-11-12 PROCEDURE — 99214 PR OFFICE/OUTPT VISIT, EST, LEVL IV, 30-39 MIN: ICD-10-PCS | Mod: S$GLB,,, | Performed by: NURSE PRACTITIONER

## 2019-11-12 PROCEDURE — 36415 COLL VENOUS BLD VENIPUNCTURE: CPT

## 2019-11-12 PROCEDURE — 3077F SYST BP >= 140 MM HG: CPT | Mod: CPTII,S$GLB,, | Performed by: NURSE PRACTITIONER

## 2019-11-12 PROCEDURE — 85025 COMPLETE CBC W/AUTO DIFF WBC: CPT

## 2019-11-12 PROCEDURE — 99214 OFFICE O/P EST MOD 30 MIN: CPT | Mod: S$GLB,,, | Performed by: NURSE PRACTITIONER

## 2019-11-12 PROCEDURE — 1101F PT FALLS ASSESS-DOCD LE1/YR: CPT | Mod: CPTII,S$GLB,, | Performed by: NURSE PRACTITIONER

## 2019-11-12 NOTE — TELEPHONE ENCOUNTER
Please get patient an appointment ASAP. He no-showed in July, and has not come in for follow-up. His glucose is now elevated.    Thanks,  CORTEZ

## 2019-11-12 NOTE — TELEPHONE ENCOUNTER
----- Message from Jessica Siddiqi, NP sent at 11/12/2019  3:08 PM CST -----  Hi there! Zeina asked Mr. Powell to get in to see you asap as his glucose continues to be elevated along with BP and may need med recs. His creatinine is trending up as well, not sure if will normalize once above is addressed. If creatine remains elevated next visit, I will possibly get US and refer to nephrology?  Thank you,   PJ

## 2019-11-12 NOTE — Clinical Note
Hi there! Zeina asked Mr. Powell to get in to see you asap as his glucose continues to be elevated along with BP and may need med recs. His creatinine is trending up as well, not sure if will normalize once above is addressed. If creatine remains elevated next visit, I will possibly get US and refer to nephrology?Thank you, PJ

## 2019-11-12 NOTE — Clinical Note
Can we get him an appt with his PCP Dr. Joshua farmer for elevated glucose and high blood pressure.

## 2019-11-13 ENCOUNTER — TELEPHONE (OUTPATIENT)
Dept: PRIMARY CARE CLINIC | Facility: CLINIC | Age: 74
End: 2019-11-13

## 2019-11-13 ENCOUNTER — OFFICE VISIT (OUTPATIENT)
Dept: PRIMARY CARE CLINIC | Facility: CLINIC | Age: 74
End: 2019-11-13
Payer: MEDICARE

## 2019-11-13 VITALS
HEART RATE: 76 BPM | DIASTOLIC BLOOD PRESSURE: 60 MMHG | WEIGHT: 177 LBS | SYSTOLIC BLOOD PRESSURE: 126 MMHG | BODY MASS INDEX: 28.45 KG/M2 | HEIGHT: 66 IN | OXYGEN SATURATION: 98 %

## 2019-11-13 DIAGNOSIS — Z79.4 TYPE 2 DIABETES MELLITUS WITH STAGE 3 CHRONIC KIDNEY DISEASE, WITH LONG-TERM CURRENT USE OF INSULIN: Primary | ICD-10-CM

## 2019-11-13 DIAGNOSIS — E11.22 TYPE 2 DIABETES MELLITUS WITH STAGE 3 CHRONIC KIDNEY DISEASE, WITH LONG-TERM CURRENT USE OF INSULIN: Primary | ICD-10-CM

## 2019-11-13 DIAGNOSIS — E11.65 TYPE 2 DIABETES MELLITUS WITH HYPERGLYCEMIA, WITH LONG-TERM CURRENT USE OF INSULIN: ICD-10-CM

## 2019-11-13 DIAGNOSIS — D75.839 THROMBOCYTHEMIA: ICD-10-CM

## 2019-11-13 DIAGNOSIS — N18.30 TYPE 2 DIABETES MELLITUS WITH STAGE 3 CHRONIC KIDNEY DISEASE, WITH LONG-TERM CURRENT USE OF INSULIN: Primary | ICD-10-CM

## 2019-11-13 DIAGNOSIS — F11.20 UNCOMPLICATED OPIOID DEPENDENCE: ICD-10-CM

## 2019-11-13 DIAGNOSIS — Z79.4 TYPE 2 DIABETES MELLITUS WITH HYPERGLYCEMIA, WITH LONG-TERM CURRENT USE OF INSULIN: ICD-10-CM

## 2019-11-13 DIAGNOSIS — M46.96 UNSPECIFIED INFLAMMATORY SPONDYLOPATHY, LUMBAR REGION: ICD-10-CM

## 2019-11-13 LAB — GLUCOSE SERPL-MCNC: 264 MG/DL (ref 70–110)

## 2019-11-13 PROCEDURE — 1101F PR PT FALLS ASSESS DOC 0-1 FALLS W/OUT INJ PAST YR: ICD-10-PCS | Mod: CPTII,S$GLB,, | Performed by: INTERNAL MEDICINE

## 2019-11-13 PROCEDURE — 99215 PR OFFICE/OUTPT VISIT, EST, LEVL V, 40-54 MIN: ICD-10-PCS | Mod: S$GLB,,, | Performed by: INTERNAL MEDICINE

## 2019-11-13 PROCEDURE — 3044F HG A1C LEVEL LT 7.0%: CPT | Mod: CPTII,S$GLB,, | Performed by: INTERNAL MEDICINE

## 2019-11-13 PROCEDURE — 99499 UNLISTED E&M SERVICE: CPT | Mod: HCNC,S$GLB,, | Performed by: INTERNAL MEDICINE

## 2019-11-13 PROCEDURE — 82962 POCT GLUCOSE, HAND-HELD DEVICE: ICD-10-PCS | Mod: ,,, | Performed by: INTERNAL MEDICINE

## 2019-11-13 PROCEDURE — 1101F PT FALLS ASSESS-DOCD LE1/YR: CPT | Mod: CPTII,S$GLB,, | Performed by: INTERNAL MEDICINE

## 2019-11-13 PROCEDURE — 3074F SYST BP LT 130 MM HG: CPT | Mod: CPTII,S$GLB,, | Performed by: INTERNAL MEDICINE

## 2019-11-13 PROCEDURE — 99215 OFFICE O/P EST HI 40 MIN: CPT | Mod: S$GLB,,, | Performed by: INTERNAL MEDICINE

## 2019-11-13 PROCEDURE — 3078F PR MOST RECENT DIASTOLIC BLOOD PRESSURE < 80 MM HG: ICD-10-PCS | Mod: CPTII,S$GLB,, | Performed by: INTERNAL MEDICINE

## 2019-11-13 PROCEDURE — 99499 RISK ADDL DX/OHS AUDIT: ICD-10-PCS | Mod: HCNC,S$GLB,, | Performed by: INTERNAL MEDICINE

## 2019-11-13 PROCEDURE — 3078F DIAST BP <80 MM HG: CPT | Mod: CPTII,S$GLB,, | Performed by: INTERNAL MEDICINE

## 2019-11-13 PROCEDURE — 82962 GLUCOSE BLOOD TEST: CPT | Mod: ,,, | Performed by: INTERNAL MEDICINE

## 2019-11-13 PROCEDURE — 3074F PR MOST RECENT SYSTOLIC BLOOD PRESSURE < 130 MM HG: ICD-10-PCS | Mod: CPTII,S$GLB,, | Performed by: INTERNAL MEDICINE

## 2019-11-13 PROCEDURE — 3044F PR MOST RECENT HEMOGLOBIN A1C LEVEL <7.0%: ICD-10-PCS | Mod: CPTII,S$GLB,, | Performed by: INTERNAL MEDICINE

## 2019-11-13 RX ORDER — LANCETS
1 EACH MISCELLANEOUS 2 TIMES DAILY PRN
Qty: 200 EACH | Refills: 3 | Status: SHIPPED | OUTPATIENT
Start: 2019-11-13 | End: 2021-01-01 | Stop reason: SDUPTHER

## 2019-11-13 RX ORDER — PEN NEEDLE, DIABETIC 29 G X1/2"
1 NEEDLE, DISPOSABLE MISCELLANEOUS DAILY
Qty: 100 EACH | Refills: 3 | Status: SHIPPED | OUTPATIENT
Start: 2019-11-13 | End: 2021-01-01 | Stop reason: SDUPTHER

## 2019-11-13 RX ORDER — DEXTROSE 4 G
TABLET,CHEWABLE ORAL
Qty: 1 EACH | Refills: 0 | Status: SHIPPED | OUTPATIENT
Start: 2019-11-13

## 2019-11-13 NOTE — ASSESSMENT & PLAN NOTE
Low back pain, controlled with narcotics. He is not physically active  · Advised to increase physical activity

## 2019-11-13 NOTE — PROGRESS NOTES
"Primary Care Provider Appointment    Subjective:      Patient ID: Edwin Powell is a 74 y.o. male with uncontrolled DM, HLD, HTN, renal CA    Chief Complaint: Follow-up and Fatigue    Patient has hyperglycemia. PCP notified by Heme-Onc. He has cancelled his appts since July with PCP. "I was waiting for you to call. I thought you abandoned me."     Which has returned since he gained weight and is improving following cancer treatment. His glucose was in 400s on labs with Heme-Onc.    He has not taken atorvastatin for months. Lipids uncontrolled. He had diarrhea even after stopped the med. He is willing to retstart it once every 3 days.    He has urgent care appointment with Heme-Onc for labs.    He has flat affect, but scores low in depression screening.    Past Surgical History:   Procedure Laterality Date    ABDOMINAL SURGERY      APPENDECTOMY      BACK SURGERY      CARDIAC SURGERY      CATARACT EXTRACTION      CHOLECYSTECTOMY      coronary stenting      X 5 last one in 2010-11.    INTRAOCULAR PROSTHESES INSERTION Right 6/27/2019    Procedure: INSERTION, IOL PROSTHESIS;  Surgeon: Chary Culp MD;  Location: [address];  Service: Ophthalmology;  Laterality: Right;    INTRAOCULAR PROSTHESES INSERTION Left 8/22/2019    Procedure: INSERTION, IOL PROSTHESIS;  Surgeon: Chary Culp MD;  Location: [address];  Service: Ophthalmology;  Laterality: Left;    PHACOEMULSIFICATION OF CATARACT Right 6/27/2019    Procedure: PHACOEMULSIFICATION, CATARACT;  Surgeon: Chary Culp MD;  Location: [address];  Service: Ophthalmology;  Laterality: Right;    PHACOEMULSIFICATION OF CATARACT Left 8/22/2019    Procedure: PHACOEMULSIFICATION, CATARACT;  Surgeon: Chary Culp MD;  Location: [address];  Service: Ophthalmology;  Laterality: Left;    SPINAL FUSION         Past Medical History:   Diagnosis Date    Acquired hypothyroidism 5/26/2017    Benign essential HTN 5/26/2017    Benign " prostatic hyperplasia (BPH) with urinary urge incontinence 6/6/2017    Chronic low back pain 6/1/2017    Coronary artery disease involving native coronary artery of native heart without angina pectoris 5/26/2017    S/p 5 stents - last one around 2010-11.    Gastroesophageal reflux disease without esophagitis 5/26/2017    History of CVA (cerebrovascular accident) 5/26/2017    Hypertension associated with diabetes 5/26/2017    BP controlled on ACE, CCB    Lumbar disc lesion 5/26/2017    Metastatic renal cell carcinoma to bone 6/6/2017    Metastatic renal cell carcinoma to liver 6/6/2017    Liver Biopsy 5-2017: METASTATIC RENAL CELL CARCINOMA.    Mixed hyperlipidemia 5/26/2017    Type 2 diabetes mellitus with stage 3 chronic kidney disease, with long-term current use of insulin 5/26/2017       Social History     Socioeconomic History    Marital status: Single     Spouse name: Not on file    Number of children: Not on file    Years of education: Not on file    Highest education level: Not on file   Occupational History    Not on file   Social Needs    Financial resource strain: Hard    Food insecurity:     Worry: Never true     Inability: Never true    Transportation needs:     Medical: No     Non-medical: No   Tobacco Use    Smoking status: Never Smoker    Smokeless tobacco: Never Used   Substance and Sexual Activity    Alcohol use: Yes     Comment: rarely     Drug use: No    Sexual activity: Not Currently     Partners: Female   Lifestyle    Physical activity:     Days per week: Not on file     Minutes per session: Not on file    Stress: Not on file   Relationships    Social connections:     Talks on phone: Not on file     Gets together: Not on file     Attends Muslim service: Not on file     Active member of club or organization: Not on file     Attends meetings of clubs or organizations: Not on file     Relationship status: Not on file   Other Topics Concern    Not on file   Social  "History Narrative    Not on file       Review of Systems   Constitutional: Negative for activity change and unexpected weight change.   HENT: Positive for hearing loss. Negative for trouble swallowing.         Abnormal hearing   Gastrointestinal: Positive for diarrhea. Negative for blood in stool, constipation and vomiting.   Endocrine: Negative for polydipsia and polyuria.   Genitourinary: Negative for difficulty urinating and urgency.   Musculoskeletal: Positive for arthralgias. Negative for joint swelling and neck pain.   Neurological: Positive for weakness. Negative for headaches.   Psychiatric/Behavioral: Positive for confusion and decreased concentration. Negative for dysphoric mood.       Objective:   /60   Pulse 76   Ht 5' 6" (1.676 m)   Wt 80.3 kg (177 lb 0.5 oz)   SpO2 98%   BMI 28.57 kg/m²     Physical Exam   Constitutional: He is oriented to person, place, and time. He appears well-developed and well-nourished.   No longer ambulating with cane   HENT:   Head: Normocephalic.   Edentulous   Eyes: Pupils are equal, round, and reactive to light.   Neck:   Decreased ROM in neck    Pulmonary/Chest: Effort normal.   Musculoskeletal: Normal range of motion.   Neurological: He is alert and oriented to person, place, and time.   Skin: Skin is warm.   Ecchymoses and purpura bilaterally UE  Telangiectasias on face and chest  Multiple tattoos on UE  Numerous red papules on neck  Fair skin   Psychiatric:   Flat affect       Lab Results   Component Value Date    WBC 7.07 11/12/2019    HGB 11.6 (L) 11/12/2019    HCT 37.3 (L) 11/12/2019     (H) 11/12/2019    CHOL 179 07/15/2019    TRIG 189 (H) 07/15/2019    HDL 34 (L) 07/15/2019    ALT 15 11/12/2019    AST 15 11/12/2019     11/12/2019    K 4.8 11/12/2019     11/12/2019    CREATININE 1.9 (H) 11/12/2019    BUN 26 (H) 11/12/2019    CO2 23 11/12/2019    TSH 4.062 (H) 09/13/2019    PSA 1.1 07/16/2018    INR 1.2 06/12/2017    HGBA1C 6.4 (H) " "02/19/2019       RESULTS: Reviewed labs and images today    Assessment:   74 y.o. male with multiple co-morbid illnesses here to continue work-up of chronic issues notably  with uncontrolled DM, HLD, HTN, renal CA     Plan:     Problem List Items Addressed This Visit        Psychiatric    Uncomplicated opioid dependence     Taking scheduled narcotics for cancer-related pain. Overdose in 2017  · Continue pain management per Heme-Onc  · Advised mindfulness to prevent overmedication  · Advised to avoid alcohol at all times            Oncology    Thrombocythemia     Elevated platelets at 400, no symptoms  · Monitor with CBCs  · Continue APT            Endocrine    Type 2 diabetes mellitus with hyperglycemia, with long-term current use of insulin     Hyperglycemia, has been off of insulin  · Start lantus 5U  · Injected  · 1 week RN follow-up with glucose log         Relevant Medications    insulin detemir U-100 injection 5 Units    blood-glucose meter kit    blood sugar diagnostic Strp    lancets (ONETOUCH ULTRASOFT LANCETS) Misc    pen needle, diabetic 29 gauge x 1/2" Ndle    Other Relevant Orders    Hemoglobin A1c    Basic metabolic panel    Lipid panel       Orthopedic    Unspecified inflammatory spondylopathy, lumbar region     Low back pain, controlled with narcotics. He is not physically active  · Advised to increase physical activity           Other Visit Diagnoses     Type 2 diabetes mellitus with stage 3 chronic kidney disease, with long-term current use of insulin    -  Primary    Relevant Medications    insulin detemir U-100 injection 5 Units    blood-glucose meter kit    blood sugar diagnostic Strp    lancets (ONETOUCH ULTRASOFT LANCETS) Misc    pen needle, diabetic 29 gauge x 1/2" Ndle    Other Relevant Orders    POCT Glucose, Hand-Held Device (Completed)    Hemoglobin A1c    Basic metabolic panel    Lipid panel          Health Maintenance       Date Due Completion Date    TETANUS VACCINE 02/18/1963 ---    " Shingles Vaccine (1 of 2) 02/18/1995 ---    Hemoglobin A1c 08/19/2019 2/19/2019- TODAY    Eye Exam 04/22/2020 4/22/2019    Foot Exam 07/11/2020 7/11/2019 (Done)    Override on 7/11/2019: Done    Override on 2/2/2018: Done (performed by podiatrist)    Lipid Panel 07/15/2020 7/15/2019    High Dose Statin 11/13/2020 11/13/2019    Colonoscopy 02/15/2021 2/15/2011 (Done)    Override on 2/15/2011: Done (Per [employer] dashboard ? results)          Follow up in about 1 year (around 11/13/2020) for 1 week and 1 mo follow-up. Total face-to-face time was 60 min, 50% of this was spent on counseling and coordination of care. The following issues were discussed:  with uncontrolled DM, HLD, HTN, renal CA    Lulú Lewis MD/MPH  Internal Medicine  [facility]  [phone]

## 2019-11-13 NOTE — TELEPHONE ENCOUNTER
Used 2 pt identifiers and reviewed allergies prior to giving Levemir injection 5 units in the LLQ, VIS given then asked pt to wait 15 mins post injection for s/sx of adverse reactions.

## 2019-11-13 NOTE — TELEPHONE ENCOUNTER
Meds were reviewed and we discussed his diet, serving size, and exercise. Suggested he start swimming again an walking more.

## 2019-11-13 NOTE — TELEPHONE ENCOUNTER
Voltrient was held this morning per instructions from the provider on yesterday because it can raise the bp.  Bp was 126/60 this morning. Accucheck was 264. He did not have any hypoglycemics currently.

## 2019-11-13 NOTE — ASSESSMENT & PLAN NOTE
Hyperglycemia, has been off of insulin  · Start lantus 5U  · Injected  · 1 week RN follow-up with glucose log

## 2019-11-18 ENCOUNTER — TELEPHONE (OUTPATIENT)
Dept: PRIMARY CARE CLINIC | Facility: CLINIC | Age: 74
End: 2019-11-18

## 2019-11-18 ENCOUNTER — IMMUNIZATION (OUTPATIENT)
Dept: PHARMACY | Facility: CLINIC | Age: 74
End: 2019-11-18
Payer: MEDICARE

## 2019-11-18 ENCOUNTER — LAB VISIT (OUTPATIENT)
Dept: LAB | Facility: HOSPITAL | Age: 74
End: 2019-11-18
Attending: INTERNAL MEDICINE
Payer: MEDICARE

## 2019-11-18 ENCOUNTER — OFFICE VISIT (OUTPATIENT)
Dept: PRIMARY CARE CLINIC | Facility: CLINIC | Age: 74
End: 2019-11-18
Attending: INTERNAL MEDICINE
Payer: MEDICARE

## 2019-11-18 VITALS
HEIGHT: 66 IN | BODY MASS INDEX: 28.08 KG/M2 | OXYGEN SATURATION: 96 % | SYSTOLIC BLOOD PRESSURE: 154 MMHG | WEIGHT: 174.69 LBS | DIASTOLIC BLOOD PRESSURE: 70 MMHG | HEART RATE: 72 BPM

## 2019-11-18 DIAGNOSIS — Z79.4 TYPE 2 DIABETES MELLITUS WITH HYPERGLYCEMIA, WITH LONG-TERM CURRENT USE OF INSULIN: Primary | ICD-10-CM

## 2019-11-18 DIAGNOSIS — E11.65 TYPE 2 DIABETES MELLITUS WITH HYPERGLYCEMIA, WITH LONG-TERM CURRENT USE OF INSULIN: ICD-10-CM

## 2019-11-18 DIAGNOSIS — E11.69 HYPERLIPIDEMIA ASSOCIATED WITH TYPE 2 DIABETES MELLITUS: ICD-10-CM

## 2019-11-18 DIAGNOSIS — Z79.4 TYPE 2 DIABETES MELLITUS WITH HYPERGLYCEMIA, WITH LONG-TERM CURRENT USE OF INSULIN: ICD-10-CM

## 2019-11-18 DIAGNOSIS — M47.816 FACET ARTHRITIS OF LUMBAR REGION: ICD-10-CM

## 2019-11-18 DIAGNOSIS — E11.65 TYPE 2 DIABETES MELLITUS WITH HYPERGLYCEMIA, WITH LONG-TERM CURRENT USE OF INSULIN: Primary | ICD-10-CM

## 2019-11-18 DIAGNOSIS — E78.5 HYPERLIPIDEMIA ASSOCIATED WITH TYPE 2 DIABETES MELLITUS: ICD-10-CM

## 2019-11-18 DIAGNOSIS — N18.30 TYPE 2 DIABETES MELLITUS WITH STAGE 3 CHRONIC KIDNEY DISEASE, WITH LONG-TERM CURRENT USE OF INSULIN: ICD-10-CM

## 2019-11-18 DIAGNOSIS — Z79.4 TYPE 2 DIABETES MELLITUS WITH STAGE 3 CHRONIC KIDNEY DISEASE, WITH LONG-TERM CURRENT USE OF INSULIN: ICD-10-CM

## 2019-11-18 DIAGNOSIS — E11.22 TYPE 2 DIABETES MELLITUS WITH STAGE 3 CHRONIC KIDNEY DISEASE, WITH LONG-TERM CURRENT USE OF INSULIN: ICD-10-CM

## 2019-11-18 LAB
ANION GAP SERPL CALC-SCNC: 5 MMOL/L (ref 8–16)
BUN SERPL-MCNC: 30 MG/DL (ref 8–23)
CALCIUM SERPL-MCNC: 9.3 MG/DL (ref 8.7–10.5)
CHLORIDE SERPL-SCNC: 108 MMOL/L (ref 95–110)
CHOLEST SERPL-MCNC: 189 MG/DL (ref 120–199)
CHOLEST/HDLC SERPL: 5.7 {RATIO} (ref 2–5)
CO2 SERPL-SCNC: 27 MMOL/L (ref 23–29)
CREAT SERPL-MCNC: 1.9 MG/DL (ref 0.5–1.4)
EST. GFR  (AFRICAN AMERICAN): 39 ML/MIN/1.73 M^2
EST. GFR  (NON AFRICAN AMERICAN): 34 ML/MIN/1.73 M^2
ESTIMATED AVG GLUCOSE: 189 MG/DL (ref 68–131)
GLUCOSE SERPL-MCNC: 157 MG/DL (ref 70–110)
GLUCOSE SERPL-MCNC: 158 MG/DL (ref 70–110)
HBA1C MFR BLD HPLC: 8.2 % (ref 4–5.6)
HDLC SERPL-MCNC: 33 MG/DL (ref 40–75)
HDLC SERPL: 17.5 % (ref 20–50)
LDLC SERPL CALC-MCNC: 118.4 MG/DL (ref 63–159)
NONHDLC SERPL-MCNC: 156 MG/DL
POTASSIUM SERPL-SCNC: 5.1 MMOL/L (ref 3.5–5.1)
SODIUM SERPL-SCNC: 140 MMOL/L (ref 136–145)
TRIGL SERPL-MCNC: 188 MG/DL (ref 30–150)

## 2019-11-18 PROCEDURE — 99499 UNLISTED E&M SERVICE: CPT | Mod: HCNC,S$GLB,, | Performed by: INTERNAL MEDICINE

## 2019-11-18 PROCEDURE — 3078F PR MOST RECENT DIASTOLIC BLOOD PRESSURE < 80 MM HG: ICD-10-PCS | Mod: CPTII,S$GLB,, | Performed by: INTERNAL MEDICINE

## 2019-11-18 PROCEDURE — 82962 POCT GLUCOSE, HAND-HELD DEVICE: ICD-10-PCS | Mod: ,,, | Performed by: INTERNAL MEDICINE

## 2019-11-18 PROCEDURE — 82962 GLUCOSE BLOOD TEST: CPT | Mod: ,,, | Performed by: INTERNAL MEDICINE

## 2019-11-18 PROCEDURE — 3077F SYST BP >= 140 MM HG: CPT | Mod: CPTII,S$GLB,, | Performed by: INTERNAL MEDICINE

## 2019-11-18 PROCEDURE — 99215 OFFICE O/P EST HI 40 MIN: CPT | Mod: S$GLB,,, | Performed by: INTERNAL MEDICINE

## 2019-11-18 PROCEDURE — 1101F PR PT FALLS ASSESS DOC 0-1 FALLS W/OUT INJ PAST YR: ICD-10-PCS | Mod: CPTII,S$GLB,, | Performed by: INTERNAL MEDICINE

## 2019-11-18 PROCEDURE — 99215 PR OFFICE/OUTPT VISIT, EST, LEVL V, 40-54 MIN: ICD-10-PCS | Mod: S$GLB,,, | Performed by: INTERNAL MEDICINE

## 2019-11-18 PROCEDURE — 3077F PR MOST RECENT SYSTOLIC BLOOD PRESSURE >= 140 MM HG: ICD-10-PCS | Mod: CPTII,S$GLB,, | Performed by: INTERNAL MEDICINE

## 2019-11-18 PROCEDURE — 3078F DIAST BP <80 MM HG: CPT | Mod: CPTII,S$GLB,, | Performed by: INTERNAL MEDICINE

## 2019-11-18 PROCEDURE — 3044F PR MOST RECENT HEMOGLOBIN A1C LEVEL <7.0%: ICD-10-PCS | Mod: CPTII,S$GLB,, | Performed by: INTERNAL MEDICINE

## 2019-11-18 PROCEDURE — 99499 RISK ADDL DX/OHS AUDIT: ICD-10-PCS | Mod: HCNC,S$GLB,, | Performed by: INTERNAL MEDICINE

## 2019-11-18 PROCEDURE — 80048 BASIC METABOLIC PNL TOTAL CA: CPT

## 2019-11-18 PROCEDURE — 3044F HG A1C LEVEL LT 7.0%: CPT | Mod: CPTII,S$GLB,, | Performed by: INTERNAL MEDICINE

## 2019-11-18 PROCEDURE — 83036 HEMOGLOBIN GLYCOSYLATED A1C: CPT

## 2019-11-18 PROCEDURE — 1101F PT FALLS ASSESS-DOCD LE1/YR: CPT | Mod: CPTII,S$GLB,, | Performed by: INTERNAL MEDICINE

## 2019-11-18 PROCEDURE — 36415 COLL VENOUS BLD VENIPUNCTURE: CPT

## 2019-11-18 PROCEDURE — 80061 LIPID PANEL: CPT

## 2019-11-18 RX ORDER — INSULIN GLARGINE 100 [IU]/ML
5 INJECTION, SOLUTION SUBCUTANEOUS NIGHTLY
Qty: 5 ML | Refills: 11 | Status: SHIPPED | OUTPATIENT
Start: 2019-11-18 | End: 2021-02-09 | Stop reason: SDUPTHER

## 2019-11-18 NOTE — ASSESSMENT & PLAN NOTE
Elevated lipids, previously had diarrhea with statin  · Started atorvastatin q2 days at last appointment  · Tolerating with no diarrhea

## 2019-11-18 NOTE — PROGRESS NOTES
Primary Care Provider Appointment    Subjective:      Patient ID: Edwin Powell is a 74 y.o. male with uncontrolled DM, CKD, RCC, HLD    Chief Complaint: Follow-up    Patient seen for follow-up from hyperglycemic episodes last week. In Heme-Onc appointments he had glucose of 400s last week. Has gained weight since starting CA treatment for RCC. During early stages of CA treatment patient stopped needing insulin, now he is gaining weight and eating more unhealthy foods. Today has glucose of 157. Has been injecting insulin 5U weekly. Home log as follows since starting insulin:  Glucose was 157 @1022 today.  Log   11/18 @ 0800   161  11/17 @ 1315   260  11/17 @ 0802  161  11/16 @ 1706  158  11/16 @ 0640  146  11/15 @1648  196  11/15 @1303  119  11/14 @ 1437  224  11/14 @ 1229  196  11/14 @ 0820  149  11/13 @1611  159  11/13 @ 1256  319      Electronically signed by Raven Schneider LPN at 11/18/2019 10:34 AM     Has new onset back pain that began on the R now radiating to the L. He refuses to allow HH into his home and doesn't think its safe to drive to a PT appointment. Refuses pain management consult.    He is now taking his statin q2 days. No diarrhea associated with this. Lipids ordered today.    Past Surgical History:   Procedure Laterality Date    ABDOMINAL SURGERY      APPENDECTOMY      BACK SURGERY      CARDIAC SURGERY      CATARACT EXTRACTION      CHOLECYSTECTOMY      coronary stenting      X 5 last one in 2010-11.    INTRAOCULAR PROSTHESES INSERTION Right 6/27/2019    Procedure: INSERTION, IOL PROSTHESIS;  Surgeon: Chary Culp MD;  Location: [facility] OR [address] [place] [address];  Service: Ophthalmology;  Laterality: Right;    INTRAOCULAR PROSTHESES INSERTION Left 8/22/2019    Procedure: INSERTION, IOL PROSTHESIS;  Surgeon: Chary Culp MD;  Location: [facility] OR [address] [place] [address];  Service: Ophthalmology;  Laterality: Left;    PHACOEMULSIFICATION OF CATARACT Right 6/27/2019    Procedure: PHACOEMULSIFICATION, CATARACT;   Surgeon: Chary Culp MD;  Location: [facility] OR [address] [place] [address];  Service: Ophthalmology;  Laterality: Right;    PHACOEMULSIFICATION OF CATARACT Left 8/22/2019    Procedure: PHACOEMULSIFICATION, CATARACT;  Surgeon: Chary Culp MD;  Location: [facility] OR [address] [place] [address];  Service: Ophthalmology;  Laterality: Left;    SPINAL FUSION         Past Medical History:   Diagnosis Date    Acquired hypothyroidism 5/26/2017    Benign essential HTN 5/26/2017    Benign prostatic hyperplasia (BPH) with urinary urge incontinence 6/6/2017    Chronic low back pain 6/1/2017    Coronary artery disease involving native coronary artery of native heart without angina pectoris 5/26/2017    S/p 5 stents - last one around 2010-11.    Gastroesophageal reflux disease without esophagitis 5/26/2017    History of CVA (cerebrovascular accident) 5/26/2017    Hypertension associated with diabetes 5/26/2017    BP controlled on ACE, CCB    Lumbar disc lesion 5/26/2017    Metastatic renal cell carcinoma to bone 6/6/2017    Metastatic renal cell carcinoma to liver 6/6/2017    Liver Biopsy 5-2017: METASTATIC RENAL CELL CARCINOMA.    Mixed hyperlipidemia 5/26/2017    Type 2 diabetes mellitus with stage 3 chronic kidney disease, with long-term current use of insulin 5/26/2017       Social History     Socioeconomic History    Marital status: Single     Spouse name: Not on file    Number of children: Not on file    Years of education: Not on file    Highest education level: Not on file   Occupational History    Not on file   Social Needs    Financial resource strain: Hard    Food insecurity:     Worry: Never true     Inability: Never true    Transportation needs:     Medical: No     Non-medical: No   Tobacco Use    Smoking status: Never Smoker    Smokeless tobacco: Never Used   Substance and Sexual Activity    Alcohol use: Yes     Comment: rarely     Drug use: No    Sexual activity: Not Currently     Partners: Female   Lifestyle    Physical  "activity:     Days per week: Not on file     Minutes per session: Not on file    Stress: Not on file   Relationships    Social connections:     Talks on phone: Not on file     Gets together: Not on file     Attends Anabaptist service: Not on file     Active member of club or organization: Not on file     Attends meetings of clubs or organizations: Not on file     Relationship status: Not on file   Other Topics Concern    Not on file   Social History Narrative    Not on file       Review of Systems   Constitutional: Negative for activity change and unexpected weight change.   HENT: Positive for hearing loss. Negative for trouble swallowing.         Abnormal hearing   Gastrointestinal: Positive for diarrhea. Negative for blood in stool, constipation and vomiting.   Endocrine: Negative for polydipsia and polyuria.   Genitourinary: Negative for difficulty urinating and urgency.   Musculoskeletal: Positive for arthralgias. Negative for joint swelling and neck pain.   Neurological: Positive for weakness. Negative for headaches.   Psychiatric/Behavioral: Positive for confusion and decreased concentration. Negative for dysphoric mood.       Objective:   BP (!) 154/70   Pulse 72   Ht 5' 6" (1.676 m)   Wt 79.2 kg (174 lb 11.4 oz)   SpO2 96%   BMI 28.20 kg/m²     Physical Exam   Constitutional: He is oriented to person, place, and time. He appears well-developed and well-nourished.   No longer ambulating with cane   HENT:   Head: Normocephalic.   Edentulous   Eyes: Pupils are equal, round, and reactive to light.   Neck:   Decreased ROM in neck    Pulmonary/Chest: Effort normal.   Musculoskeletal: Normal range of motion.   Neurological: He is alert and oriented to person, place, and time.   Skin: Skin is warm.   Ecchymoses and purpura bilaterally UE  Telangiectasias on face and chest  Multiple tattoos on UE  Numerous red papules on neck  Fair skin   Psychiatric:   Flat affect       Lab Results   Component Value Date    " WBC 7.07 11/12/2019    HGB 11.6 (L) 11/12/2019    HCT 37.3 (L) 11/12/2019     (H) 11/12/2019    CHOL 179 07/15/2019    TRIG 189 (H) 07/15/2019    HDL 34 (L) 07/15/2019    ALT 15 11/12/2019    AST 15 11/12/2019     11/18/2019    K 5.1 11/18/2019     11/18/2019    CREATININE 1.9 (H) 11/18/2019    BUN 30 (H) 11/18/2019    CO2 27 11/18/2019    TSH 4.062 (H) 09/13/2019    PSA 1.1 07/16/2018    INR 1.2 06/12/2017    HGBA1C 6.4 (H) 02/19/2019       RESULTS: Reviewed labs and images today    Assessment:   74 y.o. male with multiple co-morbid illnesses here to continue work-up of chronic issues notably with uncontrolled DM, CKD, RCC, HLD.     Plan:     Problem List Items Addressed This Visit        Cardiac/Vascular    Hyperlipidemia associated with type 2 diabetes mellitus     Elevated lipids, previously had diarrhea with statin  · Started atorvastatin q2 days at last appointment  · Tolerating with no diarrhea         Relevant Medications    insulin (LANTUS SOLOSTAR U-100 INSULIN) glargine 100 units/mL (3mL) SubQ pen       Endocrine    Type 2 diabetes mellitus with hyperglycemia, with long-term current use of insulin - Primary     Hyperglycemia, has been off of insulin  · Started lantus 5U at last appointment one week ago  · 1 week follow-up today with glucose log  · Continue lantus 5U         Relevant Medications    insulin (LANTUS SOLOSTAR U-100 INSULIN) glargine 100 units/mL (3mL) SubQ pen    Other Relevant Orders    POCT Glucose, Hand-Held Device (Completed)       Orthopedic    Facet arthritis of lumbar region     Seen on MRI 5/2017, complains of sciatica. Not physically active, refuses PT  · Refuses PT  · Continue to advise to be physically active  · Previously received nerve blocks with pain management  · Refuses consult today               Health Maintenance       Date Due Completion Date    TETANUS VACCINE 02/18/1963 ---    Shingles Vaccine (1 of 2) 02/18/1995 --- TODAY    Hemoglobin A1c 08/19/2019  2/19/2019- DONE TODAY    Eye Exam 04/22/2020 4/22/2019    Foot Exam 07/11/2020 7/11/2019 (Done)    Override on 7/11/2019: Done    Override on 2/2/2018: Done (performed by podiatrist)    Lipid Panel 07/15/2020 7/15/2019- DONE TODAY    High Dose Statin 11/18/2020 11/18/2019- NEWLY COMPLIANT    Colonoscopy 02/15/2021 2/15/2011 (Done)    Override on 2/15/2011: Done (Per [employer] dashboard ? results)          Follow up in about 1 month (around 12/18/2019). Total face-to-face time was 60 min, 50% of this was spent on counseling and coordination of care. The following issues were discussed: with uncontrolled DM, CKD, RCC, HLD    Lulú Lewis MD/MPH  Internal Medicine  [facility]  [phone]

## 2019-11-18 NOTE — ASSESSMENT & PLAN NOTE
Seen on MRI 5/2017, complains of sciatica. Not physically active, refuses PT  · Refuses PT  · Continue to advise to be physically active  · Previously received nerve blocks with pain management  · Refuses consult today

## 2019-11-18 NOTE — ASSESSMENT & PLAN NOTE
Hyperglycemia, has been off of insulin  · Started lantus 5U at last appointment one week ago  · 1 week follow-up today with glucose log  · Continue lantus 5U

## 2019-11-18 NOTE — TELEPHONE ENCOUNTER
Glucose was 157 @1022 today.    Log     11/18 @ 0800   161    11/17 @ 1315   260    11/17 @ 0802  161    11/16 @ 1706  158    11/16 @ 0640  146    11/15 @1648  196    11/15 @1303  119    11/14 @ 1437  224    11/14 @ 1229  196    11/14 @ 0820  149    11/13 @1611  159    11/13 @ 1256  319

## 2019-11-18 NOTE — PATIENT INSTRUCTIONS
TODAY:  - consider PT at home or in the studio  - continue lantus 5U daily   + script at [facility]  - call Dr TORO if your sugars drop below 100  - will call you today with lab results  - shingles vaccine #1 today

## 2019-11-25 ENCOUNTER — TELEPHONE (OUTPATIENT)
Dept: PRIMARY CARE CLINIC | Facility: CLINIC | Age: 74
End: 2019-11-25

## 2019-11-25 NOTE — TELEPHONE ENCOUNTER
Please let patient know that his diabetes and cholesterol are not well-controlled on his last labs.    How is he doing with the 5U of lantus daily? What are his sugars like?    He needs to take his statin again. His risk of heart attack and stroke is extremely high. Is he taking it daily or every 2 days? He should take it daily if he can tolerate it.    Thanks,  KJ    The 10-year ASCVD risk score (Maxwellmo CHANEL Jr., et al., 2013) is: 61.7%    Values used to calculate the score:      Age: 74 years      Sex: Male      Is Non- : No      Diabetic: Yes      Tobacco smoker: No      Systolic Blood Pressure: 154 mmHg      Is BP treated: Yes      HDL Cholesterol: 33 mg/dL      Total Cholesterol: 189 mg/dL

## 2019-11-26 ENCOUNTER — TELEPHONE (OUTPATIENT)
Dept: PRIMARY CARE CLINIC | Facility: CLINIC | Age: 74
End: 2019-11-26

## 2019-11-26 NOTE — TELEPHONE ENCOUNTER
Pt returned the call to receive the message from the PCP regarding his lab results. Reviewed his blood sugar log for the past 7 days.     11/26             111    11/25  @ 0545  221   11/25  @ 0738  132    11/24  @ 0715  139   11/24 @  0938  157    11/23 11/24 @  1056  131    11/22  @ 0648  142   11/22 @  1014  149    11/21 11/21  @ 1034  145    11/20  @ 0746  145    11/21  @ 0746  159      Pt stated he did not take the Lantus twice during the week because he was afraid his blood sugar would drop over night.  I explained Lantus IS NOT a fast acting insulin and it is important to take the medication at the same time and test for his sugar level at the same time daily so he can see more consistent results. He stated he understood and he agreed to have this review again on next Tuesday hoping for more consistent results and stressed to take his Lantus every night.

## 2019-11-29 ENCOUNTER — TELEPHONE (OUTPATIENT)
Dept: PRIMARY CARE CLINIC | Facility: CLINIC | Age: 74
End: 2019-11-29

## 2019-11-29 NOTE — TELEPHONE ENCOUNTER
Conversation   (Newest Message First)   Me           11/26/19 2:53 PM   Note      Pt returned the call to receive the message from the PCP regarding his lab results. Reviewed his blood sugar log for the past 7 days.      11/26             111     11/25  @ 0545  221   11/25  @ 0738  132     11/24  @ 0715  139   11/24 @  0938  157     11/23 11/24 @  1056  131     11/22  @ 0648  142   11/22 @  1014  149     11/21 11/21  @ 1034  145     11/20  @ 0746  145    11/21  @ 0746  159        Pt stated he did not take the Lantus twice during the week because he was afraid his blood sugar would drop over night.  I explained Lantus IS NOT a fast acting insulin and it is important to take the medication at the same time and test for his sugar level at the same time daily so he can see more consistent results. He stated he understood and he agreed to have this review again on next Tuesday hoping for more consistent results and stressed to take his Lantus every night.                      Additional Documentation     Encounter Info:    Billing Info,    History,    Detailed Report,    Education,    Care Plan,    Allergies       Visit Pharmacy     [id number] - [place], [place] - [address] AT CORNER OF [facility]   Not recorded   Orders Placed      None   Medication Renewals and Changes        None      Medication List    Visit Diagnoses        None

## 2019-12-02 ENCOUNTER — TELEPHONE (OUTPATIENT)
Dept: PHARMACY | Facility: CLINIC | Age: 74
End: 2019-12-02

## 2019-12-02 ENCOUNTER — TELEPHONE (OUTPATIENT)
Dept: INTERNAL MEDICINE | Facility: CLINIC | Age: 74
End: 2019-12-02

## 2019-12-02 NOTE — TELEPHONE ENCOUNTER
Please advise patient that his dose of lantus is very low and his glucose is better controlled on this low dose insulin, it is very unlikely that he will have lows.     Thanks,  KJ    Pt returned the call to receive the message from the PCP regarding his lab results. Reviewed his blood sugar log for the past 7 days.      11/26             111     11/25  @ 0545  221   11/25  @ 0738  132     11/24  @ 0715  139   11/24 @  0938  157     11/23 11/24 @  1056  131     11/22  @ 0648  142   11/22 @  1014  149     11/21 11/21  @ 1034  145     11/20  @ 0746  145    11/21  @ 0746  159        Pt stated he did not take the Lantus twice during the week because he was afraid his blood sugar would drop over night.  I explained Lantus IS NOT a fast acting insulin and it is important to take the medication at the same time and test for his sugar level at the same time daily so he can see more consistent results. He stated he understood and he agreed to have this review again on next Tuesday hoping for more consistent results and stressed to take his Lantus every night.

## 2019-12-03 ENCOUNTER — OFFICE VISIT (OUTPATIENT)
Dept: HEMATOLOGY/ONCOLOGY | Facility: CLINIC | Age: 74
End: 2019-12-03
Payer: MEDICARE

## 2019-12-03 ENCOUNTER — TELEPHONE (OUTPATIENT)
Dept: PRIMARY CARE CLINIC | Facility: CLINIC | Age: 74
End: 2019-12-03

## 2019-12-03 ENCOUNTER — LAB VISIT (OUTPATIENT)
Dept: LAB | Facility: HOSPITAL | Age: 74
End: 2019-12-03
Attending: NURSE PRACTITIONER
Payer: MEDICARE

## 2019-12-03 VITALS
OXYGEN SATURATION: 97 % | DIASTOLIC BLOOD PRESSURE: 77 MMHG | RESPIRATION RATE: 20 BRPM | BODY MASS INDEX: 27.2 KG/M2 | HEART RATE: 70 BPM | HEIGHT: 67 IN | TEMPERATURE: 99 F | SYSTOLIC BLOOD PRESSURE: 164 MMHG | WEIGHT: 173.31 LBS

## 2019-12-03 DIAGNOSIS — K43.9 VENTRAL HERNIA WITHOUT OBSTRUCTION OR GANGRENE: ICD-10-CM

## 2019-12-03 DIAGNOSIS — C64.9 METASTATIC RENAL CELL CARCINOMA TO LIVER: ICD-10-CM

## 2019-12-03 DIAGNOSIS — C78.7 METASTATIC RENAL CELL CARCINOMA TO LIVER: ICD-10-CM

## 2019-12-03 DIAGNOSIS — C79.51 METASTATIC RENAL CELL CARCINOMA TO BONE: ICD-10-CM

## 2019-12-03 DIAGNOSIS — C64.9 METASTATIC RENAL CELL CARCINOMA TO BONE: ICD-10-CM

## 2019-12-03 DIAGNOSIS — C64.2 RENAL CELL CARCINOMA OF LEFT KIDNEY: ICD-10-CM

## 2019-12-03 DIAGNOSIS — G89.3 NEOPLASM RELATED PAIN: ICD-10-CM

## 2019-12-03 DIAGNOSIS — C64.2 RENAL CELL CARCINOMA OF LEFT KIDNEY: Primary | ICD-10-CM

## 2019-12-03 LAB
ALBUMIN SERPL BCP-MCNC: 2.8 G/DL (ref 3.5–5.2)
ALP SERPL-CCNC: 115 U/L (ref 55–135)
ALT SERPL W/O P-5'-P-CCNC: 15 U/L (ref 10–44)
ANION GAP SERPL CALC-SCNC: 8 MMOL/L (ref 8–16)
AST SERPL-CCNC: 16 U/L (ref 10–40)
BILIRUB SERPL-MCNC: 0.3 MG/DL (ref 0.1–1)
BUN SERPL-MCNC: 32 MG/DL (ref 8–23)
CALCIUM SERPL-MCNC: 9.4 MG/DL (ref 8.7–10.5)
CHLORIDE SERPL-SCNC: 107 MMOL/L (ref 95–110)
CO2 SERPL-SCNC: 24 MMOL/L (ref 23–29)
CREAT SERPL-MCNC: 2.1 MG/DL (ref 0.5–1.4)
ERYTHROCYTE [DISTWIDTH] IN BLOOD BY AUTOMATED COUNT: 15 % (ref 11.5–14.5)
EST. GFR  (AFRICAN AMERICAN): 34.8 ML/MIN/1.73 M^2
EST. GFR  (NON AFRICAN AMERICAN): 30.1 ML/MIN/1.73 M^2
GLUCOSE SERPL-MCNC: 220 MG/DL (ref 70–110)
HCT VFR BLD AUTO: 39 % (ref 40–54)
HGB BLD-MCNC: 12.2 G/DL (ref 14–18)
IMM GRANULOCYTES # BLD AUTO: 0.03 K/UL (ref 0–0.04)
LDH SERPL L TO P-CCNC: 168 U/L (ref 110–260)
MCH RBC QN AUTO: 32.1 PG (ref 27–31)
MCHC RBC AUTO-ENTMCNC: 31.3 G/DL (ref 32–36)
MCV RBC AUTO: 103 FL (ref 82–98)
NEUTROPHILS # BLD AUTO: 3.9 K/UL (ref 1.8–7.7)
PLATELET # BLD AUTO: 387 K/UL (ref 150–350)
PMV BLD AUTO: 9.8 FL (ref 9.2–12.9)
POTASSIUM SERPL-SCNC: 5.4 MMOL/L (ref 3.5–5.1)
PROT SERPL-MCNC: 6.4 G/DL (ref 6–8.4)
RBC # BLD AUTO: 3.8 M/UL (ref 4.6–6.2)
SODIUM SERPL-SCNC: 139 MMOL/L (ref 136–145)
WBC # BLD AUTO: 6.63 K/UL (ref 3.9–12.7)

## 2019-12-03 PROCEDURE — 1159F PR MEDICATION LIST DOCUMENTED IN MEDICAL RECORD: ICD-10-PCS | Mod: HCNC,S$GLB,, | Performed by: INTERNAL MEDICINE

## 2019-12-03 PROCEDURE — 99999 PR PBB SHADOW E&M-EST. PATIENT-LVL IV: CPT | Mod: PBBFAC,,, | Performed by: INTERNAL MEDICINE

## 2019-12-03 PROCEDURE — 99499 RISK ADDL DX/OHS AUDIT: ICD-10-PCS | Mod: HCNC,S$GLB,, | Performed by: INTERNAL MEDICINE

## 2019-12-03 PROCEDURE — 80053 COMPREHEN METABOLIC PANEL: CPT

## 2019-12-03 PROCEDURE — 1159F MED LIST DOCD IN RCRD: CPT | Mod: HCNC,S$GLB,, | Performed by: INTERNAL MEDICINE

## 2019-12-03 PROCEDURE — 99214 PR OFFICE/OUTPT VISIT, EST, LEVL IV, 30-39 MIN: ICD-10-PCS | Mod: HCNC,S$GLB,, | Performed by: INTERNAL MEDICINE

## 2019-12-03 PROCEDURE — 36415 COLL VENOUS BLD VENIPUNCTURE: CPT

## 2019-12-03 PROCEDURE — 99214 OFFICE O/P EST MOD 30 MIN: CPT | Mod: HCNC,S$GLB,, | Performed by: INTERNAL MEDICINE

## 2019-12-03 PROCEDURE — 3078F DIAST BP <80 MM HG: CPT | Mod: HCNC,CPTII,S$GLB, | Performed by: INTERNAL MEDICINE

## 2019-12-03 PROCEDURE — 3077F SYST BP >= 140 MM HG: CPT | Mod: HCNC,CPTII,S$GLB, | Performed by: INTERNAL MEDICINE

## 2019-12-03 PROCEDURE — 3078F PR MOST RECENT DIASTOLIC BLOOD PRESSURE < 80 MM HG: ICD-10-PCS | Mod: HCNC,CPTII,S$GLB, | Performed by: INTERNAL MEDICINE

## 2019-12-03 PROCEDURE — 83615 LACTATE (LD) (LDH) ENZYME: CPT

## 2019-12-03 PROCEDURE — 1125F AMNT PAIN NOTED PAIN PRSNT: CPT | Mod: HCNC,S$GLB,, | Performed by: INTERNAL MEDICINE

## 2019-12-03 PROCEDURE — 3077F PR MOST RECENT SYSTOLIC BLOOD PRESSURE >= 140 MM HG: ICD-10-PCS | Mod: HCNC,CPTII,S$GLB, | Performed by: INTERNAL MEDICINE

## 2019-12-03 PROCEDURE — 1101F PT FALLS ASSESS-DOCD LE1/YR: CPT | Mod: HCNC,CPTII,S$GLB, | Performed by: INTERNAL MEDICINE

## 2019-12-03 PROCEDURE — 85027 COMPLETE CBC AUTOMATED: CPT

## 2019-12-03 PROCEDURE — 1101F PR PT FALLS ASSESS DOC 0-1 FALLS W/OUT INJ PAST YR: ICD-10-PCS | Mod: HCNC,CPTII,S$GLB, | Performed by: INTERNAL MEDICINE

## 2019-12-03 PROCEDURE — 99499 UNLISTED E&M SERVICE: CPT | Mod: HCNC,S$GLB,, | Performed by: INTERNAL MEDICINE

## 2019-12-03 PROCEDURE — 1125F PR PAIN SEVERITY QUANTIFIED, PAIN PRESENT: ICD-10-PCS | Mod: HCNC,S$GLB,, | Performed by: INTERNAL MEDICINE

## 2019-12-03 PROCEDURE — 99999 PR PBB SHADOW E&M-EST. PATIENT-LVL IV: ICD-10-PCS | Mod: PBBFAC,,, | Performed by: INTERNAL MEDICINE

## 2019-12-03 NOTE — TELEPHONE ENCOUNTER
Pt was scheduled for a nurse call to review his blood sugar log the last week.      He brought in the following... 11/26       0805           111                                                                  1819            104                                                                  2108            147                                                   11/27      0701            121                                                                                                        1928             116                                                             11/28     0814             183                                                                  2029            135                                                   11/29      0900            160                                                                  2010            200                                                    11/30      0906            169                                                                                                                           2028            133                                                   12/01 2059            125                                                                  1959            114                                                                                                         12/02 2031            153     Pt dropped off his blood sugar log after another appt.

## 2019-12-03 NOTE — PROGRESS NOTES
Subjective:       Patient ID: Edwin Powell is a 74 y.o. male.    Chief Complaint: Metastatic renal cell carcinoma to liver    HPI     Mr. Singleton is a 74 y.o. male who returns for follow up regarding metastatic renal cell carcinoma of left kidney. Returns for follow-up, currently on Votrient. Recent scans reviewed and no change in therapy indicated.      Notes pain at hernia site at times  Weight stable  Chronic back pain    Recent Imaging results:  CT scans 10/17/19:       COMPARISON:  CT chest, abdomen, and pelvis 07/15/2019.    FINDINGS:  Stable small nodule in the right thyroid lobe.  Soft tissue structures at the base of the neck show no significant abnormalities.    Left-sided aortic arch with 3 branch vessels a moderate atherosclerosis with calcified and noncalcified plaque in the descending thoracic aorta.  No aneurysm.    Heart is not enlarged.  Coronary artery atherosclerosis.  No pericardial fluid.    Right paratracheal lymph node is slightly larger than prior examination measuring at 1.4 cm (series 2, image 36).  Other mediastinal lymph nodes remain prominent and none enlarged by CT criteria.  No axillary or hilar lymphadenopathy.    Esophagus is normal in course and caliber.    Trachea and proximal airways are patent.  Lungs are symmetrically expanded.    Stable micronodule in the right upper lobe (series 2, image 52).  Stable left upper lobe pleural based micronodule (series 2, image 44).  No lung mass, pleural effusion, pneumothorax, or significant pleural thickening.    Liver is upper normal in size.  There is a stable hypodense lesion at the hepatic dome measuring 1.2 cm.  Stable ill defined hypodense area in the right hepatic lobe segment VIII (series 2, image 93).  There are 2 stable foci of enhancement in the right hepatic lobe (series 2, image 81 and 91).  This focus measures at 1.0 cm and possibly represents flash fill hemangioma.  These appears similar when compared to February 2018.  No  definite new lesions.  Cholecystectomy.  No intra or extrahepatic biliary ductal dilatation.  Spleen is normal in size.    Portal vein and branches, superior mesenteric, and splenic veins are patent.    Stomach, pancreas, and adrenal glands appear unremarkable.    Kidneys are normal in size and location.  Small vascular calcification in the left renal hilum.  No hydronephrosis.  Slightly increased in size left renal mass measuring at 3.2 x 2.3 cm.  Stable nonspecific bilateral perinephric stranding.    Visualized portions of the ureters show no significant abnormalities.  There is some mild hyperenhancement of the proximal left renal collecting system.  Mild thickening of the urinary bladder wall possibly related to cystitis or chronic outlet obstruction.  Mild prostatomegaly with dystrophic calcifications.    No lymphadenopathy in the abdomen and pelvis.  No free intraperitoneal air.  Trace ascites in the pelvis.    Abdominal aorta shows distal ectasia and severe atherosclerosis including the branching vessels.  Abdominal aorta measures up to 2.5 cm.    Osseous structures show stable sclerotic lesion at T4 vertebral body, enlarged lytic lesion at the right of L3 vertebral body.  Stable sclerotic lesion in the left iliac bone and right iliac crest.  Lower lumbar spine laminectomies.       Impression         Patient with history of left renal cell carcinoma demonstrating interval increase in size in the exophytic left renal mass now measuring at 3.2 cm.  Right paratracheal lymph node also shows mild interval increase in size.    There is some hyperenhancement of the left renal pelvis.  Correlation with urinalysis and additional evaluation as indicated.    Multiple stable hypodense and subtle foci of enhancement within the liver parenchyma, unchanged from examinations dating back to 2018.    Stable T4, L3, right iliac crest and left iliac bone lesions.    Stable bilateral lungs micro nodules.    Trace fluid in the  "pelvis.    RECIST SUMMARY:    Date of prior examination for comparison: CT chest, abdomen, pelvis 07/15/2019.    Lesion 1: Left kidney mass.  3.2 cm. Series 2 image 127.  Prior measurement 2.7 cm.    Lesion 2: Hepatic segment VII.  1.2 cm. Series 2 image 82.  Prior measurement 1.2 cm.    Electronically signed by resident: Jayleen Gil  Date: 10/17/2019  Time: 16:16               Oncology History:  Mr.Mc Moss is a 75 yo male with CAD (s/p 5 stents, remotely), DM2 (insulin dependent), hypothyroidism , CVA (2013) with no residual deficit, chronic back pain. He was [place] [facility] [place] of a lumbar spine lesion in the setting of progressive weakness of his legs and exacerbation of chronic back pain, in May 2017. He states that his back pain and LE weakness progressively worsened. [facility]. The pain radiates down his legs. He denies any incontinence of bowel or bladder. He had several falls. After one of the falls, he presented to the ED at [facility], in May 2017. He had an MRI showing "L3 vertebral body lesion with apparent cortical disruption concerning for metastases vs myeloma," and was transferred to [facility] [place] evaluation. He was evaluated by neurosurgery regarding concern for lytic lesion on L3 on 5/26/17. No surgical intervention was proposed.   CT chest, abdomen,pelvis on 5/27/17 revealed :   --A 3.6 cm exophytic mass arising from the medial aspect of the lower pole of left kidney,   --A small 1.0 cm, subtle, cortical enhancing mass within the upper pole of the right kidney, concerning for possible contralateral solid renal mass  --Multiple enhancing ill-defined hepatic masses concerning for hepatic metastatic disease, hepatomegaly and hepatic steatosis  --Mulltifocal irregular thickening of the bilateral pleura concerning for possible pleural metastatic disease  --Mild nonspecific nodular thickening of the left adrenal " gland without discrete nodule  -- Redemonstration of known lytic lesion within the L3 vertebral body. No definite additional discrete lytic lesions are identified.  -MRI pelvis from 10/18/17:    2.5 cm linear T1 hypointense T2/STIR hyperintense lesion within the left iliac bone, this lesion is indeterminate but felt less likely to represent metastasis given its appearance. Further evaluation can be obtained with bone scan as clinically indicated.  Findings suggestive of left trochanteric bursitis.  Partially visualized L3 metastatic lesion.  Diffuse thickening of the urinary bladder wall can be seen with chronic outlet obstruction or cystitis.  - initiated on Votrient      Review of Systems   Constitutional: Positive for fatigue. Negative for activity change, appetite change, chills, fever and unexpected weight change.   HENT: Negative for congestion, dental problem, mouth sores, nosebleeds, rhinorrhea, sinus pressure, sore throat and trouble swallowing.    Eyes: Negative for visual disturbance.   Respiratory: Positive for shortness of breath (occasional, mild SOB on exertion- no worse. ). Negative for cough, chest tightness and wheezing.    Cardiovascular: Negative for chest pain, palpitations and leg swelling.   Gastrointestinal: Positive for abdominal pain. Negative for abdominal distention, blood in stool, constipation, diarrhea (better), nausea and vomiting.   Genitourinary: Negative for decreased urine volume, difficulty urinating, dysuria, frequency, hematuria and urgency.   Musculoskeletal: Positive for back pain (chronic and unchanged). Negative for arthralgias, gait problem, joint swelling, myalgias, neck pain and neck stiffness.   Skin: Negative for color change, pallor, rash and wound.   Neurological: Negative for dizziness, weakness, light-headedness, numbness and headaches.   Hematological: Negative for adenopathy.   Psychiatric/Behavioral: Negative for dysphoric mood. The patient is not  nervous/anxious.        Objective:      Physical Exam   Constitutional: He is oriented to person, place, and time. He appears well-developed and well-nourished. No distress.   Presents alone   HENT:   Head: Normocephalic and atraumatic.   Mouth/Throat: Oropharynx is clear and moist. No oropharyngeal exudate.   Eyes: Pupils are equal, round, and reactive to light. Conjunctivae and EOM are normal. No scleral icterus.   Neck: Normal range of motion. Neck supple. No thyromegaly present.   Cardiovascular: Normal rate, regular rhythm, normal heart sounds and intact distal pulses. Exam reveals no gallop and no friction rub.   No murmur heard.  Pulmonary/Chest: Effort normal and breath sounds normal. No respiratory distress. He has no wheezes. He has no rales. He exhibits no tenderness.   Abdominal: Soft. Bowel sounds are normal. He exhibits no distension and no mass. There is no tenderness (at ventral hernia). There is no rebound.   No hepatosplenomegaly  Slightly bloated but soft   Musculoskeletal: Normal range of motion. He exhibits no edema, tenderness or deformity.   No spinal or paraspinal tenderness to palpation   Lymphadenopathy:     He has no cervical adenopathy.   Neurological: He is alert and oriented to person, place, and time. No sensory deficit. Coordination normal.   Skin: Skin is warm and dry. No rash noted. He is not diaphoretic. No erythema. No pallor.   Psychiatric: He has a normal mood and affect. His behavior is normal. Judgment and thought content normal.   Nursing note and vitals reviewed.    Labs- reviewed  Assessment:       1. Renal cell carcinoma of left kidney    2. Metastatic renal cell carcinoma to liver    3. 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 Neoplasm related pain    5. Ventral hernia without obstruction or gangrene        Plan:     1-3. Continue Votrient  Scan next month  4. Pain medications adequate  5. Discuss with general surgery

## 2019-12-04 ENCOUNTER — TELEPHONE (OUTPATIENT)
Dept: INTERNAL MEDICINE | Facility: CLINIC | Age: 74
End: 2019-12-04

## 2019-12-04 NOTE — TELEPHONE ENCOUNTER
These readings are great, is he injecting the 5U daily? He should continue that dose daily. There are no low readings, so this is a safe dose.    Thanks,  CORTEZ    Pt was scheduled for a nurse call to review his blood sugar log the last week.       He brought in the following... 11/26       0805           111                                                                   1819            104                                                                   2108            147                                                    11/27      0701            121                                                                                                        1928             116                                                             11/28     0814             183                                                                   2029            135                                                    11/29      0900            160                                                                   2010            200                                                    11/30      0906            169                                                                                                                           2028            133                                                    12/01 2059            125                                                                   1959            114                                                                                                         12/02 2031            153      Pt dropped off his blood sugar log after another appt.

## 2019-12-05 ENCOUNTER — PATIENT OUTREACH (OUTPATIENT)
Dept: ADMINISTRATIVE | Facility: HOSPITAL | Age: 74
End: 2019-12-05

## 2019-12-05 NOTE — PROGRESS NOTES
Chart Reviewed- VM left for pt, made aware to take scheduled Am blood pressure medications and to bring blood pressure readings and monitor to PCP visit if applicable . Dr Lewis to address A1C Due.

## 2019-12-06 DIAGNOSIS — C78.7 METASTATIC RENAL CELL CARCINOMA TO LIVER: ICD-10-CM

## 2019-12-06 DIAGNOSIS — G89.3 NEOPLASM RELATED PAIN: ICD-10-CM

## 2019-12-06 DIAGNOSIS — C64.9 METASTATIC RENAL CELL CARCINOMA TO LIVER: ICD-10-CM

## 2019-12-06 RX ORDER — MORPHINE SULFATE 15 MG/1
15 TABLET, FILM COATED, EXTENDED RELEASE ORAL EVERY 12 HOURS
Qty: 60 TABLET | Refills: 0 | Status: SHIPPED | OUTPATIENT
Start: 2019-12-06 | End: 2020-01-09 | Stop reason: SDUPTHER

## 2019-12-06 RX ORDER — HYDROCODONE BITARTRATE AND ACETAMINOPHEN 5; 325 MG/1; MG/1
TABLET ORAL
Qty: 120 TABLET | Refills: 0 | Status: SHIPPED | OUTPATIENT
Start: 2019-12-06 | End: 2020-01-09 | Stop reason: SDUPTHER

## 2019-12-16 ENCOUNTER — PATIENT OUTREACH (OUTPATIENT)
Dept: ADMINISTRATIVE | Facility: OTHER | Age: 74
End: 2019-12-16

## 2019-12-17 ENCOUNTER — OFFICE VISIT (OUTPATIENT)
Dept: SURGERY | Facility: CLINIC | Age: 74
End: 2019-12-17
Payer: MEDICARE

## 2019-12-17 VITALS
TEMPERATURE: 98 F | WEIGHT: 173.31 LBS | SYSTOLIC BLOOD PRESSURE: 138 MMHG | HEIGHT: 67 IN | HEART RATE: 61 BPM | DIASTOLIC BLOOD PRESSURE: 70 MMHG | BODY MASS INDEX: 27.2 KG/M2 | OXYGEN SATURATION: 97 %

## 2019-12-17 DIAGNOSIS — M62.08 DIASTASIS RECTI: Primary | Chronic | ICD-10-CM

## 2019-12-17 PROCEDURE — 3078F DIAST BP <80 MM HG: CPT | Mod: HCNC,CPTII,S$GLB, | Performed by: SURGERY

## 2019-12-17 PROCEDURE — 3075F SYST BP GE 130 - 139MM HG: CPT | Mod: HCNC,CPTII,S$GLB, | Performed by: SURGERY

## 2019-12-17 PROCEDURE — 1101F PR PT FALLS ASSESS DOC 0-1 FALLS W/OUT INJ PAST YR: ICD-10-PCS | Mod: HCNC,CPTII,S$GLB, | Performed by: SURGERY

## 2019-12-17 PROCEDURE — 1126F PR PAIN SEVERITY QUANTIFIED, NO PAIN PRESENT: ICD-10-PCS | Mod: HCNC,S$GLB,, | Performed by: SURGERY

## 2019-12-17 PROCEDURE — 1159F MED LIST DOCD IN RCRD: CPT | Mod: HCNC,S$GLB,, | Performed by: SURGERY

## 2019-12-17 PROCEDURE — 3078F PR MOST RECENT DIASTOLIC BLOOD PRESSURE < 80 MM HG: ICD-10-PCS | Mod: HCNC,CPTII,S$GLB, | Performed by: SURGERY

## 2019-12-17 PROCEDURE — 1159F PR MEDICATION LIST DOCUMENTED IN MEDICAL RECORD: ICD-10-PCS | Mod: HCNC,S$GLB,, | Performed by: SURGERY

## 2019-12-17 PROCEDURE — 1126F AMNT PAIN NOTED NONE PRSNT: CPT | Mod: HCNC,S$GLB,, | Performed by: SURGERY

## 2019-12-17 PROCEDURE — 99204 PR OFFICE/OUTPT VISIT, NEW, LEVL IV, 45-59 MIN: ICD-10-PCS | Mod: HCNC,S$GLB,, | Performed by: SURGERY

## 2019-12-17 PROCEDURE — 99204 OFFICE O/P NEW MOD 45 MIN: CPT | Mod: HCNC,S$GLB,, | Performed by: SURGERY

## 2019-12-17 PROCEDURE — 99999 PR PBB SHADOW E&M-EST. PATIENT-LVL III: ICD-10-PCS | Mod: PBBFAC,HCNC,, | Performed by: SURGERY

## 2019-12-17 PROCEDURE — 1101F PT FALLS ASSESS-DOCD LE1/YR: CPT | Mod: HCNC,CPTII,S$GLB, | Performed by: SURGERY

## 2019-12-17 PROCEDURE — 99999 PR PBB SHADOW E&M-EST. PATIENT-LVL III: CPT | Mod: PBBFAC,HCNC,, | Performed by: SURGERY

## 2019-12-17 PROCEDURE — 3075F PR MOST RECENT SYSTOLIC BLOOD PRESS GE 130-139MM HG: ICD-10-PCS | Mod: HCNC,CPTII,S$GLB, | Performed by: SURGERY

## 2019-12-17 NOTE — LETTER
December 17, 2019      Rahel Reed MD  [address]  [facility] [address]           [facility]  [address] , [facility] [address]  [facility] [address]  Phone: [phone]  Fax: [phone]          Patient: Edwin Powell   MR Number: [id number]   YOB: 1945   Date of Visit: 12/17/2019       Dear Dr. Rahel Reed:    Thank you for referring Edwin Powell to me for evaluation. Attached you will find relevant portions of my assessment and plan of care.    If you have questions, please do not hesitate to call me. I look forward to following Edwin Powell along with you.    Sincerely,    Gianni Abarca Jr., MD    Enclosure  CC:  No Recipients    If you would like to receive this communication electronically, please contact [email or web address] or [phone] to request more information on [employer] Link access.    For providers and/or their staff who would like to refer a patient to [facility], please contact us through our one-stop-shop provider referral line, [facility], at [phone].    If you feel you have received this communication in error or would no longer like to receive these types of communications, please e-mail [email or web address]

## 2019-12-17 NOTE — H&P
[facility]  OUTPATIENT H&P    REASON FOR VISIT/CC:  Ventral hernia    HPI: Edwin Powell is a 74 y.o. male with metastatic left renal cell carcinoma currently being treated with Votrient.  Sent for evaluation of possible ventral hernia.  Is noted patient had bulge of his upper mid abdomen especially noticeable when he was sitting up. He denies any significant pain.  Does have significant past surgical history including multiple abdominal surgeries (open cholecystectomy, open appendectomy, exploratory celiotomy).  Denies any significant nausea, vomiting, obstructive symptoms or abdominal bloating.    I have reviewed the patient's chart including prior progress notes, procedures and testing.     ROS:   Review of Systems   Constitutional: Negative for activity change, appetite change and fever.   Respiratory: Negative for apnea, chest tightness and shortness of breath.    Cardiovascular: Negative for chest pain, palpitations and leg swelling.   Gastrointestinal: Negative for abdominal distention, abdominal pain, constipation, diarrhea, nausea and vomiting.        Bulge in the upper abdomen when he sits up   Genitourinary: Negative for difficulty urinating, dysuria and hematuria.   Musculoskeletal: Positive for back pain. Negative for neck pain and neck stiffness.   Skin: Negative for color change, pallor, rash and wound.   Neurological: Negative for dizziness, syncope and weakness.   Psychiatric/Behavioral: Negative for agitation, behavioral problems and confusion.       PROBLEM LIST:  Patient Active Problem List   Diagnosis    Acquired hypothyroidism    Coronary artery disease involving native coronary artery of native heart with angina pectoris    Type 2 diabetes mellitus with stage 3 chronic kidney disease, without long-term current use of insulin    Hyperlipidemia due to type 2 diabetes mellitus    Gastroesophageal reflux disease without esophagitis    Metastatic renal cell carcinoma to liver     Metastatic renal cell carcinoma to bone    Renal cell carcinoma of left kidney    Benign prostatic hyperplasia (BPH) with urinary urge incontinence    Neoplasm related pain    Senile purpura    Uncomplicated opioid dependence    Cervical arthritis    Thrombocythemia    Neuropathy due to secondary diabetes    Drug induced insomnia    Tortuous aorta    Atherosclerosis of aorta    Facet arthritis of lumbar region    Macrocytic anemia    B12 deficiency    Moderate protein-calorie malnutrition    Vitamin D deficiency    Arthritis    Nuclear sclerotic cataract of both eyes    Lung nodule, multiple    Ground glass opacity present on imaging of lung    LLQ abdominal pain    Senile nuclear sclerosis    CRI (chronic renal insufficiency), stage 3 (moderate)    Type 2 diabetes mellitus with hyperglycemia, with long-term current use of insulin    Unspecified inflammatory spondylopathy, lumbar region    Hyperlipidemia associated with type 2 diabetes mellitus    Diastasis recti         HISTORY  Past Medical History:   Diagnosis Date    Acquired hypothyroidism 5/26/2017    Benign essential HTN 5/26/2017    Benign prostatic hyperplasia (BPH) with urinary urge incontinence 6/6/2017    Chronic low back pain 6/1/2017    Coronary artery disease involving native coronary artery of native heart without angina pectoris 5/26/2017    S/p 5 stents - last one around 2010-11.    Gastroesophageal reflux disease without esophagitis 5/26/2017    History of CVA (cerebrovascular accident) 5/26/2017    Hypertension associated with diabetes 5/26/2017    BP controlled on ACE, CCB    Lumbar disc lesion 5/26/2017    Metastatic renal cell carcinoma to bone 6/6/2017    Metastatic renal cell carcinoma to liver 6/6/2017    Liver Biopsy 5-2017: METASTATIC RENAL CELL CARCINOMA.    Mixed hyperlipidemia 5/26/2017    Type 2 diabetes mellitus with stage 3 chronic kidney disease, with long-term current use of insulin  5/26/2017       Past Surgical History:   Procedure Laterality Date    ABDOMINAL SURGERY      APPENDECTOMY      BACK SURGERY      CARDIAC SURGERY      CATARACT EXTRACTION      CHOLECYSTECTOMY      coronary stenting      X 5 last one in 2010-11.    INTRAOCULAR PROSTHESES INSERTION Right 6/27/2019    Procedure: INSERTION, IOL PROSTHESIS;  Surgeon: Chary Culp MD;  Location: [facility] OR [address] [place] [address];  Service: Ophthalmology;  Laterality: Right;    INTRAOCULAR PROSTHESES INSERTION Left 8/22/2019    Procedure: INSERTION, IOL PROSTHESIS;  Surgeon: Chary Culp MD;  Location: [facility] OR [address] [place] [address];  Service: Ophthalmology;  Laterality: Left;    PHACOEMULSIFICATION OF CATARACT Right 6/27/2019    Procedure: PHACOEMULSIFICATION, CATARACT;  Surgeon: Chary Culp MD;  Location: [facility] OR [address] [place] [address];  Service: Ophthalmology;  Laterality: Right;    PHACOEMULSIFICATION OF CATARACT Left 8/22/2019    Procedure: PHACOEMULSIFICATION, CATARACT;  Surgeon: Chary Culp MD;  Location: [facility] OR [address] [place] [address];  Service: Ophthalmology;  Laterality: Left;    SPINAL FUSION         Social History     Tobacco Use    Smoking status: Former Smoker     Types: Cigarettes    Smokeless tobacco: Never Used    Tobacco comment: haven't smoked in last 25 yrs   Substance Use Topics    Alcohol use: Yes     Comment: rarely     Drug use: Not Currently       Family History   Problem Relation Age of Onset    Glaucoma Mother     Diabetes Mother     Diabetes Brother     Glaucoma Maternal Grandmother     No Known Problems Son     Melanoma Neg Hx          MEDS:  Current Outpatient Medications on File Prior to Visit   Medication Sig Dispense Refill    amLODIPine (NORVASC) 10 MG tablet Take 1 tablet (10 mg total) by mouth once daily. 90 tablet 3    aspirin (ECOTRIN) 81 MG EC tablet Take 81 mg by mouth once daily.      atorvastatin (LIPITOR) 40 MG tablet Take 1 tablet (40 mg total) by mouth once daily. 90 tablet 3    b complex vitamins (B COMPLEX  "1) tablet Take 1 tablet by mouth once daily.      blood sugar diagnostic Strp 1 strip by Misc.(Non-Drug; Combo Route) route 2 (two) times daily as needed. 200 strip 3    blood-glucose meter Misc use as instructed 1 each 0    cholecalciferol, vitamin D3, (VITAMIN D3) 5,000 unit Tab Take 5,000 Units by mouth once daily. 90 tablet 3    clindamycin (CLEOCIN T) 1 % external solution AAA bid 60 mL 3    fluticasone propionate (FLONASE) 50 mcg/actuation nasal spray 1 SPRAY (50 MCG TOTAL) BY EACH NARE ROUTE ONCE DAILY. 16 mL 3    HYDROcodone-acetaminophen (NORCO) 5-325 mg per tablet 1 tablet PO q8h prn pain. Quantity Medically Necessary. 120 tablet 0    insulin (LANTUS SOLOSTAR U-100 INSULIN) glargine 100 units/mL (3mL) SubQ pen Inject 5 Units into the skin every evening. 5 mL 11    lancets (ONETOUCH ULTRASOFT LANCETS) Misc 1 lancet by Misc.(Non-Drug; Combo Route) route 2 (two) times daily as needed. 200 each 3    levothyroxine (SYNTHROID) 88 MCG tablet TAKE 1 TABLET (88 MCG TOTAL) BY MOUTH ONCE DAILY. 90 tablet 3    lisinopril (PRINIVIL,ZESTRIL) 40 MG tablet Take 1 tablet (40 mg total) by mouth once daily. 90 tablet 3    loratadine (CLARITIN) 10 mg tablet Take 1 tablet (10 mg total) by mouth once daily.  0    morphine (MS CONTIN) 15 MG 12 hr tablet Take 1 tablet (15 mg total) by mouth every 12 (twelve) hours. Medically Necessary 60 tablet 0    multivitamin (THERAGRAN) per tablet Take 1 tablet by mouth once daily.      ondansetron (ZOFRAN) 8 MG tablet Take 1 tablet (8 mg total) by mouth 3 (three) times daily as needed. 90 tablet 3    PAZOpanib (VOTRIENT) 200 mg Tab Take 4 tablets (800 mg total) by mouth once daily. 120 tablet 5    pen needle, diabetic 29 gauge x 1/2" Ndle 1 application by Misc.(Non-Drug; Combo Route) route once daily. 100 each 3    tamsulosin (FLOMAX) 0.4 mg Cap Take 2 capsules (0.8 mg total) by mouth every evening. (Patient taking differently: Take 0.8 mg by mouth once daily. ) 180 capsule " 3    prednisolon/gatiflox/bromfenac (PREDNISOL ACE-GATIFLOX-BROMFEN) 1-0.5-0.075 % DrpS Apply 1 drop to eye 3 (three) times daily. (Patient not taking: Reported on 12/17/2019) 7 mL 3     Current Facility-Administered Medications on File Prior to Visit   Medication Dose Route Frequency Provider Last Rate Last Dose    phenylephrine HCL 2.5% ophthalmic solution 1 drop  1 drop Left Eye On Call Procedure Chary Culp MD   1 drop at 08/22/19 0659    proparacaine 0.5 % ophthalmic solution 1 drop  1 drop Left Eye On Call Procedure Chary Culp MD   1 drop at 08/22/19 0641    tropicamide 1% ophthalmic solution 1 drop  1 drop Left Eye On Call Procedure Chary Culp MD   1 drop at 08/22/19 0700       ALLERGIES:  Review of patient's allergies indicates:  No Known Allergies      VITALS:  Vitals:    12/17/19 1048   BP: 138/70   Pulse: 61   Temp: 97.6 °F (36.4 °C)         PHYSICAL EXAM:  Physical Exam   Constitutional: He is oriented to person, place, and time. He appears well-developed and well-nourished. No distress.   HENT:   Head: Normocephalic and atraumatic.   Nose: Nose normal.   Eyes: Conjunctivae and EOM are normal. No scleral icterus.   Neck: Normal range of motion. Neck supple. No tracheal deviation present.   Cardiovascular: Normal rate, regular rhythm and intact distal pulses.   Pulmonary/Chest: Effort normal and breath sounds normal. No stridor. No respiratory distress.   Abdominal: Soft. He exhibits no distension and no ascites. There is no tenderness. No hernia.       Musculoskeletal: Normal range of motion. He exhibits no edema or deformity.   Neurological: He is alert and oriented to person, place, and time. He is not disoriented. He exhibits normal muscle tone.   Skin: Skin is warm and dry. He is not diaphoretic. No erythema.   Psychiatric: He has a normal mood and affect. His behavior is normal. Judgment and thought content normal.   Vitals reviewed.        LABS:  Lab Results   Component  Value Date    WBC 6.63 12/03/2019    RBC 3.80 (L) 12/03/2019    HGB 12.2 (L) 12/03/2019    HCT 39.0 (L) 12/03/2019     (H) 12/03/2019     Lab Results   Component Value Date     (H) 12/03/2019     12/03/2019    K 5.4 (H) 12/03/2019     12/03/2019    CO2 24 12/03/2019    BUN 32 (H) 12/03/2019    CREATININE 2.1 (H) 12/03/2019    CALCIUM 9.4 12/03/2019     Lab Results   Component Value Date    ALT 15 12/03/2019    AST 16 12/03/2019    ALKPHOS 115 12/03/2019    BILITOT 0.3 12/03/2019     Lab Results   Component Value Date    MG 1.5 (L) 08/24/2017    PHOS 2.1 (L) 01/19/2018       STUDIES:  CT images and reports were personally reviewed to evaluate for possible hernia defects.  There is no evidence of fascial defect in the area concern or in other portions of the abdominal cavity including the inguinal, umbilical and hiatal region. Atrophy of the right rectus muscles is present as well as a elongated/thinned linea alba    Impression       Patient with history of left renal cell carcinoma demonstrating interval increase in size in the exophytic left renal mass now measuring at 3.2 cm.  Right paratracheal lymph node also shows mild interval increase in size.    There is some hyperenhancement of the left renal pelvis.  Correlation with urinalysis and additional evaluation as indicated.    Multiple stable hypodense and subtle foci of enhancement within the liver parenchyma, unchanged from examinations dating back to 2018.    Stable T4, L3, right iliac crest and left iliac bone lesions.    Stable bilateral lungs micro nodules.    Trace fluid in the pelvis.    RECIST SUMMARY:    Date of prior examination for comparison: CT chest, abdomen, pelvis 07/15/2019.    Lesion 1: Left kidney mass.  3.2 cm. Series 2 image 127.  Prior measurement 2.7 cm.    Lesion 2: Hepatic segment VII.  1.2 cm. Series 2 image 82.  Prior measurement 1.2 cm.           ASSESSMENT & PLAN:  74 y.o. male with metastatic left renal cell  carcinoma, ventral bulging consistent with diastasis recti  - after physical exam and review of imaging there is no obvious fascial defects, patient instead has diastasis recti which has likely been exacerbated by atrophy of the rectus muscles on the right side following a previous open cholecystectomy  - currently there is no indication for repair of diastasis (considered cosmetic), especially given the fact patient is currently asymptomatic  - these findings were discussed with the patient and he is in understanding of the diagnosis of diastasis recti and the fact that there is no current need for treatment  - he is to contact the office with any questions or concerns and otherwise continue follow-up with Oncology for treatment of his renal cell carcinoma

## 2019-12-23 ENCOUNTER — OFFICE VISIT (OUTPATIENT)
Dept: PRIMARY CARE CLINIC | Facility: CLINIC | Age: 74
End: 2019-12-23
Payer: MEDICARE

## 2019-12-23 VITALS
BODY MASS INDEX: 27.41 KG/M2 | DIASTOLIC BLOOD PRESSURE: 66 MMHG | OXYGEN SATURATION: 97 % | RESPIRATION RATE: 16 BRPM | WEIGHT: 174.63 LBS | HEIGHT: 67 IN | SYSTOLIC BLOOD PRESSURE: 136 MMHG | HEART RATE: 68 BPM

## 2019-12-23 DIAGNOSIS — T45.1X5A CHEMOTHERAPY INDUCED DIARRHEA: ICD-10-CM

## 2019-12-23 DIAGNOSIS — K52.1 CHEMOTHERAPY INDUCED DIARRHEA: ICD-10-CM

## 2019-12-23 DIAGNOSIS — E11.69 HYPERLIPIDEMIA ASSOCIATED WITH TYPE 2 DIABETES MELLITUS: ICD-10-CM

## 2019-12-23 DIAGNOSIS — E11.65 TYPE 2 DIABETES MELLITUS WITH HYPERGLYCEMIA, WITH LONG-TERM CURRENT USE OF INSULIN: Primary | ICD-10-CM

## 2019-12-23 DIAGNOSIS — Z79.4 TYPE 2 DIABETES MELLITUS WITH HYPERGLYCEMIA, WITH LONG-TERM CURRENT USE OF INSULIN: Primary | ICD-10-CM

## 2019-12-23 DIAGNOSIS — E78.5 HYPERLIPIDEMIA ASSOCIATED WITH TYPE 2 DIABETES MELLITUS: ICD-10-CM

## 2019-12-23 LAB — GLUCOSE SERPL-MCNC: 228 MG/DL (ref 70–110)

## 2019-12-23 PROCEDURE — 1159F PR MEDICATION LIST DOCUMENTED IN MEDICAL RECORD: ICD-10-PCS | Mod: HCNC,S$GLB,, | Performed by: INTERNAL MEDICINE

## 2019-12-23 PROCEDURE — 3075F SYST BP GE 130 - 139MM HG: CPT | Mod: HCNC,CPTII,S$GLB, | Performed by: INTERNAL MEDICINE

## 2019-12-23 PROCEDURE — 1101F PT FALLS ASSESS-DOCD LE1/YR: CPT | Mod: HCNC,CPTII,S$GLB, | Performed by: INTERNAL MEDICINE

## 2019-12-23 PROCEDURE — 3078F PR MOST RECENT DIASTOLIC BLOOD PRESSURE < 80 MM HG: ICD-10-PCS | Mod: HCNC,CPTII,S$GLB, | Performed by: INTERNAL MEDICINE

## 2019-12-23 PROCEDURE — 1159F MED LIST DOCD IN RCRD: CPT | Mod: HCNC,S$GLB,, | Performed by: INTERNAL MEDICINE

## 2019-12-23 PROCEDURE — 99215 OFFICE O/P EST HI 40 MIN: CPT | Mod: HCNC,S$GLB,, | Performed by: INTERNAL MEDICINE

## 2019-12-23 PROCEDURE — 99215 PR OFFICE/OUTPT VISIT, EST, LEVL V, 40-54 MIN: ICD-10-PCS | Mod: HCNC,S$GLB,, | Performed by: INTERNAL MEDICINE

## 2019-12-23 PROCEDURE — 82962 GLUCOSE BLOOD TEST: CPT | Mod: HCNC,,, | Performed by: INTERNAL MEDICINE

## 2019-12-23 PROCEDURE — 3078F DIAST BP <80 MM HG: CPT | Mod: HCNC,CPTII,S$GLB, | Performed by: INTERNAL MEDICINE

## 2019-12-23 PROCEDURE — 3075F PR MOST RECENT SYSTOLIC BLOOD PRESS GE 130-139MM HG: ICD-10-PCS | Mod: HCNC,CPTII,S$GLB, | Performed by: INTERNAL MEDICINE

## 2019-12-23 PROCEDURE — 82962 POCT GLUCOSE, HAND-HELD DEVICE: ICD-10-PCS | Mod: HCNC,,, | Performed by: INTERNAL MEDICINE

## 2019-12-23 PROCEDURE — 1101F PR PT FALLS ASSESS DOC 0-1 FALLS W/OUT INJ PAST YR: ICD-10-PCS | Mod: HCNC,CPTII,S$GLB, | Performed by: INTERNAL MEDICINE

## 2019-12-23 PROCEDURE — 1125F AMNT PAIN NOTED PAIN PRSNT: CPT | Mod: HCNC,S$GLB,, | Performed by: INTERNAL MEDICINE

## 2019-12-23 PROCEDURE — 1125F PR PAIN SEVERITY QUANTIFIED, PAIN PRESENT: ICD-10-PCS | Mod: HCNC,S$GLB,, | Performed by: INTERNAL MEDICINE

## 2019-12-23 RX ORDER — ROSUVASTATIN CALCIUM 10 MG/1
10 TABLET, COATED ORAL DAILY
Qty: 90 TABLET | Refills: 3 | Status: SHIPPED | OUTPATIENT
Start: 2019-12-23 | End: 2020-02-27 | Stop reason: DRUGHIGH

## 2019-12-23 NOTE — PROGRESS NOTES
He ate 2 pks of instant grits, 1 fried egg, 2 pieces of toast.     Pt didn't rest well last night, suffered w/Diarrhea more than 4 times. He said it was just water.     He took one OTC pill and two prescribed capsules approximately 2hrs apart.      Pt didn't bring his glucometer but says his range was between 101-194 in the last week or two.

## 2019-12-23 NOTE — ASSESSMENT & PLAN NOTE
Elevated lipids, previously had diarrhea with statin  · Is not tolerating atorvastatin q2-3 days   · Restart crestor

## 2019-12-23 NOTE — PROGRESS NOTES
Primary Care Provider Appointment    Subjective:      Patient ID: Edwin Powell is a 74 y.o. male with DM, HLD, back pain, arthropathy    Chief Complaint: Follow-up; Low-back Pain; and Diabetes    Patient reports being up all night with diarrhea. HE ATE POPEYES LAST NIGHT. Had incontinence on his sheets and blanket. He has only one lomotil tablet left. Has been on atorvastatin q3 days, and he believes that this is making his diarrhea worse. He is willing to try crestor.    He was restarted on insulin 5U long-acting at last appointment with better control of his glucose. He didn't bring his glucometer or glucose log to today's appointment. Glucose today was 228. He had big breakfast.    The 10-year ASCVD risk score (Nanymo CHANEL Jr., et al., 2013) is: 53.6%    Values used to calculate the score:      Age: 74 years      Sex: Male      Is Non- : No      Diabetic: Yes      Tobacco smoker: No      Systolic Blood Pressure: 136 mmHg      Is BP treated: Yes      HDL Cholesterol: 33 mg/dL      Total Cholesterol: 189 mg/dL        Past Surgical History:   Procedure Laterality Date    ABDOMINAL SURGERY      APPENDECTOMY      BACK SURGERY      CARDIAC SURGERY      CATARACT EXTRACTION      CHOLECYSTECTOMY      coronary stenting      X 5 last one in 2010-11.    INTRAOCULAR PROSTHESES INSERTION Right 6/27/2019    Procedure: INSERTION, IOL PROSTHESIS;  Surgeon: Chary Culp MD;  Location: [address];  Service: Ophthalmology;  Laterality: Right;    INTRAOCULAR PROSTHESES INSERTION Left 8/22/2019    Procedure: INSERTION, IOL PROSTHESIS;  Surgeon: Chary Culp MD;  Location: [facility] OR [address] [place] [address];  Service: Ophthalmology;  Laterality: Left;    PHACOEMULSIFICATION OF CATARACT Right 6/27/2019    Procedure: PHACOEMULSIFICATION, CATARACT;  Surgeon: Chary Culp MD;  Location: [facility] OR [address] [place] [address];  Service: Ophthalmology;  Laterality: Right;    PHACOEMULSIFICATION OF CATARACT Left 8/22/2019     Procedure: PHACOEMULSIFICATION, CATARACT;  Surgeon: Chary Culp MD;  Location: [address];  Service: Ophthalmology;  Laterality: Left;    SPINAL FUSION         Past Medical History:   Diagnosis Date    Acquired hypothyroidism 5/26/2017    Benign essential HTN 5/26/2017    Benign prostatic hyperplasia (BPH) with urinary urge incontinence 6/6/2017    Chronic low back pain 6/1/2017    Coronary artery disease involving native coronary artery of native heart without angina pectoris 5/26/2017    S/p 5 stents - last one around 2010-11.    Gastroesophageal reflux disease without esophagitis 5/26/2017    History of CVA (cerebrovascular accident) 5/26/2017    Hypertension associated with diabetes 5/26/2017    BP controlled on ACE, CCB    Lumbar disc lesion 5/26/2017    Metastatic renal cell carcinoma to bone 6/6/2017    Metastatic renal cell carcinoma to liver 6/6/2017    Liver Biopsy 5-2017: METASTATIC RENAL CELL CARCINOMA.    Mixed hyperlipidemia 5/26/2017    Type 2 diabetes mellitus with stage 3 chronic kidney disease, with long-term current use of insulin 5/26/2017       Social History     Socioeconomic History    Marital status: Single     Spouse name: Not on file    Number of children: Not on file    Years of education: Not on file    Highest education level: Not on file   Occupational History    Not on file   Social Needs    Financial resource strain: Hard    Food insecurity:     Worry: Never true     Inability: Never true    Transportation needs:     Medical: No     Non-medical: No   Tobacco Use    Smoking status: Former Smoker     Types: Cigarettes    Smokeless tobacco: Never Used    Tobacco comment: haven't smoked in last 25 yrs   Substance and Sexual Activity    Alcohol use: Yes     Comment: rarely     Drug use: Not Currently    Sexual activity: Not Currently     Partners: Female   Lifestyle    Physical activity:     Days per week: Not on file     Minutes per session: Not on  "file    Stress: Not on file   Relationships    Social connections:     Talks on phone: Not on file     Gets together: Not on file     Attends Judaism service: Not on file     Active member of club or organization: Not on file     Attends meetings of clubs or organizations: Not on file     Relationship status: Not on file   Other Topics Concern    Not on file   Social History Narrative    Not on file       Review of Systems   Constitutional: Negative for activity change and unexpected weight change.   HENT: Positive for hearing loss. Negative for trouble swallowing.         Abnormal hearing   Gastrointestinal: Positive for diarrhea. Negative for blood in stool, constipation and vomiting.   Endocrine: Negative for polydipsia and polyuria.   Genitourinary: Negative for difficulty urinating and urgency.   Musculoskeletal: Positive for arthralgias. Negative for joint swelling and neck pain.   Neurological: Positive for weakness. Negative for headaches.   Psychiatric/Behavioral: Positive for confusion and decreased concentration. Negative for dysphoric mood.       Objective:   /66 (BP Location: Left arm, Patient Position: Sitting, BP Method: Medium (Manual))   Pulse 68   Resp 16   Ht 5' 7" (1.702 m)   Wt 79.2 kg (174 lb 9.7 oz)   SpO2 97%   BMI 27.35 kg/m²     Physical Exam   Constitutional: He is oriented to person, place, and time. He appears well-developed and well-nourished.   No longer ambulating with cane   HENT:   Head: Normocephalic.   Edentulous   Eyes: Pupils are equal, round, and reactive to light.   Neck:   Decreased ROM in neck    Pulmonary/Chest: Effort normal.   Musculoskeletal: Normal range of motion.   Neurological: He is alert and oriented to person, place, and time.   Skin: Skin is warm.   Ecchymoses and purpura bilaterally UE  Telangiectasias on face and chest  Multiple tattoos on UE  Numerous red papules on neck  Fair skin   Psychiatric:   Flat affect       Lab Results   Component " Value Date    WBC 6.63 12/03/2019    HGB 12.2 (L) 12/03/2019    HCT 39.0 (L) 12/03/2019     (H) 12/03/2019    CHOL 189 11/18/2019    TRIG 188 (H) 11/18/2019    HDL 33 (L) 11/18/2019    ALT 15 12/03/2019    AST 16 12/03/2019     12/03/2019    K 5.4 (H) 12/03/2019     12/03/2019    CREATININE 2.1 (H) 12/03/2019    BUN 32 (H) 12/03/2019    CO2 24 12/03/2019    TSH 4.062 (H) 09/13/2019    PSA 1.1 07/16/2018    INR 1.2 06/12/2017    HGBA1C 8.2 (H) 11/18/2019       RESULTS: Reviewed labs and images today    Assessment:   74 y.o. male with multiple co-morbid illnesses here to continue work-up of chronic issues notably with DM, HLD, back pain, arthropathy    Plan:     Problem List Items Addressed This Visit        Cardiac/Vascular    Hyperlipidemia associated with type 2 diabetes mellitus     Elevated lipids, previously had diarrhea with statin  · Is not tolerating atorvastatin q2-3 days   · Restart crestor         Relevant Medications    rosuvastatin (CRESTOR) 10 MG tablet       Endocrine    Type 2 diabetes mellitus with hyperglycemia, with long-term current use of insulin - Primary    Relevant Orders    POCT Glucose, Hand-Held Device (Completed)       GI    Chemotherapy induced diarrhea     Uncontrolled with narcotics, now experiencing hemorrhoids  · Continue zofran for nausea  · Continue lomotil  · Advised to use barrier creme  · ADVISED TO AVOID HEATHER'S!!               Health Maintenance       Date Due Completion Date    TETANUS VACCINE 02/18/1963 ---    Shingles Vaccine (2 of 2) 01/13/2020 11/18/2019    Eye Exam 04/22/2020 4/22/2019    Hemoglobin A1c 05/18/2020 11/18/2019    Foot Exam 07/11/2020 7/11/2019 (Done)    Override on 7/11/2019: Done    Override on 2/2/2018: Done (performed by podiatrist)    Lipid Panel 11/18/2020 11/18/2019    High Dose Statin 12/23/2020 12/23/2019    Colonoscopy 02/15/2021 2/15/2011 (Done)    Override on 2/15/2011: Done (Per [employer] dashboard ? results)          Follow up  in about 2 months (around 2/23/2020). Total face-to-face time was 60 min, 50% of this was spent on counseling and coordination of care. The following issues were discussed: with DM, HLD, back pain, arthropathy    Lulú Lewis MD/MPH  Internal Medicine  [facility]  [phone]

## 2019-12-23 NOTE — ASSESSMENT & PLAN NOTE
Uncontrolled with narcotics, now experiencing hemorrhoids  · Continue zofran for nausea  · Continue lomotil  · Advised to use barrier creme  · ADVISED TO AVOID HEATHER'S!!

## 2019-12-31 ENCOUNTER — TELEPHONE (OUTPATIENT)
Dept: PHARMACY | Facility: CLINIC | Age: 74
End: 2019-12-31

## 2019-12-31 NOTE — TELEPHONE ENCOUNTER
Patient returned call & confirmed shipment of Votrient refill. Verified 2 patient identifiers. Patient approved $0 copay. No new medications, allergies, or health conditions. Confirmed that dosage has not changed. No missed doses. About 4-5 doses remaining. Shipping address confirmed. Signature requirement declined. Medication will ship Thurs 1/2 for Fri 1/3 delivery. Patient voiced understanding & has no questions or concerns at this time. MICKIE

## 2020-01-09 ENCOUNTER — PATIENT OUTREACH (OUTPATIENT)
Dept: ADMINISTRATIVE | Facility: OTHER | Age: 75
End: 2020-01-09

## 2020-01-09 DIAGNOSIS — C64.9 METASTATIC RENAL CELL CARCINOMA TO LIVER: ICD-10-CM

## 2020-01-09 DIAGNOSIS — G89.3 NEOPLASM RELATED PAIN: ICD-10-CM

## 2020-01-09 DIAGNOSIS — C78.7 METASTATIC RENAL CELL CARCINOMA TO LIVER: ICD-10-CM

## 2020-01-09 RX ORDER — HYDROCODONE BITARTRATE AND ACETAMINOPHEN 5; 325 MG/1; MG/1
TABLET ORAL
Qty: 120 TABLET | Refills: 0 | Status: SHIPPED | OUTPATIENT
Start: 2020-01-09 | End: 2020-02-27 | Stop reason: SDUPTHER

## 2020-01-09 RX ORDER — MORPHINE SULFATE 15 MG/1
15 TABLET, FILM COATED, EXTENDED RELEASE ORAL EVERY 12 HOURS
Qty: 60 TABLET | Refills: 0 | Status: SHIPPED | OUTPATIENT
Start: 2020-01-09 | End: 2020-02-27 | Stop reason: SDUPTHER

## 2020-01-13 ENCOUNTER — OFFICE VISIT (OUTPATIENT)
Dept: PODIATRY | Facility: CLINIC | Age: 75
End: 2020-01-13
Payer: MEDICARE

## 2020-01-13 VITALS
DIASTOLIC BLOOD PRESSURE: 78 MMHG | RESPIRATION RATE: 16 BRPM | SYSTOLIC BLOOD PRESSURE: 192 MMHG | HEART RATE: 65 BPM | HEIGHT: 67 IN | WEIGHT: 171.69 LBS | BODY MASS INDEX: 26.95 KG/M2

## 2020-01-13 DIAGNOSIS — E11.49 TYPE II DIABETES MELLITUS WITH NEUROLOGICAL MANIFESTATIONS: Primary | ICD-10-CM

## 2020-01-13 PROCEDURE — 99213 OFFICE O/P EST LOW 20 MIN: CPT | Mod: HCNC,S$GLB,, | Performed by: PODIATRIST

## 2020-01-13 PROCEDURE — 99999 PR PBB SHADOW E&M-EST. PATIENT-LVL V: CPT | Mod: PBBFAC,HCNC,, | Performed by: PODIATRIST

## 2020-01-13 PROCEDURE — 3077F SYST BP >= 140 MM HG: CPT | Mod: HCNC,CPTII,S$GLB, | Performed by: PODIATRIST

## 2020-01-13 PROCEDURE — 3078F PR MOST RECENT DIASTOLIC BLOOD PRESSURE < 80 MM HG: ICD-10-PCS | Mod: HCNC,CPTII,S$GLB, | Performed by: PODIATRIST

## 2020-01-13 PROCEDURE — 1159F PR MEDICATION LIST DOCUMENTED IN MEDICAL RECORD: ICD-10-PCS | Mod: HCNC,S$GLB,, | Performed by: PODIATRIST

## 2020-01-13 PROCEDURE — 1126F AMNT PAIN NOTED NONE PRSNT: CPT | Mod: HCNC,S$GLB,, | Performed by: PODIATRIST

## 2020-01-13 PROCEDURE — 1101F PR PT FALLS ASSESS DOC 0-1 FALLS W/OUT INJ PAST YR: ICD-10-PCS | Mod: HCNC,CPTII,S$GLB, | Performed by: PODIATRIST

## 2020-01-13 PROCEDURE — 1126F PR PAIN SEVERITY QUANTIFIED, NO PAIN PRESENT: ICD-10-PCS | Mod: HCNC,S$GLB,, | Performed by: PODIATRIST

## 2020-01-13 PROCEDURE — 1159F MED LIST DOCD IN RCRD: CPT | Mod: HCNC,S$GLB,, | Performed by: PODIATRIST

## 2020-01-13 PROCEDURE — 3078F DIAST BP <80 MM HG: CPT | Mod: HCNC,CPTII,S$GLB, | Performed by: PODIATRIST

## 2020-01-13 PROCEDURE — 99213 PR OFFICE/OUTPT VISIT, EST, LEVL III, 20-29 MIN: ICD-10-PCS | Mod: HCNC,S$GLB,, | Performed by: PODIATRIST

## 2020-01-13 PROCEDURE — 1101F PT FALLS ASSESS-DOCD LE1/YR: CPT | Mod: HCNC,CPTII,S$GLB, | Performed by: PODIATRIST

## 2020-01-13 PROCEDURE — 3077F PR MOST RECENT SYSTOLIC BLOOD PRESSURE >= 140 MM HG: ICD-10-PCS | Mod: HCNC,CPTII,S$GLB, | Performed by: PODIATRIST

## 2020-01-13 PROCEDURE — 99999 PR PBB SHADOW E&M-EST. PATIENT-LVL V: ICD-10-PCS | Mod: PBBFAC,HCNC,, | Performed by: PODIATRIST

## 2020-01-13 NOTE — PROGRESS NOTES
Subjective:      Patient ID: Edwin Powell is a 74 y.o. male.    Chief Complaint: Nail Care (B/L feet) and Diabetic Foot Exam (Dr. Lewis last visit 12/23/19)    Edwin is a 74 y.o. male who presents to the clinic for evaluation and treatment of high risk feet. Edwin has a past medical history of Acquired hypothyroidism (5/26/2017), Benign essential HTN (5/26/2017), Benign prostatic hyperplasia (BPH) with urinary urge incontinence (6/6/2017), Chronic low back pain (6/1/2017), Coronary artery disease involving native coronary artery of native heart without angina pectoris (5/26/2017), Gastroesophageal reflux disease without esophagitis (5/26/2017), History of CVA (cerebrovascular accident) (5/26/2017), Hypertension associated with diabetes (5/26/2017), Lumbar disc lesion (5/26/2017), Metastatic renal cell carcinoma to bone (6/6/2017), Metastatic renal cell carcinoma to liver (6/6/2017), Mixed hyperlipidemia (5/26/2017), and Type 2 diabetes mellitus with stage 3 chronic kidney disease, with long-term current use of insulin (5/26/2017).  This patient has documented high risk feet requiring routine maintenance secondary to diabetes mellitis and those secondary complications of diabetes, as mentioned.. Last measured glucose was 125. No changes in his medical history since last time.     PCP: Lulú Lewis MD    Date Last Seen by PCP: in epic     Current shoe gear:  Affected Foot: Rx diabetic extra depth shoes and custom accommodative insoles     Unaffected Foot: Rx diabetic extra depth shoes and custom accommodative insoles    Hemoglobin A1C   Date Value Ref Range Status   11/18/2019 8.2 (H) 4.0 - 5.6 % Final     Comment:     ADA Screening Guidelines:  5.7-6.4%  Consistent with prediabetes  >or=6.5%  Consistent with diabetes  High levels of fetal hemoglobin interfere with the HbA1C  assay. Heterozygous hemoglobin variants (HbS, HgC, etc)do  not significantly interfere with this assay.   However, presence of  multiple variants may affect accuracy.     02/19/2019 6.4 (H) 4.0 - 5.6 % Final     Comment:     ADA Screening Guidelines:  5.7-6.4%  Consistent with prediabetes  >or=6.5%  Consistent with diabetes  High levels of fetal hemoglobin interfere with the HbA1C  assay. Heterozygous hemoglobin variants (HbS, HgC, etc)do  not significantly interfere with this assay.   However, presence of multiple variants may affect accuracy.     07/16/2018 6.2 (H) 4.0 - 5.6 % Final     Comment:     ADA Screening Guidelines:  5.7-6.4%  Consistent with prediabetes  >or=6.5%  Consistent with diabetes  High levels of fetal hemoglobin interfere with the HbA1C  assay. Heterozygous hemoglobin variants (HbS, HgC, etc)do  not significantly interfere with this assay.   However, presence of multiple variants may affect accuracy.         Review of Systems   Constitution: Negative for decreased appetite, fever and malaise/fatigue.   HENT: Negative for congestion.    Cardiovascular: Negative for chest pain and leg swelling.   Respiratory: Negative for cough and shortness of breath.    Skin: Negative for color change, nail changes and rash.   Musculoskeletal: Negative for arthritis, joint pain, joint swelling and muscle weakness.   Gastrointestinal: Negative for bloating, abdominal pain, nausea and vomiting.   Neurological: Positive for numbness and sensory change. Negative for headaches and weakness.   Psychiatric/Behavioral: Negative for altered mental status.             Past Medical History:   Diagnosis Date    Acquired hypothyroidism 5/26/2017    Benign essential HTN 5/26/2017    Benign prostatic hyperplasia (BPH) with urinary urge incontinence 6/6/2017    Chronic low back pain 6/1/2017    Coronary artery disease involving native coronary artery of native heart without angina pectoris 5/26/2017    S/p 5 stents - last one around 2010-11.    Gastroesophageal reflux disease without esophagitis 5/26/2017    History of CVA (cerebrovascular  accident) 5/26/2017    Hypertension associated with diabetes 5/26/2017    BP controlled on ACE, CCB    Lumbar disc lesion 5/26/2017    Metastatic renal cell carcinoma to bone 6/6/2017    Metastatic renal cell carcinoma to liver 6/6/2017    Liver Biopsy 5-2017: METASTATIC RENAL CELL CARCINOMA.    Mixed hyperlipidemia 5/26/2017    Type 2 diabetes mellitus with stage 3 chronic kidney disease, with long-term current use of insulin 5/26/2017       Past Surgical History:   Procedure Laterality Date    ABDOMINAL SURGERY      APPENDECTOMY      BACK SURGERY      CARDIAC SURGERY      CATARACT EXTRACTION      CHOLECYSTECTOMY      coronary stenting      X 5 last one in 2010-11.    INTRAOCULAR PROSTHESES INSERTION Right 6/27/2019    Procedure: INSERTION, IOL PROSTHESIS;  Surgeon: Chary Culp MD;  Location: [facility] OR [address] [place] [address];  Service: Ophthalmology;  Laterality: Right;    INTRAOCULAR PROSTHESES INSERTION Left 8/22/2019    Procedure: INSERTION, IOL PROSTHESIS;  Surgeon: Chary Culp MD;  Location: [facility] OR [address] [place] [address];  Service: Ophthalmology;  Laterality: Left;    PHACOEMULSIFICATION OF CATARACT Right 6/27/2019    Procedure: PHACOEMULSIFICATION, CATARACT;  Surgeon: Chary Culp MD;  Location: [facility] OR [address] [place] [address];  Service: Ophthalmology;  Laterality: Right;    PHACOEMULSIFICATION OF CATARACT Left 8/22/2019    Procedure: PHACOEMULSIFICATION, CATARACT;  Surgeon: Chary Culp MD;  Location: [facility] OR [address] [place] [address];  Service: Ophthalmology;  Laterality: Left;    SPINAL FUSION         Family History   Problem Relation Age of Onset    Glaucoma Mother     Diabetes Mother     Diabetes Brother     Glaucoma Maternal Grandmother     No Known Problems Son     Melanoma Neg Hx        Social History     Socioeconomic History    Marital status: Single     Spouse name: Not on file    Number of children: Not on file    Years of education: Not on file    Highest education level: Not on file   Occupational History     Not on file   Social Needs    Financial resource strain: Hard    Food insecurity:     Worry: Never true     Inability: Never true    Transportation needs:     Medical: No     Non-medical: No   Tobacco Use    Smoking status: Former Smoker     Types: Cigarettes    Smokeless tobacco: Never Used    Tobacco comment: haven't smoked in last 25 yrs   Substance and Sexual Activity    Alcohol use: Yes     Comment: rarely     Drug use: Not Currently    Sexual activity: Not Currently     Partners: Female   Lifestyle    Physical activity:     Days per week: Not on file     Minutes per session: Not on file    Stress: Not on file   Relationships    Social connections:     Talks on phone: Not on file     Gets together: Not on file     Attends Sikh service: Not on file     Active member of club or organization: Not on file     Attends meetings of clubs or organizations: Not on file     Relationship status: Not on file   Other Topics Concern    Not on file   Social History Narrative    Not on file       Current Outpatient Medications   Medication Sig Dispense Refill    amLODIPine (NORVASC) 10 MG tablet Take 1 tablet (10 mg total) by mouth once daily. 90 tablet 3    aspirin (ECOTRIN) 81 MG EC tablet Take 81 mg by mouth once daily.      b complex vitamins (B COMPLEX 1) tablet Take 1 tablet by mouth once daily.      blood sugar diagnostic Strp 1 strip by Misc.(Non-Drug; Combo Route) route 2 (two) times daily as needed. 200 strip 3    blood-glucose meter Misc use as instructed 1 each 0    cholecalciferol, vitamin D3, (VITAMIN D3) 5,000 unit Tab Take 5,000 Units by mouth once daily. 90 tablet 3    clindamycin (CLEOCIN T) 1 % external solution AAA bid 60 mL 3    fluticasone propionate (FLONASE) 50 mcg/actuation nasal spray 1 SPRAY (50 MCG TOTAL) BY EACH NARE ROUTE ONCE DAILY. 16 mL 3    HYDROcodone-acetaminophen (NORCO) 5-325 mg per tablet 1 tablet PO q8h prn pain. Quantity Medically Necessary. 120 tablet 0     "insulin (LANTUS SOLOSTAR U-100 INSULIN) glargine 100 units/mL (3mL) SubQ pen Inject 5 Units into the skin every evening. 5 mL 11    lancets (ONETOUCH ULTRASOFT LANCETS) Misc 1 lancet by Misc.(Non-Drug; Combo Route) route 2 (two) times daily as needed. 200 each 3    levothyroxine (SYNTHROID) 88 MCG tablet TAKE 1 TABLET (88 MCG TOTAL) BY MOUTH ONCE DAILY. 90 tablet 3    lisinopril (PRINIVIL,ZESTRIL) 40 MG tablet Take 1 tablet (40 mg total) by mouth once daily. 90 tablet 3    loratadine (CLARITIN) 10 mg tablet Take 1 tablet (10 mg total) by mouth once daily.  0    morphine (MS CONTIN) 15 MG 12 hr tablet Take 1 tablet (15 mg total) by mouth every 12 (twelve) hours. Medically Necessary 60 tablet 0    multivitamin (THERAGRAN) per tablet Take 1 tablet by mouth once daily.      ondansetron (ZOFRAN) 8 MG tablet Take 1 tablet (8 mg total) by mouth 3 (three) times daily as needed. 90 tablet 3    PAZOpanib (VOTRIENT) 200 mg Tab Take 4 tablets (800 mg total) by mouth once daily. 120 tablet 5    pen needle, diabetic 29 gauge x 1/2" Ndle 1 application by Misc.(Non-Drug; Combo Route) route once daily. 100 each 3    prednisolon/gatiflox/bromfenac (PREDNISOL ACE-GATIFLOX-BROMFEN) 1-0.5-0.075 % DrpS Apply 1 drop to eye 3 (three) times daily. 7 mL 3    rosuvastatin (CRESTOR) 10 MG tablet Take 1 tablet (10 mg total) by mouth once daily. 90 tablet 3    tamsulosin (FLOMAX) 0.4 mg Cap Take 2 capsules (0.8 mg total) by mouth every evening. (Patient taking differently: Take 0.8 mg by mouth once daily. ) 180 capsule 3     No current facility-administered medications for this visit.      Facility-Administered Medications Ordered in Other Visits   Medication Dose Route Frequency Provider Last Rate Last Dose    phenylephrine HCL 2.5% ophthalmic solution 1 drop  1 drop Left Eye On Call Procedure Chary Culp MD   1 drop at 08/22/19 0659    proparacaine 0.5 % ophthalmic solution 1 drop  1 drop Left Eye On Call Procedure Chary TIJERINA" "MD Suni   1 drop at 08/22/19 0641    tropicamide 1% ophthalmic solution 1 drop  1 drop Left Eye On Call Procedure Chary Culp MD   1 drop at 08/22/19 0700       Review of patient's allergies indicates:  No Known Allergies    Vitals:    01/13/20 1315   BP: (!) 192/78   Pulse: 65   Resp: 16   Weight: 77.9 kg (171 lb 11.2 oz)   Height: 5' 7" (1.702 m)   PainSc: 0-No pain       Objective:      Physical Exam   Constitutional: He is oriented to person, place, and time. He appears well-developed and well-nourished. No distress.   Musculoskeletal: He exhibits no edema, tenderness or deformity.   Neurological: He is alert and oriented to person, place, and time.   Skin: Skin is warm. Capillary refill takes less than 2 seconds. No erythema.   Psychiatric: He has a normal mood and affect. His behavior is normal.       Vascular: Distal DP/PT pulses palpable 1/4. CRT < 3 sec to tips of toes. No vericosities noted to LEs. Hair growth present LE, warm to touch LE, No edema noted to LE.    Dermatologic: No open lesions, lacerations or wounds. Interdigital spaces clean, dry and intact. No erythema, rubor, calor noted LE, Nails are normal R 1-5 and L 1-5.    Musculoskeletal: MMT 5/5 in DF/PF/Inv/Ev resistance with no reproduction of pain in any direction. Passive range of motion of ankle and pedal joints is painless. No calf tenderness LE, Compartments soft/compressible. ROM of ankles with < 10 deg DF is noted b/l      Neurological:   Light touch, proprioception, and sharp/dull sensation are decreased b/l. Protective threshold with the Wells-Wienstein monofilament is decreased b/l. Vibratory sensation decreased b/l.         Assessment:       Encounter Diagnosis   Name Primary?    Type II diabetes mellitus with neurological manifestations Yes         Plan:       Edwin was seen today for nail care and diabetic foot exam.    Diagnoses and all orders for this visit:    Type II diabetes mellitus with neurological " manifestations      I counseled the patient on his conditions, their implications and medical management.    75 y/o male with diabetic foot risk assessment.    -nails x 10 debrided with nail nipper (courtesy)  -Shoe inspection. General Foot Education. Patient reminded of the importance of good nutrition. Patient instructed on proper foot hygeine. We discussed wearing proper shoe gear, daily foot inspections, never walking without protective shoe gear, caution putting sharp instruments to feet. Discussed general foot care:  Wear comfortable, proper fitting shoes. Wash feet daily. Dry well. After drying, apply moisturizer to feet (no lotion to webspaces). Inspect feet daily for skin breaks, blisters, swelling, or redness. Wear cotton socks (preferably white)  Change socks every day. Do NOT walk barefoot. Do NOT use heating pads or warm/hot water soaks   -It was discussed the importance of wearing shoes with adequate room in toe box to accommodate toe deformities. Recommended New Balance/[employer]ics shoe brands with adequate arch supports to alleviate abnormal pressure and improve stability of foot while walking. Avoid flat shoes and barefoot walking as these will exacerbate or worsen symptoms.   -Advised for optimal glucose control and maintenance per primary care physician. Patient was also educated on healthy diet that is naturally rich in nutrients and low in fat and calories.   -The nature of the condition, options for management, as well as potential risks and complications were discussed in detail with patient. Patient was amenable to my recommendations and left my office fully informed and will follow up as instructed or sooner if necessary.    -Patient was advised of signs and symptoms of infection including redness, drainage, purulence, odor, streaking, fever, chills and I advised patient to seek medical attention (ER or urgent care) if these symptoms arise.   -f/u 4 months       Diabetic foot exam:    Left: Reflexes 2+    Vibratory sensation diminished   Proprioception diminished   Sharp/dull discrimination diminished   Filament test present  Right: Reflexes 2+    Vibratory sensation diminished   Proprioception diminished   Sharp/dull discrimination diminished   Filament test present

## 2020-01-24 ENCOUNTER — OFFICE VISIT (OUTPATIENT)
Dept: HEMATOLOGY/ONCOLOGY | Facility: CLINIC | Age: 75
End: 2020-01-24
Payer: MEDICARE

## 2020-01-24 ENCOUNTER — HOSPITAL ENCOUNTER (OUTPATIENT)
Dept: RADIOLOGY | Facility: HOSPITAL | Age: 75
Discharge: HOME | End: 2020-01-24
Attending: INTERNAL MEDICINE
Payer: MEDICARE

## 2020-01-24 VITALS
BODY MASS INDEX: 27.3 KG/M2 | SYSTOLIC BLOOD PRESSURE: 192 MMHG | HEART RATE: 65 BPM | TEMPERATURE: 98 F | WEIGHT: 173.94 LBS | OXYGEN SATURATION: 99 % | HEIGHT: 67 IN | DIASTOLIC BLOOD PRESSURE: 84 MMHG | RESPIRATION RATE: 16 BRPM

## 2020-01-24 DIAGNOSIS — C64.9 METASTATIC RENAL CELL CARCINOMA TO LIVER: ICD-10-CM

## 2020-01-24 DIAGNOSIS — C64.9 METASTATIC RENAL CELL CARCINOMA TO BONE: ICD-10-CM

## 2020-01-24 DIAGNOSIS — C78.7 METASTATIC RENAL CELL CARCINOMA TO LIVER: ICD-10-CM

## 2020-01-24 DIAGNOSIS — C64.2 RENAL CELL CARCINOMA OF LEFT KIDNEY: ICD-10-CM

## 2020-01-24 DIAGNOSIS — C64.2 RENAL CELL CARCINOMA OF LEFT KIDNEY: Primary | ICD-10-CM

## 2020-01-24 DIAGNOSIS — C79.51 METASTATIC RENAL CELL CARCINOMA TO BONE: ICD-10-CM

## 2020-01-24 DIAGNOSIS — R93.3 ABNORMAL FINDINGS ON DIAGNOSTIC IMAGING OF OTHER PARTS OF DIGESTIVE TRACT: ICD-10-CM

## 2020-01-24 LAB
CREAT SERPL-MCNC: 2 MG/DL (ref 0.5–1.4)
SAMPLE: ABNORMAL

## 2020-01-24 PROCEDURE — 71260 CT THORAX DX C+: CPT | Mod: 26,HCNC,, | Performed by: RADIOLOGY

## 2020-01-24 PROCEDURE — 1101F PT FALLS ASSESS-DOCD LE1/YR: CPT | Mod: HCNC,CPTII,S$GLB, | Performed by: INTERNAL MEDICINE

## 2020-01-24 PROCEDURE — 74177 CT ABD & PELVIS W/CONTRAST: CPT | Mod: TC,HCNC

## 2020-01-24 PROCEDURE — 99499 RISK ADDL DX/OHS AUDIT: ICD-10-PCS | Mod: HCNC,S$GLB,, | Performed by: INTERNAL MEDICINE

## 2020-01-24 PROCEDURE — 1159F MED LIST DOCD IN RCRD: CPT | Mod: HCNC,S$GLB,, | Performed by: INTERNAL MEDICINE

## 2020-01-24 PROCEDURE — 1159F PR MEDICATION LIST DOCUMENTED IN MEDICAL RECORD: ICD-10-PCS | Mod: HCNC,S$GLB,, | Performed by: INTERNAL MEDICINE

## 2020-01-24 PROCEDURE — 99214 PR OFFICE/OUTPT VISIT, EST, LEVL IV, 30-39 MIN: ICD-10-PCS | Mod: HCNC,S$GLB,, | Performed by: INTERNAL MEDICINE

## 2020-01-24 PROCEDURE — 3077F SYST BP >= 140 MM HG: CPT | Mod: HCNC,CPTII,S$GLB, | Performed by: INTERNAL MEDICINE

## 2020-01-24 PROCEDURE — 25500020 PHARM REV CODE 255: Mod: HCNC | Performed by: INTERNAL MEDICINE

## 2020-01-24 PROCEDURE — 3079F DIAST BP 80-89 MM HG: CPT | Mod: HCNC,CPTII,S$GLB, | Performed by: INTERNAL MEDICINE

## 2020-01-24 PROCEDURE — 1101F PR PT FALLS ASSESS DOC 0-1 FALLS W/OUT INJ PAST YR: ICD-10-PCS | Mod: HCNC,CPTII,S$GLB, | Performed by: INTERNAL MEDICINE

## 2020-01-24 PROCEDURE — 3077F PR MOST RECENT SYSTOLIC BLOOD PRESSURE >= 140 MM HG: ICD-10-PCS | Mod: HCNC,CPTII,S$GLB, | Performed by: INTERNAL MEDICINE

## 2020-01-24 PROCEDURE — 99499 UNLISTED E&M SERVICE: CPT | Mod: HCNC,S$GLB,, | Performed by: INTERNAL MEDICINE

## 2020-01-24 PROCEDURE — 74177 CT CHEST ABDOMEN PELVIS WITH CONTRAST (XPD): ICD-10-PCS | Mod: 26,HCNC,, | Performed by: RADIOLOGY

## 2020-01-24 PROCEDURE — 71260 CT CHEST ABDOMEN PELVIS WITH CONTRAST (XPD): ICD-10-PCS | Mod: 26,HCNC,, | Performed by: RADIOLOGY

## 2020-01-24 PROCEDURE — 74177 CT ABD & PELVIS W/CONTRAST: CPT | Mod: 26,HCNC,, | Performed by: RADIOLOGY

## 2020-01-24 PROCEDURE — 99214 OFFICE O/P EST MOD 30 MIN: CPT | Mod: HCNC,S$GLB,, | Performed by: INTERNAL MEDICINE

## 2020-01-24 PROCEDURE — 3079F PR MOST RECENT DIASTOLIC BLOOD PRESSURE 80-89 MM HG: ICD-10-PCS | Mod: HCNC,CPTII,S$GLB, | Performed by: INTERNAL MEDICINE

## 2020-01-24 PROCEDURE — 99999 PR PBB SHADOW E&M-EST. PATIENT-LVL IV: ICD-10-PCS | Mod: PBBFAC,HCNC,, | Performed by: INTERNAL MEDICINE

## 2020-01-24 PROCEDURE — 99999 PR PBB SHADOW E&M-EST. PATIENT-LVL IV: CPT | Mod: PBBFAC,HCNC,, | Performed by: INTERNAL MEDICINE

## 2020-01-24 RX ADMIN — IOHEXOL 15 ML: 350 INJECTION, SOLUTION INTRAVENOUS at 02:01

## 2020-01-24 RX ADMIN — IOHEXOL 75 ML: 350 INJECTION, SOLUTION INTRAVENOUS at 04:01

## 2020-01-24 NOTE — PROGRESS NOTES
"Subjective:       Patient ID: Edwin Powell is a 74 y.o. male.    Chief Complaint: No chief complaint on file.    HPI     Mr. Singleton is a 74 y.o. male who returns for follow up regarding metastatic renal cell carcinoma of left kidney. Returns for follow-up, currently on Votrient. Today's scans reviewed and no change in therapy indicated.      Notes pain at hernia site at times  Off and on back pain unchanged  Weight stable     Oncology History:  Mr.Mc Moss is a 73 yo male with CAD (s/p 5 stents, remotely), DM2 (insulin dependent), hypothyroidism , CVA (2013) with no residual deficit, chronic back pain. He was transferred to [facility] for neurosurgery evaluation of a lumbar spine lesion in the setting of progressive weakness of his legs and exacerbation of chronic back pain, in May 2017. He states that his back pain and LE weakness progressively worsened. [facility]. The pain radiates down his legs. He denies any incontinence of bowel or bladder. He had several falls. After one of the falls, he presented to the ED at [facility], in May 2017. He had an MRI showing "L3 vertebral body lesion with apparent cortical disruption concerning for metastases vs myeloma," and was transferred to [facility] for neurosurgery evaluation. He was evaluated by neurosurgery regarding concern for lytic lesion on L3 on 5/26/17. No surgical intervention was proposed.   CT chest, abdomen,pelvis on 5/27/17 revealed :   --A 3.6 cm exophytic mass arising from the medial aspect of the lower pole of left kidney,   --A small 1.0 cm, subtle, cortical enhancing mass within the upper pole of the right kidney, concerning for possible contralateral solid renal mass  --Multiple enhancing ill-defined hepatic masses concerning for hepatic metastatic disease, hepatomegaly and hepatic steatosis  --Mulltifocal irregular thickening of the bilateral pleura concerning for possible pleural metastatic disease  --Mild " nonspecific nodular thickening of the left adrenal gland without discrete nodule  -- Redemonstration of known lytic lesion within the L3 vertebral body. No definite additional discrete lytic lesions are identified.  -MRI pelvis from 10/18/17:    2.5 cm linear T1 hypointense T2/STIR hyperintense lesion within the left iliac bone, this lesion is indeterminate but felt less likely to represent metastasis given its appearance. Further evaluation can be obtained with bone scan as clinically indicated.  Findings suggestive of left trochanteric bursitis.  Partially visualized L3 metastatic lesion.  Diffuse thickening of the urinary bladder wall can be seen with chronic outlet obstruction or cystitis.  - initiated on Votrient    Review of Systems   Constitutional: Positive for fatigue. Negative for activity change, appetite change, chills, fever and unexpected weight change.   HENT: Negative for congestion, dental problem, mouth sores, nosebleeds, rhinorrhea, sinus pressure, sore throat and trouble swallowing.    Eyes: Negative for visual disturbance.   Respiratory: Positive for shortness of breath (occasional, mild SOB on exertion- no worse. ). Negative for cough, chest tightness and wheezing.    Cardiovascular: Negative for chest pain, palpitations and leg swelling.   Gastrointestinal: Positive for abdominal pain. Negative for abdominal distention, blood in stool, constipation, diarrhea (better), nausea and vomiting.   Genitourinary: Negative for decreased urine volume, difficulty urinating, dysuria, frequency, hematuria and urgency.   Musculoskeletal: Positive for back pain (chronic and unchanged). Negative for arthralgias, gait problem, joint swelling, myalgias, neck pain and neck stiffness.   Skin: Negative for color change, pallor, rash and wound.   Neurological: Negative for dizziness, weakness, light-headedness, numbness and headaches.   Hematological: Negative for adenopathy.   Psychiatric/Behavioral: Negative for  dysphoric mood. The patient is not nervous/anxious.        Objective:      Physical Exam   Constitutional: He is oriented to person, place, and time. He appears well-developed and well-nourished. No distress.   Presents alone   HENT:   Head: Normocephalic and atraumatic.   Mouth/Throat: Oropharynx is clear and moist. No oropharyngeal exudate.   Eyes: Pupils are equal, round, and reactive to light. Conjunctivae and EOM are normal. No scleral icterus.   Neck: Normal range of motion. Neck supple. No thyromegaly present.   Cardiovascular: Normal rate, regular rhythm, normal heart sounds and intact distal pulses. Exam reveals no gallop and no friction rub.   No murmur heard.  Pulmonary/Chest: Effort normal and breath sounds normal. No respiratory distress. He has no wheezes. He has no rales. He exhibits no tenderness.   Abdominal: Soft. Bowel sounds are normal. He exhibits no distension and no mass. There is no tenderness (at ventral hernia). There is no rebound.   No hepatosplenomegaly  Slightly bloated but soft   Musculoskeletal: Normal range of motion. He exhibits no edema, tenderness or deformity.   No spinal or paraspinal tenderness to palpation   Lymphadenopathy:     He has no cervical adenopathy.   Neurological: He is alert and oriented to person, place, and time. No sensory deficit. Coordination normal.   Skin: Skin is warm and dry. No rash noted. He is not diaphoretic. No erythema. No pallor.   Psychiatric: He has a normal mood and affect. His behavior is normal. Judgment and thought content normal.   Nursing note and vitals reviewed.   Labs- reviewed   Imaging- reviewed  Assessment:       1. Renal cell carcinoma of left kidney        Plan:     Continue current therapy with Votrient  Knows to call for any issues  RTC 4 weeks with labs including lipid profile    25 minutes total

## 2020-01-28 ENCOUNTER — TELEPHONE (OUTPATIENT)
Dept: HEMATOLOGY/ONCOLOGY | Facility: CLINIC | Age: 75
End: 2020-01-28

## 2020-01-28 ENCOUNTER — PATIENT MESSAGE (OUTPATIENT)
Dept: HEMATOLOGY/ONCOLOGY | Facility: CLINIC | Age: 75
End: 2020-01-28

## 2020-01-28 NOTE — TELEPHONE ENCOUNTER
Communicated with pt via [facility].       ----- Message from Rahel Reed MD sent at 1/27/2020  5:00 PM CST -----  Please let him know scans stable

## 2020-01-29 ENCOUNTER — TELEPHONE (OUTPATIENT)
Dept: PHARMACY | Facility: CLINIC | Age: 75
End: 2020-01-29

## 2020-02-01 NOTE — TELEPHONE ENCOUNTER
We will be assisting the patient with apply to [employer] Patient Assistance for his Votrient prescription.  Faxing the application prescription to Dr Reed for her review and signature.

## 2020-02-10 PROBLEM — D75.839 THROMBOCYTHEMIA: Status: RESOLVED | Noted: 2017-06-28 | Resolved: 2020-02-10

## 2020-02-10 PROBLEM — R10.32 LLQ ABDOMINAL PAIN: Status: RESOLVED | Noted: 2019-06-21 | Resolved: 2020-02-10

## 2020-02-10 PROBLEM — T45.1X5A CHEMOTHERAPY INDUCED DIARRHEA: Status: RESOLVED | Noted: 2017-07-26 | Resolved: 2020-02-10

## 2020-02-10 PROBLEM — K52.1 CHEMOTHERAPY INDUCED DIARRHEA: Status: RESOLVED | Noted: 2017-07-26 | Resolved: 2020-02-10

## 2020-02-10 PROBLEM — E44.0 MODERATE PROTEIN-CALORIE MALNUTRITION: Status: RESOLVED | Noted: 2018-05-14 | Resolved: 2020-02-10

## 2020-02-10 NOTE — PROGRESS NOTES
"Subjective:       Patient ID: Edwin Powell is a 74 y.o. male.    Chief Complaint: Renal cell carcinoma of left kidney    HPI     Mr. Singleton is a 74 y.o. male who returns for follow up regarding metastatic renal cell carcinoma of left kidney. Returns for follow-up, currently on Votrient. Today he states his left knee is bothering him due to a fall yesterday. Denies hitting his head, only hit is elbow and knee. Eating and drinking well     Off and on back pain unchanged; now more on the right side than left. Diarrhea lately taking imodium and lomotil. Shortness of breath with exertion. Abdominal pain on palpation  Weight decreased 7 lbs from last month     Oncology History:  Mr.Mc Moss is a 73 yo male with CAD (s/p 5 stents, remotely), DM2 (insulin dependent), hypothyroidism , CVA (2013) with no residual deficit, chronic back pain. He was transferred to [facility] for neurosurgery evaluation of a lumbar spine lesion in the setting of progressive weakness of his legs and exacerbation of chronic back pain, in May 2017. He states that his back pain and LE weakness progressively worsened. [facility]. The pain radiates down his legs. He denies any incontinence of bowel or bladder. He had several falls. After one of the falls, he presented to the ED at [facility], in May 2017. He had an MRI showing "L3 vertebral body lesion with apparent cortical disruption concerning for metastases vs myeloma," and was transferred to [facility] for neurosurgery evaluation. He was evaluated by neurosurgery regarding concern for lytic lesion on L3 on 5/26/17. No surgical intervention was proposed.   CT chest, abdomen,pelvis on 5/27/17 revealed :   --A 3.6 cm exophytic mass arising from the medial aspect of the lower pole of left kidney,   --A small 1.0 cm, subtle, cortical enhancing mass within the upper pole of the right kidney, concerning for possible contralateral solid renal mass  --Multiple " enhancing ill-defined hepatic masses concerning for hepatic metastatic disease, hepatomegaly and hepatic steatosis  --Mulltifocal irregular thickening of the bilateral pleura concerning for possible pleural metastatic disease  --Mild nonspecific nodular thickening of the left adrenal gland without discrete nodule  -- Redemonstration of known lytic lesion within the L3 vertebral body. No definite additional discrete lytic lesions are identified.  -MRI pelvis from 10/18/17:    2.5 cm linear T1 hypointense T2/STIR hyperintense lesion within the left iliac bone, this lesion is indeterminate but felt less likely to represent metastasis given its appearance. Further evaluation can be obtained with bone scan as clinically indicated.  Findings suggestive of left trochanteric bursitis.  Partially visualized L3 metastatic lesion.  Diffuse thickening of the urinary bladder wall can be seen with chronic outlet obstruction or cystitis.  - initiated on Votrient    Review of Systems   Constitutional: Positive for fatigue. Negative for activity change, appetite change, chills, fever and unexpected weight change.   HENT: Negative for congestion, dental problem, mouth sores, nosebleeds, rhinorrhea, sinus pressure, sore throat and trouble swallowing.    Eyes: Negative for visual disturbance.   Respiratory: Positive for shortness of breath (occasional, mild SOB on exertion- no worse. ). Negative for cough, chest tightness and wheezing.    Cardiovascular: Negative for chest pain, palpitations and leg swelling.   Gastrointestinal: Positive for abdominal pain and diarrhea. Negative for abdominal distention, blood in stool, constipation, nausea and vomiting.   Genitourinary: Negative for decreased urine volume, difficulty urinating, dysuria, frequency, hematuria and urgency.   Musculoskeletal: Positive for arthralgias (left shoulder   x 3 weeks) and back pain (chronic and unchanged; worse on right than left). Negative for gait problem,  joint swelling, myalgias, neck pain and neck stiffness.   Skin: Negative for color change, pallor, rash and wound.   Neurological: Positive for dizziness and light-headedness. Negative for weakness, numbness and headaches.   Hematological: Negative for adenopathy.   Psychiatric/Behavioral: Negative for dysphoric mood. The patient is not nervous/anxious.        Objective:      Physical Exam   Constitutional: He is oriented to person, place, and time. He appears well-developed and well-nourished. No distress.   Presents alone   HENT:   Head: Normocephalic and atraumatic.   Right Ear: External ear normal.   Left Ear: External ear normal.   Mouth/Throat: Oropharynx is clear and moist. No oropharyngeal exudate.   Eyes: Pupils are equal, round, and reactive to light. Conjunctivae and EOM are normal. No scleral icterus.   Neck: Normal range of motion. Neck supple. No thyromegaly present.   Cardiovascular: Normal rate, regular rhythm, normal heart sounds and intact distal pulses. Exam reveals no gallop and no friction rub.   No murmur heard.  Pulmonary/Chest: Effort normal and breath sounds normal. No respiratory distress. He has no wheezes. He has no rales. He exhibits no tenderness.   Abdominal: Soft. Bowel sounds are normal. He exhibits no distension and no mass. There is tenderness. There is no rebound.   No hepatosplenomegaly  Slightly bloated but soft   Musculoskeletal: Normal range of motion. He exhibits no edema, tenderness or deformity.   No spinal or paraspinal tenderness to palpation   Lymphadenopathy:     He has no cervical adenopathy.   Neurological: He is alert and oriented to person, place, and time.   Skin: Skin is warm and dry. No rash noted. He is not diaphoretic. No erythema. No pallor.   Psychiatric: He has a normal mood and affect. His behavior is normal. Judgment and thought content normal.   Nursing note and vitals reviewed.   Labs- reviewed  Assessment:       1. Renal cell carcinoma of left kidney     2. Metastatic renal cell carcinoma to liver    3. 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 Hyperlipidemia associated with type 2 diabetes mellitus    5. CRI (chronic renal insufficiency), stage 3 (moderate)    6. Type 2 diabetes mellitus with stage 3 chronic kidney disease, without long-term current use of insulin    7. Vitamin D deficiency    8. B12 deficiency    9. Acquired hypothyroidism    10. Gastroesophageal reflux disease without esophagitis    11. Hyperkalemia        Plan:     1-3 . Continue current therapy with Votrient  4. Continue current medication and follow up with PCP  5. Follow up with nephrology as previously scheduled monitored  6. Monitored glucose today; continue current medications and follow up with endocrinology   7. Continue vitamin D replacement  8. Continue vitamin B complex  9. Continue current dose of synthroid and follow up with endocrinology   10. Continue current medication and follow up with GI  11. Recheck potassium level - if it remains elevated will prescribe kayexalate     RTC 4 weeks with labs    Return to clinic in 4 weeks with MD appointment and labs.     Patient is in agreement with the proposed treatment plan. All questions were answered to the patient's satisfaction. Patient knows to call clinic for any new or worsening symptoms and if anything is needed before the next clinic visit.          Marlena Ramirez, NATALIYA-C  Hematology & Medical Oncology   [address] [place] [address]  ph. [phone]  Fax. [phone]    Patient dicussed with collaborating physician, Dr. Reed.

## 2020-02-12 ENCOUNTER — PATIENT OUTREACH (OUTPATIENT)
Dept: ADMINISTRATIVE | Facility: HOSPITAL | Age: 75
End: 2020-02-12

## 2020-02-14 ENCOUNTER — TELEPHONE (OUTPATIENT)
Dept: PHARMACY | Facility: CLINIC | Age: 75
End: 2020-02-14

## 2020-02-18 ENCOUNTER — TELEPHONE (OUTPATIENT)
Dept: PHARMACY | Facility: CLINIC | Age: 75
End: 2020-02-18

## 2020-02-18 NOTE — TELEPHONE ENCOUNTER
Refill call regarding Votrient at [facility]. Will prepare for shipment with consent of patient on  to arrive . Copay 0.00. Patient has not started any new medications including OTC drugs. Patient has not had any medication/ dose or instruction changes. No new allergies or side effects reported with this shipment. Medication is being taken as prescribed by physician and properly stored. Two patient identifiers:  and Address verified. Pt has 9 days on hand.

## 2020-02-24 ENCOUNTER — OFFICE VISIT (OUTPATIENT)
Dept: HEMATOLOGY/ONCOLOGY | Facility: CLINIC | Age: 75
End: 2020-02-24
Payer: MEDICARE

## 2020-02-24 ENCOUNTER — LAB VISIT (OUTPATIENT)
Dept: LAB | Facility: HOSPITAL | Age: 75
End: 2020-02-24
Attending: NURSE PRACTITIONER
Payer: MEDICARE

## 2020-02-24 VITALS
DIASTOLIC BLOOD PRESSURE: 91 MMHG | SYSTOLIC BLOOD PRESSURE: 198 MMHG | BODY MASS INDEX: 26.19 KG/M2 | TEMPERATURE: 98 F | WEIGHT: 166.88 LBS | HEART RATE: 64 BPM | HEIGHT: 67 IN | RESPIRATION RATE: 18 BRPM | OXYGEN SATURATION: 97 %

## 2020-02-24 DIAGNOSIS — E11.69 HYPERLIPIDEMIA ASSOCIATED WITH TYPE 2 DIABETES MELLITUS: ICD-10-CM

## 2020-02-24 DIAGNOSIS — C78.7 METASTATIC RENAL CELL CARCINOMA TO LIVER: ICD-10-CM

## 2020-02-24 DIAGNOSIS — C64.2 RENAL CELL CARCINOMA OF LEFT KIDNEY: Primary | ICD-10-CM

## 2020-02-24 DIAGNOSIS — K21.9 GASTROESOPHAGEAL REFLUX DISEASE WITHOUT ESOPHAGITIS: Chronic | ICD-10-CM

## 2020-02-24 DIAGNOSIS — E87.5 HYPERKALEMIA: ICD-10-CM

## 2020-02-24 DIAGNOSIS — R93.3 ABNORMAL FINDINGS ON DIAGNOSTIC IMAGING OF OTHER PARTS OF DIGESTIVE TRACT: ICD-10-CM

## 2020-02-24 DIAGNOSIS — C64.2 RENAL CELL CARCINOMA OF LEFT KIDNEY: ICD-10-CM

## 2020-02-24 DIAGNOSIS — C64.9 METASTATIC RENAL CELL CARCINOMA TO BONE: ICD-10-CM

## 2020-02-24 DIAGNOSIS — E78.5 HYPERLIPIDEMIA ASSOCIATED WITH TYPE 2 DIABETES MELLITUS: ICD-10-CM

## 2020-02-24 DIAGNOSIS — E53.8 B12 DEFICIENCY: ICD-10-CM

## 2020-02-24 DIAGNOSIS — E55.9 VITAMIN D DEFICIENCY: ICD-10-CM

## 2020-02-24 DIAGNOSIS — N18.30 CRI (CHRONIC RENAL INSUFFICIENCY), STAGE 3 (MODERATE): ICD-10-CM

## 2020-02-24 DIAGNOSIS — E11.22 TYPE 2 DIABETES MELLITUS WITH STAGE 3 CHRONIC KIDNEY DISEASE, WITHOUT LONG-TERM CURRENT USE OF INSULIN: ICD-10-CM

## 2020-02-24 DIAGNOSIS — N18.30 TYPE 2 DIABETES MELLITUS WITH STAGE 3 CHRONIC KIDNEY DISEASE, WITHOUT LONG-TERM CURRENT USE OF INSULIN: ICD-10-CM

## 2020-02-24 DIAGNOSIS — E03.9 ACQUIRED HYPOTHYROIDISM: ICD-10-CM

## 2020-02-24 DIAGNOSIS — C64.9 METASTATIC RENAL CELL CARCINOMA TO LIVER: ICD-10-CM

## 2020-02-24 DIAGNOSIS — C79.51 METASTATIC RENAL CELL CARCINOMA TO BONE: ICD-10-CM

## 2020-02-24 LAB
ALBUMIN SERPL BCP-MCNC: 2.7 G/DL (ref 3.5–5.2)
ALP SERPL-CCNC: 122 U/L (ref 55–135)
ALT SERPL W/O P-5'-P-CCNC: 22 U/L (ref 10–44)
ANION GAP SERPL CALC-SCNC: 8 MMOL/L (ref 8–16)
AST SERPL-CCNC: 24 U/L (ref 10–40)
BASOPHILS # BLD AUTO: 0.05 K/UL (ref 0–0.2)
BASOPHILS NFR BLD: 0.7 % (ref 0–1.9)
BILIRUB SERPL-MCNC: 0.3 MG/DL (ref 0.1–1)
BUN SERPL-MCNC: 33 MG/DL (ref 8–23)
CALCIUM SERPL-MCNC: 9 MG/DL (ref 8.7–10.5)
CHLORIDE SERPL-SCNC: 110 MMOL/L (ref 95–110)
CHOLEST SERPL-MCNC: 181 MG/DL (ref 120–199)
CHOLEST/HDLC SERPL: 4.9 {RATIO} (ref 2–5)
CO2 SERPL-SCNC: 23 MMOL/L (ref 23–29)
CREAT SERPL-MCNC: 1.7 MG/DL (ref 0.5–1.4)
DIFFERENTIAL METHOD: ABNORMAL
EOSINOPHIL # BLD AUTO: 0.2 K/UL (ref 0–0.5)
EOSINOPHIL NFR BLD: 2.8 % (ref 0–8)
ERYTHROCYTE [DISTWIDTH] IN BLOOD BY AUTOMATED COUNT: 15.4 % (ref 11.5–14.5)
EST. GFR  (AFRICAN AMERICAN): 44.6 ML/MIN/1.73 M^2
EST. GFR  (NON AFRICAN AMERICAN): 38.6 ML/MIN/1.73 M^2
GLUCOSE SERPL-MCNC: 121 MG/DL (ref 70–110)
HCT VFR BLD AUTO: 39.4 % (ref 40–54)
HDLC SERPL-MCNC: 37 MG/DL (ref 40–75)
HDLC SERPL: 20.4 % (ref 20–50)
HGB BLD-MCNC: 11.9 G/DL (ref 14–18)
IMM GRANULOCYTES # BLD AUTO: 0.01 K/UL (ref 0–0.04)
IMM GRANULOCYTES NFR BLD AUTO: 0.1 % (ref 0–0.5)
LDLC SERPL CALC-MCNC: 115.4 MG/DL (ref 63–159)
LYMPHOCYTES # BLD AUTO: 2.4 K/UL (ref 1–4.8)
LYMPHOCYTES NFR BLD: 31.8 % (ref 18–48)
MCH RBC QN AUTO: 32.2 PG (ref 27–31)
MCHC RBC AUTO-ENTMCNC: 30.2 G/DL (ref 32–36)
MCV RBC AUTO: 107 FL (ref 82–98)
MONOCYTES # BLD AUTO: 0.7 K/UL (ref 0.3–1)
MONOCYTES NFR BLD: 8.5 % (ref 4–15)
NEUTROPHILS # BLD AUTO: 4.3 K/UL (ref 1.8–7.7)
NEUTROPHILS NFR BLD: 56.1 % (ref 38–73)
NONHDLC SERPL-MCNC: 144 MG/DL
NRBC BLD-RTO: 0 /100 WBC
PLATELET # BLD AUTO: 376 K/UL (ref 150–350)
PMV BLD AUTO: 10.1 FL (ref 9.2–12.9)
POTASSIUM SERPL-SCNC: 5.7 MMOL/L (ref 3.5–5.1)
PROT SERPL-MCNC: 6.3 G/DL (ref 6–8.4)
RBC # BLD AUTO: 3.69 M/UL (ref 4.6–6.2)
SODIUM SERPL-SCNC: 141 MMOL/L (ref 136–145)
TRIGL SERPL-MCNC: 143 MG/DL (ref 30–150)
WBC # BLD AUTO: 7.63 K/UL (ref 3.9–12.7)

## 2020-02-24 PROCEDURE — 99214 PR OFFICE/OUTPT VISIT, EST, LEVL IV, 30-39 MIN: ICD-10-PCS | Mod: HCNC,S$GLB,, | Performed by: NURSE PRACTITIONER

## 2020-02-24 PROCEDURE — 3052F HG A1C>EQUAL 8.0%<EQUAL 9.0%: CPT | Mod: HCNC,CPTII,S$GLB, | Performed by: NURSE PRACTITIONER

## 2020-02-24 PROCEDURE — 99999 PR PBB SHADOW E&M-EST. PATIENT-LVL V: ICD-10-PCS | Mod: PBBFAC,HCNC,, | Performed by: NURSE PRACTITIONER

## 2020-02-24 PROCEDURE — 36415 COLL VENOUS BLD VENIPUNCTURE: CPT | Mod: HCNC

## 2020-02-24 PROCEDURE — 3052F PR MOST RECENT HEMOGLOBIN A1C LEVEL 8.0 - < 9.0%: ICD-10-PCS | Mod: HCNC,CPTII,S$GLB, | Performed by: NURSE PRACTITIONER

## 2020-02-24 PROCEDURE — 80061 LIPID PANEL: CPT | Mod: HCNC

## 2020-02-24 PROCEDURE — 80053 COMPREHEN METABOLIC PANEL: CPT | Mod: HCNC

## 2020-02-24 PROCEDURE — 85025 COMPLETE CBC W/AUTO DIFF WBC: CPT | Mod: HCNC

## 2020-02-24 PROCEDURE — 99999 PR PBB SHADOW E&M-EST. PATIENT-LVL V: CPT | Mod: PBBFAC,HCNC,, | Performed by: NURSE PRACTITIONER

## 2020-02-24 PROCEDURE — 99214 OFFICE O/P EST MOD 30 MIN: CPT | Mod: HCNC,S$GLB,, | Performed by: NURSE PRACTITIONER

## 2020-02-27 ENCOUNTER — OFFICE VISIT (OUTPATIENT)
Dept: PRIMARY CARE CLINIC | Facility: CLINIC | Age: 75
End: 2020-02-27
Payer: MEDICARE

## 2020-02-27 VITALS
OXYGEN SATURATION: 97 % | DIASTOLIC BLOOD PRESSURE: 70 MMHG | HEIGHT: 67 IN | WEIGHT: 169.75 LBS | BODY MASS INDEX: 26.64 KG/M2 | HEART RATE: 68 BPM | SYSTOLIC BLOOD PRESSURE: 160 MMHG

## 2020-02-27 DIAGNOSIS — E11.65 TYPE 2 DIABETES MELLITUS WITH HYPERGLYCEMIA, WITH LONG-TERM CURRENT USE OF INSULIN: ICD-10-CM

## 2020-02-27 DIAGNOSIS — E11.22 TYPE 2 DIABETES MELLITUS WITH STAGE 3 CHRONIC KIDNEY DISEASE, WITHOUT LONG-TERM CURRENT USE OF INSULIN: ICD-10-CM

## 2020-02-27 DIAGNOSIS — Z79.4 TYPE 2 DIABETES MELLITUS WITH HYPERGLYCEMIA, WITH LONG-TERM CURRENT USE OF INSULIN: ICD-10-CM

## 2020-02-27 DIAGNOSIS — E44.0 MODERATE PROTEIN-CALORIE MALNUTRITION: ICD-10-CM

## 2020-02-27 DIAGNOSIS — N18.30 TYPE 2 DIABETES MELLITUS WITH STAGE 3 CHRONIC KIDNEY DISEASE, WITHOUT LONG-TERM CURRENT USE OF INSULIN: ICD-10-CM

## 2020-02-27 DIAGNOSIS — I25.119 CORONARY ARTERY DISEASE INVOLVING NATIVE CORONARY ARTERY OF NATIVE HEART WITH ANGINA PECTORIS: ICD-10-CM

## 2020-02-27 DIAGNOSIS — G89.3 NEOPLASM RELATED PAIN: ICD-10-CM

## 2020-02-27 DIAGNOSIS — D69.2 SENILE PURPURA: ICD-10-CM

## 2020-02-27 DIAGNOSIS — I77.1 TORTUOUS AORTA: ICD-10-CM

## 2020-02-27 DIAGNOSIS — M47.816 FACET ARTHRITIS OF LUMBAR REGION: ICD-10-CM

## 2020-02-27 DIAGNOSIS — C78.7 METASTATIC RENAL CELL CARCINOMA TO LIVER: ICD-10-CM

## 2020-02-27 DIAGNOSIS — E11.69 HYPERLIPIDEMIA ASSOCIATED WITH TYPE 2 DIABETES MELLITUS: ICD-10-CM

## 2020-02-27 DIAGNOSIS — C64.9 METASTATIC RENAL CELL CARCINOMA TO LIVER: ICD-10-CM

## 2020-02-27 DIAGNOSIS — E78.5 HYPERLIPIDEMIA ASSOCIATED WITH TYPE 2 DIABETES MELLITUS: ICD-10-CM

## 2020-02-27 DIAGNOSIS — M46.96 UNSPECIFIED INFLAMMATORY SPONDYLOPATHY, LUMBAR REGION: ICD-10-CM

## 2020-02-27 DIAGNOSIS — F19.982 DRUG INDUCED INSOMNIA: ICD-10-CM

## 2020-02-27 PROCEDURE — 1100F PR PT FALLS ASSESS DOC 2+ FALLS/FALL W/INJURY/YR: ICD-10-PCS | Mod: HCNC,CPTII,S$GLB, | Performed by: INTERNAL MEDICINE

## 2020-02-27 PROCEDURE — 99215 PR OFFICE/OUTPT VISIT, EST, LEVL V, 40-54 MIN: ICD-10-PCS | Mod: HCNC,S$GLB,, | Performed by: INTERNAL MEDICINE

## 2020-02-27 PROCEDURE — 3077F PR MOST RECENT SYSTOLIC BLOOD PRESSURE >= 140 MM HG: ICD-10-PCS | Mod: HCNC,CPTII,S$GLB, | Performed by: INTERNAL MEDICINE

## 2020-02-27 PROCEDURE — 1159F PR MEDICATION LIST DOCUMENTED IN MEDICAL RECORD: ICD-10-PCS | Mod: HCNC,S$GLB,, | Performed by: INTERNAL MEDICINE

## 2020-02-27 PROCEDURE — 3288F FALL RISK ASSESSMENT DOCD: CPT | Mod: HCNC,CPTII,S$GLB, | Performed by: INTERNAL MEDICINE

## 2020-02-27 PROCEDURE — 3078F DIAST BP <80 MM HG: CPT | Mod: HCNC,CPTII,S$GLB, | Performed by: INTERNAL MEDICINE

## 2020-02-27 PROCEDURE — 1125F PR PAIN SEVERITY QUANTIFIED, PAIN PRESENT: ICD-10-PCS | Mod: HCNC,S$GLB,, | Performed by: INTERNAL MEDICINE

## 2020-02-27 PROCEDURE — 1125F AMNT PAIN NOTED PAIN PRSNT: CPT | Mod: HCNC,S$GLB,, | Performed by: INTERNAL MEDICINE

## 2020-02-27 PROCEDURE — 1100F PTFALLS ASSESS-DOCD GE2>/YR: CPT | Mod: HCNC,CPTII,S$GLB, | Performed by: INTERNAL MEDICINE

## 2020-02-27 PROCEDURE — 3288F PR FALLS RISK ASSESSMENT DOCUMENTED: ICD-10-PCS | Mod: HCNC,CPTII,S$GLB, | Performed by: INTERNAL MEDICINE

## 2020-02-27 PROCEDURE — 99215 OFFICE O/P EST HI 40 MIN: CPT | Mod: HCNC,S$GLB,, | Performed by: INTERNAL MEDICINE

## 2020-02-27 PROCEDURE — 3052F PR MOST RECENT HEMOGLOBIN A1C LEVEL 8.0 - < 9.0%: ICD-10-PCS | Mod: HCNC,CPTII,S$GLB, | Performed by: INTERNAL MEDICINE

## 2020-02-27 PROCEDURE — 99499 RISK ADDL DX/OHS AUDIT: ICD-10-PCS | Mod: HCNC,S$GLB,, | Performed by: INTERNAL MEDICINE

## 2020-02-27 PROCEDURE — 99499 UNLISTED E&M SERVICE: CPT | Mod: HCNC,S$GLB,, | Performed by: INTERNAL MEDICINE

## 2020-02-27 PROCEDURE — 1159F MED LIST DOCD IN RCRD: CPT | Mod: HCNC,S$GLB,, | Performed by: INTERNAL MEDICINE

## 2020-02-27 PROCEDURE — 3078F PR MOST RECENT DIASTOLIC BLOOD PRESSURE < 80 MM HG: ICD-10-PCS | Mod: HCNC,CPTII,S$GLB, | Performed by: INTERNAL MEDICINE

## 2020-02-27 PROCEDURE — 3077F SYST BP >= 140 MM HG: CPT | Mod: HCNC,CPTII,S$GLB, | Performed by: INTERNAL MEDICINE

## 2020-02-27 PROCEDURE — 3052F HG A1C>EQUAL 8.0%<EQUAL 9.0%: CPT | Mod: HCNC,CPTII,S$GLB, | Performed by: INTERNAL MEDICINE

## 2020-02-27 RX ORDER — ROSUVASTATIN CALCIUM 20 MG/1
20 TABLET, COATED ORAL DAILY
Qty: 90 TABLET | Refills: 3 | Status: SHIPPED | OUTPATIENT
Start: 2020-02-27 | End: 2020-07-14 | Stop reason: DRUGHIGH

## 2020-02-27 RX ORDER — HYDROCODONE BITARTRATE AND ACETAMINOPHEN 5; 325 MG/1; MG/1
TABLET ORAL
Qty: 120 TABLET | Refills: 0 | Status: SHIPPED | OUTPATIENT
Start: 2020-02-27 | End: 2020-03-26 | Stop reason: SDUPTHER

## 2020-02-27 RX ORDER — MORPHINE SULFATE 15 MG/1
15 TABLET, FILM COATED, EXTENDED RELEASE ORAL EVERY 12 HOURS
Qty: 60 TABLET | Refills: 0 | Status: SHIPPED | OUTPATIENT
Start: 2020-02-27 | End: 2020-03-26 | Stop reason: SDUPTHER

## 2020-02-27 NOTE — PROGRESS NOTES
"Primary Care Provider Appointment  SHARED NOTE: Jaguar Alvarado (MS-4), Dr Lewis (Attending)    Subjective:      Patient ID: Edwin Powell is a 75 y.o. male with DM, HLD, back pain, arthropathy    Chief Complaint: Low-back Pain and Follow-up     No complaints today. Endorses low-back pain but "doesn't pay it no mind".     Recent fall on front porch, scrape on the L knee. Occurred while unloading groceries and didn't raise leg high enough and tripped over porch step. Says he does get dizzy spells occasionally, but didn't get one prior to this fall. Otherwise no issues, no LOC or head trauma. Recommend putting on neosporin.    Today is the first day no diarrhea.  Had diarrhea about 3-4 days before.  Imodium helped. He attributes his diarrhea to lipitor use. It did not decrease with q2 day dosing. Is now taking crestor with no issues. Started this in December. He is taking 10mg today, but had minimal improvement in lipid panel this week.    Recently same Heme Onc, with Marlena Ramirez NP, continue on Votrient. F/u in 4 weeks.     Home glucose BID run around 120's. Insulin lantus 5U in evening around 4-5 PM. 11/18/2019: HbA1C: 8.2; due for next check today.     Inquiry about a little blue pill that Dr. Lewis prescribed about 3-4 months ago (assumes it was for stomach, lomotil??). Will need refill once he runs out.      Past Surgical History:   Procedure Laterality Date    ABDOMINAL SURGERY      APPENDECTOMY      BACK SURGERY      CARDIAC SURGERY      CATARACT EXTRACTION      CHOLECYSTECTOMY      coronary stenting      X 5 last one in 2010-11.    INTRAOCULAR PROSTHESES INSERTION Right 6/27/2019    Procedure: INSERTION, IOL PROSTHESIS;  Surgeon: Chary Culp MD;  Location: [facility] OR [address] [place] [address];  Service: Ophthalmology;  Laterality: Right;    INTRAOCULAR PROSTHESES INSERTION Left 8/22/2019    Procedure: INSERTION, IOL PROSTHESIS;  Surgeon: Chary Culp MD;  Location: [facility] OR [address] [place] [address];  Service: " Ophthalmology;  Laterality: Left;    PHACOEMULSIFICATION OF CATARACT Right 6/27/2019    Procedure: PHACOEMULSIFICATION, CATARACT;  Surgeon: Chary Cupl MD;  Location: [facility] OR [address] [place] [address];  Service: Ophthalmology;  Laterality: Right;    PHACOEMULSIFICATION OF CATARACT Left 8/22/2019    Procedure: PHACOEMULSIFICATION, CATARACT;  Surgeon: Chary Culp MD;  Location: [facility] OR [address] [place] [address];  Service: Ophthalmology;  Laterality: Left;    SPINAL FUSION         Past Medical History:   Diagnosis Date    Acquired hypothyroidism 5/26/2017    Benign essential HTN 5/26/2017    Benign prostatic hyperplasia (BPH) with urinary urge incontinence 6/6/2017    Chronic low back pain 6/1/2017    Coronary artery disease involving native coronary artery of native heart without angina pectoris 5/26/2017    S/p 5 stents - last one around 2010-11.    Gastroesophageal reflux disease without esophagitis 5/26/2017    History of CVA (cerebrovascular accident) 5/26/2017    Hypertension associated with diabetes 5/26/2017    BP controlled on ACE, CCB    Lumbar disc lesion 5/26/2017    Metastatic renal cell carcinoma to bone 6/6/2017    Metastatic renal cell carcinoma to liver 6/6/2017    Liver Biopsy 5-2017: METASTATIC RENAL CELL CARCINOMA.    Mixed hyperlipidemia 5/26/2017    Type 2 diabetes mellitus with stage 3 chronic kidney disease, with long-term current use of insulin 5/26/2017       Social History     Socioeconomic History    Marital status: Single     Spouse name: Not on file    Number of children: Not on file    Years of education: Not on file    Highest education level: Not on file   Occupational History    Not on file   Social Needs    Financial resource strain: Hard    Food insecurity:     Worry: Never true     Inability: Never true    Transportation needs:     Medical: No     Non-medical: No   Tobacco Use    Smoking status: Former Smoker     Types: Cigarettes    Smokeless tobacco: Never Used    Tobacco  "comment: haven't smoked in last 25 yrs   Substance and Sexual Activity    Alcohol use: Yes     Comment: rarely     Drug use: Not Currently    Sexual activity: Not Currently     Partners: Female   Lifestyle    Physical activity:     Days per week: Not on file     Minutes per session: Not on file    Stress: Not on file   Relationships    Social connections:     Talks on phone: Not on file     Gets together: Not on file     Attends Holiness service: Not on file     Active member of club or organization: Not on file     Attends meetings of clubs or organizations: Not on file     Relationship status: Not on file   Other Topics Concern    Not on file   Social History Narrative    Not on file       Review of Systems   Constitutional: Negative for activity change and unexpected weight change.   HENT: Positive for hearing loss. Negative for rhinorrhea and trouble swallowing.         Abnormal hearing   Respiratory: Negative for cough.    Cardiovascular: Negative for chest pain.   Gastrointestinal: Positive for diarrhea. Negative for blood in stool, constipation and vomiting.   Endocrine: Negative for polydipsia and polyuria.   Genitourinary: Negative for difficulty urinating and urgency.   Musculoskeletal: Positive for arthralgias. Negative for joint swelling and neck pain.   Neurological: Positive for weakness. Negative for headaches.   Psychiatric/Behavioral: Positive for confusion and decreased concentration. Negative for dysphoric mood.       Objective:   BP (!) 160/70   Pulse 68   Ht 5' 7" (1.702 m)   Wt 77 kg (169 lb 12.1 oz)   SpO2 97%   BMI 26.59 kg/m²     Physical Exam   Constitutional: He is oriented to person, place, and time. He appears well-developed and well-nourished.   No longer ambulating with cane   HENT:   Head: Normocephalic.   Edentulous   Eyes: Pupils are equal, round, and reactive to light.   Neck:   Decreased ROM in neck    Cardiovascular: Normal rate and regular rhythm. "   Pulmonary/Chest: Effort normal and breath sounds normal.   Abdominal: Soft. Bowel sounds are normal. He exhibits distension.   Musculoskeletal: Normal range of motion.   Neurological: He is alert and oriented to person, place, and time.   Skin: Skin is warm.   Ecchymoses and purpura bilaterally UE  Telangiectasias on face and chest  Multiple tattoos on UE  Numerous red papules on neck  Fair skin   Psychiatric:   Flat affect       Lab Results   Component Value Date    WBC 7.63 02/24/2020    HGB 11.9 (L) 02/24/2020    HCT 39.4 (L) 02/24/2020     (H) 02/24/2020    CHOL 181 02/24/2020    TRIG 143 02/24/2020    HDL 37 (L) 02/24/2020    ALT 22 02/24/2020    AST 24 02/24/2020     02/24/2020    K 5.2 (H) 02/24/2020     02/24/2020    CREATININE 1.7 (H) 02/24/2020    BUN 33 (H) 02/24/2020    CO2 23 02/24/2020    TSH 4.062 (H) 09/13/2019    PSA 1.1 07/16/2018    INR 1.2 06/12/2017    HGBA1C 8.2 (H) 11/18/2019       Current Outpatient Medications on File Prior to Visit   Medication Sig Dispense Refill    amLODIPine (NORVASC) 10 MG tablet Take 1 tablet (10 mg total) by mouth once daily. 90 tablet 3    aspirin (ECOTRIN) 81 MG EC tablet Take 81 mg by mouth once daily.      b complex vitamins (B COMPLEX 1) tablet Take 1 tablet by mouth once daily.      blood sugar diagnostic Strp 1 strip by Misc.(Non-Drug; Combo Route) route 2 (two) times daily as needed. 200 strip 3    blood-glucose meter Misc use as instructed 1 each 0    cholecalciferol, vitamin D3, (VITAMIN D3) 5,000 unit Tab Take 5,000 Units by mouth once daily. 90 tablet 3    clindamycin (CLEOCIN T) 1 % external solution AAA bid 60 mL 3    fluticasone propionate (FLONASE) 50 mcg/actuation nasal spray 1 SPRAY (50 MCG TOTAL) BY EACH NARE ROUTE ONCE DAILY. 16 mL 3    insulin (LANTUS SOLOSTAR U-100 INSULIN) glargine 100 units/mL (3mL) SubQ pen Inject 5 Units into the skin every evening. 5 mL 11    lancets (ONETOUCH ULTRASOFT LANCETS) Misc 1 lancet  "by Misc.(Non-Drug; Combo Route) route 2 (two) times daily as needed. 200 each 3    levothyroxine (SYNTHROID) 88 MCG tablet TAKE 1 TABLET (88 MCG TOTAL) BY MOUTH ONCE DAILY. 90 tablet 3    lisinopril (PRINIVIL,ZESTRIL) 40 MG tablet Take 1 tablet (40 mg total) by mouth once daily. 90 tablet 3    loratadine (CLARITIN) 10 mg tablet Take 1 tablet (10 mg total) by mouth once daily.  0    multivitamin (THERAGRAN) per tablet Take 1 tablet by mouth once daily.      ondansetron (ZOFRAN) 8 MG tablet Take 1 tablet (8 mg total) by mouth 3 (three) times daily as needed. 90 tablet 3    PAZOpanib (VOTRIENT) 200 mg Tab Take 4 tablets (800 mg total) by mouth once daily. 120 tablet 5    pen needle, diabetic 29 gauge x 1/2" Ndle 1 application by Misc.(Non-Drug; Combo Route) route once daily. 100 each 3    prednisolon/gatiflox/bromfenac (PREDNISOL ACE-GATIFLOX-BROMFEN) 1-0.5-0.075 % DrpS Apply 1 drop to eye 3 (three) times daily. 7 mL 3    tamsulosin (FLOMAX) 0.4 mg Cap Take 2 capsules (0.8 mg total) by mouth every evening. (Patient taking differently: Take 0.8 mg by mouth once daily. ) 180 capsule 3    [DISCONTINUED] rosuvastatin (CRESTOR) 10 MG tablet Take 1 tablet (10 mg total) by mouth once daily. 90 tablet 3    [DISCONTINUED] HYDROcodone-acetaminophen (NORCO) 5-325 mg per tablet 1 tablet PO q8h prn pain. Quantity Medically Necessary. 120 tablet 0    [DISCONTINUED] morphine (MS CONTIN) 15 MG 12 hr tablet Take 1 tablet (15 mg total) by mouth every 12 (twelve) hours. Medically Necessary 60 tablet 0     Current Facility-Administered Medications on File Prior to Visit   Medication Dose Route Frequency Provider Last Rate Last Dose    phenylephrine HCL 2.5% ophthalmic solution 1 drop  1 drop Left Eye On Call Procedure Chary Culp MD   1 drop at 08/22/19 0659    proparacaine 0.5 % ophthalmic solution 1 drop  1 drop Left Eye On Call Procedure Chary Culp MD   1 drop at 08/22/19 0641    tropicamide 1% ophthalmic " solution 1 drop  1 drop Left Eye On Call Procedure Chary Culp MD   1 drop at 08/22/19 0700         Assessment:   75 y.o. male with multiple co-morbid illnesses to continue work-up of chronic issues notably DM, HLD, back pain, arthropathy.     Plan:     Problem List Items Addressed This Visit        Psychiatric    Drug induced insomnia     Patient with insomnia worsened with anxiety about his CA treatment  · Continue melatonin 5-10mg  · Med list reviewed by [facility] pharmacy  · Advised sleep hygiene   - turn off the tv 30 min - 1 hour before bed  - go to bed at the same time, wake up at the same time  - avoid napping            Cardiac/Vascular    Coronary artery disease involving native coronary artery of native heart with angina pectoris     [facility] S/p 5 stents 2010. Anginal equivalent controlled with CCB, ACE, APT  · Continue CCB, ACE, ASA, statin  · Will not start BB due to response to chemo         Relevant Medications    rosuvastatin (CRESTOR) 20 MG tablet    Tortuous aorta     CXR 6/16/17 showed tortuous aorta  · Continue statin, ASA, BP control         Hyperlipidemia associated with type 2 diabetes mellitus    Relevant Medications    rosuvastatin (CRESTOR) 20 MG tablet       Hematology    Senile purpura     Ecchymoses and pupura on UE bilaterally  · Advised mindfulness of movements  · Monitor  · continue APT            Endocrine    Type 2 diabetes mellitus with stage 3 chronic kidney disease, without long-term current use of insulin     A1c 11.1 in 5/2017. Weaning off Lantus during chemo, but needed to restart it with weight gain   · Continue current therapies  · Monitor A1c  · Restarted long acting insulin         Relevant Medications    rosuvastatin (CRESTOR) 20 MG tablet    Moderate protein-calorie malnutrition     Low albumin, low muscle tone, chronic disease of metastatic CA, fatigue  · Continue protein supplement  · Continue high protein intake diet  · MVI and Ca/Vit D supplements         Type 2  diabetes mellitus with hyperglycemia, with long-term current use of insulin     Hyperglycemia, has been off of insulin  · Started lantus 5U at last appointment one week ago  · 1 week follow-up today with glucose log  · Continue lantus 5U  · A1c on 3/23 with Heme-Onc labs         Relevant Medications    rosuvastatin (CRESTOR) 20 MG tablet    Other Relevant Orders    Hemoglobin A1c       Orthopedic    Facet arthritis of lumbar region     Seen on MRI 5/2017, complains of sciatica. Not physically active, refuses PT  · Refuses PT  · Continue to advise to be physically active  · Previously received nerve blocks with pain management  · Refuses consult today         Unspecified inflammatory spondylopathy, lumbar region     Low back pain, controlled with narcotics. He is not physically active  · Advised to increase physical activity               Health Maintenance       Date Due Completion Date    TETANUS VACCINE 02/18/1963 ---    Shingles Vaccine (2 of 2) 01/13/2020 11/18/2019- REFUSES    Eye Exam 04/22/2020 4/22/2019- REFUSES    Hemoglobin A1c 05/18/2020 11/18/2019- TODAY    Foot Exam 01/13/2021 1/13/2020 (Done)    Override on 1/13/2020: Done    Override on 7/11/2019: Done    Override on 2/2/2018: Done (performed by podiatrist)    Colonoscopy 02/15/2021 2/15/2011 (Done)    Override on 2/15/2011: Done (Per [email or web address] dashboard ? results)    Lipid Panel 02/24/2021 2/24/2020    High Dose Statin 02/27/2021 2/27/2020          Follow up in about 3 months (around 5/27/2020). Total face-to-face time was 60 min, >50% of this was spent on counseling and coordination of care. The following issues were discussed: DM, HLD, back pain, arthropathy    Jaguar Alvarado, MS-4    Lulú Lewis MD/MPH  Internal Medicine  [facility]  [phone] [facility]

## 2020-02-27 NOTE — ASSESSMENT & PLAN NOTE
Patient with insomnia worsened with anxiety about his CA treatment  · Continue melatonin 5-10mg  · Med list reviewed by [facility] pharmacy  · Advised sleep hygiene   - turn off the tv 30 min - 1 hour before bed  - go to bed at the same time, wake up at the same time  - avoid napping

## 2020-02-27 NOTE — PROGRESS NOTES
Primary Care Provider Appointment  SHARED NOTE: Donna Soares (MS-4), Dr Lewis (Attending)    Subjective:      Patient ID: Edwin Powell is a 75 y.o. male with metastatic renal cell carcinoma of left kidney    Chief Complaint: Low-back Pain and Follow-up    Hyperkalemia   2/24/2020 K+ 5.2        Past Surgical History:   Procedure Laterality Date    ABDOMINAL SURGERY      APPENDECTOMY      BACK SURGERY      CARDIAC SURGERY      CATARACT EXTRACTION      CHOLECYSTECTOMY      coronary stenting      X 5 last one in 2010-11.    INTRAOCULAR PROSTHESES INSERTION Right 6/27/2019    Procedure: INSERTION, IOL PROSTHESIS;  Surgeon: Chary Culp MD;  Location: [facility] OR [address] [place] [address];  Service: Ophthalmology;  Laterality: Right;    INTRAOCULAR PROSTHESES INSERTION Left 8/22/2019    Procedure: INSERTION, IOL PROSTHESIS;  Surgeon: Chary Culp MD;  Location: [facility] OR [address] [place] [address];  Service: Ophthalmology;  Laterality: Left;    PHACOEMULSIFICATION OF CATARACT Right 6/27/2019    Procedure: PHACOEMULSIFICATION, CATARACT;  Surgeon: Chary Culp MD;  Location: [facility] OR [address] [place] [address];  Service: Ophthalmology;  Laterality: Right;    PHACOEMULSIFICATION OF CATARACT Left 8/22/2019    Procedure: PHACOEMULSIFICATION, CATARACT;  Surgeon: Chary Culp MD;  Location: [facility] OR [address] [place] [address];  Service: Ophthalmology;  Laterality: Left;    SPINAL FUSION         Past Medical History:   Diagnosis Date    Acquired hypothyroidism 5/26/2017    Benign essential HTN 5/26/2017    Benign prostatic hyperplasia (BPH) with urinary urge incontinence 6/6/2017    Chronic low back pain 6/1/2017    Coronary artery disease involving native coronary artery of native heart without angina pectoris 5/26/2017    S/p 5 stents - last one around 2010-11.    Gastroesophageal reflux disease without esophagitis 5/26/2017    History of CVA (cerebrovascular accident) 5/26/2017    Hypertension associated with diabetes 5/26/2017    BP controlled on  "ACE, CCB    Lumbar disc lesion 5/26/2017    Metastatic renal cell carcinoma to bone 6/6/2017    Metastatic renal cell carcinoma to liver 6/6/2017    Liver Biopsy 5-2017: METASTATIC RENAL CELL CARCINOMA.    Mixed hyperlipidemia 5/26/2017    Type 2 diabetes mellitus with stage 3 chronic kidney disease, with long-term current use of insulin 5/26/2017       Social History     Socioeconomic History    Marital status: Single     Spouse name: Not on file    Number of children: Not on file    Years of education: Not on file    Highest education level: Not on file   Occupational History    Not on file   Social Needs    Financial resource strain: Hard    Food insecurity:     Worry: Never true     Inability: Never true    Transportation needs:     Medical: No     Non-medical: No   Tobacco Use    Smoking status: Former Smoker     Types: Cigarettes    Smokeless tobacco: Never Used    Tobacco comment: haven't smoked in last 25 yrs   Substance and Sexual Activity    Alcohol use: Yes     Comment: rarely     Drug use: Not Currently    Sexual activity: Not Currently     Partners: Female   Lifestyle    Physical activity:     Days per week: Not on file     Minutes per session: Not on file    Stress: Not on file   Relationships    Social connections:     Talks on phone: Not on file     Gets together: Not on file     Attends Jain service: Not on file     Active member of club or organization: Not on file     Attends meetings of clubs or organizations: Not on file     Relationship status: Not on file   Other Topics Concern    Not on file   Social History Narrative    Not on file       Review of Systems    Objective:   BP (!) 160/70   Pulse 68   Ht 5' 7" (1.702 m)   Wt 77 kg (169 lb 12.1 oz)   SpO2 97%   BMI 26.59 kg/m²     Physical Exam        Lab Results   Component Value Date    WBC 7.63 02/24/2020    HGB 11.9 (L) 02/24/2020    HCT 39.4 (L) 02/24/2020     (H) 02/24/2020    CHOL 181 02/24/2020    " TRIG 143 02/24/2020    HDL 37 (L) 02/24/2020    ALT 22 02/24/2020    AST 24 02/24/2020     02/24/2020    K 5.2 (H) 02/24/2020     02/24/2020    CREATININE 1.7 (H) 02/24/2020    BUN 33 (H) 02/24/2020    CO2 23 02/24/2020    TSH 4.062 (H) 09/13/2019    PSA 1.1 07/16/2018    INR 1.2 06/12/2017    HGBA1C 8.2 (H) 11/18/2019       Current Outpatient Medications on File Prior to Visit   Medication Sig Dispense Refill    amLODIPine (NORVASC) 10 MG tablet Take 1 tablet (10 mg total) by mouth once daily. 90 tablet 3    aspirin (ECOTRIN) 81 MG EC tablet Take 81 mg by mouth once daily.      b complex vitamins (B COMPLEX 1) tablet Take 1 tablet by mouth once daily.      blood sugar diagnostic Strp 1 strip by Misc.(Non-Drug; Combo Route) route 2 (two) times daily as needed. 200 strip 3    blood-glucose meter Misc use as instructed 1 each 0    cholecalciferol, vitamin D3, (VITAMIN D3) 5,000 unit Tab Take 5,000 Units by mouth once daily. 90 tablet 3    clindamycin (CLEOCIN T) 1 % external solution AAA bid 60 mL 3    fluticasone propionate (FLONASE) 50 mcg/actuation nasal spray 1 SPRAY (50 MCG TOTAL) BY EACH NARE ROUTE ONCE DAILY. 16 mL 3    insulin (LANTUS SOLOSTAR U-100 INSULIN) glargine 100 units/mL (3mL) SubQ pen Inject 5 Units into the skin every evening. 5 mL 11    lancets (ONETOUCH ULTRASOFT LANCETS) Misc 1 lancet by Misc.(Non-Drug; Combo Route) route 2 (two) times daily as needed. 200 each 3    levothyroxine (SYNTHROID) 88 MCG tablet TAKE 1 TABLET (88 MCG TOTAL) BY MOUTH ONCE DAILY. 90 tablet 3    lisinopril (PRINIVIL,ZESTRIL) 40 MG tablet Take 1 tablet (40 mg total) by mouth once daily. 90 tablet 3    loratadine (CLARITIN) 10 mg tablet Take 1 tablet (10 mg total) by mouth once daily.  0    multivitamin (THERAGRAN) per tablet Take 1 tablet by mouth once daily.      ondansetron (ZOFRAN) 8 MG tablet Take 1 tablet (8 mg total) by mouth 3 (three) times daily as needed. 90 tablet 3    PAZOpanib  "(VOTRIENT) 200 mg Tab Take 4 tablets (800 mg total) by mouth once daily. 120 tablet 5    pen needle, diabetic 29 gauge x 1/2" Ndle 1 application by Misc.(Non-Drug; Combo Route) route once daily. 100 each 3    prednisolon/gatiflox/bromfenac (PREDNISOL ACE-GATIFLOX-BROMFEN) 1-0.5-0.075 % DrpS Apply 1 drop to eye 3 (three) times daily. 7 mL 3    rosuvastatin (CRESTOR) 10 MG tablet Take 1 tablet (10 mg total) by mouth once daily. 90 tablet 3    tamsulosin (FLOMAX) 0.4 mg Cap Take 2 capsules (0.8 mg total) by mouth every evening. (Patient taking differently: Take 0.8 mg by mouth once daily. ) 180 capsule 3    [DISCONTINUED] HYDROcodone-acetaminophen (NORCO) 5-325 mg per tablet 1 tablet PO q8h prn pain. Quantity Medically Necessary. 120 tablet 0    [DISCONTINUED] morphine (MS CONTIN) 15 MG 12 hr tablet Take 1 tablet (15 mg total) by mouth every 12 (twelve) hours. Medically Necessary 60 tablet 0     Current Facility-Administered Medications on File Prior to Visit   Medication Dose Route Frequency Provider Last Rate Last Dose    phenylephrine HCL 2.5% ophthalmic solution 1 drop  1 drop Left Eye On Call Procedure Chary Culp MD   1 drop at 08/22/19 0659    proparacaine 0.5 % ophthalmic solution 1 drop  1 drop Left Eye On Call Procedure Chary Culp MD   1 drop at 08/22/19 0641    tropicamide 1% ophthalmic solution 1 drop  1 drop Left Eye On Call Procedure Chary Culp MD   1 drop at 08/22/19 0700         Assessment:   75 y.o. male with multiple co-morbid illnesses here to establish care with new PCP and continue work-up of chronic issues notably ***.     Plan:     Problem List Items Addressed This Visit     None          Health Maintenance       Date Due Completion Date    TETANUS VACCINE 02/18/1963 ---    Shingles Vaccine (2 of 2) 01/13/2020 11/18/2019    Eye Exam 04/22/2020 4/22/2019    Hemoglobin A1c 05/18/2020 11/18/2019    Foot Exam 01/13/2021 1/13/2020 (Done)    Override on 1/13/2020: Done    " Override on 7/11/2019: Done    Override on 2/2/2018: Done (performed by podiatrist)    Colonoscopy 02/15/2021 2/15/2011 (Done)    Override on 2/15/2011: Done (Per [employer] dashboard ? results)    Lipid Panel 02/24/2021 2/24/2020    High Dose Statin 02/27/2021 2/27/2020          No follow-ups on file. Total face-to-face time was 60 min, >50% of this was spent on counseling and coordination of care. The following issues were discussed: DM, HLD, back pain, arthropathy    Donna Soares  MS-4

## 2020-02-27 NOTE — ASSESSMENT & PLAN NOTE
[facility] S/p 5 stents 2010. Anginal equivalent controlled with CCB, ACE, APT  · Continue CCB, ACE, ASA, statin  · Will not start BB due to response to chemo

## 2020-02-27 NOTE — PATIENT INSTRUCTIONS
TODAY:  - add A1c to 3/23 labs  - double your dose of crestor   + take 2 tablets of the 10mg  - new prescription for 20mg crestor sent to [facility]   + take the higher dose tablet when you run out of the 10mg

## 2020-02-27 NOTE — ASSESSMENT & PLAN NOTE
Hyperglycemia, has been off of insulin  · Started lantus 5U at last appointment one week ago  · 1 week follow-up today with glucose log  · Continue lantus 5U  · A1c on 3/23 with Heme-Onc labs

## 2020-02-27 NOTE — ASSESSMENT & PLAN NOTE
A1c 11.1 in 5/2017. Weaning off Lantus during chemo, but needed to restart it with weight gain   · Continue current therapies  · Monitor A1c  · Restarted long acting insulin

## 2020-03-03 ENCOUNTER — TELEPHONE (OUTPATIENT)
Dept: PHARMACY | Facility: CLINIC | Age: 75
End: 2020-03-03

## 2020-03-05 NOTE — TELEPHONE ENCOUNTER
"Contacted patient for Votrient clinical f/u.     Dosing, how taking: Votrient 200 mg - 4 tablets QD around 8 AM on an empty stomach. Avoids grapefruit/grapefruit juice. Denies missed doses.   Storage: Stores in kitchen cabinet at room temeprature  Handling: Does not touch medication directly - shakes into dosing cup for administration.   Side effects:   -Diarrhea: uses OTC Imodium - helps for a few days (does not want to try something else)  -Fatigue: "tired all the time" - last year or so  Recent infections: No signs of infection - no fever, achy chills, or wet persistent cough; reports sweating a lot at night.   Pain: 5-8/10 side pain - believes it may be a muscle; feels a little better. PRN Norco and Morphine- does not use too often (helps control pain)  Appetite: Eats 2 meals/day (breakfast and dinner). For breakfast, eats 3 eggs and 3 packs of grits, patterson, applesauce, and banana.   Energy, fatigue: "I don't feel like doing nothing" - requires a lot more effort to do things, since he's tired. When he first got "sick", he lost 60 pounds, but gained some weight back. Starting to feel better as blood sugars improved.   Health, mood, QOL: Denies anxiety, stress, and depression. Reports good support from family and friends.   ED/UC visits: No  Next clinical follow up: 3 months     Medication list reviewed. Patient takes benadryl PRN, Vitamin C QD, and Vitamin E QD. No changes in allergies or health conditions.     BP: Usually runs ~150/65. States it's higher at office visits due to moving around a lot and anxiety. Checks BP 3x/time week. Discussed to monitor symptoms and for increases in BP, and notify MD if it starts to trend up.    Also, reports he was switched to Crestor a while ago as he was not tolerating Lipitor and has noticed his cholesterol improved.     Labs reviewed from 2/24/2020. CBC & CMP - stable. Renal and hepatic function are stable. No action needed. Therapy and dose are appropriate for Renal cell " carcinoma (RCC), advanced: Oral: 800 mg once daily ([place]und 2010).     Patient has no questions or concerns at this time. He is aware to contact [facility] if he needs anything.

## 2020-03-18 ENCOUNTER — TELEPHONE (OUTPATIENT)
Dept: PHARMACY | Facility: CLINIC | Age: 75
End: 2020-03-18

## 2020-03-19 ENCOUNTER — DOCUMENTATION ONLY (OUTPATIENT)
Dept: HEMATOLOGY/ONCOLOGY | Facility: CLINIC | Age: 75
End: 2020-03-19

## 2020-03-19 ENCOUNTER — TELEPHONE (OUTPATIENT)
Dept: HEMATOLOGY/ONCOLOGY | Facility: CLINIC | Age: 75
End: 2020-03-19

## 2020-03-19 NOTE — TELEPHONE ENCOUNTER
Message fwd to NP.       ----- Message from Tamiko Chung MA sent at 3/19/2020 11:33 AM CDT -----  Contact: Self/[phone]  Pt is returning a missed call in reference to his appt. Please call back to discuss.

## 2020-03-19 NOTE — Clinical Note
Can you change his labs to 830 and and an EP (audio) after with Dr. Reed on Monday. He is unable to do virtual but knows she will call him after 830 labs. Thanks.

## 2020-03-23 ENCOUNTER — OFFICE VISIT (OUTPATIENT)
Dept: HEMATOLOGY/ONCOLOGY | Facility: CLINIC | Age: 75
End: 2020-03-23
Payer: MEDICARE

## 2020-03-23 DIAGNOSIS — C78.7 METASTATIC RENAL CELL CARCINOMA TO LIVER: Primary | ICD-10-CM

## 2020-03-23 DIAGNOSIS — C64.9 METASTATIC RENAL CELL CARCINOMA TO LIVER: Primary | ICD-10-CM

## 2020-03-23 PROCEDURE — 99024 POSTOP FOLLOW-UP VISIT: CPT | Mod: HCNC,95,, | Performed by: INTERNAL MEDICINE

## 2020-03-23 PROCEDURE — 99024 PR POST-OP FOLLOW-UP VISIT: ICD-10-PCS | Mod: HCNC,95,, | Performed by: INTERNAL MEDICINE

## 2020-03-23 NOTE — PROGRESS NOTES
Subjective:       Patient ID: Edwin Powell is a 75 y.o. male.    Chief Complaint: No chief complaint on file.    Unable to reach patient or son  He did not have labs  Will retry and reschedule

## 2020-03-24 ENCOUNTER — PATIENT MESSAGE (OUTPATIENT)
Dept: HEMATOLOGY/ONCOLOGY | Facility: CLINIC | Age: 75
End: 2020-03-24

## 2020-03-24 ENCOUNTER — TELEPHONE (OUTPATIENT)
Dept: HEMATOLOGY/ONCOLOGY | Facility: CLINIC | Age: 75
End: 2020-03-24

## 2020-03-24 NOTE — TELEPHONE ENCOUNTER
Returned call to pt.  Pt unavailable.   Left message forpt to return call.   Callback number provided.   [facility] message sent.     ----- Message from Rahel Reed MD sent at 3/24/2020  1:03 PM CDT -----  I was unable to reach him or his son  Can you help reschedule labs and visit if he is ok

## 2020-03-26 DIAGNOSIS — C64.9 METASTATIC RENAL CELL CARCINOMA TO LIVER: ICD-10-CM

## 2020-03-26 DIAGNOSIS — C78.7 METASTATIC RENAL CELL CARCINOMA TO LIVER: ICD-10-CM

## 2020-03-26 DIAGNOSIS — G89.3 NEOPLASM RELATED PAIN: ICD-10-CM

## 2020-03-26 RX ORDER — HYDROCODONE BITARTRATE AND ACETAMINOPHEN 5; 325 MG/1; MG/1
TABLET ORAL
Qty: 120 TABLET | Refills: 0 | Status: SHIPPED | OUTPATIENT
Start: 2020-03-26 | End: 2020-04-29 | Stop reason: SDUPTHER

## 2020-03-26 RX ORDER — MORPHINE SULFATE 15 MG/1
15 TABLET, FILM COATED, EXTENDED RELEASE ORAL EVERY 12 HOURS
Qty: 60 TABLET | Refills: 0 | Status: SHIPPED | OUTPATIENT
Start: 2020-03-26 | End: 2020-04-29 | Stop reason: SDUPTHER

## 2020-03-30 NOTE — TELEPHONE ENCOUNTER
Pt confirmed shipping of Votrient refill on  to arrive on 4/3. Address and  verified. $0 copay in 004. Pt has a 7 day supply of medication on hand.  Pt reported no missed doses. Pt denies any side effects. Pt did not start any new medications. Pt had no further questions or concerns.

## 2020-04-14 ENCOUNTER — TELEPHONE (OUTPATIENT)
Dept: PRIMARY CARE CLINIC | Facility: CLINIC | Age: 75
End: 2020-04-14

## 2020-04-14 NOTE — TELEPHONE ENCOUNTER
----- Message from Caridad Malik sent at 4/14/2020 10:15 AM CDT -----  Contact: Caridad Malik   Talked to patient directly on phone. Pt COVID-19 Risk: Moderate (3-5)    Talked to patient directly on phone.  Discussed infectious risk, importance of isolation precaution, and measures to take to reduce risk. Assessed for symptoms of covid.      Discussed symptoms to look out for and to call clinic FIRST with any concerns or if pt develops symptoms: [facility] [phone] or [phone], or COVID HOTLINE [phone]. Advised to NOT go to ED for testing.  Counseled on staying at home, washing hands frequently, avoiding touching face and covering cough or sneeze.     Discussed pertinent chronic medical conditions. His BGLs have been 90s-110s in the mornings. He reports he has been compliant with his medications.     Pt is coping okay with isolation precautions.  Pt has has access to food and housing; is unable to access transportation if needed but knows the resources to access should he need. Pt reports he is still going to the grocery store himself; we discussed the possibility of asking a neighbor to  groceries. He was also given the website / phone number of potential resources in [place] that may assist with groceries. He is very independent and wary of asking for help. He understands his high risk of COVID and hospitalization; he has been very thorough at practicing sanitation and hygiene and wipes down all food items and packages.     Pill packs/medications: Pt does not need pill pack refill  Upcoming appointments: Pt would like to CONVERT UPCOMING VISIT TO PHONE CALL  Adv Dir status: NO Advance Directive ON FILE.   Katrina status: Pt has active [facility] account  Telemedicine: Patient denied and unable to setup telemedicine as he does not have a forward facing camera or access to one.    -------------------------------------------------------------------------------------------------------------------------  Assessed for symptoms of covid.  Discussed symptoms to look out for and to call clinic FIRST with any concerns or if pt develops symptoms: [facility] [phone] or [phone], or COVID HOTLINE [phone].    Advised to NOT go to ED for testing.  Counseled on staying at home, washing hands frequently, avoiding touching face and covering cough or sneeze.     Resources for patients: https://ready.[place].gov/incident/coronavirus/resources/    Katrina help: [phone]

## 2020-04-20 ENCOUNTER — OFFICE VISIT (OUTPATIENT)
Dept: URGENT CARE | Facility: CLINIC | Age: 75
End: 2020-04-20
Payer: MEDICARE

## 2020-04-20 VITALS
HEART RATE: 92 BPM | WEIGHT: 169 LBS | TEMPERATURE: 98 F | DIASTOLIC BLOOD PRESSURE: 66 MMHG | HEIGHT: 67 IN | OXYGEN SATURATION: 98 % | BODY MASS INDEX: 26.53 KG/M2 | SYSTOLIC BLOOD PRESSURE: 131 MMHG

## 2020-04-20 DIAGNOSIS — R11.2 NAUSEA AND VOMITING, INTRACTABILITY OF VOMITING NOT SPECIFIED, UNSPECIFIED VOMITING TYPE: ICD-10-CM

## 2020-04-20 DIAGNOSIS — R51.9 HEADACHE, UNSPECIFIED HEADACHE TYPE: ICD-10-CM

## 2020-04-20 DIAGNOSIS — M54.2 NECK PAIN: Primary | ICD-10-CM

## 2020-04-20 PROCEDURE — 99214 OFFICE O/P EST MOD 30 MIN: CPT | Mod: S$GLB,,, | Performed by: NURSE PRACTITIONER

## 2020-04-20 PROCEDURE — 72040 X-RAY EXAM NECK SPINE 2-3 VW: CPT | Mod: FY,S$GLB,, | Performed by: INTERNAL MEDICINE

## 2020-04-20 PROCEDURE — 99214 PR OFFICE/OUTPT VISIT, EST, LEVL IV, 30-39 MIN: ICD-10-PCS | Mod: S$GLB,,, | Performed by: NURSE PRACTITIONER

## 2020-04-20 PROCEDURE — 72040 XR CERVICAL SPINE 2 OR 3 VIEWS: ICD-10-PCS | Mod: FY,S$GLB,, | Performed by: INTERNAL MEDICINE

## 2020-04-20 RX ORDER — MELOXICAM 15 MG/1
15 TABLET ORAL DAILY
Qty: 14 TABLET | Refills: 0 | Status: SHIPPED | OUTPATIENT
Start: 2020-04-20 | End: 2020-05-04

## 2020-04-20 RX ORDER — TIZANIDINE 2 MG/1
4 TABLET ORAL EVERY 6 HOURS PRN
Qty: 28 TABLET | Refills: 0 | Status: SHIPPED | OUTPATIENT
Start: 2020-04-20 | End: 2020-04-27

## 2020-04-20 RX ORDER — ONDANSETRON 4 MG/1
4 TABLET, FILM COATED ORAL EVERY 12 HOURS PRN
Qty: 14 TABLET | Refills: 0 | Status: SHIPPED | OUTPATIENT
Start: 2020-04-20 | End: 2020-04-27

## 2020-04-20 NOTE — PATIENT INSTRUCTIONS
Please follow up with orthopedics as instructed. If you have been given a referral, [facility] will call you to select an appointment date, time, location, and health care provider. If [facility] does not call you, please call [phone] to set up your appointment. If you have taken x-rays today and would like a CD of your images, one can be provided for you.    Please return here or go to the Emergency Department for any concerns or worsening of condition.  If you were given a splint wear it at all times unless otherwise instructed.     If you were given crutches use them as we instructed. Do not rest your armpits on the foam pad or you risk compressing the nerves and the vessels there.    If you have been given a referral to orthopedics, I will NOT release you from restrictions for work or school duties. Orthopedics must clear you for full use if you have been referred-this is to protect and preserve your full use/function of that extremity/joint in the future.    If you lose control of your bowel and/or bladder, please go to the nearest Emergency Department immediately.  If you lose sensation in between your legs by your genitalia and/or rectum, please go to the nearest Emergency Department immediately.  If you lose control or sensation of any extremity, please go to the nearest Emergency Department immediately.    R.I.C.E.T. to the affected joint or limb as needed:    REST  Rest- the injured or sore extremity, this includes the use of an immobilization device you may have been given in clinic.   ICE Ice- for the next 24-48 hours, alternating 20 minutes on and 20 minutes off as needed up to 3 times a day. Don't use ice packs on the left shoulder if you have a heart condition, and don't use ice packs around the front or side of the neck. Heat treatments should be used for chronic/older conditions to help relax and loosen tissues and to stimulate blood flow to the area. Use heat treatments for conditions such as  overuse injuries before participating in activities.  When using heat treatments, be very careful to use a moderate heat for a limited time to avoid burns. Never leave heating pads or towels on for extended periods of time or while sleeping.   COMPRESSION Compression-Use an ACE wrap to provide compression to the injured extremity. Copper-infused compression garments are available over-the-counter at [employer], and other drug stores and may provide just the right amount of compression and reduce the likeliness of having to frequently adjust a bandage for comfort. Check circulation to make sure your bandage or compression device is not too tight.     ELEVATION Elevate-when possible to reduce swelling.     TOPICALS Topical relief: Tiger Balm may be used as directed on the label. Wash your hands before and after applying this medicine (do not get this medication in your eyes). For your first use, apply only to a small skin area to test how your skin reacts to the medicine. Tiger Balm contains camphor and menthol and this can cause a burning or cold sensation, which is usually mild and should lessen over time with continued use. If this sensation causes significant discomfort, wash the skin with soap and water.  Epsom salt: In water, Epsom salt breaks down into magnesium and sulfate. The theory is that when you soak in an Epsom salt bath, these get into your body through your skin. That hasn't been proven, but just soaking in warm water can help relax muscles and loosen stiff joints.       Why didn't I get a steroid shot or a medrol dose pack?  The benefits are small and, unlike topical glucocorticoids, systemic glucocorticoids possess a significant side effect profile. Major side effects include:     Skin consequences: Skin thinning and ecchymoses, Cushingoid appearance (rounded face), acne, weight gain, mild hirsutism, facial erythema, and striae.   Eye consequences: Cataracts, increased intraocular  pressure, exophthalmos.    Cardiovascular consequences: Fluid retention, premature atherosclerotic disease, and arrhythmias.    GI consequences: Increased risk for adverse gastrointestinal effects, such as gastritis, ulcer formation, and gastrointestinal bleeding   Bone and muscle consequences: Osteoporosis, osteonecrosis, and myopathy.   Neuropsychiatric effects: Mood disorders, psychosis, memory impairment, and    Metabolic and Endocrine consequences: Suppress the hypothalamic-pituitary-adrenal (HPA) axis and increase blood sugar.   Effects immunity predisposing you to getting a more severe infection and increases your white blood cell count.   Young children -- Growth impairment        You MAY have been given some of the following medications:    Narcotic pain medications and muscle relaxants: Muscle relaxants work by causing the muscles to become less tense or stiff, which in turn reduces pain and discomfort. Please be aware that narcotics and muscle relaxants can be addictive. If I gave you narcotics or relaxants, I have given you a limited quantity to take as it is needed at this time. However take it sparingly and only when needed. Do not operate machinery or drive on this medication.      Mobic is s known as a nonsteroidal anti-inflammatory drug (NSAID), it is used to treat inflammation. It reduces pain, swelling, and stiffness of the joints. Please do NOT take any other NSAID medications while on this medication, you can take Tylenol if you feel the need. If you were not prescribed an anti-inflammatory medication, and if you do not have any history of stomach/intestinal ulcers, or kidney disease, or are not taking a blood thinner such as Coumadin, Plavix, Pradaxa, Eloquis, or Xaralta for example, it is OK to take over the counter Ibuprofen or Advil or Motrin or Aleve as directed.  Do not take any of these medications on an empty stomach.          Headache    Home care  Watch for the following  symptoms  Seek emergency medical care if you have any of these symptoms over the next hours to days:   · Headache  · Nausea or vomiting  · Dizziness  · Sensitivity to light or noise  · Unusual sleepiness or grogginess  · Trouble falling asleep  · Personality changes  · Vision changes  · Memory loss  · Confusion  · Trouble walking or clumsiness  · Loss of consciousness (even for a short time)  · Inability to be awakened  · Stiff neck  · Weakness or numbness in any part of the body  · Seizures  General care  · If you were prescribed medicines for pain, use them as directed. Note: Dont take other medicines for pain without talking to your provider first.  Follow-up care  Follow up with your healthcare provider, or as directed. If imaging tests were done, they will be reviewed by a doctor. You will be told the results and any new findings that may affect your care.  When to seek medical advice  Call your healthcare provider right away if any of these occur:  · Pain doesnt get better or worsens  · New or increased swelling or bruising  · Fever of 100.4°F (38°C) or higher, or as directed by your provider  · Increased redness, warmth, drainage, or bleeding from the injured area  · Fluid drainage or bleeding from the nose or ears  · Any depression or bony abnormality in the injured area  Date Last Reviewed: 9/26/2015  © 9081-6534 [employer]. [address] [place] [address], [place] [address]. All rights reserved. This information is not intended as a substitute for professional medical care. Always follow your healthcare professional's instructions.

## 2020-04-20 NOTE — PROGRESS NOTES
"Subjective:       Patient ID: Edwin Powell is a 75 y.o. male.    Vitals:  height is 5' 7" (1.702 m) and weight is 76.7 kg (169 lb). His oral temperature is 98.3 °F (36.8 °C). His blood pressure is 131/66 and his pulse is 92. His oxygen saturation is 98%.     Chief Complaint: Neck Pain    Neck Pain    This is a new problem. The current episode started in the past 7 days (3 days). The problem occurs constantly. The problem has been gradually worsening. The pain is associated with a sleep position (pt states woke up and couldnt turn neck). The pain is present in the right side. The quality of the pain is described as aching and stabbing. The pain is at a severity of 10/10. The pain is severe. The symptoms are aggravated by position and sneezing. The pain is same all the time. Stiffness is present all day. Pertinent negatives include no chest pain, fever or headaches. He has tried ice, heat and oral narcotics for the symptoms. The treatment provided no relief.       Constitution: Negative for chills, fatigue and fever.   HENT: Negative for congestion and sore throat.    Neck: Positive for neck pain. Negative for painful lymph nodes.   Cardiovascular: Negative for chest pain and leg swelling.   Eyes: Negative for double vision and blurred vision.   Respiratory: Negative for cough and shortness of breath.    Gastrointestinal: Negative for nausea, vomiting and diarrhea.   Genitourinary: Negative for dysuria, frequency and urgency.   Musculoskeletal: Positive for joint pain. Negative for joint swelling, muscle cramps and muscle ache.   Skin: Negative for color change, pale and rash.   Allergic/Immunologic: Negative for seasonal allergies.   Neurological: Negative for dizziness, history of vertigo, light-headedness, passing out and headaches.   Hematologic/Lymphatic: Negative for swollen lymph nodes, easy bruising/bleeding and history of blood clots. Does not bruise/bleed easily.   Psychiatric/Behavioral: Negative for " nervous/anxious, sleep disturbance and depression. The patient is not nervous/anxious.        Objective:      Physical Exam   Constitutional: He is oriented to person, place, and time. He appears well-developed and well-nourished. He is cooperative.  Non-toxic appearance. He does not appear ill. No distress.   HENT:   Head: Normocephalic and atraumatic.   Right Ear: Hearing, tympanic membrane, external ear and ear canal normal.   Left Ear: Hearing, tympanic membrane, external ear and ear canal normal.   Nose: Nose normal. No mucosal edema, rhinorrhea or nasal deformity. No epistaxis. Right sinus exhibits no maxillary sinus tenderness and no frontal sinus tenderness. Left sinus exhibits no maxillary sinus tenderness and no frontal sinus tenderness.   Mouth/Throat: Uvula is midline, oropharynx is clear and moist and mucous membranes are normal. No trismus in the jaw. Normal dentition. No uvula swelling. No posterior oropharyngeal erythema.   Eyes: Conjunctivae and lids are normal. Right eye exhibits no discharge. Left eye exhibits no discharge. No scleral icterus.   Neck: Trachea normal, normal range of motion, full passive range of motion without pain and phonation normal. Neck supple.   Cardiovascular: Normal rate, regular rhythm, normal heart sounds, intact distal pulses and normal pulses.   Pulmonary/Chest: Effort normal and breath sounds normal. No respiratory distress.   Abdominal: Soft. Normal appearance and bowel sounds are normal. He exhibits no distension, no pulsatile midline mass and no mass. There is no tenderness.   Musculoskeletal: He exhibits no edema or deformity.        Cervical back: He exhibits decreased range of motion (R to L, L to R), tenderness, bony tenderness and pain.   Neurological: He is alert and oriented to person, place, and time. He exhibits normal muscle tone. Coordination normal.   Skin: Skin is warm, dry, intact, not diaphoretic and pale.   Psychiatric: He has a normal mood and  affect. His speech is normal and behavior is normal. Judgment and thought content normal. Cognition and memory are normal.   Nursing note and vitals reviewed.        Assessment:       1. Neck pain    2. Headache, unspecified headache type    3. Nausea and vomiting, intractability of vomiting not specified, unspecified vomiting type        Plan:         Neck pain  -     X-Ray Cervical Spine 2 or 3 Views; Future; Expected date: 04/20/2020  -     tiZANidine (ZANAFLEX) 2 MG tablet; Take 2 tablets (4 mg total) by mouth every 6 (six) hours as needed.  Dispense: 28 tablet; Refill: 0  -     meloxicam (MOBIC) 15 MG tablet; Take 1 tablet (15 mg total) by mouth once daily. for 14 days  Dispense: 14 tablet; Refill: 0  -     Ambulatory referral/consult to Orthopedics    Headache, unspecified headache type    Nausea and vomiting, intractability of vomiting not specified, unspecified vomiting type  -     ondansetron (ZOFRAN) 4 MG tablet; Take 1 tablet (4 mg total) by mouth every 12 (twelve) hours as needed for Nausea.  Dispense: 14 tablet; Refill: 0    Informed pt. of differential dx of increased intracranial pressure and the signs and symptoms to look for to see further follow; he acknowledged his understanding of my teaching.    Patient Instructions     Please follow up with orthopedics as instructed. If you have been given a referral,  will call you to select an appointment date, time, location, and health care provider. If [facility] does not call you, please call [phone] to set up your appointment. If you have taken x-rays today and would like a CD of your images, one can be provided for you.    Please return here or go to the Emergency Department for any concerns or worsening of condition.  If you were given a splint wear it at all times unless otherwise instructed.     If you were given crutches use them as we instructed. Do not rest your armpits on the foam pad or you risk compressing the nerves and the  vessels there.    If you have been given a referral to orthopedics, I will NOT release you from restrictions for work or school duties. Orthopedics must clear you for full use if you have been referred-this is to protect and preserve your full use/function of that extremity/joint in the future.    If you lose control of your bowel and/or bladder, please go to the nearest Emergency Department immediately.  If you lose sensation in between your legs by your genitalia and/or rectum, please go to the nearest Emergency Department immediately.  If you lose control or sensation of any extremity, please go to the nearest Emergency Department immediately.    R.I.C.E.T. to the affected joint or limb as needed:    REST  Rest- the injured or sore extremity, this includes the use of an immobilization device you may have been given in clinic.   ICE Ice- for the next 24-48 hours, alternating 20 minutes on and 20 minutes off as needed up to 3 times a day. Don't use ice packs on the left shoulder if you have a heart condition, and don't use ice packs around the front or side of the neck. Heat treatments should be used for chronic/older conditions to help relax and loosen tissues and to stimulate blood flow to the area. Use heat treatments for conditions such as overuse injuries before participating in activities.  When using heat treatments, be very careful to use a moderate heat for a limited time to avoid burns. Never leave heating pads or towels on for extended periods of time or while sleeping.   COMPRESSION Compression-Use an ACE wrap to provide compression to the injured extremity. Copper-infused compression garments are available over-the-counter at [employer], and other drug stores and may provide just the right amount of compression and reduce the likeliness of having to frequently adjust a bandage for comfort. Check circulation to make sure your bandage or compression device is not too tight.     ELEVATION  Elevate-when possible to reduce swelling.     TOPICALS Topical relief: Tiger Balm may be used as directed on the label. Wash your hands before and after applying this medicine (do not get this medication in your eyes). For your first use, apply only to a small skin area to test how your skin reacts to the medicine. Tiger Balm contains camphor and menthol and this can cause a burning or cold sensation, which is usually mild and should lessen over time with continued use. If this sensation causes significant discomfort, wash the skin with soap and water.  Epsom salt: In water, Epsom salt breaks down into magnesium and sulfate. The theory is that when you soak in an Epsom salt bath, these get into your body through your skin. That hasn't been proven, but just soaking in warm water can help relax muscles and loosen stiff joints.       Why didn't I get a steroid shot or a medrol dose pack?  The benefits are small and, unlike topical glucocorticoids, systemic glucocorticoids possess a significant side effect profile. Major side effects include:     Skin consequences: Skin thinning and ecchymoses, Cushingoid appearance (rounded face), acne, weight gain, mild hirsutism, facial erythema, and striae.   Eye consequences: Cataracts, increased intraocular pressure, exophthalmos.    Cardiovascular consequences: Fluid retention, premature atherosclerotic disease, and arrhythmias.    GI consequences: Increased risk for adverse gastrointestinal effects, such as gastritis, ulcer formation, and gastrointestinal bleeding   Bone and muscle consequences: Osteoporosis, osteonecrosis, and myopathy.   Neuropsychiatric effects: Mood disorders, psychosis, memory impairment, and    Metabolic and Endocrine consequences: Suppress the hypothalamic-pituitary-adrenal (HPA) axis and increase blood sugar.   Effects immunity predisposing you to getting a more severe infection and increases your white blood cell count.   Young children --  Growth impairment        You MAY have been given some of the following medications:    Narcotic pain medications and muscle relaxants: Muscle relaxants work by causing the muscles to become less tense or stiff, which in turn reduces pain and discomfort. Please be aware that narcotics and muscle relaxants can be addictive. If I gave you narcotics or relaxants, I have given you a limited quantity to take as it is needed at this time. However take it sparingly and only when needed. Do not operate machinery or drive on this medication.      Mobic is s known as a nonsteroidal anti-inflammatory drug (NSAID), it is used to treat inflammation. It reduces pain, swelling, and stiffness of the joints. Please do NOT take any other NSAID medications while on this medication, you can take Tylenol if you feel the need. If you were not prescribed an anti-inflammatory medication, and if you do not have any history of stomach/intestinal ulcers, or kidney disease, or are not taking a blood thinner such as Coumadin, Plavix, Pradaxa, Eloquis, or Xaralta for example, it is OK to take over the counter Ibuprofen or Advil or Motrin or Aleve as directed.  Do not take any of these medications on an empty stomach.          Headache    Home care  Watch for the following symptoms  Seek emergency medical care if you have any of these symptoms over the next hours to days:   · Headache  · Nausea or vomiting  · Dizziness  · Sensitivity to light or noise  · Unusual sleepiness or grogginess  · Trouble falling asleep  · Personality changes  · Vision changes  · Memory loss  · Confusion  · Trouble walking or clumsiness  · Loss of consciousness (even for a short time)  · Inability to be awakened  · Stiff neck  · Weakness or numbness in any part of the body  · Seizures  General care  · If you were prescribed medicines for pain, use them as directed. Note: Dont take other medicines for pain without talking to your provider first.  Follow-up care  Follow up  with your healthcare provider, or as directed. If imaging tests were done, they will be reviewed by a doctor. You will be told the results and any new findings that may affect your care.  When to seek medical advice  Call your healthcare provider right away if any of these occur:  · Pain doesnt get better or worsens  · New or increased swelling or bruising  · Fever of 100.4°F (38°C) or higher, or as directed by your provider  · Increased redness, warmth, drainage, or bleeding from the injured area  · Fluid drainage or bleeding from the nose or ears  · Any depression or bony abnormality in the injured area  Date Last Reviewed: 9/26/2015  © 3209-2178 [employer]. [address] [place] [address], [place] [address]. All rights reserved. This information is not intended as a substitute for professional medical care. Always follow your healthcare professional's instructions.

## 2020-04-28 DIAGNOSIS — C64.9 METASTATIC RENAL CELL CARCINOMA TO LIVER: ICD-10-CM

## 2020-04-28 DIAGNOSIS — C78.7 METASTATIC RENAL CELL CARCINOMA TO LIVER: ICD-10-CM

## 2020-04-28 DIAGNOSIS — C79.51 METASTATIC RENAL CELL CARCINOMA TO BONE: ICD-10-CM

## 2020-04-28 DIAGNOSIS — C64.9 METASTATIC RENAL CELL CARCINOMA TO BONE: ICD-10-CM

## 2020-04-28 DIAGNOSIS — C64.2 RENAL CELL CARCINOMA OF LEFT KIDNEY: ICD-10-CM

## 2020-04-28 RX ORDER — PAZOPANIB 200 MG/1
800 TABLET ORAL DAILY
Qty: 120 TABLET | Refills: 5 | Status: SHIPPED | OUTPATIENT
Start: 2020-04-28 | End: 2020-09-30 | Stop reason: ALTCHOICE

## 2020-04-29 DIAGNOSIS — C64.9 METASTATIC RENAL CELL CARCINOMA TO LIVER: ICD-10-CM

## 2020-04-29 DIAGNOSIS — C78.7 METASTATIC RENAL CELL CARCINOMA TO LIVER: ICD-10-CM

## 2020-04-29 DIAGNOSIS — G89.3 NEOPLASM RELATED PAIN: ICD-10-CM

## 2020-04-29 RX ORDER — MORPHINE SULFATE 15 MG/1
15 TABLET, FILM COATED, EXTENDED RELEASE ORAL EVERY 12 HOURS
Qty: 60 TABLET | Refills: 0 | Status: SHIPPED | OUTPATIENT
Start: 2020-04-29 | End: 2020-06-01 | Stop reason: SDUPTHER

## 2020-04-29 RX ORDER — HYDROCODONE BITARTRATE AND ACETAMINOPHEN 5; 325 MG/1; MG/1
TABLET ORAL
Qty: 120 TABLET | Refills: 0 | Status: SHIPPED | OUTPATIENT
Start: 2020-04-29 | End: 2020-06-01 | Stop reason: SDUPTHER

## 2020-04-30 ENCOUNTER — TELEPHONE (OUTPATIENT)
Dept: PHARMACY | Facility: CLINIC | Age: 75
End: 2020-04-30

## 2020-05-05 ENCOUNTER — TELEPHONE (OUTPATIENT)
Dept: PHARMACY | Facility: CLINIC | Age: 75
End: 2020-05-05

## 2020-05-11 ENCOUNTER — PATIENT MESSAGE (OUTPATIENT)
Dept: HEMATOLOGY/ONCOLOGY | Facility: CLINIC | Age: 75
End: 2020-05-11

## 2020-05-13 ENCOUNTER — PATIENT OUTREACH (OUTPATIENT)
Dept: ADMINISTRATIVE | Facility: OTHER | Age: 75
End: 2020-05-13

## 2020-05-14 ENCOUNTER — OFFICE VISIT (OUTPATIENT)
Dept: HEMATOLOGY/ONCOLOGY | Facility: CLINIC | Age: 75
End: 2020-05-14
Payer: MEDICARE

## 2020-05-14 ENCOUNTER — TELEPHONE (OUTPATIENT)
Dept: HEMATOLOGY/ONCOLOGY | Facility: CLINIC | Age: 75
End: 2020-05-14

## 2020-05-14 DIAGNOSIS — M54.2 NECK PAIN: Primary | ICD-10-CM

## 2020-05-14 DIAGNOSIS — C79.51 METASTATIC RENAL CELL CARCINOMA TO BONE: ICD-10-CM

## 2020-05-14 DIAGNOSIS — C64.9 METASTATIC RENAL CELL CARCINOMA TO BONE: ICD-10-CM

## 2020-05-14 DIAGNOSIS — C64.9 METASTATIC RENAL CELL CARCINOMA TO LIVER: ICD-10-CM

## 2020-05-14 DIAGNOSIS — C64.2 RENAL CELL CARCINOMA OF LEFT KIDNEY: ICD-10-CM

## 2020-05-14 DIAGNOSIS — C78.7 METASTATIC RENAL CELL CARCINOMA TO LIVER: ICD-10-CM

## 2020-05-14 PROCEDURE — 1100F PR PT FALLS ASSESS DOC 2+ FALLS/FALL W/INJURY/YR: ICD-10-PCS | Mod: HCNC,CPTII,95, | Performed by: NURSE PRACTITIONER

## 2020-05-14 PROCEDURE — 1100F PTFALLS ASSESS-DOCD GE2>/YR: CPT | Mod: HCNC,CPTII,95, | Performed by: NURSE PRACTITIONER

## 2020-05-14 PROCEDURE — 1159F PR MEDICATION LIST DOCUMENTED IN MEDICAL RECORD: ICD-10-PCS | Mod: HCNC,95,, | Performed by: NURSE PRACTITIONER

## 2020-05-14 PROCEDURE — 99443 PR PHYSICIAN TELEPHONE EVALUATION 21-30 MIN: ICD-10-PCS | Mod: HCNC,95,, | Performed by: NURSE PRACTITIONER

## 2020-05-14 PROCEDURE — 99443 PR PHYSICIAN TELEPHONE EVALUATION 21-30 MIN: CPT | Mod: HCNC,95,, | Performed by: NURSE PRACTITIONER

## 2020-05-14 PROCEDURE — 3288F FALL RISK ASSESSMENT DOCD: CPT | Mod: HCNC,CPTII,95, | Performed by: NURSE PRACTITIONER

## 2020-05-14 PROCEDURE — 3288F PR FALLS RISK ASSESSMENT DOCUMENTED: ICD-10-PCS | Mod: HCNC,CPTII,95, | Performed by: NURSE PRACTITIONER

## 2020-05-14 PROCEDURE — 1159F MED LIST DOCD IN RCRD: CPT | Mod: HCNC,95,, | Performed by: NURSE PRACTITIONER

## 2020-05-14 NOTE — TELEPHONE ENCOUNTER
Called pt and scheduled for phone call visit today.   Pt verbalized understanding.     Virtual urgent care visit today. Will set up scans and next follow up as well.

## 2020-05-14 NOTE — Clinical Note
dalila- saw as an urgent care today for new neck pain (1.5 months). Will obtain MRI cervical spine and also schedule his CT and bone scan that's due. You will see as a virtual next week post imaging. I can see again if you don't have an opening.

## 2020-05-14 NOTE — Clinical Note
Labs- cbc, cmp, LDH. CT chest/abd/pelvis, bone scan and MRI cervical spine early next week and see Brianna as a virtual visit day after. Please let me know if you need me to help find a spot on Dr. Murrell schedule.

## 2020-05-14 NOTE — TELEPHONE ENCOUNTER
Returned call to pt.   Pt stated that he has been having neck pain x 1 month.   Pt stated went to an [facility]- XR was done and showed arthritis. Pt was given rx for 2 weeks (cannot recall name of rxs).   Pt stated pain is constant (has eased up) but when turns his neck is an 8/9.   Informed pt would send message to NP to see if wants additional imaging.   Pt verbalized understanding.       Message fwd.           ----- Message from Beverly Mendenhall MA sent at 5/13/2020  3:58 PM CDT -----  I called him to see if he wanted to do a phone call/in patient   He said either was fine with him, but then he was telling me he was having some neck pains.  Went to UC in [place]. (notes in chart)  So wasn't sure if we should have he come in or not..talked with HUSEYIN..she said to have a nurse call to triage to see if he should come in for UC slot with her!  If you don't mind this I would appreciate it!  Thanks girl!

## 2020-05-14 NOTE — PROGRESS NOTES
URGENT CARE:     Established Patient - Audio Only Telehealth Visit     The patient location is: home  The chief complaint leading to consultation is: neck pain  Visit type: Virtual visit with audio only (telephone)  Total time spent with patient: 30 minutes.        The reason for the audio only service rather than synchronous audio and video virtual visit was related to technical difficulties or patient preference/necessity.     Each patient to whom I provide medical services by telemedicine is:  (1) informed of the relationship between the physician and patient and the respective role of any other health care provider with respect to management of the patient; and (2) notified that they may decline to receive medical services by telemedicine and may withdraw from such care at any time. Patient verbally consented to receive this service via voice-only telephone call.       HPI:     Neck pain started 1.5 months ago. States just woke up one morning and could not turn head. Went to urgent care a few days later and xray performed. He was told he had arthritis. Pain mostly on right side side.   Pain slightly improved with Rx Zanaflex and Mobic. (Rx completed).   Pain continues on right side of neck. No numbness or tingling in arm/hand.   Certain movements and position trigger pain in neck but overall slightly improved since taking rxs..   Equal strength in both arms and hands.   Now Taking Advil everyday- twice a day with some relief.   No nausea and vomiting.    No unusual headaches. No blurred vision. No limitations in ADL's.   Back pain stable.   Slightly SOB on exertion- no worse.   Appetite good and states weight stable.     Oncology History:  Mr.Mc Moss is a 73 yo male with CAD (s/p 5 stents, remotely), DM2 (insulin dependent), hypothyroidism , CVA (2013) with no residual deficit, chronic back pain. [place] [facility] [place] of a lumbar spine lesion in the setting of progressive  "weakness of his legs and exacerbation of chronic back pain, in May 2017. He states that his back pain and LE weakness progressively worsened. [facility]. The pain radiates down his legs. He denies any incontinence of bowel or bladder. He had several falls. After one of the falls, he presented to the ED at [facility], in May 2017. He had an MRI showing "L3 vertebral body lesion with apparent cortical disruption concerning for metastases vs myeloma," and was transferred to [facility] [place] evaluation. He was evaluated by neurosurgery regarding concern for lytic lesion on L3 on 5/26/17. No surgical intervention was proposed.   CT chest, abdomen,pelvis on 5/27/17 revealed :   --A 3.6 cm exophytic mass arising from the medial aspect of the lower pole of left kidney,   --A small 1.0 cm, subtle, cortical enhancing mass within the upper pole of the right kidney, concerning for possible contralateral solid renal mass  --Multiple enhancing ill-defined hepatic masses concerning for hepatic metastatic disease, hepatomegaly and hepatic steatosis  --Mulltifocal irregular thickening of the bilateral pleura concerning for possible pleural metastatic disease  --Mild nonspecific nodular thickening of the left adrenal gland without discrete nodule  -- Redemonstration of known lytic lesion within the L3 vertebral body. No definite additional discrete lytic lesions are identified.  -MRI pelvis from 10/18/17:    2.5 cm linear T1 hypointense T2/STIR hyperintense lesion within the left iliac bone, this lesion is indeterminate but felt less likely to represent metastasis given its appearance. Further evaluation can be obtained with bone scan as clinically indicated.  Findings suggestive of left trochanteric bursitis.  Partially visualized L3 metastatic lesion.  Diffuse thickening of the urinary bladder wall can be seen with chronic outlet obstruction or cystitis.  - initiated on " Votrient.    ROS: see above.   PE: limited as audio. Sounds comfortable and in NAD.     Cervical spine xray:  Impression       Osseous fusion from the C4 through C6 levels, with moderate osteophytic spurring at other levels within the cervical spine, mild disc space narrowing at C7-T1 and minimal anterolisthesis at C3-4.       Assessment:    1. Neck pain  MRI Cervical Spine W WO Cont   2. Renal cell carcinoma of left kidney  CT Chest Abdomen Pelvis W W/O Contrast (XPD)    NM Bone Scan Whole Body    MRI Cervical Spine W WO Cont    CBC Oncology    Comprehensive metabolic panel    LDH   3. Metastatic renal cell carcinoma to liver  CT Chest Abdomen Pelvis W W/O Contrast (XPD)    NM Bone Scan Whole Body    MRI Cervical Spine W WO Cont   4. Metastatic renal cell carcinoma to bone  CT Chest Abdomen Pelvis W W/O Contrast (XPD)    NM Bone Scan Whole Body    MRI Cervical Spine W WO Cont          Plan:  Stop NSAID as last creatinine slightly elevated. Discussed adverse effects of NSAIDS.   Ok to take Norco for pain.   Further imaging warranted.   Standard imaging due as well.       Patient educated that symptoms of COVID-19 can develop up to 2 weeks from last contact with infected individuals. Symptoms to monitor for include fever greater than 100.4, cough and shortness of breath. I advised the patient to monitor temperature twice per day and practice ways to prevent potential spread of the disease including: social distancing of at least 6 feet, avoid contact with sick individuals, avoid touching mouth, nose, and eyes with unwashed hands, cover nose and mouth when coughing and sneezing, use of hand  that contains at least 60% alcohol, and washing hands with soap and water for at least 20 seconds. Should you develop symptoms, please notify us or your PCP for further direction.      This service was not originating from a related E/M service provided within the previous 7 days nor will  to an E/M service or  procedure within the next 24 hours or my soonest available appointment.  Prevailing standard of care was able to be met in this audio-only visit.        Patient is in agreement with the proposed treatment plan. All questions were answered to the patient's satisfaction. Pt knows to call clinic for any new or worsening symptoms and if anything is needed before the next clinic visit.      NATALIYA Hamilton-C  Hematology & Oncology  [facility] [place] [address]  ph. [phone]  Fax. [phone]

## 2020-05-18 ENCOUNTER — OFFICE VISIT (OUTPATIENT)
Dept: PODIATRY | Facility: CLINIC | Age: 75
End: 2020-05-18
Payer: MEDICARE

## 2020-05-18 VITALS
OXYGEN SATURATION: 98 % | HEART RATE: 54 BPM | WEIGHT: 166.38 LBS | SYSTOLIC BLOOD PRESSURE: 170 MMHG | BODY MASS INDEX: 26.11 KG/M2 | DIASTOLIC BLOOD PRESSURE: 66 MMHG | TEMPERATURE: 98 F | HEIGHT: 67 IN

## 2020-05-18 DIAGNOSIS — E11.49 TYPE II DIABETES MELLITUS WITH NEUROLOGICAL MANIFESTATIONS: ICD-10-CM

## 2020-05-18 DIAGNOSIS — E13.40 NEUROPATHY DUE TO SECONDARY DIABETES: Primary | ICD-10-CM

## 2020-05-18 PROCEDURE — 3078F PR MOST RECENT DIASTOLIC BLOOD PRESSURE < 80 MM HG: ICD-10-PCS | Mod: HCNC,CPTII,S$GLB, | Performed by: PODIATRIST

## 2020-05-18 PROCEDURE — 3288F FALL RISK ASSESSMENT DOCD: CPT | Mod: HCNC,CPTII,S$GLB, | Performed by: PODIATRIST

## 2020-05-18 PROCEDURE — 3044F HG A1C LEVEL LT 7.0%: CPT | Mod: HCNC,CPTII,S$GLB, | Performed by: PODIATRIST

## 2020-05-18 PROCEDURE — 99213 OFFICE O/P EST LOW 20 MIN: CPT | Mod: HCNC,S$GLB,, | Performed by: PODIATRIST

## 2020-05-18 PROCEDURE — 1125F PR PAIN SEVERITY QUANTIFIED, PAIN PRESENT: ICD-10-PCS | Mod: HCNC,S$GLB,, | Performed by: PODIATRIST

## 2020-05-18 PROCEDURE — 3077F PR MOST RECENT SYSTOLIC BLOOD PRESSURE >= 140 MM HG: ICD-10-PCS | Mod: HCNC,CPTII,S$GLB, | Performed by: PODIATRIST

## 2020-05-18 PROCEDURE — 99213 PR OFFICE/OUTPT VISIT, EST, LEVL III, 20-29 MIN: ICD-10-PCS | Mod: HCNC,S$GLB,, | Performed by: PODIATRIST

## 2020-05-18 PROCEDURE — 1100F PTFALLS ASSESS-DOCD GE2>/YR: CPT | Mod: HCNC,CPTII,S$GLB, | Performed by: PODIATRIST

## 2020-05-18 PROCEDURE — 99999 PR PBB SHADOW E&M-EST. PATIENT-LVL V: ICD-10-PCS | Mod: PBBFAC,HCNC,, | Performed by: PODIATRIST

## 2020-05-18 PROCEDURE — 3288F PR FALLS RISK ASSESSMENT DOCUMENTED: ICD-10-PCS | Mod: HCNC,CPTII,S$GLB, | Performed by: PODIATRIST

## 2020-05-18 PROCEDURE — 1100F PR PT FALLS ASSESS DOC 2+ FALLS/FALL W/INJURY/YR: ICD-10-PCS | Mod: HCNC,CPTII,S$GLB, | Performed by: PODIATRIST

## 2020-05-18 PROCEDURE — 1159F MED LIST DOCD IN RCRD: CPT | Mod: HCNC,S$GLB,, | Performed by: PODIATRIST

## 2020-05-18 PROCEDURE — 1159F PR MEDICATION LIST DOCUMENTED IN MEDICAL RECORD: ICD-10-PCS | Mod: HCNC,S$GLB,, | Performed by: PODIATRIST

## 2020-05-18 PROCEDURE — 99999 PR PBB SHADOW E&M-EST. PATIENT-LVL V: CPT | Mod: PBBFAC,HCNC,, | Performed by: PODIATRIST

## 2020-05-18 PROCEDURE — 1125F AMNT PAIN NOTED PAIN PRSNT: CPT | Mod: HCNC,S$GLB,, | Performed by: PODIATRIST

## 2020-05-18 PROCEDURE — 3077F SYST BP >= 140 MM HG: CPT | Mod: HCNC,CPTII,S$GLB, | Performed by: PODIATRIST

## 2020-05-18 PROCEDURE — 3078F DIAST BP <80 MM HG: CPT | Mod: HCNC,CPTII,S$GLB, | Performed by: PODIATRIST

## 2020-05-18 PROCEDURE — 3044F PR MOST RECENT HEMOGLOBIN A1C LEVEL <7.0%: ICD-10-PCS | Mod: HCNC,CPTII,S$GLB, | Performed by: PODIATRIST

## 2020-05-18 NOTE — PROGRESS NOTES
Subjective:      Patient ID: Edwin Powell is a 75 y.o. male.    Chief Complaint: Follow-up    Edwin is a 75 y.o. male who presents to the clinic for evaluation and treatment of high risk feet. Ediwn has a past medical history of Acquired hypothyroidism (5/26/2017), Benign essential HTN (5/26/2017), Benign prostatic hyperplasia (BPH) with urinary urge incontinence (6/6/2017), Chronic low back pain (6/1/2017), Coronary artery disease involving native coronary artery of native heart without angina pectoris (5/26/2017), Gastroesophageal reflux disease without esophagitis (5/26/2017), History of CVA (cerebrovascular accident) (5/26/2017), Hypertension associated with diabetes (5/26/2017), Lumbar disc lesion (5/26/2017), Metastatic renal cell carcinoma to bone (6/6/2017), Metastatic renal cell carcinoma to liver (6/6/2017), Mixed hyperlipidemia (5/26/2017), and Type 2 diabetes mellitus with stage 3 chronic kidney disease, with long-term current use of insulin (5/26/2017).  This patient has documented high risk feet requiring routine maintenance secondary to diabetes mellitis and those secondary complications of diabetes, as mentioned.. Last measured glucose was 138.        PCP: Lulú Lewis MD    Date Last Seen by PCP: in epic     Current shoe gear:  Affected Foot: Rx diabetic extra depth shoes and custom accommodative insoles     Unaffected Foot: Rx diabetic extra depth shoes and custom accommodative insoles    Hemoglobin A1C   Date Value Ref Range Status   04/01/2020 6.6 (H) 4.0 - 5.6 % Final     Comment:     ADA Screening Guidelines:  5.7-6.4%  Consistent with prediabetes  >or=6.5%  Consistent with diabetes  High levels of fetal hemoglobin interfere with the HbA1C  assay. Heterozygous hemoglobin variants (HbS, HgC, etc)do  not significantly interfere with this assay.   However, presence of multiple variants may affect accuracy.     11/18/2019 8.2 (H) 4.0 - 5.6 % Final     Comment:     ADA Screening  Guidelines:  5.7-6.4%  Consistent with prediabetes  >or=6.5%  Consistent with diabetes  High levels of fetal hemoglobin interfere with the HbA1C  assay. Heterozygous hemoglobin variants (HbS, HgC, etc)do  not significantly interfere with this assay.   However, presence of multiple variants may affect accuracy.     02/19/2019 6.4 (H) 4.0 - 5.6 % Final     Comment:     ADA Screening Guidelines:  5.7-6.4%  Consistent with prediabetes  >or=6.5%  Consistent with diabetes  High levels of fetal hemoglobin interfere with the HbA1C  assay. Heterozygous hemoglobin variants (HbS, HgC, etc)do  not significantly interfere with this assay.   However, presence of multiple variants may affect accuracy.         Review of Systems   Constitution: Negative for decreased appetite, fever and malaise/fatigue.   HENT: Negative for congestion.    Cardiovascular: Negative for chest pain and leg swelling.   Respiratory: Negative for cough and shortness of breath.    Skin: Negative for color change, nail changes and rash.   Musculoskeletal: Negative for arthritis, joint pain, joint swelling and muscle weakness.   Gastrointestinal: Negative for bloating, abdominal pain, nausea and vomiting.   Neurological: Positive for numbness and sensory change. Negative for headaches and weakness.   Psychiatric/Behavioral: Negative for altered mental status.             Past Medical History:   Diagnosis Date    Acquired hypothyroidism 5/26/2017    Benign essential HTN 5/26/2017    Benign prostatic hyperplasia (BPH) with urinary urge incontinence 6/6/2017    Chronic low back pain 6/1/2017    Coronary artery disease involving native coronary artery of native heart without angina pectoris 5/26/2017    S/p 5 stents - last one around 2010-11.    Gastroesophageal reflux disease without esophagitis 5/26/2017    History of CVA (cerebrovascular accident) 5/26/2017    Hypertension associated with diabetes 5/26/2017    BP controlled on ACE, CCB    Lumbar disc  lesion 5/26/2017    Metastatic renal cell carcinoma to bone 6/6/2017    Metastatic renal cell carcinoma to liver 6/6/2017    Liver Biopsy 5-2017: METASTATIC RENAL CELL CARCINOMA.    Mixed hyperlipidemia 5/26/2017    Type 2 diabetes mellitus with stage 3 chronic kidney disease, with long-term current use of insulin 5/26/2017       Past Surgical History:   Procedure Laterality Date    ABDOMINAL SURGERY      APPENDECTOMY      BACK SURGERY      CARDIAC SURGERY      CATARACT EXTRACTION      CHOLECYSTECTOMY      coronary stenting      X 5 last one in 2010-11.    INTRAOCULAR PROSTHESES INSERTION Right 6/27/2019    Procedure: INSERTION, IOL PROSTHESIS;  Surgeon: Chary Culp MD;  Location: [facility] OR [address] [place] [address];  Service: Ophthalmology;  Laterality: Right;    INTRAOCULAR PROSTHESES INSERTION Left 8/22/2019    Procedure: INSERTION, IOL PROSTHESIS;  Surgeon: Chary Culp MD;  Location: [facility] OR [address] [place] [address];  Service: Ophthalmology;  Laterality: Left;    PHACOEMULSIFICATION OF CATARACT Right 6/27/2019    Procedure: PHACOEMULSIFICATION, CATARACT;  Surgeon: Chary Culp MD;  Location: [facility] OR [address] [place] [address];  Service: Ophthalmology;  Laterality: Right;    PHACOEMULSIFICATION OF CATARACT Left 8/22/2019    Procedure: PHACOEMULSIFICATION, CATARACT;  Surgeon: Chary Culp MD;  Location: [facility] OR [address] [place] [address];  Service: Ophthalmology;  Laterality: Left;    SPINAL FUSION         Family History   Problem Relation Age of Onset    Glaucoma Mother     Diabetes Mother     Diabetes Brother     Glaucoma Maternal Grandmother     No Known Problems Son     Melanoma Neg Hx        Social History     Socioeconomic History    Marital status: Single     Spouse name: Not on file    Number of children: Not on file    Years of education: Not on file    Highest education level: Not on file   Occupational History    Not on file   Social Needs    Financial resource strain: Hard    Food insecurity:     Worry: Never true      Inability: Never true    Transportation needs:     Medical: No     Non-medical: No   Tobacco Use    Smoking status: Former Smoker     Types: Cigarettes    Smokeless tobacco: Never Used    Tobacco comment: haven't smoked in last 25 yrs   Substance and Sexual Activity    Alcohol use: Yes     Comment: rarely     Drug use: Not Currently    Sexual activity: Not Currently     Partners: Female   Lifestyle    Physical activity:     Days per week: Not on file     Minutes per session: Not on file    Stress: Not on file   Relationships    Social connections:     Talks on phone: Not on file     Gets together: Not on file     Attends Restorationism service: Not on file     Active member of club or organization: Not on file     Attends meetings of clubs or organizations: Not on file     Relationship status: Not on file   Other Topics Concern    Not on file   Social History Narrative    Not on file       Current Outpatient Medications   Medication Sig Dispense Refill    amLODIPine (NORVASC) 10 MG tablet Take 1 tablet (10 mg total) by mouth once daily. 90 tablet 3    ascorbic acid (VITAMIN C ORAL) Take by mouth once daily.      aspirin (ECOTRIN) 81 MG EC tablet Take 81 mg by mouth once daily.      b complex vitamins (B COMPLEX 1) tablet Take 1 tablet by mouth once daily.      blood sugar diagnostic Strp 1 strip by Misc.(Non-Drug; Combo Route) route 2 (two) times daily as needed. 200 strip 3    blood-glucose meter Misc use as instructed 1 each 0    cholecalciferol, vitamin D3, (VITAMIN D3) 5,000 unit Tab Take 5,000 Units by mouth once daily. 90 tablet 3    clindamycin (CLEOCIN T) 1 % external solution AAA bid 60 mL 3    diphenhydramine HCl (BENADRYL ALLERGY ORAL) Take by mouth daily as needed.      fluticasone propionate (FLONASE) 50 mcg/actuation nasal spray 1 SPRAY (50 MCG TOTAL) BY EACH NARE ROUTE ONCE DAILY. 16 mL 3    HYDROcodone-acetaminophen (NORCO) 5-325 mg per tablet 1 tablet PO q8h prn pain. Quantity  "Medically Necessary. 120 tablet 0    insulin (LANTUS SOLOSTAR U-100 INSULIN) glargine 100 units/mL (3mL) SubQ pen Inject 5 Units into the skin every evening. 5 mL 11    lancets (ONETOUCH ULTRASOFT LANCETS) Misc 1 lancet by Misc.(Non-Drug; Combo Route) route 2 (two) times daily as needed. 200 each 3    lisinopril (PRINIVIL,ZESTRIL) 40 MG tablet Take 1 tablet (40 mg total) by mouth once daily. 90 tablet 3    loratadine (CLARITIN) 10 mg tablet Take 1 tablet (10 mg total) by mouth once daily.  0    morphine (MS CONTIN) 15 MG 12 hr tablet Take 1 tablet (15 mg total) by mouth every 12 (twelve) hours. Medically Necessary 60 tablet 0    multivitamin (THERAGRAN) per tablet Take 1 tablet by mouth once daily.      ondansetron (ZOFRAN) 8 MG tablet Take 1 tablet (8 mg total) by mouth 3 (three) times daily as needed. 90 tablet 3    PAZOpanib (VOTRIENT) 200 mg Tab Take 4 tablets (800 mg total) by mouth once daily. 120 tablet 5    pen needle, diabetic 29 gauge x 1/2" Ndle 1 application by Misc.(Non-Drug; Combo Route) route once daily. 100 each 3    prednisolon/gatiflox/bromfenac (PREDNISOL ACE-GATIFLOX-BROMFEN) 1-0.5-0.075 % DrpS Apply 1 drop to eye 3 (three) times daily. 7 mL 3    rosuvastatin (CRESTOR) 20 MG tablet Take 1 tablet (20 mg total) by mouth once daily. 90 tablet 3    tamsulosin (FLOMAX) 0.4 mg Cap Take 2 capsules (0.8 mg total) by mouth every evening. 180 capsule 3    vitamin E acetate (VITAMIN E ORAL) Take by mouth once daily.      levothyroxine (SYNTHROID) 88 MCG tablet TAKE 1 TABLET (88 MCG TOTAL) BY MOUTH ONCE DAILY. 90 tablet 3     No current facility-administered medications for this visit.      Facility-Administered Medications Ordered in Other Visits   Medication Dose Route Frequency Provider Last Rate Last Dose    phenylephrine HCL 2.5% ophthalmic solution 1 drop  1 drop Left Eye On Call Procedure Chary Culp MD   1 drop at 08/22/19 0659    proparacaine 0.5 % ophthalmic solution 1 drop  1 " "drop Left Eye On Call Procedure Chary Culp MD   1 drop at 08/22/19 0641    tropicamide 1% ophthalmic solution 1 drop  1 drop Left Eye On Call Procedure Chary Culp MD   1 drop at 08/22/19 0700       Review of patient's allergies indicates:  No Known Allergies    Vitals:    05/18/20 1311   BP: (!) 170/66   Pulse: (!) 54   Temp: 98 °F (36.7 °C)   TempSrc: Oral   SpO2: 98%   Weight: 75.5 kg (166 lb 6.4 oz)   Height: 5' 7" (1.702 m)   PainSc: 10-Worst pain ever   PainLoc: Neck       Objective:      Physical Exam   Constitutional: He is oriented to person, place, and time. He appears well-developed and well-nourished. No distress.   Musculoskeletal: He exhibits no edema, tenderness or deformity.   Neurological: He is alert and oriented to person, place, and time.   Skin: Skin is warm. Capillary refill takes less than 2 seconds. No erythema.   Psychiatric: He has a normal mood and affect. His behavior is normal.       Vascular: Distal DP/PT pulses palpable 1/4. CRT < 3 sec to tips of toes. No vericosities noted to LEs. Hair growth present LE, warm to touch LE, No edema noted to LE.    Dermatologic: No open lesions, lacerations or wounds. Interdigital spaces clean, dry and intact. No erythema, rubor, calor noted LE, Nails are normal R 1-5 and L 1-5.    Musculoskeletal: MMT 5/5 in DF/PF/Inv/Ev resistance with no reproduction of pain in any direction. Passive range of motion of ankle and pedal joints is painless. No calf tenderness LE, Compartments soft/compressible. ROM of ankles with < 10 deg DF is noted b/l      Neurological:   Light touch, proprioception, and sharp/dull sensation are decreased b/l. Protective threshold with the [place]-Wienstein monofilament is decreased b/l. Vibratory sensation decreased b/l.         Assessment:       Encounter Diagnoses   Name Primary?   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    75 y/o male with diabetic foot risk assessment.    -nails x 10 debrided with nail nipper.  -Shoe inspection. General Foot Education. Patient reminded of the importance of good nutrition. Patient instructed on proper foot hygeine. We discussed wearing proper shoe gear, daily foot inspections, never walking without protective shoe gear, caution putting sharp instruments to feet. Discussed general foot care:  Wear comfortable, proper fitting shoes. Wash feet daily. Dry well. After drying, apply moisturizer to feet (no lotion to webspaces). Inspect feet daily for skin breaks, blisters, swelling, or redness. Wear cotton socks (preferably white)  Change socks every day. Do NOT walk barefoot. Do NOT use heating pads or warm/hot water soaks   -It was discussed the importance of wearing shoes with adequate room in toe box to accommodate toe deformities. Recommended New Balance/[employer]ics shoe brands with adequate arch supports to alleviate abnormal pressure and improve stability of foot while walking. Avoid flat shoes and barefoot walking as these will exacerbate or worsen symptoms.   -Advised for optimal glucose control and maintenance per primary care physician. Patient was also educated on healthy diet that is naturally rich in nutrients and low in fat and calories.   -The nature of the condition, options for management, as well as potential risks and complications were discussed in detail with patient. Patient was amenable to my recommendations and left my office fully informed and will follow up as instructed or sooner if necessary.    -Patient was advised of signs and symptoms of infection including redness, drainage, purulence, odor, streaking, fever, chills and  I advised patient to seek medical attention (ER or urgent care) if these symptoms arise.   -f/u 4 months       Diabetic foot exam:   Left: Reflexes 2+    Vibratory sensation diminished   Proprioception diminished   Sharp/dull discrimination diminished   Filament test present  Right: Reflexes 2+    Vibratory sensation diminished   Proprioception diminished   Sharp/dull discrimination diminished   Filament test present

## 2020-05-19 ENCOUNTER — HOSPITAL ENCOUNTER (OUTPATIENT)
Dept: RADIOLOGY | Facility: HOSPITAL | Age: 75
Discharge: HOME | End: 2020-05-19
Attending: NURSE PRACTITIONER
Payer: MEDICARE

## 2020-05-19 DIAGNOSIS — C78.7 METASTATIC RENAL CELL CARCINOMA TO LIVER: ICD-10-CM

## 2020-05-19 DIAGNOSIS — C64.2 RENAL CELL CARCINOMA OF LEFT KIDNEY: ICD-10-CM

## 2020-05-19 DIAGNOSIS — C64.9 METASTATIC RENAL CELL CARCINOMA TO LIVER: ICD-10-CM

## 2020-05-19 DIAGNOSIS — M54.2 NECK PAIN: ICD-10-CM

## 2020-05-19 DIAGNOSIS — C64.9 METASTATIC RENAL CELL CARCINOMA TO BONE: ICD-10-CM

## 2020-05-19 DIAGNOSIS — C79.51 METASTATIC RENAL CELL CARCINOMA TO BONE: ICD-10-CM

## 2020-05-19 PROCEDURE — 71250 CT CHEST ABDOMEN PELVIS WITHOUT CONTRAST(XPD): ICD-10-PCS | Mod: 26,HCNC,, | Performed by: RADIOLOGY

## 2020-05-19 PROCEDURE — 71250 CT THORAX DX C-: CPT | Mod: TC,HCNC

## 2020-05-19 PROCEDURE — A9503 TC99M MEDRONATE: HCPCS | Mod: HCNC

## 2020-05-19 PROCEDURE — 74176 CT CHEST ABDOMEN PELVIS WITHOUT CONTRAST(XPD): ICD-10-PCS | Mod: 26,HCNC,, | Performed by: RADIOLOGY

## 2020-05-19 PROCEDURE — 72141 MRI NECK SPINE W/O DYE: CPT | Mod: 26,HCNC,, | Performed by: RADIOLOGY

## 2020-05-19 PROCEDURE — 72141 MRI CERVICAL SPINE WITHOUT CONTRAST: ICD-10-PCS | Mod: 26,HCNC,, | Performed by: RADIOLOGY

## 2020-05-19 PROCEDURE — 78306 NM BONE SCAN WHOLE BODY: ICD-10-PCS | Mod: 26,HCNC,, | Performed by: RADIOLOGY

## 2020-05-19 PROCEDURE — 72141 MRI NECK SPINE W/O DYE: CPT | Mod: TC,HCNC

## 2020-05-19 PROCEDURE — 25500020 PHARM REV CODE 255: Mod: HCNC | Performed by: NURSE PRACTITIONER

## 2020-05-19 PROCEDURE — 74176 CT ABD & PELVIS W/O CONTRAST: CPT | Mod: 26,HCNC,, | Performed by: RADIOLOGY

## 2020-05-19 PROCEDURE — 78306 BONE IMAGING WHOLE BODY: CPT | Mod: 26,HCNC,, | Performed by: RADIOLOGY

## 2020-05-19 PROCEDURE — 74176 CT ABD & PELVIS W/O CONTRAST: CPT | Mod: TC,HCNC

## 2020-05-19 PROCEDURE — 71250 CT THORAX DX C-: CPT | Mod: 26,HCNC,, | Performed by: RADIOLOGY

## 2020-05-19 RX ADMIN — IOHEXOL 15 ML: 350 INJECTION, SOLUTION INTRAVENOUS at 02:05

## 2020-05-20 ENCOUNTER — OFFICE VISIT (OUTPATIENT)
Dept: HEMATOLOGY/ONCOLOGY | Facility: CLINIC | Age: 75
End: 2020-05-20
Payer: MEDICARE

## 2020-05-20 DIAGNOSIS — C64.9 METASTATIC RENAL CELL CARCINOMA, UNSPECIFIED LATERALITY: Primary | ICD-10-CM

## 2020-05-20 DIAGNOSIS — N17.9 AKI (ACUTE KIDNEY INJURY): ICD-10-CM

## 2020-05-20 DIAGNOSIS — M48.02 CERVICAL STENOSIS OF SPINE: ICD-10-CM

## 2020-05-20 PROCEDURE — 1100F PR PT FALLS ASSESS DOC 2+ FALLS/FALL W/INJURY/YR: ICD-10-PCS | Mod: HCNC,CPTII,95, | Performed by: INTERNAL MEDICINE

## 2020-05-20 PROCEDURE — 3288F FALL RISK ASSESSMENT DOCD: CPT | Mod: HCNC,CPTII,95, | Performed by: INTERNAL MEDICINE

## 2020-05-20 PROCEDURE — 1159F MED LIST DOCD IN RCRD: CPT | Mod: HCNC,95,, | Performed by: INTERNAL MEDICINE

## 2020-05-20 PROCEDURE — 3288F PR FALLS RISK ASSESSMENT DOCUMENTED: ICD-10-PCS | Mod: HCNC,CPTII,95, | Performed by: INTERNAL MEDICINE

## 2020-05-20 PROCEDURE — 99442 PR PHYSICIAN TELEPHONE EVALUATION 11-20 MIN: CPT | Mod: HCNC,95,, | Performed by: INTERNAL MEDICINE

## 2020-05-20 PROCEDURE — 99442 PR PHYSICIAN TELEPHONE EVALUATION 11-20 MIN: ICD-10-PCS | Mod: HCNC,95,, | Performed by: INTERNAL MEDICINE

## 2020-05-20 PROCEDURE — 1100F PTFALLS ASSESS-DOCD GE2>/YR: CPT | Mod: HCNC,CPTII,95, | Performed by: INTERNAL MEDICINE

## 2020-05-20 PROCEDURE — 1159F PR MEDICATION LIST DOCUMENTED IN MEDICAL RECORD: ICD-10-PCS | Mod: HCNC,95,, | Performed by: INTERNAL MEDICINE

## 2020-05-20 NOTE — Clinical Note
Needs- ortho referral for neck - can he see someone on [facility]? If no- he is fine with [facility], needed asapRTC 4-6 weeks with labs to see meNephrology referral

## 2020-05-20 NOTE — PROGRESS NOTES
Established Patient - Audio Only Telehealth Visit     The patient location is: his home ([facility] restrictions)  The chief complaint leading to consultation is: follow up of scans  Visit type: Virtual visit with audio only (telephone)  Total time spent with patient: 15 minutes        The reason for the audio only service rather than synchronous audio and video virtual visit was related to technical difficulties or patient preference/necessity.     Each patient to whom I provide medical services by telemedicine is:  (1) informed of the relationship between the physician and patient and the respective role of any other health care provider with respect to management of the patient; and (2) notified that they may decline to receive medical services by telemedicine and may withdraw from such care at any time. Patient verbally consented to receive this service via voice-only telephone call.       HPI:   Presents as audio visit (unable to work his my chart) to review scans    We discussed CT C/A/P stable to improved   CT neck revealed no metastatic disease but severe stenosis seen (prior spinal fusion at level above)  He describes significant pain   we discussed Cr up and he admits hydration could be better- he has stopped the NSAIDs as discussed last time    Assessment and plan:    1. Disease stable, continue Votrient for metastatic RCC  2. Ortho referral- he requests at [facility] (prior surgery at [facility])  3. Nephrology referrral    RTC 4-6 weeks                        This service was not originating from a related E/M service provided within the previous 7 days nor will  to an E/M service or procedure within the next 24 hours or my soonest available appointment.  Prevailing standard of care was able to be met in this audio-only visit.

## 2020-05-21 ENCOUNTER — TELEPHONE (OUTPATIENT)
Dept: HEMATOLOGY/ONCOLOGY | Facility: CLINIC | Age: 75
End: 2020-05-21

## 2020-05-21 NOTE — TELEPHONE ENCOUNTER
Called pt and informed that unfortunately no one from our department had reached out to pt.   Pt verbalized understanding.    MD Radha Randolph   Caller: Unspecified (Today,  9:20 AM)             I spoke to him yesterday   Maybe another dept was calling?

## 2020-05-21 NOTE — TELEPHONE ENCOUNTER
Returned call to pt.   Pt stated he received a call with no message and was returning call.   Informed pt that nurse had not attempted to call and would check with scheduling team to see if they had perhaps reached out to assist with scheduling future appts.   Pt verbalized understanding.       Message fwd.        ----- Message from Ryne Ramires sent at 5/21/2020  9:10 AM CDT -----  Contact: Patient  Returning a call     Communication preference:: [phone]    Additional info::

## 2020-05-22 DIAGNOSIS — E11.22 TYPE 2 DIABETES MELLITUS WITH STAGE 3 CHRONIC KIDNEY DISEASE, WITHOUT LONG-TERM CURRENT USE OF INSULIN: Primary | ICD-10-CM

## 2020-05-22 DIAGNOSIS — N18.30 TYPE 2 DIABETES MELLITUS WITH STAGE 3 CHRONIC KIDNEY DISEASE, WITHOUT LONG-TERM CURRENT USE OF INSULIN: Primary | ICD-10-CM

## 2020-05-27 ENCOUNTER — TELEPHONE (OUTPATIENT)
Dept: ORTHOPEDICS | Facility: CLINIC | Age: 75
End: 2020-05-27

## 2020-05-27 DIAGNOSIS — M50.30 DDD (DEGENERATIVE DISC DISEASE), CERVICAL: Primary | ICD-10-CM

## 2020-05-29 ENCOUNTER — TELEPHONE (OUTPATIENT)
Dept: PHARMACY | Facility: CLINIC | Age: 75
End: 2020-05-29

## 2020-05-29 NOTE — TELEPHONE ENCOUNTER
Refill call regarding Votrient at [facility]. Will prepare for shipment with consent of patient on  to arrive . Copay 0.00. Patient has not started any new medications including OTC drugs. Patient has not had any medication/ dose or instruction changes. No new allergies or side effects reported with this shipment. Medication is being taken as prescribed by physician and properly stored. Two patient identifiers:  and Address verified. Patient has no questions or concerns for [facility]. Patient states that he has 5 days on hand.

## 2020-06-01 ENCOUNTER — PATIENT OUTREACH (OUTPATIENT)
Dept: ADMINISTRATIVE | Facility: OTHER | Age: 75
End: 2020-06-01

## 2020-06-01 DIAGNOSIS — C64.9 METASTATIC RENAL CELL CARCINOMA TO LIVER: ICD-10-CM

## 2020-06-01 DIAGNOSIS — C78.7 METASTATIC RENAL CELL CARCINOMA TO LIVER: ICD-10-CM

## 2020-06-01 DIAGNOSIS — G89.3 NEOPLASM RELATED PAIN: ICD-10-CM

## 2020-06-01 RX ORDER — MORPHINE SULFATE 15 MG/1
15 TABLET, FILM COATED, EXTENDED RELEASE ORAL EVERY 12 HOURS
Qty: 60 TABLET | Refills: 0 | Status: SHIPPED | OUTPATIENT
Start: 2020-06-01 | End: 2020-06-01 | Stop reason: SDUPTHER

## 2020-06-01 RX ORDER — HYDROCODONE BITARTRATE AND ACETAMINOPHEN 5; 325 MG/1; MG/1
TABLET ORAL
Qty: 120 TABLET | Refills: 0 | Status: SHIPPED | OUTPATIENT
Start: 2020-06-01 | End: 2020-07-06 | Stop reason: SDUPTHER

## 2020-06-03 ENCOUNTER — TELEPHONE (OUTPATIENT)
Dept: PHARMACY | Facility: CLINIC | Age: 75
End: 2020-06-03

## 2020-06-03 ENCOUNTER — OFFICE VISIT (OUTPATIENT)
Dept: NEPHROLOGY | Facility: CLINIC | Age: 75
End: 2020-06-03
Payer: MEDICARE

## 2020-06-03 VITALS
SYSTOLIC BLOOD PRESSURE: 142 MMHG | OXYGEN SATURATION: 97 % | BODY MASS INDEX: 25.55 KG/M2 | WEIGHT: 163.13 LBS | DIASTOLIC BLOOD PRESSURE: 70 MMHG | HEART RATE: 63 BPM

## 2020-06-03 DIAGNOSIS — R80.9 PROTEINURIA, UNSPECIFIED TYPE: ICD-10-CM

## 2020-06-03 DIAGNOSIS — N17.9 AKI (ACUTE KIDNEY INJURY): ICD-10-CM

## 2020-06-03 DIAGNOSIS — C64.2 RENAL CELL CARCINOMA OF LEFT KIDNEY: Primary | ICD-10-CM

## 2020-06-03 DIAGNOSIS — N18.30 CKD (CHRONIC KIDNEY DISEASE) STAGE 3, GFR 30-59 ML/MIN: ICD-10-CM

## 2020-06-03 PROCEDURE — 3288F PR FALLS RISK ASSESSMENT DOCUMENTED: ICD-10-PCS | Mod: HCNC,CPTII,S$GLB, | Performed by: INTERNAL MEDICINE

## 2020-06-03 PROCEDURE — 99499 UNLISTED E&M SERVICE: CPT | Mod: HCNC,S$GLB,, | Performed by: INTERNAL MEDICINE

## 2020-06-03 PROCEDURE — 1125F AMNT PAIN NOTED PAIN PRSNT: CPT | Mod: HCNC,S$GLB,, | Performed by: INTERNAL MEDICINE

## 2020-06-03 PROCEDURE — 3077F SYST BP >= 140 MM HG: CPT | Mod: HCNC,CPTII,S$GLB, | Performed by: INTERNAL MEDICINE

## 2020-06-03 PROCEDURE — 1100F PTFALLS ASSESS-DOCD GE2>/YR: CPT | Mod: HCNC,CPTII,S$GLB, | Performed by: INTERNAL MEDICINE

## 2020-06-03 PROCEDURE — 99999 PR PBB SHADOW E&M-EST. PATIENT-LVL V: ICD-10-PCS | Mod: PBBFAC,HCNC,, | Performed by: INTERNAL MEDICINE

## 2020-06-03 PROCEDURE — 3078F PR MOST RECENT DIASTOLIC BLOOD PRESSURE < 80 MM HG: ICD-10-PCS | Mod: HCNC,CPTII,S$GLB, | Performed by: INTERNAL MEDICINE

## 2020-06-03 PROCEDURE — 99204 PR OFFICE/OUTPT VISIT, NEW, LEVL IV, 45-59 MIN: ICD-10-PCS | Mod: HCNC,S$GLB,, | Performed by: INTERNAL MEDICINE

## 2020-06-03 PROCEDURE — 1125F PR PAIN SEVERITY QUANTIFIED, PAIN PRESENT: ICD-10-PCS | Mod: HCNC,S$GLB,, | Performed by: INTERNAL MEDICINE

## 2020-06-03 PROCEDURE — 3077F PR MOST RECENT SYSTOLIC BLOOD PRESSURE >= 140 MM HG: ICD-10-PCS | Mod: HCNC,CPTII,S$GLB, | Performed by: INTERNAL MEDICINE

## 2020-06-03 PROCEDURE — 3078F DIAST BP <80 MM HG: CPT | Mod: HCNC,CPTII,S$GLB, | Performed by: INTERNAL MEDICINE

## 2020-06-03 PROCEDURE — 99499 RISK ADDL DX/OHS AUDIT: ICD-10-PCS | Mod: HCNC,S$GLB,, | Performed by: INTERNAL MEDICINE

## 2020-06-03 PROCEDURE — 99204 OFFICE O/P NEW MOD 45 MIN: CPT | Mod: HCNC,S$GLB,, | Performed by: INTERNAL MEDICINE

## 2020-06-03 PROCEDURE — 1100F PR PT FALLS ASSESS DOC 2+ FALLS/FALL W/INJURY/YR: ICD-10-PCS | Mod: HCNC,CPTII,S$GLB, | Performed by: INTERNAL MEDICINE

## 2020-06-03 PROCEDURE — 3288F FALL RISK ASSESSMENT DOCD: CPT | Mod: HCNC,CPTII,S$GLB, | Performed by: INTERNAL MEDICINE

## 2020-06-03 PROCEDURE — 99999 PR PBB SHADOW E&M-EST. PATIENT-LVL V: CPT | Mod: PBBFAC,HCNC,, | Performed by: INTERNAL MEDICINE

## 2020-06-03 PROCEDURE — 1159F MED LIST DOCD IN RCRD: CPT | Mod: HCNC,S$GLB,, | Performed by: INTERNAL MEDICINE

## 2020-06-03 PROCEDURE — 1159F PR MEDICATION LIST DOCUMENTED IN MEDICAL RECORD: ICD-10-PCS | Mod: HCNC,S$GLB,, | Performed by: INTERNAL MEDICINE

## 2020-06-03 NOTE — LETTER
Giulia 3, 2020      Rahel Reed MD  [address]  [facility] [address]           [facility]  [address]  [facility] [address]  Phone: [phone]  Fax: [phone]          Patient: Edwin Powell   MR Number: [id number]   YOB: 1945   Date of Visit: 6/3/2020       Dear Dr. Rahel Reed:    Thank you for referring Edwin Powell to me for evaluation. Attached you will find relevant portions of my assessment and plan of care.    If you have questions, please do not hesitate to call me. I look forward to following Edwin Powell along with you.    Sincerely,    Venkat Leonardo,     Enclosure  CC:  No Recipients    If you would like to receive this communication electronically, please contact [email or web address] or [phone] to request more information on [employer] Link access.    For providers and/or their staff who would like to refer a patient to [facility], please contact us through our one-stop-shop provider referral line, [facility] , at [phone].    If you feel you have received this communication in error or would no longer like to receive these types of communications, please e-mail [email or web address]

## 2020-06-03 NOTE — PROGRESS NOTES
"Subjective:       Patient ID: Edwin Powell is a 75 y.o. White male who presents forChronic Kidney Disease    HPI Mr.Mc Moss is a 76 yo male with CAD s/p stents, DM2 for 10 years, HTN over 5 years, hypothyroidism , CVA  with no residual deficit, chronic back pain is here for CKD .  Unable to recall bp's from home. He had back pain in 2017 and  an MRI showed "L3 vertebral body lesion and   CT showed exophytic mass arising from the medial aspect of the lower pole of left kidney and a  small 1.0 cm, subtle, cortical enhancing mass within the upper pole of the right kidney, concerning for possible contralateral solid renal mass,  L3 metastatic lesion and liver involvement. On votrient. Does not hydrate well. Was taking advil 2-4 a day  for 2 weeks and stopped about a week ago but was taking the periodically for a while. Had CT scans with contrast.       PMHX: per HPI.      Review of Systems   Constitutional: Positive for appetite change and fatigue.        Weight loss   HENT: Negative.    Eyes: Positive for discharge.   Respiratory: Negative.    Cardiovascular: Negative.    Gastrointestinal: Positive for diarrhea.         diahrrea at times   Genitourinary: Negative.    Musculoskeletal: Positive for back pain and neck pain.   Psychiatric/Behavioral: Negative.        Objective:      Physical Exam   Constitutional: He appears well-developed and well-nourished.   Musculoskeletal:   No edema       Assessment:       1. VERENA (acute kidney injury)        Plan:         1. CKD 3:  Creatinine of 1.7-2.0 for the last year or so but 1.4 in 2018.  Current creatinine of 2.1 with GFR of 30 ml/min. CKD likely sec to multiple contrast studies, prerenal , NSAID's, HTN, DM.  CT reviewed.  Pt not hydrating well-encouraged to hydrate and to avoid NSAID's.Repeat labs.      Lab Results   Component Value Date    CREATININE 2.1 (H) 05/19/2020     Protein Creatinine Ratios:7 gms; repeat.  1.6 gms in 2017 on 24 hr urine protein.  Underlying " diabetic nephropathy Possibly worsened with votrient, possible membranous with malignancy; NSAID's.. Will check serologies. On lisinopril.    Prot/Creat Ratio, Ur   Date Value Ref Range Status   05/26/2020 7.53 (H) 0.00 - 0.20 Final      ·   ·   Acid-Base: bicarb low-will repeat and may need sod bicarb.  Lab Results   Component Value Date     05/19/2020    K 5.0 05/19/2020    CO2 20 (L) 05/19/2020     2. HTN: Blood pressures need to be monitored and he will call us with readings with VEGF.     3. Renal osteodystrophy: last PTH stable.  Vit D low-on ergocalciferol.  Lab Results   Component Value Date    .0 (H) 05/26/2020    CALCIUM 8.5 (L) 05/19/2020    PHOS 2.1 (L) 01/19/2018       4. Anemia: stable.   Lab Results   Component Value Date    HGB 11.6 (L) 05/19/2020        5. DM:  Last HbA1C in 6's but as high as 11 and 8 in the past.  Lab Results   Component Value Date    HGBA1C 6.6 (H) 04/01/2020         Follow up in 2 months.

## 2020-06-03 NOTE — PROGRESS NOTES
Patient, Edwin Powell (MRN #[id number]), presented with a recent Estimated Glumerular Filtration Rate (EGFR) between 15 and 29 consistent with the definition of chronic kidney disease stage 4 (ICD10 - N18.4).    eGFR if non    Date Value Ref Range Status   05/19/2020 29.9 (A) >60 mL/min/1.73 m^2 Final     Comment:     Calculation used to obtain the estimated glomerular filtration  rate (eGFR) is the CKD-EPI equation.          The patient's chronic kidney disease stage 4 was monitored, evaluated, addressed and/or treated. This addendum to the medical record is made on 06/03/2020.

## 2020-06-08 ENCOUNTER — PATIENT OUTREACH (OUTPATIENT)
Dept: ADMINISTRATIVE | Facility: OTHER | Age: 75
End: 2020-06-08

## 2020-06-09 ENCOUNTER — HOSPITAL ENCOUNTER (OUTPATIENT)
Dept: RADIOLOGY | Facility: HOSPITAL | Age: 75
Discharge: HOME | End: 2020-06-09
Attending: PHYSICIAN ASSISTANT
Payer: MEDICARE

## 2020-06-09 ENCOUNTER — OFFICE VISIT (OUTPATIENT)
Dept: ORTHOPEDICS | Facility: CLINIC | Age: 75
End: 2020-06-09
Payer: MEDICARE

## 2020-06-09 ENCOUNTER — TELEPHONE (OUTPATIENT)
Dept: INTERNAL MEDICINE | Facility: CLINIC | Age: 75
End: 2020-06-09

## 2020-06-09 ENCOUNTER — OFFICE VISIT (OUTPATIENT)
Dept: PRIMARY CARE CLINIC | Facility: CLINIC | Age: 75
End: 2020-06-09
Payer: MEDICARE

## 2020-06-09 VITALS — WEIGHT: 163 LBS | BODY MASS INDEX: 25.58 KG/M2 | HEIGHT: 67 IN

## 2020-06-09 VITALS — BODY MASS INDEX: 24.9 KG/M2 | WEIGHT: 159 LBS

## 2020-06-09 DIAGNOSIS — R11.2 CHEMOTHERAPY-INDUCED NAUSEA AND VOMITING: ICD-10-CM

## 2020-06-09 DIAGNOSIS — E78.5 HYPERLIPIDEMIA DUE TO TYPE 2 DIABETES MELLITUS: ICD-10-CM

## 2020-06-09 DIAGNOSIS — E53.8 B12 DEFICIENCY: ICD-10-CM

## 2020-06-09 DIAGNOSIS — I15.2 HYPERTENSION ASSOCIATED WITH DIABETES: ICD-10-CM

## 2020-06-09 DIAGNOSIS — N40.1 BENIGN PROSTATIC HYPERPLASIA (BPH) WITH URINARY URGE INCONTINENCE: ICD-10-CM

## 2020-06-09 DIAGNOSIS — E55.9 VITAMIN D DEFICIENCY: ICD-10-CM

## 2020-06-09 DIAGNOSIS — D53.9 MACROCYTIC ANEMIA: ICD-10-CM

## 2020-06-09 DIAGNOSIS — M50.30 DDD (DEGENERATIVE DISC DISEASE), CERVICAL: ICD-10-CM

## 2020-06-09 DIAGNOSIS — N39.41 BENIGN PROSTATIC HYPERPLASIA (BPH) WITH URINARY URGE INCONTINENCE: ICD-10-CM

## 2020-06-09 DIAGNOSIS — E03.9 ACQUIRED HYPOTHYROIDISM: ICD-10-CM

## 2020-06-09 DIAGNOSIS — Z66 DNR (DO NOT RESUSCITATE): ICD-10-CM

## 2020-06-09 DIAGNOSIS — M54.2 NECK PAIN: ICD-10-CM

## 2020-06-09 DIAGNOSIS — M48.02 CERVICAL STENOSIS OF SPINE: ICD-10-CM

## 2020-06-09 DIAGNOSIS — E11.59 HYPERTENSION ASSOCIATED WITH DIABETES: ICD-10-CM

## 2020-06-09 DIAGNOSIS — T45.1X5A CHEMOTHERAPY-INDUCED NAUSEA AND VOMITING: ICD-10-CM

## 2020-06-09 DIAGNOSIS — E11.69 HYPERLIPIDEMIA DUE TO TYPE 2 DIABETES MELLITUS: ICD-10-CM

## 2020-06-09 DIAGNOSIS — Z98.1 HISTORY OF FUSION OF CERVICAL SPINE: Primary | ICD-10-CM

## 2020-06-09 PROCEDURE — 1125F PR PAIN SEVERITY QUANTIFIED, PAIN PRESENT: ICD-10-PCS | Mod: HCNC,S$GLB,, | Performed by: PHYSICIAN ASSISTANT

## 2020-06-09 PROCEDURE — 1101F PR PT FALLS ASSESS DOC 0-1 FALLS W/OUT INJ PAST YR: ICD-10-PCS | Mod: HCNC,CPTII,S$GLB, | Performed by: PHYSICIAN ASSISTANT

## 2020-06-09 PROCEDURE — 99204 OFFICE O/P NEW MOD 45 MIN: CPT | Mod: HCNC,S$GLB,, | Performed by: PHYSICIAN ASSISTANT

## 2020-06-09 PROCEDURE — 72040 X-RAY EXAM NECK SPINE 2-3 VW: CPT | Mod: 26,HCNC,, | Performed by: RADIOLOGY

## 2020-06-09 PROCEDURE — 99443 PR PHYSICIAN TELEPHONE EVALUATION 21-30 MIN: CPT | Mod: HCNC,95,, | Performed by: INTERNAL MEDICINE

## 2020-06-09 PROCEDURE — 1101F PT FALLS ASSESS-DOCD LE1/YR: CPT | Mod: HCNC,CPTII,S$GLB, | Performed by: PHYSICIAN ASSISTANT

## 2020-06-09 PROCEDURE — 1125F AMNT PAIN NOTED PAIN PRSNT: CPT | Mod: HCNC,S$GLB,, | Performed by: PHYSICIAN ASSISTANT

## 2020-06-09 PROCEDURE — 72040 XR CERVICAL SPINE FLEXION  AND EXTENSION ONLY: ICD-10-PCS | Mod: 26,HCNC,, | Performed by: RADIOLOGY

## 2020-06-09 PROCEDURE — 72040 X-RAY EXAM NECK SPINE 2-3 VW: CPT | Mod: TC,HCNC

## 2020-06-09 PROCEDURE — 99999 PR PBB SHADOW E&M-EST. PATIENT-LVL III: CPT | Mod: PBBFAC,HCNC,, | Performed by: PHYSICIAN ASSISTANT

## 2020-06-09 PROCEDURE — 99204 PR OFFICE/OUTPT VISIT, NEW, LEVL IV, 45-59 MIN: ICD-10-PCS | Mod: HCNC,S$GLB,, | Performed by: PHYSICIAN ASSISTANT

## 2020-06-09 PROCEDURE — 1159F MED LIST DOCD IN RCRD: CPT | Mod: HCNC,S$GLB,, | Performed by: PHYSICIAN ASSISTANT

## 2020-06-09 PROCEDURE — 99999 PR PBB SHADOW E&M-EST. PATIENT-LVL III: ICD-10-PCS | Mod: PBBFAC,HCNC,, | Performed by: PHYSICIAN ASSISTANT

## 2020-06-09 PROCEDURE — 1159F PR MEDICATION LIST DOCUMENTED IN MEDICAL RECORD: ICD-10-PCS | Mod: HCNC,S$GLB,, | Performed by: PHYSICIAN ASSISTANT

## 2020-06-09 PROCEDURE — 99443 PR PHYSICIAN TELEPHONE EVALUATION 21-30 MIN: ICD-10-PCS | Mod: HCNC,95,, | Performed by: INTERNAL MEDICINE

## 2020-06-09 RX ORDER — TAMSULOSIN HYDROCHLORIDE 0.4 MG/1
0.8 CAPSULE ORAL NIGHTLY
Qty: 180 CAPSULE | Refills: 3 | Status: SHIPPED | OUTPATIENT
Start: 2020-06-09 | End: 2021-01-01 | Stop reason: SDUPTHER

## 2020-06-09 RX ORDER — ACETAMINOPHEN 500 MG
5000 TABLET ORAL DAILY
Qty: 90 TABLET | Refills: 3 | Status: SHIPPED | OUTPATIENT
Start: 2020-06-09

## 2020-06-09 RX ORDER — LISINOPRIL 40 MG/1
40 TABLET ORAL DAILY
Qty: 90 TABLET | Refills: 3 | Status: SHIPPED | OUTPATIENT
Start: 2020-06-09 | End: 2020-08-04 | Stop reason: CLARIF

## 2020-06-09 RX ORDER — LEVOTHYROXINE SODIUM 88 UG/1
88 TABLET ORAL DAILY
Qty: 90 TABLET | Refills: 3 | Status: SHIPPED | OUTPATIENT
Start: 2020-06-09 | End: 2021-01-01 | Stop reason: SDUPTHER

## 2020-06-09 RX ORDER — MULTIVIT WITH MINERALS/HERBS
1 TABLET ORAL DAILY
Qty: 90 TABLET | Refills: 3 | Status: SHIPPED | OUTPATIENT
Start: 2020-06-09

## 2020-06-09 RX ORDER — ONDANSETRON HYDROCHLORIDE 8 MG/1
8 TABLET, FILM COATED ORAL 3 TIMES DAILY PRN
Qty: 90 TABLET | Refills: 3 | Status: SHIPPED | OUTPATIENT
Start: 2020-06-09 | End: 2021-01-01

## 2020-06-09 RX ORDER — AMLODIPINE BESYLATE 10 MG/1
10 TABLET ORAL DAILY
Qty: 90 TABLET | Refills: 3 | Status: SHIPPED | OUTPATIENT
Start: 2020-06-09 | End: 2020-08-04 | Stop reason: CLARIF

## 2020-06-09 NOTE — ASSESSMENT & PLAN NOTE
Elevated lipids, previously had diarrhea with statin  · Is not tolerating atorvastatin q2-3 days   · Restarted crestor and he is tolerating

## 2020-06-09 NOTE — PROGRESS NOTES
Established Patient - Audio Only Telehealth Visit with [facility] Provider     The patient location is: HOME  The chief complaint leading to consultation is: routine care  Visit type: Virtual visit with audio only (telephone)     The reason for the audio only service rather than synchronous audio and video virtual visit was related to technical difficulties or patient preference/necessity.     Each patient to whom I provide medical services by telemedicine is:  (1) informed of the relationship between the physician and patient and the respective role of any other health care provider with respect to management of the patient; and (2) notified that they may decline to receive medical services by telemedicine and may withdraw from such care at any time. Patient verbally consented to receive this service via voice-only telephone call.       Subjective:      Patient ID: Edwin Powell is a 75 y.o. male.    Patient's risk score of poor outcomes with COVID is 6. Patient is high risk for poor outcomes with COVID due to the following chronic conditions advanced age, CHF, HTN, MDD, DM, cancer    Patient has no complaints today aside from neck pain. He saw Dr Steele who advised him that surgery is an option. He is going to try OT first in [place].    He has nephrology labs tomorrow. His CKD has acutely worsened.    Continues to take votrient. Has RCC. Will see Dr Leonardo.    His DM is improving in control, readings are in the 120s. He continues to use 5U long acting insulin.    He lost weight, and is now 159# (was in the 170s this year).     It is unclear if he is taking crestor, last lipid panel was not controlled.    He is following precautions by wearing a mask, staying in, taking meds.    Advance Care Planning   ACP note on 6/9/20, wants to be DNR/DNI    Objective:     Lab Results   Component Value Date    WBC 6.75 05/19/2020    HGB 11.6 (L) 05/19/2020    HCT 39.5 (L) 05/19/2020     (H) 05/19/2020    CHOL 181  02/24/2020    TRIG 143 02/24/2020    HDL 37 (L) 02/24/2020    ALT 19 05/19/2020    AST 24 05/19/2020     05/19/2020    K 5.0 05/19/2020     05/19/2020    CREATININE 2.1 (H) 05/19/2020    BUN 37 (H) 05/19/2020    CO2 20 (L) 05/19/2020    TSH 4.062 (H) 09/13/2019    PSA 1.1 07/16/2018    INR 1.2 06/12/2017    HGBA1C 6.6 (H) 04/01/2020         Assessment:   75 y.o. male with multiple co-morbid illnesses here to continue work-up of chronic issues.     Plan:     Problem List Items Addressed This Visit        Cardiac/Vascular    Hyperlipidemia due to type 2 diabetes mellitus     Elevated lipids, previously had diarrhea with statin  · Is not tolerating atorvastatin q2-3 days   · Restarted crestor and he is tolerating         Relevant Orders    Lipid Panel    Hemoglobin A1C       Renal/    Benign prostatic hyperplasia (BPH) with urinary urge incontinence    Relevant Medications    tamsulosin (FLOMAX) 0.4 mg Cap       Oncology    Macrocytic anemia    Relevant Medications    b complex vitamins (B COMPLEX 1) tablet       Endocrine    Acquired hypothyroidism     Elevated TSH in 6/2017, 8/2017, 11/2017, 3/2018 compliant with levo 75mcg  · Advised to take separate from other meds and food  · Dose increased to 88mcg  · Will not increase beyond this due to age and chronic conditions  · Monitor levels         Relevant Medications    levothyroxine (SYNTHROID) 88 MCG tablet    lisinopriL (PRINIVIL,ZESTRIL) 40 MG tablet    Other Relevant Orders    TSH    B12 deficiency    Relevant Medications    b complex vitamins (B COMPLEX 1) tablet    Vitamin D deficiency    Relevant Medications    cholecalciferol, vitamin D3, (VITAMIN D3) 125 mcg (5,000 unit) Tab       Palliative Care    DNR (do not resuscitate)     DNR/DNI. His son is PoA (Edwin Powell Jr). Does not want aggressive end of life measures  · ACP conversation completed on 6/9/20, see note           Other Visit Diagnoses     Hypertension associated with diabetes         Relevant Medications    amLODIPine (NORVASC) 10 MG tablet    lisinopriL (PRINIVIL,ZESTRIL) 40 MG tablet    Chemotherapy-induced nausea and vomiting        Relevant Medications    ondansetron (ZOFRAN) 8 MG tablet          Health Maintenance       Date Due Completion Date    TETANUS VACCINE 02/18/1963 ---    Shingles Vaccine (2 of 2) 01/13/2020 11/18/2019    Hemoglobin A1c 10/01/2020 4/1/2020- TODAY    Colonoscopy 02/15/2021 2/15/2011 (Done)    Override on 2/15/2011: Done (Per [employer] dashboard ? results)    Lipid Panel 02/24/2021 2/24/2020- TODAY    Foot Exam 05/18/2021 5/18/2020 (Done)    Override on 5/18/2020: Done    Override on 1/13/2020: Done    Override on 7/11/2019: Done    Override on 2/2/2018: Done (performed by podiatrist)    High Dose Statin 06/03/2021 6/3/2020          Follow up in about 2 months (around 8/9/2020). Thirty minutes spent with this patient today, including addressing advanced care planning.    Lulú Lewis MD/MPH  Internal Medicine  [facility]  [facility] [phone]    This service was not originating from a related E/M service provided within the previous 7 days nor will  to an E/M service or procedure within the next 24 hours or my soonest available appointment.  Prevailing standard of care was able to be met in this audio-only visit.

## 2020-06-09 NOTE — ASSESSMENT & PLAN NOTE
Elevated TSH in 6/2017, 8/2017, 11/2017, 3/2018 compliant with levo 75mcg  · Advised to take separate from other meds and food  · Dose increased to 88mcg  · Will not increase beyond this due to age and chronic conditions  · Monitor levels

## 2020-06-09 NOTE — PROGRESS NOTES
DATE: 6/9/2020  PATIENT: Edwin Powell    Supervising Physician: Filemon Aguayo M.D.    CHIEF COMPLAINT: neck pain    HISTORY:  Edwin Powell is a 75 y.o. male with PMH of C4-6 non instrumented fusion in the '80s here for initial evaluation of neck pain (Neck - 9, Arm - 0). The pain has been present for about a month.  He says he hasn't had any problems with his neck since surgery until a month ago.  He denies any trauma or accidents. The patient describes the pain as constant and throbbing. The pain is worse with activity and improved by laying down. There is no associated numbness and tingling. There is no subjective weakness. Prior treatments have included tylenol, norco, morphine and a TENS unit, but no PT, ESIs or recent surgery.     The patient denies myelopathic symptoms such as handwriting changes or difficulty with buttons/coins/keys. Denies perineal paresthesias, bowel/bladder dysfunction.    PAST MEDICAL/SURGICAL HISTORY:  Past Medical History:   Diagnosis Date    Acquired hypothyroidism 5/26/2017    Benign essential HTN 5/26/2017    Benign prostatic hyperplasia (BPH) with urinary urge incontinence 6/6/2017    Chronic low back pain 6/1/2017    Coronary artery disease involving native coronary artery of native heart without angina pectoris 5/26/2017    S/p 5 stents - last one around 2010-11.    Gastroesophageal reflux disease without esophagitis 5/26/2017    History of CVA (cerebrovascular accident) 5/26/2017    Hypertension associated with diabetes 5/26/2017    BP controlled on ACE, CCB    Lumbar disc lesion 5/26/2017    Metastatic renal cell carcinoma to bone 6/6/2017    Metastatic renal cell carcinoma to liver 6/6/2017    Liver Biopsy 5-2017: METASTATIC RENAL CELL CARCINOMA.    Mixed hyperlipidemia 5/26/2017    Type 2 diabetes mellitus with stage 3 chronic kidney disease, with long-term current use of insulin 5/26/2017     Past Surgical History:   Procedure Laterality Date     ABDOMINAL SURGERY      APPENDECTOMY      BACK SURGERY      CARDIAC SURGERY      CATARACT EXTRACTION      CHOLECYSTECTOMY      coronary stenting      X 5 last one in 2010-11.    INTRAOCULAR PROSTHESES INSERTION Right 6/27/2019    Procedure: INSERTION, IOL PROSTHESIS;  Surgeon: Chary Culp MD;  Location: [address];  Service: Ophthalmology;  Laterality: Right;    INTRAOCULAR PROSTHESES INSERTION Left 8/22/2019    Procedure: INSERTION, IOL PROSTHESIS;  Surgeon: Chary Culp MD;  Location: [facility] OR [address] [place] [address];  Service: Ophthalmology;  Laterality: Left;    PHACOEMULSIFICATION OF CATARACT Right 6/27/2019    Procedure: PHACOEMULSIFICATION, CATARACT;  Surgeon: Chary Culp MD;  Location: [facility] OR [address] [place] [address];  Service: Ophthalmology;  Laterality: Right;    PHACOEMULSIFICATION OF CATARACT Left 8/22/2019    Procedure: PHACOEMULSIFICATION, CATARACT;  Surgeon: Chary Culp MD;  Location: [facility] OR [address] [place] [address];  Service: Ophthalmology;  Laterality: Left;    SPINAL FUSION         Medications:  Current Outpatient Medications on File Prior to Visit   Medication Sig Dispense Refill    amLODIPine (NORVASC) 10 MG tablet Take 1 tablet (10 mg total) by mouth once daily. 90 tablet 3    ascorbic acid (VITAMIN C ORAL) Take by mouth once daily.      aspirin (ECOTRIN) 81 MG EC tablet Take 81 mg by mouth once daily.      b complex vitamins (B COMPLEX 1) tablet Take 1 tablet by mouth once daily.      blood sugar diagnostic Strp 1 strip by Misc.(Non-Drug; Combo Route) route 2 (two) times daily as needed. 200 strip 3    blood-glucose meter Misc use as instructed 1 each 0    cholecalciferol, vitamin D3, (VITAMIN D3) 5,000 unit Tab Take 5,000 Units by mouth once daily. 90 tablet 3    clindamycin (CLEOCIN T) 1 % external solution AAA bid (Patient not taking: Reported on 6/3/2020) 60 mL 3    diphenhydramine HCl (BENADRYL ALLERGY ORAL) Take by mouth daily as needed.      fluticasone propionate (FLONASE) 50  "mcg/actuation nasal spray 1 SPRAY (50 MCG TOTAL) BY EACH NARE ROUTE ONCE DAILY. 16 mL 3    HYDROcodone-acetaminophen (NORCO) 5-325 mg per tablet 1 tablet PO q8h prn pain. Quantity Medically Necessary. 120 tablet 0    insulin (LANTUS SOLOSTAR U-100 INSULIN) glargine 100 units/mL (3mL) SubQ pen Inject 5 Units into the skin every evening. 5 mL 11    lancets (ONETOUCH ULTRASOFT LANCETS) Misc 1 lancet by Misc.(Non-Drug; Combo Route) route 2 (two) times daily as needed. 200 each 3    levothyroxine (SYNTHROID) 88 MCG tablet TAKE 1 TABLET (88 MCG TOTAL) BY MOUTH ONCE DAILY. 90 tablet 3    lisinopril (PRINIVIL,ZESTRIL) 40 MG tablet Take 1 tablet (40 mg total) by mouth once daily. 90 tablet 3    loratadine (CLARITIN) 10 mg tablet Take 1 tablet (10 mg total) by mouth once daily.  0    morphine (MS CONTIN) 15 MG 12 hr tablet Take 1 tablet (15 mg total) by mouth every 12 (twelve) hours. Medically Necessary 60 tablet 0    multivitamin (THERAGRAN) per tablet Take 1 tablet by mouth once daily.      ondansetron (ZOFRAN) 8 MG tablet Take 1 tablet (8 mg total) by mouth 3 (three) times daily as needed. 90 tablet 3    PAZOpanib (VOTRIENT) 200 mg Tab Take 4 tablets (800 mg total) by mouth once daily. 120 tablet 5    pen needle, diabetic 29 gauge x 1/2" Ndle 1 application by Misc.(Non-Drug; Combo Route) route once daily. 100 each 3    prednisolon/gatiflox/bromfenac (PREDNISOL ACE-GATIFLOX-BROMFEN) 1-0.5-0.075 % DrpS Apply 1 drop to eye 3 (three) times daily. (Patient not taking: Reported on 6/3/2020) 7 mL 3    rosuvastatin (CRESTOR) 20 MG tablet Take 1 tablet (20 mg total) by mouth once daily. 90 tablet 3    tamsulosin (FLOMAX) 0.4 mg Cap Take 2 capsules (0.8 mg total) by mouth every evening. 180 capsule 3    vitamin E acetate (VITAMIN E ORAL) Take by mouth once daily.       Current Facility-Administered Medications on File Prior to Visit   Medication Dose Route Frequency Provider Last Rate Last Dose    phenylephrine HCL " 2.5% ophthalmic solution 1 drop  1 drop Left Eye On Call Procedure Chary Culp MD   1 drop at 08/22/19 0659    proparacaine 0.5 % ophthalmic solution 1 drop  1 drop Left Eye On Call Procedure Chary Culp MD   1 drop at 08/22/19 0641    tropicamide 1% ophthalmic solution 1 drop  1 drop Left Eye On Call Procedure Chary Culp MD   1 drop at 08/22/19 0700       Social History:   Social History     Socioeconomic History    Marital status: Single     Spouse name: Not on file    Number of children: Not on file    Years of education: Not on file    Highest education level: Not on file   Occupational History    Not on file   Social Needs    Financial resource strain: Hard    Food insecurity:     Worry: Never true     Inability: Never true    Transportation needs:     Medical: No     Non-medical: No   Tobacco Use    Smoking status: Former Smoker     Types: Cigarettes    Smokeless tobacco: Never Used    Tobacco comment: haven't smoked in last 25 yrs   Substance and Sexual Activity    Alcohol use: Yes     Comment: rarely     Drug use: Not Currently    Sexual activity: Not Currently     Partners: Female   Lifestyle    Physical activity:     Days per week: Not on file     Minutes per session: Not on file    Stress: Not on file   Relationships    Social connections:     Talks on phone: Not on file     Gets together: Not on file     Attends Voodoo service: Not on file     Active member of club or organization: Not on file     Attends meetings of clubs or organizations: Not on file     Relationship status: Not on file   Other Topics Concern    Not on file   Social History Narrative    Not on file       REVIEW OF SYSTEMS:  Constitution: Negative. Negative for chills, fever and night sweats.   Cardiovascular: Negative for chest pain and syncope.   Respiratory: Negative for cough and shortness of breath.   Gastrointestinal: See HPI. Negative for nausea/vomiting. Negative for abdominal  "pain.  Genitourinary: See HPI. Negative for discoloration or dysuria.  Skin: Negative for dry skin, itching and rash.   Hematologic/Lymphatic: Negative for bleeding problem. Does not bruise/bleed easily.   Musculoskeletal: Negative for falls and muscle weakness.   Neurological: See HPI. No seizures.   Endocrine: Negative for polydipsia, polyphagia and polyuria.   Allergic/Immunologic: Negative for hives and persistent infections.  Psychiatric/Behavioral: Negative for depression and insomnia.         EXAM:  Ht 5' 7" (1.702 m)   Wt 73.9 kg (163 lb)   BMI 25.53 kg/m²     General: The patient is a very pleasant 75 y.o. male in no apparent distress, the patient is oriented to person, place and time.  Psych: Normal mood and affect  HEENT: Vision grossly intact, hearing intact to the spoken word.  Lungs: Respirations unlabored.  Gait: Normal station and gait, no difficulty with toe or heel walk.   Skin: Cervical skin negative for rashes, lesions, hairy patches and surgical scars.  Range of motion: Cervical range of motion is acceptable. There is mild tenderness to palpation.  Spinal Balance: Global saggital and coronal spinal balance acceptable, no significant for scoliosis and kyphosis.  Musculoskeletal: No pain with the range of motion of the bilateral shoulders and elbows. Normal bulk and contour of the bilateral hands.  Vascular: Bilateral hands warm and well perfused, radial pulses 2+ bilaterally.  Neurological: Normal strength and tone in all major motor groups in the bilateral upper and lower extremities. Normal sensation to light touch in the C5-T1 and L2-S1 dermatomes bilaterally.  Deep tendon reflexes symmetric 2+ in the bilateral upper and lower extremities.  Negative Inverted Radial Reflex and Locke's bilaterally. Negative Babinski bilaterally.     IMAGING:   Today I personally reviewed AP, Lat and Flex/Ex  upright C-spine films that demonstrate prior fusion from C4-6.  There is minimal anterolisthesis at " C3/4 and mild disc space narrowing at C6/7.    MRI cervical spine demonstrates severe stenosis at C6/7.         Body mass index is 25.53 kg/m².    Hemoglobin A1C   Date Value Ref Range Status   04/01/2020 6.6 (H) 4.0 - 5.6 % Final     Comment:     ADA Screening Guidelines:  5.7-6.4%  Consistent with prediabetes  >or=6.5%  Consistent with diabetes  High levels of fetal hemoglobin interfere with the HbA1C  assay. Heterozygous hemoglobin variants (HbS, HgC, etc)do  not significantly interfere with this assay.   However, presence of multiple variants may affect accuracy.     11/18/2019 8.2 (H) 4.0 - 5.6 % Final     Comment:     ADA Screening Guidelines:  5.7-6.4%  Consistent with prediabetes  >or=6.5%  Consistent with diabetes  High levels of fetal hemoglobin interfere with the HbA1C  assay. Heterozygous hemoglobin variants (HbS, HgC, etc)do  not significantly interfere with this assay.   However, presence of multiple variants may affect accuracy.     02/19/2019 6.4 (H) 4.0 - 5.6 % Final     Comment:     ADA Screening Guidelines:  5.7-6.4%  Consistent with prediabetes  >or=6.5%  Consistent with diabetes  High levels of fetal hemoglobin interfere with the HbA1C  assay. Heterozygous hemoglobin variants (HbS, HgC, etc)do  not significantly interfere with this assay.   However, presence of multiple variants may affect accuracy.             ASSESSMENT/PLAN:    Edwin was seen today for neck pain and shoulder pain.    Diagnoses and all orders for this visit:    History of fusion of cervical spine    Cervical stenosis of spine  -     Ambulatory referral/consult to Orthopedics    Neck pain  -     Ambulatory referral/consult to Physical/Occupational Therapy; Future      Today we discussed at length all of the different treatment options including anti-inflammatories, acetaminophen, rest, ice, heat, physical therapy including strengthening and stretching exercises, home exercises, ROM, aerobic conditioning, aqua therapy, other  modalities including ultrasound, massage, and dry needling, epidural steroid injections and finally surgical intervention.      Discussed with Dr. Aguayo.  The patient does have severe stenosis at C6/7, however he does not have any myelopathic symptoms.  We will observe closely for now.  Referral for PT placed today.  I will see him back in 6 weeks to re-evaluate.       Follow up if symptoms worsen or fail to improve.

## 2020-06-09 NOTE — ACP (ADVANCE CARE PLANNING)
Advance Care Planning     Patient (Mr Edwin Powell) and PCP (Dr Lulú Lewis) spoke by phone on 6/9/20 from 12:40PM- 12:50PM regarding advanced care planning.    Advance Care Planning     Power of   I initiated the process of advance care planning today and explained the importance of this process to the patient.  I introduced the concept of advance directives to the patient, as well. Then the patient received detailed information about the importance of designating a Health Care Power of  (HCPOA). He was also instructed to communicate with this person about their wishes for future healthcare, should he become sick and lose decision-making capacity. The patient has not previously appointed a HCPOA. After our discussion, the patient has decided to complete a HCPOA and has appointed his son and NAME: Edwin Powell, SUMA.  I spent a total time of 5 minutes discussing this issue with the patient.         Living Will  During this visit, I engaged the patient  in the advance care planning process.  The patient and I reviewed the role for advance directives and their purpose in directing future healthcare if the patient's unable to speak for him/herself.  At this point in time, the patient does have full decision-making capacity.  We discussed different extreme health states that he could experience, and reviewed what kind of medical care he would want in those situations.  The patient communicated that if he were comatose and had little chance of a meaningful recovery, he would not want machines/life-sustaining treatments used. In addition to the above preference, other important end-of-life issues for the patient include DNR/DNI. The patient has already designated a healthcare power of  to make decisions on his behalf.  I spent a total of 5 minutes engaging the patient in this advance care planning discussion.    He does want to have supportive measures performed should there be a  reasonable chance of meaningful recovery, but does not want aggressive measures such as intubation performed.      Code Status  In light of the patients advanced and life limiting illness,I engaged the the patient in a conversation about the patient's preferences for care  at the very end of life. The patient wishes to have a natural, peaceful death.  Along those lines, the patient does not wish to have CPR or other invasive treatments performed when his heart and/or breathing stops. I communicated to the patient that a DNR order would be placed in his medical record to reflect this preference.  I spent a total of 5 minutes engaging the patient in this advance care planning discussion.        Lulú Lewis MD/MPH  [facility]  [phone] [facility]

## 2020-06-09 NOTE — LETTER
June 9, 2020      Rahel Reed MD  [address] [facility] [address]           [place] [id number]  [place] [address]  Phone: [phone]          Patient: Edwin Powell   MR Number: [id number]   YOB: 1945   Date of Visit: 6/9/2020       Dear Dr. Rahel Reed:    Thank you for referring Edwin Powell to me for evaluation. Attached you will find relevant portions of my assessment and plan of care.    If you have questions, please do not hesitate to call me. I look forward to following Edwin Powell along with you.    Sincerely,    PILLO Zambranoosure  CC:  No Recipients    If you would like to receive this communication electronically, please contact [email or web address] or [phone] to request more information on [facility] access.    For providers and/or their staff who would like to refer a patient to [facility], please contact us through our one-stop-shop provider referral line, [facility], at [phone].    If you feel you have received this communication in error or would no longer like to receive these types of communications, please e-mail [email or web address]

## 2020-06-09 NOTE — LETTER
June 9, 2020    Edwin Powell  [address]  [place] [address]             [facility]  [address]  [place] [address]  Phone: [phone]  Fax: [phone] Advance Care Planning     Patient (Mr Edwin Powell) and PCP (Dr Lulú Lewis) spoke by phone on 6/9/20 from 12:40PM- 12:50PM regarding advanced care planning.      Power of   I initiated the process of advance care planning today and explained the importance of this process to the patient.  I introduced the concept of advance directives to the patient, as well. Then the patient received detailed information about the importance of designating a Health Care Power of  (HCPOA). He was also instructed to communicate with this person about their wishes for future healthcare, should he become sick and lose decision-making capacity. The patient has not previously appointed a HCPOA. After our discussion, the patient has decided to complete a HCPOA and has appointed his son and NAME: Edwin Powell, SUMA.  I spent a total time of 5 minutes discussing this issue with the patient.         Living Will  During this visit, I engaged the patient  in the advance care planning process.  The patient and I reviewed the role for advance directives and their purpose in directing future healthcare if the patient's unable to speak for him/herself.  At this point in time, the patient does have full decision-making capacity.  We discussed different extreme health states that he could experience, and reviewed what kind of medical care he would want in those situations.  The patient communicated that if he were comatose and had little chance of a meaningful recovery, he would not want machines/life-sustaining treatments used. In addition to the above preference, other important end-of-life issues for the patient include DNR/DNI. The patient has already designated a healthcare power of  to make decisions on his behalf.  I spent a  total of 5 minutes engaging the patient in this advance care planning discussion.    He does want to have supportive measures performed should there be a reasonable chance of meaningful recovery, but does not want aggressive measures such as intubation performed.      Code Status  In light of the patients advanced and life limiting illness,I engaged the the patient in a conversation about the patient's preferences for care  at the very end of life. The patient wishes to have a natural, peaceful death.  Along those lines, the patient does not wish to have CPR or other invasive treatments performed when his heart and/or breathing stops. I communicated to the patient that a DNR order would be placed in his medical record to reflect this preference.  I spent a total of 5 minutes engaging the patient in this advance care planning discussion.          Lulú Lewis MD/MPH  [facility]  [phone] [facility]

## 2020-06-10 NOTE — TELEPHONE ENCOUNTER
Dosing, how taking Votrient: Takes 4 tablets (800mg) by mouth in the morning on an empty stomach. Denies any missed doses.   Storage: room temperature  Handling: does not touch tablets directly. Aware of proper handling precautions.   Side effects: notes no significant side effects while on Votrient. Some diarrhea on occasion (maybe once a week just once time). Has lomotil if needed - has only needed to use it on occasion.   BP: 142/70  Recent infections: no fever, chills, cough  Pain: some neck pain for about a month. Disk in neck is out of line and digging. Trying to go to therapy (starts next Tues). Pain rated about 9/10. Takes OTC Tylenol. Seems like it eases the pain just a little bit.   Appetite: not the best. Eating breakfast in the morning and grazes the rest of the day.   Energy, fatigue: still has some fatigue. Still able to complete normal daily activities.   Health, mood, QOL: 9/10  ED/UC visits: no  Next clinical follow up: 3 months  Medication list reviewed. No new allergies or health conditions.

## 2020-06-11 ENCOUNTER — TELEPHONE (OUTPATIENT)
Dept: NEPHROLOGY | Facility: CLINIC | Age: 75
End: 2020-06-11

## 2020-06-11 DIAGNOSIS — R80.9 PROTEINURIA, UNSPECIFIED TYPE: Primary | ICD-10-CM

## 2020-06-11 NOTE — TELEPHONE ENCOUNTER
Creatinine  better at  1.9.  He should con't to hydrate well.  His potassium is high at 5.3.  Please ask him to follow low K diet. He cont's to have lot of protein in urine.  Can you please ask him to doa 24 hr urine for protein if possible.  I need UPEP as well.  I ordered.

## 2020-06-11 NOTE — TELEPHONE ENCOUNTER
Preferred Name:   Al Powell  Male, 75 y.o., 1945  Phone:   [phone] (M)  PCP:   Lulú Lewis MD  Language:   English  Need Interp:   No  Allergies Last Reviewed:   06/09/20  Allergies:   No Known Allergies  Health Maintenance:   Due  FYI  Advance Directive  Population Management (Not a Candidate)  Therigy ID  Primary Ins.:   [employer] MANAGED MEDICARE  MRN:   [id number]  Pt Comm Pref:   Patient Portal, Mail  Last [facility] Login:   6/10/2020 12:45 PM, Mobile  Next Appt:   With Outpatient Rehab  06/16/2020 at 2:45 PM  My Sticky Note:     Specialty Comments:     Patient Calls     Venkat Leonardo DO routed conversation to Malissa Robledo Staff 2 hours ago (11:04 AM)      Venkat Leonardo DO 2 hours ago (11:04 AM)         Creatinine  better at  1.9.  He should con't to hydrate well.  His potassium is high at 5.3.  Please ask him to follow low K diet. He cont's to have lot of protein in urine.  Can you please ask him to doa 24 hr urine for protein if possible.  I need UPEP as well.  I ordered.          Documentation       Orders Placed This Encounter      Active     Creatinine, urine, timed 24 Hours Ordered On: 06/11/2020   Protein, urine, timed Ordered On: 06/11/2020   Protein electrophoresis, timed urine Ordered On: 06/11/2020

## 2020-06-11 NOTE — TELEPHONE ENCOUNTER
Hello.  I saw this patient recently for VERENA and he was found to have sig proteinuria-had subnephrotic in 2017 likely from Dm, HTN at that time.  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  Serologies still pending with FLC ratio slightly high from his CKD but otherwise neg so far.  What's his prognosis/approx life expectancy with his met RCC?.  I am trying to see if the kidney biopsy will be worthwhile/beneficial and wanted to have an idea about his prognosis. Thanks.

## 2020-06-16 PROBLEM — R53.1 WEAKNESS: Status: ACTIVE | Noted: 2020-06-16

## 2020-06-16 PROBLEM — M54.2 PAIN IN NECK: Status: ACTIVE | Noted: 2020-06-16

## 2020-06-24 ENCOUNTER — TELEPHONE (OUTPATIENT)
Dept: PHARMACY | Facility: CLINIC | Age: 75
End: 2020-06-24

## 2020-06-24 NOTE — TELEPHONE ENCOUNTER
Refill call regarding Votrient at [facility].  Will prepare for shipment with patient consent on  to arrive .  Copay 0.00.  Patient states he has enough to get him through .  Patient has not started any new medications including OTC drugs. Patient has not had any medication/ dose or instruction changes. No new allergies or side effects reported with this shipment. Medication is being taken as prescribed by physician and properly stored. Two patient identifiers:  and Address verified. Patient has no questions or concerns for [facility].

## 2020-06-26 ENCOUNTER — TELEPHONE (OUTPATIENT)
Dept: NEPHROLOGY | Facility: CLINIC | Age: 75
End: 2020-06-26

## 2020-06-26 NOTE — TELEPHONE ENCOUNTER
Jacqueline, he is a 1 month appointment and don't see an appointment for July, can you please get him In July. Thanks.

## 2020-06-26 NOTE — TELEPHONE ENCOUNTER
Preferred Name:   Al Powell  Male, 75 y.o., 1945  Phone:   [phone] (M)  PCP:   Lulú Lewis MD  Language:   English  Need Interp:   No  Allergies Last Reviewed:   06/09/20  Allergies:   No Known Allergies  Health Maintenance:   Due  FYI  Advance Directive  Population Management (Not a Candidate)  Therigy ID  Primary Ins.:   HU[employer] MANAGED MEDICARE  MRN:   [id number]  Pt Comm Pref:   Patient Portal, Mail  Last MyChart Login:   6/23/2020 10:44 AM  Next Appt:   With Outpatient Rehab  06/29/2020 at 3:45 PM  My Sticky Note:     Specialty Comments:     Patient Calls    You  Venkat Leonardo DO Just now (3:30 PM)     Your July is booked, now.    Message text       Venkat Leonardo DO routed conversation to Malissa Robledo Staff 1 hour ago (2:21 PM)      Venkat Leonardo DO 1 hour ago (2:21 PM)        24 hr urine shows 7 grams of proteinuria.  Serologies negative. On max dose of lisinopril and  will be unable to tolerate spironolactone as his potassium is already slightly  higher.  Please have him follow lownK diet.  I called him several times and his son but unable to reach anyone.  The protein is likely from his cancer and I would like to see him the first week of July if possible or the following week so I can discuss this with him.  Thanks          Documentation           Recent Patient Communication     Last Update Reason Specialty     Today -- Nephrology     [facility] Nephrology Venkat Leonardo Closed     Today -- Nephrology     [facility] Nephrology Venkat Leonardo Closed     Yesterday -- Pharmacy     [facility] Bethany Gooden Closed     4 days ago -- Nephrology     [facility] Nephrology Venkat Leonardo Closed     2 weeks ago -- Nephrology     [facility] Nephrology Jacqueline Banks Closed       There are additional recent communications with this patient. View the rest in Chart Review.

## 2020-06-26 NOTE — TELEPHONE ENCOUNTER
24 hr urine shows 7 grams of proteinuria.  Serologies negative. On max dose of lisinopril and  will be unable to tolerate spironolactone as his potassium is already slightly  higher.  Please have him follow lownK diet.  I called him several times and his son but unable to reach anyone.  The protein is likely from his cancer and I would like to see him the first week of July if possible or the following week so I can discuss this with him.  Thanks

## 2020-07-07 ENCOUNTER — LAB VISIT (OUTPATIENT)
Dept: LAB | Facility: HOSPITAL | Age: 75
End: 2020-07-07
Attending: INTERNAL MEDICINE
Payer: MEDICARE

## 2020-07-07 ENCOUNTER — OFFICE VISIT (OUTPATIENT)
Dept: HEMATOLOGY/ONCOLOGY | Facility: CLINIC | Age: 75
End: 2020-07-07
Payer: MEDICARE

## 2020-07-07 VITALS
BODY MASS INDEX: 25.78 KG/M2 | HEIGHT: 67 IN | DIASTOLIC BLOOD PRESSURE: 84 MMHG | RESPIRATION RATE: 16 BRPM | HEART RATE: 67 BPM | SYSTOLIC BLOOD PRESSURE: 178 MMHG | TEMPERATURE: 98 F | WEIGHT: 164.25 LBS | OXYGEN SATURATION: 97 %

## 2020-07-07 DIAGNOSIS — C64.9 METASTATIC RENAL CELL CARCINOMA, UNSPECIFIED LATERALITY: Primary | ICD-10-CM

## 2020-07-07 DIAGNOSIS — C64.9 METASTATIC RENAL CELL CARCINOMA, UNSPECIFIED LATERALITY: ICD-10-CM

## 2020-07-07 DIAGNOSIS — R93.3 ABNORMAL FINDINGS ON DIAGNOSTIC IMAGING OF OTHER PARTS OF DIGESTIVE TRACT: ICD-10-CM

## 2020-07-07 DIAGNOSIS — R60.9 EDEMA, UNSPECIFIED TYPE: Primary | ICD-10-CM

## 2020-07-07 DIAGNOSIS — M54.2 NECK PAIN: ICD-10-CM

## 2020-07-07 LAB
ALBUMIN SERPL BCP-MCNC: 2.4 G/DL (ref 3.5–5.2)
ALP SERPL-CCNC: 95 U/L (ref 55–135)
ALT SERPL W/O P-5'-P-CCNC: 28 U/L (ref 10–44)
ANION GAP SERPL CALC-SCNC: 8 MMOL/L (ref 8–16)
AST SERPL-CCNC: 29 U/L (ref 10–40)
BASOPHILS # BLD AUTO: 0.02 K/UL (ref 0–0.2)
BASOPHILS NFR BLD: 0.3 % (ref 0–1.9)
BILIRUB SERPL-MCNC: 0.2 MG/DL (ref 0.1–1)
BUN SERPL-MCNC: 36 MG/DL (ref 8–23)
CALCIUM SERPL-MCNC: 9.2 MG/DL (ref 8.7–10.5)
CHLORIDE SERPL-SCNC: 110 MMOL/L (ref 95–110)
CO2 SERPL-SCNC: 22 MMOL/L (ref 23–29)
CREAT SERPL-MCNC: 1.8 MG/DL (ref 0.5–1.4)
DIFFERENTIAL METHOD: ABNORMAL
EOSINOPHIL # BLD AUTO: 0.3 K/UL (ref 0–0.5)
EOSINOPHIL NFR BLD: 3.9 % (ref 0–8)
ERYTHROCYTE [DISTWIDTH] IN BLOOD BY AUTOMATED COUNT: 15 % (ref 11.5–14.5)
EST. GFR  (AFRICAN AMERICAN): 41.6 ML/MIN/1.73 M^2
EST. GFR  (NON AFRICAN AMERICAN): 36 ML/MIN/1.73 M^2
GLUCOSE SERPL-MCNC: 180 MG/DL (ref 70–110)
HCT VFR BLD AUTO: 35.8 % (ref 40–54)
HGB BLD-MCNC: 11.1 G/DL (ref 14–18)
IMM GRANULOCYTES # BLD AUTO: 0.02 K/UL (ref 0–0.04)
IMM GRANULOCYTES NFR BLD AUTO: 0.3 % (ref 0–0.5)
LDH SERPL L TO P-CCNC: 145 U/L (ref 110–260)
LYMPHOCYTES # BLD AUTO: 2.1 K/UL (ref 1–4.8)
LYMPHOCYTES NFR BLD: 28.3 % (ref 18–48)
MCH RBC QN AUTO: 33 PG (ref 27–31)
MCHC RBC AUTO-ENTMCNC: 31 G/DL (ref 32–36)
MCV RBC AUTO: 107 FL (ref 82–98)
MONOCYTES # BLD AUTO: 0.6 K/UL (ref 0.3–1)
MONOCYTES NFR BLD: 7.7 % (ref 4–15)
NEUTROPHILS # BLD AUTO: 4.5 K/UL (ref 1.8–7.7)
NEUTROPHILS NFR BLD: 59.5 % (ref 38–73)
NRBC BLD-RTO: 0 /100 WBC
PLATELET # BLD AUTO: 330 K/UL (ref 150–350)
PMV BLD AUTO: 9.8 FL (ref 9.2–12.9)
POTASSIUM SERPL-SCNC: 5.1 MMOL/L (ref 3.5–5.1)
PROT SERPL-MCNC: 5.9 G/DL (ref 6–8.4)
RBC # BLD AUTO: 3.36 M/UL (ref 4.6–6.2)
SODIUM SERPL-SCNC: 140 MMOL/L (ref 136–145)
WBC # BLD AUTO: 7.52 K/UL (ref 3.9–12.7)

## 2020-07-07 PROCEDURE — 1159F MED LIST DOCD IN RCRD: CPT | Mod: HCNC,S$GLB,, | Performed by: INTERNAL MEDICINE

## 2020-07-07 PROCEDURE — 3077F PR MOST RECENT SYSTOLIC BLOOD PRESSURE >= 140 MM HG: ICD-10-PCS | Mod: HCNC,CPTII,S$GLB, | Performed by: INTERNAL MEDICINE

## 2020-07-07 PROCEDURE — 83615 LACTATE (LD) (LDH) ENZYME: CPT | Mod: HCNC

## 2020-07-07 PROCEDURE — 1101F PR PT FALLS ASSESS DOC 0-1 FALLS W/OUT INJ PAST YR: ICD-10-PCS | Mod: HCNC,CPTII,S$GLB, | Performed by: INTERNAL MEDICINE

## 2020-07-07 PROCEDURE — 3079F DIAST BP 80-89 MM HG: CPT | Mod: HCNC,CPTII,S$GLB, | Performed by: INTERNAL MEDICINE

## 2020-07-07 PROCEDURE — 1125F AMNT PAIN NOTED PAIN PRSNT: CPT | Mod: HCNC,S$GLB,, | Performed by: INTERNAL MEDICINE

## 2020-07-07 PROCEDURE — 80053 COMPREHEN METABOLIC PANEL: CPT | Mod: HCNC

## 2020-07-07 PROCEDURE — 85025 COMPLETE CBC W/AUTO DIFF WBC: CPT | Mod: HCNC

## 2020-07-07 PROCEDURE — 36415 COLL VENOUS BLD VENIPUNCTURE: CPT | Mod: HCNC

## 2020-07-07 PROCEDURE — 99214 PR OFFICE/OUTPT VISIT, EST, LEVL IV, 30-39 MIN: ICD-10-PCS | Mod: HCNC,S$GLB,, | Performed by: INTERNAL MEDICINE

## 2020-07-07 PROCEDURE — 99999 PR PBB SHADOW E&M-EST. PATIENT-LVL V: ICD-10-PCS | Mod: PBBFAC,HCNC,, | Performed by: INTERNAL MEDICINE

## 2020-07-07 PROCEDURE — 1159F PR MEDICATION LIST DOCUMENTED IN MEDICAL RECORD: ICD-10-PCS | Mod: HCNC,S$GLB,, | Performed by: INTERNAL MEDICINE

## 2020-07-07 PROCEDURE — 1101F PT FALLS ASSESS-DOCD LE1/YR: CPT | Mod: HCNC,CPTII,S$GLB, | Performed by: INTERNAL MEDICINE

## 2020-07-07 PROCEDURE — 3077F SYST BP >= 140 MM HG: CPT | Mod: HCNC,CPTII,S$GLB, | Performed by: INTERNAL MEDICINE

## 2020-07-07 PROCEDURE — 1125F PR PAIN SEVERITY QUANTIFIED, PAIN PRESENT: ICD-10-PCS | Mod: HCNC,S$GLB,, | Performed by: INTERNAL MEDICINE

## 2020-07-07 PROCEDURE — 99999 PR PBB SHADOW E&M-EST. PATIENT-LVL V: CPT | Mod: PBBFAC,HCNC,, | Performed by: INTERNAL MEDICINE

## 2020-07-07 PROCEDURE — 3079F PR MOST RECENT DIASTOLIC BLOOD PRESSURE 80-89 MM HG: ICD-10-PCS | Mod: HCNC,CPTII,S$GLB, | Performed by: INTERNAL MEDICINE

## 2020-07-07 PROCEDURE — 99214 OFFICE O/P EST MOD 30 MIN: CPT | Mod: HCNC,S$GLB,, | Performed by: INTERNAL MEDICINE

## 2020-07-07 NOTE — PROGRESS NOTES
Subjective:       Patient ID: Edwin Powell is a 75 y.o. male.    Chief Complaint: Metastatic renal cell carcinoma, unspecified laterality    HPI     Reports tolerating Votrient well  No new rashes  Energy level low  No new pain issues, chronic neck and eat pain  Good appetite  Weight stable- did note fluctuation at home    Mr. Singleton is a 74 y.o. male who returns for follow up regarding metastatic renal cell carcinoma of left kidney. Returns for follow-up, currently on Votrient. Today he states his left knee is bothering him due to a fall yesterday. Denies hitting his head, only hit is elbow and knee. Eating and drinking well     Off and on back pain unchanged; now more on the right side than left. Diarrhea lately taking imodium and lomotil. Shortness of breath with exertion. Abdominal pain on palpation  Weight decreased 7 lbs from last month    5/19/2020 CT scans C/A/P:  COMPARISON:  MRI cervical spine and bone scan from earlier today.  CT chest/abdomen/pelvis from 01/24/2020 and multiple additional previous exams.     FINDINGS:  Thoracic soft tissues: Stable subcentimeter hypoattenuating nodule in the right lobe of the thyroid.  Left lobe of thyroid appears unremarkable.     Aorta: Normal in course and caliber with moderate atherosclerotic plaque.  Left-sided 3 vessel arch.     Heart: Normal in size.  Dense calcification of the aortic valve annulus.  Dense coronary calcification.  Trace pericardial fluid.     Alyssa/Mediastinum: Stable appearance of an enlarged right paratracheal lymph node measuring 1.6 cm in short axis (axial series 2, image 33), previously 1.6 cm.  No definite hilar lymphadenopathy.     Lungs: Well aerated.  Stable pulmonary micronodule on the order of 3 mm in the right upper lobe (axial series 2, image 48).  Stable subcentimeter pleural-based nodule in the left upper lobe (axial series 2, image 41).  No new suspicious nodules.  No focal consolidation or mass.  No significant pleural fluid.   No pneumothorax.     Liver: Normal in size and attenuation.  Previously described 1.2 cm hypoattenuating lesion in the hepatic dome and subcentimeter hypoattenuating lesion about the left portal vein are not well characterized on today's non-contrast exam.  IV contrast was not given because of renal function.  No discrete mass.     Gallbladder: Surgically absent.     Bile Ducts: No evidence of dilated ducts.     Pancreas: No mass or peripancreatic fat stranding.     Spleen: Unremarkable.     Adrenals: Unremarkable.     Kidneys/ Ureters: Normal in size and location.  Mild nonspecific bilateral perinephric stranding.  Stable appearance of solid mass in the left kidney measuring approximately 3.4 x 2.5 cm (axial series 2, image 124), previously 3.1 x 2.3 cm.  Differences in measurement are likely due to interobserver variability and technique.  No hydronephrosis or nephrolithiasis. No ureteral dilatation.     Bladder: No evidence of wall thickening.     Reproductive organs: Prostatomegaly with dystrophic calcifications.     GI Tract/Mesentery: Stomach appears distended with ingested material and oral contrast.  No evidence of bowel obstruction or inflammation.     Peritoneal Space: Trace ascites along the colic gutters.  No free air.     Retroperitoneum: No significant adenopathy.     Abdominal wall: Unremarkable.     Vasculature: Dense aortic atherosclerosis without aneurysm.     Bones: Stable lytic focus within the L3 vertebral body, initially reported as metastatic disease on bone scan dated 05/30/2017.  Multiple stable nonspecific dense sclerotic foci involving the thoracic spine and bilateral iliac bones; these are more likely benign.  No new aggressive lesions.  Age-appropriate degenerative changes.  Stable decompressive laminectomies at L4 and L5.     Impression:     History metastatic renal cell carcinoma.  Stable solid left renal mass.     Stable mediastinal lymphadenopathy.     Previously described liver  "lesions are not well characterized on today's non-contrast examination; no obvious interval change.  These are likely metastatic deposits.     Stable osseous metastatic disease at L3..     Additional findings as above.     RECIST SUMMARY:     Date of prior examination for comparison: 01/24/2020     Lesion 1: Left renal mass.  3.4 cm. Series 2 image 124.  Prior measurement 3.1 cm.     Lesion 2: Right hepatic lobe lesion.  Not measurable due to noncontrast technique. Prior measurement 1.2 cm.     Lesion 3: Right paratracheal lymph node.  1.6 cm. Series 2 image 33.  Prior measurement 1.6 cm.     Oncology History:  Mr.Mc Moss is a 76 yo male with CAD (s/p 5 stents, remotely), DM2 (insulin dependent), hypothyroidism , CVA (2013) with no residual deficit, chronic back pain. He was transferred to [facility] for neurosurgery evaluation of a lumbar spine lesion in the setting of progressive weakness of his legs and exacerbation of chronic back pain, in May 2017. He states that his back pain and LE weakness progressively worsened. [facility]. The pain radiates down his legs. He denies any incontinence of bowel or bladder. He had several falls. After one of the falls, he presented to the ED at [facility], in May 2017. He had an MRI showing "L3 vertebral body lesion with apparent cortical disruption concerning for metastases vs myeloma," and was transferred to [facility] for neurosurgery evaluation. He was evaluated by neurosurgery regarding concern for lytic lesion on L3 on 5/26/17. No surgical intervention was proposed.   CT chest, abdomen,pelvis on 5/27/17 revealed :   --A 3.6 cm exophytic mass arising from the medial aspect of the lower pole of left kidney,   --A small 1.0 cm, subtle, cortical enhancing mass within the upper pole of the right kidney, concerning for possible contralateral solid renal mass  --Multiple enhancing ill-defined hepatic masses concerning for hepatic metastatic " disease, hepatomegaly and hepatic steatosis  --Mulltifocal irregular thickening of the bilateral pleura concerning for possible pleural metastatic disease  --Mild nonspecific nodular thickening of the left adrenal gland without discrete nodule  -- Redemonstration of known lytic lesion within the L3 vertebral body. No definite additional discrete lytic lesions are identified.  -MRI pelvis from 10/18/17:    2.5 cm linear T1 hypointense T2/STIR hyperintense lesion within the left iliac bone, this lesion is indeterminate but felt less likely to represent metastasis given its appearance. Further evaluation can be obtained with bone scan as clinically indicated.  Findings suggestive of left trochanteric bursitis.  Partially visualized L3 metastatic lesion.  Diffuse thickening of the urinary bladder wall can be seen with chronic outlet obstruction or cystitis.  - initiated on Votrient    Review of Systems   Constitutional: Positive for fatigue. Negative for activity change, chills, fever and unexpected weight change.   HENT: Negative for mouth sores, postnasal drip, sore throat, trouble swallowing and voice change.    Respiratory: Negative for cough, shortness of breath and wheezing.    Cardiovascular: Negative for chest pain, palpitations and leg swelling.   Gastrointestinal: Positive for diarrhea. Negative for blood in stool, change in bowel habit, constipation, nausea, vomiting and change in bowel habit.   Genitourinary: Negative for decreased urine volume, dysuria, frequency, hematuria and urgency.   Musculoskeletal: Positive for neck pain and neck stiffness. Negative for arthralgias, back pain, gait problem and joint deformity.   Neurological: Positive for dizziness. Negative for headaches.   Psychiatric/Behavioral: Negative for dysphoric mood. The patient is not nervous/anxious.          Objective:      Physical Exam  Vitals signs and nursing note reviewed.   Constitutional:       General: He is not in acute  distress.     Appearance: Normal appearance. He is well-developed and normal weight. He is not toxic-appearing or diaphoretic.      Comments: Presents alone   HENT:      Head: Normocephalic and atraumatic.      Mouth/Throat:      Pharynx: No oropharyngeal exudate.   Eyes:      General: No scleral icterus.     Conjunctiva/sclera: Conjunctivae normal.      Pupils: Pupils are equal, round, and reactive to light.   Neck:      Musculoskeletal: Normal range of motion and neck supple.      Thyroid: No thyromegaly.   Cardiovascular:      Rate and Rhythm: Normal rate and regular rhythm.      Heart sounds: Normal heart sounds. No murmur. No friction rub. No gallop.    Pulmonary:      Effort: Pulmonary effort is normal. No respiratory distress.      Breath sounds: Normal breath sounds. No wheezing or rales.   Chest:      Chest wall: No tenderness.   Abdominal:      General: Bowel sounds are normal.      Palpations: Abdomen is soft. There is no mass.      Tenderness: There is no abdominal tenderness. There is no rebound.      Comments: No hepatosplenomegaly  Slightly bloated but soft   Musculoskeletal: Normal range of motion.         General: No tenderness or deformity.      Comments: No spinal or paraspinal tenderness to palpation   Lymphadenopathy:      Cervical: No cervical adenopathy.   Skin:     General: Skin is warm and dry.      Coloration: Skin is not pale.      Findings: No erythema or rash.   Neurological:      Mental Status: He is alert and oriented to person, place, and time.      Sensory: No sensory deficit.      Coordination: Coordination normal.   Psychiatric:         Behavior: Behavior normal.         Thought Content: Thought content normal.         Judgment: Judgment normal.      Labs- reviewed   Assessment:       1. Metastatic renal cell carcinoma, unspecified laterality    2. Abnormal findings on diagnostic imaging of other parts of digestive tract     3. Neck pain        Plan:       1.2. Stable on  Votrient  RTC 4 weeks  Needs lipid panel  ECHO as well with edema  3. Cervical stenosis  PT not helping  Will follow with ortho

## 2020-07-14 ENCOUNTER — OFFICE VISIT (OUTPATIENT)
Dept: PRIMARY CARE CLINIC | Facility: CLINIC | Age: 75
End: 2020-07-14
Payer: MEDICARE

## 2020-07-14 VITALS
BODY MASS INDEX: 26.1 KG/M2 | SYSTOLIC BLOOD PRESSURE: 144 MMHG | DIASTOLIC BLOOD PRESSURE: 64 MMHG | HEART RATE: 68 BPM | HEIGHT: 67 IN | OXYGEN SATURATION: 98 % | WEIGHT: 166.31 LBS

## 2020-07-14 DIAGNOSIS — T40.2X5A CONSTIPATION DUE TO OPIOID THERAPY: ICD-10-CM

## 2020-07-14 DIAGNOSIS — T78.40XA ALLERGIC STATE, INITIAL ENCOUNTER: ICD-10-CM

## 2020-07-14 DIAGNOSIS — E53.8 B12 DEFICIENCY: ICD-10-CM

## 2020-07-14 DIAGNOSIS — K59.03 CONSTIPATION DUE TO OPIOID THERAPY: ICD-10-CM

## 2020-07-14 DIAGNOSIS — M48.07 LUMBOSACRAL STENOSIS WITH NEUROGENIC CLAUDICATION: ICD-10-CM

## 2020-07-14 DIAGNOSIS — E78.5 HYPERLIPIDEMIA DUE TO TYPE 2 DIABETES MELLITUS: ICD-10-CM

## 2020-07-14 DIAGNOSIS — E11.69 HYPERLIPIDEMIA DUE TO TYPE 2 DIABETES MELLITUS: ICD-10-CM

## 2020-07-14 DIAGNOSIS — M47.812 CERVICAL ARTHRITIS: ICD-10-CM

## 2020-07-14 DIAGNOSIS — M48.02 CERVICAL STENOSIS OF SPINE: ICD-10-CM

## 2020-07-14 PROCEDURE — 1159F MED LIST DOCD IN RCRD: CPT | Mod: HCNC,S$GLB,, | Performed by: INTERNAL MEDICINE

## 2020-07-14 PROCEDURE — 99215 PR OFFICE/OUTPT VISIT, EST, LEVL V, 40-54 MIN: ICD-10-PCS | Mod: HCNC,S$GLB,, | Performed by: INTERNAL MEDICINE

## 2020-07-14 PROCEDURE — 99215 OFFICE O/P EST HI 40 MIN: CPT | Mod: HCNC,S$GLB,, | Performed by: INTERNAL MEDICINE

## 2020-07-14 PROCEDURE — 3078F PR MOST RECENT DIASTOLIC BLOOD PRESSURE < 80 MM HG: ICD-10-PCS | Mod: HCNC,CPTII,S$GLB, | Performed by: INTERNAL MEDICINE

## 2020-07-14 PROCEDURE — 1125F AMNT PAIN NOTED PAIN PRSNT: CPT | Mod: HCNC,S$GLB,, | Performed by: INTERNAL MEDICINE

## 2020-07-14 PROCEDURE — 1101F PR PT FALLS ASSESS DOC 0-1 FALLS W/OUT INJ PAST YR: ICD-10-PCS | Mod: HCNC,CPTII,S$GLB, | Performed by: INTERNAL MEDICINE

## 2020-07-14 PROCEDURE — 1101F PT FALLS ASSESS-DOCD LE1/YR: CPT | Mod: HCNC,CPTII,S$GLB, | Performed by: INTERNAL MEDICINE

## 2020-07-14 PROCEDURE — 3044F PR MOST RECENT HEMOGLOBIN A1C LEVEL <7.0%: ICD-10-PCS | Mod: HCNC,CPTII,S$GLB, | Performed by: INTERNAL MEDICINE

## 2020-07-14 PROCEDURE — 1159F PR MEDICATION LIST DOCUMENTED IN MEDICAL RECORD: ICD-10-PCS | Mod: HCNC,S$GLB,, | Performed by: INTERNAL MEDICINE

## 2020-07-14 PROCEDURE — 3044F HG A1C LEVEL LT 7.0%: CPT | Mod: HCNC,CPTII,S$GLB, | Performed by: INTERNAL MEDICINE

## 2020-07-14 PROCEDURE — 3077F PR MOST RECENT SYSTOLIC BLOOD PRESSURE >= 140 MM HG: ICD-10-PCS | Mod: HCNC,CPTII,S$GLB, | Performed by: INTERNAL MEDICINE

## 2020-07-14 PROCEDURE — 3077F SYST BP >= 140 MM HG: CPT | Mod: HCNC,CPTII,S$GLB, | Performed by: INTERNAL MEDICINE

## 2020-07-14 PROCEDURE — 1125F PR PAIN SEVERITY QUANTIFIED, PAIN PRESENT: ICD-10-PCS | Mod: HCNC,S$GLB,, | Performed by: INTERNAL MEDICINE

## 2020-07-14 PROCEDURE — 3078F DIAST BP <80 MM HG: CPT | Mod: HCNC,CPTII,S$GLB, | Performed by: INTERNAL MEDICINE

## 2020-07-14 RX ORDER — ROSUVASTATIN CALCIUM 40 MG/1
40 TABLET, COATED ORAL NIGHTLY
Qty: 90 TABLET | Refills: 3 | Status: SHIPPED | OUTPATIENT
Start: 2020-07-14 | End: 2021-07-28 | Stop reason: SDUPTHER

## 2020-07-14 RX ORDER — LEVOCETIRIZINE DIHYDROCHLORIDE 5 MG/1
5 TABLET, FILM COATED ORAL NIGHTLY
Qty: 30 TABLET | Refills: 11 | Status: SHIPPED | OUTPATIENT
Start: 2020-07-14 | End: 2021-01-01

## 2020-07-14 RX ORDER — AZELASTINE 1 MG/ML
1 SPRAY, METERED NASAL 2 TIMES DAILY
Qty: 30 ML | Refills: 3 | Status: ON HOLD | OUTPATIENT
Start: 2020-07-14 | End: 2020-08-28 | Stop reason: SDUPTHER

## 2020-07-14 RX ORDER — MONTELUKAST SODIUM 10 MG/1
10 TABLET ORAL NIGHTLY
Qty: 30 TABLET | Refills: 0 | Status: SHIPPED | OUTPATIENT
Start: 2020-07-14 | End: 2020-08-13

## 2020-07-14 NOTE — ASSESSMENT & PLAN NOTE
"Seen on MRI 10/2018 : "L3-L4: Circumferential disc bulge and moderate facet arthropathy result in mild spinal canal stenosis and mild bilateral neural foraminal narrowing." Pain worse with walking, improved with rest  · Advised to follow-up with Ortho (has appt upcoming)  "

## 2020-07-14 NOTE — PATIENT INSTRUCTIONS
TODAY:  · Start zyzal (instead of benadryl) up to 2 tablets at bedtime  · Daily azelastine spray  · Try monteleukast daily for allergies  · order metamucil from [facility]  · Increase statin to crestor 40mg  · Go to orthopedics appointments next week

## 2020-07-14 NOTE — PROGRESS NOTES
Primary Care Provider Appointment   SHARED NOTE: Naresh Reyes (MS4), Dr Lewis (Attending)    Subjective:      Patient ID: Edwin Powell is a 75 y.o. male with metastatic renal cancer, hypothyroidism, HTN, DM, HLD, H/o cervical fusion who presents for f/u & neck pain.    Chief Complaint: Follow-up    He is taking tylenol 500mg bid for his neck pain. He tried going to PT ~4-5x and it didn't seem to help. The pain goes from the right posterior neck down to the shoulder.     His BP at home has typically been around ~150/60-65. Today it was 144/64.     Blood sugar has been around ~130. Hasn't taken insulin in months.    He's been feeling tired all the time. He went to bed around ~2AM and woke up ~7-830 this morning. Just doesn't feel like sleeping. This has been going on for 6-8mos now. He continues to take his synthroid every day. Is also taking 2 doses of benadryl daily.    He has a runny nose and takes a benadryl to dry it up. Recommended that he try taking xyxal and azelastine nasal spray and d/c benadryl.    He has some alternating diarrhea and constipation from time to time. He takes immodium. Recommended to try some metamucil.    Past Surgical History:   Procedure Laterality Date    ABDOMINAL SURGERY      APPENDECTOMY      BACK SURGERY      CARDIAC SURGERY      CATARACT EXTRACTION      CHOLECYSTECTOMY      coronary stenting      X 5 last one in 2010-11.    INTRAOCULAR PROSTHESES INSERTION Right 6/27/2019    Procedure: INSERTION, IOL PROSTHESIS;  Surgeon: Chary Culp MD;  Location: [address];  Service: Ophthalmology;  Laterality: Right;    INTRAOCULAR PROSTHESES INSERTION Left 8/22/2019    Procedure: INSERTION, IOL PROSTHESIS;  Surgeon: Chary Culp MD;  Location: [facility] OR [address] [place] [address];  Service: Ophthalmology;  Laterality: Left;    PHACOEMULSIFICATION OF CATARACT Right 6/27/2019    Procedure: PHACOEMULSIFICATION, CATARACT;  Surgeon: Chary Culp MD;  Location: [facility] OR [address] [place] [address];   Service: Ophthalmology;  Laterality: Right;    PHACOEMULSIFICATION OF CATARACT Left 8/22/2019    Procedure: PHACOEMULSIFICATION, CATARACT;  Surgeon: Chary Culp MD;  Location: [facility] OR [address] [place] [address];  Service: Ophthalmology;  Laterality: Left;    SPINAL FUSION         Past Medical History:   Diagnosis Date    Acquired hypothyroidism 5/26/2017    Benign essential HTN 5/26/2017    Benign prostatic hyperplasia (BPH) with urinary urge incontinence 6/6/2017    Chronic low back pain 6/1/2017    Coronary artery disease involving native coronary artery of native heart without angina pectoris 5/26/2017    S/p 5 stents - last one around 2010-11.    Gastroesophageal reflux disease without esophagitis 5/26/2017    History of CVA (cerebrovascular accident) 5/26/2017    Hypertension associated with diabetes 5/26/2017    BP controlled on ACE, CCB    Lumbar disc lesion 5/26/2017    Metastatic renal cell carcinoma to bone 6/6/2017    Metastatic renal cell carcinoma to liver 6/6/2017    Liver Biopsy 5-2017: METASTATIC RENAL CELL CARCINOMA.    Mixed hyperlipidemia 5/26/2017    Type 2 diabetes mellitus with stage 3 chronic kidney disease, with long-term current use of insulin 5/26/2017       Social History     Socioeconomic History    Marital status: Single     Spouse name: Not on file    Number of children: Not on file    Years of education: Not on file    Highest education level: Not on file   Occupational History    Not on file   Social Needs    Financial resource strain: Hard    Food insecurity     Worry: Never true     Inability: Never true    Transportation needs     Medical: No     Non-medical: No   Tobacco Use    Smoking status: Former Smoker     Types: Cigarettes    Smokeless tobacco: Never Used    Tobacco comment: haven't smoked in last 25 yrs   Substance and Sexual Activity    Alcohol use: Yes     Comment: rarely     Drug use: Not Currently    Sexual activity: Not Currently     Partners: Female  "  Lifestyle    Physical activity     Days per week: Not on file     Minutes per session: Not on file    Stress: Not on file   Relationships    Social connections     Talks on phone: Not on file     Gets together: Not on file     Attends Jewish service: Not on file     Active member of club or organization: Not on file     Attends meetings of clubs or organizations: Not on file     Relationship status: Not on file   Other Topics Concern    Not on file   Social History Narrative    Not on file       Review of Systems   Constitutional: Negative for chills and fever.   HENT: Positive for rhinorrhea (takes benadryl). Negative for sore throat.    Eyes: Negative for pain and visual disturbance.   Respiratory: Negative for cough and shortness of breath.    Cardiovascular: Negative for chest pain and palpitations.   Gastrointestinal: Positive for constipation and diarrhea. Negative for abdominal pain.   Endocrine: Negative for cold intolerance and heat intolerance.   Genitourinary: Negative for difficulty urinating and frequency.   Musculoskeletal: Positive for arthralgias (OA) and back pain.   Skin: Negative for color change and rash.   Allergic/Immunologic: Negative for environmental allergies and food allergies.   Neurological: Negative for dizziness and headaches.       Objective:   BP (!) 144/64   Pulse 68   Ht 5' 7" (1.702 m)   Wt 75.4 kg (166 lb 5.4 oz)   SpO2 98%   BMI 26.05 kg/m²     Physical Exam  Constitutional:       Appearance: Normal appearance.   HENT:      Head: Normocephalic and atraumatic.      Nose: Nose normal.      Mouth/Throat:      Mouth: Mucous membranes are moist.      Pharynx: Oropharynx is clear.   Eyes:      Extraocular Movements: Extraocular movements intact.      Conjunctiva/sclera: Conjunctivae normal.      Pupils: Pupils are equal, round, and reactive to light.   Neck:      Musculoskeletal: Normal range of motion and neck supple.   Cardiovascular:      Rate and Rhythm: Normal " rate.      Pulses: Normal pulses.      Heart sounds: Normal heart sounds.   Pulmonary:      Effort: Pulmonary effort is normal.      Breath sounds: Normal breath sounds.   Abdominal:      General: Bowel sounds are normal.      Palpations: Abdomen is soft.      Tenderness: There is abdominal tenderness in the epigastric area.   Musculoskeletal: Normal range of motion.   Skin:     General: Skin is warm and dry.      Capillary Refill: Capillary refill takes less than 2 seconds.   Neurological:      General: No focal deficit present.      Mental Status: He is alert and oriented to person, place, and time.             Lab Results   Component Value Date    WBC 7.52 07/07/2020    HGB 11.1 (L) 07/07/2020    HCT 35.8 (L) 07/07/2020     07/07/2020    CHOL 149 06/10/2020    TRIG 148 06/10/2020    HDL 37 (L) 06/10/2020    ALT 28 07/07/2020    AST 29 07/07/2020     07/07/2020    K 5.1 07/07/2020     07/07/2020    CREATININE 1.8 (H) 07/07/2020    BUN 36 (H) 07/07/2020    CO2 22 (L) 07/07/2020    TSH 4.062 (H) 09/13/2019    PSA 1.1 07/16/2018    INR 1.2 06/12/2017    HGBA1C 6.6 (H) 04/01/2020       Current Outpatient Medications on File Prior to Visit   Medication Sig Dispense Refill    amLODIPine (NORVASC) 10 MG tablet Take 1 tablet (10 mg total) by mouth once daily. 90 tablet 3    ascorbic acid (VITAMIN C ORAL) Take by mouth once daily.      aspirin (ECOTRIN) 81 MG EC tablet Take 81 mg by mouth once daily.      b complex vitamins (B COMPLEX 1) tablet Take 1 tablet by mouth once daily. 90 tablet 3    blood sugar diagnostic Strp 1 strip by Misc.(Non-Drug; Combo Route) route 2 (two) times daily as needed. 200 strip 3    blood-glucose meter Misc use as instructed 1 each 0    cholecalciferol, vitamin D3, (VITAMIN D3) 125 mcg (5,000 unit) Tab Take 1 tablet (5,000 Units total) by mouth once daily. 90 tablet 3    clindamycin (CLEOCIN T) 1 % external solution AAA bid 60 mL 3    diphenhydramine HCl (BENADRYL  "ALLERGY ORAL) Take by mouth daily as needed.      fluticasone propionate (FLONASE) 50 mcg/actuation nasal spray 1 SPRAY (50 MCG TOTAL) BY EACH NARE ROUTE ONCE DAILY. 16 mL 3    HYDROcodone-acetaminophen (NORCO) 5-325 mg per tablet 1 tablet PO q8h prn pain. Quantity Medically Necessary. 120 tablet 0    insulin (LANTUS SOLOSTAR U-100 INSULIN) glargine 100 units/mL (3mL) SubQ pen Inject 5 Units into the skin every evening. 5 mL 11    lancets (ONETOUCH ULTRASOFT LANCETS) Misc 1 lancet by Misc.(Non-Drug; Combo Route) route 2 (two) times daily as needed. 200 each 3    levothyroxine (SYNTHROID) 88 MCG tablet Take 1 tablet (88 mcg total) by mouth once daily. 90 tablet 3    lisinopriL (PRINIVIL,ZESTRIL) 40 MG tablet Take 1 tablet (40 mg total) by mouth once daily. 90 tablet 3    morphine (MS CONTIN) 15 MG 12 hr tablet Take 1 tablet (15 mg total) by mouth every 12 (twelve) hours. Medically Necessary 60 tablet 0    multivitamin (THERAGRAN) per tablet Take 1 tablet by mouth once daily.      ondansetron (ZOFRAN) 8 MG tablet Take 1 tablet (8 mg total) by mouth 3 (three) times daily as needed. 90 tablet 3    PAZOpanib (VOTRIENT) 200 mg Tab Take 4 tablets (800 mg total) by mouth once daily. 120 tablet 5    pen needle, diabetic 29 gauge x 1/2" Ndle 1 application by Misc.(Non-Drug; Combo Route) route once daily. 100 each 3    prednisolon/gatiflox/bromfenac (PREDNISOL ACE-GATIFLOX-BROMFEN) 1-0.5-0.075 % DrpS Apply 1 drop to eye 3 (three) times daily. 7 mL 3    tamsulosin (FLOMAX) 0.4 mg Cap Take 2 capsules (0.8 mg total) by mouth every evening. 180 capsule 3    vitamin E acetate (VITAMIN E ORAL) Take by mouth once daily.      [DISCONTINUED] rosuvastatin (CRESTOR) 20 MG tablet Take 1 tablet (20 mg total) by mouth once daily. 90 tablet 3     Current Facility-Administered Medications on File Prior to Visit   Medication Dose Route Frequency Provider Last Rate Last Dose    phenylephrine HCL 2.5% ophthalmic solution 1 drop  " "1 drop Left Eye On Call Procedure Chary Culp MD   1 drop at 08/22/19 0659    proparacaine 0.5 % ophthalmic solution 1 drop  1 drop Left Eye On Call Procedure Chary Culp MD   1 drop at 08/22/19 0641    tropicamide 1% ophthalmic solution 1 drop  1 drop Left Eye On Call Procedure Chary Culp MD   1 drop at 08/22/19 0700         Assessment:   75 y.o. male with multiple co-morbid illnesses here to follow-up with PCP and continue work-up of chronic issues notably HTN, DM, neck pain.     Plan:     Problem List Items Addressed This Visit        Neuro    Lumbosacral stenosis with neurogenic claudication     Seen on MRI 10/2018 : "L3-L4: Circumferential disc bulge and moderate facet arthropathy result in mild spinal canal stenosis and mild bilateral neural foraminal narrowing." Pain worse with walking, improved with rest  · Advised to follow-up with Ortho (has appt upcoming)         Cervical stenosis of spine     Seen on MRI 5/9/20: " Multilevel cervical spondylosis most prominent at C6-C7 resulting in severe spinal canal stenosis and severe bilateral neural foraminal narrowing."  · F/U Ortho in 1 week            Cardiac/Vascular    Hyperlipidemia due to type 2 diabetes mellitus     Elevated lipids, previously had diarrhea with statin  · Is not tolerating atorvastatin q2-3 days   · Restarted crestor and he is tolerating  · May need to increase to 40mg         Relevant Medications    rosuvastatin (CRESTOR) 40 MG Tab    Other Relevant Orders    Hemoglobin A1C       Endocrine    B12 deficiency     Low-normal B12, compliant with MVI  · Continue oral supplementation  · S/p B12 injection            GI    Constipation due to opioid therapy     On chronic opiates, complication of constipation  · Start metamucil              Orthopedic    Cervical arthritis     Fused bones in cervical spine at [facility]. Recurrent neck pain. Completed OT  · F/U Ortho            Other    Allergy     Seasonal allergies uncontrolled on " first-gen antihistamine  · Start zyzal up to 2 tablets at bedtime  · Daily azelastine spray  · Try cromelyn (but may not be covered)         Relevant Medications    levocetirizine (XYZAL) 5 MG tablet    azelastine (ASTELIN) 137 mcg (0.1 %) nasal spray    montelukast (SINGULAIR) 10 mg tablet          Health Maintenance       Date Due Completion Date    TETANUS VACCINE 02/18/1963 ---    High Dose Statin 02/18/1966 --- TODAY    Shingles Vaccine (2 of 2) 01/13/2020 11/18/2019    Influenza Vaccine (1) 09/01/2020 10/15/2019    Hemoglobin A1c 10/01/2020 4/1/2020- TODAY    Colorectal Cancer Screening 02/15/2021 2/15/2011 (Done)    Override on 2/15/2011: Done (Per [employer] dashboard ? results)    Foot Exam 05/18/2021 5/18/2020 (Done)    Override on 5/18/2020: Done    Override on 1/13/2020: Done    Override on 7/11/2019: Done    Override on 2/2/2018: Done (performed by podiatrist)    Lipid Panel 06/10/2021 6/10/2020              Follow up in about 4 weeks (around 8/11/2020). Total face-to-face time was 60 min, >50% of this was spent on counseling and coordination of care. The following issues were discussed: HTN, DM, neck pain    Naresh Reyes (MS4)    Lulú Lewis MD/MPH  Internal Medicine  [facility]  [phone] spectralink

## 2020-07-14 NOTE — ASSESSMENT & PLAN NOTE
"Seen on MRI 5/9/20: " Multilevel cervical spondylosis most prominent at C6-C7 resulting in severe spinal canal stenosis and severe bilateral neural foraminal narrowing."  · F/U Ortho in 1 week  "

## 2020-07-14 NOTE — ASSESSMENT & PLAN NOTE
Elevated lipids, previously had diarrhea with statin  · Is not tolerating atorvastatin q2-3 days   · Restarted crestor and he is tolerating  · May need to increase to 40mg

## 2020-07-14 NOTE — ASSESSMENT & PLAN NOTE
Seasonal allergies uncontrolled on first-gen antihistamine  · Start zyzal up to 2 tablets at bedtime  · Daily azelastine spray  · Try cromelyn (but may not be covered)

## 2020-07-14 NOTE — ASSESSMENT & PLAN NOTE
Fused bones in cervical spine at [facility]. Recurrent neck pain. Completed OT  · F/U Ortho

## 2020-07-21 ENCOUNTER — PATIENT OUTREACH (OUTPATIENT)
Dept: ADMINISTRATIVE | Facility: OTHER | Age: 75
End: 2020-07-21

## 2020-07-21 ENCOUNTER — TELEPHONE (OUTPATIENT)
Dept: PHARMACY | Facility: CLINIC | Age: 75
End: 2020-07-21

## 2020-07-21 NOTE — PROGRESS NOTES
Chart reviewed.   Immunizations: updated  Orders placed: n/a  Upcoming appts to satisfy GERI topics: Hemoglobin A1c 8/04

## 2020-07-22 ENCOUNTER — OFFICE VISIT (OUTPATIENT)
Dept: ORTHOPEDICS | Facility: CLINIC | Age: 75
End: 2020-07-22
Payer: MEDICARE

## 2020-07-22 VITALS — HEIGHT: 67 IN | WEIGHT: 166 LBS | BODY MASS INDEX: 26.06 KG/M2

## 2020-07-22 DIAGNOSIS — Z98.1 HISTORY OF FUSION OF CERVICAL SPINE: ICD-10-CM

## 2020-07-22 DIAGNOSIS — M48.02 CERVICAL STENOSIS OF SPINE: Primary | ICD-10-CM

## 2020-07-22 DIAGNOSIS — M54.2 NECK PAIN: ICD-10-CM

## 2020-07-22 PROCEDURE — 1101F PT FALLS ASSESS-DOCD LE1/YR: CPT | Mod: CPTII,S$GLB,, | Performed by: PHYSICIAN ASSISTANT

## 2020-07-22 PROCEDURE — 1159F MED LIST DOCD IN RCRD: CPT | Mod: S$GLB,,, | Performed by: PHYSICIAN ASSISTANT

## 2020-07-22 PROCEDURE — 1125F AMNT PAIN NOTED PAIN PRSNT: CPT | Mod: S$GLB,,, | Performed by: PHYSICIAN ASSISTANT

## 2020-07-22 PROCEDURE — 99213 OFFICE O/P EST LOW 20 MIN: CPT | Mod: S$GLB,,, | Performed by: PHYSICIAN ASSISTANT

## 2020-07-22 PROCEDURE — 99999 PR PBB SHADOW E&M-EST. PATIENT-LVL V: ICD-10-PCS | Mod: PBBFAC,HCNC,, | Performed by: PHYSICIAN ASSISTANT

## 2020-07-22 PROCEDURE — 1101F PR PT FALLS ASSESS DOC 0-1 FALLS W/OUT INJ PAST YR: ICD-10-PCS | Mod: CPTII,S$GLB,, | Performed by: PHYSICIAN ASSISTANT

## 2020-07-22 PROCEDURE — 99213 PR OFFICE/OUTPT VISIT, EST, LEVL III, 20-29 MIN: ICD-10-PCS | Mod: S$GLB,,, | Performed by: PHYSICIAN ASSISTANT

## 2020-07-22 PROCEDURE — 99999 PR PBB SHADOW E&M-EST. PATIENT-LVL V: CPT | Mod: PBBFAC,HCNC,, | Performed by: PHYSICIAN ASSISTANT

## 2020-07-22 PROCEDURE — 1125F PR PAIN SEVERITY QUANTIFIED, PAIN PRESENT: ICD-10-PCS | Mod: S$GLB,,, | Performed by: PHYSICIAN ASSISTANT

## 2020-07-22 PROCEDURE — 1159F PR MEDICATION LIST DOCUMENTED IN MEDICAL RECORD: ICD-10-PCS | Mod: S$GLB,,, | Performed by: PHYSICIAN ASSISTANT

## 2020-07-22 NOTE — PROGRESS NOTES
"  DATE: 7/22/2020  PATIENT: Edwin Powell    Attending Physician: Filemon Aguayo M.D.    HISTORY:  Edwin Powell is a 75 y.o. male who returns to me today for follow up.  He was last seen by me 6/9/2020.  He has known severe stenosis at C6/7 but was not having myelopathic symptoms so we are observing closely.  Today he is doing well but notes he continues to have severe neck pain.  He has been going to therapy with little relief.  He does not some difficulty with buttons.     The Patient denies myelopathic symptoms such as handwriting changes or difficulty with coins/keys. Denies perineal paresthesias, bowel/bladder dysfunction.    PMH/PSH/FamHx/SocHx:  Unchanged from prior visit    ROS:  REVIEW OF SYSTEMS:  Constitution: Negative. Negative for chills, fever and night sweats.   HENT: Negative for congestion and headaches.    Eyes: Negative for blurred vision, left vision loss and right vision loss.   Cardiovascular: Negative for chest pain and syncope.   Respiratory: Negative for cough and shortness of breath.    Endocrine: Negative for polydipsia, polyphagia and polyuria.   Hematologic/Lymphatic: Negative for bleeding problem. Does not bruise/bleed easily.   Skin: Negative for dry skin, itching and rash.   Musculoskeletal: Negative for falls and muscle weakness.   Gastrointestinal: Negative for abdominal pain and bowel incontinence.   Allergic/Immunologic: Negative for hives and persistent infections.  Genitourinary: Negative for urinary retention/incontinence and nocturia.   Neurological: No loss of balance and seizures.   Psychiatric/Behavioral: Negative for depression. The patient does not have insomnia.   Denies myelopathic symptoms, perineal paresthesias, bowel or bladder incontinence    EXAM:  Ht 5' 7" (1.702 m)   Wt 75.3 kg (166 lb)   BMI 26.00 kg/m²     Physical exam stable. Neuro exam stable.       IMAGING:  No new imaging today.    Today I personally re-reviewed AP, Lat and Flex/Ex  upright " C-spine films that demonstrate prior fusion from C4-6.  There is minimal anterolisthesis at C3/4 and mild disc space narrowing at C6/7.     MRI cervical spine demonstrates severe stenosis at C6/7.       Body mass index is 26 kg/m².    Hemoglobin A1C   Date Value Ref Range Status   04/01/2020 6.6 (H) 4.0 - 5.6 % Final     Comment:     ADA Screening Guidelines:  5.7-6.4%  Consistent with prediabetes  >or=6.5%  Consistent with diabetes  High levels of fetal hemoglobin interfere with the HbA1C  assay. Heterozygous hemoglobin variants (HbS, HgC, etc)do  not significantly interfere with this assay.   However, presence of multiple variants may affect accuracy.     11/18/2019 8.2 (H) 4.0 - 5.6 % Final     Comment:     ADA Screening Guidelines:  5.7-6.4%  Consistent with prediabetes  >or=6.5%  Consistent with diabetes  High levels of fetal hemoglobin interfere with the HbA1C  assay. Heterozygous hemoglobin variants (HbS, HgC, etc)do  not significantly interfere with this assay.   However, presence of multiple variants may affect accuracy.     02/19/2019 6.4 (H) 4.0 - 5.6 % Final     Comment:     ADA Screening Guidelines:  5.7-6.4%  Consistent with prediabetes  >or=6.5%  Consistent with diabetes  High levels of fetal hemoglobin interfere with the HbA1C  assay. Heterozygous hemoglobin variants (HbS, HgC, etc)do  not significantly interfere with this assay.   However, presence of multiple variants may affect accuracy.           ASSESSMENT/PLAN:    Edwin was seen today for follow-up and neck pain.    Diagnoses and all orders for this visit:    Cervical stenosis of spine    History of fusion of cervical spine    Neck pain        Today we discussed at length all of the different treatment options including anti-inflammatories, acetaminophen, rest, ice, heat, physical therapy including strengthening and stretching exercises, home exercises, ROM, aerobic conditioning, aqua therapy, other modalities including ultrasound, massage, and  dry needling, epidural steroid injections and finally surgical intervention.      Discussed with Dr. Aguayo.  The patient is a reasonable candidate for C6/7 ACDF.  We will try to schedule this for 8/17.  He will need to be cleared by the preop clinic.

## 2020-07-24 ENCOUNTER — OFFICE VISIT (OUTPATIENT)
Dept: ORTHOPEDICS | Facility: CLINIC | Age: 75
End: 2020-07-24
Payer: MEDICARE

## 2020-07-24 VITALS — HEIGHT: 67 IN | WEIGHT: 166.13 LBS | BODY MASS INDEX: 26.08 KG/M2

## 2020-07-24 DIAGNOSIS — M54.12 CERVICAL RADICULOPATHY: Primary | ICD-10-CM

## 2020-07-24 PROCEDURE — 1101F PT FALLS ASSESS-DOCD LE1/YR: CPT | Mod: CPTII,S$GLB,, | Performed by: ORTHOPAEDIC SURGERY

## 2020-07-24 PROCEDURE — 99999 PR PBB SHADOW E&M-EST. PATIENT-LVL IV: ICD-10-PCS | Mod: PBBFAC,,, | Performed by: ORTHOPAEDIC SURGERY

## 2020-07-24 PROCEDURE — 1125F PR PAIN SEVERITY QUANTIFIED, PAIN PRESENT: ICD-10-PCS | Mod: S$GLB,,, | Performed by: ORTHOPAEDIC SURGERY

## 2020-07-24 PROCEDURE — 99214 OFFICE O/P EST MOD 30 MIN: CPT | Mod: S$GLB,,, | Performed by: ORTHOPAEDIC SURGERY

## 2020-07-24 PROCEDURE — 1101F PR PT FALLS ASSESS DOC 0-1 FALLS W/OUT INJ PAST YR: ICD-10-PCS | Mod: CPTII,S$GLB,, | Performed by: ORTHOPAEDIC SURGERY

## 2020-07-24 PROCEDURE — 99214 PR OFFICE/OUTPT VISIT, EST, LEVL IV, 30-39 MIN: ICD-10-PCS | Mod: S$GLB,,, | Performed by: ORTHOPAEDIC SURGERY

## 2020-07-24 PROCEDURE — 1159F PR MEDICATION LIST DOCUMENTED IN MEDICAL RECORD: ICD-10-PCS | Mod: S$GLB,,, | Performed by: ORTHOPAEDIC SURGERY

## 2020-07-24 PROCEDURE — 1159F MED LIST DOCD IN RCRD: CPT | Mod: S$GLB,,, | Performed by: ORTHOPAEDIC SURGERY

## 2020-07-24 PROCEDURE — 1125F AMNT PAIN NOTED PAIN PRSNT: CPT | Mod: S$GLB,,, | Performed by: ORTHOPAEDIC SURGERY

## 2020-07-24 PROCEDURE — 99999 PR PBB SHADOW E&M-EST. PATIENT-LVL IV: CPT | Mod: PBBFAC,,, | Performed by: ORTHOPAEDIC SURGERY

## 2020-07-24 NOTE — PROGRESS NOTES
"  DATE: 7/24/2020  PATIENT: Edwin Powell    Attending Physician: Filemon Aguayo M.D.    HISTORY:  Edwin Powell is a 75 y.o. male who returns to me today for follow up.  He was last seen by me 6/9/2020.  He has known severe stenosis at C6/7 but was not having myelopathic symptoms so we are observing closely.  Today he is doing well but notes he continues to have severe neck pain.  He has been going to therapy with little relief.  He does not some difficulty with buttons.     The Patient denies myelopathic symptoms such as handwriting changes or difficulty with coins/keys. Denies perineal paresthesias, bowel/bladder dysfunction.    Interval History (7/24/20):  Pt returns today for preop eval. He reports increased clumsiness of B hands.     PMH/PSH/FamHx/SocHx:  Unchanged from prior visit    ROS:  REVIEW OF SYSTEMS:  Constitution: Negative. Negative for chills, fever and night sweats.   HENT: Negative for congestion and headaches.    Eyes: Negative for blurred vision, left vision loss and right vision loss.   Cardiovascular: Negative for chest pain and syncope.   Respiratory: Negative for cough and shortness of breath.    Endocrine: Negative for polydipsia, polyphagia and polyuria.   Hematologic/Lymphatic: Negative for bleeding problem. Does not bruise/bleed easily.   Skin: Negative for dry skin, itching and rash.   Musculoskeletal: Negative for falls and muscle weakness.   Gastrointestinal: Negative for abdominal pain and bowel incontinence.   Allergic/Immunologic: Negative for hives and persistent infections.  Genitourinary: Negative for urinary retention/incontinence and nocturia.   Neurological: No loss of balance and seizures.   Psychiatric/Behavioral: Negative for depression. The patient does not have insomnia.   Denies myelopathic symptoms, perineal paresthesias, bowel or bladder incontinence    EXAM:  Ht 5' 7" (1.702 m)   Wt 75.4 kg (166 lb 1.9 oz)   BMI 26.02 kg/m²     Physical exam stable. Neuro " exam stable.       IMAGING:  No new imaging today.    Today I personally re-reviewed AP, Lat and Flex/Ex  upright C-spine films that demonstrate prior fusion from C4-6.  There is minimal anterolisthesis at C3/4 and mild disc space narrowing at C6/7.     MRI cervical spine demonstrates severe stenosis at C6/7.       Body mass index is 26.02 kg/m².    Hemoglobin A1C   Date Value Ref Range Status   04/01/2020 6.6 (H) 4.0 - 5.6 % Final     Comment:     ADA Screening Guidelines:  5.7-6.4%  Consistent with prediabetes  >or=6.5%  Consistent with diabetes  High levels of fetal hemoglobin interfere with the HbA1C  assay. Heterozygous hemoglobin variants (HbS, HgC, etc)do  not significantly interfere with this assay.   However, presence of multiple variants may affect accuracy.     11/18/2019 8.2 (H) 4.0 - 5.6 % Final     Comment:     ADA Screening Guidelines:  5.7-6.4%  Consistent with prediabetes  >or=6.5%  Consistent with diabetes  High levels of fetal hemoglobin interfere with the HbA1C  assay. Heterozygous hemoglobin variants (HbS, HgC, etc)do  not significantly interfere with this assay.   However, presence of multiple variants may affect accuracy.     02/19/2019 6.4 (H) 4.0 - 5.6 % Final     Comment:     ADA Screening Guidelines:  5.7-6.4%  Consistent with prediabetes  >or=6.5%  Consistent with diabetes  High levels of fetal hemoglobin interfere with the HbA1C  assay. Heterozygous hemoglobin variants (HbS, HgC, etc)do  not significantly interfere with this assay.   However, presence of multiple variants may affect accuracy.           ASSESSMENT/PLAN:    There are no diagnoses linked to this encounter.    Today we discussed at length all of the different treatment options including anti-inflammatories, acetaminophen, rest, ice, heat, physical therapy including strengthening and stretching exercises, home exercises, ROM, aerobic conditioning, aqua therapy, other modalities including ultrasound, massage, and dry needling,  epidural steroid injections and finally surgical intervention.      Discussed R/B/A of C6/C7 ACDF. All questions answered and pt would like to proceed.      I had a sit down discussion with the patient regarding a C6/7. We specifically discussed the risks, benefits, and alternatives to surgery. We discussed the surgical procedure including the skin incision, nerve decompression, bone fusion, allograft and/or iliac crest bone graft, and surgical implants including plates and interbody spacers as indicated: they understand the risks include but are not limited to death, paralysis, blindness, bleeding, infection, damage to arteries, veins and nerves, tracheal injury, esophageal injury, vertebral artery injury, dysphagia, spinal fluid leak, continued or worsening pain, no improvement in symptoms, non-union, and the possible need for more surgery in the future, as well as the possibility other unforseen and unknown complications. We talked about expected hospital stay and recovery period. All questions were answered; they understand and wish to proceed.

## 2020-07-27 ENCOUNTER — TELEPHONE (OUTPATIENT)
Dept: PREADMISSION TESTING | Facility: HOSPITAL | Age: 75
End: 2020-07-27

## 2020-07-27 ENCOUNTER — TELEPHONE (OUTPATIENT)
Dept: PRIMARY CARE CLINIC | Facility: CLINIC | Age: 75
End: 2020-07-27

## 2020-07-27 DIAGNOSIS — M79.602 PAIN IN BOTH UPPER EXTREMITIES: ICD-10-CM

## 2020-07-27 DIAGNOSIS — Z01.818 PREOPERATIVE TESTING: Primary | ICD-10-CM

## 2020-07-27 DIAGNOSIS — M79.601 PAIN IN BOTH UPPER EXTREMITIES: ICD-10-CM

## 2020-07-27 DIAGNOSIS — E11.9 DIABETES MELLITUS WITHOUT COMPLICATION: ICD-10-CM

## 2020-07-27 DIAGNOSIS — E03.9 HYPOTHYROIDISM (ACQUIRED): ICD-10-CM

## 2020-07-27 NOTE — PRE-PROCEDURE INSTRUCTIONS
Patient stated has not had any problem with anesthesia in the past. Will need medical clearance from your PCP, Dr. Lulú Rob. I will ask your PCP for asa 81 mg instructions.He will make an appt. Will also need poc appt, labs, & ekg. Our  will call to set up these appts.   Preop instructions given. Hold other asa containing products, nsaids, vitamins and supplements one week prior to surgery.(sent to my chart)  Verbalizes understanding.

## 2020-07-27 NOTE — ANESTHESIA PAT ROS NOTE
07/27/2020  Edwin Powell is a 75 y.o., male.      Pre-op Assessment          Review of Systems  Anesthesia Hx:  No problems with previous Anesthesia LIMITED NECK MOTION. History of prior surgery of interest to airway management or planning: cervical fusion, heart surgery. Previous anesthesia: MAC  8/2019 PHACO with MAC.  Procedure performed at an [facility]. Denies Family Hx of Anesthesia complications.   Denies Personal Hx of Anesthesia complications.   Social:  Former Smoker    Hematology/Oncology:         Renal Cell (Kidney) has confirmed metastises Metastises: liver  Hematology Comments: 8/4/20 H/H 37.7 & 11.4, Plt 418  Current/Recent Cancer. (stable on Votrient oral chemo)   EENT/Dental:   chronic allergic rhinitis   Cardiovascular:    Denies Angina. hyperlipidemia 8/4/20 Echo:  · Normal left ventricular systolic function. The estimated ejection fraction is 65%.  · No wall motion abnormalities.  · Grade II (moderate) left ventricular diastolic dysfunction consistent with pseudonormalization.  · Mild left atrial enlargement.  · Normal right ventricular systolic function.  · Moderate right atrial enlargement.  · Mild mitral regurgitation.  · Mild tricuspid regurgitation.  · Normal central venous pressure (3 mmHg).  · The estimated PA systolic pressure is 38 mmHg.    Functional Capacity good / => 4 METS  Coronary Artery Disease: S/P Percutaneous Coronary Intervention (PCI) coronary stent, unknown type, currently on aspirin.  Hypertension , Fairly Controlled on RX , Recent typical clinic B/P of 150/76    Pulmonary:  Possible Obstructive Sleep Apnea , (STOP/BANG) Symptoms A - Age > 50, P - Pressure being treated for high BP and G - Gender (Male > Female)        Renal/:  Kidney Function/Disease, Chronic Kidney Disease (CKD) , CKD Stage III (GFR 30-59)    Hepatic/GI:  Liver Disease (liver lesion)     Musculoskeletal:  Musculoskeletal General/Symptoms: neck pain. Functional capacity is ambulatory without assistance.  Spine Disorders: Spinal Stenosis, Cervical Spinal Stenosis Cervical Spine Disorder, Cervical Disc Disease, S/P Cervical Fusion, Cervical Spine Limited Mobility    Neurological:  Neuro Symptoms of pain, numbness Pain , onset is chronic , location of neck , alleviating factors are prn tylenol, occ percocet 1-2/week. CVA - Cerebrovasular Accident , Most recent CVA was on 2013 , has had 1 stroke , residual deficits are no residual deficit.    Endocrine:  Diabetes, Type 2 Diabetes , controlled by oral hypoglycemics. Typical AM glucose range: 120-130  Thyroid Disease Hypothyroidism        Physical Exam  General:  Well nourished    Airway/Jaw/Neck:  Airway Findings: Mouth Opening: Small, but > 3cm Tongue: Normal  Jaw/Neck Findings:  Neck ROM: Extension Decreased, Severe, Extension Painful, Decreased flexion, to the right, Decreased Lateral Motion, to the left      Dental:  Dental Findings: Upper Dentures, Lower Dentures   Chest/Lungs:  Chest/Lungs Findings: Clear to auscultation, Normal Respiratory Rate     Heart/Vascular:  Heart Findings: Rate: Normal        Mental Status:  Mental Status Findings:  Cooperative, Alert and Oriented       8/4/20 Lab, EKG and echo results noted. PCP requested cards clearance, appt on 8/11/20. Covid on 8/14/20.  8/11/20 PCP clearance Dr Davis noted:  Pre-operative examination for internal medicine        Patient with planned cervical spine discectomy and fusion of C6/7 on 8/17. SURGICAL RISK ASSESSMENT: Per [facility] patient has 10% gennaro-operative risk score due to history of coronary artery disease, CHF. DM is well-controlled, patient has 1-4 METS. Surgery planned is intermediate-risk  · Hold ASA for 10days prior to surgery  · Hold all vitamins for at least one week prior to surgery  · Cardiology appt upcoming for further assessments           8/11/20 ASA instructions and  Cards clearance Dr Tamez noted:  1. Coronary artery disease involving native coronary artery of native heart without angina  LDL near goal, blood pressure though elevated, will re-evaluate once he is taking lisinopril on a regular basis, could consider adding a beta-blocker, but would defer until after surgery given that he is not on one now, do not know he will tolerate.  - Ambulatory referral/consult to Cardiology     2. Preoperative cardiovascular examination  From a revised cardiac risk index standpoint he has insulin-dependent diabetes, chronic kidney disease creatinine greater than 2, and coronary artery disease, which would give him a 15% risk of major adverse cardiac event, but this includes asymptomatic troponin elevation in the majority of cases.  Based on the Griffiths risk score, risk would be1-2% risk of major adverse cardiac event, clinically significant, proceed on risk benefit basis.  Okay to hold aspirin for what ever duration this surgeon desires.     3. Hyperlipidemia associated with type 2 diabetes mellitus  Most recent LDL near goal, continue Crestor 40.     4. Renal cell carcinoma of left kidney        5. Type 2 diabetes mellitus with hyperglycemia, with long-term current use of insulin        6. Essential hypertension  Would add beta-blocker to regimen if he can tolerate from heart rate perspective, after surgery.  Could consider carvedilol 6.25 b.i.d..    8/14/20 Nephrology note completed in Epic by Dr Leonardo.      Anesthesia Assessment: Preoperative EQUATION    Planned Procedure: Procedure(s) (LRB):  DISCECTOMY, SPINE, CERVICAL, ANTERIOR APPROACH, WITH FUSION co case with Dr. Falcon C6/7 Figmauy SNS:vocal cord/motors/SSEP Regular OR table Patient needs scope in preop (N/A)  Requested Anesthesia Type:General  Surgeon: Filemon Aguayo MD  Service: Neurosurgery  Known or anticipated Date of Surgery:8/17/2020    Surgeon notes: reviewed    Electronic QUestionnaire Assessment completed via nurse  interview with patient.        Triage considerations:       Previous anesthesia records:MAC and No problems  8/22/2019 PHACOEMULSIFICATION, CATARACT (Left Eye) INSERTION, IOL PROSTHESIS (Left Eye)  Airway/Jaw/Neck:  Airway Findings: Mouth Opening: Normal Tongue: Normal  General Airway Assessment: Adult  Mallampati: II  Jaw/Neck Findings:  Neck ROM: Normal ROM     ** H/O CERVICAL FUSION**      Last PCP note: within 3 months , within [facility]   Subspecialty notes: Hematology/Oncology, Nephrology, Ortho, PODUIATRY, & OPHTHALLMOLOGY    Other important co-morbidities: DM2, GERD, HLD, Hypothyroid and CAD, CKD3,METASTATIC RENAL CELL CARCINOMA      Tests already available:  Available tests,  within 3 months , within [facility] .7/7/2020 CMP, CBC, 6/9/2020 XRAY CERVICAL SPINE FLEX AND EXT ONLY, 5/26/2020 UA, 5/19/2020 MRI CERVICAL SPINE W/O CONTRAST             Instructions given. (See in Nurse's note)    Optimization:  Anesthesia Preop Clinic Assessment  Indicated    Medical Opinion Indicated       Plan:    Testing:  A1C, EKG, PT/INR, TSH and T&S   Pre-anesthesia  visit       Visit focus: concerns in complex and/or prolonged anesthesia     Consultation:Patient's PCP for re-evaluation     Patient  has previously scheduled Medical Appointment:8/4 ECHO, LAB, HEM/ONC, 8/14 COVID TESTING    Navigation: Tests Scheduled. TBD             Consults scheduled.TBD             Results will be tracked by Preop Clinic.

## 2020-07-27 NOTE — TELEPHONE ENCOUNTER
Spoke with pt, he is having surgery on 8/17/2020, surgery on his neck    He needs clearance.   Pt stated [phone], was who he spoke, anesthesia.

## 2020-07-27 NOTE — TELEPHONE ENCOUNTER
----- Message from Ksenia Stanton RN sent at 7/27/2020  1:22 PM CDT -----  Patient is scheduled for cervical spine discectomy and  fusion C6/7 with Dr. Aguayo and Dr. Falcon on 8/17( approximately 180 minutes of general anesthesia). He will need medical clearance for this surgery. Please schedule a preop clearance appt.   I will also need asa 81 mg instructions.  Please provide stop time instructions, by entering a note in EPIC.    Thanks!

## 2020-07-27 NOTE — TELEPHONE ENCOUNTER
----- Message from Ksenia Stanton RN sent at 7/27/2020 12:56 PM CDT -----  Surgery 8/17  Please schedule poc appt, labs, & ekg.  Thanks!

## 2020-07-28 ENCOUNTER — TELEPHONE (OUTPATIENT)
Dept: PRIMARY CARE CLINIC | Facility: CLINIC | Age: 75
End: 2020-07-28

## 2020-07-31 ENCOUNTER — OFFICE VISIT (OUTPATIENT)
Dept: PRIMARY CARE CLINIC | Facility: CLINIC | Age: 75
End: 2020-07-31
Payer: MEDICARE

## 2020-07-31 VITALS
DIASTOLIC BLOOD PRESSURE: 66 MMHG | HEIGHT: 67 IN | WEIGHT: 167.56 LBS | BODY MASS INDEX: 26.3 KG/M2 | SYSTOLIC BLOOD PRESSURE: 142 MMHG

## 2020-07-31 DIAGNOSIS — E11.65 TYPE 2 DIABETES MELLITUS WITH HYPERGLYCEMIA, WITH LONG-TERM CURRENT USE OF INSULIN: ICD-10-CM

## 2020-07-31 DIAGNOSIS — Z79.4 TYPE 2 DIABETES MELLITUS WITH HYPERGLYCEMIA, WITH LONG-TERM CURRENT USE OF INSULIN: ICD-10-CM

## 2020-07-31 DIAGNOSIS — Z01.818 PRE-OPERATIVE EXAMINATION FOR INTERNAL MEDICINE: ICD-10-CM

## 2020-07-31 DIAGNOSIS — I10 ESSENTIAL HYPERTENSION: Primary | ICD-10-CM

## 2020-07-31 DIAGNOSIS — M47.812 CERVICAL ARTHRITIS: ICD-10-CM

## 2020-07-31 DIAGNOSIS — I25.119 CORONARY ARTERY DISEASE INVOLVING NATIVE CORONARY ARTERY OF NATIVE HEART WITH ANGINA PECTORIS: ICD-10-CM

## 2020-07-31 PROCEDURE — 1126F AMNT PAIN NOTED NONE PRSNT: CPT | Mod: S$GLB,,, | Performed by: INTERNAL MEDICINE

## 2020-07-31 PROCEDURE — 3044F PR MOST RECENT HEMOGLOBIN A1C LEVEL <7.0%: ICD-10-PCS | Mod: CPTII,S$GLB,, | Performed by: INTERNAL MEDICINE

## 2020-07-31 PROCEDURE — 3078F DIAST BP <80 MM HG: CPT | Mod: CPTII,S$GLB,, | Performed by: INTERNAL MEDICINE

## 2020-07-31 PROCEDURE — 1101F PR PT FALLS ASSESS DOC 0-1 FALLS W/OUT INJ PAST YR: ICD-10-PCS | Mod: CPTII,S$GLB,, | Performed by: INTERNAL MEDICINE

## 2020-07-31 PROCEDURE — 3077F PR MOST RECENT SYSTOLIC BLOOD PRESSURE >= 140 MM HG: ICD-10-PCS | Mod: CPTII,S$GLB,, | Performed by: INTERNAL MEDICINE

## 2020-07-31 PROCEDURE — 1159F MED LIST DOCD IN RCRD: CPT | Mod: S$GLB,,, | Performed by: INTERNAL MEDICINE

## 2020-07-31 PROCEDURE — 1126F PR PAIN SEVERITY QUANTIFIED, NO PAIN PRESENT: ICD-10-PCS | Mod: S$GLB,,, | Performed by: INTERNAL MEDICINE

## 2020-07-31 PROCEDURE — 3078F PR MOST RECENT DIASTOLIC BLOOD PRESSURE < 80 MM HG: ICD-10-PCS | Mod: CPTII,S$GLB,, | Performed by: INTERNAL MEDICINE

## 2020-07-31 PROCEDURE — 99215 PR OFFICE/OUTPT VISIT, EST, LEVL V, 40-54 MIN: ICD-10-PCS | Mod: S$GLB,,, | Performed by: INTERNAL MEDICINE

## 2020-07-31 PROCEDURE — 1159F PR MEDICATION LIST DOCUMENTED IN MEDICAL RECORD: ICD-10-PCS | Mod: S$GLB,,, | Performed by: INTERNAL MEDICINE

## 2020-07-31 PROCEDURE — 99215 OFFICE O/P EST HI 40 MIN: CPT | Mod: S$GLB,,, | Performed by: INTERNAL MEDICINE

## 2020-07-31 PROCEDURE — 1101F PT FALLS ASSESS-DOCD LE1/YR: CPT | Mod: CPTII,S$GLB,, | Performed by: INTERNAL MEDICINE

## 2020-07-31 PROCEDURE — 3044F HG A1C LEVEL LT 7.0%: CPT | Mod: CPTII,S$GLB,, | Performed by: INTERNAL MEDICINE

## 2020-07-31 PROCEDURE — 3077F SYST BP >= 140 MM HG: CPT | Mod: CPTII,S$GLB,, | Performed by: INTERNAL MEDICINE

## 2020-07-31 RX ORDER — AMLODIPINE BESYLATE 10 MG/1
10 TABLET ORAL DAILY
Qty: 1 TABLET | Refills: 0 | Status: SHIPPED | OUTPATIENT
Start: 2020-07-31 | End: 2020-09-16

## 2020-07-31 RX ORDER — LISINOPRIL 40 MG/1
40 TABLET ORAL DAILY
Qty: 1 TABLET | Refills: 0 | Status: ON HOLD | OUTPATIENT
Start: 2020-07-31 | End: 2020-08-28 | Stop reason: HOSPADM

## 2020-07-31 NOTE — ASSESSMENT & PLAN NOTE
"Seen on MRI 5/9/20: " Multilevel cervical spondylosis most prominent at C6-C7 resulting in severe spinal canal stenosis and severe bilateral neural foraminal narrowing."  · Surgery planned with Ortho  · Needs medical clearance, but PCP needing more direction on APT  · BP not controlled today, patient did not take AM BP meds  · Recheck BP after procuring meds from pharmacy  "

## 2020-07-31 NOTE — ASSESSMENT & PLAN NOTE
Patient with planned cervical spine discectomy and fusion of C6/7 on 8/17. SURGICAL RISK ASSESSMENT: Per [facility] patient has 10% gennaro-operative risk score due to history of coronary artery disease, CHF. DM is well-controlled, patient has 1-4 METS. Surgery planned is intermediate-risk  · Hold ASA for 10days prior to surgery  · Hold all vitamins for at least one week prior to surgery  · Cardiology appt upcoming for further assessments

## 2020-07-31 NOTE — Clinical Note
Endy Pierce,  Do you think you could see my patient for a pre-op assessment, or weigh in on how to manage? He had 5 stents placed at [facility] in 2010. Ortho advised him to stop APT 10d prior to cervical spine discectomy and fusion of C6/7 on 8/17. I'm not clear on the risks/benefits of this for this low risk surgery. Documentation states to hold APT for one day.     Thanks!  CORTEZ Simple: Patient demonstrates quick and easy understanding

## 2020-07-31 NOTE — Clinical Note
Ms Radha,  Please see attached pre-op assessment note.    Thanks,  Lulú Lewis MD/MPH  [facility]  [phone] spectralink

## 2020-07-31 NOTE — ASSESSMENT & PLAN NOTE
[facility] S/p 5 stents 2010. Anginal equivalent controlled with CCB, ACE, APT  · Continue CCB, ACE, ASA, statin  · Per Cardiology side consult, does need to hold the APT for 10days prior to surgery, but will see Dr Tamez in clinic  · BB held due to response to chemo

## 2020-07-31 NOTE — PATIENT INSTRUCTIONS
TODAY:  - referral to Cardiology (message sent to Dr Tamez)  - recheck BP after taking home meds in the clinic

## 2020-07-31 NOTE — PROGRESS NOTES
Primary Care Provider Appointment   SHARED NOTE: Naresh Reyes (MS4), Dr Lewis (Attending)    Subjective:      Patient ID: Edwin Powell is a 75 y.o. male with T2DM, CKD, HTN, metastatic RCC, CAD, cervical stenosis who presents for pre-op evaluation.     Chief Complaint: pre-op evaluation    Mr. Powell is here for pre-op assessment for scheduled for cervical spine discectomy and fusion of C6/7 on 8/17. He started having acute pain in his neck a couple months ago, and is now followed by Ortho.     PRE-OP ISSUES:  1) ASA NEEDS TO BE HELD FOR 10D:  He was advised by ortho to hold off on his ASA and any vitamins for 10d. Per sidebar conversation with Cardiology:  if his stents were back in 2010, and we are talking spinal surgery, in general the risk of spinal bleeding and continuing anti-platelet agents is greater than the risk of holding them.      2) ALL VITAMINS TO BE HELD FOR ONE WEEK PRIOR TO SURGERY    3) SURGICAL RISK ASSESSMENT:   Per [facility] patient has 10% gennaro-operative risk score due to history of coronary artery disease, CHF. DM is well-controlled, patient has 1-4 METS. Surgery planned is intermediate-risk.    CHRONIC CONDITIONS:  Blood sugars at home have been around 120-130's typically. He hasn't needed any insulin lately. Last A1c was 6.6, next A1c planned for 8/4.    His blood pressure at home has been around 165/55. It was 162/70 at office, and was 168/68 on repeat. He has not taken his blood pressure medications this morning nor on most days of his PCP appointments nor on the days that he took those readings. He sometimes takes his BP before taking medications, and takes these readings during random times of day. Advised he should take his BP 1h after taking his medications. REPEAT BP /66.    His rhinorrhea has improved greatly with use of the xyxal and astelin spray.    He has some positional vertigo when he turns his head a certain way while he is lying in bed. Deferred Phillip-Hallpike d/t  neck pain.    He's now having some constipation d/t immodium use. He feels that it is mild, has been trying some metamucil.     Past Surgical History:   Procedure Laterality Date    ABDOMINAL SURGERY      APPENDECTOMY      BACK SURGERY      CARDIAC SURGERY      CATARACT EXTRACTION      CHOLECYSTECTOMY      coronary stenting      X 5 last one in 2010-11.    INTRAOCULAR PROSTHESES INSERTION Right 6/27/2019    Procedure: INSERTION, IOL PROSTHESIS;  Surgeon: Chary Culp MD;  Location: [facility] OR [address] [place] [address];  Service: Ophthalmology;  Laterality: Right;    INTRAOCULAR PROSTHESES INSERTION Left 8/22/2019    Procedure: INSERTION, IOL PROSTHESIS;  Surgeon: Chary Culp MD;  Location: [facility] OR [address] [place] [address];  Service: Ophthalmology;  Laterality: Left;    PHACOEMULSIFICATION OF CATARACT Right 6/27/2019    Procedure: PHACOEMULSIFICATION, CATARACT;  Surgeon: Chary Culp MD;  Location: [facility] OR [address] [place] [address];  Service: Ophthalmology;  Laterality: Right;    PHACOEMULSIFICATION OF CATARACT Left 8/22/2019    Procedure: PHACOEMULSIFICATION, CATARACT;  Surgeon: Chary Culp MD;  Location: [facility] OR [address] [place] [address];  Service: Ophthalmology;  Laterality: Left;    SPINAL FUSION         Past Medical History:   Diagnosis Date    Acquired hypothyroidism 5/26/2017    Benign essential HTN 5/26/2017    Benign prostatic hyperplasia (BPH) with urinary urge incontinence 6/6/2017    Chronic low back pain 6/1/2017    Coronary artery disease involving native coronary artery of native heart without angina pectoris 5/26/2017    S/p 5 stents - last one around 2010-11.    Gastroesophageal reflux disease without esophagitis 5/26/2017    History of CVA (cerebrovascular accident) 5/26/2017    Hypertension associated with diabetes 5/26/2017    BP controlled on ACE, CCB    Lumbar disc lesion 5/26/2017    Metastatic renal cell carcinoma to bone 6/6/2017    Metastatic renal cell carcinoma to liver 6/6/2017    Liver Biopsy 5-2017: METASTATIC  RENAL CELL CARCINOMA.    Mixed hyperlipidemia 5/26/2017    Type 2 diabetes mellitus with stage 3 chronic kidney disease, with long-term current use of insulin 5/26/2017       Social History     Socioeconomic History    Marital status: Single     Spouse name: Not on file    Number of children: Not on file    Years of education: Not on file    Highest education level: Not on file   Occupational History    Not on file   Social Needs    Financial resource strain: Hard    Food insecurity     Worry: Never true     Inability: Never true    Transportation needs     Medical: No     Non-medical: No   Tobacco Use    Smoking status: Former Smoker     Types: Cigarettes    Smokeless tobacco: Never Used    Tobacco comment: haven't smoked in last 25 yrs   Substance and Sexual Activity    Alcohol use: Yes     Comment: rarely     Drug use: Not Currently    Sexual activity: Not Currently     Partners: Female   Lifestyle    Physical activity     Days per week: Not on file     Minutes per session: Not on file    Stress: Not on file   Relationships    Social connections     Talks on phone: Not on file     Gets together: Not on file     Attends [employer] service: Not on file     Active member of club or organization: Not on file     Attends meetings of clubs or organizations: Not on file     Relationship status: Not on file   Other Topics Concern    Not on file   Social History Narrative    Not on file       Review of Systems   Constitutional: Negative for chills and fever.   HENT: Positive for rhinorrhea (improved with xyxal and astelin). Negative for sore throat.    Eyes: Negative for pain and visual disturbance.   Respiratory: Negative for cough and shortness of breath.    Cardiovascular: Negative for chest pain and palpitations.   Gastrointestinal: Positive for abdominal pain (hernia) and constipation. Negative for diarrhea.   Genitourinary: Negative for difficulty urinating and frequency.   Musculoskeletal:  "Positive for back pain and neck pain.   Skin: Negative for rash and wound.   Neurological: Positive for dizziness (positional). Negative for headaches.   Psychiatric/Behavioral: Negative for agitation and confusion.       Objective:   BP (!) 142/66   Ht 5' 7" (1.702 m)   Wt 76 kg (167 lb 8.8 oz)   BMI 26.24 kg/m²     Physical Exam  Constitutional:       Appearance: Normal appearance.   HENT:      Head: Normocephalic and atraumatic.      Nose: Nose normal.      Mouth/Throat:      Mouth: Mucous membranes are moist.      Pharynx: Oropharynx is clear.   Eyes:      Extraocular Movements: Extraocular movements intact.      Conjunctiva/sclera: Conjunctivae normal.      Pupils: Pupils are equal, round, and reactive to light.   Neck:      Musculoskeletal: Neck supple. No muscular tenderness.   Cardiovascular:      Rate and Rhythm: Normal rate.      Pulses: Normal pulses.      Heart sounds: Normal heart sounds.   Pulmonary:      Effort: Pulmonary effort is normal.      Breath sounds: Normal breath sounds.   Abdominal:      General: Bowel sounds are normal.      Palpations: Abdomen is soft.   Musculoskeletal: Normal range of motion.   Skin:     General: Skin is warm and dry.   Neurological:      General: No focal deficit present.      Mental Status: He is alert and oriented to person, place, and time.   Psychiatric:         Mood and Affect: Mood normal.         Behavior: Behavior normal.             Lab Results   Component Value Date    WBC 7.52 07/07/2020    HGB 11.1 (L) 07/07/2020    HCT 35.8 (L) 07/07/2020     07/07/2020    CHOL 149 06/10/2020    TRIG 148 06/10/2020    HDL 37 (L) 06/10/2020    ALT 28 07/07/2020    AST 29 07/07/2020     07/07/2020    K 5.1 07/07/2020     07/07/2020    CREATININE 1.8 (H) 07/07/2020    BUN 36 (H) 07/07/2020    CO2 22 (L) 07/07/2020    TSH 4.062 (H) 09/13/2019    PSA 1.1 07/16/2018    INR 1.2 06/12/2017    HGBA1C 6.6 (H) 04/01/2020       Current Outpatient Medications on " File Prior to Visit   Medication Sig Dispense Refill    amLODIPine (NORVASC) 10 MG tablet Take 1 tablet (10 mg total) by mouth once daily. 90 tablet 3    ascorbic acid (VITAMIN C ORAL) Take by mouth once daily.      aspirin (ECOTRIN) 81 MG EC tablet Take 81 mg by mouth once daily.      azelastine (ASTELIN) 137 mcg (0.1 %) nasal spray 1 spray (137 mcg total) by Nasal route 2 (two) times daily. 30 mL 3    b complex vitamins (B COMPLEX 1) tablet Take 1 tablet by mouth once daily. 90 tablet 3    blood sugar diagnostic Strp 1 strip by Misc.(Non-Drug; Combo Route) route 2 (two) times daily as needed. 200 strip 3    blood-glucose meter Misc use as instructed 1 each 0    cholecalciferol, vitamin D3, (VITAMIN D3) 125 mcg (5,000 unit) Tab Take 1 tablet (5,000 Units total) by mouth once daily. 90 tablet 3    clindamycin (CLEOCIN T) 1 % external solution AAA bid 60 mL 3    diphenhydramine HCl (BENADRYL ALLERGY ORAL) Take by mouth daily as needed.      fluticasone propionate (FLONASE) 50 mcg/actuation nasal spray 1 SPRAY (50 MCG TOTAL) BY EACH NARE ROUTE ONCE DAILY. 16 mL 3    HYDROcodone-acetaminophen (NORCO) 5-325 mg per tablet 1 tablet PO q8h prn pain. Quantity Medically Necessary. 120 tablet 0    insulin (LANTUS SOLOSTAR U-100 INSULIN) glargine 100 units/mL (3mL) SubQ pen Inject 5 Units into the skin every evening. 5 mL 11    lancets (ONETOUCH ULTRASOFT LANCETS) Misc 1 lancet by Misc.(Non-Drug; Combo Route) route 2 (two) times daily as needed. 200 each 3    levocetirizine (XYZAL) 5 MG tablet Take 1 tablet (5 mg total) by mouth every evening. Can take up to 2 tablets 30 tablet 11    levothyroxine (SYNTHROID) 88 MCG tablet Take 1 tablet (88 mcg total) by mouth once daily. 90 tablet 3    lisinopriL (PRINIVIL,ZESTRIL) 40 MG tablet Take 1 tablet (40 mg total) by mouth once daily. 90 tablet 3    montelukast (SINGULAIR) 10 mg tablet Take 1 tablet (10 mg total) by mouth every evening. 30 tablet 0    morphine (MS  "CONTIN) 15 MG 12 hr tablet Take 1 tablet (15 mg total) by mouth every 12 (twelve) hours. Medically Necessary 60 tablet 0    multivitamin (THERAGRAN) per tablet Take 1 tablet by mouth once daily.      ondansetron (ZOFRAN) 8 MG tablet Take 1 tablet (8 mg total) by mouth 3 (three) times daily as needed. 90 tablet 3    PAZOpanib (VOTRIENT) 200 mg Tab Take 4 tablets (800 mg total) by mouth once daily. 120 tablet 5    pen needle, diabetic 29 gauge x 1/2" Ndle 1 application by Misc.(Non-Drug; Combo Route) route once daily. 100 each 3    rosuvastatin (CRESTOR) 40 MG Tab Take 1 tablet (40 mg total) by mouth every evening. 90 tablet 3    tamsulosin (FLOMAX) 0.4 mg Cap Take 2 capsules (0.8 mg total) by mouth every evening. 180 capsule 3    vitamin E acetate (VITAMIN E ORAL) Take by mouth once daily.      [DISCONTINUED] prednisolon/gatiflox/bromfenac (PREDNISOL ACE-GATIFLOX-BROMFEN) 1-0.5-0.075 % DrpS Apply 1 drop to eye 3 (three) times daily. (Patient not taking: Reported on 7/31/2020) 7 mL 3     Current Facility-Administered Medications on File Prior to Visit   Medication Dose Route Frequency Provider Last Rate Last Dose    phenylephrine HCL 2.5% ophthalmic solution 1 drop  1 drop Left Eye On Call Procedure Chary Culp MD   1 drop at 08/22/19 0659    proparacaine 0.5 % ophthalmic solution 1 drop  1 drop Left Eye On Call Procedure Chary Culp MD   1 drop at 08/22/19 0641    tropicamide 1% ophthalmic solution 1 drop  1 drop Left Eye On Call Procedure Chary Culp MD   1 drop at 08/22/19 0700         Assessment:   75 y.o. male with multiple co-morbid illnesses here to follow-up with PCP and continue work-up of chronic issues notably HTN, cervical stenosis, DM, CKD.     Plan:     Problem List Items Addressed This Visit        Cardiac/Vascular    Coronary artery disease involving native coronary artery of native heart with angina pectoris     [facility] S/p 5 stents 2010. Anginal equivalent controlled with " "CCB, ACE, APT  · Continue CCB, ACE, ASA, statin  · Per Cardiology side consult, does need to hold the APT for 10days prior to surgery, but will see Dr Tamez in clinic  · BB held due to response to chemo         Relevant Orders    Ambulatory referral/consult to Cardiology       Endocrine    Type 2 diabetes mellitus with hyperglycemia, with long-term current use of insulin     Hyperglycemia, using insulin PRN  · PRN lantus 5U   · A1c on 8/4 with Heme-Onc labs            Orthopedic    Cervical arthritis     Seen on MRI 5/9/20: " Multilevel cervical spondylosis most prominent at C6-C7 resulting in severe spinal canal stenosis and severe bilateral neural foraminal narrowing."  · Surgery planned with Ortho  · Needs medical clearance, but PCP needing more direction on APT  · BP not controlled today, patient did not take AM BP meds  · Recheck BP after procuring meds from pharmacy            Other    Pre-operative examination for internal medicine     Patient with planned cervical spine discectomy and fusion of C6/7 on 8/17. SURGICAL RISK ASSESSMENT: Per [facility] patient has 10% gennaro-operative risk score due to history of coronary artery disease, CHF. DM is well-controlled, patient has 1-4 METS. Surgery planned is intermediate-risk  · Hold ASA for 10days prior to surgery  · Hold all vitamins for at least one week prior to surgery  · Cardiology appt upcoming for further assessments           Other Visit Diagnoses     Essential hypertension    -  Primary    Relevant Medications    amLODIPine (NORVASC) 10 MG tablet    lisinopriL (PRINIVIL,ZESTRIL) 40 MG tablet          Health Maintenance       Date Due Completion Date    TETANUS VACCINE 02/18/1963 ---    Shingles Vaccine (2 of 2) 01/13/2020 11/18/2019    Influenza Vaccine (1) 09/01/2020 10/15/2019    Hemoglobin A1c 10/01/2020 4/1/2020    Colorectal Cancer Screening 02/15/2021 2/15/2011 (Done)    Override on 2/15/2011: Done (Per [facility] ? results)    Foot Exam 05/18/2021 " 5/18/2020 (Done)    Override on 5/18/2020: Done    Override on 1/13/2020: Done    Override on 7/11/2019: Done    Override on 2/2/2018: Done (performed by podiatrist)    Lipid Panel 06/10/2021 6/10/2020    High Dose Statin 07/24/2021 7/24/2020          Follow up in about 2 months (around 9/30/2020). Total face-to-face time was 60 min, >50% of this was spent on counseling and coordination of care. The following issues were discussed: HTN, cervical stenosis, CKD, DM    Naresh Reyes (MS4)    Lulú Lewis MD/MPH  Internal Medicine  [facility]  [phone] spectralink

## 2020-08-02 DIAGNOSIS — C78.7 METASTATIC RENAL CELL CARCINOMA TO LIVER: ICD-10-CM

## 2020-08-02 DIAGNOSIS — C64.9 METASTATIC RENAL CELL CARCINOMA TO LIVER: ICD-10-CM

## 2020-08-02 DIAGNOSIS — G89.3 NEOPLASM RELATED PAIN: ICD-10-CM

## 2020-08-03 RX ORDER — MORPHINE SULFATE 15 MG/1
15 TABLET, FILM COATED, EXTENDED RELEASE ORAL EVERY 12 HOURS
Qty: 60 TABLET | Refills: 0 | Status: ON HOLD | OUTPATIENT
Start: 2020-08-03 | End: 2020-08-28 | Stop reason: HOSPADM

## 2020-08-03 RX ORDER — HYDROCODONE BITARTRATE AND ACETAMINOPHEN 5; 325 MG/1; MG/1
TABLET ORAL
Qty: 120 TABLET | Refills: 0 | Status: ON HOLD | OUTPATIENT
Start: 2020-08-03 | End: 2020-08-28 | Stop reason: HOSPADM

## 2020-08-04 ENCOUNTER — ANESTHESIA EVENT (OUTPATIENT)
Dept: SURGERY | Facility: HOSPITAL | Age: 75
DRG: 471 | End: 2020-08-04
Payer: MEDICARE

## 2020-08-04 ENCOUNTER — TELEPHONE (OUTPATIENT)
Dept: NEPHROLOGY | Facility: CLINIC | Age: 75
End: 2020-08-04

## 2020-08-04 ENCOUNTER — OFFICE VISIT (OUTPATIENT)
Dept: HEMATOLOGY/ONCOLOGY | Facility: CLINIC | Age: 75
End: 2020-08-04
Payer: MEDICARE

## 2020-08-04 ENCOUNTER — HOSPITAL ENCOUNTER (OUTPATIENT)
Dept: CARDIOLOGY | Facility: HOSPITAL | Age: 75
Discharge: HOME | End: 2020-08-04
Attending: INTERNAL MEDICINE
Payer: MEDICARE

## 2020-08-04 ENCOUNTER — HOSPITAL ENCOUNTER (OUTPATIENT)
Dept: PREADMISSION TESTING | Facility: HOSPITAL | Age: 75
Discharge: HOME | End: 2020-08-04
Attending: ANESTHESIOLOGY
Payer: MEDICARE

## 2020-08-04 ENCOUNTER — HOSPITAL ENCOUNTER (OUTPATIENT)
Dept: CARDIOLOGY | Facility: CLINIC | Age: 75
Discharge: HOME | End: 2020-08-04
Payer: MEDICARE

## 2020-08-04 VITALS
HEIGHT: 67 IN | SYSTOLIC BLOOD PRESSURE: 150 MMHG | TEMPERATURE: 99 F | WEIGHT: 168 LBS | RESPIRATION RATE: 16 BRPM | DIASTOLIC BLOOD PRESSURE: 76 MMHG | BODY MASS INDEX: 26.37 KG/M2 | OXYGEN SATURATION: 97 % | HEART RATE: 65 BPM

## 2020-08-04 VITALS
DIASTOLIC BLOOD PRESSURE: 76 MMHG | WEIGHT: 166 LBS | HEART RATE: 62 BPM | BODY MASS INDEX: 26.06 KG/M2 | HEIGHT: 67 IN | SYSTOLIC BLOOD PRESSURE: 150 MMHG

## 2020-08-04 VITALS
WEIGHT: 168 LBS | DIASTOLIC BLOOD PRESSURE: 76 MMHG | HEART RATE: 65 BPM | SYSTOLIC BLOOD PRESSURE: 150 MMHG | RESPIRATION RATE: 16 BRPM | OXYGEN SATURATION: 97 % | HEIGHT: 67 IN | TEMPERATURE: 99 F | BODY MASS INDEX: 26.37 KG/M2

## 2020-08-04 DIAGNOSIS — C78.7 METASTATIC RENAL CELL CARCINOMA TO LIVER: ICD-10-CM

## 2020-08-04 DIAGNOSIS — G89.3 NEOPLASM RELATED PAIN: ICD-10-CM

## 2020-08-04 DIAGNOSIS — N18.30 CRI (CHRONIC RENAL INSUFFICIENCY), STAGE 3 (MODERATE): ICD-10-CM

## 2020-08-04 DIAGNOSIS — E87.5 HYPERKALEMIA: ICD-10-CM

## 2020-08-04 DIAGNOSIS — N18.30 STAGE 3 CHRONIC KIDNEY DISEASE: Primary | ICD-10-CM

## 2020-08-04 DIAGNOSIS — D53.9 MACROCYTIC ANEMIA: ICD-10-CM

## 2020-08-04 DIAGNOSIS — Z01.818 PREOPERATIVE TESTING: ICD-10-CM

## 2020-08-04 DIAGNOSIS — C79.51 METASTATIC RENAL CELL CARCINOMA TO BONE: ICD-10-CM

## 2020-08-04 DIAGNOSIS — C64.9 METASTATIC RENAL CELL CARCINOMA TO LIVER: ICD-10-CM

## 2020-08-04 DIAGNOSIS — C64.2 RENAL CELL CARCINOMA OF LEFT KIDNEY: Primary | ICD-10-CM

## 2020-08-04 DIAGNOSIS — R60.9 EDEMA, UNSPECIFIED TYPE: ICD-10-CM

## 2020-08-04 DIAGNOSIS — C64.9 METASTATIC RENAL CELL CARCINOMA TO BONE: ICD-10-CM

## 2020-08-04 LAB
ASCENDING AORTA: 2.79 CM
AV INDEX (PROSTH): 0.67
AV MEAN GRADIENT: 5 MMHG
AV PEAK GRADIENT: 11 MMHG
AV VALVE AREA: 2.35 CM2
AV VELOCITY RATIO: 0.7
BSA FOR ECHO PROCEDURE: 1.89 M2
CV ECHO LV RWT: 0.35 CM
DOP CALC AO PEAK VEL: 1.66 M/S
DOP CALC AO VTI: 39.19 CM
DOP CALC LVOT AREA: 3.5 CM2
DOP CALC LVOT DIAMETER: 2.11 CM
DOP CALC LVOT PEAK VEL: 1.17 M/S
DOP CALC LVOT STROKE VOLUME: 91.99 CM3
DOP CALCLVOT PEAK VEL VTI: 26.32 CM
E WAVE DECELERATION TIME: 189.54 MSEC
E/A RATIO: 0.91
E/E' RATIO: 16 M/S
ECHO LV POSTERIOR WALL: 0.9 CM (ref 0.6–1.1)
FRACTIONAL SHORTENING: 31 % (ref 28–44)
INTERVENTRICULAR SEPTUM: 0.93 CM (ref 0.6–1.1)
LA MAJOR: 5.57 CM
LA MINOR: 5.19 CM
LA WIDTH: 4.23 CM
LEFT ATRIUM SIZE: 3.99 CM
LEFT ATRIUM VOLUME INDEX: 41.3 ML/M2
LEFT ATRIUM VOLUME: 77.09 CM3
LEFT INTERNAL DIMENSION IN SYSTOLE: 3.6 CM (ref 2.1–4)
LEFT VENTRICLE DIASTOLIC VOLUME INDEX: 68.96 ML/M2
LEFT VENTRICLE DIASTOLIC VOLUME: 128.83 ML
LEFT VENTRICLE MASS INDEX: 92 G/M2
LEFT VENTRICLE SYSTOLIC VOLUME INDEX: 29.1 ML/M2
LEFT VENTRICLE SYSTOLIC VOLUME: 54.43 ML
LEFT VENTRICULAR INTERNAL DIMENSION IN DIASTOLE: 5.19 CM (ref 3.5–6)
LEFT VENTRICULAR MASS: 172.12 G
LV LATERAL E/E' RATIO: 13.71 M/S
LV SEPTAL E/E' RATIO: 19.2 M/S
MV PEAK A VEL: 1.05 M/S
MV PEAK E VEL: 0.96 M/S
MV STENOSIS PRESSURE HALF TIME: 54.97 MS
MV VALVE AREA P 1/2 METHOD: 4 CM2
PISA TR MAX VEL: 2.94 M/S
PULM VEIN S/D RATIO: 1.22
PV PEAK D VEL: 0.49 M/S
PV PEAK S VEL: 0.6 M/S
RA MAJOR: 6.04 CM
RA PRESSURE: 3 MMHG
RA WIDTH: 2.85 CM
RIGHT VENTRICULAR END-DIASTOLIC DIMENSION: 3.26 CM
SINUS: 3.21 CM
STJ: 2.16 CM
TDI LATERAL: 0.07 M/S
TDI SEPTAL: 0.05 M/S
TDI: 0.06 M/S
TR MAX PG: 35 MMHG
TRICUSPID ANNULAR PLANE SYSTOLIC EXCURSION: 1.91 CM
TV REST PULMONARY ARTERY PRESSURE: 38 MMHG

## 2020-08-04 PROCEDURE — 3288F FALL RISK ASSESSMENT DOCD: CPT | Mod: CPTII,S$GLB,, | Performed by: NURSE PRACTITIONER

## 2020-08-04 PROCEDURE — 1100F PTFALLS ASSESS-DOCD GE2>/YR: CPT | Mod: CPTII,S$GLB,, | Performed by: NURSE PRACTITIONER

## 2020-08-04 PROCEDURE — 99999 PR PBB SHADOW E&M-EST. PATIENT-LVL III: ICD-10-PCS | Mod: PBBFAC,,, | Performed by: NURSE PRACTITIONER

## 2020-08-04 PROCEDURE — 99214 PR OFFICE/OUTPT VISIT, EST, LEVL IV, 30-39 MIN: ICD-10-PCS | Mod: S$GLB,,, | Performed by: NURSE PRACTITIONER

## 2020-08-04 PROCEDURE — 93306 ECHO (CUPID ONLY): ICD-10-PCS | Mod: 26,,, | Performed by: INTERNAL MEDICINE

## 2020-08-04 PROCEDURE — 93005 EKG 12-LEAD: ICD-10-PCS | Mod: S$GLB,,, | Performed by: ANESTHESIOLOGY

## 2020-08-04 PROCEDURE — 1125F PR PAIN SEVERITY QUANTIFIED, PAIN PRESENT: ICD-10-PCS | Mod: S$GLB,,, | Performed by: NURSE PRACTITIONER

## 2020-08-04 PROCEDURE — 1159F PR MEDICATION LIST DOCUMENTED IN MEDICAL RECORD: ICD-10-PCS | Mod: S$GLB,,, | Performed by: NURSE PRACTITIONER

## 2020-08-04 PROCEDURE — 1125F AMNT PAIN NOTED PAIN PRSNT: CPT | Mod: S$GLB,,, | Performed by: NURSE PRACTITIONER

## 2020-08-04 PROCEDURE — 93010 EKG 12-LEAD: ICD-10-PCS | Mod: S$GLB,,, | Performed by: INTERNAL MEDICINE

## 2020-08-04 PROCEDURE — 1159F MED LIST DOCD IN RCRD: CPT | Mod: S$GLB,,, | Performed by: NURSE PRACTITIONER

## 2020-08-04 PROCEDURE — 99999 PR PBB SHADOW E&M-EST. PATIENT-LVL III: CPT | Mod: PBBFAC,,, | Performed by: NURSE PRACTITIONER

## 2020-08-04 PROCEDURE — 1100F PR PT FALLS ASSESS DOC 2+ FALLS/FALL W/INJURY/YR: ICD-10-PCS | Mod: CPTII,S$GLB,, | Performed by: NURSE PRACTITIONER

## 2020-08-04 PROCEDURE — 3078F DIAST BP <80 MM HG: CPT | Mod: CPTII,S$GLB,, | Performed by: NURSE PRACTITIONER

## 2020-08-04 PROCEDURE — 3077F PR MOST RECENT SYSTOLIC BLOOD PRESSURE >= 140 MM HG: ICD-10-PCS | Mod: CPTII,S$GLB,, | Performed by: NURSE PRACTITIONER

## 2020-08-04 PROCEDURE — 93005 ELECTROCARDIOGRAM TRACING: CPT | Mod: S$GLB,,, | Performed by: ANESTHESIOLOGY

## 2020-08-04 PROCEDURE — 3078F PR MOST RECENT DIASTOLIC BLOOD PRESSURE < 80 MM HG: ICD-10-PCS | Mod: CPTII,S$GLB,, | Performed by: NURSE PRACTITIONER

## 2020-08-04 PROCEDURE — 93306 TTE W/DOPPLER COMPLETE: CPT | Mod: 26,,, | Performed by: INTERNAL MEDICINE

## 2020-08-04 PROCEDURE — 3288F PR FALLS RISK ASSESSMENT DOCUMENTED: ICD-10-PCS | Mod: CPTII,S$GLB,, | Performed by: NURSE PRACTITIONER

## 2020-08-04 PROCEDURE — 93306 TTE W/DOPPLER COMPLETE: CPT

## 2020-08-04 PROCEDURE — 3077F SYST BP >= 140 MM HG: CPT | Mod: CPTII,S$GLB,, | Performed by: NURSE PRACTITIONER

## 2020-08-04 PROCEDURE — 93010 ELECTROCARDIOGRAM REPORT: CPT | Mod: S$GLB,,, | Performed by: INTERNAL MEDICINE

## 2020-08-04 PROCEDURE — 99214 OFFICE O/P EST MOD 30 MIN: CPT | Mod: S$GLB,,, | Performed by: NURSE PRACTITIONER

## 2020-08-04 NOTE — DISCHARGE INSTRUCTIONS
Your surgery has been scheduled for:__________________________________________    You should report to:  ____[facility], located on the [place] side of the first floor of the           [facility] ([phone])  ____[facility], located on the [facility] side of the            Second floor of the [facility] ([phone])  ____[facility] ([facility]) Located at [address] [address]  Please Note   - Tell your doctor if you take Aspirin, products containing Aspirin, herbal medications  or blood thinners, such as Coumadin, Ticlid, or Plavix.  (Consult your provider regarding holding or stopping before surgery).  - Arrange for someone to drive you home following surgery.  You will not be allowed to leave the surgical facility alone or drive yourself home following sedation and anesthesia.  Before Surgery  - Stop taking all herbal medications 14days prior to surgery  - No Motrin/Advil (Ibuprofen) 7 days before surgery  - No Aleve (Naproxen) 7 days before surgery  - Stop Taking Aspirin, products containing Aspirin _____days before surgery  - Stop taking blood thinners_______days before surgery  - No Goody's/BC  Powder 7 days before surgery  - Refrain from drinking alcoholic beverages for 24hours before and after surgery  - Stop or limit smoking _________days before surgery  - You may take Tylenol for pain  Night before Surgery   Stop ALL solid food, gum, candy (including vitamins) 8 hours before arrival time.  (Please note: If your surgeon gives you different eating and drinking instructions, please follow surgeon's directions.)   Stop all CLOUDY liquids: coffee with creamer, formula, tube feeds, cloudy juices, non-human milk and breast milk with additives, 6 hours prior to arrival time.   Stop plain breast milk 4 hours prior to arrival time.   The patient should be ENCOURAGED to drink carbohydrate-rich clear liquids (sports  drinks, clear juices) until 2 hours prior to arrival time.   CLEAR liquids include only water, black coffee NO creamer, clear oral rehydration drinks, clear sports drinks or clear fruit juices (no orange juice, no pulpy juices, no apple cider). Advise patients if they can read newsprint through the liquid, it qualifies as clear liquid.    IF IN DOUBT, drink water instead.   - Take a shower or bath (shower is recommended).  Bathe with Hibiclens soap or an antibacterial soap from the neck down.  If not supplied by your surgeon, hibiclens soap will need to be purchased over the counter in pharmacy.  Rinse soap off thoroughly.  - Shampoo your hair with your regular shampoo  The Day of Surgery  · NOTHING TO  DRINK 2 hours before arrival time. If you are told to take medication on the morning of surgery, it may be taken with a sip of water.   - Take another bath or shower with hibiclens or any antibacterial soap, to reduce the chance of infection.  - Take heart and blood pressure medications with a small sip of water, as advised by the perioperative team.  - Do not take fluid pills  - You may brush your teeth and rinse your mouth, but do not swallow any additional water.   - Do not apply perfumes, powder, body lotions or deodorant on the day of surgery.  - Nail polish should be removed.  - Do not wear makeup or moisturizer  - Wear comfortable clothes, such as a button front shirt and loose fitting pants.  - Leave all jewelry, including body piercings, and valuables at home.    - Bring any devices you will neeed after surgery such as crutches or canes.  - If you have sleep apnea, please bring your CPAP machine  In the event that your physical condition changes including the onset of a cold or respiratory illness, or if you have to delay or cancel your surgery, please notify your surgeon.  Anesthesia: General Anesthesia     You are watched continuously during your procedure by your anesthesia provider.     Youre due to  have surgery. During surgery, youll be given medicine called anesthesia or anesthetic. This will keep you comfortable and pain-free. Your anesthesia provider will use general anesthesia.  What is general anesthesia?  General anesthesia puts you into a state like deep sleep. It goes into the bloodstream (IV anesthetics), into the lungs (gas anesthetics), or both. You feel nothing during the procedure. You will not remember it. During the procedure, the anesthesia provider monitors you continuously. He or she checks your heart rate and rhythm, blood pressure, breathing, and blood oxygen.  · IV anesthetics. IV anesthetics are given through an IV line in your arm. Theyre often given first. This is so you are asleep before a gas anesthetic is started. Some kinds of IV anesthetics relieve pain. Others relax you. Your doctor will decide which kind is best in your case.  · Gas anesthetics. Gas anesthetics are breathed into the lungs. They are often used to keep you asleep. They can be given through a facemask or a tube placed in your larynx or trachea (breathing tube).  ? If you have a facemask, your anesthesia provider will most likely place it over your nose and mouth while youre still awake. Youll breathe oxygen through the mask as your IV anesthetic is started. Gas anesthetic may be added through the mask.  ? If you have a tube in the larynx or trachea, it will be inserted into your throat after youre asleep.  Anesthesia tools and medicines  You will likely have:  · IV anesthetics. These are put into an IV line into your bloodstream.  · Gas anesthetics. You breathe these anesthetics into your lungs, where they pass into your bloodstream.  · Pulse oximeter. This is a small clip that is attached to the end of your finger. This measures your blood oxygen level.  · Electrocardiography leads (electrodes). These are small sticky pads that are placed on your chest. They record your heart rate and rhythm.  · Blood pressure  cuff. This reads your blood pressure.  Risks and possible complications  General anesthesia has some risks. These include:  · Breathing problems  · Nausea and vomiting  · Sore throat or hoarseness (usually temporary)  · Allergic reaction to the anesthetic  · Irregular heartbeat (rare)  · Cardiac arrest (rare)   Anesthesia safety  · Follow all instructions you are given for how long not to eat or drink before your procedure.  · Be sure your doctor knows what medicines and drugs you take. This includes over-the-counter medicines, herbs, supplements, alcohol or other drugs. You will be asked when those were last taken.  · Have an adult family member or friend drive you home after the procedure.  · For the first 24 hours after your surgery:  ? Do not drive or use heavy equipment.  ? Do not make important decisions or sign legal documents. If important decisions or signing legal documents is necessary during the first 24 hours after surgery, have a trusted family member or spouse act on your behalf.  ? Avoid alcohol.  ? Have a responsible adult stay with you. He or she can watch for problems and help keep you safe.  Date Last Reviewed: 12/1/2016 © 2000-2017 [employer]. [address] [place] [address]. All rights reserved. This information is not intended as a substitute for professional medical care. Always follow your healthcare professional's instructions

## 2020-08-04 NOTE — Clinical Note
Needs follow up with nephrology within the next week or so- (Dr. Venkat Leonardo)    -RTC 4 weeks to see Dr. Reed with cbc, cmp.

## 2020-08-04 NOTE — PROGRESS NOTES
Subjective:       Patient ID: Edwin Powell is a 75 y.o. male.    Chief Complaint: Metastatic renal cell carcinoma, unspecified laterality    HPI     Reports tolerating Votrient well.   Doing well, no complaints.   No rashes.   No new pain issues. He continues to have pain in the neck (Cervical stenosis)but not bothering as much.  He follows with with ortho.   Back pain stable to improved.   Good appetite  Weight stable- gained a couple of pounds.   No fever/chills or other infectious complaints. No loss of taste or smell.   No SOB/cough or increased swelling.   Admits to not hydrating as much.     ECHO today:  · Normal left ventricular systolic function. The estimated ejection fraction is 65%.  · No wall motion abnormalities.  · Grade II (moderate) left ventricular diastolic dysfunction consistent with pseudonormalization.  · Mild left atrial enlargement.  · Normal right ventricular systolic function.  · Moderate right atrial enlargement.  · Mild mitral regurgitation.  · Mild tricuspid regurgitation.  · Normal central venous pressure (3 mmHg).  · The estimated PA systolic pressure is 38 mmHg.         Mr. Singleton is a 75 y.o. male who returns for follow up regarding metastatic renal cell carcinoma of left kidney. Returns for follow-up, currently on Votrient.     5/19/2020 CT scans C/A/P:  COMPARISON:  MRI cervical spine and bone scan from earlier today.  CT chest/abdomen/pelvis from 01/24/2020 and multiple additional previous exams.     FINDINGS:  Thoracic soft tissues: Stable subcentimeter hypoattenuating nodule in the right lobe of the thyroid.  Left lobe of thyroid appears unremarkable.     Aorta: Normal in course and caliber with moderate atherosclerotic plaque.  Left-sided 3 vessel arch.     Heart: Normal in size.  Dense calcification of the aortic valve annulus.  Dense coronary calcification.  Trace pericardial fluid.     Alyssa/Mediastinum: Stable appearance of an enlarged right paratracheal lymph node  measuring 1.6 cm in short axis (axial series 2, image 33), previously 1.6 cm.  No definite hilar lymphadenopathy.     Lungs: Well aerated.  Stable pulmonary micronodule on the order of 3 mm in the right upper lobe (axial series 2, image 48).  Stable subcentimeter pleural-based nodule in the left upper lobe (axial series 2, image 41).  No new suspicious nodules.  No focal consolidation or mass.  No significant pleural fluid.  No pneumothorax.     Liver: Normal in size and attenuation.  Previously described 1.2 cm hypoattenuating lesion in the hepatic dome and subcentimeter hypoattenuating lesion about the left portal vein are not well characterized on today's non-contrast exam.  IV contrast was not given because of renal function.  No discrete mass.     Gallbladder: Surgically absent.     Bile Ducts: No evidence of dilated ducts.     Pancreas: No mass or peripancreatic fat stranding.     Spleen: Unremarkable.     Adrenals: Unremarkable.     Kidneys/ Ureters: Normal in size and location.  Mild nonspecific bilateral perinephric stranding.  Stable appearance of solid mass in the left kidney measuring approximately 3.4 x 2.5 cm (axial series 2, image 124), previously 3.1 x 2.3 cm.  Differences in measurement are likely due to interobserver variability and technique.  No hydronephrosis or nephrolithiasis. No ureteral dilatation.     Bladder: No evidence of wall thickening.     Reproductive organs: Prostatomegaly with dystrophic calcifications.     GI Tract/Mesentery: Stomach appears distended with ingested material and oral contrast.  No evidence of bowel obstruction or inflammation.     Peritoneal Space: Trace ascites along the colic gutters.  No free air.     Retroperitoneum: No significant adenopathy.     Abdominal wall: Unremarkable.     Vasculature: Dense aortic atherosclerosis without aneurysm.     Bones: Stable lytic focus within the L3 vertebral body, initially reported as metastatic disease on bone scan dated  "05/30/2017.  Multiple stable nonspecific dense sclerotic foci involving the thoracic spine and bilateral iliac bones; these are more likely benign.  No new aggressive lesions.  Age-appropriate degenerative changes.  Stable decompressive laminectomies at L4 and L5.     Impression:     History metastatic renal cell carcinoma.  Stable solid left renal mass.     Stable mediastinal lymphadenopathy.     Previously described liver lesions are not well characterized on today's non-contrast examination; no obvious interval change.  These are likely metastatic deposits.     Stable osseous metastatic disease at L3..     Additional findings as above.     RECIST SUMMARY:     Date of prior examination for comparison: 01/24/2020     Lesion 1: Left renal mass.  3.4 cm. Series 2 image 124.  Prior measurement 3.1 cm.     Lesion 2: Right hepatic lobe lesion.  Not measurable due to noncontrast technique. Prior measurement 1.2 cm.     Lesion 3: Right paratracheal lymph node.  1.6 cm. Series 2 image 33.  Prior measurement 1.6 cm.     Oncology History:  Mr.Mc Moss is a 74 yo male with CAD (s/p 5 stents, remotely), DM2 (insulin dependent), hypothyroidism , CVA (2013) with no residual deficit, chronic back pain. [place] [facility] a lumbar spine lesion in the setting of progressive weakness of his legs and exacerbation of chronic back pain, in May 2017. He states that his back pain and LE weakness progressively worsened. [facility]. The pain radiates down his legs. He denies any incontinence of bowel or bladder. He had several falls. After one of the falls, he presented to the ED at [facility], in May 2017. He had an MRI showing "L3 vertebral body lesion with apparent cortical disruption concerning for metastases vs myeloma," and was [place] [facility] [place]. He was evaluated by neurosurgery regarding concern for lytic lesion on " L3 on 5/26/17. No surgical intervention was proposed.   CT chest, abdomen,pelvis on 5/27/17 revealed :   --A 3.6 cm exophytic mass arising from the medial aspect of the lower pole of left kidney,   --A small 1.0 cm, subtle, cortical enhancing mass within the upper pole of the right kidney, concerning for possible contralateral solid renal mass  --Multiple enhancing ill-defined hepatic masses concerning for hepatic metastatic disease, hepatomegaly and hepatic steatosis  --Mulltifocal irregular thickening of the bilateral pleura concerning for possible pleural metastatic disease  --Mild nonspecific nodular thickening of the left adrenal gland without discrete nodule  -- Redemonstration of known lytic lesion within the L3 vertebral body. No definite additional discrete lytic lesions are identified.  -MRI pelvis from 10/18/17:    2.5 cm linear T1 hypointense T2/STIR hyperintense lesion within the left iliac bone, this lesion is indeterminate but felt less likely to represent metastasis given its appearance. Further evaluation can be obtained with bone scan as clinically indicated.  Findings suggestive of left trochanteric bursitis.  Partially visualized L3 metastatic lesion.  Diffuse thickening of the urinary bladder wall can be seen with chronic outlet obstruction or cystitis.  - initiated on Votrient    Review of Systems   Review of Systems   Constitutional: Positive for malaise/fatigue (mild). Negative for chills, fever and weight loss.   HENT: Negative for sore throat.    Eyes: Negative for blurred vision and double vision.   Respiratory: Negative for cough, hemoptysis and shortness of breath.    Cardiovascular: Negative for chest pain, palpitations and leg swelling.   Gastrointestinal: Negative for constipation, diarrhea, nausea and vomiting.   Genitourinary: Negative for dysuria.   Musculoskeletal: Positive for back pain (overall improved. ) and neck pain (right side- slightly improved. ).   Skin: Negative for  itching and rash.   Neurological: Negative for dizziness and weakness.   Psychiatric/Behavioral: Negative for depression. The patient is not nervous/anxious.            Objective:      Physical Exam  Physical Exam   Constitutional: He is oriented to person, place, and time and well-developed, well-nourished, and in no distress.   HENT:   Head: Normocephalic.   Mouth/Throat: Oropharynx is clear and moist.   Eyes: Pupils are equal, round, and reactive to light. Conjunctivae are normal.   Neck: Normal range of motion. Neck supple. No JVD present. No thyromegaly present.   Cardiovascular: Normal rate.   Pulmonary/Chest: Effort normal and breath sounds normal.   Abdominal: Soft. Bowel sounds are normal. He exhibits no distension and no mass. There is no rebound and no guarding.   Slightly tender to deep palpation- no worse; + hernia.    Musculoskeletal: Normal range of motion.   Neurological: He is alert and oriented to person, place, and time.   Skin: Skin is warm and dry.   Psychiatric: Affect and judgment normal.        Labs- reviewed.   Assessment:       1. Renal cell carcinoma of left kidney    2. 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 Metastatic renal cell carcinoma to liver    4. Neoplasm related pain    5. Macrocytic anemia    6. CRI (chronic renal insufficiency), stage 3 (moderate)    7. Hyperkalemia        Plan:       -Continue on Votrient.   -Labs reviewed-anemia parameters stable. Kidney function and potassium noted- encouraged Hydration and low K diet. Repeat Thursday in [facility]. Needs follow up with nephrology as well. (Dr. Venkat Leonardo)    -RTC 4 weeks to see Dr. Reed with cbc, cmp. Will be due for imaging mid September.         Patient is in agreement with the proposed treatment plan. All questions were answered to the patient's satisfaction. Pt knows to call clinic for any new or worsening symptoms and if anything is needed before the next clinic visit.      Jessica Siddiqi  FNP-C  Hematology & Oncology  [address] [place] [address]  ph. [phone]  Fax. [phone]     I spent 30 minutes (face to face) with the patient, more than 50% was in counseling and coordination of care as detailed above.

## 2020-08-04 NOTE — Clinical Note
Hi there! FYI- I saw Mr Powell today and his kidney function had a slight bump. Zeina asked he hydrate and follow low K diet. I will repeat CMP Thursday and have him follow up with you soon, as looks like he is due. Thank you, PJ

## 2020-08-06 ENCOUNTER — TELEPHONE (OUTPATIENT)
Dept: NEPHROLOGY | Facility: HOSPITAL | Age: 75
End: 2020-08-06

## 2020-08-06 ENCOUNTER — TELEPHONE (OUTPATIENT)
Dept: NEPHROLOGY | Facility: CLINIC | Age: 75
End: 2020-08-06

## 2020-08-06 NOTE — TELEPHONE ENCOUNTER
Jacqueline, please make sure the pt is hydrating well and  have him follow low K diet as potassium is slightly high.  He can hold the lisnopril for 2 days and then resume. Thanks.

## 2020-08-06 NOTE — TELEPHONE ENCOUNTER
Preferred Name: Al Powell   Male, 75 y.o., 1945   Phone: [phone] (M)   PCP: Lulú Lewis MD   Language: English   Need Interp: No   Allergies Last Reviewed: 08/04/20   Allergies:   No Known Allergies   Health Maintenance: Due          FYI   Advance Directive   Population Management (Not a Candidate)   Therigy ID   Primary Ins.: [employer] MANAGED MEDICARE   MRN: [id number]   Pt Comm Pref: Patient Portal, Mail   Last MyChart Login: 8/4/2020 3:07 PM                              Next Appt:   With Nephrology (Venkat Leonardo DO)  08/12/2020 at 1:00 PM   My Sticky Note:   Specialty Comments:   Last Encounter Center: [facility]   Last Enc in Dept: None   Last Enc this Prov: 6/3/2020   Last Contact Department: [facility] NEPHROLOGY                Patient Calls  Venkat Leonardo DO routed conversation to Malissa Robledo Staff; Jessica Siddiqi NP 37 minutes ago (3:07 PM)          Venkat Leonardo DO 37 minutes ago (3:07 PM)     Jacqueline, please make sure the pt is hydrating well and have him follow low K diet as potassium is slightly high. He can hold the lisnopril for 2 days and then resume. Thanks.         Documentation                              Recent Patient Communication     Last Update Reason Specialty       Today -- Nephrology     [facility] Nephrology Venkat Leonardo Closed     Today -- Nephrology     [facility] Nephrology Venkat Leonardo Closed     Yesterday -- Nephrology     [facility] Nephrology Venkat Leonardo Closed     Yesterday -- Nephrology     [facility] Nephrology Jacqueline Banks Closed     2 days ago Medication Refill Hematology and Oncology     [facility] Hematology Oncology 3rd Floor [place], Marlena OCHOA Closed    There are additional recent communications with this patient. View the rest in Chart Review.

## 2020-08-09 ENCOUNTER — PATIENT OUTREACH (OUTPATIENT)
Dept: ADMINISTRATIVE | Facility: OTHER | Age: 75
End: 2020-08-09

## 2020-08-09 NOTE — PROGRESS NOTES
Requested updates within Care Everywhere.  Patient's chart was reviewed for overdue GERI topics.  Immunizations reconciled.    Orders placed:n/a  Labs Linked:n/a

## 2020-08-11 ENCOUNTER — OFFICE VISIT (OUTPATIENT)
Dept: CARDIOLOGY | Facility: CLINIC | Age: 75
End: 2020-08-11
Payer: MEDICARE

## 2020-08-11 VITALS
DIASTOLIC BLOOD PRESSURE: 75 MMHG | SYSTOLIC BLOOD PRESSURE: 157 MMHG | WEIGHT: 164.69 LBS | HEIGHT: 67 IN | BODY MASS INDEX: 25.85 KG/M2 | HEART RATE: 67 BPM

## 2020-08-11 DIAGNOSIS — Z79.4 TYPE 2 DIABETES MELLITUS WITH HYPERGLYCEMIA, WITH LONG-TERM CURRENT USE OF INSULIN: ICD-10-CM

## 2020-08-11 DIAGNOSIS — E78.5 HYPERLIPIDEMIA ASSOCIATED WITH TYPE 2 DIABETES MELLITUS: ICD-10-CM

## 2020-08-11 DIAGNOSIS — Z01.810 PREOPERATIVE CARDIOVASCULAR EXAMINATION: ICD-10-CM

## 2020-08-11 DIAGNOSIS — E11.65 TYPE 2 DIABETES MELLITUS WITH HYPERGLYCEMIA, WITH LONG-TERM CURRENT USE OF INSULIN: ICD-10-CM

## 2020-08-11 DIAGNOSIS — E11.69 HYPERLIPIDEMIA ASSOCIATED WITH TYPE 2 DIABETES MELLITUS: ICD-10-CM

## 2020-08-11 DIAGNOSIS — I25.119 CORONARY ARTERY DISEASE INVOLVING NATIVE CORONARY ARTERY OF NATIVE HEART WITH ANGINA PECTORIS: Primary | ICD-10-CM

## 2020-08-11 DIAGNOSIS — I10 ESSENTIAL HYPERTENSION: ICD-10-CM

## 2020-08-11 DIAGNOSIS — C64.2 RENAL CELL CARCINOMA OF LEFT KIDNEY: ICD-10-CM

## 2020-08-11 PROCEDURE — 3288F PR FALLS RISK ASSESSMENT DOCUMENTED: ICD-10-PCS | Mod: HCNC,CPTII,S$GLB, | Performed by: INTERNAL MEDICINE

## 2020-08-11 PROCEDURE — 1100F PTFALLS ASSESS-DOCD GE2>/YR: CPT | Mod: HCNC,CPTII,S$GLB, | Performed by: INTERNAL MEDICINE

## 2020-08-11 PROCEDURE — 3078F DIAST BP <80 MM HG: CPT | Mod: HCNC,CPTII,S$GLB, | Performed by: INTERNAL MEDICINE

## 2020-08-11 PROCEDURE — 99999 PR PBB SHADOW E&M-EST. PATIENT-LVL V: CPT | Mod: PBBFAC,HCNC,, | Performed by: INTERNAL MEDICINE

## 2020-08-11 PROCEDURE — 1100F PR PT FALLS ASSESS DOC 2+ FALLS/FALL W/INJURY/YR: ICD-10-PCS | Mod: HCNC,CPTII,S$GLB, | Performed by: INTERNAL MEDICINE

## 2020-08-11 PROCEDURE — 1126F PR PAIN SEVERITY QUANTIFIED, NO PAIN PRESENT: ICD-10-PCS | Mod: HCNC,S$GLB,, | Performed by: INTERNAL MEDICINE

## 2020-08-11 PROCEDURE — 99204 PR OFFICE/OUTPT VISIT, NEW, LEVL IV, 45-59 MIN: ICD-10-PCS | Mod: HCNC,S$GLB,, | Performed by: INTERNAL MEDICINE

## 2020-08-11 PROCEDURE — 3078F PR MOST RECENT DIASTOLIC BLOOD PRESSURE < 80 MM HG: ICD-10-PCS | Mod: HCNC,CPTII,S$GLB, | Performed by: INTERNAL MEDICINE

## 2020-08-11 PROCEDURE — 99999 PR PBB SHADOW E&M-EST. PATIENT-LVL V: ICD-10-PCS | Mod: PBBFAC,HCNC,, | Performed by: INTERNAL MEDICINE

## 2020-08-11 PROCEDURE — 3077F SYST BP >= 140 MM HG: CPT | Mod: HCNC,CPTII,S$GLB, | Performed by: INTERNAL MEDICINE

## 2020-08-11 PROCEDURE — 3288F FALL RISK ASSESSMENT DOCD: CPT | Mod: HCNC,CPTII,S$GLB, | Performed by: INTERNAL MEDICINE

## 2020-08-11 PROCEDURE — 1159F PR MEDICATION LIST DOCUMENTED IN MEDICAL RECORD: ICD-10-PCS | Mod: HCNC,S$GLB,, | Performed by: INTERNAL MEDICINE

## 2020-08-11 PROCEDURE — 1126F AMNT PAIN NOTED NONE PRSNT: CPT | Mod: HCNC,S$GLB,, | Performed by: INTERNAL MEDICINE

## 2020-08-11 PROCEDURE — 99204 OFFICE O/P NEW MOD 45 MIN: CPT | Mod: HCNC,S$GLB,, | Performed by: INTERNAL MEDICINE

## 2020-08-11 PROCEDURE — 3044F PR MOST RECENT HEMOGLOBIN A1C LEVEL <7.0%: ICD-10-PCS | Mod: HCNC,CPTII,S$GLB, | Performed by: INTERNAL MEDICINE

## 2020-08-11 PROCEDURE — 3077F PR MOST RECENT SYSTOLIC BLOOD PRESSURE >= 140 MM HG: ICD-10-PCS | Mod: HCNC,CPTII,S$GLB, | Performed by: INTERNAL MEDICINE

## 2020-08-11 PROCEDURE — 1159F MED LIST DOCD IN RCRD: CPT | Mod: HCNC,S$GLB,, | Performed by: INTERNAL MEDICINE

## 2020-08-11 PROCEDURE — 3044F HG A1C LEVEL LT 7.0%: CPT | Mod: HCNC,CPTII,S$GLB, | Performed by: INTERNAL MEDICINE

## 2020-08-11 NOTE — Clinical Note
He said his blood pressures at home were in the 150s, with renal function, cannot use thiazide, but I think the bigger issue is the metastatic renal cell carcinoma, we do not need perfect blood pressures.  With his history of CAD though, one thing we could add would be a beta blocker if he could tolerate it from heart rate perspective.  Would consider carvedilol 6 mg b.i.d. after surgery, but not going to start anything before surgery.  Okay to hold aspirin, let me know if you have any other questions    Stan

## 2020-08-11 NOTE — PROGRESS NOTES
Subjective:   Chief Complaint: Coronary artery disease involving native coronary artery of   Last Clinic Visit: New Patient    History of Present Illness: Edwin Powell is a 75 y.o. gentleman with remote CAD s/p PCI 20 years ago, remote tobacco use, hypertension, diabetes, hyperlipidemia, osteoarthritis, cervical spinal disease, metastatic renal cell carcinoma, chronic kidney disease, hypothyroidism, who presents to establish cardiology care, has not been seen by a cardiologist in many years.  He denies any current cardiovascular complaints, no chest pain, no dyspnea on exertion, no palpitations, no syncope, no presyncope.  No orthopnea, no significant lower extremity edema.  Occasionally he reports that his legs feel weak, but this been ongoing issue with spinal disease and metastatic cancer.  Cancer was diagnosed in 2017, and has been on chemotherapy since that time.  He has upcoming neck surgery for an unrelated cervical disc issue next week.  Question as to whether not he can hold aspirin.  Also endorses diabetes on insulin.  Echocardiogram last week with normal ejection fraction, diastolic dysfunction.  Most recent LDL well controlled in the 70s.  Notably however blood pressure mildly elevated in the 150s, unsure what his ACE-inhibitor situation is as he was told to hold it at some point.  Reports blood pressures also elevated home 150.    Dx:  CAD s/p PCI 20 years ago  Remote tobacco use  Hypertension  Diabetes  Hyperlipidemia  Osteoarthritis  Metastatic renal cell carcinoma  Chronic kidney disease  Past medical and surgical history reviewed as documented.    Review of Systems   Constitution: Positive for malaise/fatigue.   HENT: Negative.    Eyes: Negative.    Cardiovascular: Negative.    Respiratory: Negative.    Hematologic/Lymphatic: Negative.    Skin: Negative.    Musculoskeletal: Positive for back pain.   Gastrointestinal: Negative.    Genitourinary: Negative.    Neurological: Positive for focal  weakness and weakness.     Medications:  Outpatient Encounter Medications as of 8/11/2020   Medication Sig Dispense Refill    amLODIPine (NORVASC) 10 MG tablet Take 1 tablet (10 mg total) by mouth once daily. (Patient taking differently: Take 10 mg by mouth once daily. Take in am of surgery) 1 tablet 0    ascorbic acid (VITAMIN C ORAL) Take by mouth once daily. Hold for 1 week prior to surgery      aspirin (ECOTRIN) 81 MG EC tablet Take 81 mg by mouth once daily. Will ask MD to hold 1 week      azelastine (ASTELIN) 137 mcg (0.1 %) nasal spray 1 spray (137 mcg total) by Nasal route 2 (two) times daily. (Patient taking differently: 1 spray by Nasal route 2 (two) times daily. Take if needed) 30 mL 3    b complex vitamins (B COMPLEX 1) tablet Take 1 tablet by mouth once daily. (Patient taking differently: Take 1 tablet by mouth once daily. Hold for 1 week prior to surgery) 90 tablet 3    blood sugar diagnostic Strp 1 strip by Misc.(Non-Drug; Combo Route) route 2 (two) times daily as needed. 200 strip 3    blood-glucose meter Misc use as instructed 1 each 0    cholecalciferol, vitamin D3, (VITAMIN D3) 125 mcg (5,000 unit) Tab Take 1 tablet (5,000 Units total) by mouth once daily. (Patient taking differently: Take 5,000 Units by mouth once daily. Hold for 1 week prior to surgery) 90 tablet 3    clindamycin (CLEOCIN T) 1 % external solution AAA bid 60 mL 3    fluticasone propionate (FLONASE) 50 mcg/actuation nasal spray 1 SPRAY (50 MCG TOTAL) BY EACH NARE ROUTE ONCE DAILY. (Patient taking differently: 1 spray by Each Nostril route once daily. Take if needed) 16 mL 3    HYDROcodone-acetaminophen (NORCO) 5-325 mg per tablet 1 tablet PO q8h prn pain. Quantity Medically Necessary. (Patient taking differently: 1 tablet PO q8h prn pain. Quantity Medically Necessary.  Take if needed) 120 tablet 0    insulin (LANTUS SOLOSTAR U-100 INSULIN) glargine 100 units/mL (3mL) SubQ pen Inject 5 Units into the skin every evening.  "(Patient taking differently: Inject 5 Units into the skin every evening. Take 80%=4U night before surgery) 5 mL 11    lancets (ONETOUCH ULTRASOFT LANCETS) Misc 1 lancet by Misc.(Non-Drug; Combo Route) route 2 (two) times daily as needed. 200 each 3    levocetirizine (XYZAL) 5 MG tablet Take 1 tablet (5 mg total) by mouth every evening. Can take up to 2 tablets (Patient taking differently: Take 5 mg by mouth every evening. Can take up to 2 tablets  Takes at night) 30 tablet 11    levothyroxine (SYNTHROID) 88 MCG tablet Take 1 tablet (88 mcg total) by mouth once daily. (Patient taking differently: Take 88 mcg by mouth once daily. Take in am of surgery) 90 tablet 3    lisinopriL (PRINIVIL,ZESTRIL) 40 MG tablet Take 1 tablet (40 mg total) by mouth once daily. (Patient taking differently: Take 40 mg by mouth once daily. Hold am of surgery) 1 tablet 0    montelukast (SINGULAIR) 10 mg tablet Take 1 tablet (10 mg total) by mouth every evening. (Patient taking differently: Take 10 mg by mouth every evening. Takes at night) 30 tablet 0    morphine (MS CONTIN) 15 MG 12 hr tablet Take 1 tablet (15 mg total) by mouth every 12 (twelve) hours. Medically Necessary (Patient taking differently: Take 15 mg by mouth every 12 (twelve) hours. Medically Necessary  Take if needed) 60 tablet 0    multivitamin (THERAGRAN) per tablet Take 1 tablet by mouth once daily. (Patient taking differently: Take 1 tablet by mouth once daily. Hold for 1 week prior to surgery)      ondansetron (ZOFRAN) 8 MG tablet Take 1 tablet (8 mg total) by mouth 3 (three) times daily as needed. (Patient taking differently: Take 8 mg by mouth 3 (three) times daily as needed. Take if needed) 90 tablet 3    PAZOpanib (VOTRIENT) 200 mg Tab Take 4 tablets (800 mg total) by mouth once daily. (Patient taking differently: Take 800 mg by mouth once daily Take in am of surgery.) 120 tablet 5    pen needle, diabetic 29 gauge x 1/2" Ndle 1 application by Misc.(Non-Drug; " "Combo Route) route once daily. 100 each 3    rosuvastatin (CRESTOR) 40 MG Tab Take 1 tablet (40 mg total) by mouth every evening. (Patient taking differently: Take 40 mg by mouth once daily. Take in am of surgery) 90 tablet 3    tamsulosin (FLOMAX) 0.4 mg Cap Take 2 capsules (0.8 mg total) by mouth every evening. (Patient taking differently: Take 0.8 mg by mouth once daily. Take in am of surgery) 180 capsule 3    vitamin E acetate (VITAMIN E ORAL) Take by mouth once daily. Hold for 1 week prior to surgery      diphenhydramine HCl (BENADRYL ALLERGY ORAL) Take by mouth daily as needed. Hold am of surgery       Facility-Administered Encounter Medications as of 8/11/2020   Medication Dose Route Frequency Provider Last Rate Last Dose    phenylephrine HCL 2.5% ophthalmic solution 1 drop  1 drop Left Eye On Call Procedure Chary Culp MD   1 drop at 08/22/19 0659    proparacaine 0.5 % ophthalmic solution 1 drop  1 drop Left Eye On Call Procedure Chary Culp MD   1 drop at 08/22/19 0641    tropicamide 1% ophthalmic solution 1 drop  1 drop Left Eye On Call Procedure Chary Culp MD   1 drop at 08/22/19 0700     Family History:  Edwin's family history includes Diabetes in his brother and mother; Glaucoma in his maternal grandmother and mother; Heart attack in his mother; No Known Problems in his son.    Social History:  Edwin reports that he has quit smoking. His smoking use included cigarettes. He has never used smokeless tobacco. He reports current alcohol use. He reports previous drug use.    Objective:   BP (!) 157/75 (BP Location: Left arm, Patient Position: Sitting, BP Method: Medium (Automatic))   Pulse 67   Ht 5' 7" (1.702 m)   Wt 74.7 kg (164 lb 10.9 oz)   BMI 25.79 kg/m²     Physical Exam   Constitutional: He is oriented to person, place, and time and well-developed, well-nourished, and in no distress. No distress.   HENT:   Head: Normocephalic and atraumatic.   Mouth/Throat: No " oropharyngeal exudate.   Eyes: EOM are normal. No scleral icterus.   Neck: No JVD present. No tracheal deviation present. No thyromegaly present.   Cardiovascular: Normal rate and regular rhythm. Exam reveals no gallop and no friction rub.   Murmur (One/6) heard.  Pulmonary/Chest: Effort normal and breath sounds normal. No respiratory distress. He has no wheezes. He has no rales. He exhibits no tenderness.   Abdominal: Soft. He exhibits no distension. There is no abdominal tenderness. There is no rebound and no guarding.   Musculoskeletal: Normal range of motion.         General: No edema.   Neurological: He is alert and oriented to person, place, and time.   Skin: Skin is warm and dry. He is not diaphoretic. No erythema.   Psychiatric: Affect normal.     EKG:  My independent visualization of most recent EKG is normal sinus rhythm, left axis deviation, cannot rule out inferior infarct.    TTE:  08/04/2020  · Normal left ventricular systolic function. The estimated ejection fraction is 65%.  · No wall motion abnormalities.  · Grade II (moderate) left ventricular diastolic dysfunction consistent with pseudonormalization.  · Mild left atrial enlargement.  · Normal right ventricular systolic function.  · Moderate right atrial enlargement.  · Mild mitral regurgitation.  · Mild tricuspid regurgitation.  · Normal central venous pressure (3 mmHg).  · The estimated PA systolic pressure is 38 mmHg.     Lipids:  Recent Labs   Lab 08/04/20  1156   LDL Cholesterol 75.6   HDL 38 L   Cholesterol 142      Renal:  Recent Labs   Lab 08/06/20  1258   Creatinine 2.08 H   Potassium 5.4 H   CO2 24   BUN, Bld 31 H     Liver:  Recent Labs   Lab 08/06/20  1258   AST 31   ALT 27     Assessment:     1. Coronary artery disease involving native coronary artery of native heart without angina    2. Preoperative cardiovascular examination    3. Hyperlipidemia associated with type 2 diabetes mellitus    4. Renal cell carcinoma of left kidney    5.  Type 2 diabetes mellitus with hyperglycemia, with long-term current use of insulin    6. Essential hypertension      Plan:   1. Coronary artery disease involving native coronary artery of native heart without angina  LDL near goal, blood pressure though elevated, will re-evaluate once he is taking lisinopril on a regular basis, could consider adding a beta-blocker, but would defer until after surgery given that he is not on one now, do not know he will tolerate.  - Ambulatory referral/consult to Cardiology    2. Preoperative cardiovascular examination  From a revised cardiac risk index standpoint he has insulin-dependent diabetes, chronic kidney disease creatinine greater than 2, and coronary artery disease, which would give him a 15% risk of major adverse cardiac event, but this includes asymptomatic troponin elevation in the majority of cases.  Based on the Griffiths risk score, risk would be1-2% risk of major adverse cardiac event, clinically significant, proceed on risk benefit basis.  Okay to hold aspirin for what ever duration this surgeon desires.    3. Hyperlipidemia associated with type 2 diabetes mellitus  Most recent LDL near goal, continue Crestor 40.    4. Renal cell carcinoma of left kidney      5. Type 2 diabetes mellitus with hyperglycemia, with long-term current use of insulin      6. Essential hypertension  Would add beta-blocker to regimen if he can tolerate from heart rate perspective, after surgery.  Could consider carvedilol 6.25 b.i.d..    Follow up in about 1 year (around 8/11/2021).      Stan Tamez MD [facility]

## 2020-08-11 NOTE — LETTER
August 11, 2020      Lulú Lewis MD  [address]  [place] [place] [facility] [place] [address]           [facility]  [address]  [facility] [place] [facility] [address]  Phone: [phone]          Patient: Edwin Powell   MR Number: [id number]   YOB: 1945   Date of Visit: 8/11/2020       Dear Dr. Lulú Lewis:    Thank you for referring Edwin Powell to me for evaluation. Attached you will find relevant portions of my assessment and plan of care.    If you have questions, please do not hesitate to call me. I look forward to following Edwin Powell along with you.    Sincerely,    Stan Tamez MD    Enclosure  CC:  No Recipients    If you would like to receive this communication electronically, please contact [email or web address] or [phone] to request more information on [employer] access.    For providers and/or their staff who would like to refer a patient to [facility], please contact us through our one-stop-shop provider referral line, [facility], at [phone].    If you feel you have received this communication in error or would no longer like to receive these types of communications, please e-mail [email or web address]

## 2020-08-12 ENCOUNTER — OFFICE VISIT (OUTPATIENT)
Dept: NEPHROLOGY | Facility: CLINIC | Age: 75
End: 2020-08-12
Payer: MEDICARE

## 2020-08-12 VITALS
SYSTOLIC BLOOD PRESSURE: 158 MMHG | BODY MASS INDEX: 25.9 KG/M2 | HEART RATE: 80 BPM | DIASTOLIC BLOOD PRESSURE: 64 MMHG | OXYGEN SATURATION: 97 % | WEIGHT: 165.38 LBS

## 2020-08-12 DIAGNOSIS — I10 HYPERTENSION, UNSPECIFIED TYPE: Primary | ICD-10-CM

## 2020-08-12 DIAGNOSIS — N18.30 CKD (CHRONIC KIDNEY DISEASE) STAGE 3, GFR 30-59 ML/MIN: ICD-10-CM

## 2020-08-12 PROCEDURE — 99999 PR PBB SHADOW E&M-EST. PATIENT-LVL V: ICD-10-PCS | Mod: PBBFAC,HCNC,, | Performed by: INTERNAL MEDICINE

## 2020-08-12 PROCEDURE — 3077F SYST BP >= 140 MM HG: CPT | Mod: HCNC,CPTII,S$GLB, | Performed by: INTERNAL MEDICINE

## 2020-08-12 PROCEDURE — 1100F PTFALLS ASSESS-DOCD GE2>/YR: CPT | Mod: HCNC,CPTII,S$GLB, | Performed by: INTERNAL MEDICINE

## 2020-08-12 PROCEDURE — 3288F PR FALLS RISK ASSESSMENT DOCUMENTED: ICD-10-PCS | Mod: HCNC,CPTII,S$GLB, | Performed by: INTERNAL MEDICINE

## 2020-08-12 PROCEDURE — 99499 RISK ADDL DX/OHS AUDIT: ICD-10-PCS | Mod: S$GLB,,, | Performed by: INTERNAL MEDICINE

## 2020-08-12 PROCEDURE — 1100F PR PT FALLS ASSESS DOC 2+ FALLS/FALL W/INJURY/YR: ICD-10-PCS | Mod: HCNC,CPTII,S$GLB, | Performed by: INTERNAL MEDICINE

## 2020-08-12 PROCEDURE — 3078F PR MOST RECENT DIASTOLIC BLOOD PRESSURE < 80 MM HG: ICD-10-PCS | Mod: HCNC,CPTII,S$GLB, | Performed by: INTERNAL MEDICINE

## 2020-08-12 PROCEDURE — 99999 PR PBB SHADOW E&M-EST. PATIENT-LVL V: CPT | Mod: PBBFAC,HCNC,, | Performed by: INTERNAL MEDICINE

## 2020-08-12 PROCEDURE — 3077F PR MOST RECENT SYSTOLIC BLOOD PRESSURE >= 140 MM HG: ICD-10-PCS | Mod: HCNC,CPTII,S$GLB, | Performed by: INTERNAL MEDICINE

## 2020-08-12 PROCEDURE — 1126F AMNT PAIN NOTED NONE PRSNT: CPT | Mod: HCNC,S$GLB,, | Performed by: INTERNAL MEDICINE

## 2020-08-12 PROCEDURE — 1159F PR MEDICATION LIST DOCUMENTED IN MEDICAL RECORD: ICD-10-PCS | Mod: HCNC,S$GLB,, | Performed by: INTERNAL MEDICINE

## 2020-08-12 PROCEDURE — 1126F PR PAIN SEVERITY QUANTIFIED, NO PAIN PRESENT: ICD-10-PCS | Mod: HCNC,S$GLB,, | Performed by: INTERNAL MEDICINE

## 2020-08-12 PROCEDURE — 1159F MED LIST DOCD IN RCRD: CPT | Mod: HCNC,S$GLB,, | Performed by: INTERNAL MEDICINE

## 2020-08-12 PROCEDURE — 3078F DIAST BP <80 MM HG: CPT | Mod: HCNC,CPTII,S$GLB, | Performed by: INTERNAL MEDICINE

## 2020-08-12 PROCEDURE — 3288F FALL RISK ASSESSMENT DOCD: CPT | Mod: HCNC,CPTII,S$GLB, | Performed by: INTERNAL MEDICINE

## 2020-08-12 PROCEDURE — 99214 PR OFFICE/OUTPT VISIT, EST, LEVL IV, 30-39 MIN: ICD-10-PCS | Mod: HCNC,S$GLB,, | Performed by: INTERNAL MEDICINE

## 2020-08-12 PROCEDURE — 99499 UNLISTED E&M SERVICE: CPT | Mod: S$GLB,,, | Performed by: INTERNAL MEDICINE

## 2020-08-12 PROCEDURE — 99214 OFFICE O/P EST MOD 30 MIN: CPT | Mod: HCNC,S$GLB,, | Performed by: INTERNAL MEDICINE

## 2020-08-12 RX ORDER — FUROSEMIDE 20 MG/1
20 TABLET ORAL DAILY
Qty: 90 TABLET | Refills: 1 | Status: ON HOLD | OUTPATIENT
Start: 2020-08-12 | End: 2020-08-28 | Stop reason: HOSPADM

## 2020-08-12 NOTE — PROGRESS NOTES
"Subjective:       Patient ID: Edwin Powell is a 75 y.o. White male who presents forChronic Kidney Disease    HPI Mr.Mc Moss is a 76 yo male with CAD s/p stents, DM2 for 10 years, HTN over 5 years, hypothyroidism , CVA  with no residual deficit, chronic back pain is here for CKD .  Bp's in the 130's-140's  home. He had back pain in 2017 and  an MRI showed "L3 vertebral body lesion and   CT showed exophytic mass arising from the medial aspect of the lower pole of left kidney and a  small 1.0 cm, subtle, cortical enhancing mass within the upper pole of the right kidney, concerning for possible contralateral solid renal mass,  L3 metastatic lesion and liver involvement. On votrient.  Was taking advil 2-4 a day  for 2 weeks and stopped  but was taking the periodically for a while. Had CT scans with contrast.       PMHX: per HPI.      Review of Systems   Constitutional: Positive for fatigue. Negative for appetite change.   HENT: Negative.    Eyes: Positive for discharge.   Respiratory: Negative.    Cardiovascular: Negative.    Gastrointestinal: Positive for diarrhea.         diahrrea at times   Genitourinary: Negative.    Musculoskeletal: Positive for neck pain. Negative for back pain.   Psychiatric/Behavioral: Negative.        Objective:    Limited.    +1 edema      Assessment:       No diagnosis found.    Plan:         1. CKD 3:  Creatinine of 1.7-2.0 for the last year or so but 1.4 in 2018.  Current creatinine of 2.0 with GFR of 30 ml/min. CKD likely sec to multiple contrast studies, prerenal , NSAID's, HTN, DM.  CT reviewed.  Pt not hydrating well and has diahrrea at times-encouraged to hydrate and to avoid NSAID's.Repeat labs. Potassium elevated as he was eating foods high in K-educated the potassium. Start small dose of lasix to help with bp's, potassium.      Lab Results   Component Value Date    CREATININE 2.08 (H) 08/06/2020     Protein Creatinine Ratios:7 gms improved to 4 gms; repeat.  1.6 gms in 2017 on 24 " hr urine protein.  Underlying diabetic nephropathy,  Possibly worsened with votrient, possible membranous with malignancy; NSAID's.  Serologies neg. On lisinopril.Discussed biopsy options and with his proteinurira improving-would like to be monitored for now.     Prot/Creat Ratio, Ur   Date Value Ref Range Status   08/06/2020 4.10 (H) 0.00 - 0.20 Final   06/10/2020 6.16 (H) 0.00 - 0.20 Final   05/26/2020 7.53 (H) 0.00 - 0.20 Final     ·   ·   Acid-Base: stable.  Lab Results   Component Value Date     08/06/2020    K 5.4 (H) 08/06/2020    CO2 24 08/06/2020     2. HTN: Blood pressures need to be monitored and he will call us with readings with VEGF. He thinks he is back on the lisnopril but asked him to confirm.  Add lasix 20 mg to help. Tight bp control to decrease proteinuria.    3. Renal osteodystrophy: last PTH stable.  Rec OTC Vit D.  Lab Results   Component Value Date    .0 (H) 08/06/2020    CALCIUM 8.8 08/06/2020    PHOS 4.3 06/10/2020       4. Anemia: stable.   Lab Results   Component Value Date    HGB 11.4 (L) 08/04/2020        5. DM:  Last HbA1C in 6's but as high as 11 and 8 in the past.  Lab Results   Component Value Date    HGBA1C 6.3 (H) 08/04/2020    HGBA1C 6.3 (H) 08/04/2020         Follow up in 2 months.add rfp to 8/14 labs.

## 2020-08-14 ENCOUNTER — LAB VISIT (OUTPATIENT)
Dept: SURGERY | Facility: CLINIC | Age: 75
DRG: 471 | End: 2020-08-14
Payer: MEDICARE

## 2020-08-14 DIAGNOSIS — Z01.818 PREOP TESTING: ICD-10-CM

## 2020-08-14 DIAGNOSIS — N18.30 CKD (CHRONIC KIDNEY DISEASE) STAGE 3, GFR 30-59 ML/MIN: ICD-10-CM

## 2020-08-14 DIAGNOSIS — I10 HYPERTENSION, UNSPECIFIED TYPE: ICD-10-CM

## 2020-08-14 LAB — SARS-COV-2 RNA RESP QL NAA+PROBE: NOT DETECTED

## 2020-08-14 PROCEDURE — U0003 INFECTIOUS AGENT DETECTION BY NUCLEIC ACID (DNA OR RNA); SEVERE ACUTE RESPIRATORY SYNDROME CORONAVIRUS 2 (SARS-COV-2) (CORONAVIRUS DISEASE [COVID-19]), AMPLIFIED PROBE TECHNIQUE, MAKING USE OF HIGH THROUGHPUT TECHNOLOGIES AS DESCRIBED BY CMS-2020-01-R: HCPCS | Mod: HCNC

## 2020-08-17 ENCOUNTER — ANESTHESIA (OUTPATIENT)
Dept: SURGERY | Facility: HOSPITAL | Age: 75
DRG: 471 | End: 2020-08-17
Payer: MEDICARE

## 2020-08-17 ENCOUNTER — HOSPITAL ENCOUNTER (INPATIENT)
Facility: HOSPITAL | Age: 75
LOS: 17 days | Discharge: HOME HEALTH | DRG: 471 | End: 2020-09-03
Attending: ORTHOPAEDIC SURGERY | Admitting: ORTHOPAEDIC SURGERY
Payer: MEDICARE

## 2020-08-17 DIAGNOSIS — E03.9 ACQUIRED HYPOTHYROIDISM: ICD-10-CM

## 2020-08-17 DIAGNOSIS — Z95.5 STENTED CORONARY ARTERY: ICD-10-CM

## 2020-08-17 DIAGNOSIS — N39.41 BENIGN PROSTATIC HYPERPLASIA (BPH) WITH URINARY URGE INCONTINENCE: ICD-10-CM

## 2020-08-17 DIAGNOSIS — J96.21 ACUTE ON CHRONIC RESPIRATORY FAILURE WITH HYPOXIA AND HYPERCAPNIA: ICD-10-CM

## 2020-08-17 DIAGNOSIS — R79.89 ELEVATED TROPONIN: ICD-10-CM

## 2020-08-17 DIAGNOSIS — E11.22 TYPE 2 DIABETES MELLITUS WITH STAGE 3 CHRONIC KIDNEY DISEASE, WITHOUT LONG-TERM CURRENT USE OF INSULIN: ICD-10-CM

## 2020-08-17 DIAGNOSIS — G93.41 ACUTE METABOLIC ENCEPHALOPATHY: ICD-10-CM

## 2020-08-17 DIAGNOSIS — R09.81 NASAL CONGESTION: ICD-10-CM

## 2020-08-17 DIAGNOSIS — C64.9 METASTATIC RENAL CELL CARCINOMA, UNSPECIFIED LATERALITY: ICD-10-CM

## 2020-08-17 DIAGNOSIS — R79.89 AZOTEMIA: ICD-10-CM

## 2020-08-17 DIAGNOSIS — I25.10 CORONARY ARTERY DISEASE INVOLVING NATIVE CORONARY ARTERY OF NATIVE HEART WITHOUT ANGINA PECTORIS: ICD-10-CM

## 2020-08-17 DIAGNOSIS — M48.02 CERVICAL STENOSIS OF SPINE: ICD-10-CM

## 2020-08-17 DIAGNOSIS — N18.9 ACUTE KIDNEY INJURY SUPERIMPOSED ON CHRONIC KIDNEY DISEASE: ICD-10-CM

## 2020-08-17 DIAGNOSIS — K92.0 HEMATEMESIS WITH NAUSEA: ICD-10-CM

## 2020-08-17 DIAGNOSIS — Z98.1 S/P CERVICAL SPINAL FUSION: ICD-10-CM

## 2020-08-17 DIAGNOSIS — T78.40XA ALLERGIC STATE, INITIAL ENCOUNTER: ICD-10-CM

## 2020-08-17 DIAGNOSIS — J96.22 ACUTE ON CHRONIC RESPIRATORY FAILURE WITH HYPOXIA AND HYPERCAPNIA: ICD-10-CM

## 2020-08-17 DIAGNOSIS — R13.10 DYSPHAGIA, UNSPECIFIED TYPE: ICD-10-CM

## 2020-08-17 DIAGNOSIS — K29.01 ACUTE SUPERFICIAL GASTRITIS WITH HEMORRHAGE: ICD-10-CM

## 2020-08-17 DIAGNOSIS — N17.9 ACUTE KIDNEY INJURY SUPERIMPOSED ON CHRONIC KIDNEY DISEASE: ICD-10-CM

## 2020-08-17 DIAGNOSIS — R09.02 HYPOXIA: ICD-10-CM

## 2020-08-17 DIAGNOSIS — N18.30 TYPE 2 DIABETES MELLITUS WITH STAGE 3 CHRONIC KIDNEY DISEASE, WITHOUT LONG-TERM CURRENT USE OF INSULIN: ICD-10-CM

## 2020-08-17 DIAGNOSIS — E87.20 METABOLIC ACIDOSIS, NORMAL ANION GAP (NAG): ICD-10-CM

## 2020-08-17 DIAGNOSIS — Z74.09 IMPAIRED FUNCTIONAL MOBILITY AND ENDURANCE: ICD-10-CM

## 2020-08-17 DIAGNOSIS — N40.1 BENIGN PROSTATIC HYPERPLASIA (BPH) WITH URINARY URGE INCONTINENCE: ICD-10-CM

## 2020-08-17 DIAGNOSIS — J96.01 ACUTE RESPIRATORY FAILURE WITH HYPOXIA: ICD-10-CM

## 2020-08-17 DIAGNOSIS — K92.0 HEMATEMESIS, PRESENCE OF NAUSEA NOT SPECIFIED: Primary | ICD-10-CM

## 2020-08-17 DIAGNOSIS — R06.81 APNEA: ICD-10-CM

## 2020-08-17 DIAGNOSIS — I21.4 NSTEMI (NON-ST ELEVATED MYOCARDIAL INFARCTION): ICD-10-CM

## 2020-08-17 DIAGNOSIS — E13.40 NEUROPATHY DUE TO SECONDARY DIABETES: ICD-10-CM

## 2020-08-17 LAB
POCT GLUCOSE: 117 MG/DL (ref 70–110)
POCT GLUCOSE: 145 MG/DL (ref 70–110)
POCT GLUCOSE: 189 MG/DL (ref 70–110)
POCT GLUCOSE: 190 MG/DL (ref 70–110)

## 2020-08-17 PROCEDURE — D9220A PRA ANESTHESIA: ICD-10-PCS | Mod: HCNC,CRNA,, | Performed by: NURSE ANESTHETIST, CERTIFIED REGISTERED

## 2020-08-17 PROCEDURE — 25000003 PHARM REV CODE 250: Mod: HCNC | Performed by: ORTHOPAEDIC SURGERY

## 2020-08-17 PROCEDURE — 63600175 PHARM REV CODE 636 W HCPCS: Mod: HCNC | Performed by: STUDENT IN AN ORGANIZED HEALTH CARE EDUCATION/TRAINING PROGRAM

## 2020-08-17 PROCEDURE — 25000003 PHARM REV CODE 250: Mod: HCNC | Performed by: NURSE ANESTHETIST, CERTIFIED REGISTERED

## 2020-08-17 PROCEDURE — 71000039 HC RECOVERY, EACH ADD'L HOUR: Mod: HCNC | Performed by: ORTHOPAEDIC SURGERY

## 2020-08-17 PROCEDURE — 22853 INSJ BIOMECHANICAL DEVICE: CPT | Mod: HCNC,,, | Performed by: ORTHOPAEDIC SURGERY

## 2020-08-17 PROCEDURE — 71000016 HC POSTOP RECOV ADDL HR: Mod: HCNC | Performed by: ORTHOPAEDIC SURGERY

## 2020-08-17 PROCEDURE — 94761 N-INVAS EAR/PLS OXIMETRY MLT: CPT | Mod: HCNC

## 2020-08-17 PROCEDURE — 27800903 OPTIME MED/SURG SUP & DEVICES OTHER IMPLANTS: Mod: HCNC | Performed by: ORTHOPAEDIC SURGERY

## 2020-08-17 PROCEDURE — 71000015 HC POSTOP RECOV 1ST HR: Mod: HCNC | Performed by: ORTHOPAEDIC SURGERY

## 2020-08-17 PROCEDURE — 63600175 PHARM REV CODE 636 W HCPCS: Mod: HCNC | Performed by: ANESTHESIOLOGY

## 2020-08-17 PROCEDURE — 27100021 HC MULTIPORT INFUSION MANIFOLD: Mod: HCNC | Performed by: ANESTHESIOLOGY

## 2020-08-17 PROCEDURE — D9220A PRA ANESTHESIA: Mod: HCNC,CRNA,, | Performed by: NURSE ANESTHETIST, CERTIFIED REGISTERED

## 2020-08-17 PROCEDURE — 20930 PR ALLOGRAFT FOR SPINE SURGERY ONLY MORSELIZED: ICD-10-PCS | Mod: HCNC,,, | Performed by: ORTHOPAEDIC SURGERY

## 2020-08-17 PROCEDURE — 37000008 HC ANESTHESIA 1ST 15 MINUTES: Mod: HCNC | Performed by: ORTHOPAEDIC SURGERY

## 2020-08-17 PROCEDURE — 22845 PR ANTERIOR INSTRUMENTATION 2-3 VERTEBRAL SEGMENTS: ICD-10-PCS | Mod: 59,HCNC,, | Performed by: ORTHOPAEDIC SURGERY

## 2020-08-17 PROCEDURE — 22551 ARTHRD ANT NTRBDY CERVICAL: CPT | Mod: 22,62,HCNC, | Performed by: OTOLARYNGOLOGY

## 2020-08-17 PROCEDURE — 25000003 PHARM REV CODE 250: Mod: HCNC | Performed by: STUDENT IN AN ORGANIZED HEALTH CARE EDUCATION/TRAINING PROGRAM

## 2020-08-17 PROCEDURE — 20936 SP BONE AGRFT LOCAL ADD-ON: CPT | Mod: HCNC,,, | Performed by: ORTHOPAEDIC SURGERY

## 2020-08-17 PROCEDURE — 27200702 HC TUBE,ENDO XOMED EMG: Mod: HCNC | Performed by: ANESTHESIOLOGY

## 2020-08-17 PROCEDURE — 63600175 PHARM REV CODE 636 W HCPCS: Mod: HCNC | Performed by: NURSE ANESTHETIST, CERTIFIED REGISTERED

## 2020-08-17 PROCEDURE — 22853 PR INSERT BIOMECH DEV W/INTERBODY ARTHRODESIS, EA CONTIGUOUS DEFECT: ICD-10-PCS | Mod: HCNC,,, | Performed by: ORTHOPAEDIC SURGERY

## 2020-08-17 PROCEDURE — 27201037 HC PRESSURE MONITORING SET UP: Mod: HCNC

## 2020-08-17 PROCEDURE — 36000711: Mod: HCNC | Performed by: ORTHOPAEDIC SURGERY

## 2020-08-17 PROCEDURE — 99900035 HC TECH TIME PER 15 MIN (STAT): Mod: HCNC

## 2020-08-17 PROCEDURE — C1729 CATH, DRAINAGE: HCPCS | Mod: HCNC | Performed by: ORTHOPAEDIC SURGERY

## 2020-08-17 PROCEDURE — 11000001 HC ACUTE MED/SURG PRIVATE ROOM: Mod: HCNC

## 2020-08-17 PROCEDURE — D9220A PRA ANESTHESIA: Mod: HCNC,ANES,, | Performed by: ANESTHESIOLOGY

## 2020-08-17 PROCEDURE — 36000710: Mod: HCNC | Performed by: ORTHOPAEDIC SURGERY

## 2020-08-17 PROCEDURE — C1713 ANCHOR/SCREW BN/BN,TIS/BN: HCPCS | Mod: HCNC | Performed by: ORTHOPAEDIC SURGERY

## 2020-08-17 PROCEDURE — 71000033 HC RECOVERY, INTIAL HOUR: Mod: HCNC | Performed by: ORTHOPAEDIC SURGERY

## 2020-08-17 PROCEDURE — 94640 AIRWAY INHALATION TREATMENT: CPT | Mod: HCNC

## 2020-08-17 PROCEDURE — 27100025 HC TUBING, SET FLUID WARMER: Mod: HCNC | Performed by: ANESTHESIOLOGY

## 2020-08-17 PROCEDURE — 37000009 HC ANESTHESIA EA ADD 15 MINS: Mod: HCNC | Performed by: ORTHOPAEDIC SURGERY

## 2020-08-17 PROCEDURE — 27000221 HC OXYGEN, UP TO 24 HOURS: Mod: HCNC

## 2020-08-17 PROCEDURE — 20930 SP BONE ALGRFT MORSEL ADD-ON: CPT | Mod: HCNC,,, | Performed by: ORTHOPAEDIC SURGERY

## 2020-08-17 PROCEDURE — 27201423 OPTIME MED/SURG SUP & DEVICES STERILE SUPPLY: Mod: HCNC | Performed by: ORTHOPAEDIC SURGERY

## 2020-08-17 PROCEDURE — C1769 GUIDE WIRE: HCPCS | Mod: HCNC | Performed by: ORTHOPAEDIC SURGERY

## 2020-08-17 PROCEDURE — D9220A PRA ANESTHESIA: ICD-10-PCS | Mod: HCNC,ANES,, | Performed by: ANESTHESIOLOGY

## 2020-08-17 PROCEDURE — 22845 INSERT SPINE FIXATION DEVICE: CPT | Mod: 59,HCNC,, | Performed by: ORTHOPAEDIC SURGERY

## 2020-08-17 PROCEDURE — 22551 ARTHRD ANT NTRBDY CERVICAL: CPT | Mod: 22,62,HCNC, | Performed by: ORTHOPAEDIC SURGERY

## 2020-08-17 PROCEDURE — 25000242 PHARM REV CODE 250 ALT 637 W/ HCPCS: Mod: HCNC | Performed by: STUDENT IN AN ORGANIZED HEALTH CARE EDUCATION/TRAINING PROGRAM

## 2020-08-17 PROCEDURE — 82962 GLUCOSE BLOOD TEST: CPT | Mod: HCNC | Performed by: ORTHOPAEDIC SURGERY

## 2020-08-17 PROCEDURE — 20936 PR AUTOGRAFT SPINE SURGERY LOCAL FROM SAME INCISION: ICD-10-PCS | Mod: HCNC,,, | Performed by: ORTHOPAEDIC SURGERY

## 2020-08-17 PROCEDURE — 22551 PR ARTHRODESIS ANT INTERBODY INC DISCECTOMY, CERVICAL BELOW C2: ICD-10-PCS | Mod: 22,62,HCNC, | Performed by: ORTHOPAEDIC SURGERY

## 2020-08-17 PROCEDURE — 22551 PR ARTHRODESIS ANT INTERBODY INC DISCECTOMY, CERVICAL BELOW C2: ICD-10-PCS | Mod: 22,62,HCNC, | Performed by: OTOLARYNGOLOGY

## 2020-08-17 DEVICE — PUTTY DBX 1CC: Type: IMPLANTABLE DEVICE | Site: NECK | Status: FUNCTIONAL

## 2020-08-17 DEVICE — SCREW BONE ANT SKYLINE 18MM TI: Type: IMPLANTABLE DEVICE | Site: NECK | Status: FUNCTIONAL

## 2020-08-17 DEVICE — IMPLANTABLE DEVICE: Type: IMPLANTABLE DEVICE | Site: NECK | Status: FUNCTIONAL

## 2020-08-17 DEVICE — SCREW BONE SPINAL ANT 16MM: Type: IMPLANTABLE DEVICE | Site: NECK | Status: FUNCTIONAL

## 2020-08-17 RX ORDER — GLUCAGON 1 MG
1 KIT INJECTION
Status: DISCONTINUED | OUTPATIENT
Start: 2020-08-17 | End: 2020-09-03 | Stop reason: HOSPADM

## 2020-08-17 RX ORDER — AMLODIPINE BESYLATE 10 MG/1
10 TABLET ORAL DAILY
Status: DISCONTINUED | OUTPATIENT
Start: 2020-08-18 | End: 2020-08-31

## 2020-08-17 RX ORDER — MORPHINE SULFATE 2 MG/ML
3 INJECTION, SOLUTION INTRAMUSCULAR; INTRAVENOUS EVERY 4 HOURS PRN
Status: DISCONTINUED | OUTPATIENT
Start: 2020-08-17 | End: 2020-08-18

## 2020-08-17 RX ORDER — OXYCODONE HYDROCHLORIDE 10 MG/1
10 TABLET ORAL EVERY 4 HOURS PRN
Status: DISCONTINUED | OUTPATIENT
Start: 2020-08-17 | End: 2020-08-18

## 2020-08-17 RX ORDER — MONTELUKAST SODIUM 10 MG/1
10 TABLET ORAL NIGHTLY
COMMUNITY
End: 2021-01-01

## 2020-08-17 RX ORDER — PROPOFOL 10 MG/ML
VIAL (ML) INTRAVENOUS
Status: DISCONTINUED | OUTPATIENT
Start: 2020-08-17 | End: 2020-08-17

## 2020-08-17 RX ORDER — ONDANSETRON 8 MG/1
8 TABLET, ORALLY DISINTEGRATING ORAL EVERY 8 HOURS PRN
Status: DISCONTINUED | OUTPATIENT
Start: 2020-08-17 | End: 2020-08-18

## 2020-08-17 RX ORDER — PAZOPANIB 200 MG/1
800 TABLET ORAL DAILY
Status: DISCONTINUED | OUTPATIENT
Start: 2020-08-18 | End: 2020-08-17

## 2020-08-17 RX ORDER — CETIRIZINE HYDROCHLORIDE 5 MG/1
10 TABLET ORAL NIGHTLY
Status: DISCONTINUED | OUTPATIENT
Start: 2020-08-17 | End: 2020-08-18

## 2020-08-17 RX ORDER — NALOXONE HCL 0.4 MG/ML
0.2 VIAL (ML) INJECTION ONCE
Status: COMPLETED | OUTPATIENT
Start: 2020-08-18 | End: 2020-08-18

## 2020-08-17 RX ORDER — METHOCARBAMOL 750 MG/1
750 TABLET, FILM COATED ORAL 3 TIMES DAILY PRN
Status: DISCONTINUED | OUTPATIENT
Start: 2020-08-17 | End: 2020-08-18

## 2020-08-17 RX ORDER — INSULIN ASPART 100 [IU]/ML
1-10 INJECTION, SOLUTION INTRAVENOUS; SUBCUTANEOUS
Status: DISCONTINUED | OUTPATIENT
Start: 2020-08-17 | End: 2020-08-18

## 2020-08-17 RX ORDER — FUROSEMIDE 20 MG/1
20 TABLET ORAL DAILY
Status: DISCONTINUED | OUTPATIENT
Start: 2020-08-18 | End: 2020-08-18

## 2020-08-17 RX ORDER — HYDROMORPHONE HYDROCHLORIDE 1 MG/ML
0.2 INJECTION, SOLUTION INTRAMUSCULAR; INTRAVENOUS; SUBCUTANEOUS EVERY 5 MIN PRN
Status: DISCONTINUED | OUTPATIENT
Start: 2020-08-17 | End: 2020-08-17 | Stop reason: HOSPADM

## 2020-08-17 RX ORDER — PROPOFOL 10 MG/ML
VIAL (ML) INTRAVENOUS CONTINUOUS PRN
Status: DISCONTINUED | OUTPATIENT
Start: 2020-08-17 | End: 2020-08-17

## 2020-08-17 RX ORDER — CEFAZOLIN SODIUM 1 G/3ML
2 INJECTION, POWDER, FOR SOLUTION INTRAMUSCULAR; INTRAVENOUS
Status: DISCONTINUED | OUTPATIENT
Start: 2020-08-17 | End: 2020-08-18

## 2020-08-17 RX ORDER — METOCLOPRAMIDE HYDROCHLORIDE 5 MG/ML
10 INJECTION INTRAMUSCULAR; INTRAVENOUS EVERY 6 HOURS
Status: DISCONTINUED | OUTPATIENT
Start: 2020-08-17 | End: 2020-08-18

## 2020-08-17 RX ORDER — LIDOCAINE HYDROCHLORIDE AND EPINEPHRINE 10; 10 MG/ML; UG/ML
INJECTION, SOLUTION INFILTRATION; PERINEURAL
Status: DISCONTINUED | OUTPATIENT
Start: 2020-08-17 | End: 2020-08-17 | Stop reason: HOSPADM

## 2020-08-17 RX ORDER — MIDAZOLAM HYDROCHLORIDE 1 MG/ML
INJECTION, SOLUTION INTRAMUSCULAR; INTRAVENOUS
Status: DISCONTINUED | OUTPATIENT
Start: 2020-08-17 | End: 2020-08-17

## 2020-08-17 RX ORDER — FAMOTIDINE 20 MG/1
20 TABLET, FILM COATED ORAL 2 TIMES DAILY
Status: DISCONTINUED | OUTPATIENT
Start: 2020-08-17 | End: 2020-08-18

## 2020-08-17 RX ORDER — PREGABALIN 75 MG/1
75 CAPSULE ORAL NIGHTLY
Status: DISCONTINUED | OUTPATIENT
Start: 2020-08-17 | End: 2020-08-18

## 2020-08-17 RX ORDER — LEVOCETIRIZINE DIHYDROCHLORIDE 5 MG/1
5 TABLET, FILM COATED ORAL NIGHTLY
Status: DISCONTINUED | OUTPATIENT
Start: 2020-08-17 | End: 2020-08-17

## 2020-08-17 RX ORDER — BISACODYL 10 MG
10 SUPPOSITORY, RECTAL RECTAL DAILY PRN
Status: DISCONTINUED | OUTPATIENT
Start: 2020-08-17 | End: 2020-09-03 | Stop reason: HOSPADM

## 2020-08-17 RX ORDER — DIPHENHYDRAMINE HCL 25 MG
25 CAPSULE ORAL EVERY 6 HOURS PRN
Status: DISCONTINUED | OUTPATIENT
Start: 2020-08-17 | End: 2020-08-18

## 2020-08-17 RX ORDER — EPHEDRINE SULFATE 50 MG/ML
INJECTION, SOLUTION INTRAVENOUS
Status: DISCONTINUED | OUTPATIENT
Start: 2020-08-17 | End: 2020-08-17

## 2020-08-17 RX ORDER — SODIUM CHLORIDE 0.9 % (FLUSH) 0.9 %
10 SYRINGE (ML) INJECTION
Status: DISCONTINUED | OUTPATIENT
Start: 2020-08-17 | End: 2020-09-03 | Stop reason: HOSPADM

## 2020-08-17 RX ORDER — ACETAMINOPHEN 325 MG/1
650 TABLET ORAL EVERY 6 HOURS
Status: DISCONTINUED | OUTPATIENT
Start: 2020-08-17 | End: 2020-08-27

## 2020-08-17 RX ORDER — SODIUM CHLORIDE 9 MG/ML
INJECTION, SOLUTION INTRAVENOUS CONTINUOUS
Status: DISCONTINUED | OUTPATIENT
Start: 2020-08-17 | End: 2020-08-18

## 2020-08-17 RX ORDER — ROCURONIUM BROMIDE 10 MG/ML
INJECTION, SOLUTION INTRAVENOUS
Status: DISCONTINUED | OUTPATIENT
Start: 2020-08-17 | End: 2020-08-17

## 2020-08-17 RX ORDER — OXYCODONE HYDROCHLORIDE 5 MG/1
5 TABLET ORAL EVERY 4 HOURS PRN
Status: DISCONTINUED | OUTPATIENT
Start: 2020-08-17 | End: 2020-08-18

## 2020-08-17 RX ORDER — IBUPROFEN 200 MG
24 TABLET ORAL
Status: DISCONTINUED | OUTPATIENT
Start: 2020-08-17 | End: 2020-09-03 | Stop reason: HOSPADM

## 2020-08-17 RX ORDER — LIDOCAINE HYDROCHLORIDE 10 MG/ML
1 INJECTION, SOLUTION EPIDURAL; INFILTRATION; INTRACAUDAL; PERINEURAL ONCE
Status: COMPLETED | OUTPATIENT
Start: 2020-08-17 | End: 2020-08-17

## 2020-08-17 RX ORDER — NALOXONE HCL 0.4 MG/ML
VIAL (ML) INJECTION
Status: COMPLETED
Start: 2020-08-17 | End: 2020-08-18

## 2020-08-17 RX ORDER — ONDANSETRON 2 MG/ML
INJECTION INTRAMUSCULAR; INTRAVENOUS
Status: DISCONTINUED | OUTPATIENT
Start: 2020-08-17 | End: 2020-08-17

## 2020-08-17 RX ORDER — TAMSULOSIN HYDROCHLORIDE 0.4 MG/1
0.8 CAPSULE ORAL DAILY
Status: DISCONTINUED | OUTPATIENT
Start: 2020-08-18 | End: 2020-09-03 | Stop reason: HOSPADM

## 2020-08-17 RX ORDER — FENTANYL CITRATE 50 UG/ML
25 INJECTION, SOLUTION INTRAMUSCULAR; INTRAVENOUS EVERY 5 MIN PRN
Status: DISCONTINUED | OUTPATIENT
Start: 2020-08-17 | End: 2020-08-17 | Stop reason: HOSPADM

## 2020-08-17 RX ORDER — ONDANSETRON 2 MG/ML
4 INJECTION INTRAMUSCULAR; INTRAVENOUS ONCE AS NEEDED
Status: COMPLETED | OUTPATIENT
Start: 2020-08-17 | End: 2020-08-17

## 2020-08-17 RX ORDER — NALOXONE HCL 0.4 MG/ML
0.2 VIAL (ML) INJECTION
Status: DISCONTINUED | OUTPATIENT
Start: 2020-08-18 | End: 2020-09-03 | Stop reason: HOSPADM

## 2020-08-17 RX ORDER — IPRATROPIUM BROMIDE AND ALBUTEROL SULFATE 2.5; .5 MG/3ML; MG/3ML
3 SOLUTION RESPIRATORY (INHALATION) EVERY 4 HOURS PRN
Status: DISCONTINUED | OUTPATIENT
Start: 2020-08-17 | End: 2020-09-03 | Stop reason: HOSPADM

## 2020-08-17 RX ORDER — IBUPROFEN 200 MG
16 TABLET ORAL
Status: DISCONTINUED | OUTPATIENT
Start: 2020-08-17 | End: 2020-09-03 | Stop reason: HOSPADM

## 2020-08-17 RX ORDER — ONDANSETRON 2 MG/ML
4 INJECTION INTRAMUSCULAR; INTRAVENOUS EVERY 12 HOURS PRN
Status: DISCONTINUED | OUTPATIENT
Start: 2020-08-17 | End: 2020-08-18

## 2020-08-17 RX ORDER — SODIUM CHLORIDE 9 MG/ML
75 INJECTION, SOLUTION INTRAVENOUS CONTINUOUS
Status: DISCONTINUED | OUTPATIENT
Start: 2020-08-17 | End: 2020-08-18

## 2020-08-17 RX ORDER — LEVOTHYROXINE SODIUM 88 UG/1
88 TABLET ORAL DAILY
Status: DISCONTINUED | OUTPATIENT
Start: 2020-08-18 | End: 2020-08-29

## 2020-08-17 RX ORDER — POLYETHYLENE GLYCOL 3350 17 G/17G
17 POWDER, FOR SOLUTION ORAL 2 TIMES DAILY PRN
Status: DISCONTINUED | OUTPATIENT
Start: 2020-08-17 | End: 2020-09-03 | Stop reason: HOSPADM

## 2020-08-17 RX ORDER — LISINOPRIL 20 MG/1
40 TABLET ORAL DAILY
Status: DISCONTINUED | OUTPATIENT
Start: 2020-08-18 | End: 2020-08-18

## 2020-08-17 RX ORDER — SODIUM CHLORIDE 0.9 % (FLUSH) 0.9 %
5 SYRINGE (ML) INJECTION
Status: DISCONTINUED | OUTPATIENT
Start: 2020-08-17 | End: 2020-09-03 | Stop reason: HOSPADM

## 2020-08-17 RX ORDER — FENTANYL CITRATE 50 UG/ML
INJECTION, SOLUTION INTRAMUSCULAR; INTRAVENOUS
Status: DISCONTINUED | OUTPATIENT
Start: 2020-08-17 | End: 2020-08-17

## 2020-08-17 RX ORDER — CEFAZOLIN SODIUM 1 G/3ML
2 INJECTION, POWDER, FOR SOLUTION INTRAMUSCULAR; INTRAVENOUS
Status: COMPLETED | OUTPATIENT
Start: 2020-08-17 | End: 2020-08-17

## 2020-08-17 RX ORDER — LIDOCAINE HYDROCHLORIDE 20 MG/ML
INJECTION INTRAVENOUS
Status: DISCONTINUED | OUTPATIENT
Start: 2020-08-17 | End: 2020-08-17

## 2020-08-17 RX ORDER — KETAMINE HCL IN 0.9 % NACL 50 MG/5 ML
SYRINGE (ML) INTRAVENOUS
Status: DISCONTINUED | OUTPATIENT
Start: 2020-08-17 | End: 2020-08-17

## 2020-08-17 RX ADMIN — OXYCODONE HYDROCHLORIDE 15 MG: 10 TABLET ORAL at 08:08

## 2020-08-17 RX ADMIN — MIDAZOLAM HYDROCHLORIDE 2 MG: 1 INJECTION, SOLUTION INTRAMUSCULAR; INTRAVENOUS at 06:08

## 2020-08-17 RX ADMIN — FAMOTIDINE 20 MG: 20 TABLET ORAL at 09:08

## 2020-08-17 RX ADMIN — CEFAZOLIN 2 G: 330 INJECTION, POWDER, FOR SOLUTION INTRAMUSCULAR; INTRAVENOUS at 07:08

## 2020-08-17 RX ADMIN — Medication 20 MG: at 07:08

## 2020-08-17 RX ADMIN — DOCUSATE SODIUM 50 MG: 50 CAPSULE, LIQUID FILLED ORAL at 08:08

## 2020-08-17 RX ADMIN — PROPOFOL 150 MCG/KG/MIN: 10 INJECTION, EMULSION INTRAVENOUS at 07:08

## 2020-08-17 RX ADMIN — IPRATROPIUM BROMIDE AND ALBUTEROL SULFATE 3 ML: .5; 3 SOLUTION RESPIRATORY (INHALATION) at 09:08

## 2020-08-17 RX ADMIN — FENTANYL CITRATE 50 MCG: 50 INJECTION, SOLUTION INTRAMUSCULAR; INTRAVENOUS at 07:08

## 2020-08-17 RX ADMIN — ACETAMINOPHEN 650 MG: 325 TABLET ORAL at 11:08

## 2020-08-17 RX ADMIN — ONDANSETRON 4 MG: 2 INJECTION INTRAMUSCULAR; INTRAVENOUS at 09:08

## 2020-08-17 RX ADMIN — METOCLOPRAMIDE 10 MG: 5 INJECTION, SOLUTION INTRAMUSCULAR; INTRAVENOUS at 09:08

## 2020-08-17 RX ADMIN — CEFAZOLIN 2 G: 1 INJECTION, POWDER, FOR SOLUTION INTRAMUSCULAR; INTRAVENOUS at 04:08

## 2020-08-17 RX ADMIN — OXYCODONE HYDROCHLORIDE 10 MG: 10 TABLET ORAL at 02:08

## 2020-08-17 RX ADMIN — ONDANSETRON 4 MG: 2 INJECTION, SOLUTION INTRAMUSCULAR; INTRAVENOUS at 08:08

## 2020-08-17 RX ADMIN — ONDANSETRON 8 MG: 8 TABLET, ORALLY DISINTEGRATING ORAL at 06:08

## 2020-08-17 RX ADMIN — SODIUM CHLORIDE 75 ML/HR: 0.9 INJECTION, SOLUTION INTRAVENOUS at 09:08

## 2020-08-17 RX ADMIN — SODIUM CHLORIDE: 0.9 INJECTION, SOLUTION INTRAVENOUS at 06:08

## 2020-08-17 RX ADMIN — SODIUM CHLORIDE 0.5 MCG/KG/MIN: 9 INJECTION, SOLUTION INTRAVENOUS at 07:08

## 2020-08-17 RX ADMIN — SODIUM CHLORIDE, SODIUM GLUCONATE, SODIUM ACETATE, POTASSIUM CHLORIDE, MAGNESIUM CHLORIDE, SODIUM PHOSPHATE, DIBASIC, AND POTASSIUM PHOSPHATE: .53; .5; .37; .037; .03; .012; .00082 INJECTION, SOLUTION INTRAVENOUS at 07:08

## 2020-08-17 RX ADMIN — LIDOCAINE HYDROCHLORIDE 100 MG: 20 INJECTION, SOLUTION INTRAVENOUS at 07:08

## 2020-08-17 RX ADMIN — EPHEDRINE SULFATE 10 MG: 50 INJECTION INTRAVENOUS at 07:08

## 2020-08-17 RX ADMIN — CETIRIZINE HYDROCHLORIDE 10 MG: 5 TABLET ORAL at 08:08

## 2020-08-17 RX ADMIN — LIDOCAINE HYDROCHLORIDE 1 MG: 10 INJECTION, SOLUTION EPIDURAL; INFILTRATION; INTRACAUDAL; PERINEURAL at 06:08

## 2020-08-17 RX ADMIN — ROCURONIUM BROMIDE 40 MG: 10 INJECTION, SOLUTION INTRAVENOUS at 07:08

## 2020-08-17 RX ADMIN — INSULIN ASPART 1 UNITS: 100 INJECTION, SOLUTION INTRAVENOUS; SUBCUTANEOUS at 08:08

## 2020-08-17 RX ADMIN — ACETAMINOPHEN 650 MG: 325 TABLET ORAL at 05:08

## 2020-08-17 RX ADMIN — SUGAMMADEX 200 MG: 100 INJECTION, SOLUTION INTRAVENOUS at 07:08

## 2020-08-17 RX ADMIN — INSULIN ASPART 2 UNITS: 100 INJECTION, SOLUTION INTRAVENOUS; SUBCUTANEOUS at 05:08

## 2020-08-17 RX ADMIN — PROPOFOL 150 MG: 10 INJECTION, EMULSION INTRAVENOUS at 07:08

## 2020-08-17 RX ADMIN — OXYCODONE HYDROCHLORIDE 10 MG: 10 TABLET ORAL at 09:08

## 2020-08-17 RX ADMIN — PREGABALIN 75 MG: 75 CAPSULE ORAL at 08:08

## 2020-08-17 RX ADMIN — FAMOTIDINE 20 MG: 20 TABLET ORAL at 08:08

## 2020-08-17 RX ADMIN — REMIFENTANIL HYDROCHLORIDE 0.2 MCG/KG/MIN: 1 INJECTION, POWDER, LYOPHILIZED, FOR SOLUTION INTRAVENOUS at 07:08

## 2020-08-17 NOTE — CONSULTS
ENT Brief Consult Note    Called to evaluate patient in pre-op for laryngoscopy prior to C6-7 ACDF. No voice complaints at this time.       Flexible Fiberoptic Laryngoscopy    After verbal consent was obtained, the left naris was anesthetized with topical lidocaine and neosynephrine. The flexible laryngoscope was introduced with the following findings:  Nasal cavity: no masses or lesions, pink mucosa, no purulence  Nasopharynx: no masses or lesions, jake wnl  Oropharynx: no masses or lesions, tonsils WNL, BOT WNL  Larynx and Hypopharynx: Bilateral vocal folds freely mobile to adduction and abduction, no masses or lesions visualized.   The patient tolerated the procedure well.       MIO Loyola MD   ENT [facility] PGY-3

## 2020-08-17 NOTE — INTERVAL H&P NOTE
"The patient has been examined and the H&P has been reviewed:    I concur with the findings and no changes have occurred since H&P was written.    Anesthesia/Surgery risks, benefits and alternative options discussed and understood by patient/family.          Active Hospital Problems    Diagnosis  POA    Cervical stenosis of spine [M48.02]  Yes     Seen on MRI 5/9/20: " Multilevel cervical spondylosis most prominent at C6-C7 resulting in severe spinal canal stenosis and severe bilateral neural foraminal narrowing."        Resolved Hospital Problems   No resolved problems to display.     "

## 2020-08-17 NOTE — ANESTHESIA PROCEDURE NOTES
Intubation  Performed by: Matt Flower CRNA  Authorized by: Eugene Shirley MD     Intubation:     Induction:  Intravenous    Intubated:  Postinduction    Mask Ventilation:  Easy with oral airway    Attempts:  1    Attempted By:  CRNA    Method of Intubation:  Video laryngoscopy    Blade:  Glidescope 3    Laryngeal View Grade: Grade I - full view of chords      Difficult Airway Encountered?: No      Complications:  None    Airway Device:  EMG ETT (NIMS)    Airway Device Size:  7.0    Style/Cuff Inflation:  Cuffed (inflated to minimal occlusive pressure)    Inflation Amount (mL):  5    Tube secured:  22    Secured at:  The lips    Placement Verified By:  Capnometry    Complicating Factors:  None    Findings Post-Intubation:  BS equal bilateral

## 2020-08-17 NOTE — HPI
Edwin Powell is a 75 y.o. male who returns to me today for follow up.  He was last seen by me 6/9/2020.  He has known severe stenosis at C6/7 but was not having myelopathic symptoms so we are observing closely.  Today he is doing well but notes he continues to have severe neck pain.  He has been going to therapy with little relief.  He does not some difficulty with buttons.      The Patient denies myelopathic symptoms such as handwriting changes or difficulty with coins/keys. Denies perineal paresthesias, bowel/bladder dysfunction.

## 2020-08-17 NOTE — ASSESSMENT & PLAN NOTE
Edwin HAYLEY Powell is a 75 y.o. male s/p C5/6 ACDF (8/17/20) for cervical stenosis, postop course complicated by apnea; Transferred to MICU and started on Narcan drop, now requiring HFNC    Pain control: multimodal, d/c sedating medications  PT/OT: progressive mobility, OOBTC  DVT PPx: no chemical ppx, SCDs at all times when not ambulating  Abx: postop Ancef  Labs: Hg 9.7, BUN 44, Cr 3.4  Drain: continue     Dispo- continue ICU care per MICU

## 2020-08-17 NOTE — OP NOTE
DATE OF PROCEDURE: 8/17/2020.     SURGEON: Filemon Aguayo M.D.     CO-SURGEON: Rush Falcon MD, of the Otolaryngology service.     PREOPERATIVE DIAGNOSIS:   1.  Cervical myelopathy.  2.  History of prior multilevel anterior cervical spinal fusion     POSTOPERATIVE DIAGNOSIS:   1.  Cervical myelopathy.  2.  History of prior multilevel anterior cervical spinal fusion     PROCEDURES PERFORMED:  1. Anterior cervical diskectomy and instrumented spinal fusion, including decompression of neurological elements, C6-7.  2. Application of carbon fiber reinforced polyetheretherketone titanium intervertebral spacer to C6-7 disk space.  3. Anterior instrumentation, C6-7.  4. Cadaveric and local bone grafting.     ANESTHESIA: General endotracheal anesthesia.     ESTIMATED BLOOD LOSS:  25 mL.     IMPLANTS: Depuy skyline plate and Bengal CFRP cage.     SPECIMENS: None.     FINDINGS:  None.     DRAINS: One deep.     COMPLICATIONS: None.     SPONGE AND NEEDLE COUNT: Correct x2.     NEUROLOGICAL MONITORING: Motor evoked potentials, somatosensory evoked    potential, free-running EMG, and vocal fold monitoring.  There were no changes to preincisional MEP and SSEP baselines, of note he had 4 bilateral lower extremity SSEP throughout the entire procedure.  There was intermittent recurrent laryngeal nerve activity that responded to decreasing air in the ETT balloon and no significant EMG activity.     REASON FOR OPERATION AND BRIEF HISTORY AND PHYSICAL: Edwin Ruano a    75 y.o. male with symptomatic cervical myelopathy. They have failed conservative management and are here for surgery today.      DESCRIPTION OF INFORMED CONSENT: Please see my last clinic note for a full    description of the informed consent process that I had with the patient.     DESCRIPTION OF PROCEDURE: The patient was met in the preoperative area where    their right-sided neck was marked as the operative site. Subsequently, they were    brought to the  Operating Room where they was placed under general endotracheal    anesthesia. Sequential compression devices were placed in the patient's    bilateral calves and run throughout the case. A Ahumada catheter was not placed. At  this point, a shoulder roll was placed and the patient's neck was gently    hyperextended. The neck was stacked in multiple stack sheets as well as a gel    roll. At this point, the patient's anterior cervical spine was prepped and    draped in a normal sterile fashion.     A full timeout was then called, identifying the patient, the procedural site and  levels, the availability of all instruments and implants, and no specific    nursing, surgical, anesthetic, or neurological monitoring concerns. Finally, it  was safe to proceed with surgery and the patient was given weight-appropriate    dose of Ancef by the Anesthesia Service as well as 8 mg of Decadron.     I then infiltrated my planned incision with 10 mL of 0.5% Marcaine with    epinephrine.     Dr. Falcon then performed a revision anterior approach to the cervical spine I then entered the field. I placed a needle in what I took to be the C6-7 disc     took a lateral radiograph and thus confirmed my  level. I then finalized my exposure. At the conclusion of my exposure, I could see from the inferior half of the C6 vertebrae to the superior half of the C7 vertebra  and the disk space from uncinate process to uncinate process.     I then performed a subtotal diskectomy under loupe magnification using a disk    knife as well as straight and angled curettes, Kerrison punches, and pituitary    rongeurs.     The Operating Room microscope was then brought in, and I drilled down the    posterior aspect of the disk space with a high-speed drill to reveal the    posterior longitudinal ligament. I used a 4-0 forward-angle curette to enter    the median raphe of the ligament, followed by #1 and #2 Kerrison punches to    resect the posterior  longitudinal ligament. At the end of my neurological    decompression, I could see dura from uncinate process to uncinate process.    Epidural hemostasis was finalized with patties and FloSeal. The wound was then    thoroughly irrigated. The disk space was sized for a size 6 wide spacer and this was  filled with 1 mL of DBX putty as well as locally harvested bone and gently impacted into place. An anterior  plate was then applied in the standard fashion, spanning C6-7.     AP and lateral  radiographs were taken, demonstrating correct level as well as adequate positionof all implants. The wound was then thoroughly irrigated. The plate screw    capture mechanism was then locked with the torque wrench. A deep drain was placed. The platysma was  closed with 3-0 Vicryl, followed by 4-0 and 5-0 Monocryl for the skin. A 2-0  silk suture was used to secure down the drain. A soft dressing was placed. The  patient was subsequently transferred to his hospital bed, awoken, and    transferred to the Recovery Room in stable condition.    JUSTIFICATION OF CO-SURGEON and -22 MODIFIER: This was a complex revision anterior cervical spine operation. The combined efforts of two attending surgeons is indicated to decrease operative time, decrease blood loss, and improve outcomes.

## 2020-08-17 NOTE — PROGRESS NOTES
Called blood bank to confirm T&S sample from 8/4/20 was still good. It is per blood bank, extension sent.

## 2020-08-17 NOTE — NURSING TRANSFER
Nursing Transfer Note      8/17/2020     Transfer To: [id number]    Transfer via stretcher    Transfer with IVF    Transported by pct    Medicines sent: none    Chart send with patient: Yes    Notified: son    Patient reassessed at: 1315 8/17/2020

## 2020-08-17 NOTE — ANESTHESIA PREPROCEDURE EVALUATION
08/17/2020  Edwin Powell is a 75 y.o., male.    Anesthesia Evaluation          Review of Systems  Anesthesia Hx:  No problems with previous Anesthesia    Social:  Non-Smoker    Hematology/Oncology:        Current/Recent Cancer.   Cardiovascular:   Exercise tolerance: poor Denies Hypertension. CAD   CABG/stent   Denies Angina.  Functional Capacity Can you climb two flights of stairs? ==> Yes    Pulmonary:   Denies Asthma.  Denies Recent URI.  Denies Sleep Apnea.    Renal/:   Chronic Renal Disease    Hepatic/GI:   Denies PUD. Denies Hiatal Hernia. GERD Liver Disease,  Denies Hepatitis.    Neurological:   CVA Denies Seizures.    Endocrine:   Diabetes Denies Hypothyroidism.        Physical Exam  General:  Well nourished    Airway/Jaw/Neck:  Airway Findings: Mouth Opening: Normal Tongue: Normal  General Airway Assessment: Adult  Mallampati: I  TM Distance: Normal, at least 6 cm  Jaw/Neck Findings:  Neck ROM: Normal ROM      Dental:  Dental Findings: In tact             Anesthesia Plan  Type of Anesthesia, risks & benefits discussed:  Anesthesia Type:  general  Patient's Preference: Proceed with anesthesia understanding that the risks are very small but could be serious or life threatening.  Intra-op Monitoring Plan: standard ASA monitors  Intra-op Monitoring Plan Comments:   Post Op Pain Control Plan:   Post Op Pain Control Plan Comments:   Induction:   IV  Beta Blocker:  Patient is not currently on a Beta-Blocker (No further documentation required).       Informed Consent: Patient understands risks and agrees with Anesthesia plan.  Questions answered. Anesthesia consent signed with patient.  ASA Score: 3     Day of Surgery Review of History & Physical: I have interviewed and examined the patient. I have reviewed the patient's H&P dated:            Ready For Surgery From Anesthesia Perspective.

## 2020-08-17 NOTE — OP NOTE
DATE OF PROCEDURE: 8/17/2020     PREOPERATIVE DIAGNOSES:   Cervical stenosis of spine [M48.02]  History of fusion of cervical spine [Z98.1]  Neck pain [M54.2]    POSTOPERATIVE DIAGNOSES:   Cervical stenosis of spine [M48.02]  History of fusion of cervical spine [Z98.1]  Neck pain [M54.2]    SURGEON:  Surgeon(s) and Role:  Panel 1:     * Filemon Aguayo MD - Primary     * Norma Arias MD - Resident - Assisting     * Jessee Loyola MD - Resident - Assisting  Panel 2:     * Rush Falcon MD - Primary      PROCEDURES PERFORMED:   Approach to the anterior cervical spine for C6-C7 anterior cervical disc fusion    ANESTHESIA: General      INDICATIONS FOR PROCEDURE:   Edwin Powell is a 75 y.o. man with history of prior spine surgery who was recently evaluated by my colleague in orthopedics, Dr. Aguayo.  He is scheduled for C6-C7 anterior cervical disc fusion today.  My services were enlisted to assist with the approach to the cervical spine given his prior surgical history.    He was apprised of the risks, benefits and alternatives to surgery.  In spite of the risk inherent to surgery,he provided informed consent for the aforementioned procedures.     PROCEDURE IN DETAIL:  The patient was taken to the operating room and placed on the operating table in the supine position.  General endotracheal anesthesia was induced by the anesthesia team.     Incision was marked in the right neck in order to facilitate ACDF as above.  The neck was then prepped and draped in the standard sterile fashion.    Next, the skin was incised utilizing the Bovie on cutting current.  Sharp dissection proceeded through the underlying subcutaneous tissue and platysma.  Superiorly and inferiorly-based subplatysmal flaps were elevated from the level of the thyroid notch to the level of the clavicle.  The anterior border of the sternocleidomastoid was skeletonized.  The omohyoid was identified, circumferentially dissected and divided.   The carotid sheath was then mobilized and retracted laterally.  The cervical viscera was retracted medially.  The prevertebral fascia was incised.  The appropriate level of the cervical spine was confirmed by fluoroscopy.  Once adequate exposure had been achieved, the patient was handed over to Dr. Aguayo for ACDF.    There were no intraoperative complications.  I was present for and participated in the entire procedure as dictated above.     My services were necessary for approach the cervical spine with Dr. Aguayo's services were necessary for the spine surgery proper.      ESTIMATED BLOOD LOSS: Minimal    SPECIMENS:   Specimen (12h ago, onward)    None

## 2020-08-18 PROBLEM — J96.21 ACUTE ON CHRONIC RESPIRATORY FAILURE WITH HYPOXIA AND HYPERCAPNIA: Status: ACTIVE | Noted: 2020-08-18

## 2020-08-18 PROBLEM — N17.9 ACUTE KIDNEY INJURY SUPERIMPOSED ON CHRONIC KIDNEY DISEASE: Status: ACTIVE | Noted: 2020-08-18

## 2020-08-18 PROBLEM — G93.41 ACUTE METABOLIC ENCEPHALOPATHY: Status: ACTIVE | Noted: 2020-08-18

## 2020-08-18 PROBLEM — J96.22 ACUTE ON CHRONIC RESPIRATORY FAILURE WITH HYPOXIA AND HYPERCAPNIA: Status: ACTIVE | Noted: 2020-08-18

## 2020-08-18 PROBLEM — N18.9 ACUTE KIDNEY INJURY SUPERIMPOSED ON CHRONIC KIDNEY DISEASE: Status: ACTIVE | Noted: 2020-08-18

## 2020-08-18 PROBLEM — E87.20 METABOLIC ACIDOSIS, NORMAL ANION GAP (NAG): Status: ACTIVE | Noted: 2020-08-18

## 2020-08-18 LAB
ALBUMIN SERPL BCP-MCNC: 2.8 G/DL (ref 3.5–5.2)
ALBUMIN SERPL BCP-MCNC: 2.9 G/DL (ref 3.5–5.2)
ALLENS TEST: ABNORMAL
ALP SERPL-CCNC: 100 U/L (ref 55–135)
ALP SERPL-CCNC: 105 U/L (ref 55–135)
ALT SERPL W/O P-5'-P-CCNC: 19 U/L (ref 10–44)
ALT SERPL W/O P-5'-P-CCNC: 25 U/L (ref 10–44)
AMMONIA PLAS-SCNC: 60 UMOL/L (ref 10–50)
ANION GAP SERPL CALC-SCNC: 10 MMOL/L (ref 8–16)
ANION GAP SERPL CALC-SCNC: 12 MMOL/L (ref 8–16)
ANISOCYTOSIS BLD QL SMEAR: SLIGHT
ASCENDING AORTA: 3.53 CM
AST SERPL-CCNC: 36 U/L (ref 10–40)
AST SERPL-CCNC: 37 U/L (ref 10–40)
AV INDEX (PROSTH): 0.73
AV MEAN GRADIENT: 5 MMHG
AV PEAK GRADIENT: 10 MMHG
AV VALVE AREA: 2.47 CM2
AV VELOCITY RATIO: 0.68
B-OH-BUTYR BLD STRIP-SCNC: 0.1 MMOL/L (ref 0–0.5)
BACTERIA #/AREA URNS AUTO: ABNORMAL /HPF
BASOPHILS # BLD AUTO: 0.01 K/UL (ref 0–0.2)
BASOPHILS # BLD AUTO: 0.01 K/UL (ref 0–0.2)
BASOPHILS # BLD AUTO: 0.02 K/UL (ref 0–0.2)
BASOPHILS NFR BLD: 0.1 % (ref 0–1.9)
BASOPHILS NFR BLD: 0.2 % (ref 0–1.9)
BASOPHILS NFR BLD: 0.3 % (ref 0–1.9)
BILIRUB SERPL-MCNC: 0.2 MG/DL (ref 0.1–1)
BILIRUB SERPL-MCNC: 0.2 MG/DL (ref 0.1–1)
BILIRUB UR QL STRIP: NEGATIVE
BNP SERPL-MCNC: 454 PG/ML (ref 0–99)
BSA FOR ECHO PROCEDURE: 1.94 M2
BUN SERPL-MCNC: 41 MG/DL (ref 8–23)
BUN SERPL-MCNC: 42 MG/DL (ref 8–23)
BUN SERPL-MCNC: 43 MG/DL (ref 8–23)
BUN SERPL-MCNC: 44 MG/DL (ref 8–23)
CALCIUM SERPL-MCNC: 8 MG/DL (ref 8.7–10.5)
CALCIUM SERPL-MCNC: 8.2 MG/DL (ref 8.7–10.5)
CALCIUM SERPL-MCNC: 8.3 MG/DL (ref 8.7–10.5)
CALCIUM SERPL-MCNC: 8.4 MG/DL (ref 8.7–10.5)
CHLORIDE SERPL-SCNC: 114 MMOL/L (ref 95–110)
CHLORIDE SERPL-SCNC: 115 MMOL/L (ref 95–110)
CHLORIDE SERPL-SCNC: 115 MMOL/L (ref 95–110)
CHLORIDE SERPL-SCNC: 117 MMOL/L (ref 95–110)
CHLORIDE UR-SCNC: 64 MMOL/L (ref 25–200)
CLARITY UR REFRACT.AUTO: ABNORMAL
CO2 SERPL-SCNC: 14 MMOL/L (ref 23–29)
CO2 SERPL-SCNC: 15 MMOL/L (ref 23–29)
CO2 SERPL-SCNC: 15 MMOL/L (ref 23–29)
CO2 SERPL-SCNC: 18 MMOL/L (ref 23–29)
COLOR UR AUTO: YELLOW
CREAT SERPL-MCNC: 3.3 MG/DL (ref 0.5–1.4)
CREAT SERPL-MCNC: 3.3 MG/DL (ref 0.5–1.4)
CREAT SERPL-MCNC: 3.4 MG/DL (ref 0.5–1.4)
CREAT SERPL-MCNC: 3.5 MG/DL (ref 0.5–1.4)
CREAT UR-MCNC: 87 MG/DL (ref 23–375)
CV ECHO LV RWT: 0.45 CM
DELSYS: ABNORMAL
DIFFERENTIAL METHOD: ABNORMAL
DOP CALC AO PEAK VEL: 1.57 M/S
DOP CALC AO VTI: 27.69 CM
DOP CALC LVOT AREA: 3.4 CM2
DOP CALC LVOT DIAMETER: 2.07 CM
DOP CALC LVOT PEAK VEL: 1.06 M/S
DOP CALC LVOT STROKE VOLUME: 68.38 CM3
DOP CALCLVOT PEAK VEL VTI: 20.33 CM
E WAVE DECELERATION TIME: 148.1 MSEC
E/A RATIO: 0.94
E/E' RATIO: 11.86 M/S
ECHO LV POSTERIOR WALL: 1.09 CM (ref 0.6–1.1)
EOSINOPHIL # BLD AUTO: 0 K/UL (ref 0–0.5)
EOSINOPHIL NFR BLD: 0 % (ref 0–8)
EP: 5
ERYTHROCYTE [DISTWIDTH] IN BLOOD BY AUTOMATED COUNT: 14.8 % (ref 11.5–14.5)
ERYTHROCYTE [DISTWIDTH] IN BLOOD BY AUTOMATED COUNT: 14.9 % (ref 11.5–14.5)
ERYTHROCYTE [DISTWIDTH] IN BLOOD BY AUTOMATED COUNT: 15.1 % (ref 11.5–14.5)
ERYTHROCYTE [DISTWIDTH] IN BLOOD BY AUTOMATED COUNT: 15.2 % (ref 11.5–14.5)
ERYTHROCYTE [SEDIMENTATION RATE] IN BLOOD BY WESTERGREN METHOD: 14 MM/H
EST. GFR  (AFRICAN AMERICAN): 18.6 ML/MIN/1.73 M^2
EST. GFR  (AFRICAN AMERICAN): 19.3 ML/MIN/1.73 M^2
EST. GFR  (AFRICAN AMERICAN): 20 ML/MIN/1.73 M^2
EST. GFR  (AFRICAN AMERICAN): 20 ML/MIN/1.73 M^2
EST. GFR  (NON AFRICAN AMERICAN): 16.1 ML/MIN/1.73 M^2
EST. GFR  (NON AFRICAN AMERICAN): 16.7 ML/MIN/1.73 M^2
EST. GFR  (NON AFRICAN AMERICAN): 17.3 ML/MIN/1.73 M^2
EST. GFR  (NON AFRICAN AMERICAN): 17.3 ML/MIN/1.73 M^2
FACT X PPP CHRO-ACNC: 1.5 IU/ML (ref 0.3–0.7)
FACT X PPP CHRO-ACNC: <0.1 IU/ML (ref 0.3–0.7)
FIO2: 35
FIO2: 71
FLOW: 25
FLOW: 5
FRACTIONAL SHORTENING: 29 % (ref 28–44)
GLUCOSE SERPL-MCNC: 105 MG/DL (ref 70–110)
GLUCOSE SERPL-MCNC: 106 MG/DL (ref 70–110)
GLUCOSE SERPL-MCNC: 144 MG/DL (ref 70–110)
GLUCOSE SERPL-MCNC: 151 MG/DL (ref 70–110)
GLUCOSE UR QL STRIP: ABNORMAL
HCO3 UR-SCNC: 15.1 MMOL/L (ref 24–28)
HCO3 UR-SCNC: 16.5 MMOL/L (ref 24–28)
HCO3 UR-SCNC: 17.2 MMOL/L (ref 24–28)
HCO3 UR-SCNC: 18.4 MMOL/L (ref 24–28)
HCT VFR BLD AUTO: 31.5 % (ref 40–54)
HCT VFR BLD AUTO: 31.5 % (ref 40–54)
HCT VFR BLD AUTO: 32.8 % (ref 40–54)
HCT VFR BLD AUTO: 33.4 % (ref 40–54)
HGB BLD-MCNC: 10.1 G/DL (ref 14–18)
HGB BLD-MCNC: 10.4 G/DL (ref 14–18)
HGB BLD-MCNC: 9.3 G/DL (ref 14–18)
HGB BLD-MCNC: 9.7 G/DL (ref 14–18)
HGB UR QL STRIP: ABNORMAL
HYALINE CASTS UR QL AUTO: 0 /LPF
IMM GRANULOCYTES # BLD AUTO: 0 K/UL (ref 0–0.04)
IMM GRANULOCYTES # BLD AUTO: 0.04 K/UL (ref 0–0.04)
IMM GRANULOCYTES # BLD AUTO: 0.06 K/UL (ref 0–0.04)
IMM GRANULOCYTES NFR BLD AUTO: 0 % (ref 0–0.5)
IMM GRANULOCYTES NFR BLD AUTO: 0.4 % (ref 0–0.5)
IMM GRANULOCYTES NFR BLD AUTO: 0.5 % (ref 0–0.5)
INR PPP: 1.1 (ref 0.8–1.2)
INTERVENTRICULAR SEPTUM: 1 CM (ref 0.6–1.1)
IP: 15
IVRT: 59.94 MSEC
KETONES UR QL STRIP: NEGATIVE
LA MAJOR: 4.91 CM
LA MINOR: 4.8 CM
LA WIDTH: 3.8 CM
LACTATE SERPL-SCNC: 1.2 MMOL/L (ref 0.5–2.2)
LEFT ATRIUM SIZE: 3.41 CM
LEFT ATRIUM VOLUME INDEX MOD: 28.2 ML/M2
LEFT ATRIUM VOLUME INDEX: 27.9 ML/M2
LEFT ATRIUM VOLUME MOD: 54 CM3
LEFT ATRIUM VOLUME: 53.47 CM3
LEFT INTERNAL DIMENSION IN SYSTOLE: 3.47 CM (ref 2.1–4)
LEFT VENTRICLE DIASTOLIC VOLUME INDEX: 58.39 ML/M2
LEFT VENTRICLE DIASTOLIC VOLUME: 111.83 ML
LEFT VENTRICLE MASS INDEX: 97 G/M2
LEFT VENTRICLE SYSTOLIC VOLUME INDEX: 26.1 ML/M2
LEFT VENTRICLE SYSTOLIC VOLUME: 49.91 ML
LEFT VENTRICULAR INTERNAL DIMENSION IN DIASTOLE: 4.88 CM (ref 3.5–6)
LEFT VENTRICULAR MASS: 185.63 G
LEUKOCYTE ESTERASE UR QL STRIP: NEGATIVE
LV LATERAL E/E' RATIO: 10.38 M/S
LV SEPTAL E/E' RATIO: 13.83 M/S
LYMPHOCYTES # BLD AUTO: 0.4 K/UL (ref 1–4.8)
LYMPHOCYTES # BLD AUTO: 0.7 K/UL (ref 1–4.8)
LYMPHOCYTES # BLD AUTO: 1.3 K/UL (ref 1–4.8)
LYMPHOCYTES NFR BLD: 11.2 % (ref 18–48)
LYMPHOCYTES NFR BLD: 14.5 % (ref 18–48)
LYMPHOCYTES NFR BLD: 6.2 % (ref 18–48)
MAGNESIUM SERPL-MCNC: 1.7 MG/DL (ref 1.6–2.6)
MAGNESIUM SERPL-MCNC: 1.7 MG/DL (ref 1.6–2.6)
MCH RBC QN AUTO: 32.2 PG (ref 27–31)
MCH RBC QN AUTO: 33 PG (ref 27–31)
MCH RBC QN AUTO: 33.2 PG (ref 27–31)
MCH RBC QN AUTO: 33.3 PG (ref 27–31)
MCHC RBC AUTO-ENTMCNC: 29.5 G/DL (ref 32–36)
MCHC RBC AUTO-ENTMCNC: 30.2 G/DL (ref 32–36)
MCHC RBC AUTO-ENTMCNC: 30.8 G/DL (ref 32–36)
MCHC RBC AUTO-ENTMCNC: 31.7 G/DL (ref 32–36)
MCV RBC AUTO: 105 FL (ref 82–98)
MCV RBC AUTO: 107 FL (ref 82–98)
MCV RBC AUTO: 109 FL (ref 82–98)
MCV RBC AUTO: 110 FL (ref 82–98)
MICROSCOPIC COMMENT: ABNORMAL
MODE: ABNORMAL
MONOCYTES # BLD AUTO: 0.3 K/UL (ref 0.3–1)
MONOCYTES # BLD AUTO: 1 K/UL (ref 0.3–1)
MONOCYTES # BLD AUTO: 1.1 K/UL (ref 0.3–1)
MONOCYTES NFR BLD: 8.9 % (ref 4–15)
MONOCYTES NFR BLD: 9 % (ref 4–15)
MONOCYTES NFR BLD: 9.5 % (ref 4–15)
MV PEAK A VEL: 0.88 M/S
MV PEAK E VEL: 0.83 M/S
MV STENOSIS PRESSURE HALF TIME: 42.95 MS
MV VALVE AREA P 1/2 METHOD: 5.12 CM2
NEUTROPHILS # BLD AUTO: 2.3 K/UL (ref 1.8–7.7)
NEUTROPHILS # BLD AUTO: 9 K/UL (ref 1.8–7.7)
NEUTROPHILS # BLD AUTO: 9.4 K/UL (ref 1.8–7.7)
NEUTROPHILS NFR BLD: 76.3 % (ref 38–73)
NEUTROPHILS NFR BLD: 78.7 % (ref 38–73)
NEUTROPHILS NFR BLD: 84.2 % (ref 38–73)
NITRITE UR QL STRIP: NEGATIVE
NRBC BLD-RTO: 0 /100 WBC
NRBC BLD-RTO: 0 /100 WBC
NRBC BLD-RTO: 1 /100 WBC
PCO2 BLDA: 41.1 MMHG (ref 35–45)
PCO2 BLDA: 41.8 MMHG (ref 35–45)
PCO2 BLDA: 49.2 MMHG (ref 35–45)
PCO2 BLDA: 52.7 MMHG (ref 35–45)
PH SMN: 7.09 [PH] (ref 7.35–7.45)
PH SMN: 7.1 [PH] (ref 7.35–7.45)
PH SMN: 7.22 [PH] (ref 7.35–7.45)
PH SMN: 7.26 [PH] (ref 7.35–7.45)
PH UR STRIP: 5 [PH] (ref 5–8)
PHOSPHATE SERPL-MCNC: 6.4 MG/DL (ref 2.7–4.5)
PHOSPHATE SERPL-MCNC: 6.7 MG/DL (ref 2.7–4.5)
PISA TR MAX VEL: 2.4 M/S
PLATELET # BLD AUTO: 261 K/UL (ref 150–350)
PLATELET # BLD AUTO: 314 K/UL (ref 150–350)
PLATELET # BLD AUTO: 327 K/UL (ref 150–350)
PLATELET # BLD AUTO: 332 K/UL (ref 150–350)
PLATELET BLD QL SMEAR: ABNORMAL
PMV BLD AUTO: 10 FL (ref 9.2–12.9)
PMV BLD AUTO: 9.5 FL (ref 9.2–12.9)
PMV BLD AUTO: 9.7 FL (ref 9.2–12.9)
PMV BLD AUTO: 9.8 FL (ref 9.2–12.9)
PO2 BLDA: 125 MMHG (ref 80–100)
PO2 BLDA: 158 MMHG (ref 80–100)
PO2 BLDA: 56 MMHG (ref 40–60)
PO2 BLDA: 91 MMHG (ref 80–100)
POC BE: -10 MMOL/L
POC BE: -13 MMOL/L
POC BE: -15 MMOL/L
POC BE: -9 MMOL/L
POC SATURATED O2: 83 % (ref 95–100)
POC SATURATED O2: 96 % (ref 95–100)
POC SATURATED O2: 97 % (ref 95–100)
POC SATURATED O2: 99 % (ref 95–100)
POC TCO2: 17 MMOL/L (ref 23–27)
POC TCO2: 18 MMOL/L (ref 23–27)
POC TCO2: 18 MMOL/L (ref 24–29)
POC TCO2: 20 MMOL/L (ref 23–27)
POCT GLUCOSE: 110 MG/DL (ref 70–110)
POCT GLUCOSE: 157 MG/DL (ref 70–110)
POCT GLUCOSE: 167 MG/DL (ref 70–110)
POTASSIUM SERPL-SCNC: 4.1 MMOL/L (ref 3.5–5.1)
POTASSIUM SERPL-SCNC: 4.8 MMOL/L (ref 3.5–5.1)
POTASSIUM SERPL-SCNC: 4.8 MMOL/L (ref 3.5–5.1)
POTASSIUM SERPL-SCNC: 5 MMOL/L (ref 3.5–5.1)
POTASSIUM UR-SCNC: 44 MMOL/L (ref 15–95)
PROT SERPL-MCNC: 6.6 G/DL (ref 6–8.4)
PROT SERPL-MCNC: 6.7 G/DL (ref 6–8.4)
PROT UR QL STRIP: ABNORMAL
PROTHROMBIN TIME: 11.9 SEC (ref 9–12.5)
PULM VEIN S/D RATIO: 1.24
PV PEAK D VEL: 0.38 M/S
PV PEAK S VEL: 0.47 M/S
RA MAJOR: 4.92 CM
RA PRESSURE: 3 MMHG
RA WIDTH: 2.74 CM
RBC # BLD AUTO: 2.89 M/UL (ref 4.6–6.2)
RBC # BLD AUTO: 2.94 M/UL (ref 4.6–6.2)
RBC # BLD AUTO: 3.04 M/UL (ref 4.6–6.2)
RBC # BLD AUTO: 3.12 M/UL (ref 4.6–6.2)
RBC #/AREA URNS AUTO: 2 /HPF (ref 0–4)
RIGHT VENTRICULAR END-DIASTOLIC DIMENSION: 3.27 CM
RV TISSUE DOPPLER FREE WALL SYSTOLIC VELOCITY 1 (APICAL 4 CHAMBER VIEW): 18.3 CM/S
SAMPLE: ABNORMAL
SARS-COV-2 RDRP RESP QL NAA+PROBE: NEGATIVE
SINUS: 3.42 CM
SITE: ABNORMAL
SODIUM SERPL-SCNC: 140 MMOL/L (ref 136–145)
SODIUM SERPL-SCNC: 141 MMOL/L (ref 136–145)
SODIUM SERPL-SCNC: 142 MMOL/L (ref 136–145)
SODIUM SERPL-SCNC: 142 MMOL/L (ref 136–145)
SODIUM UR-SCNC: 58 MMOL/L (ref 20–250)
SP GR UR STRIP: 1.02 (ref 1–1.03)
SP02: 98
SP02: 98
SQUAMOUS #/AREA URNS AUTO: 1 /HPF
STJ: 3.1 CM
T4 FREE SERPL-MCNC: 0.8 NG/DL (ref 0.71–1.51)
TDI LATERAL: 0.08 M/S
TDI SEPTAL: 0.06 M/S
TDI: 0.07 M/S
TR MAX PG: 23 MMHG
TRICUSPID ANNULAR PLANE SYSTOLIC EXCURSION: 2.6 CM
TROPONIN I SERPL DL<=0.01 NG/ML-MCNC: 1.21 NG/ML (ref 0–0.03)
TROPONIN I SERPL DL<=0.01 NG/ML-MCNC: 6.89 NG/ML (ref 0–0.03)
TSH SERPL DL<=0.005 MIU/L-ACNC: 7.71 UIU/ML (ref 0.4–4)
TV REST PULMONARY ARTERY PRESSURE: 26 MMHG
URN SPEC COLLECT METH UR: ABNORMAL
UUN UR-MCNC: 373 MG/DL (ref 140–1050)
WBC # BLD AUTO: 11.22 K/UL (ref 3.9–12.7)
WBC # BLD AUTO: 11.41 K/UL (ref 3.9–12.7)
WBC # BLD AUTO: 3.04 K/UL (ref 3.9–12.7)
WBC # BLD AUTO: 9.36 K/UL (ref 3.9–12.7)
WBC #/AREA URNS AUTO: 4 /HPF (ref 0–5)

## 2020-08-18 PROCEDURE — 99900035 HC TECH TIME PER 15 MIN (STAT): Mod: HCNC

## 2020-08-18 PROCEDURE — 82570 ASSAY OF URINE CREATININE: CPT | Mod: HCNC

## 2020-08-18 PROCEDURE — 36415 COLL VENOUS BLD VENIPUNCTURE: CPT | Mod: HCNC

## 2020-08-18 PROCEDURE — 97165 OT EVAL LOW COMPLEX 30 MIN: CPT | Mod: HCNC

## 2020-08-18 PROCEDURE — 84100 ASSAY OF PHOSPHORUS: CPT | Mod: HCNC

## 2020-08-18 PROCEDURE — 63600175 PHARM REV CODE 636 W HCPCS: Mod: HCNC | Performed by: NURSE PRACTITIONER

## 2020-08-18 PROCEDURE — 93005 ELECTROCARDIOGRAM TRACING: CPT | Mod: HCNC

## 2020-08-18 PROCEDURE — 99291 CRITICAL CARE FIRST HOUR: CPT | Mod: HCNC,,, | Performed by: NURSE PRACTITIONER

## 2020-08-18 PROCEDURE — 80048 BASIC METABOLIC PNL TOTAL CA: CPT | Mod: HCNC

## 2020-08-18 PROCEDURE — 97535 SELF CARE MNGMENT TRAINING: CPT | Mod: HCNC

## 2020-08-18 PROCEDURE — 83605 ASSAY OF LACTIC ACID: CPT | Mod: HCNC

## 2020-08-18 PROCEDURE — 99291 PR CRITICAL CARE, E/M 30-74 MINUTES: ICD-10-PCS | Mod: HCNC,,, | Performed by: NURSE PRACTITIONER

## 2020-08-18 PROCEDURE — 83735 ASSAY OF MAGNESIUM: CPT | Mod: HCNC

## 2020-08-18 PROCEDURE — 94660 CPAP INITIATION&MGMT: CPT | Mod: HCNC

## 2020-08-18 PROCEDURE — 93010 ELECTROCARDIOGRAM REPORT: CPT | Mod: HCNC,76,, | Performed by: INTERNAL MEDICINE

## 2020-08-18 PROCEDURE — 25000003 PHARM REV CODE 250: Mod: HCNC | Performed by: STUDENT IN AN ORGANIZED HEALTH CARE EDUCATION/TRAINING PROGRAM

## 2020-08-18 PROCEDURE — 85610 PROTHROMBIN TIME: CPT | Mod: HCNC

## 2020-08-18 PROCEDURE — 84540 ASSAY OF URINE/UREA-N: CPT | Mod: HCNC

## 2020-08-18 PROCEDURE — 25000242 PHARM REV CODE 250 ALT 637 W/ HCPCS: Mod: HCNC | Performed by: STUDENT IN AN ORGANIZED HEALTH CARE EDUCATION/TRAINING PROGRAM

## 2020-08-18 PROCEDURE — 81001 URINALYSIS AUTO W/SCOPE: CPT | Mod: HCNC

## 2020-08-18 PROCEDURE — 94640 AIRWAY INHALATION TREATMENT: CPT | Mod: HCNC

## 2020-08-18 PROCEDURE — 85520 HEPARIN ASSAY: CPT | Mod: 91,HCNC

## 2020-08-18 PROCEDURE — 85027 COMPLETE CBC AUTOMATED: CPT | Mod: HCNC

## 2020-08-18 PROCEDURE — 63600175 PHARM REV CODE 636 W HCPCS: Mod: HCNC | Performed by: STUDENT IN AN ORGANIZED HEALTH CARE EDUCATION/TRAINING PROGRAM

## 2020-08-18 PROCEDURE — U0002 COVID-19 LAB TEST NON-CDC: HCPCS | Mod: HCNC

## 2020-08-18 PROCEDURE — 93010 ELECTROCARDIOGRAM REPORT: CPT | Mod: HCNC,,, | Performed by: INTERNAL MEDICINE

## 2020-08-18 PROCEDURE — 82436 ASSAY OF URINE CHLORIDE: CPT | Mod: HCNC

## 2020-08-18 PROCEDURE — 84100 ASSAY OF PHOSPHORUS: CPT | Mod: 91,HCNC

## 2020-08-18 PROCEDURE — 93010 ELECTROCARDIOGRAM REPORT: CPT | Mod: 76,HCNC,, | Performed by: INTERNAL MEDICINE

## 2020-08-18 PROCEDURE — 85520 HEPARIN ASSAY: CPT | Mod: HCNC

## 2020-08-18 PROCEDURE — 25000003 PHARM REV CODE 250: Mod: HCNC | Performed by: NURSE PRACTITIONER

## 2020-08-18 PROCEDURE — 83880 ASSAY OF NATRIURETIC PEPTIDE: CPT | Mod: HCNC

## 2020-08-18 PROCEDURE — 20000000 HC ICU ROOM: Mod: HCNC

## 2020-08-18 PROCEDURE — 84443 ASSAY THYROID STIM HORMONE: CPT | Mod: HCNC

## 2020-08-18 PROCEDURE — 84484 ASSAY OF TROPONIN QUANT: CPT | Mod: 91,HCNC

## 2020-08-18 PROCEDURE — 27000190 HC CPAP FULL FACE MASK W/VALVE: Mod: HCNC

## 2020-08-18 PROCEDURE — 82140 ASSAY OF AMMONIA: CPT | Mod: HCNC

## 2020-08-18 PROCEDURE — 83735 ASSAY OF MAGNESIUM: CPT | Mod: 91,HCNC

## 2020-08-18 PROCEDURE — 84484 ASSAY OF TROPONIN QUANT: CPT | Mod: HCNC

## 2020-08-18 PROCEDURE — 82803 BLOOD GASES ANY COMBINATION: CPT | Mod: HCNC

## 2020-08-18 PROCEDURE — 84300 ASSAY OF URINE SODIUM: CPT | Mod: HCNC

## 2020-08-18 PROCEDURE — 82010 KETONE BODYS QUAN: CPT | Mod: HCNC

## 2020-08-18 PROCEDURE — 36600 WITHDRAWAL OF ARTERIAL BLOOD: CPT | Mod: HCNC

## 2020-08-18 PROCEDURE — 97161 PT EVAL LOW COMPLEX 20 MIN: CPT | Mod: HCNC

## 2020-08-18 PROCEDURE — 80053 COMPREHEN METABOLIC PANEL: CPT | Mod: 91,HCNC

## 2020-08-18 PROCEDURE — 80048 BASIC METABOLIC PNL TOTAL CA: CPT | Mod: 91,HCNC

## 2020-08-18 PROCEDURE — 84133 ASSAY OF URINE POTASSIUM: CPT | Mod: HCNC

## 2020-08-18 PROCEDURE — 27100171 HC OXYGEN HIGH FLOW UP TO 24 HOURS: Mod: HCNC

## 2020-08-18 PROCEDURE — 85025 COMPLETE CBC W/AUTO DIFF WBC: CPT | Mod: 91,HCNC

## 2020-08-18 PROCEDURE — 80053 COMPREHEN METABOLIC PANEL: CPT | Mod: HCNC

## 2020-08-18 PROCEDURE — 84439 ASSAY OF FREE THYROXINE: CPT | Mod: HCNC

## 2020-08-18 PROCEDURE — 63600175 PHARM REV CODE 636 W HCPCS: Mod: HCNC

## 2020-08-18 PROCEDURE — 94761 N-INVAS EAR/PLS OXIMETRY MLT: CPT | Mod: HCNC

## 2020-08-18 PROCEDURE — 93010 EKG 12-LEAD: ICD-10-PCS | Mod: 76,HCNC,, | Performed by: INTERNAL MEDICINE

## 2020-08-18 PROCEDURE — 27000221 HC OXYGEN, UP TO 24 HOURS: Mod: HCNC

## 2020-08-18 RX ORDER — IPRATROPIUM BROMIDE AND ALBUTEROL SULFATE 2.5; .5 MG/3ML; MG/3ML
3 SOLUTION RESPIRATORY (INHALATION) EVERY 4 HOURS
Status: DISCONTINUED | OUTPATIENT
Start: 2020-08-18 | End: 2020-08-20

## 2020-08-18 RX ORDER — ONDANSETRON 2 MG/ML
4 INJECTION INTRAMUSCULAR; INTRAVENOUS EVERY 12 HOURS PRN
Status: DISCONTINUED | OUTPATIENT
Start: 2020-08-18 | End: 2020-09-03 | Stop reason: HOSPADM

## 2020-08-18 RX ORDER — CARVEDILOL 6.25 MG/1
6.25 TABLET ORAL 2 TIMES DAILY
Status: DISCONTINUED | OUTPATIENT
Start: 2020-08-18 | End: 2020-08-31

## 2020-08-18 RX ORDER — INSULIN ASPART 100 [IU]/ML
0-5 INJECTION, SOLUTION INTRAVENOUS; SUBCUTANEOUS
Status: DISCONTINUED | OUTPATIENT
Start: 2020-08-18 | End: 2020-09-03 | Stop reason: HOSPADM

## 2020-08-18 RX ORDER — FAMOTIDINE 20 MG/1
20 TABLET, FILM COATED ORAL DAILY
Status: DISCONTINUED | OUTPATIENT
Start: 2020-08-18 | End: 2020-08-18

## 2020-08-18 RX ORDER — HEPARIN SODIUM,PORCINE/D5W 25000/250
16 INTRAVENOUS SOLUTION INTRAVENOUS CONTINUOUS
Status: DISCONTINUED | OUTPATIENT
Start: 2020-08-18 | End: 2020-08-21

## 2020-08-18 RX ORDER — NALOXONE HCL 0.4 MG/ML
VIAL (ML) INJECTION
Status: DISPENSED
Start: 2020-08-18 | End: 2020-08-18

## 2020-08-18 RX ORDER — CLOPIDOGREL BISULFATE 75 MG/1
75 TABLET ORAL DAILY
Status: DISCONTINUED | OUTPATIENT
Start: 2020-08-19 | End: 2020-08-30

## 2020-08-18 RX ORDER — CETIRIZINE HYDROCHLORIDE 5 MG/1
5 TABLET ORAL NIGHTLY
Status: DISCONTINUED | OUTPATIENT
Start: 2020-08-18 | End: 2020-08-18

## 2020-08-18 RX ORDER — CLOPIDOGREL 300 MG/1
300 TABLET, FILM COATED ORAL ONCE
Status: COMPLETED | OUTPATIENT
Start: 2020-08-18 | End: 2020-08-18

## 2020-08-18 RX ORDER — NAPROXEN SODIUM 220 MG/1
81 TABLET, FILM COATED ORAL DAILY
Status: DISCONTINUED | OUTPATIENT
Start: 2020-08-18 | End: 2020-08-30

## 2020-08-18 RX ORDER — OXYCODONE HYDROCHLORIDE 10 MG/1
10 TABLET ORAL EVERY 4 HOURS PRN
Status: DISCONTINUED | OUTPATIENT
Start: 2020-08-18 | End: 2020-08-18

## 2020-08-18 RX ADMIN — ASPIRIN 81 MG CHEWABLE TABLET 81 MG: 81 TABLET CHEWABLE at 09:08

## 2020-08-18 RX ADMIN — AMLODIPINE BESYLATE 10 MG: 10 TABLET ORAL at 09:08

## 2020-08-18 RX ADMIN — LEVOTHYROXINE SODIUM 88 MCG: 0.09 TABLET ORAL at 09:08

## 2020-08-18 RX ADMIN — NALOXONE HYDROCHLORIDE 0.2 MG: 0.4 INJECTION, SOLUTION INTRAMUSCULAR; INTRAVENOUS; SUBCUTANEOUS at 12:08

## 2020-08-18 RX ADMIN — CLOPIDOGREL BISULFATE 300 MG: 300 TABLET, FILM COATED ORAL at 09:08

## 2020-08-18 RX ADMIN — IPRATROPIUM BROMIDE AND ALBUTEROL SULFATE 3 ML: .5; 2.5 SOLUTION RESPIRATORY (INHALATION) at 11:08

## 2020-08-18 RX ADMIN — CEFAZOLIN 2 G: 1 INJECTION, POWDER, FOR SOLUTION INTRAMUSCULAR; INTRAVENOUS at 09:08

## 2020-08-18 RX ADMIN — NALOXONE HYDROCHLORIDE 0.4 MG/HR: 1 INJECTION PARENTERAL at 06:08

## 2020-08-18 RX ADMIN — DOCUSATE SODIUM 50 MG: 50 CAPSULE, LIQUID FILLED ORAL at 09:08

## 2020-08-18 RX ADMIN — FAMOTIDINE 20 MG: 20 TABLET ORAL at 09:08

## 2020-08-18 RX ADMIN — ACETAMINOPHEN 650 MG: 325 TABLET ORAL at 11:08

## 2020-08-18 RX ADMIN — IPRATROPIUM BROMIDE AND ALBUTEROL SULFATE 3 ML: .5; 2.5 SOLUTION RESPIRATORY (INHALATION) at 04:08

## 2020-08-18 RX ADMIN — HEPARIN SODIUM 18 UNITS/KG/HR: 5000 INJECTION INTRAVENOUS; SUBCUTANEOUS at 04:08

## 2020-08-18 RX ADMIN — TAMSULOSIN HYDROCHLORIDE 0.8 MG: 0.4 CAPSULE ORAL at 09:08

## 2020-08-18 RX ADMIN — IPRATROPIUM BROMIDE AND ALBUTEROL SULFATE 3 ML: .5; 2.5 SOLUTION RESPIRATORY (INHALATION) at 09:08

## 2020-08-18 RX ADMIN — SODIUM BICARBONATE: 84 INJECTION, SOLUTION INTRAVENOUS at 04:08

## 2020-08-18 RX ADMIN — IPRATROPIUM BROMIDE AND ALBUTEROL SULFATE 3 ML: .5; 2.5 SOLUTION RESPIRATORY (INHALATION) at 07:08

## 2020-08-18 RX ADMIN — NALOXONE HYDROCHLORIDE 0.4 MG: 0.4 INJECTION, SOLUTION INTRAMUSCULAR; INTRAVENOUS; SUBCUTANEOUS at 02:08

## 2020-08-18 RX ADMIN — IPRATROPIUM BROMIDE AND ALBUTEROL SULFATE 3 ML: .5; 2.5 SOLUTION RESPIRATORY (INHALATION) at 02:08

## 2020-08-18 RX ADMIN — Medication 0.2 MG: at 12:08

## 2020-08-18 RX ADMIN — NALOXONE HYDROCHLORIDE 0.4 MG/HR: 1 INJECTION PARENTERAL at 04:08

## 2020-08-18 NOTE — SUBJECTIVE & OBJECTIVE
Interval History/Significant Events: Patient more arousable today compared to yesterday night, however still somewhat sleepy and needs to be encouraged to participate in interview and physical exam. Narcan discontinued. Heparin gtt started. Bicarb gtt continued. Required up to 30L comfort flow, now down to 25L.    Review of Systems   Constitutional: Positive for fatigue. Negative for fever.   HENT: Negative for congestion, ear pain, sinus pain and sore throat.    Eyes: Negative for visual disturbance.   Respiratory: Positive for shortness of breath. Negative for cough.    Cardiovascular: Negative for chest pain and leg swelling.   Gastrointestinal: Negative for abdominal pain, constipation, diarrhea, nausea and vomiting.   Genitourinary: Negative for decreased urine volume, difficulty urinating and dysuria.   Musculoskeletal: Positive for back pain and neck pain.   Skin: Negative for rash.   Neurological: Negative for dizziness, syncope and headaches.   Psychiatric/Behavioral: Negative for confusion and decreased concentration.     Objective:     Vital Signs (Most Recent):  Temp: 99.2 °F (37.3 °C) (08/18/20 1500)  Pulse: 95 (08/18/20 1810)  Resp: (!) 23 (08/18/20 1810)  BP: (!) 152/112 (08/18/20 1800)  SpO2: (!) 93 % (08/18/20 1810) Vital Signs (24h Range):  Temp:  [96.1 °F (35.6 °C)-99.2 °F (37.3 °C)] 99.2 °F (37.3 °C)  Pulse:  [] 95  Resp:  [7-39] 23  SpO2:  [84 %-100 %] 93 %  BP: (112-188)/() 152/112   Weight: 79.8 kg (176 lb)  Body mass index is 27.57 kg/m².      Intake/Output Summary (Last 24 hours) at 8/18/2020 1818  Last data filed at 8/18/2020 1700  Gross per 24 hour   Intake 1625 ml   Output 1499.5 ml   Net 125.5 ml       Physical Exam  Vitals signs and nursing note reviewed.   Constitutional:       General: He is not in acute distress.     Comments: Seen sitting reclined in bedside chair   HENT:      Head: Normocephalic and atraumatic.      Right Ear: External ear normal.      Left Ear:  External ear normal.      Nose: Nose normal.      Mouth/Throat:      Mouth: Mucous membranes are moist.      Pharynx: Oropharynx is clear.   Eyes:      Conjunctiva/sclera: Conjunctivae normal.   Neck:      Comments: L side JIN drain present with serosanguinous drainage  Cardiovascular:      Rate and Rhythm: Normal rate and regular rhythm.      Pulses: Normal pulses.      Heart sounds: Normal heart sounds. No murmur.   Pulmonary:      Effort: Pulmonary effort is normal.      Breath sounds: Normal breath sounds.      Comments: On comfort flow  Abdominal:      General: Abdomen is flat. Bowel sounds are normal. There is no distension.      Palpations: Abdomen is soft.      Tenderness: There is no abdominal tenderness.   Genitourinary:     Penis: Normal.    Musculoskeletal: Normal range of motion.         General: No swelling or tenderness.      Right lower leg: No edema.      Left lower leg: No edema.   Skin:     General: Skin is warm and dry.      Capillary Refill: Capillary refill takes less than 2 seconds.   Neurological:      Mental Status: He is oriented to person, place, and time.      GCS: GCS eye subscore is 4. GCS verbal subscore is 5. GCS motor subscore is 6.      Motor: Motor function is intact.      Comments: Upper and lower extremity muscle strength 4-5/5 when repeatedly encouraged.          Vents:  Oxygen Concentration (%): 56 (08/18/20 1810)  Lines/Drains/Airways     Drain                 Closed/Suction Drain 08/17/20 Anterior Neck Bulb 10 Fr. 1 day    Male External Urinary Catheter 08/18/20 0500 Small less than 1 day          Peripheral Intravenous Line                 Peripheral IV - Single Lumen 01/24/20 1447 22 G Anterior;Right Hand 207 days         Peripheral IV - Single Lumen 05/19/20 0924 22 G Right Hand 91 days         Peripheral IV - Single Lumen 08/17/20 0615 18 G Left Forearm 1 day         Peripheral IV - Single Lumen 08/17/20 0715 18 G Right Forearm 1 day         Peripheral IV - Single Lumen  08/18/20 0500 20 G Anterior;Left;Proximal Forearm less than 1 day              Significant Labs:    CBC/Anemia Profile:  Recent Labs   Lab 08/18/20 0343 08/18/20 0438 08/18/20  1514   WBC 9.36 11.41 3.04*   HGB 9.3* 9.7* 10.4*   HCT 31.5* 31.5* 32.8*    332 261   * 107* 105*   RDW 14.8* 15.1* 15.2*        Chemistries:  Recent Labs   Lab 08/18/20 0051 08/18/20 0342 08/18/20 0438 08/18/20  1611    142 140 142   K 4.8 4.8 5.0 4.1   * 117* 115* 114*   CO2 14* 15* 15* 18*   BUN 43* 42* 44* 41*   CREATININE 3.3* 3.3* 3.4* 3.5*   CALCIUM 8.4* 8.2* 8.3* 8.0*   ALBUMIN 2.9*  --  2.8*  --    PROT 6.7  --  6.6  --    BILITOT 0.2  --  0.2  --    ALKPHOS 105  --  100  --    ALT 25  --  19  --    AST 37  --  36  --    MG 1.7  --  1.7  --    PHOS 6.4*  --  6.7*  --        ABGs:   Recent Labs   Lab 08/18/20  1620   PH 7.223*   PCO2 41.8   HCO3 17.2*   POCSATURATED 83*   BE -10     CMP:   Recent Labs   Lab 08/18/20 0051 08/18/20 0342 08/18/20 0438 08/18/20  1611    142 140 142   K 4.8 4.8 5.0 4.1   * 117* 115* 114*   CO2 14* 15* 15* 18*   * 105 106 151*   BUN 43* 42* 44* 41*   CREATININE 3.3* 3.3* 3.4* 3.5*   CALCIUM 8.4* 8.2* 8.3* 8.0*   PROT 6.7  --  6.6  --    ALBUMIN 2.9*  --  2.8*  --    BILITOT 0.2  --  0.2  --    ALKPHOS 105  --  100  --    AST 37  --  36  --    ALT 25  --  19  --    ANIONGAP 12 10 10 10   EGFRNONAA 17.3* 17.3* 16.7* 16.1*     All pertinent labs within the past 24 hours have been reviewed.    Significant Imaging:  I have reviewed all pertinent imaging results/findings within the past 24 hours.

## 2020-08-18 NOTE — ASSESSMENT & PLAN NOTE
--per clinic notes, insulin dependant  --home regimen lantus 5 units daily   --start lantus 2 units qhs & low dose correctional ssi; titrate prn

## 2020-08-18 NOTE — PROGRESS NOTES
[facility]  Orthopedics  Progress Note    Patient Name: Edwin Powell  MRN: [id number]  Admission Date: 8/17/2020  Hospital Length of Stay: 1 days  Attending Provider: Mandeep Byrd MD  Primary Care Provider: Lulú Lewis MD  Follow-up For: Procedure(s) (LRB):  DISCECTOMY, SPINE, CERVICAL, ANTERIOR APPROACH, WITH FUSION co case with Dr. Falcon C6/7 Depuy SNS:vocal cord/motors/SSEP Regular OR table Patient needs scope in preop (N/A)  DISSECTION, NECK (N/A)    Post-Operative Day: 1 Day Post-Op  Subjective:     Principal Problem:Cervical stenosis of spine    Principal Orthopedic Problem: s/p ACDF    Interval History: Pt transferred to SICU yest for multiple episodes of apnea overnight. Started on narcan drip. This am pt 88-91% on 15L HFNC. Pain controlled. Denies new numbness/tingling.     Review of patient's allergies indicates:  No Known Allergies    Current Facility-Administered Medications   Medication    acetaminophen tablet 650 mg    albuterol-ipratropium 2.5 mg-0.5 mg/3 mL nebulizer solution 3 mL    albuterol-ipratropium 2.5 mg-0.5 mg/3 mL nebulizer solution 3 mL    amLODIPine tablet 10 mg    bisacodyL suppository 10 mg    ceFAZolin injection 2 g    cetirizine tablet 5 mg    dextrose 50% injection 12.5 g    dextrose 50% injection 25 g    docusate sodium capsule 50 mg    famotidine tablet 20 mg    glucagon (human recombinant) injection 1 mg    glucose chewable tablet 16 g    glucose chewable tablet 24 g    insulin aspart U-100 pen 0-5 Units    insulin detemir U-100 pen 2 Units    levothyroxine tablet 88 mcg    naloxone (NARCAN) 0.4 mg/mL injection    naloxone (NARCAN) 2 mg in dextrose 5 % 100 mL infusion    naloxone 0.4 mg/mL injection 0.2 mg    ondansetron injection 4 mg    polyethylene glycol packet 17 g    sodium bicarbonate 150 mEq in dextrose 5 % 1,000 mL infusion    sodium chloride 0.9% flush 10 mL    sodium chloride 0.9% flush 5 mL    tamsulosin 24 hr  "capsule 0.8 mg     Facility-Administered Medications Ordered in Other Encounters   Medication    phenylephrine HCL 2.5% ophthalmic solution 1 drop    proparacaine 0.5 % ophthalmic solution 1 drop    tropicamide 1% ophthalmic solution 1 drop     Objective:     Vital Signs (Most Recent):  Temp: 99.1 °F (37.3 °C) (08/18/20 0700)  Pulse: 94 (08/18/20 0730)  Resp: (!) 25 (08/18/20 0730)  BP: (!) 150/68 (08/18/20 0730)  SpO2: (!) 92 % (08/18/20 0730) Vital Signs (24h Range):  Temp:  [96.1 °F (35.6 °C)-99.1 °F (37.3 °C)] 99.1 °F (37.3 °C)  Pulse:  [] 94  Resp:  [7-39] 25  SpO2:  [84 %-100 %] 92 %  BP: (124-166)/() 150/68     Weight: 80 kg (176 lb 5.9 oz)  Height: 5' 7" (170.2 cm)  Body mass index is 27.62 kg/m².      Intake/Output Summary (Last 24 hours) at 8/18/2020 0829  Last data filed at 8/18/2020 0600  Gross per 24 hour   Intake 572.5 ml   Output 879.5 ml   Net -307 ml       Ortho/SPM Exam  Anterior cervical dressing in place with drain- 27.5 cc  Neuro exam stable    Significant Labs:   ABGs:   Recent Labs   Lab 08/18/20  0349   PH 7.104*   PCO2 52.7*   HCO3 16.5*   POCSATURATED 97   BE -13     CBC:   Recent Labs   Lab 08/18/20  0051 08/18/20  0343 08/18/20  0438   WBC 11.22 9.36 11.41   HGB 10.1* 9.3* 9.7*   HCT 33.4* 31.5* 31.5*    314 332     CMP:   Recent Labs   Lab 08/18/20  0051 08/18/20  0342 08/18/20  0438    142 140   K 4.8 4.8 5.0   * 117* 115*   CO2 14* 15* 15*   * 105 106   BUN 43* 42* 44*   CREATININE 3.3* 3.3* 3.4*   CALCIUM 8.4* 8.2* 8.3*   PROT 6.7  --  6.6   ALBUMIN 2.9*  --  2.8*   BILITOT 0.2  --  0.2   ALKPHOS 105  --  100   AST 37  --  36   ALT 25  --  19   ANIONGAP 12 10 10   EGFRNONAA 17.3* 17.3* 16.7*     All pertinent labs within the past 24 hours have been reviewed.    Significant Imaging: I have reviewed and interpreted all pertinent imaging results/findings.    Assessment/Plan:     * Cervical stenosis of spine  Edwin Powell is a 75 y.o. male s/p " C5/6 ACDF (8/17/20) for cervical stenosis, postop course complicated by apnea; Transferred to MICU and started on Narcan drop, now requiring HFNC    Pain control: multimodal, d/c sedating medications  PT/OT: progressive mobility, OOBTC  DVT PPx: no chemical ppx, SCDs at all times when not ambulating  Abx: postop Ancef  Labs: Hg 9.7, BUN 44, Cr 3.4  Drain: continue     Dispo- continue ICU care per MICU               Norma Arias MD  Orthopedics  [facility]

## 2020-08-18 NOTE — PT/OT/SLP PROGRESS
Physical Therapy      Patient Name:  Edwin Powell   MRN:  [id number]    Patient not seen today secondary to (Pt not seen new orders needed 2nd to transfer to ICU.). Will follow-up with new orders when pt medically stable. .    Fouzia Delgado, PT   8/18/2020

## 2020-08-18 NOTE — ASSESSMENT & PLAN NOTE
--holding home lyrica in setting of encephalopathy requiring narcan gtt  --when resumed, will need to be renally dosed

## 2020-08-18 NOTE — NURSING
Periods of apnea noted. Puplis fixed, 2mm, non- reactive to light. Rapid response nurse paged. md on call paged. Awake and alert x 3 to voice but periods of apnea noted. o2 sat 96% per 2L o2. Narcan given per md order. No urine output noted. 0ml noted on bladder scan. At bedside. Will continue to monitor.

## 2020-08-18 NOTE — PLAN OF CARE
Problem: Occupational Therapy Goal  Goal: Occupational Therapy Goal  Description: Goals to be met by: 9/1/20     Patient will increase functional independence with ADLs by performing:    UE Dressing with Granite.  LE Dressing with Modified Granite.  Grooming while standing at sink with Modified Granite.  Toileting from toilet with Modified Granite for hygiene and clothing management.   Bathing from  shower chair/bench with Modified Granite.  Toilet transfer to toilet with Modified Granite.  Increased functional strength to WFL for B UE.  Upper extremity exercise program x15 reps per handout, with assistance as needed.    Outcome: Ongoing, Progressing

## 2020-08-18 NOTE — PT/OT/SLP EVAL
Occupational Therapy   Evaluation    Name: Edwin Powell  MRN: [id number]  Admitting Diagnosis:  Cervical stenosis of spine 1 Day Post-Op    Recommendations:     Discharge Recommendations: rehabilitation facility  Discharge Equipment Recommendations:  bedside commode  Barriers to discharge:  None    Assessment:     Edwin Powell is a 75 y.o. male with a medical diagnosis of Cervical stenosis of spine.  He was able to perform sit/stand T/F c mod A and pt has a tendency to lean posteriorly.  Able to perform UB dressing c total assistance.  B UE are WFL and pt has c/o numbness in B hands.  Has 3+/5  strength. Able to take 3-4 steps c mod A.  Pt is progressing well. Performance deficits affecting function: weakness, impaired endurance, impaired self care skills, impaired functional mobilty, impaired sensation, impaired balance, decreased upper extremity function, decreased ROM, impaired coordination, impaired fine motor, orthopedic precautions.      Rehab Prognosis: Good; patient would benefit from acute skilled OT services to address these deficits and reach maximum level of function.       Plan:     Patient to be seen 5 x/week to address the above listed problems via self-care/home management, therapeutic activities, therapeutic exercises  · Plan of Care Expires: 09/01/20  · Plan of Care Reviewed with: patient, son    Subjective     Chief Complaint: Pt is s/p ACDF  Patient/Family Comments/goals: To get better.    Occupational Profile:  Living Environment: Pt lives in a one story house c one step to porch and then a threshold into the house.  Has a tub/shower combo.  Previous level of function: I PTA  Equipment Used at Home:  walker, rolling, glucometer, shower chair  Assistance upon Discharge: Son who will be able to stay c pt at all times.    Pain/Comfort:  · Pain Rating 1: 5/10    Patients cultural, spiritual, Mandaen conflicts given the current situation: no    Objective:     Communicated with: RN prior to  session.  Patient found up in chair with blood pressure cuff, BIPAP, lynn catheter, oxygen, peripheral IV, pulse ox (continuous), telemetry upon OT entry to room.    General Precautions: Standard, fall   Orthopedic Precautions:spinal precautions   Braces: N/A     Occupational Performance:    Functional Mobility/Transfers:  · Patient completed Sit <> Stand Transfer with moderate assistance and of 2 persons  with  hand-held assist   · Functional Mobility: Pt was able to take 3-4 steps c mod A.  Pt has a tendency to lean posteriorly.    Activities of Daily Living:  · Upper Body Dressing: total assistance to don hospital gown.    Cognitive/Visual Perceptual:  Cognitive/Psychosocial Skills:     -       Oriented to: Person, Place and Situation   -       Follows Commands/attention:Follows multistep  commands  -       Communication: clear/fluent  -       Memory: Impaired STM  -       Safety awareness/insight to disability: impaired   -       Mood/Affect/Coping skills/emotional control: Appropriate to situation    Physical Exam:  Upper Extremity Range of Motion:     -       Right Upper Extremity: WFL  -       Left Upper Extremity: WFL  Upper Extremity Strength:    -       Right Upper Extremity: WFL  -       Left Upper Extremity: WFL   Strength:    -       Right Upper Extremity: 3+/5  -       Left Upper Extremity: 3+/5   Pt has c/o numbness and tingling in B fingertips.    AMPAC 6 Click ADL:  AMPAC Total Score: 14  Education:    Patient left up in chair with all lines intact, call button in reach and RN notified    GOALS:   Multidisciplinary Problems     Occupational Therapy Goals        Problem: Occupational Therapy Goal    Goal Priority Disciplines Outcome Interventions   Occupational Therapy Goal     OT, PT/OT Ongoing, Progressing    Description: Goals to be met by: 9/1/20     Patient will increase functional independence with ADLs by performing:    UE Dressing with Clayton.  LE Dressing with Modified  Donald.  Grooming while standing at sink with Modified Donald.  Toileting from toilet with Modified Donald for hygiene and clothing management.   Bathing from  shower chair/bench with Modified Donald.  Toilet transfer to toilet with Modified Donald.  Increased functional strength to WFL for B UE.  Upper extremity exercise program x15 reps per handout, with assistance as needed.                     History:     Past Medical History:   Diagnosis Date    Acquired hypothyroidism 5/26/2017    Benign essential HTN 5/26/2017    Benign prostatic hyperplasia (BPH) with urinary urge incontinence 6/6/2017    Chronic low back pain 6/1/2017    Coronary artery disease involving native coronary artery of native heart without angina pectoris 5/26/2017    S/p 5 stents - last one around 2010-11.    Gastroesophageal reflux disease without esophagitis 5/26/2017    History of CVA (cerebrovascular accident) 5/26/2017    Hypertension associated with diabetes 5/26/2017    BP controlled on ACE, CCB    Lumbar disc lesion 5/26/2017    Metastatic renal cell carcinoma to bone 6/6/2017    Metastatic renal cell carcinoma to liver 6/6/2017    Liver Biopsy 5-2017: METASTATIC RENAL CELL CARCINOMA.    Mixed hyperlipidemia 5/26/2017    Type 2 diabetes mellitus with stage 3 chronic kidney disease, with long-term current use of insulin 5/26/2017       Past Surgical History:   Procedure Laterality Date    ABDOMINAL SURGERY      APPENDECTOMY      BACK SURGERY      CARDIAC SURGERY      CATARACT EXTRACTION      CHOLECYSTECTOMY      CORONARY STENT PLACEMENT      5 stents    coronary stenting      X 5 last one in 2010-11.    INTRAOCULAR PROSTHESES INSERTION Right 6/27/2019    Procedure: INSERTION, IOL PROSTHESIS;  Surgeon: Chary Culp MD;  Location: [facility] OR [address] [place] [address];  Service: Ophthalmology;  Laterality: Right;    INTRAOCULAR PROSTHESES INSERTION Left 8/22/2019    Procedure: INSERTION, IOL PROSTHESIS;   Surgeon: Chary Culp MD;  Location: [address];  Service: Ophthalmology;  Laterality: Left;    PHACOEMULSIFICATION OF CATARACT Right 6/27/2019    Procedure: PHACOEMULSIFICATION, CATARACT;  Surgeon: Chary Culp MD;  Location: [facility] OR [address] [place] [address];  Service: Ophthalmology;  Laterality: Right;    PHACOEMULSIFICATION OF CATARACT Left 8/22/2019    Procedure: PHACOEMULSIFICATION, CATARACT;  Surgeon: Chary Culp MD;  Location: [address];  Service: Ophthalmology;  Laterality: Left;    SPINAL FUSION         Time Tracking:     OT Date of Treatment: 08/18/20  OT Start Time: 0924  OT Stop Time: 0940  OT Total Time (min): 16 min    Billable Minutes:Evaluation 8  Self Care/Home Management 8    PAM Augustin  8/18/2020

## 2020-08-18 NOTE — CARE UPDATE
Rapid called for periods of apnea. Patient would be oriented when conversing, but would fall asleep within seconds when left alone. Sats were dropping below 90s, but return to normal when awaken to verbal stimuli. BP normotensive, and not tachycardic. Meiosis on ocular exam. Due to several rounds of monitoring patient spending >20secs apnic, narcan was administered and he returned to baseline. Sating 98% on 2L NC. He was more alert and talking without apnic events and with good chest rise. ABG consistent with metabolic acidosis. Bladder scan without any urine. CBC, CMP, Mg/Ph ordered. Will f/u labs and plan to consult medicine depending on results. PRN narcan ordered (call MD before giving), continuous pulse ox, and will readjust pain med regimen.

## 2020-08-18 NOTE — PROGRESS NOTES
JODI Gould notified of pt satting 84-88% on 15L HFNC.  RRT called to NT suction pt, orders for CPT placed.  Pt now satting 90-92% on 15L HFNC.  Will continue to monitor closely.

## 2020-08-18 NOTE — NURSING
"Transported to ICU bed 10085 x 3 Rapid response RN's. Pt awake alert to verbal stimuli, responding and stated "ok."bipap on, continuous telemetry and continuous pulse ox on.   "

## 2020-08-18 NOTE — ASSESSMENT & PLAN NOTE
Concern for PE as he suddenly required significantly more O2 overnight on 8/17-18. Risk factors include recent surgery and metastatic RCC.     - CTA chest deferred due to VERENA  - Bilat LE U/S negative for DVT  - TTE negative for RV strain with normal PA pressure. Showed chronic HFpEF  - Troponin elevated, BNP elevated (no baseline)  - Started heparin gtt empirically for PE. OK'd by ortho to start A/C so soon after surgery due to rapidly worsening hypoxia  - Wean O2 to goal sat > 90%  - Will repeat CXR tomorrow and may require restarting diuresis

## 2020-08-18 NOTE — ASSESSMENT & PLAN NOTE
--baseline creatinine ~ 2; now 3.3  -- ? Etiology. Recent Nephro note states pt has had decreased po intake & diarrhea recently - ? Pre-renal. Pt net + 1.4L since admission and has been having maintenance IVF, so likely volume resuscitated at this point  --holding home lasix & lisinopril  --renal U/S unremarkable

## 2020-08-18 NOTE — NURSING
Pt o22 sat dropping to 84% per 2L , back up to 96 % with verbal stimulation. Spoke with dr Key received vo to increase o2 to 6L/MIN. At bedside. Respiratory notified.

## 2020-08-18 NOTE — ASSESSMENT & PLAN NOTE
--home asa on hold in setting of recent spinal surgery; will defer to ortho on when it is safe to resume

## 2020-08-18 NOTE — ASSESSMENT & PLAN NOTE
--2/2 rta in setting of ozzy on ckd vs recent diarrhea (per recent nephro clinic note)  --UAG positive (38) suggestive of rta  --continue bicarb gtt

## 2020-08-18 NOTE — PROGRESS NOTES
[facility]  Critical Care Medicine  Progress Note    Patient Name: Edwin Powell  MRN: [id number]  Admission Date: 8/17/2020  Hospital Length of Stay: 1 days  Code Status: Full Code  Attending Provider: Mandeep Byrd MD  Primary Care Provider: Lulú Lewis MD   Principal Problem: Cervical stenosis of spine    Subjective:     HPI:  Patient is a 75 y.o. 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 Patient underwent ACDF of C6/7 with Ortho & ENT on 8/17. During the procedure he received rocuronium, ketamine, fentanyl, propofol and midazolam. He required a renae infusion during the procedure. Post-operatively the patient was admitted to post-op ortho unit and was placed on his home 2L O2. After his procedure he received multiple doses of oxycodone for is neck pain. Overnight on 8/17-8/18 the patient had multiple periods of apnea requiring 3 doses of narcan on the floor with minimal improvement in respiratory and mental status. ABG showed combined metabolic and respiratory acidosis. No significant improvement in acidosis with initiation of bipap on the floor and patient continued to have apneic periods and worsening hypoxia requiring 15L NC. He was transferred to ICU for further management.     Hospital/ICU Course:  Patient was transferred to MICU and started on narcan and bicarbonate infusions. Hypoxia worsened requiring up to 30L comfort flow. Mental status improved and narcan gtt discontinued on 8/18 in AM. PE suspected however CTA not feasible due to renal function. TTE did not show RV strain and bilateral lower extremity U/S negative for DVT, however because of quickly worsening O2 requirements and elevated troponin, he was empirically started on a heparin  gtt.    Interval History/Significant Events: Patient more arousable today compared to yesterday night, however still somewhat sleepy and needs to be encouraged to participate in interview and physical exam. Narcan discontinued. Heparin gtt started. Bicarb gtt continued. Required up to 30L comfort flow, now down to 25L.    Review of Systems   Constitutional: Positive for fatigue. Negative for fever.   HENT: Negative for congestion, ear pain, sinus pain and sore throat.    Eyes: Negative for visual disturbance.   Respiratory: Positive for shortness of breath. Negative for cough.    Cardiovascular: Negative for chest pain and leg swelling.   Gastrointestinal: Negative for abdominal pain, constipation, diarrhea, nausea and vomiting.   Genitourinary: Negative for decreased urine volume, difficulty urinating and dysuria.   Musculoskeletal: Positive for back pain and neck pain.   Skin: Negative for rash.   Neurological: Negative for dizziness, syncope and headaches.   Psychiatric/Behavioral: Negative for confusion and decreased concentration.     Objective:     Vital Signs (Most Recent):  Temp: 99.2 °F (37.3 °C) (08/18/20 1500)  Pulse: 95 (08/18/20 1810)  Resp: (!) 23 (08/18/20 1810)  BP: (!) 152/112 (08/18/20 1800)  SpO2: (!) 93 % (08/18/20 1810) Vital Signs (24h Range):  Temp:  [96.1 °F (35.6 °C)-99.2 °F (37.3 °C)] 99.2 °F (37.3 °C)  Pulse:  [] 95  Resp:  [7-39] 23  SpO2:  [84 %-100 %] 93 %  BP: (112-188)/() 152/112   Weight: 79.8 kg (176 lb)  Body mass index is 27.57 kg/m².      Intake/Output Summary (Last 24 hours) at 8/18/2020 1818  Last data filed at 8/18/2020 1700  Gross per 24 hour   Intake 1625 ml   Output 1499.5 ml   Net 125.5 ml       Physical Exam  Vitals signs and nursing note reviewed.   Constitutional:       General: He is not in acute distress.     Comments: Seen sitting reclined in bedside chair   HENT:      Head: Normocephalic and atraumatic.      Right Ear: External ear normal.      Left Ear:  External ear normal.      Nose: Nose normal.      Mouth/Throat:      Mouth: Mucous membranes are moist.      Pharynx: Oropharynx is clear.   Eyes:      Conjunctiva/sclera: Conjunctivae normal.   Neck:      Comments: L side JIN drain present with serosanguinous drainage  Cardiovascular:      Rate and Rhythm: Normal rate and regular rhythm.      Pulses: Normal pulses.      Heart sounds: Normal heart sounds. No murmur.   Pulmonary:      Effort: Pulmonary effort is normal.      Breath sounds: Normal breath sounds.      Comments: On comfort flow  Abdominal:      General: Abdomen is flat. Bowel sounds are normal. There is no distension.      Palpations: Abdomen is soft.      Tenderness: There is no abdominal tenderness.   Genitourinary:     Penis: Normal.    Musculoskeletal: Normal range of motion.         General: No swelling or tenderness.      Right lower leg: No edema.      Left lower leg: No edema.   Skin:     General: Skin is warm and dry.      Capillary Refill: Capillary refill takes less than 2 seconds.   Neurological:      Mental Status: He is oriented to person, place, and time.      GCS: GCS eye subscore is 4. GCS verbal subscore is 5. GCS motor subscore is 6.      Motor: Motor function is intact.      Comments: Upper and lower extremity muscle strength 4-5/5 when repeatedly encouraged.          Vents:  Oxygen Concentration (%): 56 (08/18/20 1810)  Lines/Drains/Airways     Drain                 Closed/Suction Drain 08/17/20 Anterior Neck Bulb 10 Fr. 1 day    Male External Urinary Catheter 08/18/20 0500 Small less than 1 day          Peripheral Intravenous Line                 Peripheral IV - Single Lumen 01/24/20 1447 22 G Anterior;Right Hand 207 days         Peripheral IV - Single Lumen 05/19/20 0924 22 G Right Hand 91 days         Peripheral IV - Single Lumen 08/17/20 0615 18 G Left Forearm 1 day         Peripheral IV - Single Lumen 08/17/20 0715 18 G Right Forearm 1 day         Peripheral IV - Single Lumen  08/18/20 0500 20 G Anterior;Left;Proximal Forearm less than 1 day              Significant Labs:    CBC/Anemia Profile:  Recent Labs   Lab 08/18/20 0343 08/18/20 0438 08/18/20  1514   WBC 9.36 11.41 3.04*   HGB 9.3* 9.7* 10.4*   HCT 31.5* 31.5* 32.8*    332 261   * 107* 105*   RDW 14.8* 15.1* 15.2*        Chemistries:  Recent Labs   Lab 08/18/20 0051 08/18/20 0342 08/18/20 0438 08/18/20  1611    142 140 142   K 4.8 4.8 5.0 4.1   * 117* 115* 114*   CO2 14* 15* 15* 18*   BUN 43* 42* 44* 41*   CREATININE 3.3* 3.3* 3.4* 3.5*   CALCIUM 8.4* 8.2* 8.3* 8.0*   ALBUMIN 2.9*  --  2.8*  --    PROT 6.7  --  6.6  --    BILITOT 0.2  --  0.2  --    ALKPHOS 105  --  100  --    ALT 25  --  19  --    AST 37  --  36  --    MG 1.7  --  1.7  --    PHOS 6.4*  --  6.7*  --        ABGs:   Recent Labs   Lab 08/18/20  1620   PH 7.223*   PCO2 41.8   HCO3 17.2*   POCSATURATED 83*   BE -10     CMP:   Recent Labs   Lab 08/18/20 0051 08/18/20 0342 08/18/20 0438 08/18/20  1611    142 140 142   K 4.8 4.8 5.0 4.1   * 117* 115* 114*   CO2 14* 15* 15* 18*   * 105 106 151*   BUN 43* 42* 44* 41*   CREATININE 3.3* 3.3* 3.4* 3.5*   CALCIUM 8.4* 8.2* 8.3* 8.0*   PROT 6.7  --  6.6  --    ALBUMIN 2.9*  --  2.8*  --    BILITOT 0.2  --  0.2  --    ALKPHOS 105  --  100  --    AST 37  --  36  --    ALT 25  --  19  --    ANIONGAP 12 10 10 10   EGFRNONAA 17.3* 17.3* 16.7* 16.1*     All pertinent labs within the past 24 hours have been reviewed.    Significant Imaging:  I have reviewed all pertinent imaging results/findings within the past 24 hours.      ABG  Recent Labs   Lab 08/18/20  1620   PH 7.223*   PO2 56   PCO2 41.8   HCO3 17.2*   BE -10     Assessment/Plan:     Neuro  * Cervical stenosis of spine  --s/p ACDF on 8/17 with Ortho and ENT; management per ortho    Acute metabolic encephalopathy  --suspect medication induced insetting of decreased renal clearance as pt responded well to narcan. 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 Risk factors include recent surgery and metastatic RCC.     - CTA chest deferred due to VERENA  - Bilat LE U/S negative for DVT  - TTE negative for RV strain with normal PA pressure. Showed chronic HFpEF  - Troponin elevated, BNP elevated (no baseline)  - Started heparin gtt empirically for PE. OK'd by ortho to start A/C so soon after surgery due to rapidly worsening hypoxia  - Wean O2 to goal sat > 90%  - Will repeat CXR tomorrow and may require restarting diuresis    Cardiac/Vascular  Coronary artery disease involving native coronary artery of native heart with angina pectoris  --home asa on hold in setting of recent spinal surgery; will defer to ortho on when it is safe to resume    Renal/  Metabolic acidosis, normal anion gap (NAG)  --2/2 rta in setting of verena on ckd vs recent diarrhea (per recent nephro clinic note)  --UAG positive (38) suggestive of rta  --continue bicarb gtt    Acute kidney injury superimposed on chronic kidney disease  --baseline creatinine ~ 2; now 3.3  -- ? Etiology. Recent Nephro note states pt has had decreased po intake & diarrhea recently - ? Pre-renal. Pt net + 1.4L since admission and has been having maintenance IVF, so likely volume resuscitated at this point  --holding home lasix & lisinopril  --renal U/S unremarkable    Benign prostatic hyperplasia (BPH) with urinary urge incontinence  --continue home flomax    Endocrine  Type 2 diabetes mellitus with stage 3 chronic kidney disease, without long-term current use of insulin  --per clinic notes, insulin dependent  --home regimen lantus 5 units  daily   --start lantus 2 units qhs & low dose correctional ssi; titrate prn    Acquired hypothyroidism  --continue home levothyroxine  --TSH elevated however fT4 WNL       Critical Care Daily Checklist:    A: Awake: RASS Goal/Actual Goal: RASS Goal: 0-->alert and calm  Actual: [place] Agitation Sedation Scale (RASS): Alert and calm   B: Spontaneous Breathing Trial Performed?  N/A   C: SAT & SBT Coordinated?  N/A                     D: Delirium: CAM-ICU Overall CAM-ICU: Negative   E: Early Mobility Performed? Yes   F: Feeding Goal:    Status:     Current Diet Order   Procedures    Diet diabetic [facility]; 2000 Calorie     Order Specific Question:   Indicate patient location for additional diet options:     Answer:   [facility]     Order Specific Question:   Total calories:     Answer:   2000 Calorie      AS: Analgesia/Sedation None   T: Thromboembolic Prophylaxis Heparin gtt   H: HOB > 300 Yes   U: Stress Ulcer Prophylaxis (if needed) N/A   G: Glucose Control LDISS   B: Bowel Function Stool Occurrence: 1   I: Indwelling Catheter (Lines & Ahumada) Necessity PIV, neck JIN drain, condom catheter   D: De-escalation of Antimicrobials/Pharmacotherapies Discontinued narcan gtt    Plan for the day/ETD Start heparin gtt, wean O2 requirements    Code Status:  Family/Goals of Care: Full Code         Critical secondary to Patient has a condition that poses threat to life and bodily function: Severe Respiratory Distress      Critical care was time spent personally by me on the following activities: development of treatment plan with patient or surrogate and bedside caregivers, discussions with consultants, evaluation of patient's response to treatment, examination of patient, ordering and performing treatments and interventions, ordering and review of laboratory studies, ordering and review of radiographic studies, pulse oximetry, re-evaluation of patient's condition. This critical care time did not overlap with that  of any other provider or involve time for any procedures.     Curt Bryan DO  Critical Care Medicine  [facility]

## 2020-08-18 NOTE — ASSESSMENT & PLAN NOTE
--suspect medication induced insetting of decreased renal clearance as pt responded well to narcan. Started on narcan gtt and discontinued on 8/18 once mental status improved  --hold sedating medications

## 2020-08-18 NOTE — PROGRESS NOTES
Pt transferred from [facility] by RR Team.  Pt on BIPAP.  Pt responds to voice.  JODI Gould at bedside.  Pt connected to cardiac monitor in SICU [id number].  VSS at this time, will implement new orders and continue to monitor.

## 2020-08-18 NOTE — HOSPITAL COURSE
Patient was transferred to MICU and started on narcan and bicarbonate infusions. Hypoxia worsened requiring up to 30L comfort flow. Mental status improved and narcan gtt discontinued on 8/18 in AM. PE suspected however CTA not feasible due to renal function. Bilateral lower extremity U/S negative for DVT, however because of quickly worsening O2 requirements and elevated troponin, he was empirically started on a heparin gtt for possible PE. TTE did not show RV strain but did show focal wall motion abnormalities, and troponin continued to uptrend so patient was loaded with DAPT for NSTEMI. CVP 3 on TTE however pulmonary edema on CXR. Restarted diuresis intravenously.    8/20/2020: Patient no longer requiring CPAP (was on 35L/40%). O2Sat 97% with 2L NC. Refers feeling much better than yesterday.    8/21/2020: NAEON. Patient on 3L NC, Sat 98%. Refers feeling well. Denied fevers, chills, chest pain, SOB, palpitations.

## 2020-08-18 NOTE — NURSING
"      SICU PLAN OF CARE NOTE    Dx: Cervical stenosis of spine s/p c spine fusion due to severe stenosis at C6/7 with myopathy. Patient underwent ACDF of C6/7    Shift Events: Increased O2 requirements. Echo EF 50-55%. Doppler studies ordered and pending. Heparin gtt started for question of possible PE.    Goals of Care: Wean O2 as tolerated. Encourage pulmonary toilet. OOB to chair in AM. Heparin gtt per High intensity AntiXa nomogram.     Neuro: AAO x4, Follows Commands and Moves All Extremities DROWSY    Vital Signs: BP (!) 163/71 (BP Location: Right arm, Patient Position: Lying)   Pulse 92   Temp 99.2 °F (37.3 °C) (Oral)   Resp (!) 26   Ht 5' 7" (1.702 m)   Wt 79.8 kg (176 lb)   SpO2 95%   BMI 27.57 kg/m²     Respiratory: Comfort Eder 25 L 71%    Diet: Diabetic Diet. Aspiration Precautions due to drowsiness and O2 requirements    Gtts: Heparin and MIVF (BiCarb)    Urine Output: Urinary Catheter 500 cc/shift; Condom cath    Drains: JIN Drain, total output 15 cc / shift    Labs/Accuchecks: AntiXa q6h. Accuchecks q ac/hs    Skin: buttocks red and blanchable, barrier applied. No open skin. Waffle inflated. Heel boots applied. Foam dsg to b heels and sacrum. Turn and reposition q2h       "

## 2020-08-18 NOTE — H&P
[facility]  Critical Care Medicine  History & Physical    Patient Name: Edwin Powell  MRN: [id number]  Admission Date: 8/17/2020  Hospital Length of Stay: 1 days  Code Status: Full Code  Attending Physician: Mandeep Byrd MD   Primary Care Provider: Lulú Lewis MD   Principal Problem: Cervical stenosis of spine    Subjective:     HPI:  Patient is a 75 y.o. male with significant past medical history of c spine stenosis, metastatic renal cell carcinoma (liver and L3 mets), CKD, cad, hypothyroidism, Dm II (insulin dependent), HLD, BPH, HTN and gerd presented to hospital on 8/18 for scheduled c spine fusion due to severe stenosis at C6/7 with myopathy. Patient underwent ACDF of C6/7 with Ortho & ENT on 8/18. Post-operatively the patient was admitted to post-op ortho unit. Overnight patient had multiple periods of apnea requiring several doses of narcan on the floor. ABG showed combined metabolic and respiratory acidosis. No significant improvement in acidosis with initiation of bipap on the floor and patient continued to have apneic periods and was transferred to ICU for further management.         Hospital/ICU Course:  No notes on file     Past Medical History:   Diagnosis Date    Acquired hypothyroidism 5/26/2017    Benign essential HTN 5/26/2017    Benign prostatic hyperplasia (BPH) with urinary urge incontinence 6/6/2017    Chronic low back pain 6/1/2017    Coronary artery disease involving native coronary artery of native heart without angina pectoris 5/26/2017    S/p 5 stents - last one around 2010-11.    Gastroesophageal reflux disease without esophagitis 5/26/2017    History of CVA (cerebrovascular accident) 5/26/2017    Hypertension associated with diabetes 5/26/2017    BP controlled on ACE, CCB    Lumbar disc lesion 5/26/2017    Metastatic renal cell carcinoma to bone 6/6/2017    Metastatic renal cell carcinoma to liver 6/6/2017    Liver Biopsy 5-2017: METASTATIC RENAL  CELL CARCINOMA.    Mixed hyperlipidemia 5/26/2017    Type 2 diabetes mellitus with stage 3 chronic kidney disease, with long-term current use of insulin 5/26/2017       Past Surgical History:   Procedure Laterality Date    ABDOMINAL SURGERY      APPENDECTOMY      BACK SURGERY      CARDIAC SURGERY      CATARACT EXTRACTION      CHOLECYSTECTOMY      CORONARY STENT PLACEMENT      5 stents    coronary stenting      X 5 last one in 2010-11.    INTRAOCULAR PROSTHESES INSERTION Right 6/27/2019    Procedure: INSERTION, IOL PROSTHESIS;  Surgeon: Chary Culp MD;  Location: [facility] OR [address] [place] [address];  Service: Ophthalmology;  Laterality: Right;    INTRAOCULAR PROSTHESES INSERTION Left 8/22/2019    Procedure: INSERTION, IOL PROSTHESIS;  Surgeon: Chary Culp MD;  Location: [facility] OR [address] [place] [address];  Service: Ophthalmology;  Laterality: Left;    PHACOEMULSIFICATION OF CATARACT Right 6/27/2019    Procedure: PHACOEMULSIFICATION, CATARACT;  Surgeon: Chary Culp MD;  Location: [facility] OR [address] [place] [address];  Service: Ophthalmology;  Laterality: Right;    PHACOEMULSIFICATION OF CATARACT Left 8/22/2019    Procedure: PHACOEMULSIFICATION, CATARACT;  Surgeon: Chary Culp MD;  Location: [facility] OR [address] [place] [address];  Service: Ophthalmology;  Laterality: Left;    SPINAL FUSION         Review of patient's allergies indicates:  No Known Allergies    Family History     Problem Relation (Age of Onset)    Diabetes Mother, Brother    Glaucoma Mother, Maternal Grandmother    Heart attack Mother    No Known Problems Son        Tobacco Use    Smoking status: Former Smoker     Types: Cigarettes    Smokeless tobacco: Never Used    Tobacco comment: haven't smoked in last 25 yrs   Substance and Sexual Activity    Alcohol use: Yes     Comment: rarely     Drug use: Not Currently    Sexual activity: Not Currently     Partners: Female      Review of Systems   Unable to perform ROS: Mental status change     Objective:     Vital Signs (Most Recent):  Temp: 98.6  °F (37 °C) (08/18/20 0445)  Pulse: 100 (08/18/20 0445)  Resp: (!) 31 (08/18/20 0445)  BP: (!) 166/102 (08/18/20 0445)  SpO2: 99 % (08/18/20 0445) Vital Signs (24h Range):  Temp:  [96.1 °F (35.6 °C)-98.7 °F (37.1 °C)] 98.6 °F (37 °C)  Pulse:  [] 100  Resp:  [7-31] 31  SpO2:  [84 %-99 %] 99 %  BP: (124-176)/() 166/102   Weight: 80.1 kg (176 lb 9.4 oz)  Body mass index is 27.66 kg/m².      Intake/Output Summary (Last 24 hours) at 8/18/2020 0515  Last data filed at 8/18/2020 0121  Gross per 24 hour   Intake 1572.5 ml   Output 79.5 ml   Net 1493 ml       Physical Exam  Vitals signs and nursing note reviewed.   Constitutional:       General: He is not in acute distress.     Appearance: He is ill-appearing.   HENT:      Head: Normocephalic and atraumatic.      Right Ear: External ear normal.      Left Ear: External ear normal.      Nose: Nose normal.      Mouth/Throat:      Mouth: Mucous membranes are moist.      Pharynx: Oropharynx is clear.   Eyes:      Conjunctiva/sclera: Conjunctivae normal.      Comments: Pinpoint pupils bilaterally   Neck:      Comments: L side JIN drain present with serosanguinous drainage  Cardiovascular:      Rate and Rhythm: Normal rate and regular rhythm.      Pulses: Normal pulses.      Heart sounds: Normal heart sounds. No murmur.   Pulmonary:      Effort: Bradypnea present.      Breath sounds: Normal breath sounds.      Comments: Multiple episodes of apnea lasting several seconds  Abdominal:      General: Abdomen is flat. Bowel sounds are normal. There is no distension.      Palpations: Abdomen is soft.      Tenderness: There is no abdominal tenderness.   Genitourinary:     Penis: Normal.    Musculoskeletal: Normal range of motion.         General: No swelling or tenderness.   Skin:     General: Skin is warm and dry.      Capillary Refill: Capillary refill takes less than 2 seconds.   Neurological:      GCS: GCS eye subscore is 3. GCS verbal subscore is 4. GCS motor subscore is 6.       Motor: Motor function is intact.   Psychiatric:         Cognition and Memory: Cognition is impaired.         Vents:  Oxygen Concentration (%): 35 (08/18/20 0445)  Lines/Drains/Airways     Drain                 Closed/Suction Drain 08/17/20 Anterior Neck Bulb 10 Fr. 1 day          Peripheral Intravenous Line                 Peripheral IV - Single Lumen 01/24/20 1447 22 G Anterior;Right Hand 206 days         Peripheral IV - Single Lumen 05/19/20 0924 22 G Right Hand 90 days         Peripheral IV - Single Lumen 08/17/20 0615 18 G Left Forearm less than 1 day         Peripheral IV - Single Lumen 08/17/20 0715 18 G Right Forearm less than 1 day              Significant Labs:    CBC/Anemia Profile:  Recent Labs   Lab 08/18/20  0051 08/18/20  0343   WBC 11.22 9.36   HGB 10.1* 9.3*   HCT 33.4* 31.5*    314   * 109*   RDW 14.9* 14.8*        Chemistries:  Recent Labs   Lab 08/18/20  0051      K 4.8   *   CO2 14*   BUN 43*   CREATININE 3.3*   CALCIUM 8.4*   ALBUMIN 2.9*   PROT 6.7   BILITOT 0.2   ALKPHOS 105   ALT 25   AST 37   MG 1.7   PHOS 6.4*       All pertinent labs within the past 24 hours have been reviewed.    Significant Imaging: I have reviewed and interpreted all pertinent imaging results/findings within the past 24 hours.    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  --holding home lyrica in setting of encephalopathy requiring narcan gtt  --when resumed, will need to be renally dosed    Cardiac/Vascular  Coronary artery disease involving native coronary artery of native heart with angina  pectoris  --home asa on hold in setting of recent spinal surgery; will defer to ortho on when it is safe to resume    Renal/  Metabolic acidosis, normal anion gap (NAG)  --2/2 rta in setting of ozzy on ckd vs recent diarrhea (per recent nephro clinic note)  --UAG positive (38) suggestive of rta  --start bicarb gtt    Acute kidney injury superimposed on chronic kidney disease  --baseline creatinine ~ 2; now 3.3  -- ? Etiology. Recent Nephro note states pt has had decreased po intake & diarrhea recently - ? Pre-renal. Pt net + 1.4L since admission and has been having maintenance IVF, so likely volume resuscitated at this point  --holding home lasix & lisinopril  --checking renal US    Benign prostatic hyperplasia (BPH) with urinary urge incontinence  --continue home flomax    Endocrine  Type 2 diabetes mellitus with stage 3 chronic kidney disease, without long-term current use of insulin  --per clinic notes, insulin dependant  --home regimen lantus 5 units daily   --start lantus 2 units qhs & low dose correctional ssi; titrate prn    Acquired hypothyroidism  --continue home levothyroxine  --checking TSH to r/o alternate etiologies of encephalopathy        Critical Care Daily Checklist:    A: Awake: RASS Goal/Actual Goal: RASS Goal: 0-->alert and calm  Actual:     B: Spontaneous Breathing Trial Performed?  n/a   C: SAT & SBT Coordinated?  n/a                      D: Delirium: CAM-ICU     E: Early Mobility Performed? Yes   F: Feeding Goal:    Status:     Current Diet Order   Procedures    Diet diabetic [facility]; 2000 Calorie     Order Specific Question:   Indicate patient location for additional diet options:     Answer:   [facility]     Order Specific Question:   Total calories:     Answer:   2000 Calorie      AS: Analgesia/Sedation tylenol   T: Thromboembolic Prophylaxis scds   H: HOB > 300 Yes   U: Stress Ulcer Prophylaxis (if needed)    G: Glucose Control Basal + ssi   B: Bowel Function Stool  Occurrence: 1   I: Indwelling Catheter (Lines & Ahumada) Necessity piv   D: De-escalation of Antimicrobials/Pharmacotherapies Roselia-op ancef     Plan for the day/ETD W/u encephalopathy & ozzy    Code Status:  Family/Goals of Care: Full Code  Pt's son Pramod updated via phone     Case discussed with [facility] Fellow Dr. Luis Antonio Groves.     Critical Care Time: 45 minutes  Critical secondary to Patient has a condition that poses threat to life and bodily function: metabolic encephalopathy requiring narcan gtt     Critical care was time spent personally by me on the following activities: development of treatment plan with patient or surrogate and bedside caregivers, discussions with consultants, evaluation of patient's response to treatment, examination of patient, ordering and performing treatments and interventions, ordering and review of laboratory studies, ordering and review of radiographic studies, pulse oximetry, re-evaluation of patient's condition. This critical care time did not overlap with that of any other provider or involve time for any procedures.     Aminta Gould NP  Critical Care Medicine  [facility]

## 2020-08-18 NOTE — HPI
Patient is a 75 y.o. 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 Patient underwent ACDF of C6/7 with Ortho & ENT on 8/17. He was restarted on ASA and Plavix after the procedure. Over the weekend he developed small volume hemoptysis; however, reports a large volume of hematemesis last night at 2am. Has coughed up less than 1oz of blood since last night. Hemoglobin downtrended slowly over past several days 8.6--> 7.3-->6.1. He has been HD stable and asymptomatic. No history of ETOH abuse or liver disease. Denies, melena, blood in stool, abd pain, chest pain, or sob.

## 2020-08-18 NOTE — PT/OT/SLP EVAL
Physical Therapy Evaluation    Patient Name:  Edwin Powell   MRN:  [id number]    Recommendations:     Discharge Recommendations:  rehabilitation facility   Discharge Equipment Recommendations: (will determine DME needs closer to discharge)   Barriers to discharge: Decreased caregiver support family may not be able to assist at current functional level.     Assessment:     Edwin Powell is a 75 y.o. male admitted with a medical diagnosis of Cervical stenosis of spine.  He presents with the following impairments/functional limitations:  weakness, gait instability, impaired balance, decreased lower extremity function, impaired functional mobilty, decreased coordination, impaired cognition, decreased safety awareness. pt rich treatment fair but was very lethargic during treatment. Pt will benefit from cont skilled PT 5x/wk to progress physically. Pt will need Inpt rehab when medically stable to maximize rehab potential. Pt is s/p ACDF C 6-7  8/17/20, then transfer to ICU 8/18 with decreased respiratory status.     Rehab Prognosis: Good; patient would benefit from acute skilled PT services to address these deficits and reach maximum level of function.    Recent Surgery: Procedure(s) (LRB):  DISCECTOMY, SPINE, CERVICAL, ANTERIOR APPROACH, WITH FUSION co case with Dr. Falcon C6/7 [facility] SNS:vocal cord/motors/SSEP Regular OR table Patient needs scope in preop (N/A)  DISSECTION, NECK (N/A) 1 Day Post-Op    Plan:     During this hospitalization, patient to be seen 5 x/week to address the identified rehab impairments via gait training, therapeutic activities, therapeutic exercises and progress toward the following goals:    · Plan of Care Expires:  09/17/20    Subjective     Chief Complaint: pt c/o pain during treatment. .   Patient/Family Comments/goals:  To get better and go home.   Pain/Comfort:  · Pain Rating 1: 5/10(B shoulder and back)  · Pain Rating Post-Intervention 1: 5/10    Patients cultural, spiritual, Mu-ism  conflicts given the current situation: no    Living Environment:  Pt is retired and lives with his son in 1 story [facility].   Prior to admission, patients level of function was Independent .  Equipment used at home: walker, rolling, cane, quad, shower chair.  DME owned (not currently used): none.  Upon discharge, patient will have assistance from son who is retired. .    Objective:     Communicated with nurse prior to session.  Patient found up in chair with telemetry, blood pressure cuff, pulse ox (continuous), oxygen, peripheral IV, JIN drain(O2 comfort flow)  upon PT entry to room.    General Precautions: Standard, fall   Orthopedic Precautions:    Braces:       Exams:  · Cognitive Exam:  Patient is oriented to Person, Place and Situation  · RLE ROM: WFL  · RLE Strength: WFL  · LLE ROM: WFL  · LLE Strength: WFL    Functional Mobility:  · Transfers:   Pt needed verbal cues for hand placement and sequencing for functional mobility.   ·   · Sit to Stand:  moderate assistance with hand-held assist  ·   · Gait: pt received gait training ~ 3 steps forward/backward with mod assist. pt had B shuffling gait pattern. Distance pt gait trained limited by comfort flow O2.     Therapeutic Activities and Exercises:   pt and son received verbal instruction in role of PT and POC. Both verbally expressed understanding of such.     AM-PAC 6 CLICK MOBILITY  Total Score:11     Patient left up in chair with all lines intact, call button in reach and son present.    GOALS:   Multidisciplinary Problems     Physical Therapy Goals        Problem: Physical Therapy Goal    Goal Priority Disciplines Outcome Goal Variances Interventions   Physical Therapy Goal     PT, PT/OT Ongoing, Progressing     Description: Goals to be met by: 20    Patient will increase functional independence with mobility by performin. Supine to sit with MInimal Assistance -not met  2. Sit to stand transfer with Contact Guard Assistance with AD if needed. -not  met  3. Bed to chair transfer with Minimal Assistance using Rolling Walker if needed. -not met  4. Gait  x 50 feet with Contact Guard Assistance using Rolling Walker. If needed. -not met  5. Lower extremity exercise program x20 reps with supervision-not met                     History:     Past Medical History:   Diagnosis Date    Acquired hypothyroidism 5/26/2017    Benign essential HTN 5/26/2017    Benign prostatic hyperplasia (BPH) with urinary urge incontinence 6/6/2017    Chronic low back pain 6/1/2017    Coronary artery disease involving native coronary artery of native heart without angina pectoris 5/26/2017    S/p 5 stents - last one around 2010-11.    Gastroesophageal reflux disease without esophagitis 5/26/2017    History of CVA (cerebrovascular accident) 5/26/2017    Hypertension associated with diabetes 5/26/2017    BP controlled on ACE, CCB    Lumbar disc lesion 5/26/2017    Metastatic renal cell carcinoma to bone 6/6/2017    Metastatic renal cell carcinoma to liver 6/6/2017    Liver Biopsy 5-2017: METASTATIC RENAL CELL CARCINOMA.    Mixed hyperlipidemia 5/26/2017    Type 2 diabetes mellitus with stage 3 chronic kidney disease, with long-term current use of insulin 5/26/2017       Past Surgical History:   Procedure Laterality Date    ABDOMINAL SURGERY      ANTERIOR CERVICAL DISCECTOMY W/ FUSION N/A 8/17/2020    Procedure: DISCECTOMY, SPINE, CERVICAL, ANTERIOR APPROACH, WITH FUSION co case with Dr. Falcon C6/7 [facility] SNS:vocal cord/motors/SSEP Regular OR table Patient needs scope in preop;  Surgeon: Filemon Aguayo MD;  Location: [facility] OR [address] [place] [address];  Service: Neurosurgery;  Laterality: N/A;    APPENDECTOMY      BACK SURGERY      CARDIAC SURGERY      CATARACT EXTRACTION      CHOLECYSTECTOMY      CORONARY STENT PLACEMENT      5 stents    coronary stenting      X 5 last one in 2010-11.    DISSECTION OF NECK N/A 8/17/2020    Procedure: DISSECTION, NECK;  Surgeon: Rush Falcon MD;   Location: [facility] OR 2ND FLR;  Service: ENT;  Laterality: N/A;    INTRAOCULAR PROSTHESES INSERTION Right 6/27/2019    Procedure: INSERTION, IOL PROSTHESIS;  Surgeon: Chary Culp MD;  [facility];  Service: Ophthalmology;  Laterality: Right;    INTRAOCULAR PROSTHESES INSERTION Left 8/22/2019    Procedure: INSERTION, IOL PROSTHESIS;  Surgeon: Chary Culp MD;  Location: [facility] OR [address] [place] [address];  Service: Ophthalmology;  Laterality: Left;    PHACOEMULSIFICATION OF CATARACT Right 6/27/2019    Procedure: PHACOEMULSIFICATION, CATARACT;  Surgeon: Chary Culp MD;  Location: [facility] OR [address] [place] [address];  Service: Ophthalmology;  Laterality: Right;    PHACOEMULSIFICATION OF CATARACT Left 8/22/2019    Procedure: PHACOEMULSIFICATION, CATARACT;  Surgeon: Chary Culp MD;  Location: [facility] OR [address] [place] [address];  Service: Ophthalmology;  Laterality: Left;    SPINAL FUSION         Time Tracking:     PT Received On: 08/18/20  PT Start Time: 0927     PT Stop Time: 0940  PT Total Time (min): 13 min     Billable Minutes: Evaluation 13 min      Fouzia Delgado, PT  08/18/2020

## 2020-08-18 NOTE — ASSESSMENT & PLAN NOTE
--baseline creatinine ~ 2; now 3.3  -- ? Etiology. Recent Nephro note states pt has had decreased po intake & diarrhea recently - ? Pre-renal. Pt net + 1.4L since admission and has been having maintenance IVF, so likely volume resuscitated at this point  --holding home lasix & lisinopril  --checking renal US

## 2020-08-18 NOTE — CARE UPDATE
Rapid Response Nurse Follow-up Note     Followed up with patient for proactive rounding.   Pt RR back to 6. Pt drowsy, wakes up to voice but doesn't stay awake and nodds appropriately  Aminta Gould, NP with Critical Care notified of decreased RR and periods of apnea. .4 Narcan given x2, BiPAP placed with Repeat ABG in 1 hour. Repeat ABG shows little to no improvement, CO2 52.7, bicarb 16.5, PH 7.104. New orders to transfer pt to ICU to room 28660. Reviewed plan of care with Bedside RNFrance. Please call Rapid Response RN, Chastity Mandujano RN with any questions or concerns at [phone].

## 2020-08-18 NOTE — NURSING
Pt continues with periods of apnea. r 7 and irregular. Dr Key at bedside. Stat cxr ordered. Labs drawn.awake and alert to voice. Nurse at bedside.

## 2020-08-18 NOTE — ASSESSMENT & PLAN NOTE
--per clinic notes, insulin dependent  --home regimen lantus 5 units daily   --start lantus 2 units qhs & low dose correctional ssi; titrate prn

## 2020-08-18 NOTE — ASSESSMENT & PLAN NOTE
--suspect medication induced insetting of decreased renal clearance as pt responds well to narcan  --hold sedating medications  --start narcan gtt  --checking tsh and ammonia to r/o alternate etiologies given h/o hypothyroidism and liver mets

## 2020-08-18 NOTE — ASSESSMENT & PLAN NOTE
--2/2 rta in setting of ozzy on ckd vs recent diarrhea (per recent nephro clinic note)  --UAG positive (38) suggestive of rta  --start bicarb gtt

## 2020-08-18 NOTE — CONSULTS
"Hospital Medicine consulted for "CAD s/p stents 2010, CKD 3, T2DM having apnic events s/p 2 rounds of narcan without resolution. CXR with atelectasis. ABG with metabolic acidosis and no urine on bladder scan" by orthopedics. Patient subsequently admitted to MICU for narcan infusion.     Will sign off at this time. Please re-consult if needed. Thank you.     ALEXIS Abel MD  PGY-3 Internal Medicine  [facility]     "

## 2020-08-18 NOTE — PLAN OF CARE
Problem: Physical Therapy Goal  Goal: Physical Therapy Goal  Description: Goals to be met by: 20    Patient will increase functional independence with mobility by performin. Supine to sit with MInimal Assistance -not met  2. Sit to stand transfer with Contact Guard Assistance with AD if needed. -not met  3. Bed to chair transfer with Minimal Assistance using Rolling Walker if needed. -not met  4. Gait  x 50 feet with Contact Guard Assistance using Rolling Walker. If needed. -not met  5. Lower extremity exercise program x20 reps with supervision-not met    Outcome: Ongoing, Progressing   Evaluation completed and goals appropriate. Fouzia Delgado, PT  2020

## 2020-08-18 NOTE — SUBJECTIVE & OBJECTIVE
Past Medical History:   Diagnosis Date    Acquired hypothyroidism 5/26/2017    Benign essential HTN 5/26/2017    Benign prostatic hyperplasia (BPH) with urinary urge incontinence 6/6/2017    Chronic low back pain 6/1/2017    Coronary artery disease involving native coronary artery of native heart without angina pectoris 5/26/2017    S/p 5 stents - last one around 2010-11.    Gastroesophageal reflux disease without esophagitis 5/26/2017    History of CVA (cerebrovascular accident) 5/26/2017    Hypertension associated with diabetes 5/26/2017    BP controlled on ACE, CCB    Lumbar disc lesion 5/26/2017    Metastatic renal cell carcinoma to bone 6/6/2017    Metastatic renal cell carcinoma to liver 6/6/2017    Liver Biopsy 5-2017: METASTATIC RENAL CELL CARCINOMA.    Mixed hyperlipidemia 5/26/2017    Type 2 diabetes mellitus with stage 3 chronic kidney disease, with long-term current use of insulin 5/26/2017       Past Surgical History:   Procedure Laterality Date    ABDOMINAL SURGERY      APPENDECTOMY      BACK SURGERY      CARDIAC SURGERY      CATARACT EXTRACTION      CHOLECYSTECTOMY      CORONARY STENT PLACEMENT      5 stents    coronary stenting      X 5 last one in 2010-11.    INTRAOCULAR PROSTHESES INSERTION Right 6/27/2019    Procedure: INSERTION, IOL PROSTHESIS;  Surgeon: Chary Culp MD;  Location: [address];  Service: Ophthalmology;  Laterality: Right;    INTRAOCULAR PROSTHESES INSERTION Left 8/22/2019    Procedure: INSERTION, IOL PROSTHESIS;  Surgeon: Chary Culp MD;  Location: [address];  Service: Ophthalmology;  Laterality: Left;    PHACOEMULSIFICATION OF CATARACT Right 6/27/2019    Procedure: PHACOEMULSIFICATION, CATARACT;  Surgeon: Chary Culp MD;  Location: [address];  Service: Ophthalmology;  Laterality: Right;    PHACOEMULSIFICATION OF CATARACT Left 8/22/2019    Procedure: PHACOEMULSIFICATION, CATARACT;  Surgeon: Chary Culp MD;  Location:  [facility] OR 1ST FLR;  Service: Ophthalmology;  Laterality: Left;    SPINAL FUSION         Review of patient's allergies indicates:  No Known Allergies    Family History     Problem Relation (Age of Onset)    Diabetes Mother, Brother    Glaucoma Mother, Maternal Grandmother    Heart attack Mother    No Known Problems Son        Tobacco Use    Smoking status: Former Smoker     Types: Cigarettes    Smokeless tobacco: Never Used    Tobacco comment: haven't smoked in last 25 yrs   Substance and Sexual Activity    Alcohol use: Yes     Comment: rarely     Drug use: Not Currently    Sexual activity: Not Currently     Partners: Female      Review of Systems   Unable to perform ROS: Mental status change     Objective:     Vital Signs (Most Recent):  Temp: 98.6 °F (37 °C) (08/18/20 0445)  Pulse: 100 (08/18/20 0445)  Resp: (!) 31 (08/18/20 0445)  BP: (!) 166/102 (08/18/20 0445)  SpO2: 99 % (08/18/20 0445) Vital Signs (24h Range):  Temp:  [96.1 °F (35.6 °C)-98.7 °F (37.1 °C)] 98.6 °F (37 °C)  Pulse:  [] 100  Resp:  [7-31] 31  SpO2:  [84 %-99 %] 99 %  BP: (124-176)/() 166/102   Weight: 80.1 kg (176 lb 9.4 oz)  Body mass index is 27.66 kg/m².      Intake/Output Summary (Last 24 hours) at 8/18/2020 0515  Last data filed at 8/18/2020 0121  Gross per 24 hour   Intake 1572.5 ml   Output 79.5 ml   Net 1493 ml       Physical Exam  Vitals signs and nursing note reviewed.   Constitutional:       General: He is not in acute distress.     Appearance: He is ill-appearing.   HENT:      Head: Normocephalic and atraumatic.      Right Ear: External ear normal.      Left Ear: External ear normal.      Nose: Nose normal.      Mouth/Throat:      Mouth: Mucous membranes are moist.      Pharynx: Oropharynx is clear.   Eyes:      Conjunctiva/sclera: Conjunctivae normal.      Comments: Pinpoint pupils bilaterally   Neck:      Comments: L side JIN drain present with serosanguinous drainage  Cardiovascular:      Rate and Rhythm: Normal rate  and regular rhythm.      Pulses: Normal pulses.      Heart sounds: Normal heart sounds. No murmur.   Pulmonary:      Effort: Bradypnea present.      Breath sounds: Normal breath sounds.      Comments: Multiple episodes of apnea lasting several seconds  Abdominal:      General: Abdomen is flat. Bowel sounds are normal. There is no distension.      Palpations: Abdomen is soft.      Tenderness: There is no abdominal tenderness.   Genitourinary:     Penis: Normal.    Musculoskeletal: Normal range of motion.         General: No swelling or tenderness.   Skin:     General: Skin is warm and dry.      Capillary Refill: Capillary refill takes less than 2 seconds.   Neurological:      GCS: GCS eye subscore is 3. GCS verbal subscore is 4. GCS motor subscore is 6.      Motor: Motor function is intact.   Psychiatric:         Cognition and Memory: Cognition is impaired.         Vents:  Oxygen Concentration (%): 35 (08/18/20 0445)  Lines/Drains/Airways     Drain                 Closed/Suction Drain 08/17/20 Anterior Neck Bulb 10 Fr. 1 day          Peripheral Intravenous Line                 Peripheral IV - Single Lumen 01/24/20 1447 22 G Anterior;Right Hand 206 days         Peripheral IV - Single Lumen 05/19/20 0924 22 G Right Hand 90 days         Peripheral IV - Single Lumen 08/17/20 0615 18 G Left Forearm less than 1 day         Peripheral IV - Single Lumen 08/17/20 0715 18 G Right Forearm less than 1 day              Significant Labs:    CBC/Anemia Profile:  Recent Labs   Lab 08/18/20  0051 08/18/20  0343   WBC 11.22 9.36   HGB 10.1* 9.3*   HCT 33.4* 31.5*    314   * 109*   RDW 14.9* 14.8*        Chemistries:  Recent Labs   Lab 08/18/20  0051      K 4.8   *   CO2 14*   BUN 43*   CREATININE 3.3*   CALCIUM 8.4*   ALBUMIN 2.9*   PROT 6.7   BILITOT 0.2   ALKPHOS 105   ALT 25   AST 37   MG 1.7   PHOS 6.4*       All pertinent labs within the past 24 hours have been reviewed.    Significant Imaging: I have  reviewed and interpreted all pertinent imaging results/findings within the past 24 hours.

## 2020-08-18 NOTE — PLAN OF CARE
CM obtained discharge planning assessment from patient's son at bedside.  Son is requesting wheelchair upon discharge for patient.        Lulú Lewis MD  [address] / [place] [address]      [id number] [place] - [address] AT [address] OF [facility] [address]  [address]  [address]  [place] [address]  Phone: [phone] Fax: [phone]    [facility]  [address]  [place] [address]  Phone: [phone] Fax: [phone]    [facility] - [place], [place] - [address]  [address]  [place] [address]  Phone: [phone] Fax: [phone]    [facility] - [place], [place] - [address]  [address] [facility] [address]  Phone: [phone] Fax: [phone]    [facility] [place] - [place], [place] - [address]  [address]  [facility] [address]  Phone: [phone] Fax: [phone]    Payor: [employer] MANAGED MEDICARE / Plan: [email or web address] MEDICARE HMO / Product Type: Capitation /     Extended Emergency Contact Information  Primary Emergency Contact: Pramod Powell   [place]  Mobile Phone: [phone]  Relation: Son    Future Appointments   Date Time Provider Department Center   8/18/2020  1:15 PM [facility] PORTABLE [facility] ULSOUND [address]   8/18/2020  1:45 PM ECHO, [facility] ECHOSTR [address]   8/20/2020 12:30 PM Lulú Lewis MD [facility] MED CLN [address]   9/2/2020  1:00 PM HOSPITAL LAB, [facility] LAB [facility] LAB [facility]   9/4/2020  1:30 PM Rahel Reed MD [facility] HEMONC3 [address]   9/18/2020  1:00 PM SAME DAY LAB, [facility] LAB [facility]   9/18/2020  1:45 PM Lary Vickers DPM [facility] POD [place]   9/30/2020 11:00 AM Lulú Lewis MD [facility] MED CLN [facility] PCW          08/18/20 4371   Discharge Assessment   Assessment Type Discharge Planning Assessment   Confirmed/corrected address and phone number on facesheet? Yes   Assessment information obtained from? Medical  Record;Caregiver   Communicated expected length of stay with patient/caregiver yes   Prior to hospitilization cognitive status: Alert/Oriented   Prior to hospitalization functional status: Independent   Current cognitive status: Unable to Assess   Current Functional Status:   (unable to assess)   Lives With child(sari), dependent  (son)   Able to Return to Prior Arrangements yes   Is patient able to care for self after discharge? Unable to determine at this time (comments)   Who are your caregiver(s) and their phone number(s)? Pramod Sol (son)- [phone]   Readmission Within the Last 30 Days no previous admission in last 30 days   Patient currently being followed by outpatient case management? No   Patient currently receives any other outside agency services? No   Equipment Currently Used at Home none  (none used at home: patient has access to straight cane, rolling walker, BS commode)   Do you have any problems affording any of your prescribed medications? No   Is the patient taking medications as prescribed? yes   Does the patient have transportation home? Yes   Transportation Anticipated family or friend will provide   Dialysis Name and Scheduled days N/A   Does the patient receive services at the [facility]madin Clinic? No   Discharge Plan A Home Health  (no preference)   Discharge Plan B Home with family   DME Needed Upon Discharge  other (see comments)  (TBD)   Patient/Family in Agreement with Plan yes   Does the patient have transportation to healthcare appointments? Yes       Samira Claire MPH, RN, CM  Ext. [phone]

## 2020-08-18 NOTE — CARE UPDATE
"RAPID RESPONSE NURSE PROACTIVE ROUNDING NOTE     Time of Visit: 2338    Admit Date: 2020  LOS: 1  Code Status: Full Code   Date of Visit: 2020  : 1945  Age: 75 y.o.  Sex: male  Race: White  [facility]: [facility] A:   MRN: [id number]  Was the patient discharged from an ICU this admission? no   Was the patient discharged from a PACU within last 24 hours?  no  Did the patient receive conscious sedation/general anesthesia in last 24 hours?  yes  Was the patient in the ED within the past 24 hours?  no  Was the patient started on NIPPV within the past 24 hours?  no  Attending Physician: Filemon Aguayo MD  Primary Service: Networked reference to record PCT     ASSESSMENT     Notified by bedside RN via phone call.  Reason for alert: Pt apneic    Diagnosis: Cervical stenosis of spine    Abnormal Vital Signs: BP (!) 159/74   Pulse 102   Temp 97.8 °F (36.6 °C)   Resp 18   Ht 5' 7" (1.702 m)   Wt 80.1 kg (176 lb 9.4 oz)   SpO2 98%   BMI 27.66 kg/m²      Clinical Issues: Respiratory    Patient  has a past medical history of Acquired hypothyroidism, Benign essential HTN, Benign prostatic hyperplasia (BPH) with urinary urge incontinence, Chronic low back pain, Coronary artery disease involving native coronary artery of native heart without angina pectoris, Gastroesophageal reflux disease without esophagitis, History of CVA (cerebrovascular accident), Hypertension associated with diabetes, Lumbar disc lesion, Metastatic renal cell carcinoma to bone, Metastatic renal cell carcinoma to liver, Mixed hyperlipidemia, and Type 2 diabetes mellitus with stage 3 chronic kidney disease, with long-term current use of insulin.      Upon arrival pt is sleeping, Pt Wakes up to voice but quickly falls back asleep. Pt AAOx4 when awake and follows all commands. Pupils pinpoint and brisk. RR 6 per minute with sat's of 90-92%. Primary RN France at bedside. MD Robert notified of pt with RR of 6 and drowsy. .2 Narcan ordered and " given to pt. Once given pt wakes up to voice and stays awake. Sat's 99% with normal RR. ABG, CMP/CBC/Mag/Phos also ordered.    INTERVENTIONS/ RECOMMENDATIONS     Decrease current pain medcations dosage  Administer if pt becomes apneic     Discussed plan of care with RN R.    PHYSICIAN ESCALATION     Yes/No  yes    Orders received and case discussed with Dr. Shahid Jones MD.    Disposition: Remain in room 507.    FOLLOW-UP     Call back the Rapid Response Nurse, Chastity Mandujano RN at [phone] for additional questions or concerns.

## 2020-08-18 NOTE — SUBJECTIVE & OBJECTIVE
Principal Problem:Cervical stenosis of spine    Principal Orthopedic Problem: s/p ACDF    Interval History: Pt transferred to SICU yest for multiple episodes of apnea overnight. Started on narcan drip. This am pt 88-91% on 15L HFNC. Pain controlled. Denies new numbness/tingling.     Review of patient's allergies indicates:  No Known Allergies    Current Facility-Administered Medications   Medication    acetaminophen tablet 650 mg    albuterol-ipratropium 2.5 mg-0.5 mg/3 mL nebulizer solution 3 mL    albuterol-ipratropium 2.5 mg-0.5 mg/3 mL nebulizer solution 3 mL    amLODIPine tablet 10 mg    bisacodyL suppository 10 mg    ceFAZolin injection 2 g    cetirizine tablet 5 mg    dextrose 50% injection 12.5 g    dextrose 50% injection 25 g    docusate sodium capsule 50 mg    famotidine tablet 20 mg    glucagon (human recombinant) injection 1 mg    glucose chewable tablet 16 g    glucose chewable tablet 24 g    insulin aspart U-100 pen 0-5 Units    insulin detemir U-100 pen 2 Units    levothyroxine tablet 88 mcg    naloxone (NARCAN) 0.4 mg/mL injection    naloxone (NARCAN) 2 mg in dextrose 5 % 100 mL infusion    naloxone 0.4 mg/mL injection 0.2 mg    ondansetron injection 4 mg    polyethylene glycol packet 17 g    sodium bicarbonate 150 mEq in dextrose 5 % 1,000 mL infusion    sodium chloride 0.9% flush 10 mL    sodium chloride 0.9% flush 5 mL    tamsulosin 24 hr capsule 0.8 mg     Facility-Administered Medications Ordered in Other Encounters   Medication    phenylephrine HCL 2.5% ophthalmic solution 1 drop    proparacaine 0.5 % ophthalmic solution 1 drop    tropicamide 1% ophthalmic solution 1 drop     Objective:     Vital Signs (Most Recent):  Temp: 99.1 °F (37.3 °C) (08/18/20 0700)  Pulse: 94 (08/18/20 0730)  Resp: (!) 25 (08/18/20 0730)  BP: (!) 150/68 (08/18/20 0730)  SpO2: (!) 92 % (08/18/20 0730) Vital Signs (24h Range):  Temp:  [96.1 °F (35.6 °C)-99.1 °F (37.3 °C)] 99.1 °F (37.3  "°C)  Pulse:  [] 94  Resp:  [7-39] 25  SpO2:  [84 %-100 %] 92 %  BP: (124-166)/() 150/68     Weight: 80 kg (176 lb 5.9 oz)  Height: 5' 7" (170.2 cm)  Body mass index is 27.62 kg/m².      Intake/Output Summary (Last 24 hours) at 8/18/2020 0829  Last data filed at 8/18/2020 0600  Gross per 24 hour   Intake 572.5 ml   Output 879.5 ml   Net -307 ml       Ortho/SPM Exam  Anterior cervical dressing in place with drain- 27.5 cc  Neuro exam stable    Significant Labs:   ABGs:   Recent Labs   Lab 08/18/20  0349   PH 7.104*   PCO2 52.7*   HCO3 16.5*   POCSATURATED 97   BE -13     CBC:   Recent Labs   Lab 08/18/20  0051 08/18/20  0343 08/18/20  0438   WBC 11.22 9.36 11.41   HGB 10.1* 9.3* 9.7*   HCT 33.4* 31.5* 31.5*    314 332     CMP:   Recent Labs   Lab 08/18/20  0051 08/18/20  0342 08/18/20  0438    142 140   K 4.8 4.8 5.0   * 117* 115*   CO2 14* 15* 15*   * 105 106   BUN 43* 42* 44*   CREATININE 3.3* 3.3* 3.4*   CALCIUM 8.4* 8.2* 8.3*   PROT 6.7  --  6.6   ALBUMIN 2.9*  --  2.8*   BILITOT 0.2  --  0.2   ALKPHOS 105  --  100   AST 37  --  36   ALT 25  --  19   ANIONGAP 12 10 10   EGFRNONAA 17.3* 17.3* 16.7*     All pertinent labs within the past 24 hours have been reviewed.    Significant Imaging: I have reviewed and interpreted all pertinent imaging results/findings.  "

## 2020-08-18 NOTE — NURSING
Pt c/o of butt being sore. Turning and repositioning in chair frequently and sitting on pillow. Upon further assessment blanchable redness noted to bilateral glutes. Barrier paste applied, although no broken skin. Foam dsg to sacrum and heels. Waffle boots applied. Waffle mattress inflated. Turning and repositioning q2h with pillows and foam wedge.

## 2020-08-18 NOTE — CARE UPDATE
Rapid Response Nurse Chart Check     Chart check completed, abnormal VS noted. Please call [phone] for further concerns or assistance.

## 2020-08-18 NOTE — CONSULTS
Patient evaluated by and admitted to Critical Care Medicine. Full H&P to follow.    Aminta Gould NP  Critical Care Medicine

## 2020-08-18 NOTE — NURSING
Aa&ox3.pt noted with urinal in hand, urinated per urinal 50ml. md notified. Received verbal order for urine specimen. Will continue to monitor.

## 2020-08-18 NOTE — CARE UPDATE
Patient placed on BIPAP after discussion with hospital medicine and on call MICU fellow whom both assessed the patient and determined that he is likely not clearing his narcotics due to his Cr bump and since he gets improvement with narcan s/p 3 doses. After 1 hr of BIPAP on recheck ABG patient with minimal improvement in pH, but worsening of CO2. Still having apnic events and patient will be admitted to ICU for narcan drip.

## 2020-08-19 ENCOUNTER — TELEPHONE (OUTPATIENT)
Dept: PRIMARY CARE CLINIC | Facility: CLINIC | Age: 75
End: 2020-08-19

## 2020-08-19 PROBLEM — R79.89 ELEVATED TROPONIN: Status: ACTIVE | Noted: 2020-08-19

## 2020-08-19 PROBLEM — Z74.09 IMPAIRED FUNCTIONAL MOBILITY AND ENDURANCE: Status: ACTIVE | Noted: 2020-08-19

## 2020-08-19 PROBLEM — R13.10 DYSPHAGIA: Status: ACTIVE | Noted: 2020-08-19

## 2020-08-19 PROBLEM — I21.4 NSTEMI (NON-ST ELEVATED MYOCARDIAL INFARCTION): Status: ACTIVE | Noted: 2020-08-19

## 2020-08-19 LAB
ALBUMIN SERPL BCP-MCNC: 1.9 G/DL (ref 3.5–5.2)
ALLENS TEST: ABNORMAL
ALP SERPL-CCNC: 76 U/L (ref 55–135)
ALT SERPL W/O P-5'-P-CCNC: <5 U/L (ref 10–44)
ANION GAP SERPL CALC-SCNC: 10 MMOL/L (ref 8–16)
ANION GAP SERPL CALC-SCNC: 11 MMOL/L (ref 8–16)
AST SERPL-CCNC: 47 U/L (ref 10–40)
BASOPHILS # BLD AUTO: 0.01 K/UL (ref 0–0.2)
BASOPHILS NFR BLD: 0.1 % (ref 0–1.9)
BILIRUB SERPL-MCNC: 0.2 MG/DL (ref 0.1–1)
BUN SERPL-MCNC: 40 MG/DL (ref 8–23)
BUN SERPL-MCNC: 40 MG/DL (ref 8–23)
CALCIUM SERPL-MCNC: 7.9 MG/DL (ref 8.7–10.5)
CALCIUM SERPL-MCNC: 8.2 MG/DL (ref 8.7–10.5)
CHLORIDE SERPL-SCNC: 108 MMOL/L (ref 95–110)
CHLORIDE SERPL-SCNC: 111 MMOL/L (ref 95–110)
CO2 SERPL-SCNC: 20 MMOL/L (ref 23–29)
CO2 SERPL-SCNC: 23 MMOL/L (ref 23–29)
CREAT SERPL-MCNC: 3.4 MG/DL (ref 0.5–1.4)
CREAT SERPL-MCNC: 3.4 MG/DL (ref 0.5–1.4)
DIFFERENTIAL METHOD: ABNORMAL
EOSINOPHIL # BLD AUTO: 0 K/UL (ref 0–0.5)
EOSINOPHIL NFR BLD: 0 % (ref 0–8)
ERYTHROCYTE [DISTWIDTH] IN BLOOD BY AUTOMATED COUNT: 15.3 % (ref 11.5–14.5)
EST. GFR  (AFRICAN AMERICAN): 19.3 ML/MIN/1.73 M^2
EST. GFR  (AFRICAN AMERICAN): 19.3 ML/MIN/1.73 M^2
EST. GFR  (NON AFRICAN AMERICAN): 16.7 ML/MIN/1.73 M^2
EST. GFR  (NON AFRICAN AMERICAN): 16.7 ML/MIN/1.73 M^2
FACT X PPP CHRO-ACNC: 0.42 IU/ML (ref 0.3–0.7)
FACT X PPP CHRO-ACNC: 0.56 IU/ML (ref 0.3–0.7)
FACT X PPP CHRO-ACNC: 1.36 IU/ML (ref 0.3–0.7)
GLUCOSE SERPL-MCNC: 151 MG/DL (ref 70–110)
GLUCOSE SERPL-MCNC: 152 MG/DL (ref 70–110)
HCO3 UR-SCNC: 19.3 MMOL/L (ref 24–28)
HCT VFR BLD AUTO: 30.5 % (ref 40–54)
HGB BLD-MCNC: 9.8 G/DL (ref 14–18)
IMM GRANULOCYTES # BLD AUTO: 0.03 K/UL (ref 0–0.04)
IMM GRANULOCYTES NFR BLD AUTO: 0.4 % (ref 0–0.5)
LYMPHOCYTES # BLD AUTO: 0.6 K/UL (ref 1–4.8)
LYMPHOCYTES NFR BLD: 8.5 % (ref 18–48)
MAGNESIUM SERPL-MCNC: 1.4 MG/DL (ref 1.6–2.6)
MAGNESIUM SERPL-MCNC: 2 MG/DL (ref 1.6–2.6)
MCH RBC QN AUTO: 32.9 PG (ref 27–31)
MCHC RBC AUTO-ENTMCNC: 32.1 G/DL (ref 32–36)
MCV RBC AUTO: 102 FL (ref 82–98)
MONOCYTES # BLD AUTO: 0.6 K/UL (ref 0.3–1)
MONOCYTES NFR BLD: 8.2 % (ref 4–15)
NEUTROPHILS # BLD AUTO: 5.7 K/UL (ref 1.8–7.7)
NEUTROPHILS NFR BLD: 82.8 % (ref 38–73)
NRBC BLD-RTO: 0 /100 WBC
PCO2 BLDA: 42.7 MMHG (ref 35–45)
PH SMN: 7.26 [PH] (ref 7.35–7.45)
PHOSPHATE SERPL-MCNC: 5.3 MG/DL (ref 2.7–4.5)
PLATELET # BLD AUTO: 279 K/UL (ref 150–350)
PMV BLD AUTO: 10.1 FL (ref 9.2–12.9)
PO2 BLDA: 52 MMHG (ref 80–100)
POC BE: -8 MMOL/L
POC SATURATED O2: 81 % (ref 95–100)
POC TCO2: 21 MMOL/L (ref 23–27)
POCT GLUCOSE: 114 MG/DL (ref 70–110)
POCT GLUCOSE: 115 MG/DL (ref 70–110)
POCT GLUCOSE: 131 MG/DL (ref 70–110)
POCT GLUCOSE: 156 MG/DL (ref 70–110)
POTASSIUM SERPL-SCNC: 3.8 MMOL/L (ref 3.5–5.1)
POTASSIUM SERPL-SCNC: 3.9 MMOL/L (ref 3.5–5.1)
PROT SERPL-MCNC: 5.3 G/DL (ref 6–8.4)
RBC # BLD AUTO: 2.98 M/UL (ref 4.6–6.2)
SAMPLE: ABNORMAL
SITE: ABNORMAL
SODIUM SERPL-SCNC: 141 MMOL/L (ref 136–145)
SODIUM SERPL-SCNC: 142 MMOL/L (ref 136–145)
TROPONIN I SERPL DL<=0.01 NG/ML-MCNC: 5.73 NG/ML (ref 0–0.03)
TROPONIN I SERPL DL<=0.01 NG/ML-MCNC: 8.08 NG/ML (ref 0–0.03)
TROPONIN I SERPL DL<=0.01 NG/ML-MCNC: 8.77 NG/ML (ref 0–0.03)
WBC # BLD AUTO: 6.92 K/UL (ref 3.9–12.7)

## 2020-08-19 PROCEDURE — 25000003 PHARM REV CODE 250: Mod: HCNC | Performed by: STUDENT IN AN ORGANIZED HEALTH CARE EDUCATION/TRAINING PROGRAM

## 2020-08-19 PROCEDURE — 25000242 PHARM REV CODE 250 ALT 637 W/ HCPCS: Mod: HCNC | Performed by: STUDENT IN AN ORGANIZED HEALTH CARE EDUCATION/TRAINING PROGRAM

## 2020-08-19 PROCEDURE — 84484 ASSAY OF TROPONIN QUANT: CPT | Mod: 91,HCNC

## 2020-08-19 PROCEDURE — 25000003 PHARM REV CODE 250: Mod: HCNC | Performed by: NURSE PRACTITIONER

## 2020-08-19 PROCEDURE — 94667 MNPJ CHEST WALL 1ST: CPT | Mod: HCNC

## 2020-08-19 PROCEDURE — 25000003 PHARM REV CODE 250: Mod: HCNC | Performed by: INTERNAL MEDICINE

## 2020-08-19 PROCEDURE — 99223 PR INITIAL HOSPITAL CARE,LEVL III: ICD-10-PCS | Mod: HCNC,,, | Performed by: INTERNAL MEDICINE

## 2020-08-19 PROCEDURE — 99900035 HC TECH TIME PER 15 MIN (STAT): Mod: HCNC

## 2020-08-19 PROCEDURE — 63600175 PHARM REV CODE 636 W HCPCS: Mod: HCNC | Performed by: INTERNAL MEDICINE

## 2020-08-19 PROCEDURE — 94640 AIRWAY INHALATION TREATMENT: CPT | Mod: HCNC

## 2020-08-19 PROCEDURE — 99222 PR INITIAL HOSPITAL CARE,LEVL II: ICD-10-PCS | Mod: HCNC,,, | Performed by: NURSE PRACTITIONER

## 2020-08-19 PROCEDURE — 99222 1ST HOSP IP/OBS MODERATE 55: CPT | Mod: HCNC,,, | Performed by: NURSE PRACTITIONER

## 2020-08-19 PROCEDURE — 80048 BASIC METABOLIC PNL TOTAL CA: CPT | Mod: HCNC

## 2020-08-19 PROCEDURE — 84484 ASSAY OF TROPONIN QUANT: CPT | Mod: HCNC

## 2020-08-19 PROCEDURE — 85520 HEPARIN ASSAY: CPT | Mod: 91,HCNC

## 2020-08-19 PROCEDURE — 27100171 HC OXYGEN HIGH FLOW UP TO 24 HOURS: Mod: HCNC

## 2020-08-19 PROCEDURE — 20000000 HC ICU ROOM: Mod: HCNC

## 2020-08-19 PROCEDURE — 63600175 PHARM REV CODE 636 W HCPCS: Mod: HCNC | Performed by: NURSE PRACTITIONER

## 2020-08-19 PROCEDURE — 99291 CRITICAL CARE FIRST HOUR: CPT | Mod: HCNC,,, | Performed by: INTERNAL MEDICINE

## 2020-08-19 PROCEDURE — 99223 1ST HOSP IP/OBS HIGH 75: CPT | Mod: HCNC,,, | Performed by: INTERNAL MEDICINE

## 2020-08-19 PROCEDURE — 83735 ASSAY OF MAGNESIUM: CPT | Mod: 91,HCNC

## 2020-08-19 PROCEDURE — 84100 ASSAY OF PHOSPHORUS: CPT | Mod: HCNC

## 2020-08-19 PROCEDURE — 85520 HEPARIN ASSAY: CPT | Mod: HCNC

## 2020-08-19 PROCEDURE — 94660 CPAP INITIATION&MGMT: CPT | Mod: HCNC

## 2020-08-19 PROCEDURE — 63600175 PHARM REV CODE 636 W HCPCS: Mod: HCNC | Performed by: STUDENT IN AN ORGANIZED HEALTH CARE EDUCATION/TRAINING PROGRAM

## 2020-08-19 PROCEDURE — 94761 N-INVAS EAR/PLS OXIMETRY MLT: CPT | Mod: HCNC

## 2020-08-19 PROCEDURE — 85025 COMPLETE CBC W/AUTO DIFF WBC: CPT | Mod: HCNC

## 2020-08-19 PROCEDURE — 83735 ASSAY OF MAGNESIUM: CPT | Mod: HCNC

## 2020-08-19 PROCEDURE — 27000221 HC OXYGEN, UP TO 24 HOURS: Mod: HCNC

## 2020-08-19 PROCEDURE — 99291 PR CRITICAL CARE, E/M 30-74 MINUTES: ICD-10-PCS | Mod: HCNC,,, | Performed by: INTERNAL MEDICINE

## 2020-08-19 PROCEDURE — 80053 COMPREHEN METABOLIC PANEL: CPT | Mod: HCNC

## 2020-08-19 RX ORDER — SODIUM BICARBONATE 650 MG/1
1300 TABLET ORAL 2 TIMES DAILY
Status: DISCONTINUED | OUTPATIENT
Start: 2020-08-19 | End: 2020-08-28

## 2020-08-19 RX ORDER — FUROSEMIDE 10 MG/ML
100 INJECTION INTRAMUSCULAR; INTRAVENOUS EVERY 8 HOURS
Status: DISCONTINUED | OUTPATIENT
Start: 2020-08-19 | End: 2020-08-19

## 2020-08-19 RX ORDER — ROSUVASTATIN CALCIUM 20 MG/1
40 TABLET, COATED ORAL NIGHTLY
Status: DISCONTINUED | OUTPATIENT
Start: 2020-08-19 | End: 2020-09-03 | Stop reason: HOSPADM

## 2020-08-19 RX ORDER — MAGNESIUM SULFATE HEPTAHYDRATE 40 MG/ML
2 INJECTION, SOLUTION INTRAVENOUS ONCE
Status: COMPLETED | OUTPATIENT
Start: 2020-08-19 | End: 2020-08-19

## 2020-08-19 RX ORDER — FUROSEMIDE 10 MG/ML
100 INJECTION INTRAMUSCULAR; INTRAVENOUS
Status: DISCONTINUED | OUTPATIENT
Start: 2020-08-19 | End: 2020-08-20

## 2020-08-19 RX ORDER — FUROSEMIDE 10 MG/ML
40 INJECTION INTRAMUSCULAR; INTRAVENOUS ONCE
Status: DISCONTINUED | OUTPATIENT
Start: 2020-08-19 | End: 2020-08-19

## 2020-08-19 RX ORDER — FUROSEMIDE 10 MG/ML
100 INJECTION INTRAMUSCULAR; INTRAVENOUS EVERY 8 HOURS
Status: COMPLETED | OUTPATIENT
Start: 2020-08-19 | End: 2020-08-19

## 2020-08-19 RX ORDER — FUROSEMIDE 10 MG/ML
80 INJECTION INTRAMUSCULAR; INTRAVENOUS EVERY 8 HOURS
Status: DISCONTINUED | OUTPATIENT
Start: 2020-08-19 | End: 2020-08-19

## 2020-08-19 RX ORDER — FUROSEMIDE 10 MG/ML
100 INJECTION INTRAMUSCULAR; INTRAVENOUS ONCE
Status: DISCONTINUED | OUTPATIENT
Start: 2020-08-19 | End: 2020-08-19

## 2020-08-19 RX ADMIN — AMLODIPINE BESYLATE 10 MG: 10 TABLET ORAL at 09:08

## 2020-08-19 RX ADMIN — SODIUM BICARBONATE 650 MG TABLET 1300 MG: at 08:08

## 2020-08-19 RX ADMIN — CARVEDILOL 6.25 MG: 6.25 TABLET, FILM COATED ORAL at 09:08

## 2020-08-19 RX ADMIN — LEVOTHYROXINE SODIUM 88 MCG: 0.09 TABLET ORAL at 09:08

## 2020-08-19 RX ADMIN — ASPIRIN 81 MG CHEWABLE TABLET 81 MG: 81 TABLET CHEWABLE at 09:08

## 2020-08-19 RX ADMIN — IPRATROPIUM BROMIDE AND ALBUTEROL SULFATE 3 ML: .5; 2.5 SOLUTION RESPIRATORY (INHALATION) at 07:08

## 2020-08-19 RX ADMIN — ROSUVASTATIN CALCIUM 40 MG: 20 TABLET, FILM COATED ORAL at 08:08

## 2020-08-19 RX ADMIN — FUROSEMIDE 100 MG: 10 INJECTION, SOLUTION INTRAMUSCULAR; INTRAVENOUS at 08:08

## 2020-08-19 RX ADMIN — IPRATROPIUM BROMIDE AND ALBUTEROL SULFATE 3 ML: .5; 2.5 SOLUTION RESPIRATORY (INHALATION) at 11:08

## 2020-08-19 RX ADMIN — CLOPIDOGREL 75 MG: 75 TABLET, FILM COATED ORAL at 09:08

## 2020-08-19 RX ADMIN — SODIUM BICARBONATE 650 MG TABLET 1300 MG: at 11:08

## 2020-08-19 RX ADMIN — CARVEDILOL 6.25 MG: 6.25 TABLET, FILM COATED ORAL at 08:08

## 2020-08-19 RX ADMIN — FUROSEMIDE 100 MG: 10 INJECTION, SOLUTION INTRAMUSCULAR; INTRAVENOUS at 11:08

## 2020-08-19 RX ADMIN — IPRATROPIUM BROMIDE AND ALBUTEROL SULFATE 3 ML: .5; 2.5 SOLUTION RESPIRATORY (INHALATION) at 03:08

## 2020-08-19 RX ADMIN — HEPARIN SODIUM 14 UNITS/KG/HR: 5000 INJECTION INTRAVENOUS; SUBCUTANEOUS at 11:08

## 2020-08-19 RX ADMIN — MAGNESIUM SULFATE 2 G: 2 INJECTION INTRAVENOUS at 09:08

## 2020-08-19 NOTE — ASSESSMENT & PLAN NOTE
Concern for PE as he suddenly required significantly more O2 overnight on 8/17-18. Risk factors include recent surgery and metastatic RCC.     - CTA chest deferred due to VERENA  - Bilat LE U/S negative for DVT  - TTE negative for RV strain with normal PA pressure. Showed chronic HFpEF and focal wall motion abnormalities  - Troponin elevated and peaked at 8.8, BNP mildly elevated (no baseline)  - Started heparin gtt empirically for PE. OK'd by ortho to start A/C so soon after surgery due to rapidly worsening hypoxia  - Wean O2 to goal sat > 90%  - Lasix trial 100 mg IV today, with strict I/Os for pulmonary edema on CXR and improved BPs

## 2020-08-19 NOTE — CONSULTS
[facility]  Cardiology  Consult Note    Patient Name: Edwin Powell  MRN: [id number]  Admission Date: 8/17/2020  Hospital Length of Stay: 2 days  Code Status: Full Code   Attending Provider: Mandeep Byrd MD   Consulting Provider: Rylee Ricci DO  Primary Care Physician: Lulú Lewis MD  Principal Problem:Acute on chronic respiratory failure with hypoxia and hypercapnia    Patient information was obtained from patient and past medical records.     Inpatient consult to Cardiology  Consult performed by: Rylee Ricci DO  Consult ordered by: Curt Bryan DO  Reason for consult: NSTEMI        Subjective:     Chief Complaint:  Shortness of breath     HPI:   Mr. Powell is a 75 year-old with of CAD s/p multiple PCIs (patient reported 5 stents), metastatic renal cell carcinoma (metastasis to L3 and liver), chronic hypoxic respiratory failure, HLD, HTN, insulin-dependent diabetes and GERD who presented on 08/17 for ACDF of cervical 6/7 with Ortho and ENT.  Following procedure patient was hypotensive requiring Mark-Synephrine he was eventually admitted for further monitoring and observation.  On the night of 8/17 patient developed multiple episodes of apnea, required Narcan, eventually went to acute hypoxic respiratory failure, was transitioned to BiPAP and transferred to the ICU.  Further evaluation demonstrated a troponin of 8 (peak), EKG with no acute ST changes, bedside echocardiogram did not demonstrate any RV strain. There was concern for PE but CT a was not feasible due to patient's ongoing VERENA and baseline CKD.  Echocardiogram on 08/18 demonstrating EF of 50% with LAD wall motion abnormalities, CVP of 3 mm Hg.    Past Medical History:   Diagnosis Date    Acquired hypothyroidism 5/26/2017    Benign essential HTN 5/26/2017    Benign prostatic hyperplasia (BPH) with urinary urge incontinence 6/6/2017    Chronic low back pain 6/1/2017    Coronary artery disease involving native  coronary artery of native heart without angina pectoris 5/26/2017    S/p 5 stents - last one around 2010-11.    Gastroesophageal reflux disease without esophagitis 5/26/2017    History of CVA (cerebrovascular accident) 5/26/2017    Hypertension associated with diabetes 5/26/2017    BP controlled on ACE, CCB    Lumbar disc lesion 5/26/2017    Metastatic renal cell carcinoma to bone 6/6/2017    Metastatic renal cell carcinoma to liver 6/6/2017    Liver Biopsy 5-2017: METASTATIC RENAL CELL CARCINOMA.    Mixed hyperlipidemia 5/26/2017    Type 2 diabetes mellitus with stage 3 chronic kidney disease, with long-term current use of insulin 5/26/2017       Past Surgical History:   Procedure Laterality Date    ABDOMINAL SURGERY      ANTERIOR CERVICAL DISCECTOMY W/ FUSION N/A 8/17/2020    Procedure: DISCECTOMY, SPINE, CERVICAL, ANTERIOR APPROACH, WITH FUSION co case with Dr. Falcon C6/7 [facility] SNS:vocal cord/motors/SSEP Regular OR table Patient needs scope in preop;  Surgeon: Filemon Aguayo MD;  Location: [facility] OR [address] [place] [address];  Service: Neurosurgery;  Laterality: N/A;    APPENDECTOMY      BACK SURGERY      CARDIAC SURGERY      CATARACT EXTRACTION      CHOLECYSTECTOMY      CORONARY STENT PLACEMENT      5 stents    coronary stenting      X 5 last one in 2010-11.    DISSECTION OF NECK N/A 8/17/2020    Procedure: DISSECTION, NECK;  Surgeon: Rush Falcon MD;  Location: [facility] OR [address] [place] [address];  Service: ENT;  Laterality: N/A;    INTRAOCULAR PROSTHESES INSERTION Right 6/27/2019    Procedure: INSERTION, IOL PROSTHESIS;  Surgeon: Chary Culp MD;  Location: [facility] OR [address] [place] [address];  Service: Ophthalmology;  Laterality: Right;    INTRAOCULAR PROSTHESES INSERTION Left 8/22/2019    Procedure: INSERTION, IOL PROSTHESIS;  Surgeon: Chary Culp MD;  Location: [facility] OR [address] [place] [address];  Service: Ophthalmology;  Laterality: Left;    PHACOEMULSIFICATION OF CATARACT Right 6/27/2019    Procedure: PHACOEMULSIFICATION, CATARACT;   Surgeon: Chary Culp MD;  Location: [facility] OR [address] [place] [address];  Service: Ophthalmology;  Laterality: Right;    PHACOEMULSIFICATION OF CATARACT Left 8/22/2019    Procedure: PHACOEMULSIFICATION, CATARACT;  Surgeon: Chary Culp MD;  Location: [facility] OR [address] [place] [address];  Service: Ophthalmology;  Laterality: Left;    SPINAL FUSION         Review of patient's allergies indicates:  No Known Allergies    Current Facility-Administered Medications on File Prior to Encounter   Medication    phenylephrine HCL 2.5% ophthalmic solution 1 drop    proparacaine 0.5 % ophthalmic solution 1 drop    tropicamide 1% ophthalmic solution 1 drop     Current Outpatient Medications on File Prior to Encounter   Medication Sig    ascorbic acid (VITAMIN C ORAL) Take by mouth once daily. Hold for 1 week prior to surgery    azelastine (ASTELIN) 137 mcg (0.1 %) nasal spray 1 spray (137 mcg total) by Nasal route 2 (two) times daily. (Patient taking differently: 1 spray by Nasal route 2 (two) times daily. Take if needed)    b complex vitamins (B COMPLEX 1) tablet Take 1 tablet by mouth once daily. (Patient taking differently: Take 1 tablet by mouth once daily. Hold for 1 week prior to surgery)    cholecalciferol, vitamin D3, (VITAMIN D3) 125 mcg (5,000 unit) Tab Take 1 tablet (5,000 Units total) by mouth once daily. (Patient taking differently: Take 5,000 Units by mouth once daily. Hold for 1 week prior to surgery)    montelukast (SINGULAIR) 10 mg tablet Take 10 mg by mouth every evening.    multivitamin (THERAGRAN) per tablet Take 1 tablet by mouth once daily.    PAZOpanib (VOTRIENT) 200 mg Tab Take 4 tablets (800 mg total) by mouth once daily. (Patient taking differently: Take 800 mg by mouth once daily Take in am of surgery.)    rosuvastatin (CRESTOR) 40 MG Tab Take 1 tablet (40 mg total) by mouth every evening. (Patient taking differently: Take 40 mg by mouth once daily. Take in am of surgery)    tamsulosin (FLOMAX) 0.4 mg Cap Take 2  "capsules (0.8 mg total) by mouth every evening. (Patient taking differently: Take 0.8 mg by mouth once daily. Take in am of surgery)    vitamin E acetate (VITAMIN E ORAL) Take by mouth once daily. Hold for 1 week prior to surgery    aspirin (ECOTRIN) 81 MG EC tablet Take 81 mg by mouth once daily. Will ask MD to hold 1 week    blood sugar diagnostic Strp 1 strip by Misc.(Non-Drug; Combo Route) route 2 (two) times daily as needed.    blood-glucose meter Misc use as instructed    clindamycin (CLEOCIN T) 1 % external solution AAA bid (Patient not taking: Reported on 8/12/2020)    diphenhydramine HCl (BENADRYL ALLERGY ORAL) Take by mouth daily as needed. Hold am of surgery    fluticasone propionate (FLONASE) 50 mcg/actuation nasal spray 1 SPRAY (50 MCG TOTAL) BY EACH NARE ROUTE ONCE DAILY. (Patient taking differently: 1 spray by Each Nostril route once daily. Take if needed)    insulin (LANTUS SOLOSTAR U-100 INSULIN) glargine 100 units/mL (3mL) SubQ pen Inject 5 Units into the skin every evening. (Patient taking differently: Inject 5 Units into the skin every evening. Take 80%=4U night before surgery)    lancets (ONETOUCH ULTRASOFT LANCETS) Misc 1 lancet by Misc.(Non-Drug; Combo Route) route 2 (two) times daily as needed.    levocetirizine (XYZAL) 5 MG tablet Take 1 tablet (5 mg total) by mouth every evening. Can take up to 2 tablets (Patient taking differently: Take 5 mg by mouth every evening. Can take up to 2 tablets  Takes at night)    levothyroxine (SYNTHROID) 88 MCG tablet Take 1 tablet (88 mcg total) by mouth once daily. (Patient taking differently: Take 88 mcg by mouth once daily. Take in am of surgery)    ondansetron (ZOFRAN) 8 MG tablet Take 1 tablet (8 mg total) by mouth 3 (three) times daily as needed. (Patient taking differently: Take 8 mg by mouth 3 (three) times daily as needed. Take if needed)    pen needle, diabetic 29 gauge x 1/2" Ndle 1 application by Misc.(Non-Drug; Combo Route) route " once daily.     Family History     Problem Relation (Age of Onset)    Diabetes Mother, Brother    Glaucoma Mother, Maternal Grandmother    Heart attack Mother    No Known Problems Son        Tobacco Use    Smoking status: Former Smoker     Types: Cigarettes    Smokeless tobacco: Never Used    Tobacco comment: haven't smoked in last 25 yrs   Substance and Sexual Activity    Alcohol use: Yes     Comment: rarely     Drug use: Not Currently    Sexual activity: Not Currently     Partners: Female     Review of Systems   Constitution: Negative for chills and fever.   Eyes: Negative for blurred vision and double vision.   Cardiovascular: Positive for dyspnea on exertion. Negative for chest pain, irregular heartbeat, leg swelling, orthopnea, palpitations and paroxysmal nocturnal dyspnea.   Respiratory: Positive for shortness of breath.    Neurological: Negative for dizziness.     Objective:     Vital Signs (Most Recent):  Temp: 99 °F (37.2 °C) (08/19/20 1500)  Pulse: 86 (08/19/20 1545)  Resp: (!) 35 (08/19/20 1545)  BP: (!) 144/64 (08/19/20 1500)  SpO2: (!) 93 % (08/19/20 1545) Vital Signs (24h Range):  Temp:  [97.9 °F (36.6 °C)-100.1 °F (37.8 °C)] 99 °F (37.2 °C)  Pulse:  [] 86  Resp:  [10-35] 35  SpO2:  [90 %-97 %] 93 %  BP: (143-163)/() 144/64     Weight: 79.8 kg (176 lb)  Body mass index is 27.57 kg/m².    SpO2: (!) 93 %  O2 Device (Oxygen Therapy): Comfort Flow(airvo)      Intake/Output Summary (Last 24 hours) at 8/19/2020 1553  Last data filed at 8/19/2020 1500  Gross per 24 hour   Intake 3005 ml   Output 2400 ml   Net 605 ml       Lines/Drains/Airways     Drain                 Closed/Suction Drain 08/17/20 Anterior Neck Bulb 10 Fr. 2 days    Male External Urinary Catheter 08/18/20 0500 Small 1 day          Peripheral Intravenous Line                 Peripheral IV - Single Lumen 01/24/20 1447 22 G Anterior;Right Hand 208 days         Peripheral IV - Single Lumen 05/19/20 0924 22 G Right Hand 92 days          Peripheral IV - Single Lumen 08/17/20 0615 18 G Left Forearm 2 days         Peripheral IV - Single Lumen 08/17/20 0715 18 G Right Forearm 2 days         Peripheral IV - Single Lumen 08/18/20 0500 20 G Anterior;Left;Proximal Forearm 1 day                Physical Exam   Constitutional: He is oriented to person, place, and time. He appears well-developed and well-nourished.   HENT:   Head: Normocephalic and atraumatic.   Neck: No JVD present.   Sutures intact, JIN drain in place.    Cardiovascular: Normal rate, regular rhythm and normal heart sounds.   Pulmonary/Chest: Effort normal and breath sounds normal. He has no wheezes. He has no rales.   On comfort flow   Abdominal: Soft. Bowel sounds are normal.   Musculoskeletal:         General: No edema.   Neurological: He is alert and oriented to person, place, and time.   Skin: Skin is warm and dry.       Significant Labs:   ABG:   Recent Labs   Lab 08/18/20  1620 08/18/20  1959 08/18/20 2007   PH 7.223* 7.262* 7.259*   PCO2 41.8 42.7 41.1   HCO3 17.2* 19.3* 18.4*   POCSATURATED 83* 81* 96   BE -10 -8 -9   , CMP   Recent Labs   Lab 08/18/20  0051  08/18/20  0438 08/18/20  1611 08/19/20  0209      < > 140 142 142   K 4.8   < > 5.0 4.1 3.9   *   < > 115* 114* 111*   CO2 14*   < > 15* 18* 20*   *   < > 106 151* 152*   BUN 43*   < > 44* 41* 40*   CREATININE 3.3*   < > 3.4* 3.5* 3.4*   CALCIUM 8.4*   < > 8.3* 8.0* 7.9*   PROT 6.7  --  6.6  --  5.3*   ALBUMIN 2.9*  --  2.8*  --  1.9*   BILITOT 0.2  --  0.2  --  0.2   ALKPHOS 105  --  100  --  76   AST 37  --  36  --  47*   ALT 25  --  19  --  <5*   ANIONGAP 12   < > 10 10 11   ESTGFRAFRICA 20.0*   < > 19.3* 18.6* 19.3*   EGFRNONAA 17.3*   < > 16.7* 16.1* 16.7*    < > = values in this interval not displayed.    and Troponin   Recent Labs   Lab 08/19/20  0005 08/19/20  0632 08/19/20  1312   TROPONINI 8.769* 8.080* 5.726*     Significant Imaging:     Echocardiogram:   Technically challenging portable  study with poor endocardial visualization.  · Low normal left ventricular systolic function. The estimated ejection fraction is 50-55%.  · Local segmental wall motion abnormalities.  · Grade I (mild) left ventricular diastolic dysfunction consistent with impaired relaxation.  · Concentric left ventricular remodeling.  · Normal right ventricular systolic function.  · The estimated PA systolic pressure is 26 mmHg.  · Normal central venous pressure (3 mmHg).     EKG: NSR 94. Nonspecific T wave abnormality.     Assessment and Plan:     NSTEMI (non-ST elevated myocardial infarction)  Troponins 1.2> 6.8> 8.7, EKG with non specific T wave changes.  NSTEMI likely type 2  With patient's metastatic renal carcinoma and has progressive decline would recommend conservative management for now.  Additionally patient is fairly asymptomatic (no chest pain, palpitation) from his ACS. He possibly developed flash pulmonary edema in the setting of hypertension, apnea, sedation leading to respiratory failure and NSTEMI.     -Recommend conservative medical management from his ACS standpoint, continue aspirin and Plavix, rosuvastatin and carvedilol  -Would continue heparin for 48 hours in regards to his ACS  -Consider lower extremity Dopplers/V/Q scan to evaluate for VTE, agree with treating prophylactic anticoagulation  -Agree with conservative diuresis  -If able, obtain records regarding his angiogram  -Cardiology will continue to follow please call for any urgent questions or concerns      VTE Risk Mitigation (From admission, onward)         Ordered     heparin 25,000 units in dextrose 5% 250 mL (100 units/mL) infusion HIGH INTENSITY nomogram Anti- Xa  Continuous     Question:  Heparin Infusion Adjustment (DO NOT MODIFY ANSWER)  Answer:  [email or web address]\epic\Images\Pharmacy\HeparinInfusions\heparin HIGH INTENSITY nomogram with ANTI-XA [id number]    08/18/20 1444     heparin 25,000 units in dextrose 5% (100 units/ml) IV bolus from bag -  ADDITIONAL PRN BOLUS - 60 units/kg  As needed (PRN)     Question:  Heparin Infusion Adjustment (DO NOT MODIFY ANSWER)  Answer:  \\[email or web address]\epic\Images\Pharmacy\HeparinInfusions\heparin HIGH INTENSITY nomogram with ANTI-XA [id number]    08/18/20 1444     heparin 25,000 units in dextrose 5% (100 units/ml) IV bolus from bag - ADDITIONAL PRN BOLUS - 30 units/kg  As needed (PRN)     Question:  Heparin Infusion Adjustment (DO NOT MODIFY ANSWER)  Answer:  \\[email or web address]\epic\Images\Pharmacy\HeparinInfusions\heparin HIGH INTENSITY nomogram with ANTI-XA [id number]    08/18/20 1444     IP VTE HIGH RISK PATIENT  Once      08/17/20 0859     Place sequential compression device  Until discontinued      08/17/20 0516                Thank you for your consult. We will follow-up with patient. Please contact us if you have any additional questions.    Rylee Ricci, DO  Cardiology   [facility]  I have personally taken the history and examined the patient and agree with the resident's note as stated above.  As per my note earlier.

## 2020-08-19 NOTE — CARE UPDATE
"I spoke with the patient and his son, Pramod Powell Jr. at the bedside about the patient's code status and his wishes at the end of life. The patient says he has made peace with his multiple medical problems and his current life expectancy, given his advanced cancer and cardiovascular history. He says he would never want to be "hooked up to machines" for the rest of his life. However, he does say that if he had an arrest in the hospital that he would prefer to be resuscitated fully, including chest compressions and intubation, and have his son (POA) make the final decision afterwards on whether to withdraw care or not, depending on the clinical context. Documentation by the patient's PCP, Dr. Romero, states that he agreed to be DNR in June 2020 however this change was never formally made in the EMR. We will continue to discuss this with the patient and his son but for now the patient desires to be Full code, and this is reflected in the EMR currently.  "

## 2020-08-19 NOTE — SUBJECTIVE & OBJECTIVE
Past Medical History:   Diagnosis Date    Acquired hypothyroidism 5/26/2017    Benign essential HTN 5/26/2017    Benign prostatic hyperplasia (BPH) with urinary urge incontinence 6/6/2017    Chronic low back pain 6/1/2017    Coronary artery disease involving native coronary artery of native heart without angina pectoris 5/26/2017    S/p 5 stents - last one around 2010-11.    Gastroesophageal reflux disease without esophagitis 5/26/2017    History of CVA (cerebrovascular accident) 5/26/2017    Hypertension associated with diabetes 5/26/2017    BP controlled on ACE, CCB    Lumbar disc lesion 5/26/2017    Metastatic renal cell carcinoma to bone 6/6/2017    Metastatic renal cell carcinoma to liver 6/6/2017    Liver Biopsy 5-2017: METASTATIC RENAL CELL CARCINOMA.    Mixed hyperlipidemia 5/26/2017    Type 2 diabetes mellitus with stage 3 chronic kidney disease, with long-term current use of insulin 5/26/2017       Past Surgical History:   Procedure Laterality Date    ABDOMINAL SURGERY      ANTERIOR CERVICAL DISCECTOMY W/ FUSION N/A 8/17/2020    Procedure: DISCECTOMY, SPINE, CERVICAL, ANTERIOR APPROACH, WITH FUSION co case with Dr. Falcon C6/7 [facility] SNS:vocal cord/motors/SSEP Regular OR table Patient needs scope in preop;  Surgeon: Filemon Aguayo MD;  Location: [facility] OR [address] [place] [address];  Service: Neurosurgery;  Laterality: N/A;    APPENDECTOMY      BACK SURGERY      CARDIAC SURGERY      CATARACT EXTRACTION      CHOLECYSTECTOMY      CORONARY STENT PLACEMENT      5 stents    coronary stenting      X 5 last one in 2010-11.    DISSECTION OF NECK N/A 8/17/2020    Procedure: DISSECTION, NECK;  Surgeon: Rush Falcon MD;  [facility];  Service: ENT;  Laterality: N/A;    INTRAOCULAR PROSTHESES INSERTION Right 6/27/2019    Procedure: INSERTION, IOL PROSTHESIS;  Surgeon: Chary Culp MD;  [facility];  Service: Ophthalmology;  Laterality: Right;    INTRAOCULAR PROSTHESES  INSERTION Left 8/22/2019    Procedure: INSERTION, IOL PROSTHESIS;  Surgeon: Chary Culp MD;  Location: [facility] OR [address] [place] [address];  Service: Ophthalmology;  Laterality: Left;    PHACOEMULSIFICATION OF CATARACT Right 6/27/2019    Procedure: PHACOEMULSIFICATION, CATARACT;  Surgeon: Chary Culp MD;  Location: [facility] OR [address] [place] [address];  Service: Ophthalmology;  Laterality: Right;    PHACOEMULSIFICATION OF CATARACT Left 8/22/2019    Procedure: PHACOEMULSIFICATION, CATARACT;  Surgeon: Chary Culp MD;  Location: [address];  Service: Ophthalmology;  Laterality: Left;    SPINAL FUSION         Review of patient's allergies indicates:  No Known Allergies    Current Facility-Administered Medications on File Prior to Encounter   Medication    phenylephrine HCL 2.5% ophthalmic solution 1 drop    proparacaine 0.5 % ophthalmic solution 1 drop    tropicamide 1% ophthalmic solution 1 drop     Current Outpatient Medications on File Prior to Encounter   Medication Sig    ascorbic acid (VITAMIN C ORAL) Take by mouth once daily. Hold for 1 week prior to surgery    azelastine (ASTELIN) 137 mcg (0.1 %) nasal spray 1 spray (137 mcg total) by Nasal route 2 (two) times daily. (Patient taking differently: 1 spray by Nasal route 2 (two) times daily. Take if needed)    b complex vitamins (B COMPLEX 1) tablet Take 1 tablet by mouth once daily. (Patient taking differently: Take 1 tablet by mouth once daily. Hold for 1 week prior to surgery)    cholecalciferol, vitamin D3, (VITAMIN D3) 125 mcg (5,000 unit) Tab Take 1 tablet (5,000 Units total) by mouth once daily. (Patient taking differently: Take 5,000 Units by mouth once daily. Hold for 1 week prior to surgery)    montelukast (SINGULAIR) 10 mg tablet Take 10 mg by mouth every evening.    multivitamin (THERAGRAN) per tablet Take 1 tablet by mouth once daily.    PAZOpanib (VOTRIENT) 200 mg Tab Take 4 tablets (800 mg total) by mouth once daily. (Patient taking differently: Take 800  mg by mouth once daily Take in am of surgery.)    rosuvastatin (CRESTOR) 40 MG Tab Take 1 tablet (40 mg total) by mouth every evening. (Patient taking differently: Take 40 mg by mouth once daily. Take in am of surgery)    tamsulosin (FLOMAX) 0.4 mg Cap Take 2 capsules (0.8 mg total) by mouth every evening. (Patient taking differently: Take 0.8 mg by mouth once daily. Take in am of surgery)    vitamin E acetate (VITAMIN E ORAL) Take by mouth once daily. Hold for 1 week prior to surgery    aspirin (ECOTRIN) 81 MG EC tablet Take 81 mg by mouth once daily. Will ask MD to hold 1 week    blood sugar diagnostic Strp 1 strip by Misc.(Non-Drug; Combo Route) route 2 (two) times daily as needed.    blood-glucose meter Misc use as instructed    clindamycin (CLEOCIN T) 1 % external solution AAA bid (Patient not taking: Reported on 8/12/2020)    diphenhydramine HCl (BENADRYL ALLERGY ORAL) Take by mouth daily as needed. Hold am of surgery    fluticasone propionate (FLONASE) 50 mcg/actuation nasal spray 1 SPRAY (50 MCG TOTAL) BY EACH NARE ROUTE ONCE DAILY. (Patient taking differently: 1 spray by Each Nostril route once daily. Take if needed)    insulin (LANTUS SOLOSTAR U-100 INSULIN) glargine 100 units/mL (3mL) SubQ pen Inject 5 Units into the skin every evening. (Patient taking differently: Inject 5 Units into the skin every evening. Take 80%=4U night before surgery)    lancets (ONETOUCH ULTRASOFT LANCETS) Misc 1 lancet by Misc.(Non-Drug; Combo Route) route 2 (two) times daily as needed.    levocetirizine (XYZAL) 5 MG tablet Take 1 tablet (5 mg total) by mouth every evening. Can take up to 2 tablets (Patient taking differently: Take 5 mg by mouth every evening. Can take up to 2 tablets  Takes at night)    levothyroxine (SYNTHROID) 88 MCG tablet Take 1 tablet (88 mcg total) by mouth once daily. (Patient taking differently: Take 88 mcg by mouth once daily. Take in am of surgery)    ondansetron (ZOFRAN) 8 MG tablet Take  "1 tablet (8 mg total) by mouth 3 (three) times daily as needed. (Patient taking differently: Take 8 mg by mouth 3 (three) times daily as needed. Take if needed)    pen needle, diabetic 29 gauge x 1/2" Ndle 1 application by Misc.(Non-Drug; Combo Route) route once daily.     Family History     Problem Relation (Age of Onset)    Diabetes Mother, Brother    Glaucoma Mother, Maternal Grandmother    Heart attack Mother    No Known Problems Son        Tobacco Use    Smoking status: Former Smoker     Types: Cigarettes    Smokeless tobacco: Never Used    Tobacco comment: haven't smoked in last 25 yrs   Substance and Sexual Activity    Alcohol use: Yes     Comment: rarely     Drug use: Not Currently    Sexual activity: Not Currently     Partners: Female     Review of Systems   Constitution: Negative for chills and fever.   Eyes: Negative for blurred vision and double vision.   Cardiovascular: Positive for dyspnea on exertion. Negative for chest pain, irregular heartbeat, leg swelling, orthopnea, palpitations and paroxysmal nocturnal dyspnea.   Respiratory: Positive for shortness of breath.    Neurological: Negative for dizziness.     Objective:     Vital Signs (Most Recent):  Temp: 99 °F (37.2 °C) (08/19/20 1500)  Pulse: 86 (08/19/20 1545)  Resp: (!) 35 (08/19/20 1545)  BP: (!) 144/64 (08/19/20 1500)  SpO2: (!) 93 % (08/19/20 1545) Vital Signs (24h Range):  Temp:  [97.9 °F (36.6 °C)-100.1 °F (37.8 °C)] 99 °F (37.2 °C)  Pulse:  [] 86  Resp:  [10-35] 35  SpO2:  [90 %-97 %] 93 %  BP: (143-163)/() 144/64     Weight: 79.8 kg (176 lb)  Body mass index is 27.57 kg/m².    SpO2: (!) 93 %  O2 Device (Oxygen Therapy): Comfort Flow(airvo)      Intake/Output Summary (Last 24 hours) at 8/19/2020 9899  Last data filed at 8/19/2020 1500  Gross per 24 hour   Intake 3005 ml   Output 2400 ml   Net 605 ml       Lines/Drains/Airways     Drain                 Closed/Suction Drain 08/17/20 Anterior Neck Bulb 10 Fr. 2 days    Male " External Urinary Catheter 08/18/20 0500 Small 1 day          Peripheral Intravenous Line                 Peripheral IV - Single Lumen 01/24/20 1447 22 G Anterior;Right Hand 208 days         Peripheral IV - Single Lumen 05/19/20 0924 22 G Right Hand 92 days         Peripheral IV - Single Lumen 08/17/20 0615 18 G Left Forearm 2 days         Peripheral IV - Single Lumen 08/17/20 0715 18 G Right Forearm 2 days         Peripheral IV - Single Lumen 08/18/20 0500 20 G Anterior;Left;Proximal Forearm 1 day                Physical Exam   Constitutional: He is oriented to person, place, and time. He appears well-developed and well-nourished.   HENT:   Head: Normocephalic and atraumatic.   Neck: No JVD present.   Sutures intact, JIN drain in place.    Cardiovascular: Normal rate, regular rhythm and normal heart sounds.   Pulmonary/Chest: Effort normal and breath sounds normal. He has no wheezes. He has no rales.   On comfort flow   Abdominal: Soft. Bowel sounds are normal.   Musculoskeletal:         General: No edema.   Neurological: He is alert and oriented to person, place, and time.   Skin: Skin is warm and dry.       Significant Labs:   ABG:   Recent Labs   Lab 08/18/20  1620 08/18/20  1959 08/18/20 2007   PH 7.223* 7.262* 7.259*   PCO2 41.8 42.7 41.1   HCO3 17.2* 19.3* 18.4*   POCSATURATED 83* 81* 96   BE -10 -8 -9   , CMP   Recent Labs   Lab 08/18/20  0051  08/18/20  0438 08/18/20  1611 08/19/20  0209      < > 140 142 142   K 4.8   < > 5.0 4.1 3.9   *   < > 115* 114* 111*   CO2 14*   < > 15* 18* 20*   *   < > 106 151* 152*   BUN 43*   < > 44* 41* 40*   CREATININE 3.3*   < > 3.4* 3.5* 3.4*   CALCIUM 8.4*   < > 8.3* 8.0* 7.9*   PROT 6.7  --  6.6  --  5.3*   ALBUMIN 2.9*  --  2.8*  --  1.9*   BILITOT 0.2  --  0.2  --  0.2   ALKPHOS 105  --  100  --  76   AST 37  --  36  --  47*   ALT 25  --  19  --  <5*   ANIONGAP 12   < > 10 10 11   ESTGFRAFRICA 20.0*   < > 19.3* 18.6* 19.3*   EGFRNONAA 17.3*   < > 16.7*  16.1* 16.7*    < > = values in this interval not displayed.    and Troponin   Recent Labs   Lab 08/19/20  0005 08/19/20  0632 08/19/20  1312   TROPONINI 8.769* 8.080* 5.726*     Significant Imaging:     Echocardiogram:   Technically challenging portable study with poor endocardial visualization.  · Low normal left ventricular systolic function. The estimated ejection fraction is 50-55%.  · Local segmental wall motion abnormalities.  · Grade I (mild) left ventricular diastolic dysfunction consistent with impaired relaxation.  · Concentric left ventricular remodeling.  · Normal right ventricular systolic function.  · The estimated PA systolic pressure is 26 mmHg.  · Normal central venous pressure (3 mmHg).     EKG: NSR 94. Nonspecific T wave abnormality.

## 2020-08-19 NOTE — PT/OT/SLP PROGRESS
Occupational Therapy      Patient Name:  Edwin Powell   MRN:  [id number]    Patient not seen today secondary to pt c NSTEMI vs PE. Will follow-up tomorrow.    PAM Augustin  8/19/2020

## 2020-08-19 NOTE — HPI
Per chart review, Edwin Sol is a 75-year-old male with PMHx of cervical stenosis, metastatic renal cell carcinoma (liver and L3 mets), CKD, CAD, hypothyroidism, DM2, HLD, BPH, HTN and GERD .  Patient presented to [facility] on 8/17 for scheduled  C-spine fusion. Now s/p ACDF C6-C7 with otho and ENT on 8/18. Hospital course further complicated by apnea, NSTEMI, Acute metabolic encephalopathy, & VERENA superimposed on CKD. Currently on comfort flow 02 and a heparin gtt.     Functional History: Patient lives with son in a single story home with threshold  to enter.  Prior to admission, (I) with ADLs and mobility. DME: walker, rolling, cane, quad, shower chair.

## 2020-08-19 NOTE — TELEPHONE ENCOUNTER
"----- Message from Sean Cummings sent at 8/19/2020  1:06 PM CDT -----  Regarding: Pt Hospitalized. Please call  Patient is currently in the hospital. Spoke to son (Pramod) who states that they thought he had an accidental OD of his pain medications, they are now being told he had a heart attack. He says his father "in and out of it, not doing great" would like to cancel appt scheduled for 8/20/20 and would like to follow up with you when you are available.     "

## 2020-08-19 NOTE — PLAN OF CARE
Pt seen and examined at bedside. Notified of increasing O2 requirements requiring switch to bipap 70%. Denies chest pain at this time. Neuro exam stable. 5/5  strength bilaterally, SILT M/U/R. No increased anterior cervical swelling. Dressing c/d/i. No increase in drain output. Will continue to monitor for hematoma formation. Please notify ortho on call if pt requires intubation overnight.     Troponin increased from 1.2 to 6.9, no ekg changes noted. Started on ASA/Plavix by critical care team for presumed nstemi. Continue heparin gtt. Appreciate excellent care by critical care team.

## 2020-08-19 NOTE — NURSING
NP notified of increasing oxygen requirements, pt switched to comfort flow to BiPAP at 70%. EKG and ABG obtained. Clarified orders for PO aspirin and plavix, NP ok with giving PO meds. Will implement orders and continue to monitor closely.

## 2020-08-19 NOTE — HOSPITAL COURSE
08/18/2020:  Sit to stand ModA.  Ambulated 3 steps bwd/fwd ModA .  UBD TA.  8/22: BM SBA-SV. Sit to stand & trxs SBA & RW. Ambulated 50 ft SBA & rw.

## 2020-08-19 NOTE — ASSESSMENT & PLAN NOTE
-s/p C5/6 ACDF on 8/17/20 for cervical stenosis with postop course complicated by apnea and NSTEMI per ortho

## 2020-08-19 NOTE — SUBJECTIVE & OBJECTIVE
Principal Problem:Cervical stenosis of spine    Principal Orthopedic Problem: s/p ACDF    Interval History:   Pt seen and examined at the bedside this am. Troponins elevated overnight, started on ASA and plavix. Bipap overnight, rich 25 comfortflow this am. Pain controlled this am. Denies new numbness/tingling/weakness.     Review of patient's allergies indicates:  No Known Allergies    Current Facility-Administered Medications   Medication    acetaminophen tablet 650 mg    albuterol-ipratropium 2.5 mg-0.5 mg/3 mL nebulizer solution 3 mL    albuterol-ipratropium 2.5 mg-0.5 mg/3 mL nebulizer solution 3 mL    amLODIPine tablet 10 mg    aspirin chewable tablet 81 mg    bisacodyL suppository 10 mg    carvediloL tablet 6.25 mg    clopidogreL tablet 75 mg    dextrose 50% injection 12.5 g    dextrose 50% injection 25 g    docusate sodium capsule 50 mg    glucagon (human recombinant) injection 1 mg    glucose chewable tablet 16 g    glucose chewable tablet 24 g    heparin 25,000 units in dextrose 5% (100 units/ml) IV bolus from bag - ADDITIONAL PRN BOLUS - 30 units/kg    heparin 25,000 units in dextrose 5% (100 units/ml) IV bolus from bag - ADDITIONAL PRN BOLUS - 60 units/kg    heparin 25,000 units in dextrose 5% 250 mL (100 units/mL) infusion HIGH INTENSITY nomogram Anti- Xa    insulin aspart U-100 pen 0-5 Units    levothyroxine tablet 88 mcg    magnesium sulfate 2g in water 50mL IVPB (premix)    naloxone 0.4 mg/mL injection 0.2 mg    ondansetron injection 4 mg    polyethylene glycol packet 17 g    rosuvastatin tablet 40 mg    sodium chloride 0.9% flush 10 mL    sodium chloride 0.9% flush 5 mL    tamsulosin 24 hr capsule 0.8 mg     Facility-Administered Medications Ordered in Other Encounters   Medication    phenylephrine HCL 2.5% ophthalmic solution 1 drop    proparacaine 0.5 % ophthalmic solution 1 drop    tropicamide 1% ophthalmic solution 1 drop     Objective:     Vital Signs (Most  "Recent):  Temp: 98.6 °F (37 °C) (08/19/20 0700)  Pulse: 91 (08/19/20 0745)  Resp: (!) 24 (08/19/20 0745)  BP: (!) 154/68 (08/19/20 0700)  SpO2: (!) 94 % (08/19/20 0750) Vital Signs (24h Range):  Temp:  [97.9 °F (36.6 °C)-100.1 °F (37.8 °C)] 98.6 °F (37 °C)  Pulse:  [] 91  Resp:  [10-33] 24  SpO2:  [90 %-98 %] 94 %  BP: (131-188)/() 154/68     Weight: 79.8 kg (176 lb)  Height: 5' 7" (170.2 cm)  Body mass index is 27.57 kg/m².      Intake/Output Summary (Last 24 hours) at 8/19/2020 0808  Last data filed at 8/19/2020 0500  Gross per 24 hour   Intake 3305 ml   Output 1040 ml   Net 2265 ml       Ortho/SPM Exam    Anterior cervical dressing in place with drain- 27 cc  Neuro exam stable    Significant Labs:   ABGs:   Recent Labs   Lab 08/18/20 2007   PH 7.259*   PCO2 41.1   HCO3 18.4*   POCSATURATED 96   BE -9     CBC:   Recent Labs   Lab 08/18/20  0438 08/18/20  1514 08/19/20  0209   WBC 11.41 3.04* 6.92   HGB 9.7* 10.4* 9.8*   HCT 31.5* 32.8* 30.5*    261 279     CMP:   Recent Labs   Lab 08/18/20  0051  08/18/20  0438 08/18/20  1611 08/19/20  0209      < > 140 142 142   K 4.8   < > 5.0 4.1 3.9   *   < > 115* 114* 111*   CO2 14*   < > 15* 18* 20*   *   < > 106 151* 152*   BUN 43*   < > 44* 41* 40*   CREATININE 3.3*   < > 3.4* 3.5* 3.4*   CALCIUM 8.4*   < > 8.3* 8.0* 7.9*   PROT 6.7  --  6.6  --  5.3*   ALBUMIN 2.9*  --  2.8*  --  1.9*   BILITOT 0.2  --  0.2  --  0.2   ALKPHOS 105  --  100  --  76   AST 37  --  36  --  47*   ALT 25  --  19  --  <5*   ANIONGAP 12   < > 10 10 11   EGFRNONAA 17.3*   < > 16.7* 16.1* 16.7*    < > = values in this interval not displayed.     All pertinent labs within the past 24 hours have been reviewed.    Significant Imaging: I have reviewed and interpreted all pertinent imaging results/findings.  "

## 2020-08-19 NOTE — CARE UPDATE
Pt with uptrending troponin (1.2 > 6.9). EKG without acute ischemic changes. No c/o chest pain. 2D echo from earlier today reviewed; EF 50-55%, grade 1 DD, septal hypokinesis noted.     Patient already started on heparin gtt earlier today, awaiting call back from Ortho to see if initiating DAPT in setting of recent ACDF is safe.     Will continue trending troponins. Discussed with Cardiology who will evaluate patient formally in am (sooner if acute decline overnight).    Aminta Gould NP  Critical Care Medicine

## 2020-08-19 NOTE — PLAN OF CARE
Pt following commands and moving all extremities spontaneously. MAPs >65 throughout shift. Currently on BiPAP 60%, O2 sats 97%. Heparin gtt @ 14 units/kg/hr. Plavix and aspirin given PO. Cards consulted. 5 cc serosanguinous drainage from JIN drain. Urine output adequate throughout shift. MD updated on lytes, no replacements ordered at this time. Plan of care reviewed with patient. All questions and concerns addressed.

## 2020-08-19 NOTE — PROGRESS NOTES
[facility]  Critical Care Medicine  Progress Note    Patient Name: Edwin Powell  MRN: [id number]  Admission Date: 8/17/2020  Hospital Length of Stay: 2 days  Code Status: Full Code  Attending Provider: Mandeep Byrd MD  Primary Care Provider: Lluú Lewis MD   Principal Problem: Cervical stenosis of spine    Subjective:     HPI:  Patient is a 75 y.o. 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 Patient underwent ACDF of C6/7 with Ortho & ENT on 8/17. During the procedure he received rocuronium, ketamine, fentanyl, propofol and midazolam. He required a renae infusion during the procedure. Post-operatively the patient was admitted to post-op ortho unit and was placed on his home 2L O2. After his procedure he received multiple doses of oxycodone for is neck pain. Overnight on 8/17-8/18 the patient had multiple periods of apnea requiring 3 doses of narcan on the floor with minimal improvement in respiratory and mental status. ABG showed combined metabolic and respiratory acidosis. No significant improvement in acidosis with initiation of bipap on the floor and patient continued to have apneic periods and worsening hypoxia requiring 15L NC. He was transferred to ICU for further management.     Hospital/ICU Course:  Patient was transferred to MICU and started on narcan and bicarbonate infusions. Hypoxia worsened requiring up to 30L comfort flow. Mental status improved and narcan gtt discontinued on 8/18 in AM. PE suspected however CTA not feasible due to renal function. Bilateral lower extremity U/S negative for DVT, however because of quickly worsening O2 requirements and elevated troponin, he was empirically started on a heparin gtt for possible PE. TTE did not  show RV strain but did show focal wall motion abnormalities, and troponin continued to uptrend so patient was loaded with DAPT for NSTEMI. CVP 3 on TTE however pulmonary edema on CXR. Restarted diuresis intravenously.    Interval History/Significant Events: Overnight patient's troponin continued to uptrend and was loaded with DAPT per NSTEMI protocol (heparin gtt continued). Patient remained hemodynamically stable but still with significant O2 requirements. This morning patient is awake, alert and conversational.     Review of Systems   Constitutional: Positive for fatigue. Negative for fever.   HENT: Negative for congestion, ear pain, sinus pain and sore throat.    Eyes: Negative for visual disturbance.   Respiratory: Positive for shortness of breath. Negative for cough.    Cardiovascular: Negative for chest pain and leg swelling.   Gastrointestinal: Negative for abdominal pain, constipation, diarrhea, nausea and vomiting.   Genitourinary: Negative for decreased urine volume, difficulty urinating and dysuria.   Musculoskeletal: Positive for back pain and neck pain.   Skin: Negative for rash.   Neurological: Negative for dizziness, syncope and headaches.   Psychiatric/Behavioral: Negative for confusion and decreased concentration.     Objective:     Vital Signs (Most Recent):  Temp: 98.6 °F (37 °C) (08/19/20 0700)  Pulse: 86 (08/19/20 1145)  Resp: (!) 22 (08/19/20 1145)  BP: (!) 148/70 (08/19/20 1100)  SpO2: (!) 94 % (08/19/20 1145) Vital Signs (24h Range):  Temp:  [97.9 °F (36.6 °C)-100.1 °F (37.8 °C)] 98.6 °F (37 °C)  Pulse:  [] 86  Resp:  [10-28] 22  SpO2:  [90 %-97 %] 94 %  BP: (145-188)/() 148/70   Weight: 79.8 kg (176 lb)  Body mass index is 27.57 kg/m².      Intake/Output Summary (Last 24 hours) at 8/19/2020 1244  Last data filed at 8/19/2020 1130  Gross per 24 hour   Intake 3005 ml   Output 1560 ml   Net 1445 ml       Physical Exam  Vitals signs and nursing note reviewed.   Constitutional:        General: He is not in acute distress.     Comments: Laying in bed comfortably   HENT:      Head: Normocephalic and atraumatic.      Right Ear: External ear normal.      Left Ear: External ear normal.      Nose: Nose normal.      Mouth/Throat:      Mouth: Mucous membranes are moist.      Pharynx: Oropharynx is clear.   Eyes:      Conjunctiva/sclera: Conjunctivae normal.   Neck:      Comments: L side JIN drain present with serosanguinous drainage  Cardiovascular:      Rate and Rhythm: Normal rate and regular rhythm.      Pulses: Normal pulses.      Heart sounds: Normal heart sounds. No murmur.   Pulmonary:      Effort: Pulmonary effort is normal.      Comments: On comfort flow NC. Diminished breath sounds at the bases  Abdominal:      General: Abdomen is flat. Bowel sounds are normal. There is no distension.      Palpations: Abdomen is soft.      Tenderness: There is no abdominal tenderness.   Genitourinary:     Penis: Normal.    Musculoskeletal: Normal range of motion.         General: No swelling or tenderness.      Right lower leg: No edema.      Left lower leg: No edema.   Skin:     General: Skin is warm and dry.      Capillary Refill: Capillary refill takes less than 2 seconds.   Neurological:      Mental Status: He is oriented to person, place, and time.      GCS: GCS eye subscore is 4. GCS verbal subscore is 5. GCS motor subscore is 6.      Motor: Motor function is intact.      Comments: Upper and lower extremity muscle strength 5/5         Vents:  Oxygen Concentration (%): 60 (08/19/20 1120)  Lines/Drains/Airways     Drain                 Closed/Suction Drain 08/17/20 Anterior Neck Bulb 10 Fr. 2 days    Male External Urinary Catheter 08/18/20 0500 Small 1 day          Peripheral Intravenous Line                 Peripheral IV - Single Lumen 01/24/20 1447 22 G Anterior;Right Hand 207 days         Peripheral IV - Single Lumen 05/19/20 0924 22 G Right Hand 92 days         Peripheral IV - Single Lumen 08/17/20 0615  18 G Left Forearm 2 days         Peripheral IV - Single Lumen 08/17/20 0715 18 G Right Forearm 2 days         Peripheral IV - Single Lumen 08/18/20 0500 20 G Anterior;Left;Proximal Forearm 1 day              Significant Labs:    CBC/Anemia Profile:  Recent Labs   Lab 08/18/20  0438 08/18/20  1514 08/19/20  0209   WBC 11.41 3.04* 6.92   HGB 9.7* 10.4* 9.8*   HCT 31.5* 32.8* 30.5*    261 279   * 105* 102*   RDW 15.1* 15.2* 15.3*        Chemistries:  Recent Labs   Lab 08/18/20  0051  08/18/20  0438 08/18/20  1611 08/19/20  0209      < > 140 142 142   K 4.8   < > 5.0 4.1 3.9   *   < > 115* 114* 111*   CO2 14*   < > 15* 18* 20*   BUN 43*   < > 44* 41* 40*   CREATININE 3.3*   < > 3.4* 3.5* 3.4*   CALCIUM 8.4*   < > 8.3* 8.0* 7.9*   ALBUMIN 2.9*  --  2.8*  --  1.9*   PROT 6.7  --  6.6  --  5.3*   BILITOT 0.2  --  0.2  --  0.2   ALKPHOS 105  --  100  --  76   ALT 25  --  19  --  <5*   AST 37  --  36  --  47*   MG 1.7  --  1.7  --  1.4*   PHOS 6.4*  --  6.7*  --  5.3*    < > = values in this interval not displayed.       All pertinent labs within the past 24 hours have been reviewed.    Significant Imaging:  I have reviewed all pertinent imaging results/findings within the past 24 hours.      ABG  Recent Labs   Lab 08/18/20 2007   PH 7.259*   PO2 91   PCO2 41.1   HCO3 18.4*   BE -9     Assessment/Plan:     Neuro  * Cervical stenosis of spine  --s/p ACDF on 8/17 with Ortho and ENT; management per ortho    Acute metabolic encephalopathy  --suspect medication induced insetting of decreased renal clearance as pt responded well to narcan. 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 Risk factors include recent surgery and metastatic RCC.     - CTA chest deferred due to VERENA  - Bilat LE U/S negative for DVT  - TTE negative for RV strain with normal PA pressure. Showed chronic HFpEF and focal wall motion abnormalities  - Troponin elevated and peaked at 8.8, BNP mildly elevated (no baseline)  - Started heparin gtt empirically for PE. OK'd by ortho to start A/C so soon after surgery due to rapidly worsening hypoxia  - Wean O2 to goal sat > 90%  - Lasix trial 100 mg IV today, with strict I/Os for pulmonary edema on CXR and improved BPs    Cardiac/Vascular  NSTEMI (non-ST elevated myocardial infarction)  Coronary artery disease involving native coronary artery of native heart with angina pectoris  Elevated troponin  CAD with history of PCI ~20 years ago. Patient did not have chest pain. Troponin was checked when concerned for PE, initially was 1.2 however continued to uptrend and peaked at 8.8.    - Continued heparin gtt, at least for 48 hours for NSTEMI  - Loaded with ASA and Plavix, Ok'd by ortho, and will continue  - Continue coreg  - Starting statin    Coronary artery disease involving native coronary artery of native heart with angina pectoris  --home asa on hold in setting of recent spinal surgery; will defer to ortho on when it is safe to resume    Renal/  Metabolic acidosis, normal anion gap (NAG)  --2/2 rta in setting of verena on ckd vs recent diarrhea (per recent nephro clinic note)  --UAG positive (38) suggestive of rta  --Transition from bicarb gtt to PO sodium bicarb 1300 mg BID    Acute kidney injury superimposed on chronic kidney disease  --baseline creatinine ~ 2; now 3.3  -- ? Etiology. Recent Nephro note states pt has had decreased po intake & diarrhea recently - ? Pre-renal. Pt net + 1.4L since admission and has been having maintenance IVF, so likely volume resuscitated at this point  --holding home lasix & lisinopril initially. Restarting  lasix on 8/19 due to pulmonary edema, will recheck BMP/Mg and UOP after 1 dose of IV lasix 100 mg  --renal U/S unremarkable    Benign prostatic hyperplasia (BPH) with urinary urge incontinence  --continue home flomax    Endocrine  Type 2 diabetes mellitus with stage 3 chronic kidney disease, without long-term current use of insulin  --per clinic notes, insulin dependent  --home regimen lantus 5 units daily   --start lantus 2 units qhs & low dose correctional ssi; titrate prn    Acquired hypothyroidism  --continue home levothyroxine  --TSH elevated however fT4 WNL       Critical Care Daily Checklist:    A: Awake: RASS Goal/Actual Goal: RASS Goal: 0-->alert and calm  Actual: Montoya Agitation Sedation Scale (RASS): Alert and calm   B: Spontaneous Breathing Trial Performed?  N/A   C: SAT & SBT Coordinated?  N/A                      D: Delirium: CAM-ICU Overall CAM-ICU: Negative   E: Early Mobility Performed? Yes   F: Feeding Goal:    Status:     Current Diet Order   Procedures    Diet NPO      AS: Analgesia/Sedation None   T: Thromboembolic Prophylaxis Heparin gtt   H: HOB > 300 Yes   U: Stress Ulcer Prophylaxis (if needed) N/A   G: Glucose Control LDISS   B: Bowel Function Stool Occurrence: 1   I: Indwelling Catheter (Lines & Ahumada) Necessity PIV, JIN drain, condom catheter   D: De-escalation of Antimicrobials/Pharmacotherapies Switched bicarb gtt to oral    Plan for the day/ETD Continue NSTEMI medical management, lasix trial    Code Status:  Family/Goals of Care: Full Code         Critical secondary to Patient has a condition that poses threat to life and bodily function: Severe Respiratory Distress      Critical care was time spent personally by me on the following activities: development of treatment plan with patient or surrogate and bedside caregivers, discussions with consultants, evaluation of patient's response to treatment, examination of patient, ordering and performing treatments and interventions,  ordering and review of laboratory studies, ordering and review of radiographic studies, pulse oximetry, re-evaluation of patient's condition. This critical care time did not overlap with that of any other provider or involve time for any procedures.     Curt Bryan,   Critical Care Medicine  [facility]

## 2020-08-19 NOTE — PLAN OF CARE
This is a 75-year-old gentleman, Cardiology consulted for NSTEMI.  Patient with history of CAD status post multiple PCIs (patient reported 5 stents), metastatic renal carcinoma (metastatic lesions to L3 and liver), chronic hypoxic respiratory failure, HLD, HTN, insulin-dependent diabetes, GERD who presented on 08/17 for ACDF of cervical 6/7 with Ortho and ENT.  Following procedure patient was hypotensive requiring Mark-Synephrine he was eventually admitted for further monitoring and observation.  On the night of 817 patient developed multiple episodes of apnea, required Narcan eventually went to acute hypoxic respiratory failure, transition to BiPAP and transferred to the ICU.  Further evaluation demonstrated a troponin of 8 (peak), EKG with no acute ST changes, bedside echocardiogram did not demonstrate any RV strain, concern for PE our CT a was not feasible due to patient's ongoing a KI and baseline CKD.  prior echocardiogram on 08/18 demonstrating EF of 50% with LAD wall motion abnormalities, CVP of 3 mm per mercury.    Patient seen and evaluated, with son at bedside and discussed plan with primary service, with patient's metastatic renal carcinoma and has progressive decline would recommend conservative management for now.  Additionally patient is fairly asymptomatic (no chest pain, palpitation) from his ACS.  He possibly developed flash pulmonary edema in the setting of hypertension, apnea, sedation leading to respiratory failure and NSTEMI.    -cardiology will continue to follow please call for any urgent questions or concerns  -would recommend conservative medical management from his ACS standpoint, continue aspirin and Plavix  -would continue heparin for 48 hours in regards to his ACS  -can consider lower extremity Dopplers/V/Q scan to evaluate for VTE, agree with treating prophylactic anticoagulation  -agree with conservative diuresis  -if able to obtain records regarding his angiogram    Seymour Kong  M.D.  Page # [phone]  Cardiovascular Fellow PGY-V  [facility]   I have personally taken the history and examined the patient and agree with the resident's note as stated above.

## 2020-08-19 NOTE — HPI
Mr. Powell is a 75 year-old with of CAD s/p multiple PCIs (patient reported 5 stents), metastatic renal cell carcinoma (metastasis to L3 and liver), chronic hypoxic respiratory failure, HLD, HTN, insulin-dependent diabetes and GERD who presented on 08/17 for ACDF of cervical 6/7 with Ortho and ENT.  Following procedure patient was hypotensive requiring Mark-Synephrine he was eventually admitted for further monitoring and observation.  On the night of 8/17 patient developed multiple episodes of apnea, required Narcan, eventually went to acute hypoxic respiratory failure, was transitioned to BiPAP and transferred to the ICU.  Further evaluation demonstrated a troponin of 8 (peak), EKG with no acute ST changes, bedside echocardiogram did not demonstrate any RV strain. There was concern for PE but CT a was not feasible due to patient's ongoing VERENA and baseline CKD.  Echocardiogram on 08/18 demonstrating EF of 50% with LAD wall motion abnormalities, CVP of 3 mm Hg.

## 2020-08-19 NOTE — ASSESSMENT & PLAN NOTE
Coronary artery disease involving native coronary artery of native heart with angina pectoris  Elevated troponin  CAD with history of PCI ~20 years ago. Patient did not have chest pain. Troponin was checked when concerned for PE, initially was 1.2 however continued to uptrend and peaked at 8.8.    - Continued heparin gtt, at least for 48 hours for NSTEMI  - Loaded with ASA and Plavix, Ok'd by ortho, and will continue  - Continue coreg  - Starting statin

## 2020-08-19 NOTE — ASSESSMENT & PLAN NOTE
--baseline creatinine ~ 2; now 3.3  -- ? Etiology. Recent Nephro note states pt has had decreased po intake & diarrhea recently - ? Pre-renal. Pt net + 1.4L since admission and has been having maintenance IVF, so likely volume resuscitated at this point  --holding home lasix & lisinopril initially. Restarting lasix on 8/19 due to pulmonary edema, will recheck BMP/Mg and UOP after 1 dose of IV lasix 100 mg  --renal U/S unremarkable

## 2020-08-19 NOTE — ASSESSMENT & PLAN NOTE
Per RN on 8/19 patient had dysphagia and coughing after medication administration. Bedside swallow eval not encouraging.     - Formal SLP eval and NPO for now

## 2020-08-19 NOTE — ASSESSMENT & PLAN NOTE
--2/2 rta in setting of ozzy on ckd vs recent diarrhea (per recent nephro clinic note)  --UAG positive (38) suggestive of rta  --Transition from bicarb gtt to PO sodium bicarb 1300 mg BID

## 2020-08-19 NOTE — PLAN OF CARE
JANE met with patient's son at bedside and reviewed discharge plan of IRF. Patient's son declines IRF stating that his father will not want to go somewhere else after discharge from the hospital.     SW reviewed discharge plan of HH and patient's son states that patient has fired several HH providers in the past and will not be agreeable to that plan, either.     Patient's son expresses that he has taken care of his father for the past 5 years and feels equipped to continue providing 24/7 care for his father as long as he is discharged with needed medical equipment (I.e. wheelchair). Patient's son stated that his father has used [facility] for OP PT/OT in the past and that he would like to use them for any rehab needs his father may have post-discharge.        08/19/20 1056   Post-Acute Status   Post-Acute Authorization Placement;Other   Post-Acute Placement Status Patient/Family Changed Mind   Other Status See Comments  (OP PT/OT)   Discharge Delays None known at this time   Discharge Plan   Discharge Plan A Home with family   Discharge Plan B Home Health     Ashtyn Hope LMSW   - Case Management

## 2020-08-19 NOTE — ASSESSMENT & PLAN NOTE
Troponins 1.2> 6.8> 8.7, EKG with non specific T wave changes.  NSTEMI likely type 2  With patient's metastatic renal carcinoma and has progressive decline would recommend conservative management for now.  Additionally patient is fairly asymptomatic (no chest pain, palpitation) from his ACS. He possibly developed flash pulmonary edema in the setting of hypertension, apnea, sedation leading to respiratory failure and NSTEMI.     -Recommend conservative medical management from his ACS standpoint, continue aspirin and Plavix, rosuvastatin and carvedilol  -Would continue heparin for 48 hours in regards to his ACS  -Consider lower extremity Dopplers/V/Q scan to evaluate for VTE, agree with treating prophylactic anticoagulation  -Agree with conservative diuresis  -If able, obtain records regarding his angiogram  -Cardiology will continue to follow please call for any urgent questions or concerns

## 2020-08-19 NOTE — SUBJECTIVE & OBJECTIVE
Interval History/Significant Events: Overnight patient's troponin continued to uptrend and was loaded with DAPT per NSTEMI protocol (heparin gtt continued). Patient remained hemodynamically stable but still with significant O2 requirements. This morning patient is awake, alert and conversational.     Review of Systems   Constitutional: Positive for fatigue. Negative for fever.   HENT: Negative for congestion, ear pain, sinus pain and sore throat.    Eyes: Negative for visual disturbance.   Respiratory: Positive for shortness of breath. Negative for cough.    Cardiovascular: Negative for chest pain and leg swelling.   Gastrointestinal: Negative for abdominal pain, constipation, diarrhea, nausea and vomiting.   Genitourinary: Negative for decreased urine volume, difficulty urinating and dysuria.   Musculoskeletal: Positive for back pain and neck pain.   Skin: Negative for rash.   Neurological: Negative for dizziness, syncope and headaches.   Psychiatric/Behavioral: Negative for confusion and decreased concentration.     Objective:     Vital Signs (Most Recent):  Temp: 98.6 °F (37 °C) (08/19/20 0700)  Pulse: 86 (08/19/20 1145)  Resp: (!) 22 (08/19/20 1145)  BP: (!) 148/70 (08/19/20 1100)  SpO2: (!) 94 % (08/19/20 1145) Vital Signs (24h Range):  Temp:  [97.9 °F (36.6 °C)-100.1 °F (37.8 °C)] 98.6 °F (37 °C)  Pulse:  [] 86  Resp:  [10-28] 22  SpO2:  [90 %-97 %] 94 %  BP: (145-188)/() 148/70   Weight: 79.8 kg (176 lb)  Body mass index is 27.57 kg/m².      Intake/Output Summary (Last 24 hours) at 8/19/2020 1244  Last data filed at 8/19/2020 1130  Gross per 24 hour   Intake 3005 ml   Output 1560 ml   Net 1445 ml       Physical Exam  Vitals signs and nursing note reviewed.   Constitutional:       General: He is not in acute distress.     Comments: Laying in bed comfortably   HENT:      Head: Normocephalic and atraumatic.      Right Ear: External ear normal.      Left Ear: External ear normal.      Nose: Nose  normal.      Mouth/Throat:      Mouth: Mucous membranes are moist.      Pharynx: Oropharynx is clear.   Eyes:      Conjunctiva/sclera: Conjunctivae normal.   Neck:      Comments: L side JIN drain present with serosanguinous drainage  Cardiovascular:      Rate and Rhythm: Normal rate and regular rhythm.      Pulses: Normal pulses.      Heart sounds: Normal heart sounds. No murmur.   Pulmonary:      Effort: Pulmonary effort is normal.      Comments: On comfort flow NC. Diminished breath sounds at the bases  Abdominal:      General: Abdomen is flat. Bowel sounds are normal. There is no distension.      Palpations: Abdomen is soft.      Tenderness: There is no abdominal tenderness.   Genitourinary:     Penis: Normal.    Musculoskeletal: Normal range of motion.         General: No swelling or tenderness.      Right lower leg: No edema.      Left lower leg: No edema.   Skin:     General: Skin is warm and dry.      Capillary Refill: Capillary refill takes less than 2 seconds.   Neurological:      Mental Status: He is oriented to person, place, and time.      GCS: GCS eye subscore is 4. GCS verbal subscore is 5. GCS motor subscore is 6.      Motor: Motor function is intact.      Comments: Upper and lower extremity muscle strength 5/5         Vents:  Oxygen Concentration (%): 60 (08/19/20 1120)  Lines/Drains/Airways     Drain                 Closed/Suction Drain 08/17/20 Anterior Neck Bulb 10 Fr. 2 days    Male External Urinary Catheter 08/18/20 0500 Small 1 day          Peripheral Intravenous Line                 Peripheral IV - Single Lumen 01/24/20 1447 22 G Anterior;Right Hand 207 days         Peripheral IV - Single Lumen 05/19/20 0924 22 G Right Hand 92 days         Peripheral IV - Single Lumen 08/17/20 0615 18 G Left Forearm 2 days         Peripheral IV - Single Lumen 08/17/20 0715 18 G Right Forearm 2 days         Peripheral IV - Single Lumen 08/18/20 0500 20 G Anterior;Left;Proximal Forearm 1 day               Significant Labs:    CBC/Anemia Profile:  Recent Labs   Lab 08/18/20  0438 08/18/20  1514 08/19/20  0209   WBC 11.41 3.04* 6.92   HGB 9.7* 10.4* 9.8*   HCT 31.5* 32.8* 30.5*    261 279   * 105* 102*   RDW 15.1* 15.2* 15.3*        Chemistries:  Recent Labs   Lab 08/18/20  0051  08/18/20  0438 08/18/20  1611 08/19/20  0209      < > 140 142 142   K 4.8   < > 5.0 4.1 3.9   *   < > 115* 114* 111*   CO2 14*   < > 15* 18* 20*   BUN 43*   < > 44* 41* 40*   CREATININE 3.3*   < > 3.4* 3.5* 3.4*   CALCIUM 8.4*   < > 8.3* 8.0* 7.9*   ALBUMIN 2.9*  --  2.8*  --  1.9*   PROT 6.7  --  6.6  --  5.3*   BILITOT 0.2  --  0.2  --  0.2   ALKPHOS 105  --  100  --  76   ALT 25  --  19  --  <5*   AST 37  --  36  --  47*   MG 1.7  --  1.7  --  1.4*   PHOS 6.4*  --  6.7*  --  5.3*    < > = values in this interval not displayed.       All pertinent labs within the past 24 hours have been reviewed.    Significant Imaging:  I have reviewed all pertinent imaging results/findings within the past 24 hours.

## 2020-08-19 NOTE — ASSESSMENT & PLAN NOTE
Edwin HAYLEY Mckeonry is a 75 y.o. male s/p C5/6 ACDF (8/17/20) for cervical stenosis, postop course complicated by apnea; Transferred to MICU with NSTEMI and increasing oxygen requirements    Pain control: multimodal, d/c sedating medications  PT/OT: progressive mobility, OOBTC  DVT PPx: per SICU, SCDs at all times when not ambulating  Abx: postop Ancef  Labs: Hg 9.8, WBC 6.92  Drain: continue  Cards consulted     Dispo- continue ICU care per MICU

## 2020-08-19 NOTE — PT/OT/SLP PROGRESS
Physical Therapy      Patient Name:  Edwin Powell   MRN:  [id number]    Patient not seen today secondary to (pt on hold 2nd to new NSTEMI with cardiology consult.). Will follow-up at a later date. .    Fouzia Delgado, PT   8/19/2020

## 2020-08-19 NOTE — SUBJECTIVE & OBJECTIVE
Past Medical History:   Diagnosis Date    Acquired hypothyroidism 5/26/2017    Benign essential HTN 5/26/2017    Benign prostatic hyperplasia (BPH) with urinary urge incontinence 6/6/2017    Chronic low back pain 6/1/2017    Coronary artery disease involving native coronary artery of native heart without angina pectoris 5/26/2017    S/p 5 stents - last one around 2010-11.    Gastroesophageal reflux disease without esophagitis 5/26/2017    History of CVA (cerebrovascular accident) 5/26/2017    Hypertension associated with diabetes 5/26/2017    BP controlled on ACE, CCB    Lumbar disc lesion 5/26/2017    Metastatic renal cell carcinoma to bone 6/6/2017    Metastatic renal cell carcinoma to liver 6/6/2017    Liver Biopsy 5-2017: METASTATIC RENAL CELL CARCINOMA.    Mixed hyperlipidemia 5/26/2017    Type 2 diabetes mellitus with stage 3 chronic kidney disease, with long-term current use of insulin 5/26/2017     Past Surgical History:   Procedure Laterality Date    ABDOMINAL SURGERY      ANTERIOR CERVICAL DISCECTOMY W/ FUSION N/A 8/17/2020    Procedure: DISCECTOMY, SPINE, CERVICAL, ANTERIOR APPROACH, WITH FUSION co case with Dr. Falcon C6/7 [facility] SNS:vocal cord/motors/SSEP Regular OR table Patient needs scope in preop;  Surgeon: Filemon Aguayo MD;  Location: [facility] OR [address] [place] [address];  Service: Neurosurgery;  Laterality: N/A;    APPENDECTOMY      BACK SURGERY      CARDIAC SURGERY      CATARACT EXTRACTION      CHOLECYSTECTOMY      CORONARY STENT PLACEMENT      5 stents    coronary stenting      X 5 last one in 2010-11.    DISSECTION OF NECK N/A 8/17/2020    Procedure: DISSECTION, NECK;  Surgeon: Rush Falcon MD;  [facility];  Service: ENT;  Laterality: N/A;    INTRAOCULAR PROSTHESES INSERTION Right 6/27/2019    Procedure: INSERTION, IOL PROSTHESIS;  Surgeon: Chary Culp MD;  [facility];  Service: Ophthalmology;  Laterality: Right;    INTRAOCULAR PROSTHESES  INSERTION Left 8/22/2019    Procedure: INSERTION, IOL PROSTHESIS;  Surgeon: Chary Culp MD;  Location: [facility] OR [address] [place] [address];  Service: Ophthalmology;  Laterality: Left;    PHACOEMULSIFICATION OF CATARACT Right 6/27/2019    Procedure: PHACOEMULSIFICATION, CATARACT;  Surgeon: Chary Culp MD;  Location: [facility] OR [address] [place] [address];  Service: Ophthalmology;  Laterality: Right;    PHACOEMULSIFICATION OF CATARACT Left 8/22/2019    Procedure: PHACOEMULSIFICATION, CATARACT;  Surgeon: Chary Culp MD;  Location: [facility] OR [address] [place] [address];  Service: Ophthalmology;  Laterality: Left;    SPINAL FUSION       Review of patient's allergies indicates:  No Known Allergies    Scheduled Medications:    acetaminophen  650 mg Oral Q6H    albuterol-ipratropium  3 mL Nebulization Q4H    amLODIPine  10 mg Oral Daily    aspirin  81 mg Oral Daily    carvediloL  6.25 mg Oral BID    clopidogreL  75 mg Oral Daily    docusate sodium  50 mg Oral BID    levothyroxine  88 mcg Oral Daily    magnesium sulfate IVPB  2 g Intravenous Once    rosuvastatin  40 mg Oral QHS    tamsulosin  0.8 mg Oral Daily       PRN Medications: albuterol-ipratropium, bisacodyL, dextrose 50%, dextrose 50%, glucagon (human recombinant), glucose, glucose, heparin (PORCINE), heparin (PORCINE), insulin aspart U-100, naloxone, ondansetron, polyethylene glycol, sodium chloride 0.9%, sodium chloride 0.9%    Family History     Problem Relation (Age of Onset)    Diabetes Mother, Brother    Glaucoma Mother, Maternal Grandmother    Heart attack Mother    No Known Problems Son        Tobacco Use    Smoking status: Former Smoker     Types: Cigarettes    Smokeless tobacco: Never Used    Tobacco comment: haven't smoked in last 25 yrs   Substance and Sexual Activity    Alcohol use: Yes     Comment: rarely     Drug use: Not Currently    Sexual activity: Not Currently     Partners: Female     Review of Systems   Constitutional: Positive for activity change. Negative for fatigue and  fever.   HENT: Negative for trouble swallowing and voice change.    Eyes: Negative for photophobia and visual disturbance.   Respiratory: Negative for cough and shortness of breath.    Cardiovascular: Negative for chest pain and palpitations.   Gastrointestinal: Negative for nausea and vomiting.   Genitourinary: Negative for difficulty urinating and flank pain.   Musculoskeletal: Positive for gait problem and neck pain. Negative for arthralgias.   Skin: Negative for color change and rash.   Neurological: Positive for weakness. Negative for facial asymmetry, speech difficulty and numbness.   Psychiatric/Behavioral: Negative for agitation and confusion.     Objective:     Vital Signs (Most Recent):  Temp: 98.6 °F (37 °C) (08/19/20 0700)  Pulse: 91 (08/19/20 0745)  Resp: (!) 24 (08/19/20 0745)  BP: (!) 154/68 (08/19/20 0700)  SpO2: (!) 94 % (08/19/20 0750)    Vital Signs (24h Range):  Temp:  [97.9 °F (36.6 °C)-100.1 °F (37.8 °C)] 98.6 °F (37 °C)  Pulse:  [] 91  Resp:  [10-33] 24  SpO2:  [90 %-98 %] 94 %  BP: (145-188)/() 154/68     Body mass index is 27.57 kg/m².    Physical Exam  Constitutional:       General: He is not in acute distress.     Appearance: He is well-developed.   HENT:      Head: Normocephalic and atraumatic.   Eyes:      General:         Right eye: No discharge.         Left eye: No discharge.   Neck:      Musculoskeletal: Neck supple.   Pulmonary:      Effort: Pulmonary effort is normal. No respiratory distress.      Comments: On comfort flow   Abdominal:      General: There is no distension.      Palpations: Abdomen is soft.   Musculoskeletal:         General: No deformity.      Cervical back: He exhibits decreased range of motion and tenderness.      Comments: Drain in place    Skin:     General: Skin is warm and dry.   Neurological:      Mental Status: He is alert and oriented to person, place, and time.      Motor: Weakness (general) present.      Comments: Follows commands     Psychiatric:         Behavior: Behavior normal.         Cognition and Memory: Cognition is not impaired.       NEUROLOGICAL EXAMINATION:     MENTAL STATUS   Oriented to person, place, and time.       Diagnostic Results:   Labs: Reviewed  ECG: Reviewed  X-Ray: Reviewed  US: Reviewed

## 2020-08-19 NOTE — CONSULTS
Inpatient consult to Physical Medicine Rehab  Consult performed by: Beverly Warner NP  Consult ordered by: Mandeep Byrd MD  Reason for consult: assess rehab needs        Reviewed patient history and current admission.  Rehab team following.  Full consult to follow.    ANTHONY Thomas, FNP-C  Physical Medicine & Rehabilitation   08/19/2020

## 2020-08-19 NOTE — CONSULTS
[facility]  Physical Medicine & Rehab  Consult Note    Patient Name: Edwin Powell  MRN: [id number]  Admission Date: 8/17/2020  Hospital Length of Stay: 2 days  Attending Physician: Mandeep Byrd MD     Inpatient consult to Physical Medicine & Rehabilitation  Consult performed by: Beverly Warner NP  Consult requested by:  Mandeep Byrd MD    Reason for Consult:  assess rehabilitation needs  Consults  Subjective:     Principal Problem: Cervical stenosis of spine    HPI: Per chart review, Edwin Sol is a 75-year-old male with PMHx of cervical stenosis, metastatic renal cell carcinoma (liver and L3 mets), CKD, CAD, hypothyroidism, DM2, HLD, BPH, HTN and GERD .  Patient presented to [facility] on 8/17 for scheduled  C-spine fusion. Now s/p ACDF C6-C7 with otho and ENT on 8/18 with drain placement. Hospital course further complicated by apnea, NSTEMI, Acute metabolic encephalopathy, & VERENA superimposed on CKD. Currently on comfort flow 02 and a heparin gtt.     Functional History: Patient lives with son in a single story home with threshold  to enter.  Prior to admission, (I) with ADLs and mobility. DME: walker, rolling, cane, quad, shower chair.    Hospital Course: 08/18/2020:  Sit to stand ModA.  Ambulated 3 steps bwd/fwd ModA .  UBD TA.    Past Medical History:   Diagnosis Date    Acquired hypothyroidism 5/26/2017    Benign essential HTN 5/26/2017    Benign prostatic hyperplasia (BPH) with urinary urge incontinence 6/6/2017    Chronic low back pain 6/1/2017    Coronary artery disease involving native coronary artery of native heart without angina pectoris 5/26/2017    S/p 5 stents - last one around 2010-11.    Gastroesophageal reflux disease without esophagitis 5/26/2017    History of CVA (cerebrovascular accident) 5/26/2017    Hypertension associated with diabetes 5/26/2017    BP controlled on ACE, CCB    Lumbar disc lesion 5/26/2017    Metastatic renal cell carcinoma to bone 6/6/2017     Metastatic renal cell carcinoma to liver 6/6/2017    Liver Biopsy 5-2017: METASTATIC RENAL CELL CARCINOMA.    Mixed hyperlipidemia 5/26/2017    Type 2 diabetes mellitus with stage 3 chronic kidney disease, with long-term current use of insulin 5/26/2017     Past Surgical History:   Procedure Laterality Date    ABDOMINAL SURGERY      ANTERIOR CERVICAL DISCECTOMY W/ FUSION N/A 8/17/2020    Procedure: DISCECTOMY, SPINE, CERVICAL, ANTERIOR APPROACH, WITH FUSION co case with Dr. Falcon C6/7 [facility] SNS:vocal cord/motors/SSEP Regular OR table Patient needs scope in preop;  Surgeon: Filemon Aguayo MD;  Location: [facility] OR [address] [place] [address];  Service: Neurosurgery;  Laterality: N/A;    APPENDECTOMY      BACK SURGERY      CARDIAC SURGERY      CATARACT EXTRACTION      CHOLECYSTECTOMY      CORONARY STENT PLACEMENT      5 stents    coronary stenting      X 5 last one in 2010-11.    DISSECTION OF NECK N/A 8/17/2020    Procedure: DISSECTION, NECK;  Surgeon: Rush Falcon MD;  [facility];  Service: ENT;  Laterality: N/A;    INTRAOCULAR PROSTHESES INSERTION Right 6/27/2019    Procedure: INSERTION, IOL PROSTHESIS;  Surgeon: Chary Culp MD;  Location: [facility] OR [address] [place] [address];  Service: Ophthalmology;  Laterality: Right;    INTRAOCULAR PROSTHESES INSERTION Left 8/22/2019    Procedure: INSERTION, IOL PROSTHESIS;  Surgeon: Chary Culp MD;  Location: [facility] OR [address] [place] [address];  Service: Ophthalmology;  Laterality: Left;    PHACOEMULSIFICATION OF CATARACT Right 6/27/2019    Procedure: PHACOEMULSIFICATION, CATARACT;  Surgeon: Chary Culp MD;  Location: [facility] OR [address] [place] [address];  Service: Ophthalmology;  Laterality: Right;    PHACOEMULSIFICATION OF CATARACT Left 8/22/2019    Procedure: PHACOEMULSIFICATION, CATARACT;  Surgeon: Chary Culp MD;  Location: [facility] OR [address] [place] [address];  Service: Ophthalmology;  Laterality: Left;    SPINAL FUSION       Review of patient's allergies indicates:  No Known Allergies    Scheduled  Medications:    acetaminophen  650 mg Oral Q6H    albuterol-ipratropium  3 mL Nebulization Q4H    amLODIPine  10 mg Oral Daily    aspirin  81 mg Oral Daily    carvediloL  6.25 mg Oral BID    clopidogreL  75 mg Oral Daily    docusate sodium  50 mg Oral BID    levothyroxine  88 mcg Oral Daily    magnesium sulfate IVPB  2 g Intravenous Once    rosuvastatin  40 mg Oral QHS    tamsulosin  0.8 mg Oral Daily       PRN Medications: albuterol-ipratropium, bisacodyL, dextrose 50%, dextrose 50%, glucagon (human recombinant), glucose, glucose, heparin (PORCINE), heparin (PORCINE), insulin aspart U-100, naloxone, ondansetron, polyethylene glycol, sodium chloride 0.9%, sodium chloride 0.9%    Family History     Problem Relation (Age of Onset)    Diabetes Mother, Brother    Glaucoma Mother, Maternal Grandmother    Heart attack Mother    No Known Problems Son        Tobacco Use    Smoking status: Former Smoker     Types: Cigarettes    Smokeless tobacco: Never Used    Tobacco comment: haven't smoked in last 25 yrs   Substance and Sexual Activity    Alcohol use: Yes     Comment: rarely     Drug use: Not Currently    Sexual activity: Not Currently     Partners: Female     Review of Systems   Constitutional: Positive for activity change. Negative for fatigue and fever.   HENT: Negative for trouble swallowing and voice change.    Eyes: Negative for photophobia and visual disturbance.   Respiratory: Negative for cough and shortness of breath.    Cardiovascular: Negative for chest pain and palpitations.   Gastrointestinal: Negative for nausea and vomiting.   Genitourinary: Negative for difficulty urinating and flank pain.   Musculoskeletal: Positive for gait problem and neck pain. Negative for arthralgias.   Skin: Negative for color change and rash.   Neurological: Positive for weakness. Negative for facial asymmetry, speech difficulty and numbness.   Psychiatric/Behavioral: Negative for agitation and confusion.      Objective:     Vital Signs (Most Recent):  Temp: 98.6 °F (37 °C) (08/19/20 0700)  Pulse: 91 (08/19/20 0745)  Resp: (!) 24 (08/19/20 0745)  BP: (!) 154/68 (08/19/20 0700)  SpO2: (!) 94 % (08/19/20 0750)    Vital Signs (24h Range):  Temp:  [97.9 °F (36.6 °C)-100.1 °F (37.8 °C)] 98.6 °F (37 °C)  Pulse:  [] 91  Resp:  [10-33] 24  SpO2:  [90 %-98 %] 94 %  BP: (145-188)/() 154/68     Body mass index is 27.57 kg/m².    Physical Exam  Constitutional:       General: He is not in acute distress.     Appearance: He is well-developed.   HENT:      Head: Normocephalic and atraumatic.   Eyes:      General:         Right eye: No discharge.         Left eye: No discharge.   Neck:      Musculoskeletal: Neck supple.   Pulmonary:      Effort: Pulmonary effort is normal. No respiratory distress.      Comments: On comfort flow   Abdominal:      General: There is no distension.      Palpations: Abdomen is soft.   Musculoskeletal:         General: No deformity.      Cervical back: He exhibits decreased range of motion and tenderness.      Comments: Drain in place    Skin:     General: Skin is warm and dry.   Neurological:      Mental Status: He is alert and oriented to person, place, and time.      Motor: Weakness (general) present.      Comments: Follows commands    Psychiatric:         Behavior: Behavior normal.         Cognition and Memory: Cognition is not impaired.       Diagnostic Results:   Labs: Reviewed  ECG: Reviewed  X-Ray: Reviewed  US: Reviewed    Assessment/Plan:     * Cervical stenosis of spine  -s/p C5/6 ACDF on 8/17/20 for cervical stenosis with postop course complicated by apnea and NSTEMI per ortho   -on heparin gtt and comfort flow 02    Impaired functional mobility and endurance  See hospital course for functional status.      Recommendations  -  Encourage mobility, OOB in chair, and early ambulation as appropriate  -  PT/OT evaluate and treat  -  Pain management  -  DVT prophylaxis if appropriate   -   Monitor for and prevent skin breakdown and pressure ulcers  · Early mobility, repositioning/weight shifting every 20-30 minutes when sitting, turn patient every 2 hours, proper mattress/overlay and chair cushioning, pressure relief/heel protector boots    Acute metabolic encephalopathy  -suspect medication induced insetting of decreased renal clearance per CCM     Patient is not medically stable for discharge. Will follow progress and discuss with PM&R staff for post acute care recommendation.      Thank you for your consult.     Beverly Warner NP  Department of Physical Medicine & Rehab  [facility]

## 2020-08-20 LAB
ALBUMIN SERPL BCP-MCNC: 2 G/DL (ref 3.5–5.2)
ALP SERPL-CCNC: 88 U/L (ref 55–135)
ALT SERPL W/O P-5'-P-CCNC: <5 U/L (ref 10–44)
ANION GAP SERPL CALC-SCNC: 11 MMOL/L (ref 8–16)
AST SERPL-CCNC: 45 U/L (ref 10–40)
AV INDEX (PROSTH): 0.89
AV MEAN GRADIENT: 5 MMHG
AV PEAK GRADIENT: 7 MMHG
AV VALVE AREA: 3.01 CM2
AV VELOCITY RATIO: 0.83
BASOPHILS # BLD AUTO: 0.04 K/UL (ref 0–0.2)
BASOPHILS NFR BLD: 0.4 % (ref 0–1.9)
BILIRUB SERPL-MCNC: 0.2 MG/DL (ref 0.1–1)
BSA FOR ECHO PROCEDURE: 1.94 M2
BUN SERPL-MCNC: 45 MG/DL (ref 8–23)
CALCIUM SERPL-MCNC: 8.6 MG/DL (ref 8.7–10.5)
CHLORIDE SERPL-SCNC: 104 MMOL/L (ref 95–110)
CO2 SERPL-SCNC: 26 MMOL/L (ref 23–29)
CREAT SERPL-MCNC: 3.3 MG/DL (ref 0.5–1.4)
CV ECHO LV RWT: 0.42 CM
DIFFERENTIAL METHOD: ABNORMAL
DOP CALC AO PEAK VEL: 1.35 M/S
DOP CALC AO VTI: 27.15 CM
DOP CALC LVOT AREA: 3.4 CM2
DOP CALC LVOT DIAMETER: 2.08 CM
DOP CALC LVOT PEAK VEL: 1.12 M/S
DOP CALC LVOT STROKE VOLUME: 81.65 CM3
DOP CALCLVOT PEAK VEL VTI: 24.04 CM
E WAVE DECELERATION TIME: 199.83 MSEC
E/A RATIO: 0.77
E/E' RATIO: 13.64 M/S
ECHO LV POSTERIOR WALL: 1.1 CM (ref 0.6–1.1)
EOSINOPHIL # BLD AUTO: 0.1 K/UL (ref 0–0.5)
EOSINOPHIL NFR BLD: 1 % (ref 0–8)
ERYTHROCYTE [DISTWIDTH] IN BLOOD BY AUTOMATED COUNT: 15.1 % (ref 11.5–14.5)
EST. GFR  (AFRICAN AMERICAN): 20 ML/MIN/1.73 M^2
EST. GFR  (NON AFRICAN AMERICAN): 17.3 ML/MIN/1.73 M^2
FACT X PPP CHRO-ACNC: 0.22 IU/ML (ref 0.3–0.7)
FACT X PPP CHRO-ACNC: 0.34 IU/ML (ref 0.3–0.7)
FRACTIONAL SHORTENING: 18 % (ref 28–44)
GLUCOSE SERPL-MCNC: 132 MG/DL (ref 70–110)
HCT VFR BLD AUTO: 33.2 % (ref 40–54)
HGB BLD-MCNC: 10.4 G/DL (ref 14–18)
IMM GRANULOCYTES # BLD AUTO: 0.07 K/UL (ref 0–0.04)
IMM GRANULOCYTES NFR BLD AUTO: 0.7 % (ref 0–0.5)
INTERVENTRICULAR SEPTUM: 1 CM (ref 0.6–1.1)
LA MAJOR: 4.74 CM
LA MINOR: 4.53 CM
LA WIDTH: 4.15 CM
LEFT ATRIUM SIZE: 4.5 CM
LEFT ATRIUM VOLUME INDEX: 38.5 ML/M2
LEFT ATRIUM VOLUME: 73.54 CM3
LEFT INTERNAL DIMENSION IN SYSTOLE: 4.35 CM (ref 2.1–4)
LEFT VENTRICLE DIASTOLIC VOLUME INDEX: 70.15 ML/M2
LEFT VENTRICLE DIASTOLIC VOLUME: 134.03 ML
LEFT VENTRICLE MASS INDEX: 111 G/M2
LEFT VENTRICLE SYSTOLIC VOLUME INDEX: 44.7 ML/M2
LEFT VENTRICLE SYSTOLIC VOLUME: 85.41 ML
LEFT VENTRICULAR INTERNAL DIMENSION IN DIASTOLE: 5.28 CM (ref 3.5–6)
LEFT VENTRICULAR MASS: 212.55 G
LV LATERAL E/E' RATIO: 10.71 M/S
LV SEPTAL E/E' RATIO: 18.75 M/S
LYMPHOCYTES # BLD AUTO: 1.2 K/UL (ref 1–4.8)
LYMPHOCYTES NFR BLD: 11.9 % (ref 18–48)
MAGNESIUM SERPL-MCNC: 1.8 MG/DL (ref 1.6–2.6)
MCH RBC QN AUTO: 32.4 PG (ref 27–31)
MCHC RBC AUTO-ENTMCNC: 31.3 G/DL (ref 32–36)
MCV RBC AUTO: 103 FL (ref 82–98)
MONOCYTES # BLD AUTO: 0.8 K/UL (ref 0.3–1)
MONOCYTES NFR BLD: 7.2 % (ref 4–15)
MV PEAK A VEL: 0.97 M/S
MV PEAK E VEL: 0.75 M/S
MV STENOSIS PRESSURE HALF TIME: 57.95 MS
MV VALVE AREA P 1/2 METHOD: 3.8 CM2
NEUTROPHILS # BLD AUTO: 8.2 K/UL (ref 1.8–7.7)
NEUTROPHILS NFR BLD: 78.8 % (ref 38–73)
NRBC BLD-RTO: 0 /100 WBC
PHOSPHATE SERPL-MCNC: 4.4 MG/DL (ref 2.7–4.5)
PLATELET # BLD AUTO: 262 K/UL (ref 150–350)
PLATELET BLD QL SMEAR: ABNORMAL
PMV BLD AUTO: 10.6 FL (ref 9.2–12.9)
POCT GLUCOSE: 136 MG/DL (ref 70–110)
POCT GLUCOSE: 156 MG/DL (ref 70–110)
POCT GLUCOSE: 198 MG/DL (ref 70–110)
POTASSIUM SERPL-SCNC: 4 MMOL/L (ref 3.5–5.1)
PROT SERPL-MCNC: 6.1 G/DL (ref 6–8.4)
RA MAJOR: 5.13 CM
RA PRESSURE: 3 MMHG
RA WIDTH: 3.97 CM
RBC # BLD AUTO: 3.21 M/UL (ref 4.6–6.2)
RV TISSUE DOPPLER FREE WALL SYSTOLIC VELOCITY 1 (APICAL 4 CHAMBER VIEW): 15.77 CM/S
SINUS: 3.16 CM
SODIUM SERPL-SCNC: 141 MMOL/L (ref 136–145)
TDI LATERAL: 0.07 M/S
TDI SEPTAL: 0.04 M/S
TDI: 0.06 M/S
TRICUSPID ANNULAR PLANE SYSTOLIC EXCURSION: 1.5 CM
WBC # BLD AUTO: 10.35 K/UL (ref 3.9–12.7)

## 2020-08-20 PROCEDURE — 20000000 HC ICU ROOM: Mod: HCNC

## 2020-08-20 PROCEDURE — 94640 AIRWAY INHALATION TREATMENT: CPT | Mod: HCNC

## 2020-08-20 PROCEDURE — 94668 MNPJ CHEST WALL SBSQ: CPT | Mod: HCNC

## 2020-08-20 PROCEDURE — 85025 COMPLETE CBC W/AUTO DIFF WBC: CPT | Mod: HCNC

## 2020-08-20 PROCEDURE — 99232 SBSQ HOSP IP/OBS MODERATE 35: CPT | Mod: HCNC,,, | Performed by: INTERNAL MEDICINE

## 2020-08-20 PROCEDURE — 25000003 PHARM REV CODE 250: Mod: HCNC | Performed by: STUDENT IN AN ORGANIZED HEALTH CARE EDUCATION/TRAINING PROGRAM

## 2020-08-20 PROCEDURE — 63600175 PHARM REV CODE 636 W HCPCS: Mod: HCNC | Performed by: INTERNAL MEDICINE

## 2020-08-20 PROCEDURE — 85520 HEPARIN ASSAY: CPT | Mod: HCNC

## 2020-08-20 PROCEDURE — 84100 ASSAY OF PHOSPHORUS: CPT | Mod: HCNC

## 2020-08-20 PROCEDURE — 97535 SELF CARE MNGMENT TRAINING: CPT | Mod: HCNC

## 2020-08-20 PROCEDURE — 97112 NEUROMUSCULAR REEDUCATION: CPT | Mod: HCNC

## 2020-08-20 PROCEDURE — 25000003 PHARM REV CODE 250: Mod: HCNC | Performed by: INTERNAL MEDICINE

## 2020-08-20 PROCEDURE — 80053 COMPREHEN METABOLIC PANEL: CPT | Mod: HCNC

## 2020-08-20 PROCEDURE — 83735 ASSAY OF MAGNESIUM: CPT | Mod: HCNC

## 2020-08-20 PROCEDURE — 99900035 HC TECH TIME PER 15 MIN (STAT): Mod: HCNC

## 2020-08-20 PROCEDURE — 25000003 PHARM REV CODE 250: Mod: HCNC | Performed by: NURSE PRACTITIONER

## 2020-08-20 PROCEDURE — 99232 PR SUBSEQUENT HOSPITAL CARE,LEVL II: ICD-10-PCS | Mod: HCNC,,, | Performed by: INTERNAL MEDICINE

## 2020-08-20 PROCEDURE — 99233 PR SUBSEQUENT HOSPITAL CARE,LEVL III: ICD-10-PCS | Mod: HCNC,,, | Performed by: INTERNAL MEDICINE

## 2020-08-20 PROCEDURE — 92610 EVALUATE SWALLOWING FUNCTION: CPT | Mod: HCNC

## 2020-08-20 PROCEDURE — 94761 N-INVAS EAR/PLS OXIMETRY MLT: CPT | Mod: HCNC

## 2020-08-20 PROCEDURE — 97530 THERAPEUTIC ACTIVITIES: CPT | Mod: HCNC

## 2020-08-20 PROCEDURE — 85520 HEPARIN ASSAY: CPT | Mod: 91,HCNC

## 2020-08-20 PROCEDURE — 25000242 PHARM REV CODE 250 ALT 637 W/ HCPCS: Mod: HCNC | Performed by: STUDENT IN AN ORGANIZED HEALTH CARE EDUCATION/TRAINING PROGRAM

## 2020-08-20 PROCEDURE — 94799 UNLISTED PULMONARY SVC/PX: CPT | Mod: HCNC

## 2020-08-20 PROCEDURE — 63600175 PHARM REV CODE 636 W HCPCS: Mod: HCNC | Performed by: STUDENT IN AN ORGANIZED HEALTH CARE EDUCATION/TRAINING PROGRAM

## 2020-08-20 PROCEDURE — 97110 THERAPEUTIC EXERCISES: CPT | Mod: HCNC

## 2020-08-20 PROCEDURE — 27100171 HC OXYGEN HIGH FLOW UP TO 24 HOURS: Mod: HCNC

## 2020-08-20 PROCEDURE — 99233 SBSQ HOSP IP/OBS HIGH 50: CPT | Mod: HCNC,,, | Performed by: INTERNAL MEDICINE

## 2020-08-20 PROCEDURE — 27000221 HC OXYGEN, UP TO 24 HOURS: Mod: HCNC

## 2020-08-20 RX ORDER — FUROSEMIDE 10 MG/ML
100 INJECTION INTRAMUSCULAR; INTRAVENOUS DAILY
Status: DISCONTINUED | OUTPATIENT
Start: 2020-08-21 | End: 2020-08-25

## 2020-08-20 RX ADMIN — HUMAN ALBUMIN MICROSPHERES AND PERFLUTREN 0.66 MG: 10; .22 INJECTION, SOLUTION INTRAVENOUS at 10:08

## 2020-08-20 RX ADMIN — ACETAMINOPHEN 650 MG: 325 TABLET ORAL at 12:08

## 2020-08-20 RX ADMIN — CARVEDILOL 6.25 MG: 6.25 TABLET, FILM COATED ORAL at 08:08

## 2020-08-20 RX ADMIN — ASPIRIN 81 MG CHEWABLE TABLET 81 MG: 81 TABLET CHEWABLE at 08:08

## 2020-08-20 RX ADMIN — SODIUM BICARBONATE 650 MG TABLET 1300 MG: at 08:08

## 2020-08-20 RX ADMIN — HEPARIN SODIUM 16 UNITS/KG/HR: 5000 INJECTION INTRAVENOUS; SUBCUTANEOUS at 10:08

## 2020-08-20 RX ADMIN — LEVOTHYROXINE SODIUM 88 MCG: 0.09 TABLET ORAL at 09:08

## 2020-08-20 RX ADMIN — ROSUVASTATIN CALCIUM 40 MG: 20 TABLET, FILM COATED ORAL at 08:08

## 2020-08-20 RX ADMIN — AMLODIPINE BESYLATE 10 MG: 10 TABLET ORAL at 08:08

## 2020-08-20 RX ADMIN — IPRATROPIUM BROMIDE AND ALBUTEROL SULFATE 3 ML: .5; 2.5 SOLUTION RESPIRATORY (INHALATION) at 08:08

## 2020-08-20 RX ADMIN — IPRATROPIUM BROMIDE AND ALBUTEROL SULFATE 3 ML: .5; 2.5 SOLUTION RESPIRATORY (INHALATION) at 03:08

## 2020-08-20 RX ADMIN — CLOPIDOGREL 75 MG: 75 TABLET, FILM COATED ORAL at 08:08

## 2020-08-20 RX ADMIN — FUROSEMIDE 100 MG: 10 INJECTION, SOLUTION INTRAMUSCULAR; INTRAVENOUS at 08:08

## 2020-08-20 RX ADMIN — TAMSULOSIN HYDROCHLORIDE 0.8 MG: 0.4 CAPSULE ORAL at 09:08

## 2020-08-20 NOTE — PROGRESS NOTES
[facility]  Orthopedics  Progress Note    Patient Name: Edwin Powell  MRN: [id number]  Admission Date: 8/17/2020  Hospital Length of Stay: 3 days  Attending Provider: Mandeep Byrd MD  Primary Care Provider: Lulú Lewis MD  Follow-up For: Procedure(s) (LRB):  DISCECTOMY, SPINE, CERVICAL, ANTERIOR APPROACH, WITH FUSION co case with Dr. Falcon C6/7 [facility] SNS:vocal cord/motors/SSEP Regular OR table Patient needs scope in preop (N/A)  DISSECTION, NECK (N/A)    Post-Operative Day: 3 Days Post-Op  Subjective:     Principal Problem:Acute on chronic respiratory failure with hypoxia and hypercapnia    Principal Orthopedic Problem: s/p ACDF    Interval History:   Pt seen and examined at the bedside this am. 35L 40% comfortflow. +Dysphagia. Denies numbness/tingling/weakness of BUE.   Review of patient's allergies indicates:  No Known Allergies    Current Facility-Administered Medications   Medication    acetaminophen tablet 650 mg    albuterol-ipratropium 2.5 mg-0.5 mg/3 mL nebulizer solution 3 mL    albuterol-ipratropium 2.5 mg-0.5 mg/3 mL nebulizer solution 3 mL    amLODIPine tablet 10 mg    aspirin chewable tablet 81 mg    bisacodyL suppository 10 mg    carvediloL tablet 6.25 mg    clopidogreL tablet 75 mg    dextrose 50% injection 12.5 g    dextrose 50% injection 25 g    docusate sodium capsule 50 mg    furosemide injection 100 mg    glucagon (human recombinant) injection 1 mg    glucose chewable tablet 16 g    glucose chewable tablet 24 g    heparin 25,000 units in dextrose 5% (100 units/ml) IV bolus from bag - ADDITIONAL PRN BOLUS - 30 units/kg    heparin 25,000 units in dextrose 5% (100 units/ml) IV bolus from bag - ADDITIONAL PRN BOLUS - 60 units/kg    heparin 25,000 units in dextrose 5% 250 mL (100 units/mL) infusion HIGH INTENSITY nomogram Anti- Xa    insulin aspart U-100 pen 0-5 Units    levothyroxine tablet 88 mcg    naloxone 0.4 mg/mL injection 0.2 mg     "ondansetron injection 4 mg    polyethylene glycol packet 17 g    rosuvastatin tablet 40 mg    sodium bicarbonate tablet 1,300 mg    sodium chloride 0.9% flush 10 mL    sodium chloride 0.9% flush 5 mL    tamsulosin 24 hr capsule 0.8 mg     Facility-Administered Medications Ordered in Other Encounters   Medication    phenylephrine HCL 2.5% ophthalmic solution 1 drop    proparacaine 0.5 % ophthalmic solution 1 drop    tropicamide 1% ophthalmic solution 1 drop     Objective:     Vital Signs (Most Recent):  Temp: 99.2 °F (37.3 °C) (08/20/20 1100)  Pulse: 71 (08/20/20 1130)  Resp: 11 (08/20/20 1130)  BP: (!) 145/61 (08/20/20 1100)  SpO2: 98 % (08/20/20 1130) Vital Signs (24h Range):  Temp:  [98.7 °F (37.1 °C)-99.7 °F (37.6 °C)] 99.2 °F (37.3 °C)  Pulse:  [71-97] 71  Resp:  [11-37] 11  SpO2:  [86 %-100 %] 98 %  BP: (132-167)/(61-79) 145/61     Weight: 79.4 kg (175 lb)  Height: 5' 7" (170.2 cm)  Body mass index is 27.41 kg/m².      Intake/Output Summary (Last 24 hours) at 8/20/2020 1152  Last data filed at 8/20/2020 1100  Gross per 24 hour   Intake 327 ml   Output 6470 ml   Net -6143 ml       Ortho/SPM Exam    Anterior cervical dressing in place with drain- 20 cc  Neuro exam stable    Significant Labs:   ABGs:   Recent Labs   Lab 08/18/20 2007   PH 7.259*   PCO2 41.1   HCO3 18.4*   POCSATURATED 96   BE -9     CBC:   Recent Labs   Lab 08/18/20  1514 08/19/20  0209 08/20/20  0205   WBC 3.04* 6.92 10.35   HGB 10.4* 9.8* 10.4*   HCT 32.8* 30.5* 33.2*    279 262     CMP:   Recent Labs   Lab 08/19/20  0209 08/19/20  1601 08/20/20  0205    141 141   K 3.9 3.8 4.0   * 108 104   CO2 20* 23 26   * 151* 132*   BUN 40* 40* 45*   CREATININE 3.4* 3.4* 3.3*   CALCIUM 7.9* 8.2* 8.6*   PROT 5.3*  --  6.1   ALBUMIN 1.9*  --  2.0*   BILITOT 0.2  --  0.2   ALKPHOS 76  --  88   AST 47*  --  45*   ALT <5*  --  <5*   ANIONGAP 11 10 11   EGFRNONAA 16.7* 16.7* 17.3*     All pertinent labs within the past 24 hours " have been reviewed.    Significant Imaging: I have reviewed and interpreted all pertinent imaging results/findings.    Assessment/Plan:     Cervical stenosis of spine  Edwin Powell is a 75 y.o. male s/p C5/6 ACDF (8/17/20) for cervical stenosis, postop course complicated by apnea; Transferred to MICU with NSTEMI and increasing oxygen requirements    Pain control: multimodal, d/c sedating medications  PT/OT: progressive mobility, OOBTC  DVT PPx: per SICU, SCDs at all times when not ambulating  Abx: postop Ancef  Labs: stable  Drain: continue     Dispo- continue ICU care per MICU               Norma Arias MD  Orthopedics  [facility]

## 2020-08-20 NOTE — ASSESSMENT & PLAN NOTE
--baseline creatinine ~ 2; now 3.3  -- ? Etiology. Recent Nephro note states pt has had decreased po intake & diarrhea recently - ? Pre-renal. Pt net + 1.4L since admission and has been having maintenance IVF, so likely volume resuscitated at this point  --holding home lasix & lisinopril initially. Restarting lasix on 8/19 due to pulmonary edema, patient got  1 dose of IV lasix 100 mg to 6.3L UOP. Will transition to Lasix 100mg daily  --renal U/S unremarkable

## 2020-08-20 NOTE — ASSESSMENT & PLAN NOTE
Per RN on 8/19 patient had dysphagia and coughing after medication administration. Bedside swallow eval not encouraging. Seen by SLP today.

## 2020-08-20 NOTE — ASSESSMENT & PLAN NOTE
Troponins 1.2> 6.8> 8.7, EKG with non specific T wave changes.  NSTEMI likely type 2  With patient's metastatic renal carcinoma and has progressive decline would recommend conservative management for now.  Additionally patient is fairly asymptomatic (no chest pain, palpitation) from his ACS. He possibly developed flash pulmonary edema in the setting of hypertension, apnea, sedation leading to respiratory failure and NSTEMI.     -Recommend conservative medical management from his ACS standpoint, continue aspirin and Plavix, rosuvastatin and carvedilol  -Consider lower extremity Dopplers/V/Q scan to evaluate for VTE, agree with treating prophylactic anticoagulation  -Agree with conservative diuresis

## 2020-08-20 NOTE — PLAN OF CARE
Problem: Occupational Therapy Goal  Goal: Occupational Therapy Goal  Description: Goals to be met by: 9/1/20     Patient will increase functional independence with ADLs by performing:    UE Dressing with Porter.  LE Dressing with Modified Porter.  Grooming while standing at sink with Modified [place].  Toileting from toilet with Modified Porter for hygiene and clothing management.   Bathing from  shower chair/bench with Modified [place].  Toilet transfer to toilet with Modified Porter.  Increased functional strength to WFL for B UE.  Upper extremity exercise program x15 reps per handout, with assistance as needed.    Outcome: Ongoing, Progressing

## 2020-08-20 NOTE — NURSING
VSS throughout shift, pt AAOX4 & following commands. Tmax 99.7. Comfort flow 35 L 80%, O2 sats 98%. Total UO 4L/shift. Minimal serosanguinous drainage from JIN drain. Pt NPO, essential meds crushed in applesauce, team aware. Heparin @ 16 units/kg/hr. Pt turned q 2 hrs. Plan of care reviewed with patient. All questions and concerns addressed.

## 2020-08-20 NOTE — PT/OT/SLP PROGRESS
Occupational Therapy   Treatment    Name: Edwin Powell  MRN: [id number]  Admitting Diagnosis:  Acute on chronic respiratory failure with hypoxia and hypercapnia  3 Days Post-Op    Recommendations:     Discharge Recommendations: rehabilitation facility  Discharge Equipment Recommendations:  (TBD)  Barriers to discharge:  None    Assessment:     Edwin Powell is a 75 y.o. male with a medical diagnosis of Acute on chronic respiratory failure with hypoxia and hypercapnia.  He was able to perform supine/sit T/F c total assist and sat up on EOB c mod A d/t poor static sitting balance.  Was able to perform UB dressing c total assist and grooming c set-up.  Was able to perform bathing c set-up for UB and total assist for LB.  Pt is progressing well. Performance deficits affecting function are weakness, impaired self care skills, impaired functional mobilty, impaired balance, decreased upper extremity function, decreased ROM.     Rehab Prognosis:  Good; patient would benefit from acute skilled OT services to address these deficits and reach maximum level of function.       Plan:     Patient to be seen 5 x/week to address the above listed problems via self-care/home management, therapeutic activities, therapeutic exercises  · Plan of Care Expires: 09/01/20  · Plan of Care Reviewed with: patient, son    Subjective     Pain/Comfort:  · Pain Rating 1: 0/10    Objective:     Communicated with: RN prior to session.  Patient found supine with blood pressure cuff, oxygen, peripheral IV, pulse ox (continuous), telemetry, SCD, JIN drain upon OT entry to room.    General Precautions: Standard, fall   Orthopedic Precautions:spinal precautions   Braces: N/A     Occupational Performance:     Bed Mobility:    · Patient completed Supine to Sit with total assistance  · Patient completed Sit to Supine with total assistance     Functional Mobility/Transfers:    · Functional Mobility: Pt was able to sit up on EOB c mod A.      Activities of  Daily Living:  · Grooming: stand by assistance to wash off face.  · Bathing: stand by assistance and total assistance SBA to wash off UB and total assist for LB and back.  · Lower Body Dressing: total assistance to don/doff socks.      [facility] 6 Click ADL: 14    Patient left supine with all lines intact, call button in reach, RN notified and son presentEducation:      GOALS:   Multidisciplinary Problems     Occupational Therapy Goals        Problem: Occupational Therapy Goal    Goal Priority Disciplines Outcome Interventions   Occupational Therapy Goal     OT, PT/OT Ongoing, Progressing    Description: Goals to be met by: 9/1/20     Patient will increase functional independence with ADLs by performing:    UE Dressing with [place].  LE Dressing with Modified [place].  Grooming while standing at sink with Modified [place].  Toileting from toilet with Modified [place] for hygiene and clothing management.   Bathing from  shower chair/bench with Modified [place].  Toilet transfer to toilet with Modified [place].  Increased functional strength to WFL for B UE.  Upper extremity exercise program x15 reps per handout, with assistance as needed.                     Time Tracking:     OT Date of Treatment: 08/20/20  OT Start Time: 1043  OT Stop Time: 1108  OT Total Time (min): 25 min    Billable Minutes:Self Care/Home Management 13  Therapeutic Activity 12    PAM Augustin  8/20/2020

## 2020-08-20 NOTE — PROGRESS NOTES
[facility]  Cardiology  Progress Note    Patient Name: Edwin Powell  MRN: [id number]  Admission Date: 8/17/2020  Hospital Length of Stay: 3 days  Code Status: Full Code   Attending Physician: Mandeep Byrd MD   Primary Care Physician: Lulú Lewis MD  Expected Discharge Date: 8/22/2020  Principal Problem:Acute on chronic respiratory failure with hypoxia and hypercapnia    Subjective:     Interval History: Pt states shortness of breath is improving. Denies any chest pain.    Review of Systems   Constitution: Negative for chills and fever.   Eyes: Negative for blurred vision and double vision.   Cardiovascular: Positive for dyspnea on exertion. Negative for chest pain, irregular heartbeat, leg swelling, orthopnea, palpitations and paroxysmal nocturnal dyspnea.   Respiratory: Positive for shortness of breath.    Neurological: Negative for dizziness.     Objective:     Vital Signs (Most Recent):  Temp: 98.7 °F (37.1 °C) (08/20/20 0700)  Pulse: 93 (08/20/20 0800)  Resp: (!) 21 (08/20/20 0800)  BP: (!) 143/61 (08/20/20 0800)  SpO2: 97 % (08/20/20 0800) Vital Signs (24h Range):  Temp:  [98.7 °F (37.1 °C)-99.7 °F (37.6 °C)] 98.7 °F (37.1 °C)  Pulse:  [76-97] 93  Resp:  [11-37] 21  SpO2:  [86 %-100 %] 97 %  BP: (143-167)/(61-79) 143/61     Weight: 79.8 kg (175 lb 14.8 oz)  Body mass index is 27.55 kg/m².     SpO2: 97 %  O2 Device (Oxygen Therapy): Comfort Flow(airvo)      Intake/Output Summary (Last 24 hours) at 8/20/2020 0818  Last data filed at 8/20/2020 0730  Gross per 24 hour   Intake 327 ml   Output 5925 ml   Net -5598 ml       Lines/Drains/Airways     Drain                 Closed/Suction Drain 08/17/20 Anterior Neck Bulb 10 Fr. 3 days    Male External Urinary Catheter 08/18/20 0500 Small 2 days          Peripheral Intravenous Line                 Peripheral IV - Single Lumen 01/24/20 1447 22 G Anterior;Right Hand 208 days         Peripheral IV - Single Lumen 05/19/20 0924 22 G Right Hand  92 days         Peripheral IV - Single Lumen 08/17/20 0615 18 G Left Forearm 3 days         Peripheral IV - Single Lumen 08/17/20 0715 18 G Right Forearm 3 days         Peripheral IV - Single Lumen 08/18/20 0500 20 G Anterior;Left;Proximal Forearm 2 days                Physical Exam   Constitutional: He is oriented to person, place, and time. He appears well-developed and well-nourished.   HENT:   Head: Normocephalic and atraumatic.   Neck: No JVD present.   Sutures intact, JIN drain in place.    Cardiovascular: Normal rate, regular rhythm and normal heart sounds.   Pulmonary/Chest: Effort normal and breath sounds normal. He has no wheezes. He has no rales.   On comfort flow   Abdominal: Soft. Bowel sounds are normal.   Musculoskeletal:         General: No edema.   Neurological: He is alert and oriented to person, place, and time.   Skin: Skin is warm and dry.       Significant Labs:   CMP   Recent Labs   Lab 08/19/20  0209 08/19/20  1601 08/20/20  0205    141 141   K 3.9 3.8 4.0   * 108 104   CO2 20* 23 26   * 151* 132*   BUN 40* 40* 45*   CREATININE 3.4* 3.4* 3.3*   CALCIUM 7.9* 8.2* 8.6*   PROT 5.3*  --  6.1   ALBUMIN 1.9*  --  2.0*   BILITOT 0.2  --  0.2   ALKPHOS 76  --  88   AST 47*  --  45*   ALT <5*  --  <5*   ANIONGAP 11 10 11   ESTGFRAFRICA 19.3* 19.3* 20.0*   EGFRNONAA 16.7* 16.7* 17.3*    and Troponin   Recent Labs   Lab 08/19/20  0005 08/19/20  0632 08/19/20  1312   TROPONINI 8.769* 8.080* 5.726*        Significant Imaging: X-Ray: CXR: X-Ray Chest 1 View (CXR):   FINDINGS:  Chest radiograph performed 08/18/2020 at 21:51 hours revealed extensive airspace disease in perihilar distribution, new since the earlier study of 00:57 hours.  This has improved but not yet resolved.     No new disease identified.  Mediastinal structures remain midline.    Assessment and Plan:       NSTEMI (non-ST elevated myocardial infarction)  Troponins 1.2> 6.8> 8.7, EKG with non specific T wave  changes.  NSTEMI likely type 2  With patient's metastatic renal carcinoma and has progressive decline would recommend conservative management for now.  Additionally patient is fairly asymptomatic (no chest pain, palpitation) from his ACS. He possibly developed flash pulmonary edema in the setting of hypertension, apnea, sedation leading to respiratory failure and NSTEMI.     -Recommend conservative medical management from his ACS standpoint, continue aspirin and Plavix, rosuvastatin and carvedilol  -Consider lower extremity Dopplers/V/Q scan to evaluate for VTE, agree with treating prophylactic anticoagulation  -Agree with conservative diuresis        Cardiology will follow from a periphery. Please call with any questions or concerns.    VTE Risk Mitigation (From admission, onward)         Ordered     heparin 25,000 units in dextrose 5% 250 mL (100 units/mL) infusion HIGH INTENSITY nomogram Anti- Xa  Continuous     Question:  Heparin Infusion Adjustment (DO NOT MODIFY ANSWER)  Answer:  \\[email or web address]\epic\Images\Pharmacy\HeparinInfusions\heparin HIGH INTENSITY nomogram with ANTI-XA [id number]    08/18/20 1444     heparin 25,000 units in dextrose 5% (100 units/ml) IV bolus from bag - ADDITIONAL PRN BOLUS - 60 units/kg  As needed (PRN)     Question:  Heparin Infusion Adjustment (DO NOT MODIFY ANSWER)  Answer:  \[email or web address]\epic\Images\Pharmacy\HeparinInfusions\heparin HIGH INTENSITY nomogram with ANTI-XA [id number]    08/18/20 1444     heparin 25,000 units in dextrose 5% (100 units/ml) IV bolus from bag - ADDITIONAL PRN BOLUS - 30 units/kg  As needed (PRN)     Question:  Heparin Infusion Adjustment (DO NOT MODIFY ANSWER)  Answer:  \[email or web address]\epic\Images\Pharmacy\HeparinInfusions\heparin HIGH INTENSITY nomogram with ANTI-XA [id number]    08/18/20 1444     IP VTE HIGH RISK PATIENT  Once      08/17/20 0859     Place sequential compression device  Until discontinued      08/17/20 0516                Rylee Ricci,  DO  Cardiology  [facility]  I have personally taken the history and examined the patient and agree with the resident's note as stated above.

## 2020-08-20 NOTE — PLAN OF CARE
Problem: Physical Therapy Goal  Goal: Physical Therapy Goal  Description: Goals to be met by: 20    Patient will increase functional independence with mobility by performin. Supine to sit with MInimal Assistance -not met  2. Sit to stand transfer with Contact Guard Assistance with AD if needed. -not met  3. Bed to chair transfer with Minimal Assistance using Rolling Walker if needed. -not met  4. Gait  x 50 feet with Contact Guard Assistance using Rolling Walker. If needed. -not met  5. Lower extremity exercise program x20 reps with supervision-not met  6. Pt sit on EOB x 10 min with CGA- not met    Outcome: Ongoing, Progressing   Goals updated for content and are appropriate. Fouzia Delgado, PT  2020

## 2020-08-20 NOTE — ASSESSMENT & PLAN NOTE
Coronary artery disease involving native coronary artery of native heart with angina pectoris  Elevated troponin  CAD with history of PCI ~20 years ago. Patient did not have chest pain. Troponin was checked when concerned for PE, initially was 1.2 however continued to uptrend and peaked at 8.8.    - Continued heparin gtt, at least for 48 hours for NSTEMI  - Loaded with ASA and Plavix, Ok'd by ortho, and will continue  - Continue coreg  - On statin

## 2020-08-20 NOTE — SUBJECTIVE & OBJECTIVE
Interval History: Pt states shortness of breath is improving. Denies any chest pain.    Review of Systems   Constitution: Negative for chills and fever.   Eyes: Negative for blurred vision and double vision.   Cardiovascular: Positive for dyspnea on exertion. Negative for chest pain, irregular heartbeat, leg swelling, orthopnea, palpitations and paroxysmal nocturnal dyspnea.   Respiratory: Positive for shortness of breath.    Neurological: Negative for dizziness.     Objective:     Vital Signs (Most Recent):  Temp: 98.7 °F (37.1 °C) (08/20/20 0700)  Pulse: 93 (08/20/20 0800)  Resp: (!) 21 (08/20/20 0800)  BP: (!) 143/61 (08/20/20 0800)  SpO2: 97 % (08/20/20 0800) Vital Signs (24h Range):  Temp:  [98.7 °F (37.1 °C)-99.7 °F (37.6 °C)] 98.7 °F (37.1 °C)  Pulse:  [76-97] 93  Resp:  [11-37] 21  SpO2:  [86 %-100 %] 97 %  BP: (143-167)/(61-79) 143/61     Weight: 79.8 kg (175 lb 14.8 oz)  Body mass index is 27.55 kg/m².     SpO2: 97 %  O2 Device (Oxygen Therapy): Comfort Flow(airvo)      Intake/Output Summary (Last 24 hours) at 8/20/2020 0818  Last data filed at 8/20/2020 0730  Gross per 24 hour   Intake 327 ml   Output 5925 ml   Net -5598 ml       Lines/Drains/Airways     Drain                 Closed/Suction Drain 08/17/20 Anterior Neck Bulb 10 Fr. 3 days    Male External Urinary Catheter 08/18/20 0500 Small 2 days          Peripheral Intravenous Line                 Peripheral IV - Single Lumen 01/24/20 1447 22 G Anterior;Right Hand 208 days         Peripheral IV - Single Lumen 05/19/20 0924 22 G Right Hand 92 days         Peripheral IV - Single Lumen 08/17/20 0615 18 G Left Forearm 3 days         Peripheral IV - Single Lumen 08/17/20 0715 18 G Right Forearm 3 days         Peripheral IV - Single Lumen 08/18/20 0500 20 G Anterior;Left;Proximal Forearm 2 days                Physical Exam   Constitutional: He is oriented to person, place, and time. He appears well-developed and well-nourished.   HENT:   Head: Normocephalic  and atraumatic.   Neck: No JVD present.   Sutures intact, JIN drain in place.    Cardiovascular: Normal rate, regular rhythm and normal heart sounds.   Pulmonary/Chest: Effort normal and breath sounds normal. He has no wheezes. He has no rales.   On comfort flow   Abdominal: Soft. Bowel sounds are normal.   Musculoskeletal:         General: No edema.   Neurological: He is alert and oriented to person, place, and time.   Skin: Skin is warm and dry.       Significant Labs:   CMP   Recent Labs   Lab 08/19/20  0209 08/19/20  1601 08/20/20  0205    141 141   K 3.9 3.8 4.0   * 108 104   CO2 20* 23 26   * 151* 132*   BUN 40* 40* 45*   CREATININE 3.4* 3.4* 3.3*   CALCIUM 7.9* 8.2* 8.6*   PROT 5.3*  --  6.1   ALBUMIN 1.9*  --  2.0*   BILITOT 0.2  --  0.2   ALKPHOS 76  --  88   AST 47*  --  45*   ALT <5*  --  <5*   ANIONGAP 11 10 11   ESTGFRAFRICA 19.3* 19.3* 20.0*   EGFRNONAA 16.7* 16.7* 17.3*    and Troponin   Recent Labs   Lab 08/19/20  0005 08/19/20  0632 08/19/20  1312   TROPONINI 8.769* 8.080* 5.726*        Significant Imaging: X-Ray: CXR: X-Ray Chest 1 View (CXR):   FINDINGS:  Chest radiograph performed 08/18/2020 at 21:51 hours revealed extensive airspace disease in perihilar distribution, new since the earlier study of 00:57 hours.  This has improved but not yet resolved.     No new disease identified.  Mediastinal structures remain midline.

## 2020-08-20 NOTE — PLAN OF CARE
SW is following this Pt for DC planning needs. There are no identified needs at this time. Discharge Disposition: OP PT/OT    SW will continue to coordinate with patient, family, team and insurance to complete patient's discharge plan.    Ashtyn Hope LMSW   - Case Management

## 2020-08-20 NOTE — ASSESSMENT & PLAN NOTE
Edwin Powell is a 75 y.o. male s/p C5/6 ACDF (8/17/20) for cervical stenosis, postop course complicated by apnea; Transferred to MICU with NSTEMI and increasing oxygen requirements    Pain control: multimodal, d/c sedating medications  PT/OT: progressive mobility, OOBTC  DVT PPx: per SICU, SCDs at all times when not ambulating  Abx: postop Ancef  Labs: stable  Drain: continue     Dispo- continue ICU care per MICU

## 2020-08-20 NOTE — SUBJECTIVE & OBJECTIVE
Interval History/Significant Events: Patient seen today and found in no acute distress. Referred being able to breathe better. Denied chest pain, palpitations, orthopnea, PND. Now on 2L NC Sat 97%.    Review of Systems   Constitutional: Negative for appetite change, chills, fatigue and fever.   HENT: Negative for sinus pressure and sore throat.    Respiratory: Positive for shortness of breath. Negative for apnea, chest tightness and wheezing.    Cardiovascular: Negative for chest pain, palpitations and leg swelling.   Gastrointestinal: Negative for abdominal distention, abdominal pain, diarrhea, nausea and vomiting.   Genitourinary: Negative for difficulty urinating, dysuria and flank pain.   Musculoskeletal: Negative for arthralgias, joint swelling, neck pain and neck stiffness.   Skin: Negative for color change, pallor and rash.   Neurological: Negative for dizziness, weakness, numbness and headaches.     Objective:     Vital Signs (Most Recent):  Temp: 98.7 °F (37.1 °C) (08/20/20 0700)  Pulse: 93 (08/20/20 0800)  Resp: (!) 21 (08/20/20 0800)  BP: (!) 143/61 (08/20/20 0800)  SpO2: 97 % (08/20/20 0800) Vital Signs (24h Range):  Temp:  [98.7 °F (37.1 °C)-99.7 °F (37.6 °C)] 98.7 °F (37.1 °C)  Pulse:  [76-97] 93  Resp:  [11-37] 21  SpO2:  [86 %-100 %] 97 %  BP: (143-167)/(61-79) 143/61   Weight: 79.8 kg (175 lb 14.8 oz)  Body mass index is 27.55 kg/m².      Intake/Output Summary (Last 24 hours) at 8/20/2020 0817  Last data filed at 8/20/2020 0730  Gross per 24 hour   Intake 327 ml   Output 5925 ml   Net -5598 ml       Physical Exam  Constitutional:       General: He is not in acute distress.     Appearance: Normal appearance.   HENT:      Head: Normocephalic and atraumatic.   Eyes:      Extraocular Movements: Extraocular movements intact.      Conjunctiva/sclera: Conjunctivae normal.      Pupils: Pupils are equal, round, and reactive to light.   Neck:      Musculoskeletal: Normal range of motion and neck supple.       Comments: L side IJN drain present with serosanguinous drainage  Cardiovascular:      Rate and Rhythm: Normal rate and regular rhythm.      Heart sounds: No murmur. No friction rub. No gallop.    Pulmonary:      Effort: Pulmonary effort is normal. No respiratory distress.      Breath sounds: Normal breath sounds. No wheezing or rales.      Comments: On 2L NC, Sat 97%  Abdominal:      General: Abdomen is flat. Bowel sounds are normal. There is no distension.      Palpations: Abdomen is soft.      Tenderness: There is no abdominal tenderness.   Musculoskeletal: Normal range of motion.         General: No swelling or deformity.      Right lower leg: No edema.      Left lower leg: No edema.   Skin:     General: Skin is warm and dry.      Coloration: Skin is not pale.      Findings: No erythema.   Neurological:      Mental Status: He is alert.         Vents:  Oxygen Concentration (%): 40 (08/20/20 0800)  Lines/Drains/Airways     Drain                 Closed/Suction Drain 08/17/20 Anterior Neck Bulb 10 Fr. 3 days    Male External Urinary Catheter 08/18/20 0500 Small 2 days          Peripheral Intravenous Line                 Peripheral IV - Single Lumen 01/24/20 1447 22 G Anterior;Right Hand 208 days         Peripheral IV - Single Lumen 05/19/20 0924 22 G Right Hand 92 days         Peripheral IV - Single Lumen 08/17/20 0615 18 G Left Forearm 3 days         Peripheral IV - Single Lumen 08/17/20 0715 18 G Right Forearm 3 days         Peripheral IV - Single Lumen 08/18/20 0500 20 G Anterior;Left;Proximal Forearm 2 days              Significant Labs:    CBC/Anemia Profile:  Recent Labs   Lab 08/18/20  1514 08/19/20  0209 08/20/20  0205   WBC 3.04* 6.92 10.35   HGB 10.4* 9.8* 10.4*   HCT 32.8* 30.5* 33.2*    279 262   * 102* 103*   RDW 15.2* 15.3* 15.1*        Chemistries:  Recent Labs   Lab 08/19/20  0209 08/19/20  1601 08/20/20  0205    141 141   K 3.9 3.8 4.0   * 108 104   CO2 20* 23 26   BUN 40*  40* 45*   CREATININE 3.4* 3.4* 3.3*   CALCIUM 7.9* 8.2* 8.6*   ALBUMIN 1.9*  --  2.0*   PROT 5.3*  --  6.1   BILITOT 0.2  --  0.2   ALKPHOS 76  --  88   ALT <5*  --  <5*   AST 47*  --  45*   MG 1.4* 2.0 1.8   PHOS 5.3*  --  4.4       All pertinent labs within the past 24 hours have been reviewed.    Significant Imaging:  I have reviewed all pertinent imaging results/findings within the past 24 hours.

## 2020-08-20 NOTE — PT/OT/SLP PROGRESS
Physical Therapy Co-Treatment with OT    Patient Name:  Edwin Powell   MRN:  [id number]    Recommendations:     Discharge Recommendations:  rehabilitation facility   Discharge Equipment Recommendations: (will determine DME needs closer to discharge)   Barriers to discharge: Decreased caregiver support family may not be able to assist at current functional level.     Assessment:     Edwin Powell is a 75 y.o. male admitted with a medical diagnosis of Acute on chronic respiratory failure with hypoxia and hypercapnia.  He presents with the following impairments/functional limitations:  weakness, impaired endurance, impaired functional mobilty, gait instability, impaired balance, decreased lower extremity function  Pt rich treatment fair but is at a very low functional level. Pt will benefit from cont skilled PT 5x/wk to progress physically. Pt will need inpt rehab when medically stable to maximize rehab potential. Pt is s/p ACDF C 6-7 8/17, transfer to ICU 8/18/20, NSTEMI 8/19.    Rehab Prognosis: Good; patient would benefit from acute skilled PT services to address these deficits and reach maximum level of function.    Recent Surgery: Procedure(s) (LRB):  DISCECTOMY, SPINE, CERVICAL, ANTERIOR APPROACH, WITH FUSION co case with Dr. Falcon C6/7 [facility] SNS:vocal cord/motors/SSEP Regular OR table Patient needs scope in preop (N/A)  DISSECTION, NECK (N/A) 3 Days Post-Op    Plan:     During this hospitalization, patient to be seen 5 x/week to address the identified rehab impairments via gait training, therapeutic activities, therapeutic exercises, neuromuscular re-education and progress toward the following goals:    · Plan of Care Expires:  09/17/20    Subjective     Chief Complaint: pt c/o being tired after treatment.   Patient/Family Comments/goals:  To get better and go home.   Pain/Comfort:  · Pain Rating 1: 0/10  · Pain Rating Post-Intervention 1: 0/10      Objective:     Communicated with nurse prior to session.   Patient found supine with telemetry, blood pressure cuff, pulse ox (continuous), JIN drain, peripheral IV, SCD, oxygen(comfort flow, condom catheter) upon PT entry to room.     General Precautions: Standard, fall   Orthopedic Precautions:    Braces:       Functional Mobility:  · Bed Mobility:   Pt needed verbal cues for hand placement and sequencing for functional mobility.   · Rolling Left:  moderate assistance  · Supine to Sit: moderate assistance  · Sit to Supine: maximal assistance  ·   · Balance: pt sat on  EOB x 7 min with moderate assist for sitting balance. pt leaned to L side and posteriorly with sitting.       AM-PAC 6 CLICK MOBILITY  Turning over in bed (including adjusting bedclothes, sheets and blankets)?: 2  Sitting down on and standing up from a chair with arms (e.g., wheelchair, bedside commode, etc.): 1  Moving from lying on back to sitting on the side of the bed?: 2  Moving to and from a bed to a chair (including a wheelchair)?: 1  Need to walk in hospital room?: 1  Climbing 3-5 steps with a railing?: 1  Basic Mobility Total Score: 8       Therapeutic Activities and Exercises:   pt performed AAROM there exer BLE in supine x 10 reps. Pt and son received verbal instruction in PT POC and both verbally expressed understanding of such.     Patient left supine with all lines intact, call button in reach and son present..    GOALS:   Multidisciplinary Problems     Physical Therapy Goals        Problem: Physical Therapy Goal    Goal Priority Disciplines Outcome Goal Variances Interventions   Physical Therapy Goal     PT, PT/OT Ongoing, Progressing     Description: Goals to be met by: 20    Patient will increase functional independence with mobility by performin. Supine to sit with MInimal Assistance -not met  2. Sit to stand transfer with Contact Guard Assistance with AD if needed. -not met  3. Bed to chair transfer with Minimal Assistance using Rolling Walker if needed. -not met  4. Gait  x 50  feet with Contact Guard Assistance using Rolling Walker. If needed. -not met  5. Lower extremity exercise program x20 reps with supervision-not met  6. Pt sit on EOB x 10 min with CGA- not met                     Time Tracking:     PT Received On: 08/20/20  PT Start Time: 1043     PT Stop Time: 1111  PT Total Time (min): 28 min     Billable Minutes: Therapeutic Exercise 15 min and Neuromuscular Re-education 13 min    Treatment Type: Treatment(co-treatment with OT)  PT/PTA: PT     PTA Visit Number: 0     Fouzia Delgado, PT  08/20/2020

## 2020-08-20 NOTE — RESIDENT HANDOFF
Handoff     Primary Team: Networked reference to record [facility]  Room Number: 37141/84170 A     Patient Name: Edwin Powell MRN: [id number]     Date of Birth: [id number] Allergies: Patient has no known allergies.     Age: 75 y.o. Admit Date: 8/17/2020     Sex: male  BMI: Body mass index is 27.41 kg/m².     Code Status: Full Code        Illness Level (current clinical status): Watcher - Yes    Reason for Admission: Acute on chronic respiratory failure with hypoxia and hypercapnia    Brief HPI (pertinent PMH and diagnosis or differential diagnosis):   Patient is a 75 y.o. 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 Patient underwent ACDF of C6/7 with Ortho & ENT on 8/17. During the procedure he received rocuronium, ketamine, fentanyl, propofol and midazolam. He required a renae infusion during the procedure. Post-operatively the patient was admitted to post-op ortho unit and was placed on his home 2L O2. After his procedure he received multiple doses of oxycodone for is neck pain. Overnight on 8/17-8/18 the patient had multiple periods of apnea requiring 3 doses of narcan on the floor with minimal improvement in respiratory and mental status. ABG showed combined metabolic and respiratory acidosis. No significant improvement in acidosis with initiation of bipap on the floor and patient continued to have apneic periods and worsening hypoxia requiring 15L NC. He was transferred to ICU for further management.     Procedure Date: n/a    Hospital Course (updated, brief assessment by system or problem, significant events):   Patient was transferred to MICU and started on narcan and bicarbonate infusions. Hypoxia worsened requiring up to 30L comfort flow. Mental status improved and narcan gtt  discontinued on 8/18 in AM. PE suspected however CTA not feasible due to renal function. Bilateral lower extremity U/S negative for DVT, however because of quickly worsening O2 requirements and elevated troponin, he was empirically started on a heparin gtt for possible PE. TTE did not show RV strain but did show focal wall motion abnormalities, and troponin continued to uptrend so patient was loaded with DAPT for NSTEMI. CVP 3 on TTE however pulmonary edema on CXR. Restarted diuresis intravenously. 8/18 patient was requiring 30L comfort flow. 8/20 patient on 25L/40% comfort flow Sat 97%. Late afternoon patient was put on HFNC at 2L, Sat 97%. 8/20 TTE with severely hypokinetic mid inferoseptal, apical septal, and apex. Interval change in LVEF compared to prior study ( 55 to 30%) On ASA, plavix, carvedilol for ACS management.    Tasks (specific, using if-then statements):   Monitor patient's hypoxia. If hypoxemic can go up on oxygen requirements.  Conservative medical management for ACS. Continue aspirin, plavix, carvedilol.  Continue ppx anticoagulation  May continue with conservative diuresis    Contingency Plan (special circumstances anticipated and plan):   Monitor patient's hypoxia. If hypoxemic can go up on oxygen requirements.  Conservative medical management for ACS. Continue aspirin, plavix, carvedilol.  Continue ppx anticoagulation  May continue with conservative diuresis    Estimated Discharge Date: 2d    Discharge Disposition: Home or Self Care    Mentored By: Mandeep Byrd MD

## 2020-08-20 NOTE — PROGRESS NOTES
[facility]  Critical Care Medicine  Progress Note    Patient Name: Edwin Powell  MRN: [id number]  Admission Date: 8/17/2020  Hospital Length of Stay: 3 days  Code Status: Full Code  Attending Provider: Mandeep Byrd MD  Primary Care Provider: Lulú Lewis MD   Principal Problem: Acute on chronic respiratory failure with hypoxia and hypercapnia    Subjective:     HPI:  Patient is a 75 y.o. 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 Patient underwent ACDF of C6/7 with Ortho & ENT on 8/17. During the procedure he received rocuronium, ketamine, fentanyl, propofol and midazolam. He required a renae infusion during the procedure. Post-operatively the patient was admitted to post-op ortho unit and was placed on his home 2L O2. After his procedure he received multiple doses of oxycodone for is neck pain. Overnight on 8/17-8/18 the patient had multiple periods of apnea requiring 3 doses of narcan on the floor with minimal improvement in respiratory and mental status. ABG showed combined metabolic and respiratory acidosis. No significant improvement in acidosis with initiation of bipap on the floor and patient continued to have apneic periods and worsening hypoxia requiring 15L NC. He was transferred to ICU for further management.     Hospital/ICU Course:  Patient was transferred to MICU and started on narcan and bicarbonate infusions. Hypoxia worsened requiring up to 30L comfort flow. Mental status improved and narcan gtt discontinued on 8/18 in AM. PE suspected however CTA not feasible due to renal function. Bilateral lower extremity U/S negative for DVT, however because of quickly worsening O2 requirements and elevated troponin, he was empirically started on a  heparin gtt for possible PE. TTE did not show RV strain but did show focal wall motion abnormalities, and troponin continued to uptrend so patient was loaded with DAPT for NSTEMI. CVP 3 on TTE however pulmonary edema on CXR. Restarted diuresis intravenously.    8/20/2020: Patient no longer requiring CPAP (was on 35L/40%). O2Sat 97% with 2L NC. Refers feeling much better than yesterday.    Interval History/Significant Events: Patient seen today and found in no acute distress. Referred being able to breathe better. Denied chest pain, palpitations, orthopnea, PND. Now on 2L NC Sat 97%.    Review of Systems   Constitutional: Negative for appetite change, chills, fatigue and fever.   HENT: Negative for sinus pressure and sore throat.    Respiratory: Positive for shortness of breath. Negative for apnea, chest tightness and wheezing.    Cardiovascular: Negative for chest pain, palpitations and leg swelling.   Gastrointestinal: Negative for abdominal distention, abdominal pain, diarrhea, nausea and vomiting.   Genitourinary: Negative for difficulty urinating, dysuria and flank pain.   Musculoskeletal: Negative for arthralgias, joint swelling, neck pain and neck stiffness.   Skin: Negative for color change, pallor and rash.   Neurological: Negative for dizziness, weakness, numbness and headaches.     Objective:     Vital Signs (Most Recent):  Temp: 98.7 °F (37.1 °C) (08/20/20 0700)  Pulse: 93 (08/20/20 0800)  Resp: (!) 21 (08/20/20 0800)  BP: (!) 143/61 (08/20/20 0800)  SpO2: 97 % (08/20/20 0800) Vital Signs (24h Range):  Temp:  [98.7 °F (37.1 °C)-99.7 °F (37.6 °C)] 98.7 °F (37.1 °C)  Pulse:  [76-97] 93  Resp:  [11-37] 21  SpO2:  [86 %-100 %] 97 %  BP: (143-167)/(61-79) 143/61   Weight: 79.8 kg (175 lb 14.8 oz)  Body mass index is 27.55 kg/m².      Intake/Output Summary (Last 24 hours) at 8/20/2020 0817  Last data filed at 8/20/2020 0730  Gross per 24 hour   Intake 327 ml   Output 5925 ml   Net -5598 ml       Physical  Exam  Constitutional:       General: He is not in acute distress.     Appearance: Normal appearance.   HENT:      Head: Normocephalic and atraumatic.   Eyes:      Extraocular Movements: Extraocular movements intact.      Conjunctiva/sclera: Conjunctivae normal.      Pupils: Pupils are equal, round, and reactive to light.   Neck:      Musculoskeletal: Normal range of motion and neck supple.      Comments: L side JIN drain present with serosanguinous drainage  Cardiovascular:      Rate and Rhythm: Normal rate and regular rhythm.      Heart sounds: No murmur. No friction rub. No gallop.    Pulmonary:      Effort: Pulmonary effort is normal. No respiratory distress.      Breath sounds: Normal breath sounds. No wheezing or rales.      Comments: On 2L NC, Sat 97%  Abdominal:      General: Abdomen is flat. Bowel sounds are normal. There is no distension.      Palpations: Abdomen is soft.      Tenderness: There is no abdominal tenderness.   Musculoskeletal: Normal range of motion.         General: No swelling or deformity.      Right lower leg: No edema.      Left lower leg: No edema.   Skin:     General: Skin is warm and dry.      Coloration: Skin is not pale.      Findings: No erythema.   Neurological:      Mental Status: He is alert.         Vents:  Oxygen Concentration (%): 40 (08/20/20 0800)  Lines/Drains/Airways     Drain                 Closed/Suction Drain 08/17/20 Anterior Neck Bulb 10 Fr. 3 days    Male External Urinary Catheter 08/18/20 0500 Small 2 days          Peripheral Intravenous Line                 Peripheral IV - Single Lumen 01/24/20 1447 22 G Anterior;Right Hand 208 days         Peripheral IV - Single Lumen 05/19/20 0924 22 G Right Hand 92 days         Peripheral IV - Single Lumen 08/17/20 0615 18 G Left Forearm 3 days         Peripheral IV - Single Lumen 08/17/20 0715 18 G Right Forearm 3 days         Peripheral IV - Single Lumen 08/18/20 0500 20 G Anterior;Left;Proximal Forearm 2 days               Significant Labs:    CBC/Anemia Profile:  Recent Labs   Lab 08/18/20  1514 08/19/20  0209 08/20/20  0205   WBC 3.04* 6.92 10.35   HGB 10.4* 9.8* 10.4*   HCT 32.8* 30.5* 33.2*    279 262   * 102* 103*   RDW 15.2* 15.3* 15.1*        Chemistries:  Recent Labs   Lab 08/19/20  0209 08/19/20  1601 08/20/20  0205    141 141   K 3.9 3.8 4.0   * 108 104   CO2 20* 23 26   BUN 40* 40* 45*   CREATININE 3.4* 3.4* 3.3*   CALCIUM 7.9* 8.2* 8.6*   ALBUMIN 1.9*  --  2.0*   PROT 5.3*  --  6.1   BILITOT 0.2  --  0.2   ALKPHOS 76  --  88   ALT <5*  --  <5*   AST 47*  --  45*   MG 1.4* 2.0 1.8   PHOS 5.3*  --  4.4       All pertinent labs within the past 24 hours have been reviewed.    Significant Imaging:  I have reviewed all pertinent imaging results/findings within the past 24 hours.      ABG  Recent Labs   Lab 08/18/20 2007   PH 7.259*   PO2 91   PCO2 41.1   HCO3 18.4*   BE -9     Assessment/Plan:     Neuro  Acute metabolic encephalopathy  --suspect medication induced insetting of decreased renal clearance as pt responded well to narcan. 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 Risk factors include recent surgery and metastatic RCC.     - CTA chest deferred due to VERENA  - Bilat LE U/S negative for DVT  - TTE negative for RV strain with normal PA pressure. Showed chronic HFpEF and focal wall motion abnormalities  - Troponin elevated and peaked at 8.8, BNP mildly elevated (no baseline)  - Started heparin gtt empirically for PE. OK'd by ortho to start A/C  so soon after surgery due to rapidly worsening hypoxia  - Wean O2 to goal sat > 90%. Today on 2L NC Sat 97%  - Lasix 100mg daily, monitor UOP    Cardiac/Vascular  NSTEMI (non-ST elevated myocardial infarction)  Coronary artery disease involving native coronary artery of native heart with angina pectoris  Elevated troponin  CAD with history of PCI ~20 years ago. Patient did not have chest pain. Troponin was checked when concerned for PE, initially was 1.2 however continued to uptrend and peaked at 8.8.    - Continued heparin gtt, at least for 48 hours for NSTEMI  - Loaded with ASA and Plavix, Ok'd by ortho, and will continue  - Continue coreg  - On statin    Coronary artery disease involving native coronary artery of native heart without angina pectoris  --patient on home ASA, rosuvastatin, plavix, amlodipine      Renal/  Metabolic acidosis, normal anion gap (NAG)  --2/2 rta in setting of ozzy on ckd vs recent diarrhea (per recent nephro clinic note)  --UAG positive (38) suggestive of rta  --Transition from bicarb gtt to PO sodium bicarb 1300 mg BID    Acute kidney injury superimposed on chronic kidney disease  --baseline creatinine ~ 2; now 3.3  -- ? Etiology. Recent Nephro note states pt has had decreased po intake & diarrhea recently - ? Pre-renal. Pt net + 1.4L since admission and has been having maintenance IVF, so likely volume resuscitated at this point  --holding home lasix & lisinopril initially. Restarting lasix on 8/19 due to pulmonary edema, patient got  1 dose of IV lasix 100 mg to 6.3L UOP. Will transition to Lasix 100mg daily  --renal U/S unremarkable    Benign prostatic hyperplasia (BPH) with urinary urge incontinence  --continue home flomax    Endocrine  Type 2 diabetes mellitus with stage 3 chronic kidney disease, without long-term current use of insulin  --per clinic notes, insulin dependent  --home regimen lantus 5 units daily   --start lantus 2 units qhs & low dose correctional ssi; titrate  prn    Acquired hypothyroidism  --continue home levothyroxine  --TSH elevated however fT4 WNL    GI  Dysphagia  Per RN on 8/19 patient had dysphagia and coughing after medication administration. Bedside swallow eval not encouraging. Seen by SLP today.       Critical Care Daily Checklist:    A: Awake: RASS Goal/Actual Goal: RASS Goal: 0-->alert and calm  Actual: Montoya Agitation Sedation Scale (RASS): Alert and calm   B: Spontaneous Breathing Trial Performed?   n/a   C: SAT & SBT Coordinated?  n/a   D: Delirium: CAM-ICU Overall CAM-ICU: Negative   E: Early Mobility Performed? Yes   F: Feeding Goal:    Status:     Current Diet Order   Procedures    Diet diabetic [facility]; 2000 Calorie; Pureed     Order Specific Question:   Indicate patient location for additional diet options:     Answer:   [facility]     Order Specific Question:   Total calories:     Answer:   2000 Calorie     Order Specific Question:   Additional Diet Options:     Answer:   Pureed      AS: Analgesia/Sedation None   T: Thromboembolic Prophylaxis Heparin gtt   H: HOB > 300 Yes   U: Stress Ulcer Prophylaxis (if needed) n/a   G: Glucose Control LDISS   B: Bowel Function Stool Occurrence: 1   I: Indwelling Catheter (Lines & Ahumada) Necessity PIV, JIN drain, condom cathh   D: De-escalation of Antimicrobials/Pharmacotherapies Switched bicarb gtt to oral    Plan for the day/ETD NSTEMI medical management, Lasix, O2 deescalation to 2L NC    Code Status:  Family/Goals of Care: Full Code         Critical secondary to Patient has a condition that poses threat to life and bodily function: Severe Respiratory Distress      Critical care was time spent personally by me on the following activities: development of treatment plan with patient or surrogate and bedside caregivers, discussions with consultants, evaluation of patient's response to treatment, examination of patient, ordering and performing treatments and interventions, ordering and review of  laboratory studies, ordering and review of radiographic studies, pulse oximetry, re-evaluation of patient's condition. This critical care time did not overlap with that of any other provider or involve time for any procedures.     Margarito Butts MD  Critical Care Medicine  [facility]

## 2020-08-20 NOTE — PT/OT/SLP EVAL
Speech Language Pathology Evaluation  Bedside Swallow    Patient Name:  Edwin Powell   MRN:  [id number]  Admitting Diagnosis: Acute on chronic respiratory failure with hypoxia and hypercapnia    Recommendations:                 General Recommendations:  Dysphagia therapy  Diet recommendations:  Mechanical soft, Thin SLP with concerns re: adequate PO intake given lethargy.  Aspiration Precautions:   · 1 bite/sip at a time  · Assistance with meals   · Eliminate distractions  · Feed only when awake/alert   · HOB to 90 degrees  · Meds crushed in puree  · ABSOLUTELY No straws  · Remain upright 30 minutes post meal   · Small bites/sips   General Precautions: Standard, fall  Communication strategies:  none    History:     Past Medical History:   Diagnosis Date    Acquired hypothyroidism 5/26/2017    Benign essential HTN 5/26/2017    Benign prostatic hyperplasia (BPH) with urinary urge incontinence 6/6/2017    Chronic low back pain 6/1/2017    Coronary artery disease involving native coronary artery of native heart without angina pectoris 5/26/2017    S/p 5 stents - last one around 2010-11.    Gastroesophageal reflux disease without esophagitis 5/26/2017    History of CVA (cerebrovascular accident) 5/26/2017    Hypertension associated with diabetes 5/26/2017    BP controlled on ACE, CCB    Lumbar disc lesion 5/26/2017    Metastatic renal cell carcinoma to bone 6/6/2017    Metastatic renal cell carcinoma to liver 6/6/2017    Liver Biopsy 5-2017: METASTATIC RENAL CELL CARCINOMA.    Mixed hyperlipidemia 5/26/2017    Type 2 diabetes mellitus with stage 3 chronic kidney disease, with long-term current use of insulin 5/26/2017       Past Surgical History:   Procedure Laterality Date    ABDOMINAL SURGERY      ANTERIOR CERVICAL DISCECTOMY W/ FUSION N/A 8/17/2020    Procedure: DISCECTOMY, SPINE, CERVICAL, ANTERIOR APPROACH, WITH FUSION co case with Dr. Falcon C6/7 [facility] SNS:vocal cord/motors/SSEP Regular OR table  Patient needs scope in preop;  Surgeon: Filemon Aguayo MD;  [facility];  Service: Neurosurgery;  Laterality: N/A;    APPENDECTOMY      BACK SURGERY      CARDIAC SURGERY      CATARACT EXTRACTION      CHOLECYSTECTOMY      CORONARY STENT PLACEMENT      5 stents    coronary stenting      X 5 last one in 2010-11.    DISSECTION OF NECK N/A 8/17/2020    Procedure: DISSECTION, NECK;  Surgeon: Rush Falcon MD;  Location: [facility] OR 2ND FLR;  Service: ENT;  Laterality: N/A;    INTRAOCULAR PROSTHESES INSERTION Right 6/27/2019    Procedure: INSERTION, IOL PROSTHESIS;  Surgeon: Chary Culp MD;  [facility];  Service: Ophthalmology;  Laterality: Right;    INTRAOCULAR PROSTHESES INSERTION Left 8/22/2019    Procedure: INSERTION, IOL PROSTHESIS;  Surgeon: Chary Culp MD;  Location: [facility] OR [address] [place] [address];  Service: Ophthalmology;  Laterality: Left;    PHACOEMULSIFICATION OF CATARACT Right 6/27/2019    Procedure: PHACOEMULSIFICATION, CATARACT;  Surgeon: Chary Culp MD;  Location: [facility] OR [address] [place] [address];  Service: Ophthalmology;  Laterality: Right;    PHACOEMULSIFICATION OF CATARACT Left 8/22/2019    Procedure: PHACOEMULSIFICATION, CATARACT;  Surgeon: Chary Culp MD;  Location: [facility] OR [address] [place] [address];  Service: Ophthalmology;  Laterality: Left;    SPINAL FUSION         Social History: Patient lives with son. Son provides care.      Subjective     Pt with fluctuating levels of alertness throughout session. Son present.  RN stating patient with signs of airway compromise with PO medications.  Son reports coughing at home which he attributes to large sips with a straw. Also reports patient takes multiple PO medications at a time, coughing also present.    Objective:     Oral Musculature Evaluation  · Oral Musculature: general weakness  · Dentition: present and adequate  · Secretion Management: adequate  · Mucosal Quality: adequate  · Mandibular Strength and Mobility: WFL  · Oral Labial  Strength and Mobility: WFL  · Lingual Strength and Mobility: WFL  · Velar Elevation: WFL  · Buccal Strength and Mobility: WFL  · Volitional Cough: present  · Volitional Swallow: present  · Voice Prior to PO Intake: clear; mild dysphonia, harsh vocal quality  · Oral Musculature Comments: none    Bedside Swallow Eval:   Consistencies Assessed:  · Thin liquids via cup sips x5  · Puree via spoon x4  · Solids via cracker x2     Oral Phase:   · Prolonged mastication with solids  · Otherwise, within functional limits for puree and thin    Pharyngeal Phase:   · no overt clinical signs/symptoms of aspiration  · no overt clinical signs/symptoms of pharyngeal dysphagia    Compensatory Strategies  · None    Treatment: Pt with increasing level of lethargy as session progressed. Aroused given min A each time, but unable to maintain wakeful state. SLP provided skilled education regarding the need for strict aspiration precautions including absolutely no straws, meds crushed only, diet recommendations, and ongoing SLP POC. SLP also discussed increased risk of aspiration given high O2 demands. Son reports to encourage use of aspiration precautions. All results and recommendations discussed with RN post session. White board updated.    Assessment:     Edwin Powell is a 75 y.o. male with an SLP diagnosis of Dysphagia.      Goals:   Multidisciplinary Problems     SLP Goals        Problem: SLP Goal    Goal Priority Disciplines Outcome   SLP Goal     SLP    Description: Speech Language Pathology Goals  Goals expected to be met by 8/27/20  1. Pt will tolerate a mechanical soft consistency diet and thin liquids with no overt signs of airway compromise.  2. Pt will participate in ongoing swallowing assessment to ensure least restrictive diet needs are maintained.    POC initiated.     Emily P. Abadie M.S., CCC-SLP  Speech Language Pathologist  [phone]  08/20/2020                                   Plan:     · Patient to be seen:  4  x/week   · Plan of Care expires:  09/20/20  · Plan of Care reviewed with:  patient, son   · SLP Follow-Up:  Yes       Discharge recommendations:  rehabilitation facility   Barriers to Discharge:  None    Time Tracking:     SLP Treatment Date:   08/20/20  Speech Start Time:  0958  Speech Stop Time:  1011     Speech Total Time (min):  13 min    Billable Minutes: Eval Swallow and Oral Function 5 and Seld Care/Home Management Training 8    Emily Abadie, CCC-SLP  08/20/2020

## 2020-08-20 NOTE — PLAN OF CARE
Patient's O2 weaned to 2L NC during shift.  Heparin infusing at 16 units/kg/min; Anti-Xa therapeutic today, next draw is with AM labs.  UOP adequate during shift; 100 mg IV lasix switched to daily per team.  Speech evaluation today by Speech Therapy; recommendations for no straws with liquids, all PO medications must be crushed and placed in apple sauce or pudding, and pt will have a mechanical soft diet.  Sat on the side of the bed with PT/OT.  BP stable during shift.  Transfer orders placed today.  Safety maintained throughout shift.  Pt remained free from falls, injury, and skin breakdown.  Plan of care reviewed with the patient and the patient's son.  All questions and concerns were answered and addressed.

## 2020-08-20 NOTE — ASSESSMENT & PLAN NOTE
Concern for PE as he suddenly required significantly more O2 overnight on 8/17-18. Risk factors include recent surgery and metastatic RCC.     - CTA chest deferred due to VERENA  - Bilat LE U/S negative for DVT  - TTE negative for RV strain with normal PA pressure. Showed chronic HFpEF and focal wall motion abnormalities  - Troponin elevated and peaked at 8.8, BNP mildly elevated (no baseline)  - Started heparin gtt empirically for PE. OK'd by ortho to start A/C so soon after surgery due to rapidly worsening hypoxia  - Wean O2 to goal sat > 90%. Today on 2L NC Sat 97%  - Lasix 100mg daily, monitor UOP

## 2020-08-20 NOTE — SUBJECTIVE & OBJECTIVE
Principal Problem:Acute on chronic respiratory failure with hypoxia and hypercapnia    Principal Orthopedic Problem: s/p ACDF    Interval History:   Pt seen and examined at the bedside this am. 35L 40% comfortflow. +Dysphagia. Denies numbness/tingling/weakness of BUE.   Review of patient's allergies indicates:  No Known Allergies    Current Facility-Administered Medications   Medication    acetaminophen tablet 650 mg    albuterol-ipratropium 2.5 mg-0.5 mg/3 mL nebulizer solution 3 mL    albuterol-ipratropium 2.5 mg-0.5 mg/3 mL nebulizer solution 3 mL    amLODIPine tablet 10 mg    aspirin chewable tablet 81 mg    bisacodyL suppository 10 mg    carvediloL tablet 6.25 mg    clopidogreL tablet 75 mg    dextrose 50% injection 12.5 g    dextrose 50% injection 25 g    docusate sodium capsule 50 mg    furosemide injection 100 mg    glucagon (human recombinant) injection 1 mg    glucose chewable tablet 16 g    glucose chewable tablet 24 g    heparin 25,000 units in dextrose 5% (100 units/ml) IV bolus from bag - ADDITIONAL PRN BOLUS - 30 units/kg    heparin 25,000 units in dextrose 5% (100 units/ml) IV bolus from bag - ADDITIONAL PRN BOLUS - 60 units/kg    heparin 25,000 units in dextrose 5% 250 mL (100 units/mL) infusion HIGH INTENSITY nomogram Anti- Xa    insulin aspart U-100 pen 0-5 Units    levothyroxine tablet 88 mcg    naloxone 0.4 mg/mL injection 0.2 mg    ondansetron injection 4 mg    polyethylene glycol packet 17 g    rosuvastatin tablet 40 mg    sodium bicarbonate tablet 1,300 mg    sodium chloride 0.9% flush 10 mL    sodium chloride 0.9% flush 5 mL    tamsulosin 24 hr capsule 0.8 mg     Facility-Administered Medications Ordered in Other Encounters   Medication    phenylephrine HCL 2.5% ophthalmic solution 1 drop    proparacaine 0.5 % ophthalmic solution 1 drop    tropicamide 1% ophthalmic solution 1 drop     Objective:     Vital Signs (Most Recent):  Temp: 99.2 °F (37.3 °C) (08/20/20  "1100)  Pulse: 71 (08/20/20 1130)  Resp: 11 (08/20/20 1130)  BP: (!) 145/61 (08/20/20 1100)  SpO2: 98 % (08/20/20 1130) Vital Signs (24h Range):  Temp:  [98.7 °F (37.1 °C)-99.7 °F (37.6 °C)] 99.2 °F (37.3 °C)  Pulse:  [71-97] 71  Resp:  [11-37] 11  SpO2:  [86 %-100 %] 98 %  BP: (132-167)/(61-79) 145/61     Weight: 79.4 kg (175 lb)  Height: 5' 7" (170.2 cm)  Body mass index is 27.41 kg/m².      Intake/Output Summary (Last 24 hours) at 8/20/2020 1152  Last data filed at 8/20/2020 1100  Gross per 24 hour   Intake 327 ml   Output 6470 ml   Net -6143 ml       Ortho/SPM Exam    Anterior cervical dressing in place with drain- 20 cc  Neuro exam stable    Significant Labs:   ABGs:   Recent Labs   Lab 08/18/20 2007   PH 7.259*   PCO2 41.1   HCO3 18.4*   POCSATURATED 96   BE -9     CBC:   Recent Labs   Lab 08/18/20  1514 08/19/20  0209 08/20/20  0205   WBC 3.04* 6.92 10.35   HGB 10.4* 9.8* 10.4*   HCT 32.8* 30.5* 33.2*    279 262     CMP:   Recent Labs   Lab 08/19/20  0209 08/19/20  1601 08/20/20  0205    141 141   K 3.9 3.8 4.0   * 108 104   CO2 20* 23 26   * 151* 132*   BUN 40* 40* 45*   CREATININE 3.4* 3.4* 3.3*   CALCIUM 7.9* 8.2* 8.6*   PROT 5.3*  --  6.1   ALBUMIN 1.9*  --  2.0*   BILITOT 0.2  --  0.2   ALKPHOS 76  --  88   AST 47*  --  45*   ALT <5*  --  <5*   ANIONGAP 11 10 11   EGFRNONAA 16.7* 16.7* 17.3*     All pertinent labs within the past 24 hours have been reviewed.    Significant Imaging: I have reviewed and interpreted all pertinent imaging results/findings.  "

## 2020-08-21 ENCOUNTER — TELEPHONE (OUTPATIENT)
Dept: PRIMARY CARE CLINIC | Facility: CLINIC | Age: 75
End: 2020-08-21

## 2020-08-21 ENCOUNTER — TELEPHONE (OUTPATIENT)
Dept: PHARMACY | Facility: CLINIC | Age: 75
End: 2020-08-21

## 2020-08-21 LAB
ALBUMIN SERPL BCP-MCNC: 2 G/DL (ref 3.5–5.2)
ALP SERPL-CCNC: 92 U/L (ref 55–135)
ALT SERPL W/O P-5'-P-CCNC: <5 U/L (ref 10–44)
ANION GAP SERPL CALC-SCNC: 11 MMOL/L (ref 8–16)
AST SERPL-CCNC: 30 U/L (ref 10–40)
BASOPHILS # BLD AUTO: 0.02 K/UL (ref 0–0.2)
BASOPHILS NFR BLD: 0.2 % (ref 0–1.9)
BILIRUB SERPL-MCNC: 0.4 MG/DL (ref 0.1–1)
BUN SERPL-MCNC: 55 MG/DL (ref 8–23)
CALCIUM SERPL-MCNC: 8.8 MG/DL (ref 8.7–10.5)
CHLORIDE SERPL-SCNC: 98 MMOL/L (ref 95–110)
CO2 SERPL-SCNC: 33 MMOL/L (ref 23–29)
CREAT SERPL-MCNC: 2.9 MG/DL (ref 0.5–1.4)
DIFFERENTIAL METHOD: ABNORMAL
EOSINOPHIL # BLD AUTO: 0.1 K/UL (ref 0–0.5)
EOSINOPHIL NFR BLD: 0.9 % (ref 0–8)
ERYTHROCYTE [DISTWIDTH] IN BLOOD BY AUTOMATED COUNT: 14.6 % (ref 11.5–14.5)
EST. GFR  (AFRICAN AMERICAN): 23.4 ML/MIN/1.73 M^2
EST. GFR  (NON AFRICAN AMERICAN): 20.2 ML/MIN/1.73 M^2
FACT X PPP CHRO-ACNC: 0.17 IU/ML (ref 0.3–0.7)
GLUCOSE SERPL-MCNC: 140 MG/DL (ref 70–110)
HCT VFR BLD AUTO: 33.2 % (ref 40–54)
HGB BLD-MCNC: 10.6 G/DL (ref 14–18)
IMM GRANULOCYTES # BLD AUTO: 0.05 K/UL (ref 0–0.04)
IMM GRANULOCYTES NFR BLD AUTO: 0.6 % (ref 0–0.5)
LYMPHOCYTES # BLD AUTO: 1.3 K/UL (ref 1–4.8)
LYMPHOCYTES NFR BLD: 14.1 % (ref 18–48)
MAGNESIUM SERPL-MCNC: 1.8 MG/DL (ref 1.6–2.6)
MCH RBC QN AUTO: 32.4 PG (ref 27–31)
MCHC RBC AUTO-ENTMCNC: 31.9 G/DL (ref 32–36)
MCV RBC AUTO: 102 FL (ref 82–98)
MONOCYTES # BLD AUTO: 0.5 K/UL (ref 0.3–1)
MONOCYTES NFR BLD: 6 % (ref 4–15)
NEUTROPHILS # BLD AUTO: 7.1 K/UL (ref 1.8–7.7)
NEUTROPHILS NFR BLD: 78.2 % (ref 38–73)
NRBC BLD-RTO: 0 /100 WBC
PHOSPHATE SERPL-MCNC: 4.2 MG/DL (ref 2.7–4.5)
PLATELET # BLD AUTO: 278 K/UL (ref 150–350)
PMV BLD AUTO: 10.8 FL (ref 9.2–12.9)
POCT GLUCOSE: 127 MG/DL (ref 70–110)
POCT GLUCOSE: 164 MG/DL (ref 70–110)
POCT GLUCOSE: 172 MG/DL (ref 70–110)
POTASSIUM SERPL-SCNC: 3.3 MMOL/L (ref 3.5–5.1)
PROT SERPL-MCNC: 6.1 G/DL (ref 6–8.4)
RBC # BLD AUTO: 3.27 M/UL (ref 4.6–6.2)
SODIUM SERPL-SCNC: 142 MMOL/L (ref 136–145)
WBC # BLD AUTO: 9.07 K/UL (ref 3.9–12.7)

## 2020-08-21 PROCEDURE — 99900035 HC TECH TIME PER 15 MIN (STAT): Mod: HCNC

## 2020-08-21 PROCEDURE — 92526 ORAL FUNCTION THERAPY: CPT | Mod: HCNC

## 2020-08-21 PROCEDURE — 25000003 PHARM REV CODE 250: Mod: HCNC | Performed by: STUDENT IN AN ORGANIZED HEALTH CARE EDUCATION/TRAINING PROGRAM

## 2020-08-21 PROCEDURE — 97110 THERAPEUTIC EXERCISES: CPT | Mod: HCNC

## 2020-08-21 PROCEDURE — 97530 THERAPEUTIC ACTIVITIES: CPT | Mod: HCNC

## 2020-08-21 PROCEDURE — 27000221 HC OXYGEN, UP TO 24 HOURS: Mod: HCNC

## 2020-08-21 PROCEDURE — 94761 N-INVAS EAR/PLS OXIMETRY MLT: CPT | Mod: HCNC

## 2020-08-21 PROCEDURE — 83735 ASSAY OF MAGNESIUM: CPT | Mod: HCNC

## 2020-08-21 PROCEDURE — 25000003 PHARM REV CODE 250: Mod: HCNC | Performed by: NURSE PRACTITIONER

## 2020-08-21 PROCEDURE — 63600175 PHARM REV CODE 636 W HCPCS: Mod: HCNC | Performed by: INTERNAL MEDICINE

## 2020-08-21 PROCEDURE — 85025 COMPLETE CBC W/AUTO DIFF WBC: CPT | Mod: HCNC

## 2020-08-21 PROCEDURE — 85520 HEPARIN ASSAY: CPT | Mod: HCNC

## 2020-08-21 PROCEDURE — 97535 SELF CARE MNGMENT TRAINING: CPT | Mod: HCNC

## 2020-08-21 PROCEDURE — 94799 UNLISTED PULMONARY SVC/PX: CPT | Mod: HCNC

## 2020-08-21 PROCEDURE — 20600001 HC STEP DOWN PRIVATE ROOM: Mod: HCNC

## 2020-08-21 PROCEDURE — 94664 DEMO&/EVAL PT USE INHALER: CPT | Mod: HCNC

## 2020-08-21 PROCEDURE — 80053 COMPREHEN METABOLIC PANEL: CPT | Mod: HCNC

## 2020-08-21 PROCEDURE — 84100 ASSAY OF PHOSPHORUS: CPT | Mod: HCNC

## 2020-08-21 PROCEDURE — 27000646 HC AEROBIKA DEVICE: Mod: HCNC

## 2020-08-21 RX ORDER — POTASSIUM CHLORIDE 750 MG/1
40 CAPSULE, EXTENDED RELEASE ORAL
Status: DISCONTINUED | OUTPATIENT
Start: 2020-08-21 | End: 2020-08-21

## 2020-08-21 RX ORDER — POTASSIUM CHLORIDE 1.5 G/1.58G
40 POWDER, FOR SOLUTION ORAL ONCE
Status: COMPLETED | OUTPATIENT
Start: 2020-08-21 | End: 2020-08-21

## 2020-08-21 RX ORDER — PAZOPANIB 200 MG/1
800 TABLET ORAL DAILY
Status: DISCONTINUED | OUTPATIENT
Start: 2020-08-22 | End: 2020-08-22

## 2020-08-21 RX ADMIN — SODIUM BICARBONATE 650 MG TABLET 1300 MG: at 08:08

## 2020-08-21 RX ADMIN — LEVOTHYROXINE SODIUM 88 MCG: 0.09 TABLET ORAL at 08:08

## 2020-08-21 RX ADMIN — DOCUSATE SODIUM 50 MG: 50 CAPSULE, LIQUID FILLED ORAL at 08:08

## 2020-08-21 RX ADMIN — POTASSIUM CHLORIDE 40 MEQ: 1.5 POWDER, FOR SOLUTION ORAL at 08:08

## 2020-08-21 RX ADMIN — CARVEDILOL 6.25 MG: 6.25 TABLET, FILM COATED ORAL at 08:08

## 2020-08-21 RX ADMIN — TAMSULOSIN HYDROCHLORIDE 0.8 MG: 0.4 CAPSULE ORAL at 08:08

## 2020-08-21 RX ADMIN — FUROSEMIDE 100 MG: 10 INJECTION, SOLUTION INTRAMUSCULAR; INTRAVENOUS at 08:08

## 2020-08-21 RX ADMIN — CLOPIDOGREL 75 MG: 75 TABLET, FILM COATED ORAL at 08:08

## 2020-08-21 RX ADMIN — ROSUVASTATIN CALCIUM 40 MG: 20 TABLET, FILM COATED ORAL at 08:08

## 2020-08-21 RX ADMIN — HEPARIN SODIUM 19 UNITS/KG/HR: 5000 INJECTION INTRAVENOUS; SUBCUTANEOUS at 08:08

## 2020-08-21 RX ADMIN — AMLODIPINE BESYLATE 10 MG: 10 TABLET ORAL at 08:08

## 2020-08-21 RX ADMIN — ASPIRIN 81 MG CHEWABLE TABLET 81 MG: 81 TABLET CHEWABLE at 08:08

## 2020-08-21 NOTE — ASSESSMENT & PLAN NOTE
--baseline creatinine ~ 2; now 2.9  -- ? Etiology. Recent Nephro note states pt has had decreased po intake & diarrhea recently - ? Pre-renal. Pt net + 1.4L since admission and has been having maintenance IVF, so likely volume resuscitated at this point  --holding home lasix & lisinopril initially. On Lasix 100mg daily to a Net -2.8L.  --renal U/S unremarkable

## 2020-08-21 NOTE — ASSESSMENT & PLAN NOTE
Concern for PE as he suddenly required significantly more O2 overnight on 8/17-18. Risk factors include recent surgery and metastatic RCC.     - CTA chest deferred due to VERENA  - Bilat LE U/S negative for DVT  - TTE negative for RV strain with normal PA pressure. Showed chronic HFpEF and focal wall motion abnormalities  - Troponin elevated and peaked at 8.8, BNP mildly elevated (no baseline)  - Was put on heparin gtt for 48hrs. OK'd by ortho to start A/C so soon after surgery due to rapidly worsening hypoxia  - Wean O2 to goal sat > 90%. Today on 3L NC Sat 97%  - Lasix 100mg daily, monitor UOP

## 2020-08-21 NOTE — SUBJECTIVE & OBJECTIVE
Interval History/Significant Events: NAEON. Patient on 3L NC, Sat 98%. Refers feeling well. Denied fevers, chills, chest pain, SOB, palpitations.    Review of Systems    Constitutional: Negative for appetite change, chills, fatigue and fever.   HENT: Negative for sinus pressure and sore throat.    Respiratory: Negative for shortness of breath, apnea, chest tightness and wheezing.    Cardiovascular: Negative for chest pain, palpitations and leg swelling.   Gastrointestinal: Negative for abdominal distention, abdominal pain, diarrhea, nausea and vomiting.   Genitourinary: Negative for difficulty urinating, dysuria and flank pain.   Musculoskeletal: Negative for arthralgias, joint swelling, neck pain and neck stiffness.   Skin: Negative for color change, pallor and rash.   Neurological: Negative for dizziness, weakness, numbness and headaches.   Objective:     Vital Signs (Most Recent):  Temp: 98.6 °F (37 °C)(Pt arrived to Room at this time. ) (08/21/20 1017)  Pulse: 65 (08/21/20 1017)  Resp: 19 (08/21/20 1017)  BP: (!) 155/69 (08/21/20 1017)  SpO2: 99 % (08/21/20 1017) Vital Signs (24h Range):  Temp:  [98.6 °F (37 °C)-99 °F (37.2 °C)] 98.6 °F (37 °C)  Pulse:  [65-81] 65  Resp:  [8-46] 19  SpO2:  [91 %-99 %] 99 %  BP: (117-166)/(57-70) 155/69   Weight: 79.4 kg (175 lb)  Body mass index is 27.41 kg/m².      Intake/Output Summary (Last 24 hours) at 8/21/2020 1238  Last data filed at 8/21/2020 1000  Gross per 24 hour   Intake 253 ml   Output 2600 ml   Net -2347 ml       Physical Exam  Constitutional:       General: He is not in acute distress.     Appearance: Normal appearance.   HENT:      Head: Normocephalic and atraumatic.   Eyes:      Extraocular Movements: Extraocular movements intact.      Conjunctiva/sclera: Conjunctivae normal.      Pupils: Pupils are equal, round, and reactive to light.   Neck:      Musculoskeletal: Normal range of motion and neck supple.      Comments: L side JIN drain present with serosanguinous  drainage  Cardiovascular:      Rate and Rhythm: Normal rate and regular rhythm.      Heart sounds: No murmur. No friction rub. No gallop.    Pulmonary:      Effort: Pulmonary effort is normal. No respiratory distress.      Breath sounds: Normal breath sounds. No wheezing or rales.      Comments: On 3L NC, Sat 97%  Abdominal:      General: Abdomen is flat. Bowel sounds are normal. There is no distension.      Palpations: Abdomen is soft.      Tenderness: There is no abdominal tenderness.   Musculoskeletal: Normal range of motion.         General: No swelling or deformity.      Right lower leg: No edema.      Left lower leg: No edema.   Skin:     General: Skin is warm and dry.      Coloration: Skin is not pale.      Findings: No erythema.   Neurological:      Mental Status: He is alert.        Vents:  Oxygen Concentration (%): 40 (08/20/20 1200)  Lines/Drains/Airways     Drain                 Closed/Suction Drain 08/17/20 Anterior Neck Bulb 10 Fr. 4 days    Male External Urinary Catheter 08/18/20 0500 Small 3 days          Peripheral Intravenous Line                 Peripheral IV - Single Lumen 08/17/20 0615 18 G Left Forearm 4 days         Peripheral IV - Single Lumen 08/17/20 0715 18 G Right Forearm 4 days         Peripheral IV - Single Lumen 08/18/20 0500 20 G Anterior;Left;Proximal Forearm 3 days         Peripheral IV - Single Lumen 08/21/20 0824 18 G Anterior;Right Hand less than 1 day              Significant Labs:    CBC/Anemia Profile:  Recent Labs   Lab 08/20/20  0205 08/21/20  0516   WBC 10.35 9.07   HGB 10.4* 10.6*   HCT 33.2* 33.2*    278   * 102*   RDW 15.1* 14.6*        Chemistries:  Recent Labs   Lab 08/19/20  1601 08/20/20  0205 08/21/20  0516    141 142   K 3.8 4.0 3.3*    104 98   CO2 23 26 33*   BUN 40* 45* 55*   CREATININE 3.4* 3.3* 2.9*   CALCIUM 8.2* 8.6* 8.8   ALBUMIN  --  2.0* 2.0*   PROT  --  6.1 6.1   BILITOT  --  0.2 0.4   ALKPHOS  --  88 92   ALT  --  <5* <5*   AST   --  45* 30   MG 2.0 1.8 1.8   PHOS  --  4.4 4.2       All pertinent labs within the past 24 hours have been reviewed.    Significant Imaging:  I have reviewed all pertinent imaging results/findings within the past 24 hours.

## 2020-08-21 NOTE — PROGRESS NOTES
[facility]  Critical Care Medicine  Progress Note    Patient Name: Edwin Powell  MRN: [id number]  Admission Date: 8/17/2020  Hospital Length of Stay: 4 days  Code Status: Full Code  Attending Provider: Mandeep Byrd MD  Primary Care Provider: Lulú Lewis MD   Principal Problem: Acute on chronic respiratory failure with hypoxia and hypercapnia    Subjective:     HPI:  Patient is a 75 y.o. 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 Patient underwent ACDF of C6/7 with Ortho & ENT on 8/17. During the procedure he received rocuronium, ketamine, fentanyl, propofol and midazolam. He required a renae infusion during the procedure. Post-operatively the patient was admitted to post-op ortho unit and was placed on his home 2L O2. After his procedure he received multiple doses of oxycodone for is neck pain. Overnight on 8/17-8/18 the patient had multiple periods of apnea requiring 3 doses of narcan on the floor with minimal improvement in respiratory and mental status. ABG showed combined metabolic and respiratory acidosis. No significant improvement in acidosis with initiation of bipap on the floor and patient continued to have apneic periods and worsening hypoxia requiring 15L NC. He was transferred to ICU for further management.     Hospital/ICU Course:  Patient was transferred to MICU and started on narcan and bicarbonate infusions. Hypoxia worsened requiring up to 30L comfort flow. Mental status improved and narcan gtt discontinued on 8/18 in AM. PE suspected however CTA not feasible due to renal function. Bilateral lower extremity U/S negative for DVT, however because of quickly worsening O2 requirements and elevated troponin, he was empirically started on a  heparin gtt for possible PE. TTE did not show RV strain but did show focal wall motion abnormalities, and troponin continued to uptrend so patient was loaded with DAPT for NSTEMI. CVP 3 on TTE however pulmonary edema on CXR. Restarted diuresis intravenously.    8/20/2020: Patient no longer requiring CPAP (was on 35L/40%). O2Sat 97% with 2L NC. Refers feeling much better than yesterday.    8/21/2020: NAEON. Patient on 3L NC, Sat 98%. Refers feeling well. Denied fevers, chills, chest pain, SOB, palpitations. Patient to be stepped down today    Interval History/Significant Events: NAEON. Patient on 3L NC, Sat 98%. Refers feeling well. Denied fevers, chills, chest pain, SOB, palpitations.    Review of Systems    Constitutional: Negative for appetite change, chills, fatigue and fever.   HENT: Negative for sinus pressure and sore throat.    Respiratory: Negative for shortness of breath, apnea, chest tightness and wheezing.    Cardiovascular: Negative for chest pain, palpitations and leg swelling.   Gastrointestinal: Negative for abdominal distention, abdominal pain, diarrhea, nausea and vomiting.   Genitourinary: Negative for difficulty urinating, dysuria and flank pain.   Musculoskeletal: Negative for arthralgias, joint swelling, neck pain and neck stiffness.   Skin: Negative for color change, pallor and rash.   Neurological: Negative for dizziness, weakness, numbness and headaches.   Objective:     Vital Signs (Most Recent):  Temp: 98.6 °F (37 °C)(Pt arrived to Room at this time. ) (08/21/20 1017)  Pulse: 65 (08/21/20 1017)  Resp: 19 (08/21/20 1017)  BP: (!) 155/69 (08/21/20 1017)  SpO2: 99 % (08/21/20 1017) Vital Signs (24h Range):  Temp:  [98.6 °F (37 °C)-99 °F (37.2 °C)] 98.6 °F (37 °C)  Pulse:  [65-81] 65  Resp:  [8-46] 19  SpO2:  [91 %-99 %] 99 %  BP: (117-166)/(57-70) 155/69   Weight: 79.4 kg (175 lb)  Body mass index is 27.41 kg/m².      Intake/Output Summary (Last 24 hours) at 8/21/2020 9679  Last data filed at  8/21/2020 1000  Gross per 24 hour   Intake 253 ml   Output 2600 ml   Net -2347 ml       Physical Exam  Constitutional:       General: He is not in acute distress.     Appearance: Normal appearance.   HENT:      Head: Normocephalic and atraumatic.   Eyes:      Extraocular Movements: Extraocular movements intact.      Conjunctiva/sclera: Conjunctivae normal.      Pupils: Pupils are equal, round, and reactive to light.   Neck:      Musculoskeletal: Normal range of motion and neck supple.      Comments: L side JIN drain present with serosanguinous drainage  Cardiovascular:      Rate and Rhythm: Normal rate and regular rhythm.      Heart sounds: No murmur. No friction rub. No gallop.    Pulmonary:      Effort: Pulmonary effort is normal. No respiratory distress.      Breath sounds: Normal breath sounds. No wheezing or rales.      Comments: On 3L NC, Sat 97%  Abdominal:      General: Abdomen is flat. Bowel sounds are normal. There is no distension.      Palpations: Abdomen is soft.      Tenderness: There is no abdominal tenderness.   Musculoskeletal: Normal range of motion.         General: No swelling or deformity.      Right lower leg: No edema.      Left lower leg: No edema.   Skin:     General: Skin is warm and dry.      Coloration: Skin is not pale.      Findings: No erythema.   Neurological:      Mental Status: He is alert.        Vents:  Oxygen Concentration (%): 40 (08/20/20 1200)  Lines/Drains/Airways     Drain                 Closed/Suction Drain 08/17/20 Anterior Neck Bulb 10 Fr. 4 days    Male External Urinary Catheter 08/18/20 0500 Small 3 days          Peripheral Intravenous Line                 Peripheral IV - Single Lumen 08/17/20 0615 18 G Left Forearm 4 days         Peripheral IV - Single Lumen 08/17/20 0715 18 G Right Forearm 4 days         Peripheral IV - Single Lumen 08/18/20 0500 20 G Anterior;Left;Proximal Forearm 3 days         Peripheral IV - Single Lumen 08/21/20 0824 18 G Anterior;Right Hand less  than 1 day              Significant Labs:    CBC/Anemia Profile:  Recent Labs   Lab 08/20/20  0205 08/21/20  0516   WBC 10.35 9.07   HGB 10.4* 10.6*   HCT 33.2* 33.2*    278   * 102*   RDW 15.1* 14.6*        Chemistries:  Recent Labs   Lab 08/19/20  1601 08/20/20  0205 08/21/20  0516    141 142   K 3.8 4.0 3.3*    104 98   CO2 23 26 33*   BUN 40* 45* 55*   CREATININE 3.4* 3.3* 2.9*   CALCIUM 8.2* 8.6* 8.8   ALBUMIN  --  2.0* 2.0*   PROT  --  6.1 6.1   BILITOT  --  0.2 0.4   ALKPHOS  --  88 92   ALT  --  <5* <5*   AST  --  45* 30   MG 2.0 1.8 1.8   PHOS  --  4.4 4.2       All pertinent labs within the past 24 hours have been reviewed.    Significant Imaging:  I have reviewed all pertinent imaging results/findings within the past 24 hours.      ABG  Recent Labs   Lab 08/18/20 2007   PH 7.259*   PO2 91   PCO2 41.1   HCO3 18.4*   BE -9     Assessment/Plan:     Neuro  Acute metabolic encephalopathy  --suspect medication induced insetting of decreased renal clearance as pt responded well to narcan. 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 Risk factors include recent surgery and metastatic RCC.     - CTA chest deferred due to VERENA  - Bilat LE U/S negative for DVT  - TTE negative for RV strain with normal PA pressure. Showed chronic HFpEF and focal wall motion abnormalities  - Troponin elevated and peaked at 8.8, BNP mildly elevated (no baseline)  - Was put on heparin gtt for 48hrs. OK'd by ortho to start A/C so soon after surgery due to rapidly  worsening hypoxia  - Wean O2 to goal sat > 90%. Today on 3L NC Sat 97%  - Lasix 100mg daily, monitor UOP    Cardiac/Vascular  NSTEMI (non-ST elevated myocardial infarction)  Coronary artery disease involving native coronary artery of native heart with angina pectoris  Elevated troponin  CAD with history of PCI ~20 years ago. Patient did not have chest pain. Troponin was checked when concerned for PE, initially was 1.2 however continued to uptrend and peaked at 8.8.    - Loaded with ASA and Plavix, Ok'd by ortho, and will continue  - Continue coreg  - On statin    Coronary artery disease involving native coronary artery of native heart without angina pectoris  --patient on home ASA, rosuvastatin, plavix, amlodipine      Renal/  Metabolic acidosis, normal anion gap (NAG)  --2/2 rta in setting of ozzy on ckd vs recent diarrhea (per recent nephro clinic note)  --UAG positive (38) suggestive of rta  --Transition from bicarb gtt to PO sodium bicarb 1300 mg BID    Acute kidney injury superimposed on chronic kidney disease  --baseline creatinine ~ 2; now 2.9  -- ? Etiology. Recent Nephro note states pt has had decreased po intake & diarrhea recently - ? Pre-renal. Pt net + 1.4L since admission and has been having maintenance IVF, so likely volume resuscitated at this point  --holding home lasix & lisinopril initially. On Lasix 100mg daily to a Net -2.8L.  --renal U/S unremarkable    Benign prostatic hyperplasia (BPH) with urinary urge incontinence  --continue home flomax    Endocrine  Type 2 diabetes mellitus with stage 3 chronic kidney disease, without long-term current use of insulin  --per clinic notes, insulin dependent  --home regimen lantus 5 units daily   --start lantus 2 units qhs & low dose correctional ssi; titrate prn    Acquired hypothyroidism  --continue home levothyroxine  --TSH elevated however fT4 WNL    GI  Dysphagia  Per RN on 8/19 patient had dysphagia and coughing after medication administration.  Bedside swallow eval not encouraging. Seen by SLP today.       Critical Care Daily Checklist:    A: Awake: RASS Goal/Actual Goal: RASS Goal: 0-->alert and calm  Actual: [place] Agitation Sedation Scale (RASS): Alert and calm   B: Spontaneous Breathing Trial Performed?  n/a   C: SAT & SBT Coordinated?  n/a                      D: Delirium: CAM-ICU Overall CAM-ICU: Negative   E: Early Mobility Performed? Yes   F: Feeding Goal:    Status:     Current Diet Order   Procedures    Diet diabetic [facility]; 2000 Calorie; Pureed     Order Specific Question:   Indicate patient location for additional diet options:     Answer:   [facility]     Order Specific Question:   Total calories:     Answer:   2000 Calorie     Order Specific Question:   Additional Diet Options:     Answer:   Pureed      AS: Analgesia/Sedation n/a   T: Thromboembolic Prophylaxis n/a   H: HOB > 300 Yes   U: Stress Ulcer Prophylaxis (if needed) n/a   G: Glucose Control 140-180   B: Bowel Function Stool Occurrence: 1   I: Indwelling Catheter (Lines & Ahumada) Necessity PIV, JIN drain, condom cath   D: De-escalation of Antimicrobials/Pharmacotherapies Sodium bicarb gtt to oral    Plan for the day/ETD NSTEMI medical management, Lasix, 2L NC    Code Status:  Family/Goals of Care: Full Code         Critical secondary to Patient has a condition that poses threat to life and bodily function: Severe Respiratory Distress      Critical care was time spent personally by me on the following activities: development of treatment plan with patient or surrogate and bedside caregivers, discussions with consultants, evaluation of patient's response to treatment, examination of patient, ordering and performing treatments and interventions, ordering and review of laboratory studies, ordering and review of radiographic studies, pulse oximetry, re-evaluation of patient's condition. This critical care time did not overlap with that of any other provider or involve time  for any procedures.     Margarito Butts MD  Critical Care Medicine  [facility]

## 2020-08-21 NOTE — SUBJECTIVE & OBJECTIVE
Principal Problem:Acute on chronic respiratory failure with hypoxia and hypercapnia    Principal Orthopedic Problem: s/p ACDF    Interval History:   Pt seen and examined at the bedside this am. Doing well, pain in neck controlled. 93% on 2L NC. Denies numbness/tingling/weakness. Drain with 20cc outpt      3Review of patient's allergies indicates:  No Known Allergies    Current Facility-Administered Medications   Medication    acetaminophen tablet 650 mg    albuterol-ipratropium 2.5 mg-0.5 mg/3 mL nebulizer solution 3 mL    amLODIPine tablet 10 mg    aspirin chewable tablet 81 mg    bisacodyL suppository 10 mg    carvediloL tablet 6.25 mg    clopidogreL tablet 75 mg    dextrose 50% injection 12.5 g    dextrose 50% injection 25 g    docusate sodium capsule 50 mg    furosemide injection 100 mg    glucagon (human recombinant) injection 1 mg    glucose chewable tablet 16 g    glucose chewable tablet 24 g    heparin 25,000 units in dextrose 5% (100 units/ml) IV bolus from bag - ADDITIONAL PRN BOLUS - 30 units/kg    heparin 25,000 units in dextrose 5% (100 units/ml) IV bolus from bag - ADDITIONAL PRN BOLUS - 60 units/kg    heparin 25,000 units in dextrose 5% 250 mL (100 units/mL) infusion HIGH INTENSITY nomogram Anti- Xa    insulin aspart U-100 pen 0-5 Units    levothyroxine tablet 88 mcg    naloxone 0.4 mg/mL injection 0.2 mg    ondansetron injection 4 mg    polyethylene glycol packet 17 g    rosuvastatin tablet 40 mg    sodium bicarbonate tablet 1,300 mg    sodium chloride 0.9% flush 10 mL    sodium chloride 0.9% flush 5 mL    tamsulosin 24 hr capsule 0.8 mg     Facility-Administered Medications Ordered in Other Encounters   Medication    phenylephrine HCL 2.5% ophthalmic solution 1 drop    proparacaine 0.5 % ophthalmic solution 1 drop    tropicamide 1% ophthalmic solution 1 drop     Objective:     Vital Signs (Most Recent):  Temp: 98.9 °F (37.2 °C) (08/21/20 0305)  Pulse: 68 (08/21/20  "0600)  Resp: 11 (08/21/20 0600)  BP: (!) 142/64 (08/21/20 0600)  SpO2: (!) 93 % (08/21/20 0600) Vital Signs (24h Range):  Temp:  [98.7 °F (37.1 °C)-99.2 °F (37.3 °C)] 98.9 °F (37.2 °C)  Pulse:  [68-97] 68  Resp:  [8-33] 11  SpO2:  [91 %-100 %] 93 %  BP: (117-163)/(57-69) 142/64     Weight: 79.4 kg (175 lb)  Height: 5' 7" (170.2 cm)  Body mass index is 27.41 kg/m².      Intake/Output Summary (Last 24 hours) at 8/21/2020 0651  Last data filed at 8/21/2020 0600  Gross per 24 hour   Intake 253 ml   Output 3095 ml   Net -2842 ml       Ortho/SPM Exam  2L NC  Anterior cervical dressing in place with drain- 20 cc  Neuro exam stable    Significant Labs:   CBC:   Recent Labs   Lab 08/20/20  0205 08/21/20  0516   WBC 10.35 9.07   HGB 10.4* 10.6*   HCT 33.2* 33.2*    278     CMP:   Recent Labs   Lab 08/19/20  1601 08/20/20  0205 08/21/20  0516    141 142   K 3.8 4.0 3.3*    104 98   CO2 23 26 33*   * 132* 140*   BUN 40* 45* 55*   CREATININE 3.4* 3.3* 2.9*   CALCIUM 8.2* 8.6* 8.8   PROT  --  6.1 6.1   ALBUMIN  --  2.0* 2.0*   BILITOT  --  0.2 0.4   ALKPHOS  --  88 92   AST  --  45* 30   ALT  --  <5* <5*   ANIONGAP 10 11 11   EGFRNONAA 16.7* 17.3* 20.2*     All pertinent labs within the past 24 hours have been reviewed.    Significant Imaging: I have reviewed and interpreted all pertinent imaging results/findings.  "

## 2020-08-21 NOTE — PT/OT/SLP PROGRESS
Occupational Therapy   Treatment    Name: Edwin Powell  MRN: [id number]  Admitting Diagnosis:  Acute on chronic respiratory failure with hypoxia and hypercapnia  4 Days Post-Op    Recommendations:     Discharge Recommendations: rehabilitation facility  Discharge Equipment Recommendations:  (TBD)  Barriers to discharge:  None    Assessment:     Edwin Powell is a 75 y.o. male with a medical diagnosis of Acute on chronic respiratory failure with hypoxia and hypercapnia.  He was able to perform supine/sit T/F c CGA and sit/stand and bed/chair T/F c min A.  Pt has fair static/dynamic standing balance at this time.  Performed UB dressing c max A.  Pt had c/o dizziness during tx session. Performance deficits affecting function are weakness, impaired endurance, impaired self care skills, impaired functional mobilty, impaired balance, decreased upper extremity function, orthopedic precautions.     Rehab Prognosis:  Good; patient would benefit from acute skilled OT services to address these deficits and reach maximum level of function.       Plan:     Patient to be seen 5 x/week to address the above listed problems via self-care/home management, therapeutic activities, therapeutic exercises  · Plan of Care Expires: 09/01/20  · Plan of Care Reviewed with: patient    Subjective     Pain/Comfort:  · Pain Rating 1: 4/10    Objective:     Communicated with: RN prior to session.  Patient found supine with blood pressure cuff, oxygen, peripheral IV, pulse ox (continuous), telemetry upon OT entry to room.    General Precautions: Standard, fall   Orthopedic Precautions:spinal precautions   Braces: N/A     Occupational Performance:     Bed Mobility:    · Patient completed Supine to Sit with contact guard assistance     Functional Mobility/Transfers:  · Patient completed Sit <> Stand Transfer with minimum assistance  with  hand-held assist   · Patient completed Bed <> Chair Transfer using Stand Pivot technique with minimum assistance  with hand-held assist  · Functional Mobility: Pt was able to walk from bed to chair c min A.  Has fair static/dynamic standing balance at this time.    Activities of Daily Living:  · Upper Body Dressing: maximal assistance to don hospital gown.      [facility] 6 Click ADL: 16    Patient left up in chair with all lines intact, call button in reach, RN notified and son presentEducation:      GOALS:   Multidisciplinary Problems     Occupational Therapy Goals        Problem: Occupational Therapy Goal    Goal Priority Disciplines Outcome Interventions   Occupational Therapy Goal     OT, PT/OT Ongoing, Progressing    Description: Goals to be met by: 9/1/20     Patient will increase functional independence with ADLs by performing:    UE Dressing with [place].  LE Dressing with Modified [place].  Grooming while standing at sink with Modified [place].  Toileting from toilet with Modified [place] for hygiene and clothing management.   Bathing from  shower chair/bench with Modified [place].  Toilet transfer to toilet with Modified [place].  Increased functional strength to WFL for B UE.  Upper extremity exercise program x15 reps per handout, with assistance as needed.                     Time Tracking:     OT Date of Treatment: 08/21/20  OT Start Time: 1121  OT Stop Time: 1145  OT Total Time (min): 24 min    Billable Minutes:Self Care/Home Management 12  Therapeutic Activity 12    PAM Augustin  8/21/2020

## 2020-08-21 NOTE — PHYSICIAN QUERY
PT Name: Edwin Powell  MR #: [id number]    Hypertensive Condition Clarification      CDS/: Sil WILHELM, RN               Contact information: [email or web address]     This form is a permanent document in the medical record.     Query Date: August 21, 2020    By submitting this query, we are merely seeking further clarification of documentation. Please utilize your independent clinical judgment when addressing the question(s) below.    The Medical Record contains the following:   Indicators   Supporting Clinical Findings Location in Medical Record    x Hypertension associated with diabetes documented  Past Medical History:     Diagnosis                                                 Date  Hypertension associated with diabetes        5/26/2017    JODI Gould . Dr. Byrd Critical Care H&P 8/18/20    x Chronic condition(s)  Neuropathy due to secondary diabetes   Type 2 diabetes mellitus with stage 3 chronic kidney disease, without long-term current use of insulin    JODI Gould . Dr. Byrd Critical Care H&P 8/18/20    x Vital signs  Vital Signs (24h Range):   BP: (124-176)/() 166/102    JODI Gould . Dr. Byrd Critical Care H&P 8/18/20    Treatment/Medication      Other     Provider, please clarify the relationship between the Hypertension and Diabetes Mellitus  [   ] Hypertension is a manifestation of Diabetes Mellitus (Secondary Hypertension)     [x   ]  Hypertension is not a manifestation of Diabetes Mellitus (Essential Hypertension)     [   ] Other Clarification (please specify): ____________     [  ] Clinically Undetermined       Please document in your progress notes daily for the duration of treatment, until resolved, and include in your discharge summary.

## 2020-08-21 NOTE — PLAN OF CARE
Problem: Occupational Therapy Goal  Goal: Occupational Therapy Goal  Description: Goals to be met by: 9/1/20     Patient will increase functional independence with ADLs by performing:    UE Dressing with [place].  LE Dressing with Modified [place].  Grooming while standing at sink with Modified [place].  Toileting from toilet with Modified [place] for hygiene and clothing management.   Bathing from  shower chair/bench with Modified [place].  Toilet transfer to toilet with Modified [place].  Increased functional strength to WFL for B UE.  Upper extremity exercise program x15 reps per handout, with assistance as needed.    Outcome: Ongoing, Progressing

## 2020-08-21 NOTE — PLAN OF CARE
Alert and oriented. Taking steps to chair and sitting up today. Afebrile with stable VS. Pt refused puree food. Son brought food in for pt. Pt tolerating well. No coughing. Strong swallows.

## 2020-08-21 NOTE — ASSESSMENT & PLAN NOTE
Coronary artery disease involving native coronary artery of native heart with angina pectoris  Elevated troponin  CAD with history of PCI ~20 years ago. Patient did not have chest pain. Troponin was checked when concerned for PE, initially was 1.2 however continued to uptrend and peaked at 8.8.    - Loaded with ASA and Plavix, Ok'd by ortho, and will continue  - Continue coreg  - On statin

## 2020-08-21 NOTE — PHYSICIAN QUERY
"PT Name: Edwin Powell  MR #: [id number]    Physician Query Form - Heart  Condition Clarification     CDS/: Sil WILHELM, RN               Contact information: [email or web address]  This form is a permanent document in the medical record.     Query Date: August 21, 2020    By submitting this query, we are merely seeking further clarification of documentation. Please utilize your independent clinical judgment when addressing the question(s) below.    The medical record contains the following   Indicators     Supporting Clinical Findings Location in Medical Record    x BNP  BNP mildly elevated (no baseline)  Dr. Butts / Dr. Byrd Critical Care progress 8/20/20     EF         Radiology findings      x Echo Results  Moderately decreased left ventricular systolic function. The estimated ejection fraction is 30%  Grade I (mild) left ventricular diastolic dysfunction consistent with impaired relaxation       Low normal left ventricular systolic function. The estimated ejection fraction is 50-55%    Grade I (mild) left ventricular diastolic dysfunction consistent with impaired relaxation  ECHO 8/20/20           ECHO 8/18/20    "Ascites" documented      x "SOB" or "LARA" documented  Acute hypoxemic respiratory failure  Dr. Butts / Dr. Byrd Critical Care progress 8/20/20    x "Hypoxia" documented  Hypoxia worsened requiring up to 30L comfort flow  Dr. Butts / Dr. Byrd Critical Care progress 8/20/20    x Heart Failure documented  - TTE negative for RV strain with normal PA pressure. Showed chronic HFpEF and focal wall motion abnormalities  Dr. Butts / Dr. Byrd Critical Care progress 8/20/20    x "Edema" documented  NSTEMI and pulmonary edema  Dr. Butts / Dr. Byrd Critical Care progress 8/20/20    x Diuretics/Meds  Signficantly improved with diuresis and heparin, leads more towards chf/pulmonary edema and cardiac source.      Lasix 100mg daily, monitor UOP  Dr." Beckie / Dr. Byrd Critical Care progress 8/20/20    Treatment:      Other:      Heart failure (HF) can be acute, chronic or both. It is generally further specificed as systolic, diastolic, or combined. Lastly, it is important to identify an underlying etiology if known or suspected.     Common clues to acute exacerbation:  Rapidly progressive symptoms (w/in 2 weeks of presentation), using IV diuretics to treat, using supplemental O2, pulmonary edema on Xray, MI w/in 4 weeks, and/or BNP >500    Systolic Heart Failure: is defined as chart documentation of a left ventricular ejection fraction (LVEF) less than 40%     Diastolic Heart Failure: is defined as a left ventricular ejection fraction (LVEF) greater than 40%   +      Evidence of diastolic dysfunction on echocardiography OR    Right heart catheterization wedge pressure above 12 mm Hg OR    Left heart catheterization left ventricular end diastolic pressure 18 mm Hg or above.    References: *American Heart Association    The clinical guidelines noted below are only system guidelines, and do not replace the providers clinical judgment.     Provider, please specify the diagnosis associated with above clinical findings  [ x  ] Acute on Chronic Systolic Heart Failure- Pre-existing systolic HF diagnosis.  EF < 40%  and acute HF symptoms documented   [   ] Acute on Chronic Diastolic Heart Failure -    Pre-existing diastoic HF diagnosis.  EF > 40%  and acute HF symptoms documented       [   ] Acute on Chronic Combined Systolic and Diastolic Heart Failure        [   ] Other Type of Heart Failure (please specify type):     [   ] Other (please specify):     [  ] Clinically Undetermined                           Please document in your progress notes daily for the duration of treatment until resolved and include in your discharge summary.

## 2020-08-21 NOTE — PROGRESS NOTES
[facility]  Orthopedics  Progress Note    Patient Name: Edwin Powell  MRN: [id number]  Admission Date: 8/17/2020  Hospital Length of Stay: 4 days  Attending Provider: Mandeep Byrd MD  Primary Care Provider: Lulú Lewis MD  Follow-up For: Procedure(s) (LRB):  DISCECTOMY, SPINE, CERVICAL, ANTERIOR APPROACH, WITH FUSION co case with Dr. Falcon C6/7 Depuy SNS:vocal cord/motors/SSEP Regular OR table Patient needs scope in preop (N/A)  DISSECTION, NECK (N/A)    Post-Operative Day: 4 Days Post-Op  Subjective:     Principal Problem:Acute on chronic respiratory failure with hypoxia and hypercapnia    Principal Orthopedic Problem: s/p ACDF    Interval History:   Pt seen and examined at the bedside this am. Doing well, pain in neck controlled. 93% on 2L NC. Denies numbness/tingling/weakness. Drain with 20cc outpt      3Review of patient's allergies indicates:  No Known Allergies    Current Facility-Administered Medications   Medication    acetaminophen tablet 650 mg    albuterol-ipratropium 2.5 mg-0.5 mg/3 mL nebulizer solution 3 mL    amLODIPine tablet 10 mg    aspirin chewable tablet 81 mg    bisacodyL suppository 10 mg    carvediloL tablet 6.25 mg    clopidogreL tablet 75 mg    dextrose 50% injection 12.5 g    dextrose 50% injection 25 g    docusate sodium capsule 50 mg    furosemide injection 100 mg    glucagon (human recombinant) injection 1 mg    glucose chewable tablet 16 g    glucose chewable tablet 24 g    heparin 25,000 units in dextrose 5% (100 units/ml) IV bolus from bag - ADDITIONAL PRN BOLUS - 30 units/kg    heparin 25,000 units in dextrose 5% (100 units/ml) IV bolus from bag - ADDITIONAL PRN BOLUS - 60 units/kg    heparin 25,000 units in dextrose 5% 250 mL (100 units/mL) infusion HIGH INTENSITY nomogram Anti- Xa    insulin aspart U-100 pen 0-5 Units    levothyroxine tablet 88 mcg    naloxone 0.4 mg/mL injection 0.2 mg    ondansetron injection 4 mg     "polyethylene glycol packet 17 g    rosuvastatin tablet 40 mg    sodium bicarbonate tablet 1,300 mg    sodium chloride 0.9% flush 10 mL    sodium chloride 0.9% flush 5 mL    tamsulosin 24 hr capsule 0.8 mg     Facility-Administered Medications Ordered in Other Encounters   Medication    phenylephrine HCL 2.5% ophthalmic solution 1 drop    proparacaine 0.5 % ophthalmic solution 1 drop    tropicamide 1% ophthalmic solution 1 drop     Objective:     Vital Signs (Most Recent):  Temp: 98.9 °F (37.2 °C) (08/21/20 0305)  Pulse: 68 (08/21/20 0600)  Resp: 11 (08/21/20 0600)  BP: (!) 142/64 (08/21/20 0600)  SpO2: (!) 93 % (08/21/20 0600) Vital Signs (24h Range):  Temp:  [98.7 °F (37.1 °C)-99.2 °F (37.3 °C)] 98.9 °F (37.2 °C)  Pulse:  [68-97] 68  Resp:  [8-33] 11  SpO2:  [91 %-100 %] 93 %  BP: (117-163)/(57-69) 142/64     Weight: 79.4 kg (175 lb)  Height: 5' 7" (170.2 cm)  Body mass index is 27.41 kg/m².      Intake/Output Summary (Last 24 hours) at 8/21/2020 0651  Last data filed at 8/21/2020 0600  Gross per 24 hour   Intake 253 ml   Output 3095 ml   Net -2842 ml       Ortho/SPM Exam  2L NC  Anterior cervical dressing in place with drain- 20 cc  Neuro exam stable    Significant Labs:   CBC:   Recent Labs   Lab 08/20/20  0205 08/21/20  0516   WBC 10.35 9.07   HGB 10.4* 10.6*   HCT 33.2* 33.2*    278     CMP:   Recent Labs   Lab 08/19/20  1601 08/20/20  0205 08/21/20  0516    141 142   K 3.8 4.0 3.3*    104 98   CO2 23 26 33*   * 132* 140*   BUN 40* 45* 55*   CREATININE 3.4* 3.3* 2.9*   CALCIUM 8.2* 8.6* 8.8   PROT  --  6.1 6.1   ALBUMIN  --  2.0* 2.0*   BILITOT  --  0.2 0.4   ALKPHOS  --  88 92   AST  --  45* 30   ALT  --  <5* <5*   ANIONGAP 10 11 11   EGFRNONAA 16.7* 17.3* 20.2*     All pertinent labs within the past 24 hours have been reviewed.    Significant Imaging: I have reviewed and interpreted all pertinent imaging results/findings.    Assessment/Plan:     Cervical stenosis of " spine  Edwin Powell is a 75 y.o. male s/p C5/6 ACDF (8/17/20) for cervical stenosis, postop course complicated by apnea; Transferred to MICU with NSTEMI and increasing oxygen requirements    Pain control: multimodal, d/c sedating medications  PT/OT: progressive mobility, OOBTC  DVT PPx: heparin gtt, ASA, and Plavix; SCDs at all times when not ambulating  Abx: postop Ancef complete  Labs: stable  Drain: 20cc outpt- will pull this am with post drain XR     Dispo-Pt to stepdown to , will continue to follow               Norma Airas MD  Orthopedics  [facility]

## 2020-08-21 NOTE — PROGRESS NOTES
Date: 8/21/20  Transfer to: [id number]  Transfer from: [id number]  Transfer via: Bed  Transfer with: Belongings, O2  Report given to: Sadaf RN  Medicine sent: Heparin gtt  Chart sent with patient: Yes  Notified: pt son at bedside    Upon arrival to floor,RN notified,cardiac monitor applied,pt oriented to room, call bell within reach and bed locked and low

## 2020-08-21 NOTE — PLAN OF CARE
Problem: Physical Therapy Goal  Goal: Physical Therapy Goal  Description: Goals to be met by: 20    Patient will increase functional independence with mobility by performin. Supine to sit with MInimal Assistance - met       A. independence  2. Sit to stand transfer with Contact Guard Assistance with AD if needed. -not met  3. Bed to chair transfer with Minimal Assistance using Rolling Walker if needed. -not met  4. Gait  x 50 feet with Contact Guard Assistance using Rolling Walker. If needed. -not met  5. Lower extremity exercise program x20 reps with supervision-not met  6. Pt sit on EOB x 10 min with CGA- not met    2020 1530 by Steff Benjamin, PT  Outcome: Ongoing, Progressing         Steff Benjamin, PT, DPT  2020

## 2020-08-21 NOTE — PLAN OF CARE
Problem: SLP Goal  Goal: SLP Goal  Description: Speech Language Pathology Goals  Goals expected to be met by 8/27/20  1. Pt will tolerate a mechanical soft consistency diet and thin liquids with no overt signs of airway compromise.  2. Pt will participate in ongoing swallowing assessment to ensure least restrictive diet needs are maintained.    Goals remain appropriate.   Emily P. Abadie M.S., CCC-SLP  Speech Language Pathologist  [phone]  08/21/2020

## 2020-08-21 NOTE — PLAN OF CARE
"   SICU PLAN OF CARE NOTE    Dx: Acute on chronic respiratory failure with hypoxia and hypercapnia    Shift Events: no acute events or changes overnight. Pt on 3L NC, adequate UOP. VSS. NAD. This mornings labs showed potassium 3.3, antiXA was subtherapeutic at 0.17, team said to hold off on 60 bolus and just go up 3 unit/kg/hr so I changed it from 16 to 19. Will place order for another antiXA in 6hrs. They said they will talk with day team about replacing potassium, no new orders on that. Pt has transfer orders but did not receive bed assignment yet.     Goals of Care: comfort care, hemodynamic stability, adequate oxygenation, adequate UOP, transfer.    Neuro: AAO x4, Follows Commands, and Moves All Extremities    Vital Signs: BP (!) 142/64   Pulse 68   Temp 98.9 °F (37.2 °C) (Oral)   Resp 11   Ht 5' 7" (1.702 m)   Wt 79.4 kg (175 lb)   SpO2 (!) 93%   BMI 27.41 kg/m²     Respiratory: Nasal Cannula    Diet: Pureed    Gtts: Heparin    Urine Output: Voids Spontaneously 1100 cc/shift     Accuchecks ACHS           "

## 2020-08-21 NOTE — ASSESSMENT & PLAN NOTE
Edwin HAYLEY Sherry is a 75 y.o. male s/p C5/6 ACDF (8/17/20) for cervical stenosis, postop course complicated by apnea; Transferred to MICU with NSTEMI and increasing oxygen requirements    Pain control: multimodal, d/c sedating medications  PT/OT: progressive mobility, OOBTC  DVT PPx: heparin gtt, ASA, and Plavix; SCDs at all times when not ambulating  Abx: postop Ancef complete  Labs: stable  Drain: 20cc outpt- will pull this am with post drain XR     Dispo-Pt to stepdown to , will continue to follow

## 2020-08-21 NOTE — PT/OT/SLP PROGRESS
Speech Language Pathology Treatment    Patient Name:  Edwin Powell   MRN:  [id number]  Admitting Diagnosis: Acute on chronic respiratory failure with hypoxia and hypercapnia    Recommendations:     General Recommendations:  Dysphagia therapy  Diet recommendations:  Dental Soft, Thin Aspiration Precautions:   · 1 bite/sip at a time  · Assistance with meals   · Eliminate distractions  · Feed only when awake/alert   · HOB to 90 degrees  · Meds crushed in puree  · ABSOLUTELY No straws  · Remain upright 30 minutes post meal   · Small bites/sips   General Precautions: Standard, fall  Communication strategies:  none                Subjective     Patient awake and alert. Son present. Increased alertness compared to previous SLP session.     Objective:     Has the patient been evaluated by SLP for swallowing?   Yes  Keep patient NPO? No   Current Respiratory Status: nasal cannula      Patient tolerated thin liquids via cup sips x6 along with solids x3 with no overt signs of airway compromise. Continued prolonged mastication with solids, however improved from previous date. SLP recommending increased diet to dysphagia soft and thin liquids at this time. Continue meds crushed in puree. All results and recs dw team post session. Skilled education was provided to patient re: diet recs, standard aspiration precautions of which to follow, and ongoing ST plan of care.      Assessment:     Edwin Powell is a 75 y.o. male with an SLP diagnosis of improving dysphagia. .        Plan:     · Patient to be seen:  4 x/week   · Plan of Care expires:  09/20/20  · Plan of Care reviewed with:  patient, son   · SLP Follow-Up:  Yes       Discharge recommendations:  rehabilitation facility   Barriers to Discharge:  None    Time Tracking:     SLP Treatment Date:   08/21/20  Speech Start Time:  1520  Speech Stop Time:  1536     Speech Total Time (min):  16 min    Billable Minutes: Treatment Swallowing Dysfunction 8 and Seld Care/Home Management  Training 8    Emily Abadie, CCC-SLP  08/21/2020

## 2020-08-21 NOTE — PT/OT/SLP PROGRESS
Physical Therapy Treatment    Patient Name:  Edwin Powell   MRN:  [id number]    Recommendations:     Discharge Recommendations:  rehabilitation facility   Discharge Equipment Recommendations: (will determine DME needs closer to discharge)   Barriers to discharge: Decreased caregiver support and decreased functional mobility    Assessment:     Edwin Powell is a 75 y.o. male admitted with a medical diagnosis of Acute on chronic respiratory failure with hypoxia and hypercapnia.  He presents with the following impairments/functional limitations:  weakness, impaired endurance, impaired functional mobilty, impaired self care skills, gait instability, impaired balance, decreased upper extremity function, decreased lower extremity function, decreased coordination, decreased safety awareness, pain, impaired cardiopulmonary response to activity, decreased ROM Patient demonstrated good progress with therapy today. CGA for bed mobility. Alyssia sit to stand and chair transfer. Min-ModA gait to chair. Limited by mild complaints of dizziness. This patient is an excellent candidate for inpatient rehabilitation, has a qualifying diagnosis, shows increasing activity tolerance,  is motivated to participate in treatment, and has a supportive family willing to assist the patient upon discharge.      Rehab Prognosis: Good; patient would benefit from acute skilled PT services to address these deficits and reach maximum level of function.    Recent Surgery: Procedure(s) (LRB):  DISCECTOMY, SPINE, CERVICAL, ANTERIOR APPROACH, WITH FUSION co case with Dr. Falcon C6/7 [facility] SNS:vocal cord/motors/SSEP Regular OR table Patient needs scope in preop (N/A)  DISSECTION, NECK (N/A) 4 Days Post-Op    Plan:     During this hospitalization, patient to be seen 5 x/week to address the identified rehab impairments via gait training, therapeutic activities, therapeutic exercises, neuromuscular re-education and progress toward the following  "goals:    · Plan of Care Expires:  09/17/20    Subjective     Chief Complaint: generalized fatigue and acheing  Patient/Family Comments/goals: "They wouldn't let me get up to go to the bathroom on the other floor but I feel like I can"  Pain/Comfort:  · Pain Rating 1: 4/10  · Location 1: neck  · Pain Addressed 1: Pre-medicate for activity, Reposition  · Pain Rating Post-Intervention 1: 4/10      Objective:     Communicated with nurse prior to session.  Patient found HOB elevated with peripheral IV, oxygen, blood pressure cuff, telemetry upon PT entry to room.     General Precautions: Standard, fall   Orthopedic Precautions:spinal precautions   Braces: N/A     Functional Mobility:  · Bed Mobility:     · Rolling Left:  contact guard assistance  · Scooting: contact guard assistance  · Supine to Sit: contact guard assistance  · Transfers:     · Sit to Stand:  minimum assistance with hand-held assist  · Bed to Chair: minimum assistance with  hand-held assist  using  Step Transfer  · Gait: ~6 steps to chair, Min-ModA. Shuffling gait, decreased gait speed, increased RAÚL. Patient reporting fatigue and dizziness. V/t cues for weight shifting. Plan to use RW next session  · Sitting: Good, SBA. Standing: Fair, CGA-Alyssia        AM-PAC 6 CLICK MOBILITY  Turning over in bed (including adjusting bedclothes, sheets and blankets)?: 3  Sitting down on and standing up from a chair with arms (e.g., wheelchair, bedside commode, etc.): 3  Moving from lying on back to sitting on the side of the bed?: 3  Moving to and from a bed to a chair (including a wheelchair)?: 3  Need to walk in hospital room?: 3  Climbing 3-5 steps with a railing?: 3  Basic Mobility Total Score: 18       Therapeutic Activities and Exercises:   Pt educated on importance of OOB activity to decrease the risks associated with bed rest. Pt educated on plan and goals with physical therapy.   Instruction provided for bed mobility.   Instruction provided for safety and " technique for gait and transfers.  Updated on progress with therapy.   Educated on AP, QS, and GS.   Patient instructed to move carefully and take time transitioning for safety due to new onset of symptoms affecting mobility  Patient demonstrates improved activity tolerance and postural endurance.   All questions and concerns addressed within PT scope of practice.   Instructed to call nursing for assistance with out of bed mobility.       Patient left up in chair with all lines intact, call button in reach and family member present..    GOALS:   Multidisciplinary Problems     Physical Therapy Goals        Problem: Physical Therapy Goal    Goal Priority Disciplines Outcome Goal Variances Interventions   Physical Therapy Goal     PT, PT/OT Ongoing, Progressing     Description: Goals to be met by: 20    Patient will increase functional independence with mobility by performin. Supine to sit with MInimal Assistance - met       A. independence  2. Sit to stand transfer with Contact Guard Assistance with AD if needed. -not met  3. Bed to chair transfer with Minimal Assistance using Rolling Walker if needed. -not met  4. Gait  x 50 feet with Contact Guard Assistance using Rolling Walker. If needed. -not met  5. Lower extremity exercise program x20 reps with supervision-not met  6. Pt sit on EOB x 10 min with CGA- not met                     Time Tracking:     PT Received On: 20  PT Start Time: 1122     PT Stop Time: 1146  PT Total Time (min): 24 min     Billable Minutes: Therapeutic Activity 10 and Therapeutic Exercise 14    Treatment Type: Treatment  PT/PTA: PT     PTA Visit Number: 0     Steff Josiyaima, PT  2020

## 2020-08-22 LAB
ALBUMIN SERPL BCP-MCNC: 2.1 G/DL (ref 3.5–5.2)
ALP SERPL-CCNC: 106 U/L (ref 55–135)
ALT SERPL W/O P-5'-P-CCNC: 6 U/L (ref 10–44)
ANION GAP SERPL CALC-SCNC: 9 MMOL/L (ref 8–16)
AST SERPL-CCNC: 36 U/L (ref 10–40)
BASOPHILS # BLD AUTO: 0.03 K/UL (ref 0–0.2)
BASOPHILS NFR BLD: 0.4 % (ref 0–1.9)
BILIRUB SERPL-MCNC: 0.4 MG/DL (ref 0.1–1)
BUN SERPL-MCNC: 66 MG/DL (ref 8–23)
CALCIUM SERPL-MCNC: 8.9 MG/DL (ref 8.7–10.5)
CHLORIDE SERPL-SCNC: 97 MMOL/L (ref 95–110)
CO2 SERPL-SCNC: 35 MMOL/L (ref 23–29)
CREAT SERPL-MCNC: 2.6 MG/DL (ref 0.5–1.4)
DIFFERENTIAL METHOD: ABNORMAL
EOSINOPHIL # BLD AUTO: 0.2 K/UL (ref 0–0.5)
EOSINOPHIL NFR BLD: 2.4 % (ref 0–8)
ERYTHROCYTE [DISTWIDTH] IN BLOOD BY AUTOMATED COUNT: 14.3 % (ref 11.5–14.5)
EST. GFR  (AFRICAN AMERICAN): 26.7 ML/MIN/1.73 M^2
EST. GFR  (NON AFRICAN AMERICAN): 23.1 ML/MIN/1.73 M^2
GLUCOSE SERPL-MCNC: 137 MG/DL (ref 70–110)
HCT VFR BLD AUTO: 33.9 % (ref 40–54)
HGB BLD-MCNC: 10.4 G/DL (ref 14–18)
IMM GRANULOCYTES # BLD AUTO: 0.02 K/UL (ref 0–0.04)
IMM GRANULOCYTES NFR BLD AUTO: 0.3 % (ref 0–0.5)
LYMPHOCYTES # BLD AUTO: 1.3 K/UL (ref 1–4.8)
LYMPHOCYTES NFR BLD: 17.7 % (ref 18–48)
MAGNESIUM SERPL-MCNC: 1.7 MG/DL (ref 1.6–2.6)
MCH RBC QN AUTO: 32.1 PG (ref 27–31)
MCHC RBC AUTO-ENTMCNC: 30.7 G/DL (ref 32–36)
MCV RBC AUTO: 105 FL (ref 82–98)
MONOCYTES # BLD AUTO: 0.8 K/UL (ref 0.3–1)
MONOCYTES NFR BLD: 10.8 % (ref 4–15)
NEUTROPHILS # BLD AUTO: 5.2 K/UL (ref 1.8–7.7)
NEUTROPHILS NFR BLD: 68.4 % (ref 38–73)
NRBC BLD-RTO: 0 /100 WBC
PHOSPHATE SERPL-MCNC: 4 MG/DL (ref 2.7–4.5)
PLATELET # BLD AUTO: 307 K/UL (ref 150–350)
PMV BLD AUTO: 10.9 FL (ref 9.2–12.9)
POCT GLUCOSE: 118 MG/DL (ref 70–110)
POCT GLUCOSE: 124 MG/DL (ref 70–110)
POCT GLUCOSE: 133 MG/DL (ref 70–110)
POCT GLUCOSE: 218 MG/DL (ref 70–110)
POTASSIUM SERPL-SCNC: 3.4 MMOL/L (ref 3.5–5.1)
PROT SERPL-MCNC: 6 G/DL (ref 6–8.4)
RBC # BLD AUTO: 3.24 M/UL (ref 4.6–6.2)
SODIUM SERPL-SCNC: 141 MMOL/L (ref 136–145)
WBC # BLD AUTO: 7.57 K/UL (ref 3.9–12.7)

## 2020-08-22 PROCEDURE — 99233 PR SUBSEQUENT HOSPITAL CARE,LEVL III: ICD-10-PCS | Mod: HCNC,,, | Performed by: STUDENT IN AN ORGANIZED HEALTH CARE EDUCATION/TRAINING PROGRAM

## 2020-08-22 PROCEDURE — 63600175 PHARM REV CODE 636 W HCPCS: Mod: HCNC | Performed by: NURSE PRACTITIONER

## 2020-08-22 PROCEDURE — 63600175 PHARM REV CODE 636 W HCPCS: Mod: HCNC | Performed by: INTERNAL MEDICINE

## 2020-08-22 PROCEDURE — 25000003 PHARM REV CODE 250: Mod: HCNC | Performed by: NURSE PRACTITIONER

## 2020-08-22 PROCEDURE — 83735 ASSAY OF MAGNESIUM: CPT | Mod: HCNC

## 2020-08-22 PROCEDURE — 94664 DEMO&/EVAL PT USE INHALER: CPT | Mod: HCNC

## 2020-08-22 PROCEDURE — 97530 THERAPEUTIC ACTIVITIES: CPT | Mod: HCNC

## 2020-08-22 PROCEDURE — 36415 COLL VENOUS BLD VENIPUNCTURE: CPT | Mod: HCNC

## 2020-08-22 PROCEDURE — 25000003 PHARM REV CODE 250: Mod: HCNC | Performed by: STUDENT IN AN ORGANIZED HEALTH CARE EDUCATION/TRAINING PROGRAM

## 2020-08-22 PROCEDURE — 99900035 HC TECH TIME PER 15 MIN (STAT): Mod: HCNC

## 2020-08-22 PROCEDURE — 20600001 HC STEP DOWN PRIVATE ROOM: Mod: HCNC

## 2020-08-22 PROCEDURE — 97110 THERAPEUTIC EXERCISES: CPT | Mod: HCNC

## 2020-08-22 PROCEDURE — 85025 COMPLETE CBC W/AUTO DIFF WBC: CPT | Mod: HCNC

## 2020-08-22 PROCEDURE — 27000221 HC OXYGEN, UP TO 24 HOURS: Mod: HCNC

## 2020-08-22 PROCEDURE — 94761 N-INVAS EAR/PLS OXIMETRY MLT: CPT | Mod: HCNC

## 2020-08-22 PROCEDURE — 94660 CPAP INITIATION&MGMT: CPT | Mod: HCNC

## 2020-08-22 PROCEDURE — 94799 UNLISTED PULMONARY SVC/PX: CPT | Mod: HCNC

## 2020-08-22 PROCEDURE — 80053 COMPREHEN METABOLIC PANEL: CPT | Mod: HCNC

## 2020-08-22 PROCEDURE — 84100 ASSAY OF PHOSPHORUS: CPT | Mod: HCNC

## 2020-08-22 PROCEDURE — 99233 SBSQ HOSP IP/OBS HIGH 50: CPT | Mod: HCNC,,, | Performed by: STUDENT IN AN ORGANIZED HEALTH CARE EDUCATION/TRAINING PROGRAM

## 2020-08-22 RX ORDER — PAZOPANIB 200 MG/1
800 TABLET ORAL DAILY
Status: DISCONTINUED | OUTPATIENT
Start: 2020-08-22 | End: 2020-08-22

## 2020-08-22 RX ORDER — PAZOPANIB 200 MG/1
800 TABLET ORAL DAILY
Status: DISCONTINUED | OUTPATIENT
Start: 2020-08-22 | End: 2020-08-23

## 2020-08-22 RX ORDER — PAZOPANIB 200 MG/1
800 TABLET ORAL DAILY
Status: CANCELLED | OUTPATIENT
Start: 2020-08-22

## 2020-08-22 RX ORDER — POTASSIUM CHLORIDE 750 MG/1
40 CAPSULE, EXTENDED RELEASE ORAL ONCE
Status: DISCONTINUED | OUTPATIENT
Start: 2020-08-22 | End: 2020-08-22

## 2020-08-22 RX ADMIN — CLOPIDOGREL 75 MG: 75 TABLET, FILM COATED ORAL at 09:08

## 2020-08-22 RX ADMIN — LEVOTHYROXINE SODIUM 88 MCG: 0.09 TABLET ORAL at 09:08

## 2020-08-22 RX ADMIN — INSULIN ASPART 2 UNITS: 100 INJECTION, SOLUTION INTRAVENOUS; SUBCUTANEOUS at 11:08

## 2020-08-22 RX ADMIN — POTASSIUM BICARBONATE 50 MEQ: 978 TABLET, EFFERVESCENT ORAL at 01:08

## 2020-08-22 RX ADMIN — ROSUVASTATIN CALCIUM 40 MG: 20 TABLET, FILM COATED ORAL at 09:08

## 2020-08-22 RX ADMIN — SODIUM BICARBONATE 650 MG TABLET 1300 MG: at 09:08

## 2020-08-22 RX ADMIN — PAZOPANIB 800 MG: 200 TABLET ORAL at 02:08

## 2020-08-22 RX ADMIN — CARVEDILOL 6.25 MG: 6.25 TABLET, FILM COATED ORAL at 09:08

## 2020-08-22 RX ADMIN — TAMSULOSIN HYDROCHLORIDE 0.8 MG: 0.4 CAPSULE ORAL at 09:08

## 2020-08-22 RX ADMIN — ACETAMINOPHEN 650 MG: 325 TABLET ORAL at 11:08

## 2020-08-22 RX ADMIN — FUROSEMIDE 100 MG: 10 INJECTION, SOLUTION INTRAMUSCULAR; INTRAVENOUS at 09:08

## 2020-08-22 RX ADMIN — DOCUSATE SODIUM 50 MG: 50 CAPSULE, LIQUID FILLED ORAL at 09:08

## 2020-08-22 RX ADMIN — GUAIFENESIN AND DEXTROMETHORPHAN HYDROBROMIDE 1 TABLET: 600; 30 TABLET, EXTENDED RELEASE ORAL at 10:08

## 2020-08-22 RX ADMIN — ASPIRIN 81 MG CHEWABLE TABLET 81 MG: 81 TABLET CHEWABLE at 09:08

## 2020-08-22 RX ADMIN — AMLODIPINE BESYLATE 10 MG: 10 TABLET ORAL at 09:08

## 2020-08-22 NOTE — PROGRESS NOTES
Hospital Medicine  Progress Note      Patient Name: Edwin Powell  MRN:  [id number]  [facility] Medicine Team: [facility] Milo Gonzalez MD  Date of Admission:  8/17/2020     Length of Stay:  LOS: 5 days   Expected Discharge Date: 8/22/2020  Principal Problem:  Acute on chronic respiratory failure with hypoxia and hypercapnia      Subjective:    Interval History/Overnight Events:  Patient stepped down from MICU overnight. Stable on 1L NC, reports of congestion, mucinex added. Continuing lasix, sCr improving.       Review of Systems:  Constitutional: Negative for chills, fatigue, fever.   Respiratory: Negative for cough, shortness of breath.    Cardiovascular: Negative for chest pain, palpitations, leg swelling.   Gastrointestinal: Negative for abdominal pain, nausea, vomiting, diarrhea, constipation  Genitourinary: Negative for dysuria, frequency.   All other systems reviewed and are negative.      Objective:    Physical Exam:  Temp:  [98.1 °F (36.7 °C)-98.6 °F (37 °C)]   Pulse:  [62-77]   Resp:  [12-20]   BP: (136-185)/(65-82)   SpO2:  [89 %-99 %]     Constitutional: Appears comfortable, in no acute distress  Eyes: No scleral icterus or eye discharge  Cardiovascular: Normal heart rate.  Regular heart rhythm.  No murmur heard.  Pulmonary/Chest: Effort normal and breath sounds normal. No respiratory distress. No wheezes, rales, or rhonchi  Abdominal: Soft. Bowel sounds are normal.  No distension.  No tenderness  Musculoskeletal: No deformities.  No edema.   Neurological: Alert.  Oriented to person, place, and time.   Skin: Skin is warm and dry.       Assessment and Plan:    Neuro  Acute metabolic encephalopathy  --suspect medication induced insetting of decreased renal clearance as pt responded well to narcan. 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 Risk factors include recent surgery and metastatic RCC.      - CTA chest deferred due to VERENA  - Bilat LE U/S negative for DVT  - TTE negative for RV strain with normal PA pressure. Showed chronic HFpEF and focal wall motion abnormalities  - Troponin elevated and peaked at 8.8, BNP mildly elevated (no baseline)  - Was put on heparin gtt for 48hrs. OK'd by ortho to start A/C so soon after surgery due to rapidly worsening hypoxia  - Wean O2 to goal sat > 90%. Today on 3L NC Sat 97%  - Lasix 100mg daily, monitor UOP     Cardiac/Vascular  NSTEMI (non-ST elevated myocardial infarction)  Coronary artery disease involving native coronary artery of native heart with angina pectoris  Elevated troponin  CAD with history of PCI ~20 years ago. Patient did not have chest pain. Troponin was checked when concerned for PE, initially was 1.2 however continued to uptrend and peaked at 8.8.     - Loaded with ASA and Plavix, Ok'd by ortho, and will continue  - Continue coreg  - On statin     Coronary artery disease involving native coronary artery of native heart without angina pectoris  --patient on home ASA, rosuvastatin, plavix, amlodipine      Renal/  Metabolic acidosis, normal anion gap (NAG)  --2/2 rta in setting of verena on ckd vs recent diarrhea (per recent nephro clinic note)  --UAG positive (38) suggestive of rta  --Transition from bicarb gtt to PO sodium bicarb 1300 mg BID     Acute kidney injury superimposed on chronic kidney disease  --baseline creatinine ~ 2; now 2.9  -- ? Etiology. Recent Nephro note states pt has had decreased po intake & diarrhea recently - ? Pre-renal. Pt net + 1.4L since admission and has been  having maintenance IVF, so likely volume resuscitated at this point  --holding home lasix & lisinopril initially. On Lasix 100mg daily to a Net -2.8L.  --renal U/S unremarkable  --improving with diuresis     Benign prostatic hyperplasia (BPH) with urinary urge incontinence  --continue home flomax     Endocrine  Type 2 diabetes mellitus with stage 3 chronic kidney disease, without long-term current use of insulin  --per clinic notes, insulin dependent  --home regimen lantus 5 units daily   --start lantus 2 units qhs & low dose correctional ssi; titrate prn     Acquired hypothyroidism  --continue home levothyroxine  --TSH elevated however fT4 WNL     GI  Dysphagia  SLP eval, tolerating PO intake    Milo Gonzalez MD  [facility] Medicine  Spectra:  [id number]  Pager:  [phone]

## 2020-08-22 NOTE — SUBJECTIVE & OBJECTIVE
Principal Problem:Acute on chronic respiratory failure with hypoxia and hypercapnia    Principal Orthopedic Problem: s/p ACDF    Interval History:   Pt seen and examined at the bedside this am. Doing well, pain in neck controlled. 93% on 2L NC. Denies numbness/tingling/weakness. Drain with 20cc outpt      3Review of patient's allergies indicates:  No Known Allergies    Current Facility-Administered Medications   Medication    acetaminophen tablet 650 mg    albuterol-ipratropium 2.5 mg-0.5 mg/3 mL nebulizer solution 3 mL    amLODIPine tablet 10 mg    aspirin chewable tablet 81 mg    bisacodyL suppository 10 mg    carvediloL tablet 6.25 mg    clopidogreL tablet 75 mg    dextrose 50% injection 12.5 g    dextrose 50% injection 25 g    docusate sodium capsule 50 mg    furosemide injection 100 mg    glucagon (human recombinant) injection 1 mg    glucose chewable tablet 16 g    glucose chewable tablet 24 g    insulin aspart U-100 pen 0-5 Units    levothyroxine tablet 88 mcg    naloxone 0.4 mg/mL injection 0.2 mg    ondansetron injection 4 mg    PAZOpanib Tab 800 mg    polyethylene glycol packet 17 g    rosuvastatin tablet 40 mg    sodium bicarbonate tablet 1,300 mg    sodium chloride 0.9% flush 10 mL    sodium chloride 0.9% flush 5 mL    tamsulosin 24 hr capsule 0.8 mg     Facility-Administered Medications Ordered in Other Encounters   Medication    phenylephrine HCL 2.5% ophthalmic solution 1 drop    proparacaine 0.5 % ophthalmic solution 1 drop    tropicamide 1% ophthalmic solution 1 drop     Objective:     Vital Signs (Most Recent):  Temp: 98.4 °F (36.9 °C) (08/22/20 0402)  Pulse: 77 (08/22/20 0850)  Resp: 18 (08/22/20 0850)  BP: (!) 158/72 (08/22/20 0402)  SpO2: 97 % (08/22/20 0850) Vital Signs (24h Range):  Temp:  [98.1 °F (36.7 °C)-98.6 °F (37 °C)] 98.4 °F (36.9 °C)  Pulse:  [62-80] 77  Resp:  [9-20] 18  SpO2:  [89 %-99 %] 97 %  BP: (136-185)/(65-82) 158/72     Weight: 79.4 kg (175  "lb)  Height: 5' 7" (170.2 cm)  Body mass index is 27.41 kg/m².      Intake/Output Summary (Last 24 hours) at 8/22/2020 0852  Last data filed at 8/22/2020 0000  Gross per 24 hour   Intake 650 ml   Output 2000 ml   Net -1350 ml       Ortho/SPM Exam    2L NC  Anterior cervical dressing in place, c/d/i  No palpable swelling or fluid collection  Neuro exam stable      Significant Labs:   CBC:   Recent Labs   Lab 08/21/20  0516 08/22/20  0313   WBC 9.07 7.57   HGB 10.6* 10.4*   HCT 33.2* 33.9*    307     CMP:   Recent Labs   Lab 08/21/20  0516 08/22/20  0313    141   K 3.3* 3.4*   CL 98 97   CO2 33* 35*   * 137*   BUN 55* 66*   CREATININE 2.9* 2.6*   CALCIUM 8.8 8.9   PROT 6.1 6.0   ALBUMIN 2.0* 2.1*   BILITOT 0.4 0.4   ALKPHOS 92 106   AST 30 36   ALT <5* 6*   ANIONGAP 11 9   EGFRNONAA 20.2* 23.1*     All pertinent labs within the past 24 hours have been reviewed.    Significant Imaging: I have reviewed and interpreted all pertinent imaging results/findings.  "

## 2020-08-22 NOTE — PLAN OF CARE
POC reviewed with the pt. No acute changes. VS stable. Dsg C/D/I. Pt running SR on monitor. Will continue to monitor.

## 2020-08-22 NOTE — PLAN OF CARE
POC reviewed w/ pt, questions and concerns addressed. No acute events thru the night. All vital signs stable. No complaints. AOX4. Crushed meds. Tolerated well. Safety maintained. Bed locked, in lowest position, call light in reach, side rails x2. Mobilized pt to highest level of functioning. See doc flowsheets for further info. Will continue to monitor.

## 2020-08-22 NOTE — PT/OT/SLP PROGRESS
Occupational Therapy   Treatment    Name: Edwin Powell  MRN: [id number]  Admitting Diagnosis:  Acute on chronic respiratory failure with hypoxia and hypercapnia  5 Days Post-Op    Recommendations:     Discharge Recommendations: rehabilitation facility  Discharge Equipment Recommendations:  (TBD)  Barriers to discharge:       Assessment:     Edwin Powell is a 75 y.o. male with a medical diagnosis of Acute on chronic respiratory failure with hypoxia and hypercapnia. Pt progressing well - reports son has been walking with him to the bathroom. RW ordered for room. Performance deficits affecting function are weakness, impaired endurance, impaired self care skills, impaired functional mobilty, gait instability, impaired balance, decreased coordination, decreased upper extremity function, decreased safety awareness.     Rehab Prognosis:  Good; patient would benefit from acute skilled OT services to address these deficits and reach maximum level of function.       Plan:     Patient to be seen 5 x/week to address the above listed problems via self-care/home management, therapeutic activities, therapeutic exercises  · Plan of Care Expires: 09/01/20  · Plan of Care Reviewed with: patient, son    Subjective     Pain/Comfort:  · Pain Rating 1: 0/10    Objective:     Communicated with: rn prior to session.  Patient found supine with   upon OT entry to room.    General Precautions: Standard, fall   Orthopedic Precautions:spinal precautions   Braces:       Occupational Performance:     Bed Mobility:    · Patient completed Rolling/Turning to Left with  supervision  · Patient completed Scooting/Bridging with stand by assistance  · Patient completed Supine to Sit with stand by assistance     Functional Mobility/Transfers:  · Patient completed Sit <> Stand Transfer with stand by assistance  with  rolling walker   · Patient completed Bed <> Chair Transfer using Step Transfer technique with stand by assistance with rolling  walker  · Functional Mobility: Pt walked within room ~ 50' total SBA with a RW.    Activities of Daily Living:  · Declined.    [facility] 6 Click ADL: 20    Treatment & Education:  From chair level, pt completed BUE/LE therex (x 15 reps each) including:  - elbow flexion/extension  - shoulder presses  - seated marching in place  - knee flexion/extension  - ankle pumps    Encouraged prolonged OOB sitting and UE/LE ROM throughout each day.    Patient left up in chair with all lines intact and call button in reachEducation:      GOALS:   Multidisciplinary Problems     Occupational Therapy Goals        Problem: Occupational Therapy Goal    Goal Priority Disciplines Outcome Interventions   Occupational Therapy Goal     OT, PT/OT Ongoing, Progressing    Description: Goals to be met by: 9/1/20     Patient will increase functional independence with ADLs by performing:    UE Dressing with [place].  LE Dressing with Modified [place].  Grooming while standing at sink with Modified [place].  Toileting from toilet with Modified [place] for hygiene and clothing management.   Bathing from  shower chair/bench with Modified [place].  Toilet transfer to toilet with Modified [place].  Increased functional strength to WFL for B UE.  Upper extremity exercise program x15 reps per handout, with assistance as needed.                     Time Tracking:     OT Date of Treatment: 08/22/20  OT Start Time: 1106  OT Stop Time: 1132  OT Total Time (min): 26 min    Billable Minutes:Therapeutic Activity 13  Therapeutic Exercise 13    PAM Nava  8/22/2020

## 2020-08-22 NOTE — ASSESSMENT & PLAN NOTE
Edwin HAYLEY Sherry is a 75 y.o. male s/p C5/6 ACDF (8/17/20) for cervical stenosis, postop course complicated by apnea; Transferred to MICU with NSTEMI and increasing oxygen requirements. Stepped down to MTSU.     Pain control: multimodal, d/c sedating medications  PT/OT: progressive mobility, OOBTC  DVT PPx: heparin gtt, ASA, and Plavix; SCDs at all times when not ambulating  Abx: postop Ancef complete  Labs: stable  Drain: None     Dispo-Per primary, will cont to follow.

## 2020-08-22 NOTE — PROGRESS NOTES
[facility]  Orthopedics  Progress Note    Patient Name: Edwin Powell  MRN: [id number]  Admission Date: 8/17/2020  Hospital Length of Stay: 5 days  Attending Provider: Milo Gonzalez MD  Primary Care Provider: Lulú Lewis MD  Follow-up For: Procedure(s) (LRB):  DISCECTOMY, SPINE, CERVICAL, ANTERIOR APPROACH, WITH FUSION co case with Dr. Falcon C6/7 [facility] SNS:vocal cord/motors/SSEP Regular OR table Patient needs scope in preop (N/A)  DISSECTION, NECK (N/A)    Post-Operative Day: 5 Days Post-Op  Subjective:     Principal Problem:Acute on chronic respiratory failure with hypoxia and hypercapnia    Principal Orthopedic Problem: s/p ACDF    Interval History:   Pt seen and examined at the bedside this am. Doing well, pain in neck controlled. 97% on 2L NC. Denies numbness/tingling/weakness. Ambulated 7ft with PT yesterday.       3Review of patient's allergies indicates:  No Known Allergies    Current Facility-Administered Medications   Medication    acetaminophen tablet 650 mg    albuterol-ipratropium 2.5 mg-0.5 mg/3 mL nebulizer solution 3 mL    amLODIPine tablet 10 mg    aspirin chewable tablet 81 mg    bisacodyL suppository 10 mg    carvediloL tablet 6.25 mg    clopidogreL tablet 75 mg    dextrose 50% injection 12.5 g    dextrose 50% injection 25 g    docusate sodium capsule 50 mg    furosemide injection 100 mg    glucagon (human recombinant) injection 1 mg    glucose chewable tablet 16 g    glucose chewable tablet 24 g    insulin aspart U-100 pen 0-5 Units    levothyroxine tablet 88 mcg    naloxone 0.4 mg/mL injection 0.2 mg    ondansetron injection 4 mg    PAZOpanib Tab 800 mg    polyethylene glycol packet 17 g    rosuvastatin tablet 40 mg    sodium bicarbonate tablet 1,300 mg    sodium chloride 0.9% flush 10 mL    sodium chloride 0.9% flush 5 mL    tamsulosin 24 hr capsule 0.8 mg     Facility-Administered Medications Ordered in Other Encounters   Medication  "   phenylephrine HCL 2.5% ophthalmic solution 1 drop    proparacaine 0.5 % ophthalmic solution 1 drop    tropicamide 1% ophthalmic solution 1 drop     Objective:     Vital Signs (Most Recent):  Temp: 98.4 °F (36.9 °C) (08/22/20 0402)  Pulse: 77 (08/22/20 0850)  Resp: 18 (08/22/20 0850)  BP: (!) 158/72 (08/22/20 0402)  SpO2: 97 % (08/22/20 0850) Vital Signs (24h Range):  Temp:  [98.1 °F (36.7 °C)-98.6 °F (37 °C)] 98.4 °F (36.9 °C)  Pulse:  [62-80] 77  Resp:  [9-20] 18  SpO2:  [89 %-99 %] 97 %  BP: (136-185)/(65-82) 158/72     Weight: 79.4 kg (175 lb)  Height: 5' 7" (170.2 cm)  Body mass index is 27.41 kg/m².      Intake/Output Summary (Last 24 hours) at 8/22/2020 0852  Last data filed at 8/22/2020 0000  Gross per 24 hour   Intake 650 ml   Output 2000 ml   Net -1350 ml       Ortho/SPM Exam    2L NC  Anterior cervical dressing in place, c/d/i  No palpable swelling or fluid collection  Neuro exam stable      Significant Labs:   CBC:   Recent Labs   Lab 08/21/20  0516 08/22/20  0313   WBC 9.07 7.57   HGB 10.6* 10.4*   HCT 33.2* 33.9*    307     CMP:   Recent Labs   Lab 08/21/20  0516 08/22/20  0313    141   K 3.3* 3.4*   CL 98 97   CO2 33* 35*   * 137*   BUN 55* 66*   CREATININE 2.9* 2.6*   CALCIUM 8.8 8.9   PROT 6.1 6.0   ALBUMIN 2.0* 2.1*   BILITOT 0.4 0.4   ALKPHOS 92 106   AST 30 36   ALT <5* 6*   ANIONGAP 11 9   EGFRNONAA 20.2* 23.1*     All pertinent labs within the past 24 hours have been reviewed.    Significant Imaging: I have reviewed and interpreted all pertinent imaging results/findings.    Assessment/Plan:     Cervical stenosis of spine  Edwin Powell is a 75 y.o. male s/p C5/6 ACDF (8/17/20) for cervical stenosis, postop course complicated by apnea; Transferred to MICU with NSTEMI and increasing oxygen requirements. Stepped down to MTSU.     Pain control: multimodal, d/c sedating medications  PT/OT: progressive mobility, OOBTC  DVT PPx: heparin gtt, ASA, and Plavix; SCDs at all " times when not ambulating  Abx: postop Ancef complete  Dental soft diet per SLP  Labs: stable  Drain: None     Dispo-Per primary, will cont to follow.                Hector Sabillon MD  Orthopedics  [facility]

## 2020-08-22 NOTE — RESPIRATORY THERAPY
Rapid Response Respiratory Therapy Proactive Rounding Note      Time of visit: 1612     Code Status: Full Code   : 1945  Bed: 8095/8095 A:   MRN: [id number]    SITUATION     Evaluated patient for: Non-Invasive Positive Pressure Ventilation Compliance     BACKGROUND     Patient has a past medical history of Acquired hypothyroidism, Benign essential HTN, Benign prostatic hyperplasia (BPH) with urinary urge incontinence, Chronic low back pain, Coronary artery disease involving native coronary artery of native heart without angina pectoris, Gastroesophageal reflux disease without esophagitis, History of CVA (cerebrovascular accident), Hypertension associated with diabetes, Lumbar disc lesion, Metastatic renal cell carcinoma to bone, Metastatic renal cell carcinoma to liver, Mixed hyperlipidemia, and Type 2 diabetes mellitus with stage 3 chronic kidney disease, with long-term current use of insulin.    ASSESSMENT/INTERVENTIONS     Upon arrival in room Patient alert on 1.5L NC SAT 96%.  No signs of respiratory distress.  BiPAP on standby.    Pulse: 62 Respiratory rate: 12 SpO2: 96  Level of Consciousness: Level of Consciousness (AVPU): alert  Respiratory Effort: Respiratory Effort: Normal, Unlabored Expansion/Accessory Muscle Usage: Expansion/Accessory Muscles/Retractions: no use of accessory muscles, no retractions, expansion symmetric  All Lung Field Breath Sounds: All Lung Fields Breath Sounds: Anterior:, Lateral:, clear  FAUSTINO Breath Sounds: clear, equal bilaterally  LLL Breath Sounds: clear, equal bilaterally  RUL Breath Sounds: clear, equal bilaterally  RML Breath Sounds: clear, equal bilaterally  RLL Breath Sounds: clear, equal bilaterally  O2 Device/Concentration: NC 1.5L  Most recent blood gas: No results for input(s): PH, PCO2, PO2, HCO3, POCSATURATED, BE in the last 72 hours.  NIPPV: Yes, Type: BiPAP Settings: standby   Ambu at bedside: Ambu bag with the patient?: Yes, Adult Ambu    Current  Respiratory Care Orders:   08/22/20 1053  Oxygen Continuous Continuous     References: Oxygen Titration Protocol   Question Answer Comment   Device type: Low flow    Device: Nasal Cannula (1- 5 Liters)    LPM: 1    Titrate O2 per Oxygen Titration Protocol: Yes    To maintain SpO2 goal of: >= 90%    Notify MD of: Inability to achieve desired SpO2; Sudden change in patient status and requires 20% increase in FiO2; Patient requires >60% FiO2        08/22/20 1052   08/18/20 0800  Chest physiotherapy Q8H Every 8 hours (15 of 29 released)    Release   Question: Indications: Answer: ATELECTASIS NONRESPONSIVE TO TX    08/18/20 0152   08/18/20 0800  ACAPELLA TREATMENT Q8H Every 8 hours (15 of 29 released)    Release    08/18/20 0152   08/17/20 0902  Incentive spirometry Every hour (136 of 231 released)    Release   Comments: while awake   References: [facility] Clinical Practice Guidelines    08/17/20 0901   Unscheduled  Inhalation Treatment Q4H PRN Every 4 hours PRN (0 of 30253 released)    Release    08/17/20 2115   Unscheduled  Bipap PRN Use PRN (0 of 98374 released)    Release   Question Answer Comment   FiO2% 60    Inspiratory pressure: 15    Expiratory pressure: 5        08/19/20 2205      IP Meds - Nasal and Inhaled  Comment  Hide  (From admission, onward)     Last edited by  on  at    Start   Stop Status Route Frequency Ordered   08/17/20 2214  albuterol-ipratropium 2.5 mg-0.5 mg/3 mL nebulizer solution 3 mL (albuterol-ipratropium (DUO-NEB) 0.5 - 3 mg (2.5 mg base)/ 3 mL nebulizer panel)    Discontinue    --             RECOMMENDATIONS     We recommend: Continue current POC.  Wean O2 as tolerated.    ESCALATION      Physician Escalation (Yes/No) NO  Discussed plan of care primary RTMiranda RT     FOLLOW-UP     Please call back the Rapid Response RT, Morro Walters, RRT at x [phone] for any questions or concerns.

## 2020-08-23 LAB
ALBUMIN SERPL BCP-MCNC: 2.1 G/DL (ref 3.5–5.2)
ALP SERPL-CCNC: 102 U/L (ref 55–135)
ALT SERPL W/O P-5'-P-CCNC: 12 U/L (ref 10–44)
ANION GAP SERPL CALC-SCNC: 13 MMOL/L (ref 8–16)
AST SERPL-CCNC: 41 U/L (ref 10–40)
BASOPHILS # BLD AUTO: 0.03 K/UL (ref 0–0.2)
BASOPHILS NFR BLD: 0.4 % (ref 0–1.9)
BILIRUB SERPL-MCNC: 0.5 MG/DL (ref 0.1–1)
BUN SERPL-MCNC: 74 MG/DL (ref 8–23)
CALCIUM SERPL-MCNC: 8.7 MG/DL (ref 8.7–10.5)
CHLORIDE SERPL-SCNC: 98 MMOL/L (ref 95–110)
CO2 SERPL-SCNC: 34 MMOL/L (ref 23–29)
CREAT SERPL-MCNC: 2.5 MG/DL (ref 0.5–1.4)
DIFFERENTIAL METHOD: ABNORMAL
EOSINOPHIL # BLD AUTO: 0.2 K/UL (ref 0–0.5)
EOSINOPHIL NFR BLD: 3.4 % (ref 0–8)
ERYTHROCYTE [DISTWIDTH] IN BLOOD BY AUTOMATED COUNT: 13.9 % (ref 11.5–14.5)
EST. GFR  (AFRICAN AMERICAN): 28 ML/MIN/1.73 M^2
EST. GFR  (NON AFRICAN AMERICAN): 24.2 ML/MIN/1.73 M^2
GLUCOSE SERPL-MCNC: 152 MG/DL (ref 70–110)
HCT VFR BLD AUTO: 34 % (ref 40–54)
HGB BLD-MCNC: 10.3 G/DL (ref 14–18)
IMM GRANULOCYTES # BLD AUTO: 0.04 K/UL (ref 0–0.04)
IMM GRANULOCYTES NFR BLD AUTO: 0.6 % (ref 0–0.5)
LYMPHOCYTES # BLD AUTO: 1.5 K/UL (ref 1–4.8)
LYMPHOCYTES NFR BLD: 21.2 % (ref 18–48)
MAGNESIUM SERPL-MCNC: 1.7 MG/DL (ref 1.6–2.6)
MCH RBC QN AUTO: 32.4 PG (ref 27–31)
MCHC RBC AUTO-ENTMCNC: 30.3 G/DL (ref 32–36)
MCV RBC AUTO: 107 FL (ref 82–98)
MONOCYTES # BLD AUTO: 0.9 K/UL (ref 0.3–1)
MONOCYTES NFR BLD: 12.8 % (ref 4–15)
NEUTROPHILS # BLD AUTO: 4.2 K/UL (ref 1.8–7.7)
NEUTROPHILS NFR BLD: 61.6 % (ref 38–73)
NRBC BLD-RTO: 0 /100 WBC
PHOSPHATE SERPL-MCNC: 6.4 MG/DL (ref 2.7–4.5)
PLATELET # BLD AUTO: 304 K/UL (ref 150–350)
PMV BLD AUTO: 10.5 FL (ref 9.2–12.9)
POCT GLUCOSE: 125 MG/DL (ref 70–110)
POCT GLUCOSE: 133 MG/DL (ref 70–110)
POCT GLUCOSE: 160 MG/DL (ref 70–110)
POCT GLUCOSE: 184 MG/DL (ref 70–110)
POCT GLUCOSE: 194 MG/DL (ref 70–110)
POTASSIUM SERPL-SCNC: 3.9 MMOL/L (ref 3.5–5.1)
PROT SERPL-MCNC: 6 G/DL (ref 6–8.4)
RBC # BLD AUTO: 3.18 M/UL (ref 4.6–6.2)
SODIUM SERPL-SCNC: 145 MMOL/L (ref 136–145)
WBC # BLD AUTO: 6.85 K/UL (ref 3.9–12.7)

## 2020-08-23 PROCEDURE — 99233 SBSQ HOSP IP/OBS HIGH 50: CPT | Mod: HCNC,,, | Performed by: STUDENT IN AN ORGANIZED HEALTH CARE EDUCATION/TRAINING PROGRAM

## 2020-08-23 PROCEDURE — 80053 COMPREHEN METABOLIC PANEL: CPT | Mod: HCNC

## 2020-08-23 PROCEDURE — 63600175 PHARM REV CODE 636 W HCPCS: Mod: HCNC | Performed by: INTERNAL MEDICINE

## 2020-08-23 PROCEDURE — 25000003 PHARM REV CODE 250: Mod: HCNC | Performed by: NURSE PRACTITIONER

## 2020-08-23 PROCEDURE — 25000003 PHARM REV CODE 250: Mod: HCNC | Performed by: STUDENT IN AN ORGANIZED HEALTH CARE EDUCATION/TRAINING PROGRAM

## 2020-08-23 PROCEDURE — 36415 COLL VENOUS BLD VENIPUNCTURE: CPT | Mod: HCNC

## 2020-08-23 PROCEDURE — 20600001 HC STEP DOWN PRIVATE ROOM: Mod: HCNC

## 2020-08-23 PROCEDURE — 94660 CPAP INITIATION&MGMT: CPT | Mod: HCNC

## 2020-08-23 PROCEDURE — 94664 DEMO&/EVAL PT USE INHALER: CPT | Mod: HCNC

## 2020-08-23 PROCEDURE — 94761 N-INVAS EAR/PLS OXIMETRY MLT: CPT | Mod: HCNC

## 2020-08-23 PROCEDURE — 99233 PR SUBSEQUENT HOSPITAL CARE,LEVL III: ICD-10-PCS | Mod: HCNC,,, | Performed by: STUDENT IN AN ORGANIZED HEALTH CARE EDUCATION/TRAINING PROGRAM

## 2020-08-23 PROCEDURE — 27000221 HC OXYGEN, UP TO 24 HOURS: Mod: HCNC

## 2020-08-23 PROCEDURE — 83735 ASSAY OF MAGNESIUM: CPT | Mod: HCNC

## 2020-08-23 PROCEDURE — 84100 ASSAY OF PHOSPHORUS: CPT | Mod: HCNC

## 2020-08-23 PROCEDURE — 99900035 HC TECH TIME PER 15 MIN (STAT): Mod: HCNC

## 2020-08-23 PROCEDURE — 85025 COMPLETE CBC W/AUTO DIFF WBC: CPT | Mod: HCNC

## 2020-08-23 RX ORDER — PAZOPANIB 200 MG/1
800 TABLET ORAL
Status: CANCELLED | OUTPATIENT
Start: 2020-08-24

## 2020-08-23 RX ORDER — PAZOPANIB 200 MG/1
800 TABLET ORAL
Status: DISCONTINUED | OUTPATIENT
Start: 2020-08-24 | End: 2020-09-03 | Stop reason: HOSPADM

## 2020-08-23 RX ADMIN — DOCUSATE SODIUM 50 MG: 50 CAPSULE, LIQUID FILLED ORAL at 08:08

## 2020-08-23 RX ADMIN — DOCUSATE SODIUM 50 MG: 50 CAPSULE, LIQUID FILLED ORAL at 09:08

## 2020-08-23 RX ADMIN — GUAIFENESIN AND DEXTROMETHORPHAN HYDROBROMIDE 1 TABLET: 600; 30 TABLET, EXTENDED RELEASE ORAL at 08:08

## 2020-08-23 RX ADMIN — GUAIFENESIN AND DEXTROMETHORPHAN HYDROBROMIDE 1 TABLET: 600; 30 TABLET, EXTENDED RELEASE ORAL at 09:08

## 2020-08-23 RX ADMIN — FUROSEMIDE 100 MG: 10 INJECTION, SOLUTION INTRAMUSCULAR; INTRAVENOUS at 09:08

## 2020-08-23 RX ADMIN — PAZOPANIB 800 MG: 200 TABLET ORAL at 09:08

## 2020-08-23 RX ADMIN — CARVEDILOL 6.25 MG: 6.25 TABLET, FILM COATED ORAL at 08:08

## 2020-08-23 RX ADMIN — SODIUM BICARBONATE 650 MG TABLET 1300 MG: at 09:08

## 2020-08-23 RX ADMIN — ASPIRIN 81 MG CHEWABLE TABLET 81 MG: 81 TABLET CHEWABLE at 09:08

## 2020-08-23 RX ADMIN — CARVEDILOL 6.25 MG: 6.25 TABLET, FILM COATED ORAL at 09:08

## 2020-08-23 RX ADMIN — ROSUVASTATIN CALCIUM 40 MG: 20 TABLET, FILM COATED ORAL at 08:08

## 2020-08-23 RX ADMIN — AMLODIPINE BESYLATE 10 MG: 10 TABLET ORAL at 09:08

## 2020-08-23 RX ADMIN — CLOPIDOGREL 75 MG: 75 TABLET, FILM COATED ORAL at 09:08

## 2020-08-23 RX ADMIN — TAMSULOSIN HYDROCHLORIDE 0.8 MG: 0.4 CAPSULE ORAL at 09:08

## 2020-08-23 RX ADMIN — LEVOTHYROXINE SODIUM 88 MCG: 0.09 TABLET ORAL at 09:08

## 2020-08-23 RX ADMIN — SODIUM BICARBONATE 650 MG TABLET 1300 MG: at 08:08

## 2020-08-23 NOTE — PT/OT/SLP PROGRESS
Physical Therapy      Patient Name:  Edwin Powell   MRN:  [id number]    Patient not seen today secondary to (PM- pt refused stating that he was nauseated, dizzy and had vomited.  RN notified of such.). Will follow-up at a later date..    Fouzia Delgado, PT   8/23/2020

## 2020-08-23 NOTE — PLAN OF CARE
"  Problem: Adult Inpatient Plan of Care  Goal: Plan of Care Review  Outcome: Ongoing, Progressing     Problem: Adult Inpatient Plan of Care  Goal: Optimal Comfort and Wellbeing  Outcome: Ongoing, Progressing     POC reviewed with Pt. VSS for patient, HR in the 50s at time, MD aware. A&Ox4. No acute changes. Strict I/Os. BG checked before meals per MD order. One episode of vomiting, Pr refused interventions stating "he felt better", MD aware. Safety checks preformed. Call light in reach, all questions answered. Will continue to monitor.   "

## 2020-08-23 NOTE — PROGRESS NOTES
[facility]  Orthopedics  Progress Note    Patient Name: Edwin Powell  MRN: [id number]  Admission Date: 8/17/2020  Hospital Length of Stay: 6 days  Attending Provider: Milo Gonzalez MD  Primary Care Provider: Lulú Lewis MD  Follow-up For: Procedure(s) (LRB):  DISCECTOMY, SPINE, CERVICAL, ANTERIOR APPROACH, WITH FUSION co case with Dr. Falcon C6/7 Depuy SNS:vocal cord/motors/SSEP Regular OR table Patient needs scope in preop (N/A)  DISSECTION, NECK (N/A)    Post-Operative Day: 6 Days Post-Op  Subjective:     Principal Problem:Acute on chronic respiratory failure with hypoxia and hypercapnia    Principal Orthopedic Problem: s/p ACDF    Interval History:   Pt seen and examined at the bedside this am. Doing well, pain in neck controlled. 93% on 2L NC. Denies numbness/tingling/weakness. Ambulating 50 ft.       3Review of patient's allergies indicates:  No Known Allergies    Current Facility-Administered Medications   Medication    acetaminophen tablet 650 mg    albuterol-ipratropium 2.5 mg-0.5 mg/3 mL nebulizer solution 3 mL    amLODIPine tablet 10 mg    aspirin chewable tablet 81 mg    bisacodyL suppository 10 mg    carvediloL tablet 6.25 mg    clopidogreL tablet 75 mg    dextromethorphan-guaifenesin  mg per 12 hr tablet 1 tablet    dextrose 50% injection 12.5 g    dextrose 50% injection 25 g    docusate sodium capsule 50 mg    furosemide injection 100 mg    glucagon (human recombinant) injection 1 mg    glucose chewable tablet 16 g    glucose chewable tablet 24 g    insulin aspart U-100 pen 0-5 Units    levothyroxine tablet 88 mcg    naloxone 0.4 mg/mL injection 0.2 mg    ondansetron injection 4 mg    [START ON 8/24/2020] PAZOpanib Tab 800 mg    polyethylene glycol packet 17 g    rosuvastatin tablet 40 mg    sodium bicarbonate tablet 1,300 mg    sodium chloride 0.9% flush 10 mL    sodium chloride 0.9% flush 5 mL    tamsulosin 24 hr capsule 0.8 mg  "    Facility-Administered Medications Ordered in Other Encounters   Medication    phenylephrine HCL 2.5% ophthalmic solution 1 drop    proparacaine 0.5 % ophthalmic solution 1 drop    tropicamide 1% ophthalmic solution 1 drop     Objective:     Vital Signs (Most Recent):  Temp: 97.8 °F (36.6 °C) (08/23/20 0759)  Pulse: 68 (08/23/20 0921)  Resp: 20 (08/23/20 0759)  BP: (!) 159/75 (08/23/20 0759)  SpO2: (!) 93 % (08/23/20 0921) Vital Signs (24h Range):  Temp:  [97.3 °F (36.3 °C)-98.3 °F (36.8 °C)] 97.8 °F (36.6 °C)  Pulse:  [61-74] 68  Resp:  [12-20] 20  SpO2:  [93 %-98 %] 93 %  BP: (141-179)/(65-86) 159/75     Weight: 79.4 kg (175 lb)  Height: 5' 7" (170.2 cm)  Body mass index is 27.41 kg/m².      Intake/Output Summary (Last 24 hours) at 8/23/2020 1040  Last data filed at 8/23/2020 0400  Gross per 24 hour   Intake 590 ml   Output 2375 ml   Net -1785 ml       Ortho/SPM Exam    2L NC  Anterior cervical dressing in place, c/d/i  No palpable swelling or fluid collection  Neuro exam stable      Significant Labs:   CBC:   Recent Labs   Lab 08/22/20  0313 08/23/20  0357   WBC 7.57 6.85   HGB 10.4* 10.3*   HCT 33.9* 34.0*    304     CMP:   Recent Labs   Lab 08/22/20  0313 08/23/20  0357    145   K 3.4* 3.9   CL 97 98   CO2 35* 34*   * 152*   BUN 66* 74*   CREATININE 2.6* 2.5*   CALCIUM 8.9 8.7   PROT 6.0 6.0   ALBUMIN 2.1* 2.1*   BILITOT 0.4 0.5   ALKPHOS 106 102   AST 36 41*   ALT 6* 12   ANIONGAP 9 13   EGFRNONAA 23.1* 24.2*     All pertinent labs within the past 24 hours have been reviewed.    Significant Imaging: I have reviewed and interpreted all pertinent imaging results/findings.    Assessment/Plan:     Cervical stenosis of spine  Edwin Powell is a 75 y.o. male s/p C5/6 ACDF (8/17/20) for cervical stenosis, postop course complicated by apnea; Transferred to MICU with NSTEMI and increasing oxygen requirements. Stepped down to MTSU.     Pain control: multimodal, d/c sedating " medications  PT/OT: progressive mobility, OOBTC  DVT PPx: heparin gtt, ASA, and Plavix; SCDs at all times when not ambulating  Abx: postop Ancef complete  Labs: stable  Drain: None     Dispo-Per primary, will cont to follow.                Hector Sabillon MD  Orthopedics  [facility]

## 2020-08-23 NOTE — PLAN OF CARE
POC reviewed w/ pt, questions and concerns addressed. No acute events thru the night. All vital signs stable. No complaints. AOX4. Pt refused both doses of tylenol. Pt swallowed pills without difficulty. Safety maintained. Bed locked, in lowest position, call light in reach, side rails x2. Pt ambulated to the bathroom with standby assistance. See doc flowsheets for further info. Will continue to monitor.

## 2020-08-23 NOTE — NURSING
Pt HR is in the low 50s. All other VSS./  Pt complains of some lightheadedness and dizziness when ambulating to the bathroom. Dr. Gonzalez aware. No orders at this time.

## 2020-08-23 NOTE — SUBJECTIVE & OBJECTIVE
Principal Problem:Acute on chronic respiratory failure with hypoxia and hypercapnia    Principal Orthopedic Problem: s/p ACDF    Interval History:   Pt seen and examined at the bedside this am. Doing well, pain in neck controlled. 93% on 2L NC. Denies numbness/tingling/weakness. Ambulating 50 ft.       3Review of patient's allergies indicates:  No Known Allergies    Current Facility-Administered Medications   Medication    acetaminophen tablet 650 mg    albuterol-ipratropium 2.5 mg-0.5 mg/3 mL nebulizer solution 3 mL    amLODIPine tablet 10 mg    aspirin chewable tablet 81 mg    bisacodyL suppository 10 mg    carvediloL tablet 6.25 mg    clopidogreL tablet 75 mg    dextromethorphan-guaifenesin  mg per 12 hr tablet 1 tablet    dextrose 50% injection 12.5 g    dextrose 50% injection 25 g    docusate sodium capsule 50 mg    furosemide injection 100 mg    glucagon (human recombinant) injection 1 mg    glucose chewable tablet 16 g    glucose chewable tablet 24 g    insulin aspart U-100 pen 0-5 Units    levothyroxine tablet 88 mcg    naloxone 0.4 mg/mL injection 0.2 mg    ondansetron injection 4 mg    [START ON 8/24/2020] PAZOpanib Tab 800 mg    polyethylene glycol packet 17 g    rosuvastatin tablet 40 mg    sodium bicarbonate tablet 1,300 mg    sodium chloride 0.9% flush 10 mL    sodium chloride 0.9% flush 5 mL    tamsulosin 24 hr capsule 0.8 mg     Facility-Administered Medications Ordered in Other Encounters   Medication    phenylephrine HCL 2.5% ophthalmic solution 1 drop    proparacaine 0.5 % ophthalmic solution 1 drop    tropicamide 1% ophthalmic solution 1 drop     Objective:     Vital Signs (Most Recent):  Temp: 97.8 °F (36.6 °C) (08/23/20 0759)  Pulse: 68 (08/23/20 0921)  Resp: 20 (08/23/20 0759)  BP: (!) 159/75 (08/23/20 0759)  SpO2: (!) 93 % (08/23/20 0921) Vital Signs (24h Range):  Temp:  [97.3 °F (36.3 °C)-98.3 °F (36.8 °C)] 97.8 °F (36.6 °C)  Pulse:  [61-74] 68  Resp:   "[12-20] 20  SpO2:  [93 %-98 %] 93 %  BP: (141-179)/(65-86) 159/75     Weight: 79.4 kg (175 lb)  Height: 5' 7" (170.2 cm)  Body mass index is 27.41 kg/m².      Intake/Output Summary (Last 24 hours) at 8/23/2020 1040  Last data filed at 8/23/2020 0400  Gross per 24 hour   Intake 590 ml   Output 2375 ml   Net -1785 ml       Ortho/SPM Exam    2L NC  Anterior cervical dressing in place, c/d/i  No palpable swelling or fluid collection  Neuro exam stable      Significant Labs:   CBC:   Recent Labs   Lab 08/22/20  0313 08/23/20  0357   WBC 7.57 6.85   HGB 10.4* 10.3*   HCT 33.9* 34.0*    304     CMP:   Recent Labs   Lab 08/22/20  0313 08/23/20  0357    145   K 3.4* 3.9   CL 97 98   CO2 35* 34*   * 152*   BUN 66* 74*   CREATININE 2.6* 2.5*   CALCIUM 8.9 8.7   PROT 6.0 6.0   ALBUMIN 2.1* 2.1*   BILITOT 0.4 0.5   ALKPHOS 106 102   AST 36 41*   ALT 6* 12   ANIONGAP 9 13   EGFRNONAA 23.1* 24.2*     All pertinent labs within the past 24 hours have been reviewed.    Significant Imaging: I have reviewed and interpreted all pertinent imaging results/findings.  "

## 2020-08-23 NOTE — PROGRESS NOTES
Hospital Medicine  Progress Note      Patient Name: Edwin Powell  MRN:  [id number]  [facility] Medicine Team: [facility] HOSP MED D Milo Gonzalez MD  Date of Admission:  8/17/2020     Length of Stay:  LOS: 6 days   Expected Discharge Date: 8/22/2020  Principal Problem:  Acute on chronic respiratory failure with hypoxia and hypercapnia      Subjective:    Interval History/Overnight Events:  Patient remains stable on low flow NC. Continuing lasix, sCr improving. Episode of lightheadedness/dizziness and one episode of emesis later in the day, resolved. Discussed plan of care with patient and will start working on rehab placement tomorrow.       Review of Systems:  Constitutional: Negative for chills, fatigue, fever.   Respiratory: Negative for cough, shortness of breath.    Cardiovascular: Negative for chest pain, palpitations, leg swelling.   Gastrointestinal: Negative for abdominal pain, nausea, vomiting, diarrhea, constipation  Genitourinary: Negative for dysuria, frequency.   All other systems reviewed and are negative.      Objective:    Physical Exam:  Temp:  [97.3 °F (36.3 °C)-98.1 °F (36.7 °C)]   Pulse:  [52-74]   Resp:  [17-23]   BP: (158-179)/(70-86)   SpO2:  [93 %-100 %]     Constitutional: Appears comfortable, in no acute distress  Eyes: No scleral icterus or eye discharge  Cardiovascular: Normal heart rate.  Regular heart rhythm.  No murmur heard.  Pulmonary/Chest: Effort normal and breath sounds normal. No respiratory distress. No wheezes, rales, or rhonchi  Abdominal: Soft. Bowel sounds are normal.  No distension.  No tenderness  Musculoskeletal: No deformities.  No edema.   Neurological: Alert.  Oriented to person, place, and time.   Skin: Skin is warm and dry.       Assessment and Plan:    Acute on chronic respiratory failure with hypoxia and hypercapnia  Concern for PE as he suddenly required significantly more O2 overnight on 8/17-18. Risk factors include recent surgery and metastatic RCC.      - CTA  chest deferred due to VERENA  - Bilat LE U/S negative for DVT  - TTE negative for RV strain with normal PA pressure. Showed chronic HFpEF and focal wall motion abnormalities  - Troponin elevated and peaked at 8.8, BNP mildly elevated (no baseline)  - Was put on heparin gtt for 48hrs. OK'd by ortho to start A/C so soon after surgery due to rapidly worsening hypoxia  - Wean O2 to goal sat > 90%. Today on 1L NC Sat 97%  - Lasix 100mg daily, monitor UOP       NSTEMI (non-ST elevated myocardial infarction)  Coronary artery disease involving native coronary artery of native heart with angina pectoris  Elevated troponin  CAD with history of PCI ~20 years ago. Patient did not have chest pain. Troponin was checked when concerned for PE, initially was 1.2 however continued to uptrend and peaked at 8.8.     - Loaded with ASA and Plavix, Ok'd by ortho, and will continue  - Continue coreg  - On statin     Coronary artery disease involving native coronary artery of native heart without angina pectoris  --patient on home ASA, rosuvastatin, plavix, amlodipine        Metabolic acidosis, normal anion gap (NAG)  --2/2 rta in setting of verena on ckd vs recent diarrhea (per recent nephro clinic note)  --UAG positive (38) suggestive of rta  --Transition from bicarb gtt to PO sodium bicarb 1300 mg BID     Acute kidney injury superimposed on chronic kidney disease  --baseline creatinine ~ 2; now 2.9  -- ? Etiology. Recent Nephro note states pt has had decreased po intake & diarrhea recently - ? Pre-renal. Pt net + 1.4L since admission and has been having maintenance IVF, so likely volume resuscitated at this point  --holding home lasix & lisinopril initially. On Lasix 100mg daily to a Net -2.8L.  --renal U/S unremarkable  --improving with diuresis     Benign prostatic hyperplasia (BPH) with urinary urge incontinence  --continue home flomax       Type 2 diabetes mellitus with stage 3 chronic kidney disease, without long-term current use of  insulin  --per clinic notes, insulin dependent  --home regimen lantus 5 units daily   --start lantus 2 units qhs & low dose correctional ssi; titrate prn     Acquired hypothyroidism  --continue home levothyroxine  --TSH elevated however fT4 WNL       Dysphagia  SLP eval, tolerating PO intake    Acute metabolic encephalopathy  --suspect medication induced insetting of decreased renal clearance as pt responded well to narcan. 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

## 2020-08-24 LAB
ALBUMIN SERPL BCP-MCNC: 2.2 G/DL (ref 3.5–5.2)
ALP SERPL-CCNC: 99 U/L (ref 55–135)
ALT SERPL W/O P-5'-P-CCNC: 17 U/L (ref 10–44)
ANION GAP SERPL CALC-SCNC: 15 MMOL/L (ref 8–16)
AST SERPL-CCNC: 40 U/L (ref 10–40)
BASOPHILS # BLD AUTO: 0.02 K/UL (ref 0–0.2)
BASOPHILS NFR BLD: 0.3 % (ref 0–1.9)
BILIRUB SERPL-MCNC: 0.2 MG/DL (ref 0.1–1)
BUN SERPL-MCNC: 84 MG/DL (ref 8–23)
CALCIUM SERPL-MCNC: 8.8 MG/DL (ref 8.7–10.5)
CHLORIDE SERPL-SCNC: 98 MMOL/L (ref 95–110)
CO2 SERPL-SCNC: 30 MMOL/L (ref 23–29)
CREAT SERPL-MCNC: 2.5 MG/DL (ref 0.5–1.4)
DIFFERENTIAL METHOD: ABNORMAL
EOSINOPHIL # BLD AUTO: 0.3 K/UL (ref 0–0.5)
EOSINOPHIL NFR BLD: 3.9 % (ref 0–8)
ERYTHROCYTE [DISTWIDTH] IN BLOOD BY AUTOMATED COUNT: 13.7 % (ref 11.5–14.5)
EST. GFR  (AFRICAN AMERICAN): 28 ML/MIN/1.73 M^2
EST. GFR  (NON AFRICAN AMERICAN): 24.2 ML/MIN/1.73 M^2
GLUCOSE SERPL-MCNC: 125 MG/DL (ref 70–110)
HCT VFR BLD AUTO: 34.4 % (ref 40–54)
HGB BLD-MCNC: 10.4 G/DL (ref 14–18)
IMM GRANULOCYTES # BLD AUTO: 0.04 K/UL (ref 0–0.04)
IMM GRANULOCYTES NFR BLD AUTO: 0.6 % (ref 0–0.5)
LYMPHOCYTES # BLD AUTO: 1.9 K/UL (ref 1–4.8)
LYMPHOCYTES NFR BLD: 26.9 % (ref 18–48)
MAGNESIUM SERPL-MCNC: 1.8 MG/DL (ref 1.6–2.6)
MCH RBC QN AUTO: 31.9 PG (ref 27–31)
MCHC RBC AUTO-ENTMCNC: 30.2 G/DL (ref 32–36)
MCV RBC AUTO: 106 FL (ref 82–98)
MONOCYTES # BLD AUTO: 1 K/UL (ref 0.3–1)
MONOCYTES NFR BLD: 14.8 % (ref 4–15)
NEUTROPHILS # BLD AUTO: 3.7 K/UL (ref 1.8–7.7)
NEUTROPHILS NFR BLD: 53.5 % (ref 38–73)
NRBC BLD-RTO: 0 /100 WBC
PHOSPHATE SERPL-MCNC: 6.9 MG/DL (ref 2.7–4.5)
PLATELET # BLD AUTO: 341 K/UL (ref 150–350)
PMV BLD AUTO: 10.8 FL (ref 9.2–12.9)
POCT GLUCOSE: 152 MG/DL (ref 70–110)
POCT GLUCOSE: 163 MG/DL (ref 70–110)
POCT GLUCOSE: 167 MG/DL (ref 70–110)
POCT GLUCOSE: 180 MG/DL (ref 70–110)
POTASSIUM SERPL-SCNC: 3.9 MMOL/L (ref 3.5–5.1)
PROT SERPL-MCNC: 6 G/DL (ref 6–8.4)
RBC # BLD AUTO: 3.26 M/UL (ref 4.6–6.2)
SODIUM SERPL-SCNC: 143 MMOL/L (ref 136–145)
WBC # BLD AUTO: 6.94 K/UL (ref 3.9–12.7)

## 2020-08-24 PROCEDURE — 25000003 PHARM REV CODE 250: Mod: HCNC | Performed by: NURSE PRACTITIONER

## 2020-08-24 PROCEDURE — 63600175 PHARM REV CODE 636 W HCPCS: Mod: HCNC | Performed by: INTERNAL MEDICINE

## 2020-08-24 PROCEDURE — 80053 COMPREHEN METABOLIC PANEL: CPT | Mod: HCNC

## 2020-08-24 PROCEDURE — 94761 N-INVAS EAR/PLS OXIMETRY MLT: CPT | Mod: HCNC

## 2020-08-24 PROCEDURE — 84100 ASSAY OF PHOSPHORUS: CPT | Mod: HCNC

## 2020-08-24 PROCEDURE — 25000003 PHARM REV CODE 250: Mod: HCNC | Performed by: STUDENT IN AN ORGANIZED HEALTH CARE EDUCATION/TRAINING PROGRAM

## 2020-08-24 PROCEDURE — 99233 SBSQ HOSP IP/OBS HIGH 50: CPT | Mod: HCNC,,, | Performed by: STUDENT IN AN ORGANIZED HEALTH CARE EDUCATION/TRAINING PROGRAM

## 2020-08-24 PROCEDURE — 99233 PR SUBSEQUENT HOSPITAL CARE,LEVL III: ICD-10-PCS | Mod: HCNC,,, | Performed by: STUDENT IN AN ORGANIZED HEALTH CARE EDUCATION/TRAINING PROGRAM

## 2020-08-24 PROCEDURE — 27000221 HC OXYGEN, UP TO 24 HOURS: Mod: HCNC

## 2020-08-24 PROCEDURE — 99900035 HC TECH TIME PER 15 MIN (STAT): Mod: HCNC

## 2020-08-24 PROCEDURE — 85025 COMPLETE CBC W/AUTO DIFF WBC: CPT | Mod: HCNC

## 2020-08-24 PROCEDURE — 99231 SBSQ HOSP IP/OBS SF/LOW 25: CPT | Mod: HCNC,,, | Performed by: NURSE PRACTITIONER

## 2020-08-24 PROCEDURE — 20600001 HC STEP DOWN PRIVATE ROOM: Mod: HCNC

## 2020-08-24 PROCEDURE — 63600175 PHARM REV CODE 636 W HCPCS: Mod: HCNC | Performed by: NURSE PRACTITIONER

## 2020-08-24 PROCEDURE — 36415 COLL VENOUS BLD VENIPUNCTURE: CPT | Mod: HCNC

## 2020-08-24 PROCEDURE — 99231 PR SUBSEQUENT HOSPITAL CARE,LEVL I: ICD-10-PCS | Mod: HCNC,,, | Performed by: NURSE PRACTITIONER

## 2020-08-24 PROCEDURE — 92526 ORAL FUNCTION THERAPY: CPT | Mod: HCNC

## 2020-08-24 PROCEDURE — 97530 THERAPEUTIC ACTIVITIES: CPT | Mod: HCNC

## 2020-08-24 PROCEDURE — 94664 DEMO&/EVAL PT USE INHALER: CPT | Mod: HCNC

## 2020-08-24 PROCEDURE — 94799 UNLISTED PULMONARY SVC/PX: CPT | Mod: HCNC

## 2020-08-24 PROCEDURE — 83735 ASSAY OF MAGNESIUM: CPT | Mod: HCNC

## 2020-08-24 RX ORDER — HYDRALAZINE HYDROCHLORIDE 25 MG/1
25 TABLET, FILM COATED ORAL EVERY 8 HOURS PRN
Status: DISCONTINUED | OUTPATIENT
Start: 2020-08-24 | End: 2020-09-03 | Stop reason: HOSPADM

## 2020-08-24 RX ADMIN — GUAIFENESIN AND DEXTROMETHORPHAN HYDROBROMIDE 1 TABLET: 600; 30 TABLET, EXTENDED RELEASE ORAL at 08:08

## 2020-08-24 RX ADMIN — ACETAMINOPHEN 650 MG: 325 TABLET ORAL at 11:08

## 2020-08-24 RX ADMIN — ONDANSETRON 4 MG: 2 INJECTION INTRAMUSCULAR; INTRAVENOUS at 08:08

## 2020-08-24 RX ADMIN — CARVEDILOL 6.25 MG: 6.25 TABLET, FILM COATED ORAL at 10:08

## 2020-08-24 RX ADMIN — DOCUSATE SODIUM 50 MG: 50 CAPSULE, LIQUID FILLED ORAL at 08:08

## 2020-08-24 RX ADMIN — FUROSEMIDE 100 MG: 10 INJECTION, SOLUTION INTRAMUSCULAR; INTRAVENOUS at 08:08

## 2020-08-24 RX ADMIN — SODIUM BICARBONATE 650 MG TABLET 1300 MG: at 08:08

## 2020-08-24 RX ADMIN — AMLODIPINE BESYLATE 10 MG: 10 TABLET ORAL at 08:08

## 2020-08-24 RX ADMIN — PAZOPANIB 800 MG: 200 TABLET ORAL at 05:08

## 2020-08-24 RX ADMIN — TAMSULOSIN HYDROCHLORIDE 0.8 MG: 0.4 CAPSULE ORAL at 08:08

## 2020-08-24 RX ADMIN — LEVOTHYROXINE SODIUM 88 MCG: 0.09 TABLET ORAL at 08:08

## 2020-08-24 RX ADMIN — CARVEDILOL 6.25 MG: 6.25 TABLET, FILM COATED ORAL at 08:08

## 2020-08-24 RX ADMIN — ACETAMINOPHEN 650 MG: 325 TABLET ORAL at 05:08

## 2020-08-24 RX ADMIN — CLOPIDOGREL 75 MG: 75 TABLET, FILM COATED ORAL at 08:08

## 2020-08-24 RX ADMIN — ASPIRIN 81 MG CHEWABLE TABLET 81 MG: 81 TABLET CHEWABLE at 08:08

## 2020-08-24 RX ADMIN — ROSUVASTATIN CALCIUM 40 MG: 20 TABLET, FILM COATED ORAL at 08:08

## 2020-08-24 NOTE — PLAN OF CARE
Problem: SLP Goal  Goal: SLP Goal  Description: Speech Language Pathology Goals  Goals expected to be met by 8/27/20  1. Pt will tolerate a mechanical soft consistency diet and thin liquids with no overt signs of airway compromise.  2. Pt will participate in ongoing swallowing assessment to ensure least restrictive diet needs are maintained.                      Outcome: Ongoing, Progressing  Pt appears safe to advance to regular solids. Will follow up x 1 to ensure diet tolerance after diet advancement.   AGUS Jones, CCC-SLP  Speech Language Pathologist  [phone]  8/24/2020

## 2020-08-24 NOTE — NURSING
Home Oxygen Evaluation    Date Performed: 2020    1) Patient's Home O2 Sat on room air, while at rest: 93%        If O2 sats on room air at rest are 88% or below, patient qualifies. No additional testing needed. Document N/A in steps 2 and 3. If 89% or above, complete steps 2.      2) Patient's O2 Sat on room air while exercisin        If O2 sats on room air while exercising remain 89% or above patient does not qualify, no further testing needed Document N/A in step 3. If O2 sats on room air while exercising are 88% or below, continue to step 3.      3) Patient's O2 Sat while exercising on O2: 92 at 2 LPM         (Must show improvement from #2 for patients to qualify)    If O2 sats improve on oxygen, patient qualifies for portable oxygen. If not, the patient does not qualify.

## 2020-08-24 NOTE — PLAN OF CARE
Goals remain appropriate, continue with OT POC.    Problem: Occupational Therapy Goal  Goal: Occupational Therapy Goal  Description: Goals to be met by: 9/1/20     Patient will increase functional independence with ADLs by performing:    UE Dressing with Emmons.  LE Dressing with Modified Emmons.  Grooming while standing at sink with Modified Emmons.  Toileting from toilet with Modified Emmons for hygiene and clothing management.   Bathing from  shower chair/bench with Modified Emmons.  Toilet transfer to toilet with Modified Emmons.  Increased functional strength to WFL for B UE.  Upper extremity exercise program x15 reps per handout, with assistance as needed.    Outcome: Ongoing, Progressing     PAM Singh  8/24/2020  Rehab Services

## 2020-08-24 NOTE — PLAN OF CARE
Problem: Diabetes Comorbidity  Goal: Blood Glucose Level Within Desired Range  Outcome: Ongoing, Progressing  Intervention: Maintain Glycemic Control  Flowsheets (Taken 8/24/2020 1749)  Glycemic Management:   blood glucose monitoring   oral hydration promoted     Problem: Adult Inpatient Plan of Care  Goal: Plan of Care Review  Outcome: Ongoing, Progressing  Flowsheets (Taken 8/24/2020 1749)  Plan of Care Reviewed With:   patient   caregiver  Goal: Patient-Specific Goal (Individualization)  Outcome: Ongoing, Progressing  Flowsheets (Taken 8/24/2020 1749)  Patient-Specific Goals (Include Timeframe): discharge goal: dc to home instead of rehab per pt preference  Goal: Absence of Hospital-Acquired Illness or Injury  Outcome: Ongoing, Progressing  Intervention: Identify and Manage Fall Risk  Flowsheets (Taken 8/24/2020 1749)  Safety Promotion/Fall Prevention:   Fall Risk signage in place   Fall Risk reviewed with patient/family   medications reviewed   toileting scheduled  Goal: Optimal Comfort and Wellbeing  Outcome: Ongoing, Progressing  Intervention: Provide Person-Centered Care  Flowsheets (Taken 8/24/2020 1749)  Trust Relationship/Rapport:   care explained   choices provided   emotional support provided   empathic listening provided   questions answered   questions encouraged   reassurance provided   thoughts/feelings acknowledged     Problem: Fall Injury Risk  Goal: Absence of Fall and Fall-Related Injury  Outcome: Ongoing, Progressing  Intervention: Promote Injury-Free Environment  Flowsheets (Taken 8/24/2020 1749)  Safety Promotion/Fall Prevention:   Fall Risk signage in place   Fall Risk reviewed with patient/family   medications reviewed   toileting scheduled  Environmental Safety Modification:   assistive device/personal items within reach   clutter free environment maintained    POC reviewed with pt and son at bedside.

## 2020-08-24 NOTE — ASSESSMENT & PLAN NOTE
-required increased 02 on 8/17 with concern for PE  -CTA chest deferred due to VERENA  - Bilat LE U/S negative for DVT  - TTE negative for RV strain with normal PA pressure. Showed chronic HFpEF and focal wall motion abnormalities  - Troponin elevated and peaked at 8.8, BNP mildly elevated (no baseline)  -on NC 1L  -on Lasix 100mg IVP daily

## 2020-08-24 NOTE — PLAN OF CARE
POC reviewed w/ pt, questions and concerns addressed. No acute events thru the night. All vital signs stable. No complaints. Pt denies pain and refused both doses of tylenol. AOX4. Safety maintained. Bed locked, in lowest position, call light in reach, side rails x2. Mobilized pt to highest level of functioning. See doc flowsheets for further info. Will continue to monitor.

## 2020-08-24 NOTE — PROGRESS NOTES
Hospital Medicine  Progress Note      Patient Name: Edwin Powell  MRN:  [id number]  [facility] Medicine Team: [facility] D Milo Gonzalez MD  Date of Admission:  8/17/2020     Length of Stay:  LOS: 7 days   Expected Discharge Date: 8/26/2020  Principal Problem:  Acute on chronic respiratory failure with hypoxia and hypercapnia      Subjective:    Interval History/Overnight Events:  Patient remains stable on low flow NC. Continuing lasix, sCr improving. Patient feels nauseous from the chemo pill but wants to continue with it, relieved with PRN anti-emetics. Patient and son agreed to rehab in the AM per PT/OT recs, then later stated they wanted home with . Will anticipate discharge pending improvement in renal function and hypoxia.        Review of Systems:  Constitutional: Negative for chills, fatigue, fever.   Respiratory: Negative for cough, shortness of breath.    Cardiovascular: Negative for chest pain, palpitations, leg swelling.   Gastrointestinal: Negative for abdominal pain, nausea, vomiting, diarrhea, constipation  Genitourinary: Negative for dysuria, frequency.   All other systems reviewed and are negative.      Objective:    Physical Exam:  Temp:  [96.9 °F (36.1 °C)-98.6 °F (37 °C)]   Pulse:  [60-75]   Resp:  [11-20]   BP: (130-187)/(60-88)   SpO2:  [96 %-99 %]     Constitutional: Appears comfortable, in no acute distress  Eyes: No scleral icterus or eye discharge  Cardiovascular: Normal heart rate.  Regular heart rhythm.  No murmur heard.  Pulmonary/Chest: Effort normal and breath sounds normal. No respiratory distress. No wheezes, rales, or rhonchi  Abdominal: Soft. Bowel sounds are normal.  No distension.  No tenderness  Musculoskeletal: No deformities.  No edema.   Neurological: Alert.  Oriented to person, place, and time.   Skin: Skin is warm and dry.       Assessment and Plan:    Acute on chronic respiratory failure with hypoxia and hypercapnia  Concern for PE as he suddenly required  significantly more O2 overnight on 8/17-18. Risk factors include recent surgery and metastatic RCC.      - CTA chest deferred due to VERENA  - Bilat LE U/S negative for DVT  - TTE negative for RV strain with normal PA pressure. Showed chronic HFpEF and focal wall motion abnormalities  - Troponin elevated and peaked at 8.8, BNP mildly elevated (no baseline)  - Was put on heparin gtt for 48hrs. OK'd by ortho to start A/C so soon after surgery due to rapidly worsening hypoxia  - Wean O2 to goal sat > 90%. Today on 1L NC Sat 97%  - Lasix 100mg daily, monitor UOP       NSTEMI (non-ST elevated myocardial infarction)  Coronary artery disease involving native coronary artery of native heart with angina pectoris  Elevated troponin  CAD with history of PCI ~20 years ago. Patient did not have chest pain. Troponin was checked when concerned for PE, initially was 1.2 however continued to uptrend and peaked at 8.8.     - Loaded with ASA and Plavix, Ok'd by ortho, and will continue  - Continue coreg  - On statin     Coronary artery disease involving native coronary artery of native heart without angina pectoris  --patient on home ASA, rosuvastatin, plavix, amlodipine        Metabolic acidosis, normal anion gap (NAG)  --2/2 rta in setting of verena on ckd vs recent diarrhea (per recent nephro clinic note)  --UAG positive (38) suggestive of rta  --Transition from bicarb gtt to PO sodium bicarb 1300 mg BID     Acute kidney injury superimposed on chronic kidney disease  --baseline creatinine ~ 2; now 2.9  -- ? Etiology. Recent Nephro note states pt has had decreased po intake & diarrhea recently - ? Pre-renal. Pt net + 1.4L since admission and has been having maintenance IVF, so likely volume resuscitated at this point  --holding home lasix & lisinopril initially. On Lasix 100mg daily to a Net -2.8L.  --renal U/S unremarkable  --improving with diuresis     Benign prostatic hyperplasia (BPH) with urinary urge incontinence  --continue home  flomax       Type 2 diabetes mellitus with stage 3 chronic kidney disease, without long-term current use of insulin  --per clinic notes, insulin dependent  --home regimen lantus 5 units daily   --start lantus 2 units qhs & low dose correctional ssi; titrate prn     Acquired hypothyroidism  --continue home levothyroxine  --TSH elevated however fT4 WNL       Dysphagia  SLP eval, tolerating PO intake. Now on a regular diet     Acute metabolic encephalopathy  --suspect medication induced insetting of decreased renal clearance as pt responded well to narcan. Started on narcan gtt and discontinued on 8/18 once mental status improved  --hold sedating medications  --resolved     Cervical stenosis of spine  --s/p ACDF on 8/17 with Ortho and ENT; management per ortho     Neuropathy due to secondary diabetes  --holding home lyrica in setting of encephalopathy  --when resumed, will need to be renally dosed      Dispo: Rehab placement vs HH (per patient request)    Milo Gonzalez MD  [facility] Medicine  Spectra:  [phone]  Pager:  [phone]

## 2020-08-24 NOTE — PT/OT/SLP PROGRESS
"Physical Therapy      Patient Name:  Edwin Powell   MRN:  [id number]    Patient not seen today stated " I already walked in the domingo and have been sitting up in my chair". Will follow-up 8/25/20.    Tatiana Chakraborty PTA    "

## 2020-08-24 NOTE — SUBJECTIVE & OBJECTIVE
Interval History 8/24/2020:  Patient is seen for follow-up PM&R evaluation and recommendations: Participating w/ therapy and progressing.     HPI, Past Medical, Family, and Social History remains the same as documented in the initial encounter.    Scheduled Medications:    acetaminophen  650 mg Oral Q6H    amLODIPine  10 mg Oral Daily    aspirin  81 mg Oral Daily    carvediloL  6.25 mg Oral BID    clopidogreL  75 mg Oral Daily    dextromethorphan-guaifenesin  mg  1 tablet Oral BID    docusate sodium  50 mg Oral BID    furosemide (LASIX) IV  100 mg Intravenous Daily    levothyroxine  88 mcg Oral Daily    PAZOpanib  800 mg Oral Before breakfast    rosuvastatin  40 mg Oral QHS    sodium bicarbonate  1,300 mg Oral BID    tamsulosin  0.8 mg Oral Daily       Diagnostic Results: Labs: Reviewed    PRN Medications: albuterol-ipratropium, bisacodyL, dextrose 50%, dextrose 50%, glucagon (human recombinant), glucose, glucose, insulin aspart U-100, naloxone, ondansetron, polyethylene glycol, sodium chloride 0.9%, sodium chloride 0.9%    Review of Systems   Constitutional: Positive for activity change.   Respiratory: Positive for shortness of breath.    Cardiovascular: Negative for chest pain and palpitations.   Musculoskeletal: Positive for back pain and gait problem.   Neurological: Positive for weakness.     Objective:     Vital Signs (Most Recent):  Temp: 97.4 °F (36.3 °C) (08/24/20 1135)  Pulse: 62 (08/24/20 1135)  Resp: 20 (08/24/20 1135)  BP: 130/60 (08/24/20 1135)  SpO2: 98 % (08/24/20 1135)    Vital Signs (24h Range):  Temp:  [96.9 °F (36.1 °C)-98.6 °F (37 °C)] 97.4 °F (36.3 °C)  Pulse:  [52-75] 62  Resp:  [11-23] 20  SpO2:  [96 %-100 %] 98 %  BP: (130-187)/(60-88) 130/60     Physical Exam  Constitutional:       Appearance: He is well-developed.      Comments: Sitting [facility]  Son at bedside   HENT:      Head: Normocephalic and atraumatic.   Eyes:      General:         Right eye: No discharge.         Left  eye: No discharge.   Neck:      Musculoskeletal: Neck supple.   Pulmonary:      Effort: Pulmonary effort is normal. No respiratory distress.   Abdominal:      General: There is no distension.      Palpations: Abdomen is soft.   Musculoskeletal:         General: No deformity.      Cervical back: He exhibits decreased range of motion and tenderness.   Skin:     General: Skin is warm and dry.   Neurological:      Mental Status: He is alert and oriented to person, place, and time.      Motor: Weakness (general) present.      Comments: Follows commands    Psychiatric:         Behavior: Behavior normal.         Cognition and Memory: Cognition is not impaired.       NEUROLOGICAL EXAMINATION:     MENTAL STATUS   Oriented to person, place, and time.

## 2020-08-24 NOTE — PLAN OF CARE
SW met with pt to discuss SNF options.  Pt stated that although he had spoken to the Dr, he feels strongly about going home with HH.  SW asked if there was anyone who could assist in the home, and pt stated his son would be able to do it, son concurred.  SW stated that Dr. Gonzalez would probably want to come talk to him again but SW would F/U as needed.        Emily Cooper LMSW

## 2020-08-24 NOTE — PT/OT/SLP PROGRESS
"Speech Language Pathology Treatment    Patient Name:  Edwin Powell   MRN:  [id number]  Admitting Diagnosis: Acute on chronic respiratory failure with hypoxia and hypercapnia    Recommendations:                 General Recommendations:  follow up to monitor diet tolerance x 1  Diet recommendations:  Regular, Liquid Diet Level: Thin   Aspiration Precautions: 1 bite/sip at a time, Alternating bites/sips, Avoid talking while eating, HOB to 90 degrees, Monitor for s/s of aspiration, Remain upright 30 minutes post meal, Small bites/sips and Strict aspiration precautions   General Precautions: Standard, aspiration, fall  Communication strategies:  none    Subjective     "I was in ICU." pt stated when SLP commented on progress he appears to have made over the course SLP has seen him.    Pain/Comfort:  · Pain Rating 1: 0/10    Objective:     Has the patient been evaluated by SLP for swallowing?   Yes  Keep patient NPO? No   Current Respiratory Status: nasal cannula      Pt found awake/alert sitting upright in recliner chair and in NAD. Pt noted to have consumed nearly 100% of lunch meal prior to SLP's entry. Pt willing to accept trials of solids and thin liquids to allow SLP to monitor for diet tolerance and determine if safe for further diet advancement. Pt observed managing 1/2 dahiana cracker (3 bites) and >1ozwater via straw.  Oral and pharyngeal management appeared to be WFL/WNL with no overt s/s of aspiration observed.  SLP discussed recommending further diet advancement to regular solid consistencies. SLP reviewed overt s/s of aspiration and encouraged pt to avoid straws if overt s/s of aspiration were observed with thin liquids via straw. Pt also encouraged to report overt s/s of aspiration in general.  Pt expressed understanding. SLP services will plan to follow to ensure diet tolerance after advancement to regular solids. If pt is tolerating well, no further skilled SLP services appear to be warranted to address " swallowing function at this time.     Assessment:     Edwin Powell is a 75 y.o. male with an SLP diagnosis of resolving dysphagia.      Goals:   Multidisciplinary Problems     SLP Goals        Problem: SLP Goal    Goal Priority Disciplines Outcome   SLP Goal     SLP Ongoing, Progressing   Description: Speech Language Pathology Goals  Goals expected to be met by 8/27/20  1. Pt will tolerate a mechanical soft consistency diet and thin liquids with no overt signs of airway compromise.  2. Pt will participate in ongoing swallowing assessment to ensure least restrictive diet needs are maintained.                                       Plan:     · Patient to be seen:  4 x/week   · Plan of Care expires:  09/20/20  · Plan of Care reviewed with:  patient   · SLP Follow-Up:  Yes       Discharge recommendations:  (likely will not require continued SLP services to address swallowing function upon d/c)     Time Tracking:     SLP Treatment Date:   08/24/20  Speech Start Time:  1234  Speech Stop Time:  1242     Speech Total Time (min):  8 min    Billable Minutes: Treatment Swallowing Dysfunction 8    AGUS Jones, MJ-SLP  08/24/2020     AGUS Jones, CCC-SLP  Speech Language Pathologist  [phone]  8/24/2020

## 2020-08-24 NOTE — PT/OT/SLP PROGRESS
Occupational Therapy   Treatment    Name: Edwin Powell  MRN: [id number]  Admitting Diagnosis:  Acute on chronic respiratory failure with hypoxia and hypercapnia  7 Days Post-Op    Recommendations:     Discharge Recommendations: rehabilitation facility  Discharge Equipment Recommendations:  none  Barriers to discharge:  None    Assessment:     Edwin Powell is a 75 y.o. male with a medical diagnosis of Acute on chronic respiratory failure with hypoxia and hypercapnia.  He presents with decline in ADLs and functional mobility. Pt ambulated ~300ft with RW no O2 with one symptomatic desat to 88% during the 6 minute walk test.  Pt declined ADLs secondary have completed full ADL this morning including shower. Performance deficits affecting function are decreased coordination, weakness, impaired endurance, impaired self care skills, impaired functional mobilty, gait instability, impaired balance, decreased upper extremity function, decreased safety awareness.     Rehab Prognosis:  Good; patient would benefit from acute skilled OT services to address these deficits and reach maximum level of function.       Plan:     Patient to be seen 5 x/week to address the above listed problems via self-care/home management, therapeutic activities, therapeutic exercises  · Plan of Care Expires: 09/01/20  · Plan of Care Reviewed with: patient    Subjective     Pt's son appeared to be pushing for rehab when discussing options for post acute care therapy. When asked goals he would like to see pt achieve he mentioned balance and decreasing risk for falls.    Pain/Comfort:  · Pain Rating 1: 0/10  · Pain Rating Post-Intervention 1: 0/10    Objective:     Communicated with: RN prior to session.  Patient found up in chair with blood pressure cuff, oxygen, peripheral IV, pulse ox (continuous), telemetry upon OT entry to room.    General Precautions: Standard, fall   Orthopedic Precautions:spinal precautions   Braces: N/A     Occupational  Performance:     Functional Mobility/Transfers:  · Patient completed Sit <> Stand Transfer with stand by assistance  with  rolling walker   · Functional Mobility: Assisted RN with ambulation for 6 minute walk test.  Pt ambulated for full 6 minutes for ~300 ft without oxygen using RW with CGA. He did have 2 minor LOB but was able to recover with CGA only.    Activities of Daily Living:  · Feeding:  independence    · Bathing: son reported he assisted pt in/out of shower today. Pt completed shower seated on bench and was able to wash himself. SBA provided by son and min A with transfers    · Lower Body Dressing: maximal assistance pt needs LHR and sock aide which he has at home. However, B hand  weakness  · Toileting: pt can stand from toilet using GB per son      [facility] 6 Click ADL: 20    Treatment & Education:  · Pt educated on role of OT in acute care setting.   · Assisted with ADLs and functional mobility with assist levels noted above    Patient left up in chair with all lines intact and call button in reachEducation:      GOALS:   Multidisciplinary Problems     Occupational Therapy Goals        Problem: Occupational Therapy Goal    Goal Priority Disciplines Outcome Interventions   Occupational Therapy Goal     OT, PT/OT Ongoing, Progressing    Description: Goals to be met by: 9/1/20     Patient will increase functional independence with ADLs by performing:    UE Dressing with [place].  LE Dressing with Modified [place].  Grooming while standing at sink with Modified [place].  Toileting from toilet with Modified [place] for hygiene and clothing management.   Bathing from  shower chair/bench with Modified [place].  Toilet transfer to toilet with Modified [place].  Increased functional strength to WFL for B UE.  Upper extremity exercise program x15 reps per handout, with assistance as needed.                     Time Tracking:     OT Date of Treatment: 08/24/20  OT Start Time:  1146  OT Stop Time: 1210  OT Total Time (min): 24 min    Billable Minutes:Therapeutic Activity 24    PAM Rodriges  8/24/2020

## 2020-08-24 NOTE — PROGRESS NOTES
[facility]  Physical Medicine & Rehab  Progress Note    Patient Name: Edwin Powell  MRN: [id number]  Admission Date: 8/17/2020  Length of Stay: 7 days  Attending Physician: Milo Gonzalez MD    Subjective:     Principal Problem:Acute on chronic respiratory failure with hypoxia and hypercapnia    Hospital Course:   08/18/2020:  Sit to stand ModA.  Ambulated 3 steps bwd/fwd ModA .  UBD TA.  8/22: BM SBA-SV. Sit to stand & trxs SBA & RW. Ambulated 50 ft SBA & rw.     Interval History 8/24/2020:  Patient is seen for follow-up PM&R evaluation and recommendations: Participating w/ therapy and progressing.     HPI, Past Medical, Family, and Social History remains the same as documented in the initial encounter.    Scheduled Medications:    acetaminophen  650 mg Oral Q6H    amLODIPine  10 mg Oral Daily    aspirin  81 mg Oral Daily    carvediloL  6.25 mg Oral BID    clopidogreL  75 mg Oral Daily    dextromethorphan-guaifenesin  mg  1 tablet Oral BID    docusate sodium  50 mg Oral BID    furosemide (LASIX) IV  100 mg Intravenous Daily    levothyroxine  88 mcg Oral Daily    PAZOpanib  800 mg Oral Before breakfast    rosuvastatin  40 mg Oral QHS    sodium bicarbonate  1,300 mg Oral BID    tamsulosin  0.8 mg Oral Daily       Diagnostic Results: Labs: Reviewed    PRN Medications: albuterol-ipratropium, bisacodyL, dextrose 50%, dextrose 50%, glucagon (human recombinant), glucose, glucose, insulin aspart U-100, naloxone, ondansetron, polyethylene glycol, sodium chloride 0.9%, sodium chloride 0.9%    Review of Systems   Constitutional: Positive for activity change.   Respiratory: Positive for shortness of breath.    Cardiovascular: Negative for chest pain and palpitations.   Musculoskeletal: Positive for back pain and gait problem.   Neurological: Positive for weakness.     Objective:     Vital Signs (Most Recent):  Temp: 97.4 °F (36.3 °C) (08/24/20 1135)  Pulse: 62 (08/24/20 1135)  Resp:  20 (08/24/20 1135)  BP: 130/60 (08/24/20 1135)  SpO2: 98 % (08/24/20 1135)    Vital Signs (24h Range):  Temp:  [96.9 °F (36.1 °C)-98.6 °F (37 °C)] 97.4 °F (36.3 °C)  Pulse:  [52-75] 62  Resp:  [11-23] 20  SpO2:  [96 %-100 %] 98 %  BP: (130-187)/(60-88) 130/60     Physical Exam  Constitutional:       Appearance: He is well-developed.      Comments: Sitting [facility]  Son at bedside   HENT:      Head: Normocephalic and atraumatic.   Eyes:      General:         Right eye: No discharge.         Left eye: No discharge.   Neck:      Musculoskeletal: Neck supple.   Pulmonary:      Effort: Pulmonary effort is normal. No respiratory distress.   Abdominal:      General: There is no distension.      Palpations: Abdomen is soft.   Musculoskeletal:         General: No deformity.      Cervical back: He exhibits decreased range of motion and tenderness.   Skin:     General: Skin is warm and dry.   Neurological:      Mental Status: He is alert and oriented to person, place, and time.      Motor: Weakness (general) present.      Comments: Follows commands    Psychiatric:         Behavior: Behavior normal.         Cognition and Memory: Cognition is not impaired.   Assessment/Plan:      * Acute on chronic respiratory failure with hypoxia and hypercapnia  -required increased 02 on 8/17 with concern for PE  -CTA chest deferred due to VERENA  - Bilat LE U/S negative for DVT  - TTE negative for RV strain with normal PA pressure. Showed chronic HFpEF and focal wall motion abnormalities  -on NC 1L  -on Lasix 100mg IVP daily     Dysphagia  -SLP following     NSTEMI (non-ST elevated myocardial infarction)  -troponin peaked at 8.8 with chest pain  -loaded with ASA and plavix, s/p heparin gtt   -on coreg    Impaired functional mobility and endurance  See hospital course for functional status.      Recommendations  -  Encourage mobility, OOB in chair, and early ambulation as appropriate  -  PT/OT evaluate and treat  -  Pain management  -  DVT prophylaxis  if appropriate   -  Monitor for and prevent skin breakdown and pressure ulcers  · Early mobility, repositioning/weight shifting every 20-30 minutes when sitting, turn patient every 2 hours, proper mattress/overlay and chair cushioning, pressure relief/heel protector boots      Acute metabolic encephalopathy  -resolved    Cervical stenosis of spine  -s/p C5/6 ACDF on 8/17/20 for cervical stenosis with postop course complicated by apnea and NSTEMI per ortho     Participating with therapy. Will follow progress and discuss with PM&R staff for post acute care recommendation.      Beverly Warner NP  Department of Physical Medicine & Rehab   [facility]

## 2020-08-25 LAB
ALBUMIN SERPL BCP-MCNC: 2.2 G/DL (ref 3.5–5.2)
ALP SERPL-CCNC: 98 U/L (ref 55–135)
ALT SERPL W/O P-5'-P-CCNC: 22 U/L (ref 10–44)
ANION GAP SERPL CALC-SCNC: 12 MMOL/L (ref 8–16)
AST SERPL-CCNC: 46 U/L (ref 10–40)
BASOPHILS # BLD AUTO: 0.02 K/UL (ref 0–0.2)
BASOPHILS NFR BLD: 0.3 % (ref 0–1.9)
BILIRUB SERPL-MCNC: 0.2 MG/DL (ref 0.1–1)
BUN SERPL-MCNC: 81 MG/DL (ref 8–23)
CALCIUM SERPL-MCNC: 8.3 MG/DL (ref 8.7–10.5)
CHLORIDE SERPL-SCNC: 98 MMOL/L (ref 95–110)
CO2 SERPL-SCNC: 31 MMOL/L (ref 23–29)
CREAT SERPL-MCNC: 2.5 MG/DL (ref 0.5–1.4)
DIFFERENTIAL METHOD: ABNORMAL
EOSINOPHIL # BLD AUTO: 0.3 K/UL (ref 0–0.5)
EOSINOPHIL NFR BLD: 4.7 % (ref 0–8)
ERYTHROCYTE [DISTWIDTH] IN BLOOD BY AUTOMATED COUNT: 13.9 % (ref 11.5–14.5)
EST. GFR  (AFRICAN AMERICAN): 28 ML/MIN/1.73 M^2
EST. GFR  (NON AFRICAN AMERICAN): 24.2 ML/MIN/1.73 M^2
GLUCOSE SERPL-MCNC: 118 MG/DL (ref 70–110)
HCT VFR BLD AUTO: 35.2 % (ref 40–54)
HGB BLD-MCNC: 10.8 G/DL (ref 14–18)
IMM GRANULOCYTES # BLD AUTO: 0.07 K/UL (ref 0–0.04)
IMM GRANULOCYTES NFR BLD AUTO: 1 % (ref 0–0.5)
LYMPHOCYTES # BLD AUTO: 1.7 K/UL (ref 1–4.8)
LYMPHOCYTES NFR BLD: 25 % (ref 18–48)
MAGNESIUM SERPL-MCNC: 1.7 MG/DL (ref 1.6–2.6)
MCH RBC QN AUTO: 32.7 PG (ref 27–31)
MCHC RBC AUTO-ENTMCNC: 30.7 G/DL (ref 32–36)
MCV RBC AUTO: 107 FL (ref 82–98)
MONOCYTES # BLD AUTO: 1.1 K/UL (ref 0.3–1)
MONOCYTES NFR BLD: 16.1 % (ref 4–15)
NEUTROPHILS # BLD AUTO: 3.6 K/UL (ref 1.8–7.7)
NEUTROPHILS NFR BLD: 52.9 % (ref 38–73)
NRBC BLD-RTO: 0 /100 WBC
PHOSPHATE SERPL-MCNC: 4.8 MG/DL (ref 2.7–4.5)
PLATELET # BLD AUTO: 352 K/UL (ref 150–350)
PMV BLD AUTO: 11.3 FL (ref 9.2–12.9)
POCT GLUCOSE: 158 MG/DL (ref 70–110)
POCT GLUCOSE: 161 MG/DL (ref 70–110)
POCT GLUCOSE: 169 MG/DL (ref 70–110)
POCT GLUCOSE: 203 MG/DL (ref 70–110)
POTASSIUM SERPL-SCNC: 4.3 MMOL/L (ref 3.5–5.1)
PROT SERPL-MCNC: 6.1 G/DL (ref 6–8.4)
RBC # BLD AUTO: 3.3 M/UL (ref 4.6–6.2)
SODIUM SERPL-SCNC: 141 MMOL/L (ref 136–145)
WBC # BLD AUTO: 6.77 K/UL (ref 3.9–12.7)

## 2020-08-25 PROCEDURE — 99900035 HC TECH TIME PER 15 MIN (STAT): Mod: HCNC

## 2020-08-25 PROCEDURE — 80053 COMPREHEN METABOLIC PANEL: CPT | Mod: HCNC

## 2020-08-25 PROCEDURE — 25000003 PHARM REV CODE 250: Mod: HCNC | Performed by: STUDENT IN AN ORGANIZED HEALTH CARE EDUCATION/TRAINING PROGRAM

## 2020-08-25 PROCEDURE — 94799 UNLISTED PULMONARY SVC/PX: CPT | Mod: HCNC

## 2020-08-25 PROCEDURE — 97110 THERAPEUTIC EXERCISES: CPT | Mod: HCNC,CQ

## 2020-08-25 PROCEDURE — 97535 SELF CARE MNGMENT TRAINING: CPT | Mod: HCNC

## 2020-08-25 PROCEDURE — 25000003 PHARM REV CODE 250: Mod: HCNC | Performed by: NURSE PRACTITIONER

## 2020-08-25 PROCEDURE — 63600175 PHARM REV CODE 636 W HCPCS: Mod: HCNC | Performed by: INTERNAL MEDICINE

## 2020-08-25 PROCEDURE — 99233 SBSQ HOSP IP/OBS HIGH 50: CPT | Mod: HCNC,,, | Performed by: STUDENT IN AN ORGANIZED HEALTH CARE EDUCATION/TRAINING PROGRAM

## 2020-08-25 PROCEDURE — 94761 N-INVAS EAR/PLS OXIMETRY MLT: CPT | Mod: HCNC

## 2020-08-25 PROCEDURE — 94660 CPAP INITIATION&MGMT: CPT | Mod: HCNC

## 2020-08-25 PROCEDURE — 99233 PR SUBSEQUENT HOSPITAL CARE,LEVL III: ICD-10-PCS | Mod: HCNC,,, | Performed by: STUDENT IN AN ORGANIZED HEALTH CARE EDUCATION/TRAINING PROGRAM

## 2020-08-25 PROCEDURE — 20600001 HC STEP DOWN PRIVATE ROOM: Mod: HCNC

## 2020-08-25 PROCEDURE — 94664 DEMO&/EVAL PT USE INHALER: CPT | Mod: HCNC

## 2020-08-25 PROCEDURE — 63600175 PHARM REV CODE 636 W HCPCS: Mod: HCNC | Performed by: NURSE PRACTITIONER

## 2020-08-25 PROCEDURE — 83735 ASSAY OF MAGNESIUM: CPT | Mod: HCNC

## 2020-08-25 PROCEDURE — 97116 GAIT TRAINING THERAPY: CPT | Mod: HCNC,CQ

## 2020-08-25 PROCEDURE — 92526 ORAL FUNCTION THERAPY: CPT | Mod: HCNC

## 2020-08-25 PROCEDURE — 84100 ASSAY OF PHOSPHORUS: CPT | Mod: HCNC

## 2020-08-25 PROCEDURE — 36415 COLL VENOUS BLD VENIPUNCTURE: CPT | Mod: HCNC

## 2020-08-25 PROCEDURE — 85025 COMPLETE CBC W/AUTO DIFF WBC: CPT | Mod: HCNC

## 2020-08-25 RX ORDER — OXYCODONE HYDROCHLORIDE 5 MG/1
5 TABLET ORAL EVERY 6 HOURS PRN
Status: DISCONTINUED | OUTPATIENT
Start: 2020-08-25 | End: 2020-09-03 | Stop reason: HOSPADM

## 2020-08-25 RX ADMIN — FUROSEMIDE 100 MG: 10 INJECTION, SOLUTION INTRAMUSCULAR; INTRAVENOUS at 08:08

## 2020-08-25 RX ADMIN — CARVEDILOL 6.25 MG: 6.25 TABLET, FILM COATED ORAL at 08:08

## 2020-08-25 RX ADMIN — SODIUM BICARBONATE 650 MG TABLET 1300 MG: at 09:08

## 2020-08-25 RX ADMIN — CARVEDILOL 6.25 MG: 6.25 TABLET, FILM COATED ORAL at 09:08

## 2020-08-25 RX ADMIN — ACETAMINOPHEN 650 MG: 325 TABLET ORAL at 12:08

## 2020-08-25 RX ADMIN — DOCUSATE SODIUM 50 MG: 50 CAPSULE, LIQUID FILLED ORAL at 08:08

## 2020-08-25 RX ADMIN — GUAIFENESIN AND DEXTROMETHORPHAN HYDROBROMIDE 1 TABLET: 600; 30 TABLET, EXTENDED RELEASE ORAL at 08:08

## 2020-08-25 RX ADMIN — CLOPIDOGREL 75 MG: 75 TABLET, FILM COATED ORAL at 08:08

## 2020-08-25 RX ADMIN — AMLODIPINE BESYLATE 10 MG: 10 TABLET ORAL at 08:08

## 2020-08-25 RX ADMIN — ROSUVASTATIN CALCIUM 40 MG: 20 TABLET, FILM COATED ORAL at 09:08

## 2020-08-25 RX ADMIN — SODIUM BICARBONATE 650 MG TABLET 1300 MG: at 08:08

## 2020-08-25 RX ADMIN — GUAIFENESIN AND DEXTROMETHORPHAN HYDROBROMIDE 1 TABLET: 600; 30 TABLET, EXTENDED RELEASE ORAL at 09:08

## 2020-08-25 RX ADMIN — LEVOTHYROXINE SODIUM 88 MCG: 0.09 TABLET ORAL at 08:08

## 2020-08-25 RX ADMIN — TAMSULOSIN HYDROCHLORIDE 0.8 MG: 0.4 CAPSULE ORAL at 08:08

## 2020-08-25 RX ADMIN — ASPIRIN 81 MG CHEWABLE TABLET 81 MG: 81 TABLET CHEWABLE at 08:08

## 2020-08-25 RX ADMIN — ACETAMINOPHEN 650 MG: 325 TABLET ORAL at 05:08

## 2020-08-25 RX ADMIN — ONDANSETRON 4 MG: 2 INJECTION INTRAMUSCULAR; INTRAVENOUS at 08:08

## 2020-08-25 RX ADMIN — DOCUSATE SODIUM 50 MG: 50 CAPSULE, LIQUID FILLED ORAL at 09:08

## 2020-08-25 NOTE — PROGRESS NOTES
Hospital Medicine  Progress Note      Patient Name: Edwin Powell  MRN:  [id number]  [facility] Medicine Team: [facility]OSP MED D Milo Gonzalez MD  Date of Admission:  8/17/2020     Length of Stay:  LOS: 8 days   Expected Discharge Date: 8/26/2020  Principal Problem:  Acute on chronic respiratory failure with hypoxia and hypercapnia      Subjective:    Interval History/Overnight Events:  Patient remains stable on room air. Continuing lasix, sCr improving - can likely transition to home PO dose tomorrow. CXR shows near complete resolution of previously seen bilateral pulmonary parenchymal infiltrates. Now weaned to room air. Will anticipate discharge in the next few days pending improvement in renal function and hypoxia.        Review of Systems:  Constitutional: Negative for chills, fatigue, fever.   Respiratory: Negative for cough, shortness of breath.    Cardiovascular: Negative for chest pain, palpitations, leg swelling.   Gastrointestinal: Negative for abdominal pain, nausea, vomiting, diarrhea, constipation  Genitourinary: Negative for dysuria, frequency.   All other systems reviewed and are negative.      Objective:    Physical Exam:  Temp:  [97.6 °F (36.4 °C)-98.2 °F (36.8 °C)]   Pulse:  [47-68]   Resp:  [16-20]   BP: (107-172)/(51-76)   SpO2:  [93 %-100 %]     Constitutional: Appears comfortable, in no acute distress  Eyes: No scleral icterus or eye discharge  Cardiovascular: Normal heart rate.  Regular heart rhythm.  No murmur heard.  Pulmonary/Chest: Effort normal and breath sounds normal. No respiratory distress. No wheezes, rales, or rhonchi  Abdominal: Soft. Bowel sounds are normal.  No distension.  No tenderness  Musculoskeletal: No deformities.  No edema.   Neurological: Alert.  Oriented to person, place, and time.   Skin: Skin is warm and dry.       Assessment and Plan:    Acute on chronic respiratory failure with hypoxia and hypercapnia  Concern for PE as he suddenly required significantly more O2  overnight on 8/17-18. Risk factors include recent surgery and metastatic RCC.      - CTA chest deferred due to VERENA  - Bilat LE U/S negative for DVT  - TTE negative for RV strain with normal PA pressure. Showed chronic HFpEF and focal wall motion abnormalities  - Troponin elevated and peaked at 8.8, BNP mildly elevated (no baseline)  - Was put on heparin gtt for 48hrs. OK'd by ortho to start A/C so soon after surgery due to rapidly worsening hypoxia  - Wean O2 to goal sat > 90%.   - Lasix 100mg daily, monitor UOP  - now on room air        NSTEMI (non-ST elevated myocardial infarction)  Coronary artery disease involving native coronary artery of native heart with angina pectoris  Elevated troponin  CAD with history of PCI ~20 years ago. Patient did not have chest pain. Troponin was checked when concerned for PE, initially was 1.2 however continued to uptrend and peaked at 8.8.     - Loaded with ASA and Plavix, Ok'd by ortho, and will continue  - Continue coreg  - On statin     Coronary artery disease involving native coronary artery of native heart without angina pectoris  --patient on home ASA, rosuvastatin, plavix, amlodipine        Metabolic acidosis, normal anion gap (NAG)  --2/2 rta in setting of verena on ckd vs recent diarrhea (per recent nephro clinic note)  --UAG positive (38) suggestive of rta  --Transition from bicarb gtt to PO sodium bicarb 1300 mg BID     Acute kidney injury superimposed on chronic kidney disease  --baseline creatinine ~ 2; now 2.9  -- ? Etiology. Recent Nephro note states pt has had decreased po intake & diarrhea recently - ? Pre-renal. Pt net + 1.4L since admission and has been having maintenance IVF, so likely volume resuscitated at this point  --holding home lasix & lisinopril initially. On Lasix 100mg daily to a Net -2.8L.  --renal U/S unremarkable  --improving with diuresis     Benign prostatic hyperplasia (BPH) with urinary urge incontinence  --continue home flomax       Type 2  diabetes mellitus with stage 3 chronic kidney disease, without long-term current use of insulin  --per clinic notes, insulin dependent  --home regimen lantus 5 units daily   --start lantus 2 units qhs & low dose correctional ssi; titrate prn     Acquired hypothyroidism  --continue home levothyroxine  --TSH elevated however fT4 WNL       Dysphagia  SLP eval, tolerating PO intake. Now on a regular diet     Acute metabolic encephalopathy  --suspect medication induced insetting of decreased renal clearance as pt responded well to narcan. Started on narcan gtt and discontinued on 8/18 once mental status improved  --hold sedating medications  --resolved     Cervical stenosis of spine  --s/p ACDF on 8/17 with Ortho and ENT; management per ortho     Neuropathy due to secondary diabetes  --holding home lyrica in setting of encephalopathy  --when resumed, will need to be renally dosed      Dispo: Home with     Milo Gonzalez MD  [facility] Medicine  Spectra:  [phone]  Pager:  [phone]

## 2020-08-25 NOTE — PLAN OF CARE
CM met with patient to discuss his discharge plan. Explained that PT/OT are now recommending home with home health. Mr. Powell expressed understanding and is agreeable with discharge plan.    Alesia Montana RN  Ext [phone]

## 2020-08-25 NOTE — PLAN OF CARE
Pt. Tolerated  session well. Pain noted in posterior/lateral neck/scapula region on pt. 's left side

## 2020-08-25 NOTE — SUBJECTIVE & OBJECTIVE
Principal Problem:Acute on chronic respiratory failure with hypoxia and hypercapnia    Principal Orthopedic Problem: s/p ACDF    Interval History:   Pt seen and examined at the bedside this am. Doing well,oxygen weaned this am, 99% on RA. Ambulating around the halls. Reports improvement in RUE paresthesias from preop.     3Review of patient's allergies indicates:  No Known Allergies    Current Facility-Administered Medications   Medication    acetaminophen tablet 650 mg    albuterol-ipratropium 2.5 mg-0.5 mg/3 mL nebulizer solution 3 mL    amLODIPine tablet 10 mg    aspirin chewable tablet 81 mg    bisacodyL suppository 10 mg    carvediloL tablet 6.25 mg    clopidogreL tablet 75 mg    dextromethorphan-guaifenesin  mg per 12 hr tablet 1 tablet    dextrose 50% injection 12.5 g    dextrose 50% injection 25 g    docusate sodium capsule 50 mg    furosemide injection 100 mg    glucagon (human recombinant) injection 1 mg    glucose chewable tablet 16 g    glucose chewable tablet 24 g    hydrALAZINE tablet 25 mg    insulin aspart U-100 pen 0-5 Units    levothyroxine tablet 88 mcg    naloxone 0.4 mg/mL injection 0.2 mg    ondansetron injection 4 mg    PAZOPanib 200 mg tab    polyethylene glycol packet 17 g    rosuvastatin tablet 40 mg    sodium bicarbonate tablet 1,300 mg    sodium chloride 0.9% flush 10 mL    sodium chloride 0.9% flush 5 mL    tamsulosin 24 hr capsule 0.8 mg     Facility-Administered Medications Ordered in Other Encounters   Medication    phenylephrine HCL 2.5% ophthalmic solution 1 drop    proparacaine 0.5 % ophthalmic solution 1 drop    tropicamide 1% ophthalmic solution 1 drop     Objective:     Vital Signs (Most Recent):  Temp: 97.9 °F (36.6 °C) (08/25/20 0804)  Pulse: 66 (08/25/20 0814)  Resp: 20 (08/25/20 0804)  BP: (!) 156/71 (08/25/20 0804)  SpO2: 99 % (08/25/20 0814) Vital Signs (24h Range):  Temp:  [97.4 °F (36.3 °C)-98 °F (36.7 °C)] 97.9 °F (36.6 °C)  Pulse:   "[56-67] 66  Resp:  [16-20] 20  SpO2:  [93 %-100 %] 99 %  BP: (107-172)/(51-76) 156/71     Weight: 79.4 kg (175 lb)  Height: 5' 7" (170.2 cm)  Body mass index is 27.41 kg/m².      Intake/Output Summary (Last 24 hours) at 8/25/2020 0828  Last data filed at 8/25/2020 0400  Gross per 24 hour   Intake 200 ml   Output 950 ml   Net -750 ml       Ortho/SPM Exam    2L NC  Anterior cervical dressing in place, c/d/i  No palpable swelling or fluid collection  Neuro exam stable      Significant Labs:   CBC:   Recent Labs   Lab 08/24/20  0358 08/25/20  0341   WBC 6.94 6.77   HGB 10.4* 10.8*   HCT 34.4* 35.2*    352*     CMP:   Recent Labs   Lab 08/24/20  0358 08/25/20  0341    141   K 3.9 4.3   CL 98 98   CO2 30* 31*   * 118*   BUN 84* 81*   CREATININE 2.5* 2.5*   CALCIUM 8.8 8.3*   PROT 6.0 6.1   ALBUMIN 2.2* 2.2*   BILITOT 0.2 0.2   ALKPHOS 99 98   AST 40 46*   ALT 17 22   ANIONGAP 15 12   EGFRNONAA 24.2* 24.2*     All pertinent labs within the past 24 hours have been reviewed.    Significant Imaging: I have reviewed and interpreted all pertinent imaging results/findings.  "

## 2020-08-25 NOTE — PT/OT/SLP PROGRESS
Occupational Therapy   Treatment    Name: Edwin Powell  MRN: [id number]  Admitting Diagnosis:  Acute on chronic respiratory failure with hypoxia and hypercapnia  8 Days Post-Op    Recommendations:     Discharge Recommendations: home health OT  Discharge Equipment Recommendations:  none  Barriers to discharge:  None    Assessment:     Edwin Powell is a 75 y.o. male with a medical diagnosis of Acute on chronic respiratory failure with hypoxia and hypercapnia.  He presents with deficits in higher level ADL task performance and requires CGA/SBA for these tasks.  Pt. Noted to have pain in posterior cervical region on this date. Pt. Performed bed mobility and transfers with SBA. Pt. And son demonstrated understanding of spinal precautions when educated on them.  Performance deficits affecting function are impaired endurance, impaired self care skills, impaired functional mobilty, pain, other (comment)(spinal precautions). Pt. Would benefit from continued OT services to maximize safety and I with ADL tasks.    Rehab Prognosis:  Good; patient would benefit from acute skilled OT services to address these deficits and reach maximum level of function.       Plan:     Patient to be seen 5 x/week to address the above listed problems via self-care/home management, therapeutic activities, therapeutic exercises  · Plan of Care Expires: 09/01/20  · Plan of Care Reviewed with: patient    Subjective     Pain/Comfort:  · Pain Rating 1: 7/10  · Location - Orientation 1: posterior  · Location 1: cervical spine  · Pain Addressed 1: Reposition, Distraction  · Pain Rating Post-Intervention 1: 7/10    Objective:     Communicated with: nurse prior to session.  Patient found up in chair with telemetry, pulse ox (continuous)(seated in bedside chair / son present) upon OT entry to room.    General Precautions: Standard, fall   Orthopedic Precautions:spinal precautions   Braces: N/A     Occupational Performance:     Bed Mobility:    · Patient  completed Supine to Sit with modified independence with maintenance of precautions  · Patient completed Sit to Supine with modified independence with maintenance of precautions    Functional Mobility/Transfers:  · Patient completed Sit <> Stand Transfer with stand by assistance  with  rolling walker   · Patient completed Bed <> Chair Transfer using Stand Pivot technique with stand by assistance with rolling walker  · Patient completed Toilet Transfer Stand Pivot technique with stand by assistance with  rolling walker  · Functional Mobility: Pt. Ambulated in room with RW and SBA. Vc;s required at times to reduce speed of ambulation for safety    Activities of Daily Living:  · Lower Body Dressing: contact guard assistance to don pants adhering to spinal precautions from bedsdie chair; pt. will utilize reacher and sock aid at [facility] for socks  · Toileting: stand by assistance to practice transfer       [facility] 6 Click ADL: 20    Treatment & Education:  Pt. And pt. Son educated on spinal precautions  Pt. And son educated on need for SBA for all functional mobility and standing tasks    Patient left up in chair with call button in reachEducation:      GOALS:   Multidisciplinary Problems     Occupational Therapy Goals        Problem: Occupational Therapy Goal    Goal Priority Disciplines Outcome Interventions   Occupational Therapy Goal     OT, PT/OT Ongoing, Progressing    Description: Goals to be met by: 9/1/20     Patient will increase functional independence with ADLs by performing:    UE Dressing with [place].  LE Dressing with Modified [place].  Grooming while standing at sink with Modified [place].  Toileting from toilet with Modified [place] for hygiene and clothing management.   Bathing from  shower chair/bench with Modified [place].  Toilet transfer to toilet with Modified [place].  Increased functional strength to WFL for B UE.  Upper extremity exercise program x15 reps per handout,  with assistance as needed.                     Time Tracking:     OT Date of Treatment: 08/25/20  OT Start Time: 1344  OT Stop Time: 1410  OT Total Time (min): 26 min    Billable Minutes:Self Care/Home Management 25    PAM Gillespie  8/25/2020

## 2020-08-25 NOTE — PT/OT/SLP PROGRESS
"Physical Therapy Treatment    Patient Name:  Edwin Powell   MRN:  [id number]    Recommendations:     Discharge Recommendations:  home health PT   Discharge Equipment Recommendations: none   Barriers to discharge: None    Assessment:     Edwin Powell is a 75 y.o. male admitted with a medical diagnosis of Acute on chronic respiratory failure with hypoxia and hypercapnia.  He presents with the following impairments/functional limitations:  impaired endurance, impaired self care skills, impaired functional mobilty, pain, orthopedic precautions Patient tolerated treatment well focusing on bed mobility, transfers, gait, therex and standing balance/tolerance. Patient will continue to improve with skilled physical therapy services in order to return to functional baseline.    Rehab Prognosis: Good; patient would benefit from acute skilled PT services to address these deficits and reach maximum level of function.    Recent Surgery: Procedure(s) (LRB):  DISCECTOMY, SPINE, CERVICAL, ANTERIOR APPROACH, WITH FUSION co case with Dr. Falcon C6/7 [facility] SNS:vocal cord/motors/SSEP Regular OR table Patient needs scope in preop (N/A)  DISSECTION, NECK (N/A) 8 Days Post-Op    Plan:     During this hospitalization, patient to be seen 5 x/week to address the identified rehab impairments via gait training, therapeutic activities, therapeutic exercises, neuromuscular re-education and progress toward the following goals:    · Plan of Care Expires:  09/17/20    Subjective     Chief Complaint: neck pain, initial dizziness upon standing  Patient/Family Comments/goals: "I'll go around the block"  Pain/Comfort:  · Pain Rating 1: 7/10  · Location - Side 1: Left  · Location - Orientation 1: posterior  · Location 1: cervical spine  · Pain Addressed 1: Reposition, Distraction, Pre-medicate for activity  · Pain Rating Post-Intervention 1: 7/10      Objective:     Communicated with nurisng prior to session.  Patient found HOB elevated with pulse ox " (continuous), telemetry upon PT entry to room.     General Precautions: Standard, fall   Orthopedic Precautions:spinal precautions   Braces:       Functional Mobility:  · Bed Mobility:     · Scooting: stand by assistance  · Supine to Sit: stand by assistance  · Transfers:     · Sit to Stand:  stand by assistance with rolling walker  · Bed to Chair: stand by assistance with  rolling walker  using  Stand Pivot  · Gait: 250' RW SBA vcs for upright posture and breathing tech, SpO2 dropped to 88% about every 25' able to recover with standing rest break and focused breathing  · Balance: seated EOB S, static stand one LOB noted with initial standing CGA to regain balance RW      AM-PAC 6 CLICK MOBILITY  Turning over in bed (including adjusting bedclothes, sheets and blankets)?: 3  Sitting down on and standing up from a chair with arms (e.g., wheelchair, bedside commode, etc.): 3  Moving from lying on back to sitting on the side of the bed?: 3  Moving to and from a bed to a chair (including a wheelchair)?: 3  Need to walk in hospital room?: 3  Climbing 3-5 steps with a railing?: 3  Basic Mobility Total Score: 18       Therapeutic Activities and Exercises:  AP, LAQ, HF, abd/add, GS rest breaks as needed  Patient educated on importance of increased time out of bed and OSMIN throughout the day    Patient left up in chair with call button in reach, nurse notified and son present..    GOALS:   Multidisciplinary Problems     Physical Therapy Goals        Problem: Physical Therapy Goal    Goal Priority Disciplines Outcome Goal Variances Interventions   Physical Therapy Goal     PT, PT/OT Ongoing, Progressing     Description: Goals to be met by: 20    Patient will increase functional independence with mobility by performin. Supine to sit with MInimal Assistance - met       A. independence  2. Sit to stand transfer with Contact Guard Assistance with AD if needed. -not met  3. Bed to chair transfer with Minimal  Assistance using Rolling Walker if needed. -not met  4. Gait  x 50 feet with Contact Guard Assistance using Rolling Walker. If needed. -not met  5. Lower extremity exercise program x20 reps with supervision-not met  6. Pt sit on EOB x 10 min with CGA- not met                     Time Tracking:     PT Received On: 08/25/20  PT Start Time: 0945     PT Stop Time: 1008  PT Total Time (min): 23 min     Billable Minutes: Gait Training 15 and Therapeutic Exercise 8    Treatment Type: Treatment  PT/PTA: PTA     PTA Visit Number: 1     Lea Ghosh, THIAGO  08/25/2020

## 2020-08-25 NOTE — PROGRESS NOTES
PM&R consult follow up.  Please see original consult for detailed note.      PAC rec: home health OT/PT.      ANTHONY Thomas, FNP-C  Physical Medicine & Rehabilitation   08/25/2020

## 2020-08-25 NOTE — NURSING
PT AAO x 4. VSS. Pt refusing chemo med this AM d/t complaints of N/V daily when he takes it. He states he will begin taking it again when he goes home. BG WDL. Received on 2 LPM via  NC. Titrated to 1 LPM. Callbell placed within reach and use encouraged.

## 2020-08-25 NOTE — PT/OT/SLP PROGRESS
Speech Language Pathology Treatment & Discharge Summary    Patient Name:  Edwin Powell   MRN:  [id number]  Admitting Diagnosis: Acute on chronic respiratory failure with hypoxia and hypercapnia    Recommendations:                 General Recommendations:  Follow-up not indicated  Diet recommendations:  Regular, Liquid Diet Level: Thin   Aspiration Precautions: 1 bite/sip at a time, HOB to 90 degrees, Small bites/sips and Standard aspiration precautions   General Precautions: Standard, fall  Communication strategies:  none    Subjective     Patient awake and alert during session  Communicated with nurse prior to session     Pain/Comfort:  · Pain Rating 1: 0/10  · Pain Rating Post-Intervention 1: 0/10    Objective:     Has the patient been evaluated by SLP for swallowing?   Yes  Keep patient NPO? No   Current Respiratory Status: room air      Patient seen for ongoing swallow assessment. He was sitting up in chair during session. Patient and RN reported good tolerance of current diet and meds. Patient demonstrated an adequate knowledge of aspiration precautions and demonstrated them independently during intake. He tolerated thin liquids x5 (via straw) and solids x4 (dahiana crackers) with no overt signs of aspiration. His swallow was timely with good laryngeal elevation on palpation. SLP educated patient regarding current recs and he verbalized understanding. Patient left in chair with call light within reach.    Assessment:     Edwin Powell is a 75 y.o. male who presents with a safe oropharyngeal swallow at this time.     Goals:   Multidisciplinary Problems     SLP Goals     Not on file          Multidisciplinary Problems (Resolved)        Problem: SLP Goal    Goal Priority Disciplines Outcome   SLP Goal   (Resolved)     SLP Met   Description: Speech Language Pathology Goals  Goals expected to be met by 8/27/20  1. Pt will tolerate a mechanical soft consistency diet and thin liquids with no overt signs of airway  compromise.  2. Pt will participate in ongoing swallowing assessment to ensure least restrictive diet needs are maintained.                                       Plan:     · Patient to be seen:  4 x/week   · Plan of Care expires:  09/20/20  · Plan of Care reviewed with:  patient   · SLP Follow-Up:  No       Discharge recommendations:  (no further ST recommended)   Barriers to Discharge:  None    Time Tracking:     SLP Treatment Date:   08/25/20  Speech Start Time:  0920  Speech Stop Time:  0932     Speech Total Time (min):  12 min    Billable Minutes: Treatment Swallowing Dysfunction 12    Seng Solano CCC-SLP  Speech-Language Pathology  Pager: [phone]   08/25/2020

## 2020-08-25 NOTE — PROGRESS NOTES
[facility]  Orthopedics  Progress Note    Patient Name: Edwin Powell  MRN: [id number]  Admission Date: 8/17/2020  Hospital Length of Stay: 8 days  Attending Provider: Milo Gonzalez MD  Primary Care Provider: Lulú Lewis MD  Follow-up For: Procedure(s) (LRB):  DISCECTOMY, SPINE, CERVICAL, ANTERIOR APPROACH, WITH FUSION co case with Dr. Falcon C6/7 Depuy SNS:vocal cord/motors/SSEP Regular OR table Patient needs scope in preop (N/A)  DISSECTION, NECK (N/A)    Post-Operative Day: 8 Days Post-Op  Subjective:     Principal Problem:Acute on chronic respiratory failure with hypoxia and hypercapnia    Principal Orthopedic Problem: s/p ACDF    Interval History:   Pt seen and examined at the bedside this am. Doing well,oxygen weaned this am, 99% on RA. Ambulating around the halls. Reports improvement in RUE paresthesias from preop.     3Review of patient's allergies indicates:  No Known Allergies    Current Facility-Administered Medications   Medication    acetaminophen tablet 650 mg    albuterol-ipratropium 2.5 mg-0.5 mg/3 mL nebulizer solution 3 mL    amLODIPine tablet 10 mg    aspirin chewable tablet 81 mg    bisacodyL suppository 10 mg    carvediloL tablet 6.25 mg    clopidogreL tablet 75 mg    dextromethorphan-guaifenesin  mg per 12 hr tablet 1 tablet    dextrose 50% injection 12.5 g    dextrose 50% injection 25 g    docusate sodium capsule 50 mg    furosemide injection 100 mg    glucagon (human recombinant) injection 1 mg    glucose chewable tablet 16 g    glucose chewable tablet 24 g    hydrALAZINE tablet 25 mg    insulin aspart U-100 pen 0-5 Units    levothyroxine tablet 88 mcg    naloxone 0.4 mg/mL injection 0.2 mg    ondansetron injection 4 mg    PAZOPanib 200 mg tab    polyethylene glycol packet 17 g    rosuvastatin tablet 40 mg    sodium bicarbonate tablet 1,300 mg    sodium chloride 0.9% flush 10 mL    sodium chloride 0.9% flush 5 mL     "tamsulosin 24 hr capsule 0.8 mg     Facility-Administered Medications Ordered in Other Encounters   Medication    phenylephrine HCL 2.5% ophthalmic solution 1 drop    proparacaine 0.5 % ophthalmic solution 1 drop    tropicamide 1% ophthalmic solution 1 drop     Objective:     Vital Signs (Most Recent):  Temp: 97.9 °F (36.6 °C) (08/25/20 0804)  Pulse: 66 (08/25/20 0814)  Resp: 20 (08/25/20 0804)  BP: (!) 156/71 (08/25/20 0804)  SpO2: 99 % (08/25/20 0814) Vital Signs (24h Range):  Temp:  [97.4 °F (36.3 °C)-98 °F (36.7 °C)] 97.9 °F (36.6 °C)  Pulse:  [56-67] 66  Resp:  [16-20] 20  SpO2:  [93 %-100 %] 99 %  BP: (107-172)/(51-76) 156/71     Weight: 79.4 kg (175 lb)  Height: 5' 7" (170.2 cm)  Body mass index is 27.41 kg/m².      Intake/Output Summary (Last 24 hours) at 8/25/2020 0828  Last data filed at 8/25/2020 0400  Gross per 24 hour   Intake 200 ml   Output 950 ml   Net -750 ml       Ortho/SPM Exam    2L NC  Anterior cervical dressing in place, c/d/i  No palpable swelling or fluid collection  Neuro exam stable      Significant Labs:   CBC:   Recent Labs   Lab 08/24/20  0358 08/25/20  0341   WBC 6.94 6.77   HGB 10.4* 10.8*   HCT 34.4* 35.2*    352*     CMP:   Recent Labs   Lab 08/24/20  0358 08/25/20  0341    141   K 3.9 4.3   CL 98 98   CO2 30* 31*   * 118*   BUN 84* 81*   CREATININE 2.5* 2.5*   CALCIUM 8.8 8.3*   PROT 6.0 6.1   ALBUMIN 2.2* 2.2*   BILITOT 0.2 0.2   ALKPHOS 99 98   AST 40 46*   ALT 17 22   ANIONGAP 15 12   EGFRNONAA 24.2* 24.2*     All pertinent labs within the past 24 hours have been reviewed.    Significant Imaging: I have reviewed and interpreted all pertinent imaging results/findings.    Assessment/Plan:     Cervical stenosis of spine  Edwin Powell is a 75 y.o. male s/p C5/6 ACDF (8/17/20) for cervical stenosis, postop course complicated by apnea; Transferred to MICU with NSTEMI and increasing oxygen requirements. Stepped down to MTSU.     Pain control: multimodal, d/c " sedating medications  PT/OT: progressive mobility, OOBTC  DVT PPx: heparin gtt, ASA, and Plavix; SCDs at all times when not ambulating  Abx: postop Ancef complete  Labs: stable  Drain: None     Dispo-Per primary, will cont to follow.                Norma Arias MD  Orthopedics  [facility]

## 2020-08-25 NOTE — PLAN OF CARE
Problem: Diabetes Comorbidity  Goal: Blood Glucose Level Within Desired Range  Outcome: Ongoing, Progressing  Intervention: Maintain Glycemic Control  Flowsheets (Taken 8/25/2020 1758)  Glycemic Management:   blood glucose monitoring   oral hydration promoted     Problem: Adult Inpatient Plan of Care  Goal: Plan of Care Review  Outcome: Ongoing, Progressing  Flowsheets (Taken 8/25/2020 1758)  Plan of Care Reviewed With:   patient   son  Goal: Patient-Specific Goal (Individualization)  Outcome: Ongoing, Progressing  Flowsheets (Taken 8/25/2020 1758)  Patient-Specific Goals (Include Timeframe): discharge home vs inpt rehab for physical rehabilitation  Goal: Absence of Hospital-Acquired Illness or Injury  Outcome: Ongoing, Progressing  Intervention: Prevent VTE (venous thromboembolism)  Flowsheets (Taken 8/25/2020 1758)  VTE Prevention/Management: remove, assess skin and reapply sequential compression device  Goal: Optimal Comfort and Wellbeing  Outcome: Ongoing, Progressing  Intervention: Provide Person-Centered Care  Flowsheets (Taken 8/25/2020 1758)  Trust Relationship/Rapport:   care explained   choices provided   emotional support provided   empathic listening provided   questions answered   questions encouraged   reassurance provided   thoughts/feelings acknowledged   Pt up and out of bed walking in hallway with PT/OT, up in chair today most of day.  AAOX4.  NAD, VSS.

## 2020-08-26 LAB
ALBUMIN SERPL BCP-MCNC: 2.1 G/DL (ref 3.5–5.2)
ALP SERPL-CCNC: 91 U/L (ref 55–135)
ALT SERPL W/O P-5'-P-CCNC: 34 U/L (ref 10–44)
ANION GAP SERPL CALC-SCNC: 13 MMOL/L (ref 8–16)
AST SERPL-CCNC: 58 U/L (ref 10–40)
BASOPHILS # BLD AUTO: 0.04 K/UL (ref 0–0.2)
BASOPHILS NFR BLD: 0.5 % (ref 0–1.9)
BILIRUB SERPL-MCNC: 0.2 MG/DL (ref 0.1–1)
BNP SERPL-MCNC: 67 PG/ML (ref 0–99)
BUN SERPL-MCNC: 87 MG/DL (ref 8–23)
CALCIUM SERPL-MCNC: 8 MG/DL (ref 8.7–10.5)
CHLORIDE SERPL-SCNC: 95 MMOL/L (ref 95–110)
CO2 SERPL-SCNC: 30 MMOL/L (ref 23–29)
CREAT SERPL-MCNC: 2.8 MG/DL (ref 0.5–1.4)
DIFFERENTIAL METHOD: ABNORMAL
EOSINOPHIL # BLD AUTO: 0.3 K/UL (ref 0–0.5)
EOSINOPHIL NFR BLD: 4.2 % (ref 0–8)
ERYTHROCYTE [DISTWIDTH] IN BLOOD BY AUTOMATED COUNT: 13.9 % (ref 11.5–14.5)
EST. GFR  (AFRICAN AMERICAN): 24.4 ML/MIN/1.73 M^2
EST. GFR  (NON AFRICAN AMERICAN): 21.1 ML/MIN/1.73 M^2
GLUCOSE SERPL-MCNC: 115 MG/DL (ref 70–110)
HCT VFR BLD AUTO: 34.6 % (ref 40–54)
HGB BLD-MCNC: 10.5 G/DL (ref 14–18)
IMM GRANULOCYTES # BLD AUTO: 0.1 K/UL (ref 0–0.04)
IMM GRANULOCYTES NFR BLD AUTO: 1.3 % (ref 0–0.5)
LACTATE SERPL-SCNC: 1.3 MMOL/L (ref 0.5–2.2)
LYMPHOCYTES # BLD AUTO: 2 K/UL (ref 1–4.8)
LYMPHOCYTES NFR BLD: 25.9 % (ref 18–48)
MAGNESIUM SERPL-MCNC: 1.8 MG/DL (ref 1.6–2.6)
MCH RBC QN AUTO: 32.1 PG (ref 27–31)
MCHC RBC AUTO-ENTMCNC: 30.3 G/DL (ref 32–36)
MCV RBC AUTO: 106 FL (ref 82–98)
MONOCYTES # BLD AUTO: 1.1 K/UL (ref 0.3–1)
MONOCYTES NFR BLD: 14 % (ref 4–15)
NEUTROPHILS # BLD AUTO: 4.1 K/UL (ref 1.8–7.7)
NEUTROPHILS NFR BLD: 54.1 % (ref 38–73)
NRBC BLD-RTO: 0 /100 WBC
PHOSPHATE SERPL-MCNC: 4.9 MG/DL (ref 2.7–4.5)
PLATELET # BLD AUTO: 433 K/UL (ref 150–350)
PMV BLD AUTO: 10.7 FL (ref 9.2–12.9)
POCT GLUCOSE: 128 MG/DL (ref 70–110)
POCT GLUCOSE: 135 MG/DL (ref 70–110)
POCT GLUCOSE: 167 MG/DL (ref 70–110)
POCT GLUCOSE: 253 MG/DL (ref 70–110)
POTASSIUM SERPL-SCNC: 3.8 MMOL/L (ref 3.5–5.1)
PROT SERPL-MCNC: 5.8 G/DL (ref 6–8.4)
RBC # BLD AUTO: 3.27 M/UL (ref 4.6–6.2)
SODIUM SERPL-SCNC: 138 MMOL/L (ref 136–145)
WBC # BLD AUTO: 7.64 K/UL (ref 3.9–12.7)

## 2020-08-26 PROCEDURE — 36415 COLL VENOUS BLD VENIPUNCTURE: CPT | Mod: HCNC

## 2020-08-26 PROCEDURE — 85025 COMPLETE CBC W/AUTO DIFF WBC: CPT | Mod: HCNC

## 2020-08-26 PROCEDURE — 63600175 PHARM REV CODE 636 W HCPCS: Mod: HCNC | Performed by: NURSE PRACTITIONER

## 2020-08-26 PROCEDURE — 25000003 PHARM REV CODE 250: Mod: HCNC | Performed by: STUDENT IN AN ORGANIZED HEALTH CARE EDUCATION/TRAINING PROGRAM

## 2020-08-26 PROCEDURE — 93010 ELECTROCARDIOGRAM REPORT: CPT | Mod: HCNC,,, | Performed by: INTERNAL MEDICINE

## 2020-08-26 PROCEDURE — 83735 ASSAY OF MAGNESIUM: CPT | Mod: HCNC

## 2020-08-26 PROCEDURE — 97116 GAIT TRAINING THERAPY: CPT | Mod: HCNC,CQ

## 2020-08-26 PROCEDURE — 84100 ASSAY OF PHOSPHORUS: CPT | Mod: HCNC

## 2020-08-26 PROCEDURE — 83605 ASSAY OF LACTIC ACID: CPT | Mod: HCNC

## 2020-08-26 PROCEDURE — 97110 THERAPEUTIC EXERCISES: CPT | Mod: HCNC,CQ

## 2020-08-26 PROCEDURE — 25000003 PHARM REV CODE 250: Mod: HCNC | Performed by: NURSE PRACTITIONER

## 2020-08-26 PROCEDURE — 80053 COMPREHEN METABOLIC PANEL: CPT | Mod: HCNC

## 2020-08-26 PROCEDURE — 93005 ELECTROCARDIOGRAM TRACING: CPT | Mod: HCNC

## 2020-08-26 PROCEDURE — 27000221 HC OXYGEN, UP TO 24 HOURS: Mod: HCNC

## 2020-08-26 PROCEDURE — 94761 N-INVAS EAR/PLS OXIMETRY MLT: CPT | Mod: HCNC

## 2020-08-26 PROCEDURE — 93010 EKG 12-LEAD: ICD-10-PCS | Mod: HCNC,,, | Performed by: INTERNAL MEDICINE

## 2020-08-26 PROCEDURE — 20600001 HC STEP DOWN PRIVATE ROOM: Mod: HCNC

## 2020-08-26 PROCEDURE — 83880 ASSAY OF NATRIURETIC PEPTIDE: CPT | Mod: HCNC

## 2020-08-26 PROCEDURE — 27000646 HC AEROBIKA DEVICE: Mod: HCNC

## 2020-08-26 PROCEDURE — 99233 SBSQ HOSP IP/OBS HIGH 50: CPT | Mod: GT,HCNC,, | Performed by: INTERNAL MEDICINE

## 2020-08-26 PROCEDURE — 99900035 HC TECH TIME PER 15 MIN (STAT): Mod: HCNC

## 2020-08-26 PROCEDURE — 99223 1ST HOSP IP/OBS HIGH 75: CPT | Mod: HCNC,,, | Performed by: INTERNAL MEDICINE

## 2020-08-26 PROCEDURE — 99223 PR INITIAL HOSPITAL CARE,LEVL III: ICD-10-PCS | Mod: HCNC,,, | Performed by: INTERNAL MEDICINE

## 2020-08-26 PROCEDURE — 97530 THERAPEUTIC ACTIVITIES: CPT | Mod: HCNC

## 2020-08-26 PROCEDURE — 99233 PR SUBSEQUENT HOSPITAL CARE,LEVL III: ICD-10-PCS | Mod: GT,HCNC,, | Performed by: INTERNAL MEDICINE

## 2020-08-26 PROCEDURE — 94664 DEMO&/EVAL PT USE INHALER: CPT | Mod: HCNC

## 2020-08-26 RX ADMIN — AMLODIPINE BESYLATE 10 MG: 10 TABLET ORAL at 08:08

## 2020-08-26 RX ADMIN — CARVEDILOL 6.25 MG: 6.25 TABLET, FILM COATED ORAL at 08:08

## 2020-08-26 RX ADMIN — OXYCODONE 5 MG: 5 TABLET ORAL at 11:08

## 2020-08-26 RX ADMIN — INSULIN ASPART 3 UNITS: 100 INJECTION, SOLUTION INTRAVENOUS; SUBCUTANEOUS at 04:08

## 2020-08-26 RX ADMIN — ACETAMINOPHEN 650 MG: 325 TABLET ORAL at 08:08

## 2020-08-26 RX ADMIN — DOCUSATE SODIUM 50 MG: 50 CAPSULE, LIQUID FILLED ORAL at 08:08

## 2020-08-26 RX ADMIN — GUAIFENESIN AND DEXTROMETHORPHAN HYDROBROMIDE 1 TABLET: 600; 30 TABLET, EXTENDED RELEASE ORAL at 08:08

## 2020-08-26 RX ADMIN — TAMSULOSIN HYDROCHLORIDE 0.8 MG: 0.4 CAPSULE ORAL at 08:08

## 2020-08-26 RX ADMIN — SODIUM BICARBONATE 650 MG TABLET 1300 MG: at 08:08

## 2020-08-26 RX ADMIN — ROSUVASTATIN CALCIUM 40 MG: 20 TABLET, FILM COATED ORAL at 08:08

## 2020-08-26 RX ADMIN — OXYCODONE 5 MG: 5 TABLET ORAL at 05:08

## 2020-08-26 RX ADMIN — LEVOTHYROXINE SODIUM 88 MCG: 0.09 TABLET ORAL at 08:08

## 2020-08-26 RX ADMIN — ASPIRIN 81 MG CHEWABLE TABLET 81 MG: 81 TABLET CHEWABLE at 08:08

## 2020-08-26 RX ADMIN — CLOPIDOGREL 75 MG: 75 TABLET, FILM COATED ORAL at 08:08

## 2020-08-26 NOTE — ASSESSMENT & PLAN NOTE
Edwin HAYLEY Sherry is a 75 y.o. male s/p C5/6 ACDF (8/17/20) for cervical stenosis, postop course complicated by apnea; Transferred to MICU with NSTEMI and increasing oxygen requirements. Stepped down to MTSU with significant improvement, now 100% on RA    Pain control: multimodal  PT/OT: progressive mobility, OOBTC  DVT PPx: heparin gtt, ASA, and Plavix; SCDs at all times when not ambulating  Abx: postop Ancef complete  Labs: stable  Drain: None     Dispo-Per primary likely d/c home with HH; stable for discharge from ortho standpoint

## 2020-08-26 NOTE — ASSESSMENT & PLAN NOTE
Edwin HAYLEY Sherry is a 75 y.o. male s/p C5/6 ACDF (8/17/20) for cervical stenosis, postop course complicated by apnea; Transferred to MICU with NSTEMI and increasing oxygen requirements. Stepped down to MTSU.     Pain control: multimodal, d/c sedating medications  PT/OT: progressive mobility, OOBTC  DVT PPx: heparin gtt, ASA, and Plavix; SCDs at all times when not ambulating  Abx: postop Ancef complete  Labs: stable  Drain: None     Dispo-Per primary likely d/c home with HH; stable for discharge from ortho standpoint

## 2020-08-26 NOTE — PLAN OF CARE
Problem: Adult Inpatient Plan of Care  Goal: Plan of Care Review  Outcome: Ongoing, Progressing   POC reviewed with pt. VSS. Up in chair most of the day. Prn pain medications administered for L shoulder pain. No acute changes. Safety maintained. Will continue to monitor.

## 2020-08-26 NOTE — ASSESSMENT & PLAN NOTE
74 yo M admitted with acute hypoxemic respiratory failure + VERENA on CKD. Baseline sCr 2, sCr 3.5 on admission. Patient has CKD IIIb with nephrotic range proteinuria. He sees Dr. Leonardo, last visit was earlier this month. Per her notes, CKD etiology suspected to be multifactorial (periodic NSAID use, multiple CTs w/ contast, HTN, DM). At that time, sCr 2 and eGFR 30. Has had significant proteinuria in past (Pr:Cr 7.5g earlier this year, down to 4 earlier this month).    Cr improved with diuresis, bumped when diuresis was held  No indications for HD at this time    Plan:  - Recommend PO lasix 40mg BID  - Trend Cr  - Strict I&Os  - Avoid nephrotoxic agents  - Renally adjust medications

## 2020-08-26 NOTE — PROGRESS NOTES
[facility]  Orthopedics  Progress Note    Patient Name: Edwin Powell  MRN: [id number]  Admission Date: 8/17/2020  Hospital Length of Stay: 9 days  Attending Provider: Carolynn Chan MD  Primary Care Provider: Lulú Lewis MD  Follow-up For: Procedure(s) (LRB):  DISCECTOMY, SPINE, CERVICAL, ANTERIOR APPROACH, WITH FUSION co case with Dr. Falcon C6/7 Depuy SNS:vocal cord/motors/SSEP Regular OR table Patient needs scope in preop (N/A)  DISSECTION, NECK (N/A)    Post-Operative Day: 9 Days Post-Op  Subjective:     No new subjective & objective note has been filed under this hospital service since the last note was generated.    Assessment/Plan:     Cervical stenosis of spine  Edwin Powell is a 75 y.o. male s/p C5/6 ACDF (8/17/20) for cervical stenosis, postop course complicated by apnea; Transferred to MICU with NSTEMI and increasing oxygen requirements. Stepped down to MTSU with significant improvement, now 100% on RA    Pain control: multimodal  PT/OT: progressive mobility, OOBTC  DVT PPx: heparin gtt, ASA, and Plavix; SCDs at all times when not ambulating  Abx: postop Ancef complete  Labs: stable  Drain: None     Dispo-Per primary likely d/c home with HH; stable for discharge from ortho standpoint               Norma Arias MD  Orthopedics  [facility]

## 2020-08-26 NOTE — NURSING
PT AAO x 4. Received on room air. Pt desaturated to 89 % when sleeping on side. Placed on 2 LPM via NC while sleeping.   Pt refusing chemo med this AM d/t complaints of N/V daily when he takes it.  BG WDL.Callbell placed within reach and use encouraged. For possible DC with HH today.

## 2020-08-26 NOTE — HPI
Mr. Powell is a 76 yo M with RCC with mets to liver/bone (on pazopanib), CKD IIIb with proteinuria 2/2 BPH, HTN, hypothyroidism, h/o CVA, T2DM, GERD, and T2DM who is currently admitted for acute hypoxemic respiratory failure. Nephrology consulted for VERENA on CKD.     He initially presented to [facility] [place] for scheduled C-spine fusion 2/2 severe C6/7 stenosis. S/p ACDF of C6/7 on 8/17. He required renae infusion during procedure. Patient developed acute hypoxemic respiratory failure. ABG showed mixed resp/metabolic acidosis. Etiology unclear, suspected PE but was unable to get CTA 2/2 poor renal fxn, negative US BLE, started on heparin gtt. TTE negative for RV strain. Since troponins elevated, loaded with DAPT. He was transferred to ICU and placed on HFNC (up to 30L). Also started on bicarb gtt + narcan. Repeat TTE shows newly depressed EF (EF 30% grade I dd). He was started on IV lasix 120mg with good UOP. Patient was noted to have VERENA on admission with Cr 3.5 (increased from baseline ~2). His Cr improved with diuresis.     In terms of renal function, patient has CKD IIIb with nephrotic range proteinuria. He sees Dr. Leonardo, last visit was earlier this month. Per her notes, CKD etiology suspected to be multifactorial (periodic NSAID use, multiple CTs w/ contast, HTN, DM). At that time, sCr 2 and eGFR 30. Has had significant proteinuria in past (Pr:Cr 7.5g earlier this year, down to 4 earlier this month).

## 2020-08-26 NOTE — CONSULTS
[facility]  Nephrology  Consult Note    Patient Name: Edwin Powell  MRN: [id number]  Admission Date: 8/17/2020  Hospital Length of Stay: 9 days  Attending Provider: Carolynn Chan MD   Primary Care Physician: Lulú Lewis MD  Principal Problem:Acute on chronic respiratory failure with hypoxia and hypercapnia    Inpatient consult to Nephrology  Consult performed by: Evan Moore MD  Consult ordered by: Carolynn Chan MD        Subjective:     HPI: Mr. Powell is a 74 yo M with RCC with mets to liver/bone (on pazopanib), CKD IIIb with proteinuria 2/2 BPH, HTN, hypothyroidism, h/o CVA, T2DM, GERD, and T2DM who is currently admitted for acute hypoxemic respiratory failure. Nephrology consulted for VERENA on CKD.     [place] [facility] [place] C-spine fusion 2/2 severe C6/7 stenosis. S/p ACDF of C6/7 on 8/17. He required renae infusion during procedure. Patient developed acute hypoxemic respiratory failure. ABG showed mixed resp/metabolic acidosis. Etiology unclear, suspected PE but was unable to get CTA 2/2 poor renal fxn, negative US BLE, started on heparin gtt. TTE negative for RV strain. Since troponins elevated, loaded with DAPT. He was transferred to ICU and placed on HFNC (up to 30L). Also started on bicarb gtt + narcan. Repeat TTE shows newly depressed EF (EF 30% grade I dd). He was started on IV lasix 120mg with good UOP. Patient was noted to have VERENA on admission with Cr 3.5 (increased from baseline ~2). His Cr improved with diuresis.     In terms of renal function, patient has CKD IIIb with nephrotic range proteinuria. He sees Dr. Leonardo, last visit was earlier this month. Per her notes, CKD etiology suspected to be multifactorial (periodic NSAID use, multiple CTs w/ contast, HTN, DM). At that time, sCr 2 and eGFR 30. Has had significant proteinuria in past (Pr:Cr 7.5g earlier this year, down to 4 earlier this month).    Past Medical History:   Diagnosis  Date    Acquired hypothyroidism 5/26/2017    Benign essential HTN 5/26/2017    Benign prostatic hyperplasia (BPH) with urinary urge incontinence 6/6/2017    Chronic low back pain 6/1/2017    Coronary artery disease involving native coronary artery of native heart without angina pectoris 5/26/2017    S/p 5 stents - last one around 2010-11.    Gastroesophageal reflux disease without esophagitis 5/26/2017    History of CVA (cerebrovascular accident) 5/26/2017    Hypertension associated with diabetes 5/26/2017    BP controlled on ACE, CCB    Lumbar disc lesion 5/26/2017    Metastatic renal cell carcinoma to bone 6/6/2017    Metastatic renal cell carcinoma to liver 6/6/2017    Liver Biopsy 5-2017: METASTATIC RENAL CELL CARCINOMA.    Mixed hyperlipidemia 5/26/2017    Type 2 diabetes mellitus with stage 3 chronic kidney disease, with long-term current use of insulin 5/26/2017       Past Surgical History:   Procedure Laterality Date    ABDOMINAL SURGERY      ANTERIOR CERVICAL DISCECTOMY W/ FUSION N/A 8/17/2020    Procedure: DISCECTOMY, SPINE, CERVICAL, ANTERIOR APPROACH, WITH FUSION co case with Dr. Falcon C6/7 [facility] SNS:vocal cord/motors/SSEP Regular OR table Patient needs scope in preop;  Surgeon: Filemon Aguayo MD;  Location: [facility] OR [address] [place] [address];  Service: Neurosurgery;  Laterality: N/A;    APPENDECTOMY      BACK SURGERY      CARDIAC SURGERY      CATARACT EXTRACTION      CHOLECYSTECTOMY      CORONARY STENT PLACEMENT      5 stents    coronary stenting      X 5 last one in 2010-11.    DISSECTION OF NECK N/A 8/17/2020    Procedure: DISSECTION, NECK;  Surgeon: Rush Falcon MD;  Location: [facility] OR 2ND FLR;  Service: ENT;  Laterality: N/A;    INTRAOCULAR PROSTHESES INSERTION Right 6/27/2019    Procedure: INSERTION, IOL PROSTHESIS;  Surgeon: Chary Culp MD;  Location: [facility] OR 1ST FLR;  Service: Ophthalmology;  Laterality: Right;    INTRAOCULAR PROSTHESES INSERTION Left 8/22/2019     Procedure: INSERTION, IOL PROSTHESIS;  Surgeon: Chary Culp MD;  Location: [facility] OR [address] [place] [address];  Service: Ophthalmology;  Laterality: Left;    PHACOEMULSIFICATION OF CATARACT Right 6/27/2019    Procedure: PHACOEMULSIFICATION, CATARACT;  Surgeon: Chary Culp MD;  Location: [facility] OR [address] [place] [address];  Service: Ophthalmology;  Laterality: Right;    PHACOEMULSIFICATION OF CATARACT Left 8/22/2019    Procedure: PHACOEMULSIFICATION, CATARACT;  Surgeon: Chary Culp MD;  Location: [facility] OR [address] [place] [address];  Service: Ophthalmology;  Laterality: Left;    SPINAL FUSION         Review of patient's allergies indicates:  No Known Allergies  Current Facility-Administered Medications   Medication Frequency    acetaminophen tablet 650 mg Q6H    albuterol-ipratropium 2.5 mg-0.5 mg/3 mL nebulizer solution 3 mL Q4H PRN    amLODIPine tablet 10 mg Daily    aspirin chewable tablet 81 mg Daily    bisacodyL suppository 10 mg Daily PRN    carvediloL tablet 6.25 mg BID    clopidogreL tablet 75 mg Daily    dextromethorphan-guaifenesin  mg per 12 hr tablet 1 tablet BID    dextrose 50% injection 12.5 g PRN    dextrose 50% injection 25 g PRN    docusate sodium capsule 50 mg BID    glucagon (human recombinant) injection 1 mg PRN    glucose chewable tablet 16 g PRN    glucose chewable tablet 24 g PRN    hydrALAZINE tablet 25 mg Q8H PRN    insulin aspart U-100 pen 0-5 Units QID (AC + HS) PRN    levothyroxine tablet 88 mcg Daily    naloxone 0.4 mg/mL injection 0.2 mg PRN    ondansetron injection 4 mg Q12H PRN    oxyCODONE immediate release tablet 5 mg Q6H PRN    PAZOPanib 200 mg tab Before breakfast    polyethylene glycol packet 17 g BID PRN    rosuvastatin tablet 40 mg QHS    sodium bicarbonate tablet 1,300 mg BID    sodium chloride 0.9% flush 10 mL PRN    sodium chloride 0.9% flush 5 mL PRN    tamsulosin 24 hr capsule 0.8 mg Daily     Facility-Administered Medications Ordered in Other Encounters   Medication Frequency     phenylephrine HCL 2.5% ophthalmic solution 1 drop On Call Procedure    proparacaine 0.5 % ophthalmic solution 1 drop On Call Procedure    tropicamide 1% ophthalmic solution 1 drop On Call Procedure     Family History     Problem Relation (Age of Onset)    Diabetes Mother, Brother    Glaucoma Mother, Maternal Grandmother    Heart attack Mother    No Known Problems Son        Tobacco Use    Smoking status: Former Smoker     Types: Cigarettes    Smokeless tobacco: Never Used    Tobacco comment: haven't smoked in last 25 yrs   Substance and Sexual Activity    Alcohol use: Yes     Comment: rarely     Drug use: Not Currently    Sexual activity: Not Currently     Partners: Female     Review of Systems   Constitutional: Positive for fatigue. Negative for chills and fever.   HENT: Negative for congestion, sore throat and trouble swallowing.    Eyes: Negative for visual disturbance.   Respiratory: Positive for shortness of breath. Negative for cough and wheezing.    Cardiovascular: Negative for chest pain, palpitations and leg swelling.   Gastrointestinal: Negative for abdominal pain, blood in stool, constipation, diarrhea, nausea and vomiting.   Genitourinary: Negative for dysuria and hematuria.   Musculoskeletal: Positive for neck pain. Negative for arthralgias and myalgias.   Skin: Negative for rash.   Neurological: Negative for dizziness, light-headedness, numbness and headaches.   Psychiatric/Behavioral: Negative for agitation and confusion.     Objective:     Vital Signs (Most Recent):  Temp: 97.7 °F (36.5 °C) (08/26/20 1127)  Pulse: (!) 54 (08/26/20 1127)  Resp: 16 (08/26/20 1129)  BP: 120/74 (08/26/20 1127)  SpO2: 96 % (08/26/20 1127)  O2 Device (Oxygen Therapy): room air (08/26/20 1127) Vital Signs (24h Range):  Temp:  [97.7 °F (36.5 °C)-98.4 °F (36.9 °C)] 97.7 °F (36.5 °C)  Pulse:  [47-69] 54  Resp:  [16-20] 16  SpO2:  [94 %-100 %] 96 %  BP: (120-174)/(57-74) 120/74     Weight: 79.4 kg (175 lb) (08/20/20  1033)  Body mass index is 27.41 kg/m².  Body surface area is 1.94 meters squared.    I/O last 3 completed shifts:  In: 440 [P.O.:440]  Out: 850 [Urine:850]    Physical Exam  Constitutional:       General: He is not in acute distress.     Appearance: He is well-developed.   HENT:      Head: Normocephalic and atraumatic.      Mouth/Throat:      Pharynx: No oropharyngeal exudate.   Eyes:      Conjunctiva/sclera: Conjunctivae normal.      Pupils: Pupils are equal, round, and reactive to light.   Neck:      Musculoskeletal: Normal range of motion and neck supple.   Cardiovascular:      Rate and Rhythm: Normal rate and regular rhythm.      Heart sounds: Normal heart sounds. No murmur.   Pulmonary:      Effort: Pulmonary effort is normal. No respiratory distress.      Breath sounds: Normal breath sounds. No wheezing or rales.   Abdominal:      General: Bowel sounds are normal. There is no distension.      Palpations: Abdomen is soft.      Tenderness: There is no abdominal tenderness. There is no guarding.   Musculoskeletal:         General: No tenderness.   Skin:     General: Skin is warm and dry.      Findings: No rash.   Neurological:      Mental Status: He is alert and oriented to person, place, and time.      Cranial Nerves: No cranial nerve deficit.      Motor: No abnormal muscle tone.   Psychiatric:         Behavior: Behavior normal.         Significant Labs:  CBC:   Recent Labs   Lab 08/26/20  0351   WBC 7.64   RBC 3.27*   HGB 10.5*   HCT 34.6*   *   *   MCH 32.1*   MCHC 30.3*     CMP:   Recent Labs   Lab 08/26/20  0351   *   CALCIUM 8.0*   ALBUMIN 2.1*   PROT 5.8*      K 3.8   CO2 30*   CL 95   BUN 87*   CREATININE 2.8*   ALKPHOS 91   ALT 34   AST 58*   BILITOT 0.2     No results for input(s): COLORU, CLARITYU, SPECGRAV, PHUR, PROTEINUA, GLUCOSEU, BILIRUBINCON, BLOODU, WBCU, RBCU, BACTERIA, MUCUS, NITRITE, LEUKOCYTESUR, UROBILINOGEN, HYALINECASTS in the last 168 hours.  All labs within  the past 24 hours have been reviewed.    Significant Imaging:  Reviewed.    Assessment/Plan:     * Acute on chronic respiratory failure with hypoxia and hypercapnia  Management per primary team    Acute kidney injury superimposed on chronic kidney disease  76 yo M admitted with acute hypoxemic respiratory failure + VERENA on CKD. Baseline sCr 2, sCr 3.5 on admission. Patient has CKD IIIb with nephrotic range proteinuria. He sees Dr. Leonardo, last visit was earlier this month. Per her notes, CKD etiology suspected to be multifactorial (periodic NSAID use, multiple CTs w/ contast, HTN, DM). At that time, sCr 2 and eGFR 30. Has had significant proteinuria in past (Pr:Cr 7.5g earlier this year, down to 4 earlier this month).    Cr improved with diuresis, bumped when diuresis was held  No indications for HD at this time    Plan:  - Recommend PO lasix 40mg BID  - Trend Cr  - Strict I&Os  - Avoid nephrotoxic agents  - Renally adjust medications        Thank you for your consult. I will follow-up with patient. Please contact us if you have any additional questions.    Evan Moore MD  Nephrology  [facility]    ATTENDING PHYSICIAN ATTESTATION  I have personally verified the history and examined the patient. I thoroughly reviewed the demographic, clinical, laboratorial and imaging information available in medical records. I agree with the assessment and recommendations provided by the subspecialty resident who was under my supervision.

## 2020-08-26 NOTE — SUBJECTIVE & OBJECTIVE
Principal Problem:Acute on chronic respiratory failure with hypoxia and hypercapnia    Principal Orthopedic Problem: s/p ACDF    Interval History:   Pt seen and examined at the bedside this am. Doing well, pain controlled. Working well with PT/OT.     3Review of patient's allergies indicates:  No Known Allergies    Current Facility-Administered Medications   Medication    acetaminophen tablet 650 mg    albuterol-ipratropium 2.5 mg-0.5 mg/3 mL nebulizer solution 3 mL    amLODIPine tablet 10 mg    aspirin chewable tablet 81 mg    bisacodyL suppository 10 mg    carvediloL tablet 6.25 mg    clopidogreL tablet 75 mg    dextromethorphan-guaifenesin  mg per 12 hr tablet 1 tablet    dextrose 50% injection 12.5 g    dextrose 50% injection 25 g    docusate sodium capsule 50 mg    glucagon (human recombinant) injection 1 mg    glucose chewable tablet 16 g    glucose chewable tablet 24 g    hydrALAZINE tablet 25 mg    insulin aspart U-100 pen 0-5 Units    levothyroxine tablet 88 mcg    naloxone 0.4 mg/mL injection 0.2 mg    ondansetron injection 4 mg    oxyCODONE immediate release tablet 5 mg    PAZOPanib 200 mg tab    polyethylene glycol packet 17 g    rosuvastatin tablet 40 mg    sodium bicarbonate tablet 1,300 mg    sodium chloride 0.9% flush 10 mL    sodium chloride 0.9% flush 5 mL    tamsulosin 24 hr capsule 0.8 mg     Facility-Administered Medications Ordered in Other Encounters   Medication    phenylephrine HCL 2.5% ophthalmic solution 1 drop    proparacaine 0.5 % ophthalmic solution 1 drop    tropicamide 1% ophthalmic solution 1 drop     Objective:     Vital Signs (Most Recent):  Temp: 98.2 °F (36.8 °C) (08/26/20 0400)  Pulse: (!) 58 (08/26/20 0547)  Resp: 19 (08/26/20 0400)  BP: (!) 140/63 (08/26/20 0547)  SpO2: 100 % (08/26/20 0547) Vital Signs (24h Range):  Temp:  [97.9 °F (36.6 °C)-98.4 °F (36.9 °C)] 98.2 °F (36.8 °C)  Pulse:  [47-69] 58  Resp:  [18-20] 19  SpO2:  [94 %-100 %] 100  "%  BP: (121-174)/(57-71) 140/63     Weight: 79.4 kg (175 lb)  Height: 5' 7" (170.2 cm)  Body mass index is 27.41 kg/m².      Intake/Output Summary (Last 24 hours) at 8/26/2020 0707  Last data filed at 8/26/2020 0500  Gross per 24 hour   Intake 240 ml   Output 400 ml   Net -160 ml       Ortho/SPM Exam    2L NC  Anterior cervical dressing in place, c/d/i  No palpable swelling or fluid collection  Neuro exam stable      Significant Labs:   CBC:   Recent Labs   Lab 08/25/20  0341 08/26/20  0351   WBC 6.77 7.64   HGB 10.8* 10.5*   HCT 35.2* 34.6*   * 433*     CMP:   Recent Labs   Lab 08/25/20  0341      K 4.3   CL 98   CO2 31*   *   BUN 81*   CREATININE 2.5*   CALCIUM 8.3*   PROT 6.1   ALBUMIN 2.2*   BILITOT 0.2   ALKPHOS 98   AST 46*   ALT 22   ANIONGAP 12   EGFRNONAA 24.2*     All pertinent labs within the past 24 hours have been reviewed.    Significant Imaging: I have reviewed and interpreted all pertinent imaging results/findings.  "

## 2020-08-26 NOTE — SUBJECTIVE & OBJECTIVE
Past Medical History:   Diagnosis Date    Acquired hypothyroidism 5/26/2017    Benign essential HTN 5/26/2017    Benign prostatic hyperplasia (BPH) with urinary urge incontinence 6/6/2017    Chronic low back pain 6/1/2017    Coronary artery disease involving native coronary artery of native heart without angina pectoris 5/26/2017    S/p 5 stents - last one around 2010-11.    Gastroesophageal reflux disease without esophagitis 5/26/2017    History of CVA (cerebrovascular accident) 5/26/2017    Hypertension associated with diabetes 5/26/2017    BP controlled on ACE, CCB    Lumbar disc lesion 5/26/2017    Metastatic renal cell carcinoma to bone 6/6/2017    Metastatic renal cell carcinoma to liver 6/6/2017    Liver Biopsy 5-2017: METASTATIC RENAL CELL CARCINOMA.    Mixed hyperlipidemia 5/26/2017    Type 2 diabetes mellitus with stage 3 chronic kidney disease, with long-term current use of insulin 5/26/2017       Past Surgical History:   Procedure Laterality Date    ABDOMINAL SURGERY      ANTERIOR CERVICAL DISCECTOMY W/ FUSION N/A 8/17/2020    Procedure: DISCECTOMY, SPINE, CERVICAL, ANTERIOR APPROACH, WITH FUSION co case with Dr. Falcon C6/7 [facility] SNS:vocal cord/motors/SSEP Regular OR table Patient needs scope in preop;  Surgeon: Filemon Aguayo MD;  Location: [facility] OR [address] [place] [address];  Service: Neurosurgery;  Laterality: N/A;    APPENDECTOMY      BACK SURGERY      CARDIAC SURGERY      CATARACT EXTRACTION      CHOLECYSTECTOMY      CORONARY STENT PLACEMENT      5 stents    coronary stenting      X 5 last one in 2010-11.    DISSECTION OF NECK N/A 8/17/2020    Procedure: DISSECTION, NECK;  Surgeon: Rush Falcon MD;  [facility];  Service: ENT;  Laterality: N/A;    INTRAOCULAR PROSTHESES INSERTION Right 6/27/2019    Procedure: INSERTION, IOL PROSTHESIS;  Surgeon: Chary Culp MD;  [facility];  Service: Ophthalmology;  Laterality: Right;    INTRAOCULAR PROSTHESES  INSERTION Left 8/22/2019    Procedure: INSERTION, IOL PROSTHESIS;  Surgeon: Chary Culp MD;  Location: [facility] OR [address] [place] [address];  Service: Ophthalmology;  Laterality: Left;    PHACOEMULSIFICATION OF CATARACT Right 6/27/2019    Procedure: PHACOEMULSIFICATION, CATARACT;  Surgeon: Chary Culp MD;  Location: [facility] OR [address] [place] [address];  Service: Ophthalmology;  Laterality: Right;    PHACOEMULSIFICATION OF CATARACT Left 8/22/2019    Procedure: PHACOEMULSIFICATION, CATARACT;  Surgeon: Chary Culp MD;  Location: [facility] OR [address] [place] [address];  Service: Ophthalmology;  Laterality: Left;    SPINAL FUSION         Review of patient's allergies indicates:  No Known Allergies  Current Facility-Administered Medications   Medication Frequency    acetaminophen tablet 650 mg Q6H    albuterol-ipratropium 2.5 mg-0.5 mg/3 mL nebulizer solution 3 mL Q4H PRN    amLODIPine tablet 10 mg Daily    aspirin chewable tablet 81 mg Daily    bisacodyL suppository 10 mg Daily PRN    carvediloL tablet 6.25 mg BID    clopidogreL tablet 75 mg Daily    dextromethorphan-guaifenesin  mg per 12 hr tablet 1 tablet BID    dextrose 50% injection 12.5 g PRN    dextrose 50% injection 25 g PRN    docusate sodium capsule 50 mg BID    glucagon (human recombinant) injection 1 mg PRN    glucose chewable tablet 16 g PRN    glucose chewable tablet 24 g PRN    hydrALAZINE tablet 25 mg Q8H PRN    insulin aspart U-100 pen 0-5 Units QID (AC + HS) PRN    levothyroxine tablet 88 mcg Daily    naloxone 0.4 mg/mL injection 0.2 mg PRN    ondansetron injection 4 mg Q12H PRN    oxyCODONE immediate release tablet 5 mg Q6H PRN    PAZOPanib 200 mg tab Before breakfast    polyethylene glycol packet 17 g BID PRN    rosuvastatin tablet 40 mg QHS    sodium bicarbonate tablet 1,300 mg BID    sodium chloride 0.9% flush 10 mL PRN    sodium chloride 0.9% flush 5 mL PRN    tamsulosin 24 hr capsule 0.8 mg Daily     Facility-Administered Medications Ordered in Other  Encounters   Medication Frequency    phenylephrine HCL 2.5% ophthalmic solution 1 drop On Call Procedure    proparacaine 0.5 % ophthalmic solution 1 drop On Call Procedure    tropicamide 1% ophthalmic solution 1 drop On Call Procedure     Family History     Problem Relation (Age of Onset)    Diabetes Mother, Brother    Glaucoma Mother, Maternal Grandmother    Heart attack Mother    No Known Problems Son        Tobacco Use    Smoking status: Former Smoker     Types: Cigarettes    Smokeless tobacco: Never Used    Tobacco comment: haven't smoked in last 25 yrs   Substance and Sexual Activity    Alcohol use: Yes     Comment: rarely     Drug use: Not Currently    Sexual activity: Not Currently     Partners: Female     Review of Systems   Constitutional: Positive for fatigue. Negative for chills and fever.   HENT: Negative for congestion, sore throat and trouble swallowing.    Eyes: Negative for visual disturbance.   Respiratory: Positive for shortness of breath. Negative for cough and wheezing.    Cardiovascular: Negative for chest pain, palpitations and leg swelling.   Gastrointestinal: Negative for abdominal pain, blood in stool, constipation, diarrhea, nausea and vomiting.   Genitourinary: Negative for dysuria and hematuria.   Musculoskeletal: Positive for neck pain. Negative for arthralgias and myalgias.   Skin: Negative for rash.   Neurological: Negative for dizziness, light-headedness, numbness and headaches.   Psychiatric/Behavioral: Negative for agitation and confusion.     Objective:     Vital Signs (Most Recent):  Temp: 97.7 °F (36.5 °C) (08/26/20 1127)  Pulse: (!) 54 (08/26/20 1127)  Resp: 16 (08/26/20 1129)  BP: 120/74 (08/26/20 1127)  SpO2: 96 % (08/26/20 1127)  O2 Device (Oxygen Therapy): room air (08/26/20 1127) Vital Signs (24h Range):  Temp:  [97.7 °F (36.5 °C)-98.4 °F (36.9 °C)] 97.7 °F (36.5 °C)  Pulse:  [47-69] 54  Resp:  [16-20] 16  SpO2:  [94 %-100 %] 96 %  BP: (120-174)/(57-74) 120/74      Weight: 79.4 kg (175 lb) (08/20/20 1033)  Body mass index is 27.41 kg/m².  Body surface area is 1.94 meters squared.    I/O last 3 completed shifts:  In: 440 [P.O.:440]  Out: 850 [Urine:850]    Physical Exam  Constitutional:       General: He is not in acute distress.     Appearance: He is well-developed.   HENT:      Head: Normocephalic and atraumatic.      Mouth/Throat:      Pharynx: No oropharyngeal exudate.   Eyes:      Conjunctiva/sclera: Conjunctivae normal.      Pupils: Pupils are equal, round, and reactive to light.   Neck:      Musculoskeletal: Normal range of motion and neck supple.   Cardiovascular:      Rate and Rhythm: Normal rate and regular rhythm.      Heart sounds: Normal heart sounds. No murmur.   Pulmonary:      Effort: Pulmonary effort is normal. No respiratory distress.      Breath sounds: Normal breath sounds. No wheezing or rales.   Abdominal:      General: Bowel sounds are normal. There is no distension.      Palpations: Abdomen is soft.      Tenderness: There is no abdominal tenderness. There is no guarding.   Musculoskeletal:         General: No tenderness.   Skin:     General: Skin is warm and dry.      Findings: No rash.   Neurological:      Mental Status: He is alert and oriented to person, place, and time.      Cranial Nerves: No cranial nerve deficit.      Motor: No abnormal muscle tone.   Psychiatric:         Behavior: Behavior normal.         Significant Labs:  CBC:   Recent Labs   Lab 08/26/20  0351   WBC 7.64   RBC 3.27*   HGB 10.5*   HCT 34.6*   *   *   MCH 32.1*   MCHC 30.3*     CMP:   Recent Labs   Lab 08/26/20  0351   *   CALCIUM 8.0*   ALBUMIN 2.1*   PROT 5.8*      K 3.8   CO2 30*   CL 95   BUN 87*   CREATININE 2.8*   ALKPHOS 91   ALT 34   AST 58*   BILITOT 0.2     No results for input(s): COLORU, CLARITYU, SPECGRAV, PHUR, PROTEINUA, GLUCOSEU, BILIRUBINCON, BLOODU, WBCU, RBCU, BACTERIA, MUCUS, NITRITE, LEUKOCYTESUR, UROBILINOGEN, HYALINECASTS in  the last 168 hours.  All labs within the past 24 hours have been reviewed.    Significant Imaging:  Reviewed.

## 2020-08-26 NOTE — PROGRESS NOTES
[facility] Hospital Medicine  Telemedicine Progress Note    Patient Name: Edwin Powell  MRN: [id number]  Patient Class: IP- Inpatient   Admission Date: 8/17/2020  Length of Stay: 9 days  Attending Physician: aCrolynn Chan MD  Primary Care Provider: Lulú Lewis MD    [facility] Medicine Team: [facility] VIRTUAL TEAM 10 Carolynn Chan MD  Virtual Telemedicine Progress Note  Start time: 1118  Chief complaint: Acute on chronic respiratory failure with hypoxia and hypercapnia  The patient location is: [facility]  The patient arrived at: 8/17/2020  4:42 AM  Present with the patient at the time of the telemed/virtual assessment: telepresenter   End time:  1122  Total time spent with patient: 4 min  I have assessed findings virtually using a telemedicine platform and with assistance of the bedside nurse or telemedicine presenter.  The attending portion of this evaluation, treatment, and documentation was performed per Carolynn Chan MD via audiovisual.    Patient was transferred to the telemedicine service on: 8/26/2020    HPI:  75 y.o. 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 Patient underwent ACDF of C6/7 with Ortho & ENT on 8/17. During the procedure he received rocuronium, ketamine, fentanyl, propofol and midazolam. He required a renae infusion during the procedure. Post-operatively the patient was admitted to post-op Ortho unit and was placed on his home 2L O2. After his procedure he received multiple doses of oxycodone for his neck pain. Overnight on 8/17-8/18 the patient had multiple periods of apnea requiring 3 doses of Narcan on the floor with minimal improvement in respiratory and mental status. ABG showed combined metabolic and respiratory  acidosis. No significant improvement in acidosis with initiation of Bipap on the floor and patient continued to have apneic periods and worsening hypoxia requiring 15L NC. He was transferred to ICU for further management.      Subjective:     Admission CC:  Severe stenosis at C6/7 with myopathy s/p ACDF of C6/7 with Ortho & ENT on 8/17.    Follow up visit for: Acute on chronic respiratory failure with hypoxia and hypercapnia    Interval History / Events Overnight:   The patient is able to provide adequate history. Additional history was obtained from past medical records and chart review. No significant events reported by Nursing. Patient refusing scheduled Tylenol.  Patient complains of neck pain. Symptoms have improved since yesterday. Associated symptoms include: fatigue. Symptoms are decreasing in severity. Alleviating factors include: rest.     Data reviewed 8/26/2020: Lab test(s) reviewed: H&H stable. sCr increased.  Discussed case with Cardiology (another health care provider)  I have assessed findings virtually using a telemedicine platform and with assistance of the bedside nurse or telemedicine presenter.      Review of Systems   Constitutional: Negative for fever.   Cardiovascular: Negative for chest pain.     Objective:     Vital Signs (Most Recent):  Temp: 98 °F (36.7 °C) (08/26/20 1534)  Pulse: (!) 58 (08/26/20 1534)  Resp: 16 (08/26/20 1129)  BP: 124/80 (08/26/20 1534)  SpO2: 96 % (08/26/20 1127) Vital Signs (24h Range):  Temp:  [97.7 °F (36.5 °C)-98.4 °F (36.9 °C)] 98 °F (36.7 °C)  Pulse:  [54-69] 58  Resp:  [16-20] 16  SpO2:  [94 %-100 %] 96 %  BP: (120-174)/(57-80) 124/80     Weight: 79.4 kg (175 lb)  Body mass index is 27.41 kg/m².    Intake/Output Summary (Last 24 hours) at 8/26/2020 1608  Last data filed at 8/26/2020 0500  Gross per 24 hour   Intake 240 ml   Output 400 ml   Net -160 ml      Physical Exam  Constitutional:       General: He is not in acute distress.     Appearance: Normal  appearance. He is not diaphoretic.   Eyes:      General: Lids are normal. No scleral icterus.        Right eye: No discharge.         Left eye: No discharge.      Conjunctiva/sclera: Conjunctivae normal.   Cardiovascular:      Rate and Rhythm: Normal rate.   Pulmonary:      Effort: Pulmonary effort is normal. No tachypnea, accessory muscle usage or respiratory distress.   Abdominal:      General: There is no distension.   Skin:     Coloration: Skin is not cyanotic.   Neurological:      Mental Status: He is alert and oriented to person, place, and time. He is not disoriented.   Psychiatric:         Attention and Perception: Attention normal.         Mood and Affect: Affect normal.         Speech: Speech normal.         Behavior: Behavior is cooperative.         Significant Labs:   Recent Labs   Lab 08/24/20 0358 08/25/20 0341 08/26/20  0351   WBC 6.94 6.77 7.64   HGB 10.4* 10.8* 10.5*   HCT 34.4* 35.2* 34.6*    352* 433*     Recent Labs   Lab 08/24/20 0358 08/25/20 0341 08/26/20  0351   GRAN 53.5  3.7 52.9  3.6 54.1  4.1   LYMPH 26.9  1.9 25.0  1.7 25.9  2.0   MONO 14.8  1.0 16.1*  1.1* 14.0  1.1*   EOS 0.3 0.3 0.3     Recent Labs   Lab 08/24/20 0358 08/25/20 0341 08/26/20  0351    141 138   K 3.9 4.3 3.8   CL 98 98 95   CO2 30* 31* 30*   BUN 84* 81* 87*   CREATININE 2.5* 2.5* 2.8*   * 118* 115*   CALCIUM 8.8 8.3* 8.0*   ALBUMIN 2.2* 2.2* 2.1*   MG 1.8 1.7 1.8   PHOS 6.9* 4.8* 4.9*     Recent Labs   Lab 08/24/20 0358 08/25/20 0341 08/26/20  0351   ALKPHOS 99 98 91   ALT 17 22 34   AST 40 46* 58*   PROT 6.0 6.1 5.8*   BILITOT 0.2 0.2 0.2     Recent Labs   Lab 08/19/20  1312   TROPONINI 5.726*     Recent Labs   Lab 08/18/20  0438   LACTATE 1.2     SARS-CoV2 (COVID-19) Qualitative PCR (no units)   Date Value   08/14/2020 Not Detected     SARS-CoV-2 RNA, Amplification, Qual (no units)   Date Value   08/18/2020 Negative     Recent Labs   Lab 04/01/20  1137 08/04/20  1156   HGBA1C 6.6*  6.3*  6.3*     Recent Labs   Lab 08/25/20  1748 08/25/20  2148 08/26/20  0744   POCTGLUCOSE 169* 158* 135*     Recent Labs   Lab 08/18/20  0438   TSH 7.711*       Significant Imaging:     Assessment/Plan:      Active Diagnoses:    Diagnosis Date Noted POA    PRINCIPAL PROBLEM:  Acute on chronic respiratory failure with hypoxia and hypercapnia [J96.21, J96.22] 08/18/2020 Yes    Impaired functional mobility and endurance [Z74.09] 08/19/2020 Yes    NSTEMI (non-ST elevated myocardial infarction) [I21.4] 08/19/2020 Yes    Elevated troponin [R79.89] 08/19/2020 Yes    Dysphagia [R13.10] 08/19/2020 No    Stented coronary artery [Z95.5]  Not Applicable    Metastatic renal cell carcinoma [C64.9]  Yes    Acute kidney injury superimposed on chronic kidney disease [N17.9, N18.9] 08/18/2020 Yes    Acute metabolic encephalopathy [G93.41] 08/18/2020 No    Metabolic acidosis, normal anion gap (NAG) [E87.2] 08/18/2020 Yes    Cervical stenosis of spine [M48.02] 07/14/2020 Yes    Neuropathy due to secondary diabetes [E13.40] 09/06/2017 Yes    Benign prostatic hyperplasia (BPH) with urinary urge incontinence [N40.1, N39.41] 06/06/2017 Yes    Acquired hypothyroidism [E03.9] 05/26/2017 Yes    Coronary artery disease involving native coronary artery of native heart without angina pectoris [I25.10] 05/26/2017 Yes    Type 2 diabetes mellitus with stage 3 chronic kidney disease, without long-term current use of insulin [E11.22, N18.3] 05/26/2017 Yes      Problems Resolved During this Admission:       Overview / ICU Course:   Patient was transferred to MICU and started on narcan and bicarbonate infusions. Hypoxia worsened requiring up to 30L comfort flow. Mental status improved and narcan gtt discontinued on 8/18 in AM. PE suspected however CTA not feasible due to renal function. Bilateral lower extremity U/S negative for DVT, however because of quickly worsening O2 requirements and elevated troponin, he was empirically  started on a heparin gtt for possible PE. TTE did not show RV strain but did show focal wall motion abnormalities, and troponin continued to uptrend so patient was loaded with DAPT for NSTEMI. CVP 3 on TTE however pulmonary edema on CXR. Restarted diuresis intravenously.  8/20/2020: Patient no longer requiring CPAP (was on 35L/40%). O2Sat 97% with 2L NC. Refers feeling much better than yesterday.  8/21/2020: NAEON. Patient on 3L NC, Sat 98%. Denied fevers, chills, chest pain, SOB, palpitations. Patient to be stepped down today      Inpatient Medications Prescribed for Management of Current Problems:     Scheduled Meds:    acetaminophen  650 mg Oral Q6H    amLODIPine  10 mg Oral Daily    aspirin  81 mg Oral Daily    carvediloL  6.25 mg Oral BID    clopidogreL  75 mg Oral Daily    dextromethorphan-guaifenesin  mg  1 tablet Oral BID    docusate sodium  50 mg Oral BID    levothyroxine  88 mcg Oral Daily    PAZOpanib  800 mg Oral Before breakfast    rosuvastatin  40 mg Oral QHS    sodium bicarbonate  1,300 mg Oral BID    tamsulosin  0.8 mg Oral Daily     Continuous Infusions:   As Needed: albuterol-ipratropium, bisacodyL, dextrose 50%, dextrose 50%, glucagon (human recombinant), glucose, glucose, hydrALAZINE, insulin aspart U-100, naloxone, ondansetron, oxyCODONE, polyethylene glycol, sodium chloride 0.9%, sodium chloride 0.9%    Assessment and Plan by Problem    Acute on chronic respiratory failure with hypoxia and hypercapnia  Concern for PE as he suddenly required significantly more O2 overnight on 8/17-18. Risk factors include recent surgery and metastatic RCC.   - CTA chest deferred due to VERENA  - Bilat LE U/S negative for DVT  - TTE negative for RV strain with normal PA pressure. Showed chronic HFpEF and focal wall motion abnormalities  - Troponin elevated and peaked at 8.8, BNP mildly elevated (no baseline)  - Was put on heparin gtt for 48hrs. OK'd by Ortho to start A/C so soon after surgery due  to rapidly worsening hypoxia  - Wean O2 to goal sat > 90%.   - Lasix, monitor UOP  - back to baseline     NSTEMI (non-ST elevated myocardial infarction)  Coronary artery disease involving native coronary artery of native heart with angina pectoris  Elevated troponin  CAD with history of PCI ~20 years ago. Patient did not have chest pain. Troponin was checked when concerned for PE, initially was 1.2 however continued to uptrend and peaked at 8.8.  - Loaded with ASA and Plavix, Ok'd by Ortho, and continued  - Continue Coreg,  ASA, rosuvastatin, Plavix, amlodipine   - On statin  - Cardiology consulted. Per Cardiology: with patient's metastatic renal carcinoma and progressive decline recommend conservative management for now.  He possibly developed flash pulmonary edema in the setting of hypertension, apnea, sedation leading to respiratory failure and NSTEMI. For conservative medical management from his ACS standpoint, continue aspirin and Plavix, rosuvastatin and carvedilol. Cardiology aware of repeat echo showing EF 30%; currently not recommending further diuresis 8/26.     Metabolic acidosis, normal anion gap (NAG)  --due to RTA in setting of VERENA on CKD vs recent diarrhea (per recent Nephro clinic note)  --UAG positive (38) suggestive of RTA  --Transition from bicarb gtt to PO sodium bicarb 1300 mg BID     Acute kidney injury superimposed on chronic kidney disease  --baseline creatinine ~ 2  -- ? Etiology. Recent Nephro note states pt has had decreased po intake & diarrhea recently - ? Pre-renal. Pt received maintenance IVF, so likely volume resuscitated at this point  --home Lasix & lisinopril held initially. Then Lasix 100mg daily resumed.  --renal U/S unremarkable  --improving with diuresis; received IV Lasix through 8/25 then sCr increased on 8/26 after continued diuresis. Nephrology consulted.     Benign prostatic hyperplasia (BPH) with urinary urge incontinence  --continue home Flomax     Type 2 diabetes  mellitus with stage 3 chronic kidney disease, without long-term current use of insulin  --per Clinic notes, insulin dependent  --home regimen lantus 5 units daily   --currently low dose correction; concerning if actually insulin-dependent as rarely requiring administration for hyperglycemia. AG normal     Acquired hypothyroidism  --continue home levothyroxine  --TSH elevated however fT4 WNL     Dysphagia  SLP eval, tolerating PO intake. Now on a regular diet      Acute metabolic encephalopathy  --suspect medication induced insetting of decreased renal clearance as pt responded well to Narcan. Started on Narcan gtt and discontinued on 8/18 once mental status improved  --hold sedating medications  --resolved     Cervical stenosis of spine  --s/p ACDF on 8/17 with Ortho and ENT; management per Ortho     Neuropathy due to secondary diabetes  --holding home Lyrica in setting of encephalopathy  --when resumed, will need to be renally dosed     Metastatic renal cell carcinoma of L kidney   Followed by Rosa Maria and Joshua.    Diet: Diet diabetic [facility]; 2800 Calorie; Cardiac (Low Na/Chol)  GI Prophylaxis: Not indicated  Significant LDAs:   IV Access Type: Peripheral  Urinary Catheter Indication if present: Patient Does Not Have Urinary Catheter  Other Lines/Tubes/Drains:    HIGH RISK CONDITION(S):   Patient has a condition that poses threat to life and bodily function: Severe Respiratory Distress, NSTEMI, and Acute Renal Failure     Goals of Care:    Previous admission:  8/22/19  Likely prognosis:  Fair  Code Status: Full Code  Comfort Only: No  Hospice: No  Goals at discharge: remain at home, with physician follow-up    Discharge Planning   JAUN: 8/28/2020     Code Status: Full Code   Is the patient medically ready for discharge?: No    Reason for patient still in hospital (select all that apply): Patient trending condition  Discharge Plan A: Home with family   Discharge Delays: None known at this  time    VTE Risk Mitigation (From admission, onward)         Ordered     IP VTE HIGH RISK PATIENT  Once      08/17/20 0859     Place sequential compression device  Until discontinued      08/17/20 0516                   Carolynn Chan MD  Department of Hospital Medicine   [facility]

## 2020-08-26 NOTE — PT/OT/SLP PROGRESS
"Occupational Therapy   Treatment    Name: Edwin Powell  MRN: [id number]  Admitting Diagnosis:  Acute on chronic respiratory failure with hypoxia and hypercapnia      9 Days Post-Op  Pre-op Diagnosis: Cervical stenosis of spine [M48.02]  History of fusion of cervical spine [Z98.1]  Neck pain [M54.2]    Procedure(s):  DISCECTOMY, SPINE, CERVICAL, ANTERIOR APPROACH, WITH FUSION co case with Dr. Falcon C6/7 Depuy SNS:vocal cord/motors/SSEP Regular OR table Patient needs scope in preop  DISSECTION, NECK     Recommendations:     Discharge Recommendations: home with home health  Discharge Equipment Recommendations:  none  Barriers to discharge:  None    Assessment:     Edwin Powell is a 75 y.o. male with a medical diagnosis of Acute on chronic respiratory failure with hypoxia and hypercapnia. He presents with the following performance deficits affecting function: impaired endurance, impaired self care skills, impaired functional mobilty, impaired balance, pain. Patient completed functional mobility within room with SBA. Patient declined further mobility 2* recently completing PT session. At this time, patient will continue to benefit from acute skilled therapy intervention to address deficits/underlying impairments and progress towards prior level of function. After discharge, patient will benefit from receiving home health therapy services to ensure safety and independence in the home environment.    Rehab Prognosis:  Good; patient would benefit from acute skilled OT services to address these deficits and reach maximum level of function.       Plan:     Patient to be seen 4 x/week to address the above listed problems via self-care/home management, therapeutic activities, therapeutic exercises  · Plan of Care Expires: 09/01/20  · Plan of Care Reviewed with: patient, son    Subjective     Patient stated "I really don't think I need any therapists coming out to my house".     Pain/Comfort:  · Pain Rating 1: (Did not " rate)  · Location 1: shoulder  · Pain Addressed 1: Pre-medicate for activity, Reposition, Distraction, Cessation of Activity    Objective:     Communicated with: RN prior to session. Patient found up in chair with telemetry, pulse ox (continuous) upon OT entry to room.    General Precautions: Standard, fall   Orthopedic Precautions:N/A   Braces: N/A     Occupational Performance:     Bed Mobility:    · Not assessed; patient up in chair at start of session and returned to chair at end of session    Functional Mobility/Transfers:  · Patient completed Sit <> Stand Transfer with supervision with no AD  · Patient completed Toilet Transfer Step Transfer technique with supervision with no AD  · Functional Mobility: Patient completed functional mobility within room with supervision with no AD    Activities of Daily Living:  · Lower Body Dressing: mod assist to don socks 2* pain    [facility] 6 Click ADL: 21    Treatment & Education:   Therapist provided facilitation and instruction of proper body mechanics, energy conservation, and fall prevention strategies during tasks listed above.   Instructed patient to sit in bedside chair daily to increase OOB/activity tolerance.   Patient performed 1x10 B UE shoulder flexion and forward punching AROM exercises. Instructed patient to perform B UE stretches within pain free range throughout day.   Educated patient on OT POC and answered all questions within OT scope of practice.   Whiteboard updated     Patient left up in chair with all lines intact, call button in reach and patient's son presentEducation:      GOALS:   Multidisciplinary Problems     Occupational Therapy Goals        Problem: Occupational Therapy Goal    Goal Priority Disciplines Outcome Interventions   Occupational Therapy Goal     OT, PT/OT Ongoing, Progressing    Description: Goals to be met by: 9/1/20     Patient will increase functional independence with ADLs by performing:    UE Dressing with [place].  ADELSO  Dressing with Modified [place].  Grooming while standing at sink with Modified [place].  Toileting from toilet with Modified [place] for hygiene and clothing management.   Bathing from  shower chair/bench with Modified [place].  Toilet transfer to toilet with Modified [place].  Increased functional strength to WFL for B UE.  Upper extremity exercise program x15 reps per handout, with assistance as needed.                     Time Tracking:     OT Date of Treatment: 08/26/20  OT Start Time: 1506  OT Stop Time: 1520  OT Total Time (min): 14 min    Billable Minutes:Therapeutic Activity 14    Regi Olmos OT  8/26/2020

## 2020-08-26 NOTE — PROGRESS NOTES
[facility]  Orthopedics  Progress Note    Patient Name: Edwin Powell  MRN: [id number]  Admission Date: 8/17/2020  Hospital Length of Stay: 9 days  Attending Provider: Milo Gonzalez MD  Primary Care Provider: Lulú Lewis MD  Follow-up For: Procedure(s) (LRB):  DISCECTOMY, SPINE, CERVICAL, ANTERIOR APPROACH, WITH FUSION co case with Dr. Falcon C6/7 Depuy SNS:vocal cord/motors/SSEP Regular OR table Patient needs scope in preop (N/A)  DISSECTION, NECK (N/A)    Post-Operative Day: 9 Days Post-Op  Subjective:     Principal Problem:Acute on chronic respiratory failure with hypoxia and hypercapnia    Principal Orthopedic Problem: s/p ACDF    Interval History:   Pt seen and examined at the bedside this am. Doing well, pain controlled. Working well with PT/OT.     3Review of patient's allergies indicates:  No Known Allergies    Current Facility-Administered Medications   Medication    acetaminophen tablet 650 mg    albuterol-ipratropium 2.5 mg-0.5 mg/3 mL nebulizer solution 3 mL    amLODIPine tablet 10 mg    aspirin chewable tablet 81 mg    bisacodyL suppository 10 mg    carvediloL tablet 6.25 mg    clopidogreL tablet 75 mg    dextromethorphan-guaifenesin  mg per 12 hr tablet 1 tablet    dextrose 50% injection 12.5 g    dextrose 50% injection 25 g    docusate sodium capsule 50 mg    glucagon (human recombinant) injection 1 mg    glucose chewable tablet 16 g    glucose chewable tablet 24 g    hydrALAZINE tablet 25 mg    insulin aspart U-100 pen 0-5 Units    levothyroxine tablet 88 mcg    naloxone 0.4 mg/mL injection 0.2 mg    ondansetron injection 4 mg    oxyCODONE immediate release tablet 5 mg    PAZOPanib 200 mg tab    polyethylene glycol packet 17 g    rosuvastatin tablet 40 mg    sodium bicarbonate tablet 1,300 mg    sodium chloride 0.9% flush 10 mL    sodium chloride 0.9% flush 5 mL    tamsulosin 24 hr capsule 0.8 mg     Facility-Administered Medications  "Ordered in Other Encounters   Medication    phenylephrine HCL 2.5% ophthalmic solution 1 drop    proparacaine 0.5 % ophthalmic solution 1 drop    tropicamide 1% ophthalmic solution 1 drop     Objective:     Vital Signs (Most Recent):  Temp: 98.2 °F (36.8 °C) (08/26/20 0400)  Pulse: (!) 58 (08/26/20 0547)  Resp: 19 (08/26/20 0400)  BP: (!) 140/63 (08/26/20 0547)  SpO2: 100 % (08/26/20 0547) Vital Signs (24h Range):  Temp:  [97.9 °F (36.6 °C)-98.4 °F (36.9 °C)] 98.2 °F (36.8 °C)  Pulse:  [47-69] 58  Resp:  [18-20] 19  SpO2:  [94 %-100 %] 100 %  BP: (121-174)/(57-71) 140/63     Weight: 79.4 kg (175 lb)  Height: 5' 7" (170.2 cm)  Body mass index is 27.41 kg/m².      Intake/Output Summary (Last 24 hours) at 8/26/2020 0707  Last data filed at 8/26/2020 0500  Gross per 24 hour   Intake 240 ml   Output 400 ml   Net -160 ml       Ortho/SPM Exam    2L NC  Anterior cervical dressing in place, c/d/i  No palpable swelling or fluid collection  Neuro exam stable      Significant Labs:   CBC:   Recent Labs   Lab 08/25/20  0341 08/26/20  0351   WBC 6.77 7.64   HGB 10.8* 10.5*   HCT 35.2* 34.6*   * 433*     CMP:   Recent Labs   Lab 08/25/20  0341      K 4.3   CL 98   CO2 31*   *   BUN 81*   CREATININE 2.5*   CALCIUM 8.3*   PROT 6.1   ALBUMIN 2.2*   BILITOT 0.2   ALKPHOS 98   AST 46*   ALT 22   ANIONGAP 12   EGFRNONAA 24.2*     All pertinent labs within the past 24 hours have been reviewed.    Significant Imaging: I have reviewed and interpreted all pertinent imaging results/findings.    Assessment/Plan:     Cervical stenosis of spine  Edwin Powell is a 75 y.o. male s/p C5/6 ACDF (8/17/20) for cervical stenosis, postop course complicated by apnea; Transferred to MICU with NSTEMI and increasing oxygen requirements. Stepped down to MTSU.     Pain control: multimodal, d/c sedating medications  PT/OT: progressive mobility, OOBTC  DVT PPx: heparin gtt, ASA, and Plavix; SCDs at all times when not ambulating  Abx: " postop Ancef complete  Labs: stable  Drain: None     Dispo-Per primary likely d/c home with HH; stable for discharge from ortho standpoint               Norma Arias MD  Orthopedics  [facility]

## 2020-08-26 NOTE — CONSULTS
[facility] Hospital Medicine  Telemedicine Consult Note    Patient Name: Edwin Powell  MRN: [id number]  Admission Date: 8/17/2020  Hospital Length of Stay: 8 days  Attending Physician: Milo Gonzalez MD   Primary Care Provider: Lulú Lewis MD           Edwin Powell has been accepted for transfer to [facility] and will be followed through telemedicine services beginning 08/26/20 at 7 AM.        Carolynn Chan MD  Department of Hospital Medicine   [facility]

## 2020-08-26 NOTE — PLAN OF CARE
Problem: Physical Therapy Goal  Goal: Physical Therapy Goal  Description: Goals to be met by: 20    Patient will increase functional independence with mobility by performin. Supine to sit with MInimal Assistance - met       A. independence  2. Sit to stand transfer with Contact Guard Assistance with AD if needed. -not met  3. Bed to chair transfer with Minimal Assistance using Rolling Walker if needed. -not met  4. Gait  x 50 feet with Contact Guard Assistance using Rolling Walker. If needed. -not met  5. Lower extremity exercise program x20 reps with supervision-not met  6. Pt sit on EOB x 10 min with CGA- not met    Outcome: Ongoing, Progressing.  Patient continues to progress well towards his goals, as evidence of transfer ability and walking range.

## 2020-08-26 NOTE — PT/OT/SLP PROGRESS
Physical Therapy Treatment    Patient Name:  Edwin Powell   MRN:  [id number]    Recommendations:     Discharge Recommendations:  home health PT   Discharge Equipment Recommendations: none   Barriers to discharge: None    Assessment:     Edwin Powell is a 75 y.o. male admitted with a medical diagnosis of Acute on chronic respiratory failure with hypoxia and hypercapnia.  He presents with the following impairments/functional limitations:  weakness, impaired endurance, gait instability . Patient ambulated with decreased, but steady aileen. No shortness of breath or signs of distress noted during treatment.    Rehab Prognosis: Good; patient would benefit from acute skilled PT services to address these deficits and reach maximum level of function.    Recent Surgery: Procedure(s) (LRB):  DISCECTOMY, SPINE, CERVICAL, ANTERIOR APPROACH, WITH FUSION co case with Dr. Falcon C6/7 [facility] SNS:vocal cord/motors/SSEP Regular OR table Patient needs scope in preop (N/A)  DISSECTION, NECK (N/A) 9 Days Post-Op    Plan:     During this hospitalization, patient to be seen 5 x/week to address the identified rehab impairments via gait training, therapeutic activities, therapeutic exercises, neuromuscular re-education and progress toward the following goals:    · Plan of Care Expires:  09/17/20    Subjective     Chief Complaint: Pain around his neck  Patient/Family Comments/goals: to go home soon.  Pain/Comfort:  · Pain Rating 1: 6/10  · Location - Orientation 1: anterior  · Location 1: neck  · Pain Addressed 1: Pre-medicate for activity, Reposition, Distraction, Cessation of Activity  · Pain Rating Post-Intervention 1: 6/10      Objective:     Communicated with NSG prior to session.  Patient found up in chair with telemetry, pulse ox (continuous) upon PT entry to room.     General Precautions: Standard, fall   Orthopedic Precautions:N/A   Braces: N/A     Functional Mobility:  · Transfers:     · Sit to Stand:  stand by assistance with  rolling walker  · Bed to Chair: stand by assistance with  no AD  using  Stand Pivot  · Gait: 110 ft and 200 ft with RW and Close SBA.      AM-PAC 6 CLICK MOBILITY  Turning over in bed (including adjusting bedclothes, sheets and blankets)?: 4  Sitting down on and standing up from a chair with arms (e.g., wheelchair, bedside commode, etc.): 4  Moving from lying on back to sitting on the side of the bed?: 4  Moving to and from a bed to a chair (including a wheelchair)?: 4  Need to walk in hospital room?: 3  Climbing 3-5 steps with a railing?: 3  Basic Mobility Total Score: 22       Therapeutic Activities and Exercises:   Donned/Doffed a gown. There ex in sitting: LAQ, HIP FLEX, AND HEEL/TOE RAISES 2X12 REPS B LE.    Patient left up in chair with all lines intact, call button in reach and son present..    GOALS:   Multidisciplinary Problems     Physical Therapy Goals        Problem: Physical Therapy Goal    Goal Priority Disciplines Outcome Goal Variances Interventions   Physical Therapy Goal     PT, PT/OT Ongoing, Progressing     Description: Goals to be met by: 20    Patient will increase functional independence with mobility by performin. Supine to sit with MInimal Assistance - met       A. independence  2. Sit to stand transfer with Contact Guard Assistance with AD if needed. -not met  3. Bed to chair transfer with Minimal Assistance using Rolling Walker if needed. -not met  4. Gait  x 50 feet with Contact Guard Assistance using Rolling Walker. If needed. -not met  5. Lower extremity exercise program x20 reps with supervision-not met  6. Pt sit on EOB x 10 min with CGA- not met                     Time Tracking:     PT Received On: 20  PT Start Time: 1046     PT Stop Time: 1110  PT Total Time (min): 24 min     Billable Minutes: Gait Training 16 and Therapeutic Exercise 8    Treatment Type: Treatment  PT/PTA: PTA     PTA Visit Number: 2     Bj Thompson, THIAGO  2020

## 2020-08-27 LAB
ALBUMIN SERPL BCP-MCNC: 2.1 G/DL (ref 3.5–5.2)
ALP SERPL-CCNC: 89 U/L (ref 55–135)
ALT SERPL W/O P-5'-P-CCNC: 33 U/L (ref 10–44)
ANION GAP SERPL CALC-SCNC: 9 MMOL/L (ref 8–16)
AST SERPL-CCNC: 42 U/L (ref 10–40)
BASOPHILS # BLD AUTO: 0.05 K/UL (ref 0–0.2)
BASOPHILS NFR BLD: 0.6 % (ref 0–1.9)
BILIRUB SERPL-MCNC: 0.2 MG/DL (ref 0.1–1)
BUN SERPL-MCNC: 82 MG/DL (ref 8–23)
CALCIUM SERPL-MCNC: 8.7 MG/DL (ref 8.7–10.5)
CHLORIDE SERPL-SCNC: 97 MMOL/L (ref 95–110)
CO2 SERPL-SCNC: 32 MMOL/L (ref 23–29)
CREAT SERPL-MCNC: 2.8 MG/DL (ref 0.5–1.4)
DIFFERENTIAL METHOD: ABNORMAL
EOSINOPHIL # BLD AUTO: 0.3 K/UL (ref 0–0.5)
EOSINOPHIL NFR BLD: 3.5 % (ref 0–8)
ERYTHROCYTE [DISTWIDTH] IN BLOOD BY AUTOMATED COUNT: 14 % (ref 11.5–14.5)
EST. GFR  (AFRICAN AMERICAN): 24.4 ML/MIN/1.73 M^2
EST. GFR  (NON AFRICAN AMERICAN): 21.1 ML/MIN/1.73 M^2
GLUCOSE SERPL-MCNC: 145 MG/DL (ref 70–110)
HCT VFR BLD AUTO: 34.5 % (ref 40–54)
HGB BLD-MCNC: 10.6 G/DL (ref 14–18)
IMM GRANULOCYTES # BLD AUTO: 0.14 K/UL (ref 0–0.04)
IMM GRANULOCYTES NFR BLD AUTO: 1.6 % (ref 0–0.5)
LYMPHOCYTES # BLD AUTO: 1.8 K/UL (ref 1–4.8)
LYMPHOCYTES NFR BLD: 20.3 % (ref 18–48)
MAGNESIUM SERPL-MCNC: 1.9 MG/DL (ref 1.6–2.6)
MCH RBC QN AUTO: 32.5 PG (ref 27–31)
MCHC RBC AUTO-ENTMCNC: 30.7 G/DL (ref 32–36)
MCV RBC AUTO: 106 FL (ref 82–98)
MONOCYTES # BLD AUTO: 0.9 K/UL (ref 0.3–1)
MONOCYTES NFR BLD: 10.6 % (ref 4–15)
NEUTROPHILS # BLD AUTO: 5.5 K/UL (ref 1.8–7.7)
NEUTROPHILS NFR BLD: 63.4 % (ref 38–73)
NRBC BLD-RTO: 0 /100 WBC
PHOSPHATE SERPL-MCNC: 5.3 MG/DL (ref 2.7–4.5)
PLATELET # BLD AUTO: 500 K/UL (ref 150–350)
PMV BLD AUTO: 10.5 FL (ref 9.2–12.9)
POCT GLUCOSE: 136 MG/DL (ref 70–110)
POCT GLUCOSE: 158 MG/DL (ref 70–110)
POCT GLUCOSE: 214 MG/DL (ref 70–110)
POTASSIUM SERPL-SCNC: 4.7 MMOL/L (ref 3.5–5.1)
PROT SERPL-MCNC: 5.7 G/DL (ref 6–8.4)
RBC # BLD AUTO: 3.26 M/UL (ref 4.6–6.2)
SODIUM SERPL-SCNC: 138 MMOL/L (ref 136–145)
WBC # BLD AUTO: 8.69 K/UL (ref 3.9–12.7)

## 2020-08-27 PROCEDURE — 63600175 PHARM REV CODE 636 W HCPCS: Mod: HCNC | Performed by: NURSE PRACTITIONER

## 2020-08-27 PROCEDURE — 99232 SBSQ HOSP IP/OBS MODERATE 35: CPT | Mod: HCNC,,, | Performed by: INTERNAL MEDICINE

## 2020-08-27 PROCEDURE — 25000003 PHARM REV CODE 250: Mod: HCNC | Performed by: STUDENT IN AN ORGANIZED HEALTH CARE EDUCATION/TRAINING PROGRAM

## 2020-08-27 PROCEDURE — 25000003 PHARM REV CODE 250: Mod: HCNC | Performed by: NURSE PRACTITIONER

## 2020-08-27 PROCEDURE — 99231 PR SUBSEQUENT HOSPITAL CARE,LEVL I: ICD-10-PCS | Mod: HCNC,,, | Performed by: INTERNAL MEDICINE

## 2020-08-27 PROCEDURE — 83735 ASSAY OF MAGNESIUM: CPT | Mod: HCNC

## 2020-08-27 PROCEDURE — 25000003 PHARM REV CODE 250: Mod: HCNC | Performed by: INTERNAL MEDICINE

## 2020-08-27 PROCEDURE — 97802 MEDICAL NUTRITION INDIV IN: CPT | Mod: HCNC

## 2020-08-27 PROCEDURE — 99231 SBSQ HOSP IP/OBS SF/LOW 25: CPT | Mod: HCNC,,, | Performed by: INTERNAL MEDICINE

## 2020-08-27 PROCEDURE — 99232 PR SUBSEQUENT HOSPITAL CARE,LEVL II: ICD-10-PCS | Mod: HCNC,,, | Performed by: INTERNAL MEDICINE

## 2020-08-27 PROCEDURE — 36415 COLL VENOUS BLD VENIPUNCTURE: CPT | Mod: HCNC

## 2020-08-27 PROCEDURE — 85025 COMPLETE CBC W/AUTO DIFF WBC: CPT | Mod: HCNC

## 2020-08-27 PROCEDURE — 84100 ASSAY OF PHOSPHORUS: CPT | Mod: HCNC

## 2020-08-27 PROCEDURE — 20600001 HC STEP DOWN PRIVATE ROOM: Mod: HCNC

## 2020-08-27 PROCEDURE — 80053 COMPREHEN METABOLIC PANEL: CPT | Mod: HCNC

## 2020-08-27 RX ORDER — FUROSEMIDE 40 MG/1
40 TABLET ORAL 2 TIMES DAILY
Status: DISCONTINUED | OUTPATIENT
Start: 2020-08-27 | End: 2020-08-30

## 2020-08-27 RX ORDER — ACETAMINOPHEN 325 MG/1
650 TABLET ORAL EVERY 6 HOURS PRN
Status: DISCONTINUED | OUTPATIENT
Start: 2020-08-27 | End: 2020-09-03 | Stop reason: HOSPADM

## 2020-08-27 RX ADMIN — ROSUVASTATIN CALCIUM 40 MG: 20 TABLET, FILM COATED ORAL at 09:08

## 2020-08-27 RX ADMIN — DOCUSATE SODIUM 50 MG: 50 CAPSULE, LIQUID FILLED ORAL at 09:08

## 2020-08-27 RX ADMIN — AMLODIPINE BESYLATE 10 MG: 10 TABLET ORAL at 08:08

## 2020-08-27 RX ADMIN — CARVEDILOL 6.25 MG: 6.25 TABLET, FILM COATED ORAL at 08:08

## 2020-08-27 RX ADMIN — GUAIFENESIN AND DEXTROMETHORPHAN HYDROBROMIDE 1 TABLET: 600; 30 TABLET, EXTENDED RELEASE ORAL at 09:08

## 2020-08-27 RX ADMIN — CLOPIDOGREL 75 MG: 75 TABLET, FILM COATED ORAL at 08:08

## 2020-08-27 RX ADMIN — SODIUM BICARBONATE 650 MG TABLET 1300 MG: at 08:08

## 2020-08-27 RX ADMIN — GUAIFENESIN AND DEXTROMETHORPHAN HYDROBROMIDE 1 TABLET: 600; 30 TABLET, EXTENDED RELEASE ORAL at 08:08

## 2020-08-27 RX ADMIN — FUROSEMIDE 40 MG: 40 TABLET ORAL at 06:08

## 2020-08-27 RX ADMIN — TAMSULOSIN HYDROCHLORIDE 0.8 MG: 0.4 CAPSULE ORAL at 08:08

## 2020-08-27 RX ADMIN — SODIUM BICARBONATE 650 MG TABLET 1300 MG: at 09:08

## 2020-08-27 RX ADMIN — DOCUSATE SODIUM 50 MG: 50 CAPSULE, LIQUID FILLED ORAL at 08:08

## 2020-08-27 RX ADMIN — ASPIRIN 81 MG CHEWABLE TABLET 81 MG: 81 TABLET CHEWABLE at 08:08

## 2020-08-27 RX ADMIN — INSULIN ASPART 2 UNITS: 100 INJECTION, SOLUTION INTRAVENOUS; SUBCUTANEOUS at 11:08

## 2020-08-27 RX ADMIN — CARVEDILOL 6.25 MG: 6.25 TABLET, FILM COATED ORAL at 09:08

## 2020-08-27 RX ADMIN — LEVOTHYROXINE SODIUM 88 MCG: 0.09 TABLET ORAL at 08:08

## 2020-08-27 NOTE — PLAN OF CARE
Recommendations     1. Continue current Diabetic/Cardiac diet.   2. RD to monitor & follow-up.     Goals: Meet % EEN, EPN by RD f/u date  Nutrition Goal Status: new  Communication of RD Recs: reviewed with RN

## 2020-08-27 NOTE — PROGRESS NOTES
[facility]  Nephrology  Progress Note    Patient Name: Edwin Powell  MRN: [id number]  Admission Date: 8/17/2020  Hospital Length of Stay: 10 days  Attending Provider: Marlena Montenegro MD   Primary Care Physician: Lulú Lewis MD  Principal Problem:Acute on chronic respiratory failure with hypoxia and hypercapnia    Subjective:     HPI: Mr. Powell is a 76 yo M with RCC with mets to liver/bone (on pazopanib), CKD IIIb with proteinuria 2/2 BPH, HTN, hypothyroidism, h/o CVA, T2DM, GERD, and T2DM who is currently admitted for acute hypoxemic respiratory failure. Nephrology consulted for VERENA on CKD.     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 S/p ACDF of C6/7 on 8/17. He required renae infusion during procedure. Patient developed acute hypoxemic respiratory failure. ABG showed mixed resp/metabolic acidosis. Etiology unclear, suspected PE but was unable to get CTA 2/2 poor renal fxn, negative US BLE, started on heparin gtt. TTE negative for RV strain. Since troponins elevated, loaded with DAPT. He was transferred to ICU and placed on HFNC (up to 30L). Also started on bicarb gtt + narcan. Repeat TTE shows newly depressed EF (EF 30% grade I dd). He was started on IV lasix 120mg with good UOP. Patient was noted to have VERENA on admission with Cr 3.5 (increased from baseline ~2). His Cr improved with diuresis.     In terms of renal function, patient has CKD IIIb with nephrotic range proteinuria. He sees Dr. Leonardo, last visit was earlier this month. Per her notes, CKD etiology suspected to be multifactorial (periodic NSAID use, multiple CTs w/ contast, HTN, DM). At that time, sCr 2 and eGFR 30. Has had significant proteinuria in past (Pr:Cr 7.5g earlier this year, down to 4 earlier this month).    Interval History: Patient seen and examined at bedside, on room air, Cr stable at 2.8. 550cc of urine output documented over the past 24 hrs.     Review of  patient's allergies indicates:  No Known Allergies  Current Facility-Administered Medications   Medication Frequency    acetaminophen tablet 650 mg Q6H    albuterol-ipratropium 2.5 mg-0.5 mg/3 mL nebulizer solution 3 mL Q4H PRN    amLODIPine tablet 10 mg Daily    aspirin chewable tablet 81 mg Daily    bisacodyL suppository 10 mg Daily PRN    carvediloL tablet 6.25 mg BID    clopidogreL tablet 75 mg Daily    dextromethorphan-guaifenesin  mg per 12 hr tablet 1 tablet BID    dextrose 50% injection 12.5 g PRN    dextrose 50% injection 25 g PRN    docusate sodium capsule 50 mg BID    furosemide tablet 40 mg BID    glucagon (human recombinant) injection 1 mg PRN    glucose chewable tablet 16 g PRN    glucose chewable tablet 24 g PRN    hydrALAZINE tablet 25 mg Q8H PRN    insulin aspart U-100 pen 0-5 Units QID (AC + HS) PRN    levothyroxine tablet 88 mcg Daily    naloxone 0.4 mg/mL injection 0.2 mg PRN    ondansetron injection 4 mg Q12H PRN    oxyCODONE immediate release tablet 5 mg Q6H PRN    PAZOPanib 200 mg tab Before breakfast    polyethylene glycol packet 17 g BID PRN    rosuvastatin tablet 40 mg QHS    sodium bicarbonate tablet 1,300 mg BID    sodium chloride 0.9% flush 10 mL PRN    sodium chloride 0.9% flush 5 mL PRN    tamsulosin 24 hr capsule 0.8 mg Daily     Facility-Administered Medications Ordered in Other Encounters   Medication Frequency    phenylephrine HCL 2.5% ophthalmic solution 1 drop On Call Procedure    proparacaine 0.5 % ophthalmic solution 1 drop On Call Procedure    tropicamide 1% ophthalmic solution 1 drop On Call Procedure       Objective:     Vital Signs (Most Recent):  Temp: 98.4 °F (36.9 °C) (08/27/20 1100)  Pulse: 76 (08/27/20 1155)  Resp: 16 (08/27/20 0727)  BP: (!) 161/70 (08/27/20 1100)  SpO2: 97 % (08/27/20 1100)  O2 Device (Oxygen Therapy): room air (08/27/20 0727) Vital Signs (24h Range):  Temp:  [97.7 °F (36.5 °C)-98.9 °F (37.2 °C)] 98.4 °F (36.9  °C)  Pulse:  [58-76] 76  Resp:  [16-18] 16  SpO2:  [93 %-98 %] 97 %  BP: (124-164)/(70-80) 161/70     Weight: 79.4 kg (175 lb 0.7 oz) (08/27/20 1300)  Body mass index is 27.42 kg/m².  Body surface area is 1.94 meters squared.    I/O last 3 completed shifts:  In: 480 [P.O.:480]  Out: 950 [Urine:950]    Physical Exam  Vitals signs and nursing note reviewed.   Constitutional:       General: He is not in acute distress.     Appearance: He is ill-appearing.   HENT:      Head: Normocephalic and atraumatic.      Right Ear: External ear normal.      Left Ear: External ear normal.      Nose: Nose normal.      Mouth/Throat:      Mouth: Mucous membranes are moist.      Pharynx: Oropharynx is clear.   Eyes:      Conjunctiva/sclera: Conjunctivae normal.      Comments: Pinpoint pupils bilaterally   Neck:      Comments: L side JIN drain present with serosanguinous drainage  Cardiovascular:      Rate and Rhythm: Normal rate and regular rhythm.      Pulses: Normal pulses.      Heart sounds: Normal heart sounds. No murmur.   Pulmonary:      Effort: Bradypnea present.      Breath sounds: Normal breath sounds.      Comments: Multiple episodes of apnea lasting several seconds  Abdominal:      General: Abdomen is flat. Bowel sounds are normal. There is no distension.      Palpations: Abdomen is soft.      Tenderness: There is no abdominal tenderness.   Genitourinary:     Penis: Normal.    Musculoskeletal: Normal range of motion.         General: No swelling or tenderness.   Skin:     General: Skin is warm and dry.      Capillary Refill: Capillary refill takes less than 2 seconds.   Neurological:      GCS: GCS eye subscore is 3. GCS verbal subscore is 4. GCS motor subscore is 6.      Motor: Motor function is intact.   Psychiatric:         Cognition and Memory: Cognition is impaired.         Significant Labs:  CBC:   Recent Labs   Lab 08/27/20  0633   WBC 8.69   RBC 3.26*   HGB 10.6*   HCT 34.5*   *   *   MCH 32.5*   MCHC  30.7*     CMP:   Recent Labs   Lab 08/27/20  0633   *   CALCIUM 8.7   ALBUMIN 2.1*   PROT 5.7*      K 4.7   CO2 32*   CL 97   BUN 82*   CREATININE 2.8*   ALKPHOS 89   ALT 33   AST 42*   BILITOT 0.2     Coagulation: No results for input(s): PT, INR, APTT in the last 168 hours.  All labs within the past 24 hours have been reviewed.     Significant Imaging:  Labs: Reviewed  X-Ray: Reviewed    Assessment/Plan:     * Acute on chronic respiratory failure with hypoxia and hypercapnia  Management per primary team    Acute kidney injury superimposed on chronic kidney disease  76 yo M admitted with acute hypoxemic respiratory failure + VERENA on CKD. Baseline sCr 2, sCr 3.5 on admission. Patient has CKD IIIb with nephrotic range proteinuria. He sees Dr. Leonardo, last visit was earlier this month. Per her notes, CKD etiology suspected to be multifactorial (periodic NSAID use, multiple CTs w/ contast, HTN, DM). At that time, sCr 2 and eGFR 30. Has had significant proteinuria in past (Pr:Cr 7.5g earlier this year, down to 4 earlier this month).    Cr improved with diuresis, bumped when diuresis was held  No indications for HD at this time    Plan:  - continue PO lasix 40mg BID  - Trend Cr 3.3-->2.9--> 2.8-->2.8  - UOP: 550/24 hrs  - Strict I&Os  - Avoid nephrotoxic agents  - Renally adjust medications        Thank you for your consult. I will follow-up with patient. Please contact us if you have any additional questions.    Star Bagley MD  Nephrology  [facility]    ATTENDING PHYSICIAN ATTESTATION  I have personally verified the history and examined the patient. I thoroughly reviewed the demographic, clinical, laboratorial and imaging information available in medical records. I agree with the assessment and recommendations provided by the subspecialty resident who was under my supervision.

## 2020-08-27 NOTE — PLAN OF CARE
No acute changes overnight. Blood glucose monitoring performed. No SSI needed. Pt refusing morning pazopanib and tylenol. Call light in reach. Possible discharge. WCTM

## 2020-08-27 NOTE — PROGRESS NOTES
[facility] Hospital Medicine  Telemedicine Progress Note    Patient Name: Edwin Powell  MRN: [id number]  Patient Class: IP- Inpatient   Admission Date: 8/17/2020  Length of Stay: 10 days  Attending Physician: Marlena Montenegro MD  Primary Care Provider: Lulú Lewis MD    [facility] Medicine Team: [facility] VIRTUAL TEAM 10 Marlena Montenegro MD    Provider visited at bedside.    Patient was transferred to the telemedicine service on: 8/26/2020    HPI:  75 y.o. 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 Patient underwent ACDF of C6/7 with Ortho & ENT on 8/17. During the procedure he received rocuronium, ketamine, fentanyl, propofol and midazolam. He required a renae infusion during the procedure. Post-operatively the patient was admitted to post-op Ortho unit and was placed on his home 2L O2. After his procedure he received multiple doses of oxycodone for his neck pain. Overnight on 8/17-8/18 the patient had multiple periods of apnea requiring 3 doses of Narcan on the floor with minimal improvement in respiratory and mental status. ABG showed combined metabolic and respiratory acidosis. No significant improvement in acidosis with initiation of Bipap on the floor and patient continued to have apneic periods and worsening hypoxia requiring 15L NC. He was transferred to ICU for further management.      Subjective:     Admission CC:  Severe stenosis at C6/7 with myopathy s/p ACDF of C6/7 with Ortho & ENT on 8/17.    Follow up visit for: Acute on chronic respiratory failure with hypoxia and hypercapnia    Interval History / Events Overnight:   Patient c/o neck pain but controlled with oxycodone - he does not see the need to take tylenol scheduled or otherwise and has been  refusing scheduled doses. Oxycodone alleviates pain.  He agreed to home health previously but now thinks he does not need home health for nursing lab draws due to hospital in [place] near his home and therapy in the past made him more anxious; off supplemental oxygen; wears 2L at home    Review of Systems   Constitutional: Negative for fever.   Cardiovascular: Negative for chest pain.     Objective:     Vital Signs (Most Recent):  Temp: 98.4 °F (36.9 °C) (08/27/20 1100)  Pulse: 76 (08/27/20 1155)  Resp: 16 (08/27/20 0727)  BP: (!) 161/70 (08/27/20 1100)  SpO2: 97 % (08/27/20 1100) Vital Signs (24h Range):  Temp:  [97.7 °F (36.5 °C)-98.9 °F (37.2 °C)] 98.4 °F (36.9 °C)  Pulse:  [58-76] 76  Resp:  [16-18] 16  SpO2:  [93 %-98 %] 97 %  BP: (124-164)/(70-80) 161/70     Weight: 79.4 kg (175 lb 0.7 oz)  Body mass index is 27.42 kg/m².    Intake/Output Summary (Last 24 hours) at 8/27/2020 1523  Last data filed at 8/27/2020 1256  Gross per 24 hour   Intake 720 ml   Output 350 ml   Net 370 ml      Physical Exam  Constitutional:       General: He is not in acute distress.     Appearance: Normal appearance. He is not diaphoretic.   Eyes:      General: Lids are normal. No scleral icterus.        Right eye: No discharge.         Left eye: No discharge.      Conjunctiva/sclera: Conjunctivae normal.   Cardiovascular:      Rate and Rhythm: Normal rate and regular rhythm.   Pulmonary:      Effort: Pulmonary effort is normal. No tachypnea, accessory muscle usage or respiratory distress.      Breath sounds: Normal breath sounds.   Abdominal:      General: There is no distension.   Musculoskeletal:      Right lower leg: No edema.      Left lower leg: No edema.   Skin:     Coloration: Skin is not cyanotic.      Findings: No rash.   Neurological:      General: No focal deficit present.      Mental Status: He is alert and oriented to person, place, and time.      Cranial Nerves: Cranial nerves are intact.      Motor: Motor function is  intact.   Psychiatric:         Attention and Perception: Attention normal.         Mood and Affect: Affect is labile.         Speech: Speech normal.         Behavior: Behavior is cooperative.         Significant Labs:   Recent Labs   Lab 08/25/20 0341 08/26/20  0351 08/27/20  0633   WBC 6.77 7.64 8.69   HGB 10.8* 10.5* 10.6*   HCT 35.2* 34.6* 34.5*   * 433* 500*     Recent Labs   Lab 08/25/20 0341 08/26/20  0351 08/27/20  0633   GRAN 52.9  3.6 54.1  4.1 63.4  5.5   LYMPH 25.0  1.7 25.9  2.0 20.3  1.8   MONO 16.1*  1.1* 14.0  1.1* 10.6  0.9   EOS 0.3 0.3 0.3     Recent Labs   Lab 08/25/20 0341 08/26/20  0351 08/27/20  0633    138 138   K 4.3 3.8 4.7   CL 98 95 97   CO2 31* 30* 32*   BUN 81* 87* 82*   CREATININE 2.5* 2.8* 2.8*   * 115* 145*   CALCIUM 8.3* 8.0* 8.7   ALBUMIN 2.2* 2.1* 2.1*   MG 1.7 1.8 1.9   PHOS 4.8* 4.9* 5.3*     Recent Labs   Lab 08/25/20 0341 08/26/20 0351 08/27/20  0633   ALKPHOS 98 91 89   ALT 22 34 33   AST 46* 58* 42*   PROT 6.1 5.8* 5.7*   BILITOT 0.2 0.2 0.2     Recent Labs   Lab 08/26/20  1340   BNP 67     Recent Labs   Lab 08/18/20  0438 08/26/20  1340   LACTATE 1.2 1.3     SARS-CoV2 (COVID-19) Qualitative PCR (no units)   Date Value   08/14/2020 Not Detected     SARS-CoV-2 RNA, Amplification, Qual (no units)   Date Value   08/18/2020 Negative     Recent Labs   Lab 04/01/20  1137 08/04/20  1156   HGBA1C 6.6* 6.3*  6.3*     Recent Labs   Lab 08/26/20 2058 08/27/20  0725 08/27/20  1140   POCTGLUCOSE 128* 158* 214*     Recent Labs   Lab 08/18/20  0438   TSH 7.711*       Significant Imaging:     Assessment/Plan:      Active Diagnoses:    Diagnosis Date Noted POA    PRINCIPAL PROBLEM:  Acute on chronic respiratory failure with hypoxia and hypercapnia [J96.21, J96.22] 08/18/2020 Yes    Acute respiratory failure with hypoxia [J96.01]  Yes    Impaired functional mobility and endurance [Z74.09] 08/19/2020 Yes    NSTEMI (non-ST elevated myocardial infarction)  [I21.4] 08/19/2020 Yes    Elevated troponin [R79.89] 08/19/2020 Yes    Dysphagia [R13.10] 08/19/2020 No    Stented coronary artery [Z95.5]  Not Applicable    Metastatic renal cell carcinoma [C64.9]  Yes    Acute kidney injury superimposed on chronic kidney disease [N17.9, N18.9] 08/18/2020 Yes    Acute metabolic encephalopathy [G93.41] 08/18/2020 No    Metabolic acidosis, normal anion gap (NAG) [E87.2] 08/18/2020 Yes    Cervical stenosis of spine [M48.02] 07/14/2020 Yes    Neuropathy due to secondary diabetes [E13.40] 09/06/2017 Yes    Benign prostatic hyperplasia (BPH) with urinary urge incontinence [N40.1, N39.41] 06/06/2017 Yes    Acquired hypothyroidism [E03.9] 05/26/2017 Yes    Coronary artery disease involving native coronary artery of native heart without angina pectoris [I25.10] 05/26/2017 Yes    Type 2 diabetes mellitus with stage 3 chronic kidney disease, without long-term current use of insulin [E11.22, N18.3] 05/26/2017 Yes      Problems Resolved During this Admission:       Overview / ICU Course:   Patient was transferred to MICU and started on narcan and bicarbonate infusions. Hypoxia worsened requiring up to 30L comfort flow. Mental status improved and narcan gtt discontinued on 8/18 in AM. PE suspected however CTA not feasible due to renal function. Bilateral lower extremity U/S negative for DVT, however because of quickly worsening O2 requirements and elevated troponin, he was empirically started on a heparin gtt for possible PE. TTE did not show RV strain but did show focal wall motion abnormalities, and troponin continued to uptrend so patient was loaded with DAPT for NSTEMI. CVP 3 on TTE however pulmonary edema on CXR. Restarted diuresis intravenously.  8/20/2020: Patient no longer requiring CPAP (was on 35L/40%). O2Sat 97% with 2L NC. Refers feeling much better than yesterday.  8/21/2020: NAEON. Patient on 3L NC, Sat 98%. Denied fevers, chills, chest pain, SOB, palpitations. Patient  to be stepped down today      Inpatient Medications Prescribed for Management of Current Problems:     Scheduled Meds:    acetaminophen  650 mg Oral Q6H    amLODIPine  10 mg Oral Daily    aspirin  81 mg Oral Daily    carvediloL  6.25 mg Oral BID    clopidogreL  75 mg Oral Daily    dextromethorphan-guaifenesin  mg  1 tablet Oral BID    docusate sodium  50 mg Oral BID    furosemide  40 mg Oral BID    levothyroxine  88 mcg Oral Daily    PAZOpanib  800 mg Oral Before breakfast    rosuvastatin  40 mg Oral QHS    sodium bicarbonate  1,300 mg Oral BID    tamsulosin  0.8 mg Oral Daily     Continuous Infusions:   As Needed: albuterol-ipratropium, bisacodyL, dextrose 50%, dextrose 50%, glucagon (human recombinant), glucose, glucose, hydrALAZINE, insulin aspart U-100, naloxone, ondansetron, oxyCODONE, polyethylene glycol, sodium chloride 0.9%, sodium chloride 0.9%    Assessment and Plan by Problem    Acute on chronic respiratory failure with hypoxia and hypercapnia  Concern for PE as he suddenly required significantly more O2 overnight on 8/17-18. Risk factors include recent surgery and metastatic RCC.   - CTA chest deferred due to VERENA  - Bilat LE U/S negative for DVT  - TTE negative for RV strain with normal PA pressure. Showed chronic HFpEF and focal wall motion abnormalities  - Troponin elevated and peaked at 8.8, BNP mildly elevated (no baseline)  - Was put on heparin gtt for 48hrs. OK'd by Ortho to start A/C so soon after surgery due to rapidly worsening hypoxia  - Wean O2 to goal sat > 90%.   - Lasix, monitor UOP  - back to baseline     NSTEMI (non-ST elevated myocardial infarction)  Coronary artery disease involving native coronary artery of native heart with angina pectoris  Elevated troponin  CAD with history of PCI ~20 years ago. Patient did not have chest pain. Troponin was checked when concerned for PE, initially was 1.2 however continued to uptrend and peaked at 8.8.  - Loaded with ASA and  Plavix, Ok'd by Ortho, and continued  - Continue Coreg,  ASA, rosuvastatin, Plavix, amlodipine   - On statin  - Cardiology consulted. Per Cardiology: with patient's metastatic renal carcinoma and progressive decline recommend conservative management for now.  He possibly developed flash pulmonary edema in the setting of hypertension, apnea, sedation leading to respiratory failure and NSTEMI. For conservative medical management from his ACS standpoint, continue aspirin and Plavix, rosuvastatin and carvedilol. Cardiology aware of repeat echo showing EF 30%; currently not recommending further diuresis 8/26.     Metabolic acidosis, normal anion gap (NAG)  --due to RTA in setting of VERENA on CKD vs recent diarrhea (per recent Nephro clinic note)  --UAG positive (38) suggestive of RTA  --Transition from bicarb gtt to PO sodium bicarb 1300 mg BID     Acute kidney injury superimposed on chronic kidney disease  --baseline creatinine ~ 2  -- ? Etiology. Recent Nephro note states pt has had decreased po intake & diarrhea recently - ? Pre-renal. Pt received maintenance IVF, so likely volume resuscitated at this point  --home Lasix & lisinopril held initially. Then Lasix 100mg daily resumed.  --renal U/S unremarkable  --improving with diuresis; received IV Lasix through 8/25 then sCr increased on 8/26 after continued diuresis. Nephrology consulted and determining home lasix dose on discharge- start lasix 40 mg BID with serial labs.     Benign prostatic hyperplasia (BPH) with urinary urge incontinence  --continue home Flomax     Type 2 diabetes mellitus with stage 3 chronic kidney disease, without long-term current use of insulin  --per Clinic notes, insulin dependent  --home regimen lantus 5 units daily   --currently low dose correction; concerning if actually insulin-dependent as rarely requiring administration for hyperglycemia. AG normal     Acquired hypothyroidism  --continue home levothyroxine  --TSH elevated however fT4  WNL     Dysphagia  SLP eval, tolerating PO intake. Now on a regular diet      Acute metabolic encephalopathy  --suspect medication induced insetting of decreased renal clearance as pt responded well to Narcan. Started on Narcan gtt and discontinued on 8/18 once mental status improved  --hold sedating medications  --resolved     Cervical stenosis of spine  --s/p ACDF on 8/17 with Ortho and ENT; management per Ortho     Neuropathy due to secondary diabetes  --holding home Lyrica in setting of encephalopathy  --when resumed, will need to be renally dosed     Metastatic renal cell carcinoma of L kidney   Followed by Rosa Maria and Joshua.    Diet: Diet diabetic [facility]; 2800 Calorie; Cardiac (Low Na/Chol)  GI Prophylaxis: Not indicated  Significant LDAs:   IV Access Type: Peripheral  Urinary Catheter Indication if present: Patient Does Not Have Urinary Catheter  Other Lines/Tubes/Drains:    HIGH RISK CONDITION(S):   Patient has a condition that poses threat to life and bodily function: Severe Respiratory Distress, NSTEMI, and Acute Renal Failure     Goals of Care:    Previous admission:  8/22/19  Likely prognosis:  Fair  Code Status: Full Code  Comfort Only: No  Hospice: No  Goals at discharge: remain at home, with physician follow-up    Discharge Planning   JAUN: 8/28/2020     Code Status: Full Code   Is the patient medically ready for discharge?: No    Reason for patient still in hospital (select all that apply): Patient trending condition  Discharge Plan A: Home with family   Discharge Delays: None known at this time    VTE Risk Mitigation (From admission, onward)         Ordered     IP VTE HIGH RISK PATIENT  Once      08/17/20 0859     Place sequential compression device  Until discontinued      08/17/20 0516                   Marlena Montenegro MD  Department of Hospital Medicine   [facility]  [phone]

## 2020-08-27 NOTE — PLAN OF CARE
Patient up in the chair for meals today. No complaints of pain. BS covered once per SS. VS stable. Starting lasix this evening will continue to monitor kidney function. Plan of care reviewed with patient .

## 2020-08-27 NOTE — SUBJECTIVE & OBJECTIVE
Interval History: Patient seen and examined at bedside, on room air, Cr stable at 2.8. 550cc of urine output documented over the past 24 hrs.     Review of patient's allergies indicates:  No Known Allergies  Current Facility-Administered Medications   Medication Frequency    acetaminophen tablet 650 mg Q6H    albuterol-ipratropium 2.5 mg-0.5 mg/3 mL nebulizer solution 3 mL Q4H PRN    amLODIPine tablet 10 mg Daily    aspirin chewable tablet 81 mg Daily    bisacodyL suppository 10 mg Daily PRN    carvediloL tablet 6.25 mg BID    clopidogreL tablet 75 mg Daily    dextromethorphan-guaifenesin  mg per 12 hr tablet 1 tablet BID    dextrose 50% injection 12.5 g PRN    dextrose 50% injection 25 g PRN    docusate sodium capsule 50 mg BID    furosemide tablet 40 mg BID    glucagon (human recombinant) injection 1 mg PRN    glucose chewable tablet 16 g PRN    glucose chewable tablet 24 g PRN    hydrALAZINE tablet 25 mg Q8H PRN    insulin aspart U-100 pen 0-5 Units QID (AC + HS) PRN    levothyroxine tablet 88 mcg Daily    naloxone 0.4 mg/mL injection 0.2 mg PRN    ondansetron injection 4 mg Q12H PRN    oxyCODONE immediate release tablet 5 mg Q6H PRN    PAZOPanib 200 mg tab Before breakfast    polyethylene glycol packet 17 g BID PRN    rosuvastatin tablet 40 mg QHS    sodium bicarbonate tablet 1,300 mg BID    sodium chloride 0.9% flush 10 mL PRN    sodium chloride 0.9% flush 5 mL PRN    tamsulosin 24 hr capsule 0.8 mg Daily     Facility-Administered Medications Ordered in Other Encounters   Medication Frequency    phenylephrine HCL 2.5% ophthalmic solution 1 drop On Call Procedure    proparacaine 0.5 % ophthalmic solution 1 drop On Call Procedure    tropicamide 1% ophthalmic solution 1 drop On Call Procedure       Objective:     Vital Signs (Most Recent):  Temp: 98.4 °F (36.9 °C) (08/27/20 1100)  Pulse: 76 (08/27/20 1155)  Resp: 16 (08/27/20 0727)  BP: (!) 161/70 (08/27/20 1100)  SpO2: 97 %  (08/27/20 1100)  O2 Device (Oxygen Therapy): room air (08/27/20 0727) Vital Signs (24h Range):  Temp:  [97.7 °F (36.5 °C)-98.9 °F (37.2 °C)] 98.4 °F (36.9 °C)  Pulse:  [58-76] 76  Resp:  [16-18] 16  SpO2:  [93 %-98 %] 97 %  BP: (124-164)/(70-80) 161/70     Weight: 79.4 kg (175 lb 0.7 oz) (08/27/20 1300)  Body mass index is 27.42 kg/m².  Body surface area is 1.94 meters squared.    I/O last 3 completed shifts:  In: 480 [P.O.:480]  Out: 950 [Urine:950]    Physical Exam  Vitals signs and nursing note reviewed.   Constitutional:       General: He is not in acute distress.     Appearance: He is ill-appearing.   HENT:      Head: Normocephalic and atraumatic.      Right Ear: External ear normal.      Left Ear: External ear normal.      Nose: Nose normal.      Mouth/Throat:      Mouth: Mucous membranes are moist.      Pharynx: Oropharynx is clear.   Eyes:      Conjunctiva/sclera: Conjunctivae normal.      Comments: Pinpoint pupils bilaterally   Neck:      Comments: L side JIN drain present with serosanguinous drainage  Cardiovascular:      Rate and Rhythm: Normal rate and regular rhythm.      Pulses: Normal pulses.      Heart sounds: Normal heart sounds. No murmur.   Pulmonary:      Effort: Bradypnea present.      Breath sounds: Normal breath sounds.      Comments: Multiple episodes of apnea lasting several seconds  Abdominal:      General: Abdomen is flat. Bowel sounds are normal. There is no distension.      Palpations: Abdomen is soft.      Tenderness: There is no abdominal tenderness.   Genitourinary:     Penis: Normal.    Musculoskeletal: Normal range of motion.         General: No swelling or tenderness.   Skin:     General: Skin is warm and dry.      Capillary Refill: Capillary refill takes less than 2 seconds.   Neurological:      GCS: GCS eye subscore is 3. GCS verbal subscore is 4. GCS motor subscore is 6.      Motor: Motor function is intact.   Psychiatric:         Cognition and Memory: Cognition is impaired.          Significant Labs:  CBC:   Recent Labs   Lab 08/27/20  0633   WBC 8.69   RBC 3.26*   HGB 10.6*   HCT 34.5*   *   *   MCH 32.5*   MCHC 30.7*     CMP:   Recent Labs   Lab 08/27/20  0633   *   CALCIUM 8.7   ALBUMIN 2.1*   PROT 5.7*      K 4.7   CO2 32*   CL 97   BUN 82*   CREATININE 2.8*   ALKPHOS 89   ALT 33   AST 42*   BILITOT 0.2     Coagulation: No results for input(s): PT, INR, APTT in the last 168 hours.  All labs within the past 24 hours have been reviewed.     Significant Imaging:  Labs: Reviewed  X-Ray: Reviewed

## 2020-08-27 NOTE — PROGRESS NOTES
"  [facility]  Adult Nutrition  Consult Note    SUMMARY     Recommendations    1. Continue current Diabetic/Cardiac diet.   2. RD to monitor & follow-up.    Goals: Meet % EEN, EPN by RD f/u date  Nutrition Goal Status: new  Communication of RD Recs: reviewed with RN    Reason for Assessment    Reason For Assessment: length of stay  Diagnosis: other (see comments)(Resp. fx)  Relevant Medical History: RCC, DM  Interdisciplinary Rounds: did not attend    General Information Comments: Pt tolerating diet w/ 100% PO intake. Reports good appetite & stable wt PTA (UBW: 170#). NFPE not warranted - pt does report 60# wt loss x 4 years ago, however pt doesnt meet malnutrition criteria at this time. 10 days post-op discectomy.   Nutrition Discharge Planning: Adequate PO intake    Nutrition/Diet History    Patient Reported Diet/Restrictions/Preferences: general  Spiritual, Cultural Beliefs, Confucianism Practices, Values that Affect Care: no  Factors Affecting Nutritional Intake: None identified at this time    Anthropometrics    Temp: 98.4 °F (36.9 °C)  Height Method: Stated  Height: 5' 7" (170.2 cm)  Height (inches): 67 in  Weight Method: Bed Scale  Weight: 79.4 kg (175 lb 0.7 oz)  Weight (lb): 175.05 lb  Ideal Body Weight (IBW), Male: 148 lb  % Ideal Body Weight, Male (lb): 118.28 %  BMI (Calculated): 27.4  BMI Grade: 25 - 29.9 - overweight  Usual Body Weight (UBW), k.3 kg  % Usual Body Weight: 102.93  % Weight Change From Usual Weight: 2.72 %    Lab/Procedures/Meds    Pertinent Labs Reviewed: reviewed  Pertinent Labs Comments: BUN 87, Creat 2.8, GFR 21.1, P 4.9, A1C 6.3  Pertinent Medications Reviewed: reviewed    Estimated/Assessed Needs    Weight Used For Calorie Calculations: 79.4 kg (175 lb 0.7 oz)     Energy Calorie Requirements (kcal): 1860 kcal/d  Energy Need Method: [place](1.25 PAL)     Protein Requirements: 79-95 g/d (1-1.2 g/kg)  Weight Used For Protein Calculations: 79.4 kg (175 " lb 0.7 oz)     Estimated Fluid Requirement Method: other (see comments)(Per MD or 1 mL/kcal)     Nutrition Prescription Ordered    Current Diet Order: 2800 kcal ADA, Cardiac    Evaluation of Received Nutrient/Fluid Intake    Comments: LBM: 8/26    Tolerance: tolerating    Nutrition Risk    Level of Risk/Frequency of Follow-up: (1x/week)     Assessment and Plan    No nutritional dx at this time.      Monitor and Evaluation    Food and Nutrient Intake: energy intake, food and beverage intake  Food and Nutrient Adminstration: diet order  Physical Activity and Function: nutrition-related ADLs and IADLs  Anthropometric Measurements: weight, weight change  Biochemical Data, Medical Tests and Procedures: inflammatory profile, lipid profile, glucose/endocrine profile, gastrointestinal profile, electrolyte and renal panel  Nutrition-Focused Physical Findings: overall appearance     Nutrition Follow-Up    RD Follow-up?: Yes

## 2020-08-28 LAB
ALBUMIN SERPL BCP-MCNC: 2 G/DL (ref 3.5–5.2)
ALP SERPL-CCNC: 83 U/L (ref 55–135)
ALT SERPL W/O P-5'-P-CCNC: 27 U/L (ref 10–44)
ANION GAP SERPL CALC-SCNC: 11 MMOL/L (ref 8–16)
AST SERPL-CCNC: 34 U/L (ref 10–40)
BASOPHILS # BLD AUTO: 0.04 K/UL (ref 0–0.2)
BASOPHILS NFR BLD: 0.4 % (ref 0–1.9)
BILIRUB SERPL-MCNC: 0.2 MG/DL (ref 0.1–1)
BUN SERPL-MCNC: 85 MG/DL (ref 8–23)
CALCIUM SERPL-MCNC: 8.5 MG/DL (ref 8.7–10.5)
CHLORIDE SERPL-SCNC: 99 MMOL/L (ref 95–110)
CO2 SERPL-SCNC: 29 MMOL/L (ref 23–29)
CREAT SERPL-MCNC: 2.6 MG/DL (ref 0.5–1.4)
DIFFERENTIAL METHOD: ABNORMAL
EOSINOPHIL # BLD AUTO: 0.2 K/UL (ref 0–0.5)
EOSINOPHIL NFR BLD: 2 % (ref 0–8)
ERYTHROCYTE [DISTWIDTH] IN BLOOD BY AUTOMATED COUNT: 14.1 % (ref 11.5–14.5)
EST. GFR  (AFRICAN AMERICAN): 26.7 ML/MIN/1.73 M^2
EST. GFR  (NON AFRICAN AMERICAN): 23.1 ML/MIN/1.73 M^2
GLUCOSE SERPL-MCNC: 149 MG/DL (ref 70–110)
HCT VFR BLD AUTO: 29.5 % (ref 40–54)
HCT VFR BLD AUTO: 32.1 % (ref 40–54)
HGB BLD-MCNC: 9 G/DL (ref 14–18)
HGB BLD-MCNC: 9.7 G/DL (ref 14–18)
IMM GRANULOCYTES # BLD AUTO: 0.14 K/UL (ref 0–0.04)
IMM GRANULOCYTES NFR BLD AUTO: 1.4 % (ref 0–0.5)
LYMPHOCYTES # BLD AUTO: 1.7 K/UL (ref 1–4.8)
LYMPHOCYTES NFR BLD: 17.4 % (ref 18–48)
MAGNESIUM SERPL-MCNC: 1.7 MG/DL (ref 1.6–2.6)
MCH RBC QN AUTO: 32.4 PG (ref 27–31)
MCHC RBC AUTO-ENTMCNC: 30.5 G/DL (ref 32–36)
MCV RBC AUTO: 106 FL (ref 82–98)
MONOCYTES # BLD AUTO: 0.8 K/UL (ref 0.3–1)
MONOCYTES NFR BLD: 7.8 % (ref 4–15)
NEUTROPHILS # BLD AUTO: 7.1 K/UL (ref 1.8–7.7)
NEUTROPHILS NFR BLD: 71 % (ref 38–73)
NRBC BLD-RTO: 0 /100 WBC
PHOSPHATE SERPL-MCNC: 5.1 MG/DL (ref 2.7–4.5)
PLATELET # BLD AUTO: 467 K/UL (ref 150–350)
PMV BLD AUTO: 10.3 FL (ref 9.2–12.9)
POCT GLUCOSE: 143 MG/DL (ref 70–110)
POCT GLUCOSE: 161 MG/DL (ref 70–110)
POCT GLUCOSE: 183 MG/DL (ref 70–110)
POCT GLUCOSE: 189 MG/DL (ref 70–110)
POCT GLUCOSE: 218 MG/DL (ref 70–110)
POTASSIUM SERPL-SCNC: 4.1 MMOL/L (ref 3.5–5.1)
PROT SERPL-MCNC: 5.4 G/DL (ref 6–8.4)
RBC # BLD AUTO: 2.78 M/UL (ref 4.6–6.2)
SODIUM SERPL-SCNC: 139 MMOL/L (ref 136–145)
WBC # BLD AUTO: 10 K/UL (ref 3.9–12.7)

## 2020-08-28 PROCEDURE — 94761 N-INVAS EAR/PLS OXIMETRY MLT: CPT | Mod: HCNC

## 2020-08-28 PROCEDURE — 20600001 HC STEP DOWN PRIVATE ROOM: Mod: HCNC

## 2020-08-28 PROCEDURE — 25000003 PHARM REV CODE 250: Mod: HCNC | Performed by: STUDENT IN AN ORGANIZED HEALTH CARE EDUCATION/TRAINING PROGRAM

## 2020-08-28 PROCEDURE — 25000003 PHARM REV CODE 250: Mod: HCNC | Performed by: INTERNAL MEDICINE

## 2020-08-28 PROCEDURE — 36415 COLL VENOUS BLD VENIPUNCTURE: CPT | Mod: HCNC

## 2020-08-28 PROCEDURE — 85018 HEMOGLOBIN: CPT | Mod: HCNC

## 2020-08-28 PROCEDURE — 99232 PR SUBSEQUENT HOSPITAL CARE,LEVL II: ICD-10-PCS | Mod: HCNC,,, | Performed by: INTERNAL MEDICINE

## 2020-08-28 PROCEDURE — 99231 PR SUBSEQUENT HOSPITAL CARE,LEVL I: ICD-10-PCS | Mod: HCNC,,, | Performed by: INTERNAL MEDICINE

## 2020-08-28 PROCEDURE — 25000003 PHARM REV CODE 250: Mod: HCNC | Performed by: NURSE PRACTITIONER

## 2020-08-28 PROCEDURE — 85025 COMPLETE CBC W/AUTO DIFF WBC: CPT | Mod: HCNC

## 2020-08-28 PROCEDURE — 84100 ASSAY OF PHOSPHORUS: CPT | Mod: HCNC

## 2020-08-28 PROCEDURE — 80053 COMPREHEN METABOLIC PANEL: CPT | Mod: HCNC

## 2020-08-28 PROCEDURE — 99231 SBSQ HOSP IP/OBS SF/LOW 25: CPT | Mod: HCNC,,, | Performed by: INTERNAL MEDICINE

## 2020-08-28 PROCEDURE — 99232 SBSQ HOSP IP/OBS MODERATE 35: CPT | Mod: HCNC,,, | Performed by: INTERNAL MEDICINE

## 2020-08-28 PROCEDURE — 85014 HEMATOCRIT: CPT | Mod: HCNC

## 2020-08-28 PROCEDURE — 83735 ASSAY OF MAGNESIUM: CPT | Mod: HCNC

## 2020-08-28 RX ORDER — OXYCODONE HYDROCHLORIDE 5 MG/1
5 TABLET ORAL EVERY 6 HOURS PRN
Qty: 25 TABLET | Refills: 0 | Status: SHIPPED | OUTPATIENT
Start: 2020-08-28 | End: 2020-09-08

## 2020-08-28 RX ORDER — POLYETHYLENE GLYCOL 3350 17 G/17G
17 POWDER, FOR SOLUTION ORAL 2 TIMES DAILY PRN
Refills: 0 | COMMUNITY
Start: 2020-08-28 | End: 2021-01-01

## 2020-08-28 RX ORDER — FLUTICASONE PROPIONATE 50 MCG
1 SPRAY, SUSPENSION (ML) NASAL DAILY PRN
Start: 2020-08-28

## 2020-08-28 RX ORDER — AZELASTINE 1 MG/ML
1 SPRAY, METERED NASAL 2 TIMES DAILY PRN
Qty: 30 ML | Refills: 3
Start: 2020-08-28 | End: 2021-01-01

## 2020-08-28 RX ORDER — FUROSEMIDE 40 MG/1
40 TABLET ORAL 2 TIMES DAILY
Qty: 60 TABLET | Refills: 11 | Status: SHIPPED | OUTPATIENT
Start: 2020-08-28 | End: 2020-09-16 | Stop reason: ALTCHOICE

## 2020-08-28 RX ORDER — LIDOCAINE 50 MG/G
2 PATCH TOPICAL DAILY
Qty: 30 PATCH | Refills: 1 | Status: SHIPPED | OUTPATIENT
Start: 2020-08-29 | End: 2021-01-01

## 2020-08-28 RX ORDER — LIDOCAINE 50 MG/G
2 PATCH TOPICAL DAILY
Status: DISCONTINUED | OUTPATIENT
Start: 2020-08-28 | End: 2020-08-29

## 2020-08-28 RX ORDER — CLOPIDOGREL BISULFATE 75 MG/1
75 TABLET ORAL DAILY
Qty: 30 TABLET | Refills: 11 | Status: SHIPPED | OUTPATIENT
Start: 2020-08-29 | End: 2020-09-25 | Stop reason: SDUPTHER

## 2020-08-28 RX ORDER — SODIUM BICARBONATE 650 MG/1
650 TABLET ORAL 2 TIMES DAILY
Status: DISCONTINUED | OUTPATIENT
Start: 2020-08-28 | End: 2020-08-30

## 2020-08-28 RX ORDER — CARVEDILOL 6.25 MG/1
6.25 TABLET ORAL 2 TIMES DAILY
Qty: 60 TABLET | Refills: 11 | Status: SHIPPED | OUTPATIENT
Start: 2020-08-28 | End: 2020-09-16 | Stop reason: DRUGHIGH

## 2020-08-28 RX ADMIN — CARVEDILOL 6.25 MG: 6.25 TABLET, FILM COATED ORAL at 08:08

## 2020-08-28 RX ADMIN — SODIUM BICARBONATE 650 MG TABLET 650 MG: at 09:08

## 2020-08-28 RX ADMIN — TAMSULOSIN HYDROCHLORIDE 0.8 MG: 0.4 CAPSULE ORAL at 08:08

## 2020-08-28 RX ADMIN — OXYCODONE 5 MG: 5 TABLET ORAL at 08:08

## 2020-08-28 RX ADMIN — DOCUSATE SODIUM 50 MG: 50 CAPSULE, LIQUID FILLED ORAL at 08:08

## 2020-08-28 RX ADMIN — FUROSEMIDE 40 MG: 40 TABLET ORAL at 08:08

## 2020-08-28 RX ADMIN — GUAIFENESIN AND DEXTROMETHORPHAN HYDROBROMIDE 1 TABLET: 600; 30 TABLET, EXTENDED RELEASE ORAL at 09:08

## 2020-08-28 RX ADMIN — OXYCODONE 5 MG: 5 TABLET ORAL at 09:08

## 2020-08-28 RX ADMIN — ASPIRIN 81 MG CHEWABLE TABLET 81 MG: 81 TABLET CHEWABLE at 08:08

## 2020-08-28 RX ADMIN — ROSUVASTATIN CALCIUM 40 MG: 20 TABLET, FILM COATED ORAL at 09:08

## 2020-08-28 RX ADMIN — LEVOTHYROXINE SODIUM 88 MCG: 0.09 TABLET ORAL at 08:08

## 2020-08-28 RX ADMIN — INSULIN ASPART 2 UNITS: 100 INJECTION, SOLUTION INTRAVENOUS; SUBCUTANEOUS at 12:08

## 2020-08-28 RX ADMIN — GUAIFENESIN AND DEXTROMETHORPHAN HYDROBROMIDE 1 TABLET: 600; 30 TABLET, EXTENDED RELEASE ORAL at 08:08

## 2020-08-28 RX ADMIN — CLOPIDOGREL 75 MG: 75 TABLET, FILM COATED ORAL at 08:08

## 2020-08-28 RX ADMIN — AMLODIPINE BESYLATE 10 MG: 10 TABLET ORAL at 08:08

## 2020-08-28 RX ADMIN — OXYCODONE 5 MG: 5 TABLET ORAL at 02:08

## 2020-08-28 RX ADMIN — DOCUSATE SODIUM 50 MG: 50 CAPSULE, LIQUID FILLED ORAL at 09:08

## 2020-08-28 RX ADMIN — LIDOCAINE 2 PATCH: 50 PATCH TOPICAL at 12:08

## 2020-08-28 RX ADMIN — FUROSEMIDE 40 MG: 40 TABLET ORAL at 06:08

## 2020-08-28 RX ADMIN — SODIUM BICARBONATE 650 MG TABLET 1300 MG: at 08:08

## 2020-08-28 RX ADMIN — CARVEDILOL 6.25 MG: 6.25 TABLET, FILM COATED ORAL at 09:08

## 2020-08-28 NOTE — PLAN OF CARE
"[facility]    HOME HEALTH ORDERS  FACE TO FACE ENCOUNTER    Patient Name: Edwin Powell  YOB: 1945    PCP: Lulú Lewis MD   PCP Address: [address] / [place] [address]  PCP Phone Number: [phone]  PCP Fax: [phone]    Encounter Date: 08/28/2020    Admit to Home Health    Diagnoses:  Active Hospital Problems    Diagnosis  POA    *Acute on chronic respiratory failure with hypoxia and hypercapnia [J96.21, J96.22]  Yes    S/P cervical spinal fusion [Z98.1]  Not Applicable    Acute respiratory failure with hypoxia [J96.01]  Yes    Impaired functional mobility and endurance [Z74.09]  Yes    NSTEMI (non-ST elevated myocardial infarction) [I21.4]  Yes    Elevated troponin [R79.89]  Yes    Dysphagia [R13.10]  No    Stented coronary artery [Z95.5]  Not Applicable    Metastatic renal cell carcinoma [C64.9]  Yes    Acute kidney injury superimposed on chronic kidney disease [N17.9, N18.9]  Yes    Acute metabolic encephalopathy [G93.41]  No    Metabolic acidosis, normal anion gap (NAG) [E87.2]  Yes    Cervical stenosis of spine [M48.02]  Yes     Seen on MRI 5/9/20: " Multilevel cervical spondylosis most prominent at C6-C7 resulting in severe spinal canal stenosis and severe bilateral neural foraminal narrowing."      Neuropathy due to secondary diabetes [E13.40]  Yes     LE numbness and tingling, elevated A1c, followed by podiatry      Benign prostatic hyperplasia (BPH) with urinary urge incontinence [N40.1, N39.41]  Yes     Patient with LUTS, compliant with flomax      Acquired hypothyroidism [E03.9]  Yes     Elevated TSH in 6/2017, 8/2017, 11/2017, 3/2018      Coronary artery disease involving native coronary artery of native heart without angina pectoris [I25.10]  Yes     [facility] S/p 5 stents 2010. Anginal equivalent controlled with CCB, ACE, APT      Type 2 diabetes mellitus with stage 3 chronic kidney disease, without long-term current use of " insulin [E11.22, N18.3]  Yes     A1c 11.1 in 5/2017. Weaning off Lantus during chemo, but needed to restart it with weight gain         Resolved Hospital Problems   No resolved problems to display.       Future Appointments   Date Time Provider Department Center   9/2/2020  1:00 PM HOSPITAL LAB, [facility] LAB [facility]   9/4/2020  1:30 PM Rahel Reed MD [facility] [id number] [facility]   9/18/2020  1:00 PM SAME DAY LAB, [facility] LAB [facility]   9/18/2020  1:45 PM Lary Vickers DPM [facility] POD [place]   9/30/2020 11:00 AM Lulú Lewis MD [facility] MED [facility] PCW           I have seen and examined this patient face to face today. My clinical findings that support the need for the home health skilled services and home bound status are the following:  Weakness/numbness causing balance and gait disturbance due to Heart Failure, Coronary Heart Disease, Weakness/Debility and Surgery making it taxing to leave home.    Allergies:Review of patient's allergies indicates:  No Known Allergies    Diet: cardiac diet and diabetic diet: 2200 calorie    Activities: activity as tolerated    Nursing:   SN to complete comprehensive assessment including routine vital signs. Instruct on disease process and s/s of complications to report to MD. Review/verify medication list sent home with the patient at time of discharge  and instruct patient/caregiver as needed. Frequency may be adjusted depending on start of care date. Monitor SpO2.    Notify MD if SBP > 160 or < 90; DBP > 90 or < 50; HR > 120 or < 50; Temp > 101; Other:   SpO2 < 90%      CONSULTS:    Physical Therapy to evaluate and treat. Evaluate for home safety and equipment needs; Establish/upgrade home exercise program. Perform / instruct on therapeutic exercises, gait training, transfer training, and Range of Motion.  Occupational Therapy to evaluate and treat. Evaluate home environment for safety and equipment needs. Perform/Instruct on transfers, ADL training, ROM,  and therapeutic exercises.  Aide to provide assistance with personal care, ADLs, and vital signs.    MISCELLANEOUS CARE:  Diabetic Care:   SN to perform and educate Diabetic management with blood glucose monitoring: and Report CBG < 60 or > 350 to physician.       Heart Failure:      SN to instruct on the following:    Instruct on the definition of CHF.   Instruct on the signs/sympoms of CHF to be reported.   Instruct on and monitor daily weights.   Instruct on factors that cause exacerbation.   Instruct on action, dose, schedule, and side effects of medications.   Instruct on diet as prescribed.   Instruct on activity allowed.   Instruct on life-style modifications for life long management of CHF   SN to assess compliance with daily weights, diet, medications, fluid retention,    safety precautions, activities permitted and life-style modifications.   Additional 1-2 SN visits per week as needed for signs and symptoms     of CHF exacerbation.    For Weight Gain > 2-3 lbs in 1 day or 4-6 lbs over 1 week notify PCP:      LABS:  Draw Renal Function Panel on 9/1 with results to Dr. Leonardo, Ph: [phone], Fax: [phone]      WOUND CARE ORDERS  n/a      Medications: Review discharge medications with patient and family and provide education.      Current Discharge Medication List      CONTINUE these medications which have NOT CHANGED    Details   amLODIPine (NORVASC) 10 MG tablet Take 1 tablet (10 mg total) by mouth once daily.  Qty: 1 tablet, Refills: 0    Comments: .  Associated Diagnoses: Essential hypertension      ascorbic acid (VITAMIN C ORAL) Take by mouth once daily. Hold for 1 week prior to surgery      azelastine (ASTELIN) 137 mcg (0.1 %) nasal spray 1 spray (137 mcg total) by Nasal route 2 (two) times daily.  Qty: 30 mL, Refills: 3    Associated Diagnoses: Allergic state, initial encounter      b complex vitamins (B COMPLEX 1) tablet Take 1 tablet by mouth once daily.  Qty: 90 tablet, Refills: 3     Associated Diagnoses: Macrocytic anemia; B12 deficiency      cholecalciferol, vitamin D3, (VITAMIN D3) 125 mcg (5,000 unit) Tab Take 1 tablet (5,000 Units total) by mouth once daily.  Qty: 90 tablet, Refills: 3    Associated Diagnoses: Vitamin D deficiency      furosemide (LASIX) 20 MG tablet Take 1 tablet (20 mg total) by mouth once daily.  Qty: 90 tablet, Refills: 1      lisinopriL (PRINIVIL,ZESTRIL) 40 MG tablet Take 1 tablet (40 mg total) by mouth once daily.  Qty: 1 tablet, Refills: 0    Comments: .  Associated Diagnoses: Essential hypertension      montelukast (SINGULAIR) 10 mg tablet Take 10 mg by mouth every evening.      multivitamin (THERAGRAN) per tablet Take 1 tablet by mouth once daily.    Associated Diagnoses: Macrocytic anemia; B12 deficiency      PAZOpanib (VOTRIENT) 200 mg Tab Take 4 tablets (800 mg total) by mouth once daily.  Qty: 120 tablet, Refills: 5    Associated Diagnoses: Renal cell carcinoma of left kidney; Metastatic renal cell carcinoma to bone; Metastatic renal cell carcinoma to liver      rosuvastatin (CRESTOR) 40 MG Tab Take 1 tablet (40 mg total) by mouth every evening.  Qty: 90 tablet, Refills: 3    Associated Diagnoses: Hyperlipidemia due to type 2 diabetes mellitus      tamsulosin (FLOMAX) 0.4 mg Cap Take 2 capsules (0.8 mg total) by mouth every evening.  Qty: 180 capsule, Refills: 3    Associated Diagnoses: Benign prostatic hyperplasia (BPH) with urinary urge incontinence      vitamin E acetate (VITAMIN E ORAL) Take by mouth once daily. Hold for 1 week prior to surgery      aspirin (ECOTRIN) 81 MG EC tablet Take 81 mg by mouth once daily. Will ask MD to hold 1 week      blood sugar diagnostic Strp 1 strip by Misc.(Non-Drug; Combo Route) route 2 (two) times daily as needed.  Qty: 200 strip, Refills: 3    Associated Diagnoses: Type 2 diabetes mellitus with stage 3 chronic kidney disease, with long-term current use of insulin; Type 2 diabetes mellitus with hyperglycemia, with  long-term current use of insulin      blood-glucose meter Misc use as instructed  Qty: 1 each, Refills: 0    Comments: NATA Accuchek or any glucometer covered by insurance  Associated Diagnoses: Type 2 diabetes mellitus with stage 3 chronic kidney disease, with long-term current use of insulin; Type 2 diabetes mellitus with hyperglycemia, with long-term current use of insulin      clindamycin (CLEOCIN T) 1 % external solution AAA bid  Qty: 60 mL, Refills: 3    Associated Diagnoses: Folliculitis      diphenhydramine HCl (BENADRYL ALLERGY ORAL) Take by mouth daily as needed. Hold am of surgery      fluticasone propionate (FLONASE) 50 mcg/actuation nasal spray 1 SPRAY (50 MCG TOTAL) BY EACH NARE ROUTE ONCE DAILY.  Qty: 16 mL, Refills: 3    Associated Diagnoses: Nasal congestion      HYDROcodone-acetaminophen (NORCO) 5-325 mg per tablet 1 tablet PO q8h prn pain. Quantity Medically Necessary.  Qty: 120 tablet, Refills: 0    Comments: Quantity prescribed more than 7 day supply? Yes, quantity medically necessary  Associated Diagnoses: Metastatic renal cell carcinoma to liver; Neoplasm related pain      insulin (LANTUS SOLOSTAR U-100 INSULIN) glargine 100 units/mL (3mL) SubQ pen Inject 5 Units into the skin every evening.  Qty: 5 mL, Refills: 11    Associated Diagnoses: Type 2 diabetes mellitus with hyperglycemia, with long-term current use of insulin      lancets (ONETOUCH ULTRASOFT LANCETS) Misc 1 lancet by Misc.(Non-Drug; Combo Route) route 2 (two) times daily as needed.  Qty: 200 each, Refills: 3    Associated Diagnoses: Type 2 diabetes mellitus with stage 3 chronic kidney disease, with long-term current use of insulin; Type 2 diabetes mellitus with hyperglycemia, with long-term current use of insulin      levocetirizine (XYZAL) 5 MG tablet Take 1 tablet (5 mg total) by mouth every evening. Can take up to 2 tablets  Qty: 30 tablet, Refills: 11    Associated Diagnoses: Allergic state, initial encounter     "  levothyroxine (SYNTHROID) 88 MCG tablet Take 1 tablet (88 mcg total) by mouth once daily.  Qty: 90 tablet, Refills: 3    Associated Diagnoses: Acquired hypothyroidism      morphine (MS CONTIN) 15 MG 12 hr tablet Take 1 tablet (15 mg total) by mouth every 12 (twelve) hours. Medically Necessary  Qty: 60 tablet, Refills: 0    Comments: Quantity prescribed more than 7 day supply? Yes, quantity medically necessary  Associated Diagnoses: Neoplasm related pain      ondansetron (ZOFRAN) 8 MG tablet Take 1 tablet (8 mg total) by mouth 3 (three) times daily as needed.  Qty: 90 tablet, Refills: 3    Associated Diagnoses: Chemotherapy-induced nausea and vomiting      pen needle, diabetic 29 gauge x 1/2" Ndle 1 application by Misc.(Non-Drug; Combo Route) route once daily.  Qty: 100 each, Refills: 3    Associated Diagnoses: Type 2 diabetes mellitus with stage 3 chronic kidney disease, with long-term current use of insulin; Type 2 diabetes mellitus with hyperglycemia, with long-term current use of insulin             I certify that this patient is confined to his home and needs intermittent skilled nursing care, physical therapy and occupational therapy.    Marlena Montenegro MD  Hospital Medicine [facility]  [phone]    "

## 2020-08-28 NOTE — PT/OT/SLP PROGRESS
"Physical Therapy      Patient Name:  Edwin Powell   MRN:  [id number]    Patient not seen today secondary to Patient unwilling to participate. PT provided maximal education and encouragement from 15:. Patient stating "not today" and that he has too much pain between shoulder blades. Rated pain 7/10. Will follow-up as scheduled, available and appropriate for therapy.     Steff Benjamin, PT    "

## 2020-08-28 NOTE — PLAN OF CARE
08/28/20 1816   Discharge Reassessment   Assessment Type Discharge Planning Reassessment   Provided patient/caregiver education on the expected discharge date and the discharge plan Yes   Do you have any problems affording any of your prescribed medications? No   Discharge Plan A Home Health   Discharge Plan B Home with family   DME Needed Upon Discharge  other (see comments)  (TBD)   Anticipated Discharge Disposition Home-Health   Post-Acute Status   Post-Acute Authorization Home Health   Home Health Status Awaiting Internal Medical Clearance   Discharge Delays None known at this time

## 2020-08-28 NOTE — PROGRESS NOTES
[facility] Hospital Medicine  Telemedicine Progress Note    Patient Name: Edwin Powell  MRN: [id number]  Patient Class: IP- Inpatient   Admission Date: 8/17/2020  Length of Stay: 11 days  Attending Physician: Marlena Montenegro MD  Primary Care Provider: Lulú Lewis MD    [facility] Medicine Team: [facility] VIRTUAL TEAM 10 Marlena Montenegro MD    Provider visited at bedside.    Patient was transferred to the telemedicine service on: 8/26/2020    HPI:  75 y.o. 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 Patient underwent ACDF of C6/7 with Ortho & ENT on 8/17. During the procedure he received rocuronium, ketamine, fentanyl, propofol and midazolam. He required a renae infusion during the procedure. Post-operatively the patient was admitted to post-op Ortho unit and was placed on his home 2L O2. After his procedure he received multiple doses of oxycodone for his neck pain. Overnight on 8/17-8/18 the patient had multiple periods of apnea requiring 3 doses of Narcan on the floor with minimal improvement in respiratory and mental status. ABG showed combined metabolic and respiratory acidosis. No significant improvement in acidosis with initiation of Bipap on the floor and patient continued to have apneic periods and worsening hypoxia requiring 15L NC. He was transferred to ICU for further management.      Subjective:     Admission CC:  Severe stenosis at C6/7 with myopathy s/p ACDF of C6/7 with Ortho & ENT on 8/17.    Follow up visit for: Acute on chronic respiratory failure with hypoxia and hypercapnia    Interval History / Events Overnight:   Patient in chair and son at bedside during visit. He complains of reproducible upper back pain along bilateral scapula.  Lidoderm  patch ordered.  He remains off oxygen and does NOT have oxygen in the home, confirmed with patient and son.  He is agreeable to home health today. Anticipate discharge in am if Cr stable on Lasix. H/H low this am on labs but near baseline on repeat. No signs of blood loss, melena/BRBPR per patient.       Review of Systems   Constitutional: Negative for fever.   Cardiovascular: Negative for chest pain.     Objective:     Vital Signs (Most Recent):  Temp: 98.4 °F (36.9 °C) (08/28/20 1100)  Pulse: 68 (08/28/20 1100)  Resp: 16 (08/28/20 1100)  BP: (!) 152/67 (08/28/20 1100)  SpO2: 95 % (08/28/20 1100) Vital Signs (24h Range):  Temp:  [97.7 °F (36.5 °C)-99.8 °F (37.7 °C)] 98.4 °F (36.9 °C)  Pulse:  [62-85] 68  Resp:  [16-20] 16  SpO2:  [90 %-97 %] 95 %  BP: (125-168)/(63-74) 152/67     Weight: 79.4 kg (175 lb 0.7 oz)  Body mass index is 27.42 kg/m².    Intake/Output Summary (Last 24 hours) at 8/28/2020 1343  Last data filed at 8/28/2020 1215  Gross per 24 hour   Intake 1060 ml   Output 1050 ml   Net 10 ml      Physical Exam  Constitutional:       General: He is not in acute distress.     Appearance: Normal appearance. He is not diaphoretic.   Eyes:      General: Lids are normal. No scleral icterus.        Right eye: No discharge.         Left eye: No discharge.      Conjunctiva/sclera: Conjunctivae normal.   Cardiovascular:      Rate and Rhythm: Normal rate and regular rhythm.   Pulmonary:      Effort: Pulmonary effort is normal. No tachypnea, accessory muscle usage or respiratory distress.      Breath sounds: Normal breath sounds.   Abdominal:      General: There is no distension.   Musculoskeletal:      Right lower leg: No edema.      Left lower leg: No edema.   Skin:     Coloration: Skin is not cyanotic.      Findings: No rash.   Neurological:      General: No focal deficit present.      Mental Status: He is alert and oriented to person, place, and time.      Cranial Nerves: Cranial nerves are intact.      Motor:  Motor function is intact.   Psychiatric:         Attention and Perception: Attention normal.         Mood and Affect: Affect is labile.         Speech: Speech normal.         Behavior: Behavior is cooperative.         Significant Labs:   Recent Labs   Lab 08/26/20 0351 08/27/20 0633 08/28/20  0339 08/28/20  0829   WBC 7.64 8.69 10.00  --    HGB 10.5* 10.6* 9.0* 9.7*   HCT 34.6* 34.5* 29.5* 32.1*   * 500* 467*  --      Recent Labs   Lab 08/26/20 0351 08/27/20  0633 08/28/20  0339   GRAN 54.1  4.1 63.4  5.5 71.0  7.1   LYMPH 25.9  2.0 20.3  1.8 17.4*  1.7   MONO 14.0  1.1* 10.6  0.9 7.8  0.8   EOS 0.3 0.3 0.2     Recent Labs   Lab 08/26/20 0351 08/27/20 0633 08/28/20  0339    138 139   K 3.8 4.7 4.1   CL 95 97 99   CO2 30* 32* 29   BUN 87* 82* 85*   CREATININE 2.8* 2.8* 2.6*   * 145* 149*   CALCIUM 8.0* 8.7 8.5*   ALBUMIN 2.1* 2.1* 2.0*   MG 1.8 1.9 1.7   PHOS 4.9* 5.3* 5.1*     Recent Labs   Lab 08/26/20 0351 08/27/20 0633 08/28/20  0339   ALKPHOS 91 89 83   ALT 34 33 27   AST 58* 42* 34   PROT 5.8* 5.7* 5.4*   BILITOT 0.2 0.2 0.2     Recent Labs   Lab 08/26/20  1340   BNP 67     Recent Labs   Lab 08/18/20  0438 08/26/20  1340   LACTATE 1.2 1.3     SARS-CoV2 (COVID-19) Qualitative PCR (no units)   Date Value   08/14/2020 Not Detected     SARS-CoV-2 RNA, Amplification, Qual (no units)   Date Value   08/18/2020 Negative     Recent Labs   Lab 04/01/20  1137 08/04/20  1156   HGBA1C 6.6* 6.3*  6.3*     Recent Labs   Lab 08/27/20  2149 08/28/20  0721 08/28/20  1134   POCTGLUCOSE 136* 143* 218*     Recent Labs   Lab 08/18/20  0438   TSH 7.711*       Significant Imaging:     Assessment/Plan:      Active Diagnoses:    Diagnosis Date Noted POA    PRINCIPAL PROBLEM:  Acute on chronic respiratory failure with hypoxia and hypercapnia [J96.21, J96.22] 08/18/2020 Yes    S/P cervical spinal fusion [Z98.1]  Not Applicable    Acute respiratory failure with hypoxia [J96.01]  Yes    Impaired  functional mobility and endurance [Z74.09] 08/19/2020 Yes    NSTEMI (non-ST elevated myocardial infarction) [I21.4] 08/19/2020 Yes    Elevated troponin [R79.89] 08/19/2020 Yes    Dysphagia [R13.10] 08/19/2020 No    Stented coronary artery [Z95.5]  Not Applicable    Metastatic renal cell carcinoma [C64.9]  Yes    Acute kidney injury superimposed on chronic kidney disease [N17.9, N18.9] 08/18/2020 Yes    Acute metabolic encephalopathy [G93.41] 08/18/2020 No    Metabolic acidosis, normal anion gap (NAG) [E87.2] 08/18/2020 Yes    Cervical stenosis of spine [M48.02] 07/14/2020 Yes    Neuropathy due to secondary diabetes [E13.40] 09/06/2017 Yes    Benign prostatic hyperplasia (BPH) with urinary urge incontinence [N40.1, N39.41] 06/06/2017 Yes    Acquired hypothyroidism [E03.9] 05/26/2017 Yes    Coronary artery disease involving native coronary artery of native heart without angina pectoris [I25.10] 05/26/2017 Yes    Type 2 diabetes mellitus with stage 3 chronic kidney disease, without long-term current use of insulin [E11.22, N18.3] 05/26/2017 Yes      Problems Resolved During this Admission:       Overview / ICU Course:   Patient was transferred to MICU and started on narcan and bicarbonate infusions. Hypoxia worsened requiring up to 30L comfort flow. Mental status improved and narcan gtt discontinued on 8/18 in AM. PE suspected however CTA not feasible due to renal function. Bilateral lower extremity U/S negative for DVT, however because of quickly worsening O2 requirements and elevated troponin, he was empirically started on a heparin gtt for possible PE. TTE did not show RV strain but did show focal wall motion abnormalities, and troponin continued to uptrend so patient was loaded with DAPT for NSTEMI. CVP 3 on TTE however pulmonary edema on CXR. Restarted diuresis intravenously.  8/20/2020: Patient no longer requiring CPAP (was on 35L/40%). O2Sat 97% with 2L NC. Refers feeling much better than  yesterday.  8/21/2020: CHANCEHIEU. Patient on 3L NC, Sat 98%. Denied fevers, chills, chest pain, SOB, palpitations. Patient to be stepped down today      Inpatient Medications Prescribed for Management of Current Problems:     Scheduled Meds:    amLODIPine  10 mg Oral Daily    aspirin  81 mg Oral Daily    carvediloL  6.25 mg Oral BID    clopidogreL  75 mg Oral Daily    dextromethorphan-guaifenesin  mg  1 tablet Oral BID    docusate sodium  50 mg Oral BID    furosemide  40 mg Oral BID    levothyroxine  88 mcg Oral Daily    lidocaine  2 patch Transdermal Daily    PAZOpanib  800 mg Oral Before breakfast    rosuvastatin  40 mg Oral QHS    sodium bicarbonate  650 mg Oral BID    tamsulosin  0.8 mg Oral Daily     Continuous Infusions:   As Needed: acetaminophen, albuterol-ipratropium, bisacodyL, dextrose 50%, dextrose 50%, glucagon (human recombinant), glucose, glucose, hydrALAZINE, insulin aspart U-100, naloxone, ondansetron, oxyCODONE, polyethylene glycol, sodium chloride 0.9%, sodium chloride 0.9%    Assessment and Plan by Problem    Acute on chronic respiratory failure with hypoxia and hypercapnia  Concern for PE as he suddenly required significantly more O2 overnight on 8/17-18. Risk factors include recent surgery and metastatic RCC.   - CTA chest deferred due to VERENA  - Bilat LE U/S negative for DVT  - TTE negative for RV strain with normal PA pressure. Showed chronic HFpEF and focal wall motion abnormalities  - Troponin elevated and peaked at 8.8, BNP mildly elevated (no baseline)  - Was put on heparin gtt for 48hrs. OK'd by Ortho to start A/C so soon after surgery due to rapidly worsening hypoxia  - Wean O2 to goal sat > 90%.   - Lasix, monitor UOP  - Cr stable at 2.6 on 8/28     NSTEMI (non-ST elevated myocardial infarction)  Coronary artery disease involving native coronary artery of native heart with angina pectoris  Elevated troponin  CAD with history of PCI ~20 years ago. Patient did not have  chest pain. Troponin was checked when concerned for PE, initially was 1.2 however continued to uptrend and peaked at 8.8.  - Loaded with ASA and Plavix, Ok'd by Ortho, and continued  - Continue Coreg,  ASA, rosuvastatin, Plavix, amlodipine   - On statin  - Cardiology consulted. Per Cardiology: with patient's metastatic renal carcinoma and progressive decline recommend conservative management for now.  He possibly developed flash pulmonary edema in the setting of hypertension, apnea, sedation leading to respiratory failure and NSTEMI. For conservative medical management from his ACS standpoint, continue aspirin and Plavix, rosuvastatin and carvedilol. Cardiology aware of repeat echo showing EF 30%; currently not recommending further diuresis 8/26.     Metabolic acidosis, normal anion gap (NAG)  --due to RTA in setting of VERENA on CKD vs recent diarrhea (per recent Nephro clinic note)  --UAG positive (38) suggestive of RTA  --Transition from bicarb gtt to PO sodium bicarb 1300 mg BID     Acute kidney injury superimposed on chronic kidney disease  --baseline creatinine ~ 2  -- ? Etiology. Recent Nephro note states pt has had decreased po intake & diarrhea recently - ? Pre-renal. Pt received maintenance IVF, so likely volume resuscitated at this point  --home Lasix & lisinopril held initially. Then Lasix 100mg daily resumed.  --renal U/S unremarkable  --improving with diuresis; received IV Lasix through 8/25 then sCr increased on 8/26 after continued diuresis. Nephrology consulted and determining home lasix dose on discharge- start lasix 40 mg BID with serial labs.     Benign prostatic hyperplasia (BPH) with urinary urge incontinence  --continue home Flomax     Type 2 diabetes mellitus with stage 3 chronic kidney disease, without long-term current use of insulin  --per Clinic notes, insulin dependent  --home regimen lantus 5 units daily   --currently low dose correction; concerning if actually insulin-dependent as rarely  requiring administration for hyperglycemia. AG normal     Acquired hypothyroidism  --continue home levothyroxine  --TSH elevated however fT4 WNL     Dysphagia  SLP eval, tolerating PO intake. Now on a regular diet      Acute metabolic encephalopathy  --suspect medication induced insetting of decreased renal clearance as pt responded well to Narcan. Started on Narcan gtt and discontinued on 8/18 once mental status improved  --hold sedating medications  --resolved     Cervical stenosis of spine  --s/p ACDF on 8/17 with Ortho and ENT; management per Ortho     Neuropathy due to secondary diabetes  --holding home Lyrica in setting of encephalopathy  --when resumed, will need to be renally dosed     Metastatic renal cell carcinoma of L kidney   Followed by Rosa Maria and Joshua.    Diet: Diet diabetic [facility]; 2800 Calorie; Cardiac (Low Na/Chol)  GI Prophylaxis: Not indicated  Significant LDAs:   IV Access Type: Peripheral  Urinary Catheter Indication if present: Patient Does Not Have Urinary Catheter  Other Lines/Tubes/Drains:    HIGH RISK CONDITION(S):   Patient has a condition that poses threat to life and bodily function: Severe Respiratory Distress, NSTEMI, and Acute Renal Failure     Goals of Care:    Previous admission:  8/22/19  Likely prognosis:  Fair  Code Status: Full Code  Comfort Only: No  Hospice: No  Goals at discharge: remain at home, with physician follow-up    Discharge Planning   JAUN: 8/30/2020     Code Status: Full Code   Is the patient medically ready for discharge?: No    Reason for patient still in hospital (select all that apply): Patient trending condition  Discharge Plan A: Home health with son  Discharge Delays: None known at this time    VTE Risk Mitigation (From admission, onward)         Ordered     IP VTE HIGH RISK PATIENT  Once      08/17/20 0859     Place sequential compression device  Until discontinued      08/17/20 0516                   Marlena Montenegro MD  Department of  Hospital Medicine [facility]  [phone]

## 2020-08-28 NOTE — PROGRESS NOTES
[facility]  Nephrology  Progress Note    Patient Name: Edwin Powell  MRN: [id number]  Admission Date: 8/17/2020  Hospital Length of Stay: 11 days  Attending Provider: Marlena Montenegro MD   Primary Care Physician: Lulú Lewis MD  Principal Problem:Acute on chronic respiratory failure with hypoxia and hypercapnia    Subjective:     HPI: Mr. Powell is a 74 yo M with RCC with mets to liver/bone (on pazopanib), CKD IIIb with proteinuria 2/2 BPH, HTN, hypothyroidism, h/o CVA, T2DM, GERD, and T2DM who is currently admitted for acute hypoxemic respiratory failure. Nephrology consulted for VERENA on CKD.     He initially [facility] for scheduled C-spine fusion 2/2 severe C6/7 stenosis. S/p ACDF of C6/7 on 8/17. He required renae infusion during procedure. Patient developed acute hypoxemic respiratory failure. ABG showed mixed resp/metabolic acidosis. Etiology unclear, suspected PE but was unable to get CTA 2/2 poor renal fxn, negative US BLE, started on heparin gtt. TTE negative for RV strain. Since troponins elevated, loaded with DAPT. He was transferred to ICU and placed on HFNC (up to 30L). Also started on bicarb gtt + narcan. Repeat TTE shows newly depressed EF (EF 30% grade I dd). He was started on IV lasix 120mg with good UOP. Patient was noted to have VERENA on admission with Cr 3.5 (increased from baseline ~2). His Cr improved with diuresis.     In terms of renal function, patient has CKD IIIb with nephrotic range proteinuria. He sees Dr. Leonardo, last visit was earlier this month. Per her notes, CKD etiology suspected to be multifactorial (periodic NSAID use, multiple CTs w/ contast, HTN, DM). At that time, sCr 2 and eGFR 30. Has had significant proteinuria in past (Pr:Cr 7.5g earlier this year, down to 4 earlier this month).    Interval History: NAEON. MAP , on RA. UOP 650cc, net +400cc on lasix 40mg BID. Cr improving.     Review of patient's allergies indicates:  No Known  Allergies  Current Facility-Administered Medications   Medication Frequency    acetaminophen tablet 650 mg Q6H PRN    albuterol-ipratropium 2.5 mg-0.5 mg/3 mL nebulizer solution 3 mL Q4H PRN    amLODIPine tablet 10 mg Daily    aspirin chewable tablet 81 mg Daily    bisacodyL suppository 10 mg Daily PRN    carvediloL tablet 6.25 mg BID    clopidogreL tablet 75 mg Daily    dextromethorphan-guaifenesin  mg per 12 hr tablet 1 tablet BID    dextrose 50% injection 12.5 g PRN    dextrose 50% injection 25 g PRN    docusate sodium capsule 50 mg BID    furosemide tablet 40 mg BID    glucagon (human recombinant) injection 1 mg PRN    glucose chewable tablet 16 g PRN    glucose chewable tablet 24 g PRN    hydrALAZINE tablet 25 mg Q8H PRN    insulin aspart U-100 pen 0-5 Units QID (AC + HS) PRN    levothyroxine tablet 88 mcg Daily    naloxone 0.4 mg/mL injection 0.2 mg PRN    ondansetron injection 4 mg Q12H PRN    oxyCODONE immediate release tablet 5 mg Q6H PRN    PAZOPanib 200 mg tab Before breakfast    polyethylene glycol packet 17 g BID PRN    rosuvastatin tablet 40 mg QHS    sodium bicarbonate tablet 650 mg BID    sodium chloride 0.9% flush 10 mL PRN    sodium chloride 0.9% flush 5 mL PRN    tamsulosin 24 hr capsule 0.8 mg Daily     Facility-Administered Medications Ordered in Other Encounters   Medication Frequency    phenylephrine HCL 2.5% ophthalmic solution 1 drop On Call Procedure    proparacaine 0.5 % ophthalmic solution 1 drop On Call Procedure    tropicamide 1% ophthalmic solution 1 drop On Call Procedure       Objective:     Vital Signs (Most Recent):  Temp: 97.9 °F (36.6 °C) (08/28/20 0721)  Pulse: 75 (08/28/20 0754)  Resp: 16 (08/28/20 0826)  BP: (!) 153/70 (08/28/20 0721)  SpO2: 97 % (08/28/20 0721)  O2 Device (Oxygen Therapy): room air (08/28/20 0721) Vital Signs (24h Range):  Temp:  [97.7 °F (36.5 °C)-99.8 °F (37.7 °C)] 97.9 °F (36.6 °C)  Pulse:  [62-85] 75  Resp:  [16-20]  16  SpO2:  [90 %-97 %] 97 %  BP: (125-168)/(63-74) 153/70     Weight: 79.4 kg (175 lb 0.7 oz) (08/27/20 1300)  Body mass index is 27.42 kg/m².  Body surface area is 1.94 meters squared.    I/O last 3 completed shifts:  In: 1300 [P.O.:1300]  Out: 1400 [Urine:1400]    Physical Exam  Constitutional:       General: He is not in acute distress.     Appearance: He is well-developed.   HENT:      Head: Normocephalic and atraumatic.      Mouth/Throat:      Pharynx: No oropharyngeal exudate.   Eyes:      Conjunctiva/sclera: Conjunctivae normal.      Pupils: Pupils are equal, round, and reactive to light.   Neck:      Musculoskeletal: Normal range of motion and neck supple.   Cardiovascular:      Rate and Rhythm: Normal rate and regular rhythm.      Heart sounds: Normal heart sounds. No murmur.   Pulmonary:      Effort: Pulmonary effort is normal. No respiratory distress.      Breath sounds: Normal breath sounds. No wheezing or rales.   Abdominal:      General: Bowel sounds are normal. There is no distension.      Palpations: Abdomen is soft.      Tenderness: There is no abdominal tenderness. There is no guarding.   Musculoskeletal:         General: No tenderness.   Skin:     General: Skin is warm and dry.      Findings: No rash.   Neurological:      Mental Status: He is alert and oriented to person, place, and time.      Cranial Nerves: No cranial nerve deficit.      Motor: No abnormal muscle tone.   Psychiatric:         Behavior: Behavior normal.         Significant Labs:  CBC:   Recent Labs   Lab 08/28/20  0339 08/28/20  0829   WBC 10.00  --    RBC 2.78*  --    HGB 9.0* 9.7*   HCT 29.5* 32.1*   *  --    *  --    MCH 32.4*  --    MCHC 30.5*  --      CMP:   Recent Labs   Lab 08/28/20  0339   *   CALCIUM 8.5*   ALBUMIN 2.0*   PROT 5.4*      K 4.1   CO2 29   CL 99   BUN 85*   CREATININE 2.6*   ALKPHOS 83   ALT 27   AST 34   BILITOT 0.2     All labs within the past 24 hours have been reviewed.      Significant Imaging:  Reviewed.    Assessment/Plan:     * Acute on chronic respiratory failure with hypoxia and hypercapnia  Management per primary team    Acute kidney injury superimposed on chronic kidney disease  74 yo M admitted with acute hypoxemic respiratory failure + VERENA on CKD. Baseline sCr 2, sCr 3.5 on admission. Patient has CKD IIIb with nephrotic range proteinuria. He sees Dr. Leonardo, last visit was earlier this month. Per her notes, CKD etiology suspected to be multifactorial (periodic NSAID use, multiple CTs w/ contast, HTN, DM). At that time, sCr 2 and eGFR 30. Has had significant proteinuria in past (Pr:Cr 7.5g earlier this year, down to 4 earlier this month).    Cr improved with diuresis, bumped when diuresis was held  No indications for HD at this time    Plan:  - Continue PO lasix 40mg BID  - Trend Cr 3.3-->2.9--> 2.8-->2.8 --> 2.6  - Strict I&Os  - Avoid nephrotoxic agents  - Renally adjust medications        Thank you for your consult. I will follow-up with patient. Please contact us if you have any additional questions.    Evan [facility] MD  Nephrology  [facility]    ATTENDING PHYSICIAN ATTESTATION  I have personally verified the history and examined the patient. I thoroughly reviewed the demographic, clinical, laboratorial and imaging information available in medical records. I agree with the assessment and recommendations provided by the subspecialty resident who was under my supervision.

## 2020-08-28 NOTE — ASSESSMENT & PLAN NOTE
76 yo M admitted with acute hypoxemic respiratory failure + VERENA on CKD. Baseline sCr 2, sCr 3.5 on admission. Patient has CKD IIIb with nephrotic range proteinuria. He sees Dr. Leonardo, last visit was earlier this month. Per her notes, CKD etiology suspected to be multifactorial (periodic NSAID use, multiple CTs w/ contast, HTN, DM). At that time, sCr 2 and eGFR 30. Has had significant proteinuria in past (Pr:Cr 7.5g earlier this year, down to 4 earlier this month).    Cr improved with diuresis, bumped when diuresis was held  No indications for HD at this time    Plan:  - Continue PO lasix 40mg BID  - Trend Cr 3.3-->2.9--> 2.8-->2.8 --> 2.6  - Strict I&Os  - Avoid nephrotoxic agents  - Renally adjust medications

## 2020-08-28 NOTE — PT/OT/SLP PROGRESS
Occupational Therapy      Patient Name:  Edwin Powell   MRN:  [id number]    Patient not seen today. Therapist attempted in a.m and patient in bed with c/o pain. Therapist attempted in p.m. and patient up in chair and reported not feeling well and declined participation in therapy session. Will follow-up as able within POC.    Regi Olmos OT  8/28/2020

## 2020-08-28 NOTE — PLAN OF CARE
Patient up in chair for meals today stand by assist. Complained of side/back pain, lidocaine patch applied with moderate relief. PRN pain medication administered X2. VS stable. Plan to D/C tomorrow pending kidney function labs. Son at bedside and involved in care today.

## 2020-08-28 NOTE — SUBJECTIVE & OBJECTIVE
Interval History: JOJO. MAP , on RA. UOP 650cc, net +400cc on lasix 40mg BID. Cr improving.     Review of patient's allergies indicates:  No Known Allergies  Current Facility-Administered Medications   Medication Frequency    acetaminophen tablet 650 mg Q6H PRN    albuterol-ipratropium 2.5 mg-0.5 mg/3 mL nebulizer solution 3 mL Q4H PRN    amLODIPine tablet 10 mg Daily    aspirin chewable tablet 81 mg Daily    bisacodyL suppository 10 mg Daily PRN    carvediloL tablet 6.25 mg BID    clopidogreL tablet 75 mg Daily    dextromethorphan-guaifenesin  mg per 12 hr tablet 1 tablet BID    dextrose 50% injection 12.5 g PRN    dextrose 50% injection 25 g PRN    docusate sodium capsule 50 mg BID    furosemide tablet 40 mg BID    glucagon (human recombinant) injection 1 mg PRN    glucose chewable tablet 16 g PRN    glucose chewable tablet 24 g PRN    hydrALAZINE tablet 25 mg Q8H PRN    insulin aspart U-100 pen 0-5 Units QID (AC + HS) PRN    levothyroxine tablet 88 mcg Daily    naloxone 0.4 mg/mL injection 0.2 mg PRN    ondansetron injection 4 mg Q12H PRN    oxyCODONE immediate release tablet 5 mg Q6H PRN    PAZOPanib 200 mg tab Before breakfast    polyethylene glycol packet 17 g BID PRN    rosuvastatin tablet 40 mg QHS    sodium bicarbonate tablet 650 mg BID    sodium chloride 0.9% flush 10 mL PRN    sodium chloride 0.9% flush 5 mL PRN    tamsulosin 24 hr capsule 0.8 mg Daily     Facility-Administered Medications Ordered in Other Encounters   Medication Frequency    phenylephrine HCL 2.5% ophthalmic solution 1 drop On Call Procedure    proparacaine 0.5 % ophthalmic solution 1 drop On Call Procedure    tropicamide 1% ophthalmic solution 1 drop On Call Procedure       Objective:     Vital Signs (Most Recent):  Temp: 97.9 °F (36.6 °C) (08/28/20 0721)  Pulse: 75 (08/28/20 0754)  Resp: 16 (08/28/20 0826)  BP: (!) 153/70 (08/28/20 0721)  SpO2: 97 % (08/28/20 0721)  O2 Device (Oxygen  Therapy): room air (08/28/20 0721) Vital Signs (24h Range):  Temp:  [97.7 °F (36.5 °C)-99.8 °F (37.7 °C)] 97.9 °F (36.6 °C)  Pulse:  [62-85] 75  Resp:  [16-20] 16  SpO2:  [90 %-97 %] 97 %  BP: (125-168)/(63-74) 153/70     Weight: 79.4 kg (175 lb 0.7 oz) (08/27/20 1300)  Body mass index is 27.42 kg/m².  Body surface area is 1.94 meters squared.    I/O last 3 completed shifts:  In: 1300 [P.O.:1300]  Out: 1400 [Urine:1400]    Physical Exam  Constitutional:       General: He is not in acute distress.     Appearance: He is well-developed.   HENT:      Head: Normocephalic and atraumatic.      Mouth/Throat:      Pharynx: No oropharyngeal exudate.   Eyes:      Conjunctiva/sclera: Conjunctivae normal.      Pupils: Pupils are equal, round, and reactive to light.   Neck:      Musculoskeletal: Normal range of motion and neck supple.   Cardiovascular:      Rate and Rhythm: Normal rate and regular rhythm.      Heart sounds: Normal heart sounds. No murmur.   Pulmonary:      Effort: Pulmonary effort is normal. No respiratory distress.      Breath sounds: Normal breath sounds. No wheezing or rales.   Abdominal:      General: Bowel sounds are normal. There is no distension.      Palpations: Abdomen is soft.      Tenderness: There is no abdominal tenderness. There is no guarding.   Musculoskeletal:         General: No tenderness.   Skin:     General: Skin is warm and dry.      Findings: No rash.   Neurological:      Mental Status: He is alert and oriented to person, place, and time.      Cranial Nerves: No cranial nerve deficit.      Motor: No abnormal muscle tone.   Psychiatric:         Behavior: Behavior normal.         Significant Labs:  CBC:   Recent Labs   Lab 08/28/20  0339 08/28/20  0829   WBC 10.00  --    RBC 2.78*  --    HGB 9.0* 9.7*   HCT 29.5* 32.1*   *  --    *  --    MCH 32.4*  --    MCHC 30.5*  --      CMP:   Recent Labs   Lab 08/28/20  0339   *   CALCIUM 8.5*   ALBUMIN 2.0*   PROT 5.4*      K  4.1   CO2 29   CL 99   BUN 85*   CREATININE 2.6*   ALKPHOS 83   ALT 27   AST 34   BILITOT 0.2     All labs within the past 24 hours have been reviewed.     Significant Imaging:  Reviewed.

## 2020-08-28 NOTE — PLAN OF CARE
No acute changes overnight. Blood glucose managing performed. No SSI needed. Probable discharge today. WCTM

## 2020-08-29 LAB
ALBUMIN SERPL BCP-MCNC: 2 G/DL (ref 3.5–5.2)
ALP SERPL-CCNC: 83 U/L (ref 55–135)
ALT SERPL W/O P-5'-P-CCNC: 36 U/L (ref 10–44)
ANION GAP SERPL CALC-SCNC: 11 MMOL/L (ref 8–16)
AST SERPL-CCNC: 52 U/L (ref 10–40)
BASOPHILS # BLD AUTO: 0.03 K/UL (ref 0–0.2)
BASOPHILS NFR BLD: 0.3 % (ref 0–1.9)
BILIRUB SERPL-MCNC: 0.2 MG/DL (ref 0.1–1)
BUN SERPL-MCNC: 75 MG/DL (ref 8–23)
CALCIUM SERPL-MCNC: 8.6 MG/DL (ref 8.7–10.5)
CHLORIDE SERPL-SCNC: 98 MMOL/L (ref 95–110)
CO2 SERPL-SCNC: 29 MMOL/L (ref 23–29)
CREAT SERPL-MCNC: 2.4 MG/DL (ref 0.5–1.4)
DIFFERENTIAL METHOD: ABNORMAL
EOSINOPHIL # BLD AUTO: 0.2 K/UL (ref 0–0.5)
EOSINOPHIL NFR BLD: 2.1 % (ref 0–8)
ERYTHROCYTE [DISTWIDTH] IN BLOOD BY AUTOMATED COUNT: 14.1 % (ref 11.5–14.5)
EST. GFR  (AFRICAN AMERICAN): 29.4 ML/MIN/1.73 M^2
EST. GFR  (NON AFRICAN AMERICAN): 25.4 ML/MIN/1.73 M^2
GLUCOSE SERPL-MCNC: 148 MG/DL (ref 70–110)
HCT VFR BLD AUTO: 30.9 % (ref 40–54)
HGB BLD-MCNC: 9.4 G/DL (ref 14–18)
IMM GRANULOCYTES # BLD AUTO: 0.11 K/UL (ref 0–0.04)
IMM GRANULOCYTES NFR BLD AUTO: 1.1 % (ref 0–0.5)
LYMPHOCYTES # BLD AUTO: 1.4 K/UL (ref 1–4.8)
LYMPHOCYTES NFR BLD: 14.1 % (ref 18–48)
MAGNESIUM SERPL-MCNC: 1.7 MG/DL (ref 1.6–2.6)
MCH RBC QN AUTO: 32.4 PG (ref 27–31)
MCHC RBC AUTO-ENTMCNC: 30.4 G/DL (ref 32–36)
MCV RBC AUTO: 107 FL (ref 82–98)
MONOCYTES # BLD AUTO: 0.8 K/UL (ref 0.3–1)
MONOCYTES NFR BLD: 7.9 % (ref 4–15)
NEUTROPHILS # BLD AUTO: 7.5 K/UL (ref 1.8–7.7)
NEUTROPHILS NFR BLD: 74.5 % (ref 38–73)
NRBC BLD-RTO: 0 /100 WBC
PHOSPHATE SERPL-MCNC: 5.1 MG/DL (ref 2.7–4.5)
PLATELET # BLD AUTO: 487 K/UL (ref 150–350)
PMV BLD AUTO: 10.6 FL (ref 9.2–12.9)
POCT GLUCOSE: 147 MG/DL (ref 70–110)
POCT GLUCOSE: 154 MG/DL (ref 70–110)
POCT GLUCOSE: 178 MG/DL (ref 70–110)
POCT GLUCOSE: 187 MG/DL (ref 70–110)
POTASSIUM SERPL-SCNC: 4.7 MMOL/L (ref 3.5–5.1)
PROT SERPL-MCNC: 5.5 G/DL (ref 6–8.4)
RBC # BLD AUTO: 2.9 M/UL (ref 4.6–6.2)
SODIUM SERPL-SCNC: 138 MMOL/L (ref 136–145)
WBC # BLD AUTO: 10.09 K/UL (ref 3.9–12.7)

## 2020-08-29 PROCEDURE — 25000003 PHARM REV CODE 250: Mod: HCNC | Performed by: NURSE PRACTITIONER

## 2020-08-29 PROCEDURE — 25000003 PHARM REV CODE 250: Mod: HCNC | Performed by: STUDENT IN AN ORGANIZED HEALTH CARE EDUCATION/TRAINING PROGRAM

## 2020-08-29 PROCEDURE — 20600001 HC STEP DOWN PRIVATE ROOM: Mod: HCNC

## 2020-08-29 PROCEDURE — 94664 DEMO&/EVAL PT USE INHALER: CPT | Mod: HCNC

## 2020-08-29 PROCEDURE — 80053 COMPREHEN METABOLIC PANEL: CPT | Mod: HCNC

## 2020-08-29 PROCEDURE — 83735 ASSAY OF MAGNESIUM: CPT | Mod: HCNC

## 2020-08-29 PROCEDURE — 25000003 PHARM REV CODE 250: Mod: HCNC | Performed by: INTERNAL MEDICINE

## 2020-08-29 PROCEDURE — 84100 ASSAY OF PHOSPHORUS: CPT | Mod: HCNC

## 2020-08-29 PROCEDURE — 36415 COLL VENOUS BLD VENIPUNCTURE: CPT | Mod: HCNC

## 2020-08-29 PROCEDURE — 99232 PR SUBSEQUENT HOSPITAL CARE,LEVL II: ICD-10-PCS | Mod: GT,HCNC,, | Performed by: INTERNAL MEDICINE

## 2020-08-29 PROCEDURE — 99232 SBSQ HOSP IP/OBS MODERATE 35: CPT | Mod: GT,HCNC,, | Performed by: INTERNAL MEDICINE

## 2020-08-29 PROCEDURE — 85025 COMPLETE CBC W/AUTO DIFF WBC: CPT | Mod: HCNC

## 2020-08-29 PROCEDURE — 94761 N-INVAS EAR/PLS OXIMETRY MLT: CPT | Mod: HCNC

## 2020-08-29 PROCEDURE — 99900035 HC TECH TIME PER 15 MIN (STAT): Mod: HCNC

## 2020-08-29 RX ORDER — LEVOTHYROXINE SODIUM 88 UG/1
88 TABLET ORAL
Status: DISCONTINUED | OUTPATIENT
Start: 2020-08-30 | End: 2020-09-03 | Stop reason: HOSPADM

## 2020-08-29 RX ORDER — METHOCARBAMOL 500 MG/1
500 TABLET, FILM COATED ORAL 2 TIMES DAILY
Status: DISCONTINUED | OUTPATIENT
Start: 2020-08-29 | End: 2020-09-03 | Stop reason: HOSPADM

## 2020-08-29 RX ADMIN — MENTHOL, METHYL SALICYLATE: 10; 15 CREAM TOPICAL at 09:08

## 2020-08-29 RX ADMIN — SODIUM BICARBONATE 650 MG TABLET 650 MG: at 09:08

## 2020-08-29 RX ADMIN — CLOPIDOGREL 75 MG: 75 TABLET, FILM COATED ORAL at 09:08

## 2020-08-29 RX ADMIN — DOCUSATE SODIUM 50 MG: 50 CAPSULE, LIQUID FILLED ORAL at 09:08

## 2020-08-29 RX ADMIN — FUROSEMIDE 40 MG: 40 TABLET ORAL at 06:08

## 2020-08-29 RX ADMIN — GUAIFENESIN AND DEXTROMETHORPHAN HYDROBROMIDE 1 TABLET: 600; 30 TABLET, EXTENDED RELEASE ORAL at 09:08

## 2020-08-29 RX ADMIN — OXYCODONE 5 MG: 5 TABLET ORAL at 09:08

## 2020-08-29 RX ADMIN — CARVEDILOL 6.25 MG: 6.25 TABLET, FILM COATED ORAL at 09:08

## 2020-08-29 RX ADMIN — FUROSEMIDE 40 MG: 40 TABLET ORAL at 09:08

## 2020-08-29 RX ADMIN — OXYCODONE 5 MG: 5 TABLET ORAL at 04:08

## 2020-08-29 RX ADMIN — ASPIRIN 81 MG CHEWABLE TABLET 81 MG: 81 TABLET CHEWABLE at 09:08

## 2020-08-29 RX ADMIN — MENTHOL, METHYL SALICYLATE: 10; 15 CREAM TOPICAL at 04:08

## 2020-08-29 RX ADMIN — METHOCARBAMOL TABLETS 500 MG: 500 TABLET, COATED ORAL at 09:08

## 2020-08-29 RX ADMIN — MENTHOL, METHYL SALICYLATE: 10; 15 CREAM TOPICAL at 10:08

## 2020-08-29 RX ADMIN — ROSUVASTATIN CALCIUM 40 MG: 20 TABLET, FILM COATED ORAL at 09:08

## 2020-08-29 RX ADMIN — TAMSULOSIN HYDROCHLORIDE 0.8 MG: 0.4 CAPSULE ORAL at 09:08

## 2020-08-29 RX ADMIN — AMLODIPINE BESYLATE 10 MG: 10 TABLET ORAL at 09:08

## 2020-08-29 NOTE — PROGRESS NOTES
[facility] Hospital Medicine  Telemedicine Progress Note    Patient Name: Edwin Powell  MRN: [id number]  Patient Class: IP- Inpatient   Admission Date: 8/17/2020  Length of Stay: 12 days  Attending Physician: Marlena Montenegro MD  Primary Care Provider: Lulú Lewis MD    [facility] Medicine Team: [facility] [place]IRTUAL TEAM 10 Marlena Montenegro MD    This service was provided through telemedicine.  The patient consents to virtual visits.   The patient location is: [address]  Chief complaint:  Acute on chronic respiratory failure with hypoxia and hypercapnia   Start time: 900a    End time:  912a    Total time spent with patient: 12 min  Present with the patient at the time of the telemed/virtual assessment: Telemed presenter  I have assessed these findings virtually using a telemed platform and with assistance of bedside nurse.  The attending portion of this evaluation, treatment, and documentation was performed per Marlena Montenegro MD via audiovisual modality.      Patient was transferred to the telemedicine service on: 8/26/2020    HPI:  75 y.o. 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 Patient underwent ACDF of C6/7 with Ortho & ENT on 8/17. During the procedure he received rocuronium, ketamine, fentanyl, propofol and midazolam. He required a renae infusion during the procedure. Post-operatively the patient was admitted to post-op Ortho unit and was placed on his home 2L O2. After his procedure he received multiple doses of oxycodone for his neck pain. Overnight on 8/17-8/18 the patient had multiple periods of apnea requiring 3 doses of Narcan on the floor with minimal improvement in respiratory and mental status. ABG showed combined metabolic and respiratory  acidosis. No significant improvement in acidosis with initiation of Bipap on the floor and patient continued to have apneic periods and worsening hypoxia requiring 15L NC. He was transferred to ICU for further management.      Subjective:     Admission CC:  Severe stenosis at C6/7 with myopathy s/p ACDF of C6/7 with Ortho & ENT on 8/17.    Follow up visit for: Acute on chronic respiratory failure with hypoxia and hypercapnia    Interval History / Events Overnight:   Patient denies SOB or CP but remains with persistent left neck/back/shoulder pain; lidoderm patch did not help; oxycodone providing mild relief; topical analgesics and methocarbamol ordered; Cr stable with current lasix dose      Review of Systems   Constitutional: Negative for fever.   Cardiovascular: Negative for chest pain.   Musculoskeletal: Positive for arthralgias, back pain, myalgias and neck pain.     Objective:     Vital Signs (Most Recent):  Temp: 98.2 °F (36.8 °C) (08/29/20 1111)  Pulse: 62 (08/29/20 1114)  Resp: 18 (08/29/20 1111)  BP: 138/65 (08/29/20 1111)  SpO2: 96 % (08/29/20 1111) Vital Signs (24h Range):  Temp:  [97.1 °F (36.2 °C)-98.6 °F (37 °C)] 98.2 °F (36.8 °C)  Pulse:  [59-80] 62  Resp:  [16-18] 18  SpO2:  [92 %-99 %] 96 %  BP: (132-168)/(61-74) 138/65     Weight: 79.4 kg (175 lb 0.7 oz)  Body mass index is 27.42 kg/m².    Intake/Output Summary (Last 24 hours) at 8/29/2020 1432  Last data filed at 8/29/2020 0200  Gross per 24 hour   Intake 590 ml   Output 450 ml   Net 140 ml      Physical Exam  Constitutional:       General: He is not in acute distress.     Appearance: Normal appearance. He is not diaphoretic.   Eyes:      General: Lids are normal. No scleral icterus.        Right eye: No discharge.         Left eye: No discharge.      Conjunctiva/sclera: Conjunctivae normal.   Cardiovascular:      Rate and Rhythm: Normal rate.   Pulmonary:      Effort: Pulmonary effort is normal. No tachypnea, accessory muscle usage or respiratory  distress.   Musculoskeletal:      Right lower leg: No edema.      Left lower leg: No edema.   Skin:     Coloration: Skin is not cyanotic.      Findings: No rash.   Neurological:      General: No focal deficit present.      Mental Status: He is alert and oriented to person, place, and time.      Cranial Nerves: Cranial nerves are intact.      Motor: Motor function is intact.   Psychiatric:         Attention and Perception: Attention normal.         Mood and Affect: Affect is labile.         Speech: Speech normal.         Behavior: Behavior is cooperative.         Significant Labs:   Recent Labs   Lab 08/27/20 0633 08/28/20 0339 08/28/20 0829 08/29/20  0440   WBC 8.69 10.00  --  10.09   HGB 10.6* 9.0* 9.7* 9.4*   HCT 34.5* 29.5* 32.1* 30.9*   * 467*  --  487*     Recent Labs   Lab 08/27/20 0633 08/28/20 0339 08/29/20  0440   GRAN 63.4  5.5 71.0  7.1 74.5*  7.5   LYMPH 20.3  1.8 17.4*  1.7 14.1*  1.4   MONO 10.6  0.9 7.8  0.8 7.9  0.8   EOS 0.3 0.2 0.2     Recent Labs   Lab 08/27/20 0633 08/28/20 0339 08/29/20  0440    139 138   K 4.7 4.1 4.7   CL 97 99 98   CO2 32* 29 29   BUN 82* 85* 75*   CREATININE 2.8* 2.6* 2.4*   * 149* 148*   CALCIUM 8.7 8.5* 8.6*   ALBUMIN 2.1* 2.0* 2.0*   MG 1.9 1.7 1.7   PHOS 5.3* 5.1* 5.1*     Recent Labs   Lab 08/27/20 0633 08/28/20 0339 08/29/20  0440   ALKPHOS 89 83 83   ALT 33 27 36   AST 42* 34 52*   PROT 5.7* 5.4* 5.5*   BILITOT 0.2 0.2 0.2     Recent Labs   Lab 08/26/20  1340   BNP 67     Recent Labs   Lab 08/18/20  0438 08/26/20  1340   LACTATE 1.2 1.3     SARS-CoV2 (COVID-19) Qualitative PCR (no units)   Date Value   08/14/2020 Not Detected     SARS-CoV-2 RNA, Amplification, Qual (no units)   Date Value   08/18/2020 Negative     Recent Labs   Lab 04/01/20  1137 08/04/20  1156   HGBA1C 6.6* 6.3*  6.3*     Recent Labs   Lab 08/28/20  2112 08/29/20  0746 08/29/20  1144   POCTGLUCOSE 183* 154* 187*     Recent Labs   Lab 08/18/20  0438   TSH 7.711*        Significant Imaging:     Assessment/Plan:      Active Diagnoses:    Diagnosis Date Noted POA    PRINCIPAL PROBLEM:  Acute on chronic respiratory failure with hypoxia and hypercapnia [J96.21, J96.22] 08/18/2020 Yes    S/P cervical spinal fusion [Z98.1]  Not Applicable    Acute respiratory failure with hypoxia [J96.01]  Yes    Impaired functional mobility and endurance [Z74.09] 08/19/2020 Yes    NSTEMI (non-ST elevated myocardial infarction) [I21.4] 08/19/2020 Yes    Elevated troponin [R79.89] 08/19/2020 Yes    Dysphagia [R13.10] 08/19/2020 No    Stented coronary artery [Z95.5]  Not Applicable    Metastatic renal cell carcinoma [C64.9]  Yes    Acute kidney injury superimposed on chronic kidney disease [N17.9, N18.9] 08/18/2020 Yes    Acute metabolic encephalopathy [G93.41] 08/18/2020 No    Metabolic acidosis, normal anion gap (NAG) [E87.2] 08/18/2020 Yes    Cervical stenosis of spine [M48.02] 07/14/2020 Yes    Neuropathy due to secondary diabetes [E13.40] 09/06/2017 Yes    Benign prostatic hyperplasia (BPH) with urinary urge incontinence [N40.1, N39.41] 06/06/2017 Yes    Acquired hypothyroidism [E03.9] 05/26/2017 Yes    Coronary artery disease involving native coronary artery of native heart without angina pectoris [I25.10] 05/26/2017 Yes    Type 2 diabetes mellitus with stage 3 chronic kidney disease, without long-term current use of insulin [E11.22, N18.3] 05/26/2017 Yes      Problems Resolved During this Admission:       Overview / ICU Course:   Patient was transferred to MICU and started on narcan and bicarbonate infusions. Hypoxia worsened requiring up to 30L comfort flow. Mental status improved and narcan gtt discontinued on 8/18 in AM. PE suspected however CTA not feasible due to renal function. Bilateral lower extremity U/S negative for DVT, however because of quickly worsening O2 requirements and elevated troponin, he was empirically started on a heparin gtt for possible PE. TTE did  not show RV strain but did show focal wall motion abnormalities, and troponin continued to uptrend so patient was loaded with DAPT for NSTEMI. CVP 3 on TTE however pulmonary edema on CXR. Restarted diuresis intravenously.  8/20/2020: Patient no longer requiring CPAP (was on 35L/40%). O2Sat 97% with 2L NC. Refers feeling much better than yesterday.  8/21/2020: NAEON. Patient on 3L NC, Sat 98%. Denied fevers, chills, chest pain, SOB, palpitations. Patient to be stepped down today      Inpatient Medications Prescribed for Management of Current Problems:     Scheduled Meds:    amLODIPine  10 mg Oral Daily    aspirin  81 mg Oral Daily    carvediloL  6.25 mg Oral BID    clopidogreL  75 mg Oral Daily    dextromethorphan-guaifenesin  mg  1 tablet Oral BID    docusate sodium  50 mg Oral BID    furosemide  40 mg Oral BID    levothyroxine  88 mcg Oral Daily    methocarbamoL  500 mg Oral BID    methyl salicylate-menthol 15-10%   Topical (Top) TID    PAZOpanib  800 mg Oral Before breakfast    rosuvastatin  40 mg Oral QHS    sodium bicarbonate  650 mg Oral BID    tamsulosin  0.8 mg Oral Daily     Continuous Infusions:   As Needed: acetaminophen, albuterol-ipratropium, bisacodyL, dextrose 50%, dextrose 50%, glucagon (human recombinant), glucose, glucose, hydrALAZINE, insulin aspart U-100, naloxone, ondansetron, oxyCODONE, polyethylene glycol, sodium chloride 0.9%, sodium chloride 0.9%    Assessment and Plan by Problem    Acute on chronic respiratory failure with hypoxia and hypercapnia  Concern for PE as he suddenly required significantly more O2 overnight on 8/17-18. Risk factors include recent surgery and metastatic RCC.   - CTA chest deferred due to VERENA  - Bilat LE U/S negative for DVT  - TTE negative for RV strain with normal PA pressure. Showed chronic HFpEF and focal wall motion abnormalities  - Troponin elevated and peaked at 8.8, BNP mildly elevated (no baseline)  - Was put on heparin gtt for 48hrs.  OK'd by Ortho to start A/C so soon after surgery due to rapidly worsening hypoxia  - Wean O2 to goal sat > 90%.   - Lasix, monitor UOP  - Cr stable at 2.6 on 8/28     NSTEMI (non-ST elevated myocardial infarction)  Coronary artery disease involving native coronary artery of native heart with angina pectoris  Elevated troponin  CAD with history of PCI ~20 years ago. Patient did not have chest pain. Troponin was checked when concerned for PE, initially was 1.2 however continued to uptrend and peaked at 8.8.  - Loaded with ASA and Plavix, Ok'd by Ortho, and continued  - Continue Coreg,  ASA, rosuvastatin, Plavix, amlodipine   - On statin  - Cardiology consulted. Per Cardiology: with patient's metastatic renal carcinoma and progressive decline recommend conservative management for now.  He possibly developed flash pulmonary edema in the setting of hypertension, apnea, sedation leading to respiratory failure and NSTEMI. For conservative medical management from his ACS standpoint, continue aspirin and Plavix, rosuvastatin and carvedilol. Cardiology aware of repeat echo showing EF 30%; currently not recommending further diuresis 8/26.     Metabolic acidosis, normal anion gap (NAG)  --due to RTA in setting of VERENA on CKD vs recent diarrhea (per recent Nephro clinic note)  --UAG positive (38) suggestive of RTA  --Transition from bicarb gtt to PO sodium bicarb 1300 mg BID     Acute kidney injury superimposed on chronic kidney disease  --baseline creatinine ~ 2  -- ? Etiology. Recent Nephro note states pt has had decreased po intake & diarrhea recently - ? Pre-renal. Pt received maintenance IVF, so likely volume resuscitated at this point  --home Lasix & lisinopril held initially. Then Lasix 100mg daily resumed.  --renal U/S unremarkable  --improving with diuresis; received IV Lasix through 8/25 then sCr increased on 8/26 after continued diuresis. Nephrology consulted and determining home lasix dose on discharge- start lasix 40  mg BID with serial labs.     Benign prostatic hyperplasia (BPH) with urinary urge incontinence  --continue home Flomax     Type 2 diabetes mellitus with stage 3 chronic kidney disease, without long-term current use of insulin  --per Clinic notes, insulin dependent  --home regimen lantus 5 units daily   --currently low dose correction; concerning if actually insulin-dependent as rarely requiring administration for hyperglycemia. AG normal     Acquired hypothyroidism  --continue home levothyroxine  --TSH elevated however fT4 WNL     Dysphagia  SLP eval, tolerating PO intake. Now on a regular diet      Acute metabolic encephalopathy  --suspect medication induced insetting of decreased renal clearance as pt responded well to Narcan. Started on Narcan gtt and discontinued on 8/18 once mental status improved  --hold sedating medications  --resolved     Cervical stenosis of spine  --s/p ACDF on 8/17 with Ortho and ENT; management per Ortho  --start topicals to shoulder/ left neck  --Continue oxycodone prn; added methocarbamol     Neuropathy due to secondary diabetes  --holding home Lyrica in setting of encephalopathy  --when resumed, will need to be renally dosed     Metastatic renal cell carcinoma of L kidney   Followed by Rosa Maria and Joshua.    Diet: Diet diabetic [facility]; 2800 Calorie; Cardiac (Low Na/Chol)  GI Prophylaxis: Not indicated  Significant LDAs:   IV Access Type: Peripheral  Urinary Catheter Indication if present: Patient Does Not Have Urinary Catheter  Other Lines/Tubes/Drains:    HIGH RISK CONDITION(S):   Patient has a condition that poses threat to life and bodily function: Severe Respiratory Distress, NSTEMI, and Acute Renal Failure     Goals of Care:    Previous admission:  8/22/19  Likely prognosis:  Fair  Code Status: Full Code  Comfort Only: No  Hospice: No  Goals at discharge: remain at home, with physician follow-up    Discharge Planning   JAUN: 8/30/2020     Code Status: Full Code    Is the patient medically ready for discharge?: No    Reason for patient still in hospital (select all that apply): Patient trending condition - uncontrolled pain  Discharge Plan A: Home health with son  Discharge Delays: None known at this time    VTE Risk Mitigation (From admission, onward)         Ordered     IP VTE HIGH RISK PATIENT  Once      08/17/20 0859     Place sequential compression device  Until discontinued      08/17/20 0516                   Marlena Montenegro MD  Department of Hospital Medicine   [facility]  [phone]

## 2020-08-29 NOTE — PLAN OF CARE
The patient is lying in bed, awake and alert. No s/s of acute distress. The patient is independent of ADLs. Skin Warm and dry to touch, resp even et unlabored. The patient is able to make needs known. BBS clear. ABD round and soft. Bed locked and in low position. Call light within reach.

## 2020-08-30 LAB
ABO + RH BLD: NORMAL
ALBUMIN SERPL BCP-MCNC: 2 G/DL (ref 3.5–5.2)
ALP SERPL-CCNC: 85 U/L (ref 55–135)
ALT SERPL W/O P-5'-P-CCNC: 38 U/L (ref 10–44)
ANION GAP SERPL CALC-SCNC: 8 MMOL/L (ref 8–16)
AST SERPL-CCNC: 44 U/L (ref 10–40)
BASOPHILS # BLD AUTO: 0.02 K/UL (ref 0–0.2)
BASOPHILS NFR BLD: 0.2 % (ref 0–1.9)
BILIRUB SERPL-MCNC: 0.1 MG/DL (ref 0.1–1)
BLD GP AB SCN CELLS X3 SERPL QL: NORMAL
BUN SERPL-MCNC: 80 MG/DL (ref 8–23)
CALCIUM SERPL-MCNC: 8.6 MG/DL (ref 8.7–10.5)
CHLORIDE SERPL-SCNC: 98 MMOL/L (ref 95–110)
CO2 SERPL-SCNC: 30 MMOL/L (ref 23–29)
CREAT SERPL-MCNC: 2.7 MG/DL (ref 0.5–1.4)
DIFFERENTIAL METHOD: ABNORMAL
EOSINOPHIL # BLD AUTO: 0.2 K/UL (ref 0–0.5)
EOSINOPHIL NFR BLD: 1.6 % (ref 0–8)
ERYTHROCYTE [DISTWIDTH] IN BLOOD BY AUTOMATED COUNT: 14.2 % (ref 11.5–14.5)
EST. GFR  (AFRICAN AMERICAN): 25.5 ML/MIN/1.73 M^2
EST. GFR  (NON AFRICAN AMERICAN): 22.1 ML/MIN/1.73 M^2
GLUCOSE SERPL-MCNC: 214 MG/DL (ref 70–110)
HCT VFR BLD AUTO: 28.3 % (ref 40–54)
HCT VFR BLD AUTO: 28.6 % (ref 40–54)
HCT VFR BLD AUTO: 29.4 % (ref 40–54)
HGB BLD-MCNC: 8.6 G/DL (ref 14–18)
HGB BLD-MCNC: 8.6 G/DL (ref 14–18)
HGB BLD-MCNC: 9 G/DL (ref 14–18)
IMM GRANULOCYTES # BLD AUTO: 0.09 K/UL (ref 0–0.04)
IMM GRANULOCYTES NFR BLD AUTO: 0.9 % (ref 0–0.5)
LYMPHOCYTES # BLD AUTO: 1.7 K/UL (ref 1–4.8)
LYMPHOCYTES NFR BLD: 17 % (ref 18–48)
MAGNESIUM SERPL-MCNC: 1.8 MG/DL (ref 1.6–2.6)
MCH RBC QN AUTO: 32.5 PG (ref 27–31)
MCHC RBC AUTO-ENTMCNC: 30.4 G/DL (ref 32–36)
MCV RBC AUTO: 107 FL (ref 82–98)
MONOCYTES # BLD AUTO: 0.7 K/UL (ref 0.3–1)
MONOCYTES NFR BLD: 7.5 % (ref 4–15)
NEUTROPHILS # BLD AUTO: 7.2 K/UL (ref 1.8–7.7)
NEUTROPHILS NFR BLD: 72.8 % (ref 38–73)
NRBC BLD-RTO: 0 /100 WBC
OB PNL STL: NEGATIVE
PHOSPHATE SERPL-MCNC: 5.8 MG/DL (ref 2.7–4.5)
PLATELET # BLD AUTO: 518 K/UL (ref 150–350)
PMV BLD AUTO: 10.2 FL (ref 9.2–12.9)
POCT GLUCOSE: 171 MG/DL (ref 70–110)
POCT GLUCOSE: 183 MG/DL (ref 70–110)
POCT GLUCOSE: 185 MG/DL (ref 70–110)
POCT GLUCOSE: 193 MG/DL (ref 70–110)
POTASSIUM SERPL-SCNC: 4.8 MMOL/L (ref 3.5–5.1)
PROT SERPL-MCNC: 5.5 G/DL (ref 6–8.4)
RBC # BLD AUTO: 2.65 M/UL (ref 4.6–6.2)
SODIUM SERPL-SCNC: 136 MMOL/L (ref 136–145)
WBC # BLD AUTO: 9.9 K/UL (ref 3.9–12.7)

## 2020-08-30 PROCEDURE — 83735 ASSAY OF MAGNESIUM: CPT | Mod: HCNC

## 2020-08-30 PROCEDURE — 86920 COMPATIBILITY TEST SPIN: CPT | Mod: HCNC

## 2020-08-30 PROCEDURE — 82272 OCCULT BLD FECES 1-3 TESTS: CPT | Mod: HCNC

## 2020-08-30 PROCEDURE — 25000003 PHARM REV CODE 250: Mod: HCNC | Performed by: NURSE PRACTITIONER

## 2020-08-30 PROCEDURE — 25000003 PHARM REV CODE 250: Mod: HCNC | Performed by: STUDENT IN AN ORGANIZED HEALTH CARE EDUCATION/TRAINING PROGRAM

## 2020-08-30 PROCEDURE — 20600001 HC STEP DOWN PRIVATE ROOM: Mod: HCNC

## 2020-08-30 PROCEDURE — 84100 ASSAY OF PHOSPHORUS: CPT | Mod: HCNC

## 2020-08-30 PROCEDURE — 86901 BLOOD TYPING SEROLOGIC RH(D): CPT | Mod: HCNC

## 2020-08-30 PROCEDURE — 25000003 PHARM REV CODE 250: Mod: HCNC | Performed by: INTERNAL MEDICINE

## 2020-08-30 PROCEDURE — 99232 PR SUBSEQUENT HOSPITAL CARE,LEVL II: ICD-10-PCS | Mod: GT,HCNC,, | Performed by: INTERNAL MEDICINE

## 2020-08-30 PROCEDURE — 99232 SBSQ HOSP IP/OBS MODERATE 35: CPT | Mod: GT,HCNC,, | Performed by: INTERNAL MEDICINE

## 2020-08-30 PROCEDURE — 94664 DEMO&/EVAL PT USE INHALER: CPT | Mod: HCNC

## 2020-08-30 PROCEDURE — 85014 HEMATOCRIT: CPT | Mod: 91,HCNC

## 2020-08-30 PROCEDURE — 99900035 HC TECH TIME PER 15 MIN (STAT): Mod: HCNC

## 2020-08-30 PROCEDURE — 80053 COMPREHEN METABOLIC PANEL: CPT | Mod: HCNC

## 2020-08-30 PROCEDURE — 85018 HEMOGLOBIN: CPT | Mod: 91,HCNC

## 2020-08-30 PROCEDURE — 36415 COLL VENOUS BLD VENIPUNCTURE: CPT | Mod: HCNC

## 2020-08-30 PROCEDURE — C9113 INJ PANTOPRAZOLE SODIUM, VIA: HCPCS | Mod: HCNC | Performed by: INTERNAL MEDICINE

## 2020-08-30 PROCEDURE — 63600175 PHARM REV CODE 636 W HCPCS: Mod: HCNC | Performed by: INTERNAL MEDICINE

## 2020-08-30 PROCEDURE — 85025 COMPLETE CBC W/AUTO DIFF WBC: CPT | Mod: HCNC

## 2020-08-30 RX ORDER — NAPROXEN SODIUM 220 MG/1
81 TABLET, FILM COATED ORAL DAILY
Status: DISCONTINUED | OUTPATIENT
Start: 2020-09-01 | End: 2020-08-31

## 2020-08-30 RX ORDER — CLOPIDOGREL BISULFATE 75 MG/1
75 TABLET ORAL DAILY
Status: DISCONTINUED | OUTPATIENT
Start: 2020-09-01 | End: 2020-08-31

## 2020-08-30 RX ORDER — PANTOPRAZOLE SODIUM 40 MG/10ML
40 INJECTION, POWDER, LYOPHILIZED, FOR SOLUTION INTRAVENOUS DAILY
Status: DISCONTINUED | OUTPATIENT
Start: 2020-08-30 | End: 2020-08-31

## 2020-08-30 RX ORDER — FUROSEMIDE 40 MG/1
40 TABLET ORAL DAILY
Status: DISCONTINUED | OUTPATIENT
Start: 2020-08-31 | End: 2020-08-31

## 2020-08-30 RX ADMIN — ASPIRIN 81 MG CHEWABLE TABLET 81 MG: 81 TABLET CHEWABLE at 08:08

## 2020-08-30 RX ADMIN — OXYCODONE 5 MG: 5 TABLET ORAL at 08:08

## 2020-08-30 RX ADMIN — GUAIFENESIN AND DEXTROMETHORPHAN HYDROBROMIDE 1 TABLET: 600; 30 TABLET, EXTENDED RELEASE ORAL at 08:08

## 2020-08-30 RX ADMIN — CARVEDILOL 6.25 MG: 6.25 TABLET, FILM COATED ORAL at 08:08

## 2020-08-30 RX ADMIN — DOCUSATE SODIUM 50 MG: 50 CAPSULE, LIQUID FILLED ORAL at 08:08

## 2020-08-30 RX ADMIN — CLOPIDOGREL 75 MG: 75 TABLET, FILM COATED ORAL at 08:08

## 2020-08-30 RX ADMIN — OXYCODONE 5 MG: 5 TABLET ORAL at 04:08

## 2020-08-30 RX ADMIN — FUROSEMIDE 40 MG: 40 TABLET ORAL at 08:08

## 2020-08-30 RX ADMIN — MENTHOL, METHYL SALICYLATE: 10; 15 CREAM TOPICAL at 02:08

## 2020-08-30 RX ADMIN — TAMSULOSIN HYDROCHLORIDE 0.8 MG: 0.4 CAPSULE ORAL at 08:08

## 2020-08-30 RX ADMIN — SODIUM BICARBONATE 650 MG TABLET 650 MG: at 08:08

## 2020-08-30 RX ADMIN — ACETAMINOPHEN 650 MG: 325 TABLET ORAL at 12:08

## 2020-08-30 RX ADMIN — METHOCARBAMOL TABLETS 500 MG: 500 TABLET, COATED ORAL at 08:08

## 2020-08-30 RX ADMIN — OXYCODONE 5 MG: 5 TABLET ORAL at 12:08

## 2020-08-30 RX ADMIN — ROSUVASTATIN CALCIUM 40 MG: 20 TABLET, FILM COATED ORAL at 08:08

## 2020-08-30 RX ADMIN — MENTHOL, METHYL SALICYLATE: 10; 15 CREAM TOPICAL at 08:08

## 2020-08-30 RX ADMIN — AMLODIPINE BESYLATE 10 MG: 10 TABLET ORAL at 08:08

## 2020-08-30 RX ADMIN — LEVOTHYROXINE SODIUM 88 MCG: 0.09 TABLET ORAL at 06:08

## 2020-08-30 RX ADMIN — PANTOPRAZOLE SODIUM 40 MG: 40 INJECTION, POWDER, LYOPHILIZED, FOR SOLUTION INTRAVENOUS at 09:08

## 2020-08-30 NOTE — NURSING
Pt RUBA to Xray. Left via wheelchair with escort. VSS, pt in NAD. Telemetry monitoring ongoing. Pt had two more episodes of coughing up blood at this time. Will continue to monitor.

## 2020-08-30 NOTE — SUBJECTIVE & OBJECTIVE
Principal Problem:Acute on chronic respiratory failure with hypoxia and hypercapnia    Principal Orthopedic Problem: s/p ACDF    Interval History:   Pt seen and examined at the bedside this am. Doing well, pain controlled. Refused PT 8/28 2/2 shoulder blade pain, improving today. Sat 90% overnight on room air, 97% this am. Denies SOB    Review of patient's allergies indicates:  No Known Allergies    Current Facility-Administered Medications   Medication    acetaminophen tablet 650 mg    albuterol-ipratropium 2.5 mg-0.5 mg/3 mL nebulizer solution 3 mL    amLODIPine tablet 10 mg    aspirin chewable tablet 81 mg    bisacodyL suppository 10 mg    carvediloL tablet 6.25 mg    clopidogreL tablet 75 mg    dextromethorphan-guaifenesin  mg per 12 hr tablet 1 tablet    dextrose 50% injection 12.5 g    dextrose 50% injection 25 g    docusate sodium capsule 50 mg    furosemide tablet 40 mg    glucagon (human recombinant) injection 1 mg    glucose chewable tablet 16 g    glucose chewable tablet 24 g    hydrALAZINE tablet 25 mg    insulin aspart U-100 pen 0-5 Units    levothyroxine tablet 88 mcg    methocarbamoL tablet 500 mg    methyl salicylate-menthol 15-10% cream    naloxone 0.4 mg/mL injection 0.2 mg    ondansetron injection 4 mg    oxyCODONE immediate release tablet 5 mg    PAZOPanib 200 mg tab    polyethylene glycol packet 17 g    rosuvastatin tablet 40 mg    sodium bicarbonate tablet 650 mg    sodium chloride 0.9% flush 10 mL    sodium chloride 0.9% flush 5 mL    tamsulosin 24 hr capsule 0.8 mg     Facility-Administered Medications Ordered in Other Encounters   Medication    phenylephrine HCL 2.5% ophthalmic solution 1 drop    proparacaine 0.5 % ophthalmic solution 1 drop    tropicamide 1% ophthalmic solution 1 drop     Objective:     Vital Signs (Most Recent):  Temp: 98.4 °F (36.9 °C) (08/30/20 0734)  Pulse: (!) 57 (08/30/20 0752)  Resp: 16 (08/30/20 0734)  BP: (!) 162/70 (08/30/20  "0734)  SpO2: 97 % (08/30/20 0734) Vital Signs (24h Range):  Temp:  [98.1 °F (36.7 °C)-98.6 °F (37 °C)] 98.4 °F (36.9 °C)  Pulse:  [57-78] 57  Resp:  [16-18] 16  SpO2:  [92 %-98 %] 97 %  BP: (138-177)/(65-71) 162/70     Weight: 79.4 kg (175 lb 0.7 oz)  Height: 5' 7" (170.2 cm)  Body mass index is 27.42 kg/m².      Intake/Output Summary (Last 24 hours) at 8/30/2020 0836  Last data filed at 8/30/2020 0735  Gross per 24 hour   Intake 725 ml   Output 1375 ml   Net -650 ml       Ortho/SPM Exam  Anterior cervical dressing in place, c/d/i  No palpable swelling or fluid collection  Neuro exam stable      Significant Labs:   CBC:   Recent Labs   Lab 08/29/20  0440 08/30/20  0624   WBC 10.09 9.90   HGB 9.4* 8.6*   HCT 30.9* 28.3*   * 518*     CMP:   Recent Labs   Lab 08/29/20  0440 08/30/20  0624    136   K 4.7 4.8   CL 98 98   CO2 29 30*   * 214*   BUN 75* 80*   CREATININE 2.4* 2.7*   CALCIUM 8.6* 8.6*   PROT 5.5* 5.5*   ALBUMIN 2.0* 2.0*   BILITOT 0.2 0.1   ALKPHOS 83 85   AST 52* 44*   ALT 36 38   ANIONGAP 11 8   EGFRNONAA 25.4* 22.1*     All pertinent labs within the past 24 hours have been reviewed.    Significant Imaging: I have reviewed and interpreted all pertinent imaging results/findings.  "

## 2020-08-30 NOTE — PROGRESS NOTES
[facility]  Orthopedics  Progress Note    Patient Name: Edwin Powell  MRN: [id number]  Admission Date: 8/17/2020  Hospital Length of Stay: 13 days  Attending Provider: Marlena Montenegro MD  Primary Care Provider: Lulú Lewis MD  Follow-up For: Procedure(s) (LRB):  DISCECTOMY, SPINE, CERVICAL, ANTERIOR APPROACH, WITH FUSION co case with Dr. Falcon C6/7 Depuy SNS:vocal cord/motors/SSEP Regular OR table Patient needs scope in preop (N/A)  DISSECTION, NECK (N/A)    Post-Operative Day: 13 Days Post-Op  Subjective:     Principal Problem:Acute on chronic respiratory failure with hypoxia and hypercapnia    Principal Orthopedic Problem: s/p ACDF    Interval History:   Pt seen and examined at the bedside this am. Doing well, pain controlled. Refused PT 8/28 2/2 shoulder blade pain, improving today. Sat 90% overnight on room air, 97% this am. Denies SOB    Review of patient's allergies indicates:  No Known Allergies    Current Facility-Administered Medications   Medication    acetaminophen tablet 650 mg    albuterol-ipratropium 2.5 mg-0.5 mg/3 mL nebulizer solution 3 mL    amLODIPine tablet 10 mg    aspirin chewable tablet 81 mg    bisacodyL suppository 10 mg    carvediloL tablet 6.25 mg    clopidogreL tablet 75 mg    dextromethorphan-guaifenesin  mg per 12 hr tablet 1 tablet    dextrose 50% injection 12.5 g    dextrose 50% injection 25 g    docusate sodium capsule 50 mg    furosemide tablet 40 mg    glucagon (human recombinant) injection 1 mg    glucose chewable tablet 16 g    glucose chewable tablet 24 g    hydrALAZINE tablet 25 mg    insulin aspart U-100 pen 0-5 Units    levothyroxine tablet 88 mcg    methocarbamoL tablet 500 mg    methyl salicylate-menthol 15-10% cream    naloxone 0.4 mg/mL injection 0.2 mg    ondansetron injection 4 mg    oxyCODONE immediate release tablet 5 mg    PAZOPanib 200 mg tab    polyethylene glycol packet 17 g    rosuvastatin tablet 40  "mg    sodium bicarbonate tablet 650 mg    sodium chloride 0.9% flush 10 mL    sodium chloride 0.9% flush 5 mL    tamsulosin 24 hr capsule 0.8 mg     Facility-Administered Medications Ordered in Other Encounters   Medication    phenylephrine HCL 2.5% ophthalmic solution 1 drop    proparacaine 0.5 % ophthalmic solution 1 drop    tropicamide 1% ophthalmic solution 1 drop     Objective:     Vital Signs (Most Recent):  Temp: 98.4 °F (36.9 °C) (08/30/20 0734)  Pulse: (!) 57 (08/30/20 0752)  Resp: 16 (08/30/20 0734)  BP: (!) 162/70 (08/30/20 0734)  SpO2: 97 % (08/30/20 0734) Vital Signs (24h Range):  Temp:  [98.1 °F (36.7 °C)-98.6 °F (37 °C)] 98.4 °F (36.9 °C)  Pulse:  [57-78] 57  Resp:  [16-18] 16  SpO2:  [92 %-98 %] 97 %  BP: (138-177)/(65-71) 162/70     Weight: 79.4 kg (175 lb 0.7 oz)  Height: 5' 7" (170.2 cm)  Body mass index is 27.42 kg/m².      Intake/Output Summary (Last 24 hours) at 8/30/2020 0836  Last data filed at 8/30/2020 0735  Gross per 24 hour   Intake 725 ml   Output 1375 ml   Net -650 ml       Ortho/SPM Exam  Anterior cervical dressing in place, c/d/i  No palpable swelling or fluid collection  Neuro exam stable      Significant Labs:   CBC:   Recent Labs   Lab 08/29/20  0440 08/30/20 0624   WBC 10.09 9.90   HGB 9.4* 8.6*   HCT 30.9* 28.3*   * 518*     CMP:   Recent Labs   Lab 08/29/20 0440 08/30/20 0624    136   K 4.7 4.8   CL 98 98   CO2 29 30*   * 214*   BUN 75* 80*   CREATININE 2.4* 2.7*   CALCIUM 8.6* 8.6*   PROT 5.5* 5.5*   ALBUMIN 2.0* 2.0*   BILITOT 0.2 0.1   ALKPHOS 83 85   AST 52* 44*   ALT 36 38   ANIONGAP 11 8   EGFRNONAA 25.4* 22.1*     All pertinent labs within the past 24 hours have been reviewed.    Significant Imaging: I have reviewed and interpreted all pertinent imaging results/findings.    Assessment/Plan:     Cervical stenosis of spine  Edwin Powell is a 75 y.o. male s/p C5/6 ACDF (8/17/20) for cervical stenosis, postop course complicated by apnea; " Transferred to MICU with NSTEMI and increasing oxygen requirements. Stepped down to MTSU with significant improvement,      Pain control: multimodal  PT/OT: progressive mobility, OOBTC  DVT PPx: ASA, and Plavix; SCDs at all times when not ambulating  Abx: postop Ancef complete  Labs: stable  Drain: None     Dispo-Per primary likely d/c home with HH; stable for discharge from ortho standpoint               Tello Smith MD  Orthopedics  [facility]

## 2020-08-30 NOTE — NURSING
Called into pt's room, pt reports couhing up blood. Pt choughed up a blood clot about 1 by 2 inches bright red in color. Pt does not c/o of being short of breath or having chest pain. SPO2 checked and 97% on RA. Dr. Montenegro paged and notified of the above. TM.

## 2020-08-30 NOTE — ASSESSMENT & PLAN NOTE
Edwin HAYLEY Sherry is a 75 y.o. male s/p C5/6 ACDF (8/17/20) for cervical stenosis, postop course complicated by apnea; Transferred to MICU with NSTEMI and increasing oxygen requirements. Stepped down to MTSU with significant improvement,      Pain control: multimodal  PT/OT: progressive mobility, OOBTC  DVT PPx: ASA, and Plavix; SCDs at all times when not ambulating  Abx: postop Ancef complete  Labs: stable  Drain: None     Dispo-Per primary likely d/c home with HH; stable for discharge from ortho standpoint

## 2020-08-30 NOTE — NURSING
Pt back on the floor. Helped back to bed and repositioned for comfort. C/o being a little lightheaded when walking. Will continue to monitor.

## 2020-08-30 NOTE — PLAN OF CARE
Patient is AAOX4, and on room air > 92%. Vital signs stable with a slight increase in BP around midnight, no meds needed. DELIA Orozco Pt has refused his panzopanib 200 mg this morning because he said it makes him vomit. He also stated that he has refused for the past 3 days and will not take until he returns home.  Did have c/o pain in the bilateral and medial shoulder blade area, treated with PRN oxycodone 5mg at 10pm and 4 am, with 650 tylenol at midnight with moderate relief. Vitals signs stable, and no acute changes throughout shift. No coverage of insulin needed before bedtime. Will continue to monitor for safety and comfort. All safety precautions in place.        Problem: Diabetes Comorbidity  Goal: Blood Glucose Level Within Desired Range  Outcome: Ongoing, Progressing     Problem: Adult Inpatient Plan of Care  Goal: Plan of Care Review  Outcome: Ongoing, Progressing  Goal: Patient-Specific Goal (Individualization)  Outcome: Ongoing, Progressing  Goal: Absence of Hospital-Acquired Illness or Injury  Outcome: Ongoing, Progressing  Goal: Optimal Comfort and Wellbeing  Outcome: Ongoing, Progressing  Goal: Readiness for Transition of Care  Outcome: Ongoing, Progressing  Goal: Rounds/Family Conference  Outcome: Ongoing, Progressing     Problem: Fall Injury Risk  Goal: Absence of Fall and Fall-Related Injury  Outcome: Ongoing, Progressing     Problem: Skin Injury Risk Increased  Goal: Skin Health and Integrity  Outcome: Ongoing, Progressing     Problem: Electrolyte Imbalance (Acute Kidney Injury/Impairment)  Goal: Serum Electrolyte Balance  Outcome: Ongoing, Progressing     Problem: Fluid Imbalance (Acute Kidney Injury/Impairment)  Goal: Optimal Fluid Balance  Outcome: Ongoing, Progressing     Problem: Hematologic Alteration (Acute Kidney Injury/Impairment)  Goal: Hemoglobin, Hematocrit and Platelets Within Normal Range  Outcome: Ongoing, Progressing     Problem: Oral Intake Inadequate (Acute Kidney  Injury/Impairment)  Goal: Optimal Nutrition Intake  Outcome: Ongoing, Progressing     Problem: Renal Function Impairment (Acute Kidney Injury/Impairment)  Goal: Effective Renal Function  Outcome: Ongoing, Progressing     Problem: Wound  Goal: Optimal Wound Healing  Outcome: Ongoing, Progressing

## 2020-08-30 NOTE — SIGNIFICANT EVENT
- new hemoptysis has recurred X 3 - tablespoon each; occurs when patient clears his throat; no hematemesis or epistaxis; neg CXR; radiology to do V/Q scan as patient had suspicion for PE but could not get CTA due to VERENA; Ortho spine also aware as he is s/p cervical fusion 2 weeks ago; H/H so far stable and patient on room air; only on ASA/plavix currently; escalate to pulmonary/critical care consult if worsening  -will be transferred to regular hospital med service

## 2020-08-30 NOTE — PLAN OF CARE
Problem: Adult Inpatient Plan of Care  Goal: Plan of Care Review  Outcome: Ongoing, Progressing  Flowsheets (Taken 8/29/2020 1830)  Plan of Care Reviewed With: patient   Pt remains free from falls/injury. No acute events during shift. VSS, NAD. Bed in lowest position, wheels locked, side rails up times 2, call light w/in reach, bed alarm on. Room clear of obstacles. Pt up in chair for most of the shift. Pt BG monitored AC/HS as per order. Pt covered with insulin aspart per low sliding scale. No S&S of hypo/hyperglycemia noticed. WCTM.

## 2020-08-30 NOTE — PROGRESS NOTES
[facility] Hospital Medicine  Telemedicine Progress Note    Patient Name: Edwin Powell  MRN: [id number]  Patient Class: IP- Inpatient   Admission Date: 8/17/2020  Length of Stay: 13 days  Attending Physician: Marlena Montenegro MD  Primary Care Provider: Lulú Lewis MD    [facility] Medicine Team: [facility] VIRTUAL TEAM 10 Marlena Montenegro MD    This service was provided through telemedicine.  The patient consents to virtual visits.   The patient location is: [address]/[facility]  Chief complaint:  Acute on chronic respiratory failure with hypoxia and hypercapnia   Start time: 852a   End time:  900a    Total time spent with patient: 8 min  Present with the patient at the time of the telemed/virtual assessment: Telemed presenter  I have assessed these findings virtually using a telemed platform and with assistance of bedside nurse.  The attending portion of this evaluation, treatment, and documentation was performed per Marlena Montenegro MD via audiovisual modality.      Patient was transferred to the telemedicine service on: 8/26/2020    HPI:  75 y.o. 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 Patient underwent ACDF of C6/7 with Ortho & ENT on 8/17. During the procedure he received rocuronium, ketamine, fentanyl, propofol and midazolam. He required a renae infusion during the procedure. Post-operatively the patient was admitted to post-op Ortho unit and was placed on his home 2L O2. After his procedure he received multiple doses of oxycodone for his neck pain. Overnight on 8/17-8/18 the patient had multiple periods of apnea requiring 3 doses of Narcan on the floor with minimal improvement in respiratory and mental status. ABG showed combined metabolic and respiratory  acidosis. No significant improvement in acidosis with initiation of Bipap on the floor and patient continued to have apneic periods and worsening hypoxia requiring 15L NC. He was transferred to ICU for further management.      Subjective:     Admission CC:  Severe stenosis at C6/7 with myopathy s/p ACDF of C6/7 with Ortho & ENT on 8/17.    Follow up visit for: Acute on chronic respiratory failure with hypoxia and hypercapnia    Interval History / Events Overnight:     Left neck/back/shoulder pain improved today; muscle relaxer and topical analgesics have been effective; creatinine mildly increased to 2.7 with decline in hemoglobin; later in the afternoon nursing reported that he felt fullness in his throat and coughed up of blood clot; he denied any dyspnea or persistent cough thereafter and oxygen saturation remained stable; no history of epistaxis or hematemesis; chest x-ray ordered    Review of Systems   Constitutional: Negative for fever.   Cardiovascular: Negative for chest pain.   Musculoskeletal: Positive for arthralgias, back pain, myalgias and neck pain.     Objective:     Vital Signs (Most Recent):  Temp: 98.7 °F (37.1 °C) (08/30/20 1216)  Pulse: 70 (08/30/20 1526)  Resp: 18 (08/30/20 1241)  BP: (!) 140/69 (08/30/20 1216)  SpO2: (!) 92 % (08/30/20 1216) Vital Signs (24h Range):  Temp:  [98.1 °F (36.7 °C)-98.7 °F (37.1 °C)] 98.7 °F (37.1 °C)  Pulse:  [57-78] 70  Resp:  [16-18] 18  SpO2:  [92 %-98 %] 92 %  BP: (140-177)/(66-71) 140/69     Weight: 79.4 kg (175 lb 0.7 oz)  Body mass index is 27.42 kg/m².    Intake/Output Summary (Last 24 hours) at 8/30/2020 1555  Last data filed at 8/30/2020 0843  Gross per 24 hour   Intake 800 ml   Output 1700 ml   Net -900 ml      Physical Exam  Constitutional:       General: He is not in acute distress.     Appearance: Normal appearance. He is not diaphoretic.   Eyes:      General: Lids are normal. No scleral icterus.        Right eye: No discharge.         Left eye: No  discharge.      Conjunctiva/sclera: Conjunctivae normal.   Cardiovascular:      Rate and Rhythm: Normal rate.   Pulmonary:      Effort: Pulmonary effort is normal. No tachypnea, accessory muscle usage or respiratory distress.   Musculoskeletal:      Right lower leg: No edema.      Left lower leg: No edema.   Skin:     Coloration: Skin is not cyanotic.      Findings: No rash.   Neurological:      General: No focal deficit present.      Mental Status: He is alert and oriented to person, place, and time.      Cranial Nerves: Cranial nerves are intact.      Motor: Motor function is intact.   Psychiatric:         Attention and Perception: Attention normal.         Mood and Affect: Affect is labile.         Speech: Speech normal.         Behavior: Behavior is cooperative.         Significant Labs:   Recent Labs   Lab 08/28/20 0339 08/29/20 0440 08/30/20 0624 08/30/20  1431   WBC 10.00  --  10.09 9.90  --    HGB 9.0*   < > 9.4* 8.6* 9.0*   HCT 29.5*   < > 30.9* 28.3* 29.4*   *  --  487* 518*  --     < > = values in this interval not displayed.     Recent Labs   Lab 08/28/20 0339 08/29/20 0440 08/30/20 0624   GRAN 71.0  7.1 74.5*  7.5 72.8  7.2   LYMPH 17.4*  1.7 14.1*  1.4 17.0*  1.7   MONO 7.8  0.8 7.9  0.8 7.5  0.7   EOS 0.2 0.2 0.2     Recent Labs   Lab 08/28/20 0339 08/29/20 0440 08/30/20  0624    138 136   K 4.1 4.7 4.8   CL 99 98 98   CO2 29 29 30*   BUN 85* 75* 80*   CREATININE 2.6* 2.4* 2.7*   * 148* 214*   CALCIUM 8.5* 8.6* 8.6*   ALBUMIN 2.0* 2.0* 2.0*   MG 1.7 1.7 1.8   PHOS 5.1* 5.1* 5.8*     Recent Labs   Lab 08/28/20 0339 08/29/20 0440 08/30/20  0624   ALKPHOS 83 83 85   ALT 27 36 38   AST 34 52* 44*   PROT 5.4* 5.5* 5.5*   BILITOT 0.2 0.2 0.1     Recent Labs   Lab 08/26/20  1340   BNP 67     Recent Labs   Lab 08/18/20  0438 08/26/20  1340   LACTATE 1.2 1.3     SARS-CoV2 (COVID-19) Qualitative PCR (no units)   Date Value   08/14/2020 Not Detected     SARS-CoV-2 RNA,  Amplification, Qual (no units)   Date Value   08/18/2020 Negative     Recent Labs   Lab 04/01/20  1137 08/04/20  1156   HGBA1C 6.6* 6.3*  6.3*     Recent Labs   Lab 08/29/20  2119 08/30/20  0733 08/30/20  1202   POCTGLUCOSE 178* 193* 171*     Recent Labs   Lab 08/18/20  0438   TSH 7.711*       Significant Imaging:     Assessment/Plan:      Active Diagnoses:    Diagnosis Date Noted POA    PRINCIPAL PROBLEM:  Acute on chronic respiratory failure with hypoxia and hypercapnia [J96.21, J96.22] 08/18/2020 Yes    S/P cervical spinal fusion [Z98.1]  Not Applicable    Acute respiratory failure with hypoxia [J96.01]  Yes    Impaired functional mobility and endurance [Z74.09] 08/19/2020 Yes    NSTEMI (non-ST elevated myocardial infarction) [I21.4] 08/19/2020 Yes    Elevated troponin [R79.89] 08/19/2020 Yes    Dysphagia [R13.10] 08/19/2020 No    Stented coronary artery [Z95.5]  Not Applicable    Metastatic renal cell carcinoma [C64.9]  Yes    Acute kidney injury superimposed on chronic kidney disease [N17.9, N18.9] 08/18/2020 Yes    Acute metabolic encephalopathy [G93.41] 08/18/2020 No    Metabolic acidosis, normal anion gap (NAG) [E87.2] 08/18/2020 Yes    Cervical stenosis of spine [M48.02] 07/14/2020 Yes    Neuropathy due to secondary diabetes [E13.40] 09/06/2017 Yes    Benign prostatic hyperplasia (BPH) with urinary urge incontinence [N40.1, N39.41] 06/06/2017 Yes    Acquired hypothyroidism [E03.9] 05/26/2017 Yes    Coronary artery disease involving native coronary artery of native heart without angina pectoris [I25.10] 05/26/2017 Yes    Type 2 diabetes mellitus with stage 3 chronic kidney disease, without long-term current use of insulin [E11.22, N18.3] 05/26/2017 Yes      Problems Resolved During this Admission:       Overview / ICU Course:   Patient was transferred to MICU and started on narcan and bicarbonate infusions. Hypoxia worsened requiring up to 30L comfort flow. Mental status improved and  narcan gtt discontinued on 8/18 in AM. PE suspected however CTA not feasible due to renal function. Bilateral lower extremity U/S negative for DVT, however because of quickly worsening O2 requirements and elevated troponin, he was empirically started on a heparin gtt for possible PE. TTE did not show RV strain but did show focal wall motion abnormalities, and troponin continued to uptrend so patient was loaded with DAPT for NSTEMI. CVP 3 on TTE however pulmonary edema on CXR. Restarted diuresis intravenously.  8/20/2020: Patient no longer requiring CPAP (was on 35L/40%). O2Sat 97% with 2L NC. Refers feeling much better than yesterday.  8/21/2020: NAEON. Patient on 3L NC, Sat 98%. Denied fevers, chills, chest pain, SOB, palpitations. Patient to be stepped down today      Inpatient Medications Prescribed for Management of Current Problems:     Scheduled Meds:    amLODIPine  10 mg Oral Daily    aspirin  81 mg Oral Daily    carvediloL  6.25 mg Oral BID    clopidogreL  75 mg Oral Daily    dextromethorphan-guaifenesin  mg  1 tablet Oral BID    docusate sodium  50 mg Oral BID    [START ON 8/31/2020] furosemide  40 mg Oral Daily    levothyroxine  88 mcg Oral Before breakfast    methocarbamoL  500 mg Oral BID    methyl salicylate-menthol 15-10%   Topical (Top) TID    pantoprazole  40 mg Intravenous Daily    PAZOpanib  800 mg Oral Before breakfast    rosuvastatin  40 mg Oral QHS    tamsulosin  0.8 mg Oral Daily     Continuous Infusions:   As Needed: acetaminophen, albuterol-ipratropium, bisacodyL, dextrose 50%, dextrose 50%, glucagon (human recombinant), glucose, glucose, hydrALAZINE, insulin aspart U-100, naloxone, ondansetron, oxyCODONE, polyethylene glycol, sodium chloride 0.9%, sodium chloride 0.9%    Assessment and Plan by Problem    Acute on chronic respiratory failure with hypoxia and hypercapnia  Concern for PE as he suddenly required significantly more O2 overnight on 8/17-18. Risk factors include  recent surgery and metastatic RCC.   - CTA chest deferred due to VERENA  - Bilat LE U/S negative for DVT  - TTE negative for RV strain with normal PA pressure. Showed chronic HFpEF and focal wall motion abnormalities  - Troponin elevated and peaked at 8.8, BNP mildly elevated (no baseline)  - Was put on heparin gtt for 48hrs. OK'd by Ortho to start A/C so soon after surgery due to rapidly worsening hypoxia  - Wean O2 to goal sat > 90%.   - Lasix, monitor UOP  - Cr   2.7 on 8/30 - hold evening dose of Lasix due to decline in hemoglobin    Hemoptysis  -solitary episode; patient has not been wearing nasal cannula for days  -chest x-ray ordered  -monitor H&H  -remains on aspirin and Plavix    NSTEMI (non-ST elevated myocardial infarction)  Coronary artery disease involving native coronary artery of native heart with angina pectoris  Elevated troponin  CAD with history of PCI ~20 years ago. Patient did not have chest pain. Troponin was checked when concerned for PE, initially was 1.2 however continued to uptrend and peaked at 8.8.  - Loaded with ASA and Plavix, Ok'd by Ortho, and continued  - Continue Coreg,  ASA, rosuvastatin, Plavix, amlodipine   - On statin  - Cardiology consulted. Per Cardiology: with patient's metastatic renal carcinoma and progressive decline recommend conservative management for now.  He possibly developed flash pulmonary edema in the setting of hypertension, apnea, sedation leading to respiratory failure and NSTEMI. For conservative medical management from his ACS standpoint, continue aspirin and Plavix, rosuvastatin and carvedilol. Cardiology aware of repeat echo showing EF 30%; currently not recommending further diuresis 8/26.     Metabolic acidosis, normal anion gap (NAG)  --due to RTA in setting of VERENA on CKD vs recent diarrhea (per recent Nephro clinic note)  --UAG positive (38) suggestive of RTA  -- Will sodium bicarb as acidosis resolved     Acute kidney injury superimposed on chronic kidney  disease  --baseline creatinine ~ 2  -- ? Etiology. Recent Nephro note states pt has had decreased po intake & diarrhea recently - ? Pre-renal. Pt received maintenance IVF, so likely volume resuscitated at this point  --home Lasix & lisinopril held initially. Then Lasix 100mg daily resumed.  --renal U/S unremarkable  --improving with diuresis; received IV Lasix through 8/25 then sCr increased on 8/26 after continued diuresis. Nephrology consulted and determining home lasix dose on discharge- start lasix 40 mg BID with serial labs.     Benign prostatic hyperplasia (BPH) with urinary urge incontinence  --continue home Flomax     Type 2 diabetes mellitus with stage 3 chronic kidney disease, without long-term current use of insulin  --per Clinic notes, insulin dependent  --home regimen lantus 5 units daily   --currently low dose correction; concerning if actually insulin-dependent as rarely requiring administration for hyperglycemia. AG normal     Acquired hypothyroidism  --continue home levothyroxine  --TSH elevated however fT4 WNL     Dysphagia  SLP eval, tolerating PO intake. Now on a regular diet      Acute metabolic encephalopathy  --suspect medication induced insetting of decreased renal clearance as pt responded well to Narcan. Started on Narcan gtt and discontinued on 8/18 once mental status improved  --hold sedating medications  --resolved     Cervical stenosis of spine  --s/p ACDF on 8/17 with Ortho and ENT; management per Ortho  --start topicals to shoulder/ left neck  --Continue oxycodone prn; added methocarbamol     Neuropathy due to secondary diabetes  --holding home Lyrica in setting of encephalopathy  --when resumed, will need to be renally dosed     Metastatic renal cell carcinoma of L kidney   Followed by Rosa Maria and Joshua.    Diet: Diet diabetic [facility]; 2800 Calorie; Cardiac (Low Na/Chol)  GI Prophylaxis: Not indicated  Significant LDAs:   IV Access Type: Peripheral  Urinary Catheter  Indication if present: Patient Does Not Have Urinary Catheter  Other Lines/Tubes/Drains:    HIGH RISK CONDITION(S):   Patient has a condition that poses threat to life and bodily function: Severe Respiratory Distress, NSTEMI, and Acute Renal Failure     Goals of Care:    Previous admission:  8/22/19  Likely prognosis:  Fair  Code Status: Full Code  Comfort Only: No  Hospice: No  Goals at discharge: remain at home, with physician follow-up    Discharge Planning   JAUN: 8/30/2020     Code Status: Full Code   Is the patient medically ready for discharge?: No    Reason for patient still in hospital (select all that apply): Patient new problem and Patient trending condition - uncontrolled pain  Discharge Plan A: Home health with son  Discharge Delays: None known at this time    VTE Risk Mitigation (From admission, onward)         Ordered     IP VTE HIGH RISK PATIENT  Once      08/17/20 0859     Place sequential compression device  Until discontinued      08/17/20 0516                   Marlena Montenegro MD  Department of Hospital Medicine   [facility]  [phone]

## 2020-08-31 ENCOUNTER — ANESTHESIA EVENT (OUTPATIENT)
Dept: ENDOSCOPY | Facility: HOSPITAL | Age: 75
DRG: 471 | End: 2020-08-31
Payer: MEDICARE

## 2020-08-31 PROBLEM — K92.2 GI BLEED: Status: ACTIVE | Noted: 2020-08-31

## 2020-08-31 LAB
ALBUMIN SERPL BCP-MCNC: 2 G/DL (ref 3.5–5.2)
ALP SERPL-CCNC: 72 U/L (ref 55–135)
ALT SERPL W/O P-5'-P-CCNC: 28 U/L (ref 10–44)
ANION GAP SERPL CALC-SCNC: 10 MMOL/L (ref 8–16)
AST SERPL-CCNC: 30 U/L (ref 10–40)
BASOPHILS # BLD AUTO: 0.01 K/UL (ref 0–0.2)
BASOPHILS # BLD AUTO: 0.01 K/UL (ref 0–0.2)
BASOPHILS # BLD AUTO: 0.02 K/UL (ref 0–0.2)
BASOPHILS # BLD AUTO: 0.02 K/UL (ref 0–0.2)
BASOPHILS # BLD AUTO: 0.03 K/UL (ref 0–0.2)
BASOPHILS NFR BLD: 0.1 % (ref 0–1.9)
BASOPHILS NFR BLD: 0.1 % (ref 0–1.9)
BASOPHILS NFR BLD: 0.2 % (ref 0–1.9)
BASOPHILS NFR BLD: 0.2 % (ref 0–1.9)
BASOPHILS NFR BLD: 0.3 % (ref 0–1.9)
BILIRUB SERPL-MCNC: 0.2 MG/DL (ref 0.1–1)
BUN SERPL-MCNC: 99 MG/DL (ref 8–23)
CALCIUM SERPL-MCNC: 8.5 MG/DL (ref 8.7–10.5)
CHLORIDE SERPL-SCNC: 101 MMOL/L (ref 95–110)
CO2 SERPL-SCNC: 26 MMOL/L (ref 23–29)
CREAT SERPL-MCNC: 3 MG/DL (ref 0.5–1.4)
DIFFERENTIAL METHOD: ABNORMAL
EOSINOPHIL # BLD AUTO: 0 K/UL (ref 0–0.5)
EOSINOPHIL # BLD AUTO: 0.1 K/UL (ref 0–0.5)
EOSINOPHIL NFR BLD: 0.4 % (ref 0–8)
EOSINOPHIL NFR BLD: 0.8 % (ref 0–8)
EOSINOPHIL NFR BLD: 0.8 % (ref 0–8)
EOSINOPHIL NFR BLD: 0.9 % (ref 0–8)
EOSINOPHIL NFR BLD: 1.1 % (ref 0–8)
ERYTHROCYTE [DISTWIDTH] IN BLOOD BY AUTOMATED COUNT: 14.1 % (ref 11.5–14.5)
ERYTHROCYTE [DISTWIDTH] IN BLOOD BY AUTOMATED COUNT: 14.2 % (ref 11.5–14.5)
ERYTHROCYTE [DISTWIDTH] IN BLOOD BY AUTOMATED COUNT: 14.2 % (ref 11.5–14.5)
ERYTHROCYTE [DISTWIDTH] IN BLOOD BY AUTOMATED COUNT: 14.4 % (ref 11.5–14.5)
ERYTHROCYTE [DISTWIDTH] IN BLOOD BY AUTOMATED COUNT: 16.6 % (ref 11.5–14.5)
EST. GFR  (AFRICAN AMERICAN): 22.5 ML/MIN/1.73 M^2
EST. GFR  (NON AFRICAN AMERICAN): 19.4 ML/MIN/1.73 M^2
GLUCOSE SERPL-MCNC: 187 MG/DL (ref 70–110)
HCT VFR BLD AUTO: 21.1 % (ref 40–54)
HCT VFR BLD AUTO: 24.2 % (ref 40–54)
HCT VFR BLD AUTO: 24.4 % (ref 40–54)
HCT VFR BLD AUTO: 26.1 % (ref 40–54)
HCT VFR BLD AUTO: 26.5 % (ref 40–54)
HGB BLD-MCNC: 6.1 G/DL (ref 14–18)
HGB BLD-MCNC: 7.3 G/DL (ref 14–18)
HGB BLD-MCNC: 7.4 G/DL (ref 14–18)
HGB BLD-MCNC: 7.5 G/DL (ref 14–18)
HGB BLD-MCNC: 8.2 G/DL (ref 14–18)
IMM GRANULOCYTES # BLD AUTO: 0.09 K/UL (ref 0–0.04)
IMM GRANULOCYTES # BLD AUTO: 0.1 K/UL (ref 0–0.04)
IMM GRANULOCYTES NFR BLD AUTO: 0.8 % (ref 0–0.5)
IMM GRANULOCYTES NFR BLD AUTO: 0.8 % (ref 0–0.5)
IMM GRANULOCYTES NFR BLD AUTO: 0.9 % (ref 0–0.5)
IMM GRANULOCYTES NFR BLD AUTO: 0.9 % (ref 0–0.5)
IMM GRANULOCYTES NFR BLD AUTO: 1 % (ref 0–0.5)
INR PPP: 1 (ref 0.8–1.2)
LYMPHOCYTES # BLD AUTO: 1.4 K/UL (ref 1–4.8)
LYMPHOCYTES # BLD AUTO: 1.6 K/UL (ref 1–4.8)
LYMPHOCYTES # BLD AUTO: 1.8 K/UL (ref 1–4.8)
LYMPHOCYTES # BLD AUTO: 2 K/UL (ref 1–4.8)
LYMPHOCYTES # BLD AUTO: 2.1 K/UL (ref 1–4.8)
LYMPHOCYTES NFR BLD: 12 % (ref 18–48)
LYMPHOCYTES NFR BLD: 15.5 % (ref 18–48)
LYMPHOCYTES NFR BLD: 16.7 % (ref 18–48)
LYMPHOCYTES NFR BLD: 18.9 % (ref 18–48)
LYMPHOCYTES NFR BLD: 18.9 % (ref 18–48)
MAGNESIUM SERPL-MCNC: 1.7 MG/DL (ref 1.6–2.6)
MCH RBC QN AUTO: 31.8 PG (ref 27–31)
MCH RBC QN AUTO: 32.2 PG (ref 27–31)
MCH RBC QN AUTO: 32.3 PG (ref 27–31)
MCH RBC QN AUTO: 32.4 PG (ref 27–31)
MCH RBC QN AUTO: 32.6 PG (ref 27–31)
MCHC RBC AUTO-ENTMCNC: 28.3 G/DL (ref 32–36)
MCHC RBC AUTO-ENTMCNC: 28.9 G/DL (ref 32–36)
MCHC RBC AUTO-ENTMCNC: 30.2 G/DL (ref 32–36)
MCHC RBC AUTO-ENTMCNC: 30.3 G/DL (ref 32–36)
MCHC RBC AUTO-ENTMCNC: 31.4 G/DL (ref 32–36)
MCV RBC AUTO: 103 FL (ref 82–98)
MCV RBC AUTO: 107 FL (ref 82–98)
MCV RBC AUTO: 108 FL (ref 82–98)
MCV RBC AUTO: 110 FL (ref 82–98)
MCV RBC AUTO: 114 FL (ref 82–98)
MONOCYTES # BLD AUTO: 0.6 K/UL (ref 0.3–1)
MONOCYTES # BLD AUTO: 0.7 K/UL (ref 0.3–1)
MONOCYTES # BLD AUTO: 0.9 K/UL (ref 0.3–1)
MONOCYTES NFR BLD: 6.2 % (ref 4–15)
MONOCYTES NFR BLD: 6.5 % (ref 4–15)
MONOCYTES NFR BLD: 6.5 % (ref 4–15)
MONOCYTES NFR BLD: 6.8 % (ref 4–15)
MONOCYTES NFR BLD: 7.2 % (ref 4–15)
NEUTROPHILS # BLD AUTO: 7.7 K/UL (ref 1.8–7.7)
NEUTROPHILS # BLD AUTO: 7.9 K/UL (ref 1.8–7.7)
NEUTROPHILS # BLD AUTO: 7.9 K/UL (ref 1.8–7.7)
NEUTROPHILS # BLD AUTO: 8 K/UL (ref 1.8–7.7)
NEUTROPHILS # BLD AUTO: 9.5 K/UL (ref 1.8–7.7)
NEUTROPHILS NFR BLD: 72.3 % (ref 38–73)
NEUTROPHILS NFR BLD: 72.9 % (ref 38–73)
NEUTROPHILS NFR BLD: 74.8 % (ref 38–73)
NEUTROPHILS NFR BLD: 76.8 % (ref 38–73)
NEUTROPHILS NFR BLD: 78.7 % (ref 38–73)
NRBC BLD-RTO: 0 /100 WBC
PHOSPHATE SERPL-MCNC: 5.6 MG/DL (ref 2.7–4.5)
PLATELET # BLD AUTO: 462 K/UL (ref 150–350)
PLATELET # BLD AUTO: 472 K/UL (ref 150–350)
PLATELET # BLD AUTO: 505 K/UL (ref 150–350)
PLATELET # BLD AUTO: 507 K/UL (ref 150–350)
PLATELET # BLD AUTO: 508 K/UL (ref 150–350)
PMV BLD AUTO: 10.1 FL (ref 9.2–12.9)
PMV BLD AUTO: 10.1 FL (ref 9.2–12.9)
PMV BLD AUTO: 10.4 FL (ref 9.2–12.9)
PMV BLD AUTO: 10.7 FL (ref 9.2–12.9)
PMV BLD AUTO: 10.9 FL (ref 9.2–12.9)
POCT GLUCOSE: 145 MG/DL (ref 70–110)
POCT GLUCOSE: 189 MG/DL (ref 70–110)
POCT GLUCOSE: 194 MG/DL (ref 70–110)
POCT GLUCOSE: 207 MG/DL (ref 70–110)
POTASSIUM SERPL-SCNC: 5 MMOL/L (ref 3.5–5.1)
PROT SERPL-MCNC: 5.3 G/DL (ref 6–8.4)
PROTHROMBIN TIME: 11.2 SEC (ref 9–12.5)
RBC # BLD AUTO: 1.92 M/UL (ref 4.6–6.2)
RBC # BLD AUTO: 2.26 M/UL (ref 4.6–6.2)
RBC # BLD AUTO: 2.27 M/UL (ref 4.6–6.2)
RBC # BLD AUTO: 2.33 M/UL (ref 4.6–6.2)
RBC # BLD AUTO: 2.53 M/UL (ref 4.6–6.2)
SARS-COV-2 RDRP RESP QL NAA+PROBE: NEGATIVE
SODIUM SERPL-SCNC: 137 MMOL/L (ref 136–145)
WBC # BLD AUTO: 10.4 K/UL (ref 3.9–12.7)
WBC # BLD AUTO: 10.59 K/UL (ref 3.9–12.7)
WBC # BLD AUTO: 10.64 K/UL (ref 3.9–12.7)
WBC # BLD AUTO: 10.86 K/UL (ref 3.9–12.7)
WBC # BLD AUTO: 12.01 K/UL (ref 3.9–12.7)

## 2020-08-31 PROCEDURE — 85025 COMPLETE CBC W/AUTO DIFF WBC: CPT | Mod: 91,HCNC

## 2020-08-31 PROCEDURE — 63600175 PHARM REV CODE 636 W HCPCS: Mod: HCNC | Performed by: NURSE PRACTITIONER

## 2020-08-31 PROCEDURE — 20600001 HC STEP DOWN PRIVATE ROOM: Mod: HCNC

## 2020-08-31 PROCEDURE — 63600175 PHARM REV CODE 636 W HCPCS: Mod: HCNC | Performed by: HOSPITALIST

## 2020-08-31 PROCEDURE — 83735 ASSAY OF MAGNESIUM: CPT | Mod: HCNC

## 2020-08-31 PROCEDURE — C9113 INJ PANTOPRAZOLE SODIUM, VIA: HCPCS | Mod: HCNC | Performed by: INTERNAL MEDICINE

## 2020-08-31 PROCEDURE — 99232 SBSQ HOSP IP/OBS MODERATE 35: CPT | Mod: HCNC,,, | Performed by: HOSPITALIST

## 2020-08-31 PROCEDURE — C9113 INJ PANTOPRAZOLE SODIUM, VIA: HCPCS | Mod: HCNC | Performed by: HOSPITALIST

## 2020-08-31 PROCEDURE — 63600175 PHARM REV CODE 636 W HCPCS: Mod: HCNC | Performed by: INTERNAL MEDICINE

## 2020-08-31 PROCEDURE — 99900035 HC TECH TIME PER 15 MIN (STAT): Mod: HCNC

## 2020-08-31 PROCEDURE — P9016 RBC LEUKOCYTES REDUCED: HCPCS | Mod: HCNC

## 2020-08-31 PROCEDURE — 99232 PR SUBSEQUENT HOSPITAL CARE,LEVL II: ICD-10-PCS | Mod: HCNC,,, | Performed by: HOSPITALIST

## 2020-08-31 PROCEDURE — 25000003 PHARM REV CODE 250: Mod: HCNC | Performed by: STUDENT IN AN ORGANIZED HEALTH CARE EDUCATION/TRAINING PROGRAM

## 2020-08-31 PROCEDURE — 84100 ASSAY OF PHOSPHORUS: CPT | Mod: HCNC

## 2020-08-31 PROCEDURE — 36415 COLL VENOUS BLD VENIPUNCTURE: CPT | Mod: HCNC

## 2020-08-31 PROCEDURE — 99223 1ST HOSP IP/OBS HIGH 75: CPT | Mod: HCNC,,, | Performed by: INTERNAL MEDICINE

## 2020-08-31 PROCEDURE — 99233 SBSQ HOSP IP/OBS HIGH 50: CPT | Mod: HCNC,GC,, | Performed by: INTERNAL MEDICINE

## 2020-08-31 PROCEDURE — 99233 PR SUBSEQUENT HOSPITAL CARE,LEVL III: ICD-10-PCS | Mod: HCNC,GC,, | Performed by: INTERNAL MEDICINE

## 2020-08-31 PROCEDURE — 99291 PR CRITICAL CARE, E/M 30-74 MINUTES: ICD-10-PCS | Mod: HCNC,,, | Performed by: PHYSICIAN ASSISTANT

## 2020-08-31 PROCEDURE — 99223 PR INITIAL HOSPITAL CARE,LEVL III: ICD-10-PCS | Mod: HCNC,,, | Performed by: INTERNAL MEDICINE

## 2020-08-31 PROCEDURE — 80053 COMPREHEN METABOLIC PANEL: CPT | Mod: HCNC

## 2020-08-31 PROCEDURE — 99291 CRITICAL CARE FIRST HOUR: CPT | Mod: HCNC,,, | Performed by: PHYSICIAN ASSISTANT

## 2020-08-31 PROCEDURE — 63600175 PHARM REV CODE 636 W HCPCS: Mod: HCNC | Performed by: PHYSICIAN ASSISTANT

## 2020-08-31 PROCEDURE — 85610 PROTHROMBIN TIME: CPT | Mod: HCNC

## 2020-08-31 PROCEDURE — U0002 COVID-19 LAB TEST NON-CDC: HCPCS | Mod: HCNC

## 2020-08-31 PROCEDURE — C9113 INJ PANTOPRAZOLE SODIUM, VIA: HCPCS | Mod: HCNC | Performed by: PHYSICIAN ASSISTANT

## 2020-08-31 PROCEDURE — 36430 TRANSFUSION BLD/BLD COMPNT: CPT

## 2020-08-31 PROCEDURE — 25000003 PHARM REV CODE 250: Mod: HCNC | Performed by: INTERNAL MEDICINE

## 2020-08-31 PROCEDURE — 94761 N-INVAS EAR/PLS OXIMETRY MLT: CPT | Mod: HCNC

## 2020-08-31 PROCEDURE — 25000003 PHARM REV CODE 250: Mod: HCNC | Performed by: HOSPITALIST

## 2020-08-31 RX ORDER — BENZONATATE 100 MG/1
100 CAPSULE ORAL 3 TIMES DAILY PRN
Status: DISCONTINUED | OUTPATIENT
Start: 2020-08-31 | End: 2020-09-03 | Stop reason: HOSPADM

## 2020-08-31 RX ORDER — PANTOPRAZOLE SODIUM 40 MG/10ML
40 INJECTION, POWDER, LYOPHILIZED, FOR SOLUTION INTRAVENOUS 2 TIMES DAILY
Status: DISCONTINUED | OUTPATIENT
Start: 2020-08-31 | End: 2020-09-01

## 2020-08-31 RX ORDER — PANTOPRAZOLE SODIUM 40 MG/10ML
80 INJECTION, POWDER, LYOPHILIZED, FOR SOLUTION INTRAVENOUS ONCE
Status: COMPLETED | OUTPATIENT
Start: 2020-08-31 | End: 2020-08-31

## 2020-08-31 RX ORDER — HYDROCODONE BITARTRATE AND ACETAMINOPHEN 500; 5 MG/1; MG/1
TABLET ORAL
Status: DISCONTINUED | OUTPATIENT
Start: 2020-08-31 | End: 2020-09-03 | Stop reason: HOSPADM

## 2020-08-31 RX ADMIN — GUAIFENESIN AND DEXTROMETHORPHAN HYDROBROMIDE 1 TABLET: 600; 30 TABLET, EXTENDED RELEASE ORAL at 09:08

## 2020-08-31 RX ADMIN — ONDANSETRON 4 MG: 2 INJECTION INTRAMUSCULAR; INTRAVENOUS at 03:08

## 2020-08-31 RX ADMIN — OXYCODONE 5 MG: 5 TABLET ORAL at 10:08

## 2020-08-31 RX ADMIN — INSULIN ASPART 2 UNITS: 100 INJECTION, SOLUTION INTRAVENOUS; SUBCUTANEOUS at 09:08

## 2020-08-31 RX ADMIN — SODIUM CHLORIDE 500 ML: 0.9 INJECTION, SOLUTION INTRAVENOUS at 11:08

## 2020-08-31 RX ADMIN — OXYCODONE 5 MG: 5 TABLET ORAL at 04:08

## 2020-08-31 RX ADMIN — PANTOPRAZOLE SODIUM 40 MG: 40 INJECTION, POWDER, LYOPHILIZED, FOR SOLUTION INTRAVENOUS at 09:08

## 2020-08-31 RX ADMIN — ROSUVASTATIN CALCIUM 40 MG: 20 TABLET, FILM COATED ORAL at 09:08

## 2020-08-31 RX ADMIN — PANTOPRAZOLE SODIUM 80 MG: 40 INJECTION, POWDER, LYOPHILIZED, FOR SOLUTION INTRAVENOUS at 04:08

## 2020-08-31 RX ADMIN — DOCUSATE SODIUM 50 MG: 50 CAPSULE, LIQUID FILLED ORAL at 09:08

## 2020-08-31 RX ADMIN — METHOCARBAMOL TABLETS 500 MG: 500 TABLET, COATED ORAL at 09:08

## 2020-08-31 RX ADMIN — FUROSEMIDE 40 MG: 40 TABLET ORAL at 09:08

## 2020-08-31 RX ADMIN — LEVOTHYROXINE SODIUM 88 MCG: 0.09 TABLET ORAL at 05:08

## 2020-08-31 RX ADMIN — TAMSULOSIN HYDROCHLORIDE 0.8 MG: 0.4 CAPSULE ORAL at 09:08

## 2020-08-31 NOTE — PROGRESS NOTES
Progress Note  Hospital Medicine      Admit Date: 8/17/2020   [facility] HOSP MED D    SUBJECTIVE:     Follow-up For:  Acute on chronic respiratory failure with hypoxia and hypercapnia     Interval history/ROS: hematemesis overnight. Bright red blood. Patient feels dizzy.     HPI:  75 y.o. male with history of c-spine stenosis, metastatic RCC (liver and L3 mets), CKD, CAD s/p PCI, chronic hypoxic respiratory failure, hypothyroidism, IDDM2, HLD, BPH, HTN and GERD presented to hospital on 8/17 for scheduled C-spine fusion due to severe stenosis at C6/7 with myopathy. Patient underwent ACDF of C6/7 with Ortho & ENT on 8/17. During the procedure he received rocuronium, ketamine, fentanyl, propofol and midazolam. He required a renae infusion during the procedure. Post-operatively the patient was admitted to post-op Ortho unit and was placed on his home 2L O2. After his procedure he received multiple doses of oxycodone for his neck pain. Overnight on 8/17-8/18 the patient had multiple periods of apnea requiring 3 doses of Narcan on the floor with minimal improvement in respiratory and mental status. ABG showed combined metabolic and respiratory acidosis. No significant improvement in acidosis with initiation of Bipap on the floor and patient continued to have apneic periods and worsening hypoxia requiring 15L NC. He was transferred to ICU for further management.      Overview / ICU Course:   Patient was transferred to MICU and started on narcan and bicarbonate infusions. Hypoxia worsened requiring up to 30L comfort flow. Mental status improved and narcan gtt discontinued on 8/18 in AM. PE suspected however CTA not feasible due to renal function. Bilateral lower extremity U/S negative for DVT, however because of quickly worsening O2 requirements and elevated troponin, he was empirically started on a heparin gtt for possible PE. TTE did not show RV strain but did show focal wall motion abnormalities, and troponin continued to  uptrend so patient was loaded with DAPT for NSTEMI. CVP 3 on TTE however pulmonary edema on CXR. Restarted diuresis intravenously.  8/20/2020: Patient no longer requiring CPAP (was on 35L/40%). O2Sat 97% with 2L NC. Refers feeling much better than yesterday.  8/21/2020: NAEON. Patient on 3L NC, Sat 98%. Denied fevers, chills, chest pain, SOB, palpitations. Patient to be stepped down today       OBJECTIVE:     Body mass index is 27.42 kg/m².    Vital Signs Range (Last 24H):  Temp:  [97.8 °F (36.6 °C)-98.5 °F (36.9 °C)]   Pulse:  [62-88]   Resp:  [16-20]   BP: ()/(53-70)   SpO2:  [91 %-98 %]     I & O (Last 24H):    Intake/Output Summary (Last 24 hours) at 8/31/2020 1412  Last data filed at 8/31/2020 1200  Gross per 24 hour   Intake 270 ml   Output 585 ml   Net -315 ml        Physical Exam:  Constitutional:       General: He is not in acute distress.     Appearance: Normal appearance. He is not diaphoretic.   Eyes:      General: Lids are normal. No scleral icterus.        Right eye: No discharge.         Left eye: No discharge.      Conjunctiva/sclera: Conjunctivae normal.   Cardiovascular:      Rate and Rhythm: Normal rate.   Pulmonary:      Effort: Pulmonary effort is normal. No tachypnea, accessory muscle usage or respiratory distress.   Musculoskeletal:      Right lower leg: No edema.      Left lower leg: No edema.   Skin:     Coloration: Skin is not cyanotic.      Findings: No rash.   Neurological:      General: No focal deficit present.      Mental Status: He is alert and oriented to person, place, and time.      Cranial Nerves: Cranial nerves are intact.      Motor: Motor function is intact.   Psychiatric:         Attention and Perception: Attention normal.         Mood and Affect: Affect is labile.         Speech: Speech normal.         Behavior: Behavior is cooperative.        Recent Labs   Lab 08/29/20  0440 08/30/20  0624 08/31/20  0338    136 137   K 4.7 4.8 5.0   CL 98 98 101   CO2 29 30* 26    BUN 75* 80* 99*   CREATININE 2.4* 2.7* 3.0*   * 214* 187*   CALCIUM 8.6* 8.6* 8.5*   MG 1.7 1.8 1.7   PHOS 5.1* 5.8* 5.6*     Recent Labs   Lab 08/29/20  0440 08/30/20  0624 08/31/20  0338 08/31/20  1208   ALKPHOS 83 85 72  --    ALT 36 38 28  --    AST 52* 44* 30  --    ALBUMIN 2.0* 2.0* 2.0*  --    PROT 5.5* 5.5* 5.3*  --    BILITOT 0.2 0.1 0.2  --    INR  --   --   --  1.0       Recent Labs   Lab 08/30/20  2336 08/31/20  0338 08/31/20  1208   WBC 12.01 10.86  10.64 10.40   HGB 7.5* 7.3*  7.4* 6.1*   HCT 26.5* 24.2*  24.4* 21.1*   * 505*  507* 462*   GRAN 78.7*  9.5* 72.9  72.3  7.9*  7.7 76.8*  8.0*   LYMPH 12.0*  1.4 18.9  18.9  2.1  2.0 15.5*  1.6   MONO 7.2  0.9 6.5  6.8  0.7  0.7 6.2  0.6       Recent Labs   Lab 08/29/20  2119 08/30/20  0733 08/30/20  1202 08/30/20  1638 08/30/20  2047 08/31/20  0725   POCTGLUCOSE 178* 193* 171* 185* 183* 207*       No results for input(s): TROPONINI in the last 168 hours.    Diagnostic Results:  Labs: Reviewed    dextromethorphan-guaifenesin  mg, 1 tablet, Oral, BID  docusate sodium, 50 mg, Oral, BID  levothyroxine, 88 mcg, Oral, Before breakfast  methocarbamoL, 500 mg, Oral, BID  methyl salicylate-menthol 15-10%, , Topical (Top), TID  pantoprazole, 40 mg, Intravenous, BID  PAZOpanib, 800 mg, Oral, Before breakfast  rosuvastatin, 40 mg, Oral, QHS  tamsulosin, 0.8 mg, Oral, Daily        As Needed sodium chloride, sodium chloride, acetaminophen, albuterol-ipratropium, benzonatate, bisacodyL, dextrose 50%, dextrose 50%, glucagon (human recombinant), glucose, glucose, hydrALAZINE, insulin aspart U-100, naloxone, ondansetron, oxyCODONE, polyethylene glycol, sodium chloride 0.9%, sodium chloride 0.9%    ASSESSMENT/PLAN:     Assessment: Edwin Powell is a 75 y.o. male here with:     Active Hospital Problems    Diagnosis  POA    *Acute on chronic respiratory failure with hypoxia and hypercapnia [J96.21, J96.22]  Yes    GI bleed [K92.2]   "Unknown    Hematemesis with nausea [K92.0]  No    S/P cervical spinal fusion [Z98.1]  Not Applicable    Acute respiratory failure with hypoxia [J96.01]  Yes    Impaired functional mobility and endurance [Z74.09]  Yes    NSTEMI (non-ST elevated myocardial infarction) [I21.4]  Yes    Elevated troponin [R79.89]  Yes    Dysphagia [R13.10]  No    Stented coronary artery [Z95.5]  Not Applicable    Metastatic renal cell carcinoma [C64.9]  Yes    Acute kidney injury superimposed on chronic kidney disease [N17.9, N18.9]  Yes    Acute metabolic encephalopathy [G93.41]  No    Metabolic acidosis, normal anion gap (NAG) [E87.2]  Yes    Cervical stenosis of spine [M48.02]  Yes     Seen on MRI 5/9/20: " Multilevel cervical spondylosis most prominent at C6-C7 resulting in severe spinal canal stenosis and severe bilateral neural foraminal narrowing."      Neuropathy due to secondary diabetes [E13.40]  Yes     LE numbness and tingling, elevated A1c, followed by podiatry      Benign prostatic hyperplasia (BPH) with urinary urge incontinence [N40.1, N39.41]  Yes     Patient with LUTS, compliant with flomax      Acquired hypothyroidism [E03.9]  Yes     Elevated TSH in 6/2017, 8/2017, 11/2017, 3/2018      Coronary artery disease involving native coronary artery of native heart without angina pectoris [I25.10]  Yes     [facility] S/p 5 stents 2010. Anginal equivalent controlled with CCB, ACE, APT      Type 2 diabetes mellitus with stage 3 chronic kidney disease, without long-term current use of insulin [E11.22, N18.3]  Yes     A1c 11.1 in 5/2017. Weaning off Lantus during chemo, but needed to restart it with weight gain         Resolved Hospital Problems   No resolved problems to display.        Plan:    Upper GI Bleeding:   Patient with active hematemesis personally witnessed. Bright red blood with clots.    Plan:   - protonix bid VI  - hold all anticoagulants  - NS bolus  - prepare 2 UPRBC and transfuse 1 for symptomatic " anemia  - no history of liver diseases to suggest variceal origin  - consult GI team - EGD in AM as patient ate this AM.  - consult MICU for multiple episodes of hematemesis  - confirm IV access       Acute on chronic respiratory failure with hypoxia and hypercapnia  Concern for PE as he suddenly required significantly more O2 overnight on 8/17-18. Risk factors include recent surgery and metastatic RCC.   - CTA chest deferred due to VERENA but NM scan low probability PE  - Bilat LE U/S negative for DVT  - TTE negative for RV strain with normal PA pressure. Showed chronic HFpEF and focal wall motion abnormalities  - Troponin elevated and peaked at 8.8, BNP mildly elevated (no baseline)  - Was put on heparin gtt for 48hrs. OK'd by Ortho to start A/C so soon after surgery due to rapidly worsening hypoxia  - Wean O2 to goal sat > 90%.   - Lasix, monitor UOP  - Cr   2.7 on 8/30 - hold evening dose of Lasix due to decline in hemoglobin     Hemoptysis  -solitary episode; patient has not been wearing nasal cannula for days  -chest x-ray ordered  -monitor H&H  -remains on aspirin and Plavix     NSTEMI (non-ST elevated myocardial infarction)  Coronary artery disease involving native coronary artery of native heart with angina pectoris  Elevated troponin  CAD with history of PCI ~20 years ago. Patient did not have chest pain. Troponin was checked when concerned for PE, initially was 1.2 however continued to uptrend and peaked at 8.8.  - Loaded with ASA and Plavix, Ok'd by Ortho, and continued now stopped due to bleeding  - Continue Coreg,  ASA, rosuvastatin, Plavix, amlodipine   - On statin  - Cardiology consulted. Per Cardiology: with patient's metastatic renal carcinoma and progressive decline recommend conservative management for now.  He possibly developed flash pulmonary edema in the setting of hypertension, apnea, sedation leading to respiratory failure and NSTEMI. For conservative medical management from his ACS  standpoint, continue aspirin and Plavix, rosuvastatin and carvedilol. Cardiology aware of repeat echo showing EF 30%; currently not recommending further diuresis 8/26.     Metabolic acidosis, normal anion gap (NAG)  --due to RTA in setting of VERENA on CKD vs recent diarrhea (per recent Nephro clinic note)  --UAG positive (38) suggestive of RTA  -- Will sodium bicarb as acidosis resolved     Acute kidney injury superimposed on chronic kidney disease  --baseline creatinine ~ 2  -- ? Etiology. Recent Nephro note states pt has had decreased po intake & diarrhea recently - ? Pre-renal. Pt received maintenance IVF, so likely volume resuscitated at this point  --home Lasix & lisinopril held initially. Then Lasix 100mg daily resumed.  --renal U/S unremarkable  --improving with diuresis; received IV Lasix through 8/25 then sCr increased on 8/26 after continued diuresis. Nephrology consulted and determining home lasix dose on discharge- start lasix 40 mg BID with serial labs.     Benign prostatic hyperplasia (BPH) with urinary urge incontinence  --continue home Flomax     Type 2 diabetes mellitus with stage 3 chronic kidney disease, without long-term current use of insulin  --per Clinic notes, insulin dependent  --home regimen lantus 5 units daily   --currently low dose correction; concerning if actually insulin-dependent as rarely requiring administration for hyperglycemia. AG normal     Acquired hypothyroidism  --continue home levothyroxine  --TSH elevated however fT4 WNL     Dysphagia  SLP eval, tolerating PO intake. Now on a regular diet      Acute metabolic encephalopathy  --suspect medication induced insetting of decreased renal clearance as pt responded well to Narcan. Started on Narcan gtt and discontinued on 8/18 once mental status improved  --hold sedating medications  --resolved     Cervical stenosis of spine  --s/p ACDF on 8/17 with Ortho and ENT; management per Ortho  --start topicals to shoulder/ left  neck  --Continue oxycodone prn; added methocarbamol     Neuropathy due to secondary diabetes  --holding home Lyrica in setting of encephalopathy  --when resumed, will need to be renally dosed     Metastatic renal cell carcinoma of L kidney          Followed by Rosa Maria and Joshua.     Diet: Diet diabetic [facility]; 2800 Calorie; Cardiac (Low Na/Chol)  GI Prophylaxis: Not indicated  Significant LDAs:   IV Access Type: Peripheral  Urinary Catheter Indication if present: Patient Does Not Have Urinary Catheter  Other Lines/Tubes/Drains:     HIGH RISK CONDITION(S):   Patient has a condition that poses threat to life and bodily function: Severe Respiratory Distress, NSTEMI, and Acute Renal Failure      Goals of Care:    Previous admission:  8/22/19  Likely prognosis:  Fair  Code Status: Full Code  Comfort Only: No  Hospice: No  Goals at discharge: remain at home, with physician follow-up     HIGH RISK CONDITION(S):  Patient has a condition that poses threat to life and bodily function: upper GI bleeding        Loraine Beard MD

## 2020-08-31 NOTE — NURSING
Pt stated that he felt dizzy and BP was in the low 100s/50s. H&H this morning was 7.3 and 24.2. MD was notified

## 2020-08-31 NOTE — CONSULTS
[facility]  Gastroenterology  Consult Note    Patient Name: Edwin Powell  MRN: [id number]  Admission Date: 8/17/2020  Hospital Length of Stay: 14 days  Code Status: Full Code   Attending Provider: Loraine Beard MD   Consulting Provider: Sage Gregg MD  Primary Care Physician: Lulú Lewis MD  Principal Problem:Acute on chronic respiratory failure with hypoxia and hypercapnia    Inpatient consult to Gastroenterology  Consult performed by: Sage Gregg MD  Consult ordered by: Lea Jacob PA-C        Subjective:     HPI:  75 male PMH metastatic RCC with liver and spinal mets, CKD, CAD, hypothyroidism, DM2, HLD, BPH, HTN, GERD admitted 8/18 for fusion of cervical spine (C6/7) due to myopathy.  Patient underwent surgical repair with anterior approach with ENT and Ortho.  Post-operative course notable for hypoxic respiratory failure due to opioids.  Stepped down to medicine floor and restarted on ASA, plavix for CAD.  Patient developed hemoptysis several days after procedure, small volume with some maroon blood and clots.  This continued through the weekend, and yesterday he developed one episode of large volume hematemesis.  Patient notes coughing up blood throughout the day prior to his episode of hematemesis.  Hematemesis appeared similar to above hemoptysis.  Was not hemodynamically unstable.  Hemoglobin down trended slightly 8.6--> 7.4.    Upon interview patient produced cup with small amount of hemoptysis with bloody clot.  This has continued after episode of hematemesis.  No evidence of dysphagia or neck swelling.  No alcohol use history, no history of liver disease.    Past Medical History:   Diagnosis Date    Acquired hypothyroidism 5/26/2017    Benign essential HTN 5/26/2017    Benign prostatic hyperplasia (BPH) with urinary urge incontinence 6/6/2017    Chronic low back pain 6/1/2017    Coronary artery disease involving native coronary artery of native  heart without angina pectoris 5/26/2017    S/p 5 stents - last one around 2010-11.    Gastroesophageal reflux disease without esophagitis 5/26/2017    History of CVA (cerebrovascular accident) 5/26/2017    Hypertension associated with diabetes 5/26/2017    BP controlled on ACE, CCB    Lumbar disc lesion 5/26/2017    Metastatic renal cell carcinoma to bone 6/6/2017    Metastatic renal cell carcinoma to liver 6/6/2017    Liver Biopsy 5-2017: METASTATIC RENAL CELL CARCINOMA.    Mixed hyperlipidemia 5/26/2017    Type 2 diabetes mellitus with stage 3 chronic kidney disease, with long-term current use of insulin 5/26/2017       Past Surgical History:   Procedure Laterality Date    ABDOMINAL SURGERY      ANTERIOR CERVICAL DISCECTOMY W/ FUSION N/A 8/17/2020    Procedure: DISCECTOMY, SPINE, CERVICAL, ANTERIOR APPROACH, WITH FUSION co case with Dr. Falcon C6/7 [facility] SNS:vocal cord/motors/SSEP Regular OR table Patient needs scope in preop;  Surgeon: Filemon Aguayo MD;  Location: [facility] OR [address] [place] [address];  Service: Neurosurgery;  Laterality: N/A;    APPENDECTOMY      BACK SURGERY      CARDIAC SURGERY      CATARACT EXTRACTION      CHOLECYSTECTOMY      CORONARY STENT PLACEMENT      5 stents    coronary stenting      X 5 last one in 2010-11.    DISSECTION OF NECK N/A 8/17/2020    Procedure: DISSECTION, NECK;  Surgeon: Rush Falcon MD;  Location: [facility] OR [address] [place] [address];  Service: ENT;  Laterality: N/A;    INTRAOCULAR PROSTHESES INSERTION Right 6/27/2019    Procedure: INSERTION, IOL PROSTHESIS;  Surgeon: Chary Culp MD;  Location: [facility] OR [address] [place] [address];  Service: Ophthalmology;  Laterality: Right;    INTRAOCULAR PROSTHESES INSERTION Left 8/22/2019    Procedure: INSERTION, IOL PROSTHESIS;  Surgeon: Chary Culp MD;  Location: [facility] OR [address] [place] [address];  Service: Ophthalmology;  Laterality: Left;    PHACOEMULSIFICATION OF CATARACT Right 6/27/2019    Procedure: PHACOEMULSIFICATION, CATARACT;  Surgeon: Chary Culp MD;   Location: [facility] OR [address] [place] [address];  Service: Ophthalmology;  Laterality: Right;    PHACOEMULSIFICATION OF CATARACT Left 8/22/2019    Procedure: PHACOEMULSIFICATION, CATARACT;  Surgeon: Chary Culp MD;  Location: [facility] OR [address] [place] [address];  Service: Ophthalmology;  Laterality: Left;    SPINAL FUSION         Review of patient's allergies indicates:  No Known Allergies  Family History     Problem Relation (Age of Onset)    Diabetes Mother, Brother    Glaucoma Mother, Maternal Grandmother    Heart attack Mother    No Known Problems Son        Tobacco Use    Smoking status: Former Smoker     Types: Cigarettes    Smokeless tobacco: Never Used    Tobacco comment: haven't smoked in last 25 yrs   Substance and Sexual Activity    Alcohol use: Yes     Comment: rarely     Drug use: Not Currently    Sexual activity: Not Currently     Partners: Female     Review of Systems   Constitutional: Positive for fatigue.   HENT:        Hemoptysis   Eyes: Negative.    Respiratory: Negative.    Cardiovascular: Negative.    Gastrointestinal: Positive for vomiting.        Hematemesis x1   Endocrine: Negative.    Genitourinary: Negative.    Musculoskeletal: Negative.    Allergic/Immunologic: Negative.    Neurological: Negative.    Hematological: Negative.    Psychiatric/Behavioral: Negative.      Objective:     Vital Signs (Most Recent):  Temp: 97.9 °F (36.6 °C) (08/31/20 0904)  Pulse: 65 (08/31/20 0906)  Resp: 18 (08/31/20 0904)  BP: (!) 110/53 (08/31/20 0906)  SpO2: 98 % (08/31/20 0904) Vital Signs (24h Range):  Temp:  [97.9 °F (36.6 °C)-98.7 °F (37.1 °C)] 97.9 °F (36.6 °C)  Pulse:  [63-88] 65  Resp:  [16-20] 18  SpO2:  [91 %-98 %] 98 %  BP: ()/(53-70) 110/53     Weight: 79.4 kg (175 lb 0.7 oz) (08/27/20 1300)  Body mass index is 27.42 kg/m².      Intake/Output Summary (Last 24 hours) at 8/31/2020 0952  Last data filed at 8/31/2020 0900  Gross per 24 hour   Intake 120 ml   Output 710 ml   Net -590 ml       Lines/Drains/Airways      Peripheral Intravenous Line                 Peripheral IV - Single Lumen 08/26/20 1710 22 G Right Forearm 4 days                Physical Exam    Gen: NAD, lying comfortably  HENT: NCAT, oropharynx clear, small amount of dry blood on oral mucosa  Eyes: anicteric sclerae, EOMI grossly  Neck: supple, no visible masses/goiter, well-dressed anterior surgical incision  Cardiac: RRR, no M/R/G, S1/S2 present  Lungs: CTAB, no crackles, no wheezes  Abd: soft, NT/ND, normoactive BS, no rebound  Ext: no LE edema, warm, well perfused  Skin: skin intact on exposed body parts, no visible rashes, lesions  Neuro: A&Ox4, neuro exam grossly intact, moves all extremities  Psych: appropriate mood, affect      Significant Labs:  CBC:   Recent Labs   Lab 08/30/20  0624  08/30/20  1757 08/30/20  2336 08/31/20  0338   WBC 9.90  --   --  12.01 10.86  10.64   HGB 8.6*   < > 8.6* 7.5* 7.3*  7.4*   HCT 28.3*   < > 28.6* 26.5* 24.2*  24.4*   *  --   --  508* 505*  507*    < > = values in this interval not displayed.     CMP:   Recent Labs   Lab 08/31/20  0338   *   CALCIUM 8.5*   ALBUMIN 2.0*   PROT 5.3*      K 5.0   CO2 26      BUN 99*   CREATININE 3.0*   ALKPHOS 72   ALT 28   AST 30   BILITOT 0.2     Coagulation: No results for input(s): PT, INR, APTT in the last 48 hours.    Significant Imaging:  Imaging results within the past 24 hours have been reviewed.    Assessment/Plan:     Hematemesis with nausea  Patient with recent anterior neck dissection and cervical spinal stenosis surgery with ENT and Orthopedics.  Postoperatively developed hemoptysis and then subsequently hematemesis x1 in setting of coughing.  He was recently restarted on his aspirin Plavix for coronary artery disease.  This is concerning for a Fallon-Mullen tear, though ulcer disease cannot be ruled out.  Low likelihood for variceal bleeding.    Management:  -Place patient NPO at midnight  -Place 2 large bore IVs, volume resuscitation per  primary  -Transfuse pRBC for Hb < 7 g/dL (Consider a higher Hb target if there is clinical evidence of intravascular volume depletion or comorbidities, such as CAD or if high suspicion of vigorous active ongoing bleeding or an uncorrected coagulopathy exists.).  -Correct coagulopathy (goal plt >50, INR <1.5) if present in patients without absolute contraindications.  -PPI 80 mg IV bolus once, then IV PPI 40 BID  -Avoid nonsteroidal agents, antiplatelet agents and anticoagulants if possible in patients without absolute contraindications  -Will plan for Endoscopy tomorrow given the patient has eaten this morning  -Please call with any additional questions, concerns or changes in the patient's clinical status.                Thank you for your consult. I will follow-up with patient. Please contact us if you have any additional questions.    Sage Gregg MD  Gastroenterology  [facility]

## 2020-08-31 NOTE — PLAN OF CARE
POC reviewed w/ pt, questions and concerns addressed. Pt had episodes of hemoptysis, unable to measure it. PILLO and MD notified and came to bedside. Pt given PRN Zofran. Pt got PRN Oxy for pain and got moderate relief. Pt refused pain cream, because he stated it keeps him too cold. BG stable, no prn insulin needed. All vital signs stable. AOX4. Safety maintained. Bed locked, in lowest position, call light in reach, side rails x2. Mobilized pt to highest level of functioning. See doc flowsheets for further info. Will continue to monitor.

## 2020-08-31 NOTE — PT/OT/SLP PROGRESS
Physical Therapy      Patient Name:  Edwin Powell   MRN:  [id number]    Patient not seen today secondary to Patient politely declined treatment today, as last night He was vomiting chunks of blood and currently was receiving a blood transfusion.. Will follow-up on next scheduled visit.    Bj Thompson, PTA

## 2020-08-31 NOTE — SIGNIFICANT EVENT
Significant Event  Hospital Medicine    CC:  Acute on chronic respiratory failure with hypoxia and hypercapnia  Date:  08/31/2020  Admit Date:  8/17/2020  Hospital Length of Stay:  14    Code Status:  Full Code     SUBJECTIVE:     Significant Events:  Called urgently to the bedside by nurse to evaluate patient for hematemesis.  Patient with episodes of hemoptysis earlier tonight with H/H drop to 7.5/26.5 prior to this event.  Patient seen and examined.  Reports one episode of dark red hematemesis.  Denies chest pain, SOB, dizziness, palpitations.  SBP stable at 139.      OBJECTIVE:     Physical Exam:  Last Vitals:   Vitals:    08/31/20 0317   BP: 139/65   Pulse: 88   Resp: 20   Temp: 98.3 °F (36.8 °C)     GA:  No acute distress  HEENT:  PERRL.  No scleral icterus or JVD.   Pulmonary:  Clear to auscultation A/P/L.  No wheezing, crackles, or rhonchi.  Cardiac:  RRR, S1 & S2 w/o rubs/murmurs/gallops.   Abdominal:  Bowel sounds present x 4. No appreciable hepatosplenomegaly.  Neuro:  --GCS:  15  --CN II-XII grossly intact.  Corneal reflex, gag, cough intact.  --Extremity strength and sensation intact x 4.     ASSESSMENT AND PLAN/INTERVENTIONS:     1) Hematemesis  Plan/Interventions:  - CBC ordered STAT.  - Will give IV Protonix, Zofran.  - SBP stable at 139.  - Patient already consented for blood.  - GI consult.  - Hold ASA and Plavix.    Uninterrupted Critical Care/Counseling Time (not including procedures):  30 minutes    BERKLEY Salazar, PA-C  Hospital Medicine Department  Staff:  Dr. Feliz

## 2020-08-31 NOTE — PLAN OF CARE
Problem: Adult Inpatient Plan of Care  Goal: Plan of Care Review  Outcome: Ongoing, Progressing  Plan of care was reviewed with the pt. AAOx4. Vitals were stable. Cardiac and glucose monitoring was done. 1 unit of blood and a 500ml bolus of NS was given. PT/OT was able to work with the pt. Pt is scheduled for an EGD tomorrow. Safety precautions were in placed.

## 2020-08-31 NOTE — PLAN OF CARE
Plan of care: full note to follow.    Patient with active hematemesis personally witnessed. Bright red blood with clots.     Plan:   - protonix bid VI  - hold all anticoagulants  - NS bolus  - prepare 2 UPRBC and transfuse 1 for symptomatic anemia  - no history of liver diseases to suggest variceal origin  - consult GI team  - consult MICU for multiple episodes of hematemesis  - confirm IV access      Loraine flowers

## 2020-08-31 NOTE — ANESTHESIA PREPROCEDURE EVALUATION
[facility]  Anesthesia Pre-Operative Evaluation         Patient Name: Edwin Powell  YOB: 1945  MRN: [id number]    SUBJECTIVE:     Pre-operative evaluation for Procedure(s) (LRB):  EGD (ESOPHAGOGASTRODUODENOSCOPY) (N/A)     08/31/2020    Edwin Powell is a 75 y.o. male w/ a significant PMHx of metastatic RCC with liver and spinal mets, CKD, CAD, hypothyroidism, DM2, HLD, BPH, HTN, GERD admitted 8/18 for fusion of cervical spine (C6/7) due to myopathy.  Patient underwent surgical repair with anterior approach with ENT and Ortho.  Post-operative course notable for hypoxic respiratory failure due to opioids.  Stepped down to medicine floor and restarted on ASA, plavix for CAD.  Patient developed hemoptysis several days after procedure, small volume with some maroon blood and clots.  This continued through the weekend, and yesterday he developed one episode of witnessed large volume hematemesis with drop in Hgb from 8.6 to 7.5. Now presents for the above procedure.    8/31/20 H/H 7.3/24.2    Echo: TTE 8/20/20   · Moderately decreased left ventricular systolic function. The estimated ejection fraction is 30%. The following segments are severely hypokinetic: mid inferoseptal, apical septal and apex. rest of the walls are hypokinetic. Interval change in LVEF compared to the prior study.  · Grade I (mild) left ventricular diastolic dysfunction consistent with impaired relaxation.  · Normal right ventricular systolic function.  · Normal central venous pressure (3 mmHg).  · Mild left atrial enlargement.    LDA: None documented.    Prev airway:   Induction:  Intravenous    Intubated:  Postinduction    Mask Ventilation:  Easy with oral airway    Attempts:  1    Attempted By:  CRNA    Method of Intubation:  Video laryngoscopy    Blade:  Glidescope 3    Laryngeal View Grade: Grade I - full view of chords      Difficult Airway Encountered?: No      Complications:  None    Airway  Device:  EMG ETT (NIMS)    Airway Device Size:  7.0    Style/Cuff Inflation:  Cuffed (inflated to minimal occlusive pressure)    Inflation Amount (mL):  5    Tube secured:  22    Secured at:  The lips    Placement Verified By:  Capnometry    Complicating Factors:  None    Findings Post-Intubation:  BS equal bilateral     Drips: None documented.    Patient Active Problem List   Diagnosis    Acquired hypothyroidism    Coronary artery disease involving native coronary artery of native heart without angina pectoris    Type 2 diabetes mellitus with stage 3 chronic kidney disease, without long-term current use of insulin    Hyperlipidemia due to type 2 diabetes mellitus    Gastroesophageal reflux disease without esophagitis    Metastatic renal cell carcinoma to liver    Metastatic renal cell carcinoma to bone    Renal cell carcinoma of left kidney    Benign prostatic hyperplasia (BPH) with urinary urge incontinence    Neoplasm related pain    Senile purpura    Uncomplicated opioid dependence    Cervical arthritis    Neuropathy due to secondary diabetes    Drug induced insomnia    Tortuous aorta    Atherosclerosis of aorta    Facet arthritis of lumbar region    Macrocytic anemia    B12 deficiency    Moderate protein-calorie malnutrition    Vitamin D deficiency    Arthritis    Nuclear sclerotic cataract of both eyes    Lung nodule, multiple    Ground glass opacity present on imaging of lung    Senile nuclear sclerosis    CRI (chronic renal insufficiency), stage 3 (moderate)    Type 2 diabetes mellitus with hyperglycemia, with long-term current use of insulin    Unspecified inflammatory spondylopathy, lumbar region    Hyperlipidemia associated with type 2 diabetes mellitus    Diastasis recti    DNR (do not resuscitate)    Pain in neck    Weakness    Neck pain    Allergy    Constipation due to opioid therapy    Lumbosacral stenosis with neurogenic claudication    Cervical stenosis of  spine    Pre-operative examination for internal medicine    Hyperkalemia    Acute kidney injury superimposed on chronic kidney disease    Acute metabolic encephalopathy    Metabolic acidosis, normal anion gap (NAG)    Acute on chronic respiratory failure with hypoxia and hypercapnia    Impaired functional mobility and endurance    NSTEMI (non-ST elevated myocardial infarction)    Elevated troponin    Dysphagia    Stented coronary artery    Metastatic renal cell carcinoma    Acute respiratory failure with hypoxia    S/P cervical spinal fusion    Hematemesis with nausea       Review of patient's allergies indicates:  No Known Allergies    Current Inpatient Medications:   dextromethorphan-guaifenesin  mg  1 tablet Oral BID    docusate sodium  50 mg Oral BID    levothyroxine  88 mcg Oral Before breakfast    methocarbamoL  500 mg Oral BID    methyl salicylate-menthol 15-10%   Topical (Top) TID    pantoprazole  40 mg Intravenous BID    PAZOpanib  800 mg Oral Before breakfast    rosuvastatin  40 mg Oral QHS    tamsulosin  0.8 mg Oral Daily       Current Facility-Administered Medications on File Prior to Encounter   Medication Dose Route Frequency Provider Last Rate Last Dose    phenylephrine HCL 2.5% ophthalmic solution 1 drop  1 drop Left Eye On Call Procedure Chary Culp MD   1 drop at 08/22/19 0659    proparacaine 0.5 % ophthalmic solution 1 drop  1 drop Left Eye On Call Procedure Chary Culp MD   1 drop at 08/22/19 0641    tropicamide 1% ophthalmic solution 1 drop  1 drop Left Eye On Call Procedure Chary Culp MD   1 drop at 08/22/19 0700     Current Outpatient Medications on File Prior to Encounter   Medication Sig Dispense Refill    ascorbic acid (VITAMIN C ORAL) Take by mouth once daily. Hold for 1 week prior to surgery      b complex vitamins (B COMPLEX 1) tablet Take 1 tablet by mouth once daily. (Patient taking differently: Take 1 tablet by mouth once daily.  Hold for 1 week prior to surgery) 90 tablet 3    cholecalciferol, vitamin D3, (VITAMIN D3) 125 mcg (5,000 unit) Tab Take 1 tablet (5,000 Units total) by mouth once daily. (Patient taking differently: Take 5,000 Units by mouth once daily. Hold for 1 week prior to surgery) 90 tablet 3    montelukast (SINGULAIR) 10 mg tablet Take 10 mg by mouth every evening.      multivitamin (THERAGRAN) per tablet Take 1 tablet by mouth once daily.      PAZOpanib (VOTRIENT) 200 mg Tab Take 4 tablets (800 mg total) by mouth once daily. (Patient taking differently: Take 800 mg by mouth once daily Take in am of surgery.) 120 tablet 5    rosuvastatin (CRESTOR) 40 MG Tab Take 1 tablet (40 mg total) by mouth every evening. (Patient taking differently: Take 40 mg by mouth once daily. Take in am of surgery) 90 tablet 3    tamsulosin (FLOMAX) 0.4 mg Cap Take 2 capsules (0.8 mg total) by mouth every evening. (Patient taking differently: Take 0.8 mg by mouth once daily. Take in am of surgery) 180 capsule 3    vitamin E acetate (VITAMIN E ORAL) Take by mouth once daily. Hold for 1 week prior to surgery      aspirin (ECOTRIN) 81 MG EC tablet Take 81 mg by mouth once daily. Will ask MD to hold 1 week      blood sugar diagnostic Strp 1 strip by Misc.(Non-Drug; Combo Route) route 2 (two) times daily as needed. 200 strip 3    blood-glucose meter Misc use as instructed 1 each 0    insulin (LANTUS SOLOSTAR U-100 INSULIN) glargine 100 units/mL (3mL) SubQ pen Inject 5 Units into the skin every evening. (Patient taking differently: Inject 5 Units into the skin every evening. Take 80%=4U night before surgery) 5 mL 11    lancets (ONETOUCH ULTRASOFT LANCETS) Misc 1 lancet by Misc.(Non-Drug; Combo Route) route 2 (two) times daily as needed. 200 each 3    levocetirizine (XYZAL) 5 MG tablet Take 1 tablet (5 mg total) by mouth every evening. Can take up to 2 tablets (Patient taking differently: Take 5 mg by mouth every evening. Can take up to 2  "tablets  Takes at night) 30 tablet 11    levothyroxine (SYNTHROID) 88 MCG tablet Take 1 tablet (88 mcg total) by mouth once daily. (Patient taking differently: Take 88 mcg by mouth once daily. Take in am of surgery) 90 tablet 3    ondansetron (ZOFRAN) 8 MG tablet Take 1 tablet (8 mg total) by mouth 3 (three) times daily as needed. (Patient taking differently: Take 8 mg by mouth 3 (three) times daily as needed. Take if needed) 90 tablet 3    pen needle, diabetic 29 gauge x 1/2" Ndle 1 application by Misc.(Non-Drug; Combo Route) route once daily. 100 each 3       Past Surgical History:   Procedure Laterality Date    ABDOMINAL SURGERY      ANTERIOR CERVICAL DISCECTOMY W/ FUSION N/A 8/17/2020    Procedure: DISCECTOMY, SPINE, CERVICAL, ANTERIOR APPROACH, WITH FUSION co case with Dr. Falcon C6/7 [facility] SNS:vocal cord/motors/SSEP Regular OR table Patient needs scope in preop;  Surgeon: Filemon Aguayo MD;  Location: [facility] OR [address] [place] [address];  Service: Neurosurgery;  Laterality: N/A;    APPENDECTOMY      BACK SURGERY      CARDIAC SURGERY      CATARACT EXTRACTION      CHOLECYSTECTOMY      CORONARY STENT PLACEMENT      5 stents    coronary stenting      X 5 last one in 2010-11.    DISSECTION OF NECK N/A 8/17/2020    Procedure: DISSECTION, NECK;  Surgeon: Rush Falcon MD;  Location: [facility] OR [address] [place] [address];  Service: ENT;  Laterality: N/A;    INTRAOCULAR PROSTHESES INSERTION Right 6/27/2019    Procedure: INSERTION, IOL PROSTHESIS;  Surgeon: Chary Culp MD;  Location: [address];  Service: Ophthalmology;  Laterality: Right;    INTRAOCULAR PROSTHESES INSERTION Left 8/22/2019    Procedure: INSERTION, IOL PROSTHESIS;  Surgeon: Chary Culp MD;  Location: [facility] OR [address] [place] [address];  Service: Ophthalmology;  Laterality: Left;    PHACOEMULSIFICATION OF CATARACT Right 6/27/2019    Procedure: PHACOEMULSIFICATION, CATARACT;  Surgeon: Chary Culp MD;  Location: [facility] OR [address] [place] [address];  Service: Ophthalmology;  Laterality: " Right;    PHACOEMULSIFICATION OF CATARACT Left 8/22/2019    Procedure: PHACOEMULSIFICATION, CATARACT;  Surgeon: Chary Culp MD;  Location: [facility] OR [address] [place] [address];  Service: Ophthalmology;  Laterality: Left;    SPINAL FUSION         Social History     Socioeconomic History    Marital status: Single     Spouse name: Not on file    Number of children: Not on file    Years of education: Not on file    Highest education level: Not on file   Occupational History    Not on file   Social Needs    Financial resource strain: Hard    Food insecurity     Worry: Never true     Inability: Never true    Transportation needs     Medical: No     Non-medical: No   Tobacco Use    Smoking status: Former Smoker     Types: Cigarettes    Smokeless tobacco: Never Used    Tobacco comment: haven't smoked in last 25 yrs   Substance and Sexual Activity    Alcohol use: Yes     Comment: rarely     Drug use: Not Currently    Sexual activity: Not Currently     Partners: Female   Lifestyle    Physical activity     Days per week: Not on file     Minutes per session: Not on file    Stress: Not on file   Relationships    Social connections     Talks on phone: Not on file     Gets together: Not on file     Attends Tenriism service: Not on file     Active member of club or organization: Not on file     Attends meetings of clubs or organizations: Not on file     Relationship status: Not on file   Other Topics Concern    Not on file   Social History Narrative    Not on file       OBJECTIVE:     Vital Signs Range (Last 24H):  Temp:  [36.6 °C (97.9 °F)-37.1 °C (98.7 °F)]   Pulse:  [62-88]   Resp:  [16-20]   BP: ()/(53-70)   SpO2:  [91 %-98 %]       CBC:   Recent Labs     08/30/20  2336 08/31/20  0338   WBC 12.01 10.86  10.64   RBC 2.33* 2.26*  2.27*   HGB 7.5* 7.3*  7.4*   HCT 26.5* 24.2*  24.4*   * 505*  507*   * 107*  108*   MCH 32.2* 32.3*  32.6*   MCHC 28.3* 30.2*  30.3*       CMP:   Recent Labs      08/30/20  0624 08/31/20  0338    137   K 4.8 5.0   CL 98 101   CO2 30* 26   BUN 80* 99*   CREATININE 2.7* 3.0*   * 187*   MG 1.8 1.7   PHOS 5.8* 5.6*   CALCIUM 8.6* 8.5*   ALBUMIN 2.0* 2.0*   PROT 5.5* 5.3*   ALKPHOS 85 72   ALT 38 28   AST 44* 30   BILITOT 0.1 0.2       INR:  No results for input(s): PT, INR, PROTIME, APTT in the last 72 hours.    Diagnostic Studies: No relevant studies.    EKG: No recent studies available.    2D ECHO:  No results found for this or any previous visit.      ASSESSMENT/PLAN:         Pre-op Assessment    I have reviewed the Patient Summary Reports.     I have reviewed the Nursing Notes. I have reviewed the NPO Status.      Review of Systems  Anesthesia Hx:  No problems with previous Anesthesia  History of prior surgery of interest to airway management or planning:  Denies Personal Hx of Anesthesia complications.   Social:  Non-Smoker    Hematology/Oncology:        Current/Recent Cancer.   Cardiovascular:   Exercise tolerance: poor Denies Hypertension. CAD   CABG/stent   Denies Angina.  Functional Capacity Can you climb two flights of stairs? ==> Yes    Pulmonary:   Denies Asthma.  Denies Recent URI.  Denies Sleep Apnea.    Renal/:   Chronic Renal Disease    Hepatic/GI:   Denies PUD. Denies Hiatal Hernia. GERD Liver Disease,  Denies Hepatitis.    Neurological:   CVA Denies Seizures.    Endocrine:   Diabetes Denies Hypothyroidism.        Physical Exam  General:  Well nourished    Airway/Jaw/Neck:  Airway Findings: Mouth Opening: Normal Tongue: Normal  General Airway Assessment: Adult  Mallampati: II  TM Distance: Normal, at least 6 cm  Jaw/Neck Findings:  Neck ROM: Extension Decreased, Mod.      Dental:  Dental Findings: Edentulous   Chest/Lungs:  Chest/Lungs Findings: Clear to auscultation, Normal Respiratory Rate     Heart/Vascular:  Heart Findings: Rate: Normal  Rhythm: Regular Rhythm  Heart Murmur  Systolic  Systolic Heart Murmur Description: Holosystolic, Apical   Systolic Heart Murmur Grade: Grade II        Mental Status:  Mental Status Findings:  Cooperative, Alert and Oriented         Anesthesia Plan  Type of Anesthesia, risks & benefits discussed:  Anesthesia Type:  general, MAC  Patient's Preference: Proceed with anesthesia understanding that the risks are very small but could be serious or life threatening.  Intra-op Monitoring Plan: standard ASA monitors  Intra-op Monitoring Plan Comments:   Post Op Pain Control Plan: multimodal analgesia, per primary service following discharge from PACU and IV/PO Opioids PRN  Post Op Pain Control Plan Comments:   Induction:   IV  Beta Blocker:  Patient is not currently on a Beta-Blocker (No further documentation required).       Informed Consent: Patient understands risks and agrees with Anesthesia plan.  Questions answered. Anesthesia consent signed with patient.  ASA Score: 3     Day of Surgery Review of History & Physical: I have interviewed and examined the patient. I have reviewed the patient's H&P dated:  There are no significant changes.  H&P update referred to the provider.         Ready For Surgery From Anesthesia Perspective.

## 2020-08-31 NOTE — ASSESSMENT & PLAN NOTE
75 year old male with no history of GI bleeding or liver disease who was admitted for c-spine fusion. Now anemic with hemoptysis and hematemesis after restarting home plavix/asa for CAD. H/H slowly down trending. No hematemesis since last night. GI with plans for endoscopy tomorrow. MICU consulted for higher level of care.     Recs:  - patient HD stable and asymptomatic at this time. - transfuse as needed. Goal hgb >7  - PPI IV BID  - GI to scope tomorrow   - hold asa, Plavix, and blood thinners   - Recommend continued care under hosptial medicine. Will continue to follow from a distance. Please notify ICU if patient becomes HD unstable, symptomatic, or hgb not responding appropriately.

## 2020-08-31 NOTE — SUBJECTIVE & OBJECTIVE
Past Medical History:   Diagnosis Date    Acquired hypothyroidism 5/26/2017    Benign essential HTN 5/26/2017    Benign prostatic hyperplasia (BPH) with urinary urge incontinence 6/6/2017    Chronic low back pain 6/1/2017    Coronary artery disease involving native coronary artery of native heart without angina pectoris 5/26/2017    S/p 5 stents - last one around 2010-11.    Gastroesophageal reflux disease without esophagitis 5/26/2017    History of CVA (cerebrovascular accident) 5/26/2017    Hypertension associated with diabetes 5/26/2017    BP controlled on ACE, CCB    Lumbar disc lesion 5/26/2017    Metastatic renal cell carcinoma to bone 6/6/2017    Metastatic renal cell carcinoma to liver 6/6/2017    Liver Biopsy 5-2017: METASTATIC RENAL CELL CARCINOMA.    Mixed hyperlipidemia 5/26/2017    Type 2 diabetes mellitus with stage 3 chronic kidney disease, with long-term current use of insulin 5/26/2017       Past Surgical History:   Procedure Laterality Date    ABDOMINAL SURGERY      ANTERIOR CERVICAL DISCECTOMY W/ FUSION N/A 8/17/2020    Procedure: DISCECTOMY, SPINE, CERVICAL, ANTERIOR APPROACH, WITH FUSION co case with Dr. Falcon C6/7 [facility] SNS:vocal cord/motors/SSEP Regular OR table Patient needs scope in preop;  Surgeon: Filemon Aguayo MD;  Location: [facility] OR [address] [place] [address];  Service: Neurosurgery;  Laterality: N/A;    APPENDECTOMY      BACK SURGERY      CARDIAC SURGERY      CATARACT EXTRACTION      CHOLECYSTECTOMY      CORONARY STENT PLACEMENT      5 stents    coronary stenting      X 5 last one in 2010-11.    DISSECTION OF NECK N/A 8/17/2020    Procedure: DISSECTION, NECK;  Surgeon: Rush Falcon MD;  [facility];  Service: ENT;  Laterality: N/A;    INTRAOCULAR PROSTHESES INSERTION Right 6/27/2019    Procedure: INSERTION, IOL PROSTHESIS;  Surgeon: Chary Culp MD;  [facility];  Service: Ophthalmology;  Laterality: Right;    INTRAOCULAR PROSTHESES  INSERTION Left 8/22/2019    Procedure: INSERTION, IOL PROSTHESIS;  Surgeon: Chary Culp MD;  Location: [facility] OR [address] [place] [address];  Service: Ophthalmology;  Laterality: Left;    PHACOEMULSIFICATION OF CATARACT Right 6/27/2019    Procedure: PHACOEMULSIFICATION, CATARACT;  Surgeon: Chary Culp MD;  Location: [facility] OR [address] [place] [address];  Service: Ophthalmology;  Laterality: Right;    PHACOEMULSIFICATION OF CATARACT Left 8/22/2019    Procedure: PHACOEMULSIFICATION, CATARACT;  Surgeon: Chary Culp MD;  Location: [facility] OR [address] [place] [address];  Service: Ophthalmology;  Laterality: Left;    SPINAL FUSION         Review of patient's allergies indicates:  No Known Allergies  Family History     Problem Relation (Age of Onset)    Diabetes Mother, Brother    Glaucoma Mother, Maternal Grandmother    Heart attack Mother    No Known Problems Son        Tobacco Use    Smoking status: Former Smoker     Types: Cigarettes    Smokeless tobacco: Never Used    Tobacco comment: haven't smoked in last 25 yrs   Substance and Sexual Activity    Alcohol use: Yes     Comment: rarely     Drug use: Not Currently    Sexual activity: Not Currently     Partners: Female     Review of Systems   Constitutional: Positive for fatigue.   HENT:        Hemoptysis   Eyes: Negative.    Respiratory: Negative.    Cardiovascular: Negative.    Gastrointestinal: Positive for vomiting.        Hematemesis x1   Endocrine: Negative.    Genitourinary: Negative.    Musculoskeletal: Negative.    Allergic/Immunologic: Negative.    Neurological: Negative.    Hematological: Negative.    Psychiatric/Behavioral: Negative.      Objective:     Vital Signs (Most Recent):  Temp: 97.9 °F (36.6 °C) (08/31/20 0904)  Pulse: 65 (08/31/20 0906)  Resp: 18 (08/31/20 0904)  BP: (!) 110/53 (08/31/20 0906)  SpO2: 98 % (08/31/20 0904) Vital Signs (24h Range):  Temp:  [97.9 °F (36.6 °C)-98.7 °F (37.1 °C)] 97.9 °F (36.6 °C)  Pulse:  [63-88] 65  Resp:  [16-20] 18  SpO2:  [91 %-98 %] 98 %  BP:  ()/(53-70) 110/53     Weight: 79.4 kg (175 lb 0.7 oz) (08/27/20 1300)  Body mass index is 27.42 kg/m².      Intake/Output Summary (Last 24 hours) at 8/31/2020 0952  Last data filed at 8/31/2020 0900  Gross per 24 hour   Intake 120 ml   Output 710 ml   Net -590 ml       Lines/Drains/Airways     Peripheral Intravenous Line                 Peripheral IV - Single Lumen 08/26/20 1710 22 G Right Forearm 4 days                Physical Exam    Gen: NAD, lying comfortably  HENT: NCAT, oropharynx clear, small amount of dry blood on oral mucosa  Eyes: anicteric sclerae, EOMI grossly  Neck: supple, no visible masses/goiter, well-dressed anterior surgical incision  Cardiac: RRR, no M/R/G, S1/S2 present  Lungs: CTAB, no crackles, no wheezes  Abd: soft, NT/ND, normoactive BS, no rebound  Ext: no LE edema, warm, well perfused  Skin: skin intact on exposed body parts, no visible rashes, lesions  Neuro: A&Ox4, neuro exam grossly intact, moves all extremities  Psych: appropriate mood, affect      Significant Labs:  CBC:   Recent Labs   Lab 08/30/20  0624  08/30/20  1757 08/30/20  2336 08/31/20  0338   WBC 9.90  --   --  12.01 10.86  10.64   HGB 8.6*   < > 8.6* 7.5* 7.3*  7.4*   HCT 28.3*   < > 28.6* 26.5* 24.2*  24.4*   *  --   --  508* 505*  507*    < > = values in this interval not displayed.     CMP:   Recent Labs   Lab 08/31/20  0338   *   CALCIUM 8.5*   ALBUMIN 2.0*   PROT 5.3*      K 5.0   CO2 26      BUN 99*   CREATININE 3.0*   ALKPHOS 72   ALT 28   AST 30   BILITOT 0.2     Coagulation: No results for input(s): PT, INR, APTT in the last 48 hours.    Significant Imaging:  Imaging results within the past 24 hours have been reviewed.

## 2020-08-31 NOTE — ASSESSMENT & PLAN NOTE
Patient with recent anterior neck dissection and cervical spinal stenosis surgery with [facility]NT and Orthopedics.  Postoperatively developed hemoptysis and then subsequently hematemesis x1 in setting of coughing.  He was recently restarted on his aspirin Plavix for coronary artery disease.  This is concerning for a Fallon-Mullen tear, though ulcer disease cannot be ruled out.  Low likelihood for variceal bleeding.    Management:  -Place patient NPO at midnight  -Place 2 large bore IVs, volume resuscitation per primary  -Transfuse pRBC for Hb < 7 g/dL (Consider a higher Hb target if there is clinical evidence of intravascular volume depletion or comorbidities, such as CAD or if high suspicion of vigorous active ongoing bleeding or an uncorrected coagulopathy exists.).  -Correct coagulopathy (goal plt >50, INR <1.5) if present in patients without absolute contraindications.  -PPI 80 mg IV bolus once, then IV PPI 40 BID  -Avoid nonsteroidal agents, antiplatelet agents and anticoagulants if possible in patients without absolute contraindications  -Will plan for Endoscopy tomorrow given the patient has eaten this morning  -Please call with any additional questions, concerns or changes in the patient's clinical status.

## 2020-08-31 NOTE — SUBJECTIVE & OBJECTIVE
Past Medical History:   Diagnosis Date    Acquired hypothyroidism 5/26/2017    Benign essential HTN 5/26/2017    Benign prostatic hyperplasia (BPH) with urinary urge incontinence 6/6/2017    Chronic low back pain 6/1/2017    Coronary artery disease involving native coronary artery of native heart without angina pectoris 5/26/2017    S/p 5 stents - last one around 2010-11.    Gastroesophageal reflux disease without esophagitis 5/26/2017    History of CVA (cerebrovascular accident) 5/26/2017    Hypertension associated with diabetes 5/26/2017    BP controlled on ACE, CCB    Lumbar disc lesion 5/26/2017    Metastatic renal cell carcinoma to bone 6/6/2017    Metastatic renal cell carcinoma to liver 6/6/2017    Liver Biopsy 5-2017: METASTATIC RENAL CELL CARCINOMA.    Mixed hyperlipidemia 5/26/2017    Type 2 diabetes mellitus with stage 3 chronic kidney disease, with long-term current use of insulin 5/26/2017       Past Surgical History:   Procedure Laterality Date    ABDOMINAL SURGERY      ANTERIOR CERVICAL DISCECTOMY W/ FUSION N/A 8/17/2020    Procedure: DISCECTOMY, SPINE, CERVICAL, ANTERIOR APPROACH, WITH FUSION co case with Dr. Falcon C6/7 [facility] SNS:vocal cord/motors/SSEP Regular OR table Patient needs scope in preop;  Surgeon: Filemon Aguayo MD;  Location: [facility] OR [address] [place] [address];  Service: Neurosurgery;  Laterality: N/A;    APPENDECTOMY      BACK SURGERY      CARDIAC SURGERY      CATARACT EXTRACTION      CHOLECYSTECTOMY      CORONARY STENT PLACEMENT      5 stents    coronary stenting      X 5 last one in 2010-11.    DISSECTION OF NECK N/A 8/17/2020    Procedure: DISSECTION, NECK;  Surgeon: Rush Falcon MD;  [facility];  Service: ENT;  Laterality: N/A;    INTRAOCULAR PROSTHESES INSERTION Right 6/27/2019    Procedure: INSERTION, IOL PROSTHESIS;  Surgeon: Chary Culp MD;  [facility];  Service: Ophthalmology;  Laterality: Right;    INTRAOCULAR PROSTHESES  INSERTION Left 8/22/2019    Procedure: INSERTION, IOL PROSTHESIS;  Surgeon: Chary Culp MD;  Location: [facility] OR [address] [place] [address];  Service: Ophthalmology;  Laterality: Left;    PHACOEMULSIFICATION OF CATARACT Right 6/27/2019    Procedure: PHACOEMULSIFICATION, CATARACT;  Surgeon: Chary Culp MD;  Location: [facility] OR [address] [place] [address];  Service: Ophthalmology;  Laterality: Right;    PHACOEMULSIFICATION OF CATARACT Left 8/22/2019    Procedure: PHACOEMULSIFICATION, CATARACT;  Surgeon: Chary Culp MD;  Location: [facility] OR [address] [place] [address];  Service: Ophthalmology;  Laterality: Left;    SPINAL FUSION         Review of patient's allergies indicates:  No Known Allergies    Family History     Problem Relation (Age of Onset)    Diabetes Mother, Brother    Glaucoma Mother, Maternal Grandmother    Heart attack Mother    No Known Problems Son        Tobacco Use    Smoking status: Former Smoker     Types: Cigarettes    Smokeless tobacco: Never Used    Tobacco comment: haven't smoked in last 25 yrs   Substance and Sexual Activity    Alcohol use: Yes     Comment: rarely     Drug use: Not Currently    Sexual activity: Not Currently     Partners: Female      Review of Systems   Constitutional: Negative for activity change, appetite change, chills, fatigue and fever.   HENT: Negative for congestion, rhinorrhea and sore throat.    Respiratory: Negative for cough, shortness of breath and wheezing.         Hemoptysis    Cardiovascular: Negative for chest pain, palpitations and leg swelling.   Gastrointestinal: Negative for abdominal pain, blood in stool, constipation, diarrhea and nausea.        Hematemesis   Genitourinary: Negative for difficulty urinating, dysuria and hematuria.   Musculoskeletal: Negative for back pain, myalgias and neck pain.   Skin: Negative for rash.   Neurological: Negative for weakness and headaches.   Psychiatric/Behavioral: Negative for agitation and confusion.     Objective:     Vital Signs (Most Recent):  Temp:  97.9 °F (36.6 °C) (08/31/20 1315)  Pulse: 65 (08/31/20 1315)  Resp: 18 (08/31/20 1315)  BP: (!) 121/58 (08/31/20 1315)  SpO2: 95 % (08/31/20 1315) Vital Signs (24h Range):  Temp:  [97.8 °F (36.6 °C)-98.5 °F (36.9 °C)] 97.9 °F (36.6 °C)  Pulse:  [62-88] 65  Resp:  [16-20] 18  SpO2:  [91 %-98 %] 95 %  BP: ()/(53-70) 121/58   Weight: 79.4 kg (175 lb 0.7 oz)  Body mass index is 27.42 kg/m².      Intake/Output Summary (Last 24 hours) at 8/31/2020 1350  Last data filed at 8/31/2020 1200  Gross per 24 hour   Intake 270 ml   Output 585 ml   Net -315 ml       Physical Exam  Constitutional:       General: He is not in acute distress.     Appearance: He is well-developed.   HENT:      Head: Normocephalic and atraumatic.      Mouth/Throat:      Pharynx: No oropharyngeal exudate.   Eyes:      Conjunctiva/sclera: Conjunctivae normal.      Pupils: Pupils are equal, round, and reactive to light.   Neck:      Musculoskeletal: Normal range of motion and neck supple.   Cardiovascular:      Rate and Rhythm: Normal rate and regular rhythm.      Heart sounds: Normal heart sounds. No murmur.   Pulmonary:      Effort: Pulmonary effort is normal. No respiratory distress.      Breath sounds: Normal breath sounds. No wheezing or rales.   Abdominal:      General: Bowel sounds are normal. There is no distension.      Palpations: Abdomen is soft.      Tenderness: There is no abdominal tenderness. There is no guarding.   Musculoskeletal:         General: No tenderness.   Skin:     General: Skin is warm and dry.      Findings: No rash.   Neurological:      Mental Status: He is alert and oriented to person, place, and time.      Cranial Nerves: No cranial nerve deficit.      Motor: No abnormal muscle tone.   Psychiatric:         Behavior: Behavior normal.         Vents:  Oxygen Concentration (%): 40 (08/20/20 1200)  Lines/Drains/Airways     Peripheral Intravenous Line                 Peripheral IV - Single Lumen 08/26/20 1710 22 G Right  Forearm 4 days              Significant Labs:    CBC/Anemia Profile:  Recent Labs   Lab 08/30/20  1249  08/30/20  2336 08/31/20  0338 08/31/20  1208   WBC  --   --  12.01 10.86  10.64 10.40   HGB  --    < > 7.5* 7.3*  7.4* 6.1*   HCT  --    < > 26.5* 24.2*  24.4* 21.1*   PLT  --   --  508* 505*  507* 462*   MCV  --   --  114* 107*  108* 110*   RDW  --   --  14.2 14.2  14.1 14.4   OCCULTBLOOD Negative  --   --   --   --     < > = values in this interval not displayed.        Chemistries:  Recent Labs   Lab 08/30/20  0624 08/31/20  0338    137   K 4.8 5.0   CL 98 101   CO2 30* 26   BUN 80* 99*   CREATININE 2.7* 3.0*   CALCIUM 8.6* 8.5*   ALBUMIN 2.0* 2.0*   PROT 5.5* 5.3*   BILITOT 0.1 0.2   ALKPHOS 85 72   ALT 38 28   AST 44* 30   MG 1.8 1.7   PHOS 5.8* 5.6*       All pertinent labs within the past 24 hours have been reviewed.    Significant Imaging: I have reviewed all pertinent imaging results/findings within the past 24 hours.

## 2020-08-31 NOTE — NURSING
Pt found to be coughing up large, copious blood clots. VSS, no c/o new pain. TED Jacob, NP updated,she said she is going to come to the bedside.

## 2020-08-31 NOTE — PT/OT/SLP PROGRESS
Occupational Therapy      Patient Name:  Edwin Powell   MRN:  [id number]    Patient not seen today secondary to Patient unwilling to participate. Writing therapist attempted pt in AM, but pt refused.  Will follow-up as scheduled.    Dieudonne Meredith OT  8/31/2020

## 2020-08-31 NOTE — CONSULTS
[facility]  Critical Care Medicine  Consult Note    Patient Name: Edwin Powell  MRN: [id number]  Admission Date: 8/17/2020  Hospital Length of Stay: 14 days  Code Status: Full Code  Attending Physician: Loraine Beard MD   Primary Care Provider: Lulú Lewis MD   Principal Problem: Acute on chronic respiratory failure with hypoxia and hypercapnia    Inpatient consult to Critical Care Medicine  Consult performed by: Jim Mujica MD  Consult ordered by: Loraine Beard MD        Subjective:     HPI/hospital course:  Patient is a 75 y.o. 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 Patient underwent ACDF of C6/7 with Ortho & ENT on 8/17. He was restarted on ASA and Plavix after the procedure. Over the weekend he developed small volume hemoptysis; however, reports a large volume of hematemesis last night at 2am. Has coughed up less than 1oz of blood since last night. Hemoglobin downtrended slowly over past several days 8.6--> 7.3-->6.1. He has been HD stable and asymptomatic. No history of ETOH abuse or liver disease. Denies, melena, blood in stool, abd pain, chest pain, or sob.           Past Medical History:   Diagnosis Date    Acquired hypothyroidism 5/26/2017    Benign essential HTN 5/26/2017    Benign prostatic hyperplasia (BPH) with urinary urge incontinence 6/6/2017    Chronic low back pain 6/1/2017    Coronary artery disease involving native coronary artery of native heart without angina pectoris 5/26/2017    S/p 5 stents - last one around 2010-11.    Gastroesophageal reflux disease without esophagitis 5/26/2017    History of CVA (cerebrovascular accident) 5/26/2017    Hypertension associated with diabetes 5/26/2017    BP controlled on  ACE, CCB    Lumbar disc lesion 5/26/2017    Metastatic renal cell carcinoma to bone 6/6/2017    Metastatic renal cell carcinoma to liver 6/6/2017    Liver Biopsy 5-2017: METASTATIC RENAL CELL CARCINOMA.    Mixed hyperlipidemia 5/26/2017    Type 2 diabetes mellitus with stage 3 chronic kidney disease, with long-term current use of insulin 5/26/2017       Past Surgical History:   Procedure Laterality Date    ABDOMINAL SURGERY      ANTERIOR CERVICAL DISCECTOMY W/ FUSION N/A 8/17/2020    Procedure: DISCECTOMY, SPINE, CERVICAL, ANTERIOR APPROACH, WITH FUSION co case with Dr. Falcon C6/7 [facility] SNS:vocal cord/motors/SSEP Regular OR table Patient needs scope in preop;  Surgeon: Filemon Aguayo MD;  Location: [facility] OR [address] [place] [address];  Service: Neurosurgery;  Laterality: N/A;    APPENDECTOMY      BACK SURGERY      CARDIAC SURGERY      CATARACT EXTRACTION      CHOLECYSTECTOMY      CORONARY STENT PLACEMENT      5 stents    coronary stenting      X 5 last one in 2010-11.    DISSECTION OF NECK N/A 8/17/2020    Procedure: DISSECTION, NECK;  Surgeon: Rush Falcon MD;  [facility];  Service: ENT;  Laterality: N/A;    INTRAOCULAR PROSTHESES INSERTION Right 6/27/2019    Procedure: INSERTION, IOL PROSTHESIS;  Surgeon: Chary Culp MD;  [facility];  Service: Ophthalmology;  Laterality: Right;    INTRAOCULAR PROSTHESES INSERTION Left 8/22/2019    Procedure: INSERTION, IOL PROSTHESIS;  Surgeon: Chary Culp MD;  Location: [facility] OR [address] [place] [address];  Service: Ophthalmology;  Laterality: Left;    PHACOEMULSIFICATION OF CATARACT Right 6/27/2019    Procedure: PHACOEMULSIFICATION, CATARACT;  Surgeon: Chary Culp MD;  Location: [facility] OR [address] [place] [address];  Service: Ophthalmology;  Laterality: Right;    PHACOEMULSIFICATION OF CATARACT Left 8/22/2019    Procedure: PHACOEMULSIFICATION, CATARACT;  Surgeon: Chary Culp MD;  Location: [facility] OR [address] [place] [address];  Service: Ophthalmology;  Laterality: Left;     SPINAL FUSION         Review of patient's allergies indicates:  No Known Allergies    Family History     Problem Relation (Age of Onset)    Diabetes Mother, Brother    Glaucoma Mother, Maternal Grandmother    Heart attack Mother    No Known Problems Son        Tobacco Use    Smoking status: Former Smoker     Types: Cigarettes    Smokeless tobacco: Never Used    Tobacco comment: haven't smoked in last 25 yrs   Substance and Sexual Activity    Alcohol use: Yes     Comment: rarely     Drug use: Not Currently    Sexual activity: Not Currently     Partners: Female      Review of Systems   Constitutional: Negative for activity change, appetite change, chills, fatigue and fever.   HENT: Negative for congestion, rhinorrhea and sore throat.    Respiratory: Negative for cough, shortness of breath and wheezing.         Hemoptysis    Cardiovascular: Negative for chest pain, palpitations and leg swelling.   Gastrointestinal: Negative for abdominal pain, blood in stool, constipation, diarrhea and nausea.        Hematemesis   Genitourinary: Negative for difficulty urinating, dysuria and hematuria.   Musculoskeletal: Negative for back pain, myalgias and neck pain.   Skin: Negative for rash.   Neurological: Negative for weakness and headaches.   Psychiatric/Behavioral: Negative for agitation and confusion.     Objective:     Vital Signs (Most Recent):  Temp: 97.9 °F (36.6 °C) (08/31/20 1315)  Pulse: 65 (08/31/20 1315)  Resp: 18 (08/31/20 1315)  BP: (!) 121/58 (08/31/20 1315)  SpO2: 95 % (08/31/20 1315) Vital Signs (24h Range):  Temp:  [97.8 °F (36.6 °C)-98.5 °F (36.9 °C)] 97.9 °F (36.6 °C)  Pulse:  [62-88] 65  Resp:  [16-20] 18  SpO2:  [91 %-98 %] 95 %  BP: ()/(53-70) 121/58   Weight: 79.4 kg (175 lb 0.7 oz)  Body mass index is 27.42 kg/m².      Intake/Output Summary (Last 24 hours) at 8/31/2020 1350  Last data filed at 8/31/2020 1200  Gross per 24 hour   Intake 270 ml   Output 585 ml   Net -315 ml       Physical  Exam  Constitutional:       General: He is not in acute distress.     Appearance: He is well-developed.   HENT:      Head: Normocephalic and atraumatic.      Mouth/Throat:      Pharynx: No oropharyngeal exudate.   Eyes:      Conjunctiva/sclera: Conjunctivae normal.      Pupils: Pupils are equal, round, and reactive to light.   Neck:      Musculoskeletal: Normal range of motion and neck supple.   Cardiovascular:      Rate and Rhythm: Normal rate and regular rhythm.      Heart sounds: Normal heart sounds. No murmur.   Pulmonary:      Effort: Pulmonary effort is normal. No respiratory distress.      Breath sounds: Normal breath sounds. No wheezing or rales.   Abdominal:      General: Bowel sounds are normal. There is no distension.      Palpations: Abdomen is soft.      Tenderness: There is no abdominal tenderness. There is no guarding.   Musculoskeletal:         General: No tenderness.   Skin:     General: Skin is warm and dry.      Findings: No rash.   Neurological:      Mental Status: He is alert and oriented to person, place, and time.      Cranial Nerves: No cranial nerve deficit.      Motor: No abnormal muscle tone.   Psychiatric:         Behavior: Behavior normal.         Vents:  Oxygen Concentration (%): 40 (08/20/20 1200)  Lines/Drains/Airways     Peripheral Intravenous Line                 Peripheral IV - Single Lumen 08/26/20 1710 22 G Right Forearm 4 days              Significant Labs:    CBC/Anemia Profile:  Recent Labs   Lab 08/30/20  1249  08/30/20  2336 08/31/20  0338 08/31/20  1208   WBC  --   --  12.01 10.86  10.64 10.40   HGB  --    < > 7.5* 7.3*  7.4* 6.1*   HCT  --    < > 26.5* 24.2*  24.4* 21.1*   PLT  --   --  508* 505*  507* 462*   MCV  --   --  114* 107*  108* 110*   RDW  --   --  14.2 14.2  14.1 14.4   OCCULTBLOOD Negative  --   --   --   --     < > = values in this interval not displayed.        Chemistries:  Recent Labs   Lab 08/30/20  0624 08/31/20  0338    137   K 4.8 5.0   CL  98 101   CO2 30* 26   BUN 80* 99*   CREATININE 2.7* 3.0*   CALCIUM 8.6* 8.5*   ALBUMIN 2.0* 2.0*   PROT 5.5* 5.3*   BILITOT 0.1 0.2   ALKPHOS 85 72   ALT 38 28   AST 44* 30   MG 1.8 1.7   PHOS 5.8* 5.6*       All pertinent labs within the past 24 hours have been reviewed.    Significant Imaging: I have reviewed all pertinent imaging results/findings within the past 24 hours.      ABG  No results for input(s): PH, PO2, PCO2, HCO3, BE in the last 168 hours.  Assessment/Plan:     GI bleed  75 year old male with no history of GI bleeding or liver disease who was admitted for c-spine fusion. Now anemic with hemoptysis and hematemesis after restarting home plavix/asa for CAD. H/H slowly down trending. No hematemesis since last night. GI with plans for endoscopy tomorrow. MICU consulted for higher level of care.     Recs:  - patient HD stable and asymptomatic at this time. - transfuse as needed. Goal hgb >7  - PPI IV BID  - GI to scope tomorrow   - hold asa, Plavix, and blood thinners   - Recommend continued care under hosptial medicine. Will continue to follow from a distance. Please notify ICU if patient becomes HD unstable, symptomatic, or hgb not responding appropriately.           Jim Mujica MD  Critical Care Medicine  [facility]

## 2020-08-31 NOTE — HPI
75 male PMH metastatic RCC with liver and spinal mets, CKD, CAD, hypothyroidism, DM2, HLD, BPH, HTN, GERD admitted 8/18 for fusion of cervical spine (C6/7) due to myopathy.  Patient underwent surgical repair with anterior approach with ENT and Ortho.  Post-operative course notable for hypoxic respiratory failure due to opioids.  Stepped down to medicine floor and restarted on ASA, plavix for CAD.  Patient developed hemoptysis several days after procedure, small volume with some maroon blood and clots.  This continued through the weekend, and yesterday he developed one episode of large volume hematemesis.  Patient notes coughing up blood throughout the day prior to his episode of hematemesis.  Hematemesis appeared similar to above hemoptysis.  Was not hemodynamically unstable.  Hemoglobin down trended slightly 8.6--> 7.4.    Upon interview patient produced cup with small amount of hemoptysis with bloody clot.  This has continued after episode of hematemesis.  No evidence of dysphagia or neck swelling.  No alcohol use history, no history of liver disease.

## 2020-09-01 ENCOUNTER — ANESTHESIA (OUTPATIENT)
Dept: ENDOSCOPY | Facility: HOSPITAL | Age: 75
DRG: 471 | End: 2020-09-01
Payer: MEDICARE

## 2020-09-01 LAB
ALBUMIN SERPL BCP-MCNC: 2 G/DL (ref 3.5–5.2)
ALP SERPL-CCNC: 76 U/L (ref 55–135)
ALT SERPL W/O P-5'-P-CCNC: 36 U/L (ref 10–44)
ANION GAP SERPL CALC-SCNC: 9 MMOL/L (ref 8–16)
AST SERPL-CCNC: 48 U/L (ref 10–40)
BASOPHILS # BLD AUTO: 0.03 K/UL (ref 0–0.2)
BASOPHILS NFR BLD: 0.3 % (ref 0–1.9)
BILIRUB SERPL-MCNC: 0.3 MG/DL (ref 0.1–1)
BLD PROD TYP BPU: NORMAL
BLD PROD TYP BPU: NORMAL
BLOOD UNIT EXPIRATION DATE: NORMAL
BLOOD UNIT EXPIRATION DATE: NORMAL
BLOOD UNIT TYPE CODE: 5100
BLOOD UNIT TYPE CODE: 5100
BLOOD UNIT TYPE: NORMAL
BLOOD UNIT TYPE: NORMAL
BUN SERPL-MCNC: 112 MG/DL (ref 8–23)
CALCIUM SERPL-MCNC: 8.6 MG/DL (ref 8.7–10.5)
CHLORIDE SERPL-SCNC: 101 MMOL/L (ref 95–110)
CO2 SERPL-SCNC: 28 MMOL/L (ref 23–29)
CODING SYSTEM: NORMAL
CODING SYSTEM: NORMAL
CREAT SERPL-MCNC: 3 MG/DL (ref 0.5–1.4)
DIFFERENTIAL METHOD: ABNORMAL
DISPENSE STATUS: NORMAL
DISPENSE STATUS: NORMAL
EOSINOPHIL # BLD AUTO: 0.2 K/UL (ref 0–0.5)
EOSINOPHIL NFR BLD: 1.4 % (ref 0–8)
ERYTHROCYTE [DISTWIDTH] IN BLOOD BY AUTOMATED COUNT: 17 % (ref 11.5–14.5)
EST. GFR  (AFRICAN AMERICAN): 22.5 ML/MIN/1.73 M^2
EST. GFR  (NON AFRICAN AMERICAN): 19.4 ML/MIN/1.73 M^2
GLUCOSE SERPL-MCNC: 121 MG/DL (ref 70–110)
HCT VFR BLD AUTO: 24.7 % (ref 40–54)
HGB BLD-MCNC: 7.5 G/DL (ref 14–18)
IMM GRANULOCYTES # BLD AUTO: 0.08 K/UL (ref 0–0.04)
IMM GRANULOCYTES NFR BLD AUTO: 0.7 % (ref 0–0.5)
INR PPP: 1 (ref 0.8–1.2)
LYMPHOCYTES # BLD AUTO: 1.4 K/UL (ref 1–4.8)
LYMPHOCYTES NFR BLD: 13 % (ref 18–48)
MAGNESIUM SERPL-MCNC: 1.8 MG/DL (ref 1.6–2.6)
MCH RBC QN AUTO: 31.3 PG (ref 27–31)
MCHC RBC AUTO-ENTMCNC: 30.4 G/DL (ref 32–36)
MCV RBC AUTO: 103 FL (ref 82–98)
MONOCYTES # BLD AUTO: 0.8 K/UL (ref 0.3–1)
MONOCYTES NFR BLD: 7.2 % (ref 4–15)
NEUTROPHILS # BLD AUTO: 8.4 K/UL (ref 1.8–7.7)
NEUTROPHILS NFR BLD: 77.4 % (ref 38–73)
NRBC BLD-RTO: 0 /100 WBC
NUM UNITS TRANS PACKED RBC: NORMAL
NUM UNITS TRANS PACKED RBC: NORMAL
PHOSPHATE SERPL-MCNC: 5.2 MG/DL (ref 2.7–4.5)
PLATELET # BLD AUTO: 457 K/UL (ref 150–350)
PMV BLD AUTO: 10.1 FL (ref 9.2–12.9)
POCT GLUCOSE: 147 MG/DL (ref 70–110)
POCT GLUCOSE: 148 MG/DL (ref 70–110)
POCT GLUCOSE: 182 MG/DL (ref 70–110)
POCT GLUCOSE: 198 MG/DL (ref 70–110)
POCT GLUCOSE: 203 MG/DL (ref 70–110)
POTASSIUM SERPL-SCNC: 4.5 MMOL/L (ref 3.5–5.1)
PROT SERPL-MCNC: 5.1 G/DL (ref 6–8.4)
PROTHROMBIN TIME: 10.9 SEC (ref 9–12.5)
RBC # BLD AUTO: 2.4 M/UL (ref 4.6–6.2)
SODIUM SERPL-SCNC: 138 MMOL/L (ref 136–145)
WBC # BLD AUTO: 10.87 K/UL (ref 3.9–12.7)

## 2020-09-01 PROCEDURE — 37000009 HC ANESTHESIA EA ADD 15 MINS: Mod: HCNC | Performed by: INTERNAL MEDICINE

## 2020-09-01 PROCEDURE — 43235 EGD DIAGNOSTIC BRUSH WASH: CPT | Mod: HCNC,GC,, | Performed by: INTERNAL MEDICINE

## 2020-09-01 PROCEDURE — 37000008 HC ANESTHESIA 1ST 15 MINUTES: Mod: HCNC | Performed by: INTERNAL MEDICINE

## 2020-09-01 PROCEDURE — 85025 COMPLETE CBC W/AUTO DIFF WBC: CPT | Mod: HCNC

## 2020-09-01 PROCEDURE — 63600175 PHARM REV CODE 636 W HCPCS: Mod: HCNC | Performed by: STUDENT IN AN ORGANIZED HEALTH CARE EDUCATION/TRAINING PROGRAM

## 2020-09-01 PROCEDURE — 20600001 HC STEP DOWN PRIVATE ROOM: Mod: HCNC

## 2020-09-01 PROCEDURE — 99232 PR SUBSEQUENT HOSPITAL CARE,LEVL II: ICD-10-PCS | Mod: HCNC,,, | Performed by: HOSPITALIST

## 2020-09-01 PROCEDURE — 25000003 PHARM REV CODE 250: Mod: HCNC | Performed by: STUDENT IN AN ORGANIZED HEALTH CARE EDUCATION/TRAINING PROGRAM

## 2020-09-01 PROCEDURE — 99232 PR SUBSEQUENT HOSPITAL CARE,LEVL II: ICD-10-PCS | Mod: HCNC,,, | Performed by: INTERNAL MEDICINE

## 2020-09-01 PROCEDURE — 25000003 PHARM REV CODE 250: Mod: HCNC | Performed by: INTERNAL MEDICINE

## 2020-09-01 PROCEDURE — 99232 SBSQ HOSP IP/OBS MODERATE 35: CPT | Mod: HCNC,,, | Performed by: INTERNAL MEDICINE

## 2020-09-01 PROCEDURE — 85610 PROTHROMBIN TIME: CPT | Mod: HCNC

## 2020-09-01 PROCEDURE — 43235 EGD DIAGNOSTIC BRUSH WASH: CPT | Mod: HCNC | Performed by: INTERNAL MEDICINE

## 2020-09-01 PROCEDURE — C9113 INJ PANTOPRAZOLE SODIUM, VIA: HCPCS | Mod: HCNC | Performed by: HOSPITALIST

## 2020-09-01 PROCEDURE — 80053 COMPREHEN METABOLIC PANEL: CPT | Mod: HCNC

## 2020-09-01 PROCEDURE — 99232 SBSQ HOSP IP/OBS MODERATE 35: CPT | Mod: HCNC,,, | Performed by: HOSPITALIST

## 2020-09-01 PROCEDURE — D9220A PRA ANESTHESIA: Mod: HCNC,ANES,, | Performed by: ANESTHESIOLOGY

## 2020-09-01 PROCEDURE — D9220A PRA ANESTHESIA: ICD-10-PCS | Mod: HCNC,ANES,, | Performed by: ANESTHESIOLOGY

## 2020-09-01 PROCEDURE — 94761 N-INVAS EAR/PLS OXIMETRY MLT: CPT | Mod: HCNC

## 2020-09-01 PROCEDURE — 94664 DEMO&/EVAL PT USE INHALER: CPT | Mod: HCNC

## 2020-09-01 PROCEDURE — 99900035 HC TECH TIME PER 15 MIN (STAT): Mod: HCNC

## 2020-09-01 PROCEDURE — D9220A PRA ANESTHESIA: ICD-10-PCS | Mod: HCNC,CRNA,, | Performed by: STUDENT IN AN ORGANIZED HEALTH CARE EDUCATION/TRAINING PROGRAM

## 2020-09-01 PROCEDURE — 84100 ASSAY OF PHOSPHORUS: CPT | Mod: HCNC

## 2020-09-01 PROCEDURE — D9220A PRA ANESTHESIA: Mod: HCNC,CRNA,, | Performed by: STUDENT IN AN ORGANIZED HEALTH CARE EDUCATION/TRAINING PROGRAM

## 2020-09-01 PROCEDURE — 63600175 PHARM REV CODE 636 W HCPCS: Mod: HCNC | Performed by: HOSPITALIST

## 2020-09-01 PROCEDURE — 36415 COLL VENOUS BLD VENIPUNCTURE: CPT | Mod: HCNC

## 2020-09-01 PROCEDURE — 83735 ASSAY OF MAGNESIUM: CPT | Mod: HCNC

## 2020-09-01 PROCEDURE — 82962 GLUCOSE BLOOD TEST: CPT | Mod: HCNC | Performed by: INTERNAL MEDICINE

## 2020-09-01 PROCEDURE — 43235 PR EGD, FLEX, DIAGNOSTIC: ICD-10-PCS | Mod: HCNC,GC,, | Performed by: INTERNAL MEDICINE

## 2020-09-01 RX ORDER — ONDANSETRON 2 MG/ML
4 INJECTION INTRAMUSCULAR; INTRAVENOUS ONCE AS NEEDED
Status: DISCONTINUED | OUTPATIENT
Start: 2020-09-01 | End: 2020-09-01 | Stop reason: HOSPADM

## 2020-09-01 RX ORDER — FENTANYL CITRATE 50 UG/ML
INJECTION, SOLUTION INTRAMUSCULAR; INTRAVENOUS
Status: DISCONTINUED | OUTPATIENT
Start: 2020-09-01 | End: 2020-09-01

## 2020-09-01 RX ORDER — HYDROMORPHONE HYDROCHLORIDE 1 MG/ML
0.2 INJECTION, SOLUTION INTRAMUSCULAR; INTRAVENOUS; SUBCUTANEOUS EVERY 5 MIN PRN
Status: DISCONTINUED | OUTPATIENT
Start: 2020-09-01 | End: 2020-09-01 | Stop reason: HOSPADM

## 2020-09-01 RX ORDER — ETOMIDATE 2 MG/ML
INJECTION INTRAVENOUS
Status: DISCONTINUED | OUTPATIENT
Start: 2020-09-01 | End: 2020-09-01

## 2020-09-01 RX ORDER — SODIUM CHLORIDE 9 MG/ML
INJECTION, SOLUTION INTRAVENOUS CONTINUOUS PRN
Status: DISCONTINUED | OUTPATIENT
Start: 2020-09-01 | End: 2020-09-01

## 2020-09-01 RX ORDER — LIDOCAINE HYDROCHLORIDE 20 MG/ML
INJECTION INTRAVENOUS
Status: DISCONTINUED | OUTPATIENT
Start: 2020-09-01 | End: 2020-09-01

## 2020-09-01 RX ORDER — PANTOPRAZOLE SODIUM 40 MG/1
40 TABLET, DELAYED RELEASE ORAL DAILY
Status: DISCONTINUED | OUTPATIENT
Start: 2020-09-02 | End: 2020-09-03 | Stop reason: HOSPADM

## 2020-09-01 RX ORDER — FENTANYL CITRATE 50 UG/ML
25 INJECTION, SOLUTION INTRAMUSCULAR; INTRAVENOUS EVERY 5 MIN PRN
Status: DISCONTINUED | OUTPATIENT
Start: 2020-09-01 | End: 2020-09-01 | Stop reason: HOSPADM

## 2020-09-01 RX ORDER — PROPOFOL 10 MG/ML
VIAL (ML) INTRAVENOUS CONTINUOUS PRN
Status: DISCONTINUED | OUTPATIENT
Start: 2020-09-01 | End: 2020-09-01

## 2020-09-01 RX ADMIN — FENTANYL CITRATE 25 MCG: 50 INJECTION, SOLUTION INTRAMUSCULAR; INTRAVENOUS at 01:09

## 2020-09-01 RX ADMIN — SODIUM CHLORIDE: 9 INJECTION, SOLUTION INTRAVENOUS at 01:09

## 2020-09-01 RX ADMIN — INSULIN ASPART 2 UNITS: 100 INJECTION, SOLUTION INTRAVENOUS; SUBCUTANEOUS at 06:09

## 2020-09-01 RX ADMIN — ETOMIDATE 2 MG: 2 INJECTION, SOLUTION INTRAVENOUS at 01:09

## 2020-09-01 RX ADMIN — PROPOFOL 25 MCG/KG/MIN: 10 INJECTION, EMULSION INTRAVENOUS at 01:09

## 2020-09-01 RX ADMIN — GUAIFENESIN AND DEXTROMETHORPHAN HYDROBROMIDE 1 TABLET: 600; 30 TABLET, EXTENDED RELEASE ORAL at 09:09

## 2020-09-01 RX ADMIN — LIDOCAINE HYDROCHLORIDE 80 MG: 20 INJECTION, SOLUTION INTRAVENOUS at 01:09

## 2020-09-01 RX ADMIN — PANTOPRAZOLE SODIUM 40 MG: 40 INJECTION, POWDER, LYOPHILIZED, FOR SOLUTION INTRAVENOUS at 09:09

## 2020-09-01 RX ADMIN — OXYCODONE 5 MG: 5 TABLET ORAL at 03:09

## 2020-09-01 RX ADMIN — ROSUVASTATIN CALCIUM 40 MG: 20 TABLET, FILM COATED ORAL at 09:09

## 2020-09-01 RX ADMIN — TAMSULOSIN HYDROCHLORIDE 0.8 MG: 0.4 CAPSULE ORAL at 03:09

## 2020-09-01 RX ADMIN — METHOCARBAMOL TABLETS 500 MG: 500 TABLET, COATED ORAL at 09:09

## 2020-09-01 RX ADMIN — DOCUSATE SODIUM 50 MG: 50 CAPSULE, LIQUID FILLED ORAL at 09:09

## 2020-09-01 RX ADMIN — ETOMIDATE 4 MG: 2 INJECTION, SOLUTION INTRAVENOUS at 01:09

## 2020-09-01 RX ADMIN — LEVOTHYROXINE SODIUM 88 MCG: 0.09 TABLET ORAL at 06:09

## 2020-09-01 NOTE — PLAN OF CARE
Discussed with GI team. Concern for bleeding below the vocal chords. Will request input from pulmonary. Patient will remain NPO pending next phase of care planning.  Loraine Beard

## 2020-09-01 NOTE — PT/OT/SLP PROGRESS
Occupational Therapy      Patient Name:  Edwin Powell   MRN:  [id number]    Patient not seen today secondary to pt being off the floor for EGD in the pm and OT unable to follow up later in the afternoon. OT will follow-up 9/2/2020.    Natanael Bell, OT  9/1/2020

## 2020-09-01 NOTE — [employer] PATIENT INSTRUCTIONS
Discharge Summary/Instructions after an Endoscopic Procedure  Patient Name: Edwin Luna  Patient MRN: [id number]  Patient YOB: 1945 Tuesday, September 1, 2020  Jim Hooks MD  RESTRICTIONS:  During your procedure today, you received medications for sedation.  These   medications may affect your judgment, balance and coordination.  Therefore,   for 24 hours, you have the following restrictions:   - DO NOT drive a car, operate machinery, make legal/financial decisions,   sign important papers or drink alcohol.    ACTIVITY:  Today: no heavy lifting, straining or running due to procedural   sedation/anesthesia.  The following day: return to full activity including work.  DIET:  Eat and drink normally unless instructed otherwise.     TREATMENT FOR COMMON SIDE EFFECTS:  - Mild abdominal pain, nausea, belching, bloating or excessive gas:  rest,   eat lightly and use a heating pad.  - Sore Throat: treat with throat lozenges and/or gargle with warm salt   water.  - Because air was used during the procedure, expelling large amounts of air   from your rectum or belching is normal.  - If a bowel prep was taken, you may not have a bowel movement for 1-3 days.    This is normal.  SYMPTOMS TO WATCH FOR AND REPORT TO YOUR PHYSICIAN:  1. Abdominal pain or bloating, other than gas cramps.  2. Chest pain.  3. Back pain.  4. Signs of infection such as: chills or fever occurring within 24 hours   after the procedure.  5. Rectal bleeding, which would show as bright red, maroon, or black stools.   (A tablespoon of blood from the rectum is not serious, especially if   hemorrhoids are present.)  6. Vomiting.  7. Weakness or dizziness.  GO DIRECTLY TO THE NEAREST EMERGENCY ROOM IF YOU HAVE ANY OF THE FOLLOWING:      Difficulty breathing              Chills and/or fever over 101 F   Persistent vomiting and/or vomiting blood   Severe abdominal pain   Severe chest pain   Black, tarry stools   Bleeding- more than one  tablespoon   Any other symptom or condition that you feel may need urgent attention  Your doctor recommends these additional instructions:  If any biopsies were taken, your doctors clinic will contact you in 1 to 2   weeks with any results.  - Return patient to hospital biswas for ongoing care.   - Advance diet as tolerated.   - Observe patient's clinical course.   For questions, problems or results please call your physician - Jim Hooks MD at Work:  [phone].  [facility], EMERGENCY ROOM PHONE NUMBER: [phone]  IF A COMPLICATION OR EMERGENCY SITUATION ARISES AND YOU ARE UNABLE TO REACH   YOUR PHYSICIAN - GO DIRECTLY TO THE EMERGENCY ROOM.  Jim Hooks MD  9/1/2020 3:20:22 PM  This report has been verified and signed electronically.  [employer]

## 2020-09-01 NOTE — ANESTHESIA POSTPROCEDURE EVALUATION
Anesthesia Post Evaluation    Patient: Edwin Powell    Procedure(s) Performed: Procedure(s) (LRB):  EGD (ESOPHAGOGASTRODUODENOSCOPY) (N/A)    Final Anesthesia Type: general    Patient location during evaluation: PACU  Patient participation: Yes- Able to Participate  Level of consciousness: awake and alert and oriented  Post-procedure vital signs: reviewed and stable  Pain management: adequate  Airway patency: patent    PONV status at discharge: No PONV  Anesthetic complications: no      Cardiovascular status: blood pressure returned to baseline  Respiratory status: unassisted, room air and spontaneous ventilation  Hydration status: euvolemic  Follow-up not needed.          Vitals Value Taken Time   /60 09/01/20 1545   Temp 36.5 °C (97.7 °F) 09/01/20 1545   Pulse 64 09/01/20 1545   Resp 18 09/01/20 1545   SpO2 95 % 09/01/20 1545         Event Time   Out of Recovery 14:36:00         Pain/Lashaun Score: Pain Rating Prior to Med Admin: 9 (9/1/2020  3:20 PM)  Pain Rating Post Med Admin: 5 (9/1/2020  6:00 AM)  Lashaun Score: 10 (9/1/2020  2:35 PM)

## 2020-09-01 NOTE — PT/OT/SLP PROGRESS
Physical Therapy      Patient Name:  Edwin Powell   MRN:  [id number]      Pt not seen on this date secondary to refusal.  Pt refusal therapy despite max encouragement secondary to planned EGD later in day.  Pt reports fatigue and pain limiting participation.  PT unable to return later in day for additional attempt.  Chart reviewed and pt remain appropriate for PT services at this time.  Pt safe to amb c assistance of 1x person + RW.  Will see pt as schedule allows.      Sang Garcia, PT,DPT  9/1/2020

## 2020-09-01 NOTE — PROGRESS NOTES
Progress Note  Hospital Medicine      Admit Date: 8/17/2020   [facility] [place] [facility] [place] [facility] [place]    SUBJECTIVE:     Follow-up For:  Acute on chronic respiratory failure with hypoxia and hypercapnia     Interval history/ROS: No reported hematemesis. Patient still with lightheadedness.     HPI:  75 y.o. male with history of c-spine stenosis, metastatic RCC (liver and L3 mets), CKD, CAD s/p PCI, chronic hypoxic respiratory failure, hypothyroidism, IDDM2, HLD, BPH, HTN and GERD presented to hospital on 8/17 for scheduled C-spine fusion due to severe stenosis at C6/7 with myopathy. Patient underwent ACDF of C6/7 with Ortho & ENT on 8/17. During the procedure he received rocuronium, ketamine, fentanyl, propofol and midazolam. He required a renae infusion during the procedure. Post-operatively the patient was admitted to post-op Ortho unit and was placed on his home 2L O2. After his procedure he received multiple doses of oxycodone for his neck pain. Overnight on 8/17-8/18 the patient had multiple periods of apnea requiring 3 doses of Narcan on the floor with minimal improvement in respiratory and mental status. ABG showed combined metabolic and respiratory acidosis. No significant improvement in acidosis with initiation of Bipap on the floor and patient continued to have apneic periods and worsening hypoxia requiring 15L NC. He was transferred to ICU for further management.      Overview / ICU Course:   Patient was transferred to MICU and started on narcan and bicarbonate infusions. Hypoxia worsened requiring up to 30L comfort flow. Mental status improved and narcan gtt discontinued on 8/18 in AM. PE suspected however CTA not feasible due to renal function. Bilateral lower extremity U/S negative for DVT, however because of quickly worsening O2 requirements and elevated troponin, he was empirically started on a heparin gtt for possible PE. TTE did not show RV strain but did show focal wall motion abnormalities, and troponin continued to uptrend  so patient was loaded with DAPT for NSTEMI. CVP 3 on TTE however pulmonary edema on CXR. Restarted diuresis intravenously.  8/20/2020: Patient no longer requiring CPAP (was on 35L/40%). O2Sat 97% with 2L NC. Refers feeling much better than yesterday.  8/21/2020: NAEON. Patient on 3L NC, Sat 98%. Denied fevers, chills, chest pain, SOB, palpitations. Patient to be stepped down today       OBJECTIVE:     Body mass index is 27.42 kg/m².    Vital Signs Range (Last 24H):  Temp:  [97.5 °F (36.4 °C)-98.5 °F (36.9 °C)]   Pulse:  [59-87]   Resp:  [14-18]   BP: (121-160)/(58-94)   SpO2:  [94 %-98 %]     I & O (Last 24H):    Intake/Output Summary (Last 24 hours) at 9/1/2020 1303  Last data filed at 9/1/2020 0600  Gross per 24 hour   Intake 310 ml   Output 1300 ml   Net -990 ml        Physical Exam:  Constitutional:       General: He is not in acute distress.     Appearance: Normal appearance. He is not diaphoretic.   Eyes:      General: Lids are normal. No scleral icterus.        Right eye: No discharge.         Left eye: No discharge.      Conjunctiva/sclera: Conjunctivae normal.   Cardiovascular:      Rate and Rhythm: Normal rate.   Pulmonary:      Effort: Pulmonary effort is normal. No tachypnea, accessory muscle usage or respiratory distress.   Musculoskeletal:      Right lower leg: No edema.      Left lower leg: No edema.   Skin:     Coloration: Skin is not cyanotic.      Findings: No rash.   Neurological:      General: No focal deficit present.      Mental Status: He is alert and oriented to person, place, and time.      Cranial Nerves: Cranial nerves are intact.      Motor: Motor function is intact.   Psychiatric:         Attention and Perception: Attention normal.         Mood and Affect: Affect is labile.         Speech: Speech normal.         Behavior: Behavior is cooperative.        Recent Labs   Lab 08/30/20  0624 08/31/20  0338 09/01/20  0337    137 138   K 4.8 5.0 4.5   CL 98 101 101   CO2 30* 26 28   BUN  80* 99* 112*   CREATININE 2.7* 3.0* 3.0*   * 187* 121*   CALCIUM 8.6* 8.5* 8.6*   MG 1.8 1.7 1.8   PHOS 5.8* 5.6* 5.2*     Recent Labs   Lab 08/30/20  0624 08/31/20  0338 08/31/20  1208 09/01/20  0337 09/01/20  1005   ALKPHOS 85 72  --  76  --    ALT 38 28  --  36  --    AST 44* 30  --  48*  --    ALBUMIN 2.0* 2.0*  --  2.0*  --    PROT 5.5* 5.3*  --  5.1*  --    BILITOT 0.1 0.2  --  0.3  --    INR  --   --  1.0  --  1.0       Recent Labs   Lab 08/31/20  1208 08/31/20  1753 09/01/20  1005   WBC 10.40 10.59 10.87   HGB 6.1* 8.2* 7.5*   HCT 21.1* 26.1* 24.7*   * 472* 457*   GRAN 76.8*  8.0* 74.8*  7.9* 77.4*  8.4*   LYMPH 15.5*  1.6 16.7*  1.8 13.0*  1.4   MONO 6.2  0.6 6.5  0.7 7.2  0.8       Recent Labs   Lab 08/31/20  0725 08/31/20  1141 08/31/20  1630 08/31/20  2137 09/01/20  0740 09/01/20  1250   POCTGLUCOSE 207* 194* 145* 189* 147* 182*       No results for input(s): TROPONINI in the last 168 hours.    Diagnostic Results:  Labs: Reviewed    dextromethorphan-guaifenesin  mg, 1 tablet, Oral, BID  docusate sodium, 50 mg, Oral, BID  levothyroxine, 88 mcg, Oral, Before breakfast  methocarbamoL, 500 mg, Oral, BID  methyl salicylate-menthol 15-10%, , Topical (Top), TID  pantoprazole, 40 mg, Intravenous, BID  PAZOpanib, 800 mg, Oral, Before breakfast  rosuvastatin, 40 mg, Oral, QHS  tamsulosin, 0.8 mg, Oral, Daily        As Needed sodium chloride, sodium chloride, acetaminophen, albuterol-ipratropium, benzonatate, bisacodyL, dextrose 50%, dextrose 50%, glucagon (human recombinant), glucose, glucose, hydrALAZINE, insulin aspart U-100, naloxone, ondansetron, oxyCODONE, polyethylene glycol, sodium chloride 0.9%, sodium chloride 0.9%    ASSESSMENT/PLAN:     Assessment: Edwin Powell is a 75 y.o. male here with:     Active Hospital Problems    Diagnosis  POA    *Acute on chronic respiratory failure with hypoxia and hypercapnia [J96.21, J96.22]  Yes    GI bleed [K92.2]  Unknown    Hematemesis  "with nausea [K92.0]  No    S/P cervical spinal fusion [Z98.1]  Not Applicable    Acute respiratory failure with hypoxia [J96.01]  Yes    Impaired functional mobility and endurance [Z74.09]  Yes    NSTEMI (non-ST elevated myocardial infarction) [I21.4]  Yes    Elevated troponin [R79.89]  Yes    Dysphagia [R13.10]  No    Stented coronary artery [Z95.5]  Not Applicable    Metastatic renal cell carcinoma [C64.9]  Yes    Acute kidney injury superimposed on chronic kidney disease [N17.9, N18.9]  Yes    Acute metabolic encephalopathy [G93.41]  No    Metabolic acidosis, normal anion gap (NAG) [E87.2]  Yes    Cervical stenosis of spine [M48.02]  Yes     Seen on MRI 5/9/20: " Multilevel cervical spondylosis most prominent at C6-C7 resulting in severe spinal canal stenosis and severe bilateral neural foraminal narrowing."      Neuropathy due to secondary diabetes [E13.40]  Yes     LE numbness and tingling, elevated A1c, followed by podiatry      Benign prostatic hyperplasia (BPH) with urinary urge incontinence [N40.1, N39.41]  Yes     Patient with LUTS, compliant with flomax      Acquired hypothyroidism [E03.9]  Yes     Elevated TSH in 6/2017, 8/2017, 11/2017, 3/2018      Coronary artery disease involving native coronary artery of native heart without angina pectoris [I25.10]  Yes     EJ S/p 5 stents 2010. Anginal equivalent controlled with CCB, ACE, APT      Type 2 diabetes mellitus with stage 3 chronic kidney disease, without long-term current use of insulin [E11.22, N18.3]  Yes     A1c 11.1 in 5/2017. Weaning off Lantus during chemo, but needed to restart it with weight gain         Resolved Hospital Problems   No resolved problems to display.        Plan:    Upper GI Bleeding:   Patient with active hematemesis personally witnessed. Bright red blood with clots.    Plan:   - protonix bid VI  - hold all anticoagulants  - NS bolus  - prepare 2 UPRBC and transfused 1 for symptomatic anemia- Hg 7.5 after " transfusion suggesting ongoing blood loss.   - no history of liver diseases to suggest variceal origin  - consult GI team - EGD today  - consult MICU for multiple episodes of hematemesis- currently stable to monitor on floor  - confirm IV access       Acute on chronic respiratory failure with hypoxia and hypercapnia  Concern for PE as he suddenly required significantly more O2 overnight on 8/17-18. Risk factors include recent surgery and metastatic RCC.   - CTA chest deferred due to VERENA but NM scan low probability PE  - Bilat LE U/S negative for DVT  - TTE negative for RV strain with normal PA pressure. Showed chronic HFpEF and focal wall motion abnormalities  - Troponin elevated and peaked at 8.8, BNP mildly elevated (no baseline)  - Was put on heparin gtt for 48hrs. OK'd by Ortho to start A/C so soon after surgery due to rapidly worsening hypoxia  - Wean O2 to goal sat > 90%.   - Lasix, monitor UOP  - Cr   2.7 on 8/30 - hold evening dose of Lasix due to decline in hemoglobin     Hemoptysis  -solitary episode; patient has not been wearing nasal cannula for days  -chest x-ray ordered  -monitor H&H  -remains on aspirin and Plavix     NSTEMI (non-ST elevated myocardial infarction)  Coronary artery disease involving native coronary artery of native heart with angina pectoris  Elevated troponin  CAD with history of PCI ~20 years ago. Patient did not have chest pain. Troponin was checked when concerned for PE, initially was 1.2 however continued to uptrend and peaked at 8.8.  - Loaded with ASA and Plavix, Ok'd by Ortho, and continued now stopped due to bleeding  - Continue Coreg,  ASA, rosuvastatin, Plavix, amlodipine   - On statin  - Cardiology consulted. Per Cardiology: with patient's metastatic renal carcinoma and progressive decline recommend conservative management for now.  He possibly developed flash pulmonary edema in the setting of hypertension, apnea, sedation leading to respiratory failure and NSTEMI. For  conservative medical management from his ACS standpoint, continue aspirin and Plavix, rosuvastatin and carvedilol. Cardiology aware of repeat echo showing EF 30%; currently not recommending further diuresis 8/26.     Metabolic acidosis, normal anion gap (NAG)  --due to RTA in setting of VERENA on CKD vs recent diarrhea (per recent Nephro clinic note)  --UAG positive (38) suggestive of RTA  -- Will sodium bicarb as acidosis resolved     Acute kidney injury superimposed on chronic kidney disease  --baseline creatinine ~ 2  -- ? Etiology. Recent Nephro note states pt has had decreased po intake & diarrhea recently - ? Pre-renal. Pt received maintenance IVF, so likely volume resuscitated at this point  --home Lasix & lisinopril held initially. Then Lasix 100mg daily resumed.  --renal U/S unremarkable  --improving with diuresis; received IV Lasix through 8/25 then sCr increased on 8/26 after continued diuresis. Nephrology consulted and determining home lasix dose on discharge- start lasix 40 mg BID with serial labs.     Benign prostatic hyperplasia (BPH) with urinary urge incontinence  --continue home Flomax     Type 2 diabetes mellitus with stage 3 chronic kidney disease, without long-term current use of insulin  --per Clinic notes, insulin dependent  --home regimen lantus 5 units daily   --currently low dose correction; concerning if actually insulin-dependent as rarely requiring administration for hyperglycemia. AG normal     Acquired hypothyroidism  --continue home levothyroxine  --TSH elevated however fT4 WNL     Dysphagia  SLP eval, tolerating PO intake. Now on a regular diet      Acute metabolic encephalopathy  --suspect medication induced insetting of decreased renal clearance as pt responded well to Narcan. Started on Narcan gtt and discontinued on 8/18 once mental status improved  --hold sedating medications  --resolved     Cervical stenosis of spine  --s/p ACDF on 8/17 with Ortho and ENT; management per  Ortho  --start topicals to shoulder/ left neck  --Continue oxycodone prn; added methocarbamol     Neuropathy due to secondary diabetes  --holding home Lyrica in setting of encephalopathy  --when resumed, will need to be renally dosed     Metastatic renal cell carcinoma of L kidney          Followed by Rosa Maria and Joshua.     Diet: Diet diabetic [facility]; 2800 Calorie; Cardiac (Low Na/Chol)  GI Prophylaxis: Not indicated  Significant LDAs:   IV Access Type: Peripheral  Urinary Catheter Indication if present: Patient Does Not Have Urinary Catheter  Other Lines/Tubes/Drains:     HIGH RISK CONDITION(S):   Patient has a condition that poses threat to life and bodily function: Severe Respiratory Distress, NSTEMI, and Acute Renal Failure      Goals of Care:    Previous admission:  8/22/19  Likely prognosis:  Fair  Code Status: Full Code  Comfort Only: No  Hospice: No  Goals at discharge: remain at home, with physician follow-up     HIGH RISK CONDITION(S):  Patient has a condition that poses threat to life and bodily function: upper GI bleeding        Loraine Beard MD

## 2020-09-01 NOTE — PLAN OF CARE
09/01/20 1550   Discharge Reassessment   Assessment Type Discharge Planning Reassessment   Provided patient/caregiver education on the expected discharge date and the discharge plan Yes   Do you have any problems affording any of your prescribed medications? No   Discharge Plan A Home Health   Discharge Plan B Home with family   DME Needed Upon Discharge  other (see comments)  (TBD)   Anticipated Discharge Disposition Home-Health   Post-Acute Status   Post-Acute Authorization Home Health   Home Health Status Awaiting Internal Medical Clearance

## 2020-09-01 NOTE — PLAN OF CARE
Patient is AAOX4, and on room air > 92%. No c/o pain or discomfort. Patient has upcoming EGD this AM and has been NPO since 11pm, 8/31. No acute changes throughout shift. All safety protocols in place. Will continue to monitor for safety and comfort.      Problem: Diabetes Comorbidity  Goal: Blood Glucose Level Within Desired Range  Outcome: Ongoing, Progressing     Problem: Adult Inpatient Plan of Care  Goal: Plan of Care Review  Outcome: Ongoing, Progressing  Goal: Patient-Specific Goal (Individualization)  Outcome: Ongoing, Progressing  Goal: Absence of Hospital-Acquired Illness or Injury  Outcome: Ongoing, Progressing  Goal: Optimal Comfort and Wellbeing  Outcome: Ongoing, Progressing  Goal: Readiness for Transition of Care  Outcome: Ongoing, Progressing  Goal: Rounds/Family Conference  Outcome: Ongoing, Progressing     Problem: Fall Injury Risk  Goal: Absence of Fall and Fall-Related Injury  Outcome: Ongoing, Progressing     Problem: Skin Injury Risk Increased  Goal: Skin Health and Integrity  Outcome: Ongoing, Progressing     Problem: Electrolyte Imbalance (Acute Kidney Injury/Impairment)  Goal: Serum Electrolyte Balance  Outcome: Ongoing, Progressing     Problem: Fluid Imbalance (Acute Kidney Injury/Impairment)  Goal: Optimal Fluid Balance  Outcome: Ongoing, Progressing     Problem: Hematologic Alteration (Acute Kidney Injury/Impairment)  Goal: Hemoglobin, Hematocrit and Platelets Within Normal Range  Outcome: Ongoing, Progressing     Problem: Oral Intake Inadequate (Acute Kidney Injury/Impairment)  Goal: Optimal Nutrition Intake  Outcome: Ongoing, Progressing     Problem: Renal Function Impairment (Acute Kidney Injury/Impairment)  Goal: Effective Renal Function  Outcome: Ongoing, Progressing     Problem: Wound  Goal: Optimal Wound Healing  Outcome: Ongoing, Progressing

## 2020-09-01 NOTE — PLAN OF CARE
Problem: Adult Inpatient Plan of Care  Goal: Plan of Care Review  Outcome: Ongoing, Progressing  Goal: Optimal Comfort and Wellbeing  Outcome: Ongoing, Progressing     Problem: Diabetes Comorbidity  Goal: Blood Glucose Level Within Desired Range  Outcome: Ongoing, Progressing  Intervention: Maintain Glycemic Control  Flowsheets (Taken 9/1/2020 1640)  Glycemic Management: blood glucose monitoring     Problem: Fall Injury Risk  Goal: Absence of Fall and Fall-Related Injury  Outcome: Ongoing, Progressing     POC reviewed with patient. Address questions and concerns. AAOX4. VVS. EGD done. Oxy given for pain. Safety maintained. Call light in reach. WCTM

## 2020-09-01 NOTE — NURSING TRANSFER
Nursing Transfer Note      9/1/2020     Transfer [id number]    Transfer via stretcher    Transfer with cardiac monitoring via central tele    Transported by pct    Medicines sent: none    Chart send with patient: yes    Notified: na    Patient reassessed at: 1438

## 2020-09-01 NOTE — PROGRESS NOTES
[facility]  Nephrology  Progress Note    Patient Name: Edwin Powell  MRN: [id number]  Admission Date: 8/17/2020  Hospital Length of Stay: 15 days  Attending Provider: Loraine Beard MD   Primary Care Physician: Lulú Lewis MD  Principal Problem:Acute on chronic respiratory failure with hypoxia and hypercapnia    Subjective:     HPI: Mr. Powell is a 76 yo M with RCC with mets to liver/bone (on pazopanib), CKD IIIb with proteinuria 2/2 BPH, HTN, hypothyroidism, h/o CVA, T2DM, GERD, and T2DM who is currently admitted for acute hypoxemic respiratory failure. Nephrology consulted for VERENA on CKD.      He initially presented to [facility] on 8/17 for scheduled C-spine fusion 2/2 severe C6/7 stenosis. S/p ACDF of C6/7 on 8/17. He required renae infusion during procedure. Patient developed acute hypoxemic respiratory failure. ABG showed mixed resp/metabolic acidosis. Etiology unclear, suspected PE but was unable to get CTA 2/2 poor renal fxn, negative US BLE, started on heparin gtt. TTE negative for RV strain. Since troponins elevated, loaded with DAPT. He was transferred to ICU and placed on HFNC (up to 30L). Also started on bicarb gtt + narcan. Repeat TTE shows newly depressed EF (EF 30% grade I dd). He was started on IV lasix 120mg with good UOP. Patient was noted to have VERENA on admission with Cr 3.5 (increased from baseline ~2). His Cr improved with diuresis.      In terms of renal function, patient has CKD IIIb with nephrotic range proteinuria. He sees Dr. Leonardo, last visit was earlier this month. Per her notes, CKD etiology suspected to be multifactorial (periodic NSAID use, multiple CTs w/ contast, HTN, DM). At that time, sCr 2 and eGFR 30. Has had significant proteinuria in past (Pr:Cr 7.5g earlier this year, down to 4 earlier this month).    Interval History: No Gi bleeding today.    Review of patient's allergies indicates:  No Known Allergies  Current Facility-Administered Medications    Medication Frequency    0.9%  NaCl infusion (for blood administration) Q24H PRN    0.9%  NaCl infusion (for blood administration) Q24H PRN    acetaminophen tablet 650 mg Q6H PRN    albuterol-ipratropium 2.5 mg-0.5 mg/3 mL nebulizer solution 3 mL Q4H PRN    benzonatate capsule 100 mg TID PRN    bisacodyL suppository 10 mg Daily PRN    dextromethorphan-guaifenesin  mg per 12 hr tablet 1 tablet BID    dextrose 50% injection 12.5 g PRN    dextrose 50% injection 25 g PRN    docusate sodium capsule 50 mg BID    glucagon (human recombinant) injection 1 mg PRN    glucose chewable tablet 16 g PRN    glucose chewable tablet 24 g PRN    hydrALAZINE tablet 25 mg Q8H PRN    insulin aspart U-100 pen 0-5 Units QID (AC + HS) PRN    levothyroxine tablet 88 mcg Before breakfast    methocarbamoL tablet 500 mg BID    methyl salicylate-menthol 15-10% cream TID    naloxone 0.4 mg/mL injection 0.2 mg PRN    ondansetron injection 4 mg Q12H PRN    oxyCODONE immediate release tablet 5 mg Q6H PRN    pantoprazole injection 40 mg BID    PAZOPanib 200 mg tab Before breakfast    polyethylene glycol packet 17 g BID PRN    rosuvastatin tablet 40 mg QHS    sodium chloride 0.9% flush 10 mL PRN    sodium chloride 0.9% flush 5 mL PRN    tamsulosin 24 hr capsule 0.8 mg Daily     Facility-Administered Medications Ordered in Other Encounters   Medication Frequency    0.9%  NaCl infusion Continuous PRN    etomidate injection PRN    fentaNYL injection PRN    lidocaine (cardiac) injection PRN    phenylephrine HCL 2.5% ophthalmic solution 1 drop On Call Procedure    proparacaine 0.5 % ophthalmic solution 1 drop On Call Procedure    propofol (DIPRIVAN) 10 mg/mL infusion Continuous PRN    tropicamide 1% ophthalmic solution 1 drop On Call Procedure       Objective:     Vital Signs (Most Recent):  Temp: 98.1 °F (36.7 °C) (09/01/20 1435)  Pulse: 66 (09/01/20 1435)  Resp: 13 (09/01/20 1435)  BP: 138/61 (09/01/20  1427)  SpO2: 95 % (09/01/20 1435)  O2 Device (Oxygen Therapy): room air (09/01/20 1435) Vital Signs (24h Range):  Temp:  [97.5 °F (36.4 °C)-98.5 °F (36.9 °C)] 98.1 °F (36.7 °C)  Pulse:  [59-87] 66  Resp:  [13-18] 13  SpO2:  [94 %-100 %] 95 %  BP: (121-160)/(58-94) 138/61     Weight: 79.4 kg (175 lb 0.7 oz) (08/27/20 1300)  Body mass index is 27.42 kg/m².  Body surface area is 1.94 meters squared.    I/O last 3 completed shifts:  In: 580 [P.O.:580]  Out: 1710 [Urine:1710]    Physical Exam  Vitals signs reviewed.   Constitutional:       Appearance: Normal appearance.   HENT:      Head: Atraumatic.   Eyes:      Extraocular Movements: Extraocular movements intact.   Neck:      Musculoskeletal: Neck supple.   Cardiovascular:      Rate and Rhythm: Normal rate and regular rhythm.      Pulses: Normal pulses.      Heart sounds: Normal heart sounds.   Pulmonary:      Effort: Pulmonary effort is normal. No respiratory distress.      Comments: Lt basilar inspiratory crackles  Abdominal:      General: Abdomen is flat. Bowel sounds are normal. There is no distension.      Palpations: Abdomen is soft.   Musculoskeletal:         General: No swelling or tenderness.   Skin:     General: Skin is warm.   Neurological:      Mental Status: He is alert and oriented to person, place, and time.         Significant Labs:sure  BMP:   Recent Labs   Lab 09/01/20  0337   *      CO2 28   *   CREATININE 3.0*   CALCIUM 8.6*   MG 1.8     CBC:   Recent Labs   Lab 09/01/20  1005   WBC 10.87   RBC 2.40*   HGB 7.5*   HCT 24.7*   *   *   MCH 31.3*   MCHC 30.4*     CMP:   Recent Labs   Lab 09/01/20  0337   *   CALCIUM 8.6*   ALBUMIN 2.0*   PROT 5.1*      K 4.5   CO2 28      *   CREATININE 3.0*   ALKPHOS 76   ALT 36   AST 48*   BILITOT 0.3     All labs within the past 24 hours have been reviewed.      Assessment/Plan:     Active Diagnoses:    Diagnosis Date Noted POA    PRINCIPAL PROBLEM:  Acute  on chronic respiratory failure with hypoxia and hypercapnia [J96.21, J96.22] 08/18/2020 Yes    GI bleed [K92.2] 08/31/2020 Unknown    Hematemesis with nausea [K92.0]  No    S/P cervical spinal fusion [Z98.1]  Not Applicable    Acute respiratory failure with hypoxia [J96.01]  Yes    Impaired functional mobility and endurance [Z74.09] 08/19/2020 Yes    NSTEMI (non-ST elevated myocardial infarction) [I21.4] 08/19/2020 Yes    Elevated troponin [R79.89] 08/19/2020 Yes    Dysphagia [R13.10] 08/19/2020 No    Stented coronary artery [Z95.5]  Not Applicable    Metastatic renal cell carcinoma [C64.9]  Yes    Acute kidney injury superimposed on chronic kidney disease [N17.9, N18.9] 08/18/2020 Yes    Acute metabolic encephalopathy [G93.41] 08/18/2020 No    Metabolic acidosis, normal anion gap (NAG) [E87.2] 08/18/2020 Yes    Cervical stenosis of spine [M48.02] 07/14/2020 Yes    Neuropathy due to secondary diabetes [E13.40] 09/06/2017 Yes    Benign prostatic hyperplasia (BPH) with urinary urge incontinence [N40.1, N39.41] 06/06/2017 Yes    Acquired hypothyroidism [E03.9] 05/26/2017 Yes    Coronary artery disease involving native coronary artery of native heart without angina pectoris [I25.10] 05/26/2017 Yes    Type 2 diabetes mellitus with stage 3 chronic kidney disease, without long-term current use of insulin [E11.22, N18.3] 05/26/2017 Yes      Problems Resolved During this Admission:     Acute kidney injury superimposed on chronic kidney disease    No need for emergent RRT  WRF probably secondary to GI bleeding.   Adequate urine output  Negative fluid balance  No need for RRT  - Strict I/O and chart   - renally dose all medications   - Avoid nephrotoxic medications, NSAIDs, IV contrast, ACE/ARB.  - Maintain MAP > 65  - Maintain Hb > 7 gm/dL   - Will follow closely     Thank you for your consult. I will follow-up with patient. Please contact us if you have any additional questions.    Anup QUIROS  MD Rolf  Nephrology  [facility]    ATTENDING PHYSICIAN ATTESTATION  I have personally verified the history and examined the patient. I thoroughly reviewed the demographic, clinical, laboratorial and imaging information available in medical records. I agree with the assessment and recommendations provided by the subspecialty resident who was under my supervision.

## 2020-09-02 PROBLEM — T40.2X5A CONSTIPATION DUE TO OPIOID THERAPY: Chronic | Status: ACTIVE | Noted: 2020-07-14

## 2020-09-02 PROBLEM — E11.22 TYPE 2 DIABETES MELLITUS WITH STAGE 3 CHRONIC KIDNEY DISEASE, WITHOUT LONG-TERM CURRENT USE OF INSULIN: Chronic | Status: RESOLVED | Noted: 2017-05-26 | Resolved: 2020-09-02

## 2020-09-02 PROBLEM — N18.30 TYPE 2 DIABETES MELLITUS WITH STAGE 3 CHRONIC KIDNEY DISEASE, WITHOUT LONG-TERM CURRENT USE OF INSULIN: Chronic | Status: RESOLVED | Noted: 2017-05-26 | Resolved: 2020-09-02

## 2020-09-02 PROBLEM — K29.01 ACUTE SUPERFICIAL GASTRITIS WITH HEMORRHAGE: Status: ACTIVE | Noted: 2020-08-31

## 2020-09-02 PROBLEM — K59.03 CONSTIPATION DUE TO OPIOID THERAPY: Chronic | Status: ACTIVE | Noted: 2020-07-14

## 2020-09-02 PROBLEM — J96.21 ACUTE ON CHRONIC RESPIRATORY FAILURE WITH HYPOXIA AND HYPERCAPNIA: Status: RESOLVED | Noted: 2020-08-18 | Resolved: 2020-09-02

## 2020-09-02 PROBLEM — E13.40 NEUROPATHY DUE TO SECONDARY DIABETES: Chronic | Status: ACTIVE | Noted: 2017-09-06

## 2020-09-02 PROBLEM — M48.02 CERVICAL STENOSIS OF SPINE: Chronic | Status: ACTIVE | Noted: 2020-07-14

## 2020-09-02 PROBLEM — G93.41 ACUTE METABOLIC ENCEPHALOPATHY: Status: RESOLVED | Noted: 2020-08-18 | Resolved: 2020-09-02

## 2020-09-02 PROBLEM — J96.22 ACUTE ON CHRONIC RESPIRATORY FAILURE WITH HYPOXIA AND HYPERCAPNIA: Status: RESOLVED | Noted: 2020-08-18 | Resolved: 2020-09-02

## 2020-09-02 PROBLEM — E11.69 HYPERLIPIDEMIA ASSOCIATED WITH TYPE 2 DIABETES MELLITUS: Chronic | Status: ACTIVE | Noted: 2019-11-18

## 2020-09-02 PROBLEM — Z01.818 PRE-OPERATIVE EXAMINATION FOR INTERNAL MEDICINE: Status: RESOLVED | Noted: 2020-07-31 | Resolved: 2020-09-02

## 2020-09-02 PROBLEM — N17.9 ACUTE KIDNEY INJURY SUPERIMPOSED ON CHRONIC KIDNEY DISEASE: Status: RESOLVED | Noted: 2020-08-18 | Resolved: 2020-09-02

## 2020-09-02 PROBLEM — E11.65 TYPE 2 DIABETES MELLITUS WITH HYPERGLYCEMIA, WITH LONG-TERM CURRENT USE OF INSULIN: Chronic | Status: ACTIVE | Noted: 2019-11-13

## 2020-09-02 PROBLEM — N18.9 ACUTE KIDNEY INJURY SUPERIMPOSED ON CHRONIC KIDNEY DISEASE: Status: RESOLVED | Noted: 2020-08-18 | Resolved: 2020-09-02

## 2020-09-02 PROBLEM — N40.1 BENIGN PROSTATIC HYPERPLASIA (BPH) WITH URINARY URGE INCONTINENCE: Chronic | Status: ACTIVE | Noted: 2017-06-06

## 2020-09-02 PROBLEM — E03.9 ACQUIRED HYPOTHYROIDISM: Chronic | Status: ACTIVE | Noted: 2017-05-26

## 2020-09-02 PROBLEM — N18.30 CRI (CHRONIC RENAL INSUFFICIENCY), STAGE 3 (MODERATE): Chronic | Status: ACTIVE | Noted: 2019-07-22

## 2020-09-02 PROBLEM — Z79.4 TYPE 2 DIABETES MELLITUS WITH HYPERGLYCEMIA, WITH LONG-TERM CURRENT USE OF INSULIN: Chronic | Status: ACTIVE | Noted: 2019-11-13

## 2020-09-02 PROBLEM — E87.20 METABOLIC ACIDOSIS, NORMAL ANION GAP (NAG): Status: RESOLVED | Noted: 2020-08-18 | Resolved: 2020-09-02

## 2020-09-02 PROBLEM — F11.20 UNCOMPLICATED OPIOID DEPENDENCE: Chronic | Status: ACTIVE | Noted: 2017-06-28

## 2020-09-02 PROBLEM — E78.5 HYPERLIPIDEMIA ASSOCIATED WITH TYPE 2 DIABETES MELLITUS: Chronic | Status: ACTIVE | Noted: 2019-11-18

## 2020-09-02 PROBLEM — N39.41 BENIGN PROSTATIC HYPERPLASIA (BPH) WITH URINARY URGE INCONTINENCE: Chronic | Status: ACTIVE | Noted: 2017-06-06

## 2020-09-02 LAB
ALBUMIN SERPL BCP-MCNC: 2.1 G/DL (ref 3.5–5.2)
ALP SERPL-CCNC: 91 U/L (ref 55–135)
ALT SERPL W/O P-5'-P-CCNC: 44 U/L (ref 10–44)
ANION GAP SERPL CALC-SCNC: 11 MMOL/L (ref 8–16)
AST SERPL-CCNC: 52 U/L (ref 10–40)
BASOPHILS # BLD AUTO: 0.02 K/UL (ref 0–0.2)
BASOPHILS NFR BLD: 0.2 % (ref 0–1.9)
BILIRUB SERPL-MCNC: 0.2 MG/DL (ref 0.1–1)
BUN SERPL-MCNC: 93 MG/DL (ref 8–23)
CALCIUM SERPL-MCNC: 8.5 MG/DL (ref 8.7–10.5)
CHLORIDE SERPL-SCNC: 107 MMOL/L (ref 95–110)
CO2 SERPL-SCNC: 25 MMOL/L (ref 23–29)
CREAT SERPL-MCNC: 2.8 MG/DL (ref 0.5–1.4)
DIFFERENTIAL METHOD: ABNORMAL
EOSINOPHIL # BLD AUTO: 0.1 K/UL (ref 0–0.5)
EOSINOPHIL NFR BLD: 0.8 % (ref 0–8)
ERYTHROCYTE [DISTWIDTH] IN BLOOD BY AUTOMATED COUNT: 16.5 % (ref 11.5–14.5)
EST. GFR  (AFRICAN AMERICAN): 24.4 ML/MIN/1.73 M^2
EST. GFR  (NON AFRICAN AMERICAN): 21.1 ML/MIN/1.73 M^2
GLUCOSE SERPL-MCNC: 187 MG/DL (ref 70–110)
HCT VFR BLD AUTO: 24.4 % (ref 40–54)
HGB BLD-MCNC: 7.1 G/DL (ref 14–18)
IMM GRANULOCYTES # BLD AUTO: 0.06 K/UL (ref 0–0.04)
IMM GRANULOCYTES NFR BLD AUTO: 0.5 % (ref 0–0.5)
LYMPHOCYTES # BLD AUTO: 1.3 K/UL (ref 1–4.8)
LYMPHOCYTES NFR BLD: 11.1 % (ref 18–48)
MAGNESIUM SERPL-MCNC: 1.8 MG/DL (ref 1.6–2.6)
MCH RBC QN AUTO: 31.3 PG (ref 27–31)
MCHC RBC AUTO-ENTMCNC: 29.1 G/DL (ref 32–36)
MCV RBC AUTO: 108 FL (ref 82–98)
MONOCYTES # BLD AUTO: 1 K/UL (ref 0.3–1)
MONOCYTES NFR BLD: 8.3 % (ref 4–15)
NEUTROPHILS # BLD AUTO: 9.1 K/UL (ref 1.8–7.7)
NEUTROPHILS NFR BLD: 79.1 % (ref 38–73)
NRBC BLD-RTO: 0 /100 WBC
PHOSPHATE SERPL-MCNC: 5.1 MG/DL (ref 2.7–4.5)
PLATELET # BLD AUTO: 490 K/UL (ref 150–350)
PMV BLD AUTO: 10.6 FL (ref 9.2–12.9)
POCT GLUCOSE: 143 MG/DL (ref 70–110)
POCT GLUCOSE: 153 MG/DL (ref 70–110)
POTASSIUM SERPL-SCNC: 4.1 MMOL/L (ref 3.5–5.1)
PROT SERPL-MCNC: 5.2 G/DL (ref 6–8.4)
RBC # BLD AUTO: 2.27 M/UL (ref 4.6–6.2)
SODIUM SERPL-SCNC: 143 MMOL/L (ref 136–145)
WBC # BLD AUTO: 11.5 K/UL (ref 3.9–12.7)

## 2020-09-02 PROCEDURE — 25000003 PHARM REV CODE 250: Mod: HCNC | Performed by: STUDENT IN AN ORGANIZED HEALTH CARE EDUCATION/TRAINING PROGRAM

## 2020-09-02 PROCEDURE — 99232 SBSQ HOSP IP/OBS MODERATE 35: CPT | Mod: HCNC,,, | Performed by: HOSPITALIST

## 2020-09-02 PROCEDURE — 94761 N-INVAS EAR/PLS OXIMETRY MLT: CPT | Mod: HCNC

## 2020-09-02 PROCEDURE — 97116 GAIT TRAINING THERAPY: CPT | Mod: HCNC,CQ

## 2020-09-02 PROCEDURE — 84100 ASSAY OF PHOSPHORUS: CPT | Mod: HCNC

## 2020-09-02 PROCEDURE — 83735 ASSAY OF MAGNESIUM: CPT | Mod: HCNC

## 2020-09-02 PROCEDURE — 25000003 PHARM REV CODE 250: Mod: HCNC | Performed by: NURSE PRACTITIONER

## 2020-09-02 PROCEDURE — 99232 PR SUBSEQUENT HOSPITAL CARE,LEVL II: ICD-10-PCS | Mod: HCNC,,, | Performed by: HOSPITALIST

## 2020-09-02 PROCEDURE — 25000003 PHARM REV CODE 250: Mod: HCNC | Performed by: HOSPITALIST

## 2020-09-02 PROCEDURE — 80053 COMPREHEN METABOLIC PANEL: CPT | Mod: HCNC

## 2020-09-02 PROCEDURE — 25000003 PHARM REV CODE 250: Mod: HCNC | Performed by: INTERNAL MEDICINE

## 2020-09-02 PROCEDURE — 85025 COMPLETE CBC W/AUTO DIFF WBC: CPT | Mod: HCNC

## 2020-09-02 PROCEDURE — 99223 1ST HOSP IP/OBS HIGH 75: CPT | Mod: HCNC,,, | Performed by: INTERNAL MEDICINE

## 2020-09-02 PROCEDURE — 97530 THERAPEUTIC ACTIVITIES: CPT | Mod: HCNC

## 2020-09-02 PROCEDURE — 20600001 HC STEP DOWN PRIVATE ROOM: Mod: HCNC

## 2020-09-02 PROCEDURE — 99223 PR INITIAL HOSPITAL CARE,LEVL III: ICD-10-PCS | Mod: HCNC,,, | Performed by: INTERNAL MEDICINE

## 2020-09-02 PROCEDURE — 36415 COLL VENOUS BLD VENIPUNCTURE: CPT | Mod: HCNC

## 2020-09-02 RX ORDER — PANTOPRAZOLE SODIUM 40 MG/1
40 TABLET, DELAYED RELEASE ORAL DAILY
Qty: 40 TABLET | Refills: 0 | Status: SHIPPED | OUTPATIENT
Start: 2020-09-03 | End: 2020-09-16

## 2020-09-02 RX ORDER — METHOCARBAMOL 500 MG/1
500 TABLET, FILM COATED ORAL 2 TIMES DAILY
Qty: 20 TABLET | Refills: 0 | Status: SHIPPED | OUTPATIENT
Start: 2020-09-03 | End: 2020-09-13

## 2020-09-02 RX ORDER — MAG HYDROX/ALUMINUM HYD/SIMETH 200-200-20
30 SUSPENSION, ORAL (FINAL DOSE FORM) ORAL EVERY 6 HOURS PRN
Status: DISCONTINUED | OUTPATIENT
Start: 2020-09-02 | End: 2020-09-03 | Stop reason: HOSPADM

## 2020-09-02 RX ADMIN — DOCUSATE SODIUM 50 MG: 50 CAPSULE, LIQUID FILLED ORAL at 09:09

## 2020-09-02 RX ADMIN — GUAIFENESIN AND DEXTROMETHORPHAN HYDROBROMIDE 1 TABLET: 600; 30 TABLET, EXTENDED RELEASE ORAL at 09:09

## 2020-09-02 RX ADMIN — METHOCARBAMOL TABLETS 500 MG: 500 TABLET, COATED ORAL at 09:09

## 2020-09-02 RX ADMIN — PANTOPRAZOLE SODIUM 40 MG: 40 TABLET, DELAYED RELEASE ORAL at 08:09

## 2020-09-02 RX ADMIN — LEVOTHYROXINE SODIUM 88 MCG: 0.09 TABLET ORAL at 06:09

## 2020-09-02 RX ADMIN — GUAIFENESIN AND DEXTROMETHORPHAN HYDROBROMIDE 1 TABLET: 600; 30 TABLET, EXTENDED RELEASE ORAL at 08:09

## 2020-09-02 RX ADMIN — INSULIN ASPART 2 UNITS: 100 INJECTION, SOLUTION INTRAVENOUS; SUBCUTANEOUS at 11:09

## 2020-09-02 RX ADMIN — ROSUVASTATIN CALCIUM 40 MG: 20 TABLET, FILM COATED ORAL at 09:09

## 2020-09-02 RX ADMIN — METHOCARBAMOL TABLETS 500 MG: 500 TABLET, COATED ORAL at 08:09

## 2020-09-02 RX ADMIN — ALUMINUM HYDROXIDE, MAGNESIUM HYDROXIDE, AND SIMETHICONE 30 ML: 200; 200; 20 SUSPENSION ORAL at 01:09

## 2020-09-02 RX ADMIN — DOCUSATE SODIUM 50 MG: 50 CAPSULE, LIQUID FILLED ORAL at 08:09

## 2020-09-02 RX ADMIN — TAMSULOSIN HYDROCHLORIDE 0.8 MG: 0.4 CAPSULE ORAL at 08:09

## 2020-09-02 NOTE — MEDICAL/APP STUDENT
[facility]  Pulmonology  Consult Note    Patient Name: Edwin Powell  MRN: [id number]  Admission Date: 8/17/2020  Hospital Length of Stay: 16 days  Code Status: Full Code  Attending Physician: Loraine Beard MD  Primary Care Provider: Lulú Lewis MD   Principal Problem: Acute on chronic respiratory failure with hypoxia and hypercapnia    Consults  Subjective:     HPI: Patient is 74 y/o with PMHx metastic RCC (liver and L3 mets), CKD, CAD s/p PCI, chronic hypoxic respiratory failure on home 2L O2, T2DM, HLD, HTN, who presented to hospital for scheduled C-spine fusion. Post surgery, he required multiple doses of oxycodone. Overnight he had episodes of apnea and altered mental status, for which he was given 3 doses of Narcan. O2 sats worsened, did not improve with bipap so he was put on 30L comfort flow and transferred to ICU. Concern for PE, but CTA could not be done due to renal function. Altered mental status improved. Acute worsening of O2 and elevation in trops, so empirically treated for PE with heparin gtt for 48 hours. Oxygen requirements have since improved. Now satting well on room air for last 7 days.      Over the weekend, patient had one episode of coughing up blood, after which he had caro hematemesis. Scope done by GI showed erythematous gastropathy. Concern for bleeding from below vocal cords so pulmonary was consulted for possible hemoptysis. Patient denies previous or further episodes of hemoptysis. Patient is stable and satting well on room air.    Past Medical History:   Diagnosis Date    Acquired hypothyroidism 5/26/2017    Benign essential HTN 5/26/2017    Benign prostatic hyperplasia (BPH) with urinary urge incontinence 6/6/2017    Chronic low back pain 6/1/2017    Coronary artery disease involving native coronary artery of native heart without angina pectoris 5/26/2017    S/p 5 stents - last one around 2010-11.    Gastroesophageal reflux disease without  esophagitis 5/26/2017    History of CVA (cerebrovascular accident) 5/26/2017    Hypertension associated with diabetes 5/26/2017    BP controlled on ACE, CCB    Lumbar disc lesion 5/26/2017    Metastatic renal cell carcinoma to bone 6/6/2017    Metastatic renal cell carcinoma to liver 6/6/2017    Liver Biopsy 5-2017: METASTATIC RENAL CELL CARCINOMA.    Mixed hyperlipidemia 5/26/2017    Type 2 diabetes mellitus with stage 3 chronic kidney disease, with long-term current use of insulin 5/26/2017       Past Surgical History:   Procedure Laterality Date    ABDOMINAL SURGERY      ANTERIOR CERVICAL DISCECTOMY W/ FUSION N/A 8/17/2020    Procedure: DISCECTOMY, SPINE, CERVICAL, ANTERIOR APPROACH, WITH FUSION co case with Dr. Falcon C6/7 [facility] SNS:vocal cord/motors/SSEP Regular OR table Patient needs scope in preop;  Surgeon: Filemon Aguayo MD;  Location: [facility] OR [address] [place] [address];  Service: Neurosurgery;  Laterality: N/A;    APPENDECTOMY      BACK SURGERY      CARDIAC SURGERY      CATARACT EXTRACTION      CHOLECYSTECTOMY      CORONARY STENT PLACEMENT      5 stents    coronary stenting      X 5 last one in 2010-11.    DISSECTION OF NECK N/A 8/17/2020    Procedure: DISSECTION, NECK;  Surgeon: Rush Falcon MD;  Location: [facility] OR [address] [place] [address];  Service: ENT;  Laterality: N/A;    ESOPHAGOGASTRODUODENOSCOPY N/A 9/1/2020    Procedure: EGD (ESOPHAGOGASTRODUODENOSCOPY);  Surgeon: Jim Hooks MD;  Location: [address]);  Service: Endoscopy;  Laterality: N/A;    INTRAOCULAR PROSTHESES INSERTION Right 6/27/2019    Procedure: INSERTION, IOL PROSTHESIS;  Surgeon: Chary Culp MD;  Location: [facility] OR [address] [place] [address];  Service: Ophthalmology;  Laterality: Right;    INTRAOCULAR PROSTHESES INSERTION Left 8/22/2019    Procedure: INSERTION, IOL PROSTHESIS;  Surgeon: Chary Culp MD;  Location: [address];  Service: Ophthalmology;  Laterality: Left;    PHACOEMULSIFICATION OF CATARACT Right 6/27/2019     Procedure: PHACOEMULSIFICATION, CATARACT;  Surgeon: Chary Culp MD;  Location: [facility] OR [address] [place] [address];  Service: Ophthalmology;  Laterality: Right;    PHACOEMULSIFICATION OF CATARACT Left 8/22/2019    Procedure: PHACOEMULSIFICATION, CATARACT;  Surgeon: Chary Culp MD;  Location: [address];  Service: Ophthalmology;  Laterality: Left;    SPINAL FUSION         Review of patient's allergies indicates:  No Known Allergies    Family History     Problem Relation (Age of Onset)    Diabetes Mother, Brother    Glaucoma Mother, Maternal Grandmother    Heart attack Mother    No Known Problems Son        Tobacco Use    Smoking status: Former Smoker     Types: Cigarettes    Smokeless tobacco: Never Used    Tobacco comment: haven't smoked in last 25 yrs   Substance and Sexual Activity    Alcohol use: Yes     Comment: rarely     Drug use: Not Currently    Sexual activity: Not Currently     Partners: Female        Review of Systems   Constitutional: Negative for chills, fever and unexpected weight change.   HENT: Negative for congestion and sore throat.    Respiratory: Positive for cough. Negative for chest tightness, shortness of breath and wheezing.    Cardiovascular: Negative for chest pain, palpitations and leg swelling.   Gastrointestinal: Positive for vomiting. Negative for abdominal pain.        Hematemesis.   Genitourinary: Negative for dysuria and hematuria.   Musculoskeletal: Negative for arthralgias and myalgias.   Skin: Negative for color change.   Neurological: Negative for dizziness, weakness and light-headedness.   Psychiatric/Behavioral: Negative for agitation and behavioral problems.     Objective:     Vital Signs (Most Recent):  Temp: 98.3 °F (36.8 °C) (09/02/20 1618)  Pulse: 83 (09/02/20 1618)  Resp: 20 (09/02/20 1618)  BP: (!) 147/67 (09/02/20 1618)  SpO2: 98 % (09/02/20 1618) Vital Signs (24h Range):  Temp:  [98 °F (36.7 °C)-98.9 °F (37.2 °C)] 98.3 °F (36.8 °C)  Pulse:  [75-88] 83  Resp:   [18-20] 20  SpO2:  [92 %-98 %] 98 %  BP: (114-150)/(58-67) 147/67     Weight: 79.4 kg (175 lb 0.7 oz)  Body mass index is 27.42 kg/m².      Intake/Output Summary (Last 24 hours) at 9/2/2020 1652  Last data filed at 9/2/2020 1400  Gross per 24 hour   Intake 1560 ml   Output 1500 ml   Net 60 ml       Physical Exam  Constitutional:       General: He is not in acute distress.     Appearance: Normal appearance.   HENT:      Head: Normocephalic and atraumatic.      Nose: Nose normal.      Mouth/Throat:      Mouth: Mucous membranes are moist.      Pharynx: Oropharynx is clear.   Eyes:      Extraocular Movements: Extraocular movements intact.      Pupils: Pupils are equal, round, and reactive to light.   Neck:      Musculoskeletal: Normal range of motion and neck supple.   Cardiovascular:      Rate and Rhythm: Normal rate and regular rhythm.      Pulses: Normal pulses.      Heart sounds: Murmur present.   Pulmonary:      Effort: Pulmonary effort is normal. No respiratory distress.      Breath sounds: Normal breath sounds. No wheezing.      Comments: Satting 95% on RA.  Abdominal:      General: Abdomen is flat. Bowel sounds are normal.      Palpations: Abdomen is soft.   Musculoskeletal: Normal range of motion.         General: No swelling or tenderness.   Skin:     General: Skin is warm and dry.      Coloration: Skin is pale.   Neurological:      Mental Status: He is alert and oriented to person, place, and time.   Psychiatric:         Mood and Affect: Mood normal.         Behavior: Behavior normal.         Vents:  Oxygen Concentration (%): 40 (08/20/20 1200)    Lines/Drains/Airways     Peripheral Intravenous Line                 Peripheral IV - Single Lumen 08/26/20 1710 22 G Right Forearm 6 days                Significant Labs:    CBC/Anemia Profile:  Recent Labs   Lab 08/31/20  1753 09/01/20  1005 09/02/20  0242   WBC 10.59 10.87 11.50   HGB 8.2* 7.5* 7.1*   HCT 26.1* 24.7* 24.4*   * 457* 490*   * 103* 108*    RDW 16.6* 17.0* 16.5*        Chemistries:  Recent Labs   Lab 09/01/20  0337 09/02/20  0249    143   K 4.5 4.1    107   CO2 28 25   * 93*   CREATININE 3.0* 2.8*   CALCIUM 8.6* 8.5*   ALBUMIN 2.0* 2.1*   PROT 5.1* 5.2*   BILITOT 0.3 0.2   ALKPHOS 76 91   ALT 36 44   AST 48* 52*   MG 1.8 1.8   PHOS 5.2* 5.1*       All pertinent labs within the past 24 hours have been reviewed.    Significant Imaging:     CXR - Cardiac silhouette and pulmonary vascularity are stable and within normal range.  There is aortic atherosclerosis and tortuosity of the thoracic aorta.  Lungs show no evidence of significant focal consolidation, large effusion or pneumothorax.  Bones show mild degenerative changes and postop changes of the cervical spine.    Assessment/Plan:     Patient is a 76 y/o M with above PMHx who had an episode of coughing up blood followed by hematemesis over the weekend. GI scoped the patient and were concerned about possible bleeding from below the vocal cords. Patient has not had previous episodes of hemoptysis. He is currently stable, satting well on room air. Blood he coughed up possibly aspiration from hematemesis. No indication for further work up of hemoptysis. Please re-consult if patient has another episode of hemoptysis or increased oxygen requirement.      Thank you for your consult. I will sign off. Please contact us if you have any additional questions.     Brenda Roberts, MS4  Pulmonology  [facility]

## 2020-09-02 NOTE — ASSESSMENT & PLAN NOTE
74 yo M admitted with acute hypoxemic respiratory failure + VERENA on CKD. Baseline sCr 2, sCr 3.5 on admission. Patient has CKD IIIb with nephrotic range proteinuria. He sees Dr. Leonardo, last visit was earlier this month. Per her notes, CKD etiology suspected to be multifactorial (periodic NSAID use, multiple CTs w/ contast, HTN, DM). At that time, sCr 2 and eGFR 30. Has had significant proteinuria in past (Pr:Cr 7.5g earlier this year, down to 4 earlier this month).    Throughout admission, patient's creatinine improved with diuresis and he has had good urine output with this. Cr was down to 2.7 and increased on 8/31 to 3.0, the same day the patient had an episode of hematemesis. PO lasix was held at this time. Patient was never significantly hypotensive however did have a drop in Hgb. EGD on 9/1 did not show active bleeding. Patient has been without hematemesis since the initial episode and Cr has been down-trending since.    Final recommendations:  - Would restart oral lasix given his VERENA on CKD and newly worsened EF whenever it is felt safe to do so. Had some trace pitting edema on exam today but no JVD or crackles, patient is saturating well on room air.  - GDMT for heart failure and HTN management per primary.  - Will need follow up in nephrology clinic after discharge.  - Monitor Ins and Outs and daily weights.   - Avoid nephrotoxic agents such as NSAIDS, Gadolinium and IV Radiocontrast  - Renal Dose meds to current GFR/Creat Clearance.  - Maintain MAP > 65

## 2020-09-02 NOTE — PLAN OF CARE
Problem: Adult Inpatient Plan of Care  Goal: Plan of Care Review  Outcome: Ongoing, Progressing  Goal: Optimal Comfort and Wellbeing  Outcome: Ongoing, Progressing     Problem: Diabetes Comorbidity  Goal: Blood Glucose Level Within Desired Range  Outcome: Ongoing, Progressing  Intervention: Maintain Glycemic Control  Flowsheets (Taken 9/2/2020 1810)  Glycemic Management:   blood glucose monitoring   supplemental insulin given     Problem: Fall Injury Risk  Goal: Absence of Fall and Fall-Related Injury  Outcome: Ongoing, Progressing     POC reviewed with patient. AAOX4. VVS. No acute changes. No complaint of discomfort/pain. Up chair tolerated well. Safety maintained. Call light in reach. WCTM

## 2020-09-02 NOTE — PLAN OF CARE
SW assigned to case today 09/02/20. JANE will assist team with DC needs. JANE in communication with TAMMY.    Asthyn Hope LMSW   - Case Management

## 2020-09-02 NOTE — TREATMENT PLAN
Brief GI Treatment Plan    EGD performed yesterday with no gastric etiology for bleeding, blood was seen coming from larynx/below vocal cords.    Recommendations:    - Pulmonary consult for further evaluation of hemoptysis  - Continue to trend Hgb  - GI will sign off at this time.    Sage Gregg MD  GI Fellow

## 2020-09-02 NOTE — PLAN OF CARE
Problem: Occupational Therapy Goal  Goal: Occupational Therapy Goal  Description: Goals to be met by: 9/1/20     Patient will increase functional independence with ADLs by performing:    UE Dressing with [place].  LE Dressing with Modified [place].  Grooming while standing at sink with Modified [place].  Toileting from toilet with Modified [place] for hygiene and clothing management.   Bathing from  shower chair/bench with Modified [place].  Toilet transfer to toilet with Modified [place].  Increased functional strength to WFL for B UE.  Upper extremity exercise program x15 reps per handout, with assistance as needed.    Outcome: Met    Pt has met all OT goals at satisfactory level of achievement. Pt no longer requires skilled OT in the acute setting. Pt's d/c recs for  for home safety assessment. Pt d/c from OT 9/2/2020.    Maria E Weathers OTR/L  Pager: [phone]  9/2/2020

## 2020-09-02 NOTE — PLAN OF CARE
The patient is lying in bed, soundly asleep at this time. Pt easily awakened. No s/s of acute distress. Pt had c/o indigestion earlier and I  obtained an order for PRN Mag ox. No further complaints. Bed locked and in low position. Call light within reach. Will cont to monitor.

## 2020-09-02 NOTE — PROGRESS NOTES
Progress Note  Hospital Medicine      Admit Date: 8/17/2020   [facility]    SUBJECTIVE:     Follow-up For:  Acute on chronic respiratory failure with hypoxia and hypercapnia     Interval history/ROS: No reported hematemesis. Hg at 7.      HPI:  75 y.o. male with history of c-spine stenosis, metastatic RCC (liver and L3 mets), CKD, CAD s/p PCI, chronic hypoxic respiratory failure, hypothyroidism, IDDM2, HLD, BPH, HTN and GERD presented to hospital on 8/17 for scheduled C-spine fusion due to severe stenosis at C6/7 with myopathy. Patient underwent ACDF of C6/7 with Ortho & ENT on 8/17. During the procedure he received rocuronium, ketamine, fentanyl, propofol and midazolam. He required a renae infusion during the procedure. Post-operatively the patient was admitted to post-op Ortho unit and was placed on his home 2L O2. After his procedure he received multiple doses of oxycodone for his neck pain. Overnight on 8/17-8/18 the patient had multiple periods of apnea requiring 3 doses of Narcan on the floor with minimal improvement in respiratory and mental status. ABG showed combined metabolic and respiratory acidosis. No significant improvement in acidosis with initiation of Bipap on the floor and patient continued to have apneic periods and worsening hypoxia requiring 15L NC. He was transferred to ICU for further management.      Overview / ICU Course:   Patient was transferred to MICU and started on narcan and bicarbonate infusions. Hypoxia worsened requiring up to 30L comfort flow. Mental status improved and narcan gtt discontinued on 8/18 in AM. PE suspected however CTA not feasible due to renal function. Bilateral lower extremity U/S negative for DVT, however because of quickly worsening O2 requirements and elevated troponin, he was empirically started on a heparin gtt for possible PE. TTE did not show RV strain but did show focal wall motion abnormalities, and troponin continued to uptrend so patient was loaded  with DAPT for NSTEMI. CVP 3 on TTE however pulmonary edema on CXR. Restarted diuresis intravenously.  8/20/2020: Patient no longer requiring CPAP (was on 35L/40%). O2Sat 97% with 2L NC. Refers feeling much better than yesterday.  8/21/2020: NAEON. Patient on 3L NC, Sat 98%. Denied fevers, chills, chest pain, SOB, palpitations. Patient to be stepped down today       OBJECTIVE:     Body mass index is 27.42 kg/m².    Vital Signs Range (Last 24H):  Temp:  [97.7 °F (36.5 °C)-98.9 °F (37.2 °C)]   Pulse:  [64-88]   Resp:  [18-20]   BP: (114-150)/(58-67)   SpO2:  [92 %-98 %]     I & O (Last 24H):    Intake/Output Summary (Last 24 hours) at 9/2/2020 1502  Last data filed at 9/2/2020 1140  Gross per 24 hour   Intake 1200 ml   Output 1500 ml   Net -300 ml        Physical Exam:  Constitutional:       General: He is not in acute distress.     Appearance: Normal appearance. He is not diaphoretic.   Eyes:      General: Lids are normal. No scleral icterus.        Right eye: No discharge.         Left eye: No discharge.      Conjunctiva/sclera: Conjunctivae normal.   Cardiovascular:      Rate and Rhythm: Normal rate.   Pulmonary:      Effort: Pulmonary effort is normal. No tachypnea, accessory muscle usage or respiratory distress.   Musculoskeletal:      Right lower leg: No edema.      Left lower leg: No edema.   Skin:     Coloration: Skin is not cyanotic.      Findings: No rash.   Neurological:      General: No focal deficit present.      Mental Status: He is alert and oriented to person, place, and time.      Cranial Nerves: Cranial nerves are intact.      Motor: Motor function is intact.   Psychiatric:         Attention and Perception: Attention normal.         Mood and Affect: Affect is labile.         Speech: Speech normal.         Behavior: Behavior is cooperative.        Recent Labs   Lab 08/31/20  0338 09/01/20  0337 09/02/20  0249    138 143   K 5.0 4.5 4.1    101 107   CO2 26 28 25   BUN 99* 112* 93*    CREATININE 3.0* 3.0* 2.8*   * 121* 187*   CALCIUM 8.5* 8.6* 8.5*   MG 1.7 1.8 1.8   PHOS 5.6* 5.2* 5.1*     Recent Labs   Lab 08/31/20  0338 08/31/20  1208 09/01/20  0337 09/01/20  1005 09/02/20  0249   ALKPHOS 72  --  76  --  91   ALT 28  --  36  --  44   AST 30  --  48*  --  52*   ALBUMIN 2.0*  --  2.0*  --  2.1*   PROT 5.3*  --  5.1*  --  5.2*   BILITOT 0.2  --  0.3  --  0.2   INR  --  1.0  --  1.0  --        Recent Labs   Lab 08/31/20  1753 09/01/20  1005 09/02/20  0242   WBC 10.59 10.87 11.50   HGB 8.2* 7.5* 7.1*   HCT 26.1* 24.7* 24.4*   * 457* 490*   GRAN 74.8*  7.9* 77.4*  8.4* 79.1*  9.1*   LYMPH 16.7*  1.8 13.0*  1.4 11.1*  1.3   MONO 6.5  0.7 7.2  0.8 8.3  1.0       Recent Labs   Lab 09/01/20  0740 09/01/20  1134 09/01/20  1250 09/01/20  1603 09/01/20  2210 09/02/20  0734   POCTGLUCOSE 147* 148* 182* 203* 198* 153*       No results for input(s): TROPONINI in the last 168 hours.    Diagnostic Results:  Labs: Reviewed    dextromethorphan-guaifenesin  mg, 1 tablet, Oral, BID  docusate sodium, 50 mg, Oral, BID  levothyroxine, 88 mcg, Oral, Before breakfast  methocarbamoL, 500 mg, Oral, BID  methyl salicylate-menthol 15-10%, , Topical (Top), TID  pantoprazole, 40 mg, Oral, Daily  PAZOpanib, 800 mg, Oral, Before breakfast  rosuvastatin, 40 mg, Oral, QHS  tamsulosin, 0.8 mg, Oral, Daily        As Needed sodium chloride, sodium chloride, acetaminophen, albuterol-ipratropium, aluminum-magnesium hydroxide-simethicone, benzonatate, bisacodyL, dextrose 50%, dextrose 50%, glucagon (human recombinant), glucose, glucose, hydrALAZINE, insulin aspart U-100, naloxone, ondansetron, oxyCODONE, polyethylene glycol, sodium chloride 0.9%, sodium chloride 0.9%    ASSESSMENT/PLAN:     Assessment: Edwin Powell is a 75 y.o. male here with:     Active Hospital Problems    Diagnosis  POA    *Acute on chronic respiratory failure with hypoxia and hypercapnia [J96.21, J96.22]  Yes    Azotemia [R79.89]  " Yes    GI bleed [K92.2]  Unknown    Hematemesis with nausea [K92.0]  No    S/P cervical spinal fusion [Z98.1]  Not Applicable    Acute respiratory failure with hypoxia [J96.01]  Yes    Impaired functional mobility and endurance [Z74.09]  Yes    NSTEMI (non-ST elevated myocardial infarction) [I21.4]  Yes    Elevated troponin [R79.89]  Yes    Dysphagia [R13.10]  No    Stented coronary artery [Z95.5]  Not Applicable    Metastatic renal cell carcinoma [C64.9]  Yes    Acute kidney injury superimposed on chronic kidney disease [N17.9, N18.9]  Yes    Acute metabolic encephalopathy [G93.41]  No    Metabolic acidosis, normal anion gap (NAG) [E87.2]  Yes    Cervical stenosis of spine [M48.02]  Yes     Seen on MRI 5/9/20: " Multilevel cervical spondylosis most prominent at C6-C7 resulting in severe spinal canal stenosis and severe bilateral neural foraminal narrowing."      Neuropathy due to secondary diabetes [E13.40]  Yes     LE numbness and tingling, elevated A1c, followed by podiatry      Benign prostatic hyperplasia (BPH) with urinary urge incontinence [N40.1, N39.41]  Yes     Patient with LUTS, compliant with flomax      Acquired hypothyroidism [E03.9]  Yes     Elevated TSH in 6/2017, 8/2017, 11/2017, 3/2018      Coronary artery disease involving native coronary artery of native heart without angina pectoris [I25.10]  Yes     EJ S/p 5 stents 2010. Anginal equivalent controlled with CCB, ACE, APT      Type 2 diabetes mellitus with stage 3 chronic kidney disease, without long-term current use of insulin [E11.22, N18.3]  Yes     A1c 11.1 in 5/2017. Weaning off Lantus during chemo, but needed to restart it with weight gain         Resolved Hospital Problems   No resolved problems to display.        Plan:    Upper GI Bleeding:   Patient with active hematemesis personally witnessed. Bright red blood with clots.    Plan:   - protonix bid VI  - hold all anticoagulants  - NS bolus  - prepared 2 UPRBC and " transfused 1 for symptomatic anemia- Hg 7.5 after transfusion suggesting ongoing blood loss.   - no history of liver diseases to suggest variceal origin  - EGD with some gastritis  - pulmonary does not feel consistent with lung origin.   - Hg today at 7.1 will continue to monitor.     Acute on chronic respiratory failure with hypoxia and hypercapnia  Concern for PE as he suddenly required significantly more O2 overnight on 8/17-18. Risk factors include recent surgery and metastatic RCC.   - CTA chest deferred due to VERENA but NM scan low probability PE  - Bilat LE U/S negative for DVT  - TTE negative for RV strain with normal PA pressure. Showed chronic HFpEF and focal wall motion abnormalities  - Troponin elevated and peaked at 8.8, BNP mildly elevated (no baseline)  - Was put on heparin gtt for 48hrs. OK'd by Ortho to start A/C so soon after surgery due to rapidly worsening hypoxia  - Wean O2 to goal sat > 90%.   - Lasix, monitor UOP  - Cr   2.8     Hemoptysis?  -solitary episode; patient has not been wearing nasal cannula for days     NSTEMI (non-ST elevated myocardial infarction)  Coronary artery disease involving native coronary artery of native heart with angina pectoris  Elevated troponin  CAD with history of PCI ~20 years ago. Patient did not have chest pain. Troponin was checked when concerned for PE, initially was 1.2 however continued to uptrend and peaked at 8.8.  - Loaded with ASA and Plavix, Ok'd by Ortho, and continued now stopped due to bleeding  - On statin  - Cardiology consulted. Per Cardiology: with patient's metastatic renal carcinoma and progressive decline recommend conservative management for now.  He possibly developed flash pulmonary edema in the setting of hypertension, apnea, sedation leading to respiratory failure and NSTEMI. For conservative medical management from his ACS standpoint, plan would be to continue aspirin and Plavix (currently held due to bleeding) , rosuvastatin and  carvedilol. Cardiology aware of repeat echo showing EF 30%; currently not recommending further diuresis 8/26.     Metabolic acidosis, normal anion gap (NAG)  --due to RTA in setting of VERENA on CKD vs recent diarrhea (per recent Nephro clinic note)  --UAG positive (38) suggestive of RTA  -- Will sodium bicarb as acidosis resolved     Acute kidney injury superimposed on chronic kidney disease  --baseline creatinine ~ 2  -- ? Etiology. Recent Nephro note states pt has had decreased po intake & diarrhea recently - ? Pre-renal. Pt received maintenance IVF, so likely volume resuscitated at this point  --home Lasix & lisinopril held initially. Then Lasix 100mg daily resumed.  --renal U/S unremarkable  --improving with diuresis; received IV Lasix through 8/25 then sCr increased on 8/26 after continued diuresis. Nephrology consulted and determining home lasix dose on discharge- start lasix 40 mg BID with serial labs.     Benign prostatic hyperplasia (BPH) with urinary urge incontinence  --continue home Flomax     Type 2 diabetes mellitus with stage 3 chronic kidney disease, without long-term current use of insulin  --per Clinic notes, insulin dependent  --home regimen lantus 5 units daily   --currently low dose correction; concerning if actually insulin-dependent as rarely requiring administration for hyperglycemia. AG normal     Acquired hypothyroidism  --continue home levothyroxine  --TSH elevated however fT4 WNL     Dysphagia  SLP eval, tolerating PO intake. Now on a regular diet      Acute metabolic encephalopathy  --suspect medication induced insetting of decreased renal clearance as pt responded well to Narcan. Started on Narcan gtt and discontinued on 8/18 once mental status improved  --hold sedating medications  --resolved     Cervical stenosis of spine  --s/p ACDF on 8/17 with Ortho and ENT; management per Ortho  --start topicals to shoulder/ left neck  --Continue oxycodone prn; added methocarbamol     Neuropathy due  to secondary diabetes  --holding home Lyrica in setting of encephalopathy  --when resumed, will need to be renally dosed     Metastatic renal cell carcinoma of L kidney          Followed by Rosa Maria and Joshua.     Diet: Diet diabetic [facility]; 2800 Calorie; Cardiac (Low Na/Chol)  GI Prophylaxis: Not indicated  Significant LDAs:   IV Access Type: Peripheral  Urinary Catheter Indication if present: Patient Does Not Have Urinary Catheter  Other Lines/Tubes/Drains:     HIGH RISK CONDITION(S):   Patient has a condition that poses threat to life and bodily function: Severe Respiratory Distress, NSTEMI, and Acute Renal Failure      Goals of Care:    Previous admission:  8/22/19  Likely prognosis:  Fair  Code Status: Full Code  Comfort Only: No  Hospice: No  Goals at discharge: remain at home, with physician follow-up     HIGH RISK CONDITION(S):  Patient has a condition that poses threat to life and bodily function: upper GI bleeding        Loraine Beard MD

## 2020-09-02 NOTE — PT/OT/SLP PROGRESS
"Physical Therapy Treatment    Patient Name:  Edwin Powell   MRN:  [id number]    Recommendations:     Discharge Recommendations:  home with home health   Discharge Equipment Recommendations: none   Barriers to discharge: None    Assessment:     Edwin Powell is a 75 y.o. male admitted with a medical diagnosis of Acute on chronic respiratory failure with hypoxia and hypercapnia.  He presents with the following impairments/functional limitations:  weakness, impaired endurance, impaired self care skills, impaired functional mobilty, gait instability, impaired balance, pain, orthopedic precautions. Pt with agitated mood but agreeable to mobilization with PTA/OT this day. Pt several times repeating phrase "Y'all are killing me." but refuses to explain his comments when asked. Pt voices frustration for having to participate in therapy and states several times over that he "can do for himself, like has his whole life". Education attempted to explain purpose and role of therapy in pts care but pt interrupts and states "that's fine, that's fine. I get it.". Pt ambulatory this session and mobilizing overall with only supervision and use of RW. Pt not open to corrective cues on posture or gait mechanics. PT to be informed of pts status and will follow up to assess pt when next appropriate per POC.     Rehab Prognosis: Good; patient would benefit from acute skilled PT services to address these deficits and reach maximum level of function.    Recent Surgery: Procedure(s) (LRB):  EGD (ESOPHAGOGASTRODUODENOSCOPY) (N/A) 1 Day Post-Op    Plan:     During this hospitalization, patient to be seen 5 x/week to address the identified rehab impairments via gait training, therapeutic activities, therapeutic exercises, neuromuscular re-education and progress toward the following goals:    · Plan of Care Expires:  09/17/20    Subjective     Chief Complaint: pain  Pain/Comfort:  · Pain Rating 1: other (see comments)(refused to rate when " asked)  · Location - Orientation 1: upper  · Location 1: back      Objective:     Communicated with NSG prior to session.  Patient found supine with telemetry, pulse ox (continuous) upon PTA entry to room.     General Precautions: Standard, fall   Orthopedic Precautions:N/A   Braces: N/A     Functional Mobility:  · Bed Mobility:     · Rolling Left:  supervision  · Supine to Sit: supervision  · Transfers:     · Sit to Stand:  supervision with rolling walker  · Bed to Chair: supervision with  rolling walker  using  Step Transfer  · Gait: Pt ambulates ~200 ft with RW and Supervision. Pt with FFP, narrow RAÚL, and downward gaze. Pt not attentive to therapists attempts to cue pt on improved gait mechanics and safety.       AM-PAC 6 CLICK MOBILITY  Turning over in bed (including adjusting bedclothes, sheets and blankets)?: 4  Sitting down on and standing up from a chair with arms (e.g., wheelchair, bedside commode, etc.): 3  Moving from lying on back to sitting on the side of the bed?: 4  Moving to and from a bed to a chair (including a wheelchair)?: 3  Need to walk in hospital room?: 3  Climbing 3-5 steps with a railing?: 3  Basic Mobility Total Score: 20       Therapeutic Activities and Exercises:   Pt educated as noted in assessment. Pt not open to learning and dismissive of need to mobilize or work with therapy services.     Patient left up in chair with all lines intact and call button in reach..    GOALS:   Multidisciplinary Problems     Physical Therapy Goals        Problem: Physical Therapy Goal    Goal Priority Disciplines Outcome Goal Variances Interventions   Physical Therapy Goal     PT, PT/OT Ongoing, Progressing     Description: Goals to be met by: 20    Patient will increase functional independence with mobility by performin. Supine to sit with MInimal Assistance - met       A. independence  2. Sit to stand transfer with Contact Guard Assistance with AD if needed. -not met  3. Bed to chair  transfer with Minimal Assistance using Rolling Walker if needed. -not met  4. Gait  x 50 feet with Contact Guard Assistance using Rolling Walker. If needed. -not met  5. Lower extremity exercise program x20 reps with supervision-not met  6. Pt sit on EOB x 10 min with CGA- not met                     Time Tracking:     PT Received On: 09/02/20  PT Start Time: 1115     PT Stop Time: 1130  PT Total Time (min): 15 min     Billable Minutes: Gait Training 15    Treatment Type: Treatment  PT/PTA: PTA     PTA Visit Number: 3     Rachid Nesbitt, PTA  09/02/2020

## 2020-09-02 NOTE — PLAN OF CARE
Asked to re-evaluate patient by primary team after GI noted concern for bleeding below the vocal cords on EGD.    Spoke with patient, he denies any hemoptysis, is certain he has been vomiting blood and has not coughed at all nor coughed up blood. He is in no respiratory distress, his lung sounds are clear, he is sat'ing 94% on RA, he is hemodynamically stable. No indication for transfer to ICU at this time.    If input from pulmonary is still needed, can consult pulmonary tomorrow, although with no pulmonary symptoms, likely will not be any intervention.    Discussed with Dr. Deloris Kaye MD  [facility]  09/01/2020  7:35 PM

## 2020-09-02 NOTE — SUBJECTIVE & OBJECTIVE
Interval History: Patient had EGD yesterday for episode of hematemesis on 8/31 which showed gastritis and no active bleeding. Has been off home oral lasix since hematemsis episode. Has not had further bleeding episodes and blood pressures have been good. No acute events overnight. Continues to have good urine output, 1.7L yesterday.      Review of patient's allergies indicates:  No Known Allergies  Current Facility-Administered Medications   Medication Frequency    0.9%  NaCl infusion (for blood administration) Q24H PRN    0.9%  NaCl infusion (for blood administration) Q24H PRN    acetaminophen tablet 650 mg Q6H PRN    albuterol-ipratropium 2.5 mg-0.5 mg/3 mL nebulizer solution 3 mL Q4H PRN    aluminum-magnesium hydroxide-simethicone 200-200-20 mg/5 mL suspension 30 mL Q6H PRN    benzonatate capsule 100 mg TID PRN    bisacodyL suppository 10 mg Daily PRN    dextromethorphan-guaifenesin  mg per 12 hr tablet 1 tablet BID    dextrose 50% injection 12.5 g PRN    dextrose 50% injection 25 g PRN    docusate sodium capsule 50 mg BID    glucagon (human recombinant) injection 1 mg PRN    glucose chewable tablet 16 g PRN    glucose chewable tablet 24 g PRN    hydrALAZINE tablet 25 mg Q8H PRN    insulin aspart U-100 pen 0-5 Units QID (AC + HS) PRN    levothyroxine tablet 88 mcg Before breakfast    methocarbamoL tablet 500 mg BID    methyl salicylate-menthol 15-10% cream TID    naloxone 0.4 mg/mL injection 0.2 mg PRN    ondansetron injection 4 mg Q12H PRN    oxyCODONE immediate release tablet 5 mg Q6H PRN    pantoprazole EC tablet 40 mg Daily    PAZOPanib 200 mg tab Before breakfast    polyethylene glycol packet 17 g BID PRN    rosuvastatin tablet 40 mg QHS    sodium chloride 0.9% flush 10 mL PRN    sodium chloride 0.9% flush 5 mL PRN    tamsulosin 24 hr capsule 0.8 mg Daily     Facility-Administered Medications Ordered in Other Encounters   Medication Frequency    phenylephrine HCL 2.5%  ophthalmic solution 1 drop On Call Procedure    proparacaine 0.5 % ophthalmic solution 1 drop On Call Procedure    tropicamide 1% ophthalmic solution 1 drop On Call Procedure       Objective:     Vital Signs (Most Recent):  Temp: 98.9 °F (37.2 °C) (09/02/20 1137)  Pulse: 79 (09/02/20 1137)  Resp: 18 (09/02/20 1137)  BP: (!) 150/67 (09/02/20 1137)  SpO2: 96 % (09/02/20 1137)  O2 Device (Oxygen Therapy): room air (09/02/20 1137) Vital Signs (24h Range):  Temp:  [97.7 °F (36.5 °C)-98.9 °F (37.2 °C)] 98.9 °F (37.2 °C)  Pulse:  [63-88] 79  Resp:  [13-20] 18  SpO2:  [92 %-100 %] 96 %  BP: (114-150)/(58-92) 150/67     Weight: 79.4 kg (175 lb 0.7 oz) (08/27/20 1300)  Body mass index is 27.42 kg/m².  Body surface area is 1.94 meters squared.    I/O last 3 completed shifts:  In: 1290 [P.O.:1090; I.V.:200]  Out: 2300 [Urine:2300]    Physical Exam  Vitals signs reviewed.   Constitutional:       General: He is not in acute distress.     Appearance: He is well-developed. He is not diaphoretic.      Comments: Seen sitting upright in chair. Patient is awake, alert, conversational.   HENT:      Head: Normocephalic and atraumatic.   Eyes:      Conjunctiva/sclera: Conjunctivae normal.   Neck:      Musculoskeletal: Normal range of motion.      Trachea: No tracheal deviation.   Cardiovascular:      Rate and Rhythm: Normal rate and regular rhythm.      Heart sounds: Normal heart sounds. No murmur.   Pulmonary:      Effort: Pulmonary effort is normal. No respiratory distress.      Breath sounds: Normal breath sounds. No wheezing or rales.   Abdominal:      General: Bowel sounds are normal. There is no distension.      Palpations: Abdomen is soft.      Tenderness: There is no abdominal tenderness.   Musculoskeletal:         General: No deformity.      Comments: Trace pretibial pitting edema bilaterally.   Skin:     General: Skin is warm and dry.      Findings: No erythema or rash.   Neurological:      Mental Status: He is oriented to  person, place, and time.      Cranial Nerves: No cranial nerve deficit.      Sensory: No sensory deficit.      Motor: No abnormal muscle tone.         Significant Labs:  CBC:   Recent Labs   Lab 09/02/20  0242   WBC 11.50   RBC 2.27*   HGB 7.1*   HCT 24.4*   *   *   MCH 31.3*   MCHC 29.1*     CMP:   Recent Labs   Lab 09/02/20  0249   *   CALCIUM 8.5*   ALBUMIN 2.1*   PROT 5.2*      K 4.1   CO2 25      BUN 93*   CREATININE 2.8*   ALKPHOS 91   ALT 44   AST 52*   BILITOT 0.2     All labs within the past 24 hours have been reviewed.     Significant Imaging:  Labs: Reviewed  X-Ray: Reviewed  Upper GI: Reviewed

## 2020-09-02 NOTE — PROGRESS NOTES
[facility]  Nephrology  Progress Note    Patient Name: Edwin Powell  MRN: [id number]  Admission Date: 8/17/2020  Hospital Length of Stay: 16 days  Attending Provider: Loraine Beard MD   Primary Care Physician: Lulú Lewis MD  Principal Problem:Acute on chronic respiratory failure with hypoxia and hypercapnia    Subjective:     HPI: Mr. Powell is a 76 yo M with RCC with mets to liver/bone (on pazopanib), CKD IIIb with proteinuria 2/2 BPH, HTN, hypothyroidism, h/o CVA, T2DM, GERD, and T2DM who is currently admitted for acute hypoxemic respiratory failure. Nephrology consulted for VERENA on CKD.     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 S/p ACDF of C6/7 on 8/17. He required renae infusion during procedure. Patient developed acute hypoxemic respiratory failure. ABG showed mixed resp/metabolic acidosis. Etiology unclear, suspected PE but was unable to get CTA 2/2 poor renal fxn, negative US BLE, started on heparin gtt. TTE negative for RV strain. Since troponins elevated, loaded with DAPT. He was transferred to ICU and placed on HFNC (up to 30L). Also started on bicarb gtt + narcan. Repeat TTE shows newly depressed EF (EF 30% grade I dd). He was started on IV lasix 120mg with good UOP. Patient was noted to have VERENA on admission with Cr 3.5 (increased from baseline ~2). His Cr improved with diuresis.     In terms of renal function, patient has CKD IIIb with nephrotic range proteinuria. He sees Dr. Leonardo, last visit was earlier this month. Per her notes, CKD etiology suspected to be multifactorial (periodic NSAID use, multiple CTs w/ contast, HTN, DM). At that time, sCr 2 and eGFR 30. Has had significant proteinuria in past (Pr:Cr 7.5g earlier this year, down to 4 earlier this month).    Interval History: Patient had EGD yesterday for episode of hematemesis on 8/31 which showed gastritis and no active bleeding. Has been off home oral  lasix since hematemsis episode. Has not had further bleeding episodes and blood pressures have been good. No acute events overnight. Continues to have good urine output, 1.7L yesterday.      Review of patient's allergies indicates:  No Known Allergies  Current Facility-Administered Medications   Medication Frequency    0.9%  NaCl infusion (for blood administration) Q24H PRN    0.9%  NaCl infusion (for blood administration) Q24H PRN    acetaminophen tablet 650 mg Q6H PRN    albuterol-ipratropium 2.5 mg-0.5 mg/3 mL nebulizer solution 3 mL Q4H PRN    aluminum-magnesium hydroxide-simethicone 200-200-20 mg/5 mL suspension 30 mL Q6H PRN    benzonatate capsule 100 mg TID PRN    bisacodyL suppository 10 mg Daily PRN    dextromethorphan-guaifenesin  mg per 12 hr tablet 1 tablet BID    dextrose 50% injection 12.5 g PRN    dextrose 50% injection 25 g PRN    docusate sodium capsule 50 mg BID    glucagon (human recombinant) injection 1 mg PRN    glucose chewable tablet 16 g PRN    glucose chewable tablet 24 g PRN    hydrALAZINE tablet 25 mg Q8H PRN    insulin aspart U-100 pen 0-5 Units QID (AC + HS) PRN    levothyroxine tablet 88 mcg Before breakfast    methocarbamoL tablet 500 mg BID    methyl salicylate-menthol 15-10% cream TID    naloxone 0.4 mg/mL injection 0.2 mg PRN    ondansetron injection 4 mg Q12H PRN    oxyCODONE immediate release tablet 5 mg Q6H PRN    pantoprazole EC tablet 40 mg Daily    PAZOPanib 200 mg tab Before breakfast    polyethylene glycol packet 17 g BID PRN    rosuvastatin tablet 40 mg QHS    sodium chloride 0.9% flush 10 mL PRN    sodium chloride 0.9% flush 5 mL PRN    tamsulosin 24 hr capsule 0.8 mg Daily     Facility-Administered Medications Ordered in Other Encounters   Medication Frequency    phenylephrine HCL 2.5% ophthalmic solution 1 drop On Call Procedure    proparacaine 0.5 % ophthalmic solution 1 drop On Call Procedure    tropicamide 1% ophthalmic solution 1  drop On Call Procedure       Objective:     Vital Signs (Most Recent):  Temp: 98.9 °F (37.2 °C) (09/02/20 1137)  Pulse: 79 (09/02/20 1137)  Resp: 18 (09/02/20 1137)  BP: (!) 150/67 (09/02/20 1137)  SpO2: 96 % (09/02/20 1137)  O2 Device (Oxygen Therapy): room air (09/02/20 1137) Vital Signs (24h Range):  Temp:  [97.7 °F (36.5 °C)-98.9 °F (37.2 °C)] 98.9 °F (37.2 °C)  Pulse:  [63-88] 79  Resp:  [13-20] 18  SpO2:  [92 %-100 %] 96 %  BP: (114-150)/(58-92) 150/67     Weight: 79.4 kg (175 lb 0.7 oz) (08/27/20 1300)  Body mass index is 27.42 kg/m².  Body surface area is 1.94 meters squared.    I/O last 3 completed shifts:  In: 1290 [P.O.:1090; I.V.:200]  Out: 2300 [Urine:2300]    Physical Exam  Vitals signs reviewed.   Constitutional:       General: He is not in acute distress.     Appearance: He is well-developed. He is not diaphoretic.      Comments: Seen sitting upright in chair. Patient is awake, alert, conversational.   HENT:      Head: Normocephalic and atraumatic.   Eyes:      Conjunctiva/sclera: Conjunctivae normal.   Neck:      Musculoskeletal: Normal range of motion.      Trachea: No tracheal deviation.   Cardiovascular:      Rate and Rhythm: Normal rate and regular rhythm.      Heart sounds: Normal heart sounds. No murmur.   Pulmonary:      Effort: Pulmonary effort is normal. No respiratory distress.      Breath sounds: Normal breath sounds. No wheezing or rales.   Abdominal:      General: Bowel sounds are normal. There is no distension.      Palpations: Abdomen is soft.      Tenderness: There is no abdominal tenderness.   Musculoskeletal:         General: No deformity.      Comments: Trace pretibial pitting edema bilaterally.   Skin:     General: Skin is warm and dry.      Findings: No erythema or rash.   Neurological:      Mental Status: He is oriented to person, place, and time.      Cranial Nerves: No cranial nerve deficit.      Sensory: No sensory deficit.      Motor: No abnormal muscle tone.          Significant Labs:  CBC:   Recent Labs   Lab 09/02/20  0242   WBC 11.50   RBC 2.27*   HGB 7.1*   HCT 24.4*   *   *   MCH 31.3*   MCHC 29.1*     CMP:   Recent Labs   Lab 09/02/20  0249   *   CALCIUM 8.5*   ALBUMIN 2.1*   PROT 5.2*      K 4.1   CO2 25      BUN 93*   CREATININE 2.8*   ALKPHOS 91   ALT 44   AST 52*   BILITOT 0.2     All labs within the past 24 hours have been reviewed.     Significant Imaging:  Labs: Reviewed  X-Ray: Reviewed  Upper GI: Reviewed    Assessment/Plan:     Acute kidney injury superimposed on chronic kidney disease  74 yo M admitted with acute hypoxemic respiratory failure + VERENA on CKD. Baseline sCr 2, sCr 3.5 on admission. Patient has CKD IIIb with nephrotic range proteinuria. He sees Dr. Leonardo, last visit was earlier this month. Per her notes, CKD etiology suspected to be multifactorial (periodic NSAID use, multiple CTs w/ contast, HTN, DM). At that time, sCr 2 and eGFR 30. Has had significant proteinuria in past (Pr:Cr 7.5g earlier this year, down to 4 earlier this month).    Throughout admission, patient's creatinine improved with diuresis and he has had good urine output with this. Cr was down to 2.7 and increased on 8/31 to 3.0, the same day the patient had an episode of hematemesis. PO lasix was held at this time. Patient was never significantly hypotensive however did have a drop in Hgb. EGD on 9/1 did not show active bleeding. Patient has been without hematemesis since the initial episode and Cr has been down-trending since.    Final recommendations:  - Would restart oral lasix   - GDMT for heart failure and HTN management per primary.  - Will need follow up in nephrology clinic after discharge.  - Monitor Ins and Outs and daily weights.   - Avoid nephrotoxic agents such as NSAIDS, Gadolinium and IV Radiocontrast  - Renal Dose meds to current GFR/Creat Clearance.  - Maintain MAP > 65  Thank you for your consult. I will sign off. Please  contact us if you have any additional questions.    Curt Bryan, DO  Nephrology  [facility]    I have personally verified the history and thoroughly reviewed the demographic, clinical, laboratorial and imaging information available in medical records. I agree with the assessment and recommendations provided except as I have documented.     Paz Branch MD  [facility] [facility] [place], Nephorlogy

## 2020-09-03 VITALS
OXYGEN SATURATION: 98 % | HEIGHT: 67 IN | HEART RATE: 70 BPM | SYSTOLIC BLOOD PRESSURE: 159 MMHG | TEMPERATURE: 97 F | RESPIRATION RATE: 18 BRPM | DIASTOLIC BLOOD PRESSURE: 70 MMHG | WEIGHT: 175.06 LBS | BODY MASS INDEX: 27.48 KG/M2

## 2020-09-03 LAB
ALBUMIN SERPL BCP-MCNC: 2 G/DL (ref 3.5–5.2)
ALP SERPL-CCNC: 87 U/L (ref 55–135)
ALT SERPL W/O P-5'-P-CCNC: 40 U/L (ref 10–44)
ANION GAP SERPL CALC-SCNC: 7 MMOL/L (ref 8–16)
AST SERPL-CCNC: 47 U/L (ref 10–40)
BILIRUB SERPL-MCNC: 0.2 MG/DL (ref 0.1–1)
BLD PROD TYP BPU: NORMAL
BLOOD UNIT EXPIRATION DATE: NORMAL
BLOOD UNIT TYPE CODE: 5100
BLOOD UNIT TYPE: NORMAL
BUN SERPL-MCNC: 70 MG/DL (ref 8–23)
CALCIUM SERPL-MCNC: 8.4 MG/DL (ref 8.7–10.5)
CHLORIDE SERPL-SCNC: 108 MMOL/L (ref 95–110)
CO2 SERPL-SCNC: 27 MMOL/L (ref 23–29)
CODING SYSTEM: NORMAL
CREAT SERPL-MCNC: 2.4 MG/DL (ref 0.5–1.4)
DISPENSE STATUS: NORMAL
EST. GFR  (AFRICAN AMERICAN): 29.4 ML/MIN/1.73 M^2
EST. GFR  (NON AFRICAN AMERICAN): 25.4 ML/MIN/1.73 M^2
GLUCOSE SERPL-MCNC: 153 MG/DL (ref 70–110)
MAGNESIUM SERPL-MCNC: 1.9 MG/DL (ref 1.6–2.6)
NUM UNITS TRANS PACKED RBC: NORMAL
PHOSPHATE SERPL-MCNC: 3.8 MG/DL (ref 2.7–4.5)
POCT GLUCOSE: 145 MG/DL (ref 70–110)
POCT GLUCOSE: 173 MG/DL (ref 70–110)
POCT GLUCOSE: 263 MG/DL (ref 70–110)
POTASSIUM SERPL-SCNC: 4 MMOL/L (ref 3.5–5.1)
PROT SERPL-MCNC: 5.2 G/DL (ref 6–8.4)
SODIUM SERPL-SCNC: 142 MMOL/L (ref 136–145)

## 2020-09-03 PROCEDURE — 36415 COLL VENOUS BLD VENIPUNCTURE: CPT | Mod: HCNC

## 2020-09-03 PROCEDURE — 84100 ASSAY OF PHOSPHORUS: CPT | Mod: HCNC

## 2020-09-03 PROCEDURE — 83735 ASSAY OF MAGNESIUM: CPT | Mod: HCNC

## 2020-09-03 PROCEDURE — 99232 SBSQ HOSP IP/OBS MODERATE 35: CPT | Mod: HCNC,,, | Performed by: HOSPITALIST

## 2020-09-03 PROCEDURE — 25000003 PHARM REV CODE 250: Mod: HCNC | Performed by: STUDENT IN AN ORGANIZED HEALTH CARE EDUCATION/TRAINING PROGRAM

## 2020-09-03 PROCEDURE — 25000003 PHARM REV CODE 250: Mod: HCNC | Performed by: INTERNAL MEDICINE

## 2020-09-03 PROCEDURE — 80053 COMPREHEN METABOLIC PANEL: CPT | Mod: HCNC

## 2020-09-03 PROCEDURE — 25000003 PHARM REV CODE 250: Mod: HCNC | Performed by: HOSPITALIST

## 2020-09-03 PROCEDURE — 99232 PR SUBSEQUENT HOSPITAL CARE,LEVL II: ICD-10-PCS | Mod: HCNC,,, | Performed by: HOSPITALIST

## 2020-09-03 RX ADMIN — METHOCARBAMOL TABLETS 500 MG: 500 TABLET, COATED ORAL at 08:09

## 2020-09-03 RX ADMIN — PANTOPRAZOLE SODIUM 40 MG: 40 TABLET, DELAYED RELEASE ORAL at 08:09

## 2020-09-03 RX ADMIN — TAMSULOSIN HYDROCHLORIDE 0.8 MG: 0.4 CAPSULE ORAL at 08:09

## 2020-09-03 RX ADMIN — DOCUSATE SODIUM 50 MG: 50 CAPSULE, LIQUID FILLED ORAL at 08:09

## 2020-09-03 RX ADMIN — INSULIN ASPART 3 UNITS: 100 INJECTION, SOLUTION INTRAVENOUS; SUBCUTANEOUS at 12:09

## 2020-09-03 RX ADMIN — LEVOTHYROXINE SODIUM 88 MCG: 0.09 TABLET ORAL at 06:09

## 2020-09-03 RX ADMIN — GUAIFENESIN AND DEXTROMETHORPHAN HYDROBROMIDE 1 TABLET: 600; 30 TABLET, EXTENDED RELEASE ORAL at 08:09

## 2020-09-03 NOTE — PLAN OF CARE
The patient is lying in bed, soundly asleep at this time. No s/s of acute distress. Patient is independent of ADL's and able to make needs known. No request or c/o at this time.bed locked and in low position, call light within reach.

## 2020-09-03 NOTE — DISCHARGE SUMMARY
[facility] Hospital Medicine  Discharge Summary      Patient Name: Edwin Powell  MRN: [id number]  Admission Date: 8/17/2020  Hospital Length of Stay: 17 days  Discharge Date and Time: 9/3/2020  1:00 PM  Attending Physician: Mandeep Raza MD   Discharging Provider: Mandeep Raza MD  Primary Care Provider: Lulú Lewis MD  [facility] Medicine Team: [facility] HOSP MED D Mandeep Raza MD    HPI:   Edwin Powell is a 75 year old white man with hypertension, coronary artery disease status post coronary artery stent placement x 5 in 2010, 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 He lives in [place], [place]. His primary care physician is Dr. Lulú Lewis. His nephrologist is Dr. Venkat Leonardo. His oncologist is Dr. Rahel Reed.              He underwent elective anterior C6-C7 cervical disc fusion at [facility] on 8/17/2020 by Orthopedic Surgery and Otolaryngology. During surgery, he received rocuronium, ketamine, fentanyl, propofol, and midazolam. He required phenylephrine infusion. Postoperatively, he was placed on his home 2 liters/minute of supplemental oxygen. He was given multiple doses of oxycodone for postoperative neck pain. Overnight, he had multiple episodes of apnea, requiring 3 doses of naloxone with minimal improvement. ABG showed combined metabolic and respiratory acidosis. There was no significant improvement with BiPAP. Hypoxia worsened and he required 15 liters/minute of oxygen. He was transferred to Critical Care Medicine during the night.    Procedure(s) (LRB):  EGD  (ESOPHAGOGASTRODUODENOSCOPY) (N/A)      Hospital Course:   He was put on naloxone and bicarbonate infusions. Hypoxia worsened further and he required 30 liters/minute of comfort flow oxygen. Mentation improved and naloxone infusion was discontinued the next morning. Pulmonary embolism was suspected but CTA was not done due to renal insufficiency. Lower extremity ultrasound showed no deep venous thrombosis, but he was given empiric anticoagulation with heparin infusion. Transthoracic echocardiogram showed no right ventricular strain but showed focal wall motion abnormalities. Troponin was elevated and trended up, so he was given dual antiplatelet therapy for non-ST elevated myocardial infarction. Central venous pressure was 3 but chest X-ray showed pulmonary edema. He was given diuretics. On 8/20/2020, he was weaned back to nasal cannula at 2 liters/minute. He was transferred to Hospital Medicine Team D on 8/21/2020. Nuclear medicine perfusion lung scan on 8/30/2020 was low probability for pulmonary embolism. He coughed up bright red blood with clots. He was transfused 1 unit of packed RBCs. Esophagogastroduodenoscopy on 9/1/2020 showed a small hiatal hernia and mild gastritis. He was put on pantoprazole. Physical and occupational therapy recommended home health.     Consults:   Consults (From admission, onward)        Status Ordering Provider     Inpatient consult to Cardiology  Once     Provider:  (Not yet assigned)    Completed SETH MERCADO     Inpatient consult to Critical Care Medicine  Once     Provider:  (Not yet assigned)    Completed LEIDA NULL     Inpatient consult to Critical Care Medicine  Once     Provider:  (Not yet assigned)    Completed CRISTY JOY     Inpatient consult to Gastroenterology  Once     Provider:  (Not yet assigned)    Completed MARLENE SUAREZ     Inpatient consult to [facility] Medicine[facility] Comanagement Only  Once     Provider:  (Not yet assigned)    Completed  LEIDA NULL     Inpatient consult to Nephrology  Once     Provider:  (Not yet assigned)    Completed RIVER MARIA     Inpatient consult to Physical Medicine Rehab  Once     Provider:  (Not yet assigned)    Completed MILDRED ETIENNE     Inpatient virtual consult to Hospital Medicine  Once     Provider:  (Not yet assigned)    Completed NERI FARRIS        Final Active Diagnoses:    Diagnosis Date Noted POA    Chronic respiratory failure with hypoxia, on home oxygen therapy [J96.11, Z99.81]  Not Applicable     Chronic    Azotemia [R79.89]  Yes    Acute superficial gastritis with hemorrhage [K29.01] 08/31/2020 Yes    S/P cervical spinal fusion [Z98.1]  Not Applicable     Chronic    Impaired functional mobility and endurance [Z74.09] 08/19/2020 Yes    NSTEMI (non-ST elevated myocardial infarction) [I21.4] 08/19/2020 Yes    Elevated troponin [R79.89] 08/19/2020 Yes    Dysphagia [R13.10] 08/19/2020 No    Stented coronary artery [Z95.5]  Not Applicable     Chronic    Metastatic renal cell carcinoma [C64.9]  Yes     Chronic    Cervical stenosis of spine [M48.02] 07/14/2020 Yes     Chronic    Constipation due to opioid therapy [K59.03, T40.2X5A] 07/14/2020 Yes     Chronic    Type 2 diabetes mellitus with hyperglycemia, with long-term current use of insulin [E11.65, Z79.4] 11/13/2019 Not Applicable     Chronic    CRI (chronic renal insufficiency), stage 3 (moderate) [N18.3] 07/22/2019 Yes     Chronic    Neuropathy due to secondary diabetes [E13.40] 09/06/2017 Yes     Chronic    Uncomplicated opioid dependence [F11.20] 06/28/2017 Yes     Chronic    Benign prostatic hyperplasia (BPH) with urinary urge incontinence [N40.1, N39.41] 06/06/2017 Yes     Chronic    Acquired hypothyroidism [E03.9] 05/26/2017 Yes     Chronic    Coronary artery disease involving native coronary artery of native heart without angina pectoris [I25.10] 05/26/2017 Yes     Chronic      Problems Resolved During this  Admission:    Diagnosis Date Noted Date Resolved POA    PRINCIPAL PROBLEM:  Acute on chronic respiratory failure with hypoxia and hypercapnia [J96.21, J96.22] 08/18/2020 09/02/2020 Yes    Hematemesis with nausea [K92.0]  09/02/2020 No    Acute respiratory failure with hypoxia [J96.01]  09/02/2020 Yes    Acute kidney injury superimposed on chronic kidney disease [N17.9, N18.9] 08/18/2020 09/02/2020 Yes    Acute metabolic encephalopathy [G93.41] 08/18/2020 09/02/2020 No    Metabolic acidosis, normal anion gap (NAG) [E87.2] 08/18/2020 09/02/2020 Yes    Type 2 diabetes mellitus with stage 3 chronic kidney disease, without long-term current use of insulin [E11.22, N18.3] 05/26/2017 09/02/2020 Yes     Chronic       Discharged Condition: good    Disposition: Home-Health Care Svc    Follow Up:  Follow-up Information     Filemon Aguayo MD In 1 week.    Specialties: Orthopedic Surgery, Spine Surgery  Contact information:  [address] [place] [id number]  [phone]                 Patient Instructions:      Diet Adult Regular     Notify your health care provider if you experience any of the following:  redness, tenderness, or signs of infection (pain, swelling, redness, odor or green/yellow discharge around incision site)     Notify your health care provider if you experience any of the following:  persistent dizziness, light-headedness, or visual disturbances     Notify your health care provider if you experience any of the following:  increased confusion or weakness     Activity as tolerated       Significant Diagnostic Studies:   X-Ray Chest AP Portable 8/18/20: FINDINGS:  Surgical drain overlies the right neck.  Fixation hardware overlies the lower cervical spine.   Underinflated lungs with hypoventilatory changes.  Subsegmental atelectatic changes at the lung bases.  No pleural effusion or pneumothorax.  Cardiomediastinal silhouette is unremarkable.  Impression:  Surgical drain overlies the right  neck. Fixation hardware overlies the lower cervical spine.  Underinflated lungs with hypoventilatory changes. Subsegmental atelectatic changes at the lung bases.  Transthoracic echo complete 8/18/20:   · Technically challenging portable study with poor endocardial visualization.  · Low normal left ventricular systolic function. The estimated ejection fraction is 50-55%.  · Local segmental wall motion abnormalities.  · Grade I (mild) left ventricular diastolic dysfunction consistent with impaired relaxation.  · Concentric left ventricular remodeling.  · Normal right ventricular systolic function.  · The estimated PA systolic pressure is 26 mmHg.  · Normal central venous pressure (3 mmHg).  US Retroperitoneal Complete 8/18/20: FINDINGS:  Right kidney: The right kidney measures 11.2 cm. No cortical thinning. No loss of corticomedullary distinction. Resistive index measures 0.79.  No mass. No renal stone. No hydronephrosis.  Left kidney: The left kidney measures 10.6 cm. No cortical thinning. No loss of corticomedullary distinction. Resistive index measures 0.72.  Stable appearance of a solid inferior pole hypoechoic renal mass measuring 2.7 x 2.4 x 2.6 cm (previously 2.9 x 2.2 x 2.9 cm).  This is compatible with patient's history of known renal cell carcinoma.  No renal stone. No hydronephrosis.  The bladder is partially distended at the time of scanning and has an unremarkable appearance.  Splenic RI measures 0.1.  Impression:  Hypoechoic solid mass along the inferior pole of the left kidney compatible with known renal cell carcinoma, not significantly changed..  No evidence of hydronephrosis.  US Lower Extremity Veins Bilateral 8/18/20: FINDINGS:  Right thigh veins: The common femoral, femoral, popliteal, upper greater saphenous, and upper deep femoral veins are patent and free of thrombus. The veins are normally compressible and have normal phasic flow and augmentation response.  Right calf veins: The visualized  calf veins are patent.  Left thigh veins: The common femoral, femoral, popliteal, upper greater saphenous, and upper deep femoral veins are patent and free of thrombus. The veins are normally compressible and have normal phasic flow and augmentation response.  Left calf veins: The visualized calf veins are patent.  Miscellaneous: None  Impression:  No evidence of deep venous thrombosis in either lower extremity.  Transthoracic echo complete with contrast 8/20/20:   · Moderately decreased left ventricular systolic function. The estimated ejection fraction is 30%. The following segments are severely hypokinetic: mid inferoseptal, apical septal and apex. rest of the walls are hypokinetic. Interval change in LVEF compared to the prior study.  · Grade I (mild) left ventricular diastolic dysfunction consistent with impaired relaxation.  · Normal right ventricular systolic function.  · Normal central venous pressure (3 mmHg).  · Mild left atrial enlargement.  X-Ray Chest PA And Lateral 8/30/20: FINDINGS: Cardiac silhouette and pulmonary vascularity are stable and within normal range.  There is aortic atherosclerosis and tortuosity of the thoracic aorta.  Lungs show no evidence of significant focal consolidation, large effusion or pneumothorax.  Bones show mild degenerative changes and postop changes of the cervical spine.   NM Lung Scan Perfusion Particulate 8/30/20: FINDINGS:   Perfusion imaging demonstrates mildly inhomogeneous distribution of tracer activity throughout both lungs with no lobar or segmental defects identified.   Impression: Mildly inhomogeneous distribution of tracer activity throughout both lungs with no lobar or segmental defects identified.  Given chest x-ray findings, perfusion only scan is thought to be low probability for pulmonary emboli.   Upper GI endoscopy 9/01/20: Findings:        The examined esophagus was normal.        A small hiatal hernia was present.        Striped mildly erythematous  mucosa without bleeding was found in the gastric body.        The examined duodenum was normal.   Impression:           - Small hiatal hernia.                         - Erythematous gastropathy.   Recommendation:       - Return patient to hospital biswas for ongoing care.                         - Advance diet as tolerated.                         - Observe patient's clinical course.     Medications:  Reconciled Home Medications:      Medication List      START taking these medications    carvediloL 6.25 MG tablet  Commonly known as: COREG  Take 1 tablet (6.25 mg total) by mouth 2 (two) times daily.     clopidogreL 75 mg tablet  Commonly known as: PLAVIX  Take 1 tablet (75 mg total) by mouth once daily.     dextromethorphan-guaifenesin  mg  mg per 12 hr tablet  Commonly known as: MUCINEX DM  Take 1 tablet by mouth 2 (two) times daily as needed (cough).     docusate sodium 50 MG capsule  Commonly known as: COLACE  Take 1 capsule (50 mg total) by mouth 2 (two) times daily.     lidocaine 5 %  Commonly known as: LIDODERM  Place 2 patches onto the skin once daily. Remove & Discard patch within 12 hours or as directed by MD     methocarbamoL 500 MG Tab  Commonly known as: ROBAXIN  Take 1 tablet (500 mg total) by mouth 2 (two) times a day. for 10 days     methyl salicylate-menthol 15-10% 15-10 % Crea  Apply topically 3 (three) times daily. To neckk     oxyCODONE 5 MG immediate release tablet  Commonly known as: ROXICODONE  Take 1 tablet (5 mg total) by mouth every 6 (six) hours as needed for Pain.     pantoprazole 40 MG tablet  Commonly known as: PROTONIX  Take 1 tablet (40 mg total) by mouth once daily.     polyethylene glycol 17 gram Pwpk  Commonly known as: GLYCOLAX  Take 17 g by mouth 2 (two) times daily as needed (constipation).        CHANGE how you take these medications    amLODIPine 10 MG tablet  Commonly known as: NORVASC  Take 1 tablet (10 mg total) by mouth once daily.  What changed: additional  instructions     azelastine 137 mcg (0.1 %) nasal spray  Commonly known as: ASTELIN  1 spray (137 mcg total) by Nasal route 2 (two) times daily as needed for Rhinitis (congestion).  What changed:   · when to take this  · reasons to take this     b complex vitamins tablet  Commonly known as: B COMPLEX 1  Take 1 tablet by mouth once daily.  What changed: additional instructions     cholecalciferol (vitamin D3) 125 mcg (5,000 unit) Tab  Commonly known as: VITAMIN D3  Take 1 tablet (5,000 Units total) by mouth once daily.  What changed: additional instructions     fluticasone propionate 50 mcg/actuation nasal spray  Commonly known as: FLONASE  1 spray (50 mcg total) by Each Nostril route daily as needed for Rhinitis or Allergies.  What changed:   · when to take this  · reasons to take this     furosemide 40 MG tablet  Commonly known as: LASIX  Take 1 tablet (40 mg total) by mouth 2 (two) times daily.  What changed:   · medication strength  · how much to take  · when to take this     LANTUS SOLOSTAR U-100 INSULIN glargine 100 units/mL (3mL) SubQ pen  Generic drug: insulin  Inject 5 Units into the skin every evening.  What changed: additional instructions     levocetirizine 5 MG tablet  Commonly known as: XYZAL  Take 1 tablet (5 mg total) by mouth every evening. Can take up to 2 tablets  What changed: additional instructions     levothyroxine 88 MCG tablet  Commonly known as: SYNTHROID  Take 1 tablet (88 mcg total) by mouth once daily.  What changed: additional instructions     ondansetron 8 MG tablet  Commonly known as: ZOFRAN  Take 1 tablet (8 mg total) by mouth 3 (three) times daily as needed.  What changed: additional instructions     rosuvastatin 40 MG Tab  Commonly known as: CRESTOR  Take 1 tablet (40 mg total) by mouth every evening.  What changed:   · when to take this  · additional instructions     tamsulosin 0.4 mg Cap  Commonly known as: FLOMAX  Take 2 capsules (0.8 mg total) by mouth every evening.  What  "changed:   · when to take this  · additional instructions     VOTRIENT 200 mg Tab  Generic drug: PAZOpanib  Take 4 tablets (800 mg total) by mouth once daily.  What changed: additional instructions        CONTINUE taking these medications    ACCU-CHEK NATA PLUS METER [facility]  Generic drug: blood-glucose meter  use as instructed     ACCU-CHEK NATA PLUS TEST STRP Str[facility]  Generic drug: blood sugar diagnostic  1 strip by Misc.(Non-Drug; Combo Route) route 2 (two) times daily as needed.     ACCU-CHEK SOFTCLIX LANCETS Misc  Generic drug: lancets  1 lancet by Misc.(Non-Drug; Combo Route) route 2 (two) times daily as needed.     aspirin 81 MG EC tablet  Commonly known as: ECOTRIN  Take 81 mg by mouth once daily. Will ask MD to hold 1 week     BD ULTRA-FINE ORIG PEN NEEDLE 29 gauge x 1/2" Ndle  Generic drug: pen needle, diabetic  1 application by Misc.(Non-Drug; Combo Route) route once daily.     montelukast 10 mg tablet  Commonly known as: SINGULAIR  Take 10 mg by mouth every evening.     multivitamin per tablet  Commonly known as: THERAGRAN  Take 1 tablet by mouth once daily.     VITAMIN C ORAL  Take by mouth once daily. Hold for 1 week prior to surgery     VITAMIN E ORAL  Take by mouth once daily. Hold for 1 week prior to surgery        STOP taking these medications    BENADRYL ALLERGY ORAL     clindamycin 1 % external solution  Commonly known as: CLEOCIN T     HYDROcodone-acetaminophen 5-325 mg per tablet  Commonly known as: NORCO     lisinopriL 40 MG tablet  Commonly known as: PRINIVIL,ZESTRIL     morphine 15 MG 12 hr tablet  Commonly known as: MS CONTIN            Indwelling Lines/Drains at time of discharge: None    Time spent on the discharge of patient: 35 minutes  Patient was seen and examined on the date of discharge and determined to be suitable for discharge.         Mandeep Raza MD  Department of Hospital Medicine  [facility]  "

## 2020-09-03 NOTE — PLAN OF CARE
Future Appointments   Date Time Provider Department Center   9/4/2020  1:30 PM Rahel Reed MD [facility] HEMONC3 [facility]   9/18/2020  1:00 PM SAME DAY LABST [facility] LAB [facility]   9/18/2020  1:45 PM Lary Vickers DPM [facility] POD [place]   9/30/2020 11:00 AM Lulú Lewis MD [facility] MED CLN [address] PCW           09/03/20 1414   Final Note   Assessment Type Final Discharge Note   Anticipated Discharge Disposition Home-Health   What phone number can be called within the next 1-3 days to see how you are doing after discharge? [phone]   Hospital Follow Up  Appt(s) scheduled? Yes   Right Care Referral Info   Post Acute Recommendation Home-care   Facility Name [facility]   Post-Acute Status   Post-Acute Authorization Home Health   Home Health Status Set-up Complete  ([facility])   Discharge Delays None known at this time

## 2020-09-03 NOTE — PLAN OF CARE
09/03/20 1419   Post-Acute Status   Post-Acute Authorization Home Health   Home Health Status Set-up Complete   Discharge Plan   Discharge Plan A Home Health   Pt accepted by [facility].

## 2020-09-03 NOTE — PLAN OF CARE
09/03/20 0932   Post-Acute Status   Post-Acute Authorization Home Health   Home Health Status Referrals Sent   Discharge Delays None known at this time   Discharge Plan   Discharge Plan A Home Health   Discharge Plan B Home with family

## 2020-09-03 NOTE — HOSPITAL COURSE
He was put on naloxone and bicarbonate infusions. Hypoxia worsened further and he required 30 liters/minute of comfort flow oxygen. Mentation improved and naloxone infusion was discontinued the next morning. Pulmonary embolism was suspected but CTA was not done due to renal insufficiency. Lower extremity ultrasound showed no deep venous thrombosis, but he was given empiric anticoagulation with heparin infusion. Transthoracic echocardiogram showed no right ventricular strain but showed focal wall motion abnormalities. Troponin was elevated and trended up, so he was given dual antiplatelet therapy for non-ST elevated myocardial infarction. Central venous pressure was 3 but chest X-ray showed pulmonary edema. He was given diuretics. On 8/20/2020, he was weaned back to nasal cannula at 2 liters/minute. He was transferred to Hospital Medicine Team D on 8/21/2020. Nuclear medicine perfusion lung scan on 8/30/2020 was low probability for pulmonary embolism. He coughed up bright red blood with clots. He was transfused 1 unit of packed RBCs. Esophagogastroduodenoscopy on 9/1/2020 showed a small hiatal hernia and mild gastritis. He was put on pantoprazole. Physical and occupational therapy recommended home health.

## 2020-09-03 NOTE — NURSING
Patient being discharge. Removed IV Access and Telemetry monitor. Discharge instructions given and medications from  Pharmacy delivered. Son at bedside

## 2020-09-03 NOTE — HPI
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 He lives in [place], [place]. His primary care physician is Dr. uLlú Lewis. His nephrologist is Dr. Venkat Leonardo. His oncologist is Dr. Rahel Reed.              He underwent elective anterior C6-C7 cervical disc fusion at [facility] on 8/17/2020 by Orthopedic Surgery and Otolaryngology. During surgery, he received rocuronium, ketamine, fentanyl, propofol, and midazolam. He required phenylephrine infusion. Postoperatively, he was placed on his home 2 liters/minute of supplemental oxygen. He was given multiple doses of oxycodone for postoperative neck pain. Overnight, he had multiple episodes of apnea, requiring 3 doses of naloxone with minimal improvement. ABG showed combined metabolic and respiratory acidosis. There was no significant improvement with BiPAP. Hypoxia worsened and he required 15 liters/minute of oxygen. He was transferred to Critical Care Medicine during the night.

## 2020-09-03 NOTE — PLAN OF CARE
"[facility]    HOME HEALTH ORDERS  FACE TO FACE ENCOUNTER    Patient Name: Edwin Powell  YOB: 1945    PCP: Lulú Lewis MD   PCP Address: [address] / [place] [address]  PCP Phone Number: [phone]  PCP Fax: [phone]    Encounter Date: 09/03/2020    Admit to Home Health    Diagnoses:  Active Hospital Problems    Diagnosis  POA    Chronic respiratory failure with hypoxia, on home oxygen therapy [J96.11, Z99.81]  Not Applicable     Chronic    Azotemia [R79.89]  Yes    Acute superficial gastritis with hemorrhage [K29.01]  Yes    S/P cervical spinal fusion [Z98.1]  Not Applicable     Chronic    Impaired functional mobility and endurance [Z74.09]  Yes    NSTEMI (non-ST elevated myocardial infarction) [I21.4]  Yes    Elevated troponin [R79.89]  Yes    Dysphagia [R13.10]  No    Stented coronary artery [Z95.5]  Not Applicable     Chronic    Metastatic renal cell carcinoma [C64.9]  Yes     Chronic    Cervical stenosis of spine [M48.02]  Yes     Chronic     Seen on MRI 5/9/20: " Multilevel cervical spondylosis most prominent at C6-C7 resulting in severe spinal canal stenosis and severe bilateral neural foraminal narrowing."      Constipation due to opioid therapy [K59.03, T40.2X5A]  Yes     Chronic     On chronic opiates, complication of constipation      Type 2 diabetes mellitus with hyperglycemia, with long-term current use of insulin [E11.65, Z79.4]  Not Applicable     Chronic     Hyperglycemia, was off of insulin during chemo, now taking 5U lantus      CRI (chronic renal insufficiency), stage 3 (moderate) [N18.3]  Yes     Chronic    Neuropathy due to secondary diabetes [E13.40]  Yes     Chronic     LE numbness and tingling, elevated A1c, followed by podiatry      Uncomplicated opioid dependence [F11.20]  Yes     Chronic     Taking scheduled narcotics for cancer-related pain. Overdose in 2017      Benign prostatic hyperplasia (BPH) with " urinary urge incontinence [N40.1, N39.41]  Yes     Chronic     Patient with LUTS, compliant with flomax      Acquired hypothyroidism [E03.9]  Yes     Chronic     Elevated TSH in 6/2017, 8/2017, 11/2017, 3/2018      Coronary artery disease involving native coronary artery of native heart without angina pectoris [I25.10]  Yes     Chronic     [facility] S/p 5 stents 2010. Anginal equivalent controlled with CCB, ACE, APT        Resolved Hospital Problems    Diagnosis Date Resolved POA    *Acute on chronic respiratory failure with hypoxia and hypercapnia [J96.21, J96.22] 09/02/2020 Yes    Hematemesis with nausea [K92.0] 09/02/2020 No    Acute respiratory failure with hypoxia [J96.01] 09/02/2020 Yes    Acute kidney injury superimposed on chronic kidney disease [N17.9, N18.9] 09/02/2020 Yes    Acute metabolic encephalopathy [G93.41] 09/02/2020 No    Metabolic acidosis, normal anion gap (NAG) [E87.2] 09/02/2020 Yes    Type 2 diabetes mellitus with stage 3 chronic kidney disease, without long-term current use of insulin [E11.22, N18.3] 09/02/2020 Yes     Chronic     A1c 11.1 in 5/2017. Weaning off Lantus during chemo, but needed to restart it with weight gain          Future Appointments   Date Time Provider Department Center   9/4/2020  1:30 PM Rahel Reed MD [facility] HEMONC3 [facility]   9/18/2020  1:00 PM SAME DAY LAB, [facility] LAB [facility]   9/18/2020  1:45 PM Lary Vickers DPM [facility] POD [place]   9/30/2020 11:00 AM Lulú Lewis MD [facility] MED CLN Arsenio Wallace PCW     Follow-up Information     Filemon Aguayo MD In 1 week.    Specialties: Orthopedic Surgery, Spine Surgery  Contact information:  [address]  [facility] [address] [phone]                     I have seen and examined this patient face to face today. My clinical findings that support the need for the home health skilled services and home bound status are the following:  Weakness/numbness causing balance and gait disturbance due to  Malignancy/Cancer and Surgery making it taxing to leave home.    Allergies:Review of patient's allergies indicates:  No Known Allergies    Diet: regular diet    Activities: activity as tolerated    Nursing:   SN to complete comprehensive assessment including routine vital signs. Instruct on disease process and s/s of complications to report to MD. Review/verify medication list sent home with the patient at time of discharge  and instruct patient/caregiver as needed. Frequency may be adjusted depending on start of care date.    Notify MD if SBP > 160 or < 90; DBP > 90 or < 50; HR > 120 or < 50; Temp > 101      CONSULTS:    Physical Therapy to evaluate and treat. Evaluate for home safety and equipment needs; Establish/upgrade home exercise program. Perform / instruct on therapeutic exercises, gait training, transfer training, and Range of Motion.  Occupational Therapy to evaluate and treat. Evaluate home environment for safety and equipment needs. Perform/Instruct on transfers, ADL training, ROM, and therapeutic exercises.    MISCELLANEOUS CARE:  N/A    WOUND CARE ORDERS  n/a      Medications: Review discharge medications with patient and family and provide education.      Current Discharge Medication List      START taking these medications    Details   carvediloL (COREG) 6.25 MG tablet Take 1 tablet (6.25 mg total) by mouth 2 (two) times daily.  Qty: 60 tablet, Refills: 11    Comments: .      clopidogreL (PLAVIX) 75 mg tablet Take 1 tablet (75 mg total) by mouth once daily.  Qty: 30 tablet, Refills: 11      dextromethorphan-guaifenesin  mg (MUCINEX DM)  mg per 12 hr tablet Take 1 tablet by mouth 2 (two) times daily as needed (cough).  Qty: 20 tablet, Refills: 0      docusate sodium (COLACE) 50 MG capsule Take 1 capsule (50 mg total) by mouth 2 (two) times daily.  Qty: 60 capsule, Refills: 0      lidocaine (LIDODERM) 5 % Place 2 patches onto the skin once daily. Remove & Discard patch within 12 hours or  as directed by MD  Qty: 30 patch, Refills: 1      methocarbamoL (ROBAXIN) 500 MG Tab Take 1 tablet (500 mg total) by mouth 2 (two) times a day. for 10 days  Qty: 20 tablet, Refills: 0    Associated Diagnoses: S/P cervical spinal fusion      methyl salicylate-menthol 15-10% 15-10 % Crea Apply topically 3 (three) times daily. To neckk  Refills: 0    Associated Diagnoses: S/P cervical spinal fusion      oxyCODONE (ROXICODONE) 5 MG immediate release tablet Take 1 tablet (5 mg total) by mouth every 6 (six) hours as needed for Pain.  Qty: 25 tablet, Refills: 0    Comments: Quantity prescribed more than 7 day supply? No      pantoprazole (PROTONIX) 40 MG tablet Take 1 tablet (40 mg total) by mouth once daily.  Qty: 40 tablet, Refills: 0    Associated Diagnoses: Acute superficial gastritis with hemorrhage      polyethylene glycol (GLYCOLAX) 17 gram PwPk Take 17 g by mouth 2 (two) times daily as needed (constipation).  Qty:  , Refills: 0         CONTINUE these medications which have CHANGED    Details   azelastine (ASTELIN) 137 mcg (0.1 %) nasal spray 1 spray (137 mcg total) by Nasal route 2 (two) times daily as needed for Rhinitis (congestion).  Qty: 30 mL, Refills: 3    Associated Diagnoses: Allergic state, initial encounter      fluticasone propionate (FLONASE) 50 mcg/actuation nasal spray 1 spray (50 mcg total) by Each Nostril route daily as needed for Rhinitis or Allergies.    Associated Diagnoses: Nasal congestion      furosemide (LASIX) 40 MG tablet Take 1 tablet (40 mg total) by mouth 2 (two) times daily.  Qty: 60 tablet, Refills: 11         CONTINUE these medications which have NOT CHANGED    Details   amLODIPine (NORVASC) 10 MG tablet Take 1 tablet (10 mg total) by mouth once daily.  Qty: 1 tablet, Refills: 0    Comments: .  Associated Diagnoses: Essential hypertension      ascorbic acid (VITAMIN C ORAL) Take by mouth once daily. Hold for 1 week prior to surgery      b complex vitamins (B COMPLEX 1) tablet Take 1  tablet by mouth once daily.  Qty: 90 tablet, Refills: 3    Associated Diagnoses: Macrocytic anemia; B12 deficiency      cholecalciferol, vitamin D3, (VITAMIN D3) 125 mcg (5,000 unit) Tab Take 1 tablet (5,000 Units total) by mouth once daily.  Qty: 90 tablet, Refills: 3    Associated Diagnoses: Vitamin D deficiency      montelukast (SINGULAIR) 10 mg tablet Take 10 mg by mouth every evening.      multivitamin (THERAGRAN) per tablet Take 1 tablet by mouth once daily.    Associated Diagnoses: Macrocytic anemia; B12 deficiency      PAZOpanib (VOTRIENT) 200 mg Tab Take 4 tablets (800 mg total) by mouth once daily.  Qty: 120 tablet, Refills: 5    Associated Diagnoses: Renal cell carcinoma of left kidney; Metastatic renal cell carcinoma to bone; Metastatic renal cell carcinoma to liver      rosuvastatin (CRESTOR) 40 MG Tab Take 1 tablet (40 mg total) by mouth every evening.  Qty: 90 tablet, Refills: 3    Associated Diagnoses: Hyperlipidemia due to type 2 diabetes mellitus      tamsulosin (FLOMAX) 0.4 mg Cap Take 2 capsules (0.8 mg total) by mouth every evening.  Qty: 180 capsule, Refills: 3    Associated Diagnoses: Benign prostatic hyperplasia (BPH) with urinary urge incontinence      vitamin E acetate (VITAMIN E ORAL) Take by mouth once daily. Hold for 1 week prior to surgery      aspirin (ECOTRIN) 81 MG EC tablet Take 81 mg by mouth once daily. Will ask MD to hold 1 week      blood sugar diagnostic Strp 1 strip by Misc.(Non-Drug; Combo Route) route 2 (two) times daily as needed.  Qty: 200 strip, Refills: 3    Associated Diagnoses: Type 2 diabetes mellitus with stage 3 chronic kidney disease, with long-term current use of insulin; Type 2 diabetes mellitus with hyperglycemia, with long-term current use of insulin      blood-glucose meter Misc use as instructed  Qty: 1 each, Refills: 0    Comments: NATA Accuchek or any glucometer covered by insurance  Associated Diagnoses: Type 2 diabetes mellitus with stage 3 chronic  "kidney disease, with long-term current use of insulin; Type 2 diabetes mellitus with hyperglycemia, with long-term current use of insulin      insulin (LANTUS SOLOSTAR U-100 INSULIN) glargine 100 units/mL (3mL) SubQ pen Inject 5 Units into the skin every evening.  Qty: 5 mL, Refills: 11    Associated Diagnoses: Type 2 diabetes mellitus with hyperglycemia, with long-term current use of insulin      lancets (ONETOUCH ULTRASOFT LANCETS) Misc 1 lancet by Misc.(Non-Drug; Combo Route) route 2 (two) times daily as needed.  Qty: 200 each, Refills: 3    Associated Diagnoses: Type 2 diabetes mellitus with stage 3 chronic kidney disease, with long-term current use of insulin; Type 2 diabetes mellitus with hyperglycemia, with long-term current use of insulin      levocetirizine (XYZAL) 5 MG tablet Take 1 tablet (5 mg total) by mouth every evening. Can take up to 2 tablets  Qty: 30 tablet, Refills: 11    Associated Diagnoses: Allergic state, initial encounter      levothyroxine (SYNTHROID) 88 MCG tablet Take 1 tablet (88 mcg total) by mouth once daily.  Qty: 90 tablet, Refills: 3    Associated Diagnoses: Acquired hypothyroidism      ondansetron (ZOFRAN) 8 MG tablet Take 1 tablet (8 mg total) by mouth 3 (three) times daily as needed.  Qty: 90 tablet, Refills: 3    Associated Diagnoses: Chemotherapy-induced nausea and vomiting      pen needle, diabetic 29 gauge x 1/2" Ndle 1 application by Misc.(Non-Drug; Combo Route) route once daily.  Qty: 100 each, Refills: 3    Associated Diagnoses: Type 2 diabetes mellitus with stage 3 chronic kidney disease, with long-term current use of insulin; Type 2 diabetes mellitus with hyperglycemia, with long-term current use of insulin         STOP taking these medications       lisinopriL (PRINIVIL,ZESTRIL) 40 MG tablet Comments:   Reason for Stopping:         clindamycin (CLEOCIN T) 1 % external solution Comments:   Reason for Stopping:         diphenhydramine HCl (BENADRYL ALLERGY ORAL) Comments: "   Reason for Stopping:         HYDROcodone-acetaminophen (NORCO) 5-325 mg per tablet Comments:   Reason for Stopping:         morphine (MS CONTIN) 15 MG 12 hr tablet Comments:   Reason for Stopping:               I certify that this patient is confined to his home and needs physical therapy and occupational therapy.      Electronically signed  Mandeep Raza MD  [facility]

## 2020-09-04 ENCOUNTER — PATIENT OUTREACH (OUTPATIENT)
Dept: ADMINISTRATIVE | Facility: CLINIC | Age: 75
End: 2020-09-04

## 2020-09-04 ENCOUNTER — TELEPHONE (OUTPATIENT)
Dept: PHARMACY | Facility: CLINIC | Age: 75
End: 2020-09-04

## 2020-09-04 NOTE — TELEPHONE ENCOUNTER
Patient recently discharged from the hospital with instructions to continue taking Votrient.  He was due for follow-up with Dr. Reed today at 1:30p, however, does not appear to have presented to appointment.  LVM with patient for call-back to discuss.  Will send inbasket to Dr. Reed to see if patient will be rescheduled and if he will continue to hold Votrient (it was held during admission) until seen by provider.

## 2020-09-04 NOTE — PHYSICIAN QUERY
PT Name: Edwin Powell  MR #: [id number]    HEMATOLOGY CLARIFICATION      CDS/: Sil WILHELM, RN              Contact information: [email or web address]    This form is a permanent document in the medical record.      Query Date: September 4, 2020    By submitting this query, we are merely seeking further clarification of documentation. Please utilize your independent clinical judgment when addressing the question(s) below.    The Medical Record contains the following:   Indicators  Supporting Clinical Findings Location in Medical Record    Anemia documented      x H&H  Hemoglobin 10.1--> 6.1   Hematocrit 33.4 --> 21.1  Results 8/17/20 - 8/31/20    BP                    HR      x GI bleeding documented   Esophagogastroduodenoscopy on 9/1/2020 showed a small hiatal hernia and mild gastritis. He was put on pantoprazole.  Dr. Raza [facility] discharge summary 9/3/20    Acute bleeding (Non GI site)      x Transfusion(s)   He coughed up bright red blood with clots. He was transfused 1 unit of packed RBCs.  Dr. Raza [facility] discharge summary 9/3/20    x Acute/Chronic illness  chronic kidney disease stage 3    left renal cell carcinoma metastatic to liver and third lumbar vertebra  [facility]    Treatments      Other       Provider, please specify diagnosis or diagnoses associated with above clinical findings.   [x ] Acute blood loss anemia      [   ] Anemia of chronic kidney disease (please specify stage): _______________     [   ] Anemia due to neoplastic disease     [   ] Anemia due to chronic disease (please specify): _________________     [   ] Anemia, unspecified      [   ] Other Hematological Diagnosis (please specify): _________________     [   ] Clinically Undetermined            Please document in your progress notes daily for the duration of treatment, until resolved, and include in your discharge summary.

## 2020-09-05 LAB — POCT GLUCOSE: 244 MG/DL (ref 70–110)

## 2020-09-08 ENCOUNTER — DOCUMENTATION ONLY (OUTPATIENT)
Dept: PRIMARY CARE CLINIC | Facility: CLINIC | Age: 75
End: 2020-09-08

## 2020-09-08 ENCOUNTER — PATIENT MESSAGE (OUTPATIENT)
Dept: HEMATOLOGY/ONCOLOGY | Facility: CLINIC | Age: 75
End: 2020-09-08

## 2020-09-08 ENCOUNTER — OFFICE VISIT (OUTPATIENT)
Dept: PRIMARY CARE CLINIC | Facility: CLINIC | Age: 75
End: 2020-09-08
Payer: MEDICARE

## 2020-09-08 ENCOUNTER — TELEPHONE (OUTPATIENT)
Dept: PRIMARY CARE CLINIC | Facility: CLINIC | Age: 75
End: 2020-09-08

## 2020-09-08 DIAGNOSIS — C79.51 METASTATIC RENAL CELL CARCINOMA TO BONE: ICD-10-CM

## 2020-09-08 DIAGNOSIS — Z79.899 POLYPHARMACY: ICD-10-CM

## 2020-09-08 DIAGNOSIS — C64.9 METASTATIC RENAL CELL CARCINOMA TO LIVER: ICD-10-CM

## 2020-09-08 DIAGNOSIS — C64.9 METASTATIC RENAL CELL CARCINOMA TO BONE: ICD-10-CM

## 2020-09-08 DIAGNOSIS — C64.9 METASTATIC RENAL CELL CARCINOMA, UNSPECIFIED LATERALITY: Chronic | ICD-10-CM

## 2020-09-08 DIAGNOSIS — C64.2 RENAL CELL CARCINOMA OF LEFT KIDNEY: Primary | ICD-10-CM

## 2020-09-08 DIAGNOSIS — C78.7 METASTATIC RENAL CELL CARCINOMA TO LIVER: ICD-10-CM

## 2020-09-08 PROCEDURE — 3288F FALL RISK ASSESSMENT DOCD: CPT | Mod: HCNC,CPTII,95, | Performed by: INTERNAL MEDICINE

## 2020-09-08 PROCEDURE — 1100F PR PT FALLS ASSESS DOC 2+ FALLS/FALL W/INJURY/YR: ICD-10-PCS | Mod: HCNC,CPTII,95, | Performed by: INTERNAL MEDICINE

## 2020-09-08 PROCEDURE — 3288F PR FALLS RISK ASSESSMENT DOCUMENTED: ICD-10-PCS | Mod: HCNC,CPTII,95, | Performed by: INTERNAL MEDICINE

## 2020-09-08 PROCEDURE — 1159F MED LIST DOCD IN RCRD: CPT | Mod: HCNC,95,, | Performed by: INTERNAL MEDICINE

## 2020-09-08 PROCEDURE — 1100F PTFALLS ASSESS-DOCD GE2>/YR: CPT | Mod: HCNC,CPTII,95, | Performed by: INTERNAL MEDICINE

## 2020-09-08 PROCEDURE — 1159F PR MEDICATION LIST DOCUMENTED IN MEDICAL RECORD: ICD-10-PCS | Mod: HCNC,95,, | Performed by: INTERNAL MEDICINE

## 2020-09-08 PROCEDURE — 99215 PR OFFICE/OUTPT VISIT, EST, LEVL V, 40-54 MIN: ICD-10-PCS | Mod: HCNC,95,, | Performed by: INTERNAL MEDICINE

## 2020-09-08 PROCEDURE — 99215 OFFICE O/P EST HI 40 MIN: CPT | Mod: HCNC,95,, | Performed by: INTERNAL MEDICINE

## 2020-09-08 RX ORDER — OXYCODONE HYDROCHLORIDE 5 MG/1
5 TABLET ORAL EVERY 6 HOURS PRN
Qty: 120 TABLET | Refills: 0 | Status: SHIPPED | OUTPATIENT
Start: 2020-09-08 | End: 2020-09-18

## 2020-09-08 NOTE — PROGRESS NOTES
"[facility] Primary [facility] Provider Telemedicine Appointment      The patient location is:  Patient Home   The chief complaint leading to consultation is: medications  Total time spent with patient: 40min    Visit type: Virtual visit with synchronous audio only and video  Each patient to whom he or she provides medical services by telemedicine is:  (1) informed of the relationship between the physician and patient and the respective role of any other health care provider with respect to management of the patient; and (2) notified that he or she may decline to receive medical services by telemedicine and may withdraw from such care at any time.      Subjective:      Patient ID: Edwin Powell is a 75 y.o. male with RCC, recent surgical complications (AMI)    Chief Complaint: Discuss medications post discharge.    Spoke with patient's son Pramod, who assists with patient's care. Pramod states neck surgery was followed by many complications (AMI, narcotic overdose)    Pt's son interested in getting pill packs for his father and wants to have his medications reconciled. My concern is that they gave him 6 or 7 new medications in addition to the old ones."    His narcotics were changed to oxicodone.    Last blood sugar is 178.    On chart review, a pharmacist advised in 2017:  Pt's son called and informed us that pt has started Omeprazole and Zofran by Dr. Davis recently due to complains of dysphagia. I advised him that Omeprazole can actually decrease the serum concentration of Votrient, causing it to be less effective. He states that it is working to help with the nausea and dysphagia he has when eating. He has tried tums and that has NOT helped. Zantac provides little relief but also has the potential for the same DDIs. Pt also reports diarrhea - staying well hydrated with a diet of boost, gatorade, bananas- slightly relieved with Imodium.    START taking these medications    carvediloL 6.25 MG tablet                  "                                                                                         Has, new med  Commonly known as: COREG  Take 1 tablet (6.25 mg total) by mouth 2 (two) times daily.      clopidogreL 75 mg tablet                                                                                                           Has, new med  Commonly known as: PLAVIX  Take 1 tablet (75 mg total) by mouth once daily.      dextromethorphan-guaifenesin  mg  mg per 12 hr tablet                               Does not have  Commonly known as: MUCINEX DM  Take 1 tablet by mouth 2 (two) times daily as needed (cough).      docusate sodium 50 MG capsule                                                                                             Does not have  Commonly known as: COLACE  Take 1 capsule (50 mg total) by mouth 2 (two) times daily.      lidocaine 5 %                                                                                                                                        Has  Commonly known as: LIDODERM  Place 2 patches onto the skin once daily. Remove & Discard patch within 12 hours or as directed by MD      methocarbamoL 500 MG Tab                                                                                                    Has, new med  Commonly known as: ROBAXIN  Take 1 tablet (500 mg total) by mouth 2 (two) times a day. for 10 days      methyl salicylate-menthol 15-10% 15-10 % Crea                                                                   Does not have  Apply topically 3 (three) times daily. To neck      oxyCODONE 5 MG immediate release tablet                                                                  Has, will need more  Commonly known as: ROXICODONE  Take 1 tablet (5 mg total) by mouth every 6 (six) hours as needed for Pain.      pantoprazole 40 MG tablet                                                                   Has, new med, ADVISED TO STOP  Commonly known as:  PROTONIX  Take 1 tablet (40 mg total) by mouth once daily.      polyethylene glycol 17 gram Pwpk                                                                                    Does not have  Commonly known as: GLYCOLAX  Take 17 g by mouth 2 (two) times daily as needed (constipation).     Medication that pt has that is not listed:  Diphenoxyoate 2.5mg 4 times daily as needed for diarrhea  Pt son states pt only has occasional diarrhea      CHANGE how you take these medications    amLODIPine 10 MG tablet                                                                                                                                    Has  Commonly known as: NORVASC  Take 1 tablet (10 mg total) by mouth once daily.  What changed: additional instructions      azelastine 137 mcg (0.1 %) nasal spray                                                                                                          Has  Commonly known as: ASTELIN  1 spray (137 mcg total) by Nasal route 2 (two) times daily as needed for Rhinitis (congestion).  What changed:   · when to take this  · reasons to take this      b complex vitamins tablet                                                                                                                                   Has  Commonly known as: B COMPLEX 1  Take 1 tablet by mouth once daily.  What changed: additional instructions      cholecalciferol (vitamin D3) 125 mcg (5,000 unit) Tab                                                                                        Has  Commonly known as: VITAMIN D3  Take 1 tablet (5,000 Units total) by mouth once daily.  What changed: additional instructions      fluticasone propionate 50 mcg/actuation nasal spray                                                                       Does not have  Commonly known as: FLONASE  1 spray (50 mcg total) by Each Nostril route daily as needed for Rhinitis or Allergies.  What changed:   · when to take  this  · reasons to take this      furosemide 40 MG tablet                                                                                                                                   Has  Commonly known as: LASIX  Take 1 tablet (40 mg total) by mouth 2 (two) times daily.  What changed:   · medication strength  · how much to take  · when to take this      LANTUS SOLOSTAR U-100 INSULIN glargine 100 units/mL (3mL) SubQ pen               Has one, will need more  Generic drug: insulin  Inject 5 Units into the skin every evening.  What changed: additional instructions      levocetirizine 5 MG tablet                                                                                                               Does not have  Commonly known as: XYZAL  Take 1 tablet (5 mg total) by mouth every evening. Can take up to 2 tablets  What changed: additional instructions      levothyroxine 88 MCG tablet                                                                                                                             Has  Commonly known as: SYNTHROID  Take 1 tablet (88 mcg total) by mouth once daily.  What changed: additional instructions      ondansetron 8 MG tablet                                                                                                     Has, not much nausea  Commonly known as: ZOFRAN  Take 1 tablet (8 mg total) by mouth 3 (three) times daily as needed.  What changed: additional instructions      rosuvastatin 40 MG Tab                                                                                                                                      Has  Commonly known as: CRESTOR  Take 1 tablet (40 mg total) by mouth every evening.  What changed:   · when to take this  · additional instructions      tamsulosin 0.4 mg Cap                                                                                                                                        Has  Commonly known as: FLOMAX  Take 2  "capsules (0.8 mg total) by mouth every evening.  What changed:   · when to take this  · additional instructions      VOTRIENT 200 mg Tab                                                                                                                                       Has  Generic drug: PAZOpanib  Take 4 tablets (800 mg total) by mouth once daily.  What changed: additional instructions             CONTINUE taking these medications    ACCU-CHEK NATA PLUS METER [facility]                                                                                                            Has  Generic drug: blood-glucose meter  use as instructed      ACCU-CHEK NATA PLUS TEST STRP [facility]                                                                                                    Has   Generic drug: blood sugar diagnostic  1 strip by Misc.(Non-Drug; Combo Route) route 2 (two) times daily as needed.      ACCU-CHEK SOFTCLIX LANCETS [facility]                                                                                                          Has  Generic drug: lancets  1 lancet by Misc.(Non-Drug; Combo Route) route 2 (two) times daily as needed.      aspirin 81 MG EC tablet                                                                                                                                        Has  Commonly known as: ECOTRIN  Take 81 mg by mouth once daily. Will ask MD to hold 1 week      BD ULTRA-FINE ORIG PEN NEEDLE 29 gauge x 1/2" Ndle                                                            Does not use  Generic drug: pen needle, diabetic  1 application by Misc.(Non-Drug; Combo Route) route once daily.      montelukast 10 mg tablet                                                                                                                                      Has  Commonly known as: SINGULAIR  Take 10 mg by mouth every evening.      multivitamin per tablet                                                          "                                                                                  Has  Commonly known as: THERAGRAN  Take 1 tablet by mouth once daily.      VITAMIN C ORAL                                                                                                                                                  Has  Take by mouth once daily. Hold for 1 week prior to surgery      VITAMIN E ORAL                                                                                                                                                   Has  Take by mouth once daily. Hold for 1 week prior to surgery           Pt has stopped taking this medications  STOP taking these medications    BENADRYL ALLERGY ORAL      clindamycin 1 % external solution  Commonly known as: CLEOCIN T      HYDROcodone-acetaminophen 5-325 mg per tablet  Commonly known as: NORCO      lisinopriL 40 MG tablet  Commonly known as: PRINIVIL,ZESTRIL      morphine 15 MG 12 hr tablet  Commonly known as: MS CONTIN       Past Surgical History:   Procedure Laterality Date    ABDOMINAL SURGERY      ANTERIOR CERVICAL DISCECTOMY W/ FUSION N/A 8/17/2020    Procedure: DISCECTOMY, SPINE, CERVICAL, ANTERIOR APPROACH, WITH FUSION co case with Dr. Falcon C6/7 [facility] SNS:vocal cord/motors/SSEP Regular OR table Patient needs scope in preop;  Surgeon: Filemon Aguayo MD;  Location: [facility] OR [address] [place] [address];  Service: Neurosurgery;  Laterality: N/A;    APPENDECTOMY      BACK SURGERY      CARDIAC SURGERY      CATARACT EXTRACTION      CHOLECYSTECTOMY      CORONARY STENT PLACEMENT      5 stents    coronary stenting      X 5 last one in 2010-11.    DISSECTION OF NECK N/A 8/17/2020    Procedure: DISSECTION, NECK;  Surgeon: Rush Falcon MD;  Location: [facility] OR [address] [place] [address];  Service: ENT;  Laterality: N/A;    ESOPHAGOGASTRODUODENOSCOPY N/A 9/1/2020    Procedure: EGD (ESOPHAGOGASTRODUODENOSCOPY);  Surgeon: Jim Hooks MD;  Location: [facility] ([address] [place] [address]);  Service:  Endoscopy;  Laterality: N/A;    INTRAOCULAR PROSTHESES INSERTION Right 6/27/2019    Procedure: INSERTION, IOL PROSTHESIS;  Surgeon: Chary Culp MD;  Location: [facility] OR [address] [place] [address];  Service: Ophthalmology;  Laterality: Right;    INTRAOCULAR PROSTHESES INSERTION Left 8/22/2019    Procedure: INSERTION, IOL PROSTHESIS;  Surgeon: Chary Culp MD;  Location: [facility] OR [address] [place] [address];  Service: Ophthalmology;  Laterality: Left;    PHACOEMULSIFICATION OF CATARACT Right 6/27/2019    Procedure: PHACOEMULSIFICATION, CATARACT;  Surgeon: Chary Culp MD;  Location: [facility] OR [address] [place] [address];  Service: Ophthalmology;  Laterality: Right;    PHACOEMULSIFICATION OF CATARACT Left 8/22/2019    Procedure: PHACOEMULSIFICATION, CATARACT;  Surgeon: Chary Culp MD;  Location: [facility] OR [address] [place] [address];  Service: Ophthalmology;  Laterality: Left;    SPINAL FUSION         Past Medical History:   Diagnosis Date    Acquired hypothyroidism 5/26/2017    Benign essential HTN 5/26/2017    Benign prostatic hyperplasia (BPH) with urinary urge incontinence 6/6/2017    Chronic low back pain 6/1/2017    Chronic respiratory failure with hypoxia, on home oxygen therapy     Coronary artery disease involving native coronary artery of native heart without angina pectoris 5/26/2017    S/p 5 stents - last one around 2010-11.    Gastroesophageal reflux disease without esophagitis 5/26/2017    History of CVA (cerebrovascular accident) 5/26/2017    Hypertension associated with diabetes 5/26/2017    BP controlled on ACE, CCB    Lumbar disc lesion 5/26/2017    Metastatic renal cell carcinoma to bone 6/6/2017    Metastatic renal cell carcinoma to liver 6/6/2017    Liver Biopsy 5-2017: METASTATIC RENAL CELL CARCINOMA.    Mixed hyperlipidemia 5/26/2017    Type 2 diabetes mellitus with stage 3 chronic kidney disease, with long-term current use of insulin 5/26/2017       Review of Systems   Constitutional: Positive for activity change, appetite change and  unexpected weight change.   Psychiatric/Behavioral: Positive for agitation.       Objective:   There were no vitals taken for this visit.    Physical Exam  Constitutional:       Comments: dissheveled   Psychiatric:      Comments: Easily agitated         Lab Results   Component Value Date    WBC 11.50 09/02/2020    HGB 7.1 (L) 09/02/2020    HCT 24.4 (L) 09/02/2020     (H) 09/02/2020    CHOL 142 08/04/2020    TRIG 142 08/04/2020    HDL 38 (L) 08/04/2020    ALT 40 09/03/2020    AST 47 (H) 09/03/2020     09/03/2020    K 4.0 09/03/2020     09/03/2020    CREATININE 2.4 (H) 09/03/2020    BUN 70 (H) 09/03/2020    CO2 27 09/03/2020    TSH 7.711 (H) 08/18/2020    PSA 1.1 07/16/2018    INR 1.0 09/01/2020    HGBA1C 6.3 (H) 08/04/2020    HGBA1C 6.3 (H) 08/04/2020         Assessment:   75 y.o. male with multiple co-morbid illnesses here to continue work-up of chronic issues notably with RCC, recent surgical complications (AMI)     Plan:     Problem List Items Addressed This Visit        Other    Polypharmacy     Multiple med changes during in-patient stay, much confusion over meds  · Schedule for face to face appt with all meds tomorrow               Health Maintenance       Date Due Completion Date    Shingles Vaccine (2 of 2) 01/13/2020 11/18/2019    Influenza Vaccine (1) 08/01/2020 10/15/2019    Hemoglobin A1c 02/04/2021 8/4/2020    Colorectal Cancer Screening 02/15/2021 8/30/2020    Override on 2/15/2011: Done (Per [employer] dashboard ? results)    Foot Exam 05/18/2021 5/18/2020 (Done)    Override on 5/18/2020: Done    Override on 1/13/2020: Done    Override on 7/11/2019: Done    Override on 2/2/2018: Done (performed by podiatrist)    Lipid Panel 08/04/2021 8/4/2020    High Dose Statin 09/01/2021 9/1/2020    TETANUS VACCINE 01/23/2022 1/23/2012          Follow up in about 1 day (around 9/9/2020). One hour spent with this patient today, more than half of that in counseling on the following issues: with RCC,  recent surgical complications (AMI)    Lulú Lewis MD/MPH  [facility]  [phone]

## 2020-09-09 ENCOUNTER — TELEPHONE (OUTPATIENT)
Dept: PHARMACY | Facility: CLINIC | Age: 75
End: 2020-09-09

## 2020-09-09 ENCOUNTER — OFFICE VISIT (OUTPATIENT)
Dept: PRIMARY CARE CLINIC | Facility: CLINIC | Age: 75
End: 2020-09-09
Payer: MEDICARE

## 2020-09-09 ENCOUNTER — TELEPHONE (OUTPATIENT)
Dept: HEMATOLOGY/ONCOLOGY | Facility: CLINIC | Age: 75
End: 2020-09-09

## 2020-09-09 VITALS — DIASTOLIC BLOOD PRESSURE: 49 MMHG | SYSTOLIC BLOOD PRESSURE: 124 MMHG | HEART RATE: 69 BPM

## 2020-09-09 DIAGNOSIS — I21.4 NSTEMI (NON-ST ELEVATED MYOCARDIAL INFARCTION): Primary | ICD-10-CM

## 2020-09-09 DIAGNOSIS — N40.1 BENIGN PROSTATIC HYPERPLASIA (BPH) WITH URINARY URGE INCONTINENCE: ICD-10-CM

## 2020-09-09 DIAGNOSIS — N39.41 BENIGN PROSTATIC HYPERPLASIA (BPH) WITH URINARY URGE INCONTINENCE: ICD-10-CM

## 2020-09-09 PROCEDURE — 1100F PTFALLS ASSESS-DOCD GE2>/YR: CPT | Mod: HCNC,CPTII,S$GLB, | Performed by: INTERNAL MEDICINE

## 2020-09-09 PROCEDURE — 99215 OFFICE O/P EST HI 40 MIN: CPT | Mod: HCNC,S$GLB,, | Performed by: INTERNAL MEDICINE

## 2020-09-09 PROCEDURE — 99499 RISK ADDL DX/OHS AUDIT: ICD-10-PCS | Mod: S$GLB,,, | Performed by: INTERNAL MEDICINE

## 2020-09-09 PROCEDURE — 3288F PR FALLS RISK ASSESSMENT DOCUMENTED: ICD-10-PCS | Mod: HCNC,CPTII,S$GLB, | Performed by: INTERNAL MEDICINE

## 2020-09-09 PROCEDURE — 3074F PR MOST RECENT SYSTOLIC BLOOD PRESSURE < 130 MM HG: ICD-10-PCS | Mod: HCNC,CPTII,S$GLB, | Performed by: INTERNAL MEDICINE

## 2020-09-09 PROCEDURE — 3078F PR MOST RECENT DIASTOLIC BLOOD PRESSURE < 80 MM HG: ICD-10-PCS | Mod: HCNC,CPTII,S$GLB, | Performed by: INTERNAL MEDICINE

## 2020-09-09 PROCEDURE — 1159F MED LIST DOCD IN RCRD: CPT | Mod: HCNC,S$GLB,, | Performed by: INTERNAL MEDICINE

## 2020-09-09 PROCEDURE — 99499 UNLISTED E&M SERVICE: CPT | Mod: S$GLB,,, | Performed by: INTERNAL MEDICINE

## 2020-09-09 PROCEDURE — 1159F PR MEDICATION LIST DOCUMENTED IN MEDICAL RECORD: ICD-10-PCS | Mod: HCNC,S$GLB,, | Performed by: INTERNAL MEDICINE

## 2020-09-09 PROCEDURE — 3078F DIAST BP <80 MM HG: CPT | Mod: HCNC,CPTII,S$GLB, | Performed by: INTERNAL MEDICINE

## 2020-09-09 PROCEDURE — 3288F FALL RISK ASSESSMENT DOCD: CPT | Mod: HCNC,CPTII,S$GLB, | Performed by: INTERNAL MEDICINE

## 2020-09-09 PROCEDURE — 3074F SYST BP LT 130 MM HG: CPT | Mod: HCNC,CPTII,S$GLB, | Performed by: INTERNAL MEDICINE

## 2020-09-09 PROCEDURE — 99215 PR OFFICE/OUTPT VISIT, EST, LEVL V, 40-54 MIN: ICD-10-PCS | Mod: HCNC,S$GLB,, | Performed by: INTERNAL MEDICINE

## 2020-09-09 PROCEDURE — 1100F PR PT FALLS ASSESS DOC 2+ FALLS/FALL W/INJURY/YR: ICD-10-PCS | Mod: HCNC,CPTII,S$GLB, | Performed by: INTERNAL MEDICINE

## 2020-09-09 NOTE — TELEPHONE ENCOUNTER
MD Lulú Randolph MD             Yes   Thank you!    Previous Messages    ----- Message -----   From: Lulú Lewis MD   Sent: 9/9/2020  10:31 AM CDT   To: Rahel Reed MD     Hi Dr Reed and JODI Siddiqi,   Can you address this at patient's upcoming appt?   + protonix interacts with votrient by lowering the level of it   + patient needs protonix now due to the stomach bleed              + when does he restart votrient?     Thanks,   CORTEZ (PCP)

## 2020-09-09 NOTE — TELEPHONE ENCOUNTER
Spoke to son, Pramod, who stated that he was unaware of appt on 9/4 and stated that he believes his father has restarted the Votrient since being home. I explained that usually the medication would be held until the patient is seen in clinic to discuss appropriateness of restarting oral chemo since the medication was held in the hospital. Son stated that he does not understand this as his father has cancer and needs to be on his medication. I explained that I would send a message to the office and suggested that he also reach out to Dr. Reed to discuss. Son was agreeable and plans to call the office this morning. Message sent to Dr. Reed on 9/9. Patient was due to follow up - explained that once we get confirmation from Dr. Reed I would reach out for follow up. He confirmed that patient has about 3/4 of the bottle remaining. Will try to get an exact count at follow up to pend refills more appropriately.

## 2020-09-09 NOTE — PROGRESS NOTES
Primary Care Provider Appointment - [facility]  SHARED NOTE: Jw Puri (MS4), Dr Lewis (Attending)    Subjective:      Patient ID: Edwin Powell is a 75 y.o. male with CAD, HLD, BPH, and RCC    Chief Complaint: Post-hospitalization medication review.    Pt was discharged from the hospital on 9/3/2020 s/p complications secondary to cervical neck surgery and needs med reconciliation. He was seen via virtual appointment yesterday.     Pt's son (Pramod Powell Jr.) arrived with pt's medication for review and pill packing. Pt's son states he is not sure which medications the patient is supposed to take or stop. He notes that new medications were started while in the hospital and the discharge summary was not clear to him.     The patient lives with his son, who is able to help with daily activities, food, medication, and clinic appointments.     Prior to hospitalization the pt was taking Vitamin B, D3, E, rosuvastatin, aspirin, levothyroxine, Votrient, Amlodipine, Ondansetron, Diphenoxylate, Montelukast    The patient was started on Carvedilol, Clopidogrel while in hospital due to an NSTEMI. He was started on Furosemide due to suspected pulmonary edema, secondary to cardiac dysfunction. Per chart review, pt's blood pressure while in the hospital was between 120-160 systolic. While in the clinic the pt was called by his son and asked to take his blood pressure with home BP machine. Pt reported it to be 124/49. Per son, the patient has not taken any of his blood pressure medications this morning. Pt's son was advised to continue with Carvedilol, Clopidogrel, and Aspirin for heart health. Given the patient's blood pressure he was advised to stop Amlodipine. Pt's son was instructed to take the pt's blood pressure over the next several days to further assess his blood pressure medication needs. Mr. Sherry Mosley was advised to give his father furosemide only as needed for leg swelling.       Pt's son states the pt has  decreased appetite, but is eating some food with meals. Pt was started on Pantoprazole (Protonix) after having a GI bleed while in the hospital. Pt's son was advised by Dr. Lewis to prioritize Protonix and stop Votrient, due to interactions, until talking to Dr. Reed. Pt was also told to stop Ondansetron    Per pt's son, the patient is able to ambulate at home with a walker, but occasionally becomes dizzy/lightheaded. Pt has been taking Tamsulosin (two pill every evening) and was instructed to decrease to one pill every evening.   Mr. Sherry Mosley admits that the patient does not answer the phone for home health and is quick to dismiss them. He asks that he be called to arrange home health visits.     Dr. Lewis discussed additional medications with Mr. Sherry Mosley and he was advised to continue his vitamin B, vitamin D, Levothyroxine and Rosuvastatin (at discharge dose of 40mg). He was instructed to give the pt methocarbamol, diphenoxylate, montelukast as needed, and to stop Vitamin E    Mr. Sherry Mosley also notes while in hospital physical therapy told the pt he would benefit from a wheelchair to help with moving around the house and reduce fall risk.    Past Surgical History:   Procedure Laterality Date    ABDOMINAL SURGERY      ANTERIOR CERVICAL DISCECTOMY W/ FUSION N/A 8/17/2020    Procedure: DISCECTOMY, SPINE, CERVICAL, ANTERIOR APPROACH, WITH FUSION co case with Dr. Falcon C6/7 [facility] SNS:vocal cord/motors/SSEP Regular OR table Patient needs scope in preop;  Surgeon: Filemon Aguayo MD;  Location: [facility] OR [address] [place] [address];  Service: Neurosurgery;  Laterality: N/A;    APPENDECTOMY      BACK SURGERY      CARDIAC SURGERY      CATARACT EXTRACTION      CHOLECYSTECTOMY      CORONARY STENT PLACEMENT      5 stents    coronary stenting      X 5 last one in 2010-11.    DISSECTION OF NECK N/A 8/17/2020    Procedure: DISSECTION, NECK;  Surgeon: Rush Falcon MD;  Location: [facility] OR [address] [place] [address];  Service:  ENT;  Laterality: N/A;    ESOPHAGOGASTRODUODENOSCOPY N/A 9/1/2020    Procedure: EGD (ESOPHAGOGASTRODUODENOSCOPY);  Surgeon: Jim Hooks MD;  Location: [facility] (2ND FLR);  Service: Endoscopy;  Laterality: N/A;    INTRAOCULAR PROSTHESES INSERTION Right 6/27/2019    Procedure: INSERTION, IOL PROSTHESIS;  Surgeon: Chary Culp MD;  Location: [facility] OR 1ST FLR;  Service: Ophthalmology;  Laterality: Right;    INTRAOCULAR PROSTHESES INSERTION Left 8/22/2019    Procedure: INSERTION, IOL PROSTHESIS;  Surgeon: Chary Culp MD;  Location: [facility] OR 1ST FLR;  Service: Ophthalmology;  Laterality: Left;    PHACOEMULSIFICATION OF CATARACT Right 6/27/2019    Procedure: PHACOEMULSIFICATION, CATARACT;  Surgeon: Chary Culp MD;  Location: [facility] OR 1ST FLR;  Service: Ophthalmology;  Laterality: Right;    PHACOEMULSIFICATION OF CATARACT Left 8/22/2019    Procedure: PHACOEMULSIFICATION, CATARACT;  Surgeon: Chary Culp MD;  Location: [facility] OR 1ST FLR;  Service: Ophthalmology;  Laterality: Left;    SPINAL FUSION         Past Medical History:   Diagnosis Date    Acquired hypothyroidism 5/26/2017    Benign essential HTN 5/26/2017    Benign prostatic hyperplasia (BPH) with urinary urge incontinence 6/6/2017    Chronic low back pain 6/1/2017    Chronic respiratory failure with hypoxia, on home oxygen therapy     Coronary artery disease involving native coronary artery of native heart without angina pectoris 5/26/2017    S/p 5 stents - last one around 2010-11.    Gastroesophageal reflux disease without esophagitis 5/26/2017    History of CVA (cerebrovascular accident) 5/26/2017    Hypertension associated with diabetes 5/26/2017    BP controlled on ACE, CCB    Lumbar disc lesion 5/26/2017    Metastatic renal cell carcinoma to bone 6/6/2017    Metastatic renal cell carcinoma to liver 6/6/2017    Liver Biopsy 5-2017: METASTATIC RENAL CELL CARCINOMA.    Mixed hyperlipidemia 5/26/2017    Type 2 diabetes  mellitus with stage 3 chronic kidney disease, with long-term current use of insulin 5/26/2017       Social History     Socioeconomic History    Marital status: Single     Spouse name: Not on file    Number of children: Not on file    Years of education: Not on file    Highest education level: Not on file   Occupational History    Not on file   Social Needs    Financial resource strain: Hard    Food insecurity     Worry: Never true     Inability: Never true    Transportation needs     Medical: No     Non-medical: No   Tobacco Use    Smoking status: Former Smoker     Types: Cigarettes    Smokeless tobacco: Never Used    Tobacco comment: haven't smoked in last 25 yrs   Substance and Sexual Activity    Alcohol use: Yes     Comment: rarely     Drug use: Not Currently    Sexual activity: Not Currently     Partners: Female   Lifestyle    Physical activity     Days per week: Not on file     Minutes per session: Not on file    Stress: Not on file   Relationships    Social connections     Talks on phone: Not on file     Gets together: Not on file     Attends Yazidism service: Not on file     Active member of club or organization: Not on file     Attends meetings of clubs or organizations: Not on file     Relationship status: Not on file   Other Topics Concern    Not on file   Social History Narrative    Not on file       Review of Systems   Unable to perform ROS: Other       Objective:   BP (!) 124/49   Pulse 69  (taken at home by patient)    Physical Exam  NOT PERFORMED      Lab Results   Component Value Date    WBC 11.50 09/02/2020    HGB 7.1 (L) 09/02/2020    HCT 24.4 (L) 09/02/2020     (H) 09/02/2020    CHOL 142 08/04/2020    TRIG 142 08/04/2020    HDL 38 (L) 08/04/2020    ALT 40 09/03/2020    AST 47 (H) 09/03/2020     09/03/2020    K 4.0 09/03/2020     09/03/2020    CREATININE 2.4 (H) 09/03/2020    BUN 70 (H) 09/03/2020    CO2 27 09/03/2020    TSH 7.711 (H) 08/18/2020    PSA 1.1  07/16/2018    INR 1.0 09/01/2020    HGBA1C 6.3 (H) 08/04/2020    HGBA1C 6.3 (H) 08/04/2020       Current Outpatient Medications on File Prior to Visit   Medication Sig Dispense Refill    amLODIPine (NORVASC) 10 MG tablet Take 1 tablet (10 mg total) by mouth once daily. (Patient taking differently: Take 10 mg by mouth once daily. Take in am of surgery) 1 tablet 0    ascorbic acid (VITAMIN C ORAL) Take by mouth once daily. Hold for 1 week prior to surgery      aspirin (ECOTRIN) 81 MG EC tablet Take 81 mg by mouth once daily. Will ask MD to hold 1 week      azelastine (ASTELIN) 137 mcg (0.1 %) nasal spray 1 spray (137 mcg total) by Nasal route 2 (two) times daily as needed for Rhinitis (congestion). 30 mL 3    b complex vitamins (B COMPLEX 1) tablet Take 1 tablet by mouth once daily. (Patient taking differently: Take 1 tablet by mouth once daily. Hold for 1 week prior to surgery) 90 tablet 3    blood sugar diagnostic Strp 1 strip by Misc.(Non-Drug; Combo Route) route 2 (two) times daily as needed. 200 strip 3    blood-glucose meter Misc use as instructed 1 each 0    carvediloL (COREG) 6.25 MG tablet Take 1 tablet (6.25 mg total) by mouth 2 (two) times daily. 60 tablet 11    cholecalciferol, vitamin D3, (VITAMIN D3) 125 mcg (5,000 unit) Tab Take 1 tablet (5,000 Units total) by mouth once daily. (Patient taking differently: Take 5,000 Units by mouth once daily. Hold for 1 week prior to surgery) 90 tablet 3    clopidogreL (PLAVIX) 75 mg tablet Take 1 tablet (75 mg total) by mouth once daily. 30 tablet 11    docusate sodium (COLACE) 50 MG capsule Take 1 capsule (50 mg total) by mouth 2 (two) times daily. 60 capsule 0    fluticasone propionate (FLONASE) 50 mcg/actuation nasal spray 1 spray (50 mcg total) by Each Nostril route daily as needed for Rhinitis or Allergies.      furosemide (LASIX) 40 MG tablet Take 1 tablet (40 mg total) by mouth 2 (two) times daily. 60 tablet 11    insulin (LANTUS SOLOSTAR U-100  "INSULIN) glargine 100 units/mL (3mL) SubQ pen Inject 5 Units into the skin every evening. (Patient taking differently: Inject 5 Units into the skin every evening. Take 80%=4U night before surgery) 5 mL 11    lancets (ONETOUCH ULTRASOFT LANCETS) Misc 1 lancet by Misc.(Non-Drug; Combo Route) route 2 (two) times daily as needed. 200 each 3    levocetirizine (XYZAL) 5 MG tablet Take 1 tablet (5 mg total) by mouth every evening. Can take up to 2 tablets (Patient taking differently: Take 5 mg by mouth every evening. Can take up to 2 tablets  Takes at night) 30 tablet 11    levothyroxine (SYNTHROID) 88 MCG tablet Take 1 tablet (88 mcg total) by mouth once daily. (Patient taking differently: Take 88 mcg by mouth once daily. Take in am of surgery) 90 tablet 3    lidocaine (LIDODERM) 5 % Place 2 patches onto the skin once daily. Remove & Discard patch within 12 hours or as directed by MD 30 patch 1    methocarbamoL (ROBAXIN) 500 MG Tab Take 1 tablet (500 mg total) by mouth 2 (two) times a day. for 10 days 20 tablet 0    methyl salicylate-menthol 15-10% 15-10 % Crea Apply topically 3 (three) times daily. To neckk  0    montelukast (SINGULAIR) 10 mg tablet Take 10 mg by mouth every evening.      ondansetron (ZOFRAN) 8 MG tablet Take 1 tablet (8 mg total) by mouth 3 (three) times daily as needed. (Patient taking differently: Take 8 mg by mouth 3 (three) times daily as needed. Take if needed) 90 tablet 3    oxyCODONE (ROXICODONE) 5 MG immediate release tablet Take 1 tablet (5 mg total) by mouth every 6 (six) hours as needed for Pain. 120 tablet 0    pantoprazole (PROTONIX) 40 MG tablet Take 1 tablet (40 mg total) by mouth once daily. 40 tablet 0    PAZOpanib (VOTRIENT) 200 mg Tab Take 4 tablets (800 mg total) by mouth once daily. (Patient taking differently: Take 800 mg by mouth once daily Take in am of surgery.) 120 tablet 5    pen needle, diabetic 29 gauge x 1/2" Ndle 1 application by Misc.(Non-Drug; Combo Route) " route once daily. 100 each 3    polyethylene glycol (GLYCOLAX) 17 gram PwPk Take 17 g by mouth 2 (two) times daily as needed (constipation).  0    rosuvastatin (CRESTOR) 40 MG Tab Take 1 tablet (40 mg total) by mouth every evening. (Patient taking differently: Take 40 mg by mouth once daily. Take in am of surgery) 90 tablet 3    tamsulosin (FLOMAX) 0.4 mg Cap Take 2 capsules (0.8 mg total) by mouth every evening. (Patient taking differently: Take 0.8 mg by mouth once daily. Take in am of surgery) 180 capsule 3     Current Facility-Administered Medications on File Prior to Visit   Medication Dose Route Frequency Provider Last Rate Last Dose    phenylephrine HCL 2.5% ophthalmic solution 1 drop  1 drop Left Eye On Call Procedure Chary Culp MD   1 drop at 08/22/19 0659    proparacaine 0.5 % ophthalmic solution 1 drop  1 drop Left Eye On Call Procedure Chary Culp MD   1 drop at 08/22/19 0641    tropicamide 1% ophthalmic solution 1 drop  1 drop Left Eye On Call Procedure Chary Culp MD   1 drop at 08/22/19 0700         Assessment:   75 y.o. male with multiple co-morbid illnesses here to follow-up with PCP and continue work-up of chronic issues notably post hospitalization medication review, pill packing    Plan:   Dr. Lewis instructed patient:  - To speak with Dr. Reed and JODI Montejo about taking Protonix with the Votrient  - A mobile NP will come to his home for hospital discharge appointment    -Patient advised to take medication as follows:    AM  Vitamin B complex and Vitamin D  Pantoprazole  Levothyroxine  Aspirin  Plavix  Carvedilol    PM  Carvedilol  Rosuvastatin (now 40mg)  Tamsulosin (take one for now)    -Pt instructed to hold:  Votrient (until talking to Dr. Reed about its interactions with Protonix)  Amlodipine (until we get more BP readings)    -Pt instructed to take as needed:  Methocarbamol  Diphenoxylate  Furosemide  Montelukast    Pt provided with pill pack for 7 days  with above AM and PM medication. Pt is scheduled to see Dr. Reed/ JODI Montejo on 9/15/2020      Problem List Items Addressed This Visit        Cardiac/Vascular    NSTEMI (non-ST elevated myocardial infarction) - Primary    Relevant Orders    Ambulatory referral/consult to [facility]    WHEELCHAIR FOR HOME USE       Renal/    Benign prostatic hyperplasia (BPH) with urinary urge incontinence (Chronic)          Health Maintenance       Date Due Completion Date    Shingles Vaccine (2 of 2) 01/13/2020 11/18/2019    Influenza Vaccine (1) 08/01/2020 10/15/2019    Hemoglobin A1c 02/04/2021 8/4/2020    Colorectal Cancer Screening 02/15/2021 8/30/2020    Override on 2/15/2011: Done (Per [employer] dashboard ? results)    Foot Exam 05/18/2021 5/18/2020 (Done)    Override on 5/18/2020: Done    Override on 1/13/2020: Done    Override on 7/11/2019: Done    Override on 2/2/2018: Done (performed by podiatrist)    Lipid Panel 08/04/2021 8/4/2020    High Dose Statin 09/01/2021 9/1/2020    TETANUS VACCINE 01/23/2022 1/23/2012              Follow up in about 4 weeks (around 10/7/2020). Total face-to-face time was 60 min, >50% of this was spent on counseling and coordination of care. The following issues were discussed: post-hospitalization medication review and pill packing    Jw Puri (MS4)    Lulú Lewis MD/MPH  [facility]  [phone] dustinink

## 2020-09-09 NOTE — Clinical Note
Hi Dr Reed and NP Neeru,  Can you address this at patient's upcoming appt?   + protonix interacts with votrient by lowering the level of it   + patient needs protonix now due to the stomach bleed             + when does he restart votrient?    Thanks,  CORTEZ (PCP)

## 2020-09-09 NOTE — PATIENT INSTRUCTIONS
TODAY:  - speak with Dr Menjivar and JODI Montejo about taking protonix with the votrient   + protonix interacts with votrient by lowering the level of it   + you need protonix now due to the stomach bleed  - a mobile NP will come to your home for your hospital discharge appointment, JODI Terry    TAKE MEDS THIS WAY UNTIL YOU SEE DR MENJIVAR:  AM:  Vitamins (B-complex, and Vit D)  Pantoprazole (for stomach bleed, talk to Dr Menjivar about whether you can take this with votrient)  Levothyroxine (thyroid)  Aspirin and plavix (to keep your heart arteries open)  Carvedilol (blood pressure and heart protection)    PM:  Carvedilol (blood pressure and heart protection)  Rosuvastatin 40mg (cholesterol and heart protection)  Tamsulosin 0.8mg (prostate, only take one for now)      HOLD:  Votrient (until you talk to Dr Menjivar)  Amlodipine (until we get more BP readings)      AS NEEDED:  Methocarbamol (muscle cramps, can make you fall)  Diphenoxylate (diarrhea)  Furosemide (leg swelling)  monteleukast and ceterizine (allergies)

## 2020-09-09 NOTE — TELEPHONE ENCOUNTER
Message terelld to .       ----- Message from Rahel Reed MD sent at 9/9/2020  8:48 AM CDT -----  Regarding: RE: Votrient status  He needs to come in and see me or PJ  ----- Message -----  From: Radha Phillips  Sent: 9/9/2020   8:29 AM CDT  To: Rahel Reed MD  Subject: FW: Votrient status                              Looks like he no showed for labs on 9/2 and appt with you on 9/4- need to get this r/s.   See below  ----- Message -----  From: Jeri Harris PharmD  Sent: 9/9/2020   8:19 AM CDT  To: Malu Pena PharmD, Brianna Ferrari  Subject: RE: Votrient status                              I spoke with son, Pramod, who states that patient restarted his Votrient when he was discharged home. He stated that he did not understand why he needed to hold the medication any longer until he was seen. I suggested that he call the office to discuss.     Jeri Harris, PharmD  [facility]   [phone]    ----- Message -----  From: Pratima Zuñiga RN  Sent: 9/4/2020   4:48 PM CDT  To: Malu Pena PharmD  Subject: FW: Votrient status                                ----- Message -----  From: Rahel Reed MD  Sent: 9/4/2020   4:35 PM CDT  To: Pratima Zuñiga, RN  Subject: RE: Votrient status                              We should probably hold until I see him  ----- Message -----  From: Pratima Zuñiga RN  Sent: 9/4/2020   3:57 PM CDT  To: Rahel Reed MD  Subject: FW: Votrient status                                ----- Message -----  From: Malu Pena PharmD  Sent: 9/4/2020   3:52 PM CDT  To: Jeri Harris PharmD, #  Subject: RE: Votrient status                              Good afternoon.    It appears Mr. Powell was discharged home from the hospital.  He had an office visit scheduled for today, but does not appear that he showed. Will he continue to hold Votrient until he is seen?  I attempted to reach the patient to discuss his discharge plan, but LVM.      Any information you may  have would be appreciated.  Thanks!    Malu Pena, Pharm D.   [facility]  [phone]  ----- Message -----  From: Iqra VallesD  Sent: 8/25/2020   9:03 AM CDT  To: Malu Pena, PharmD, Iqra MoscosoD, #  Subject: Votrient status                                  Good morning,    We have been following up with Mr. Powell while he has been admitted. Patient did bring his medication from home to the hospital. It looks like he received his Votrient dose while admitted on 8/24; however, he refused it today stating that he will restart when he gets home. He may be due for a refill soon. Just wanted to confirm how we should proceed. Will he continue to hold his Votrient while admitted or would you like us to work on getting his refill sent over?    Thank you,    Jeri Harris PharmD  [facility]   [phone]

## 2020-09-11 NOTE — TELEPHONE ENCOUNTER
Patient is now holding Votrient therapy again due to significant DDI with pantoprazole. Will follow up after next scheduled appt on 9/15

## 2020-09-14 ENCOUNTER — TELEPHONE (OUTPATIENT)
Dept: HEMATOLOGY/ONCOLOGY | Facility: CLINIC | Age: 75
End: 2020-09-14

## 2020-09-14 ENCOUNTER — PES CALL (OUTPATIENT)
Dept: ADMINISTRATIVE | Facility: CLINIC | Age: 75
End: 2020-09-14

## 2020-09-15 ENCOUNTER — TELEPHONE (OUTPATIENT)
Dept: PHARMACY | Facility: CLINIC | Age: 75
End: 2020-09-15

## 2020-09-15 ENCOUNTER — OFFICE VISIT (OUTPATIENT)
Dept: HEMATOLOGY/ONCOLOGY | Facility: CLINIC | Age: 75
End: 2020-09-15
Payer: MEDICARE

## 2020-09-15 ENCOUNTER — PATIENT OUTREACH (OUTPATIENT)
Dept: ADMINISTRATIVE | Facility: OTHER | Age: 75
End: 2020-09-15

## 2020-09-15 DIAGNOSIS — C64.9 METASTATIC RENAL CELL CARCINOMA TO LIVER: ICD-10-CM

## 2020-09-15 DIAGNOSIS — G89.3 NEOPLASM RELATED PAIN: ICD-10-CM

## 2020-09-15 DIAGNOSIS — C64.9 METASTATIC RENAL CELL CARCINOMA, UNSPECIFIED LATERALITY: Primary | Chronic | ICD-10-CM

## 2020-09-15 DIAGNOSIS — C64.9 METASTATIC RENAL CELL CARCINOMA TO BONE: ICD-10-CM

## 2020-09-15 DIAGNOSIS — R07.9 CHEST PAIN, UNSPECIFIED TYPE: ICD-10-CM

## 2020-09-15 DIAGNOSIS — C79.51 METASTATIC RENAL CELL CARCINOMA TO BONE: ICD-10-CM

## 2020-09-15 DIAGNOSIS — C78.7 METASTATIC RENAL CELL CARCINOMA TO LIVER: ICD-10-CM

## 2020-09-15 PROCEDURE — 99214 PR OFFICE/OUTPT VISIT, EST, LEVL IV, 30-39 MIN: ICD-10-PCS | Mod: HCNC,95,, | Performed by: NURSE PRACTITIONER

## 2020-09-15 PROCEDURE — 1159F PR MEDICATION LIST DOCUMENTED IN MEDICAL RECORD: ICD-10-PCS | Mod: HCNC,95,, | Performed by: NURSE PRACTITIONER

## 2020-09-15 PROCEDURE — 1159F MED LIST DOCD IN RCRD: CPT | Mod: HCNC,95,, | Performed by: NURSE PRACTITIONER

## 2020-09-15 PROCEDURE — 1100F PTFALLS ASSESS-DOCD GE2>/YR: CPT | Mod: HCNC,CPTII,95, | Performed by: NURSE PRACTITIONER

## 2020-09-15 PROCEDURE — 3288F FALL RISK ASSESSMENT DOCD: CPT | Mod: HCNC,CPTII,95, | Performed by: NURSE PRACTITIONER

## 2020-09-15 PROCEDURE — 99499 RISK ADDL DX/OHS AUDIT: ICD-10-PCS | Mod: 95,,, | Performed by: NURSE PRACTITIONER

## 2020-09-15 PROCEDURE — 3288F PR FALLS RISK ASSESSMENT DOCUMENTED: ICD-10-PCS | Mod: HCNC,CPTII,95, | Performed by: NURSE PRACTITIONER

## 2020-09-15 PROCEDURE — 99214 OFFICE O/P EST MOD 30 MIN: CPT | Mod: HCNC,95,, | Performed by: NURSE PRACTITIONER

## 2020-09-15 PROCEDURE — 99499 UNLISTED E&M SERVICE: CPT | Mod: 95,,, | Performed by: NURSE PRACTITIONER

## 2020-09-15 PROCEDURE — 1100F PR PT FALLS ASSESS DOC 2+ FALLS/FALL W/INJURY/YR: ICD-10-PCS | Mod: HCNC,CPTII,95, | Performed by: NURSE PRACTITIONER

## 2020-09-15 NOTE — TELEPHONE ENCOUNTER
Dosing, how taking Votrient: Take 4 tablets (800 mg total) by mouth once daily.  Storage: room temperature  Handling: pours into cap and does not   Side effects: no side effects on Votrient. Asked if he wanted to review medication list again today and patient's son declined stating that patient has been doing well and he is familiar with common side effects of Votrient.   Recent infections: no fever, chills, cough, SOB  Pain: has some chest pain which was discussed at appt today. NP notes that it was likely related to trauma. Does not normally experience pain.    Appetite: still has about 2-3 meals a day. Weight stable.   Energy, fatigue: energy level has been very low since he has been discharged. Still able to  Bathe and feed himself and use the restroom on his own.   Health, mood, QOL: 6-7/10 (10 being the best).   ED/UC visits: recent hospitalization  Next clinical follow up: 3 months  Medication list reviewed. No new allergies or health conditions. Stopped taking Protonix.   Cat X: P-glycoprotein/ABCB1 Inhibitors (carvedilol) may increase the serum concentration of PAZOPanib.  Cat X: BCRP/ABCG2 Inhibitors (Plavix) may increase the serum concentration of PAZOPanib.       Refill: Patient resumed chemo pills today. Reports that he has about 2 weeks of medication

## 2020-09-15 NOTE — PROGRESS NOTES
"Subjective:       Patient ID: Edwin Powell is a 75 y.o. male.    HPI   The patient location is: home  The chief complaint leading to consultation is: renal cell cancer    Visit type: audiovisual    Face to Face time with patient: 30 minutes  60 minutes of total time spent on the encounter, which includes face to face time and non-face to face time preparing to see the patient (eg, review of tests), Obtaining and/or reviewing separately obtained history, Documenting clinical information in the electronic or other health record, Independently interpreting results (not separately reported) and communicating results to the patient/family/caregiver, or Care coordination (not separately reported).         Each patient to whom he or she provides medical services by telemedicine is:  (1) informed of the relationship between the physician and patient and the respective role of any other health care provider with respect to management of the patient; and (2) notified that he or she may decline to receive medical services by telemedicine and may withdraw from such care at any time.    Notes:       Today, not feeling too well due to pain.  Recent hospitalization post neck surgery. - see below.     Pain in center of chest and ribs with inspiration- cuts through to back. Started during hospital admission when they were "beating on him". Pain about the same as when in hospital- no worse but no better.   Pain eases up until takes another breath.   Pain also triggered by movement.   No cough.   No fever/chills.   No n/v.   No dysphagia. No reflux.   No other new complaints or issues today. Chronic back and neck pain.     Taking oxycodone 5mg once or twice a day as does not want to take too much.   Not taking Votrient at this time as was told to stop in hospital.       Hospital Course: 8/17/2020-9/3/2020:   He underwent elective anterior C6-C7 cervical disc fusion at [facility] on 8/17/2020 by Orthopedic " Surgery and Otolaryngology. During surgery, he received rocuronium, ketamine, fentanyl, propofol, and midazolam. He required phenylephrine infusion. Postoperatively, he was placed on his home 2 liters/minute of supplemental oxygen. He was given multiple doses of oxycodone for postoperative neck pain. Overnight, he had multiple episodes of apnea, requiring 3 doses of naloxone with minimal improvement. ABG showed combined metabolic and respiratory acidosis. There was no significant improvement with BiPAP. Hypoxia worsened and he required 15 liters/minute of oxygen. He was transferred to Critical Care Medicine during the night.  He was put on naloxone and bicarbonate infusions. Hypoxia worsened further and he required 30 liters/minute of comfort flow oxygen. Mentation improved and naloxone infusion was discontinued the next morning. Pulmonary embolism was suspected but CTA was not done due to renal insufficiency. Lower extremity ultrasound showed no deep venous thrombosis, but he was given empiric anticoagulation with heparin infusion. Transthoracic echocardiogram showed no right ventricular strain but showed focal wall motion abnormalities. Troponin was elevated and trended up, so he was given dual antiplatelet therapy for non-ST elevated myocardial infarction. Central venous pressure was 3 but chest X-ray showed pulmonary edema. He was given diuretics. On 8/20/2020, he was weaned back to nasal cannula at 2 liters/minute. He was transferred to Hospital Medicine Team D on 8/21/2020. Nuclear medicine perfusion lung scan on 8/30/2020 was low probability for pulmonary embolism. He coughed up bright red blood with clots. He was transfused 1 unit of packed RBCs. Esophagogastroduodenoscopy on 9/1/2020 showed a small hiatal hernia and mild gastritis. He was put on pantoprazole. Physical and occupational therapy recommended home health.      ECHO:  · Normal left ventricular systolic function. The estimated ejection fraction is  65%.  · No wall motion abnormalities.  · Grade II (moderate) left ventricular diastolic dysfunction consistent with pseudonormalization.  · Mild left atrial enlargement.  · Normal right ventricular systolic function.  · Moderate right atrial enlargement.  · Mild mitral regurgitation.  · Mild tricuspid regurgitation.  · Normal central venous pressure (3 mmHg).  · The estimated PA systolic pressure is 38 mmHg.         Mr. Singleton is a 75 y.o. male who returns for follow up regarding metastatic renal cell carcinoma of left kidney. Returns for follow-up, currently on Votrient.     5/19/2020 CT scans C/A/P:  COMPARISON:  MRI cervical spine and bone scan from earlier today.  CT chest/abdomen/pelvis from 01/24/2020 and multiple additional previous exams.     FINDINGS:  Thoracic soft tissues: Stable subcentimeter hypoattenuating nodule in the right lobe of the thyroid.  Left lobe of thyroid appears unremarkable.     Aorta: Normal in course and caliber with moderate atherosclerotic plaque.  Left-sided 3 vessel arch.     Heart: Normal in size.  Dense calcification of the aortic valve annulus.  Dense coronary calcification.  Trace pericardial fluid.     Alyssa/Mediastinum: Stable appearance of an enlarged right paratracheal lymph node measuring 1.6 cm in short axis (axial series 2, image 33), previously 1.6 cm.  No definite hilar lymphadenopathy.     Lungs: Well aerated.  Stable pulmonary micronodule on the order of 3 mm in the right upper lobe (axial series 2, image 48).  Stable subcentimeter pleural-based nodule in the left upper lobe (axial series 2, image 41).  No new suspicious nodules.  No focal consolidation or mass.  No significant pleural fluid.  No pneumothorax.     Liver: Normal in size and attenuation.  Previously described 1.2 cm hypoattenuating lesion in the hepatic dome and subcentimeter hypoattenuating lesion about the left portal vein are not well characterized on today's non-contrast exam.  IV contrast was not  given because of renal function.  No discrete mass.     Gallbladder: Surgically absent.     Bile Ducts: No evidence of dilated ducts.     Pancreas: No mass or peripancreatic fat stranding.     Spleen: Unremarkable.     Adrenals: Unremarkable.     Kidneys/ Ureters: Normal in size and location.  Mild nonspecific bilateral perinephric stranding.  Stable appearance of solid mass in the left kidney measuring approximately 3.4 x 2.5 cm (axial series 2, image 124), previously 3.1 x 2.3 cm.  Differences in measurement are likely due to interobserver variability and technique.  No hydronephrosis or nephrolithiasis. No ureteral dilatation.     Bladder: No evidence of wall thickening.     Reproductive organs: Prostatomegaly with dystrophic calcifications.     GI Tract/Mesentery: Stomach appears distended with ingested material and oral contrast.  No evidence of bowel obstruction or inflammation.     Peritoneal Space: Trace ascites along the colic gutters.  No free air.     Retroperitoneum: No significant adenopathy.     Abdominal wall: Unremarkable.     Vasculature: Dense aortic atherosclerosis without aneurysm.     Bones: Stable lytic focus within the L3 vertebral body, initially reported as metastatic disease on bone scan dated 05/30/2017.  Multiple stable nonspecific dense sclerotic foci involving the thoracic spine and bilateral iliac bones; these are more likely benign.  No new aggressive lesions.  Age-appropriate degenerative changes.  Stable decompressive laminectomies at L4 and L5.     Impression:     History metastatic renal cell carcinoma.  Stable solid left renal mass.     Stable mediastinal lymphadenopathy.     Previously described liver lesions are not well characterized on today's non-contrast examination; no obvious interval change.  These are likely metastatic deposits.     Stable osseous metastatic disease at L3..     Additional findings as above.     RECIST SUMMARY:     Date of prior examination for  "comparison: 01/24/2020     Lesion 1: Left renal mass.  3.4 cm. Series 2 image 124.  Prior measurement 3.1 cm.     Lesion 2: Right hepatic lobe lesion.  Not measurable due to noncontrast technique. Prior measurement 1.2 cm.     Lesion 3: Right paratracheal lymph node.  1.6 cm. Series 2 image 33.  Prior measurement 1.6 cm.     Oncology History:  Mr.Mc Moss is a 76 yo male with CAD (s/p 5 stents, remotely), DM2 (insulin dependent), hypothyroidism , CVA (2013) with no residual deficit, chronic back pain. He was transferred [place] [facility] [place]surgery evaluation of a lumbar spine lesion in the setting of progressive weakness of his legs and exacerbation of chronic back pain, in May 2017. He states that his back pain and LE weakness progressively worsened. [facility]. The pain radiates down his legs. He denies any incontinence of bowel or bladder. He had several falls. After one of the falls, he presented to the ED at [facility], in May 2017. He had an MRI showing "L3 vertebral body lesion with apparent cortical disruption concerning for metastases vs myeloma," and was transferred to [facility] [place] neurosurgery evaluation. He was evaluated by neurosurgery regarding concern for lytic lesion on L3 on 5/26/17. No surgical intervention was proposed.   CT chest, abdomen,pelvis on 5/27/17 revealed :   --A 3.6 cm exophytic mass arising from the medial aspect of the lower pole of left kidney,   --A small 1.0 cm, subtle, cortical enhancing mass within the upper pole of the right kidney, concerning for possible contralateral solid renal mass  --Multiple enhancing ill-defined hepatic masses concerning for hepatic metastatic disease, hepatomegaly and hepatic steatosis  --Mulltifocal irregular thickening of the bilateral pleura concerning for possible pleural metastatic disease  --Mild nonspecific nodular thickening of the left adrenal gland without discrete nodule  -- Redemonstration of known " lytic lesion within the L3 vertebral body. No definite additional discrete lytic lesions are identified.  -MRI pelvis from 10/18/17:    2.5 cm linear T1 hypointense T2/STIR hyperintense lesion within the left iliac bone, this lesion is indeterminate but felt less likely to represent metastasis given its appearance. Further evaluation can be obtained with bone scan as clinically indicated.  Findings suggestive of left trochanteric bursitis.  Partially visualized L3 metastatic lesion.  Diffuse thickening of the urinary bladder wall can be seen with chronic outlet obstruction or cystitis.  - initiated on Votrient    Review of Systems   Review of Systems   Constitutional: Positive for malaise/fatigue (mild). Negative for chills, fever and weight loss.   HENT: Negative for sore throat.    Eyes: Negative for blurred vision and double vision.   Respiratory: Negative for cough, hemoptysis and shortness of breath.    Cardiovascular: Positive for chest pain (with inspiration and  movement. ). Negative for palpitations and leg swelling.   Gastrointestinal: Negative for constipation, diarrhea, nausea and vomiting.   Genitourinary: Negative for dysuria.   Musculoskeletal: Positive for back pain (overall improved. ) and neck pain (right side- slightly improved. ).   Skin: Negative for itching and rash.   Neurological: Negative for dizziness and weakness.   Psychiatric/Behavioral: Negative for depression. The patient is not nervous/anxious.            Objective:      Physical Exam  Physical Exam   Constitutional: He is well-developed, well-nourished, and in no distress. No distress.   Limited as virtual.          Assessment:       1. Metastatic renal cell carcinoma, unspecified laterality    2. Metastatic renal cell carcinoma to liver    3. 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 Neoplasm related pain    5. Chest pain, unspecified type        Plan:       Recent hospitalization reviewed in detail.   -Labs today- cbc, cmp.    -Stop protonix.   -Resume votrient.   -chest pain likely related to trauma.  Continue Oxycodone 5 mg every 6 hours prn for chest/rib pain. Lidoderm patches- he has supply at home- discussed in detail.   -Ct chest/abd/pelvis without contrast and se Dr. Reed post.   - follow up with nephrology as well. (Dr. Venkat Leonardo)        Patient is in agreement with the proposed treatment plan. All questions were answered to the patient's satisfaction. Pt knows to call clinic for any new or worsening symptoms and if anything is needed before the next clinic visit. Case discussed with Dr. Reed, who agrees with above.         Jessica Siddiqi, FNP-C  Hematology & Oncology  [facility] [place] [address]  ph. [phone]  Fax. [phone]

## 2020-09-16 ENCOUNTER — CARE AT HOME (OUTPATIENT)
Dept: HOME HEALTH SERVICES | Facility: CLINIC | Age: 75
End: 2020-09-16
Payer: MEDICARE

## 2020-09-16 ENCOUNTER — OFFICE VISIT (OUTPATIENT)
Dept: PRIMARY CARE CLINIC | Facility: CLINIC | Age: 75
End: 2020-09-16
Payer: MEDICARE

## 2020-09-16 VITALS
HEART RATE: 62 BPM | SYSTOLIC BLOOD PRESSURE: 100 MMHG | OXYGEN SATURATION: 97 % | DIASTOLIC BLOOD PRESSURE: 60 MMHG | TEMPERATURE: 98 F | RESPIRATION RATE: 16 BRPM

## 2020-09-16 VITALS — DIASTOLIC BLOOD PRESSURE: 58 MMHG | SYSTOLIC BLOOD PRESSURE: 100 MMHG

## 2020-09-16 DIAGNOSIS — C64.9 METASTATIC RENAL CELL CARCINOMA, UNSPECIFIED LATERALITY: Chronic | ICD-10-CM

## 2020-09-16 DIAGNOSIS — I21.4 NSTEMI (NON-ST ELEVATED MYOCARDIAL INFARCTION): ICD-10-CM

## 2020-09-16 DIAGNOSIS — Z98.1 S/P CERVICAL SPINAL FUSION: Primary | Chronic | ICD-10-CM

## 2020-09-16 DIAGNOSIS — I10 ESSENTIAL HYPERTENSION: Primary | ICD-10-CM

## 2020-09-16 DIAGNOSIS — I95.1 ORTHOSTATIC HYPOTENSION: ICD-10-CM

## 2020-09-16 DIAGNOSIS — Z79.899 POLYPHARMACY: ICD-10-CM

## 2020-09-16 PROCEDURE — 99443 PR PHYSICIAN TELEPHONE EVALUATION 21-30 MIN: ICD-10-PCS | Mod: HCNC,95,, | Performed by: INTERNAL MEDICINE

## 2020-09-16 PROCEDURE — 99443 PR PHYSICIAN TELEPHONE EVALUATION 21-30 MIN: CPT | Mod: HCNC,95,, | Performed by: INTERNAL MEDICINE

## 2020-09-16 PROCEDURE — 99350 HOME/RES VST EST HIGH MDM 60: CPT | Mod: S$GLB,,, | Performed by: NURSE PRACTITIONER

## 2020-09-16 PROCEDURE — 99350 PR HOME VISIT,ESTAB PATIENT,LEVEL IV: ICD-10-PCS | Mod: S$GLB,,, | Performed by: NURSE PRACTITIONER

## 2020-09-16 RX ORDER — CARVEDILOL 3.12 MG/1
3.12 TABLET ORAL 2 TIMES DAILY WITH MEALS
Qty: 180 TABLET | Refills: 3 | Status: SHIPPED | OUTPATIENT
Start: 2020-09-16 | End: 2021-01-01

## 2020-09-16 NOTE — PROGRESS NOTES
"Established Patient - Audio Only Telehealth Visit with [facility] Provider     The patient location is: HOME  The chief complaint leading to consultation is: routine care  Visit type: Virtual visit with audio only (telephone)     The reason for the audio only service rather than synchronous audio and video virtual visit was related to technical difficulties or patient preference/necessity.     Each patient to whom I provide medical services by telemedicine is:  (1) informed of the relationship between the physician and patient and the respective role of any other health care provider with respect to management of the patient; and (2) notified that they may decline to receive medical services by telemedicine and may withdraw from such care at any time. Patient verbally consented to receive this service via voice-only telephone call.       Subjective:      Patient ID: Edwin Powell is a 75 y.o. male.    Patient's risk score is 7. Patient is high risk for poor outcomes with COVID due to the following chronic conditions advanced age, CAD, HLD, BPH, and RCC    Mr. Powell met with Neeru NP (Hem/Onc) yesterday and was told to stop Protonix and restart Votrient. Home health is coming to the home today to draw labs. Per pt, Neeru is scheduling him for a CT chest/abd/pelvis to reevaluate his RCC. No date yet.    Pt states that he found the pill packs useful and would like to continue with them going forward. However, Protonix will need to be removed and Votrient added. Pt notes he does not need medication refills    Pt states his last blood pressure reading was 108/49. He does not know what previous readings were. He notes that he still gets dizzy/lightheaded when standing. Denies falls. He uses a walker to get around the house without difficulty.     Pt reports that he gets pain on the L side of his chest that he describes as "indigestion", which occurs approximately 3 times a day. He notes that the pain occurs when he is " "sitting down and when he relaxes it goes away. He notes that occasionally he will have L arm numbness from his shoulder down with the chest discomfort. He admits that he has had similar pain years ago that improved with antacids and caused him to belch. He denies recent chest pain improvement with antacids. He states that he has had this pain since being in the hospital after receiving pulmonary therapy, which he describes as "beating on my chest". He notes it hurts to breathe in deep. He notes the pain is worse with movement. He denies current pain.     Pt states that he thinks his vision appears worse since discharged, but is able to read with glasses.      Review of Systems   Constitutional: Negative for chills and fever.   HENT: Negative for sore throat.    Respiratory: Positive for shortness of breath (with exterion "20-30 steps"). Negative for cough.    Cardiovascular: Negative for palpitations and leg swelling.   Gastrointestinal: Negative for abdominal pain, constipation, diarrhea, nausea and vomiting.   Genitourinary: Negative for dysuria and hematuria.   Neurological: Positive for dizziness and headaches (L side behind L eye when lying down). Negative for weakness.          Objective:     Lab Results   Component Value Date    WBC 9.28 09/16/2020    HGB 7.1 (L) 09/16/2020    HCT 23.9 (L) 09/16/2020     (H) 09/16/2020    CHOL 142 08/04/2020    TRIG 142 08/04/2020    HDL 38 (L) 08/04/2020     (H) 09/16/2020     (H) 09/16/2020     09/16/2020    K 5.2 (H) 09/16/2020     09/16/2020    CREATININE 2.01 (H) 09/16/2020    BUN 40 (H) 09/16/2020    CO2 21 (L) 09/16/2020    TSH 7.711 (H) 08/18/2020    PSA 1.1 07/16/2018    INR 1.0 09/01/2020    HGBA1C 6.3 (H) 08/04/2020    HGBA1C 6.3 (H) 08/04/2020         Assessment:   75 y.o. male with multiple co-morbid illnesses here to continue work-up of chronic issues including advanced age, CAD, HLD, BPH, and RCC    Plan:     Problem List Items " Addressed This Visit        Other    Polypharmacy     Multiple med changes during in-patient stay, much confusion over meds  · Decrease coreg           Other Visit Diagnoses     Essential hypertension    -  Primary    Relevant Medications    carvediloL (COREG) 3.125 MG tablet          Health Maintenance       Date Due Completion Date    Shingles Vaccine (2 of 2) 01/13/2020 11/18/2019    Influenza Vaccine (1) 08/01/2020 10/15/2019    Hemoglobin A1c 02/04/2021 8/4/2020    Foot Exam 05/18/2021 5/18/2020 (Done)    Override on 5/18/2020: Done    Override on 1/13/2020: Done    Override on 7/11/2019: Done    Override on 2/2/2018: Done (performed by podiatrist)    Lipid Panel 08/04/2021 8/4/2020    Colorectal Cancer Screening 08/30/2021 8/30/2020    Override on 2/15/2011: Done (Per [employer] dashboard ? results)    High Dose Statin 09/01/2021 9/1/2020    TETANUS VACCINE 01/23/2022 1/23/2012          Follow up in about 2 weeks (around 9/30/2020). Thirty minutes spent with this patient today, including addressing polypharmacy, med changes, HTN.    Jw Puri (MS4)    Lulú Lewis MD/MPH  Internal Medicine  [facility]  Spectra [phone]    This service was not originating from a related E/M service provided within the previous 7 days nor will  to an E/M service or procedure within the next 24 hours or my soonest available appointment.  Prevailing standard of care was able to be met in this audio-only visit.

## 2020-09-16 NOTE — PROGRESS NOTES
[facility]  Transition of Care Home Visit    Visit Date: 9/16/2020  Encounter Provider: Arely Terry NP  PCP:  Lulú Lewis MD    PRESENTING HISTORY      Patient ID: Edwin Powell is a 75 y.o. male.    Consult Requested By:  Dr. Lulú Lewis  Reason for Consult:  Hospital Follow Up    Edwin is being seen at home due to physical debility that presents a taxing effort to leave the home, to mitigate high risk of hospital readmission and/or due to the limited availability of reliable or safe options for transportation to the point of access to the provider. Prior to treatment on this visit the chart was reviewed and patient consent was obtained.         Chief Complaint: Transitional Care, Cancer, and Post-op Problem      History of Present Illness: Mr. Edwin Powell is a 75 y.o. male who was recently admitted to the hospital.      ___________________________________________________________________    Today:    HPI:  Pt is being seen today for hospital follow up. See hospital course.    He has a PMH: RCC with mets on chemo, CAD, glaucoma, HLD, chronic back pain.  He was recently admitted for a cervical fusion.    Today, he reports some weakness and dizziness especially when standing. He reports symptoms resolve after a few seconds.   He reports no issues with his incision healing, ambulating with a walker. He does not want therapy at this time.  He reports he missed labs being drawn this week due to the lab closed because of possible storm    Review of Systems   Constitutional: Positive for fatigue. Negative for activity change and fever.   HENT: Negative.    Eyes: Negative.    Respiratory: Negative for chest tightness.    Cardiovascular: Negative.  Negative for leg swelling.   Gastrointestinal: Negative.    Endocrine: Negative.    Genitourinary: Negative.    Musculoskeletal: Negative.    Skin: Negative.    Allergic/Immunologic: Negative.    Neurological: Positive for dizziness (when  standing).   Hematological: Negative.    Psychiatric/Behavioral: Negative.  Negative for agitation.   All other systems reviewed and are negative.      Assessments:  · Environmental: clean, adequate lighting and temperature,   · Functional Status: independent  · Safety: no issues noted  · Nutritional: adequate food in home  · Home Health/DME/Supplies: declined home health/walker    PAST HISTORY:     Past Medical History:   Diagnosis Date    Acquired hypothyroidism 5/26/2017    Benign essential HTN 5/26/2017    Benign prostatic hyperplasia (BPH) with urinary urge incontinence 6/6/2017    Chronic low back pain 6/1/2017    Chronic respiratory failure with hypoxia, on home oxygen therapy     Coronary artery disease involving native coronary artery of native heart without angina pectoris 5/26/2017    S/p 5 stents - last one around 2010-11.    Gastroesophageal reflux disease without esophagitis 5/26/2017    History of CVA (cerebrovascular accident) 5/26/2017    Hypertension associated with diabetes 5/26/2017    BP controlled on ACE, CCB    Lumbar disc lesion 5/26/2017    Metastatic renal cell carcinoma to bone 6/6/2017    Metastatic renal cell carcinoma to liver 6/6/2017    Liver Biopsy 5-2017: METASTATIC RENAL CELL CARCINOMA.    Mixed hyperlipidemia 5/26/2017    Type 2 diabetes mellitus with stage 3 chronic kidney disease, with long-term current use of insulin 5/26/2017       Past Surgical History:   Procedure Laterality Date    ABDOMINAL SURGERY      ANTERIOR CERVICAL DISCECTOMY W/ FUSION N/A 8/17/2020    Procedure: DISCECTOMY, SPINE, CERVICAL, ANTERIOR APPROACH, WITH FUSION co case with Dr. Falcon C6/7 Depkeara SNS:vocal cord/motors/SSEP Regular OR table Patient needs scope in preop;  Surgeon: Filemon Aguayo MD;  Location: [facility] OR [address] [place] [address];  Service: Neurosurgery;  Laterality: N/A;    APPENDECTOMY      BACK SURGERY      CARDIAC SURGERY      CATARACT EXTRACTION      CHOLECYSTECTOMY      CORONARY  STENT PLACEMENT      5 stents    coronary stenting      X 5 last one in 2010-11.    DISSECTION OF NECK N/A 8/17/2020    Procedure: DISSECTION, NECK;  Surgeon: Rush Falcon MD;  Location: [facility] OR 2ND FLR;  Service: ENT;  Laterality: N/A;    ESOPHAGOGASTRODUODENOSCOPY N/A 9/1/2020    Procedure: EGD (ESOPHAGOGASTRODUODENOSCOPY);  Surgeon: Jim Hooks MD;  Location: [facility] ENDO (2ND FLR);  Service: Endoscopy;  Laterality: N/A;    INTRAOCULAR PROSTHESES INSERTION Right 6/27/2019    Procedure: INSERTION, IOL PROSTHESIS;  Surgeon: Chary Culp MD;  [facility];  Service: Ophthalmology;  Laterality: Right;    INTRAOCULAR PROSTHESES INSERTION Left 8/22/2019    Procedure: INSERTION, IOL PROSTHESIS;  Surgeon: Chary Culp MD;  Location: [facility] OR [address] [place] [address];  Service: Ophthalmology;  Laterality: Left;    PHACOEMULSIFICATION OF CATARACT Right 6/27/2019    Procedure: PHACOEMULSIFICATION, CATARACT;  Surgeon: Chary Culp MD;  Location: [facility] OR [address] [place] [address];  Service: Ophthalmology;  Laterality: Right;    PHACOEMULSIFICATION OF CATARACT Left 8/22/2019    Procedure: PHACOEMULSIFICATION, CATARACT;  Surgeon: Chary Culp MD;  Location: [facility] OR [address] [place] [address];  Service: Ophthalmology;  Laterality: Left;    SPINAL FUSION         Family History   Problem Relation Age of Onset    Glaucoma Mother     Diabetes Mother     Heart attack Mother     Diabetes Brother     Glaucoma Maternal Grandmother     No Known Problems Son     Melanoma Neg Hx        Social History     Socioeconomic History    Marital status: Single     Spouse name: Not on file    Number of children: Not on file    Years of education: Not on file    Highest education level: Not on file   Occupational History    Not on file   Social Needs    Financial resource strain: Hard    Food insecurity     Worry: Never true     Inability: Never true    Transportation needs     Medical: No     Non-medical: No   Tobacco Use    Smoking  status: Former Smoker     Types: Cigarettes    Smokeless tobacco: Never Used    Tobacco comment: haven't smoked in last 25 yrs   Substance and Sexual Activity    Alcohol use: Yes     Comment: rarely     Drug use: Not Currently    Sexual activity: Not Currently     Partners: Female   Lifestyle    Physical activity     Days per week: Not on file     Minutes per session: Not on file    Stress: Not on file   Relationships    Social connections     Talks on phone: Not on file     Gets together: Not on file     Attends Sabianism service: Not on file     Active member of club or organization: Not on file     Attends meetings of clubs or organizations: Not on file     Relationship status: Not on file   Other Topics Concern    Not on file   Social History Narrative    Not on file       MEDICATIONS & ALLERGIES:     Current Outpatient Medications on File Prior to Visit   Medication Sig Dispense Refill    ascorbic acid (VITAMIN C ORAL) Take by mouth once daily. Hold for 1 week prior to surgery      aspirin (ECOTRIN) 81 MG EC tablet Take 81 mg by mouth once daily. Will ask MD to hold 1 week      azelastine (ASTELIN) 137 mcg (0.1 %) nasal spray 1 spray (137 mcg total) by Nasal route 2 (two) times daily as needed for Rhinitis (congestion). 30 mL 3    b complex vitamins (B COMPLEX 1) tablet Take 1 tablet by mouth once daily. (Patient taking differently: Take 1 tablet by mouth once daily. Hold for 1 week prior to surgery) 90 tablet 3    blood sugar diagnostic Strp 1 strip by Misc.(Non-Drug; Combo Route) route 2 (two) times daily as needed. 200 strip 3    blood-glucose meter Misc use as instructed 1 each 0    cholecalciferol, vitamin D3, (VITAMIN D3) 125 mcg (5,000 unit) Tab Take 1 tablet (5,000 Units total) by mouth once daily. (Patient taking differently: Take 5,000 Units by mouth once daily. Hold for 1 week prior to surgery) 90 tablet 3    clopidogreL (PLAVIX) 75 mg tablet Take 1 tablet (75 mg total) by mouth  once daily. 30 tablet 11    docusate sodium (COLACE) 50 MG capsule Take 1 capsule (50 mg total) by mouth 2 (two) times daily. 60 capsule 0    fluticasone propionate (FLONASE) 50 mcg/actuation nasal spray 1 spray (50 mcg total) by Each Nostril route daily as needed for Rhinitis or Allergies.      insulin (LANTUS SOLOSTAR U-100 INSULIN) glargine 100 units/mL (3mL) SubQ pen Inject 5 Units into the skin every evening. (Patient taking differently: Inject 5 Units into the skin every evening. Take 80%=4U night before surgery) 5 mL 11    lancets (ONETOUCH ULTRASOFT LANCETS) Misc 1 lancet by Misc.(Non-Drug; Combo Route) route 2 (two) times daily as needed. 200 each 3    levocetirizine (XYZAL) 5 MG tablet Take 1 tablet (5 mg total) by mouth every evening. Can take up to 2 tablets (Patient taking differently: Take 5 mg by mouth every evening. Can take up to 2 tablets  Takes at night) 30 tablet 11    levothyroxine (SYNTHROID) 88 MCG tablet Take 1 tablet (88 mcg total) by mouth once daily. (Patient taking differently: Take 88 mcg by mouth once daily. Take in am of surgery) 90 tablet 3    lidocaine (LIDODERM) 5 % Place 2 patches onto the skin once daily. Remove & Discard patch within 12 hours or as directed by MD 30 patch 1    methyl salicylate-menthol 15-10% 15-10 % Crea Apply topically 3 (three) times daily. To neckk  0    montelukast (SINGULAIR) 10 mg tablet Take 10 mg by mouth every evening.      ondansetron (ZOFRAN) 8 MG tablet Take 1 tablet (8 mg total) by mouth 3 (three) times daily as needed. (Patient taking differently: Take 8 mg by mouth 3 (three) times daily as needed. Take if needed) 90 tablet 3    oxyCODONE (ROXICODONE) 5 MG immediate release tablet Take 1 tablet (5 mg total) by mouth every 6 (six) hours as needed for Pain. 120 tablet 0    PAZOpanib (VOTRIENT) 200 mg Tab Take 4 tablets (800 mg total) by mouth once daily. (Patient taking differently: Take 800 mg by mouth once daily Take in am of surgery.)  "120 tablet 5    pen needle, diabetic 29 gauge x 1/2" Ndle 1 application by Misc.(Non-Drug; Combo Route) route once daily. 100 each 3    polyethylene glycol (GLYCOLAX) 17 gram PwPk Take 17 g by mouth 2 (two) times daily as needed (constipation).  0    rosuvastatin (CRESTOR) 40 MG Tab Take 1 tablet (40 mg total) by mouth every evening. (Patient taking differently: Take 40 mg by mouth once daily. Take in am of surgery) 90 tablet 3    tamsulosin (FLOMAX) 0.4 mg Cap Take 2 capsules (0.8 mg total) by mouth every evening. (Patient taking differently: Take 0.8 mg by mouth once daily. Take in am of surgery) 180 capsule 3    [DISCONTINUED] amLODIPine (NORVASC) 10 MG tablet Take 1 tablet (10 mg total) by mouth once daily. (Patient taking differently: Take 10 mg by mouth once daily. Take in am of surgery) 1 tablet 0    [DISCONTINUED] carvediloL (COREG) 6.25 MG tablet Take 1 tablet (6.25 mg total) by mouth 2 (two) times daily. 60 tablet 11    [DISCONTINUED] furosemide (LASIX) 40 MG tablet Take 1 tablet (40 mg total) by mouth 2 (two) times daily. 60 tablet 11    [DISCONTINUED] pantoprazole (PROTONIX) 40 MG tablet Take 1 tablet (40 mg total) by mouth once daily. 40 tablet 0     Current Facility-Administered Medications on File Prior to Visit   Medication Dose Route Frequency Provider Last Rate Last Dose    phenylephrine HCL 2.5% ophthalmic solution 1 drop  1 drop Left Eye On Call Procedure Chary Culp MD   1 drop at 08/22/19 0659    proparacaine 0.5 % ophthalmic solution 1 drop  1 drop Left Eye On Call Procedure Chary Culp MD   1 drop at 08/22/19 0641    tropicamide 1% ophthalmic solution 1 drop  1 drop Left Eye On Call Procedure Chary Culp MD   1 drop at 08/22/19 0700        Review of patient's allergies indicates:  No Known Allergies    OBJECTIVE:     Vital Signs:  Vitals:    09/16/20 1200   BP: 100/60   Pulse: 62   Resp: 16   Temp: 97.8 °F (36.6 °C)     There is no height or weight on file to " calculate BMI.     Physical Exam:  Physical Exam  Vitals signs reviewed.   Constitutional:       General: He is not in acute distress.     Appearance: He is well-developed. He is ill-appearing.   HENT:      Head: Normocephalic and atraumatic.   Eyes:      Pupils: Pupils are equal, round, and reactive to light.   Neck:      Musculoskeletal: Normal range of motion and neck supple.      Comments: Healing incision to anterior neck  Cardiovascular:      Rate and Rhythm: Normal rate and regular rhythm.      Heart sounds: Normal heart sounds.   Pulmonary:      Effort: Pulmonary effort is normal.      Breath sounds: Normal breath sounds.   Abdominal:      General: Bowel sounds are normal.      Palpations: Abdomen is soft.   Musculoskeletal: Normal range of motion.   Skin:     General: Skin is warm and dry.      Coloration: Skin is pale.      Findings: Bruising present.   Neurological:      Mental Status: He is alert and oriented to person, place, and time.      Cranial Nerves: No cranial nerve deficit.   Psychiatric:         Behavior: Behavior normal.         Thought Content: Thought content normal.         Judgment: Judgment normal.         Laboratory  Lab Results   Component Value Date    WBC 9.28 09/16/2020    HGB 7.1 (L) 09/16/2020    HCT 23.9 (L) 09/16/2020     (H) 09/16/2020     (H) 09/16/2020     Lab Results   Component Value Date    INR 1.0 09/01/2020    INR 1.0 08/31/2020    INR 1.1 08/18/2020     Lab Results   Component Value Date    HGBA1C 6.3 (H) 08/04/2020    HGBA1C 6.3 (H) 08/04/2020     No results for input(s): POCTGLUCOSE in the last 72 hours.    Diagnostic Results:      TRANSITION OF CARE:     [facility] Contact Note: 9/4/2020    Family and/or Caretaker present at visit?  Yes.  Diagnostic tests reviewed/disposition: No diagnosic tests pending after this hospitalization.  Disease/illness education:   Home health/community services discussion/referrals: Patient does not have home health  established from hospital visit.  They do not need home health.  If needed, we will set up home health for the patient.   Establishment or re-establishment of referral orders for community resources: No other necessary community resources.   Discussion with other health care providers: No discussion with other health care providers necessary.     Transition of Care Visit:     I have reviewed and updated the history and problem list.  I have reconciled the medication list.  I have discussed the hospitalization and current medical issues, prognosis and plans with the patient/family.  I  spent more than 50% of time discussing the care with the patient/family.  Total Face-to-Face Encounter: 60 minutes.    Medications Reconciliation:   I have reconciled the patient's home medications and discharge medications with the patient/family. I have updated all changes.  Refer to After-Visit Medication List.    ASSESSMENT & PLAN:     HIGH RISK CONDITION(S):      Edwin was seen today for transitional care, cancer and post-op problem.    Diagnoses and all orders for this visit:    S/P cervical spinal fusion  Incision healing  Denies pain    NSTEMI (non-ST elevated myocardial infarction)  -     Ambulatory referral/consult to [facility]  Beta blocker in place. ASA, plavix and statin in place  No angina, or SOB at this time    Metastatic renal cell carcinoma, unspecified laterality  Currently followed by oncology  Chemo in place: Votrient  Recently stopped PPI due to interference with chemo  Pt missed recent labs due to lab closure   Labs drawn today    Orthostatic hypotension  B/P 100/54 today, pt reports b/p has been low at times  Reports dizziness with standing  Reduce Coreg to 3.125mg twice daily  Pt does not want HH, he will check b/p daily and log for review.  Instructed parameters to notify for, stated understanding  Reviewed standing slowly  Use walker at all times  Discussed with PCP    Labs  drawn from right AC. Pt tolerated well. Labs delivered to [facility]      Were controlled substances prescribed?  No    Instructions for the patient:  Reduce Coreg dose to 3.125 mg twice daily  Reviewed slow to stand to avoid fall  Follow up with PCP and oncology    Scheduled Follow-up :  Future Appointments   Date Time Provider Department Center   9/18/2020  1:00 PM SAME DAY LABST [facility] LAB [facility]   9/18/2020  1:45 PM Lary Vickers DPM [facility] POD [place]   9/23/2020 10:00 AM [facility] OIC CT1 DES CT SCAN [place]   9/25/2020  4:30 PM Rahel Reed MD [facility] HEMONC3 [facility] Cance   9/30/2020 11:00 AM Lulú Lewis MD [facility] MED CLN Arsenio Mukherjee PCW   10/7/2020  2:00 PM [facility] EOS [facility]   10/7/2020  3:00 PM Filemon Aguayo MD [facility] SPINE Arsenio Yaz       After Visit Medication List :     Medication List          Accurate as of September 16, 2020  3:29 PM. If you have any questions, ask your nurse or doctor.            CHANGE how you take these medications    b complex vitamins tablet  Commonly known as: B COMPLEX 1  Take 1 tablet by mouth once daily.  What changed: additional instructions     carvediloL 3.125 MG tablet  Commonly known as: COREG  Take 1 tablet (3.125 mg total) by mouth 2 (two) times daily with meals.  What changed:   · medication strength  · how much to take  · when to take this  Changed by: Lulú Lewis MD     cholecalciferol (vitamin D3) 125 mcg (5,000 unit) Tab  Commonly known as: VITAMIN D3  Take 1 tablet (5,000 Units total) by mouth once daily.  What changed: additional instructions     LANTUS SOLOSTAR U-100 INSULIN glargine 100 units/mL (3mL) SubQ pen  Generic drug: insulin  Inject 5 Units into the skin every evening.  What changed: additional instructions     levocetirizine 5 MG tablet  Commonly known as: XYZAL  Take 1 tablet (5 mg total) by mouth every evening. Can take up to 2 tablets  What changed: additional instructions     levothyroxine 88 MCG tablet  Commonly  "known as: SYNTHROID  Take 1 tablet (88 mcg total) by mouth once daily.  What changed: additional instructions     ondansetron 8 MG tablet  Commonly known as: ZOFRAN  Take 1 tablet (8 mg total) by mouth 3 (three) times daily as needed.  What changed: additional instructions     rosuvastatin 40 MG Tab  Commonly known as: CRESTOR  Take 1 tablet (40 mg total) by mouth every evening.  What changed:   · when to take this  · additional instructions     tamsulosin 0.4 mg Cap  Commonly known as: FLOMAX  Take 2 capsules (0.8 mg total) by mouth every evening.  What changed:   · when to take this  · additional instructions     VOTRIENT 200 mg Tab  Generic drug: PAZOpanib  Take 4 tablets (800 mg total) by mouth once daily.  What changed: additional instructions        CONTINUE taking these medications    ACCU-CHEK NATA PLUS METER [facility]  Generic drug: blood-glucose meter  use as instructed     ACCU-CHEK NATA PLUS TEST STRP Strp  Generic drug: blood sugar diagnostic  1 strip by Misc.(Non-Drug; Combo Route) route 2 (two) times daily as needed.     ACCU-CHEK SOFTCLIX LANCETS Misc  Generic drug: lancets  1 lancet by Misc.(Non-Drug; Combo Route) route 2 (two) times daily as needed.     aspirin 81 MG EC tablet  Commonly known as: ECOTRIN     azelastine 137 mcg (0.1 %) nasal spray  Commonly known as: ASTELIN  1 spray (137 mcg total) by Nasal route 2 (two) times daily as needed for Rhinitis (congestion).     BD ULTRA-FINE ORIG PEN NEEDLE 29 gauge x 1/2" Ndle  Generic drug: pen needle, diabetic  1 application by Misc.(Non-Drug; Combo Route) route once daily.     clopidogreL 75 mg tablet  Commonly known as: PLAVIX  Take 1 tablet (75 mg total) by mouth once daily.     docusate sodium 50 MG capsule  Commonly known as: COLACE  Take 1 capsule (50 mg total) by mouth 2 (two) times daily.     fluticasone propionate 50 mcg/actuation nasal spray  Commonly known as: FLONASE  1 spray (50 mcg total) by Each Nostril route daily as needed for Rhinitis " or Allergies.     lidocaine 5 %  Commonly known as: LIDODERM  Place 2 patches onto the skin once daily. Remove & Discard patch within 12 hours or as directed by MD     methyl salicylate-menthol 15-10% 15-10 % Crea  Apply topically 3 (three) times daily. To neckk     montelukast 10 mg tablet  Commonly known as: SINGULAIR     oxyCODONE 5 MG immediate release tablet  Commonly known as: ROXICODONE  Take 1 tablet (5 mg total) by mouth every 6 (six) hours as needed for Pain.     polyethylene glycol 17 gram Pwpk  Commonly known as: GLYCOLAX  Take 17 g by mouth 2 (two) times daily as needed (constipation).     VITAMIN C ORAL        STOP taking these medications    furosemide 40 MG tablet  Commonly known as: LASIX  Stopped by: Arely Terry NP           Where to Get Your Medications      These medications were sent to [facility]/pharmacy #[id number] - [place] - [address] AT COR[address] OF [facility]  [address], [place] [address]    Phone: [phone]   · carvediloL 3.125 MG tablet       Attestation: Screening criteria to assess the level of the patient's risk for infection with COVID-19 as recommended by the CDC at the time of the above documented home visit concluded appropriateness to proceed. Universal precautions were maintained at all times, including provider use of >60% alcohol gel hand  immediately prior to entry and upon departing the patient's home as well as cleaning of equipment used in home visit with antibacterial/germicidal disposable wipes.    Signature:  Arely Terry NP    Patient consent obtained prior to treatment

## 2020-09-17 ENCOUNTER — PATIENT MESSAGE (OUTPATIENT)
Dept: PRIMARY CARE CLINIC | Facility: CLINIC | Age: 75
End: 2020-09-17

## 2020-09-17 ENCOUNTER — TELEPHONE (OUTPATIENT)
Dept: HEMATOLOGY/ONCOLOGY | Facility: CLINIC | Age: 75
End: 2020-09-17

## 2020-09-17 NOTE — TELEPHONE ENCOUNTER
Left detailed message on patient's cell phone regarding elevated liver enzymes and high potassium.   Instructed to hold votrient and reduce high potassium foods in diet. Will try to call again later.   PJ

## 2020-09-18 ENCOUNTER — PATIENT MESSAGE (OUTPATIENT)
Dept: HEMATOLOGY/ONCOLOGY | Facility: CLINIC | Age: 75
End: 2020-09-18

## 2020-09-18 DIAGNOSIS — C78.7 METASTATIC RENAL CELL CARCINOMA TO LIVER: ICD-10-CM

## 2020-09-18 DIAGNOSIS — C64.9 METASTATIC RENAL CELL CARCINOMA TO LIVER: ICD-10-CM

## 2020-09-18 DIAGNOSIS — C64.2 RENAL CELL CARCINOMA OF LEFT KIDNEY: ICD-10-CM

## 2020-09-18 DIAGNOSIS — C79.51 METASTATIC RENAL CELL CARCINOMA TO BONE: ICD-10-CM

## 2020-09-18 DIAGNOSIS — C64.9 METASTATIC RENAL CELL CARCINOMA, UNSPECIFIED LATERALITY: Chronic | ICD-10-CM

## 2020-09-18 DIAGNOSIS — C64.9 METASTATIC RENAL CELL CARCINOMA TO BONE: ICD-10-CM

## 2020-09-18 RX ORDER — OXYCODONE HYDROCHLORIDE 10 MG/1
10 TABLET ORAL EVERY 4 HOURS PRN
Qty: 120 TABLET | Refills: 0 | Status: SHIPPED | OUTPATIENT
Start: 2020-09-18 | End: 2020-10-14 | Stop reason: SDUPTHER

## 2020-09-18 RX ORDER — OXYCODONE HYDROCHLORIDE 10 MG/1
10 TABLET ORAL EVERY 4 HOURS PRN
Qty: 120 TABLET | Refills: 0 | Status: SHIPPED | OUTPATIENT
Start: 2020-09-18 | End: 2020-09-18 | Stop reason: SDUPTHER

## 2020-09-18 RX ORDER — OXYCODONE HYDROCHLORIDE 10 MG/1
10 TABLET ORAL EVERY 4 HOURS PRN
Qty: 120 TABLET | Refills: 0 | Status: SHIPPED | OUTPATIENT
Start: 2020-09-18 | End: 2020-09-18

## 2020-09-22 ENCOUNTER — OFFICE VISIT (OUTPATIENT)
Dept: PRIMARY CARE CLINIC | Facility: CLINIC | Age: 75
End: 2020-09-22
Payer: MEDICARE

## 2020-09-22 ENCOUNTER — PATIENT MESSAGE (OUTPATIENT)
Dept: PRIMARY CARE CLINIC | Facility: CLINIC | Age: 75
End: 2020-09-22

## 2020-09-22 ENCOUNTER — TELEPHONE (OUTPATIENT)
Dept: NEPHROLOGY | Facility: HOSPITAL | Age: 75
End: 2020-09-22

## 2020-09-22 DIAGNOSIS — Z79.4 TYPE 2 DIABETES MELLITUS WITH HYPERGLYCEMIA, WITH LONG-TERM CURRENT USE OF INSULIN: ICD-10-CM

## 2020-09-22 DIAGNOSIS — I25.119 CORONARY ARTERY DISEASE INVOLVING NATIVE CORONARY ARTERY OF NATIVE HEART WITH ANGINA PECTORIS: ICD-10-CM

## 2020-09-22 DIAGNOSIS — N18.30 STAGE 3 CHRONIC KIDNEY DISEASE: Primary | ICD-10-CM

## 2020-09-22 DIAGNOSIS — E11.59 HYPERTENSION ASSOCIATED WITH DIABETES: ICD-10-CM

## 2020-09-22 DIAGNOSIS — E11.65 TYPE 2 DIABETES MELLITUS WITH HYPERGLYCEMIA, WITH LONG-TERM CURRENT USE OF INSULIN: ICD-10-CM

## 2020-09-22 DIAGNOSIS — Z79.899 POLYPHARMACY: Primary | ICD-10-CM

## 2020-09-22 DIAGNOSIS — I15.2 HYPERTENSION ASSOCIATED WITH DIABETES: ICD-10-CM

## 2020-09-22 DIAGNOSIS — I10 ESSENTIAL HYPERTENSION: ICD-10-CM

## 2020-09-22 PROCEDURE — 1159F MED LIST DOCD IN RCRD: CPT | Mod: HCNC,95,, | Performed by: INTERNAL MEDICINE

## 2020-09-22 PROCEDURE — 99443 PR PHYSICIAN TELEPHONE EVALUATION 21-30 MIN: CPT | Mod: HCNC,95,, | Performed by: INTERNAL MEDICINE

## 2020-09-22 PROCEDURE — 99443 PR PHYSICIAN TELEPHONE EVALUATION 21-30 MIN: ICD-10-PCS | Mod: HCNC,95,, | Performed by: INTERNAL MEDICINE

## 2020-09-22 PROCEDURE — 1159F PR MEDICATION LIST DOCUMENTED IN MEDICAL RECORD: ICD-10-PCS | Mod: HCNC,95,, | Performed by: INTERNAL MEDICINE

## 2020-09-22 PROCEDURE — 1100F PTFALLS ASSESS-DOCD GE2>/YR: CPT | Mod: HCNC,CPTII,95, | Performed by: INTERNAL MEDICINE

## 2020-09-22 PROCEDURE — 3288F FALL RISK ASSESSMENT DOCD: CPT | Mod: HCNC,CPTII,95, | Performed by: INTERNAL MEDICINE

## 2020-09-22 PROCEDURE — 3044F HG A1C LEVEL LT 7.0%: CPT | Mod: HCNC,CPTII,95, | Performed by: INTERNAL MEDICINE

## 2020-09-22 PROCEDURE — 3044F PR MOST RECENT HEMOGLOBIN A1C LEVEL <7.0%: ICD-10-PCS | Mod: HCNC,CPTII,95, | Performed by: INTERNAL MEDICINE

## 2020-09-22 PROCEDURE — 1100F PR PT FALLS ASSESS DOC 2+ FALLS/FALL W/INJURY/YR: ICD-10-PCS | Mod: HCNC,CPTII,95, | Performed by: INTERNAL MEDICINE

## 2020-09-22 PROCEDURE — 3288F PR FALLS RISK ASSESSMENT DOCUMENTED: ICD-10-PCS | Mod: HCNC,CPTII,95, | Performed by: INTERNAL MEDICINE

## 2020-09-22 NOTE — ASSESSMENT & PLAN NOTE
- Pt does not consistently perform BG checks. Random check 3 hours post prandial while on telephone visitation at 180  - Encouraged regular readings to assure glycemic optimization

## 2020-09-22 NOTE — PATIENT INSTRUCTIONS
- Instructed to take Levothyroxine on its own in the morning, 30 minutes prior to taking the rest of AM medications  - Clarified medication regiment and instructed to dispose of Amlodipine and Lisinopril

## 2020-09-22 NOTE — PROGRESS NOTES
Established Patient - Audio Only Telehealth Visit with [facility] Provider     The patient location is: HOME  The chief complaint leading to consultation is: routine care  Visit type: Virtual visit with audio only (telephone)     The reason for the audio only service rather than synchronous audio and video virtual visit was related to technical difficulties or patient preference/necessity.     Each patient to whom I provide medical services by telemedicine is:  (1) informed of the relationship between the physician and patient and the respective role of any other health care provider with respect to management of the patient; and (2) notified that they may decline to receive medical services by telemedicine and may withdraw from such care at any time. Patient verbally consented to receive this service via voice-only telephone call.       Subjective:      Patient ID: Edwin Powell is a 75 y.o. male.    Patient's risk score is 7. Patient is high risk for poor outcomes with COVID due to the following chronic conditions advanced age, dementia, CHF, HTN, MDD, DM    Pt's son (Al) contacted clinic to clarify Mr. Powell's medications. He reported that there was a mix-up with a bottle of Lisinopril that was still present. He understands that the medication has been discontinued and that the only antihypertensive agent that should be in the regiment is Coreg 3.125 mg twice daily. Al was able to correctly list all AM and PM medications and did not report any additional medications. He has thrown out all amlodipine and lisinopril from his home. BP readings have been in the low 100s consistently. Stated that it appears Mr. Powell is slowly getting his strength back. He does report an intermittent sharp-diffuse chest pain when taking a deep breath; there is no relation to food. BG readings are not consistently performed.    Objective:     Lab Results   Component Value Date    WBC 9.28 09/16/2020    HGB 7.1 (L) 09/16/2020     HCT 23.9 (L) 09/16/2020     (H) 09/16/2020    CHOL 142 08/04/2020    TRIG 142 08/04/2020    HDL 38 (L) 08/04/2020     (H) 09/16/2020     (H) 09/16/2020     09/16/2020    K 5.2 (H) 09/16/2020     09/16/2020    CREATININE 2.01 (H) 09/16/2020    BUN 40 (H) 09/16/2020    CO2 21 (L) 09/16/2020    TSH 7.711 (H) 08/18/2020    PSA 1.1 07/16/2018    INR 1.0 09/01/2020    HGBA1C 6.3 (H) 08/04/2020    HGBA1C 6.3 (H) 08/04/2020     Review of Systems   Constitutional: Positive for malaise/fatigue. Negative for diaphoresis, fever and weight loss.   Respiratory: Positive for shortness of breath (chronic). Negative for cough and wheezing.    Cardiovascular: Positive for chest pain (intermittent). Negative for palpitations, orthopnea and leg swelling.   Genitourinary: Negative for dysuria and urgency.   Musculoskeletal: Negative for myalgias.   Neurological: Negative for dizziness, weakness and headaches.         Assessment:   75 y.o. male with multiple co-morbid illnesses here to continue work-up of chronic issues.     Plan:     Problem List Items Addressed This Visit        Endocrine    Type 2 diabetes mellitus with hyperglycemia, with long-term current use of insulin (Chronic)     - Pt does not consistently perform BG checks. Random check 3 hours post prandial while on telephone visitation at 180  - Encouraged regular readings to assure glycemic optimization            Other    Polypharmacy - Primary     - Pt with multiple med changes following in-patient stay. Pt's son (Al) contacted clinic to clarify regiment. Reiterated regiment with Al. Due to hypotension, pt only antihypertensive agent is Coreg 3.125 mg twice daily. Al has thrown out amlodipine and lisinopril as instructed to avoid confusion  - Instructed pt and son to take Levothyroxine medication in the morning on its own and to wait 30 minutes prior to administration of the remainder of AM meds  - Pt's son still resistant on  administrating medications via [facility]; will revisit            Other Visit Diagnoses     Essential hypertension     Overtreated on ACE/ARB, CCB  · BB decreased at last appt  · Patient's caretaker continues to give him an ACE  Advised to throw away ACE       Coronary artery disease involving native coronary artery of native heart with angina pectoris            - See Polypharmacy       Health Maintenance       Date Due Completion Date    Shingles Vaccine (2 of 2) 01/13/2020 11/18/2019    Influenza Vaccine (1) 08/01/2020 10/15/2019    Hemoglobin A1c 02/04/2021 8/4/2020    Foot Exam 05/18/2021 5/18/2020 (Done)    Override on 5/18/2020: Done    Override on 1/13/2020: Done    Override on 7/11/2019: Done    Override on 2/2/2018: Done (performed by podiatrist)    Lipid Panel 08/04/2021 8/4/2020    Colorectal Cancer Screening 08/30/2021 8/30/2020    Override on 2/15/2011: Done (Per [facility] ? results)    High Dose Statin 09/01/2021 9/1/2020    TETANUS VACCINE 01/23/2022 1/23/2012          Follow up in about 6 weeks (around 11/3/2020). Thirty minutes spent with this patient today, including addressing advanced care planning.    Meg Vail MD  Internal Medicine, PGY-3    This service was not originating from a related E/M service provided within the previous 7 days nor will  to an E/M service or procedure within the next 24 hours or my soonest available appointment.  Prevailing standard of care was able to be met in this audio-only visit.

## 2020-09-22 NOTE — ASSESSMENT & PLAN NOTE
- Pt with multiple med changes following in-patient stay. Pt's son (Al) contacted clinic to clarify regiment. Reiterated regiment with Al. Due to hypotension, pt only antihypertensive agent is Coreg 3.125 mg twice daily. Al has thrown out amlodipine and lisinopril as instructed to avoid confusion  - Instructed pt and son to take Levothyroxine medication in the morning on its own and to wait 30 minutes prior to administration of the remainder of AM meds

## 2020-09-23 NOTE — ASSESSMENT & PLAN NOTE
Overtreated on ACE/ARB, CCB  · BB decreased at last appt  · Patient's caretaker continues to give him an ACE  · Advised to throw away ACE

## 2020-09-25 ENCOUNTER — PATIENT MESSAGE (OUTPATIENT)
Dept: PRIMARY CARE CLINIC | Facility: CLINIC | Age: 75
End: 2020-09-25

## 2020-09-25 RX ORDER — CLOPIDOGREL BISULFATE 75 MG/1
75 TABLET ORAL DAILY
Qty: 90 TABLET | Refills: 3 | Status: CANCELLED | OUTPATIENT
Start: 2020-09-25 | End: 2021-01-01

## 2020-09-28 DIAGNOSIS — C64.2 RENAL CELL CARCINOMA OF LEFT KIDNEY: Primary | ICD-10-CM

## 2020-09-29 ENCOUNTER — PATIENT MESSAGE (OUTPATIENT)
Dept: OTHER | Facility: OTHER | Age: 75
End: 2020-09-29

## 2020-09-30 ENCOUNTER — OFFICE VISIT (OUTPATIENT)
Dept: HEMATOLOGY/ONCOLOGY | Facility: CLINIC | Age: 75
End: 2020-09-30
Attending: INTERNAL MEDICINE
Payer: MEDICARE

## 2020-09-30 ENCOUNTER — OFFICE VISIT (OUTPATIENT)
Dept: PRIMARY CARE CLINIC | Facility: CLINIC | Age: 75
End: 2020-09-30
Payer: MEDICARE

## 2020-09-30 ENCOUNTER — TELEPHONE (OUTPATIENT)
Dept: PHARMACY | Facility: CLINIC | Age: 75
End: 2020-09-30

## 2020-09-30 ENCOUNTER — PATIENT MESSAGE (OUTPATIENT)
Dept: PRIMARY CARE CLINIC | Facility: CLINIC | Age: 75
End: 2020-09-30

## 2020-09-30 DIAGNOSIS — I21.4 NSTEMI (NON-ST ELEVATED MYOCARDIAL INFARCTION): ICD-10-CM

## 2020-09-30 DIAGNOSIS — C64.9 METASTATIC RENAL CELL CARCINOMA TO BONE: ICD-10-CM

## 2020-09-30 DIAGNOSIS — C78.7 METASTATIC RENAL CELL CARCINOMA TO LIVER: ICD-10-CM

## 2020-09-30 DIAGNOSIS — C79.51 METASTATIC RENAL CELL CARCINOMA TO BONE: ICD-10-CM

## 2020-09-30 DIAGNOSIS — C64.9 METASTATIC RENAL CELL CARCINOMA TO LIVER: ICD-10-CM

## 2020-09-30 DIAGNOSIS — C78.01 SECONDARY MALIGNANT NEOPLASM OF RIGHT LUNG: ICD-10-CM

## 2020-09-30 DIAGNOSIS — J96.11 CHRONIC RESPIRATORY FAILURE WITH HYPOXIA, ON HOME OXYGEN THERAPY: Chronic | ICD-10-CM

## 2020-09-30 DIAGNOSIS — C64.2 RENAL CELL CARCINOMA OF LEFT KIDNEY: ICD-10-CM

## 2020-09-30 DIAGNOSIS — G89.3 NEOPLASM RELATED PAIN: ICD-10-CM

## 2020-09-30 DIAGNOSIS — C64.9 METASTATIC RENAL CELL CARCINOMA, UNSPECIFIED LATERALITY: Primary | Chronic | ICD-10-CM

## 2020-09-30 DIAGNOSIS — D53.9 NUTRITIONAL ANEMIA, UNSPECIFIED: ICD-10-CM

## 2020-09-30 DIAGNOSIS — D50.9 IRON DEFICIENCY ANEMIA, UNSPECIFIED IRON DEFICIENCY ANEMIA TYPE: ICD-10-CM

## 2020-09-30 DIAGNOSIS — Z99.81 CHRONIC RESPIRATORY FAILURE WITH HYPOXIA, ON HOME OXYGEN THERAPY: Chronic | ICD-10-CM

## 2020-09-30 PROBLEM — E11.59 HYPERTENSION ASSOCIATED WITH DIABETES: Status: RESOLVED | Noted: 2017-05-26 | Resolved: 2020-09-30

## 2020-09-30 PROBLEM — I15.2 HYPERTENSION ASSOCIATED WITH DIABETES: Status: RESOLVED | Noted: 2017-05-26 | Resolved: 2020-09-30

## 2020-09-30 PROCEDURE — 1100F PTFALLS ASSESS-DOCD GE2>/YR: CPT | Mod: HCNC,CPTII,95, | Performed by: INTERNAL MEDICINE

## 2020-09-30 PROCEDURE — 99499 UNLISTED E&M SERVICE: CPT | Mod: 95,,, | Performed by: INTERNAL MEDICINE

## 2020-09-30 PROCEDURE — 1159F MED LIST DOCD IN RCRD: CPT | Mod: HCNC,95,, | Performed by: INTERNAL MEDICINE

## 2020-09-30 PROCEDURE — 3288F FALL RISK ASSESSMENT DOCD: CPT | Mod: HCNC,CPTII,95, | Performed by: INTERNAL MEDICINE

## 2020-09-30 PROCEDURE — 1159F PR MEDICATION LIST DOCUMENTED IN MEDICAL RECORD: ICD-10-PCS | Mod: HCNC,95,, | Performed by: INTERNAL MEDICINE

## 2020-09-30 PROCEDURE — 99214 OFFICE O/P EST MOD 30 MIN: CPT | Mod: HCNC,95,, | Performed by: INTERNAL MEDICINE

## 2020-09-30 PROCEDURE — 99443 PR PHYSICIAN TELEPHONE EVALUATION 21-30 MIN: CPT | Mod: HCNC,95,, | Performed by: INTERNAL MEDICINE

## 2020-09-30 PROCEDURE — 3288F PR FALLS RISK ASSESSMENT DOCUMENTED: ICD-10-PCS | Mod: HCNC,CPTII,95, | Performed by: INTERNAL MEDICINE

## 2020-09-30 PROCEDURE — 99443 PR PHYSICIAN TELEPHONE EVALUATION 21-30 MIN: ICD-10-PCS | Mod: HCNC,95,, | Performed by: INTERNAL MEDICINE

## 2020-09-30 PROCEDURE — 99214 PR OFFICE/OUTPT VISIT, EST, LEVL IV, 30-39 MIN: ICD-10-PCS | Mod: HCNC,95,, | Performed by: INTERNAL MEDICINE

## 2020-09-30 PROCEDURE — 99499 RISK ADDL DX/OHS AUDIT: ICD-10-PCS | Mod: 95,,, | Performed by: INTERNAL MEDICINE

## 2020-09-30 PROCEDURE — 1100F PR PT FALLS ASSESS DOC 2+ FALLS/FALL W/INJURY/YR: ICD-10-PCS | Mod: HCNC,CPTII,95, | Performed by: INTERNAL MEDICINE

## 2020-09-30 RX ORDER — PAZOPANIB 200 MG/1
800 TABLET ORAL DAILY
Qty: 120 TABLET | Refills: 5 | Status: CANCELLED | OUTPATIENT
Start: 2020-09-30

## 2020-09-30 NOTE — ASSESSMENT & PLAN NOTE
Continues ASA and plavix, Hgb 7.1 this month.    Awaiting recommendations from today's heme/onc visit. Consider repeat CBC, home health to draw.

## 2020-09-30 NOTE — PROGRESS NOTES
Subjective:       Patient ID: Edwin Powell is a 75 y.o. male.    Chief Complaint: No chief complaint on file.    HPI     The patient location is: home, son Pramod present with him  The chief complaint leading to consultation is: follow up after recent surgery and hospitalizations    Visit type: audiovisual    Face to Face time with patient: 15 minutes  25 minutes of total time spent on the encounter, which includes face to face time and non-face to face time preparing to see the patient (eg, review of tests), Obtaining and/or reviewing separately obtained history, Documenting clinical information in the electronic or other health record, Independently interpreting results (not separately reported) and communicating results to the patient/family/caregiver, or Care coordination (not separately reported).   Each patient to whom he or she provides medical services by telemedicine is:  (1) informed of the relationship between the physician and patient and the respective role of any other health care provider with respect to management of the patient; and (2) notified that he or she may decline to receive medical services by telemedicine and may withdraw from such care at any time.    Notes:   States in the interval his pain is doing a little better  Notes pleuritic pain when he takes a deep breath and this is related to rapid response interventions when hospitalized  Using Incentive spirometer at home  Appetite improving, he did lose significant weight with hospitalization but son feels that weight is picking back up  Energy level is picking up; he had recent medication management issues that required review  It turns out that lisinopril was added back in accidentally and he has done better off; coreg reduced  Reports vitals at home stable and afebrile (before above medication changes he reports that he was running hypotensive)  Uses walker to ambulate    Most recent imagin2020 CT scans:  COMPARISON:  Chest  x-ray from August 30, 2020.  Chest abdomen pelvis CT from May 19, 2020. 01/24/2020 CT.  FINDINGS:  There is a nodule seen in the right lower lobe image 461 of series 4 which measures 5.2 mm.  On the prior exam, it measured on the order of 3 mm.  Previously 5described tiny right upper lobe nodule is not thought significantly changed.  Additional previously described subcentimeter pleural based nodule or 2 adjacent nodules in the left upper lobe are also stable (image 212 series 4) no definite new nodules are appreciated.  There is interval development of a left-sided pleural effusion, small in size.  There is also interval development of left lower lobe subsegmental atelectasis.  Right thyroid gland nodule on image 15 of series 2 is stable.  Heart size is stable.  No pericardial effusion.  Thoracic aortic and coronary artery calcifications are seen.  Trachea and mainstem bronchi remain patent.  A previously noted enlarged right paratracheal lymph is again seen and appears slightly smaller.  On image 40 of series 2, it measures 10 mm in short axis.  Previously it was measured at 16 mm in short axis.  No new adenopathy.  Known liver lesions were best seen on the contrasted CT from January of this year.  The liver on today's non contrasted CT appears similar to the prior study.  No obvious new lesions are seen although lack of contrast limits detail.  Unenhanced spleen and the pancreas appear normal.  Duodenal diverticulum noted.  Adrenal glands are within normal limits.  Right kidney is normal.  No hydronephrosis.  There is ectasia of the infrarenal abdominal aorta which is unchanged.  Stomach and small bowel are normal without evidence of bowel obstruction.  Contrast is seen to the level of the rectum.  No evidence of colitis or diverticulitis.  Urinary bladder demonstrates mildly thickened wall.  Prostate gland remains enlarged with coarse central calcifications.  Correlation with PSA can be performed.  There is a  "small fat containing left inguinal hernia.  No ascites or drainable fluid collections.  No bulky adenopathy  Again seen is a solid left renal mass.  This measures 3.4 x 2.6 cm image 130 of series 2 and is stable allowing for slight differences in measurement technique.  Review of bone windows demonstrate stable lytic focus at L3.  No new lesions are seen.  Anterior cervical spinal fusion hardware seen in the lower cervical spine.  Impression:  Unchanged left renal mass.  Interval increase in size of a right lower lobe pulmonary nodule as described above.  New small left pleural effusion.  Unchanged lytic focus at L3. Known hepatic lesions were best demonstrated on the contrasted CT from January 2020.  These are not well seen on today's exam but not thought significantly changed. Previously described mildly enlarged right paratracheal lymph node has decreased in size and now measures 10 mm compared with 16 mm.  Other stable findings as above.    Recently "he underwent elective anterior C6-C7 cervical disc fusion at [facility] on 8/17/2020 by Orthopedic Surgery and Otolaryngology. During surgery, he received rocuronium, ketamine, fentanyl, propofol, and midazolam. He required phenylephrine infusion. Postoperatively, he was placed on his home 2 liters/minute of supplemental oxygen. He was given multiple doses of oxycodone for postoperative neck pain. Overnight, he had multiple episodes of apnea, requiring 3 doses of naloxone with minimal improvement. ABG showed combined metabolic and respiratory acidosis. There was no significant improvement with BiPAP. Hypoxia worsened and he required 15 liters/minute of oxygen. He was transferred to Critical Care Medicine during the night  He was put on naloxone and bicarbonate infusions. Hypoxia worsened further and he required 30 liters/minute of comfort flow oxygen. Mentation improved and naloxone infusion was discontinued the next morning. Pulmonary " "embolism was suspected but CTA was not done due to renal insufficiency. Lower extremity ultrasound showed no deep venous thrombosis, but he was given empiric anticoagulation with heparin infusion. Transthoracic echocardiogram showed no right ventricular strain but showed focal wall motion abnormalities. Troponin was elevated and trended up, so he was given dual antiplatelet therapy for non-ST elevated myocardial infarction. Central venous pressure was 3 but chest X-ray showed pulmonary edema. He was given diuretics. On 8/20/2020, he was weaned back to nasal cannula at 2 liters/minute. He was transferred to Hospital Medicine Team D on 8/21/2020. Nuclear medicine perfusion lung scan on 8/30/2020 was low probability for pulmonary embolism. He coughed up bright red blood with clots. He was transfused 1 unit of packed RBCs. Esophagogastroduodenoscopy on 9/1/2020 showed a small hiatal hernia and mild gastritis. He was put on pantoprazole. Physical and occupational therapy recommended home health."    He has been recovering from above over the last several weeks.     Votrient held with above and then with elevated lfts.     Oncology History:  Mr.Mc Moss is a 74 yo male with CAD (s/p 5 stents, remotely), DM2 (insulin dependent), hypothyroidism , CVA (2013) with no residual deficit, chronic back pain. He was [place] [facility] [place] of a lumbar spine lesion in the setting of progressive weakness of his legs and exacerbation of chronic back pain, in May 2017. He states that his back pain and LE weakness progressively worsened. [facility]. The pain radiates down his legs. He denies any incontinence of bowel or bladder. He had several falls. After one of the falls, he presented to the ED at [facility], in May 2017. He had an MRI showing "L3 vertebral body lesion with apparent cortical disruption concerning for metastases vs myeloma," and was transferred " to [facility] [place]surgery evaluation. He was evaluated by neurosurgery regarding concern for lytic lesion on L3 on 5/26/17. No surgical intervention was proposed.   CT chest, abdomen,pelvis on 5/27/17 revealed :   --A 3.6 cm exophytic mass arising from the medial aspect of the lower pole of left kidney,   --A small 1.0 cm, subtle, cortical enhancing mass within the upper pole of the right kidney, concerning for possible contralateral solid renal mass  --Multiple enhancing ill-defined hepatic masses concerning for hepatic metastatic disease, hepatomegaly and hepatic steatosis  --Mulltifocal irregular thickening of the bilateral pleura concerning for possible pleural metastatic disease  --Mild nonspecific nodular thickening of the left adrenal gland without discrete nodule  -- Redemonstration of known lytic lesion within the L3 vertebral body. No definite additional discrete lytic lesions are identified.  -MRI pelvis from 10/18/17:    2.5 cm linear T1 hypointense T2/STIR hyperintense lesion within the left iliac bone, this lesion is indeterminate but felt less likely to represent metastasis given its appearance. Further evaluation can be obtained with bone scan as clinically indicated.  Findings suggestive of left trochanteric bursitis.  Partially visualized L3 metastatic lesion.  Diffuse thickening of the urinary bladder wall can be seen with chronic outlet obstruction or cystitis.  - initiated on Votrient    Review of Systems   Constitutional: Positive for activity change and fatigue. Negative for appetite change, fever and unexpected weight change.   Respiratory: Positive for chest tightness and shortness of breath. Negative for cough and wheezing.    Cardiovascular: Positive for chest pain (pleuritic, see above). Negative for palpitations and leg swelling.   Gastrointestinal: Negative for abdominal distention, abdominal pain, blood in stool, constipation, nausea and vomiting.   Genitourinary: Negative for decreased  urine volume, difficulty urinating and frequency.   Musculoskeletal: Positive for arthralgias and neck pain. Negative for back pain and myalgias.   Neurological: Negative for weakness and numbness.   Psychiatric/Behavioral: Negative for dysphoric mood. The patient is not nervous/anxious.         Objective:      Physical Exam Virtual visit, son Pramod present  Assessment:       1. Metastatic renal cell carcinoma, unspecified laterality    2. Metastatic renal cell carcinoma to liver    3. 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 Secondary malignant neoplasm of right lung        Plan:     1-4. We discussed the options   - restart Votrient with dose reduction with lfts  - hold Votrient as disease relatively stable  Monitor and repeat scans in 2-3 months  - consider new treatment- we discussed that I do not feel this is necessary at this time    He chooses option 2.  RTC 2-3 months with scans  He knows he can call us for anything and he will be focused on continued recuperation

## 2020-09-30 NOTE — PROGRESS NOTES
"Established Patient - Audio Only Telehealth Visit     The patient location is: Home in [place], [place]  The chief complaint leading to consultation is: follow up dyspnea  Visit type: Virtual visit with audio only (telephone)  Total time spent with patient: 30 minutes       The reason for the audio only service rather than synchronous audio and video virtual visit was related to technical difficulties or patient preference/necessity.     Each patient to whom I provide medical services by telemedicine is:  (1) informed of the relationship between the physician and patient and the respective role of any other health care provider with respect to management of the patient; and (2) notified that they may decline to receive medical services by telemedicine and may withdraw from such care at any time. Patient verbally consented to receive this service via voice-only telephone call.       HPI:   [age] year old male with significant medical h/o metastatic renal cell carcinoma, lung/bone/liver mets, anemia, HTN, pain related to CA. Mr. Powell was hospitalized in August for a scheduled C6-C7 cervical disc fusion. Postoperatively he required extensive supplemental oxygen and eventually diuresis. He began treatment with dual antiplatelet therapy for NSTEMI while hospitalized then developed hemoptysis. EGD showed mild gastritis, lung perfusion scan negative for PE. He received one unit PRBCs and pantoprazole was initiated.     Today pt reports feeling "alright."     He spoke to oncology today. He said there's no change in his CA, which is contrary to recent CT report. Oncologist recommended keeping him off of treatment.     Today he is mostly having CP and back pain.    He is taking oxycodone every 4-5 hours to manage pain, taking about 4/day since he came home from the hospital. Son helps with medications.     Reports some LARA and pleurisy relieved by rest.     States present weight is 150 lbs, attributes low weight to " "poor appetite. States appetite is slowly improving.     Per pt:  124/46, 130/56 BPs today  160 CBG today    Son reports pt is "doing great!"  Per son, keeping pt off ctx meds, to f/u in 4 weeks, likely in person.  Home health within one week, call at least son 2 hours prior to visit.  No longer using oxygen.       The following is the A/P per oncology visit today:     Assessment:       1. Metastatic renal cell carcinoma, unspecified laterality    2. Metastatic renal cell carcinoma to liver    3. 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 Secondary malignant neoplasm of right lung        Plan:     1-4. We discussed the options   - restart Votrient with dose reduction with lfts  - hold Votrient as disease relatively stable  Monitor and repeat scans in 2-3 months  - consider new treatment- we discussed that I do not feel this is necessary at this time     He chooses option 2.  RTC 2-3 months with scans  He knows he can call us for anything and he will be focused on continued recuperation               Assessment and plan:    Problem List Items Addressed This Visit        Pulmonary    Chronic respiratory failure with hypoxia, on home oxygen therapy (Chronic)     Dyspnea improved per his report. Continue POC.             Cardiac/Vascular    NSTEMI (non-ST elevated myocardial infarction)     Continues ASA and plavix, Hgb 7.1 this month.    Awaiting recommendations from today's heme/onc visit. Consider repeat CBC, home health to draw.            Oncology    Secondary malignant neoplasm of right lung     Now with increased size of right pulmonary nodule. Saw oncologist today. Still with some LARA but this is improved per his report.          Renal cell carcinoma of left kidney    Neoplasm related pain     Taking approximately 40mg of short acting oxycodone/day per pt's report. This is controlling pain.     Reports regular BMs.         Metastatic renal cell carcinoma to liver    Metastatic renal cell carcinoma to bone    " Iron deficiency anemia     Hgb staying around 7. May need to add Fe supplement. GFR 31. Procrit?    Repeat CBC. Check iron studies, retic count, B12/folate.                   This service was not originating from a related E/M service provided within the previous 7 days nor will  to an E/M service or procedure within the next 24 hours or my soonest available appointment.  Prevailing standard of care was able to be met in this audio-only visit.

## 2020-09-30 NOTE — ASSESSMENT & PLAN NOTE
Taking approximately 40mg of short acting oxycodone/day per pt's report. This is controlling pain.     Reports regular BMs.

## 2020-09-30 NOTE — ASSESSMENT & PLAN NOTE
Now with increased size of right pulmonary nodule. Saw oncologist today. Still with some LARA but this is improved per his report.

## 2020-10-01 PROBLEM — D50.9 IRON DEFICIENCY ANEMIA: Status: ACTIVE | Noted: 2020-10-01

## 2020-10-01 NOTE — ASSESSMENT & PLAN NOTE
Hgb staying around 7. May need to add Fe supplement. GFR 31. Procrit?    Repeat CBC. Check iron studies, retic count, B12/folate.

## 2020-10-07 ENCOUNTER — OFFICE VISIT (OUTPATIENT)
Dept: PODIATRY | Facility: CLINIC | Age: 75
End: 2020-10-07
Payer: MEDICARE

## 2020-10-07 ENCOUNTER — OFFICE VISIT (OUTPATIENT)
Dept: ORTHOPEDICS | Facility: CLINIC | Age: 75
End: 2020-10-07
Payer: MEDICARE

## 2020-10-07 ENCOUNTER — HOSPITAL ENCOUNTER (OUTPATIENT)
Dept: RADIOLOGY | Facility: HOSPITAL | Age: 75
Discharge: HOME | End: 2020-10-07
Attending: ORTHOPAEDIC SURGERY
Payer: MEDICARE

## 2020-10-07 VITALS
DIASTOLIC BLOOD PRESSURE: 68 MMHG | SYSTOLIC BLOOD PRESSURE: 128 MMHG | BODY MASS INDEX: 27.47 KG/M2 | HEIGHT: 67 IN | WEIGHT: 175 LBS | HEART RATE: 74 BPM

## 2020-10-07 DIAGNOSIS — Z98.890 S/P SPINAL SURGERY: ICD-10-CM

## 2020-10-07 DIAGNOSIS — Z98.1 HISTORY OF FUSION OF CERVICAL SPINE: Primary | ICD-10-CM

## 2020-10-07 DIAGNOSIS — R60.9 PITTING EDEMA: ICD-10-CM

## 2020-10-07 DIAGNOSIS — Z79.4 TYPE 2 DIABETES MELLITUS WITH HYPERGLYCEMIA, WITH LONG-TERM CURRENT USE OF INSULIN: Primary | Chronic | ICD-10-CM

## 2020-10-07 DIAGNOSIS — E11.65 TYPE 2 DIABETES MELLITUS WITH HYPERGLYCEMIA, WITH LONG-TERM CURRENT USE OF INSULIN: Primary | Chronic | ICD-10-CM

## 2020-10-07 PROCEDURE — 90694 FLU VACCINE - QUADRIVALENT - ADJUVANTED: ICD-10-PCS | Mod: HCNC,S$GLB,, | Performed by: PODIATRIST

## 2020-10-07 PROCEDURE — 99999 PR PBB SHADOW E&M-EST. PATIENT-LVL V: ICD-10-PCS | Mod: PBBFAC,HCNC,, | Performed by: PODIATRIST

## 2020-10-07 PROCEDURE — 99024 POSTOP FOLLOW-UP VISIT: CPT | Mod: HCNC,S$GLB,, | Performed by: ORTHOPAEDIC SURGERY

## 2020-10-07 PROCEDURE — 72040 XR CERVICAL SPINE AP LATERAL: ICD-10-PCS | Mod: 26,HCNC,, | Performed by: RADIOLOGY

## 2020-10-07 PROCEDURE — G0008 ADMIN INFLUENZA VIRUS VAC: HCPCS | Mod: HCNC,S$GLB,, | Performed by: PODIATRIST

## 2020-10-07 PROCEDURE — 11721 PR DEBRIDEMENT OF NAILS, 6 OR MORE: ICD-10-PCS | Mod: 59,Q8,HCNC,S$GLB | Performed by: PODIATRIST

## 2020-10-07 PROCEDURE — 99999 PR PBB SHADOW E&M-EST. PATIENT-LVL IV: CPT | Mod: PBBFAC,HCNC,, | Performed by: ORTHOPAEDIC SURGERY

## 2020-10-07 PROCEDURE — 99999 PR PBB SHADOW E&M-EST. PATIENT-LVL IV: ICD-10-PCS | Mod: PBBFAC,HCNC,, | Performed by: ORTHOPAEDIC SURGERY

## 2020-10-07 PROCEDURE — 72040 X-RAY EXAM NECK SPINE 2-3 VW: CPT | Mod: 26,HCNC,, | Performed by: RADIOLOGY

## 2020-10-07 PROCEDURE — 72040 X-RAY EXAM NECK SPINE 2-3 VW: CPT | Mod: TC,HCNC

## 2020-10-07 PROCEDURE — 99499 UNLISTED E&M SERVICE: CPT | Mod: HCNC,S$GLB,, | Performed by: PODIATRIST

## 2020-10-07 PROCEDURE — G0008 FLU VACCINE - QUADRIVALENT - ADJUVANTED: ICD-10-PCS | Mod: HCNC,S$GLB,, | Performed by: PODIATRIST

## 2020-10-07 PROCEDURE — 90694 VACC AIIV4 NO PRSRV 0.5ML IM: CPT | Mod: HCNC,S$GLB,, | Performed by: PODIATRIST

## 2020-10-07 PROCEDURE — 11721 DEBRIDE NAIL 6 OR MORE: CPT | Mod: 59,Q8,HCNC,S$GLB | Performed by: PODIATRIST

## 2020-10-07 PROCEDURE — 99024 PR POST-OP FOLLOW-UP VISIT: ICD-10-PCS | Mod: HCNC,S$GLB,, | Performed by: ORTHOPAEDIC SURGERY

## 2020-10-07 PROCEDURE — 99499 NO LOS: ICD-10-PCS | Mod: HCNC,S$GLB,, | Performed by: PODIATRIST

## 2020-10-07 PROCEDURE — 99999 PR PBB SHADOW E&M-EST. PATIENT-LVL V: CPT | Mod: PBBFAC,HCNC,, | Performed by: PODIATRIST

## 2020-10-07 NOTE — PROGRESS NOTES
Surgery: C6/7 ACDF  Surgery Date: 8/17/20    HPI: Patient returns for follow up after the above procedure. His stay was complicated by NSTEMI postoperatively. They state that overall they are doing well. He has some muscle pain still in his neck and shoulders, but otherwise is very pleased with his recovery. Denies any numbness/tingling or myelopathic symptoms. He has some residual R shoulder weakness.     Exam: incisions healing well, no signs of infection or dvt    Plan:  Patient is doing well postoperatively. Will have him start PT for some ongoing R shoulder weakness. Will have them return to clinic in 6 weeks with new xrays.

## 2020-10-07 NOTE — PROGRESS NOTES
Subjective:      Patient ID: Edwin Powell is a 75 y.o. male.    Chief Complaint: Follow-up, PCP Visit (Dr. Lewis last visit was virtual 9/30/2020), Diabetes Mellitus, and Nail Care      Edwin is a 75 y.o. male who presents to the clinic for evaluation and treatment of high risk feet. Edwin has a past medical history of Acquired hypothyroidism (5/26/2017), Benign essential HTN (5/26/2017), Benign prostatic hyperplasia (BPH) with urinary urge incontinence (6/6/2017), Chronic low back pain (6/1/2017), Chronic respiratory failure with hypoxia, on home oxygen therapy, Coronary artery disease involving native coronary artery of native heart without angina pectoris (5/26/2017), Gastroesophageal reflux disease without esophagitis (5/26/2017), History of CVA (cerebrovascular accident) (5/26/2017), Hypertension associated with diabetes (5/26/2017), Lumbar disc lesion (5/26/2017), Metastatic renal cell carcinoma to bone (6/6/2017), Metastatic renal cell carcinoma to liver (6/6/2017), Mixed hyperlipidemia (5/26/2017), and Type 2 diabetes mellitus with stage 3 chronic kidney disease, with long-term current use of insulin (5/26/2017).  This patient has documented high risk feet requiring routine maintenance secondary to diabetes mellitis and those secondary complications of diabetes, as mentioned.  With his son today. Denies pedal complaints other than long, painful nails x 10. Presenting with compression stocking.        PCP: Lulú Lewis MD    Date Last Seen by PCP: in epic       Hemoglobin A1C   Date Value Ref Range Status   08/04/2020 6.3 (H) 4.0 - 5.6 % Final     Comment:     ADA Screening Guidelines:  5.7-6.4%  Consistent with prediabetes  >or=6.5%  Consistent with diabetes  High levels of fetal hemoglobin interfere with the HbA1C  assay. Heterozygous hemoglobin variants (HbS, HgC, etc)do  not significantly interfere with this assay.   However, presence of multiple variants may affect accuracy.      08/04/2020 6.3 (H) 4.0 - 5.6 % Final     Comment:     ADA Screening Guidelines:  5.7-6.4%  Consistent with prediabetes  >or=6.5%  Consistent with diabetes  High levels of fetal hemoglobin interfere with the HbA1C  assay. Heterozygous hemoglobin variants (HbS, HgC, etc)do  not significantly interfere with this assay.   However, presence of multiple variants may affect accuracy.     04/01/2020 6.6 (H) 4.0 - 5.6 % Final     Comment:     ADA Screening Guidelines:  5.7-6.4%  Consistent with prediabetes  >or=6.5%  Consistent with diabetes  High levels of fetal hemoglobin interfere with the HbA1C  assay. Heterozygous hemoglobin variants (HbS, HgC, etc)do  not significantly interfere with this assay.   However, presence of multiple variants may affect accuracy.         Review of Systems   Constitution: Negative for decreased appetite, fever and malaise/fatigue.   HENT: Negative for congestion.    Cardiovascular: Negative for chest pain and leg swelling.   Respiratory: Negative for cough and shortness of breath.    Skin: Negative for color change, nail changes and rash.   Musculoskeletal: Negative for arthritis, joint pain, joint swelling and muscle weakness.   Gastrointestinal: Negative for bloating, abdominal pain, nausea and vomiting.   Neurological: Positive for numbness and sensory change. Negative for headaches and weakness.   Psychiatric/Behavioral: Negative for altered mental status.             Past Medical History:   Diagnosis Date    Acquired hypothyroidism 5/26/2017    Benign essential HTN 5/26/2017    Benign prostatic hyperplasia (BPH) with urinary urge incontinence 6/6/2017    Chronic low back pain 6/1/2017    Chronic respiratory failure with hypoxia, on home oxygen therapy     Coronary artery disease involving native coronary artery of native heart without angina pectoris 5/26/2017    S/p 5 stents - last one around 2010-11.    Gastroesophageal reflux disease without esophagitis 5/26/2017    History  of CVA (cerebrovascular accident) 5/26/2017    Hypertension associated with diabetes 5/26/2017    BP controlled on ACE, CCB    Lumbar disc lesion 5/26/2017    Metastatic renal cell carcinoma to bone 6/6/2017    Metastatic renal cell carcinoma to liver 6/6/2017    Liver Biopsy 5-2017: METASTATIC RENAL CELL CARCINOMA.    Mixed hyperlipidemia 5/26/2017    Type 2 diabetes mellitus with stage 3 chronic kidney disease, with long-term current use of insulin 5/26/2017       Past Surgical History:   Procedure Laterality Date    ABDOMINAL SURGERY      ANTERIOR CERVICAL DISCECTOMY W/ FUSION N/A 8/17/2020    Procedure: DISCECTOMY, SPINE, CERVICAL, ANTERIOR APPROACH, WITH FUSION co case with Dr. Falcon C6/7 [facility] SNS:vocal cord/motors/SSEP Regular OR table Patient needs scope in preop;  Surgeon: Filemon Aguayo MD;  Location: [facility] OR [address] [place] [address];  Service: Neurosurgery;  Laterality: N/A;    APPENDECTOMY      BACK SURGERY      CARDIAC SURGERY      CATARACT EXTRACTION      CHOLECYSTECTOMY      CORONARY STENT PLACEMENT      5 stents    coronary stenting      X 5 last one in 2010-11.    DISSECTION OF NECK N/A 8/17/2020    Procedure: DISSECTION, NECK;  Surgeon: Rush Falcon MD;  [facility];  Service: ENT;  Laterality: N/A;    ESOPHAGOGASTRODUODENOSCOPY N/A 9/1/2020    Procedure: EGD (ESOPHAGOGASTRODUODENOSCOPY);  Surgeon: Jim Hooks MD;  Location: [address]);  Service: Endoscopy;  Laterality: N/A;    INTRAOCULAR PROSTHESES INSERTION Right 6/27/2019    Procedure: INSERTION, IOL PROSTHESIS;  Surgeon: Chary Culp MD;  Location: [address];  Service: Ophthalmology;  Laterality: Right;    INTRAOCULAR PROSTHESES INSERTION Left 8/22/2019    Procedure: INSERTION, IOL PROSTHESIS;  Surgeon: Chary Culp MD;  Location: [address];  Service: Ophthalmology;  Laterality: Left;    PHACOEMULSIFICATION OF CATARACT Right 6/27/2019    Procedure: PHACOEMULSIFICATION, CATARACT;   Surgeon: Chary Culp MD;  Location: [facility] OR [address] [place] [address];  Service: Ophthalmology;  Laterality: Right;    PHACOEMULSIFICATION OF CATARACT Left 8/22/2019    Procedure: PHACOEMULSIFICATION, CATARACT;  Surgeon: Chary Culp MD;  Location: [facility] OR [address] [place] [address];  Service: Ophthalmology;  Laterality: Left;    SPINAL FUSION         Family History   Problem Relation Age of Onset    Glaucoma Mother     Diabetes Mother     Heart attack Mother     Diabetes Brother     Glaucoma Maternal Grandmother     No Known Problems Son     Melanoma Neg Hx        Social History     Socioeconomic History    Marital status: Single     Spouse name: Not on file    Number of children: Not on file    Years of education: Not on file    Highest education level: Not on file   Occupational History    Not on file   Social Needs    Financial resource strain: Hard    Food insecurity     Worry: Never true     Inability: Never true    Transportation needs     Medical: No     Non-medical: No   Tobacco Use    Smoking status: Former Smoker     Types: Cigarettes    Smokeless tobacco: Never Used    Tobacco comment: haven't smoked in last 25 yrs   Substance and Sexual Activity    Alcohol use: Yes     Comment: rarely     Drug use: Not Currently    Sexual activity: Not Currently     Partners: Female   Lifestyle    Physical activity     Days per week: Not on file     Minutes per session: Not on file    Stress: Not on file   Relationships    Social connections     Talks on phone: Not on file     Gets together: Not on file     Attends Congregational service: Not on file     Active member of club or organization: Not on file     Attends meetings of clubs or organizations: Not on file     Relationship status: Not on file   Other Topics Concern    Not on file   Social History Narrative    Not on file       Current Outpatient Medications   Medication Sig Dispense Refill    ascorbic acid (VITAMIN C ORAL) Take by mouth once daily. Hold for 1 week  prior to surgery      aspirin (ECOTRIN) 81 MG EC tablet Take 81 mg by mouth once daily. Will ask MD to hold 1 week      b complex vitamins (B COMPLEX 1) tablet Take 1 tablet by mouth once daily. (Patient taking differently: Take 1 tablet by mouth once daily. Hold for 1 week prior to surgery) 90 tablet 3    blood sugar diagnostic Strp 1 strip by Misc.(Non-Drug; Combo Route) route 2 (two) times daily as needed. 200 strip 3    blood-glucose meter Misc use as instructed 1 each 0    carvediloL (COREG) 3.125 MG tablet Take 1 tablet (3.125 mg total) by mouth 2 (two) times daily with meals. 180 tablet 3    cholecalciferol, vitamin D3, (VITAMIN D3) 125 mcg (5,000 unit) Tab Take 1 tablet (5,000 Units total) by mouth once daily. (Patient taking differently: Take 5,000 Units by mouth once daily. Hold for 1 week prior to surgery) 90 tablet 3    clopidogreL (PLAVIX) 75 mg tablet Take 1 tablet (75 mg total) by mouth once daily. 30 tablet 11    fluticasone propionate (FLONASE) 50 mcg/actuation nasal spray 1 spray (50 mcg total) by Each Nostril route daily as needed for Rhinitis or Allergies.      insulin (LANTUS SOLOSTAR U-100 INSULIN) glargine 100 units/mL (3mL) SubQ pen Inject 5 Units into the skin every evening. (Patient taking differently: Inject 5 Units into the skin every evening. Take 80%=4U night before surgery) 5 mL 11    lancets (ONETOUCH ULTRASOFT LANCETS) Misc 1 lancet by Misc.(Non-Drug; Combo Route) route 2 (two) times daily as needed. 200 each 3    levothyroxine (SYNTHROID) 88 MCG tablet Take 1 tablet (88 mcg total) by mouth once daily. (Patient taking differently: Take 88 mcg by mouth once daily. Take in am of surgery) 90 tablet 3    lidocaine (LIDODERM) 5 % Place 2 patches onto the skin once daily. Remove & Discard patch within 12 hours or as directed by MD 30 patch 1    methyl salicylate-menthol 15-10% 15-10 % Crea Apply topically 3 (three) times daily. To neckk  0    ondansetron (ZOFRAN) 8 MG tablet  "Take 1 tablet (8 mg total) by mouth 3 (three) times daily as needed. (Patient taking differently: Take 8 mg by mouth 3 (three) times daily as needed. Take if needed) 90 tablet 3    oxyCODONE (ROXICODONE) 10 mg Tab immediate release tablet Take 1 tablet (10 mg total) by mouth every 4 (four) hours as needed for Pain. 120 tablet 0    pen needle, diabetic 29 gauge x 1/2" Ndle 1 application by Misc.(Non-Drug; Combo Route) route once daily. 100 each 3    rosuvastatin (CRESTOR) 40 MG Tab Take 1 tablet (40 mg total) by mouth every evening. (Patient taking differently: Take 40 mg by mouth once daily. Take in am of surgery) 90 tablet 3    tamsulosin (FLOMAX) 0.4 mg Cap Take 2 capsules (0.8 mg total) by mouth every evening. (Patient taking differently: Take 0.8 mg by mouth once daily. Take in am of surgery) 180 capsule 3    azelastine (ASTELIN) 137 mcg (0.1 %) nasal spray 1 spray (137 mcg total) by Nasal route 2 (two) times daily as needed for Rhinitis (congestion). 30 mL 3    levocetirizine (XYZAL) 5 MG tablet Take 1 tablet (5 mg total) by mouth every evening. Can take up to 2 tablets (Patient taking differently: Take 5 mg by mouth every evening. Can take up to 2 tablets  Takes at night) 30 tablet 11    montelukast (SINGULAIR) 10 mg tablet Take 10 mg by mouth every evening.      polyethylene glycol (GLYCOLAX) 17 gram PwPk Take 17 g by mouth 2 (two) times daily as needed (constipation).  0     No current facility-administered medications for this visit.      Facility-Administered Medications Ordered in Other Visits   Medication Dose Route Frequency Provider Last Rate Last Dose    phenylephrine HCL 2.5% ophthalmic solution 1 drop  1 drop Left Eye On Call Procedure Chary Culp MD   1 drop at 08/22/19 0659    proparacaine 0.5 % ophthalmic solution 1 drop  1 drop Left Eye On Call Procedure Chary Culp MD   1 drop at 08/22/19 0641    tropicamide 1% ophthalmic solution 1 drop  1 drop Left Eye On Call Procedure " "Chary Culp MD   1 drop at 08/22/19 0700       Review of patient's allergies indicates:  No Known Allergies    Vitals:    10/07/20 1015   BP: 128/68   Pulse: 74   Weight: 79.4 kg (175 lb)   Height: 5' 7" (1.702 m)   PainSc:   8       Objective:      Physical Exam  Constitutional:       General: He is not in acute distress.     Appearance: He is well-developed.   Musculoskeletal:         General: No tenderness or deformity.   Skin:     General: Skin is warm.      Capillary Refill: Capillary refill takes less than 2 seconds.      Findings: No erythema.   Neurological:      Mental Status: He is alert and oriented to person, place, and time.   Psychiatric:         Behavior: Behavior normal.       Vascular: Distal DP/PT pulses palpable 0/4. No vericosities noted to LEs. Hair growth present LE, warm to touch LE, + edema noted to LE.    Dermatologic: No open lesions, lacerations or wounds. Interdigital spaces clean, dry and intact. No erythema, rubor, calor noted LE, Nails are normal R 1-5 and L 1-5.    Musculoskeletal: MMT 5/5 in DF/PF/Inv/Ev resistance with no reproduction of pain in any direction. Passive range of motion of ankle and pedal joints is painless. No calf tenderness LE, Compartments soft/compressible. ROM of ankles with < 10 deg DF is noted b/l      Neurological:   Light touch, proprioception, and sharp/dull sensation are decreased b/l. Protective threshold with the [place]stein monofilament is decreased b/l. Vibratory sensation decreased b/l.         Assessment:       Encounter Diagnoses   Name Primary?    Type 2 diabetes mellitus with hyperglycemia, with long-term current use of insulin Yes    Pitting edema          Plan:       Edwin was seen today for follow-up, pcp visit, diabetes mellitus and nail care.    Diagnoses and all orders for this visit:    Type 2 diabetes mellitus with hyperglycemia, with long-term current use of insulin    Pitting edema    Other orders  -     Influenza - " Quadrivalent (Adjuvanted)      I counseled the patient on his conditions, their implications and medical management.    75 y.o. male with diabetic foot risk assessment.    -nails x 10 debrided with nail nipper. Tolerated well. Verbal consent obtained. Recommend to continue with compression stocking.     -Shoe inspection. General Foot Education. Patient reminded of the importance of good nutrition. Patient instructed on proper foot hygeine. We discussed wearing proper shoe gear, daily foot inspections, never walking without protective shoe gear, caution putting sharp instruments to feet. Discussed general foot care:  Wear comfortable, proper fitting shoes. Wash feet daily. Dry well. After drying, apply moisturizer to feet (no lotion to webspaces). Inspect feet daily for skin breaks, blisters, swelling, or redness. Wear cotton socks (preferably white)  Change socks every day. Do NOT walk barefoot. Do NOT use heating pads or warm/hot water soaks   -It was discussed the importance of wearing shoes with adequate room in toe box to accommodate toe deformities. Recommended New Balance/[employer]ics shoe brands with adequate arch supports to alleviate abnormal pressure and improve stability of foot while walking. Avoid flat shoes and barefoot walking as these will exacerbate or worsen symptoms.   -Advised for optimal glucose control and maintenance per primary care physician. Patient was also educated on healthy diet that is naturally rich in nutrients and low in fat and calories.   -The nature of the condition, options for management, as well as potential risks and complications were discussed in detail with patient. Patient was amenable to my recommendations and left my office fully informed and will follow up as instructed or sooner if necessary.    -Patient was advised of signs and symptoms of infection including redness, drainage, purulence, odor, streaking, fever, chills and I advised patient to seek medical attention (ER or  urgent care) if these symptoms arise.   -f/u 4 months

## 2020-10-14 DIAGNOSIS — C79.51 METASTATIC RENAL CELL CARCINOMA TO BONE: ICD-10-CM

## 2020-10-14 DIAGNOSIS — C64.9 METASTATIC RENAL CELL CARCINOMA TO LIVER: ICD-10-CM

## 2020-10-14 DIAGNOSIS — C64.9 METASTATIC RENAL CELL CARCINOMA TO BONE: ICD-10-CM

## 2020-10-14 DIAGNOSIS — C64.9 METASTATIC RENAL CELL CARCINOMA, UNSPECIFIED LATERALITY: Chronic | ICD-10-CM

## 2020-10-14 DIAGNOSIS — C78.7 METASTATIC RENAL CELL CARCINOMA TO LIVER: ICD-10-CM

## 2020-10-14 DIAGNOSIS — C64.2 RENAL CELL CARCINOMA OF LEFT KIDNEY: ICD-10-CM

## 2020-10-14 RX ORDER — OXYCODONE HYDROCHLORIDE 10 MG/1
10 TABLET ORAL EVERY 4 HOURS PRN
Qty: 120 TABLET | Refills: 0 | Status: SHIPPED | OUTPATIENT
Start: 2020-10-14 | End: 2020-11-01 | Stop reason: SDUPTHER

## 2020-10-23 ENCOUNTER — PES CALL (OUTPATIENT)
Dept: ADMINISTRATIVE | Facility: CLINIC | Age: 75
End: 2020-10-23

## 2020-11-01 DIAGNOSIS — C64.9 METASTATIC RENAL CELL CARCINOMA TO LIVER: ICD-10-CM

## 2020-11-01 DIAGNOSIS — C64.9 METASTATIC RENAL CELL CARCINOMA TO BONE: ICD-10-CM

## 2020-11-01 DIAGNOSIS — C78.7 METASTATIC RENAL CELL CARCINOMA TO LIVER: ICD-10-CM

## 2020-11-01 DIAGNOSIS — C64.2 RENAL CELL CARCINOMA OF LEFT KIDNEY: ICD-10-CM

## 2020-11-01 DIAGNOSIS — C64.9 METASTATIC RENAL CELL CARCINOMA, UNSPECIFIED LATERALITY: Chronic | ICD-10-CM

## 2020-11-01 DIAGNOSIS — C79.51 METASTATIC RENAL CELL CARCINOMA TO BONE: ICD-10-CM

## 2020-11-02 RX ORDER — OXYCODONE HYDROCHLORIDE 10 MG/1
10 TABLET ORAL EVERY 4 HOURS PRN
Qty: 120 TABLET | Refills: 0 | Status: SHIPPED | OUTPATIENT
Start: 2020-11-02 | End: 2020-11-20 | Stop reason: SDUPTHER

## 2020-11-03 ENCOUNTER — TELEPHONE (OUTPATIENT)
Dept: NEPHROLOGY | Facility: CLINIC | Age: 75
End: 2020-11-03

## 2020-11-06 ENCOUNTER — TELEPHONE (OUTPATIENT)
Dept: NEPHROLOGY | Facility: CLINIC | Age: 75
End: 2020-11-06

## 2020-11-06 DIAGNOSIS — R80.9 PROTEINURIA, UNSPECIFIED TYPE: Primary | ICD-10-CM

## 2020-11-06 NOTE — TELEPHONE ENCOUNTER
Protein came back high again-not sure about how accurate it is and therefore would like him to do a 24 hr protein-ordered.  Please inform him.

## 2020-11-06 NOTE — TELEPHONE ENCOUNTER
Venkat Leonardo DO routed conversation to Malissa Robledo Staff 19 minutes ago (3:28 PM)      Venkat Leonardo DO 19 minutes ago (3:28 PM)        Protein came back high again-not sure about how accurate it is and therefore would like him to do a 24 hr protein-ordered.  Please inform him.         Documentation      Called and no answer, left message for pt to call Jacqueline on Monday 11/9/2020

## 2020-11-10 ENCOUNTER — TELEPHONE (OUTPATIENT)
Dept: NEPHROLOGY | Facility: CLINIC | Age: 75
End: 2020-11-10

## 2020-11-10 DIAGNOSIS — I10 HYPERTENSION, UNSPECIFIED TYPE: Primary | ICD-10-CM

## 2020-11-24 ENCOUNTER — TELEPHONE (OUTPATIENT)
Dept: ORTHOPEDICS | Facility: CLINIC | Age: 75
End: 2020-11-24
Payer: MEDICARE

## 2020-11-24 DIAGNOSIS — Z98.890 S/P SPINAL SURGERY: Primary | ICD-10-CM

## 2020-12-06 ENCOUNTER — PATIENT MESSAGE (OUTPATIENT)
Dept: PRIMARY CARE CLINIC | Facility: CLINIC | Age: 75
End: 2020-12-06

## 2020-12-08 ENCOUNTER — TELEPHONE (OUTPATIENT)
Dept: NEPHROLOGY | Facility: CLINIC | Age: 75
End: 2020-12-08

## 2020-12-08 ENCOUNTER — PATIENT MESSAGE (OUTPATIENT)
Dept: ADMINISTRATIVE | Facility: OTHER | Age: 75
End: 2020-12-08

## 2020-12-11 DIAGNOSIS — C64.9 METASTATIC RENAL CELL CARCINOMA TO BONE: ICD-10-CM

## 2020-12-11 DIAGNOSIS — C64.9 METASTATIC RENAL CELL CARCINOMA, UNSPECIFIED LATERALITY: Chronic | ICD-10-CM

## 2020-12-11 DIAGNOSIS — C64.9 METASTATIC RENAL CELL CARCINOMA TO LIVER: ICD-10-CM

## 2020-12-11 DIAGNOSIS — C78.7 METASTATIC RENAL CELL CARCINOMA TO LIVER: ICD-10-CM

## 2020-12-11 DIAGNOSIS — C64.2 RENAL CELL CARCINOMA OF LEFT KIDNEY: ICD-10-CM

## 2020-12-11 DIAGNOSIS — C79.51 METASTATIC RENAL CELL CARCINOMA TO BONE: ICD-10-CM

## 2020-12-11 RX ORDER — OXYCODONE HYDROCHLORIDE 10 MG/1
10 TABLET ORAL EVERY 4 HOURS PRN
Qty: 120 TABLET | Refills: 0 | Status: SHIPPED | OUTPATIENT
Start: 2020-12-11 | End: 2020-12-30 | Stop reason: SDUPTHER

## 2020-12-16 ENCOUNTER — OFFICE VISIT (OUTPATIENT)
Dept: PRIMARY CARE CLINIC | Facility: CLINIC | Age: 75
End: 2020-12-16
Payer: MEDICARE

## 2020-12-16 ENCOUNTER — PATIENT MESSAGE (OUTPATIENT)
Dept: NEPHROLOGY | Facility: CLINIC | Age: 75
End: 2020-12-16

## 2020-12-16 DIAGNOSIS — E11.65 TYPE 2 DIABETES MELLITUS WITH HYPERGLYCEMIA, WITH LONG-TERM CURRENT USE OF INSULIN: Chronic | ICD-10-CM

## 2020-12-16 DIAGNOSIS — I10 ESSENTIAL HYPERTENSION: ICD-10-CM

## 2020-12-16 DIAGNOSIS — E11.22 TYPE 2 DIABETES MELLITUS WITH STAGE 3A CHRONIC KIDNEY DISEASE, WITHOUT LONG-TERM CURRENT USE OF INSULIN: ICD-10-CM

## 2020-12-16 DIAGNOSIS — N18.31 TYPE 2 DIABETES MELLITUS WITH STAGE 3A CHRONIC KIDNEY DISEASE, WITHOUT LONG-TERM CURRENT USE OF INSULIN: ICD-10-CM

## 2020-12-16 DIAGNOSIS — E55.9 VITAMIN D DEFICIENCY: ICD-10-CM

## 2020-12-16 DIAGNOSIS — E53.8 B12 DEFICIENCY: ICD-10-CM

## 2020-12-16 DIAGNOSIS — E78.5 HYPERLIPIDEMIA ASSOCIATED WITH TYPE 2 DIABETES MELLITUS: Chronic | ICD-10-CM

## 2020-12-16 DIAGNOSIS — Z79.4 TYPE 2 DIABETES MELLITUS WITH HYPERGLYCEMIA, WITH LONG-TERM CURRENT USE OF INSULIN: Chronic | ICD-10-CM

## 2020-12-16 DIAGNOSIS — E11.69 HYPERLIPIDEMIA ASSOCIATED WITH TYPE 2 DIABETES MELLITUS: Chronic | ICD-10-CM

## 2020-12-16 DIAGNOSIS — E03.9 ACQUIRED HYPOTHYROIDISM: Primary | Chronic | ICD-10-CM

## 2020-12-16 DIAGNOSIS — G47.00 INSOMNIA, UNSPECIFIED TYPE: ICD-10-CM

## 2020-12-16 PROCEDURE — 3044F HG A1C LEVEL LT 7.0%: CPT | Mod: HCNC,CPTII,95, | Performed by: INTERNAL MEDICINE

## 2020-12-16 PROCEDURE — 3072F PR LOW RISK FOR RETINOPATHY: ICD-10-PCS | Mod: HCNC,95,, | Performed by: INTERNAL MEDICINE

## 2020-12-16 PROCEDURE — 1159F MED LIST DOCD IN RCRD: CPT | Mod: HCNC,95,, | Performed by: INTERNAL MEDICINE

## 2020-12-16 PROCEDURE — 3072F LOW RISK FOR RETINOPATHY: CPT | Mod: HCNC,95,, | Performed by: INTERNAL MEDICINE

## 2020-12-16 PROCEDURE — 99215 PR OFFICE/OUTPT VISIT, EST, LEVL V, 40-54 MIN: ICD-10-PCS | Mod: HCNC,95,, | Performed by: INTERNAL MEDICINE

## 2020-12-16 PROCEDURE — 1159F PR MEDICATION LIST DOCUMENTED IN MEDICAL RECORD: ICD-10-PCS | Mod: HCNC,95,, | Performed by: INTERNAL MEDICINE

## 2020-12-16 PROCEDURE — 99215 OFFICE O/P EST HI 40 MIN: CPT | Mod: HCNC,95,, | Performed by: INTERNAL MEDICINE

## 2020-12-16 PROCEDURE — 3044F PR MOST RECENT HEMOGLOBIN A1C LEVEL <7.0%: ICD-10-PCS | Mod: HCNC,CPTII,95, | Performed by: INTERNAL MEDICINE

## 2020-12-16 NOTE — PROGRESS NOTES
[facility] Primary Care Provider Telemedicine Appointment      The patient location is:  Patient Home   The chief complaint leading to consultation is: Follow up  Total time spent with patient:     Visit type: Virtual visit with synchronous audio only and video  Each patient to whom he or she provides medical services by telemedicine is:  (1) informed of the relationship between the physician and patient and the respective role of any other health care provider with respect to management of the patient; and (2) notified that he or she may decline to receive medical services by telemedicine and may withdraw from such care at any time.      Subjective:      Patient ID: Edwin Powell is a 75 y.o. male. With a PMHx of  metastatic renal cell carcinoma, lung/bone/liver mets, anemia, HTN, HLD , T2DM , CKD, Acquired Hypothyroidism , Chronic respiratory failure with hypoxia, on home oxygen therapy, Coronary artery disease involving native coronary artery of native heart without angina pectoris     Last virtual visit was on ( 09/23/2020) Patient was accompanied by his Son Al during that phone call. BP medications were reconciled.     Chief Complaint: Insomnia    During today's virtual visit patient was accompanied by his son (Al). He denied any complaints. He has been compliant with his medications. Reported BG runs between 100-140's . He has been taking Lantus 5 units nightly. He reported he is trying to follow a diet but does waver off and eat pretty much everything. He denied any CP, SOB, N/V, fever, chills, LARA, syncope, dizziness.     He does report difficulty falling asleep. He endorsed not being able to fall asleep until 4 am and then wakes up at 6 am. He reported he used to be on Temazepam previously for a short period of time and that had helped him sleep better, requesting that prescription again.     Past Surgical History:   Procedure Laterality Date    ABDOMINAL SURGERY      ANTERIOR CERVICAL DISCECTOMY W/  FUSION N/A 8/17/2020    Procedure: DISCECTOMY, SPINE, CERVICAL, ANTERIOR APPROACH, WITH FUSION co case with Dr. Falcon C6/7 [facility] SNS:vocal cord/motors/SSEP Regular OR table Patient needs scope in preop;  Surgeon: Filemon Aguayo MD;  Location: [facility] OR 2ND FLR;  Service: Neurosurgery;  Laterality: N/A;    APPENDECTOMY      BACK SURGERY      CARDIAC SURGERY      CATARACT EXTRACTION      CHOLECYSTECTOMY      CORONARY STENT PLACEMENT      5 stents    coronary stenting      X 5 last one in 2010-11.    DISSECTION OF NECK N/A 8/17/2020    Procedure: DISSECTION, NECK;  Surgeon: Rush Falcon MD;  [facility];  Service: ENT;  Laterality: N/A;    ESOPHAGOGASTRODUODENOSCOPY N/A 9/1/2020    Procedure: EGD (ESOPHAGOGASTRODUODENOSCOPY);  Surgeon: Jim Hooks MD;  Location: [facility] (2ND FLR);  Service: Endoscopy;  Laterality: N/A;    INTRAOCULAR PROSTHESES INSERTION Right 6/27/2019    Procedure: INSERTION, IOL PROSTHESIS;  Surgeon: Chary Culp MD;  [facility];  Service: Ophthalmology;  Laterality: Right;    INTRAOCULAR PROSTHESES INSERTION Left 8/22/2019    Procedure: INSERTION, IOL PROSTHESIS;  Surgeon: Chary Culp MD;  [facility];  Service: Ophthalmology;  Laterality: Left;    PHACOEMULSIFICATION OF CATARACT Right 6/27/2019    Procedure: PHACOEMULSIFICATION, CATARACT;  Surgeon: Chary Culp MD;  Location: [address];  Service: Ophthalmology;  Laterality: Right;    PHACOEMULSIFICATION OF CATARACT Left 8/22/2019    Procedure: PHACOEMULSIFICATION, CATARACT;  Surgeon: Chary Culp MD;  [facility];  Service: Ophthalmology;  Laterality: Left;    SPINAL FUSION         Past Medical History:   Diagnosis Date    Acquired hypothyroidism 5/26/2017    Benign essential HTN 5/26/2017    Benign prostatic hyperplasia (BPH) with urinary urge incontinence 6/6/2017    Chronic low back pain 6/1/2017    Chronic respiratory  failure with hypoxia, on home oxygen therapy     Coronary artery disease involving native coronary artery of native heart without angina pectoris 5/26/2017    S/p 5 stents - last one around 2010-11.    Gastroesophageal reflux disease without esophagitis 5/26/2017    History of CVA (cerebrovascular accident) 5/26/2017    Hypertension associated with diabetes 5/26/2017    BP controlled on ACE, CCB    Lumbar disc lesion 5/26/2017    Metastatic renal cell carcinoma to bone 6/6/2017    Metastatic renal cell carcinoma to liver 6/6/2017    Liver Biopsy 5-2017: METASTATIC RENAL CELL CARCINOMA.    Mixed hyperlipidemia 5/26/2017    Type 2 diabetes mellitus with stage 3 chronic kidney disease, with long-term current use of insulin 5/26/2017       Review of Systems   Constitutional: Negative for activity change, appetite change, chills, diaphoresis and fatigue.   HENT: Negative for congestion, ear pain, facial swelling, hearing loss, postnasal drip, rhinorrhea and sore throat.    Eyes: Negative for photophobia and visual disturbance.   Respiratory: Negative for apnea, cough, chest tightness and shortness of breath.    Cardiovascular: Negative for chest pain, palpitations and leg swelling.   Gastrointestinal: Negative for abdominal distention, abdominal pain, blood in stool, constipation, diarrhea, nausea and vomiting.   Endocrine: Negative for polyphagia and polyuria.   Genitourinary: Negative for difficulty urinating, dysuria and flank pain.   Musculoskeletal: Negative for arthralgias, back pain, gait problem, joint swelling and myalgias.   Neurological: Negative for dizziness, seizures, syncope, speech difficulty, numbness and headaches.   Psychiatric/Behavioral: Positive for sleep disturbance. Negative for agitation, behavioral problems and confusion.       Objective:   There were no vitals taken for this visit.    Physical Exam  Constitutional:       Appearance: Normal appearance.   HENT:      Head: Normocephalic  and atraumatic.   Neurological:      Mental Status: He is alert and oriented to person, place, and time.         Lab Results   Component Value Date    WBC 9.28 09/16/2020    HGB 7.1 (L) 09/16/2020    HCT 23.9 (L) 09/16/2020     (H) 09/16/2020    CHOL 142 08/04/2020    TRIG 142 08/04/2020    HDL 38 (L) 08/04/2020     (H) 09/16/2020     (H) 09/16/2020     11/03/2020    K 4.5 11/03/2020     11/03/2020    CREATININE 1.88 (H) 11/03/2020    BUN 43 (H) 11/03/2020    CO2 31 (H) 11/03/2020    TSH 7.711 (H) 08/18/2020    PSA 1.1 07/16/2018    INR 1.0 09/01/2020    HGBA1C 6.3 (H) 08/04/2020    HGBA1C 6.3 (H) 08/04/2020         Assessment:   75 y.o. male with multiple co-morbid illnesses here to continue work-up of chronic issues notably     Plan:     Problem List Items Addressed This Visit        Cardiac/Vascular    Hyperlipidemia associated with type 2 diabetes mellitus (Chronic)    Essential hypertension     - Medications were reconciled at the last visit in September 2020   - Currently only on Coreg 3.25 mg BID and is compliant             Endocrine    Acquired hypothyroidism - Primary (Chronic)     - Continue Levothyroxine 88 mcg every morning           Relevant Orders    TSH    Type 2 diabetes mellitus with hyperglycemia, with long-term current use of insulin (Chronic)     - Continue Lantus 5 units nightly and daily BG checks  - Keep a log  - A1C prior to next visit           B12 deficiency    Relevant Orders    Vitamin B12    Vitamin D deficiency    Relevant Orders    Vitamin D       Other    Insomnia     - Continues to have insomnia, has tried Melatonin but that did not provide any relief  - Was previously on Temazepam and is requesting a prescription for that  - Will avoid all sedatives at this time given the high risk of falls   - Encouraged to follow a sleep hygiene , avoid sleeping during daytime and have a set time to get to bed every night. Avoid TV/Music/Radio 1-2 hours before  falling asleep.              Other Visit Diagnoses     Type 2 diabetes mellitus with stage 3a chronic kidney disease, without long-term current use of insulin        Relevant Orders    Hemoglobin A1C    Comprehensive Metabolic Panel    CBC Auto Differential          Health Maintenance       Date Due Completion Date    Shingles Vaccine (2 of 2) 01/13/2020 11/18/2019    Hemoglobin A1c 02/04/2021 8/4/2020    Foot Exam 05/18/2021 5/18/2020 (Done)    Override on 5/18/2020: Done    Override on 1/13/2020: Done    Override on 7/11/2019: Done    Override on 2/2/2018: Done (performed by podiatrist)    Lipid Panel 08/04/2021 8/4/2020    Colorectal Cancer Screening 08/30/2021 8/30/2020    Override on 2/15/2011: Done (Per [employer] dashboard ? results)    High Dose Statin 10/07/2021 10/7/2020    TETANUS VACCINE 01/23/2022 1/23/2012          Follow up in about 3 weeks (around 1/6/2021). One hour spent with this patient today, more than half of that in counseling on the following issues: HTn, HLD, T2DM, Insomnia, Hypothyroidism     Alisa Price MD  Resident     Lulú Lewis MD/MPH  [facility]  [phone]

## 2020-12-16 NOTE — ASSESSMENT & PLAN NOTE
- Continues to have insomnia, has tried Melatonin but that did not provide any relief  - Was previously on Temazepam and is requesting a prescription for that  - Will avoid all sedatives at this time given the high risk of falls   - Encouraged to follow a sleep hygiene , avoid sleeping during daytime and have a set time to get to bed every night. Avoid TV/Music/Radio 1-2 hours before falling asleep.

## 2020-12-16 NOTE — ASSESSMENT & PLAN NOTE
- Medications were reconciled at the last visit in September 2020   - Currently only on Coreg 3.25 mg BID and is compliant

## 2020-12-18 ENCOUNTER — PATIENT MESSAGE (OUTPATIENT)
Dept: NEPHROLOGY | Facility: CLINIC | Age: 75
End: 2020-12-18

## 2020-12-18 ENCOUNTER — TELEPHONE (OUTPATIENT)
Dept: NEPHROLOGY | Facility: CLINIC | Age: 75
End: 2020-12-18

## 2020-12-22 ENCOUNTER — TELEPHONE (OUTPATIENT)
Dept: PRIMARY CARE CLINIC | Facility: CLINIC | Age: 75
End: 2020-12-22

## 2020-12-22 ENCOUNTER — TELEPHONE (OUTPATIENT)
Dept: NEPHROLOGY | Facility: CLINIC | Age: 75
End: 2020-12-22

## 2020-12-22 NOTE — TELEPHONE ENCOUNTER
Patient has B12 deficiency. Is he taking a B-complex supplement. We can order this from [facility] for him if he needs it.    Otherwise his labs were stable.    How is he doing?     Thanks,  CORTEZ

## 2020-12-26 ENCOUNTER — PATIENT MESSAGE (OUTPATIENT)
Dept: PRIMARY CARE CLINIC | Facility: CLINIC | Age: 75
End: 2020-12-26

## 2020-12-28 ENCOUNTER — TELEPHONE (OUTPATIENT)
Dept: PRIMARY CARE CLINIC | Facility: CLINIC | Age: 75
End: 2020-12-28

## 2020-12-28 DIAGNOSIS — Z91.89 AT INCREASED RISK OF EXPOSURE TO COVID-19 VIRUS: Primary | ICD-10-CM

## 2020-12-29 ENCOUNTER — CLINICAL SUPPORT (OUTPATIENT)
Dept: URGENT CARE | Facility: CLINIC | Age: 75
End: 2020-12-29
Payer: MEDICARE

## 2020-12-29 VITALS — TEMPERATURE: 98 F

## 2020-12-29 DIAGNOSIS — Z20.822 EXPOSURE TO COVID-19 VIRUS: Primary | ICD-10-CM

## 2020-12-29 LAB
CTP QC/QA: YES
SARS-COV-2 RDRP RESP QL NAA+PROBE: NEGATIVE

## 2020-12-29 PROCEDURE — U0002: ICD-10-PCS | Mod: QW,S$GLB,, | Performed by: NURSE PRACTITIONER

## 2020-12-29 PROCEDURE — U0002 COVID-19 LAB TEST NON-CDC: HCPCS | Mod: QW,S$GLB,, | Performed by: NURSE PRACTITIONER

## 2020-12-29 NOTE — PROGRESS NOTES
CDC Testing and Quarantine Guidelines for Exposure:         A close exposure is defined as anyone who has had an exposure (masked or unmasked) to a known COVID -19 positive person within 6 ft for longer than 15 minutes. If your exposure meets this definition you are required by CDC guidelines to quarantine for at least 7-10 days from time of exposure. The CDC states that a test can be performed for an asymptomatic patient (someone who does not have any symptoms) after a close exposure, and that a test should be done if you develop symptoms after a close exposure as described above.  Specifically, you can test at day 5 or later if asymptomatic, in order to get released from quarantine on day 7 or later.  If you develop symptoms sooner, you should test when your symptoms start.  If you meet the definition of a close exposure, it will not matter whether you are experiencing symptoms- a quarantine for at least 7-10 days after a close exposure is required by CDC guidelines.  Please note, if you decide to test as an asymptomatic during your quarantine and you are positive, you will be restarting your quarantine and moving from a possible 10 day quarantine (if you do not test), to a 11 day or greater quarantine.  The CDC also suggests people still monitor for symptoms for a full 14 days and remember that the shorter quarantine options do not replace initial CDC guidance.  The CDC continues to recommend quarantining for 14 days as the best way to reduce risk for spreading COVID-19 - however, this is only a recommendation.  If your exposure does not meet the above definition, you can return to your normal daily activities to include social distancing, wearing a mask and frequent handwashing

## 2020-12-30 DIAGNOSIS — C64.9 METASTATIC RENAL CELL CARCINOMA TO BONE: ICD-10-CM

## 2020-12-30 DIAGNOSIS — C64.2 RENAL CELL CARCINOMA OF LEFT KIDNEY: ICD-10-CM

## 2020-12-30 DIAGNOSIS — C64.9 METASTATIC RENAL CELL CARCINOMA TO LIVER: ICD-10-CM

## 2020-12-30 DIAGNOSIS — C79.51 METASTATIC RENAL CELL CARCINOMA TO BONE: ICD-10-CM

## 2020-12-30 DIAGNOSIS — C64.9 METASTATIC RENAL CELL CARCINOMA, UNSPECIFIED LATERALITY: Chronic | ICD-10-CM

## 2020-12-30 DIAGNOSIS — C78.7 METASTATIC RENAL CELL CARCINOMA TO LIVER: ICD-10-CM

## 2020-12-30 RX ORDER — OXYCODONE HYDROCHLORIDE 10 MG/1
10 TABLET ORAL EVERY 4 HOURS PRN
Qty: 120 TABLET | Refills: 0 | Status: SHIPPED | OUTPATIENT
Start: 2020-12-30 | End: 2021-01-21 | Stop reason: SDUPTHER

## 2021-01-01 ENCOUNTER — TELEPHONE (OUTPATIENT)
Dept: PRIMARY CARE CLINIC | Facility: CLINIC | Age: 76
End: 2021-01-01

## 2021-01-01 ENCOUNTER — OFFICE VISIT (OUTPATIENT)
Dept: OPTOMETRY | Facility: CLINIC | Age: 76
End: 2021-01-01
Payer: MEDICARE

## 2021-01-01 ENCOUNTER — TELEPHONE (OUTPATIENT)
Dept: ORTHOPEDICS | Facility: CLINIC | Age: 76
End: 2021-01-01
Payer: MEDICARE

## 2021-01-01 ENCOUNTER — OFFICE VISIT (OUTPATIENT)
Dept: PRIMARY CARE CLINIC | Facility: CLINIC | Age: 76
End: 2021-01-01
Payer: MEDICARE

## 2021-01-01 ENCOUNTER — LAB VISIT (OUTPATIENT)
Dept: LAB | Facility: HOSPITAL | Age: 76
End: 2021-01-01
Payer: MEDICARE

## 2021-01-01 ENCOUNTER — TELEPHONE (OUTPATIENT)
Dept: PRIMARY CARE CLINIC | Facility: CLINIC | Age: 76
End: 2021-01-01
Payer: MEDICARE

## 2021-01-01 ENCOUNTER — TELEPHONE (OUTPATIENT)
Dept: INTERNAL MEDICINE | Facility: CLINIC | Age: 76
End: 2021-01-01
Payer: MEDICARE

## 2021-01-01 ENCOUNTER — PATIENT OUTREACH (OUTPATIENT)
Dept: ADMINISTRATIVE | Facility: OTHER | Age: 76
End: 2021-01-01

## 2021-01-01 ENCOUNTER — TELEPHONE (OUTPATIENT)
Dept: ORTHOPEDICS | Facility: CLINIC | Age: 76
End: 2021-01-01

## 2021-01-01 ENCOUNTER — HOSPITAL ENCOUNTER (OUTPATIENT)
Dept: RADIOLOGY | Facility: HOSPITAL | Age: 76
Discharge: HOME | End: 2021-10-07
Attending: NURSE PRACTITIONER
Payer: MEDICARE

## 2021-01-01 ENCOUNTER — PATIENT OUTREACH (OUTPATIENT)
Dept: ADMINISTRATIVE | Facility: OTHER | Age: 76
End: 2021-01-01
Payer: MEDICARE

## 2021-01-01 ENCOUNTER — HOSPITAL ENCOUNTER (OUTPATIENT)
Dept: RADIOLOGY | Facility: HOSPITAL | Age: 76
Discharge: HOME | End: 2021-09-27
Attending: ORTHOPAEDIC SURGERY
Payer: MEDICARE

## 2021-01-01 ENCOUNTER — IMMUNIZATION (OUTPATIENT)
Dept: INTERNAL MEDICINE | Facility: CLINIC | Age: 76
End: 2021-01-01
Payer: MEDICARE

## 2021-01-01 ENCOUNTER — PATIENT MESSAGE (OUTPATIENT)
Dept: HEMATOLOGY/ONCOLOGY | Facility: CLINIC | Age: 76
End: 2021-01-01

## 2021-01-01 ENCOUNTER — TELEPHONE (OUTPATIENT)
Dept: HEMATOLOGY/ONCOLOGY | Facility: CLINIC | Age: 76
End: 2021-01-01

## 2021-01-01 ENCOUNTER — LAB VISIT (OUTPATIENT)
Dept: LAB | Facility: HOSPITAL | Age: 76
End: 2021-01-01
Attending: INTERNAL MEDICINE
Payer: MEDICARE

## 2021-01-01 ENCOUNTER — OFFICE VISIT (OUTPATIENT)
Dept: ORTHOPEDICS | Facility: CLINIC | Age: 76
End: 2021-01-01
Payer: MEDICARE

## 2021-01-01 ENCOUNTER — PATIENT MESSAGE (OUTPATIENT)
Dept: ORTHOPEDICS | Facility: CLINIC | Age: 76
End: 2021-01-01

## 2021-01-01 ENCOUNTER — OFFICE VISIT (OUTPATIENT)
Dept: HEMATOLOGY/ONCOLOGY | Facility: CLINIC | Age: 76
End: 2021-01-01
Payer: MEDICARE

## 2021-01-01 ENCOUNTER — HOSPITAL ENCOUNTER (OUTPATIENT)
Dept: RADIOLOGY | Facility: HOSPITAL | Age: 76
Discharge: HOME | End: 2021-11-01
Attending: ORTHOPAEDIC SURGERY
Payer: MEDICARE

## 2021-01-01 VITALS
TEMPERATURE: 98 F | OXYGEN SATURATION: 97 % | BODY MASS INDEX: 23.36 KG/M2 | DIASTOLIC BLOOD PRESSURE: 76 MMHG | SYSTOLIC BLOOD PRESSURE: 192 MMHG | HEART RATE: 85 BPM | RESPIRATION RATE: 16 BRPM | HEIGHT: 67 IN | WEIGHT: 148.81 LBS

## 2021-01-01 VITALS — BODY MASS INDEX: 26.31 KG/M2 | WEIGHT: 168 LBS

## 2021-01-01 VITALS
BODY MASS INDEX: 23.93 KG/M2 | OXYGEN SATURATION: 97 % | HEIGHT: 67 IN | HEART RATE: 83 BPM | TEMPERATURE: 99 F | RESPIRATION RATE: 16 BRPM | SYSTOLIC BLOOD PRESSURE: 158 MMHG | DIASTOLIC BLOOD PRESSURE: 70 MMHG | WEIGHT: 152.44 LBS

## 2021-01-01 VITALS — BODY MASS INDEX: 23.36 KG/M2 | WEIGHT: 148.81 LBS | HEIGHT: 67 IN

## 2021-01-01 DIAGNOSIS — E11.69 HYPERLIPIDEMIA DUE TO TYPE 2 DIABETES MELLITUS: ICD-10-CM

## 2021-01-01 DIAGNOSIS — E11.65 TYPE 2 DIABETES MELLITUS WITH HYPERGLYCEMIA, WITH LONG-TERM CURRENT USE OF INSULIN: ICD-10-CM

## 2021-01-01 DIAGNOSIS — C64.9 METASTATIC RENAL CELL CARCINOMA, UNSPECIFIED LATERALITY: Primary | ICD-10-CM

## 2021-01-01 DIAGNOSIS — C79.51 METASTATIC RENAL CELL CARCINOMA TO BONE: ICD-10-CM

## 2021-01-01 DIAGNOSIS — S62.354D CLOSED NONDISPLACED FRACTURE OF SHAFT OF FOURTH METACARPAL BONE OF RIGHT HAND WITH ROUTINE HEALING: Primary | ICD-10-CM

## 2021-01-01 DIAGNOSIS — C78.7 METASTATIC RENAL CELL CARCINOMA TO LIVER: ICD-10-CM

## 2021-01-01 DIAGNOSIS — C64.9 METASTATIC RENAL CELL CARCINOMA TO BONE: ICD-10-CM

## 2021-01-01 DIAGNOSIS — E03.9 HYPOTHYROIDISM, UNSPECIFIED TYPE: ICD-10-CM

## 2021-01-01 DIAGNOSIS — Z12.89 ENCOUNTER FOR SCREENING FOR MALIGNANT NEOPLASM OF OTHER SITES: ICD-10-CM

## 2021-01-01 DIAGNOSIS — S62.324A CLOSED DISPLACED FRACTURE OF SHAFT OF FOURTH METACARPAL BONE OF RIGHT HAND, INITIAL ENCOUNTER: ICD-10-CM

## 2021-01-01 DIAGNOSIS — E11.65 TYPE 2 DIABETES MELLITUS WITH HYPERGLYCEMIA, WITH LONG-TERM CURRENT USE OF INSULIN: Primary | ICD-10-CM

## 2021-01-01 DIAGNOSIS — Z79.4 TYPE 2 DIABETES MELLITUS WITH HYPERGLYCEMIA, WITH LONG-TERM CURRENT USE OF INSULIN: Primary | Chronic | ICD-10-CM

## 2021-01-01 DIAGNOSIS — C78.7 METASTATIC RENAL CELL CARCINOMA TO LIVER: Primary | ICD-10-CM

## 2021-01-01 DIAGNOSIS — D50.0 IRON DEFICIENCY ANEMIA DUE TO CHRONIC BLOOD LOSS: ICD-10-CM

## 2021-01-01 DIAGNOSIS — C64.2 RENAL CELL CARCINOMA OF LEFT KIDNEY: ICD-10-CM

## 2021-01-01 DIAGNOSIS — C64.9 METASTATIC RENAL CELL CARCINOMA, UNSPECIFIED LATERALITY: Chronic | ICD-10-CM

## 2021-01-01 DIAGNOSIS — D50.9 IRON DEFICIENCY ANEMIA, UNSPECIFIED IRON DEFICIENCY ANEMIA TYPE: Primary | ICD-10-CM

## 2021-01-01 DIAGNOSIS — Z96.1 PRESENCE OF INTRAOCULAR LENS: ICD-10-CM

## 2021-01-01 DIAGNOSIS — S62.354D CLOSED NONDISPLACED FRACTURE OF SHAFT OF FOURTH METACARPAL BONE OF RIGHT HAND WITH ROUTINE HEALING: ICD-10-CM

## 2021-01-01 DIAGNOSIS — Z79.4 TYPE 2 DIABETES MELLITUS WITH STAGE 3 CHRONIC KIDNEY DISEASE, WITH LONG-TERM CURRENT USE OF INSULIN: ICD-10-CM

## 2021-01-01 DIAGNOSIS — N18.30 TYPE 2 DIABETES MELLITUS WITH STAGE 3 CHRONIC KIDNEY DISEASE, WITH LONG-TERM CURRENT USE OF INSULIN: ICD-10-CM

## 2021-01-01 DIAGNOSIS — N39.41 BENIGN PROSTATIC HYPERPLASIA (BPH) WITH URINARY URGE INCONTINENCE: ICD-10-CM

## 2021-01-01 DIAGNOSIS — N18.4 CHRONIC KIDNEY DISEASE, STAGE 4 (SEVERE): ICD-10-CM

## 2021-01-01 DIAGNOSIS — E11.9 TYPE 2 DIABETES MELLITUS WITHOUT RETINOPATHY: Primary | ICD-10-CM

## 2021-01-01 DIAGNOSIS — E11.22 TYPE 2 DIABETES MELLITUS WITH STAGE 3 CHRONIC KIDNEY DISEASE, WITH LONG-TERM CURRENT USE OF INSULIN: ICD-10-CM

## 2021-01-01 DIAGNOSIS — Z79.4 TYPE 2 DIABETES MELLITUS WITH HYPERGLYCEMIA, WITH LONG-TERM CURRENT USE OF INSULIN: ICD-10-CM

## 2021-01-01 DIAGNOSIS — Z23 NEED FOR VACCINATION: Primary | ICD-10-CM

## 2021-01-01 DIAGNOSIS — C64.9 METASTATIC RENAL CELL CARCINOMA TO LIVER: Primary | ICD-10-CM

## 2021-01-01 DIAGNOSIS — E11.65 TYPE 2 DIABETES MELLITUS WITH HYPERGLYCEMIA, WITH LONG-TERM CURRENT USE OF INSULIN: Chronic | ICD-10-CM

## 2021-01-01 DIAGNOSIS — C64.9 METASTATIC RENAL CELL CARCINOMA, UNSPECIFIED LATERALITY: Primary | Chronic | ICD-10-CM

## 2021-01-01 DIAGNOSIS — C64.2 RENAL CELL CARCINOMA OF LEFT KIDNEY: Primary | ICD-10-CM

## 2021-01-01 DIAGNOSIS — H35.033 HYPERTENSIVE RETINOPATHY OF BOTH EYES, GRADE 1: ICD-10-CM

## 2021-01-01 DIAGNOSIS — C78.01 SECONDARY MALIGNANT NEOPLASM OF RIGHT LUNG: ICD-10-CM

## 2021-01-01 DIAGNOSIS — E11.69 ANEMIA ASSOCIATED WITH TYPE 2 DIABETES MELLITUS: ICD-10-CM

## 2021-01-01 DIAGNOSIS — C64.9 METASTATIC RENAL CELL CARCINOMA TO LIVER: ICD-10-CM

## 2021-01-01 DIAGNOSIS — Z79.4 TYPE 2 DIABETES MELLITUS WITH HYPERGLYCEMIA, WITH LONG-TERM CURRENT USE OF INSULIN: Chronic | ICD-10-CM

## 2021-01-01 DIAGNOSIS — D50.9 IRON DEFICIENCY ANEMIA, UNSPECIFIED IRON DEFICIENCY ANEMIA TYPE: ICD-10-CM

## 2021-01-01 DIAGNOSIS — H52.7 REFRACTIVE ERROR: ICD-10-CM

## 2021-01-01 DIAGNOSIS — I10 ESSENTIAL HYPERTENSION: ICD-10-CM

## 2021-01-01 DIAGNOSIS — Z79.4 TYPE 2 DIABETES MELLITUS WITH HYPERGLYCEMIA, WITH LONG-TERM CURRENT USE OF INSULIN: Primary | ICD-10-CM

## 2021-01-01 DIAGNOSIS — H01.004 BLEPHARITIS OF UPPER EYELIDS OF BOTH EYES, UNSPECIFIED TYPE: ICD-10-CM

## 2021-01-01 DIAGNOSIS — D63.8 ANEMIA ASSOCIATED WITH TYPE 2 DIABETES MELLITUS: ICD-10-CM

## 2021-01-01 DIAGNOSIS — D64.9 ANEMIA, UNSPECIFIED TYPE: Primary | ICD-10-CM

## 2021-01-01 DIAGNOSIS — H52.4 PRESBYOPIA OF BOTH EYES: ICD-10-CM

## 2021-01-01 DIAGNOSIS — E11.65 TYPE 2 DIABETES MELLITUS WITH HYPERGLYCEMIA, WITH LONG-TERM CURRENT USE OF INSULIN: Primary | Chronic | ICD-10-CM

## 2021-01-01 DIAGNOSIS — S62.324A CLOSED DISPLACED FRACTURE OF SHAFT OF FOURTH METACARPAL BONE OF RIGHT HAND, INITIAL ENCOUNTER: Primary | ICD-10-CM

## 2021-01-01 DIAGNOSIS — Z12.89 ENCOUNTER FOR SCREENING FOR MALIGNANT NEOPLASM OF OTHER SITES: Primary | ICD-10-CM

## 2021-01-01 DIAGNOSIS — N18.4 CHRONIC KIDNEY DISEASE, STAGE 4 (SEVERE): Primary | ICD-10-CM

## 2021-01-01 DIAGNOSIS — E78.5 HYPERLIPIDEMIA DUE TO TYPE 2 DIABETES MELLITUS: ICD-10-CM

## 2021-01-01 DIAGNOSIS — N40.1 BENIGN PROSTATIC HYPERPLASIA (BPH) WITH URINARY URGE INCONTINENCE: ICD-10-CM

## 2021-01-01 DIAGNOSIS — H01.001 BLEPHARITIS OF UPPER EYELIDS OF BOTH EYES, UNSPECIFIED TYPE: ICD-10-CM

## 2021-01-01 DIAGNOSIS — E55.9 VITAMIN D DEFICIENCY: ICD-10-CM

## 2021-01-01 DIAGNOSIS — D53.9 MACROCYTIC ANEMIA: ICD-10-CM

## 2021-01-01 LAB
ALBUMIN SERPL BCP-MCNC: 2.8 G/DL (ref 3.5–5.2)
ALP SERPL-CCNC: 129 U/L (ref 55–135)
ALT SERPL W/O P-5'-P-CCNC: 17 U/L (ref 10–44)
ANION GAP SERPL CALC-SCNC: 17 MMOL/L (ref 8–16)
AST SERPL-CCNC: 16 U/L (ref 10–40)
BASOPHILS # BLD AUTO: 0.04 K/UL (ref 0–0.2)
BASOPHILS NFR BLD: 0.4 % (ref 0–1.9)
BILIRUB SERPL-MCNC: 0.2 MG/DL (ref 0.1–1)
BUN SERPL-MCNC: 29 MG/DL (ref 8–23)
CALCIUM SERPL-MCNC: 9.8 MG/DL (ref 8.7–10.5)
CHLORIDE SERPL-SCNC: 102 MMOL/L (ref 95–110)
CO2 SERPL-SCNC: 18 MMOL/L (ref 23–29)
CREAT SERPL-MCNC: 1.7 MG/DL (ref 0.5–1.4)
DIFFERENTIAL METHOD: ABNORMAL
EOSINOPHIL # BLD AUTO: 0.2 K/UL (ref 0–0.5)
EOSINOPHIL NFR BLD: 1.8 % (ref 0–8)
ERYTHROCYTE [DISTWIDTH] IN BLOOD BY AUTOMATED COUNT: 17.4 % (ref 11.5–14.5)
EST. GFR  (AFRICAN AMERICAN): 44.3 ML/MIN/1.73 M^2
EST. GFR  (NON AFRICAN AMERICAN): 38.3 ML/MIN/1.73 M^2
ESTIMATED AVG GLUCOSE: 275 MG/DL (ref 68–131)
ESTIMATED AVG GLUCOSE: 275 MG/DL (ref 68–131)
GLUCOSE SERPL-MCNC: 376 MG/DL (ref 70–110)
HBA1C MFR BLD: 11.2 % (ref 4–5.6)
HBA1C MFR BLD: 11.2 % (ref 4–5.6)
HCT VFR BLD AUTO: 27.6 % (ref 40–54)
HEMOCCULT STL QL IA: NEGATIVE
HGB BLD-MCNC: 7.7 G/DL (ref 14–18)
IMM GRANULOCYTES # BLD AUTO: 0.05 K/UL (ref 0–0.04)
IMM GRANULOCYTES NFR BLD AUTO: 0.6 % (ref 0–0.5)
LDH SERPL L TO P-CCNC: 180 U/L (ref 110–260)
LYMPHOCYTES # BLD AUTO: 1.7 K/UL (ref 1–4.8)
LYMPHOCYTES NFR BLD: 19.4 % (ref 18–48)
MCH RBC QN AUTO: 21.7 PG (ref 27–31)
MCHC RBC AUTO-ENTMCNC: 27.9 G/DL (ref 32–36)
MCV RBC AUTO: 78 FL (ref 82–98)
MONOCYTES # BLD AUTO: 0.8 K/UL (ref 0.3–1)
MONOCYTES NFR BLD: 9.3 % (ref 4–15)
NEUTROPHILS # BLD AUTO: 6.1 K/UL (ref 1.8–7.7)
NEUTROPHILS NFR BLD: 68.5 % (ref 38–73)
NRBC BLD-RTO: 0 /100 WBC
PLATELET # BLD AUTO: 705 K/UL (ref 150–450)
PMV BLD AUTO: 9.8 FL (ref 9.2–12.9)
POTASSIUM SERPL-SCNC: 4.6 MMOL/L (ref 3.5–5.1)
PROT SERPL-MCNC: 7.5 G/DL (ref 6–8.4)
RBC # BLD AUTO: 3.55 M/UL (ref 4.6–6.2)
SODIUM SERPL-SCNC: 137 MMOL/L (ref 136–145)
T4 FREE SERPL-MCNC: 0.77 NG/DL (ref 0.71–1.51)
TSH SERPL DL<=0.005 MIU/L-ACNC: 5.67 UIU/ML (ref 0.4–4)
WBC # BLD AUTO: 8.9 K/UL (ref 3.9–12.7)

## 2021-01-01 PROCEDURE — 99214 PR OFFICE/OUTPT VISIT, EST, LEVL IV, 30-39 MIN: ICD-10-PCS | Mod: HCNC,25,S$GLB, | Performed by: NURSE PRACTITIONER

## 2021-01-01 PROCEDURE — 1125F AMNT PAIN NOTED PAIN PRSNT: CPT | Mod: HCNC,CPTII,S$GLB, | Performed by: ORTHOPAEDIC SURGERY

## 2021-01-01 PROCEDURE — 1158F ADVNC CARE PLAN TLK DOCD: CPT | Mod: HCNC,CPTII,95, | Performed by: NURSE PRACTITIONER

## 2021-01-01 PROCEDURE — 99999 PR PBB SHADOW E&M-EST. PATIENT-LVL III: ICD-10-PCS | Mod: PBBFAC,HCNC,, | Performed by: ORTHOPAEDIC SURGERY

## 2021-01-01 PROCEDURE — 3288F FALL RISK ASSESSMENT DOCD: CPT | Mod: HCNC,CPTII,S$GLB, | Performed by: ORTHOPAEDIC SURGERY

## 2021-01-01 PROCEDURE — 80053 COMPREHEN METABOLIC PANEL: CPT | Mod: HCNC | Performed by: NURSE PRACTITIONER

## 2021-01-01 PROCEDURE — 2023F DILAT RTA XM W/O RTNOPTHY: CPT | Mod: HCNC,CPTII,S$GLB, | Performed by: OPTOMETRIST

## 2021-01-01 PROCEDURE — 3077F SYST BP >= 140 MM HG: CPT | Mod: HCNC,CPTII,S$GLB, | Performed by: NURSE PRACTITIONER

## 2021-01-01 PROCEDURE — 1100F PR PT FALLS ASSESS DOC 2+ FALLS/FALL W/INJURY/YR: ICD-10-PCS | Mod: HCNC,CPTII,S$GLB, | Performed by: INTERNAL MEDICINE

## 2021-01-01 PROCEDURE — 92014 PR EYE EXAM, EST PATIENT,COMPREHESV: ICD-10-PCS | Mod: HCNC,S$GLB,, | Performed by: OPTOMETRIST

## 2021-01-01 PROCEDURE — 1125F PR PAIN SEVERITY QUANTIFIED, PAIN PRESENT: ICD-10-PCS | Mod: HCNC,CPTII,S$GLB, | Performed by: NURSE PRACTITIONER

## 2021-01-01 PROCEDURE — 99443 PR PHYSICIAN TELEPHONE EVALUATION 21-30 MIN: ICD-10-PCS | Mod: HCNC,95,, | Performed by: NURSE PRACTITIONER

## 2021-01-01 PROCEDURE — 1101F PT FALLS ASSESS-DOCD LE1/YR: CPT | Mod: HCNC,CPTII,S$GLB, | Performed by: NURSE PRACTITIONER

## 2021-01-01 PROCEDURE — 99499 RISK ADDL DX/OHS AUDIT: ICD-10-PCS | Mod: HCNC,S$GLB,, | Performed by: NURSE PRACTITIONER

## 2021-01-01 PROCEDURE — 99214 OFFICE O/P EST MOD 30 MIN: CPT | Mod: HCNC,25,S$GLB, | Performed by: NURSE PRACTITIONER

## 2021-01-01 PROCEDURE — 25500020 PHARM REV CODE 255: Mod: HCNC | Performed by: NURSE PRACTITIONER

## 2021-01-01 PROCEDURE — 1100F PTFALLS ASSESS-DOCD GE2>/YR: CPT | Mod: HCNC,CPTII,S$GLB, | Performed by: INTERNAL MEDICINE

## 2021-01-01 PROCEDURE — 99999 PR PBB SHADOW E&M-EST. PATIENT-LVL III: CPT | Mod: PBBFAC,HCNC,, | Performed by: OPTOMETRIST

## 2021-01-01 PROCEDURE — 1126F PR PAIN SEVERITY QUANTIFIED, NO PAIN PRESENT: ICD-10-PCS | Mod: HCNC,CPTII,S$GLB, | Performed by: OPTOMETRIST

## 2021-01-01 PROCEDURE — 1125F AMNT PAIN NOTED PAIN PRSNT: CPT | Mod: HCNC,CPTII,S$GLB, | Performed by: NURSE PRACTITIONER

## 2021-01-01 PROCEDURE — 99999 PR PBB SHADOW E&M-EST. PATIENT-LVL III: ICD-10-PCS | Mod: PBBFAC,HCNC,, | Performed by: OPTOMETRIST

## 2021-01-01 PROCEDURE — G0008 FLU VACCINE - QUADRIVALENT - ADJUVANTED: ICD-10-PCS | Mod: HCNC,S$GLB,, | Performed by: INTERNAL MEDICINE

## 2021-01-01 PROCEDURE — 73130 X-RAY EXAM OF HAND: CPT | Mod: TC,HCNC,PN,RT

## 2021-01-01 PROCEDURE — 99214 OFFICE O/P EST MOD 30 MIN: CPT | Mod: HCNC,S$GLB,, | Performed by: INTERNAL MEDICINE

## 2021-01-01 PROCEDURE — 99443 PR PHYSICIAN TELEPHONE EVALUATION 21-30 MIN: CPT | Mod: HCNC,95,, | Performed by: INTERNAL MEDICINE

## 2021-01-01 PROCEDURE — 3288F PR FALLS RISK ASSESSMENT DOCUMENTED: ICD-10-PCS | Mod: HCNC,CPTII,S$GLB, | Performed by: NURSE PRACTITIONER

## 2021-01-01 PROCEDURE — 1101F PR PT FALLS ASSESS DOC 0-1 FALLS W/OUT INJ PAST YR: ICD-10-PCS | Mod: HCNC,CPTII,S$GLB, | Performed by: NURSE PRACTITIONER

## 2021-01-01 PROCEDURE — 83036 HEMOGLOBIN GLYCOSYLATED A1C: CPT | Mod: HCNC | Performed by: INTERNAL MEDICINE

## 2021-01-01 PROCEDURE — 3288F FALL RISK ASSESSMENT DOCD: CPT | Mod: HCNC,CPTII,S$GLB, | Performed by: NURSE PRACTITIONER

## 2021-01-01 PROCEDURE — 1159F PR MEDICATION LIST DOCUMENTED IN MEDICAL RECORD: ICD-10-PCS | Mod: HCNC,CPTII,S$GLB, | Performed by: INTERNAL MEDICINE

## 2021-01-01 PROCEDURE — 36415 COLL VENOUS BLD VENIPUNCTURE: CPT | Mod: HCNC | Performed by: INTERNAL MEDICINE

## 2021-01-01 PROCEDURE — 1158F PR ADVANCE CARE PLANNING DISCUSS DOCUMENTED IN MEDICAL RECORD: ICD-10-PCS | Mod: HCNC,CPTII,95, | Performed by: NURSE PRACTITIONER

## 2021-01-01 PROCEDURE — 91300 COVID-19, MRNA, LNP-S, PF, 30 MCG/0.3 ML DOSE VACCINE: CPT | Mod: HCNC,PBBFAC | Performed by: INTERNAL MEDICINE

## 2021-01-01 PROCEDURE — 90694 FLU VACCINE - QUADRIVALENT - ADJUVANTED: ICD-10-PCS | Mod: HCNC,S$GLB,, | Performed by: INTERNAL MEDICINE

## 2021-01-01 PROCEDURE — 99213 OFFICE O/P EST LOW 20 MIN: CPT | Mod: HCNC,S$GLB,, | Performed by: ORTHOPAEDIC SURGERY

## 2021-01-01 PROCEDURE — 73130 XR HAND COMPLETE 3 VIEW RIGHT: ICD-10-PCS | Mod: 26,HCNC,RT, | Performed by: RADIOLOGY

## 2021-01-01 PROCEDURE — 99203 PR OFFICE/OUTPT VISIT, NEW, LEVL III, 30-44 MIN: ICD-10-PCS | Mod: HCNC,S$GLB,, | Performed by: ORTHOPAEDIC SURGERY

## 2021-01-01 PROCEDURE — 1159F PR MEDICATION LIST DOCUMENTED IN MEDICAL RECORD: ICD-10-PCS | Mod: HCNC,CPTII,95, | Performed by: NURSE PRACTITIONER

## 2021-01-01 PROCEDURE — 0003A COVID-19, MRNA, LNP-S, PF, 30 MCG/0.3 ML DOSE VACCINE: CPT | Mod: HCNC,PBBFAC | Performed by: INTERNAL MEDICINE

## 2021-01-01 PROCEDURE — 1101F PR PT FALLS ASSESS DOC 0-1 FALLS W/OUT INJ PAST YR: ICD-10-PCS | Mod: HCNC,CPTII,S$GLB, | Performed by: ORTHOPAEDIC SURGERY

## 2021-01-01 PROCEDURE — 73130 X-RAY EXAM OF HAND: CPT | Mod: 26,HCNC,RT, | Performed by: RADIOLOGY

## 2021-01-01 PROCEDURE — 3078F DIAST BP <80 MM HG: CPT | Mod: HCNC,CPTII,S$GLB, | Performed by: NURSE PRACTITIONER

## 2021-01-01 PROCEDURE — 99999 PR PBB SHADOW E&M-EST. PATIENT-LVL V: ICD-10-PCS | Mod: PBBFAC,HCNC,, | Performed by: INTERNAL MEDICINE

## 2021-01-01 PROCEDURE — 1101F PT FALLS ASSESS-DOCD LE1/YR: CPT | Mod: HCNC,CPTII,S$GLB, | Performed by: ORTHOPAEDIC SURGERY

## 2021-01-01 PROCEDURE — 1159F MED LIST DOCD IN RCRD: CPT | Mod: HCNC,CPTII,S$GLB, | Performed by: ORTHOPAEDIC SURGERY

## 2021-01-01 PROCEDURE — 74178 CT CHEST ABDOMEN PELVIS W W/O CONTRAST (XPD): ICD-10-PCS | Mod: 26,HCNC,, | Performed by: RADIOLOGY

## 2021-01-01 PROCEDURE — 92014 COMPRE OPH EXAM EST PT 1/>: CPT | Mod: HCNC,S$GLB,, | Performed by: OPTOMETRIST

## 2021-01-01 PROCEDURE — 1159F PR MEDICATION LIST DOCUMENTED IN MEDICAL RECORD: ICD-10-PCS | Mod: HCNC,CPTII,S$GLB, | Performed by: ORTHOPAEDIC SURGERY

## 2021-01-01 PROCEDURE — 84443 ASSAY THYROID STIM HORMONE: CPT | Mod: HCNC | Performed by: NURSE PRACTITIONER

## 2021-01-01 PROCEDURE — 1160F RVW MEDS BY RX/DR IN RCRD: CPT | Mod: HCNC,CPTII,S$GLB, | Performed by: INTERNAL MEDICINE

## 2021-01-01 PROCEDURE — 99441 PR PHYSICIAN TELEPHONE EVALUATION 5-10 MIN: ICD-10-PCS | Mod: HCNC,95,, | Performed by: NURSE PRACTITIONER

## 2021-01-01 PROCEDURE — 85025 COMPLETE CBC W/AUTO DIFF WBC: CPT | Mod: HCNC | Performed by: NURSE PRACTITIONER

## 2021-01-01 PROCEDURE — 3288F FALL RISK ASSESSMENT DOCD: CPT | Mod: HCNC,CPTII,S$GLB, | Performed by: INTERNAL MEDICINE

## 2021-01-01 PROCEDURE — 1159F MED LIST DOCD IN RCRD: CPT | Mod: HCNC,CPTII,S$GLB, | Performed by: NURSE PRACTITIONER

## 2021-01-01 PROCEDURE — 3078F PR MOST RECENT DIASTOLIC BLOOD PRESSURE < 80 MM HG: ICD-10-PCS | Mod: HCNC,CPTII,S$GLB, | Performed by: NURSE PRACTITIONER

## 2021-01-01 PROCEDURE — 99499 RISK ADDL DX/OHS AUDIT: ICD-10-PCS | Mod: HCNC,S$GLB,, | Performed by: INTERNAL MEDICINE

## 2021-01-01 PROCEDURE — 84439 ASSAY OF FREE THYROXINE: CPT | Mod: HCNC | Performed by: NURSE PRACTITIONER

## 2021-01-01 PROCEDURE — 3288F FALL RISK ASSESSMENT DOCD: CPT | Mod: HCNC,CPTII,S$GLB, | Performed by: OPTOMETRIST

## 2021-01-01 PROCEDURE — 1159F MED LIST DOCD IN RCRD: CPT | Mod: HCNC,CPTII,95, | Performed by: NURSE PRACTITIONER

## 2021-01-01 PROCEDURE — 1160F PR REVIEW ALL MEDS BY PRESCRIBER/CLIN PHARMACIST DOCUMENTED: ICD-10-PCS | Mod: HCNC,CPTII,95, | Performed by: NURSE PRACTITIONER

## 2021-01-01 PROCEDURE — 1159F MED LIST DOCD IN RCRD: CPT | Mod: HCNC,CPTII,S$GLB, | Performed by: OPTOMETRIST

## 2021-01-01 PROCEDURE — 3078F PR MOST RECENT DIASTOLIC BLOOD PRESSURE < 80 MM HG: ICD-10-PCS | Mod: HCNC,CPTII,S$GLB, | Performed by: INTERNAL MEDICINE

## 2021-01-01 PROCEDURE — 1101F PT FALLS ASSESS-DOCD LE1/YR: CPT | Mod: HCNC,CPTII,S$GLB, | Performed by: OPTOMETRIST

## 2021-01-01 PROCEDURE — 99499 UNLISTED E&M SERVICE: CPT | Mod: HCNC,S$GLB,, | Performed by: INTERNAL MEDICINE

## 2021-01-01 PROCEDURE — 36415 COLL VENOUS BLD VENIPUNCTURE: CPT | Mod: HCNC | Performed by: NURSE PRACTITIONER

## 2021-01-01 PROCEDURE — 1160F RVW MEDS BY RX/DR IN RCRD: CPT | Mod: HCNC,CPTII,95, | Performed by: NURSE PRACTITIONER

## 2021-01-01 PROCEDURE — 99999 PR PBB SHADOW E&M-EST. PATIENT-LVL III: CPT | Mod: PBBFAC,HCNC,, | Performed by: ORTHOPAEDIC SURGERY

## 2021-01-01 PROCEDURE — 1125F PR PAIN SEVERITY QUANTIFIED, PAIN PRESENT: ICD-10-PCS | Mod: HCNC,CPTII,S$GLB, | Performed by: ORTHOPAEDIC SURGERY

## 2021-01-01 PROCEDURE — 1126F PR PAIN SEVERITY QUANTIFIED, NO PAIN PRESENT: ICD-10-PCS | Mod: HCNC,CPTII,S$GLB, | Performed by: INTERNAL MEDICINE

## 2021-01-01 PROCEDURE — G0008 ADMIN INFLUENZA VIRUS VAC: HCPCS | Mod: HCNC,S$GLB,, | Performed by: INTERNAL MEDICINE

## 2021-01-01 PROCEDURE — 99441 PR PHYSICIAN TELEPHONE EVALUATION 5-10 MIN: CPT | Mod: HCNC,95,, | Performed by: NURSE PRACTITIONER

## 2021-01-01 PROCEDURE — 83615 LACTATE (LD) (LDH) ENZYME: CPT | Mod: HCNC | Performed by: INTERNAL MEDICINE

## 2021-01-01 PROCEDURE — 99203 OFFICE O/P NEW LOW 30 MIN: CPT | Mod: HCNC,S$GLB,, | Performed by: ORTHOPAEDIC SURGERY

## 2021-01-01 PROCEDURE — 1126F AMNT PAIN NOTED NONE PRSNT: CPT | Mod: HCNC,CPTII,S$GLB, | Performed by: INTERNAL MEDICINE

## 2021-01-01 PROCEDURE — 3077F PR MOST RECENT SYSTOLIC BLOOD PRESSURE >= 140 MM HG: ICD-10-PCS | Mod: HCNC,CPTII,S$GLB, | Performed by: INTERNAL MEDICINE

## 2021-01-01 PROCEDURE — 82274 ASSAY TEST FOR BLOOD FECAL: CPT | Mod: HCNC | Performed by: NURSE PRACTITIONER

## 2021-01-01 PROCEDURE — 1159F PR MEDICATION LIST DOCUMENTED IN MEDICAL RECORD: ICD-10-PCS | Mod: HCNC,CPTII,S$GLB, | Performed by: OPTOMETRIST

## 2021-01-01 PROCEDURE — 1159F PR MEDICATION LIST DOCUMENTED IN MEDICAL RECORD: ICD-10-PCS | Mod: HCNC,CPTII,S$GLB, | Performed by: NURSE PRACTITIONER

## 2021-01-01 PROCEDURE — 3077F SYST BP >= 140 MM HG: CPT | Mod: HCNC,CPTII,S$GLB, | Performed by: INTERNAL MEDICINE

## 2021-01-01 PROCEDURE — 71260 CT THORAX DX C+: CPT | Mod: 26,HCNC,, | Performed by: RADIOLOGY

## 2021-01-01 PROCEDURE — 71260 CT THORAX DX C+: CPT | Mod: TC,HCNC

## 2021-01-01 PROCEDURE — 3078F DIAST BP <80 MM HG: CPT | Mod: HCNC,CPTII,S$GLB, | Performed by: INTERNAL MEDICINE

## 2021-01-01 PROCEDURE — 2023F PR DILATED RETINAL EXAM W/O EVID OF RETINOPATHY: ICD-10-PCS | Mod: HCNC,CPTII,S$GLB, | Performed by: OPTOMETRIST

## 2021-01-01 PROCEDURE — 99443 PR PHYSICIAN TELEPHONE EVALUATION 21-30 MIN: CPT | Mod: HCNC,95,, | Performed by: NURSE PRACTITIONER

## 2021-01-01 PROCEDURE — 90694 VACC AIIV4 NO PRSRV 0.5ML IM: CPT | Mod: HCNC,S$GLB,, | Performed by: INTERNAL MEDICINE

## 2021-01-01 PROCEDURE — 1101F PR PT FALLS ASSESS DOC 0-1 FALLS W/OUT INJ PAST YR: ICD-10-PCS | Mod: HCNC,CPTII,S$GLB, | Performed by: OPTOMETRIST

## 2021-01-01 PROCEDURE — 1159F MED LIST DOCD IN RCRD: CPT | Mod: HCNC,CPTII,S$GLB, | Performed by: INTERNAL MEDICINE

## 2021-01-01 PROCEDURE — 3077F PR MOST RECENT SYSTOLIC BLOOD PRESSURE >= 140 MM HG: ICD-10-PCS | Mod: HCNC,CPTII,S$GLB, | Performed by: NURSE PRACTITIONER

## 2021-01-01 PROCEDURE — 74178 CT ABD&PLV WO CNTR FLWD CNTR: CPT | Mod: 26,HCNC,, | Performed by: RADIOLOGY

## 2021-01-01 PROCEDURE — 3288F PR FALLS RISK ASSESSMENT DOCUMENTED: ICD-10-PCS | Mod: HCNC,CPTII,S$GLB, | Performed by: OPTOMETRIST

## 2021-01-01 PROCEDURE — 3288F PR FALLS RISK ASSESSMENT DOCUMENTED: ICD-10-PCS | Mod: HCNC,CPTII,S$GLB, | Performed by: INTERNAL MEDICINE

## 2021-01-01 PROCEDURE — 3288F PR FALLS RISK ASSESSMENT DOCUMENTED: ICD-10-PCS | Mod: HCNC,CPTII,S$GLB, | Performed by: ORTHOPAEDIC SURGERY

## 2021-01-01 PROCEDURE — 99999 PR PBB SHADOW E&M-EST. PATIENT-LVL V: CPT | Mod: PBBFAC,HCNC,, | Performed by: INTERNAL MEDICINE

## 2021-01-01 PROCEDURE — 99214 PR OFFICE/OUTPT VISIT, EST, LEVL IV, 30-39 MIN: ICD-10-PCS | Mod: HCNC,S$GLB,, | Performed by: INTERNAL MEDICINE

## 2021-01-01 PROCEDURE — 99499 UNLISTED E&M SERVICE: CPT | Mod: HCNC,S$GLB,, | Performed by: NURSE PRACTITIONER

## 2021-01-01 PROCEDURE — 1126F AMNT PAIN NOTED NONE PRSNT: CPT | Mod: HCNC,CPTII,S$GLB, | Performed by: OPTOMETRIST

## 2021-01-01 PROCEDURE — 71260 CT CHEST ABDOMEN PELVIS W W/O CONTRAST (XPD): ICD-10-PCS | Mod: 26,HCNC,, | Performed by: RADIOLOGY

## 2021-01-01 PROCEDURE — A9698 NON-RAD CONTRAST MATERIALNOC: HCPCS | Mod: HCNC | Performed by: NURSE PRACTITIONER

## 2021-01-01 PROCEDURE — 99443 PR PHYSICIAN TELEPHONE EVALUATION 21-30 MIN: ICD-10-PCS | Mod: HCNC,95,, | Performed by: INTERNAL MEDICINE

## 2021-01-01 PROCEDURE — 99213 PR OFFICE/OUTPT VISIT, EST, LEVL III, 20-29 MIN: ICD-10-PCS | Mod: HCNC,S$GLB,, | Performed by: ORTHOPAEDIC SURGERY

## 2021-01-01 PROCEDURE — 1160F PR REVIEW ALL MEDS BY PRESCRIBER/CLIN PHARMACIST DOCUMENTED: ICD-10-PCS | Mod: HCNC,CPTII,S$GLB, | Performed by: INTERNAL MEDICINE

## 2021-01-01 RX ORDER — OXYCODONE HYDROCHLORIDE 10 MG/1
10 TABLET ORAL EVERY 4 HOURS PRN
Qty: 120 TABLET | Refills: 0 | Status: SHIPPED | OUTPATIENT
Start: 2021-01-01 | End: 2021-01-01 | Stop reason: SDUPTHER

## 2021-01-01 RX ORDER — LANOLIN ALCOHOL/MO/W.PET/CERES
1 CREAM (GRAM) TOPICAL
Qty: 30 TABLET | Refills: 6 | Status: SHIPPED | OUTPATIENT
Start: 2021-01-01

## 2021-01-01 RX ORDER — TAMSULOSIN HYDROCHLORIDE 0.4 MG/1
0.8 CAPSULE ORAL NIGHTLY
Qty: 180 CAPSULE | Refills: 3 | Status: SHIPPED | OUTPATIENT
Start: 2021-01-01 | End: 2022-01-01 | Stop reason: SDUPTHER

## 2021-01-01 RX ORDER — NALOXONE HYDROCHLORIDE 4 MG/.1ML
SPRAY NASAL
Qty: 1 EACH | Refills: 11 | Status: CANCELLED | OUTPATIENT
Start: 2021-01-01

## 2021-01-01 RX ORDER — LANCETS
1 EACH MISCELLANEOUS 2 TIMES DAILY
Qty: 200 EACH | Refills: 3 | Status: SHIPPED | OUTPATIENT
Start: 2021-01-01

## 2021-01-01 RX ORDER — INSULIN GLARGINE 100 [IU]/ML
5 INJECTION, SOLUTION SUBCUTANEOUS NIGHTLY
Qty: 1 BOX | Refills: 6 | Status: SHIPPED | OUTPATIENT
Start: 2021-01-01 | End: 2021-01-01

## 2021-01-01 RX ORDER — OXYCODONE HYDROCHLORIDE 10 MG/1
10 TABLET ORAL EVERY 4 HOURS PRN
Qty: 120 TABLET | Refills: 0 | Status: SHIPPED | OUTPATIENT
Start: 2021-01-01 | End: 2021-01-01

## 2021-01-01 RX ORDER — ROSUVASTATIN CALCIUM 40 MG/1
40 TABLET, COATED ORAL NIGHTLY
Qty: 90 TABLET | Refills: 3 | Status: SHIPPED | OUTPATIENT
Start: 2021-01-01 | End: 2022-09-30

## 2021-01-01 RX ORDER — INSULIN GLARGINE 100 [IU]/ML
10 INJECTION, SOLUTION SUBCUTANEOUS NIGHTLY
Qty: 1 EACH | Refills: 6 | Status: SHIPPED | OUTPATIENT
Start: 2021-01-01 | End: 2022-01-01 | Stop reason: SDUPTHER

## 2021-01-01 RX ORDER — OXYCODONE HYDROCHLORIDE 10 MG/1
10 TABLET ORAL EVERY 4 HOURS PRN
Qty: 120 TABLET | Refills: 0 | Status: CANCELLED | OUTPATIENT
Start: 2021-01-01

## 2021-01-01 RX ORDER — CLOPIDOGREL BISULFATE 75 MG/1
75 TABLET ORAL DAILY
COMMUNITY
Start: 2021-01-01

## 2021-01-01 RX ADMIN — IOHEXOL 75 ML: 350 INJECTION, SOLUTION INTRAVENOUS at 01:10

## 2021-01-01 RX ADMIN — IOHEXOL 1000 ML: 9 SOLUTION ORAL at 12:10

## 2021-01-13 ENCOUNTER — OFFICE VISIT (OUTPATIENT)
Dept: NEPHROLOGY | Facility: CLINIC | Age: 76
End: 2021-01-13
Payer: MEDICARE

## 2021-01-13 ENCOUNTER — LAB VISIT (OUTPATIENT)
Dept: LAB | Facility: HOSPITAL | Age: 76
End: 2021-01-13
Payer: MEDICARE

## 2021-01-13 VITALS
WEIGHT: 165.38 LBS | BODY MASS INDEX: 25.9 KG/M2 | HEART RATE: 93 BPM | OXYGEN SATURATION: 97 % | DIASTOLIC BLOOD PRESSURE: 78 MMHG | SYSTOLIC BLOOD PRESSURE: 144 MMHG

## 2021-01-13 DIAGNOSIS — R80.9 PROTEINURIA, UNSPECIFIED TYPE: ICD-10-CM

## 2021-01-13 DIAGNOSIS — N18.30 STAGE 3 CHRONIC KIDNEY DISEASE, UNSPECIFIED WHETHER STAGE 3A OR 3B CKD: ICD-10-CM

## 2021-01-13 DIAGNOSIS — I10 ESSENTIAL HYPERTENSION: ICD-10-CM

## 2021-01-13 DIAGNOSIS — I10 ESSENTIAL HYPERTENSION: Primary | ICD-10-CM

## 2021-01-13 LAB
BACTERIA #/AREA URNS AUTO: NORMAL /HPF
BILIRUB UR QL STRIP: NEGATIVE
CLARITY UR REFRACT.AUTO: CLEAR
COLOR UR AUTO: YELLOW
CREAT UR-MCNC: 59 MG/DL (ref 23–375)
GLUCOSE UR QL STRIP: ABNORMAL
HGB UR QL STRIP: NEGATIVE
HYALINE CASTS UR QL AUTO: 0 /LPF
KETONES UR QL STRIP: NEGATIVE
LEUKOCYTE ESTERASE UR QL STRIP: NEGATIVE
MICROSCOPIC COMMENT: NORMAL
NITRITE UR QL STRIP: NEGATIVE
PH UR STRIP: 6 [PH] (ref 5–8)
PROT UR QL STRIP: ABNORMAL
PROT UR-MCNC: 477 MG/DL (ref 0–15)
PROT/CREAT UR: 8.08 MG/G{CREAT} (ref 0–0.2)
RBC #/AREA URNS AUTO: 2 /HPF (ref 0–4)
SP GR UR STRIP: 1.01 (ref 1–1.03)
URN SPEC COLLECT METH UR: ABNORMAL
WBC #/AREA URNS AUTO: 1 /HPF (ref 0–5)
YEAST UR QL AUTO: NORMAL

## 2021-01-13 PROCEDURE — 1101F PT FALLS ASSESS-DOCD LE1/YR: CPT | Mod: HCNC,CPTII,S$GLB, | Performed by: INTERNAL MEDICINE

## 2021-01-13 PROCEDURE — 3288F PR FALLS RISK ASSESSMENT DOCUMENTED: ICD-10-PCS | Mod: HCNC,CPTII,S$GLB, | Performed by: INTERNAL MEDICINE

## 2021-01-13 PROCEDURE — 99215 OFFICE O/P EST HI 40 MIN: CPT | Mod: HCNC,S$GLB,, | Performed by: INTERNAL MEDICINE

## 2021-01-13 PROCEDURE — 81001 URINALYSIS AUTO W/SCOPE: CPT | Mod: HCNC

## 2021-01-13 PROCEDURE — 1159F MED LIST DOCD IN RCRD: CPT | Mod: HCNC,S$GLB,, | Performed by: INTERNAL MEDICINE

## 2021-01-13 PROCEDURE — 84156 ASSAY OF PROTEIN URINE: CPT | Mod: HCNC

## 2021-01-13 PROCEDURE — 3078F PR MOST RECENT DIASTOLIC BLOOD PRESSURE < 80 MM HG: ICD-10-PCS | Mod: HCNC,CPTII,S$GLB, | Performed by: INTERNAL MEDICINE

## 2021-01-13 PROCEDURE — 3077F PR MOST RECENT SYSTOLIC BLOOD PRESSURE >= 140 MM HG: ICD-10-PCS | Mod: HCNC,CPTII,S$GLB, | Performed by: INTERNAL MEDICINE

## 2021-01-13 PROCEDURE — 99999 PR PBB SHADOW E&M-EST. PATIENT-LVL III: CPT | Mod: PBBFAC,HCNC,, | Performed by: INTERNAL MEDICINE

## 2021-01-13 PROCEDURE — 1125F AMNT PAIN NOTED PAIN PRSNT: CPT | Mod: HCNC,S$GLB,, | Performed by: INTERNAL MEDICINE

## 2021-01-13 PROCEDURE — 99499 RISK ADDL DX/OHS AUDIT: ICD-10-PCS | Mod: S$GLB,,, | Performed by: INTERNAL MEDICINE

## 2021-01-13 PROCEDURE — 3288F FALL RISK ASSESSMENT DOCD: CPT | Mod: HCNC,CPTII,S$GLB, | Performed by: INTERNAL MEDICINE

## 2021-01-13 PROCEDURE — 1101F PR PT FALLS ASSESS DOC 0-1 FALLS W/OUT INJ PAST YR: ICD-10-PCS | Mod: HCNC,CPTII,S$GLB, | Performed by: INTERNAL MEDICINE

## 2021-01-13 PROCEDURE — 3077F SYST BP >= 140 MM HG: CPT | Mod: HCNC,CPTII,S$GLB, | Performed by: INTERNAL MEDICINE

## 2021-01-13 PROCEDURE — 1159F PR MEDICATION LIST DOCUMENTED IN MEDICAL RECORD: ICD-10-PCS | Mod: HCNC,S$GLB,, | Performed by: INTERNAL MEDICINE

## 2021-01-13 PROCEDURE — 99499 UNLISTED E&M SERVICE: CPT | Mod: S$GLB,,, | Performed by: INTERNAL MEDICINE

## 2021-01-13 PROCEDURE — 1125F PR PAIN SEVERITY QUANTIFIED, PAIN PRESENT: ICD-10-PCS | Mod: HCNC,S$GLB,, | Performed by: INTERNAL MEDICINE

## 2021-01-13 PROCEDURE — 3078F DIAST BP <80 MM HG: CPT | Mod: HCNC,CPTII,S$GLB, | Performed by: INTERNAL MEDICINE

## 2021-01-13 PROCEDURE — 99999 PR PBB SHADOW E&M-EST. PATIENT-LVL III: ICD-10-PCS | Mod: PBBFAC,HCNC,, | Performed by: INTERNAL MEDICINE

## 2021-01-13 PROCEDURE — 99215 PR OFFICE/OUTPT VISIT, EST, LEVL V, 40-54 MIN: ICD-10-PCS | Mod: HCNC,S$GLB,, | Performed by: INTERNAL MEDICINE

## 2021-01-14 ENCOUNTER — TELEPHONE (OUTPATIENT)
Dept: NEPHROLOGY | Facility: CLINIC | Age: 76
End: 2021-01-14

## 2021-01-14 DIAGNOSIS — R80.9 PROTEINURIA, UNSPECIFIED TYPE: ICD-10-CM

## 2021-01-14 DIAGNOSIS — N18.30 STAGE 3 CHRONIC KIDNEY DISEASE, UNSPECIFIED WHETHER STAGE 3A OR 3B CKD: Primary | ICD-10-CM

## 2021-01-15 ENCOUNTER — TELEPHONE (OUTPATIENT)
Dept: INTERVENTIONAL RADIOLOGY/VASCULAR | Facility: CLINIC | Age: 76
End: 2021-01-15

## 2021-01-15 ENCOUNTER — TELEPHONE (OUTPATIENT)
Dept: NEPHROLOGY | Facility: CLINIC | Age: 76
End: 2021-01-15

## 2021-01-15 DIAGNOSIS — C64.9 RENAL CELL CARCINOMA, UNSPECIFIED LATERALITY: Primary | ICD-10-CM

## 2021-01-16 ENCOUNTER — IMMUNIZATION (OUTPATIENT)
Dept: FAMILY MEDICINE | Facility: CLINIC | Age: 76
End: 2021-01-16
Payer: MEDICARE

## 2021-01-16 DIAGNOSIS — Z23 NEED FOR VACCINATION: Primary | ICD-10-CM

## 2021-01-16 PROCEDURE — 91300 COVID-19, MRNA, LNP-S, PF, 30 MCG/0.3 ML DOSE VACCINE: CPT | Mod: PBBFAC | Performed by: NURSE ANESTHETIST, CERTIFIED REGISTERED

## 2021-01-18 ENCOUNTER — PATIENT MESSAGE (OUTPATIENT)
Dept: NEPHROLOGY | Facility: CLINIC | Age: 76
End: 2021-01-18

## 2021-01-19 ENCOUNTER — TELEPHONE (OUTPATIENT)
Dept: NEPHROLOGY | Facility: CLINIC | Age: 76
End: 2021-01-19

## 2021-01-19 DIAGNOSIS — C64.9 METASTATIC RENAL CELL CARCINOMA TO LIVER: ICD-10-CM

## 2021-01-19 DIAGNOSIS — C64.2 RENAL CELL CARCINOMA OF LEFT KIDNEY: ICD-10-CM

## 2021-01-19 DIAGNOSIS — C78.7 METASTATIC RENAL CELL CARCINOMA TO LIVER: ICD-10-CM

## 2021-01-19 DIAGNOSIS — C79.51 METASTATIC RENAL CELL CARCINOMA TO BONE: ICD-10-CM

## 2021-01-19 DIAGNOSIS — N18.30 STAGE 3 CHRONIC KIDNEY DISEASE, UNSPECIFIED WHETHER STAGE 3A OR 3B CKD: Primary | ICD-10-CM

## 2021-01-19 DIAGNOSIS — C64.9 METASTATIC RENAL CELL CARCINOMA TO BONE: ICD-10-CM

## 2021-01-19 DIAGNOSIS — C64.9 METASTATIC RENAL CELL CARCINOMA, UNSPECIFIED LATERALITY: Chronic | ICD-10-CM

## 2021-01-19 RX ORDER — OXYCODONE HYDROCHLORIDE 10 MG/1
10 TABLET ORAL EVERY 4 HOURS PRN
Qty: 120 TABLET | Refills: 0 | OUTPATIENT
Start: 2021-01-19

## 2021-01-19 RX ORDER — LOSARTAN POTASSIUM 50 MG/1
50 TABLET ORAL DAILY
Qty: 90 TABLET | Refills: 3 | Status: SHIPPED | OUTPATIENT
Start: 2021-01-19 | End: 2021-01-27

## 2021-01-21 DIAGNOSIS — C64.9 METASTATIC RENAL CELL CARCINOMA TO LIVER: ICD-10-CM

## 2021-01-21 DIAGNOSIS — C64.9 METASTATIC RENAL CELL CARCINOMA, UNSPECIFIED LATERALITY: Chronic | ICD-10-CM

## 2021-01-21 DIAGNOSIS — C78.7 METASTATIC RENAL CELL CARCINOMA TO LIVER: ICD-10-CM

## 2021-01-21 DIAGNOSIS — C64.9 METASTATIC RENAL CELL CARCINOMA TO BONE: ICD-10-CM

## 2021-01-21 DIAGNOSIS — C79.51 METASTATIC RENAL CELL CARCINOMA TO BONE: ICD-10-CM

## 2021-01-21 DIAGNOSIS — C64.2 RENAL CELL CARCINOMA OF LEFT KIDNEY: ICD-10-CM

## 2021-01-21 RX ORDER — OXYCODONE HYDROCHLORIDE 10 MG/1
10 TABLET ORAL EVERY 4 HOURS PRN
Qty: 120 TABLET | Refills: 0 | Status: SHIPPED | OUTPATIENT
Start: 2021-01-21 | End: 2021-02-10 | Stop reason: SDUPTHER

## 2021-01-26 ENCOUNTER — PATIENT MESSAGE (OUTPATIENT)
Dept: PRIMARY CARE CLINIC | Facility: CLINIC | Age: 76
End: 2021-01-26

## 2021-01-27 ENCOUNTER — TELEPHONE (OUTPATIENT)
Dept: NEPHROLOGY | Facility: CLINIC | Age: 76
End: 2021-01-27

## 2021-01-27 RX ORDER — LOSARTAN POTASSIUM 100 MG/1
100 TABLET ORAL DAILY
Qty: 90 TABLET | Refills: 3 | Status: SHIPPED | OUTPATIENT
Start: 2021-01-27 | End: 2022-01-01

## 2021-01-27 RX ORDER — MIDAZOLAM HYDROCHLORIDE 1 MG/ML
1 INJECTION INTRAMUSCULAR; INTRAVENOUS
Status: CANCELLED | OUTPATIENT
Start: 2021-01-27

## 2021-01-27 RX ORDER — FENTANYL CITRATE 50 UG/ML
50 INJECTION, SOLUTION INTRAMUSCULAR; INTRAVENOUS
Status: CANCELLED | OUTPATIENT
Start: 2021-01-27

## 2021-01-28 ENCOUNTER — HOSPITAL ENCOUNTER (OUTPATIENT)
Dept: INTERVENTIONAL RADIOLOGY/VASCULAR | Facility: HOSPITAL | Age: 76
Discharge: HOME | End: 2021-01-28
Attending: INTERNAL MEDICINE
Payer: MEDICARE

## 2021-01-28 VITALS
HEART RATE: 78 BPM | SYSTOLIC BLOOD PRESSURE: 150 MMHG | DIASTOLIC BLOOD PRESSURE: 50 MMHG | WEIGHT: 170.63 LBS | RESPIRATION RATE: 18 BRPM | TEMPERATURE: 98 F | HEIGHT: 67 IN | BODY MASS INDEX: 26.78 KG/M2 | OXYGEN SATURATION: 96 %

## 2021-01-28 DIAGNOSIS — C64.9 RENAL CELL CARCINOMA, UNSPECIFIED LATERALITY: ICD-10-CM

## 2021-01-28 LAB — POCT GLUCOSE: 229 MG/DL (ref 70–110)

## 2021-01-28 PROCEDURE — 25000003 PHARM REV CODE 250: Performed by: STUDENT IN AN ORGANIZED HEALTH CARE EDUCATION/TRAINING PROGRAM

## 2021-01-28 PROCEDURE — 77012 CT SCAN FOR NEEDLE BIOPSY: CPT | Mod: TC | Performed by: STUDENT IN AN ORGANIZED HEALTH CARE EDUCATION/TRAINING PROGRAM

## 2021-01-28 PROCEDURE — 88312 SPECIAL STAINS GROUP 1: CPT | Mod: 26,,, | Performed by: PATHOLOGY

## 2021-01-28 PROCEDURE — 99152 MOD SED SAME PHYS/QHP 5/>YRS: CPT

## 2021-01-28 PROCEDURE — 82962 GLUCOSE BLOOD TEST: CPT

## 2021-01-28 PROCEDURE — 88313 PR  SPECIAL STAINS,GROUP II: ICD-10-PCS | Mod: 26,,, | Performed by: PATHOLOGY

## 2021-01-28 PROCEDURE — 27201068 IR BIOPSY KIDNEY

## 2021-01-28 PROCEDURE — 50200 RENAL BIOPSY PERQ: CPT | Mod: LT | Performed by: STUDENT IN AN ORGANIZED HEALTH CARE EDUCATION/TRAINING PROGRAM

## 2021-01-28 PROCEDURE — 63600175 PHARM REV CODE 636 W HCPCS: Performed by: STUDENT IN AN ORGANIZED HEALTH CARE EDUCATION/TRAINING PROGRAM

## 2021-01-28 PROCEDURE — 88342 CHG IMMUNOCYTOCHEMISTRY: ICD-10-PCS | Mod: 26,,, | Performed by: PATHOLOGY

## 2021-01-28 PROCEDURE — 88312 SPECIAL STAINS GROUP 1: CPT | Performed by: PATHOLOGY

## 2021-01-28 PROCEDURE — 88313 SPECIAL STAINS GROUP 2: CPT | Mod: 26,,, | Performed by: PATHOLOGY

## 2021-01-28 PROCEDURE — 88313 SPECIAL STAINS GROUP 2: CPT | Performed by: PATHOLOGY

## 2021-01-28 PROCEDURE — 88342 IMHCHEM/IMCYTCHM 1ST ANTB: CPT | Mod: 26,,, | Performed by: PATHOLOGY

## 2021-01-28 PROCEDURE — 88305 TISSUE EXAM BY PATHOLOGIST: CPT | Mod: 26,,, | Performed by: PATHOLOGY

## 2021-01-28 PROCEDURE — 50200 IR BIOPSY KIDNEY: ICD-10-PCS | Mod: LT,ICN,, | Performed by: STUDENT IN AN ORGANIZED HEALTH CARE EDUCATION/TRAINING PROGRAM

## 2021-01-28 PROCEDURE — 88333 PATH CONSLTJ SURG CYTO XM 1: CPT | Mod: 26,,, | Performed by: PATHOLOGY

## 2021-01-28 PROCEDURE — 25000003 PHARM REV CODE 250: Performed by: FAMILY MEDICINE

## 2021-01-28 PROCEDURE — 88312 PR  SPECIAL STAINS,GROUP I: ICD-10-PCS | Mod: 26,,, | Performed by: PATHOLOGY

## 2021-01-28 PROCEDURE — 88305 TISSUE EXAM BY PATHOLOGIST: CPT | Performed by: PATHOLOGY

## 2021-01-28 PROCEDURE — 77012 CT SCAN FOR NEEDLE BIOPSY: CPT | Mod: 26,ICN,, | Performed by: STUDENT IN AN ORGANIZED HEALTH CARE EDUCATION/TRAINING PROGRAM

## 2021-01-28 PROCEDURE — 88333 PR  INTRAOPERATIVE CYTO PATH CONSULT, INITIAL SITE: ICD-10-PCS | Mod: 26,,, | Performed by: PATHOLOGY

## 2021-01-28 PROCEDURE — 99152 MOD SED SAME PHYS/QHP 5/>YRS: CPT | Mod: ICN,,, | Performed by: STUDENT IN AN ORGANIZED HEALTH CARE EDUCATION/TRAINING PROGRAM

## 2021-01-28 PROCEDURE — 99152 PR MOD CONSCIOUS SEDATION, SAME PHYS, 5+ YRS, FIRST 15 MIN: ICD-10-PCS | Mod: ICN,,, | Performed by: STUDENT IN AN ORGANIZED HEALTH CARE EDUCATION/TRAINING PROGRAM

## 2021-01-28 PROCEDURE — 88305 TISSUE EXAM BY PATHOLOGIST: ICD-10-PCS | Mod: 26,,, | Performed by: PATHOLOGY

## 2021-01-28 PROCEDURE — 88333 PATH CONSLTJ SURG CYTO XM 1: CPT | Performed by: PATHOLOGY

## 2021-01-28 PROCEDURE — 88342 IMHCHEM/IMCYTCHM 1ST ANTB: CPT | Performed by: PATHOLOGY

## 2021-01-28 PROCEDURE — 77012 PR  CT GUIDANCE NEEDLE PLACEMENT: ICD-10-PCS | Mod: 26,ICN,, | Performed by: STUDENT IN AN ORGANIZED HEALTH CARE EDUCATION/TRAINING PROGRAM

## 2021-01-28 RX ORDER — SODIUM CHLORIDE 9 MG/ML
INJECTION, SOLUTION INTRAVENOUS ONCE
Status: COMPLETED | OUTPATIENT
Start: 2021-01-28 | End: 2021-01-28

## 2021-01-28 RX ORDER — METOPROLOL TARTRATE 1 MG/ML
INJECTION, SOLUTION INTRAVENOUS CODE/TRAUMA/SEDATION MEDICATION
Status: COMPLETED | OUTPATIENT
Start: 2021-01-28 | End: 2021-01-28

## 2021-01-28 RX ORDER — ONDANSETRON 2 MG/ML
4 INJECTION INTRAMUSCULAR; INTRAVENOUS EVERY 6 HOURS PRN
Status: DISCONTINUED | OUTPATIENT
Start: 2021-01-28 | End: 2021-01-29 | Stop reason: HOSPADM

## 2021-01-28 RX ORDER — FENTANYL CITRATE 50 UG/ML
INJECTION, SOLUTION INTRAMUSCULAR; INTRAVENOUS CODE/TRAUMA/SEDATION MEDICATION
Status: COMPLETED | OUTPATIENT
Start: 2021-01-28 | End: 2021-01-28

## 2021-01-28 RX ORDER — HYDRALAZINE HYDROCHLORIDE 20 MG/ML
10 INJECTION INTRAMUSCULAR; INTRAVENOUS ONCE
Status: COMPLETED | OUTPATIENT
Start: 2021-01-28 | End: 2021-01-28

## 2021-01-28 RX ORDER — HYDRALAZINE HYDROCHLORIDE 20 MG/ML
INJECTION INTRAMUSCULAR; INTRAVENOUS CODE/TRAUMA/SEDATION MEDICATION
Status: COMPLETED | OUTPATIENT
Start: 2021-01-28 | End: 2021-01-28

## 2021-01-28 RX ORDER — MIDAZOLAM HYDROCHLORIDE 1 MG/ML
INJECTION INTRAMUSCULAR; INTRAVENOUS CODE/TRAUMA/SEDATION MEDICATION
Status: COMPLETED | OUTPATIENT
Start: 2021-01-28 | End: 2021-01-28

## 2021-01-28 RX ADMIN — HYDRALAZINE HYDROCHLORIDE 10 MG: 20 INJECTION, SOLUTION INTRAMUSCULAR; INTRAVENOUS at 09:01

## 2021-01-28 RX ADMIN — HYDRALAZINE HYDROCHLORIDE 10 MG: 20 INJECTION INTRAMUSCULAR; INTRAVENOUS at 10:01

## 2021-01-28 RX ADMIN — SODIUM CHLORIDE: 0.9 INJECTION, SOLUTION INTRAVENOUS at 09:01

## 2021-01-28 RX ADMIN — MIDAZOLAM HYDROCHLORIDE 1 MG: 1 INJECTION, SOLUTION INTRAMUSCULAR; INTRAVENOUS at 10:01

## 2021-01-28 RX ADMIN — METOPROLOL TARTRATE 10 MG: 5 INJECTION, SOLUTION INTRAVENOUS at 10:01

## 2021-01-28 RX ADMIN — FENTANYL CITRATE 50 MCG: 50 INJECTION, SOLUTION INTRAMUSCULAR; INTRAVENOUS at 10:01

## 2021-02-01 ENCOUNTER — TELEPHONE (OUTPATIENT)
Dept: NEPHROLOGY | Facility: CLINIC | Age: 76
End: 2021-02-01

## 2021-02-02 ENCOUNTER — TELEPHONE (OUTPATIENT)
Dept: NEPHROLOGY | Facility: CLINIC | Age: 76
End: 2021-02-02

## 2021-02-03 ENCOUNTER — TELEPHONE (OUTPATIENT)
Dept: NEPHROLOGY | Facility: CLINIC | Age: 76
End: 2021-02-03

## 2021-02-03 ENCOUNTER — DOCUMENTATION ONLY (OUTPATIENT)
Dept: NEPHROLOGY | Facility: CLINIC | Age: 76
End: 2021-02-03

## 2021-02-03 LAB
FINAL PATHOLOGIC DIAGNOSIS: NORMAL
GROSS: NORMAL
SUPPLEMENTAL DIAGNOSIS: NORMAL

## 2021-02-06 ENCOUNTER — IMMUNIZATION (OUTPATIENT)
Dept: FAMILY MEDICINE | Facility: CLINIC | Age: 76
End: 2021-02-06
Payer: MEDICARE

## 2021-02-06 DIAGNOSIS — Z23 NEED FOR VACCINATION: Primary | ICD-10-CM

## 2021-02-06 PROCEDURE — 0002A COVID-19, MRNA, LNP-S, PF, 30 MCG/0.3 ML DOSE VACCINE: CPT | Mod: PBBFAC | Performed by: FAMILY MEDICINE

## 2021-02-06 PROCEDURE — 91300 COVID-19, MRNA, LNP-S, PF, 30 MCG/0.3 ML DOSE VACCINE: CPT | Mod: PBBFAC | Performed by: FAMILY MEDICINE

## 2021-02-09 ENCOUNTER — TELEPHONE (OUTPATIENT)
Dept: NEPHROLOGY | Facility: CLINIC | Age: 76
End: 2021-02-09

## 2021-02-09 DIAGNOSIS — E11.65 TYPE 2 DIABETES MELLITUS WITH HYPERGLYCEMIA, WITH LONG-TERM CURRENT USE OF INSULIN: ICD-10-CM

## 2021-02-09 DIAGNOSIS — Z79.4 TYPE 2 DIABETES MELLITUS WITH HYPERGLYCEMIA, WITH LONG-TERM CURRENT USE OF INSULIN: ICD-10-CM

## 2021-02-09 DIAGNOSIS — I10 ESSENTIAL HYPERTENSION: ICD-10-CM

## 2021-02-09 RX ORDER — INSULIN GLARGINE 100 [IU]/ML
5 INJECTION, SOLUTION SUBCUTANEOUS NIGHTLY
Qty: 5 ML | Refills: 11 | Status: SHIPPED | OUTPATIENT
Start: 2021-02-09 | End: 2021-01-01

## 2021-02-09 RX ORDER — SPIRONOLACTONE 25 MG/1
25 TABLET ORAL DAILY
Qty: 30 TABLET | Refills: 4 | Status: SHIPPED | OUTPATIENT
Start: 2021-02-09 | End: 2021-06-30

## 2021-02-18 ENCOUNTER — OFFICE VISIT (OUTPATIENT)
Dept: PODIATRY | Facility: CLINIC | Age: 76
End: 2021-02-18
Payer: MEDICARE

## 2021-02-18 VITALS
BODY MASS INDEX: 26.86 KG/M2 | DIASTOLIC BLOOD PRESSURE: 82 MMHG | WEIGHT: 171.13 LBS | SYSTOLIC BLOOD PRESSURE: 160 MMHG | HEART RATE: 76 BPM | HEIGHT: 67 IN

## 2021-02-18 DIAGNOSIS — Z79.4 TYPE 2 DIABETES MELLITUS WITH HYPERGLYCEMIA, WITH LONG-TERM CURRENT USE OF INSULIN: ICD-10-CM

## 2021-02-18 DIAGNOSIS — E11.49 TYPE II DIABETES MELLITUS WITH NEUROLOGICAL MANIFESTATIONS: ICD-10-CM

## 2021-02-18 DIAGNOSIS — E13.40 NEUROPATHY DUE TO SECONDARY DIABETES: Primary | ICD-10-CM

## 2021-02-18 DIAGNOSIS — E11.65 TYPE 2 DIABETES MELLITUS WITH HYPERGLYCEMIA, WITH LONG-TERM CURRENT USE OF INSULIN: ICD-10-CM

## 2021-02-18 PROCEDURE — 11721 PR DEBRIDEMENT OF NAILS, 6 OR MORE: ICD-10-PCS | Mod: Q9,S$GLB,, | Performed by: PODIATRIST

## 2021-02-18 PROCEDURE — 1125F AMNT PAIN NOTED PAIN PRSNT: CPT | Mod: S$GLB,,, | Performed by: PODIATRIST

## 2021-02-18 PROCEDURE — 3288F FALL RISK ASSESSMENT DOCD: CPT | Mod: CPTII,S$GLB,, | Performed by: PODIATRIST

## 2021-02-18 PROCEDURE — 99999 PR PBB SHADOW E&M-EST. PATIENT-LVL V: ICD-10-PCS | Mod: PBBFAC,,, | Performed by: PODIATRIST

## 2021-02-18 PROCEDURE — 99499 UNLISTED E&M SERVICE: CPT | Mod: S$GLB,,, | Performed by: PODIATRIST

## 2021-02-18 PROCEDURE — 99999 PR PBB SHADOW E&M-EST. PATIENT-LVL V: CPT | Mod: PBBFAC,,, | Performed by: PODIATRIST

## 2021-02-18 PROCEDURE — 1101F PT FALLS ASSESS-DOCD LE1/YR: CPT | Mod: CPTII,S$GLB,, | Performed by: PODIATRIST

## 2021-02-18 PROCEDURE — 11721 DEBRIDE NAIL 6 OR MORE: CPT | Mod: Q9,S$GLB,, | Performed by: PODIATRIST

## 2021-02-18 PROCEDURE — 1101F PR PT FALLS ASSESS DOC 0-1 FALLS W/OUT INJ PAST YR: ICD-10-PCS | Mod: CPTII,S$GLB,, | Performed by: PODIATRIST

## 2021-02-18 PROCEDURE — 99499 UNLISTED E&M SERVICE: CPT | Mod: HCNC,S$GLB,, | Performed by: PODIATRIST

## 2021-02-18 PROCEDURE — 99499 RISK ADDL DX/OHS AUDIT: ICD-10-PCS | Mod: HCNC,S$GLB,, | Performed by: PODIATRIST

## 2021-02-18 PROCEDURE — 3288F PR FALLS RISK ASSESSMENT DOCUMENTED: ICD-10-PCS | Mod: CPTII,S$GLB,, | Performed by: PODIATRIST

## 2021-02-18 PROCEDURE — 1125F PR PAIN SEVERITY QUANTIFIED, PAIN PRESENT: ICD-10-PCS | Mod: S$GLB,,, | Performed by: PODIATRIST

## 2021-02-18 RX ORDER — METHOCARBAMOL 500 MG/1
TABLET, FILM COATED ORAL
Status: ON HOLD | COMMUNITY
Start: 2020-12-27 | End: 2021-03-13 | Stop reason: HOSPADM

## 2021-02-19 ENCOUNTER — TELEPHONE (OUTPATIENT)
Dept: NEPHROLOGY | Facility: CLINIC | Age: 76
End: 2021-02-19

## 2021-02-19 DIAGNOSIS — N18.30 STAGE 3 CHRONIC KIDNEY DISEASE, UNSPECIFIED WHETHER STAGE 3A OR 3B CKD: Primary | ICD-10-CM

## 2021-02-20 ENCOUNTER — PATIENT MESSAGE (OUTPATIENT)
Dept: HEMATOLOGY/ONCOLOGY | Facility: CLINIC | Age: 76
End: 2021-02-20

## 2021-02-20 ENCOUNTER — PATIENT MESSAGE (OUTPATIENT)
Dept: NEPHROLOGY | Facility: CLINIC | Age: 76
End: 2021-02-20

## 2021-02-22 ENCOUNTER — TELEPHONE (OUTPATIENT)
Dept: NEPHROLOGY | Facility: HOSPITAL | Age: 76
End: 2021-02-22

## 2021-02-22 ENCOUNTER — TELEPHONE (OUTPATIENT)
Dept: NEPHROLOGY | Facility: CLINIC | Age: 76
End: 2021-02-22

## 2021-02-22 DIAGNOSIS — N18.30 STAGE 3 CHRONIC KIDNEY DISEASE, UNSPECIFIED WHETHER STAGE 3A OR 3B CKD: Primary | ICD-10-CM

## 2021-02-22 DIAGNOSIS — E87.5 HYPERKALEMIA: Primary | ICD-10-CM

## 2021-03-08 ENCOUNTER — TELEPHONE (OUTPATIENT)
Dept: NEPHROLOGY | Facility: HOSPITAL | Age: 76
End: 2021-03-08

## 2021-03-08 ENCOUNTER — TELEPHONE (OUTPATIENT)
Dept: NEPHROLOGY | Facility: CLINIC | Age: 76
End: 2021-03-08

## 2021-03-09 ENCOUNTER — HOSPITAL ENCOUNTER (INPATIENT)
Facility: HOSPITAL | Age: 76
LOS: 4 days | Discharge: HOME | DRG: 377 | End: 2021-03-13
Attending: FAMILY MEDICINE | Admitting: FAMILY MEDICINE
Payer: MEDICARE

## 2021-03-09 ENCOUNTER — PATIENT MESSAGE (OUTPATIENT)
Dept: PRIMARY CARE CLINIC | Facility: CLINIC | Age: 76
End: 2021-03-09

## 2021-03-09 DIAGNOSIS — I20.0 UNSTABLE ANGINA PECTORIS: ICD-10-CM

## 2021-03-09 DIAGNOSIS — R07.9 CHEST PAIN: ICD-10-CM

## 2021-03-09 DIAGNOSIS — K27.4 GASTROINTESTINAL HEMORRHAGE ASSOCIATED WITH PEPTIC ULCER: ICD-10-CM

## 2021-03-09 DIAGNOSIS — R94.31 ABNORMAL EKG: ICD-10-CM

## 2021-03-09 DIAGNOSIS — D64.9 SYMPTOMATIC ANEMIA: Primary | ICD-10-CM

## 2021-03-09 DIAGNOSIS — R79.89 ELEVATED TROPONIN: ICD-10-CM

## 2021-03-09 DIAGNOSIS — I21.4 NSTEMI (NON-ST ELEVATED MYOCARDIAL INFARCTION): ICD-10-CM

## 2021-03-09 LAB
BACTERIA #/AREA URNS HPF: NORMAL /HPF
BILIRUB UR QL STRIP: NEGATIVE
CLARITY UR: CLEAR
COLOR UR: YELLOW
ESTIMATED AVG GLUCOSE: 148 MG/DL (ref 68–131)
GLUCOSE UR QL STRIP: ABNORMAL
HBA1C MFR BLD: 6.8 % (ref 4–5.6)
HCT VFR BLD AUTO: 26.2 % (ref 40–54)
HCT VFR BLD AUTO: 27.7 % (ref 40–54)
HGB BLD-MCNC: 8.2 G/DL (ref 14–18)
HGB BLD-MCNC: 8.6 G/DL (ref 14–18)
HGB UR QL STRIP: NEGATIVE
HYALINE CASTS #/AREA URNS LPF: 0 /LPF
KETONES UR QL STRIP: ABNORMAL
LEUKOCYTE ESTERASE UR QL STRIP: NEGATIVE
MAGNESIUM SERPL-MCNC: 2 MG/DL (ref 1.6–2.6)
MICROSCOPIC COMMENT: NORMAL
NITRITE UR QL STRIP: NEGATIVE
PH UR STRIP: 6 [PH] (ref 5–8)
PHOSPHATE SERPL-MCNC: 3.1 MG/DL (ref 2.7–4.5)
POCT GLUCOSE: 165 MG/DL (ref 70–110)
POCT GLUCOSE: 182 MG/DL (ref 70–110)
PROCALCITONIN SERPL IA-MCNC: 0.12 NG/ML
PROT UR QL STRIP: ABNORMAL
RBC #/AREA URNS HPF: 3 /HPF (ref 0–4)
SP GR UR STRIP: 1.02 (ref 1–1.03)
T4 FREE SERPL-MCNC: 0.88 NG/DL (ref 0.71–1.51)
TROPONIN I SERPL DL<=0.01 NG/ML-MCNC: 3.85 NG/ML (ref 0–0.03)
TSH SERPL DL<=0.005 MIU/L-ACNC: 4.3 UIU/ML (ref 0.4–4)
URN SPEC COLLECT METH UR: ABNORMAL
UROBILINOGEN UR STRIP-ACNC: NEGATIVE EU/DL
WBC #/AREA URNS HPF: 2 /HPF (ref 0–5)

## 2021-03-09 PROCEDURE — 84145 PROCALCITONIN (PCT): CPT | Performed by: STUDENT IN AN ORGANIZED HEALTH CARE EDUCATION/TRAINING PROGRAM

## 2021-03-09 PROCEDURE — 84439 ASSAY OF FREE THYROXINE: CPT | Performed by: STUDENT IN AN ORGANIZED HEALTH CARE EDUCATION/TRAINING PROGRAM

## 2021-03-09 PROCEDURE — 81000 URINALYSIS NONAUTO W/SCOPE: CPT | Performed by: STUDENT IN AN ORGANIZED HEALTH CARE EDUCATION/TRAINING PROGRAM

## 2021-03-09 PROCEDURE — 25000003 PHARM REV CODE 250: Performed by: FAMILY MEDICINE

## 2021-03-09 PROCEDURE — 63600175 PHARM REV CODE 636 W HCPCS: Performed by: STUDENT IN AN ORGANIZED HEALTH CARE EDUCATION/TRAINING PROGRAM

## 2021-03-09 PROCEDURE — 25000003 PHARM REV CODE 250: Performed by: INTERNAL MEDICINE

## 2021-03-09 PROCEDURE — 84100 ASSAY OF PHOSPHORUS: CPT | Performed by: STUDENT IN AN ORGANIZED HEALTH CARE EDUCATION/TRAINING PROGRAM

## 2021-03-09 PROCEDURE — 93010 ELECTROCARDIOGRAM REPORT: CPT | Mod: 76,,, | Performed by: INTERNAL MEDICINE

## 2021-03-09 PROCEDURE — 83735 ASSAY OF MAGNESIUM: CPT | Performed by: STUDENT IN AN ORGANIZED HEALTH CARE EDUCATION/TRAINING PROGRAM

## 2021-03-09 PROCEDURE — 84443 ASSAY THYROID STIM HORMONE: CPT | Performed by: STUDENT IN AN ORGANIZED HEALTH CARE EDUCATION/TRAINING PROGRAM

## 2021-03-09 PROCEDURE — 36415 COLL VENOUS BLD VENIPUNCTURE: CPT | Performed by: STUDENT IN AN ORGANIZED HEALTH CARE EDUCATION/TRAINING PROGRAM

## 2021-03-09 PROCEDURE — 93005 ELECTROCARDIOGRAM TRACING: CPT

## 2021-03-09 PROCEDURE — 83036 HEMOGLOBIN GLYCOSYLATED A1C: CPT | Performed by: STUDENT IN AN ORGANIZED HEALTH CARE EDUCATION/TRAINING PROGRAM

## 2021-03-09 PROCEDURE — 20000000 HC ICU ROOM

## 2021-03-09 PROCEDURE — C9113 INJ PANTOPRAZOLE SODIUM, VIA: HCPCS | Performed by: STUDENT IN AN ORGANIZED HEALTH CARE EDUCATION/TRAINING PROGRAM

## 2021-03-09 PROCEDURE — 84484 ASSAY OF TROPONIN QUANT: CPT | Mod: 91 | Performed by: STUDENT IN AN ORGANIZED HEALTH CARE EDUCATION/TRAINING PROGRAM

## 2021-03-09 PROCEDURE — 85018 HEMOGLOBIN: CPT | Performed by: STUDENT IN AN ORGANIZED HEALTH CARE EDUCATION/TRAINING PROGRAM

## 2021-03-09 PROCEDURE — 85014 HEMATOCRIT: CPT | Mod: 91 | Performed by: STUDENT IN AN ORGANIZED HEALTH CARE EDUCATION/TRAINING PROGRAM

## 2021-03-09 PROCEDURE — 93010 EKG 12-LEAD: ICD-10-PCS | Mod: 76,,, | Performed by: INTERNAL MEDICINE

## 2021-03-09 RX ORDER — INSULIN ASPART 100 [IU]/ML
0-5 INJECTION, SOLUTION INTRAVENOUS; SUBCUTANEOUS EVERY 6 HOURS PRN
Status: DISCONTINUED | OUTPATIENT
Start: 2021-03-09 | End: 2021-03-13 | Stop reason: HOSPADM

## 2021-03-09 RX ORDER — SODIUM CHLORIDE 0.9 % (FLUSH) 0.9 %
5 SYRINGE (ML) INJECTION
Status: DISCONTINUED | OUTPATIENT
Start: 2021-03-09 | End: 2021-03-13 | Stop reason: HOSPADM

## 2021-03-09 RX ORDER — LABETALOL HYDROCHLORIDE 5 MG/ML
10 INJECTION, SOLUTION INTRAVENOUS EVERY 6 HOURS PRN
Status: DISCONTINUED | OUTPATIENT
Start: 2021-03-09 | End: 2021-03-13 | Stop reason: HOSPADM

## 2021-03-09 RX ORDER — PANTOPRAZOLE SODIUM 40 MG/10ML
40 INJECTION, POWDER, LYOPHILIZED, FOR SOLUTION INTRAVENOUS 2 TIMES DAILY
Status: DISCONTINUED | OUTPATIENT
Start: 2021-03-09 | End: 2021-03-13 | Stop reason: HOSPADM

## 2021-03-09 RX ORDER — MUPIROCIN 20 MG/G
OINTMENT TOPICAL 2 TIMES DAILY
Status: DISCONTINUED | OUTPATIENT
Start: 2021-03-09 | End: 2021-03-13 | Stop reason: HOSPADM

## 2021-03-09 RX ORDER — IBUPROFEN 200 MG
24 TABLET ORAL
Status: DISCONTINUED | OUTPATIENT
Start: 2021-03-09 | End: 2021-03-13 | Stop reason: HOSPADM

## 2021-03-09 RX ORDER — IBUPROFEN 200 MG
16 TABLET ORAL
Status: DISCONTINUED | OUTPATIENT
Start: 2021-03-09 | End: 2021-03-13 | Stop reason: HOSPADM

## 2021-03-09 RX ORDER — HYDRALAZINE HYDROCHLORIDE 20 MG/ML
10 INJECTION INTRAMUSCULAR; INTRAVENOUS ONCE
Status: COMPLETED | OUTPATIENT
Start: 2021-03-09 | End: 2021-03-09

## 2021-03-09 RX ORDER — GLUCAGON 1 MG
1 KIT INJECTION
Status: DISCONTINUED | OUTPATIENT
Start: 2021-03-09 | End: 2021-03-13 | Stop reason: HOSPADM

## 2021-03-09 RX ADMIN — NITROGLYCERIN 0.5 INCH: 20 OINTMENT TOPICAL at 09:03

## 2021-03-09 RX ADMIN — PANTOPRAZOLE SODIUM 40 MG: 40 INJECTION, POWDER, FOR SOLUTION INTRAVENOUS at 08:03

## 2021-03-09 RX ADMIN — MUPIROCIN: 20 OINTMENT TOPICAL at 08:03

## 2021-03-09 RX ADMIN — HYDRALAZINE HYDROCHLORIDE 10 MG: 20 INJECTION, SOLUTION INTRAMUSCULAR; INTRAVENOUS at 06:03

## 2021-03-10 ENCOUNTER — ANESTHESIA EVENT (OUTPATIENT)
Dept: ENDOSCOPY | Facility: HOSPITAL | Age: 76
DRG: 377 | End: 2021-03-10
Payer: MEDICARE

## 2021-03-10 ENCOUNTER — ANESTHESIA (OUTPATIENT)
Dept: ENDOSCOPY | Facility: HOSPITAL | Age: 76
DRG: 377 | End: 2021-03-10
Payer: MEDICARE

## 2021-03-10 LAB
ALBUMIN SERPL BCP-MCNC: 2.7 G/DL (ref 3.5–5.2)
ALP SERPL-CCNC: 95 U/L (ref 55–135)
ALT SERPL W/O P-5'-P-CCNC: 11 U/L (ref 10–44)
ANION GAP SERPL CALC-SCNC: 9 MMOL/L (ref 8–16)
AORTIC ROOT ANNULUS: 3.17 CM
ASCENDING AORTA: 2.83 CM
AST SERPL-CCNC: 23 U/L (ref 10–40)
AV INDEX (PROSTH): 0.75
AV MEAN GRADIENT: 5 MMHG
AV PEAK GRADIENT: 8 MMHG
AV VALVE AREA: 2.79 CM2
AV VELOCITY RATIO: 0.71
BASOPHILS # BLD AUTO: 0.04 K/UL (ref 0–0.2)
BASOPHILS NFR BLD: 0.3 % (ref 0–1.9)
BILIRUB SERPL-MCNC: 0.5 MG/DL (ref 0.1–1)
BSA FOR ECHO PROCEDURE: 1.88 M2
BUN SERPL-MCNC: 32 MG/DL (ref 8–23)
CALCIUM SERPL-MCNC: 8.7 MG/DL (ref 8.7–10.5)
CHLORIDE SERPL-SCNC: 107 MMOL/L (ref 95–110)
CO2 SERPL-SCNC: 24 MMOL/L (ref 23–29)
CREAT SERPL-MCNC: 1.7 MG/DL (ref 0.5–1.4)
CV ECHO LV RWT: 0.45 CM
DIFFERENTIAL METHOD: ABNORMAL
DOP CALC AO PEAK VEL: 1.43 M/S
DOP CALC AO VTI: 25.66 CM
DOP CALC LVOT AREA: 3.7 CM2
DOP CALC LVOT DIAMETER: 2.17 CM
DOP CALC LVOT PEAK VEL: 1.01 M/S
DOP CALC LVOT STROKE VOLUME: 71.6 CM3
DOP CALCLVOT PEAK VEL VTI: 19.37 CM
E WAVE DECELERATION TIME: 239.5 MSEC
E/A RATIO: 0.87
E/E' RATIO: 20.5 M/S
ECHO LV POSTERIOR WALL: 1.14 CM (ref 0.6–1.1)
EOSINOPHIL # BLD AUTO: 0.3 K/UL (ref 0–0.5)
EOSINOPHIL NFR BLD: 2 % (ref 0–8)
ERYTHROCYTE [DISTWIDTH] IN BLOOD BY AUTOMATED COUNT: 17.6 % (ref 11.5–14.5)
EST. GFR  (AFRICAN AMERICAN): 44 ML/MIN/1.73 M^2
EST. GFR  (NON AFRICAN AMERICAN): 38 ML/MIN/1.73 M^2
FRACTIONAL SHORTENING: 14 % (ref 28–44)
GLUCOSE SERPL-MCNC: 215 MG/DL (ref 70–110)
HCT VFR BLD AUTO: 25 % (ref 40–54)
HCT VFR BLD AUTO: 26.2 % (ref 40–54)
HCT VFR BLD AUTO: 26.4 % (ref 40–54)
HCT VFR BLD AUTO: 26.7 % (ref 40–54)
HCT VFR BLD AUTO: 26.7 % (ref 40–54)
HCT VFR BLD AUTO: 28.1 % (ref 40–54)
HGB BLD-MCNC: 7.9 G/DL (ref 14–18)
HGB BLD-MCNC: 8.1 G/DL (ref 14–18)
HGB BLD-MCNC: 8.2 G/DL (ref 14–18)
HGB BLD-MCNC: 8.3 G/DL (ref 14–18)
HGB BLD-MCNC: 8.3 G/DL (ref 14–18)
HGB BLD-MCNC: 8.6 G/DL (ref 14–18)
IMM GRANULOCYTES # BLD AUTO: 0.09 K/UL (ref 0–0.04)
IMM GRANULOCYTES NFR BLD AUTO: 0.7 % (ref 0–0.5)
INTERVENTRICULAR SEPTUM: 1 CM (ref 0.6–1.1)
IVRT: 114.18 MSEC
LA MAJOR: 5.38 CM
LA MINOR: 5.03 CM
LA WIDTH: 4.21 CM
LEFT ATRIUM SIZE: 3.85 CM
LEFT ATRIUM VOLUME INDEX MOD: 28.4 ML/M2
LEFT ATRIUM VOLUME INDEX: 38.5 ML/M2
LEFT ATRIUM VOLUME MOD: 52.77 CM3
LEFT ATRIUM VOLUME: 71.63 CM3
LEFT INTERNAL DIMENSION IN SYSTOLE: 4.37 CM (ref 2.1–4)
LEFT VENTRICLE DIASTOLIC VOLUME INDEX: 65.3 ML/M2
LEFT VENTRICLE DIASTOLIC VOLUME: 121.45 ML
LEFT VENTRICLE MASS INDEX: 109 G/M2
LEFT VENTRICLE SYSTOLIC VOLUME INDEX: 46.3 ML/M2
LEFT VENTRICLE SYSTOLIC VOLUME: 86.1 ML
LEFT VENTRICULAR INTERNAL DIMENSION IN DIASTOLE: 5.06 CM (ref 3.5–6)
LEFT VENTRICULAR MASS: 203.35 G
LV LATERAL E/E' RATIO: 20.5 M/S
LV SEPTAL E/E' RATIO: 20.5 M/S
LYMPHOCYTES # BLD AUTO: 1.2 K/UL (ref 1–4.8)
LYMPHOCYTES NFR BLD: 9.1 % (ref 18–48)
MAGNESIUM SERPL-MCNC: 1.8 MG/DL (ref 1.6–2.6)
MCH RBC QN AUTO: 28.9 PG (ref 27–31)
MCHC RBC AUTO-ENTMCNC: 31.1 G/DL (ref 32–36)
MCV RBC AUTO: 93 FL (ref 82–98)
MONOCYTES # BLD AUTO: 1.1 K/UL (ref 0.3–1)
MONOCYTES NFR BLD: 8.1 % (ref 4–15)
MV A" WAVE DURATION": 15.41 MSEC
MV PEAK A VEL: 0.94 M/S
MV PEAK E VEL: 0.82 M/S
MV STENOSIS PRESSURE HALF TIME: 69.45 MS
MV VALVE AREA P 1/2 METHOD: 3.17 CM2
NEUTROPHILS # BLD AUTO: 10.3 K/UL (ref 1.8–7.7)
NEUTROPHILS NFR BLD: 79.8 % (ref 38–73)
NRBC BLD-RTO: 1 /100 WBC
PHOSPHATE SERPL-MCNC: 2.6 MG/DL (ref 2.7–4.5)
PLATELET # BLD AUTO: 459 K/UL (ref 150–350)
PMV BLD AUTO: 9.8 FL (ref 9.2–12.9)
POCT GLUCOSE: 177 MG/DL (ref 70–110)
POCT GLUCOSE: 214 MG/DL (ref 70–110)
POCT GLUCOSE: 252 MG/DL (ref 70–110)
POCT GLUCOSE: 281 MG/DL (ref 70–110)
POTASSIUM SERPL-SCNC: 4.3 MMOL/L (ref 3.5–5.1)
PROT SERPL-MCNC: 6.1 G/DL (ref 6–8.4)
PULM VEIN S/D RATIO: 1.21
PV PEAK D VEL: 0.38 M/S
PV PEAK S VEL: 0.46 M/S
RA MAJOR: 4.97 CM
RA PRESSURE: 3 MMHG
RA WIDTH: 3.91 CM
RBC # BLD AUTO: 2.87 M/UL (ref 4.6–6.2)
RIGHT VENTRICULAR END-DIASTOLIC DIMENSION: 2.66 CM
RV TISSUE DOPPLER FREE WALL SYSTOLIC VELOCITY 1 (APICAL 4 CHAMBER VIEW): 13.4 CM/S
SODIUM SERPL-SCNC: 140 MMOL/L (ref 136–145)
STJ: 2.97 CM
TDI LATERAL: 0.04 M/S
TDI SEPTAL: 0.04 M/S
TDI: 0.04 M/S
TRICUSPID ANNULAR PLANE SYSTOLIC EXCURSION: 2.46 CM
TROPONIN I SERPL DL<=0.01 NG/ML-MCNC: 4.57 NG/ML (ref 0–0.03)
TROPONIN I SERPL DL<=0.01 NG/ML-MCNC: 4.98 NG/ML (ref 0–0.03)
WBC # BLD AUTO: 12.9 K/UL (ref 3.9–12.7)

## 2021-03-10 PROCEDURE — 25000003 PHARM REV CODE 250: Performed by: FAMILY MEDICINE

## 2021-03-10 PROCEDURE — 97161 PT EVAL LOW COMPLEX 20 MIN: CPT

## 2021-03-10 PROCEDURE — 25000003 PHARM REV CODE 250: Performed by: INTERNAL MEDICINE

## 2021-03-10 PROCEDURE — 84100 ASSAY OF PHOSPHORUS: CPT | Performed by: STUDENT IN AN ORGANIZED HEALTH CARE EDUCATION/TRAINING PROGRAM

## 2021-03-10 PROCEDURE — 85025 COMPLETE CBC W/AUTO DIFF WBC: CPT | Performed by: STUDENT IN AN ORGANIZED HEALTH CARE EDUCATION/TRAINING PROGRAM

## 2021-03-10 PROCEDURE — 83735 ASSAY OF MAGNESIUM: CPT | Performed by: STUDENT IN AN ORGANIZED HEALTH CARE EDUCATION/TRAINING PROGRAM

## 2021-03-10 PROCEDURE — 11000001 HC ACUTE MED/SURG PRIVATE ROOM

## 2021-03-10 PROCEDURE — 36415 COLL VENOUS BLD VENIPUNCTURE: CPT | Performed by: STUDENT IN AN ORGANIZED HEALTH CARE EDUCATION/TRAINING PROGRAM

## 2021-03-10 PROCEDURE — 85014 HEMATOCRIT: CPT | Mod: 91 | Performed by: STUDENT IN AN ORGANIZED HEALTH CARE EDUCATION/TRAINING PROGRAM

## 2021-03-10 PROCEDURE — 93010 ELECTROCARDIOGRAM REPORT: CPT | Mod: ,,, | Performed by: INTERNAL MEDICINE

## 2021-03-10 PROCEDURE — 84484 ASSAY OF TROPONIN QUANT: CPT | Performed by: STUDENT IN AN ORGANIZED HEALTH CARE EDUCATION/TRAINING PROGRAM

## 2021-03-10 PROCEDURE — 85018 HEMOGLOBIN: CPT | Mod: 91 | Performed by: STUDENT IN AN ORGANIZED HEALTH CARE EDUCATION/TRAINING PROGRAM

## 2021-03-10 PROCEDURE — 99223 PR INITIAL HOSPITAL CARE,LEVL III: ICD-10-PCS | Mod: ,,, | Performed by: INTERNAL MEDICINE

## 2021-03-10 PROCEDURE — 99291 PR CRITICAL CARE, E/M 30-74 MINUTES: ICD-10-PCS | Mod: GC,,, | Performed by: INTERNAL MEDICINE

## 2021-03-10 PROCEDURE — 27000221 HC OXYGEN, UP TO 24 HOURS

## 2021-03-10 PROCEDURE — 99291 CRITICAL CARE FIRST HOUR: CPT | Mod: GC,,, | Performed by: INTERNAL MEDICINE

## 2021-03-10 PROCEDURE — 94761 N-INVAS EAR/PLS OXIMETRY MLT: CPT

## 2021-03-10 PROCEDURE — 63600175 PHARM REV CODE 636 W HCPCS: Performed by: NURSE ANESTHETIST, CERTIFIED REGISTERED

## 2021-03-10 PROCEDURE — 85018 HEMOGLOBIN: CPT | Performed by: STUDENT IN AN ORGANIZED HEALTH CARE EDUCATION/TRAINING PROGRAM

## 2021-03-10 PROCEDURE — 63600175 PHARM REV CODE 636 W HCPCS: Performed by: STUDENT IN AN ORGANIZED HEALTH CARE EDUCATION/TRAINING PROGRAM

## 2021-03-10 PROCEDURE — 93010 EKG 12-LEAD: ICD-10-PCS | Mod: ,,, | Performed by: INTERNAL MEDICINE

## 2021-03-10 PROCEDURE — 97116 GAIT TRAINING THERAPY: CPT

## 2021-03-10 PROCEDURE — 99223 PR INITIAL HOSPITAL CARE,LEVL III: ICD-10-PCS | Mod: 25,,, | Performed by: INTERNAL MEDICINE

## 2021-03-10 PROCEDURE — 99223 1ST HOSP IP/OBS HIGH 75: CPT | Mod: 25,,, | Performed by: INTERNAL MEDICINE

## 2021-03-10 PROCEDURE — C9113 INJ PANTOPRAZOLE SODIUM, VIA: HCPCS | Performed by: STUDENT IN AN ORGANIZED HEALTH CARE EDUCATION/TRAINING PROGRAM

## 2021-03-10 PROCEDURE — 85014 HEMATOCRIT: CPT | Performed by: STUDENT IN AN ORGANIZED HEALTH CARE EDUCATION/TRAINING PROGRAM

## 2021-03-10 PROCEDURE — 43235 EGD DIAGNOSTIC BRUSH WASH: CPT | Mod: ,,, | Performed by: INTERNAL MEDICINE

## 2021-03-10 PROCEDURE — 37000008 HC ANESTHESIA 1ST 15 MINUTES: Performed by: INTERNAL MEDICINE

## 2021-03-10 PROCEDURE — 43235 EGD DIAGNOSTIC BRUSH WASH: CPT | Performed by: INTERNAL MEDICINE

## 2021-03-10 PROCEDURE — 25000003 PHARM REV CODE 250: Performed by: NURSE ANESTHETIST, CERTIFIED REGISTERED

## 2021-03-10 PROCEDURE — 43235 PR EGD, FLEX, DIAGNOSTIC: ICD-10-PCS | Mod: ,,, | Performed by: INTERNAL MEDICINE

## 2021-03-10 PROCEDURE — 84484 ASSAY OF TROPONIN QUANT: CPT | Mod: 91 | Performed by: STUDENT IN AN ORGANIZED HEALTH CARE EDUCATION/TRAINING PROGRAM

## 2021-03-10 PROCEDURE — 93005 ELECTROCARDIOGRAM TRACING: CPT

## 2021-03-10 PROCEDURE — 99223 1ST HOSP IP/OBS HIGH 75: CPT | Mod: ,,, | Performed by: INTERNAL MEDICINE

## 2021-03-10 PROCEDURE — 37000009 HC ANESTHESIA EA ADD 15 MINS: Performed by: INTERNAL MEDICINE

## 2021-03-10 PROCEDURE — 80053 COMPREHEN METABOLIC PANEL: CPT | Performed by: STUDENT IN AN ORGANIZED HEALTH CARE EDUCATION/TRAINING PROGRAM

## 2021-03-10 RX ORDER — POLYETHYLENE GLYCOL 3350, SODIUM SULFATE ANHYDROUS, SODIUM BICARBONATE, SODIUM CHLORIDE, POTASSIUM CHLORIDE 236; 22.74; 6.74; 5.86; 2.97 G/4L; G/4L; G/4L; G/4L; G/4L
4000 POWDER, FOR SOLUTION ORAL ONCE
Status: DISCONTINUED | OUTPATIENT
Start: 2021-03-10 | End: 2021-03-13 | Stop reason: HOSPADM

## 2021-03-10 RX ORDER — PROPOFOL 10 MG/ML
INJECTION, EMULSION INTRAVENOUS CONTINUOUS PRN
Status: DISCONTINUED | OUTPATIENT
Start: 2021-03-10 | End: 2021-03-10

## 2021-03-10 RX ORDER — LIDOCAINE HCL/PF 100 MG/5ML
SYRINGE (ML) INTRAVENOUS
Status: DISCONTINUED | OUTPATIENT
Start: 2021-03-10 | End: 2021-03-10

## 2021-03-10 RX ORDER — ETOMIDATE 2 MG/ML
INJECTION INTRAVENOUS
Status: DISCONTINUED | OUTPATIENT
Start: 2021-03-10 | End: 2021-03-10

## 2021-03-10 RX ADMIN — PROPOFOL 50 MCG/KG/MIN: 10 INJECTION, EMULSION INTRAVENOUS at 01:03

## 2021-03-10 RX ADMIN — MUPIROCIN: 20 OINTMENT TOPICAL at 09:03

## 2021-03-10 RX ADMIN — ETOMIDATE 6 MG: 2 INJECTION, SOLUTION INTRAVENOUS at 01:03

## 2021-03-10 RX ADMIN — PANTOPRAZOLE SODIUM 40 MG: 40 INJECTION, POWDER, FOR SOLUTION INTRAVENOUS at 09:03

## 2021-03-10 RX ADMIN — NITROGLYCERIN 0.5 INCH: 20 OINTMENT TOPICAL at 06:03

## 2021-03-10 RX ADMIN — LIDOCAINE HYDROCHLORIDE 50 MG: 20 INJECTION, SOLUTION INTRAVENOUS at 01:03

## 2021-03-10 RX ADMIN — INSULIN ASPART 3 UNITS: 100 INJECTION, SOLUTION INTRAVENOUS; SUBCUTANEOUS at 06:03

## 2021-03-10 RX ADMIN — INSULIN ASPART 2 UNITS: 100 INJECTION, SOLUTION INTRAVENOUS; SUBCUTANEOUS at 05:03

## 2021-03-11 LAB
ALBUMIN SERPL BCP-MCNC: 2.4 G/DL (ref 3.5–5.2)
ALP SERPL-CCNC: 88 U/L (ref 55–135)
ALT SERPL W/O P-5'-P-CCNC: 12 U/L (ref 10–44)
ANION GAP SERPL CALC-SCNC: 4 MMOL/L (ref 8–16)
AST SERPL-CCNC: 19 U/L (ref 10–40)
BASOPHILS # BLD AUTO: 0.03 K/UL (ref 0–0.2)
BASOPHILS NFR BLD: 0.4 % (ref 0–1.9)
BILIRUB SERPL-MCNC: 0.4 MG/DL (ref 0.1–1)
BUN SERPL-MCNC: 20 MG/DL (ref 8–23)
CALCIUM SERPL-MCNC: 8.4 MG/DL (ref 8.7–10.5)
CHLORIDE SERPL-SCNC: 107 MMOL/L (ref 95–110)
CHOLEST SERPL-MCNC: 160 MG/DL (ref 120–199)
CHOLEST/HDLC SERPL: 5.2 {RATIO} (ref 2–5)
CO2 SERPL-SCNC: 29 MMOL/L (ref 23–29)
CREAT SERPL-MCNC: 1.5 MG/DL (ref 0.5–1.4)
DIFFERENTIAL METHOD: ABNORMAL
EOSINOPHIL # BLD AUTO: 0.5 K/UL (ref 0–0.5)
EOSINOPHIL NFR BLD: 6 % (ref 0–8)
ERYTHROCYTE [DISTWIDTH] IN BLOOD BY AUTOMATED COUNT: 17.8 % (ref 11.5–14.5)
EST. GFR  (AFRICAN AMERICAN): 52 ML/MIN/1.73 M^2
EST. GFR  (NON AFRICAN AMERICAN): 45 ML/MIN/1.73 M^2
GLUCOSE SERPL-MCNC: 194 MG/DL (ref 70–110)
HCT VFR BLD AUTO: 26.4 % (ref 40–54)
HCT VFR BLD AUTO: 26.5 % (ref 40–54)
HCT VFR BLD AUTO: 26.7 % (ref 40–54)
HDLC SERPL-MCNC: 31 MG/DL (ref 40–75)
HDLC SERPL: 19.4 % (ref 20–50)
HGB BLD-MCNC: 8.2 G/DL (ref 14–18)
IMM GRANULOCYTES # BLD AUTO: 0.05 K/UL (ref 0–0.04)
IMM GRANULOCYTES NFR BLD AUTO: 0.6 % (ref 0–0.5)
LDLC SERPL CALC-MCNC: 93.4 MG/DL (ref 63–159)
LYMPHOCYTES # BLD AUTO: 1.2 K/UL (ref 1–4.8)
LYMPHOCYTES NFR BLD: 15.3 % (ref 18–48)
MAGNESIUM SERPL-MCNC: 2 MG/DL (ref 1.6–2.6)
MCH RBC QN AUTO: 28.9 PG (ref 27–31)
MCHC RBC AUTO-ENTMCNC: 30.9 G/DL (ref 32–36)
MCV RBC AUTO: 93 FL (ref 82–98)
MONOCYTES # BLD AUTO: 0.8 K/UL (ref 0.3–1)
MONOCYTES NFR BLD: 10.9 % (ref 4–15)
NEUTROPHILS # BLD AUTO: 5.2 K/UL (ref 1.8–7.7)
NEUTROPHILS NFR BLD: 66.8 % (ref 38–73)
NONHDLC SERPL-MCNC: 129 MG/DL
NRBC BLD-RTO: 0 /100 WBC
PHOSPHATE SERPL-MCNC: 2.9 MG/DL (ref 2.7–4.5)
PLATELET # BLD AUTO: 457 K/UL (ref 150–350)
PMV BLD AUTO: 9.6 FL (ref 9.2–12.9)
POCT GLUCOSE: 199 MG/DL (ref 70–110)
POCT GLUCOSE: 202 MG/DL (ref 70–110)
POCT GLUCOSE: 205 MG/DL (ref 70–110)
POCT GLUCOSE: 234 MG/DL (ref 70–110)
POTASSIUM SERPL-SCNC: 4.1 MMOL/L (ref 3.5–5.1)
PROT SERPL-MCNC: 5.9 G/DL (ref 6–8.4)
RBC # BLD AUTO: 2.84 M/UL (ref 4.6–6.2)
SODIUM SERPL-SCNC: 140 MMOL/L (ref 136–145)
TRIGL SERPL-MCNC: 178 MG/DL (ref 30–150)
WBC # BLD AUTO: 7.72 K/UL (ref 3.9–12.7)

## 2021-03-11 PROCEDURE — 85014 HEMATOCRIT: CPT | Performed by: STUDENT IN AN ORGANIZED HEALTH CARE EDUCATION/TRAINING PROGRAM

## 2021-03-11 PROCEDURE — 93010 EKG 12-LEAD: ICD-10-PCS | Mod: ,,, | Performed by: INTERNAL MEDICINE

## 2021-03-11 PROCEDURE — 11000001 HC ACUTE MED/SURG PRIVATE ROOM

## 2021-03-11 PROCEDURE — 93010 ELECTROCARDIOGRAM REPORT: CPT | Mod: 76,,, | Performed by: INTERNAL MEDICINE

## 2021-03-11 PROCEDURE — 25000003 PHARM REV CODE 250: Performed by: INTERNAL MEDICINE

## 2021-03-11 PROCEDURE — 80061 LIPID PANEL: CPT | Performed by: NURSE PRACTITIONER

## 2021-03-11 PROCEDURE — 93005 ELECTROCARDIOGRAM TRACING: CPT

## 2021-03-11 PROCEDURE — 97165 OT EVAL LOW COMPLEX 30 MIN: CPT

## 2021-03-11 PROCEDURE — 99233 PR SUBSEQUENT HOSPITAL CARE,LEVL III: ICD-10-PCS | Mod: ,,, | Performed by: NURSE PRACTITIONER

## 2021-03-11 PROCEDURE — 84100 ASSAY OF PHOSPHORUS: CPT | Performed by: STUDENT IN AN ORGANIZED HEALTH CARE EDUCATION/TRAINING PROGRAM

## 2021-03-11 PROCEDURE — 93010 ELECTROCARDIOGRAM REPORT: CPT | Mod: ,,, | Performed by: INTERNAL MEDICINE

## 2021-03-11 PROCEDURE — 99232 SBSQ HOSP IP/OBS MODERATE 35: CPT | Mod: ,,, | Performed by: INTERNAL MEDICINE

## 2021-03-11 PROCEDURE — 83735 ASSAY OF MAGNESIUM: CPT | Performed by: STUDENT IN AN ORGANIZED HEALTH CARE EDUCATION/TRAINING PROGRAM

## 2021-03-11 PROCEDURE — 85025 COMPLETE CBC W/AUTO DIFF WBC: CPT | Performed by: STUDENT IN AN ORGANIZED HEALTH CARE EDUCATION/TRAINING PROGRAM

## 2021-03-11 PROCEDURE — 63600175 PHARM REV CODE 636 W HCPCS: Performed by: STUDENT IN AN ORGANIZED HEALTH CARE EDUCATION/TRAINING PROGRAM

## 2021-03-11 PROCEDURE — 80053 COMPREHEN METABOLIC PANEL: CPT | Performed by: STUDENT IN AN ORGANIZED HEALTH CARE EDUCATION/TRAINING PROGRAM

## 2021-03-11 PROCEDURE — 25000003 PHARM REV CODE 250: Performed by: STUDENT IN AN ORGANIZED HEALTH CARE EDUCATION/TRAINING PROGRAM

## 2021-03-11 PROCEDURE — 99233 SBSQ HOSP IP/OBS HIGH 50: CPT | Mod: ,,, | Performed by: NURSE PRACTITIONER

## 2021-03-11 PROCEDURE — 25000003 PHARM REV CODE 250: Performed by: FAMILY MEDICINE

## 2021-03-11 PROCEDURE — 85018 HEMOGLOBIN: CPT | Performed by: STUDENT IN AN ORGANIZED HEALTH CARE EDUCATION/TRAINING PROGRAM

## 2021-03-11 PROCEDURE — 36415 COLL VENOUS BLD VENIPUNCTURE: CPT | Performed by: STUDENT IN AN ORGANIZED HEALTH CARE EDUCATION/TRAINING PROGRAM

## 2021-03-11 PROCEDURE — 94761 N-INVAS EAR/PLS OXIMETRY MLT: CPT

## 2021-03-11 PROCEDURE — C9113 INJ PANTOPRAZOLE SODIUM, VIA: HCPCS | Performed by: STUDENT IN AN ORGANIZED HEALTH CARE EDUCATION/TRAINING PROGRAM

## 2021-03-11 PROCEDURE — 99232 PR SUBSEQUENT HOSPITAL CARE,LEVL II: ICD-10-PCS | Mod: ,,, | Performed by: INTERNAL MEDICINE

## 2021-03-11 RX ORDER — LEVOTHYROXINE SODIUM 88 UG/1
88 TABLET ORAL
Status: DISCONTINUED | OUTPATIENT
Start: 2021-03-12 | End: 2021-03-13 | Stop reason: HOSPADM

## 2021-03-11 RX ORDER — LOSARTAN POTASSIUM 50 MG/1
100 TABLET ORAL DAILY
Status: DISCONTINUED | OUTPATIENT
Start: 2021-03-12 | End: 2021-03-13 | Stop reason: HOSPADM

## 2021-03-11 RX ORDER — CLOPIDOGREL BISULFATE 75 MG/1
75 TABLET ORAL DAILY
Status: CANCELLED | OUTPATIENT
Start: 2021-03-11

## 2021-03-11 RX ORDER — ATORVASTATIN CALCIUM 40 MG/1
80 TABLET, FILM COATED ORAL NIGHTLY
Status: DISCONTINUED | OUTPATIENT
Start: 2021-03-11 | End: 2021-03-13 | Stop reason: HOSPADM

## 2021-03-11 RX ORDER — SPIRONOLACTONE 25 MG/1
25 TABLET ORAL DAILY
Status: DISCONTINUED | OUTPATIENT
Start: 2021-03-12 | End: 2021-03-13 | Stop reason: HOSPADM

## 2021-03-11 RX ORDER — CARVEDILOL 3.12 MG/1
3.12 TABLET ORAL 2 TIMES DAILY WITH MEALS
Status: DISCONTINUED | OUTPATIENT
Start: 2021-03-11 | End: 2021-03-13 | Stop reason: HOSPADM

## 2021-03-11 RX ORDER — NAPROXEN SODIUM 220 MG/1
81 TABLET, FILM COATED ORAL DAILY
Status: CANCELLED | OUTPATIENT
Start: 2021-03-11

## 2021-03-11 RX ADMIN — PANTOPRAZOLE SODIUM 40 MG: 40 INJECTION, POWDER, FOR SOLUTION INTRAVENOUS at 09:03

## 2021-03-11 RX ADMIN — NITROGLYCERIN 0.5 INCH: 20 OINTMENT TOPICAL at 11:03

## 2021-03-11 RX ADMIN — INSULIN ASPART 2 UNITS: 100 INJECTION, SOLUTION INTRAVENOUS; SUBCUTANEOUS at 05:03

## 2021-03-11 RX ADMIN — NITROGLYCERIN 0.5 INCH: 20 OINTMENT TOPICAL at 05:03

## 2021-03-11 RX ADMIN — CARVEDILOL 3.12 MG: 3.12 TABLET, FILM COATED ORAL at 05:03

## 2021-03-11 RX ADMIN — INSULIN ASPART 2 UNITS: 100 INJECTION, SOLUTION INTRAVENOUS; SUBCUTANEOUS at 07:03

## 2021-03-11 RX ADMIN — MUPIROCIN: 20 OINTMENT TOPICAL at 07:03

## 2021-03-11 RX ADMIN — ATORVASTATIN CALCIUM 80 MG: 40 TABLET, FILM COATED ORAL at 09:03

## 2021-03-11 RX ADMIN — MUPIROCIN: 20 OINTMENT TOPICAL at 09:03

## 2021-03-11 RX ADMIN — INSULIN ASPART 2 UNITS: 100 INJECTION, SOLUTION INTRAVENOUS; SUBCUTANEOUS at 12:03

## 2021-03-12 LAB
ABO + RH BLD: NORMAL
ALBUMIN SERPL BCP-MCNC: 2.3 G/DL (ref 3.5–5.2)
ALP SERPL-CCNC: 84 U/L (ref 55–135)
ALT SERPL W/O P-5'-P-CCNC: 17 U/L (ref 10–44)
ANION GAP SERPL CALC-SCNC: 6 MMOL/L (ref 8–16)
AST SERPL-CCNC: 17 U/L (ref 10–40)
BASOPHILS # BLD AUTO: 0.04 K/UL (ref 0–0.2)
BASOPHILS NFR BLD: 0.5 % (ref 0–1.9)
BILIRUB SERPL-MCNC: 0.4 MG/DL (ref 0.1–1)
BLD GP AB SCN CELLS X3 SERPL QL: NORMAL
BLD PROD TYP BPU: NORMAL
BLOOD UNIT EXPIRATION DATE: NORMAL
BLOOD UNIT TYPE CODE: 5100
BLOOD UNIT TYPE: NORMAL
BUN SERPL-MCNC: 21 MG/DL (ref 8–23)
CALCIUM SERPL-MCNC: 8.1 MG/DL (ref 8.7–10.5)
CHLORIDE SERPL-SCNC: 107 MMOL/L (ref 95–110)
CO2 SERPL-SCNC: 26 MMOL/L (ref 23–29)
CODING SYSTEM: NORMAL
CREAT SERPL-MCNC: 1.7 MG/DL (ref 0.5–1.4)
DIFFERENTIAL METHOD: ABNORMAL
DISPENSE STATUS: NORMAL
EOSINOPHIL # BLD AUTO: 0.4 K/UL (ref 0–0.5)
EOSINOPHIL NFR BLD: 5.2 % (ref 0–8)
ERYTHROCYTE [DISTWIDTH] IN BLOOD BY AUTOMATED COUNT: 17.4 % (ref 11.5–14.5)
EST. GFR  (AFRICAN AMERICAN): 44 ML/MIN/1.73 M^2
EST. GFR  (NON AFRICAN AMERICAN): 38 ML/MIN/1.73 M^2
GLUCOSE SERPL-MCNC: 209 MG/DL (ref 70–110)
HCT VFR BLD AUTO: 24 % (ref 40–54)
HCT VFR BLD AUTO: 24.9 % (ref 40–54)
HCT VFR BLD AUTO: 25.6 % (ref 40–54)
HCT VFR BLD AUTO: 29.5 % (ref 40–54)
HGB BLD-MCNC: 7.6 G/DL (ref 14–18)
HGB BLD-MCNC: 7.8 G/DL (ref 14–18)
HGB BLD-MCNC: 7.8 G/DL (ref 14–18)
HGB BLD-MCNC: 9.2 G/DL (ref 14–18)
IMM GRANULOCYTES # BLD AUTO: 0.03 K/UL (ref 0–0.04)
IMM GRANULOCYTES NFR BLD AUTO: 0.4 % (ref 0–0.5)
LYMPHOCYTES # BLD AUTO: 1.4 K/UL (ref 1–4.8)
LYMPHOCYTES NFR BLD: 16.1 % (ref 18–48)
MAGNESIUM SERPL-MCNC: 1.9 MG/DL (ref 1.6–2.6)
MCH RBC QN AUTO: 29.2 PG (ref 27–31)
MCHC RBC AUTO-ENTMCNC: 31.3 G/DL (ref 32–36)
MCV RBC AUTO: 93 FL (ref 82–98)
MONOCYTES # BLD AUTO: 1.1 K/UL (ref 0.3–1)
MONOCYTES NFR BLD: 13.1 % (ref 4–15)
NEUTROPHILS # BLD AUTO: 5.5 K/UL (ref 1.8–7.7)
NEUTROPHILS NFR BLD: 64.7 % (ref 38–73)
NRBC BLD-RTO: 0 /100 WBC
PHOSPHATE SERPL-MCNC: 3 MG/DL (ref 2.7–4.5)
PLATELET # BLD AUTO: 457 K/UL (ref 150–350)
PMV BLD AUTO: 9.4 FL (ref 9.2–12.9)
POCT GLUCOSE: 184 MG/DL (ref 70–110)
POCT GLUCOSE: 208 MG/DL (ref 70–110)
POCT GLUCOSE: 214 MG/DL (ref 70–110)
POCT GLUCOSE: 264 MG/DL (ref 70–110)
POTASSIUM SERPL-SCNC: 4.1 MMOL/L (ref 3.5–5.1)
PROT SERPL-MCNC: 5.7 G/DL (ref 6–8.4)
RBC # BLD AUTO: 2.67 M/UL (ref 4.6–6.2)
SODIUM SERPL-SCNC: 139 MMOL/L (ref 136–145)
TRANS ERYTHROCYTES VOL PATIENT: NORMAL ML
WBC # BLD AUTO: 8.5 K/UL (ref 3.9–12.7)

## 2021-03-12 PROCEDURE — 93010 ELECTROCARDIOGRAM REPORT: CPT | Mod: ,,, | Performed by: INTERNAL MEDICINE

## 2021-03-12 PROCEDURE — 85025 COMPLETE CBC W/AUTO DIFF WBC: CPT | Performed by: STUDENT IN AN ORGANIZED HEALTH CARE EDUCATION/TRAINING PROGRAM

## 2021-03-12 PROCEDURE — 36430 TRANSFUSION BLD/BLD COMPNT: CPT

## 2021-03-12 PROCEDURE — 85018 HEMOGLOBIN: CPT | Performed by: STUDENT IN AN ORGANIZED HEALTH CARE EDUCATION/TRAINING PROGRAM

## 2021-03-12 PROCEDURE — 86900 BLOOD TYPING SEROLOGIC ABO: CPT | Performed by: STUDENT IN AN ORGANIZED HEALTH CARE EDUCATION/TRAINING PROGRAM

## 2021-03-12 PROCEDURE — 83735 ASSAY OF MAGNESIUM: CPT | Performed by: STUDENT IN AN ORGANIZED HEALTH CARE EDUCATION/TRAINING PROGRAM

## 2021-03-12 PROCEDURE — 84100 ASSAY OF PHOSPHORUS: CPT | Performed by: STUDENT IN AN ORGANIZED HEALTH CARE EDUCATION/TRAINING PROGRAM

## 2021-03-12 PROCEDURE — 25000003 PHARM REV CODE 250: Performed by: NURSE PRACTITIONER

## 2021-03-12 PROCEDURE — 94761 N-INVAS EAR/PLS OXIMETRY MLT: CPT

## 2021-03-12 PROCEDURE — 86920 COMPATIBILITY TEST SPIN: CPT | Performed by: STUDENT IN AN ORGANIZED HEALTH CARE EDUCATION/TRAINING PROGRAM

## 2021-03-12 PROCEDURE — 99232 PR SUBSEQUENT HOSPITAL CARE,LEVL II: ICD-10-PCS | Mod: ,,, | Performed by: INTERNAL MEDICINE

## 2021-03-12 PROCEDURE — 36415 COLL VENOUS BLD VENIPUNCTURE: CPT | Performed by: STUDENT IN AN ORGANIZED HEALTH CARE EDUCATION/TRAINING PROGRAM

## 2021-03-12 PROCEDURE — 80053 COMPREHEN METABOLIC PANEL: CPT | Performed by: STUDENT IN AN ORGANIZED HEALTH CARE EDUCATION/TRAINING PROGRAM

## 2021-03-12 PROCEDURE — 25000003 PHARM REV CODE 250: Performed by: STUDENT IN AN ORGANIZED HEALTH CARE EDUCATION/TRAINING PROGRAM

## 2021-03-12 PROCEDURE — 11000001 HC ACUTE MED/SURG PRIVATE ROOM

## 2021-03-12 PROCEDURE — 63600175 PHARM REV CODE 636 W HCPCS: Performed by: STUDENT IN AN ORGANIZED HEALTH CARE EDUCATION/TRAINING PROGRAM

## 2021-03-12 PROCEDURE — 93010 EKG 12-LEAD: ICD-10-PCS | Mod: ,,, | Performed by: INTERNAL MEDICINE

## 2021-03-12 PROCEDURE — C9113 INJ PANTOPRAZOLE SODIUM, VIA: HCPCS | Performed by: STUDENT IN AN ORGANIZED HEALTH CARE EDUCATION/TRAINING PROGRAM

## 2021-03-12 PROCEDURE — 99232 SBSQ HOSP IP/OBS MODERATE 35: CPT | Mod: ,,, | Performed by: INTERNAL MEDICINE

## 2021-03-12 PROCEDURE — 85014 HEMATOCRIT: CPT | Mod: 91 | Performed by: STUDENT IN AN ORGANIZED HEALTH CARE EDUCATION/TRAINING PROGRAM

## 2021-03-12 PROCEDURE — 93005 ELECTROCARDIOGRAM TRACING: CPT

## 2021-03-12 PROCEDURE — P9021 RED BLOOD CELLS UNIT: HCPCS | Performed by: STUDENT IN AN ORGANIZED HEALTH CARE EDUCATION/TRAINING PROGRAM

## 2021-03-12 PROCEDURE — 85018 HEMOGLOBIN: CPT | Mod: 91 | Performed by: STUDENT IN AN ORGANIZED HEALTH CARE EDUCATION/TRAINING PROGRAM

## 2021-03-12 RX ORDER — ASPIRIN 81 MG/1
81 TABLET ORAL DAILY
Status: DISCONTINUED | OUTPATIENT
Start: 2021-03-12 | End: 2021-03-13 | Stop reason: HOSPADM

## 2021-03-12 RX ORDER — HYDROCODONE BITARTRATE AND ACETAMINOPHEN 500; 5 MG/1; MG/1
TABLET ORAL
Status: DISCONTINUED | OUTPATIENT
Start: 2021-03-12 | End: 2021-03-13 | Stop reason: HOSPADM

## 2021-03-12 RX ADMIN — PANTOPRAZOLE SODIUM 40 MG: 40 INJECTION, POWDER, FOR SOLUTION INTRAVENOUS at 08:03

## 2021-03-12 RX ADMIN — Medication 81 MG: at 12:03

## 2021-03-12 RX ADMIN — PANTOPRAZOLE SODIUM 40 MG: 40 INJECTION, POWDER, FOR SOLUTION INTRAVENOUS at 10:03

## 2021-03-12 RX ADMIN — CARVEDILOL 3.12 MG: 3.12 TABLET, FILM COATED ORAL at 08:03

## 2021-03-12 RX ADMIN — INSULIN ASPART 2 UNITS: 100 INJECTION, SOLUTION INTRAVENOUS; SUBCUTANEOUS at 05:03

## 2021-03-12 RX ADMIN — LEVOTHYROXINE SODIUM 88 MCG: 88 TABLET ORAL at 06:03

## 2021-03-12 RX ADMIN — LOSARTAN POTASSIUM 100 MG: 50 TABLET, FILM COATED ORAL at 08:03

## 2021-03-12 RX ADMIN — INSULIN ASPART 2 UNITS: 100 INJECTION, SOLUTION INTRAVENOUS; SUBCUTANEOUS at 08:03

## 2021-03-12 RX ADMIN — SPIRONOLACTONE 25 MG: 25 TABLET, FILM COATED ORAL at 08:03

## 2021-03-12 RX ADMIN — INSULIN ASPART 3 UNITS: 100 INJECTION, SOLUTION INTRAVENOUS; SUBCUTANEOUS at 12:03

## 2021-03-12 RX ADMIN — ATORVASTATIN CALCIUM 80 MG: 40 TABLET, FILM COATED ORAL at 10:03

## 2021-03-12 RX ADMIN — MUPIROCIN: 20 OINTMENT TOPICAL at 10:03

## 2021-03-12 RX ADMIN — MUPIROCIN: 20 OINTMENT TOPICAL at 08:03

## 2021-03-12 RX ADMIN — CARVEDILOL 3.12 MG: 3.12 TABLET, FILM COATED ORAL at 05:03

## 2021-03-13 VITALS
HEIGHT: 67 IN | RESPIRATION RATE: 16 BRPM | SYSTOLIC BLOOD PRESSURE: 159 MMHG | DIASTOLIC BLOOD PRESSURE: 71 MMHG | HEART RATE: 85 BPM | OXYGEN SATURATION: 97 % | WEIGHT: 164 LBS | TEMPERATURE: 98 F | BODY MASS INDEX: 25.74 KG/M2

## 2021-03-13 PROBLEM — D64.9 SYMPTOMATIC ANEMIA: Status: RESOLVED | Noted: 2021-03-09 | Resolved: 2021-03-13

## 2021-03-13 PROBLEM — R79.89 ELEVATED TROPONIN: Status: RESOLVED | Noted: 2020-08-19 | Resolved: 2021-03-13

## 2021-03-13 LAB
ALBUMIN SERPL BCP-MCNC: 2.4 G/DL (ref 3.5–5.2)
ALP SERPL-CCNC: 93 U/L (ref 55–135)
ALT SERPL W/O P-5'-P-CCNC: 15 U/L (ref 10–44)
ANION GAP SERPL CALC-SCNC: 8 MMOL/L (ref 8–16)
AST SERPL-CCNC: 17 U/L (ref 10–40)
BASOPHILS # BLD AUTO: 0.04 K/UL (ref 0–0.2)
BASOPHILS NFR BLD: 0.5 % (ref 0–1.9)
BILIRUB SERPL-MCNC: 0.5 MG/DL (ref 0.1–1)
BUN SERPL-MCNC: 20 MG/DL (ref 8–23)
CALCIUM SERPL-MCNC: 8.5 MG/DL (ref 8.7–10.5)
CHLORIDE SERPL-SCNC: 105 MMOL/L (ref 95–110)
CO2 SERPL-SCNC: 25 MMOL/L (ref 23–29)
CREAT SERPL-MCNC: 1.7 MG/DL (ref 0.5–1.4)
DIFFERENTIAL METHOD: ABNORMAL
EOSINOPHIL # BLD AUTO: 0.6 K/UL (ref 0–0.5)
EOSINOPHIL NFR BLD: 7.1 % (ref 0–8)
ERYTHROCYTE [DISTWIDTH] IN BLOOD BY AUTOMATED COUNT: 16 % (ref 11.5–14.5)
EST. GFR  (AFRICAN AMERICAN): 44 ML/MIN/1.73 M^2
EST. GFR  (NON AFRICAN AMERICAN): 38 ML/MIN/1.73 M^2
GLUCOSE SERPL-MCNC: 192 MG/DL (ref 70–110)
HCT VFR BLD AUTO: 29.7 % (ref 40–54)
HGB BLD-MCNC: 9.4 G/DL (ref 14–18)
IMM GRANULOCYTES # BLD AUTO: 0.03 K/UL (ref 0–0.04)
IMM GRANULOCYTES NFR BLD AUTO: 0.4 % (ref 0–0.5)
LYMPHOCYTES # BLD AUTO: 1.4 K/UL (ref 1–4.8)
LYMPHOCYTES NFR BLD: 16.9 % (ref 18–48)
MAGNESIUM SERPL-MCNC: 2 MG/DL (ref 1.6–2.6)
MCH RBC QN AUTO: 29.2 PG (ref 27–31)
MCHC RBC AUTO-ENTMCNC: 31.6 G/DL (ref 32–36)
MCV RBC AUTO: 92 FL (ref 82–98)
MONOCYTES # BLD AUTO: 1 K/UL (ref 0.3–1)
MONOCYTES NFR BLD: 11.8 % (ref 4–15)
NEUTROPHILS # BLD AUTO: 5.1 K/UL (ref 1.8–7.7)
NEUTROPHILS NFR BLD: 63.3 % (ref 38–73)
NRBC BLD-RTO: 0 /100 WBC
PHOSPHATE SERPL-MCNC: 3.1 MG/DL (ref 2.7–4.5)
PLATELET # BLD AUTO: 474 K/UL (ref 150–350)
PMV BLD AUTO: 9.5 FL (ref 9.2–12.9)
POCT GLUCOSE: 198 MG/DL (ref 70–110)
POCT GLUCOSE: 303 MG/DL (ref 70–110)
POTASSIUM SERPL-SCNC: 4.6 MMOL/L (ref 3.5–5.1)
PROT SERPL-MCNC: 6 G/DL (ref 6–8.4)
RBC # BLD AUTO: 3.22 M/UL (ref 4.6–6.2)
SODIUM SERPL-SCNC: 138 MMOL/L (ref 136–145)
WBC # BLD AUTO: 8.07 K/UL (ref 3.9–12.7)

## 2021-03-13 PROCEDURE — 85025 COMPLETE CBC W/AUTO DIFF WBC: CPT | Performed by: STUDENT IN AN ORGANIZED HEALTH CARE EDUCATION/TRAINING PROGRAM

## 2021-03-13 PROCEDURE — 99232 PR SUBSEQUENT HOSPITAL CARE,LEVL II: ICD-10-PCS | Mod: ,,, | Performed by: INTERNAL MEDICINE

## 2021-03-13 PROCEDURE — 84100 ASSAY OF PHOSPHORUS: CPT | Performed by: STUDENT IN AN ORGANIZED HEALTH CARE EDUCATION/TRAINING PROGRAM

## 2021-03-13 PROCEDURE — C9113 INJ PANTOPRAZOLE SODIUM, VIA: HCPCS | Performed by: STUDENT IN AN ORGANIZED HEALTH CARE EDUCATION/TRAINING PROGRAM

## 2021-03-13 PROCEDURE — 25000003 PHARM REV CODE 250: Performed by: FAMILY MEDICINE

## 2021-03-13 PROCEDURE — 94761 N-INVAS EAR/PLS OXIMETRY MLT: CPT

## 2021-03-13 PROCEDURE — 25000003 PHARM REV CODE 250: Performed by: NURSE PRACTITIONER

## 2021-03-13 PROCEDURE — 25000003 PHARM REV CODE 250: Performed by: STUDENT IN AN ORGANIZED HEALTH CARE EDUCATION/TRAINING PROGRAM

## 2021-03-13 PROCEDURE — 80053 COMPREHEN METABOLIC PANEL: CPT | Performed by: STUDENT IN AN ORGANIZED HEALTH CARE EDUCATION/TRAINING PROGRAM

## 2021-03-13 PROCEDURE — 83735 ASSAY OF MAGNESIUM: CPT | Performed by: STUDENT IN AN ORGANIZED HEALTH CARE EDUCATION/TRAINING PROGRAM

## 2021-03-13 PROCEDURE — 63600175 PHARM REV CODE 636 W HCPCS: Performed by: STUDENT IN AN ORGANIZED HEALTH CARE EDUCATION/TRAINING PROGRAM

## 2021-03-13 PROCEDURE — 99232 SBSQ HOSP IP/OBS MODERATE 35: CPT | Mod: ,,, | Performed by: INTERNAL MEDICINE

## 2021-03-13 PROCEDURE — 36415 COLL VENOUS BLD VENIPUNCTURE: CPT | Performed by: STUDENT IN AN ORGANIZED HEALTH CARE EDUCATION/TRAINING PROGRAM

## 2021-03-13 RX ORDER — ATORVASTATIN CALCIUM 80 MG/1
80 TABLET, FILM COATED ORAL NIGHTLY
Qty: 90 TABLET | Refills: 3 | Status: CANCELLED | OUTPATIENT
Start: 2021-03-13 | End: 2022-01-01

## 2021-03-13 RX ORDER — ASPIRIN 81 MG/1
81 TABLET ORAL DAILY
Qty: 90 TABLET | Refills: 3 | Status: SHIPPED | OUTPATIENT
Start: 2021-03-14 | End: 2022-01-01

## 2021-03-13 RX ADMIN — LEVOTHYROXINE SODIUM 88 MCG: 88 TABLET ORAL at 05:03

## 2021-03-13 RX ADMIN — LOSARTAN POTASSIUM 100 MG: 50 TABLET, FILM COATED ORAL at 09:03

## 2021-03-13 RX ADMIN — SPIRONOLACTONE 25 MG: 25 TABLET, FILM COATED ORAL at 09:03

## 2021-03-13 RX ADMIN — PANTOPRAZOLE SODIUM 40 MG: 40 INJECTION, POWDER, FOR SOLUTION INTRAVENOUS at 09:03

## 2021-03-13 RX ADMIN — CARVEDILOL 3.12 MG: 3.12 TABLET, FILM COATED ORAL at 08:03

## 2021-03-13 RX ADMIN — Medication 81 MG: at 09:03

## 2021-03-13 RX ADMIN — MUPIROCIN: 20 OINTMENT TOPICAL at 09:03

## 2021-03-15 ENCOUNTER — TELEPHONE (OUTPATIENT)
Dept: PRIMARY CARE CLINIC | Facility: CLINIC | Age: 76
End: 2021-03-15

## 2021-03-15 ENCOUNTER — PATIENT OUTREACH (OUTPATIENT)
Dept: ADMINISTRATIVE | Facility: CLINIC | Age: 76
End: 2021-03-15

## 2021-03-16 ENCOUNTER — TELEPHONE (OUTPATIENT)
Dept: ADMINISTRATIVE | Facility: OTHER | Age: 76
End: 2021-03-16

## 2021-03-17 ENCOUNTER — OFFICE VISIT (OUTPATIENT)
Dept: PRIMARY CARE CLINIC | Facility: CLINIC | Age: 76
End: 2021-03-17
Payer: MEDICARE

## 2021-03-17 ENCOUNTER — TELEPHONE (OUTPATIENT)
Dept: PRIMARY CARE CLINIC | Facility: CLINIC | Age: 76
End: 2021-03-17

## 2021-03-17 ENCOUNTER — LAB VISIT (OUTPATIENT)
Dept: LAB | Facility: HOSPITAL | Age: 76
End: 2021-03-17
Attending: INTERNAL MEDICINE
Payer: MEDICARE

## 2021-03-17 ENCOUNTER — TELEPHONE (OUTPATIENT)
Dept: NEPHROLOGY | Facility: HOSPITAL | Age: 76
End: 2021-03-17

## 2021-03-17 VITALS
TEMPERATURE: 97 F | BODY MASS INDEX: 26.33 KG/M2 | OXYGEN SATURATION: 96 % | HEIGHT: 67 IN | HEART RATE: 70 BPM | SYSTOLIC BLOOD PRESSURE: 139 MMHG | DIASTOLIC BLOOD PRESSURE: 65 MMHG | WEIGHT: 167.75 LBS

## 2021-03-17 DIAGNOSIS — E11.69 ANEMIA ASSOCIATED WITH TYPE 2 DIABETES MELLITUS: ICD-10-CM

## 2021-03-17 DIAGNOSIS — N41.0 ACUTE PROSTATITIS: ICD-10-CM

## 2021-03-17 DIAGNOSIS — N39.41 BENIGN PROSTATIC HYPERPLASIA (BPH) WITH URINARY URGE INCONTINENCE: Chronic | ICD-10-CM

## 2021-03-17 DIAGNOSIS — C64.9 METASTATIC RENAL CELL CARCINOMA TO BONE: ICD-10-CM

## 2021-03-17 DIAGNOSIS — E27.8 OTHER SPECIFIED DISORDERS OF ADRENAL GLAND: ICD-10-CM

## 2021-03-17 DIAGNOSIS — Z12.5 ENCOUNTER FOR SCREENING FOR MALIGNANT NEOPLASM OF PROSTATE: ICD-10-CM

## 2021-03-17 DIAGNOSIS — N18.31 TYPE 2 DIABETES MELLITUS WITH STAGE 3A CHRONIC KIDNEY DISEASE, WITHOUT LONG-TERM CURRENT USE OF INSULIN: Primary | ICD-10-CM

## 2021-03-17 DIAGNOSIS — E11.22 TYPE 2 DIABETES MELLITUS WITH STAGE 3A CHRONIC KIDNEY DISEASE, WITHOUT LONG-TERM CURRENT USE OF INSULIN: Primary | ICD-10-CM

## 2021-03-17 DIAGNOSIS — I77.1 TORTUOUS AORTA: ICD-10-CM

## 2021-03-17 DIAGNOSIS — N40.1 BENIGN PROSTATIC HYPERPLASIA (BPH) WITH URINARY URGE INCONTINENCE: Chronic | ICD-10-CM

## 2021-03-17 DIAGNOSIS — D69.2 SENILE PURPURA: ICD-10-CM

## 2021-03-17 DIAGNOSIS — G99.2 MYELOPATHY IN DISEASES CLASSIFIED ELSEWHERE: ICD-10-CM

## 2021-03-17 DIAGNOSIS — I21.4 NSTEMI (NON-ST ELEVATED MYOCARDIAL INFARCTION): ICD-10-CM

## 2021-03-17 DIAGNOSIS — D63.8 ANEMIA ASSOCIATED WITH TYPE 2 DIABETES MELLITUS: ICD-10-CM

## 2021-03-17 DIAGNOSIS — M46.96 UNSPECIFIED INFLAMMATORY SPONDYLOPATHY, LUMBAR REGION: ICD-10-CM

## 2021-03-17 DIAGNOSIS — C79.51 METASTATIC RENAL CELL CARCINOMA TO BONE: ICD-10-CM

## 2021-03-17 PROBLEM — N18.4 CHRONIC KIDNEY DISEASE, STAGE 4 (SEVERE): Status: ACTIVE | Noted: 2021-03-17

## 2021-03-17 LAB
BASOPHILS # BLD AUTO: 0.04 K/UL (ref 0–0.2)
BASOPHILS NFR BLD: 0.4 % (ref 0–1.9)
DIFFERENTIAL METHOD: ABNORMAL
EOSINOPHIL # BLD AUTO: 0.3 K/UL (ref 0–0.5)
EOSINOPHIL NFR BLD: 3.6 % (ref 0–8)
ERYTHROCYTE [DISTWIDTH] IN BLOOD BY AUTOMATED COUNT: 15.9 % (ref 11.5–14.5)
GLUCOSE SERPL-MCNC: 277 MG/DL (ref 70–110)
HCT VFR BLD AUTO: 30.4 % (ref 40–54)
HGB BLD-MCNC: 9.1 G/DL (ref 14–18)
IMM GRANULOCYTES # BLD AUTO: 0.06 K/UL (ref 0–0.04)
IMM GRANULOCYTES NFR BLD AUTO: 0.7 % (ref 0–0.5)
LYMPHOCYTES # BLD AUTO: 1.6 K/UL (ref 1–4.8)
LYMPHOCYTES NFR BLD: 17.5 % (ref 18–48)
MCH RBC QN AUTO: 28.7 PG (ref 27–31)
MCHC RBC AUTO-ENTMCNC: 29.9 G/DL (ref 32–36)
MCV RBC AUTO: 96 FL (ref 82–98)
MONOCYTES # BLD AUTO: 0.9 K/UL (ref 0.3–1)
MONOCYTES NFR BLD: 9.7 % (ref 4–15)
NEUTROPHILS # BLD AUTO: 6.2 K/UL (ref 1.8–7.7)
NEUTROPHILS NFR BLD: 68.1 % (ref 38–73)
NRBC BLD-RTO: 0 /100 WBC
PLATELET # BLD AUTO: 528 K/UL (ref 150–350)
PMV BLD AUTO: 10.1 FL (ref 9.2–12.9)
RBC # BLD AUTO: 3.17 M/UL (ref 4.6–6.2)
WBC # BLD AUTO: 9.1 K/UL (ref 3.9–12.7)

## 2021-03-17 PROCEDURE — 3288F PR FALLS RISK ASSESSMENT DOCUMENTED: ICD-10-PCS | Mod: CPTII,S$GLB,, | Performed by: INTERNAL MEDICINE

## 2021-03-17 PROCEDURE — 99215 PR OFFICE/OUTPT VISIT, EST, LEVL V, 40-54 MIN: ICD-10-PCS | Mod: S$GLB,,, | Performed by: INTERNAL MEDICINE

## 2021-03-17 PROCEDURE — 99499 UNLISTED E&M SERVICE: CPT | Mod: S$GLB,,, | Performed by: INTERNAL MEDICINE

## 2021-03-17 PROCEDURE — 1101F PR PT FALLS ASSESS DOC 0-1 FALLS W/OUT INJ PAST YR: ICD-10-PCS | Mod: CPTII,S$GLB,, | Performed by: INTERNAL MEDICINE

## 2021-03-17 PROCEDURE — 99215 OFFICE O/P EST HI 40 MIN: CPT | Mod: S$GLB,,, | Performed by: INTERNAL MEDICINE

## 2021-03-17 PROCEDURE — 36415 COLL VENOUS BLD VENIPUNCTURE: CPT | Performed by: INTERNAL MEDICINE

## 2021-03-17 PROCEDURE — 84153 ASSAY OF PSA TOTAL: CPT | Performed by: INTERNAL MEDICINE

## 2021-03-17 PROCEDURE — 3288F FALL RISK ASSESSMENT DOCD: CPT | Mod: CPTII,S$GLB,, | Performed by: INTERNAL MEDICINE

## 2021-03-17 PROCEDURE — 87086 URINE CULTURE/COLONY COUNT: CPT | Performed by: INTERNAL MEDICINE

## 2021-03-17 PROCEDURE — 85025 COMPLETE CBC W/AUTO DIFF WBC: CPT | Performed by: INTERNAL MEDICINE

## 2021-03-17 PROCEDURE — 82962 POCT GLUCOSE, HAND-HELD DEVICE: ICD-10-PCS | Mod: ,,, | Performed by: INTERNAL MEDICINE

## 2021-03-17 PROCEDURE — 82962 GLUCOSE BLOOD TEST: CPT | Mod: ,,, | Performed by: INTERNAL MEDICINE

## 2021-03-17 PROCEDURE — 1125F AMNT PAIN NOTED PAIN PRSNT: CPT | Mod: S$GLB,,, | Performed by: INTERNAL MEDICINE

## 2021-03-17 PROCEDURE — 1101F PT FALLS ASSESS-DOCD LE1/YR: CPT | Mod: CPTII,S$GLB,, | Performed by: INTERNAL MEDICINE

## 2021-03-17 PROCEDURE — 99499 RISK ADDL DX/OHS AUDIT: ICD-10-PCS | Mod: S$GLB,,, | Performed by: INTERNAL MEDICINE

## 2021-03-17 PROCEDURE — 1125F PR PAIN SEVERITY QUANTIFIED, PAIN PRESENT: ICD-10-PCS | Mod: S$GLB,,, | Performed by: INTERNAL MEDICINE

## 2021-03-17 RX ORDER — SULFAMETHOXAZOLE AND TRIMETHOPRIM 400; 80 MG/1; MG/1
1 TABLET ORAL 2 TIMES DAILY
Qty: 28 TABLET | Refills: 0 | Status: SHIPPED | OUTPATIENT
Start: 2021-03-17 | End: 2021-01-01

## 2021-03-18 LAB — COMPLEXED PSA SERPL-MCNC: 1.3 NG/ML (ref 0–4)

## 2021-03-19 ENCOUNTER — OFFICE VISIT (OUTPATIENT)
Dept: CARDIOLOGY | Facility: CLINIC | Age: 76
End: 2021-03-19
Payer: MEDICARE

## 2021-03-19 VITALS
HEART RATE: 90 BPM | DIASTOLIC BLOOD PRESSURE: 71 MMHG | SYSTOLIC BLOOD PRESSURE: 193 MMHG | HEIGHT: 67 IN | BODY MASS INDEX: 26.39 KG/M2 | WEIGHT: 168.13 LBS

## 2021-03-19 DIAGNOSIS — I25.10 CORONARY ARTERY DISEASE WITHOUT ANGINA PECTORIS, UNSPECIFIED VESSEL OR LESION TYPE, UNSPECIFIED WHETHER NATIVE OR TRANSPLANTED HEART: Primary | ICD-10-CM

## 2021-03-19 DIAGNOSIS — K92.2 GASTROINTESTINAL BLEEDING, LOWER: ICD-10-CM

## 2021-03-19 DIAGNOSIS — I10 HYPERTENSION, UNSPECIFIED TYPE: ICD-10-CM

## 2021-03-19 LAB — BACTERIA UR CULT: NORMAL

## 2021-03-19 PROCEDURE — 3078F DIAST BP <80 MM HG: CPT | Mod: CPTII,S$GLB,, | Performed by: INTERNAL MEDICINE

## 2021-03-19 PROCEDURE — 3288F FALL RISK ASSESSMENT DOCD: CPT | Mod: CPTII,S$GLB,, | Performed by: INTERNAL MEDICINE

## 2021-03-19 PROCEDURE — 1159F PR MEDICATION LIST DOCUMENTED IN MEDICAL RECORD: ICD-10-PCS | Mod: S$GLB,,, | Performed by: INTERNAL MEDICINE

## 2021-03-19 PROCEDURE — 99214 OFFICE O/P EST MOD 30 MIN: CPT | Mod: S$GLB,,, | Performed by: INTERNAL MEDICINE

## 2021-03-19 PROCEDURE — 1159F MED LIST DOCD IN RCRD: CPT | Mod: S$GLB,,, | Performed by: INTERNAL MEDICINE

## 2021-03-19 PROCEDURE — 3077F SYST BP >= 140 MM HG: CPT | Mod: CPTII,S$GLB,, | Performed by: INTERNAL MEDICINE

## 2021-03-19 PROCEDURE — 3288F PR FALLS RISK ASSESSMENT DOCUMENTED: ICD-10-PCS | Mod: CPTII,S$GLB,, | Performed by: INTERNAL MEDICINE

## 2021-03-19 PROCEDURE — 99999 PR PBB SHADOW E&M-EST. PATIENT-LVL IV: CPT | Mod: PBBFAC,,, | Performed by: INTERNAL MEDICINE

## 2021-03-19 PROCEDURE — 1101F PT FALLS ASSESS-DOCD LE1/YR: CPT | Mod: CPTII,S$GLB,, | Performed by: INTERNAL MEDICINE

## 2021-03-19 PROCEDURE — 1126F AMNT PAIN NOTED NONE PRSNT: CPT | Mod: S$GLB,,, | Performed by: INTERNAL MEDICINE

## 2021-03-19 PROCEDURE — 1101F PR PT FALLS ASSESS DOC 0-1 FALLS W/OUT INJ PAST YR: ICD-10-PCS | Mod: CPTII,S$GLB,, | Performed by: INTERNAL MEDICINE

## 2021-03-19 PROCEDURE — 99214 PR OFFICE/OUTPT VISIT, EST, LEVL IV, 30-39 MIN: ICD-10-PCS | Mod: S$GLB,,, | Performed by: INTERNAL MEDICINE

## 2021-03-19 PROCEDURE — 99999 PR PBB SHADOW E&M-EST. PATIENT-LVL IV: ICD-10-PCS | Mod: PBBFAC,,, | Performed by: INTERNAL MEDICINE

## 2021-03-19 PROCEDURE — 1126F PR PAIN SEVERITY QUANTIFIED, NO PAIN PRESENT: ICD-10-PCS | Mod: S$GLB,,, | Performed by: INTERNAL MEDICINE

## 2021-03-19 PROCEDURE — 3077F PR MOST RECENT SYSTOLIC BLOOD PRESSURE >= 140 MM HG: ICD-10-PCS | Mod: CPTII,S$GLB,, | Performed by: INTERNAL MEDICINE

## 2021-03-19 PROCEDURE — 3078F PR MOST RECENT DIASTOLIC BLOOD PRESSURE < 80 MM HG: ICD-10-PCS | Mod: CPTII,S$GLB,, | Performed by: INTERNAL MEDICINE

## 2021-03-26 ENCOUNTER — OFFICE VISIT (OUTPATIENT)
Dept: PRIMARY CARE CLINIC | Facility: CLINIC | Age: 76
End: 2021-03-26
Payer: MEDICARE

## 2021-03-26 DIAGNOSIS — E87.5 HYPERKALEMIA: Primary | ICD-10-CM

## 2021-03-26 PROCEDURE — 99443 PR PHYSICIAN TELEPHONE EVALUATION 21-30 MIN: CPT | Mod: 95,,, | Performed by: INTERNAL MEDICINE

## 2021-03-26 PROCEDURE — 99443 PR PHYSICIAN TELEPHONE EVALUATION 21-30 MIN: ICD-10-PCS | Mod: 95,,, | Performed by: INTERNAL MEDICINE

## 2021-04-01 ENCOUNTER — TELEPHONE (OUTPATIENT)
Dept: PRIMARY CARE CLINIC | Facility: CLINIC | Age: 76
End: 2021-04-01

## 2021-04-01 DIAGNOSIS — E87.5 HYPERKALEMIA: Primary | ICD-10-CM

## 2021-04-05 ENCOUNTER — TELEPHONE (OUTPATIENT)
Dept: PRIMARY CARE CLINIC | Facility: CLINIC | Age: 76
End: 2021-04-05

## 2021-04-05 DIAGNOSIS — C78.7 METASTATIC RENAL CELL CARCINOMA TO LIVER: ICD-10-CM

## 2021-04-05 DIAGNOSIS — C79.51 METASTATIC RENAL CELL CARCINOMA TO BONE: ICD-10-CM

## 2021-04-05 DIAGNOSIS — C64.9 METASTATIC RENAL CELL CARCINOMA TO BONE: ICD-10-CM

## 2021-04-05 DIAGNOSIS — C64.9 METASTATIC RENAL CELL CARCINOMA TO LIVER: ICD-10-CM

## 2021-04-05 DIAGNOSIS — C64.2 RENAL CELL CARCINOMA OF LEFT KIDNEY: ICD-10-CM

## 2021-04-05 DIAGNOSIS — C64.9 METASTATIC RENAL CELL CARCINOMA, UNSPECIFIED LATERALITY: Chronic | ICD-10-CM

## 2021-04-06 RX ORDER — OXYCODONE HYDROCHLORIDE 10 MG/1
10 TABLET ORAL EVERY 4 HOURS PRN
Qty: 120 TABLET | Refills: 0 | Status: SHIPPED | OUTPATIENT
Start: 2021-04-06 | End: 2021-04-26 | Stop reason: SDUPTHER

## 2021-04-07 ENCOUNTER — TELEPHONE (OUTPATIENT)
Dept: NEPHROLOGY | Facility: CLINIC | Age: 76
End: 2021-04-07

## 2021-04-23 ENCOUNTER — TELEPHONE (OUTPATIENT)
Dept: PRIMARY CARE CLINIC | Facility: CLINIC | Age: 76
End: 2021-04-23

## 2021-04-26 DIAGNOSIS — C64.9 METASTATIC RENAL CELL CARCINOMA, UNSPECIFIED LATERALITY: Chronic | ICD-10-CM

## 2021-04-26 DIAGNOSIS — C79.51 METASTATIC RENAL CELL CARCINOMA TO BONE: ICD-10-CM

## 2021-04-26 DIAGNOSIS — C64.2 RENAL CELL CARCINOMA OF LEFT KIDNEY: ICD-10-CM

## 2021-04-26 DIAGNOSIS — C64.9 METASTATIC RENAL CELL CARCINOMA TO BONE: ICD-10-CM

## 2021-04-26 DIAGNOSIS — C78.7 METASTATIC RENAL CELL CARCINOMA TO LIVER: ICD-10-CM

## 2021-04-26 DIAGNOSIS — C64.9 METASTATIC RENAL CELL CARCINOMA TO LIVER: ICD-10-CM

## 2021-04-26 RX ORDER — OXYCODONE HYDROCHLORIDE 10 MG/1
10 TABLET ORAL EVERY 4 HOURS PRN
Qty: 120 TABLET | Refills: 0 | Status: SHIPPED | OUTPATIENT
Start: 2021-04-26 | End: 2021-05-14 | Stop reason: SDUPTHER

## 2021-05-05 DIAGNOSIS — N18.30 STAGE 3 CHRONIC KIDNEY DISEASE, UNSPECIFIED WHETHER STAGE 3A OR 3B CKD: Primary | ICD-10-CM

## 2021-05-17 ENCOUNTER — HOSPITAL ENCOUNTER (OUTPATIENT)
Dept: RADIOLOGY | Facility: HOSPITAL | Age: 76
Discharge: HOME | End: 2021-05-17
Attending: INTERNAL MEDICINE
Payer: MEDICARE

## 2021-05-17 DIAGNOSIS — C64.9 METASTATIC RENAL CELL CARCINOMA, UNSPECIFIED LATERALITY: ICD-10-CM

## 2021-05-17 PROCEDURE — 74177 CT CHEST ABDOMEN PELVIS WITH CONTRAST (XPD): ICD-10-PCS | Mod: 26,,, | Performed by: RADIOLOGY

## 2021-05-17 PROCEDURE — 74177 CT ABD & PELVIS W/CONTRAST: CPT | Mod: TC

## 2021-05-17 PROCEDURE — 71260 CT THORAX DX C+: CPT | Mod: 26,,, | Performed by: RADIOLOGY

## 2021-05-17 PROCEDURE — 25500020 PHARM REV CODE 255: Performed by: INTERNAL MEDICINE

## 2021-05-17 PROCEDURE — 74177 CT ABD & PELVIS W/CONTRAST: CPT | Mod: 26,,, | Performed by: RADIOLOGY

## 2021-05-17 PROCEDURE — 71260 CT CHEST ABDOMEN PELVIS WITH CONTRAST (XPD): ICD-10-PCS | Mod: 26,,, | Performed by: RADIOLOGY

## 2021-05-17 RX ADMIN — IOHEXOL 15 ML: 350 INJECTION, SOLUTION INTRAVENOUS at 02:05

## 2021-05-17 RX ADMIN — IOHEXOL 100 ML: 350 INJECTION, SOLUTION INTRAVENOUS at 02:05

## 2021-06-06 NOTE — TELEPHONE ENCOUNTER
"Pt arrived ambulatory by himself for consultation with Dr. Snowden for Left lung cancer recurrence. Had prior chemo/rad at [facility]. Pt was given new patient folder which included welcome letter, Eric Understanding RT, SBRT pathway. Pathway was explained in detail and pt verbalized their understanding. They were encouraged to call with any questions, concerns, or decisions. Pt is feeling fairly well. Did have hemoptysis post-biospy for about two weeks and c/o difficulty getting a hold of \"anyone\" to talk about it.     " ----- Message from Kennedi Flannery PA-C sent at 4/16/2019  3:12 PM CDT -----  larned with cbc/cmp/lipid panel on 5/14; can we also schedule patient to see ENT the same day for eval of dizziness/vertigo? thanks

## 2021-06-10 ENCOUNTER — TELEPHONE (OUTPATIENT)
Dept: PRIMARY CARE CLINIC | Facility: CLINIC | Age: 76
End: 2021-06-10

## 2021-07-08 ENCOUNTER — PES CALL (OUTPATIENT)
Dept: ADMINISTRATIVE | Facility: CLINIC | Age: 76
End: 2021-07-08

## 2021-07-10 DIAGNOSIS — C64.9 METASTATIC RENAL CELL CARCINOMA, UNSPECIFIED LATERALITY: Chronic | ICD-10-CM

## 2021-07-10 DIAGNOSIS — C64.2 RENAL CELL CARCINOMA OF LEFT KIDNEY: ICD-10-CM

## 2021-07-10 DIAGNOSIS — C64.9 METASTATIC RENAL CELL CARCINOMA TO BONE: ICD-10-CM

## 2021-07-10 DIAGNOSIS — C78.7 METASTATIC RENAL CELL CARCINOMA TO LIVER: ICD-10-CM

## 2021-07-10 DIAGNOSIS — C64.9 METASTATIC RENAL CELL CARCINOMA TO LIVER: ICD-10-CM

## 2021-07-10 DIAGNOSIS — C79.51 METASTATIC RENAL CELL CARCINOMA TO BONE: ICD-10-CM

## 2021-07-12 RX ORDER — OXYCODONE HYDROCHLORIDE 10 MG/1
10 TABLET ORAL EVERY 4 HOURS PRN
Qty: 120 TABLET | Refills: 0 | Status: SHIPPED | OUTPATIENT
Start: 2021-07-12 | End: 2021-07-30 | Stop reason: SDUPTHER

## 2021-07-19 ENCOUNTER — PATIENT OUTREACH (OUTPATIENT)
Dept: ADMINISTRATIVE | Facility: OTHER | Age: 76
End: 2021-07-19

## 2021-07-20 ENCOUNTER — TELEPHONE (OUTPATIENT)
Dept: PODIATRY | Facility: CLINIC | Age: 76
End: 2021-07-20

## 2021-07-28 DIAGNOSIS — C79.51 METASTATIC RENAL CELL CARCINOMA TO BONE: ICD-10-CM

## 2021-07-28 DIAGNOSIS — C78.7 METASTATIC RENAL CELL CARCINOMA TO LIVER: ICD-10-CM

## 2021-07-28 DIAGNOSIS — C64.2 RENAL CELL CARCINOMA OF LEFT KIDNEY: ICD-10-CM

## 2021-07-28 DIAGNOSIS — C64.9 METASTATIC RENAL CELL CARCINOMA TO LIVER: ICD-10-CM

## 2021-07-28 DIAGNOSIS — C64.9 METASTATIC RENAL CELL CARCINOMA TO BONE: ICD-10-CM

## 2021-07-28 DIAGNOSIS — C64.9 METASTATIC RENAL CELL CARCINOMA, UNSPECIFIED LATERALITY: Chronic | ICD-10-CM

## 2021-07-29 RX ORDER — OXYCODONE HYDROCHLORIDE 10 MG/1
10 TABLET ORAL EVERY 4 HOURS PRN
Qty: 120 TABLET | Refills: 0 | OUTPATIENT
Start: 2021-07-29

## 2021-07-30 DIAGNOSIS — C79.51 METASTATIC RENAL CELL CARCINOMA TO BONE: ICD-10-CM

## 2021-07-30 DIAGNOSIS — C64.2 RENAL CELL CARCINOMA OF LEFT KIDNEY: ICD-10-CM

## 2021-07-30 DIAGNOSIS — C78.7 METASTATIC RENAL CELL CARCINOMA TO LIVER: ICD-10-CM

## 2021-07-30 DIAGNOSIS — C64.9 METASTATIC RENAL CELL CARCINOMA, UNSPECIFIED LATERALITY: Chronic | ICD-10-CM

## 2021-07-30 DIAGNOSIS — C64.9 METASTATIC RENAL CELL CARCINOMA TO LIVER: ICD-10-CM

## 2021-07-30 DIAGNOSIS — C64.9 METASTATIC RENAL CELL CARCINOMA TO BONE: ICD-10-CM

## 2021-07-30 RX ORDER — OXYCODONE HYDROCHLORIDE 10 MG/1
10 TABLET ORAL EVERY 4 HOURS PRN
Qty: 120 TABLET | Refills: 0 | Status: SHIPPED | OUTPATIENT
Start: 2021-07-30 | End: 2021-01-01 | Stop reason: SDUPTHER

## 2021-09-27 PROBLEM — S62.354D CLOSED NONDISPLACED FRACTURE OF SHAFT OF FOURTH METACARPAL BONE OF RIGHT HAND WITH ROUTINE HEALING: Status: ACTIVE | Noted: 2021-01-01

## 2021-10-11 PROBLEM — D64.9 ANEMIA: Status: ACTIVE | Noted: 2021-01-01

## 2022-01-01 ENCOUNTER — OFFICE VISIT (OUTPATIENT)
Dept: PRIMARY CARE CLINIC | Facility: CLINIC | Age: 77
End: 2022-01-01
Payer: MEDICARE

## 2022-01-01 ENCOUNTER — TELEPHONE (OUTPATIENT)
Dept: PRIMARY CARE CLINIC | Facility: CLINIC | Age: 77
End: 2022-01-01
Payer: MEDICARE

## 2022-01-01 ENCOUNTER — PATIENT MESSAGE (OUTPATIENT)
Dept: PRIMARY CARE CLINIC | Facility: CLINIC | Age: 77
End: 2022-01-01
Payer: MEDICARE

## 2022-01-01 ENCOUNTER — TELEPHONE (OUTPATIENT)
Dept: HEMATOLOGY/ONCOLOGY | Facility: CLINIC | Age: 77
End: 2022-01-01
Payer: MEDICARE

## 2022-01-01 ENCOUNTER — LAB VISIT (OUTPATIENT)
Dept: LAB | Facility: HOSPITAL | Age: 77
End: 2022-01-01
Payer: MEDICARE

## 2022-01-01 ENCOUNTER — PATIENT MESSAGE (OUTPATIENT)
Dept: HEMATOLOGY/ONCOLOGY | Facility: CLINIC | Age: 77
End: 2022-01-01
Payer: MEDICARE

## 2022-01-01 ENCOUNTER — OFFICE VISIT (OUTPATIENT)
Dept: HEMATOLOGY/ONCOLOGY | Facility: CLINIC | Age: 77
End: 2022-01-01
Payer: MEDICARE

## 2022-01-01 ENCOUNTER — TELEPHONE (OUTPATIENT)
Dept: ADMINISTRATIVE | Facility: OTHER | Age: 77
End: 2022-01-01

## 2022-01-01 VITALS
RESPIRATION RATE: 16 BRPM | HEIGHT: 67 IN | SYSTOLIC BLOOD PRESSURE: 182 MMHG | OXYGEN SATURATION: 96 % | TEMPERATURE: 99 F | DIASTOLIC BLOOD PRESSURE: 76 MMHG | BODY MASS INDEX: 22.62 KG/M2 | HEART RATE: 93 BPM | WEIGHT: 144.13 LBS

## 2022-01-01 VITALS
OXYGEN SATURATION: 97 % | DIASTOLIC BLOOD PRESSURE: 65 MMHG | TEMPERATURE: 98 F | WEIGHT: 143.06 LBS | HEIGHT: 67 IN | HEART RATE: 100 BPM | SYSTOLIC BLOOD PRESSURE: 130 MMHG | BODY MASS INDEX: 22.45 KG/M2

## 2022-01-01 DIAGNOSIS — F11.20 NARCOTIC DEPENDENCE: ICD-10-CM

## 2022-01-01 DIAGNOSIS — C64.9 METASTATIC RENAL CELL CARCINOMA TO BONE: Primary | ICD-10-CM

## 2022-01-01 DIAGNOSIS — C79.51 METASTATIC RENAL CELL CARCINOMA TO BONE: ICD-10-CM

## 2022-01-01 DIAGNOSIS — N39.41 BENIGN PROSTATIC HYPERPLASIA (BPH) WITH URINARY URGE INCONTINENCE: ICD-10-CM

## 2022-01-01 DIAGNOSIS — E11.22 TYPE 2 DIABETES MELLITUS WITH STAGE 3 CHRONIC KIDNEY DISEASE, WITH LONG-TERM CURRENT USE OF INSULIN: ICD-10-CM

## 2022-01-01 DIAGNOSIS — C64.9 METASTATIC RENAL CELL CARCINOMA TO LIVER: ICD-10-CM

## 2022-01-01 DIAGNOSIS — E11.65 TYPE 2 DIABETES MELLITUS WITH HYPERGLYCEMIA, WITH LONG-TERM CURRENT USE OF INSULIN: Primary | ICD-10-CM

## 2022-01-01 DIAGNOSIS — C78.7 METASTATIC RENAL CELL CARCINOMA TO LIVER: ICD-10-CM

## 2022-01-01 DIAGNOSIS — C78.7 METASTATIC RENAL CELL CARCINOMA TO LIVER: Primary | ICD-10-CM

## 2022-01-01 DIAGNOSIS — N40.1 BENIGN PROSTATIC HYPERPLASIA (BPH) WITH URINARY URGE INCONTINENCE: ICD-10-CM

## 2022-01-01 DIAGNOSIS — I77.1 TORTUOUS AORTA: ICD-10-CM

## 2022-01-01 DIAGNOSIS — M46.96 UNSPECIFIED INFLAMMATORY SPONDYLOPATHY, LUMBAR REGION: ICD-10-CM

## 2022-01-01 DIAGNOSIS — C64.9 METASTATIC RENAL CELL CARCINOMA TO BONE: ICD-10-CM

## 2022-01-01 DIAGNOSIS — N18.30 TYPE 2 DIABETES MELLITUS WITH STAGE 3 CHRONIC KIDNEY DISEASE, WITH LONG-TERM CURRENT USE OF INSULIN: ICD-10-CM

## 2022-01-01 DIAGNOSIS — C64.9 METASTATIC RENAL CELL CARCINOMA, UNSPECIFIED LATERALITY: Chronic | ICD-10-CM

## 2022-01-01 DIAGNOSIS — C64.9 METASTATIC RENAL CELL CARCINOMA TO LIVER: Primary | ICD-10-CM

## 2022-01-01 DIAGNOSIS — D69.2 SENILE PURPURA: ICD-10-CM

## 2022-01-01 DIAGNOSIS — Z79.4 TYPE 2 DIABETES MELLITUS WITH STAGE 3 CHRONIC KIDNEY DISEASE, WITH LONG-TERM CURRENT USE OF INSULIN: ICD-10-CM

## 2022-01-01 DIAGNOSIS — E87.5 HYPERKALEMIA: ICD-10-CM

## 2022-01-01 DIAGNOSIS — N18.4 CHRONIC KIDNEY DISEASE, STAGE 4 (SEVERE): ICD-10-CM

## 2022-01-01 DIAGNOSIS — I25.119 CORONARY ARTERY DISEASE INVOLVING NATIVE CORONARY ARTERY OF NATIVE HEART WITH ANGINA PECTORIS: ICD-10-CM

## 2022-01-01 DIAGNOSIS — G99.2 MYELOPATHY IN DISEASES CLASSIFIED ELSEWHERE: ICD-10-CM

## 2022-01-01 DIAGNOSIS — C79.51 METASTATIC RENAL CELL CARCINOMA TO BONE: Primary | ICD-10-CM

## 2022-01-01 DIAGNOSIS — Z74.09 IMPAIRED FUNCTIONAL MOBILITY AND ENDURANCE: ICD-10-CM

## 2022-01-01 DIAGNOSIS — Z79.4 TYPE 2 DIABETES MELLITUS WITH HYPERGLYCEMIA, WITH LONG-TERM CURRENT USE OF INSULIN: Primary | ICD-10-CM

## 2022-01-01 DIAGNOSIS — C64.9 METASTATIC RENAL CELL CARCINOMA, UNSPECIFIED LATERALITY: Primary | ICD-10-CM

## 2022-01-01 LAB
ALBUMIN SERPL BCP-MCNC: 2.3 G/DL (ref 3.5–5.2)
ALP SERPL-CCNC: 156 U/L (ref 55–135)
ALT SERPL W/O P-5'-P-CCNC: 18 U/L (ref 10–44)
ANION GAP SERPL CALC-SCNC: 7 MMOL/L (ref 8–16)
AST SERPL-CCNC: 22 U/L (ref 10–40)
BASOPHILS # BLD AUTO: 0.03 K/UL (ref 0–0.2)
BASOPHILS NFR BLD: 0.3 % (ref 0–1.9)
BILIRUB SERPL-MCNC: 0.3 MG/DL (ref 0.1–1)
BUN SERPL-MCNC: 27 MG/DL (ref 8–23)
CALCIUM SERPL-MCNC: 9.4 MG/DL (ref 8.7–10.5)
CHLORIDE SERPL-SCNC: 101 MMOL/L (ref 95–110)
CO2 SERPL-SCNC: 27 MMOL/L (ref 23–29)
CREAT SERPL-MCNC: 1.9 MG/DL (ref 0.5–1.4)
DIFFERENTIAL METHOD: ABNORMAL
EOSINOPHIL # BLD AUTO: 0.2 K/UL (ref 0–0.5)
EOSINOPHIL NFR BLD: 1.8 % (ref 0–8)
ERYTHROCYTE [DISTWIDTH] IN BLOOD BY AUTOMATED COUNT: 17.8 % (ref 11.5–14.5)
EST. GFR  (AFRICAN AMERICAN): 38.7 ML/MIN/1.73 M^2
EST. GFR  (NON AFRICAN AMERICAN): 33.5 ML/MIN/1.73 M^2
GLUCOSE SERPL-MCNC: 392 MG/DL (ref 70–110)
GLUCOSE SERPL-MCNC: 432 MG/DL (ref 70–110)
HCT VFR BLD AUTO: 28.8 % (ref 40–54)
HGB BLD-MCNC: 8.5 G/DL (ref 14–18)
IMM GRANULOCYTES # BLD AUTO: 0.08 K/UL (ref 0–0.04)
IMM GRANULOCYTES NFR BLD AUTO: 0.7 % (ref 0–0.5)
LYMPHOCYTES # BLD AUTO: 1.7 K/UL (ref 1–4.8)
LYMPHOCYTES NFR BLD: 15.6 % (ref 18–48)
MCH RBC QN AUTO: 23.3 PG (ref 27–31)
MCHC RBC AUTO-ENTMCNC: 29.5 G/DL (ref 32–36)
MCV RBC AUTO: 79 FL (ref 82–98)
MONOCYTES # BLD AUTO: 1.1 K/UL (ref 0.3–1)
MONOCYTES NFR BLD: 10.4 % (ref 4–15)
NEUTROPHILS # BLD AUTO: 7.7 K/UL (ref 1.8–7.7)
NEUTROPHILS NFR BLD: 71.2 % (ref 38–73)
NRBC BLD-RTO: 0 /100 WBC
PLATELET # BLD AUTO: 685 K/UL (ref 150–450)
PMV BLD AUTO: 9.2 FL (ref 9.2–12.9)
POTASSIUM SERPL-SCNC: 5.3 MMOL/L (ref 3.5–5.1)
PROT SERPL-MCNC: 7.3 G/DL (ref 6–8.4)
RBC # BLD AUTO: 3.65 M/UL (ref 4.6–6.2)
SODIUM SERPL-SCNC: 135 MMOL/L (ref 136–145)
WBC # BLD AUTO: 10.83 K/UL (ref 3.9–12.7)

## 2022-01-01 PROCEDURE — 3072F PR LOW RISK FOR RETINOPATHY: ICD-10-PCS | Mod: HCNC,CPTII,S$GLB, | Performed by: INTERNAL MEDICINE

## 2022-01-01 PROCEDURE — 85025 COMPLETE CBC W/AUTO DIFF WBC: CPT | Mod: HCNC | Performed by: INTERNAL MEDICINE

## 2022-01-01 PROCEDURE — 1125F AMNT PAIN NOTED PAIN PRSNT: CPT | Mod: HCNC,CPTII,S$GLB, | Performed by: INTERNAL MEDICINE

## 2022-01-01 PROCEDURE — 99999 PR PBB SHADOW E&M-EST. PATIENT-LVL IV: CPT | Mod: PBBFAC,HCNC,, | Performed by: INTERNAL MEDICINE

## 2022-01-01 PROCEDURE — 99499 UNLISTED E&M SERVICE: CPT | Mod: S$GLB,,, | Performed by: INTERNAL MEDICINE

## 2022-01-01 PROCEDURE — 99214 PR OFFICE/OUTPT VISIT, EST, LEVL IV, 30-39 MIN: ICD-10-PCS | Mod: HCNC,S$GLB,, | Performed by: INTERNAL MEDICINE

## 2022-01-01 PROCEDURE — 1158F ADVNC CARE PLAN TLK DOCD: CPT | Mod: HCNC,CPTII,S$GLB, | Performed by: INTERNAL MEDICINE

## 2022-01-01 PROCEDURE — 99499 RISK ADDL DX/OHS AUDIT: ICD-10-PCS | Mod: S$GLB,,, | Performed by: INTERNAL MEDICINE

## 2022-01-01 PROCEDURE — 1160F RVW MEDS BY RX/DR IN RCRD: CPT | Mod: HCNC,CPTII,S$GLB, | Performed by: INTERNAL MEDICINE

## 2022-01-01 PROCEDURE — 82962 GLUCOSE BLOOD TEST: CPT | Mod: HCNC,,, | Performed by: INTERNAL MEDICINE

## 2022-01-01 PROCEDURE — 3072F LOW RISK FOR RETINOPATHY: CPT | Mod: HCNC,CPTII,S$GLB, | Performed by: INTERNAL MEDICINE

## 2022-01-01 PROCEDURE — 3288F FALL RISK ASSESSMENT DOCD: CPT | Mod: HCNC,CPTII,S$GLB, | Performed by: INTERNAL MEDICINE

## 2022-01-01 PROCEDURE — 1101F PR PT FALLS ASSESS DOC 0-1 FALLS W/OUT INJ PAST YR: ICD-10-PCS | Mod: HCNC,CPTII,S$GLB, | Performed by: INTERNAL MEDICINE

## 2022-01-01 PROCEDURE — 99999 PR PBB SHADOW E&M-EST. PATIENT-LVL IV: ICD-10-PCS | Mod: PBBFAC,HCNC,, | Performed by: INTERNAL MEDICINE

## 2022-01-01 PROCEDURE — 1159F PR MEDICATION LIST DOCUMENTED IN MEDICAL RECORD: ICD-10-PCS | Mod: HCNC,CPTII,S$GLB, | Performed by: INTERNAL MEDICINE

## 2022-01-01 PROCEDURE — 82962 POCT GLUCOSE, HAND-HELD DEVICE: ICD-10-PCS | Mod: HCNC,,, | Performed by: INTERNAL MEDICINE

## 2022-01-01 PROCEDURE — 99499 UNLISTED E&M SERVICE: CPT | Mod: 95,,, | Performed by: INTERNAL MEDICINE

## 2022-01-01 PROCEDURE — 99215 PR OFFICE/OUTPT VISIT, EST, LEVL V, 40-54 MIN: ICD-10-PCS | Mod: HCNC,S$GLB,, | Performed by: INTERNAL MEDICINE

## 2022-01-01 PROCEDURE — 80053 COMPREHEN METABOLIC PANEL: CPT | Mod: HCNC | Performed by: INTERNAL MEDICINE

## 2022-01-01 PROCEDURE — 99215 OFFICE O/P EST HI 40 MIN: CPT | Mod: HCNC,S$GLB,, | Performed by: INTERNAL MEDICINE

## 2022-01-01 PROCEDURE — 1160F PR REVIEW ALL MEDS BY PRESCRIBER/CLIN PHARMACIST DOCUMENTED: ICD-10-PCS | Mod: HCNC,CPTII,S$GLB, | Performed by: INTERNAL MEDICINE

## 2022-01-01 PROCEDURE — 3078F PR MOST RECENT DIASTOLIC BLOOD PRESSURE < 80 MM HG: ICD-10-PCS | Mod: HCNC,CPTII,S$GLB, | Performed by: INTERNAL MEDICINE

## 2022-01-01 PROCEDURE — 1159F MED LIST DOCD IN RCRD: CPT | Mod: HCNC,CPTII,S$GLB, | Performed by: INTERNAL MEDICINE

## 2022-01-01 PROCEDURE — 1158F PR ADVANCE CARE PLANNING DISCUSS DOCUMENTED IN MEDICAL RECORD: ICD-10-PCS | Mod: HCNC,CPTII,S$GLB, | Performed by: INTERNAL MEDICINE

## 2022-01-01 PROCEDURE — 3078F DIAST BP <80 MM HG: CPT | Mod: HCNC,CPTII,S$GLB, | Performed by: INTERNAL MEDICINE

## 2022-01-01 PROCEDURE — 3077F PR MOST RECENT SYSTOLIC BLOOD PRESSURE >= 140 MM HG: ICD-10-PCS | Mod: HCNC,CPTII,S$GLB, | Performed by: INTERNAL MEDICINE

## 2022-01-01 PROCEDURE — 36415 COLL VENOUS BLD VENIPUNCTURE: CPT | Mod: HCNC | Performed by: INTERNAL MEDICINE

## 2022-01-01 PROCEDURE — 99214 OFFICE O/P EST MOD 30 MIN: CPT | Mod: HCNC,S$GLB,, | Performed by: INTERNAL MEDICINE

## 2022-01-01 PROCEDURE — 1125F PR PAIN SEVERITY QUANTIFIED, PAIN PRESENT: ICD-10-PCS | Mod: HCNC,CPTII,S$GLB, | Performed by: INTERNAL MEDICINE

## 2022-01-01 PROCEDURE — 3077F SYST BP >= 140 MM HG: CPT | Mod: HCNC,CPTII,S$GLB, | Performed by: INTERNAL MEDICINE

## 2022-01-01 PROCEDURE — 99499 RISK ADDL DX/OHS AUDIT: ICD-10-PCS | Mod: 95,,, | Performed by: INTERNAL MEDICINE

## 2022-01-01 PROCEDURE — 1101F PT FALLS ASSESS-DOCD LE1/YR: CPT | Mod: HCNC,CPTII,S$GLB, | Performed by: INTERNAL MEDICINE

## 2022-01-01 PROCEDURE — 3288F PR FALLS RISK ASSESSMENT DOCUMENTED: ICD-10-PCS | Mod: HCNC,CPTII,S$GLB, | Performed by: INTERNAL MEDICINE

## 2022-01-01 RX ORDER — INSULIN GLARGINE 100 [IU]/ML
10 INJECTION, SOLUTION SUBCUTANEOUS NIGHTLY
Qty: 3 ML | Refills: 11 | Status: SHIPPED | OUTPATIENT
Start: 2022-01-01 | End: 2022-01-01

## 2022-01-01 RX ORDER — TAMSULOSIN HYDROCHLORIDE 0.4 MG/1
0.8 CAPSULE ORAL NIGHTLY
Qty: 180 CAPSULE | Refills: 3 | Status: SHIPPED | OUTPATIENT
Start: 2022-01-01

## 2022-01-01 RX ORDER — NALOXONE HYDROCHLORIDE 4 MG/.1ML
SPRAY NASAL
Qty: 1 EACH | Refills: 11 | Status: SHIPPED | OUTPATIENT
Start: 2022-01-01

## 2022-01-01 RX ORDER — TAMSULOSIN HYDROCHLORIDE 0.4 MG/1
0.4 CAPSULE ORAL DAILY
Status: CANCELLED | OUTPATIENT
Start: 2022-01-01

## 2022-01-01 RX ORDER — MORPHINE SULFATE 15 MG/1
15 TABLET ORAL EVERY 4 HOURS PRN
Qty: 30 TABLET | Refills: 0 | Status: SHIPPED | OUTPATIENT
Start: 2022-01-01

## 2022-01-01 RX ORDER — TAMSULOSIN HYDROCHLORIDE 0.4 MG/1
0.8 CAPSULE ORAL NIGHTLY
Qty: 180 CAPSULE | Refills: 3 | Status: CANCELLED | OUTPATIENT
Start: 2022-01-01

## 2022-01-24 NOTE — ACP (ADVANCE CARE PLANNING)
ACP- Metastatic renal cell carcinoma-  Not interested in treatment or hospice. He feels he's able to do everything himself at this point- he can cook for himself, shower, etc. He has oxycodone and morphine at home but reports this doesn't work for him. Appetite has been alright- usually eats one meal a day usually around 10:30. He likes to eat eggs and grits.     He's lived with his son for 5 years but works and is only there at nighttime. Patient doesn't ask his son for help unless he absolutely needs it. Patient states he doesn't get along that well with his son but says his son will do anything for him. Patient also stated that his son expects him to pay all the bills now but isn't bothered by it as long as he has enough money at the end of the month, which he does with his pension and social security. Patient has a 6 year old terrier named  who sleeps with him at night. He has two labs as well.    DNR/ DNI. PoA is son Al Powell II. Patient states he's written something for his living will but is not sure if his son has a copy.    Lulú Lewis MD/MPH  [facility]  [phone] missy

## 2022-01-24 NOTE — PROGRESS NOTES
Continuing current management with amlodipine for now.  Better controlled initially but then remaining above goal; added Coreg.  Improved.   Primary Care Provider Appointment - [facility]  SHARED NOTE: Luisa Nichols (MS4), Dr Lewis (Attending)    Subjective:      Patient ID: Edwin Powell is a 76 y.o. male with RCC, frailty, homebound status    Chief Complaint: Cancer    Prior to this visit, patient's last encounter with PCP was 11/18/2021. Issues addressed at last appointment were DM control, management of metastatic RCC, and advanced care planning.    Had appt with Dr Zambrano that needed to be rescheduled to new appointment was not booked.    Current problems include:    DM-  BG today was >400. Reports morning BG ranges from 150 to 230 even when taking Lantus but stopped consistently checking sugars a couple months ago because he wasn't seeing good results and his fingers started to hurt.  Prescribed 14u basal insulin Lantus but stopped taking it a month ago because sugars continued to be high even when taking Lantus. He's interested in taking daily bolus insulin because he feels sleepy when his sugars are too high.    Lower back pain-  Reports constant BL lower back pain that is 10/10. He's been taking 3 tablets of tylenol per night to help his sleep with helps the pain a little bit.    Fracture of R 4th metacarpal-  Patient stated he fell during [place] on a bookshelf which caused the fracture. Per patient, he wore a cast for a couple weeks then a splint for another couple weeks. During this time he fell on his hand again (end of Nov) and has had much more severe pain since. He has not seen Dr. Zambrano since before this second fall because Dr Zambrano cancelled their appointment in Dec due to needing to be in surgery. He has pain in his fingers and wrist when moving his 4th and 5th fingers and struggles to  objects with his right hand. Interested in rescheduling appointment with Dr Zambrano as long as they send paper confirmation.    Health maintenance-  TODAY: interested in getting second shingles vaccine dose as well as tetanus  booster.    Advance Care Planning     ACP- Metastatic renal cell carcinoma-  Not interested in treatment or hospice. He feels he's able to do everything himself at this point- he can cook for himself, shower, etc. He has oxycodone and morphine at home but reports this doesn't work for him. Appetite has been alright- usually eats one meal a day usually around 10:30. He likes to eat eggs and grits.     He's lived with his son for 5 years but works and is only there at nighttime. Patient doesn't ask his son for help unless he absolutely needs it. Patient states he doesn't get along that well with his son but says his son will do anything for him. Patient also stated that his son expects him to pay all the bills now but isn't bothered by it as long as he has enough money at the end of the month, which he does with his pension and social security. Patient has a 6 year old terrier named  who sleeps with him at night. He has two labs as well.    Seeing Dr Reed tomorrow.    DNR/ DNI. PoA is son Al Powell II. Patient states he's written something for his living will but is not sure if his son has a copy.         Past Surgical History:   Procedure Laterality Date    ABDOMINAL SURGERY      ANTERIOR CERVICAL DISCECTOMY W/ FUSION N/A 8/17/2020    Procedure: DISCECTOMY, SPINE, CERVICAL, ANTERIOR APPROACH, WITH FUSION co case with Dr. Falcon C6/7 Depuy SNS:vocal cord/motors/SSEP Regular OR table Patient needs scope in preop;  Surgeon: Filemon Aguayo MD;  Location: [facility] OR [address] [place] [address];  Service: Neurosurgery;  Laterality: N/A;    APPENDECTOMY      BACK SURGERY      CARDIAC SURGERY      CATARACT EXTRACTION      CHOLECYSTECTOMY      CORONARY STENT PLACEMENT      5 stents    coronary stenting      X 5 last one in 2010-11.    DISSECTION OF NECK N/A 8/17/2020    Procedure: DISSECTION, NECK;  Surgeon: Rush Falcon MD;  Location: [facility] OR [address] [place] [address];  Service: ENT;  Laterality: N/A;    ESOPHAGOGASTRODUODENOSCOPY  N/A 9/1/2020    Procedure: EGD (ESOPHAGOGASTRODUODENOSCOPY);  Surgeon: Jim Hooks MD;  Location: [facility] (2ND FLR);  Service: Endoscopy;  Laterality: N/A;    ESOPHAGOGASTRODUODENOSCOPY N/A 3/10/2021    Procedure: EGD (ESOPHAGOGASTRODUODENOSCOPY);  Surgeon: Taqueria Bean MD;  Location: [facility] ENDO;  Service: Endoscopy;  Laterality: N/A;    INTRAOCULAR PROSTHESES INSERTION Right 6/27/2019    Procedure: INSERTION, IOL PROSTHESIS;  Surgeon: Chary Culp MD;  Location: [facility] OR 1ST FLR;  Service: Ophthalmology;  Laterality: Right;    INTRAOCULAR PROSTHESES INSERTION Left 8/22/2019    Procedure: INSERTION, IOL PROSTHESIS;  Surgeon: Chary Culp MD;  Location: [facility] OR 1ST FLR;  Service: Ophthalmology;  Laterality: Left;    PHACOEMULSIFICATION OF CATARACT Right 6/27/2019    Procedure: PHACOEMULSIFICATION, CATARACT;  Surgeon: Chary Culp MD;  [facility];  Service: Ophthalmology;  Laterality: Right;    PHACOEMULSIFICATION OF CATARACT Left 8/22/2019    Procedure: PHACOEMULSIFICATION, CATARACT;  Surgeon: Chary Culp MD;  [facility];  Service: Ophthalmology;  Laterality: Left;    SPINAL FUSION         Past Medical History:   Diagnosis Date    Acquired hypothyroidism 5/26/2017    Benign essential HTN 5/26/2017    Benign prostatic hyperplasia (BPH) with urinary urge incontinence 6/6/2017    Chronic low back pain 6/1/2017    Chronic respiratory failure with hypoxia, on home oxygen therapy     Coronary artery disease involving native coronary artery of native heart without angina pectoris 5/26/2017    S/p 5 stents - last one around 2010-11.    Gastroesophageal reflux disease without esophagitis 5/26/2017    History of CVA (cerebrovascular accident) 5/26/2017    Hypertension associated with diabetes 5/26/2017    BP controlled on ACE, CCB    Lumbar disc lesion 5/26/2017    Metastatic renal cell carcinoma to bone 6/6/2017    Metastatic renal cell  carcinoma to liver 6/6/2017    Liver Biopsy 5-2017: METASTATIC RENAL CELL CARCINOMA.    Mixed hyperlipidemia 5/26/2017    Type 2 diabetes mellitus with stage 3 chronic kidney disease, with long-term current use of insulin 5/26/2017       Social History     Socioeconomic History    Marital status: Single   Tobacco Use    Smoking status: Former Smoker     Types: Cigarettes    Smokeless tobacco: Never Used    Tobacco comment: haven't smoked in last 25 yrs   Substance and Sexual Activity    Alcohol use: Yes     Comment: rarely     Drug use: Not Currently    Sexual activity: Not Currently     Partners: Female       Review of Systems   Constitutional: Positive for fatigue and unexpected weight change. Negative for activity change, appetite change, chills and fever.   HENT: Positive for rhinorrhea. Negative for congestion, postnasal drip, sneezing and sore throat.    Eyes: Positive for pain.        Sand sensation in R eye   Respiratory: Positive for shortness of breath and wheezing. Negative for cough.    Cardiovascular: Negative for chest pain and leg swelling.   Gastrointestinal: Positive for abdominal pain. Negative for blood in stool, constipation, diarrhea, nausea and vomiting.        Gas pains   Genitourinary: Negative.    Musculoskeletal: Positive for arthralgias, back pain and myalgias. Negative for joint swelling, neck pain and neck stiffness.        BL shoulder pain  BL lower back pain  Pain in BL leg muscles   Skin: Negative.    Neurological: Positive for weakness and light-headedness.        Feels weak in legs when he walks   Hematological: Bruises/bleeds easily.   Psychiatric/Behavioral: Negative.        Objective:   There were no vitals taken for this visit.    Physical Exam  Constitutional:       Appearance: He is ill-appearing.   HENT:      Head: Normocephalic and atraumatic.      Mouth/Throat:      Mouth: Mucous membranes are moist.      Pharynx: Oropharynx is clear.   Eyes:       Conjunctiva/sclera: Conjunctivae normal.   Cardiovascular:      Rate and Rhythm: Normal rate and regular rhythm.      Pulses: Normal pulses.      Heart sounds: Murmur heard.       Pulmonary:      Effort: Pulmonary effort is normal.      Breath sounds: Rales present.   Abdominal:      General: Abdomen is flat. Bowel sounds are normal.      Palpations: Abdomen is soft.   Skin:     General: Skin is warm and dry.   Neurological:      General: No focal deficit present.      Mental Status: He is oriented to person, place, and time.   Psychiatric:         Mood and Affect: Mood normal.         Behavior: Behavior normal.         Thought Content: Thought content normal.         Judgment: Judgment normal.             Lab Results   Component Value Date    WBC 9.22 02/01/2022    HGB 8.6 (L) 02/01/2022    HCT 29.2 (L) 02/01/2022     (H) 02/01/2022    CHOL 160 03/11/2021    TRIG 178 (H) 03/11/2021    HDL 31 (L) 03/11/2021    ALT 23 02/01/2022    AST 23 02/01/2022     02/01/2022    K 5.0 02/01/2022    CL 98 02/01/2022    CREATININE 1.44 (H) 02/01/2022    BUN 28 (H) 02/01/2022    CO2 27 02/01/2022    TSH 5.669 (H) 09/30/2021    PSA 1.3 03/17/2021    INR 1.1 10/11/2021    HGBA1C 11.2 (H) 09/30/2021    HGBA1C 11.2 (H) 09/30/2021       Current Outpatient Medications on File Prior to Visit   Medication Sig Dispense Refill    ascorbic acid (VITAMIN C ORAL) Take by mouth once daily. Hold for 1 week prior to surgery      aspirin (ECOTRIN) 81 MG EC tablet Take 1 tablet (81 mg total) by mouth once daily. 90 tablet 3    b complex vitamins (B COMPLEX 1) tablet Take 1 tablet by mouth once daily. (Patient taking differently: Take 1 tablet by mouth once daily. Hold for 1 week prior to surgery) 90 tablet 3    blood sugar diagnostic Strp 1 strip by Misc.(Non-Drug; Combo Route) route 2 (two) times daily as needed. 200 strip 3    blood sugar diagnostic Strp 1 strip by Misc.(Non-Drug; Combo Route) route once daily. 200 strip 3     "blood-glucose meter Misc use as instructed (Patient taking differently: use as instructed) 1 each 0    carvediloL (COREG) 3.125 MG tablet Take 1 tablet (3.125 mg total) by mouth 2 (two) times daily with meals. (Patient not taking: Reported on 3/19/2021) 180 tablet 3    cholecalciferol, vitamin D3, (VITAMIN D3) 125 mcg (5,000 unit) Tab Take 1 tablet (5,000 Units total) by mouth once daily. (Patient taking differently: Take 5,000 Units by mouth once daily. Hold for 1 week prior to surgery) 90 tablet 3    clopidogreL (PLAVIX) 75 mg tablet Take 75 mg by mouth once daily.      ferrous sulfate (FEOSOL) Tab tablet Take 1 tablet (1 each total) by mouth daily with breakfast. 30 tablet 6    fluticasone propionate (FLONASE) 50 mcg/actuation nasal spray 1 spray (50 mcg total) by Each Nostril route daily as needed for Rhinitis or Allergies.      lancets (ONETOUCH ULTRASOFT LANCETS) Misc 1 lancet by Misc.(Non-Drug; Combo Route) route 2 (two) times a day. 200 each 3    levothyroxine (SYNTHROID) 88 MCG tablet Take 1 tablet (88 mcg total) by mouth once daily. 90 tablet 3    losartan (COZAAR) 100 MG tablet Take 1 tablet (100 mg total) by mouth once daily. 90 tablet 3    pen needle, diabetic 29 gauge x 1/2" Ndle 1 application by Misc.(Non-Drug; Combo Route) route once daily. 100 each 3    rosuvastatin (CRESTOR) 40 MG Tab Take 1 tablet (40 mg total) by mouth every evening. 90 tablet 3    spironolactone (ALDACTONE) 25 MG tablet TAKE 1 TABLET BY MOUTH EVERY DAY 30 tablet 4    tamsulosin (FLOMAX) 0.4 mg Cap Take 2 capsules (0.8 mg total) by mouth every evening. 180 capsule 3     Current Facility-Administered Medications on File Prior to Visit   Medication Dose Route Frequency Provider Last Rate Last Admin    phenylephrine HCL 2.5% ophthalmic solution 1 drop  1 drop Left Eye On Call Procedure Chary Culp MD   1 drop at 08/22/19 0659    proparacaine 0.5 % ophthalmic solution 1 drop  1 drop Left Eye On Call Procedure " Chary Culp MD   1 drop at 08/22/19 0641    tropicamide 1% ophthalmic solution 1 drop  1 drop Left Eye On Call Procedure Chary Culp MD   1 drop at 08/22/19 0700         Assessment:   76 y.o. male with multiple co-morbid illnesses here to follow-up with PCP and continue work-up of chronic issues.     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 Anginal equivalent controlled with CCB, ACE, APT  · Continue CCB, ACE, ASA, statin  · Unclear if patient is taking these medications  · BB held due to response to chemo         Tortuous aorta     CXR 6/16/17 showed tortuous aorta  · Continue statin, ASA, BP control            Hematology    Senile purpura     Ecchymoses and pupura on UE bilaterally  · Advised mindfulness of movements  · Monitor  · continue APT            Oncology    Metastatic renal cell carcinoma to liver     Not pursuing treatment at this time  Will follow up with Dr. Brianna easley  Recommended hospice for symptom management, pt does not want at this time            Endocrine    Type 2 diabetes mellitus with hyperglycemia, with long-term current use of insulin - Primary (Chronic)    Relevant Medications    insulin (LANTUS SOLOSTAR U-100 INSULIN) glargine 100 units/mL (3mL) SubQ pen    Other Relevant Orders    POCT Glucose, Hand-Held Device (Completed)       Orthopedic    Unspecified inflammatory spondylopathy, lumbar region     Low back pain, intermittently controlled with narcotics. He is not physically active  · Advised to increase physical activity           Other Visit Diagnoses     Type 2 diabetes mellitus with stage 3 chronic kidney disease, with long-term current use of insulin        Relevant Medications    insulin (LANTUS SOLOSTAR  U-100 INSULIN) glargine 100 units/mL (3mL) SubQ pen          Health Maintenance       Date Due Completion Date    Diabetes Urine Screening Never done ---    Shingles Vaccine (2 of 2) 01/13/2020 11/18/2019    TETANUS VACCINE 01/23/2022 1/23/2012    Foot Exam 02/18/2022 2/18/2021    Override on 5/18/2020: Done    Override on 1/13/2020: Done    Override on 7/11/2019: Done    Override on 2/2/2018: Done (performed by podiatrist)    Lipid Panel 03/11/2022 3/11/2021    Hemoglobin A1c 03/30/2022 9/30/2021          Future Appointments   Date Time Provider Department Center   2/24/2022  8:00 AM Jessica Siddiqi NP [facility] [place]   7/18/2022 11:30 AM Lulú Lewis MD [facility] MED [facility]         No follow-ups on file. Total clinical care time was 60 min, issues addressed include cancer, chronic pain, social isolation    Luisa Nichols, MS4 (student)    Lulú Lewis MD/MPH  [facility]  [phone] spectralink

## 2022-01-25 NOTE — PROGRESS NOTES
Subjective:       Patient ID: Edwin Powell is a 76 y.o. male.    Chief Complaint: No chief complaint on file.    HPI     Returns for follow up  Declines home palliative as he has 3 dogs and states they would not do well with a guest on property- he states he will work with me when more needs identifed  + weight loss  States last hospitalization he only eats 1 meal per day (breakfast at 10 AM) and snacks  He has chronic back pain -- states about the same and manages with Tylenol    We recently reestablished care after several cancelled appts   He has opted to stay off cancer treatment and opted for surveillance.     Most recently he was hospitalized from 10/12- 10/13/2021 for symptomatic anemia-- abnormal labs reviewed by PCP and he was sent to ED for transfusion resulting in an observation admission  Per hospital discharge note-   Patient admitted and started on 2 units PRBC.  Transfused with no reaction appreciated overnight.  Renal function still abnormal, will start gentle IV hydration and continue to monitor closely.  No signs of further bleeding or bruising.    10/13:  No further episodes of shortness of breath or fatigue reported.  Renal function has greatly improved overnight and patient has had good urinary output.  Will provide patient with referral for GI follow-up in order to scope in outpatient setting. Patient remains hemodynamically stable. Patient clinically improved and medically stable for discharge home with instructions to follow-up with PCP in 1-2 weeks.     Since discharge:  Remains on iron tablets- will consider IV  States not inclined to have further GI procedures as never found anything  Negative EGD 3/2021  States has declined colonoscopies after a prior experience     Most recent imaging:  10/7.2021 CT scans:  COMPARISON:  05/17/2021  FINDINGS:  Compared with the prior study diffuse metastatic mediastinal and hilar lymphadenopathy appears grossly similar to the prior study with confluent  subcarinal lymph nodes measuring up to 2 cm in short axis.  There is prominent atherosclerosis of the thoracic aorta.  Coronary atherosclerosis is present.  There has been considerable interval worsening of extensive neoplastic disease throughout the left and to a lesser extent right hemithorax with multiple pleural based nodules and masses, most notable in the left lung base where there is an associated oval-shaped 8 cm focus of subpleural consolidation adjacent to diffuse pleural thickening and or pleural fluid that may reflect round atelectasis.  Similar but less severe changes are seen in the lingula.  Kidneys/ Ureters: Heterogeneously enhancing left renal mass is increased in size to approximately 7.5 by 5.0 cm.  Right kidney unremarkable.  Liver: Innumerable hyperenhancing liver lesions are increased in size with index lesions as follows: Superior right hepatic lobe, previously 3 cm, now 4.6 cm.  Left hepatic lobe lateral segment inferiorly previously 2 cm, now 3 cm innumerable additional liver lesions are also increased in size or stable.  Gallbladder: Surgically absent  Bile Ducts: No evidence of dilated ducts.  Pancreas: No mass or peripancreatic fat stranding.  Spleen: Unremarkable.  Adrenals: Small bilateral adrenal nodules are too small to characterize with certainty but are stable from prior.  Pelvic organs: Unremarkable.  GI Tract/Mesentery: No evidence of bowel obstruction or inflammation.  Retroperitoneum: No significant adenopathy.  Vasculature: Prominent atherosclerosis of the abdominal aorta.  No convincing evidence of left renal vein thrombosis.  Bones: Destructive bony lesion involving the L3 vertebral body to the right of midline is more conspicuous with increase in size of the soft tissue component and probably some extension into the anterior epidural space on the right.  Impression:  Increase in size of left renal malignancy  Worsening metastatic disease to the chest, liver, and  "bones  Stable metastatic disease to the mediastinum  This report was flagged in Epic as abnormal.     Oncology History:  Mr.Mc Moss is a 77 yo male with CAD (s/p 5 stents, remotely), DM2 (insulin dependent), hypothyroidism , CVA (2013) with no residual deficit, chronic back pain. [place] [facility] [place] of a lumbar spine lesion in the setting of progressive weakness of his legs and exacerbation of chronic back pain, in May 2017. He states that his back pain and LE weakness progressively worsened. [facility]. The pain radiates down his legs. He denies any incontinence of bowel or bladder. He had several falls. After one of the falls, he presented to the ED at [facility], in May 2017. He had an MRI showing "L3 vertebral body lesion with apparent cortical disruption concerning for metastases vs myeloma," and was transferred [place] [facility] [place]. He was evaluated by neurosurgery regarding concern for lytic lesion on L3 on 5/26/17. No surgical intervention was proposed.   CT chest, abdomen,pelvis on 5/27/17 revealed :   --A 3.6 cm exophytic mass arising from the medial aspect of the lower pole of left kidney,   --A small 1.0 cm, subtle, cortical enhancing mass within the upper pole of the right kidney, concerning for possible contralateral solid renal mass  --Multiple enhancing ill-defined hepatic masses concerning for hepatic metastatic disease, hepatomegaly and hepatic steatosis  --Mulltifocal irregular thickening of the bilateral pleura concerning for possible pleural metastatic disease  --Mild nonspecific nodular thickening of the left adrenal gland without discrete nodule  -- Redemonstration of known lytic lesion within the L3 vertebral body. No definite additional discrete lytic lesions are identified.  -MRI pelvis from 10/18/17:    2.5 cm linear T1 hypointense T2/STIR hyperintense lesion within the left iliac bone, this " lesion is indeterminate but felt less likely to represent metastasis given its appearance. Further evaluation can be obtained with bone scan as clinically indicated.  Findings suggestive of left trochanteric bursitis.  Partially visualized L3 metastatic lesion.  Diffuse thickening of the urinary bladder wall can be seen with chronic outlet obstruction or cystitis.  - initiated on Votrient  - stopped in summer of 2020    Review of Systems   Constitutional: Positive for activity change, appetite change, fatigue and unexpected weight change. Negative for chills and fever.   HENT: Negative for trouble swallowing.    Eyes: Negative for visual disturbance.   Respiratory: Positive for shortness of breath (better after trabsfusion). Negative for cough and wheezing.    Cardiovascular: Negative for chest pain, palpitations and leg swelling.   Gastrointestinal: Negative for abdominal distention, abdominal pain, blood in stool, change in bowel habit, constipation, diarrhea, nausea, vomiting and change in bowel habit.   Genitourinary: Negative for decreased urine volume, difficulty urinating, dysuria (had some recently but resolved), frequency and urgency.   Musculoskeletal: Positive for arthralgias, back pain and gait problem. Negative for neck pain.   Neurological: Positive for weakness (see above) and light-headedness (positional changes is when he notes). Negative for dizziness, numbness and headaches.   Hematological: Negative for adenopathy. Does not bruise/bleed easily.         Objective:      Physical Exam  Vitals and nursing note reviewed.   Constitutional:       General: He is not in acute distress.     Appearance: Normal appearance. He is well-developed and normal weight. He is not toxic-appearing or diaphoretic.      Comments: Presents alone   HENT:      Head: Normocephalic and atraumatic.   Eyes:      General: No scleral icterus.     Extraocular Movements: Extraocular movements intact.      Conjunctiva/sclera:  Conjunctivae normal.      Pupils: Pupils are equal, round, and reactive to light.   Neck:      Thyroid: No thyromegaly.   Cardiovascular:      Rate and Rhythm: Normal rate and regular rhythm.      Heart sounds: Normal heart sounds. No murmur heard.  No friction rub. No gallop.    Pulmonary:      Effort: Pulmonary effort is normal. No respiratory distress.      Breath sounds: Normal breath sounds. No wheezing or rales.   Chest:      Chest wall: No tenderness.   Abdominal:      General: Bowel sounds are normal.      Palpations: Abdomen is soft. There is no mass.      Tenderness: There is no abdominal tenderness. There is no rebound.      Comments: No hepatosplenomegaly  Slightly bloated but soft   Musculoskeletal:         General: No tenderness or deformity. Normal range of motion.      Cervical back: Normal range of motion and neck supple.      Comments: No spinal or paraspinal tenderness to palpation   Lymphadenopathy:      Cervical: No cervical adenopathy.   Skin:     General: Skin is warm and dry.      Coloration: Skin is not pale.      Findings: No erythema or rash.   Neurological:      General: No focal deficit present.      Mental Status: He is alert and oriented to person, place, and time.      Cranial Nerves: No cranial nerve deficit.      Sensory: No sensory deficit.      Motor: Weakness (generalized) present.      Coordination: Coordination normal.      Gait: Gait abnormal.   Psychiatric:         Mood and Affect: Mood normal.         Behavior: Behavior normal.         Thought Content: Thought content normal.         Judgment: Judgment normal.         Assessment:       Problem List Items Addressed This Visit     Metastatic renal cell carcinoma to liver - Primary    Relevant Orders    Dnr (do not resuscitate)    Metastatic renal cell carcinoma to bone    Metastatic renal cell carcinoma (Chronic)    Hyperkalemia    Chronic kidney disease, stage 4 (severe)          Plan:       Desires no further therapy  States  ""would call if got so bad" but now desires no help at home  Desires DNR    1 week labs and iron in [place]    RTc 4-6 weeks          "

## 2022-02-07 NOTE — ASSESSMENT & PLAN NOTE
Low back pain, intermittently controlled with narcotics. He is not physically active  · Advised to increase physical activity

## 2022-02-07 NOTE — ASSESSMENT & PLAN NOTE
[facility] S/p 5 stents 2010. Anginal equivalent controlled with CCB, ACE, APT  · Continue CCB, ACE, ASA, statin  · Unclear if patient is taking these medications  · BB held due to response to chemo

## 2022-02-07 NOTE — ASSESSMENT & PLAN NOTE
Not pursuing treatment at this time  Will follow up with Dr. Reed q3mo  Recommended hospice for symptom management, pt does not want at this time

## 2022-02-07 NOTE — PROGRESS NOTES
All documentation in shared note on 1/24/22.     Problem List Items Addressed This Visit        Other    Impaired functional mobility and endurance     Homebound, and low functional status  · Advised hospice or home palliative care               Lulú Lewis MD/MPH  [facility]  [phone] spectralink

## 2022-02-22 NOTE — PT/OT/SLP PROGRESS
Occupational Therapy   Treatment and Discharge     Name: Edwin Powell  MRN: [id number]  Admitting Diagnosis:  Acute on chronic respiratory failure with hypoxia and hypercapnia  1 Day Post-Op    Recommendations:     Discharge Recommendations: home health PT  Discharge Equipment Recommendations:  none  Barriers to discharge:  None    Assessment:     Edwin Powell is a 75 y.o. male with a medical diagnosis of Acute on chronic respiratory failure with hypoxia and hypercapnia.  He presents with performance deficits affecting function are impaired endurance, orthopedic precautions, pain. Pt tolerated session well after max encouragement for participation this date. Pt reported to therapist that he does not require assistance for ADLs and refused to perform ADLs with therapist this date. OT provided education on role of OT in the acute setting and to ensure maximal safety when performing ADL tasks. Pt verbalized understanding but reported no concerns with ADLs or functional tasks. Pt performed functional mobility, bed mobility, and transfers with supervision. OT recommending HH for a home safety assessment. Pt discharge from acute OT 9/2/2020.     Rehab Prognosis:  Good; patient would benefit from acute skilled OT services to address these deficits and reach maximum level of function.       Plan:     Patient to be seen 4 x/week to address the above listed problems via self-care/home management, therapeutic activities, therapeutic exercises  · Plan of Care Expires: 09/01/20  · Plan of Care Reviewed with: patient    Subjective     Pain/Comfort:  · Pain Rating 1: (Pt did not rate numerical value)  · Location 1: back  · Pain Addressed 1: Pre-medicate for activity, Reposition, Distraction, Cessation of Activity    Objective:     Communicated with: RN prior to session.  Patient found HOB elevated with telemetry, pulse ox (continuous) upon OT entry to room. Pt agreeable to therapy session with max encouragement     General  Precautions: Standard, fall   Orthopedic Precautions:Spinal precautions   Braces: N/A     Occupational Performance:     Bed Mobility:  V/cs for rolling technique   · Patient completed Supine to Sit with supervision with HOB flat     Functional Mobility/Transfers:  · Patient completed Sit <> Stand Transfer with supervision  with  rolling walker   · Functional Mobility: Pt engaging in functional mobility to simulate household/community distances approx 200ft  with supervision and utilizing RW in order to maximize functional activity tolerance and standing balance required for engagement in occupations of choice.   · Decreased safety awareness   · No LOB or SOB present     Activities of Daily Living:  · Refused to perform ADL tasks despite education and encouragement provided. Pt reports no concerns with ADLs    [facility] 6 Click ADL: 23    Treatment & Education:  - Pt educated on role of OT, POC, and goals for therapy.    - Pt educated on importance of ambulating hallway daily with nursing staff   - Pt is safe to mobilize with nursing staff using RW  - Time provided for therapeutic counseling and discussion of health disposition.   - Pt dismissive to education provided. Increased agitation noted as education was provided   - Importance of OOB ax's with staff member assistance and sitting OOB majority of day.   - Pt completed ADLs and functional mobility for treatment session as noted above   - Pt verbalized understanding. Pt expressed no further concerns/questions.  - whiteboard updated     Patient left up in chair with all lines intact, call button in reach and RN notifiedEducation:      GOALS:   Multidisciplinary Problems     Occupational Therapy Goals     Not on file          Multidisciplinary Problems (Resolved)        Problem: Occupational Therapy Goal    Goal Priority Disciplines Outcome Interventions   Occupational Therapy Goal   (Resolved)     OT, PT/OT Met    Description: Goals to be met by: 9/1/20     Patient  will increase functional independence with ADLs by performing:    UE Dressing with [place].  LE Dressing with Modified [place].  Grooming while standing at sink with Modified [place].  Toileting from toilet with Modified [place] for hygiene and clothing management.   Bathing from  shower chair/bench with Modified [place].  Toilet transfer to toilet with Modified [place].  Increased functional strength to WFL for B UE.  Upper extremity exercise program x15 reps per handout, with assistance as needed.                     Time Tracking:     OT Date of Treatment: 09/02/20  OT Start Time: 1115  OT Stop Time: 1130  OT Total Time (min): 15 min    Billable Minutes:Therapeutic Activity 15    Maria E Weathers, OT  9/2/2020     Topical Sulfur Applications Counseling: Topical Sulfur Counseling: Patient counseled that this medication may cause skin irritation or allergic reactions.  In the event of skin irritation, the patient was advised to reduce the amount of the drug applied or use it less frequently.   The patient verbalized understanding of the proper use and possible adverse effects of topical sulfur application.  All of the patient's questions and concerns were addressed.

## 2022-04-22 NOTE — TELEPHONE ENCOUNTER
Patient continues to decline. He was seen in the ED, and prescribed narcotics. Per son (his HCPoA), patient can not care for himself.    It seems as if patient needs a sitter. Son cannot afford private care sitters.     Message sent to [facility], as patient will likely need sitter and eventually in-patient hospice.    Meds refilled to [facility].    Thanks,  CORTEZ

## 2022-04-22 NOTE — TELEPHONE ENCOUNTER
Hospice referral ordered. [facility] willing to take patient. He may need an inpatient hospice admission, and also needs assistance with volunteer sitters.    Lulú Lewis MD/MPH  [facility]  [phone] [employer]in[employer]

## 2022-04-22 NOTE — TELEPHONE ENCOUNTER
----- Message from Carmelita Fan MA sent at 4/21/2022  1:40 PM CDT -----  Regarding: FW: questions  Contact: [phone]  PLEASE ADVISE   ----- Message -----  From: Melany Stewart  Sent: 4/21/2022  12:41 PM CDT  To: Joshua Grover Staff  Subject: questions                                        Pt Questions    Who Called: Pt son Edwin  Questions: Calling to speak with the dr about the pt  being sent home on Hospice. Please contact pt son  Call Back number: [phone]

## 2022-04-22 NOTE — TELEPHONE ENCOUNTER
Spoke to patient's son and he confirmed that he did in fact speak to dr. Ornelas and is ok. I just wanted to let Mr. Benavidez that the clinic did not forget about him and his dad and he was happy with the phone call

## 2022-04-22 NOTE — ACP (ADVANCE CARE PLANNING)
"Advance Care Planning     Edwin Powell  11:33 AM  04/22/2022    Patient is declining related to his metastatic renal cancer, which has enlarged on imaging. His son, Al Powell who is HCPoA, states patient is no longer able to care for himself nor perform his ADLs. HCPoA also states that patient's pain is uncontrolled.    HCPoA states that patient has been advised hospice by the ED and his oncology specialist, in addition to his PCP. Kin is not able to care for patient independently and needs support, but can not afford a sitter. He is seeking hospice enrollment that can potentially provide volunteer sitter services, and the option of in-patient placement. His son states "I don't think he will be alive in one month."    Lulú Lewis MD/MPH  [facility]  [phone] [employer]            "

## 2022-04-25 NOTE — TELEPHONE ENCOUNTER
I received a call from Mr. Powell's son who is requesting a nebulizer and diuretic as his father's lungs are filling up with fluid as per son.  On Saturday, [facility] came to the home but did not enrolled the patient because of a miss communication.  The son was not aware of the services hospice provides and did not receive the correct information from the hospice company.

## 2022-07-18 NOTE — TELEPHONE ENCOUNTER
Pt's son confirmed that he is on Hospice and slowing declining. They are trying to keep him comfortable with pain meds.

## 2022-08-11 NOTE — ASSESSMENT & PLAN NOTE
--continue home levothyroxine  --checking TSH to r/o alternate etiologies of encephalopathy   Comment: [facility] #[phone] 86kg Comment: [facility] #[phone] 86kg

## 2023-01-01 NOTE — ANESTHESIA POSTPROCEDURE EVALUATION
Anesthesia Post Evaluation    Patient: Edwin Powell    Procedure(s) Performed: Procedure(s) (LRB):  DISCECTOMY, SPINE, CERVICAL, ANTERIOR APPROACH, WITH FUSION co case with Dr. Falcon C6/7 [facility] SNS:vocal cord/motors/SSEP Regular OR table Patient needs scope in preop (N/A)  DISSECTION, NECK (N/A)    Final Anesthesia Type: general    Patient location during evaluation: PACU  Patient participation: Yes- Able to Participate  Level of consciousness: awake  Post-procedure vital signs: reviewed and stable  Pain management: adequate  Airway patency: patent    PONV status at discharge: No PONV  Anesthetic complications: no      Cardiovascular status: blood pressure returned to baseline  Respiratory status: unassisted  Hydration status: euvolemic  Follow-up not needed.          Vitals Value Taken Time   /63 08/17/20 1407   Temp 35.6 °C (96.1 °F) 08/17/20 1407   Pulse 75 08/17/20 1407   Resp 16 08/17/20 1419   SpO2 93 % 08/17/20 1407         Event Time   Out of Recovery 11:00:00         Pain/Lashaun Score: Pain Rating Prior to Med Admin: 8 (8/17/2020  2:19 PM)  Pain Rating Post Med Admin: 5 (8/17/2020  1:15 PM)  Lashaun Score: 10 (8/17/2020  1:15 PM)         normal

## 2023-04-08 NOTE — TELEPHONE ENCOUNTER
He missed his appointment with me.  Please have him f/u in jan and also still has a lot of protein in urine and I requested a 24 hr urine collection per previous tel note.  Please see if he can get this done if possible; otherwise a random urine protein.  Thanks.    Patient with one or more new problems requiring additional work-up/treatment.

## 2023-10-02 NOTE — ASSESSMENT & PLAN NOTE
Uncontrolled with narcotics, now experiencing hemorrhoids  · Continue zofran for nausea  · Continue loperamide  · Advised to use barrier creme   Rotation Flap Text: The defect edges were debeveled with a #15 scalpel blade. Given the location of the defect, shape of the defect and the proximity to free margins a rotation flap was deemed most appropriate. Using a sterile surgical marker, an appropriate rotation flap was drawn incorporating the defect and placing the expected incisions within the relaxed skin tension lines where possible. The area thus outlined was incised deep to adipose tissue with a #15 scalpel blade. The skin margins were undermined to an appropriate distance in all directions utilizing iris scissors. Following this, the designed flap was carried over into the primary defect and sutured into place.

## 2024-09-15 NOTE — PLAN OF CARE
Problem: Adult Inpatient Plan of Care  Goal: Plan of Care Review  Outcome: Ongoing, Progressing  Flowsheets (Taken 8/30/2020 7605)  Plan of Care Reviewed With: patient     Pt remains free from falls/injury. VSS, NAD. Bed in lowest position, wheels locked, side rails up times 2, call light w/in reach, bed alarm on. Room clear of obstacles. Pt up in chair for most of the shift. Pt BG monitored AC/HS as per order. Pt covered with insulin aspart per low sliding scale. No S&S of hypo/hyperglycemia noticed. Pt c/o hemoptysis this afternoon. No significant changes in VS noted. Dr. Montenegro notified, chest Xray ordered and completed. WCTM.         No

## 2025-04-14 NOTE — PROGRESS NOTES
"Subjective:       Patient ID: Edwin Singleton is a 73 y.o. male.    Chief Complaint: Renal cell carcinoma of left kidney    Mr. Singleton is a 73 y.o. male who returns for follow up regarding metastatic renal cell carcinoma of left kidney.  Restaging scans in February showed  stable to improved disease.    Reports overall doing well  Tolerating Votrient with minimal side effects- manages diarrhea with medication, "better than it was"  Eating well   Weight stable  No new pain  Pain well controlled- mostly in low back and side where there are known bone lesions.  Using hydrocodone and morphine.      Imaging: CT scans 2/15/18:  Impression   Left renal lesion as above, stable in size with decreased enhancement.  Interval decrease in size of hepatic lesions.  Stable L3 lytic lesion.  Stable bilateral pleural nodularity, and stable 2 mm right upper lobe pulmonary micronodule.  Stable 0.6 cm hypodense lesion in the upper pole of the right kidney, too small to characterize.    Moderate atherosclerosis with infrarenal abdominal aortic ectasia.  RECIST SUMMARY:Date of prior examination for comparison 8/23/2017  Lesion 1: Left kidney 2.2 cm cm Series 3 Image 122 Prior measurement 2.2 cm cm  Lesion 2: Right kidney 0.6  cm Series 601 Image 79 Prior measurement 0.6  cm  Lesion 3: Hepatic segment II 1.1 cm Series 3 Image 79 Prior measurement 2.1 cm  Lesion 4: Hepatic segment VII 1.6 cm Series 3 Image 82 Prior measurement 3 cm  Lesion 5: L3 vertebral body 2.4 cm Series 3 Image 133 Prior measurement 2.4 cm        Sometimes notes urine flow hard to get started   Does not feel as if he fully empties  On Flomax     Oncology History:  Mr.Mc Moss is a 74 yo male with CAD (s/p 5 stents, remotely), DM2 (insulin dependent), hypothyroidism , CVA (2013) with no residual deficit, chronic back pain. He was transferred to [facility] [place] for neurosurgery evaluation of a lumbar spine lesion in the setting of progressive weakness of his legs and exacerbation " "of chronic back pain, in May 2017. He states that his back pain and LE weakness progressively worsened. [facility]. The pain radiates down his legs. He denies any incontinence of bowel or bladder. He had several falls. After one of the falls, he presented to the ED at [facility], in May 2017. He had an MRI showing "L3 vertebral body lesion with apparent cortical disruption concerning for metastases vs myeloma," and was transferred to [facility] [place] for neurosurgery evaluation. He was evaluated by neurosurgery regarding concern for lytic lesion on L3 on 5/26/17. No surgical intervention was proposed.   CT chest, abdomen,pelvis on 5/27/17 revealed :   --A 3.6 cm exophytic mass arising from the medial aspect of the lower pole of left kidney,   --A small 1.0 cm, subtle, cortical enhancing mass within the upper pole of the right kidney, concerning for possible contralateral solid renal mass  --Multiple enhancing ill-defined hepatic masses concerning for hepatic metastatic disease, hepatomegaly and hepatic steatosis  --Mulltifocal irregular thickening of the bilateral pleura concerning for possible pleural metastatic disease  --Mild nonspecific nodular thickening of the left adrenal gland without discrete nodule  -- Redemonstration of known lytic lesion within the L3 vertebral body. No definite additional discrete lytic lesions are identified.  -MRI pelvis from 10/18/17:    2.5 cm linear T1 hypointense T2/STIR hyperintense lesion within the left iliac bone, this lesion is indeterminate but felt less likely to represent metastasis given its appearance. Further evaluation can be obtained with bone scan as clinically indicated.  Findings suggestive of left trochanteric bursitis.  Partially visualized L3 metastatic lesion.  Diffuse thickening of the urinary bladder wall can be seen with chronic outlet obstruction or cystitis.        Review of Systems   Constitutional: Negative for " appetite change and unexpected weight change.   HENT: Negative for congestion, mouth sores, rhinorrhea and trouble swallowing.    Eyes: Negative for visual disturbance.   Respiratory: Negative for cough and shortness of breath.    Cardiovascular: Negative for chest pain.   Gastrointestinal: Positive for diarrhea (controlled). Negative for abdominal pain.   Genitourinary: Positive for frequency.   Musculoskeletal: Positive for back pain (chronic and unchanged).   Skin: Negative for rash.   Neurological: Negative for headaches.   Hematological: Negative for adenopathy.   Psychiatric/Behavioral: The patient is not nervous/anxious.        Objective:      Physical Exam   Constitutional: He is oriented to person, place, and time. He appears well-nourished. No distress.   Presents alone  ECOG 0   HENT:   Head: Normocephalic.   Mouth/Throat: Oropharynx is clear and moist. No oropharyngeal exudate.   Eyes: Conjunctivae are normal. Pupils are equal, round, and reactive to light. No scleral icterus.   Neck: Normal range of motion. Neck supple. No thyromegaly present.   Cardiovascular: Normal rate, regular rhythm, normal heart sounds and intact distal pulses.    Pulmonary/Chest: Effort normal and breath sounds normal. No respiratory distress.   Lungs clear     Abdominal: Soft. Bowel sounds are normal. He exhibits no distension and no mass. There is no tenderness.   Musculoskeletal: Normal range of motion. He exhibits no edema.   No spinal or paraspinal tenderness to palpation  Point tenderness at right flank area, unchanged from previous exams     Lymphadenopathy:     He has no cervical adenopathy.   Neurological: He is alert and oriented to person, place, and time. No cranial nerve deficit.   Skin: Skin is warm and dry. No rash noted. No erythema. No pallor.   Psychiatric: He has a normal mood and affect. His behavior is normal. Thought content normal.   Vitals reviewed.      Assessment:       1. Renal cell carcinoma of left  kidney    2. 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 Neoplasm related pain    4. Chemotherapy induced diarrhea        Plan:       1-2)Continue Votrient with response  Tolerating well  Will scan again in June unless clinically indicated prior  3)controlled with current pain medications  4)controlled with lomotil.       Distress Screening Results: Psychosocial Distress screening score of Distress Score: 0 noted and reviewed. No intervention indicated.   No complaints in sensation offered.

## (undated) DEVICE — SOL BETADINE 5%

## (undated) DEVICE — CARTRIDGE OIL

## (undated) DEVICE — SUT VICRYL 3-0 27 SH

## (undated) DEVICE — SHIELD EYE PLASTIC 3100G

## (undated) DEVICE — SEE MEDLINE ITEM 156905

## (undated) DEVICE — EXPANDER PUPIL 7.0MM

## (undated) DEVICE — DRAPE THYROID WITH ARMBOARD

## (undated) DEVICE — TUBE FRAZIER 5MM 2FT SOFT TIP

## (undated) DEVICE — TUBE EMG NIM 7.0MM TRIVANTAGE

## (undated) DEVICE — GAUZE SPONGE PEANUT STRL

## (undated) DEVICE — SUT SILK 2-0 BLK BR PS-2 18

## (undated) DEVICE — GLOVE BIOGEL ECLIPSE SZ 8

## (undated) DEVICE — SEE MEDLINE ITEM 146292

## (undated) DEVICE — SHIELD COLLAGEN 12HR CORNEAL

## (undated) DEVICE — KIT GREY EYE

## (undated) DEVICE — ELECTRODE REM PLYHSV RETURN 9

## (undated) DEVICE — GOWN SURGICAL X-LARGE

## (undated) DEVICE — DRAPE C ARM 42 X 120 10/BX

## (undated) DEVICE — SEE MEDLINE ITEM 157131

## (undated) DEVICE — SUT MCRYL PLUS 4-0 PS2 27IN

## (undated) DEVICE — DRESSING TRANS 4X4 TEGADERM

## (undated) DEVICE — DRAPE OPHTHALMIC 48X62 FEN

## (undated) DEVICE — EVACUATOR WOUND BULB 100CC

## (undated) DEVICE — GARTER EYE ADULT

## (undated) DEVICE — SHIELD EYE CLEAR ACRYLIC UNIV

## (undated) DEVICE — SOL WATER STRL IRR 1000ML

## (undated) DEVICE — BIT DRILL FLAT CHUCK 16MM

## (undated) DEVICE — NDL SPINAL 18GX3.5 SPINOCAN

## (undated) DEVICE — DIFFUSER

## (undated) DEVICE — NDL FLTR 5MCRN BLNT TIP 18GX1

## (undated) DEVICE — KIT SURGIFLO HEMOSTATIC MATRIX

## (undated) DEVICE — CLOSURE SKIN STERI STRIP 1/2X4

## (undated) DEVICE — MARKER SKIN STND TIP BLUE BARR

## (undated) DEVICE — SHEILD & GARTERS FOX METAL EYE

## (undated) DEVICE — CHLORAPREP 3ML APPLICATOR TINT

## (undated) DEVICE — SEE MEDLINE ITEM 157150

## (undated) DEVICE — PIN DISTRACTION DISP 14MM
Type: IMPLANTABLE DEVICE | Site: NECK | Status: NON-FUNCTIONAL
Removed: 2020-08-17

## (undated) DEVICE — SUT MONOCRYL 5-0 P-3 UND 18

## (undated) DEVICE — DRESSING TELFA STRL 4X3 LF

## (undated) DEVICE — DRAPE OPMI STERILE

## (undated) DEVICE — DRESSING SURGICAL 1/2X1/2

## (undated) DEVICE — TIP KERRISON RONGEUR THIN 1MM

## (undated) DEVICE — SUT SILK 3-0 SH 18IN BLACK

## (undated) DEVICE — DRAIN CHANNEL ROUND 10FR

## (undated) DEVICE — CORD BIPOLAR 12 FOOT

## (undated) DEVICE — BUR BONE CUT MICRO TPS 3X3.8MM